# Patient Record
Sex: FEMALE | Race: WHITE | NOT HISPANIC OR LATINO | Employment: OTHER | URBAN - METROPOLITAN AREA
[De-identification: names, ages, dates, MRNs, and addresses within clinical notes are randomized per-mention and may not be internally consistent; named-entity substitution may affect disease eponyms.]

---

## 2017-02-20 ENCOUNTER — HOSPITAL ENCOUNTER (OUTPATIENT)
Dept: RADIOLOGY | Facility: MEDICAL CENTER | Age: 62
Discharge: HOME | End: 2017-02-20
Payer: COMMERCIAL

## 2017-02-20 ENCOUNTER — TRANSCRIBE ORDERS (OUTPATIENT)
Dept: ADMINISTRATIVE | Facility: HOSPITAL | Age: 62
End: 2017-02-20

## 2017-02-20 DIAGNOSIS — M79.671 RIGHT FOOT PAIN: ICD-10-CM

## 2017-02-20 DIAGNOSIS — M79.671 RIGHT FOOT PAIN: Primary | ICD-10-CM

## 2017-02-20 PROCEDURE — 73630 X-RAY EXAM OF FOOT: CPT

## 2017-04-26 ENCOUNTER — ALLSCRIPTS OFFICE VISIT (OUTPATIENT)
Dept: OTHER | Facility: OTHER | Age: 62
End: 2017-04-26

## 2017-05-02 ENCOUNTER — HOSPITAL ENCOUNTER (OUTPATIENT)
Dept: MAMMOGRAPHY | Facility: CLINIC | Age: 62
Discharge: HOME | End: 2017-05-02
Payer: COMMERCIAL

## 2017-05-02 DIAGNOSIS — Z15.01 GENETIC SUSCEPTIBILITY TO MALIGNANT NEOPLASM OF BREAST: ICD-10-CM

## 2017-05-02 DIAGNOSIS — Z15.02 GENETIC SUSCEPTIBILITY TO MALIGNANT NEOPLASM OF OVARY: ICD-10-CM

## 2017-05-02 DIAGNOSIS — Z12.31 ENCOUNTER FOR SCREENING MAMMOGRAM FOR MALIGNANT NEOPLASM OF BREAST: ICD-10-CM

## 2017-05-02 PROCEDURE — G0202 SCR MAMMO BI INCL CAD: HCPCS

## 2017-05-09 ENCOUNTER — ALLSCRIPTS OFFICE VISIT (OUTPATIENT)
Dept: OTHER | Facility: OTHER | Age: 62
End: 2017-05-09

## 2017-05-18 ENCOUNTER — TRANSCRIBE ORDERS (OUTPATIENT)
Dept: ADMINISTRATIVE | Facility: HOSPITAL | Age: 62
End: 2017-05-18

## 2017-05-18 DIAGNOSIS — Z15.01 GENETIC SUSCEPTIBILITY TO BREAST CANCER: ICD-10-CM

## 2017-05-18 DIAGNOSIS — Z15.01 GENETIC SUSCEPTIBILITY TO MALIGNANT NEOPLASM OF BREAST: Primary | ICD-10-CM

## 2017-05-30 ENCOUNTER — HOSPITAL ENCOUNTER (OUTPATIENT)
Dept: ULTRASOUND IMAGING | Facility: CLINIC | Age: 62
Discharge: HOME | End: 2017-05-30
Payer: COMMERCIAL

## 2017-05-30 DIAGNOSIS — Z15.01 GENETIC SUSCEPTIBILITY TO MALIGNANT NEOPLASM OF BREAST: ICD-10-CM

## 2017-05-30 PROCEDURE — 76377 3D RENDER W/INTRP POSTPROCES: CPT

## 2017-05-30 PROCEDURE — 76641 ULTRASOUND BREAST COMPLETE: CPT

## 2017-08-24 ENCOUNTER — APPOINTMENT (EMERGENCY)
Dept: RADIOLOGY | Facility: HOSPITAL | Age: 62
End: 2017-08-24
Payer: COMMERCIAL

## 2017-08-24 ENCOUNTER — HOSPITAL ENCOUNTER (EMERGENCY)
Facility: HOSPITAL | Age: 62
Discharge: HOME | End: 2017-08-24
Attending: EMERGENCY MEDICINE | Admitting: EMERGENCY MEDICINE
Payer: COMMERCIAL

## 2017-08-24 ENCOUNTER — APPOINTMENT (EMERGENCY)
Dept: NON INVASIVE DIAGNOSTICS | Facility: HOSPITAL | Age: 62
End: 2017-08-24
Payer: COMMERCIAL

## 2017-08-24 VITALS
WEIGHT: 250 LBS | TEMPERATURE: 97.9 F | RESPIRATION RATE: 18 BRPM | OXYGEN SATURATION: 94 % | HEART RATE: 83 BPM | BODY MASS INDEX: 40.35 KG/M2 | DIASTOLIC BLOOD PRESSURE: 60 MMHG | SYSTOLIC BLOOD PRESSURE: 127 MMHG

## 2017-08-24 DIAGNOSIS — S82.224A CLOSED NONDISPLACED TRANSVERSE FRACTURE OF SHAFT OF RIGHT TIBIA, INITIAL ENCOUNTER: Primary | ICD-10-CM

## 2017-08-24 PROCEDURE — 73590 X-RAY EXAM OF LOWER LEG: CPT

## 2017-08-24 PROCEDURE — 99284 EMERGENCY DEPT VISIT MOD MDM: CPT

## 2017-08-24 PROCEDURE — 93971 EXTREMITY STUDY: CPT

## 2017-08-24 RX ORDER — OXYCODONE HYDROCHLORIDE 10 MG/1
20 TABLET ORAL ONCE
Status: COMPLETED | OUTPATIENT
Start: 2017-08-24 | End: 2017-08-24

## 2017-08-24 RX ADMIN — OXYCODONE HYDROCHLORIDE 20 MG: 10 TABLET ORAL at 15:21

## 2017-09-01 ENCOUNTER — ALLSCRIPTS OFFICE VISIT (OUTPATIENT)
Dept: OTHER | Facility: OTHER | Age: 62
End: 2017-09-01

## 2017-09-04 ENCOUNTER — APPOINTMENT (INPATIENT)
Dept: RADIOLOGY | Facility: HOSPITAL | Age: 62
DRG: 493 | End: 2017-09-04
Payer: COMMERCIAL

## 2017-09-04 ENCOUNTER — APPOINTMENT (EMERGENCY)
Dept: RADIOLOGY | Facility: HOSPITAL | Age: 62
DRG: 493 | End: 2017-09-04
Payer: COMMERCIAL

## 2017-09-04 ENCOUNTER — HOSPITAL ENCOUNTER (INPATIENT)
Facility: HOSPITAL | Age: 62
LOS: 5 days | DRG: 493 | End: 2017-09-09
Attending: EMERGENCY MEDICINE | Admitting: ORTHOPAEDIC SURGERY
Payer: COMMERCIAL

## 2017-09-04 DIAGNOSIS — M31.30 WEGENER'S GRANULOMATOSIS: Chronic | ICD-10-CM

## 2017-09-04 DIAGNOSIS — S82.201A CLOSED RIGHT TIBIAL FRACTURE: Primary | ICD-10-CM

## 2017-09-04 LAB
ABO GROUP BLD: NORMAL
ANION GAP SERPL CALCULATED.3IONS-SCNC: 6 MMOL/L (ref 4–13)
APTT PPP: 28 SECONDS (ref 23–35)
BLD GP AB SCN SERPL QL: NEGATIVE
BUN SERPL-MCNC: 16 MG/DL (ref 5–25)
CALCIUM SERPL-MCNC: 9.1 MG/DL (ref 8.3–10.1)
CHLORIDE SERPL-SCNC: 104 MMOL/L (ref 100–108)
CO2 SERPL-SCNC: 30 MMOL/L (ref 21–32)
CREAT SERPL-MCNC: 0.99 MG/DL (ref 0.6–1.3)
ERYTHROCYTE [DISTWIDTH] IN BLOOD BY AUTOMATED COUNT: 13.3 % (ref 11.6–15.1)
GFR SERPL CREATININE-BSD FRML MDRD: 60 ML/MIN/1.73SQ M
GLUCOSE SERPL-MCNC: 88 MG/DL (ref 65–140)
HCT VFR BLD AUTO: 38.9 % (ref 34.8–46.1)
HGB BLD-MCNC: 12.8 G/DL (ref 11.5–15.4)
INR PPP: 1.02 (ref 0.86–1.16)
MCH RBC QN AUTO: 31.2 PG (ref 26.8–34.3)
MCHC RBC AUTO-ENTMCNC: 32.9 G/DL (ref 31.4–37.4)
MCV RBC AUTO: 95 FL (ref 82–98)
PLATELET # BLD AUTO: 209 THOUSANDS/UL (ref 149–390)
PMV BLD AUTO: 9.5 FL (ref 8.9–12.7)
POTASSIUM SERPL-SCNC: 4.3 MMOL/L (ref 3.5–5.3)
PROTHROMBIN TIME: 13.4 SECONDS (ref 12.1–14.4)
RBC # BLD AUTO: 4.1 MILLION/UL (ref 3.81–5.12)
RH BLD: POSITIVE
SODIUM SERPL-SCNC: 140 MMOL/L (ref 136–145)
SPECIMEN EXPIRATION DATE: NORMAL
WBC # BLD AUTO: 6.3 THOUSAND/UL (ref 4.31–10.16)

## 2017-09-04 PROCEDURE — 85027 COMPLETE CBC AUTOMATED: CPT | Performed by: ORTHOPAEDIC SURGERY

## 2017-09-04 PROCEDURE — 73590 X-RAY EXAM OF LOWER LEG: CPT

## 2017-09-04 PROCEDURE — 86923 COMPATIBILITY TEST ELECTRIC: CPT

## 2017-09-04 PROCEDURE — 99285 EMERGENCY DEPT VISIT HI MDM: CPT

## 2017-09-04 PROCEDURE — 36415 COLL VENOUS BLD VENIPUNCTURE: CPT | Performed by: ORTHOPAEDIC SURGERY

## 2017-09-04 PROCEDURE — 93005 ELECTROCARDIOGRAM TRACING: CPT | Performed by: ORTHOPAEDIC SURGERY

## 2017-09-04 PROCEDURE — 73600 X-RAY EXAM OF ANKLE: CPT

## 2017-09-04 PROCEDURE — 80048 BASIC METABOLIC PNL TOTAL CA: CPT | Performed by: ORTHOPAEDIC SURGERY

## 2017-09-04 PROCEDURE — 73560 X-RAY EXAM OF KNEE 1 OR 2: CPT

## 2017-09-04 PROCEDURE — 71010 HB CHEST X-RAY 1 VIEW FRONTAL: CPT

## 2017-09-04 PROCEDURE — 85610 PROTHROMBIN TIME: CPT | Performed by: ORTHOPAEDIC SURGERY

## 2017-09-04 PROCEDURE — 86901 BLOOD TYPING SEROLOGIC RH(D): CPT | Performed by: ORTHOPAEDIC SURGERY

## 2017-09-04 PROCEDURE — 85730 THROMBOPLASTIN TIME PARTIAL: CPT | Performed by: ORTHOPAEDIC SURGERY

## 2017-09-04 PROCEDURE — 86850 RBC ANTIBODY SCREEN: CPT | Performed by: ORTHOPAEDIC SURGERY

## 2017-09-04 PROCEDURE — 86900 BLOOD TYPING SEROLOGIC ABO: CPT | Performed by: ORTHOPAEDIC SURGERY

## 2017-09-04 RX ORDER — ACETAMINOPHEN 325 MG/1
650 TABLET ORAL EVERY 4 HOURS PRN
Status: DISCONTINUED | OUTPATIENT
Start: 2017-09-04 | End: 2017-09-09 | Stop reason: HOSPADM

## 2017-09-04 RX ORDER — DULOXETIN HYDROCHLORIDE 30 MG/1
30 CAPSULE, DELAYED RELEASE ORAL DAILY
Status: DISCONTINUED | OUTPATIENT
Start: 2017-09-05 | End: 2017-09-09 | Stop reason: HOSPADM

## 2017-09-04 RX ORDER — OXYCODONE HYDROCHLORIDE 5 MG/1
5 TABLET ORAL ONCE
Status: COMPLETED | OUTPATIENT
Start: 2017-09-04 | End: 2017-09-04

## 2017-09-04 RX ORDER — FUROSEMIDE 40 MG/1
40 TABLET ORAL DAILY
Status: DISCONTINUED | OUTPATIENT
Start: 2017-09-05 | End: 2017-09-09 | Stop reason: HOSPADM

## 2017-09-04 RX ORDER — PREGABALIN 50 MG/1
50 CAPSULE ORAL 3 TIMES DAILY
Status: DISCONTINUED | OUTPATIENT
Start: 2017-09-04 | End: 2017-09-09 | Stop reason: HOSPADM

## 2017-09-04 RX ORDER — DOCUSATE SODIUM 100 MG/1
100 CAPSULE, LIQUID FILLED ORAL 2 TIMES DAILY
Status: DISCONTINUED | OUTPATIENT
Start: 2017-09-04 | End: 2017-09-09 | Stop reason: HOSPADM

## 2017-09-04 RX ORDER — PANTOPRAZOLE SODIUM 40 MG/1
40 TABLET, DELAYED RELEASE ORAL
Status: DISCONTINUED | OUTPATIENT
Start: 2017-09-05 | End: 2017-09-09 | Stop reason: HOSPADM

## 2017-09-04 RX ORDER — ONDANSETRON 2 MG/ML
4 INJECTION INTRAMUSCULAR; INTRAVENOUS EVERY 6 HOURS PRN
Status: DISCONTINUED | OUTPATIENT
Start: 2017-09-04 | End: 2017-09-09

## 2017-09-04 RX ORDER — SENNOSIDES 8.6 MG
1 TABLET ORAL DAILY
Status: DISCONTINUED | OUTPATIENT
Start: 2017-09-04 | End: 2017-09-09 | Stop reason: HOSPADM

## 2017-09-04 RX ORDER — SODIUM CHLORIDE, SODIUM LACTATE, POTASSIUM CHLORIDE, CALCIUM CHLORIDE 600; 310; 30; 20 MG/100ML; MG/100ML; MG/100ML; MG/100ML
75 INJECTION, SOLUTION INTRAVENOUS CONTINUOUS
Status: DISCONTINUED | OUTPATIENT
Start: 2017-09-04 | End: 2017-09-06

## 2017-09-04 RX ORDER — LORAZEPAM 0.5 MG/1
0.5 TABLET ORAL EVERY 8 HOURS PRN
Status: DISCONTINUED | OUTPATIENT
Start: 2017-09-04 | End: 2017-09-09 | Stop reason: HOSPADM

## 2017-09-04 RX ORDER — OXYCODONE HCL 20 MG/1
60 TABLET, FILM COATED, EXTENDED RELEASE ORAL EVERY 12 HOURS SCHEDULED
Status: DISCONTINUED | OUTPATIENT
Start: 2017-09-04 | End: 2017-09-09 | Stop reason: HOSPADM

## 2017-09-04 RX ORDER — SULFAMETHOXAZOLE AND TRIMETHOPRIM 800; 160 MG/1; MG/1
1 TABLET ORAL 3 TIMES WEEKLY
Status: DISCONTINUED | OUTPATIENT
Start: 2017-09-04 | End: 2017-09-09 | Stop reason: HOSPADM

## 2017-09-04 RX ADMIN — DOCUSATE SODIUM 100 MG: 100 CAPSULE, LIQUID FILLED ORAL at 20:09

## 2017-09-04 RX ADMIN — LORAZEPAM 0.5 MG: 0.5 TABLET ORAL at 20:02

## 2017-09-04 RX ADMIN — OXYCODONE HYDROCHLORIDE 5 MG: 5 TABLET ORAL at 14:01

## 2017-09-04 RX ADMIN — OXYCODONE HYDROCHLORIDE 60 MG: 20 TABLET, FILM COATED, EXTENDED RELEASE ORAL at 20:03

## 2017-09-04 RX ADMIN — SULFAMETHOXAZOLE AND TRIMETHOPRIM 1 TABLET: 800; 160 TABLET ORAL at 20:00

## 2017-09-05 ENCOUNTER — ANESTHESIA EVENT (INPATIENT)
Dept: PERIOP | Facility: HOSPITAL | Age: 62
DRG: 493 | End: 2017-09-05
Payer: COMMERCIAL

## 2017-09-05 LAB
ATRIAL RATE: 83 BPM
P AXIS: 57 DEGREES
PLATELET # BLD AUTO: 210 THOUSANDS/UL (ref 149–390)
PMV BLD AUTO: 10.2 FL (ref 8.9–12.7)
PR INTERVAL: 156 MS
QRS AXIS: -5 DEGREES
QRSD INTERVAL: 92 MS
QT INTERVAL: 342 MS
QTC INTERVAL: 401 MS
T WAVE AXIS: 22 DEGREES
VENTRICULAR RATE: 83 BPM

## 2017-09-05 PROCEDURE — 85049 AUTOMATED PLATELET COUNT: CPT | Performed by: ORTHOPAEDIC SURGERY

## 2017-09-05 RX ADMIN — PANTOPRAZOLE SODIUM 40 MG: 40 TABLET, DELAYED RELEASE ORAL at 05:22

## 2017-09-05 RX ADMIN — LORAZEPAM 0.5 MG: 0.5 TABLET ORAL at 21:54

## 2017-09-05 RX ADMIN — ACETAMINOPHEN 650 MG: 325 TABLET, FILM COATED ORAL at 08:25

## 2017-09-05 RX ADMIN — OXYCODONE HYDROCHLORIDE 15 MG: 5 TABLET ORAL at 15:26

## 2017-09-05 RX ADMIN — OXYCODONE HYDROCHLORIDE 15 MG: 5 TABLET ORAL at 20:52

## 2017-09-05 RX ADMIN — OXYCODONE HYDROCHLORIDE 15 MG: 5 TABLET ORAL at 02:28

## 2017-09-05 RX ADMIN — OXYCODONE HYDROCHLORIDE 15 MG: 5 TABLET ORAL at 10:27

## 2017-09-05 RX ADMIN — LORAZEPAM 0.5 MG: 0.5 TABLET ORAL at 13:42

## 2017-09-05 RX ADMIN — PREGABALIN 50 MG: 50 CAPSULE ORAL at 08:25

## 2017-09-05 RX ADMIN — OXYCODONE HYDROCHLORIDE 60 MG: 20 TABLET, FILM COATED, EXTENDED RELEASE ORAL at 08:24

## 2017-09-05 RX ADMIN — LORAZEPAM 0.5 MG: 0.5 TABLET ORAL at 04:04

## 2017-09-05 RX ADMIN — FUROSEMIDE 40 MG: 40 TABLET ORAL at 08:25

## 2017-09-05 RX ADMIN — ENOXAPARIN SODIUM 40 MG: 40 INJECTION SUBCUTANEOUS at 08:25

## 2017-09-05 RX ADMIN — SENNOSIDES 8.6 MG: 8.6 TABLET, FILM COATED ORAL at 08:25

## 2017-09-05 RX ADMIN — DOCUSATE SODIUM 100 MG: 100 CAPSULE, LIQUID FILLED ORAL at 08:25

## 2017-09-05 RX ADMIN — DULOXETINE HYDROCHLORIDE 30 MG: 30 CAPSULE, DELAYED RELEASE ORAL at 08:25

## 2017-09-05 RX ADMIN — OXYCODONE HYDROCHLORIDE 60 MG: 20 TABLET, FILM COATED, EXTENDED RELEASE ORAL at 21:54

## 2017-09-06 ENCOUNTER — ANESTHESIA (INPATIENT)
Dept: PERIOP | Facility: HOSPITAL | Age: 62
DRG: 493 | End: 2017-09-06
Payer: COMMERCIAL

## 2017-09-06 ENCOUNTER — APPOINTMENT (INPATIENT)
Dept: RADIOLOGY | Facility: HOSPITAL | Age: 62
DRG: 493 | End: 2017-09-06
Payer: COMMERCIAL

## 2017-09-06 LAB
ANION GAP SERPL CALCULATED.3IONS-SCNC: 7 MMOL/L (ref 4–13)
BASOPHILS # BLD MANUAL: 0 THOUSAND/UL (ref 0–0.1)
BASOPHILS NFR MAR MANUAL: 0 % (ref 0–1)
BUN SERPL-MCNC: 17 MG/DL (ref 5–25)
CALCIUM SERPL-MCNC: 8.8 MG/DL (ref 8.3–10.1)
CHLORIDE SERPL-SCNC: 105 MMOL/L (ref 100–108)
CO2 SERPL-SCNC: 30 MMOL/L (ref 21–32)
CREAT SERPL-MCNC: 1.09 MG/DL (ref 0.6–1.3)
EOSINOPHIL # BLD MANUAL: 0.18 THOUSAND/UL (ref 0–0.4)
EOSINOPHIL NFR BLD MANUAL: 3 % (ref 0–6)
ERYTHROCYTE [DISTWIDTH] IN BLOOD BY AUTOMATED COUNT: 13.6 % (ref 11.6–15.1)
GFR SERPL CREATININE-BSD FRML MDRD: 54 ML/MIN/1.73SQ M
GLUCOSE SERPL-MCNC: 89 MG/DL (ref 65–140)
HCT VFR BLD AUTO: 38.6 % (ref 34.8–46.1)
HCT VFR BLD AUTO: 38.8 % (ref 34.8–46.1)
HGB BLD-MCNC: 12.6 G/DL (ref 11.5–15.4)
HGB BLD-MCNC: 12.6 G/DL (ref 11.5–15.4)
LYMPHOCYTES # BLD AUTO: 1.77 THOUSAND/UL (ref 0.6–4.47)
LYMPHOCYTES # BLD AUTO: 29 % (ref 14–44)
MAGNESIUM SERPL-MCNC: 2.1 MG/DL (ref 1.6–2.6)
MCH RBC QN AUTO: 31 PG (ref 26.8–34.3)
MCHC RBC AUTO-ENTMCNC: 32.6 G/DL (ref 31.4–37.4)
MCV RBC AUTO: 95 FL (ref 82–98)
METAMYELOCYTES NFR BLD MANUAL: 1 % (ref 0–1)
MONOCYTES # BLD AUTO: 0.37 THOUSAND/UL (ref 0–1.22)
MONOCYTES NFR BLD: 6 % (ref 4–12)
NEUTROPHILS # BLD MANUAL: 3.49 THOUSAND/UL (ref 1.85–7.62)
NEUTS SEG NFR BLD AUTO: 57 % (ref 43–75)
NRBC BLD AUTO-RTO: 0 /100 WBCS
PHOSPHATE SERPL-MCNC: 3.7 MG/DL (ref 2.3–4.1)
PLATELET # BLD AUTO: 219 THOUSANDS/UL (ref 149–390)
PLATELET BLD QL SMEAR: ADEQUATE
PMV BLD AUTO: 10.8 FL (ref 8.9–12.7)
POTASSIUM SERPL-SCNC: 4.3 MMOL/L (ref 3.5–5.3)
RBC # BLD AUTO: 4.07 MILLION/UL (ref 3.81–5.12)
RBC MORPH BLD: NORMAL
SODIUM SERPL-SCNC: 142 MMOL/L (ref 136–145)
VARIANT LYMPHS # BLD AUTO: 4 %
WBC # BLD AUTO: 6.12 THOUSAND/UL (ref 4.31–10.16)

## 2017-09-06 PROCEDURE — 73590 X-RAY EXAM OF LOWER LEG: CPT

## 2017-09-06 PROCEDURE — C1713 ANCHOR/SCREW BN/BN,TIS/BN: HCPCS | Performed by: ORTHOPAEDIC SURGERY

## 2017-09-06 PROCEDURE — 80048 BASIC METABOLIC PNL TOTAL CA: CPT | Performed by: INTERNAL MEDICINE

## 2017-09-06 PROCEDURE — 85018 HEMOGLOBIN: CPT | Performed by: PHYSICIAN ASSISTANT

## 2017-09-06 PROCEDURE — 84100 ASSAY OF PHOSPHORUS: CPT | Performed by: INTERNAL MEDICINE

## 2017-09-06 PROCEDURE — 85027 COMPLETE CBC AUTOMATED: CPT | Performed by: INTERNAL MEDICINE

## 2017-09-06 PROCEDURE — 0QSG36Z REPOSITION RIGHT TIBIA WITH INTRAMEDULLARY INTERNAL FIXATION DEVICE, PERCUTANEOUS APPROACH: ICD-10-PCS | Performed by: ORTHOPAEDIC SURGERY

## 2017-09-06 PROCEDURE — 85007 BL SMEAR W/DIFF WBC COUNT: CPT | Performed by: INTERNAL MEDICINE

## 2017-09-06 PROCEDURE — 83735 ASSAY OF MAGNESIUM: CPT | Performed by: INTERNAL MEDICINE

## 2017-09-06 PROCEDURE — C1769 GUIDE WIRE: HCPCS | Performed by: ORTHOPAEDIC SURGERY

## 2017-09-06 PROCEDURE — 85014 HEMATOCRIT: CPT | Performed by: PHYSICIAN ASSISTANT

## 2017-09-06 DEVICE — 5.0MM TI LOCKING SCREW W/T25 STARDRIVE 44MM F/IM NAIL-STER: Type: IMPLANTABLE DEVICE | Site: TIBIA | Status: FUNCTIONAL

## 2017-09-06 DEVICE — 5.0MM TI LOCKING SCREW W/T25 STARDRIVE 42MM F/IM NAIL-STER: Type: IMPLANTABLE DEVICE | Site: TIBIA | Status: FUNCTIONAL

## 2017-09-06 DEVICE — 13MM TI CANN TIBIAL NAIL-EX W/PROX BEND 345MM-STERILE
Type: IMPLANTABLE DEVICE | Site: TIBIA | Status: FUNCTIONAL
Brand: EXPERT NAIL

## 2017-09-06 RX ORDER — SODIUM CHLORIDE, SODIUM LACTATE, POTASSIUM CHLORIDE, CALCIUM CHLORIDE 600; 310; 30; 20 MG/100ML; MG/100ML; MG/100ML; MG/100ML
INJECTION, SOLUTION INTRAVENOUS CONTINUOUS PRN
Status: DISCONTINUED | OUTPATIENT
Start: 2017-09-06 | End: 2017-09-06

## 2017-09-06 RX ORDER — SODIUM CHLORIDE 9 MG/ML
INJECTION, SOLUTION INTRAVENOUS CONTINUOUS PRN
Status: DISCONTINUED | OUTPATIENT
Start: 2017-09-06 | End: 2017-09-06 | Stop reason: SURG

## 2017-09-06 RX ORDER — TOLTERODINE 4 MG/1
4 CAPSULE, EXTENDED RELEASE ORAL DAILY
Status: DISCONTINUED | OUTPATIENT
Start: 2017-09-06 | End: 2017-09-06

## 2017-09-06 RX ORDER — HYDROMORPHONE HYDROCHLORIDE 2 MG/ML
INJECTION, SOLUTION INTRAMUSCULAR; INTRAVENOUS; SUBCUTANEOUS AS NEEDED
Status: DISCONTINUED | OUTPATIENT
Start: 2017-09-06 | End: 2017-09-06 | Stop reason: SURG

## 2017-09-06 RX ORDER — FESOTERODINE FUMARATE 8 MG/1
8 TABLET, EXTENDED RELEASE ORAL EVERY EVENING
Status: DISCONTINUED | OUTPATIENT
Start: 2017-09-06 | End: 2017-09-09 | Stop reason: HOSPADM

## 2017-09-06 RX ORDER — MIDAZOLAM HYDROCHLORIDE 1 MG/ML
INJECTION INTRAMUSCULAR; INTRAVENOUS AS NEEDED
Status: DISCONTINUED | OUTPATIENT
Start: 2017-09-06 | End: 2017-09-06 | Stop reason: SURG

## 2017-09-06 RX ORDER — LIDOCAINE HYDROCHLORIDE 10 MG/ML
INJECTION, SOLUTION INFILTRATION; PERINEURAL AS NEEDED
Status: DISCONTINUED | OUTPATIENT
Start: 2017-09-06 | End: 2017-09-06 | Stop reason: SURG

## 2017-09-06 RX ORDER — ONDANSETRON 2 MG/ML
4 INJECTION INTRAMUSCULAR; INTRAVENOUS ONCE AS NEEDED
Status: COMPLETED | OUTPATIENT
Start: 2017-09-06 | End: 2017-09-06

## 2017-09-06 RX ORDER — MAGNESIUM HYDROXIDE 1200 MG/15ML
LIQUID ORAL AS NEEDED
Status: DISCONTINUED | OUTPATIENT
Start: 2017-09-06 | End: 2017-09-06 | Stop reason: HOSPADM

## 2017-09-06 RX ORDER — FENTANYL CITRATE 50 UG/ML
INJECTION, SOLUTION INTRAMUSCULAR; INTRAVENOUS AS NEEDED
Status: DISCONTINUED | OUTPATIENT
Start: 2017-09-06 | End: 2017-09-06 | Stop reason: SURG

## 2017-09-06 RX ORDER — KETAMINE HYDROCHLORIDE 50 MG/ML
INJECTION, SOLUTION, CONCENTRATE INTRAMUSCULAR; INTRAVENOUS AS NEEDED
Status: DISCONTINUED | OUTPATIENT
Start: 2017-09-06 | End: 2017-09-06 | Stop reason: SURG

## 2017-09-06 RX ORDER — SODIUM CHLORIDE, SODIUM LACTATE, POTASSIUM CHLORIDE, CALCIUM CHLORIDE 600; 310; 30; 20 MG/100ML; MG/100ML; MG/100ML; MG/100ML
20 INJECTION, SOLUTION INTRAVENOUS CONTINUOUS
Status: DISCONTINUED | OUTPATIENT
Start: 2017-09-06 | End: 2017-09-06

## 2017-09-06 RX ORDER — ONDANSETRON 2 MG/ML
INJECTION INTRAMUSCULAR; INTRAVENOUS AS NEEDED
Status: DISCONTINUED | OUTPATIENT
Start: 2017-09-06 | End: 2017-09-06 | Stop reason: SURG

## 2017-09-06 RX ORDER — FENTANYL CITRATE/PF 50 MCG/ML
25 SYRINGE (ML) INJECTION
Status: COMPLETED | OUTPATIENT
Start: 2017-09-06 | End: 2017-09-06

## 2017-09-06 RX ORDER — PROPOFOL 10 MG/ML
INJECTION, EMULSION INTRAVENOUS AS NEEDED
Status: DISCONTINUED | OUTPATIENT
Start: 2017-09-06 | End: 2017-09-06 | Stop reason: SURG

## 2017-09-06 RX ORDER — SODIUM CHLORIDE, SODIUM LACTATE, POTASSIUM CHLORIDE, CALCIUM CHLORIDE 600; 310; 30; 20 MG/100ML; MG/100ML; MG/100ML; MG/100ML
125 INJECTION, SOLUTION INTRAVENOUS CONTINUOUS
Status: DISCONTINUED | OUTPATIENT
Start: 2017-09-06 | End: 2017-09-07

## 2017-09-06 RX ADMIN — SULFAMETHOXAZOLE AND TRIMETHOPRIM 1 TABLET: 800; 160 TABLET ORAL at 09:38

## 2017-09-06 RX ADMIN — SODIUM CHLORIDE, SODIUM LACTATE, POTASSIUM CHLORIDE, AND CALCIUM CHLORIDE 75 ML/HR: .6; .31; .03; .02 INJECTION, SOLUTION INTRAVENOUS at 05:35

## 2017-09-06 RX ADMIN — HYDROMORPHONE HYDROCHLORIDE 0.4 MG: 1 INJECTION, SOLUTION INTRAMUSCULAR; INTRAVENOUS; SUBCUTANEOUS at 13:38

## 2017-09-06 RX ADMIN — OXYCODONE HYDROCHLORIDE 15 MG: 5 TABLET ORAL at 15:59

## 2017-09-06 RX ADMIN — OXYCODONE HYDROCHLORIDE 60 MG: 20 TABLET, FILM COATED, EXTENDED RELEASE ORAL at 09:25

## 2017-09-06 RX ADMIN — CEFAZOLIN SODIUM 2000 MG: 2 SOLUTION INTRAVENOUS at 11:45

## 2017-09-06 RX ADMIN — SODIUM CHLORIDE, SODIUM LACTATE, POTASSIUM CHLORIDE, AND CALCIUM CHLORIDE: .6; .31; .03; .02 INJECTION, SOLUTION INTRAVENOUS at 12:40

## 2017-09-06 RX ADMIN — FESOTERODINE FUMARATE 8 MG: 8 TABLET, EXTENDED RELEASE ORAL at 19:31

## 2017-09-06 RX ADMIN — FENTANYL CITRATE 50 MCG: 50 INJECTION, SOLUTION INTRAMUSCULAR; INTRAVENOUS at 12:50

## 2017-09-06 RX ADMIN — SODIUM CHLORIDE, SODIUM LACTATE, POTASSIUM CHLORIDE, AND CALCIUM CHLORIDE 125 ML/HR: .6; .31; .03; .02 INJECTION, SOLUTION INTRAVENOUS at 14:40

## 2017-09-06 RX ADMIN — OXYCODONE HYDROCHLORIDE 15 MG: 5 TABLET ORAL at 05:51

## 2017-09-06 RX ADMIN — HYDROMORPHONE HYDROCHLORIDE 0.5 MG: 2 INJECTION, SOLUTION INTRAMUSCULAR; INTRAVENOUS; SUBCUTANEOUS at 12:31

## 2017-09-06 RX ADMIN — HYDROMORPHONE HYDROCHLORIDE 1 MG: 1 INJECTION, SOLUTION INTRAMUSCULAR; INTRAVENOUS; SUBCUTANEOUS at 19:31

## 2017-09-06 RX ADMIN — DULOXETINE HYDROCHLORIDE 30 MG: 30 CAPSULE, DELAYED RELEASE ORAL at 09:25

## 2017-09-06 RX ADMIN — KETAMINE HYDROCHLORIDE 100 MG: 50 INJECTION, SOLUTION INTRAMUSCULAR; INTRAVENOUS at 11:40

## 2017-09-06 RX ADMIN — PANTOPRAZOLE SODIUM 40 MG: 40 TABLET, DELAYED RELEASE ORAL at 05:51

## 2017-09-06 RX ADMIN — HYDROMORPHONE HYDROCHLORIDE 0.4 MG: 1 INJECTION, SOLUTION INTRAMUSCULAR; INTRAVENOUS; SUBCUTANEOUS at 13:48

## 2017-09-06 RX ADMIN — FENTANYL CITRATE 25 MCG: 50 INJECTION INTRAMUSCULAR; INTRAVENOUS at 13:18

## 2017-09-06 RX ADMIN — HYDROMORPHONE HYDROCHLORIDE 0.5 MG: 2 INJECTION, SOLUTION INTRAMUSCULAR; INTRAVENOUS; SUBCUTANEOUS at 12:26

## 2017-09-06 RX ADMIN — FENTANYL CITRATE 25 MCG: 50 INJECTION INTRAMUSCULAR; INTRAVENOUS at 13:08

## 2017-09-06 RX ADMIN — HYDROMORPHONE HYDROCHLORIDE 0.4 MG: 1 INJECTION, SOLUTION INTRAMUSCULAR; INTRAVENOUS; SUBCUTANEOUS at 13:28

## 2017-09-06 RX ADMIN — MIDAZOLAM HYDROCHLORIDE 2 MG: 1 INJECTION, SOLUTION INTRAMUSCULAR; INTRAVENOUS at 12:31

## 2017-09-06 RX ADMIN — PREGABALIN 50 MG: 50 CAPSULE ORAL at 09:24

## 2017-09-06 RX ADMIN — PROPOFOL 200 MG: 10 INJECTION, EMULSION INTRAVENOUS at 11:39

## 2017-09-06 RX ADMIN — SODIUM CHLORIDE: 0.9 INJECTION, SOLUTION INTRAVENOUS at 11:49

## 2017-09-06 RX ADMIN — LIDOCAINE HYDROCHLORIDE 50 MG: 10 INJECTION, SOLUTION INFILTRATION; PERINEURAL at 11:39

## 2017-09-06 RX ADMIN — FENTANYL CITRATE 50 MCG: 50 INJECTION, SOLUTION INTRAMUSCULAR; INTRAVENOUS at 11:49

## 2017-09-06 RX ADMIN — FENTANYL CITRATE 25 MCG: 50 INJECTION INTRAMUSCULAR; INTRAVENOUS at 13:28

## 2017-09-06 RX ADMIN — FENTANYL CITRATE 50 MCG: 50 INJECTION, SOLUTION INTRAMUSCULAR; INTRAVENOUS at 12:06

## 2017-09-06 RX ADMIN — SODIUM CHLORIDE, SODIUM LACTATE, POTASSIUM CHLORIDE, AND CALCIUM CHLORIDE: .6; .31; .03; .02 INJECTION, SOLUTION INTRAVENOUS at 11:26

## 2017-09-06 RX ADMIN — FENTANYL CITRATE 50 MCG: 50 INJECTION, SOLUTION INTRAMUSCULAR; INTRAVENOUS at 11:40

## 2017-09-06 RX ADMIN — DOCUSATE SODIUM 100 MG: 100 CAPSULE, LIQUID FILLED ORAL at 19:32

## 2017-09-06 RX ADMIN — HYDROMORPHONE HYDROCHLORIDE 0.4 MG: 1 INJECTION, SOLUTION INTRAMUSCULAR; INTRAVENOUS; SUBCUTANEOUS at 13:58

## 2017-09-06 RX ADMIN — HYDROMORPHONE HYDROCHLORIDE 0.4 MG: 1 INJECTION, SOLUTION INTRAMUSCULAR; INTRAVENOUS; SUBCUTANEOUS at 14:08

## 2017-09-06 RX ADMIN — ONDANSETRON 4 MG: 2 INJECTION INTRAMUSCULAR; INTRAVENOUS at 21:22

## 2017-09-06 RX ADMIN — ONDANSETRON 4 MG: 2 INJECTION INTRAMUSCULAR; INTRAVENOUS at 14:03

## 2017-09-06 RX ADMIN — OXYCODONE HYDROCHLORIDE 60 MG: 20 TABLET, FILM COATED, EXTENDED RELEASE ORAL at 21:21

## 2017-09-06 RX ADMIN — PREGABALIN 50 MG: 50 CAPSULE ORAL at 21:22

## 2017-09-06 RX ADMIN — FENTANYL CITRATE 25 MCG: 50 INJECTION INTRAMUSCULAR; INTRAVENOUS at 13:12

## 2017-09-06 RX ADMIN — OXYCODONE HYDROCHLORIDE 15 MG: 5 TABLET ORAL at 22:46

## 2017-09-06 RX ADMIN — LORAZEPAM 0.5 MG: 0.5 TABLET ORAL at 05:56

## 2017-09-06 RX ADMIN — OXYCODONE HYDROCHLORIDE 15 MG: 5 TABLET ORAL at 01:20

## 2017-09-06 RX ADMIN — ONDANSETRON 4 MG: 2 INJECTION INTRAMUSCULAR; INTRAVENOUS at 12:39

## 2017-09-07 LAB
HCT VFR BLD AUTO: 32.2 % (ref 34.8–46.1)
HGB BLD-MCNC: 10.3 G/DL (ref 11.5–15.4)

## 2017-09-07 PROCEDURE — G8978 MOBILITY CURRENT STATUS: HCPCS

## 2017-09-07 PROCEDURE — 85018 HEMOGLOBIN: CPT | Performed by: PHYSICIAN ASSISTANT

## 2017-09-07 PROCEDURE — 97163 PT EVAL HIGH COMPLEX 45 MIN: CPT

## 2017-09-07 PROCEDURE — 97167 OT EVAL HIGH COMPLEX 60 MIN: CPT

## 2017-09-07 PROCEDURE — G8988 SELF CARE GOAL STATUS: HCPCS

## 2017-09-07 PROCEDURE — G8979 MOBILITY GOAL STATUS: HCPCS

## 2017-09-07 PROCEDURE — 97535 SELF CARE MNGMENT TRAINING: CPT

## 2017-09-07 PROCEDURE — 85014 HEMATOCRIT: CPT | Performed by: PHYSICIAN ASSISTANT

## 2017-09-07 PROCEDURE — G8987 SELF CARE CURRENT STATUS: HCPCS

## 2017-09-07 RX ADMIN — DOCUSATE SODIUM 100 MG: 100 CAPSULE, LIQUID FILLED ORAL at 17:42

## 2017-09-07 RX ADMIN — OXYCODONE HYDROCHLORIDE 15 MG: 5 TABLET ORAL at 13:55

## 2017-09-07 RX ADMIN — ACETAMINOPHEN 650 MG: 325 TABLET, FILM COATED ORAL at 17:41

## 2017-09-07 RX ADMIN — HYDROMORPHONE HYDROCHLORIDE 1 MG: 1 INJECTION, SOLUTION INTRAMUSCULAR; INTRAVENOUS; SUBCUTANEOUS at 06:03

## 2017-09-07 RX ADMIN — OXYCODONE HYDROCHLORIDE 60 MG: 20 TABLET, FILM COATED, EXTENDED RELEASE ORAL at 22:11

## 2017-09-07 RX ADMIN — OXYCODONE HYDROCHLORIDE 15 MG: 5 TABLET ORAL at 03:25

## 2017-09-07 RX ADMIN — OXYCODONE HYDROCHLORIDE 60 MG: 20 TABLET, FILM COATED, EXTENDED RELEASE ORAL at 08:30

## 2017-09-07 RX ADMIN — OXYCODONE HYDROCHLORIDE 15 MG: 5 TABLET ORAL at 18:39

## 2017-09-07 RX ADMIN — ONDANSETRON 4 MG: 2 INJECTION INTRAMUSCULAR; INTRAVENOUS at 13:56

## 2017-09-07 RX ADMIN — FUROSEMIDE 40 MG: 40 TABLET ORAL at 08:31

## 2017-09-07 RX ADMIN — DOCUSATE SODIUM 100 MG: 100 CAPSULE, LIQUID FILLED ORAL at 08:32

## 2017-09-07 RX ADMIN — ACETAMINOPHEN 650 MG: 325 TABLET, FILM COATED ORAL at 08:31

## 2017-09-07 RX ADMIN — SODIUM CHLORIDE, SODIUM LACTATE, POTASSIUM CHLORIDE, AND CALCIUM CHLORIDE 125 ML/HR: .6; .31; .03; .02 INJECTION, SOLUTION INTRAVENOUS at 00:06

## 2017-09-07 RX ADMIN — OXYCODONE HYDROCHLORIDE 15 MG: 5 TABLET ORAL at 08:34

## 2017-09-07 RX ADMIN — ONDANSETRON 4 MG: 2 INJECTION INTRAMUSCULAR; INTRAVENOUS at 06:38

## 2017-09-07 RX ADMIN — HYDROMORPHONE HYDROCHLORIDE 1 MG: 1 INJECTION, SOLUTION INTRAMUSCULAR; INTRAVENOUS; SUBCUTANEOUS at 20:05

## 2017-09-07 RX ADMIN — FESOTERODINE FUMARATE 8 MG: 8 TABLET, EXTENDED RELEASE ORAL at 17:42

## 2017-09-07 RX ADMIN — HYDROMORPHONE HYDROCHLORIDE 1 MG: 1 INJECTION, SOLUTION INTRAMUSCULAR; INTRAVENOUS; SUBCUTANEOUS at 00:08

## 2017-09-07 RX ADMIN — ENOXAPARIN SODIUM 40 MG: 40 INJECTION SUBCUTANEOUS at 08:32

## 2017-09-07 RX ADMIN — SENNOSIDES 8.6 MG: 8.6 TABLET, FILM COATED ORAL at 08:32

## 2017-09-07 RX ADMIN — LORAZEPAM 0.5 MG: 0.5 TABLET ORAL at 17:41

## 2017-09-07 RX ADMIN — PANTOPRAZOLE SODIUM 40 MG: 40 TABLET, DELAYED RELEASE ORAL at 06:03

## 2017-09-07 RX ADMIN — HYDROMORPHONE HYDROCHLORIDE 1 MG: 1 INJECTION, SOLUTION INTRAMUSCULAR; INTRAVENOUS; SUBCUTANEOUS at 12:05

## 2017-09-07 RX ADMIN — SODIUM CHLORIDE, SODIUM LACTATE, POTASSIUM CHLORIDE, AND CALCIUM CHLORIDE 125 ML/HR: .6; .31; .03; .02 INJECTION, SOLUTION INTRAVENOUS at 08:36

## 2017-09-07 RX ADMIN — DULOXETINE HYDROCHLORIDE 30 MG: 30 CAPSULE, DELAYED RELEASE ORAL at 08:30

## 2017-09-08 LAB
ABO GROUP BLD BPU: NORMAL
ABO GROUP BLD BPU: NORMAL
BPU ID: NORMAL
BPU ID: NORMAL
UNIT DISPENSE STATUS: NORMAL
UNIT DISPENSE STATUS: NORMAL
UNIT PRODUCT CODE: NORMAL
UNIT PRODUCT CODE: NORMAL
UNIT RH: NORMAL
UNIT RH: NORMAL

## 2017-09-08 PROCEDURE — 97535 SELF CARE MNGMENT TRAINING: CPT

## 2017-09-08 PROCEDURE — 97530 THERAPEUTIC ACTIVITIES: CPT

## 2017-09-08 PROCEDURE — 97112 NEUROMUSCULAR REEDUCATION: CPT

## 2017-09-08 RX ADMIN — OXYCODONE HYDROCHLORIDE 15 MG: 5 TABLET ORAL at 00:45

## 2017-09-08 RX ADMIN — DULOXETINE HYDROCHLORIDE 30 MG: 30 CAPSULE, DELAYED RELEASE ORAL at 08:19

## 2017-09-08 RX ADMIN — OXYCODONE HYDROCHLORIDE 15 MG: 5 TABLET ORAL at 06:31

## 2017-09-08 RX ADMIN — PANTOPRAZOLE SODIUM 40 MG: 40 TABLET, DELAYED RELEASE ORAL at 06:31

## 2017-09-08 RX ADMIN — LORAZEPAM 0.5 MG: 0.5 TABLET ORAL at 07:47

## 2017-09-08 RX ADMIN — OXYCODONE HYDROCHLORIDE 60 MG: 20 TABLET, FILM COATED, EXTENDED RELEASE ORAL at 08:23

## 2017-09-08 RX ADMIN — ENOXAPARIN SODIUM 40 MG: 40 INJECTION SUBCUTANEOUS at 08:19

## 2017-09-08 RX ADMIN — OXYCODONE HYDROCHLORIDE 60 MG: 20 TABLET, FILM COATED, EXTENDED RELEASE ORAL at 21:18

## 2017-09-08 RX ADMIN — OXYCODONE HYDROCHLORIDE 15 MG: 5 TABLET ORAL at 10:34

## 2017-09-08 RX ADMIN — PREGABALIN 50 MG: 50 CAPSULE ORAL at 15:12

## 2017-09-08 RX ADMIN — PREGABALIN 50 MG: 50 CAPSULE ORAL at 08:19

## 2017-09-08 RX ADMIN — OXYCODONE HYDROCHLORIDE 15 MG: 5 TABLET ORAL at 15:12

## 2017-09-08 RX ADMIN — FUROSEMIDE 40 MG: 40 TABLET ORAL at 08:19

## 2017-09-08 RX ADMIN — LORAZEPAM 0.5 MG: 0.5 TABLET ORAL at 23:57

## 2017-09-08 RX ADMIN — ONDANSETRON 4 MG: 2 INJECTION INTRAMUSCULAR; INTRAVENOUS at 07:48

## 2017-09-08 RX ADMIN — OXYCODONE HYDROCHLORIDE 15 MG: 5 TABLET ORAL at 23:51

## 2017-09-08 RX ADMIN — SENNOSIDES 8.6 MG: 8.6 TABLET, FILM COATED ORAL at 08:19

## 2017-09-08 RX ADMIN — FESOTERODINE FUMARATE 8 MG: 8 TABLET, EXTENDED RELEASE ORAL at 17:41

## 2017-09-08 RX ADMIN — PREGABALIN 50 MG: 50 CAPSULE ORAL at 21:18

## 2017-09-08 RX ADMIN — DOCUSATE SODIUM 100 MG: 100 CAPSULE, LIQUID FILLED ORAL at 08:19

## 2017-09-08 RX ADMIN — ACETAMINOPHEN 650 MG: 325 TABLET, FILM COATED ORAL at 07:48

## 2017-09-08 RX ADMIN — SULFAMETHOXAZOLE AND TRIMETHOPRIM 1 TABLET: 800; 160 TABLET ORAL at 08:19

## 2017-09-08 RX ADMIN — DOCUSATE SODIUM 100 MG: 100 CAPSULE, LIQUID FILLED ORAL at 17:40

## 2017-09-08 RX ADMIN — OXYCODONE HYDROCHLORIDE 15 MG: 5 TABLET ORAL at 19:42

## 2017-09-09 ENCOUNTER — HOSPITAL ENCOUNTER (INPATIENT)
Facility: HOSPITAL | Age: 62
LOS: 10 days | Discharge: HOME HEALTH | DRG: 560 | End: 2017-09-19
Attending: PHYSICAL MEDICINE & REHABILITATION | Admitting: PHYSICAL MEDICINE & REHABILITATION
Payer: COMMERCIAL

## 2017-09-09 VITALS
WEIGHT: 250 LBS | BODY MASS INDEX: 39.24 KG/M2 | HEIGHT: 67 IN | DIASTOLIC BLOOD PRESSURE: 60 MMHG | SYSTOLIC BLOOD PRESSURE: 128 MMHG | HEART RATE: 87 BPM | OXYGEN SATURATION: 96 % | TEMPERATURE: 98.7 F | RESPIRATION RATE: 16 BRPM

## 2017-09-09 DIAGNOSIS — M31.30 WEGENER'S GRANULOMATOSIS: Chronic | ICD-10-CM

## 2017-09-09 DIAGNOSIS — N18.9 CKD (CHRONIC KIDNEY DISEASE): ICD-10-CM

## 2017-09-09 DIAGNOSIS — D64.9 ANEMIA: Primary | ICD-10-CM

## 2017-09-09 DIAGNOSIS — S82.201A CLOSED RIGHT TIBIAL FRACTURE: ICD-10-CM

## 2017-09-09 RX ORDER — PREGABALIN 50 MG/1
50 CAPSULE ORAL 3 TIMES DAILY
Status: DISCONTINUED | OUTPATIENT
Start: 2017-09-09 | End: 2017-09-19 | Stop reason: HOSPADM

## 2017-09-09 RX ORDER — BISACODYL 10 MG
10 SUPPOSITORY, RECTAL RECTAL DAILY PRN
Status: DISCONTINUED | OUTPATIENT
Start: 2017-09-09 | End: 2017-09-19 | Stop reason: HOSPADM

## 2017-09-09 RX ORDER — POLYETHYLENE GLYCOL 3350 17 G/17G
17 POWDER, FOR SOLUTION ORAL DAILY PRN
Status: DISCONTINUED | OUTPATIENT
Start: 2017-09-09 | End: 2017-09-14

## 2017-09-09 RX ORDER — FUROSEMIDE 40 MG/1
40 TABLET ORAL DAILY
Status: CANCELLED | OUTPATIENT
Start: 2017-09-10

## 2017-09-09 RX ORDER — SENNOSIDES 8.6 MG
1 TABLET ORAL DAILY
Status: CANCELLED | OUTPATIENT
Start: 2017-09-10

## 2017-09-09 RX ORDER — PANTOPRAZOLE SODIUM 40 MG/1
40 TABLET, DELAYED RELEASE ORAL
Status: DISCONTINUED | OUTPATIENT
Start: 2017-09-10 | End: 2017-09-19 | Stop reason: HOSPADM

## 2017-09-09 RX ORDER — DOCUSATE SODIUM 100 MG/1
100 CAPSULE, LIQUID FILLED ORAL 2 TIMES DAILY
Status: CANCELLED | OUTPATIENT
Start: 2017-09-09

## 2017-09-09 RX ORDER — SULFAMETHOXAZOLE AND TRIMETHOPRIM 800; 160 MG/1; MG/1
1 TABLET ORAL 3 TIMES WEEKLY
Status: DISCONTINUED | OUTPATIENT
Start: 2017-09-11 | End: 2017-09-19 | Stop reason: HOSPADM

## 2017-09-09 RX ORDER — PREGABALIN 50 MG/1
50 CAPSULE ORAL 3 TIMES DAILY
Status: CANCELLED | OUTPATIENT
Start: 2017-09-09

## 2017-09-09 RX ORDER — FESOTERODINE FUMARATE 8 MG/1
8 TABLET, EXTENDED RELEASE ORAL EVERY EVENING
Status: CANCELLED | OUTPATIENT
Start: 2017-09-09

## 2017-09-09 RX ORDER — OXYCODONE HYDROCHLORIDE 5 MG/1
TABLET ORAL
Qty: 60 TABLET | Refills: 0
Start: 2017-09-09 | End: 2017-09-09

## 2017-09-09 RX ORDER — PANTOPRAZOLE SODIUM 40 MG/1
40 TABLET, DELAYED RELEASE ORAL
Status: CANCELLED | OUTPATIENT
Start: 2017-09-10

## 2017-09-09 RX ORDER — OXYCODONE HCL 20 MG/1
60 TABLET, FILM COATED, EXTENDED RELEASE ORAL EVERY 12 HOURS SCHEDULED
Status: CANCELLED | OUTPATIENT
Start: 2017-09-09

## 2017-09-09 RX ORDER — DULOXETIN HYDROCHLORIDE 30 MG/1
30 CAPSULE, DELAYED RELEASE ORAL DAILY
Status: CANCELLED | OUTPATIENT
Start: 2017-09-10

## 2017-09-09 RX ORDER — ACETAMINOPHEN 325 MG/1
650 TABLET ORAL EVERY 4 HOURS PRN
Status: CANCELLED | OUTPATIENT
Start: 2017-09-09

## 2017-09-09 RX ORDER — OXYCODONE HYDROCHLORIDE 10 MG/1
10 TABLET ORAL EVERY 4 HOURS PRN
Status: DISCONTINUED | OUTPATIENT
Start: 2017-09-09 | End: 2017-09-11

## 2017-09-09 RX ORDER — LORAZEPAM 0.5 MG/1
0.5 TABLET ORAL EVERY 8 HOURS PRN
Status: DISCONTINUED | OUTPATIENT
Start: 2017-09-09 | End: 2017-09-19 | Stop reason: HOSPADM

## 2017-09-09 RX ORDER — DULOXETIN HYDROCHLORIDE 30 MG/1
30 CAPSULE, DELAYED RELEASE ORAL DAILY
Status: DISCONTINUED | OUTPATIENT
Start: 2017-09-10 | End: 2017-09-19 | Stop reason: HOSPADM

## 2017-09-09 RX ORDER — SENNOSIDES 8.6 MG
1 TABLET ORAL DAILY
Status: DISCONTINUED | OUTPATIENT
Start: 2017-09-10 | End: 2017-09-19 | Stop reason: HOSPADM

## 2017-09-09 RX ORDER — ONDANSETRON 4 MG/1
4 TABLET, ORALLY DISINTEGRATING ORAL EVERY 6 HOURS PRN
Status: DISCONTINUED | OUTPATIENT
Start: 2017-09-09 | End: 2017-09-19 | Stop reason: HOSPADM

## 2017-09-09 RX ORDER — MAGNESIUM HYDROXIDE/ALUMINUM HYDROXICE/SIMETHICONE 120; 1200; 1200 MG/30ML; MG/30ML; MG/30ML
30 SUSPENSION ORAL EVERY 4 HOURS PRN
Status: DISCONTINUED | OUTPATIENT
Start: 2017-09-09 | End: 2017-09-19 | Stop reason: HOSPADM

## 2017-09-09 RX ORDER — ACETAMINOPHEN 325 MG/1
650 TABLET ORAL EVERY 4 HOURS PRN
Status: DISCONTINUED | OUTPATIENT
Start: 2017-09-09 | End: 2017-09-19 | Stop reason: HOSPADM

## 2017-09-09 RX ORDER — LORAZEPAM 0.5 MG/1
0.5 TABLET ORAL EVERY 8 HOURS PRN
Status: CANCELLED | OUTPATIENT
Start: 2017-09-09

## 2017-09-09 RX ORDER — FESOTERODINE FUMARATE 8 MG/1
8 TABLET, EXTENDED RELEASE ORAL EVERY EVENING
Status: DISCONTINUED | OUTPATIENT
Start: 2017-09-09 | End: 2017-09-18

## 2017-09-09 RX ORDER — ONDANSETRON 4 MG/1
4 TABLET, ORALLY DISINTEGRATING ORAL EVERY 6 HOURS PRN
Status: DISCONTINUED | OUTPATIENT
Start: 2017-09-09 | End: 2017-09-09 | Stop reason: HOSPADM

## 2017-09-09 RX ORDER — FUROSEMIDE 40 MG/1
40 TABLET ORAL DAILY
Status: DISCONTINUED | OUTPATIENT
Start: 2017-09-10 | End: 2017-09-12

## 2017-09-09 RX ORDER — OXYCODONE HYDROCHLORIDE 5 MG/1
TABLET ORAL
Qty: 60 TABLET | Refills: 0 | Status: SHIPPED | OUTPATIENT
Start: 2017-09-09 | End: 2017-09-19 | Stop reason: HOSPADM

## 2017-09-09 RX ORDER — OXYCODONE HCL 20 MG/1
60 TABLET, FILM COATED, EXTENDED RELEASE ORAL EVERY 12 HOURS SCHEDULED
Status: DISCONTINUED | OUTPATIENT
Start: 2017-09-09 | End: 2017-09-19 | Stop reason: HOSPADM

## 2017-09-09 RX ORDER — SULFAMETHOXAZOLE AND TRIMETHOPRIM 800; 160 MG/1; MG/1
1 TABLET ORAL 3 TIMES WEEKLY
Status: CANCELLED | OUTPATIENT
Start: 2017-09-11

## 2017-09-09 RX ORDER — DOCUSATE SODIUM 100 MG/1
100 CAPSULE, LIQUID FILLED ORAL 2 TIMES DAILY
Status: DISCONTINUED | OUTPATIENT
Start: 2017-09-09 | End: 2017-09-19 | Stop reason: HOSPADM

## 2017-09-09 RX ADMIN — OXYCODONE HYDROCHLORIDE 15 MG: 5 TABLET ORAL at 23:50

## 2017-09-09 RX ADMIN — DOCUSATE SODIUM 100 MG: 100 CAPSULE, LIQUID FILLED ORAL at 18:17

## 2017-09-09 RX ADMIN — ACETAMINOPHEN 650 MG: 325 TABLET, FILM COATED ORAL at 02:41

## 2017-09-09 RX ADMIN — OXYCODONE HYDROCHLORIDE 15 MG: 5 TABLET ORAL at 10:31

## 2017-09-09 RX ADMIN — OXYCODONE HYDROCHLORIDE 15 MG: 5 TABLET ORAL at 14:59

## 2017-09-09 RX ADMIN — DOCUSATE SODIUM 100 MG: 100 CAPSULE, LIQUID FILLED ORAL at 08:44

## 2017-09-09 RX ADMIN — PREGABALIN 50 MG: 50 CAPSULE ORAL at 20:58

## 2017-09-09 RX ADMIN — SENNOSIDES 8.6 MG: 8.6 TABLET, FILM COATED ORAL at 08:44

## 2017-09-09 RX ADMIN — DULOXETINE HYDROCHLORIDE 30 MG: 30 CAPSULE, DELAYED RELEASE ORAL at 08:44

## 2017-09-09 RX ADMIN — OXYCODONE HYDROCHLORIDE 60 MG: 20 TABLET, FILM COATED, EXTENDED RELEASE ORAL at 20:57

## 2017-09-09 RX ADMIN — LORAZEPAM 0.5 MG: 0.5 TABLET ORAL at 16:46

## 2017-09-09 RX ADMIN — FESOTERODINE FUMARATE 8 MG: 8 TABLET, EXTENDED RELEASE ORAL at 20:59

## 2017-09-09 RX ADMIN — PANTOPRAZOLE SODIUM 40 MG: 40 TABLET, DELAYED RELEASE ORAL at 05:01

## 2017-09-09 RX ADMIN — FUROSEMIDE 40 MG: 40 TABLET ORAL at 08:44

## 2017-09-09 RX ADMIN — HYDROMORPHONE HYDROCHLORIDE 1 MG: 1 INJECTION, SOLUTION INTRAMUSCULAR; INTRAVENOUS; SUBCUTANEOUS at 08:45

## 2017-09-09 RX ADMIN — PREGABALIN 50 MG: 50 CAPSULE ORAL at 08:45

## 2017-09-09 RX ADMIN — ENOXAPARIN SODIUM 40 MG: 40 INJECTION SUBCUTANEOUS at 08:45

## 2017-09-09 RX ADMIN — OXYCODONE HYDROCHLORIDE 60 MG: 20 TABLET, FILM COATED, EXTENDED RELEASE ORAL at 08:45

## 2017-09-09 RX ADMIN — PREGABALIN 50 MG: 50 CAPSULE ORAL at 16:42

## 2017-09-09 RX ADMIN — OXYCODONE HYDROCHLORIDE 15 MG: 5 TABLET ORAL at 05:01

## 2017-09-10 PROCEDURE — 97166 OT EVAL MOD COMPLEX 45 MIN: CPT

## 2017-09-10 PROCEDURE — 97162 PT EVAL MOD COMPLEX 30 MIN: CPT

## 2017-09-10 PROCEDURE — 97530 THERAPEUTIC ACTIVITIES: CPT

## 2017-09-10 PROCEDURE — 97535 SELF CARE MNGMENT TRAINING: CPT

## 2017-09-10 RX ADMIN — OXYCODONE HYDROCHLORIDE 60 MG: 20 TABLET, FILM COATED, EXTENDED RELEASE ORAL at 08:05

## 2017-09-10 RX ADMIN — DOCUSATE SODIUM 100 MG: 100 CAPSULE, LIQUID FILLED ORAL at 08:05

## 2017-09-10 RX ADMIN — LORAZEPAM 0.5 MG: 0.5 TABLET ORAL at 15:50

## 2017-09-10 RX ADMIN — OXYCODONE HYDROCHLORIDE 15 MG: 5 TABLET ORAL at 17:51

## 2017-09-10 RX ADMIN — PREGABALIN 50 MG: 50 CAPSULE ORAL at 15:43

## 2017-09-10 RX ADMIN — OXYCODONE HYDROCHLORIDE 15 MG: 5 TABLET ORAL at 08:15

## 2017-09-10 RX ADMIN — OXYCODONE HYDROCHLORIDE 15 MG: 5 TABLET ORAL at 04:12

## 2017-09-10 RX ADMIN — DULOXETINE HYDROCHLORIDE 30 MG: 30 CAPSULE, DELAYED RELEASE ORAL at 08:06

## 2017-09-10 RX ADMIN — OXYCODONE HYDROCHLORIDE 60 MG: 20 TABLET, FILM COATED, EXTENDED RELEASE ORAL at 02:03

## 2017-09-10 RX ADMIN — ENOXAPARIN SODIUM 40 MG: 40 INJECTION SUBCUTANEOUS at 08:10

## 2017-09-10 RX ADMIN — PREGABALIN 50 MG: 50 CAPSULE ORAL at 21:51

## 2017-09-10 RX ADMIN — ACETAMINOPHEN 650 MG: 325 TABLET, FILM COATED ORAL at 07:50

## 2017-09-10 RX ADMIN — SENNOSIDES 8.6 MG: 8.6 TABLET, FILM COATED ORAL at 08:10

## 2017-09-10 RX ADMIN — PREGABALIN 50 MG: 50 CAPSULE ORAL at 08:05

## 2017-09-10 RX ADMIN — DOCUSATE SODIUM 100 MG: 100 CAPSULE, LIQUID FILLED ORAL at 17:51

## 2017-09-10 RX ADMIN — PANTOPRAZOLE SODIUM 40 MG: 40 TABLET, DELAYED RELEASE ORAL at 05:20

## 2017-09-10 RX ADMIN — LORAZEPAM 0.5 MG: 0.5 TABLET ORAL at 04:15

## 2017-09-10 RX ADMIN — FUROSEMIDE 40 MG: 40 TABLET ORAL at 08:10

## 2017-09-10 RX ADMIN — OXYCODONE HYDROCHLORIDE 15 MG: 5 TABLET ORAL at 12:57

## 2017-09-10 RX ADMIN — POLYETHYLENE GLYCOL 3350 17 G: 17 POWDER, FOR SOLUTION ORAL at 07:50

## 2017-09-11 PROBLEM — S82.201A CLOSED RIGHT TIBIAL FRACTURE: Status: ACTIVE | Noted: 2017-09-04

## 2017-09-11 PROCEDURE — 97530 THERAPEUTIC ACTIVITIES: CPT | Performed by: OCCUPATIONAL THERAPY ASSISTANT

## 2017-09-11 PROCEDURE — 97530 THERAPEUTIC ACTIVITIES: CPT

## 2017-09-11 PROCEDURE — 97116 GAIT TRAINING THERAPY: CPT

## 2017-09-11 PROCEDURE — 97110 THERAPEUTIC EXERCISES: CPT | Performed by: OCCUPATIONAL THERAPY ASSISTANT

## 2017-09-11 RX ORDER — OXYCODONE HYDROCHLORIDE 10 MG/1
20 TABLET ORAL EVERY 4 HOURS PRN
Status: DISCONTINUED | OUTPATIENT
Start: 2017-09-11 | End: 2017-09-19 | Stop reason: HOSPADM

## 2017-09-11 RX ORDER — NALOXONE HYDROCHLORIDE 0.4 MG/ML
0.4 INJECTION, SOLUTION INTRAMUSCULAR; INTRAVENOUS; SUBCUTANEOUS DAILY PRN
Status: DISCONTINUED | OUTPATIENT
Start: 2017-09-11 | End: 2017-09-19 | Stop reason: HOSPADM

## 2017-09-11 RX ORDER — NALOXONE HYDROCHLORIDE 1 MG/ML
0.4 INJECTION INTRAMUSCULAR; INTRAVENOUS; SUBCUTANEOUS DAILY PRN
Status: DISCONTINUED | OUTPATIENT
Start: 2017-09-11 | End: 2017-09-11

## 2017-09-11 RX ORDER — LACTULOSE 20 G/30ML
20 SOLUTION ORAL ONCE
Status: COMPLETED | OUTPATIENT
Start: 2017-09-11 | End: 2017-09-11

## 2017-09-11 RX ADMIN — OXYCODONE HYDROCHLORIDE 15 MG: 5 TABLET ORAL at 06:29

## 2017-09-11 RX ADMIN — PREGABALIN 50 MG: 50 CAPSULE ORAL at 15:30

## 2017-09-11 RX ADMIN — PREGABALIN 50 MG: 50 CAPSULE ORAL at 09:11

## 2017-09-11 RX ADMIN — DOCUSATE SODIUM 100 MG: 100 CAPSULE, LIQUID FILLED ORAL at 09:11

## 2017-09-11 RX ADMIN — OXYCODONE HYDROCHLORIDE 15 MG: 5 TABLET ORAL at 11:21

## 2017-09-11 RX ADMIN — PANTOPRAZOLE SODIUM 40 MG: 40 TABLET, DELAYED RELEASE ORAL at 06:29

## 2017-09-11 RX ADMIN — DOCUSATE SODIUM 100 MG: 100 CAPSULE, LIQUID FILLED ORAL at 18:09

## 2017-09-11 RX ADMIN — OXYCODONE HYDROCHLORIDE 60 MG: 20 TABLET, FILM COATED, EXTENDED RELEASE ORAL at 09:11

## 2017-09-11 RX ADMIN — DULOXETINE HYDROCHLORIDE 30 MG: 30 CAPSULE, DELAYED RELEASE ORAL at 09:12

## 2017-09-11 RX ADMIN — LORAZEPAM 0.5 MG: 0.5 TABLET ORAL at 18:11

## 2017-09-11 RX ADMIN — OXYCODONE HYDROCHLORIDE 20 MG: 10 TABLET ORAL at 20:09

## 2017-09-11 RX ADMIN — LACTULOSE 20 G: 20 SOLUTION ORAL at 15:30

## 2017-09-11 RX ADMIN — OXYCODONE HYDROCHLORIDE 15 MG: 5 TABLET ORAL at 15:26

## 2017-09-11 RX ADMIN — PREGABALIN 50 MG: 50 CAPSULE ORAL at 20:06

## 2017-09-11 RX ADMIN — FUROSEMIDE 40 MG: 40 TABLET ORAL at 09:11

## 2017-09-11 RX ADMIN — POLYETHYLENE GLYCOL 3350 17 G: 17 POWDER, FOR SOLUTION ORAL at 09:11

## 2017-09-11 RX ADMIN — OXYCODONE HYDROCHLORIDE 60 MG: 20 TABLET, FILM COATED, EXTENDED RELEASE ORAL at 20:07

## 2017-09-11 RX ADMIN — OXYCODONE HYDROCHLORIDE 20 MG: 10 TABLET ORAL at 23:59

## 2017-09-11 RX ADMIN — ENOXAPARIN SODIUM 40 MG: 40 INJECTION SUBCUTANEOUS at 09:11

## 2017-09-11 RX ADMIN — ACETAMINOPHEN 650 MG: 325 TABLET, FILM COATED ORAL at 15:30

## 2017-09-11 RX ADMIN — SULFAMETHOXAZOLE AND TRIMETHOPRIM 1 TABLET: 800; 160 TABLET ORAL at 09:12

## 2017-09-11 RX ADMIN — OXYCODONE HYDROCHLORIDE 15 MG: 5 TABLET ORAL at 00:13

## 2017-09-11 RX ADMIN — LORAZEPAM 0.5 MG: 0.5 TABLET ORAL at 00:12

## 2017-09-11 RX ADMIN — SENNOSIDES 8.6 MG: 8.6 TABLET, FILM COATED ORAL at 09:11

## 2017-09-12 LAB
ANION GAP SERPL CALCULATED.3IONS-SCNC: 7 MMOL/L (ref 4–13)
BASOPHILS # BLD AUTO: 0.02 THOUSANDS/ΜL (ref 0–0.1)
BASOPHILS NFR BLD AUTO: 0 % (ref 0–1)
BUN SERPL-MCNC: 15 MG/DL (ref 5–25)
CALCIUM SERPL-MCNC: 8.3 MG/DL (ref 8.3–10.1)
CHLORIDE SERPL-SCNC: 102 MMOL/L (ref 100–108)
CO2 SERPL-SCNC: 31 MMOL/L (ref 21–32)
CREAT SERPL-MCNC: 1.25 MG/DL (ref 0.6–1.3)
EOSINOPHIL # BLD AUTO: 0.35 THOUSAND/ΜL (ref 0–0.61)
EOSINOPHIL NFR BLD AUTO: 5 % (ref 0–6)
ERYTHROCYTE [DISTWIDTH] IN BLOOD BY AUTOMATED COUNT: 14.2 % (ref 11.6–15.1)
GFR SERPL CREATININE-BSD FRML MDRD: 46 ML/MIN/1.73SQ M
GLUCOSE SERPL-MCNC: 125 MG/DL (ref 65–140)
HCT VFR BLD AUTO: 25.8 % (ref 34.8–46.1)
HGB BLD-MCNC: 8.2 G/DL (ref 11.5–15.4)
LYMPHOCYTES # BLD AUTO: 1.8 THOUSANDS/ΜL (ref 0.6–4.47)
LYMPHOCYTES NFR BLD AUTO: 24 % (ref 14–44)
MAGNESIUM SERPL-MCNC: 2 MG/DL (ref 1.6–2.6)
MCH RBC QN AUTO: 30.8 PG (ref 26.8–34.3)
MCHC RBC AUTO-ENTMCNC: 31.8 G/DL (ref 31.4–37.4)
MCV RBC AUTO: 97 FL (ref 82–98)
MONOCYTES # BLD AUTO: 0.55 THOUSAND/ΜL (ref 0.17–1.22)
MONOCYTES NFR BLD AUTO: 7 % (ref 4–12)
NEUTROPHILS # BLD AUTO: 4.63 THOUSANDS/ΜL (ref 1.85–7.62)
NEUTS SEG NFR BLD AUTO: 64 % (ref 43–75)
NRBC BLD AUTO-RTO: 0 /100 WBCS
PLATELET # BLD AUTO: 275 THOUSANDS/UL (ref 149–390)
PMV BLD AUTO: 9 FL (ref 8.9–12.7)
POTASSIUM SERPL-SCNC: 3.8 MMOL/L (ref 3.5–5.3)
RBC # BLD AUTO: 2.66 MILLION/UL (ref 3.81–5.12)
SODIUM SERPL-SCNC: 140 MMOL/L (ref 136–145)
WBC # BLD AUTO: 7.39 THOUSAND/UL (ref 4.31–10.16)

## 2017-09-12 PROCEDURE — 97535 SELF CARE MNGMENT TRAINING: CPT

## 2017-09-12 PROCEDURE — 97542 WHEELCHAIR MNGMENT TRAINING: CPT

## 2017-09-12 PROCEDURE — 80048 BASIC METABOLIC PNL TOTAL CA: CPT | Performed by: PHYSICAL MEDICINE & REHABILITATION

## 2017-09-12 PROCEDURE — 97116 GAIT TRAINING THERAPY: CPT

## 2017-09-12 PROCEDURE — 97530 THERAPEUTIC ACTIVITIES: CPT

## 2017-09-12 PROCEDURE — 83735 ASSAY OF MAGNESIUM: CPT | Performed by: PHYSICAL MEDICINE & REHABILITATION

## 2017-09-12 PROCEDURE — 97110 THERAPEUTIC EXERCISES: CPT

## 2017-09-12 PROCEDURE — 85025 COMPLETE CBC W/AUTO DIFF WBC: CPT | Performed by: PHYSICAL MEDICINE & REHABILITATION

## 2017-09-12 RX ORDER — LIDOCAINE 40 MG/G
CREAM TOPICAL ONCE
Status: DISCONTINUED | OUTPATIENT
Start: 2017-09-12 | End: 2017-09-19 | Stop reason: HOSPADM

## 2017-09-12 RX ADMIN — OXYCODONE HYDROCHLORIDE 20 MG: 10 TABLET ORAL at 13:39

## 2017-09-12 RX ADMIN — OXYCODONE HYDROCHLORIDE 20 MG: 10 TABLET ORAL at 05:07

## 2017-09-12 RX ADMIN — OXYCODONE HYDROCHLORIDE 20 MG: 10 TABLET ORAL at 09:23

## 2017-09-12 RX ADMIN — PREGABALIN 50 MG: 50 CAPSULE ORAL at 08:16

## 2017-09-12 RX ADMIN — DULOXETINE HYDROCHLORIDE 30 MG: 30 CAPSULE, DELAYED RELEASE ORAL at 08:17

## 2017-09-12 RX ADMIN — LORAZEPAM 0.5 MG: 0.5 TABLET ORAL at 05:23

## 2017-09-12 RX ADMIN — DOCUSATE SODIUM 100 MG: 100 CAPSULE, LIQUID FILLED ORAL at 08:16

## 2017-09-12 RX ADMIN — OXYCODONE HYDROCHLORIDE 20 MG: 10 TABLET ORAL at 17:43

## 2017-09-12 RX ADMIN — PREGABALIN 50 MG: 50 CAPSULE ORAL at 20:15

## 2017-09-12 RX ADMIN — OXYCODONE HYDROCHLORIDE 60 MG: 20 TABLET, FILM COATED, EXTENDED RELEASE ORAL at 20:15

## 2017-09-12 RX ADMIN — DOCUSATE SODIUM 100 MG: 100 CAPSULE, LIQUID FILLED ORAL at 16:48

## 2017-09-12 RX ADMIN — PANTOPRAZOLE SODIUM 40 MG: 40 TABLET, DELAYED RELEASE ORAL at 05:06

## 2017-09-12 RX ADMIN — FUROSEMIDE 40 MG: 40 TABLET ORAL at 08:16

## 2017-09-12 RX ADMIN — PREGABALIN 50 MG: 50 CAPSULE ORAL at 16:49

## 2017-09-12 RX ADMIN — SENNOSIDES 8.6 MG: 8.6 TABLET, FILM COATED ORAL at 08:16

## 2017-09-12 RX ADMIN — LORAZEPAM 0.5 MG: 0.5 TABLET ORAL at 19:14

## 2017-09-12 RX ADMIN — OXYCODONE HYDROCHLORIDE 60 MG: 20 TABLET, FILM COATED, EXTENDED RELEASE ORAL at 08:16

## 2017-09-12 RX ADMIN — OXYCODONE HYDROCHLORIDE 20 MG: 10 TABLET ORAL at 21:50

## 2017-09-12 RX ADMIN — ENOXAPARIN SODIUM 40 MG: 40 INJECTION SUBCUTANEOUS at 08:17

## 2017-09-13 LAB
ANION GAP SERPL CALCULATED.3IONS-SCNC: 6 MMOL/L (ref 4–13)
BUN SERPL-MCNC: 14 MG/DL (ref 5–25)
CALCIUM SERPL-MCNC: 8.8 MG/DL (ref 8.3–10.1)
CHLORIDE SERPL-SCNC: 102 MMOL/L (ref 100–108)
CO2 SERPL-SCNC: 30 MMOL/L (ref 21–32)
CREAT SERPL-MCNC: 1.07 MG/DL (ref 0.6–1.3)
GFR SERPL CREATININE-BSD FRML MDRD: 55 ML/MIN/1.73SQ M
GLUCOSE SERPL-MCNC: 113 MG/DL (ref 65–140)
POTASSIUM SERPL-SCNC: 3.5 MMOL/L (ref 3.5–5.3)
SODIUM SERPL-SCNC: 138 MMOL/L (ref 136–145)

## 2017-09-13 PROCEDURE — 97535 SELF CARE MNGMENT TRAINING: CPT

## 2017-09-13 PROCEDURE — 97110 THERAPEUTIC EXERCISES: CPT

## 2017-09-13 PROCEDURE — 97530 THERAPEUTIC ACTIVITIES: CPT

## 2017-09-13 PROCEDURE — 97116 GAIT TRAINING THERAPY: CPT

## 2017-09-13 PROCEDURE — 97542 WHEELCHAIR MNGMENT TRAINING: CPT

## 2017-09-13 PROCEDURE — 80048 BASIC METABOLIC PNL TOTAL CA: CPT | Performed by: NURSE PRACTITIONER

## 2017-09-13 RX ORDER — FUROSEMIDE 40 MG/1
40 TABLET ORAL DAILY
Status: DISCONTINUED | OUTPATIENT
Start: 2017-09-13 | End: 2017-09-19 | Stop reason: HOSPADM

## 2017-09-13 RX ADMIN — DOCUSATE SODIUM 100 MG: 100 CAPSULE, LIQUID FILLED ORAL at 17:17

## 2017-09-13 RX ADMIN — ENOXAPARIN SODIUM 40 MG: 40 INJECTION SUBCUTANEOUS at 08:41

## 2017-09-13 RX ADMIN — PREGABALIN 50 MG: 50 CAPSULE ORAL at 15:49

## 2017-09-13 RX ADMIN — OXYCODONE HYDROCHLORIDE 20 MG: 10 TABLET ORAL at 08:40

## 2017-09-13 RX ADMIN — OXYCODONE HYDROCHLORIDE 60 MG: 20 TABLET, FILM COATED, EXTENDED RELEASE ORAL at 08:40

## 2017-09-13 RX ADMIN — LORAZEPAM 0.5 MG: 0.5 TABLET ORAL at 15:50

## 2017-09-13 RX ADMIN — SENNOSIDES 8.6 MG: 8.6 TABLET, FILM COATED ORAL at 08:40

## 2017-09-13 RX ADMIN — DOCUSATE SODIUM 100 MG: 100 CAPSULE, LIQUID FILLED ORAL at 08:40

## 2017-09-13 RX ADMIN — PREGABALIN 50 MG: 50 CAPSULE ORAL at 08:40

## 2017-09-13 RX ADMIN — PANTOPRAZOLE SODIUM 40 MG: 40 TABLET, DELAYED RELEASE ORAL at 06:05

## 2017-09-13 RX ADMIN — LORAZEPAM 0.5 MG: 0.5 TABLET ORAL at 03:52

## 2017-09-13 RX ADMIN — OXYCODONE HYDROCHLORIDE 20 MG: 10 TABLET ORAL at 13:33

## 2017-09-13 RX ADMIN — DULOXETINE HYDROCHLORIDE 30 MG: 30 CAPSULE, DELAYED RELEASE ORAL at 08:41

## 2017-09-13 RX ADMIN — OXYCODONE HYDROCHLORIDE 60 MG: 20 TABLET, FILM COATED, EXTENDED RELEASE ORAL at 21:14

## 2017-09-13 RX ADMIN — OXYCODONE HYDROCHLORIDE 20 MG: 10 TABLET ORAL at 22:43

## 2017-09-13 RX ADMIN — PREGABALIN 50 MG: 50 CAPSULE ORAL at 21:14

## 2017-09-13 RX ADMIN — OXYCODONE HYDROCHLORIDE 20 MG: 10 TABLET ORAL at 03:49

## 2017-09-13 RX ADMIN — OXYCODONE HYDROCHLORIDE 20 MG: 10 TABLET ORAL at 18:27

## 2017-09-13 RX ADMIN — SULFAMETHOXAZOLE AND TRIMETHOPRIM 1 TABLET: 800; 160 TABLET ORAL at 08:41

## 2017-09-13 RX ADMIN — FUROSEMIDE 40 MG: 40 TABLET ORAL at 14:12

## 2017-09-14 LAB
ANION GAP SERPL CALCULATED.3IONS-SCNC: 6 MMOL/L (ref 4–13)
BASOPHILS # BLD MANUAL: 0 THOUSAND/UL (ref 0–0.1)
BASOPHILS NFR MAR MANUAL: 0 % (ref 0–1)
BUN SERPL-MCNC: 14 MG/DL (ref 5–25)
CALCIUM SERPL-MCNC: 8.2 MG/DL (ref 8.3–10.1)
CHLORIDE SERPL-SCNC: 104 MMOL/L (ref 100–108)
CO2 SERPL-SCNC: 32 MMOL/L (ref 21–32)
CREAT SERPL-MCNC: 1.07 MG/DL (ref 0.6–1.3)
EOSINOPHIL # BLD MANUAL: 0.27 THOUSAND/UL (ref 0–0.4)
EOSINOPHIL NFR BLD MANUAL: 4 % (ref 0–6)
ERYTHROCYTE [DISTWIDTH] IN BLOOD BY AUTOMATED COUNT: 14.9 % (ref 11.6–15.1)
ERYTHROCYTE [DISTWIDTH] IN BLOOD BY AUTOMATED COUNT: 15 % (ref 11.6–15.1)
GFR SERPL CREATININE-BSD FRML MDRD: 55 ML/MIN/1.73SQ M
GIANT PLATELETS BLD QL SMEAR: PRESENT
GLUCOSE SERPL-MCNC: 120 MG/DL (ref 65–140)
HCT VFR BLD AUTO: 24 % (ref 34.8–46.1)
HCT VFR BLD AUTO: 28.8 % (ref 34.8–46.1)
HGB BLD-MCNC: 7.7 G/DL (ref 11.5–15.4)
HGB BLD-MCNC: 9.4 G/DL (ref 11.5–15.4)
LYMPHOCYTES # BLD AUTO: 1.43 THOUSAND/UL (ref 0.6–4.47)
LYMPHOCYTES # BLD AUTO: 21 % (ref 14–44)
MCH RBC QN AUTO: 31 PG (ref 26.8–34.3)
MCH RBC QN AUTO: 31.5 PG (ref 26.8–34.3)
MCHC RBC AUTO-ENTMCNC: 32.1 G/DL (ref 31.4–37.4)
MCHC RBC AUTO-ENTMCNC: 32.6 G/DL (ref 31.4–37.4)
MCV RBC AUTO: 97 FL (ref 82–98)
MCV RBC AUTO: 97 FL (ref 82–98)
MONOCYTES # BLD AUTO: 0.75 THOUSAND/UL (ref 0–1.22)
MONOCYTES NFR BLD: 11 % (ref 4–12)
NEUTROPHILS # BLD MANUAL: 4.37 THOUSAND/UL (ref 1.85–7.62)
NEUTS SEG NFR BLD AUTO: 64 % (ref 43–75)
NRBC BLD AUTO-RTO: 0 /100 WBCS
PLATELET # BLD AUTO: 265 THOUSANDS/UL (ref 149–390)
PLATELET # BLD AUTO: 359 THOUSANDS/UL (ref 149–390)
PLATELET BLD QL SMEAR: ADEQUATE
PMV BLD AUTO: 8.9 FL (ref 8.9–12.7)
PMV BLD AUTO: 9.7 FL (ref 8.9–12.7)
POTASSIUM SERPL-SCNC: 3.5 MMOL/L (ref 3.5–5.3)
RBC # BLD AUTO: 2.48 MILLION/UL (ref 3.81–5.12)
RBC # BLD AUTO: 2.98 MILLION/UL (ref 3.81–5.12)
RBC MORPH BLD: NORMAL
SODIUM SERPL-SCNC: 142 MMOL/L (ref 136–145)
WBC # BLD AUTO: 5.52 THOUSAND/UL (ref 4.31–10.16)
WBC # BLD AUTO: 6.83 THOUSAND/UL (ref 4.31–10.16)

## 2017-09-14 PROCEDURE — 85027 COMPLETE CBC AUTOMATED: CPT | Performed by: NURSE PRACTITIONER

## 2017-09-14 PROCEDURE — 97530 THERAPEUTIC ACTIVITIES: CPT

## 2017-09-14 PROCEDURE — 97530 THERAPEUTIC ACTIVITIES: CPT | Performed by: OCCUPATIONAL THERAPY ASSISTANT

## 2017-09-14 PROCEDURE — 85007 BL SMEAR W/DIFF WBC COUNT: CPT | Performed by: NURSE PRACTITIONER

## 2017-09-14 PROCEDURE — 85027 COMPLETE CBC AUTOMATED: CPT | Performed by: PHYSICAL MEDICINE & REHABILITATION

## 2017-09-14 PROCEDURE — 97110 THERAPEUTIC EXERCISES: CPT

## 2017-09-14 PROCEDURE — 80048 BASIC METABOLIC PNL TOTAL CA: CPT | Performed by: NURSE PRACTITIONER

## 2017-09-14 PROCEDURE — 97116 GAIT TRAINING THERAPY: CPT

## 2017-09-14 RX ORDER — FERROUS SULFATE 325(65) MG
325 TABLET ORAL
Status: DISCONTINUED | OUTPATIENT
Start: 2017-09-14 | End: 2017-09-19 | Stop reason: HOSPADM

## 2017-09-14 RX ORDER — POLYETHYLENE GLYCOL 3350 17 G/17G
17 POWDER, FOR SOLUTION ORAL DAILY
Status: DISCONTINUED | OUTPATIENT
Start: 2017-09-14 | End: 2017-09-19 | Stop reason: HOSPADM

## 2017-09-14 RX ADMIN — OXYCODONE HYDROCHLORIDE 60 MG: 20 TABLET, FILM COATED, EXTENDED RELEASE ORAL at 21:25

## 2017-09-14 RX ADMIN — OXYCODONE HYDROCHLORIDE 20 MG: 10 TABLET ORAL at 06:50

## 2017-09-14 RX ADMIN — ENOXAPARIN SODIUM 40 MG: 40 INJECTION SUBCUTANEOUS at 08:00

## 2017-09-14 RX ADMIN — DULOXETINE HYDROCHLORIDE 30 MG: 30 CAPSULE, DELAYED RELEASE ORAL at 08:00

## 2017-09-14 RX ADMIN — DOCUSATE SODIUM 100 MG: 100 CAPSULE, LIQUID FILLED ORAL at 17:41

## 2017-09-14 RX ADMIN — FUROSEMIDE 40 MG: 40 TABLET ORAL at 08:00

## 2017-09-14 RX ADMIN — OXYCODONE HYDROCHLORIDE 15 MG: 5 TABLET ORAL at 15:08

## 2017-09-14 RX ADMIN — DOCUSATE SODIUM 100 MG: 100 CAPSULE, LIQUID FILLED ORAL at 08:00

## 2017-09-14 RX ADMIN — OXYCODONE HYDROCHLORIDE 15 MG: 5 TABLET ORAL at 19:36

## 2017-09-14 RX ADMIN — LORAZEPAM 0.5 MG: 0.5 TABLET ORAL at 19:36

## 2017-09-14 RX ADMIN — SENNOSIDES 8.6 MG: 8.6 TABLET, FILM COATED ORAL at 08:00

## 2017-09-14 RX ADMIN — POLYETHYLENE GLYCOL 3350 17 G: 17 POWDER, FOR SOLUTION ORAL at 14:05

## 2017-09-14 RX ADMIN — ACETAMINOPHEN 650 MG: 325 TABLET, FILM COATED ORAL at 10:53

## 2017-09-14 RX ADMIN — LORAZEPAM 0.5 MG: 0.5 TABLET ORAL at 07:50

## 2017-09-14 RX ADMIN — PREGABALIN 50 MG: 50 CAPSULE ORAL at 08:00

## 2017-09-14 RX ADMIN — PANTOPRAZOLE SODIUM 40 MG: 40 TABLET, DELAYED RELEASE ORAL at 06:28

## 2017-09-14 RX ADMIN — PREGABALIN 50 MG: 50 CAPSULE ORAL at 21:25

## 2017-09-14 RX ADMIN — OXYCODONE HYDROCHLORIDE 20 MG: 10 TABLET ORAL at 02:52

## 2017-09-14 RX ADMIN — PREGABALIN 50 MG: 50 CAPSULE ORAL at 17:41

## 2017-09-14 RX ADMIN — OXYCODONE HYDROCHLORIDE 20 MG: 10 TABLET ORAL at 10:52

## 2017-09-14 RX ADMIN — OXYCODONE HYDROCHLORIDE 60 MG: 20 TABLET, FILM COATED, EXTENDED RELEASE ORAL at 08:00

## 2017-09-14 RX ADMIN — Medication 325 MG: at 14:02

## 2017-09-15 LAB
ABO GROUP BLD: NORMAL
BLD GP AB SCN SERPL QL: NEGATIVE
ERYTHROCYTE [DISTWIDTH] IN BLOOD BY AUTOMATED COUNT: 14.8 % (ref 11.6–15.1)
HCT VFR BLD AUTO: 29.7 % (ref 34.8–46.1)
HGB BLD-MCNC: 9.4 G/DL (ref 11.5–15.4)
MCH RBC QN AUTO: 30.8 PG (ref 26.8–34.3)
MCHC RBC AUTO-ENTMCNC: 31.6 G/DL (ref 31.4–37.4)
MCV RBC AUTO: 97 FL (ref 82–98)
PLATELET # BLD AUTO: 337 THOUSANDS/UL (ref 149–390)
PMV BLD AUTO: 9 FL (ref 8.9–12.7)
RBC # BLD AUTO: 3.05 MILLION/UL (ref 3.81–5.12)
RH BLD: POSITIVE
SPECIMEN EXPIRATION DATE: NORMAL
WBC # BLD AUTO: 6.14 THOUSAND/UL (ref 4.31–10.16)

## 2017-09-15 PROCEDURE — 86901 BLOOD TYPING SEROLOGIC RH(D): CPT | Performed by: NURSE PRACTITIONER

## 2017-09-15 PROCEDURE — 97530 THERAPEUTIC ACTIVITIES: CPT

## 2017-09-15 PROCEDURE — 97116 GAIT TRAINING THERAPY: CPT

## 2017-09-15 PROCEDURE — 97535 SELF CARE MNGMENT TRAINING: CPT | Performed by: OCCUPATIONAL THERAPY ASSISTANT

## 2017-09-15 PROCEDURE — 86850 RBC ANTIBODY SCREEN: CPT | Performed by: NURSE PRACTITIONER

## 2017-09-15 PROCEDURE — 85027 COMPLETE CBC AUTOMATED: CPT | Performed by: PHYSICAL MEDICINE & REHABILITATION

## 2017-09-15 PROCEDURE — 97530 THERAPEUTIC ACTIVITIES: CPT | Performed by: OCCUPATIONAL THERAPY ASSISTANT

## 2017-09-15 PROCEDURE — 86900 BLOOD TYPING SEROLOGIC ABO: CPT | Performed by: NURSE PRACTITIONER

## 2017-09-15 PROCEDURE — 97542 WHEELCHAIR MNGMENT TRAINING: CPT

## 2017-09-15 RX ADMIN — PREGABALIN 50 MG: 50 CAPSULE ORAL at 08:34

## 2017-09-15 RX ADMIN — OXYCODONE HYDROCHLORIDE 20 MG: 10 TABLET ORAL at 15:08

## 2017-09-15 RX ADMIN — OXYCODONE HYDROCHLORIDE 15 MG: 5 TABLET ORAL at 00:19

## 2017-09-15 RX ADMIN — PREGABALIN 50 MG: 50 CAPSULE ORAL at 16:07

## 2017-09-15 RX ADMIN — OXYCODONE HYDROCHLORIDE 20 MG: 10 TABLET ORAL at 23:37

## 2017-09-15 RX ADMIN — LORAZEPAM 0.5 MG: 0.5 TABLET ORAL at 16:07

## 2017-09-15 RX ADMIN — SULFAMETHOXAZOLE AND TRIMETHOPRIM 1 TABLET: 800; 160 TABLET ORAL at 08:34

## 2017-09-15 RX ADMIN — FUROSEMIDE 40 MG: 40 TABLET ORAL at 08:34

## 2017-09-15 RX ADMIN — SENNOSIDES 8.6 MG: 8.6 TABLET, FILM COATED ORAL at 08:34

## 2017-09-15 RX ADMIN — PANTOPRAZOLE SODIUM 40 MG: 40 TABLET, DELAYED RELEASE ORAL at 05:20

## 2017-09-15 RX ADMIN — PREGABALIN 50 MG: 50 CAPSULE ORAL at 22:04

## 2017-09-15 RX ADMIN — DULOXETINE HYDROCHLORIDE 30 MG: 30 CAPSULE, DELAYED RELEASE ORAL at 08:34

## 2017-09-15 RX ADMIN — OXYCODONE HYDROCHLORIDE 20 MG: 10 TABLET ORAL at 09:49

## 2017-09-15 RX ADMIN — OXYCODONE HYDROCHLORIDE 60 MG: 20 TABLET, FILM COATED, EXTENDED RELEASE ORAL at 22:04

## 2017-09-15 RX ADMIN — OXYCODONE HYDROCHLORIDE 20 MG: 10 TABLET ORAL at 19:06

## 2017-09-15 RX ADMIN — POLYETHYLENE GLYCOL 3350 17 G: 17 POWDER, FOR SOLUTION ORAL at 08:34

## 2017-09-15 RX ADMIN — DOCUSATE SODIUM 100 MG: 100 CAPSULE, LIQUID FILLED ORAL at 08:34

## 2017-09-15 RX ADMIN — ENOXAPARIN SODIUM 40 MG: 40 INJECTION SUBCUTANEOUS at 08:34

## 2017-09-15 RX ADMIN — Medication 325 MG: at 11:51

## 2017-09-15 RX ADMIN — OXYCODONE HYDROCHLORIDE 60 MG: 20 TABLET, FILM COATED, EXTENDED RELEASE ORAL at 08:34

## 2017-09-15 RX ADMIN — DOCUSATE SODIUM 100 MG: 100 CAPSULE, LIQUID FILLED ORAL at 17:25

## 2017-09-15 RX ADMIN — OXYCODONE HYDROCHLORIDE 20 MG: 10 TABLET ORAL at 05:21

## 2017-09-16 PROCEDURE — 97542 WHEELCHAIR MNGMENT TRAINING: CPT

## 2017-09-16 PROCEDURE — 97530 THERAPEUTIC ACTIVITIES: CPT

## 2017-09-16 PROCEDURE — 97535 SELF CARE MNGMENT TRAINING: CPT | Performed by: OCCUPATIONAL THERAPY ASSISTANT

## 2017-09-16 PROCEDURE — 97116 GAIT TRAINING THERAPY: CPT

## 2017-09-16 RX ADMIN — ENOXAPARIN SODIUM 40 MG: 40 INJECTION SUBCUTANEOUS at 08:23

## 2017-09-16 RX ADMIN — OXYCODONE HYDROCHLORIDE 60 MG: 20 TABLET, FILM COATED, EXTENDED RELEASE ORAL at 09:21

## 2017-09-16 RX ADMIN — DOCUSATE SODIUM 100 MG: 100 CAPSULE, LIQUID FILLED ORAL at 17:38

## 2017-09-16 RX ADMIN — SENNOSIDES 8.6 MG: 8.6 TABLET, FILM COATED ORAL at 08:23

## 2017-09-16 RX ADMIN — FUROSEMIDE 40 MG: 40 TABLET ORAL at 08:23

## 2017-09-16 RX ADMIN — OXYCODONE HYDROCHLORIDE 20 MG: 10 TABLET ORAL at 08:22

## 2017-09-16 RX ADMIN — POLYETHYLENE GLYCOL 3350 17 G: 17 POWDER, FOR SOLUTION ORAL at 08:23

## 2017-09-16 RX ADMIN — PANTOPRAZOLE SODIUM 40 MG: 40 TABLET, DELAYED RELEASE ORAL at 05:26

## 2017-09-16 RX ADMIN — OXYCODONE HYDROCHLORIDE 20 MG: 10 TABLET ORAL at 13:31

## 2017-09-16 RX ADMIN — PREGABALIN 50 MG: 50 CAPSULE ORAL at 08:23

## 2017-09-16 RX ADMIN — OXYCODONE HYDROCHLORIDE 20 MG: 10 TABLET ORAL at 17:39

## 2017-09-16 RX ADMIN — OXYCODONE HYDROCHLORIDE 60 MG: 20 TABLET, FILM COATED, EXTENDED RELEASE ORAL at 21:32

## 2017-09-16 RX ADMIN — PREGABALIN 50 MG: 50 CAPSULE ORAL at 16:33

## 2017-09-16 RX ADMIN — OXYCODONE HYDROCHLORIDE 20 MG: 10 TABLET ORAL at 04:11

## 2017-09-16 RX ADMIN — PREGABALIN 50 MG: 50 CAPSULE ORAL at 21:32

## 2017-09-16 RX ADMIN — LORAZEPAM 0.5 MG: 0.5 TABLET ORAL at 13:31

## 2017-09-16 RX ADMIN — LORAZEPAM 0.5 MG: 0.5 TABLET ORAL at 02:21

## 2017-09-16 RX ADMIN — DULOXETINE HYDROCHLORIDE 30 MG: 30 CAPSULE, DELAYED RELEASE ORAL at 08:24

## 2017-09-16 RX ADMIN — Medication 325 MG: at 11:50

## 2017-09-16 RX ADMIN — DOCUSATE SODIUM 100 MG: 100 CAPSULE, LIQUID FILLED ORAL at 08:23

## 2017-09-17 PROCEDURE — 97535 SELF CARE MNGMENT TRAINING: CPT

## 2017-09-17 PROCEDURE — 97110 THERAPEUTIC EXERCISES: CPT

## 2017-09-17 PROCEDURE — 97116 GAIT TRAINING THERAPY: CPT

## 2017-09-17 RX ADMIN — PREGABALIN 50 MG: 50 CAPSULE ORAL at 15:33

## 2017-09-17 RX ADMIN — POLYETHYLENE GLYCOL 3350 17 G: 17 POWDER, FOR SOLUTION ORAL at 08:53

## 2017-09-17 RX ADMIN — DULOXETINE HYDROCHLORIDE 30 MG: 30 CAPSULE, DELAYED RELEASE ORAL at 08:54

## 2017-09-17 RX ADMIN — LORAZEPAM 0.5 MG: 0.5 TABLET ORAL at 01:56

## 2017-09-17 RX ADMIN — OXYCODONE HYDROCHLORIDE 60 MG: 20 TABLET, FILM COATED, EXTENDED RELEASE ORAL at 21:54

## 2017-09-17 RX ADMIN — PREGABALIN 50 MG: 50 CAPSULE ORAL at 21:23

## 2017-09-17 RX ADMIN — PREGABALIN 50 MG: 50 CAPSULE ORAL at 08:52

## 2017-09-17 RX ADMIN — LORAZEPAM 0.5 MG: 0.5 TABLET ORAL at 15:30

## 2017-09-17 RX ADMIN — ENOXAPARIN SODIUM 40 MG: 40 INJECTION SUBCUTANEOUS at 08:53

## 2017-09-17 RX ADMIN — Medication 325 MG: at 12:05

## 2017-09-17 RX ADMIN — OXYCODONE HYDROCHLORIDE 20 MG: 10 TABLET ORAL at 04:48

## 2017-09-17 RX ADMIN — OXYCODONE HYDROCHLORIDE 20 MG: 10 TABLET ORAL at 09:05

## 2017-09-17 RX ADMIN — FUROSEMIDE 40 MG: 40 TABLET ORAL at 08:52

## 2017-09-17 RX ADMIN — OXYCODONE HYDROCHLORIDE 60 MG: 20 TABLET, FILM COATED, EXTENDED RELEASE ORAL at 08:52

## 2017-09-17 RX ADMIN — OXYCODONE HYDROCHLORIDE 20 MG: 10 TABLET ORAL at 00:27

## 2017-09-17 RX ADMIN — OXYCODONE HYDROCHLORIDE 15 MG: 5 TABLET ORAL at 20:10

## 2017-09-17 RX ADMIN — OXYCODONE HYDROCHLORIDE 15 MG: 5 TABLET ORAL at 15:30

## 2017-09-17 RX ADMIN — PANTOPRAZOLE SODIUM 40 MG: 40 TABLET, DELAYED RELEASE ORAL at 05:11

## 2017-09-18 PROCEDURE — 97530 THERAPEUTIC ACTIVITIES: CPT

## 2017-09-18 PROCEDURE — 97110 THERAPEUTIC EXERCISES: CPT

## 2017-09-18 PROCEDURE — 97535 SELF CARE MNGMENT TRAINING: CPT | Performed by: OCCUPATIONAL THERAPY ASSISTANT

## 2017-09-18 RX ORDER — FUROSEMIDE 40 MG/1
40 TABLET ORAL ONCE
Status: COMPLETED | OUTPATIENT
Start: 2017-09-18 | End: 2017-09-18

## 2017-09-18 RX ADMIN — FUROSEMIDE 40 MG: 40 TABLET ORAL at 16:51

## 2017-09-18 RX ADMIN — DULOXETINE HYDROCHLORIDE 30 MG: 30 CAPSULE, DELAYED RELEASE ORAL at 09:12

## 2017-09-18 RX ADMIN — SULFAMETHOXAZOLE AND TRIMETHOPRIM 1 TABLET: 800; 160 TABLET ORAL at 09:12

## 2017-09-18 RX ADMIN — FUROSEMIDE 40 MG: 40 TABLET ORAL at 09:09

## 2017-09-18 RX ADMIN — OXYCODONE HYDROCHLORIDE 60 MG: 20 TABLET, FILM COATED, EXTENDED RELEASE ORAL at 09:10

## 2017-09-18 RX ADMIN — PANTOPRAZOLE SODIUM 40 MG: 40 TABLET, DELAYED RELEASE ORAL at 06:10

## 2017-09-18 RX ADMIN — ENOXAPARIN SODIUM 40 MG: 40 INJECTION SUBCUTANEOUS at 09:13

## 2017-09-18 RX ADMIN — OXYCODONE HYDROCHLORIDE 60 MG: 20 TABLET, FILM COATED, EXTENDED RELEASE ORAL at 22:01

## 2017-09-18 RX ADMIN — PREGABALIN 50 MG: 50 CAPSULE ORAL at 16:51

## 2017-09-18 RX ADMIN — ACETAMINOPHEN 650 MG: 325 TABLET, FILM COATED ORAL at 03:32

## 2017-09-18 RX ADMIN — OXYCODONE HYDROCHLORIDE 15 MG: 5 TABLET ORAL at 06:11

## 2017-09-18 RX ADMIN — OXYCODONE HYDROCHLORIDE 15 MG: 5 TABLET ORAL at 11:57

## 2017-09-18 RX ADMIN — PREGABALIN 50 MG: 50 CAPSULE ORAL at 09:11

## 2017-09-18 RX ADMIN — PREGABALIN 50 MG: 50 CAPSULE ORAL at 22:01

## 2017-09-18 RX ADMIN — Medication 325 MG: at 11:54

## 2017-09-18 RX ADMIN — OXYCODONE HYDROCHLORIDE 20 MG: 10 TABLET ORAL at 16:51

## 2017-09-19 ENCOUNTER — APPOINTMENT (INPATIENT)
Dept: RADIOLOGY | Facility: HOSPITAL | Age: 62
DRG: 560 | End: 2017-09-19
Payer: COMMERCIAL

## 2017-09-19 VITALS
WEIGHT: 237.66 LBS | SYSTOLIC BLOOD PRESSURE: 121 MMHG | RESPIRATION RATE: 18 BRPM | BODY MASS INDEX: 38.19 KG/M2 | TEMPERATURE: 98.3 F | HEIGHT: 66 IN | OXYGEN SATURATION: 94 % | DIASTOLIC BLOOD PRESSURE: 64 MMHG | HEART RATE: 87 BPM

## 2017-09-19 PROCEDURE — 73590 X-RAY EXAM OF LOWER LEG: CPT

## 2017-09-19 RX ORDER — FERROUS SULFATE 325(65) MG
325 TABLET ORAL
Qty: 14 TABLET | Refills: 0 | Status: SHIPPED | OUTPATIENT
Start: 2017-09-19 | End: 2018-05-15

## 2017-09-19 RX ORDER — ACETAMINOPHEN 325 MG/1
650 TABLET ORAL EVERY 6 HOURS PRN
Qty: 30 TABLET | Refills: 0 | Status: SHIPPED | OUTPATIENT
Start: 2017-09-19

## 2017-09-19 RX ADMIN — DOCUSATE SODIUM 100 MG: 100 CAPSULE, LIQUID FILLED ORAL at 10:04

## 2017-09-19 RX ADMIN — Medication 325 MG: at 12:40

## 2017-09-19 RX ADMIN — DULOXETINE HYDROCHLORIDE 30 MG: 30 CAPSULE, DELAYED RELEASE ORAL at 10:03

## 2017-09-19 RX ADMIN — SENNOSIDES 8.6 MG: 8.6 TABLET, FILM COATED ORAL at 10:02

## 2017-09-19 RX ADMIN — FUROSEMIDE 40 MG: 40 TABLET ORAL at 10:01

## 2017-09-19 RX ADMIN — PANTOPRAZOLE SODIUM 40 MG: 40 TABLET, DELAYED RELEASE ORAL at 05:28

## 2017-09-19 RX ADMIN — POLYETHYLENE GLYCOL 3350 17 G: 17 POWDER, FOR SOLUTION ORAL at 10:02

## 2017-09-19 RX ADMIN — PREGABALIN 50 MG: 50 CAPSULE ORAL at 09:59

## 2017-09-19 RX ADMIN — OXYCODONE HYDROCHLORIDE 60 MG: 20 TABLET, FILM COATED, EXTENDED RELEASE ORAL at 09:59

## 2017-09-19 RX ADMIN — OXYCODONE HYDROCHLORIDE 15 MG: 5 TABLET ORAL at 00:50

## 2017-09-19 RX ADMIN — ENOXAPARIN SODIUM 40 MG: 40 INJECTION SUBCUTANEOUS at 10:00

## 2017-09-19 RX ADMIN — OXYCODONE HYDROCHLORIDE 15 MG: 5 TABLET ORAL at 10:00

## 2017-09-19 RX ADMIN — OXYCODONE HYDROCHLORIDE 15 MG: 5 TABLET ORAL at 14:34

## 2017-09-19 RX ADMIN — OXYCODONE HYDROCHLORIDE 15 MG: 5 TABLET ORAL at 05:28

## 2017-09-29 ENCOUNTER — ALLSCRIPTS OFFICE VISIT (OUTPATIENT)
Dept: OTHER | Facility: OTHER | Age: 62
End: 2017-09-29

## 2017-09-29 ENCOUNTER — APPOINTMENT (OUTPATIENT)
Dept: RADIOLOGY | Facility: MEDICAL CENTER | Age: 62
End: 2017-09-29
Payer: COMMERCIAL

## 2017-09-29 DIAGNOSIS — M84.461D: ICD-10-CM

## 2017-09-29 PROCEDURE — 73590 X-RAY EXAM OF LOWER LEG: CPT

## 2017-11-21 ENCOUNTER — ALLSCRIPTS OFFICE VISIT (OUTPATIENT)
Dept: OTHER | Facility: OTHER | Age: 62
End: 2017-11-21

## 2017-11-21 ENCOUNTER — HOSPITAL ENCOUNTER (OUTPATIENT)
Dept: RADIOLOGY | Facility: HOSPITAL | Age: 62
Discharge: HOME | End: 2017-11-21
Attending: ORTHOPAEDIC SURGERY
Payer: COMMERCIAL

## 2017-11-21 ENCOUNTER — TRANSCRIBE ORDERS (OUTPATIENT)
Dept: ADMINISTRATIVE | Facility: HOSPITAL | Age: 62
End: 2017-11-21

## 2017-11-21 ENCOUNTER — GENERIC CONVERSION - ENCOUNTER (OUTPATIENT)
Dept: OTHER | Facility: OTHER | Age: 62
End: 2017-11-21

## 2017-11-21 DIAGNOSIS — Z15.01 GENETIC SUSCEPTIBILITY TO MALIGNANT NEOPLASM OF BREAST: Primary | ICD-10-CM

## 2017-11-21 DIAGNOSIS — M84.461D: ICD-10-CM

## 2017-11-21 PROCEDURE — 73590 X-RAY EXAM OF LOWER LEG: CPT

## 2017-11-22 NOTE — PROGRESS NOTES
Assessment    1  Aftercare following surgery for injury and trauma (V58 43) (Z48 89)  10 weeks following nailing of right tibia fracture, fracture appears healed  This patient to return to preinjury level of function  I would welcome the opportunity see back in the office should problems arise   Plan  Aftercare following surgery for injury and trauma    · Follow-up PRN Evaluation and Treatment  Follow-up  Status: Complete  Done:21Nov2017 01:36PM  Pathological fracture of right tibia with routine healing, unspecified pathological cause,subsequent encounter    · * XR TIBIA FIBULA 2 VIEW RIGHT; Status:Active; Requested for:21Nov2017;     Post-Op  HPI: 10 weeks following nailing for a right tibia fracture, as patient describes return 3 fracture level of function      Active Problems    1  Abdominal mass, LLQ (left lower quadrant) (789 34) (R19 04)   2  Abnormal mammogram (793 80) (R92 8)   3  Aftercare following surgery for injury and trauma (V58 43) (Z48 89)   4  Anxiety (300 00) (F41 9)   5  Benign essential hypertension (401 1) (I10)   6  Breast mass (611 72) (N63 0)   7  Chronic kidney disease, stage 3 (585 3) (N18 3)   8  Closed nondisplaced fracture of shaft of left clavicle, initial encounter (810 02) (S42 025A)   9  Depression (311) (F32 9)   10  Dyslipidemia (272 4) (E78 5)   11  Edema (782 3) (R60 9)   12  Encounter for screening mammogram for high-risk patient (V76 11) (Z12 31)   13  Genetic susceptibility to malignant neoplasm of breast (V84 01) (Z15 01)   14  Genetic susceptibility to malignant neoplasm of breast (V84 01) (Z15 01)   15  Hypertension (401 9) (I10)   16  Lymphedema (457 1) (I89 0)   17  Multiple joint pain (719 49) (M25 50)   18  Other chronic pain (338 29) (G89 29)   19  Pain of left clavicle (733 90) (M89 8X1)   20  Pathological fracture of right tibia with routine healing, unspecified pathological cause,  subsequent encounter (V54 26) (M84 461D)   21   Stasis dermatitis (454 1) (I83 10) 22  Urgency of urination (788 63) (R39 15)   23  Wegener's granulomatosis (446 4) (M31 30)    Social History     · Being A Social Drinker   · Daily caffeine consumption, 2-3 servings a day   · Denied: History of Drug Use   · Marital History - Currently    · Never A Smoker   · Uses Safety Equipment - Seatbelts    Current Meds   1  DULoxetine HCl - 30 MG Oral Capsule Delayed Release Particles; TAKE 1 CAPSULE DAILY; Therapy: 41UNJ7090 to Recorded   2  Furosemide 40 MG Oral Tablet; Take 1 tablet daily except Monday, , and Friday take 1 tablet twice a day; Therapy: 86TSV9151 to ([id number])  Requested for: 19ROR5744; Last Rx:00Dho9556 Ordered   3  LORazepam 0 5 MG Oral Tablet; TAKE 1 TABLET TWICE DAILY; Therapy: 24QMT4370 to (Evaluate:2012); Last TW:69LPY9076 Recorded   4  Lyrica 50 MG Oral Capsule; TAKE 1 CAPSULE 3 TIMES DAILY; Therapy: 06Pur2075 to Recorded   5  Omeprazole 20 MG Oral Capsule Delayed Release; TAKE 1 CAPSULE DAILY; Therapy: (Recorded:2015) to Recorded   6  PredniSONE 2 5 MG Oral Tablet; take 1 tablet every other day; Therapy: 91ZRN4836 to Recorded   7  Toviaz 4 MG Oral Tablet Extended Release 24 Hour; TAKE 1 TABLET AT BEDTIME; Therapy: 10Eki1561 to (Evaluate:2018)  Requested for: 35REH6254; Last Rx:32Hfc5063 Ordered    Allergies  1  Methotrexate TABS   2  Triple Dye LIQD    3  IVP Dye    Vitals  Signs   Recorded: 2017 01:22PM   Heart Rate: 446  Systolic: 834  Diastolic: 83    Physical Exam  Gait pattern is with lymphedema wraps, and [place] brace, and a gliding walker  Right knee is stiff  The right ankle is stiff  Results/Data  Diagnostic Studies Reviewed: I personally reviewed the films/images/results in the office today  My interpretation follows  X-ray Review X-rays the right tib-fib show nail traversing a healed proximal tibial shaft fracture        Future Appointments    Date/Time Provider Specialty Site   2017 03:00 PM ALYSSA Pascual  [facility]       Signatures   Electronically signed by : ALYSSA Martinez ; Nov 21 2017  1:36PM EST                       (Author)

## 2017-12-08 ENCOUNTER — TRANSCRIBE ORDERS (OUTPATIENT)
Dept: ADMINISTRATIVE | Facility: HOSPITAL | Age: 62
End: 2017-12-08

## 2017-12-08 DIAGNOSIS — Z13.820 SCREENING FOR OSTEOPOROSIS: Primary | ICD-10-CM

## 2018-01-13 VITALS
RESPIRATION RATE: 16 BRPM | HEIGHT: 66 IN | HEART RATE: 88 BPM | OXYGEN SATURATION: 91 % | TEMPERATURE: 98.5 F | SYSTOLIC BLOOD PRESSURE: 130 MMHG | DIASTOLIC BLOOD PRESSURE: 76 MMHG | WEIGHT: 250 LBS | BODY MASS INDEX: 40.18 KG/M2

## 2018-01-13 VITALS
RESPIRATION RATE: 16 BRPM | HEART RATE: 74 BPM | DIASTOLIC BLOOD PRESSURE: 72 MMHG | HEIGHT: 66 IN | SYSTOLIC BLOOD PRESSURE: 122 MMHG

## 2018-01-14 VITALS — HEART RATE: 91 BPM | SYSTOLIC BLOOD PRESSURE: 132 MMHG | DIASTOLIC BLOOD PRESSURE: 71 MMHG

## 2018-01-14 VITALS — SYSTOLIC BLOOD PRESSURE: 125 MMHG | HEART RATE: 79 BPM | DIASTOLIC BLOOD PRESSURE: 79 MMHG

## 2018-01-14 NOTE — PROGRESS NOTES
Assessment    1  Chronic kidney disease, stage 3 (585 3) (N18 3)   2  Wegener's granulomatosis (446 4) (M31 30)   3  Edema (782 3) (R60 9)   4  Lymphedema (457 1) (I89 0)   5  Urgency of urination (788 63) (R39 15)    Plan  Chronic kidney disease, stage 3    · (1) CBC/ PLT (NO DIFF); Status:Active; Requested for:03Apr2017;    Perform:[facility]; Due:03Apr2018; Ordered; For:Chronic kidney disease, stage 3; Ordered By:Braxton Holden;   · (1) MICROALBUMIN CREATININE RATIO, RANDOM URINE; Status:Active; Requested  for:03Apr2017;    Perform:[facility]; Due:03Apr2018; Ordered; For:Chronic kidney disease, stage 3; Ordered By:Braxton Holden;   · (1) PTH N-TERMINAL (INTACT); Status:Active; Requested for:03Apr2017;    Perform:[facility]; Due:03Apr2018; Ordered; For:Chronic kidney disease, stage 3; Ordered By:Braxton Holden;   · (1) RENAL FUNCTION PANEL; Status:Active; Requested for:03Apr2017;    Perform:[facility]; Due:03Apr2018; Ordered; For:Chronic kidney disease, stage 3; Ordered By:Braxton Holden;   · (1) URIC ACID; Status:Active; Requested for:03Apr2017;    Perform:[facility]; Due:03Apr2018; Ordered; For:Chronic kidney disease, stage 3; Ordered By:Braxton Holden;   · Follow-up Visit in 8 Months Evaluation and Treatment  Follow-up  Status: Hold For -  Scheduling  Requested for: 03Apr2017   Ordered; For: Chronic kidney disease, stage 3; Ordered By: Hallie Mcclure Performed:  Due: 08Apr2017  Urgency of urination    · Toviaz 4 MG Oral Tablet Extended Release 24 Hour; TAKE 1 TABLET Bedtime   Rx By: Hallie Mcclure; Dispense: 90 Days ; #:90 Tablet Extended Release 24 Hour; Refill: 3; For: Urgency of urination; HARITHA = N; [facility] To[facility]  Wegener's granulomatosis    · Clindamycin HCl - 300 MG Oral Capsule   Rx By: Braxton Henry; Dispense: 5 Days ; #:20 Capsule;  Refill: 0; For: Wegener's granulomatosis; HARITHA = N; Verified Transmission to [facility] [facility]; Last Updated By: Gala Santacruz; 8/22/2016 9:28:03 AM    Discussion/Summary    As far as Bree's kidney function is concerned it appears that her creatinine is fairly stable 1 3, GFR of approximately 43  Again her disease is probably due to her previous Juan's  At this point time her blood pressure seems to be stable, her lower extremity edema seems to be controlled with her current diuretic dosing as well as lymphedema treatment  At this point time have not changed her current regimen  We will continue to follow serially, plan to see her in approximately 8 months with repeat laboratory studies at that time  The patient was counseled regarding instructions for management, impressions  Reason For Visit  Chronic kidney disease      History of Present Illness  With the pleasure of seeing Shelby Beaver for nephrology follow-up secondary to previous diagnosis of stage III chronic kidney disease  He has been doing reasonably well  She denies any recent hospitalizations  She has been off the rituximab due to low immunoglobulin levels  Fortunately her Harvinder Morrow remained in remission  Her lower extremity swelling is unchanged  She unfortunately has developed a Charcot foot is now using a boot  Review of Systems    Constitutional: fatigue, but no fever and no anorexia  Integumentary: no rashes  Gastrointestinal: no abdominal pain, no nausea, no diarrhea and no vomiting  Respiratory: no shortness of breath  Cardiovascular: lower extremity edema, but no chest pain  Musculoskeletal: joint pain  Neurological: no lightheadedness  Genitourinary: urinary frequency, but no dysuria  Current Meds   1  Clindamycin HCl - 300 MG Oral Capsule; take 1 capsule 4 times daily; Therapy: 72EWZ3478 to (Evaluate:56Pkv9315)  Requested for: 96HQZ4144; Last   Rx:09May2016 Ordered   2  DULoxetine HCl - 30 MG Oral Capsule Delayed Release Particles; TAKE 1 CAPSULE   DAILY; Therapy: 67VBQ3975 to Recorded   3   Furosemide 40 MG Oral Tablet; TAKE 1 TABLET DAILY EXCEPT MODAY,WEDNESDAY   AND FRIDAY TAKE 1 TABLET TWICE DAILY; Therapy: 36KTX0112 to (Evaluate:15Jan2017)  Requested for: 20OWD7581; Last   Rx:31Jki9998 Ordered   4  LORazepam 0 5 MG Oral Tablet; TAKE 1 TABLET TWICE DAILY; Therapy: 23LEB3189 to (Evaluate:03Oct2012); Last UE:22GTJ4095 Recorded   5  Lyrica 50 MG Oral Capsule; TAKE 1 CAPSULE 3 TIMES DAILY; Therapy: 22Izw7523 to Recorded   6  Omeprazole 20 MG Oral Capsule Delayed Release; TAKE 1 CAPSULE DAILY; Therapy: (Recorded:02Apr2015) to Recorded   7  PredniSONE 2 5 MG Oral Tablet; take 1 tablet every other day; Therapy: 03SAB3559 to Recorded    The medication list was reviewed and updated today  Allergies    1  Methotrexate TABS   2  Triple Dye LIQD    3  IVP Dye    Vitals  Vital Signs    Recorded: 22Aug2016 09:44AM Recorded: 29NLD5436 80:71BV   Systolic 659, LUE, Sitting    Diastolic 92, LUE, Sitting    Height  5 ft 6 in     Physical Exam    Constitutional: General appearance: No acute distress, well appearing and well nourished  Eyes: Anicteric sclerae  JVD:  No JVD present  Pulmonary: Respiratory effort: No increased work of breathing or signs of respiratory distress  Auscultation of lungs: Clear to auscultation  Cardiovascular: Auscultation of heart: Normal rate and rhythm, normal S1 and S2, without murmurs  Abdomen: Non-tender, no masses  Extremities: Extremities are abnormal   Extremities: bilateral extremities have 1+ pitting edema  Rash: No rash present  Psychiatric: Orientation to person, place, and time: Normal   and Mood and affect: Normal        Future Appointments    Date/Time Provider Specialty Site   11/22/2016 08:30 AM ALYSSA Abdi   [facility]urgical [facility]     Signatures   Electronically signed by : Prerna Cheema DO; Aug 22 2016  9:53AM EST                       (Author)

## 2018-01-15 VITALS — SYSTOLIC BLOOD PRESSURE: 150 MMHG | HEART RATE: 106 BPM | DIASTOLIC BLOOD PRESSURE: 83 MMHG

## 2018-01-22 VITALS
SYSTOLIC BLOOD PRESSURE: 128 MMHG | TEMPERATURE: 98 F | HEART RATE: 106 BPM | BODY MASS INDEX: 38.73 KG/M2 | OXYGEN SATURATION: 97 % | RESPIRATION RATE: 16 BRPM | DIASTOLIC BLOOD PRESSURE: 78 MMHG | HEIGHT: 66 IN | WEIGHT: 241 LBS

## 2018-02-06 ENCOUNTER — HOSPITAL ENCOUNTER (OUTPATIENT)
Dept: RADIOLOGY | Age: 63
Discharge: HOME | End: 2018-02-06
Payer: COMMERCIAL

## 2018-02-06 DIAGNOSIS — Z13.820 SCREENING FOR OSTEOPOROSIS: ICD-10-CM

## 2018-02-06 PROCEDURE — 77080 DXA BONE DENSITY AXIAL: CPT

## 2018-04-02 DIAGNOSIS — N18.30 CHRONIC KIDNEY DISEASE, STAGE III (MODERATE) (HCC): ICD-10-CM

## 2018-05-03 DIAGNOSIS — Z15.01 GENETIC SUSCEPTIBILITY TO MALIGNANT NEOPLASM OF BREAST: ICD-10-CM

## 2018-05-04 ENCOUNTER — HOSPITAL ENCOUNTER (OUTPATIENT)
Dept: MAMMOGRAPHY | Facility: CLINIC | Age: 63
Discharge: HOME | End: 2018-05-04

## 2018-05-11 ENCOUNTER — HOSPITAL ENCOUNTER (OUTPATIENT)
Dept: MAMMOGRAPHY | Facility: CLINIC | Age: 63
Discharge: HOME | End: 2018-05-11
Payer: COMMERCIAL

## 2018-05-11 DIAGNOSIS — Z15.01 GENETIC SUSCEPTIBILITY TO MALIGNANT NEOPLASM OF BREAST: ICD-10-CM

## 2018-05-11 DIAGNOSIS — Z12.31 SCREENING MAMMOGRAM, ENCOUNTER FOR: ICD-10-CM

## 2018-05-11 PROCEDURE — 77063 BREAST TOMOSYNTHESIS BI: CPT

## 2018-05-11 PROCEDURE — 77067 SCR MAMMO BI INCL CAD: CPT

## 2018-05-14 PROBLEM — S82.201A CLOSED RIGHT TIBIAL FRACTURE: Status: RESOLVED | Noted: 2017-09-04 | Resolved: 2018-05-14

## 2018-05-14 PROBLEM — M19.92 POST-TRAUMATIC OSTEOARTHRITIS: Status: ACTIVE | Noted: 2018-05-14

## 2018-05-14 RX ORDER — SODIUM FLUORIDE 6.1 MG/ML
GEL, DENTIFRICE DENTAL
COMMUNITY
Start: 2018-02-12 | End: 2021-07-09 | Stop reason: HOSPADM

## 2018-05-14 RX ORDER — FESOTERODINE FUMARATE 8 MG/1
8 TABLET, FILM COATED, EXTENDED RELEASE ORAL
COMMUNITY
Start: 2018-04-05

## 2018-05-14 RX ORDER — POLYETHYLENE GLYCOL 3350 17 G/17G
17 POWDER, FOR SOLUTION ORAL AS NEEDED
COMMUNITY
End: 2021-12-21

## 2018-05-14 RX ORDER — DULOXETIN HYDROCHLORIDE 60 MG/1
60 CAPSULE, DELAYED RELEASE ORAL DAILY
Refills: 0 | COMMUNITY
Start: 2018-04-13

## 2018-05-15 ENCOUNTER — OFFICE VISIT (OUTPATIENT)
Dept: SURGICAL ONCOLOGY | Facility: CLINIC | Age: 63
End: 2018-05-15
Payer: COMMERCIAL

## 2018-05-15 VITALS
BODY MASS INDEX: 40.18 KG/M2 | SYSTOLIC BLOOD PRESSURE: 118 MMHG | WEIGHT: 250 LBS | DIASTOLIC BLOOD PRESSURE: 80 MMHG | HEIGHT: 66 IN | HEART RATE: 88 BPM | RESPIRATION RATE: 18 BRPM | TEMPERATURE: 97.8 F

## 2018-05-15 DIAGNOSIS — Z15.01 BRCA1 POSITIVE: Primary | ICD-10-CM

## 2018-05-15 DIAGNOSIS — Z15.09 BRCA1 POSITIVE: Primary | ICD-10-CM

## 2018-05-15 PROCEDURE — 99213 OFFICE O/P EST LOW 20 MIN: CPT | Performed by: SURGERY

## 2018-05-15 NOTE — LETTER
May 15, 2018     Stevenflora DO Isaiah  [address]   [address]  [place] [address]    Patient: Fernanda Oro   YOB: 1955   Date of Visit: 5/15/2018       Dear Dr Madan Poon: Thank you for referring Fabian Rosales to me for evaluation  Below are my notes for this consultation  If you have questions, please do not hesitate to call me  I look forward to following your patient along with you  Sincerely,        Dolores Hernandez MD        CC: No Recipients  Dolores Hernandez MD  5/15/2018 10:20 AM  Sign at close encounter               Surgical Oncology Follow Up       [address] [place] [id number]    Fernanda Oro  1955  [id number]  [address] [facility]COL[facility] [address] [address]    Diagnoses and all orders for this visit:    BRCA1 positive    Other orders  -     DULoxetine (CYMBALTA) 60 mg delayed release capsule;   -     TOVIAZ 8 MG TB24;   -     Multiple Vitamins-Minerals (MULTIVITAMIN ADULT PO); Take 1 capsule by mouth  -     polyethylene glycol (MIRALAX) 17 g packet; Take 17 g by mouth  -     PREVIDENT 5000 DRY MOUTH 1 1 % GEL;         Chief Complaint   Patient presents with    Follow-up     Pt is here for 6 mo breast f/u with mammo  Return in about 6 months (around 11/15/2018) for Office Visit  No history exists  Diagnosis and Staging: BRCA1 positive   Personal History of ovarian cancer  Family history of breast and ovarian cancer     Treatment History: FAMILIA BSO and cytoreductive surgery for ovarian cancer  Chemotherapy   Current Therapy:    Disease Status: MANUELITO     History of Present Illness:   Patient presents today for a 6 month follow-up for an increased risk of breast cancer due to BRCA1 genetic mutation  She had a bilateral screening mammogram performed on May 11, 2018  This was BI-RADS 1    I personally reviewed the films   She has no new complaints today  She is noticing no changes on self exams  She is unable to obtain an MRI  She did have some breast discomfort prior to the mammogram, but this resolved spontaneously after several days  Review of Systems  Complete ROS Surg Onc:   Complete ROS Surg Onc:   Constitutional: The patient denies new or recent history of general fatigue, no recent weight loss, no change in appetite  Eyes: No complaints of visual problems, no scleral icterus  ENT: no complaints of ear pain, no hoarseness, no difficulty swallowing,  no tinnitus and no new masses in head, oral cavity, or neck  Cardiovascular: No complaints of chest pain, no palpitations, no ankle edema  Respiratory: No complaints of shortness of breath, no cough  Gastrointestinal: No complaints of jaundice, no bloody stools, no pale stools  Genitourinary: No complaints of dysuria, no hematuria, no nocturia, no frequent urination, no urethral discharge  Musculoskeletal: No complaints of weakness, paralysis, joint stiffness or arthralgias  Integumentary: No complaints of rash, no new lesions  Neurological: No complaints of convulsions, no seizures, no dizziness  Hematologic/Lymphatic: No complaints of easy bruising  Endocrine:  No hot or cold intolerance  No polydipsia, polyphagia, or polyuria  Allergy/immunology:  No environmental allergies  No food allergies  Not immunocompromised  Skin:  No pallor or rash  No wound          Patient Active Problem List   Diagnosis    [facility]    Charcot's joint    Anxiety    Benign essential hypertension    BRCA1 positive    Cancer of ovary (HCC)    Chronic kidney disease, stage III (moderate)    Chronic pain disorder    Class 2 obesity    Depression    Dyslipidemia    Esophageal reflux    Hypertension    Opiate analgesic use agreement exists    Ovarian cancer (HCC)    Post-traumatic osteoarthritis    Increased frequency of urination Past Medical History:   Diagnosis Date    Lymphedema      Past Surgical History:   Procedure Laterality Date    APPENDECTOMY      HYSTERECTOMY      OTHER SURGICAL HISTORY      right lower extremity due to trauma    MT TREAT TIBIAL SHAFT FX, INTRAMED IMPLANT Right 9/6/2017    Procedure: INSERTION NAIL IM TIBIA;  Surgeon: India Verdugo MD;  [facility];  Service: Orthopedics     No family history on file  Social History     Social History    Marital status: /Civil Union     Spouse name: N/A    Number of children: N/A    Years of education: N/A     Occupational History    Not on file  Social History Main Topics    Smoking status: Never Smoker    Smokeless tobacco: Never Used    Alcohol use No    Drug use: No    Sexual activity: Yes     Partners: Male     Birth control/ protection: None     Other Topics Concern    Not on file     Social History Narrative    No narrative on file       Current Outpatient Prescriptions:     Multiple Vitamins-Minerals (MULTIVITAMIN ADULT PO), Take 1 capsule by mouth, Disp: , Rfl:     acetaminophen (TYLENOL) 325 mg tablet, Take 2 tablets by mouth every 6 (six) hours as needed for mild pain, Disp: 30 tablet, Rfl: 0    Calcium Carbonate-Vit D-Min (CALCIUM 1200 PO), Take 1 tablet by mouth, Disp: , Rfl:     DULoxetine (CYMBALTA) 30 mg delayed release capsule, Take 30 mg by mouth daily  , Disp: , Rfl:     DULoxetine (CYMBALTA) 60 mg delayed release capsule, , Disp: , Rfl: 0    furosemide (LASIX) 40 mg tablet, Take 40 mg by mouth daily M W F BID PRN , Disp: , Rfl:     LORazepam (ATIVAN) 0 5 mg tablet, Take 1 tablet as needed every 8 hours  , Disp: 30 tablet, Rfl: 0    omeprazole (PriLOSEC) 20 mg delayed release capsule, Take 20 mg by mouth daily  , Disp: , Rfl:     oxyCODONE (OxyCONTIN) 60 mg 12 hr tablet, Earliest Fill Date: 2/3/16  Take 1 tablet every 12 hours  , Disp: 60 tablet, Rfl: 0    oxyCODONE (ROXICODONE) 15 mg immediate release tablet, Take 1 tab every 4 to six hours as needed for pain , Disp: 60 tablet, Rfl: 0    polyethylene glycol (MIRALAX) 17 g packet, Take 17 g by mouth, Disp: , Rfl:     PREVIDENT 5000 DRY MOUTH 1 1 % GEL, , Disp: , Rfl:     sulfamethoxazole-trimethoprim (BACTRIM DS) 800-160 mg per tablet, Take 1 tablet by mouth 3 (three) times a week  , Disp: , Rfl:     TOVIAZ 8 MG TB24, , Disp: , Rfl:   Allergies   Allergen Reactions    Iodinated Diagnostic Agents Shortness Of Breath    Omnipaque [Iohexol] Shortness Of Breath    Other Shortness Of Breath     IVP dye, x ray dye 3    Iodides      Other reaction(s): SWELLING, SOB    Methotrexate Nausea Only     Headache and nausea    Methotrexate Derivatives Headache     Vitals:    05/15/18 0942   BP: 118/80   Pulse: 88   Resp: 18   Temp: 97 8 °F (36 6 °C)       Physical Exam  Constitutional: General appearance: The Patient is well-developed and well-nourished who appears the stated age in no acute distress  Patient is pleasant and talkative  HEENT:  Normocephalic  Sclerae are anicteric  Mucous membranes are moist  Neck is supple without adenopathy  No JVD  Chest: The lungs are clear to auscultation  Cardiac: Heart is regular rate  Abdomen: Abdomen is soft, non-tender, non-distended and without masses  Extremities: There is no clubbing or cyanosis  There is 3+ lower extremity edema  Her right foot is in a boot  Neuro: Grossly nonfocal  Gait, she walks with a walker  Lymphatic: No evidence of cervical adenopathy bilaterally  No evidence of axillary adenopathy bilaterally  Skin: Warm, anicteric  Psych:  Patient is pleasant and talkative  Breasts:  symmetric  No dominant masses, skin changes, or nipple discharge in either breast   No axillary or supraclavicular adenopathy bilaterally          Pathology:      Labs:      Imaging  Mammo Screening Bilateral W 3d & Cad    Result Date: 5/11/2018  Narrative: Patient History: Patient is postmenopausal and has history of ovarian cancer at age 45  Patient had tested positive for BRCA1  Family history of breast cancer at age 28 in mother, pancreatic cancer at age 58 in mother  Benign US guided breast biopsy right, May 9, 2016  Mammo Pathology Letter of the right breast, May 9, 2016  Patient has never smoked  Patient's BMI is 35 5  Reason for exam: screening, asymptomatic  Mammo Screening Bilateral W DBT and CAD: May 11, 2018 - Check In #: [de-identified] 2D/3D Procedure 3D views: Bilateral MLO view(s) were taken  2D views: Bilateral CC view(s) were taken  Technologist: MARIANN Martinez (R)(M) Prior study comparison: May 2, 2017, mammo screening bilateral W CAD performed at [address] [place] [address]  April 26, 2016, mammo diagnostic bilateral W CAD performed at [address] [place] [address]  April 22, 2015, digital bilateral screening mammogram, performed at [address] [place] [address]  April 21, 2014, bilateral mammogram, performed at [facility]  The breast tissue is almost entirely fat  No dominant soft tissue mass, architectural distortion or suspicious calcifications are noted in either breast   The skin and nipple structures are within normal limits  Scattered benign appearing calcifications are noted  No significant changes when compared with prior studies  ACR BI-RADS® Assessments: BiRad:1 - Negative Recommendation: Routine screening mammogram of both breasts in 1 year  A reminder letter will be scheduled  The patient is scheduled in a reminder system for screening mammography  8-10% of cancers will be missed on mammography  Management of a palpable abnormality must be based on clinical grounds  Patients will be notified of their results via letter from our facility  Accredited by [employer] of Radiology and FDA   Transcription Location: [address] [place] [address]: [id number] Risk Value(s): Tyrer-Cuzick 10 Year: 18 800%, Tyrer-Cuzick Lifetime: 29 800%, Myriad Table: 14 3%, MRS : Based on personal history, consideration of intensive breast surveillance, including MRI is warranted  I reviewed the above laboratory and imaging data  Discussion/Summary:   Patient is a 58-year-old female who presents today for a 6 month follow-up for an increased risk of breast cancer due to BRCA1 genetic mutation  She has a personal history of ovarian cancer  There is no evidence of malignancy by physical exam or imaging  She is unable to tolerate a breast MRI due to orthopedic injuries  She has no new complaints today  There are no worrisome findings on physical exam  We will see the patient back in 6 months  She is agreeable to this  All of her questions were answered

## 2018-05-15 NOTE — PROGRESS NOTES
Surgical Oncology Follow Up       [address] [place] [address]  CANCER CARE [facility] SURGICAL ONCOLOGY [address] [address]    Fredy Merino  1955  [id number]  [address] [place] [address]  CANCER [facility] SURGICAL ONCOLOGY [place] [id number]    Diagnoses and all orders for this visit:    BRCA1 positive    Other orders  -     DULoxetine (CYMBALTA) 60 mg delayed release capsule;   -     TOVIAZ 8 MG TB24;   -     Multiple Vitamins-Minerals (MULTIVITAMIN ADULT PO); Take 1 capsule by mouth  -     polyethylene glycol (MIRALAX) 17 g packet; Take 17 g by mouth  -     PREVIDENT 5000 DRY MOUTH 1 1 % GEL;         Chief Complaint   Patient presents with    Follow-up     Pt is here for 6 mo breast f/u with mammo  Return in about 6 months (around 11/15/2018) for Office Visit  No history exists  Diagnosis and Staging: BRCA1 positive   Personal History of ovarian cancer  Family history of breast and ovarian cancer     Treatment History: FAMILIA BSO and cytoreductive surgery for ovarian cancer  Chemotherapy   Current Therapy:    Disease Status: MANUELITO     History of Present Illness:   Patient presents today for a 6 month follow-up for an increased risk of breast cancer due to BRCA1 genetic mutation  She had a bilateral screening mammogram performed on May 11, 2018  This was BI-RADS 1  I personally reviewed the films  She has no new complaints today  She is noticing no changes on self exams  She is unable to obtain an MRI  She did have some breast discomfort prior to the mammogram, but this resolved spontaneously after several days  Review of Systems  Complete ROS Surg Onc:   Complete ROS Surg Onc:   Constitutional: The patient denies new or recent history of general fatigue, no recent weight loss, no change in appetite  Eyes: No complaints of visual problems, no scleral icterus     ENT: no complaints of ear pain, no hoarseness, no difficulty swallowing,  no tinnitus and no new masses in head, oral cavity, or neck  Cardiovascular: No complaints of chest pain, no palpitations, no ankle edema  Respiratory: No complaints of shortness of breath, no cough  Gastrointestinal: No complaints of jaundice, no bloody stools, no pale stools  Genitourinary: No complaints of dysuria, no hematuria, no nocturia, no frequent urination, no urethral discharge  Musculoskeletal: No complaints of weakness, paralysis, joint stiffness or arthralgias  Integumentary: No complaints of rash, no new lesions  Neurological: No complaints of convulsions, no seizures, no dizziness  Hematologic/Lymphatic: No complaints of easy bruising  Endocrine:  No hot or cold intolerance  No polydipsia, polyphagia, or polyuria  Allergy/immunology:  No environmental allergies  No food allergies  Not immunocompromised  Skin:  No pallor or rash  No wound  Patient Active Problem List   Diagnosis    Wegeners granulomatosis ([facility] )    Charcot's joint    Anxiety    Benign essential hypertension    BRCA1 positive    Cancer of ovary (HCC)    Chronic kidney disease, stage III (moderate)    Chronic pain disorder    Class 2 obesity    Depression    Dyslipidemia    Esophageal reflux    Hypertension    Opiate analgesic use agreement exists    [facility]    Post-traumatic osteoarthritis    Increased frequency of urination     Past Medical History:   Diagnosis Date    Lymphedema      Past Surgical History:   Procedure Laterality Date    APPENDECTOMY      HYSTERECTOMY      OTHER SURGICAL HISTORY      right lower extremity due to trauma    NM TREAT TIBIAL SHAFT FX, INTRAMED IMPLANT Right 9/6/2017    Procedure: INSERTION NAIL IM TIBIA;  Surgeon: Crystal Caal MD;  Location:  MAIN OR;  Service: Orthopedics     No family history on file    Social History     Social History    Marital status: /Civil Union     Spouse name: N/A    Number of children: N/A  Years of education: N/A     Occupational History    Not on file  Social History Main Topics    Smoking status: Never Smoker    Smokeless tobacco: Never Used    Alcohol use No    Drug use: No    Sexual activity: Yes     Partners: Male     Birth control/ protection: None     Other Topics Concern    Not on file     Social History Narrative    No narrative on file       Current Outpatient Prescriptions:     Multiple Vitamins-Minerals (MULTIVITAMIN ADULT PO), Take 1 capsule by mouth, Disp: , Rfl:     acetaminophen (TYLENOL) 325 mg tablet, Take 2 tablets by mouth every 6 (six) hours as needed for mild pain, Disp: 30 tablet, Rfl: 0    Calcium Carbonate-Vit D-Min (CALCIUM 1200 PO), Take 1 tablet by mouth, Disp: , Rfl:     DULoxetine (CYMBALTA) 30 mg delayed release capsule, Take 30 mg by mouth daily  , Disp: , Rfl:     DULoxetine (CYMBALTA) 60 mg delayed release capsule, , Disp: , Rfl: 0    furosemide (LASIX) 40 mg tablet, Take 40 mg by mouth daily M W F BID PRN , Disp: , Rfl:     LORazepam (ATIVAN) 0 5 mg tablet, Take 1 tablet as needed every 8 hours  , Disp: 30 tablet, Rfl: 0    omeprazole (PriLOSEC) 20 mg delayed release capsule, Take 20 mg by mouth daily  , Disp: , Rfl:     oxyCODONE (OxyCONTIN) 60 mg 12 hr tablet, Earliest Fill Date: 2/3/16  Take 1 tablet every 12 hours  , Disp: 60 tablet, Rfl: 0    oxyCODONE (ROXICODONE) 15 mg immediate release tablet, Take 1 tab every 4 to six hours as needed for pain , Disp: 60 tablet, Rfl: 0    polyethylene glycol (MIRALAX) 17 g packet, Take 17 g by mouth, Disp: , Rfl:     PREVIDENT 5000 DRY MOUTH 1 1 % GEL, , Disp: , Rfl:     sulfamethoxazole-trimethoprim (BACTRIM DS) 800-160 mg per tablet, Take 1 tablet by mouth 3 (three) times a week  , Disp: , Rfl:     TOVIAZ 8 MG TB24, , Disp: , Rfl:   Allergies   Allergen Reactions    Iodinated Diagnostic Agents Shortness Of Breath    Omnipaque [Iohexol] Shortness Of Breath    Other Shortness Of Breath     IVP dye, x ray dye 3    Iodides      Other reaction(s): SWELLING, SOB    Methotrexate Nausea Only     Headache and nausea    Methotrexate Derivatives Headache     Vitals:    05/15/18 0942   BP: 118/80   Pulse: 88   Resp: 18   Temp: 97 8 °F (36 6 °C)       Physical Exam  Constitutional: General appearance: The Patient is well-developed and well-nourished who appears the stated age in no acute distress  Patient is pleasant and talkative  HEENT:  Normocephalic  Sclerae are anicteric  Mucous membranes are moist  Neck is supple without adenopathy  No JVD  Chest: The lungs are clear to auscultation  Cardiac: Heart is regular rate  Abdomen: Abdomen is soft, non-tender, non-distended and without masses  Extremities: There is no clubbing or cyanosis  There is 3+ lower extremity edema  Her right foot is in a boot  Neuro: Grossly nonfocal  Gait, she walks with a walker  Lymphatic: No evidence of cervical adenopathy bilaterally  No evidence of axillary adenopathy bilaterally  Skin: Warm, anicteric  Psych:  Patient is pleasant and talkative  Breasts:  symmetric  No dominant masses, skin changes, or nipple discharge in either breast   No axillary or supraclavicular adenopathy bilaterally  Pathology:      Labs:      Imaging  Mammo Screening Bilateral W 3d & Cad    Result Date: 5/11/2018  Narrative: Patient History: Patient is postmenopausal and has history of ovarian cancer at age 45  Patient had tested positive for BRCA1  Family history of breast cancer at age 28 in mother, pancreatic cancer at age 58 in mother  Benign US guided breast biopsy right, May 9, 2016  Mammo Pathology Letter of the right breast, May 9, 2016  Patient has never smoked  Patient's BMI is 35 5  Reason for exam: screening, asymptomatic  Mammo Screening Bilateral W DBT and CAD: May 11, 2018 - Check In #: [de-identified] 2D/3D Procedure 3D views: Bilateral MLO view(s) were taken  2D views: Bilateral CC view(s) were taken  Technologist: MARIANN Barber (MARIANN)(M) Prior study comparison: May 2, 2017, mammo screening bilateral W CAD performed at [address] [place] [address]  April 26, 2016, mammo diagnostic bilateral W CAD performed at [address] [place] [address]  April 22, 2015, digital bilateral screening mammogram, performed at [address] [place] [address]  April 21, 2014, bilateral mammogram, performed at [facility]  The breast tissue is almost entirely fat  No dominant soft tissue mass, architectural distortion or suspicious calcifications are noted in either breast   The skin and nipple structures are within normal limits  Scattered benign appearing calcifications are noted  No significant changes when compared with prior studies  ACR BI-RADS® Assessments: BiRad:1 - Negative Recommendation: Routine screening mammogram of both breasts in 1 year  A reminder letter will be scheduled  The patient is scheduled in a reminder system for screening mammography  8-10% of cancers will be missed on mammography  Management of a palpable abnormality must be based on clinical grounds  Patients will be notified of their results via letter from our facility  Accredited by [employer] of Radiology and FDA  Transcription Location: [address] [place] [address]: [id number] Risk Value(s): Boriser-Cujaceck 10 Year: 18 800%, Tyrer-Cuzick Lifetime: 29 800%, Myriad Table: 14 3%, MRS : Based on personal history, consideration of intensive breast surveillance, including MRI is warranted  I reviewed the above laboratory and imaging data  Discussion/Summary:   Patient is a 64-year-old female who presents today for a 6 month follow-up for an increased risk of breast cancer due to BRCA1 genetic mutation  She has a personal history of ovarian cancer  There is no evidence of malignancy by physical exam or imaging  She is unable to tolerate a breast MRI due to orthopedic injuries  She has no new complaints today   There are no worrisome findings on physical exam  We will see the patient back in 6 months  She is agreeable to this  All of her questions were answered

## 2018-05-24 LAB — CREAT ?TM UR-SCNC: 121 UMOL/L

## 2018-06-20 ENCOUNTER — OFFICE VISIT (OUTPATIENT)
Dept: NEPHROLOGY | Facility: CLINIC | Age: 63
End: 2018-06-20
Payer: COMMERCIAL

## 2018-06-20 VITALS
SYSTOLIC BLOOD PRESSURE: 138 MMHG | DIASTOLIC BLOOD PRESSURE: 78 MMHG | HEART RATE: 76 BPM | HEIGHT: 66 IN | BODY MASS INDEX: 40.18 KG/M2 | WEIGHT: 250 LBS

## 2018-06-20 DIAGNOSIS — M31.30 WEGENERS GRANULOMATOSIS: Chronic | ICD-10-CM

## 2018-06-20 DIAGNOSIS — I10 BENIGN ESSENTIAL HYPERTENSION: ICD-10-CM

## 2018-06-20 DIAGNOSIS — N18.30 CHRONIC KIDNEY DISEASE, STAGE III (MODERATE) (HCC): Primary | ICD-10-CM

## 2018-06-20 PROCEDURE — 99213 OFFICE O/P EST LOW 20 MIN: CPT | Performed by: INTERNAL MEDICINE

## 2018-06-20 NOTE — PROGRESS NOTES
NEPHROLOGY OUTPATIENT PROGRESS NOTE   Gaston Morales 70 y o  female MRN: [id number]  Reason for visit:   Chronic kidney disease    ASSESSMENT and PLAN:  1  Chronic kidney disease, baseline creatinine between 1 and 1 2, current creatinine 1 1, estimated GFR 51, no significant proteinuria  2  History of ANCA associated vasculitis  3  Chronic lower extremity edema, lymphedema, continue with current diuretic regimen  4  Hypertension, overall stable    · Overall renal function has remained stable  · Continue with current regimen clipping her diuretics  · Follow up with Rheumatology, rituximab has been on hold secondary to depressed immunoglobulins  · Follow-up in 1 year with repeat laboratory studies at that time  SUBJECTIVE / INTERVAL HISTORY:  She has been doing reasonably well  Last year she did have a fracture involving her right leg and needed a steffi placed  At times complains of significant arthritis and body pain  Denies any worsening lower extremity swelling  Continues to use compression stockings  Denies any chest pain or shortness of Breath  Appetite has been normal       Review of Systems      OBJECTIVE:  /78 (BP Location: Left arm, Patient Position: Sitting)   Pulse 76   Ht 5' 6" (1 676 m)   Wt 113 kg (250 lb)   BMI 40 35 kg/m²     Physical Exam   Constitutional: She is oriented to person, place, and time  No distress  HENT:   Head: Normocephalic  Eyes: No scleral icterus  Neck: Neck supple  Cardiovascular: Normal rate and regular rhythm  Pulmonary/Chest: Effort normal and breath sounds normal    Abdominal: Soft  Bowel sounds are normal    Musculoskeletal: She exhibits edema (2+)  Neurological: She is alert and oriented to person, place, and time  Skin: Skin is warm and dry  Psychiatric: She has a normal mood and affect           Medications:    Current Outpatient Prescriptions:     acetaminophen (TYLENOL) 325 mg tablet, Take 2 tablets by mouth every 6 (six) hours as needed for mild pain, Disp: 30 tablet, Rfl: 0    Calcium Carbonate-Vit D-Min (CALCIUM 1200 PO), Take 1 tablet by mouth, Disp: , Rfl:     DULoxetine (CYMBALTA) 60 mg delayed release capsule, Take 60 mg by mouth daily  , Disp: , Rfl: 0    furosemide (LASIX) 40 mg tablet, Take 40 mg by mouth daily M W F BID PRN , Disp: , Rfl:     LORazepam (ATIVAN) 0 5 mg tablet, Take 1 tablet as needed every 8 hours  (Patient taking differently: Take 0 5 mg by mouth 2 (two) times a day Take 1 tablet as needed every 8 hours  ), Disp: 30 tablet, Rfl: 0    Multiple Vitamins-Minerals (MULTIVITAMIN ADULT PO), Take 1 capsule by mouth, Disp: , Rfl:     omeprazole (PriLOSEC) 20 mg delayed release capsule, Take 20 mg by mouth daily  , Disp: , Rfl:     oxyCODONE (OxyCONTIN) 60 mg 12 hr tablet, Earliest Fill Date: 2/3/16  Take 1 tablet every 12 hours  , Disp: 60 tablet, Rfl: 0    oxyCODONE (ROXICODONE) 15 mg immediate release tablet, Take 1 tab every 4 to six hours as needed for pain , Disp: 60 tablet, Rfl: 0    polyethylene glycol (MIRALAX) 17 g packet, Take 17 g by mouth, Disp: , Rfl:     sulfamethoxazole-trimethoprim (BACTRIM DS) 800-160 mg per tablet, Take 1 tablet by mouth 3 (three) times a week  , Disp: , Rfl:     TOVIAZ 8 MG TB24, , Disp: , Rfl:     DULoxetine (CYMBALTA) 30 mg delayed release capsule, Take 30 mg by mouth daily  , Disp: , Rfl:     PREVIDENT 5000 DRY MOUTH 1 1 % GEL, , Disp: , Rfl:     Laboratory Results:  Results for orders placed or performed in visit on 05/24/18   Microalbumin / creatinine urine ratio   Result Value Ref Range    EXT Creatinine Urine 121 0

## 2018-06-20 NOTE — PATIENT INSTRUCTIONS
ASSESSMENT and PLAN:  1  Chronic kidney disease, baseline creatinine between 1 and 1 2, current creatinine 1 1, estimated GFR 51, no significant proteinuria  2  History of ANCA associated vasculitis  3  Chronic lower extremity edema, lymphedema, continue with current diuretic regimen  4  Hypertension, overall stable    · Overall renal function has remained stable  · Continue with current regimen clipping her diuretics  · Follow up with Rheumatology, rituximab has been on hold secondary to depressed immunoglobulins  · Follow-up in 1 year with repeat laboratory studies at that time

## 2018-06-20 NOTE — LETTER
June 20, 2018     Fatou Hurtado DO  [address]   [address]  [place] [address]    Patient: Junior Regan   YOB: 1955   Date of Visit: 6/20/2018     Dear Dr Apolinar Begum      Thank you for referring Pearson Collet to me for evaluation  Below are the relevant portions of my assessment and plan of care  If you have questions, please do not hesitate to call me  I look forward to following Hernán Chandra along with you  Sincerely,        Jose Hernandez DO        CC: No Recipients    Progress Notes:      ASSESSMENT and PLAN:  1  Chronic kidney disease, baseline creatinine between 1 and 1 2, current creatinine 1 1, estimated GFR 51, no significant proteinuria  2  History of ANCA associated vasculitis  3  Chronic lower extremity edema, lymphedema, continue with current diuretic regimen  4  Hypertension, overall stable    · Overall renal function has remained stable  · Continue with current regimen clipping her diuretics  · Follow up with Rheumatology, rituximab has been on hold secondary to depressed immunoglobulins  · Follow-up in 1 year with repeat laboratory studies at that time

## 2018-07-10 RX ORDER — ONDANSETRON 2 MG/ML
4 INJECTION INTRAMUSCULAR; INTRAVENOUS ONCE
Status: COMPLETED | OUTPATIENT
Start: 2018-07-11 | End: 2018-07-11

## 2018-07-11 ENCOUNTER — HOSPITAL ENCOUNTER (INPATIENT)
Facility: HOSPITAL | Age: 63
LOS: 2 days | Discharge: HOME | DRG: 392 | End: 2018-07-13
Attending: EMERGENCY MEDICINE | Admitting: INTERNAL MEDICINE
Payer: COMMERCIAL

## 2018-07-11 ENCOUNTER — APPOINTMENT (EMERGENCY)
Dept: RADIOLOGY | Facility: HOSPITAL | Age: 63
DRG: 392 | End: 2018-07-11
Payer: COMMERCIAL

## 2018-07-11 DIAGNOSIS — A41.9 SEPSIS (HCC): Primary | ICD-10-CM

## 2018-07-11 DIAGNOSIS — G44.59 OTHER COMPLICATED HEADACHE SYNDROME: ICD-10-CM

## 2018-07-11 DIAGNOSIS — R41.0 CONFUSION: ICD-10-CM

## 2018-07-11 DIAGNOSIS — R51.9 HEADACHE: ICD-10-CM

## 2018-07-11 DIAGNOSIS — R53.83 LETHARGY: ICD-10-CM

## 2018-07-11 PROBLEM — R11.2 NAUSEA & VOMITING: Status: ACTIVE | Noted: 2018-07-11

## 2018-07-11 PROBLEM — R65.10 SIRS (SYSTEMIC INFLAMMATORY RESPONSE SYNDROME) (HCC): Status: ACTIVE | Noted: 2018-07-11

## 2018-07-11 LAB
ALBUMIN SERPL BCP-MCNC: 4 G/DL (ref 3.5–5)
ALP SERPL-CCNC: 188 U/L (ref 46–116)
ALT SERPL W P-5'-P-CCNC: 47 U/L (ref 12–78)
ANION GAP SERPL CALCULATED.3IONS-SCNC: 7 MMOL/L (ref 4–13)
ANION GAP SERPL CALCULATED.3IONS-SCNC: 7 MMOL/L (ref 4–13)
AST SERPL W P-5'-P-CCNC: 54 U/L (ref 5–45)
ATRIAL RATE: 104 BPM
BACTERIA UR QL AUTO: ABNORMAL /HPF
BASOPHILS # BLD AUTO: 0 THOUSANDS/ΜL (ref 0–0.1)
BASOPHILS # BLD AUTO: 0.02 THOUSANDS/ΜL (ref 0–0.1)
BASOPHILS NFR BLD AUTO: 0 % (ref 0–1)
BASOPHILS NFR BLD AUTO: 0 % (ref 0–1)
BILIRUB SERPL-MCNC: 0.74 MG/DL (ref 0.2–1)
BILIRUB UR QL STRIP: ABNORMAL
BUN SERPL-MCNC: 15 MG/DL (ref 5–25)
BUN SERPL-MCNC: 17 MG/DL (ref 5–25)
CALCIUM SERPL-MCNC: 8.2 MG/DL (ref 8.3–10.1)
CALCIUM SERPL-MCNC: 9.2 MG/DL (ref 8.3–10.1)
CHLORIDE SERPL-SCNC: 100 MMOL/L (ref 100–108)
CHLORIDE SERPL-SCNC: 106 MMOL/L (ref 100–108)
CLARITY UR: CLEAR
CO2 SERPL-SCNC: 23 MMOL/L (ref 21–32)
CO2 SERPL-SCNC: 27 MMOL/L (ref 21–32)
COLOR UR: ABNORMAL
COLOR, POC: NORMAL
CREAT SERPL-MCNC: 1 MG/DL (ref 0.6–1.3)
CREAT SERPL-MCNC: 1.1 MG/DL (ref 0.6–1.3)
EOSINOPHIL # BLD AUTO: 0 THOUSAND/ΜL (ref 0–0.61)
EOSINOPHIL # BLD AUTO: 0 THOUSAND/ΜL (ref 0–0.61)
EOSINOPHIL NFR BLD AUTO: 0 % (ref 0–6)
EOSINOPHIL NFR BLD AUTO: 0 % (ref 0–6)
ERYTHROCYTE [DISTWIDTH] IN BLOOD BY AUTOMATED COUNT: 13.7 % (ref 11.6–15.1)
ERYTHROCYTE [DISTWIDTH] IN BLOOD BY AUTOMATED COUNT: 13.8 % (ref 11.6–15.1)
GFR SERPL CREATININE-BSD FRML MDRD: 53 ML/MIN/1.73SQ M
GFR SERPL CREATININE-BSD FRML MDRD: 59 ML/MIN/1.73SQ M
GLUCOSE SERPL-MCNC: 129 MG/DL (ref 65–140)
GLUCOSE SERPL-MCNC: 143 MG/DL (ref 65–140)
GLUCOSE UR STRIP-MCNC: NEGATIVE MG/DL
HCT VFR BLD AUTO: 36.1 % (ref 34.8–46.1)
HCT VFR BLD AUTO: 42.4 % (ref 34.8–46.1)
HGB BLD-MCNC: 11.8 G/DL (ref 11.5–15.4)
HGB BLD-MCNC: 13.8 G/DL (ref 11.5–15.4)
HGB UR QL STRIP.AUTO: ABNORMAL
HOLD SPECIMEN: NORMAL
HYALINE CASTS #/AREA URNS LPF: ABNORMAL /LPF
IMM GRANULOCYTES # BLD AUTO: 0.05 THOUSAND/UL (ref 0–0.2)
IMM GRANULOCYTES # BLD AUTO: 0.09 THOUSAND/UL (ref 0–0.2)
IMM GRANULOCYTES NFR BLD AUTO: 0 % (ref 0–2)
IMM GRANULOCYTES NFR BLD AUTO: 1 % (ref 0–2)
KETONES UR STRIP-MCNC: NEGATIVE MG/DL
LACTATE SERPL-SCNC: 0.7 MMOL/L (ref 0.5–2)
LEUKOCYTE ESTERASE UR QL STRIP: NEGATIVE
LIPASE SERPL-CCNC: 530 U/L (ref 73–393)
LYMPHOCYTES # BLD AUTO: 0.72 THOUSANDS/ΜL (ref 0.6–4.47)
LYMPHOCYTES # BLD AUTO: 0.89 THOUSANDS/ΜL (ref 0.6–4.47)
LYMPHOCYTES NFR BLD AUTO: 6 % (ref 14–44)
LYMPHOCYTES NFR BLD AUTO: 6 % (ref 14–44)
MAGNESIUM SERPL-MCNC: 2.2 MG/DL (ref 1.6–2.6)
MCH RBC QN AUTO: 30.2 PG (ref 26.8–34.3)
MCH RBC QN AUTO: 30.4 PG (ref 26.8–34.3)
MCHC RBC AUTO-ENTMCNC: 32.5 G/DL (ref 31.4–37.4)
MCHC RBC AUTO-ENTMCNC: 32.7 G/DL (ref 31.4–37.4)
MCV RBC AUTO: 93 FL (ref 82–98)
MCV RBC AUTO: 93 FL (ref 82–98)
MONOCYTES # BLD AUTO: 0.65 THOUSAND/ΜL (ref 0.17–1.22)
MONOCYTES # BLD AUTO: 1.04 THOUSAND/ΜL (ref 0.17–1.22)
MONOCYTES NFR BLD AUTO: 5 % (ref 4–12)
MONOCYTES NFR BLD AUTO: 7 % (ref 4–12)
NEUTROPHILS # BLD AUTO: 11.13 THOUSANDS/ΜL (ref 1.85–7.62)
NEUTROPHILS # BLD AUTO: 13.02 THOUSANDS/ΜL (ref 1.85–7.62)
NEUTS SEG NFR BLD AUTO: 86 % (ref 43–75)
NEUTS SEG NFR BLD AUTO: 89 % (ref 43–75)
NITRITE UR QL STRIP: NEGATIVE
NON-SQ EPI CELLS URNS QL MICRO: ABNORMAL /HPF
NRBC BLD AUTO-RTO: 0 /100 WBCS
NRBC BLD AUTO-RTO: 0 /100 WBCS
P AXIS: 57 DEGREES
PH UR STRIP.AUTO: 7 [PH] (ref 4.5–8)
PHOSPHATE SERPL-MCNC: 3.2 MG/DL (ref 2.3–4.1)
PLATELET # BLD AUTO: 201 THOUSANDS/UL (ref 149–390)
PLATELET # BLD AUTO: 211 THOUSANDS/UL (ref 149–390)
PMV BLD AUTO: 10.1 FL (ref 8.9–12.7)
PMV BLD AUTO: 9.7 FL (ref 8.9–12.7)
POTASSIUM SERPL-SCNC: 4.2 MMOL/L (ref 3.5–5.3)
POTASSIUM SERPL-SCNC: 4.3 MMOL/L (ref 3.5–5.3)
PR INTERVAL: 170 MS
PROT SERPL-MCNC: 7 G/DL (ref 6.4–8.2)
PROT UR STRIP-MCNC: ABNORMAL MG/DL
QRS AXIS: -30 DEGREES
QRSD INTERVAL: 98 MS
QT INTERVAL: 350 MS
QTC INTERVAL: 460 MS
RBC # BLD AUTO: 3.88 MILLION/UL (ref 3.81–5.12)
RBC # BLD AUTO: 4.57 MILLION/UL (ref 3.81–5.12)
RBC #/AREA URNS AUTO: ABNORMAL /HPF
SODIUM SERPL-SCNC: 134 MMOL/L (ref 136–145)
SODIUM SERPL-SCNC: 136 MMOL/L (ref 136–145)
SP GR UR STRIP.AUTO: 1.02 (ref 1–1.03)
T WAVE AXIS: 28 DEGREES
TROPONIN I SERPL-MCNC: <0.02 NG/ML
UROBILINOGEN UR QL STRIP.AUTO: 1 E.U./DL
VENTRICULAR RATE: 104 BPM
WBC # BLD AUTO: 12.55 THOUSAND/UL (ref 4.31–10.16)
WBC # BLD AUTO: 15.06 THOUSAND/UL (ref 4.31–10.16)
WBC #/AREA URNS AUTO: ABNORMAL /HPF

## 2018-07-11 PROCEDURE — 96375 TX/PRO/DX INJ NEW DRUG ADDON: CPT

## 2018-07-11 PROCEDURE — 99254 IP/OBS CNSLTJ NEW/EST MOD 60: CPT | Performed by: INTERNAL MEDICINE

## 2018-07-11 PROCEDURE — 80053 COMPREHEN METABOLIC PANEL: CPT | Performed by: EMERGENCY MEDICINE

## 2018-07-11 PROCEDURE — 80048 BASIC METABOLIC PNL TOTAL CA: CPT | Performed by: INTERNAL MEDICINE

## 2018-07-11 PROCEDURE — 83735 ASSAY OF MAGNESIUM: CPT | Performed by: INTERNAL MEDICINE

## 2018-07-11 PROCEDURE — 87040 BLOOD CULTURE FOR BACTERIA: CPT | Performed by: EMERGENCY MEDICINE

## 2018-07-11 PROCEDURE — 99223 1ST HOSP IP/OBS HIGH 75: CPT | Performed by: INTERNAL MEDICINE

## 2018-07-11 PROCEDURE — 84484 ASSAY OF TROPONIN QUANT: CPT | Performed by: EMERGENCY MEDICINE

## 2018-07-11 PROCEDURE — 85025 COMPLETE CBC W/AUTO DIFF WBC: CPT | Performed by: INTERNAL MEDICINE

## 2018-07-11 PROCEDURE — 84100 ASSAY OF PHOSPHORUS: CPT | Performed by: INTERNAL MEDICINE

## 2018-07-11 PROCEDURE — 81001 URINALYSIS AUTO W/SCOPE: CPT

## 2018-07-11 PROCEDURE — 87147 CULTURE TYPE IMMUNOLOGIC: CPT | Performed by: EMERGENCY MEDICINE

## 2018-07-11 PROCEDURE — 83690 ASSAY OF LIPASE: CPT | Performed by: EMERGENCY MEDICINE

## 2018-07-11 PROCEDURE — 96374 THER/PROPH/DIAG INJ IV PUSH: CPT

## 2018-07-11 PROCEDURE — 36415 COLL VENOUS BLD VENIPUNCTURE: CPT

## 2018-07-11 PROCEDURE — 36415 COLL VENOUS BLD VENIPUNCTURE: CPT | Performed by: EMERGENCY MEDICINE

## 2018-07-11 PROCEDURE — 93005 ELECTROCARDIOGRAM TRACING: CPT

## 2018-07-11 PROCEDURE — 93010 ELECTROCARDIOGRAM REPORT: CPT | Performed by: INTERNAL MEDICINE

## 2018-07-11 PROCEDURE — 96361 HYDRATE IV INFUSION ADD-ON: CPT

## 2018-07-11 PROCEDURE — 70450 CT HEAD/BRAIN W/O DYE: CPT

## 2018-07-11 PROCEDURE — 85025 COMPLETE CBC W/AUTO DIFF WBC: CPT | Performed by: EMERGENCY MEDICINE

## 2018-07-11 PROCEDURE — 74176 CT ABD & PELVIS W/O CONTRAST: CPT

## 2018-07-11 PROCEDURE — 99285 EMERGENCY DEPT VISIT HI MDM: CPT

## 2018-07-11 PROCEDURE — 36415 COLL VENOUS BLD VENIPUNCTURE: CPT | Performed by: INTERNAL MEDICINE

## 2018-07-11 PROCEDURE — 83605 ASSAY OF LACTIC ACID: CPT | Performed by: EMERGENCY MEDICINE

## 2018-07-11 PROCEDURE — 99222 1ST HOSP IP/OBS MODERATE 55: CPT | Performed by: PSYCHIATRY & NEUROLOGY

## 2018-07-11 PROCEDURE — 71046 X-RAY EXAM CHEST 2 VIEWS: CPT

## 2018-07-11 RX ORDER — METOCLOPRAMIDE HYDROCHLORIDE 5 MG/ML
10 INJECTION INTRAMUSCULAR; INTRAVENOUS ONCE
Status: COMPLETED | OUTPATIENT
Start: 2018-07-11 | End: 2018-07-11

## 2018-07-11 RX ORDER — POLYETHYLENE GLYCOL 3350 17 G/17G
17 POWDER, FOR SOLUTION ORAL DAILY PRN
Status: DISCONTINUED | OUTPATIENT
Start: 2018-07-11 | End: 2018-07-13 | Stop reason: HOSPADM

## 2018-07-11 RX ORDER — DULOXETIN HYDROCHLORIDE 30 MG/1
30 CAPSULE, DELAYED RELEASE ORAL DAILY
Status: DISCONTINUED | OUTPATIENT
Start: 2018-07-11 | End: 2018-07-11

## 2018-07-11 RX ORDER — ACETAMINOPHEN 325 MG/1
650 TABLET ORAL EVERY 6 HOURS PRN
Status: DISCONTINUED | OUTPATIENT
Start: 2018-07-11 | End: 2018-07-13 | Stop reason: HOSPADM

## 2018-07-11 RX ORDER — MAGNESIUM HYDROXIDE/ALUMINUM HYDROXICE/SIMETHICONE 120; 1200; 1200 MG/30ML; MG/30ML; MG/30ML
15 SUSPENSION ORAL EVERY 6 HOURS PRN
Status: DISCONTINUED | OUTPATIENT
Start: 2018-07-11 | End: 2018-07-13 | Stop reason: HOSPADM

## 2018-07-11 RX ORDER — DOCUSATE SODIUM 100 MG/1
100 CAPSULE, LIQUID FILLED ORAL 2 TIMES DAILY PRN
Status: DISCONTINUED | OUTPATIENT
Start: 2018-07-11 | End: 2018-07-13 | Stop reason: HOSPADM

## 2018-07-11 RX ORDER — KETOROLAC TROMETHAMINE 30 MG/ML
15 INJECTION, SOLUTION INTRAMUSCULAR; INTRAVENOUS ONCE
Status: COMPLETED | OUTPATIENT
Start: 2018-07-11 | End: 2018-07-11

## 2018-07-11 RX ORDER — SODIUM CHLORIDE 9 MG/ML
75 INJECTION, SOLUTION INTRAVENOUS CONTINUOUS
Status: DISCONTINUED | OUTPATIENT
Start: 2018-07-11 | End: 2018-07-13 | Stop reason: HOSPADM

## 2018-07-11 RX ORDER — SULFAMETHOXAZOLE AND TRIMETHOPRIM 800; 160 MG/1; MG/1
1 TABLET ORAL 3 TIMES WEEKLY
Status: DISCONTINUED | OUTPATIENT
Start: 2018-07-11 | End: 2018-07-11

## 2018-07-11 RX ORDER — LORAZEPAM 0.5 MG/1
0.5 TABLET ORAL 2 TIMES DAILY PRN
Status: DISCONTINUED | OUTPATIENT
Start: 2018-07-11 | End: 2018-07-13 | Stop reason: HOSPADM

## 2018-07-11 RX ORDER — OXYCODONE HYDROCHLORIDE 5 MG/1
15 TABLET ORAL EVERY 6 HOURS PRN
Status: DISCONTINUED | OUTPATIENT
Start: 2018-07-11 | End: 2018-07-13 | Stop reason: HOSPADM

## 2018-07-11 RX ORDER — OXYBUTYNIN CHLORIDE 5 MG/1
5 TABLET, EXTENDED RELEASE ORAL DAILY
Status: DISCONTINUED | OUTPATIENT
Start: 2018-07-11 | End: 2018-07-13 | Stop reason: HOSPADM

## 2018-07-11 RX ORDER — TOLTERODINE 2 MG/1
2 CAPSULE, EXTENDED RELEASE ORAL DAILY
Status: DISCONTINUED | OUTPATIENT
Start: 2018-07-11 | End: 2018-07-11 | Stop reason: CLARIF

## 2018-07-11 RX ORDER — ONDANSETRON 2 MG/ML
4 INJECTION INTRAMUSCULAR; INTRAVENOUS EVERY 6 HOURS PRN
Status: DISCONTINUED | OUTPATIENT
Start: 2018-07-11 | End: 2018-07-12

## 2018-07-11 RX ORDER — DULOXETIN HYDROCHLORIDE 60 MG/1
60 CAPSULE, DELAYED RELEASE ORAL DAILY
Status: DISCONTINUED | OUTPATIENT
Start: 2018-07-11 | End: 2018-07-13 | Stop reason: HOSPADM

## 2018-07-11 RX ORDER — PANTOPRAZOLE SODIUM 20 MG/1
20 TABLET, DELAYED RELEASE ORAL
Status: DISCONTINUED | OUTPATIENT
Start: 2018-07-11 | End: 2018-07-13 | Stop reason: HOSPADM

## 2018-07-11 RX ORDER — B-COMPLEX WITH VITAMIN C
2 TABLET ORAL
Status: DISCONTINUED | OUTPATIENT
Start: 2018-07-11 | End: 2018-07-13 | Stop reason: HOSPADM

## 2018-07-11 RX ORDER — OXYCODONE HCL 20 MG/1
60 TABLET, FILM COATED, EXTENDED RELEASE ORAL EVERY 12 HOURS SCHEDULED
Status: DISCONTINUED | OUTPATIENT
Start: 2018-07-11 | End: 2018-07-13 | Stop reason: HOSPADM

## 2018-07-11 RX ADMIN — OXYCODONE HYDROCHLORIDE 60 MG: 20 TABLET, FILM COATED, EXTENDED RELEASE ORAL at 20:40

## 2018-07-11 RX ADMIN — OXYCODONE HYDROCHLORIDE 60 MG: 20 TABLET, FILM COATED, EXTENDED RELEASE ORAL at 08:10

## 2018-07-11 RX ADMIN — ALUMINUM HYDROXIDE, MAGNESIUM HYDROXIDE, AND SIMETHICONE 15 ML: 200; 200; 20 SUSPENSION ORAL at 15:39

## 2018-07-11 RX ADMIN — SODIUM CHLORIDE 500 ML: 0.9 INJECTION, SOLUTION INTRAVENOUS at 05:53

## 2018-07-11 RX ADMIN — KETOROLAC TROMETHAMINE 15 MG: 30 INJECTION, SOLUTION INTRAMUSCULAR at 03:06

## 2018-07-11 RX ADMIN — SODIUM CHLORIDE 1000 ML: 0.9 INJECTION, SOLUTION INTRAVENOUS at 02:42

## 2018-07-11 RX ADMIN — Medication 1 TABLET: at 09:49

## 2018-07-11 RX ADMIN — OXYBUTYNIN CHLORIDE 5 MG: 5 TABLET, EXTENDED RELEASE ORAL at 21:01

## 2018-07-11 RX ADMIN — ONDANSETRON 4 MG: 2 INJECTION, SOLUTION INTRAMUSCULAR; INTRAVENOUS at 00:51

## 2018-07-11 RX ADMIN — DULOXETINE HYDROCHLORIDE 60 MG: 60 CAPSULE, DELAYED RELEASE ORAL at 09:48

## 2018-07-11 RX ADMIN — SODIUM CHLORIDE 500 ML: 0.9 INJECTION, SOLUTION INTRAVENOUS at 00:52

## 2018-07-11 RX ADMIN — METOCLOPRAMIDE 10 MG: 5 INJECTION, SOLUTION INTRAMUSCULAR; INTRAVENOUS at 03:09

## 2018-07-11 RX ADMIN — OXYCODONE HYDROCHLORIDE 15 MG: 5 TABLET ORAL at 15:40

## 2018-07-11 RX ADMIN — PANTOPRAZOLE SODIUM 20 MG: 20 TABLET, DELAYED RELEASE ORAL at 08:10

## 2018-07-11 RX ADMIN — OXYCODONE HYDROCHLORIDE 15 MG: 5 TABLET ORAL at 09:53

## 2018-07-11 RX ADMIN — ENOXAPARIN SODIUM 40 MG: 100 INJECTION SUBCUTANEOUS at 08:10

## 2018-07-11 RX ADMIN — ACETAMINOPHEN 650 MG: 325 TABLET, FILM COATED ORAL at 22:11

## 2018-07-11 RX ADMIN — CEFEPIME HYDROCHLORIDE 2000 MG: 2 INJECTION, POWDER, FOR SOLUTION INTRAVENOUS at 03:27

## 2018-07-11 RX ADMIN — SULFAMETHOXAZOLE AND TRIMETHOPRIM 1 TABLET: 800; 160 TABLET ORAL at 08:21

## 2018-07-11 RX ADMIN — VANCOMYCIN HYDROCHLORIDE 1500 MG: 1 INJECTION, POWDER, LYOPHILIZED, FOR SOLUTION INTRAVENOUS at 04:11

## 2018-07-11 RX ADMIN — ALUMINUM HYDROXIDE, MAGNESIUM HYDROXIDE, AND SIMETHICONE 15 ML: 200; 200; 20 SUSPENSION ORAL at 20:45

## 2018-07-11 NOTE — ASSESSMENT & PLAN NOTE
· SIRS POA   · Low grade temp likely 2/2 nonspecific viral syndrome   · UA benign, CXR and CT a/p without source for infection   · Low suspicion for meningitis, appreciate neuro input   · blood cultures 1/2 prelim positive for GPC in cluster - follow up, d/w ID  · PCT normal   · appreciate ID input, stable off abx

## 2018-07-11 NOTE — ASSESSMENT & PLAN NOTE
· Resolved tolerating reg diet   · Supportive care   · Possible 2/2 nonspecific viral syndrome   · Can recheck lipase

## 2018-07-11 NOTE — ASSESSMENT & PLAN NOTE
Concern for meningitis  Infectious Disease consult neurology consult continue ceftriaxone with vancomycin  Unfortunately, patient refuses to have spinal tap  But will observe for next few days  Will also get urinalysis

## 2018-07-11 NOTE — CASE MANAGEMENT
Initial Clinical Review    Admission: Date/Time/Statement: 7/11/18 @ 0333     Orders Placed This Encounter   Procedures    Inpatient Admission     Standing Status:   Standing     Number of Occurrences:   1     Order Specific Question:   Admitting Physician     Answer:   Edelmira Cross [[id number]]     Order Specific Question:   Level of Care     Answer:   Med Surg [[id number]]     Order Specific Question:   Estimated length of stay     Answer:   More than 2 Midnights     Order Specific Question:   Certification     Answer:   I certify that inpatient services are medically necessary for this patient for a duration of greater than two midnights  See H&P and MD Progress Notes for additional information about the patient's course of treatment  ED: Date/Time/Mode of Arrival:   ED Arrival Information     Expected Arrival Acuity Means of Arrival Escorted By Service Admission Type    - 7/10/2018 23:50 Emergent Wheelchair Spouse General Medicine Emergency    Arrival Complaint    vomiting        Chief Complaint:   Chief Complaint   Patient presents with    Vomiting     pt reports acute onset of nausea and vomiting tonight, fever, no abdominal pain     History of Illness: Eden Wright is a 70 y o  female who has a past medical history significant for ovarian cancer status post therapy and currently is doing well in that respect  Likewise she has morbid obesity, chronic pain, depression  She is on multiple medications for the management of pain including oxycodone and OxyContin with the use of Cymbalta  Patient claims that she was not eating for the past day or so  However this evening was experiencing headache more in the frontal area with note of photophobia  She denies any neck stiffness  Initially she was lethargic although she could answer appropriately and is oriented x3  However according to ER resident she would also drift back to sleep  No note of any fever or cough or cold  No open sores    Patient is currently receiving some fluids with note of improvement of mental status  However due to the patient's refusal to have a spinal tap done, the concern was for meningitis and therefore was started on cefepime with vancomycin  The patient also had an episode of vomiting and nausea however currently she is feeling much better  ED Vital Signs:   ED Triage Vitals [07/10/18 2355]   Temperature Pulse Respirations Blood Pressure SpO2   100 °F (37 8 °C) (!) 107 22 (!) 180/84 93 %      Temp Source Heart Rate Source Patient Position - Orthostatic VS BP Location FiO2 (%)   Tympanic Monitor Sitting Left arm --      Pain Score       No Pain        Wt Readings from Last 1 Encounters:   07/11/18 111 kg (245 lb 6 oz)     Vital Signs (abnormal):     07/11/18 0126  --  104   23  149/65  91 %  None (Room air)  Lying   07/10/18 2355  100 °F (37 8 °C)   107  22   180/84  93 %  None (Room air)  Sitting     Abnormal Labs:    Na 134  Glucose 143  Sean 8 2  AST 54  Alk phos 188  Lipase 530  Trop WNL   WBC 15 06, 12 55  Blood cultures    07/11/18 0400   Color, UA Fani    Clarity, UA Clear    pH, UA 7 0    Leukocytes, UA Negative    Nitrite, UA Negative    Protein, UA Trace     Glucose, UA Negative    Ketones, UA Negative    Urobilinogen, UA 1 0    Bilirubin, UA Interference- unable to analyze     Blood, UA Trace     Specific [place], UA 1 020      Diagnostic Test Results:     7/11 CT abd, pelvis - Previously seen large area of ischemia/luxation, infection, and abscess formation in the left lower quadrant anterior abdominal wall has resolved  Unchanged postsurgical changes right hip persistent severe right hip arthritic changes  Fatty liver  Splenic cyst unchanged  Diverticulosis  No evidence for diverticulitis  IVC are unchanged      ED Treatment:   Medication Administration from 07/10/2018 2350 to 07/11/2018 0858    Date/Time Order Dose Route Action   07/11/2018 0051 ondansetron (ZOFRAN) injection 4 mg 4 mg Intravenous Given   07/11/2018 0946 sodium chloride 0 9 % bolus 500 mL 500 mL Intravenous New Bag   07/11/2018 0242 sodium chloride 0 9 % bolus 1,000 mL 1,000 mL Intravenous New Bag   07/11/2018 0306 ketorolac (TORADOL) injection 15 mg 15 mg Intravenous Given   07/11/2018 0309 metoclopramide (REGLAN) injection 10 mg 10 mg Intravenous Given   07/11/2018 0411 vancomycin (VANCOCIN) 1,500 mg in sodium chloride 0 9 % 250 mL IVPB 1,500 mg Intravenous New Bag   07/11/2018 0327 cefepime (MAXIPIME) 2 g/50 mL dextrose IVPB 2,000 mg Intravenous New Bag   07/11/2018 0810 pantoprazole (PROTONIX) EC tablet 20 mg 20 mg Oral Given   07/11/2018 0810 oxyCODONE (OxyCONTIN) 12 hr tablet 60 mg 60 mg Oral Given   07/11/2018 0821 sulfamethoxazole-trimethoprim (BACTRIM DS) 800-160 mg per tablet 1 tablet 1 tablet Oral Given   07/11/2018 0810 enoxaparin (LOVENOX) subcutaneous injection 40 mg 40 mg Subcutaneous Given   07/11/2018 0553 sodium chloride 0 9 % bolus 500 mL 500 mL Intravenous New Bag        Past Medical/Surgical History:     Diagnosis    Wegeners granulomatosis (HCC)    Charcot's joint    Anxiety    Benign essential hypertension    BRCA1 positive    Cancer of ovary (HCC)    Chronic kidney disease, stage III (moderate)    Chronic pain disorder    Class 2 obesity    Depression    Dyslipidemia    Esophageal reflux    Hypertension    Opiate analgesic use agreement exists    Ovarian [facility] ([facility] [address] )    Post-traumatic osteoarthritis    Increased frequency of urination       Diagnosis    Right foot pain    Anemia of chronic disease    Closed right tibial fracture    Abdominal mass, LLQ (left lower quadrant)    Abnormal mammogram    Anxiety state    Breast mass    Closed nondisplaced fracture of shaft of left clavicle    Edema    Lymphedema    Multiple joint pain    Pain of left clavicle    Stasis dermatitis    Urinary urgency     Diagnosis    Cancer (HCC)    Lymphedema     Admitting Diagnosis: Confusion [R41 0]  Vomiting [R11 10]  Lethargy [Y45 00]  Other complicated headache syndrome [G44 59]  Sepsis ([address] ) [A41 9]  Headache [R51]    Age/Sex: 70 y o  female    Assessment/Plan:   * Lethargy   Assessment & Plan     Concern for meningitis  Infectious Disease consult neurology consult continue ceftriaxone with vancomycin  Unfortunately, patient refuses to have spinal tap  But will observe for next few days  Will also get urinalysis         Other complicated headache syndrome   Assessment & Plan     Headache much better  Was given Toradol and metoclopramide         Chronic pain disorder   Assessment & Plan     While patient is on chronic opioids for control of pain especially secondary to malignancy; it is possible that her lethargy might be secondary to mild dehydration complicated by the opioid medication use  Patient will receive fluids  Assess mental status neurologic function         Cancer of ovary [facility])   Assessment & Plan     Currently this is stable  Patient's latest CT scan of the abdomen revealed diverticulosis  This is without any infection or abscess formation or any acute findings            VTE Prophylaxis: Enoxaparin (Lovenox)  / sequential compression device   Code Status: Prior full Code as discussed with patient  Anticipated Length of Stay:  Patient will be admitted on an Inpatient basis with an anticipated length of stay of  greater than 2 midnights     Justification for Hospital Stay: Please see detailed plans noted above      Admission Orders:  Scheduled Meds:   Current Facility-Administered Medications:  acetaminophen 650 mg Oral Q6H PRN    aluminum-magnesium hydroxide-simethicone 15 mL Oral Q6H PRN    calcium carbonate-vitamin D 2 tablet Oral Daily With Breakfast    docusate sodium 100 mg Oral BID PRN    DULoxetine 60 mg Oral Daily    enoxaparin 40 mg Subcutaneous Daily    LORazepam 0 5 mg Oral BID PRN    multivitamin-minerals 1 tablet Oral Daily    ondansetron 4 mg Intravenous Q6H PRN    oxyCODONE 60 mg Oral Q12H [address] oxyCODONE 15 mg Oral Q6H PRN    pantoprazole 20 mg Oral Early Morning    polyethylene glycol 17 g Oral Daily PRN    sodium chloride 75 mL/hr Intravenous Continuous Last Rate: Stopped (07/11/18 0819)   sulfamethoxazole-trimethoprim 1 tablet Oral Once per day on Mon Wed Fri    tolterodine 2 mg Oral Daily      Continuous Infusions:   sodium chloride 75 mL/hr Last Rate: Stopped (07/11/18 0819)     PRN Meds:   acetaminophen    aluminum-magnesium hydroxide-simethicone    docusate sodium    LORazepam    ondansetron    oxyCODONE    polyethylene glycol    Neuro checks q 4 hr   OOB as phyllis   Daily wt   SCDs  Diet cardiac step 1, 2 Gm Na   Cons Neuro   Cons ID   Contact isolation   ______________________ 7/11 Neuro Consult   1  Patient presents with complaints of headache, nausea vomiting, low-grade temperature, now improved  She did have an elevated white count at 15,000, now 12  She had no neck pain or other evidence of meningeal irritation  I think likelihood of meningitis is quite low  She appears nontoxic  Her headache may have been nonspecific, associated with viral or other GI process (increased lipase, consider pancreatitis)  2  History of Wegener's, in remission, no longer immunosuppressed (can Bactrim be discontinued?)     Plan:  1  Infectious Disease consult is pending  I would await their opinion, but would otherwise recommend continuing off antibiotics and observe  2   At present I do not think there is a strong indication to pursue lumbar puncture  3  Can continue utilize analgesics p r n    4   No need for additional neuro imaging-no additional neurologic recommendations  ___________________  7/11 ID Consult   1  SIRS, tachycardia, leukocytosis  T-max of a 100 0°  No high documented fevers  Lactic acid was normal  Unclear etiology  No overt evidence of acute bacterial infection  Patient's symptoms have already resolved and she has no focal complaints at this time    Her WBC count is trending down  Consider leukemoid reaction in the setting of recent vomiting episode and headache  She remains hemodynamically stable, nontoxic  CT head and abdomen with no evidence of acute infection  No clinical or radiographic evidence of pneumonia  Urinalysis without evidence of infection      -monitor off antibiotics  -monitor temperatures  -check CBC in a m   -follow up pending blood cultures     2  Acute onset headache, nausea, episode of vomiting  These symptoms have resolved  Patient refused lumbar puncture in the ED  No meningeal signs on exam   Neurology evaluation noted and they have a very low suspicion for bacterial meningitis, as do I  She is completely nontoxic  CT head and abdomen without any evidence of acute infection  Consideration for viral gastroenteritis with rapid resolution of symptoms  At this point, I would continue to monitor closely off antibiotics as no clear evidence acute bacterial infection      -monitor off antibiotics  -monitor symptoms closely     3   Elevated lipase  May be secondary to acute gastroenteritis  Abdominal exam is benign  CT abdomen with no acute pathology noted  Patient's symptoms have improved      -trend lipase for now  -serial abdominal exams     4  History of Juan's granulomatosis  Patient not recently been on any immunosuppressive agents or steroids  Currently no indication to continue on prophylactic Bactrim as patient is not on immunosuppressive medication and her disease is felt to be in remission      5   Lethargy  As stated above, no clinical evidence of infection  Consideration for polypharmacy as a contributing factor  Mental status has improved and appears to be back to baseline      -try to limit sedating medications as able  -monitor mental status     Antibiotics:  none    Thank you,  Shankar Garyq  [facility] Utilization Review Department  Phone: [phone];  Fax [phone]  ATTENTION: The Network Utilization Review Department is now centralized for our 9 Facilities  Make a note that we have a new phone and fax numbers for our Department  Please call with any questions or concerns to [phone] and carefully follow the prompts so that you are directed to the right person  All voicemails are confidential  Fax any determinations, approvals, denials, and requests for initial or continue stay review clinical to [phone]  Due to HIGH CALL volume, it would be easier if you could please send faxed requests to expedite your requests and in part, help us provide discharge notifications faster

## 2018-07-11 NOTE — ASSESSMENT & PLAN NOTE
· Follows renal and rheum outpatient  · rituximab on hold 2/2 depressed immunoglobulins   · Can d/c daily bactrim suppression as patients disease is in remission

## 2018-07-11 NOTE — CONSULTS
Consultation - Infectious Disease   Megan Nevarez 70 y o  female MRN: [id number]  Unit/Bed#: -01 Encounter: [id number]      IMPRESSION & RECOMMENDATIONS:   Impression/Recommendations:  1  SIRS, tachycardia, leukocytosis  T-max of a 100 0°  No high documented fevers  Lactic acid was normal  Unclear etiology  No overt evidence of acute bacterial infection  Patient's symptoms have already resolved and she has no focal complaints at this time  Her WBC count is trending down  Consider leukemoid reaction in the setting of recent vomiting episode and headache  She remains hemodynamically stable, nontoxic  CT head and abdomen with no evidence of acute infection  No clinical or radiographic evidence of pneumonia  Urinalysis without evidence of infection  -monitor off antibiotics  -monitor temperatures  -check CBC in a m   -follow up pending blood cultures    2  Acute onset headache, nausea, episode of vomiting  These symptoms have resolved  Patient refused lumbar puncture in the ED  No meningeal signs on exam   Neurology evaluation noted and they have a very low suspicion for bacterial meningitis, as do I  She is completely nontoxic  CT head and abdomen without any evidence of acute infection  Consideration for viral gastroenteritis with rapid resolution of symptoms  At this point, I would continue to monitor closely off antibiotics as no clear evidence acute bacterial infection  -monitor off antibiotics  -monitor symptoms closely    3  Elevated lipase  May be secondary to acute gastroenteritis  Abdominal exam is benign  CT abdomen with no acute pathology noted  Patient's symptoms have improved  -trend lipase for now  -serial abdominal exams    4  History of Juan's granulomatosis  Patient not recently been on any immunosuppressive agents or steroids    Currently no indication to continue on prophylactic Bactrim as patient is not on immunosuppressive medication and her disease is felt to be in remission  5   Lethargy  As stated above, no clinical evidence of infection  Consideration for polypharmacy as a contributing factor  Mental status has improved and appears to be back to baseline     -try to limit sedating medications as able  -monitor mental status    Antibiotics:  none    Thank you for this consultation  We will follow along with you  HISTORY OF PRESENT ILLNESS:  Reason for Consult:  Concern for meningitis    HPI: Merced Lema is a 70 y o  female with history of Juan's, CKD, BRCA1 positive, ovarian cancer, obesity who was admitted on 07/11/2018 due to acute onset fatigue, headache, nausea  Patient did appear somewhat confused  Her  who was in nurse was concerned for possible developing infection and brought her to the ER  Outside of the ER, patient had an episode of emesis  In the ED, she was noted to have a temperature of a 100° with associated tachycardia  She also had an elevated WBC count of 15  Due to complain of nausea and headache, a lumbar puncture was recommended but the patient refused  A CT head was negative  CT abdomen was negative  Patient was started empirically on vancomycin and cefepime due to possible meningitis which could not be ruled out as patient refused lumbar puncture  Today, patient states her symptoms have completely resolved  She denies any fatigue, fevers, chills, nausea, vomiting, diarrhea, abdominal pain, chest pain, new rashes, joint pain  REVIEW OF SYSTEMS:  A complete system-based review of systems is otherwise negative      PAST MEDICAL HISTORY:  Past Medical History:   Diagnosis Date    Cancer ([address] )     ovarian Ca with chemo    Lymphedema      Past Surgical History:   Procedure Laterality Date    APPENDECTOMY      HYSTERECTOMY      OTHER SURGICAL HISTORY      right lower extremity due to trauma    VA TREAT TIBIAL SHAFT FX, INTRAMED IMPLANT Right 9/6/2017    Procedure: INSERTION NAIL IM TIBIA;  Surgeon: Harriet Huffman MD [place];  Location: [facility];  Service: Orthopedics       FAMILY HISTORY:  Non-contributory    SOCIAL HISTORY:  History   Alcohol Use No     History   Drug Use No     History   Smoking Status    Never Smoker   Smokeless Tobacco    Never Used       ALLERGIES:  Allergies   Allergen Reactions    Iodinated Diagnostic Agents Shortness Of Breath    Omnipaque [Iohexol] Shortness Of Breath    Other Shortness Of Breath     IVP dye, x ray dye 3    Iodides      Other reaction(s): SWELLING, SOB    Methotrexate Nausea Only     Headache and nausea    Methotrexate Derivatives Headache       MEDICATIONS:  All current active medications have been reviewed  PHYSICAL EXAM:  Vitals:  HR:  [] 88  Resp:  [15-23] 18  BP: (109-180)/(57-84) 134/62  SpO2:  [91 %-96 %] 96 %  Temp (24hrs), Av 4 °F (37 4 °C), Min:98 7 °F (37 1 °C), Max:100 °F (37 8 °C)  Current: Temperature: 98 7 °F (37 1 °C)     Physical Exam:  General:  No acute distress, awake and alert  Eyes:  Conjunctive clear with no hemorrhages or effusions  Oropharynx:  No ulcers, no lesions  Neck:  Supple, no lymphadenopathy  Lungs:  Clear to auscultation bilaterally, no accessory muscle use  Cardiac:  Regular rate and rhythm, no murmurs  Abdomen:  Soft, non-tender, non-distended, morbidly obese, no rigidity or guarding  Extremities:  Bilateral lower extremities wrapped  Skin:  No rashes, no ulcers  Neurological:  Moves all four extremities spontaneously, sensation grossly intact    LABS, IMAGING, & OTHER STUDIES:  Lab Results:  I have personally reviewed pertinent labs      Results from last 7 days  Lab Units 18  0653 18  0049   SODIUM mmol/L 136 134*   POTASSIUM mmol/L 4 3 4 2   CHLORIDE mmol/L 106 100   CO2 mmol/L 23 27   ANION GAP mmol/L 7 7   BUN mg/dL 15 17   CREATININE mg/dL 1 00 1 10   EGFR ml/min/1 73sq m 59 53   GLUCOSE RANDOM mg/dL 143* 129   CALCIUM mg/dL 8 2* 9 2   AST U/L  --  54*   ALT U/L  --  47   ALK PHOS U/L  --  188*   TOTAL PROTEIN g/dL  --  7 0   BILIRUBIN TOTAL mg/dL  --  0 74       Results from last 7 days  Lab Units 07/11/18  0726 07/11/18  0049   WBC Thousand/uL 12 55* 15 06*   HEMOGLOBIN g/dL 11 8 13 8   PLATELETS Thousands/uL 201 211         Imaging Studies:   I have personally reviewed pertinent imaging study reports and images in PACS  Chest x-ray shows no active pulmonary disease  CT head was negative    CT abdomen/pelvis negative for any bowel obstruction  Previous intra-abdominal abscess has resolved  No acute infection  EKG, Pathology, and Other Studies:   I have personally reviewed pertinent reports

## 2018-07-11 NOTE — ASSESSMENT & PLAN NOTE
Currently this is stable  Patient's latest CT scan of the abdomen revealed diverticulosis  This is without any infection or abscess formation or any acute findings

## 2018-07-11 NOTE — DISCHARGE SUMMARY
Discharge- Shelley Damon 1955, 70 y o  female MRN: [id number]  Unit/Bed#: -01 Encounter: [id number] DOS: 7/12/18  Primary Care Provider: Deejay Cox DO   Date and time admitted to hospital: 7/11/2018 12:12 AM    SIRS (systemic inflammatory response syndrome) (HCC)   Assessment & Plan    · SIRS POA   · Low grade temp likely 2/2 nonspecific viral syndrome   · UA benign, CXR and CT a/p without source for infection   · Low suspicion for meningitis, appreciate neuro input   · blood cultures 1/2 positive for staph coag neg - d/w ID, likely skin contaminant  No further workup     · appreciate ID input, stable off abx         Nausea & vomiting   Assessment & Plan    · Resolved tolerating reg diet   · Possible 2/2 nonspecific viral syndrome         Wegeners granulomatosis (HCC)   Assessment & Plan    · Follows renal and rheum outpatient  · rituximab on hold 2/2 depressed immunoglobulins   · Can d/c daily bactrim suppression as patients disease is in remission        Lymphedema   Assessment & Plan    · Lasix 40mg BID PRN         Chronic pain disorder   Assessment & Plan    · Patient with continuous opioid dependence 2/2 chronic pain, cancer related pain  · Consider patients lethargy on admission might be 2/2 mild dehydration complicated by the opioid medication use  · Continue cymbalta         Chronic kidney disease, stage III (moderate)   Assessment & Plan    · Creatinine stable   · outpatient follow up with renal         Cancer of ovary [facility])   Assessment & Plan    · Stable, f/u with Dr Deborah Austin and specialists at [address] [place] [address]  Physician / Practitioner: Jody Hi PA-C  PCP: Deejay Cox DO  Admission Date:   Admission Orders     Ordered        07/11/18 0340  Inpatient Admission  Once             Discharge Date: 07/13/18    Resolved Problems  Date Reviewed: 7/12/2018    None        Consultations During Hospital Stay:  · Neurology  · Infectious disease     Procedures Performed: · none    Significant Findings / Test Results:   · CT head- No acute intracranial abnormality  · CT a/p- No bowel obstruction  Previously seen large area of ischemia/luxation, infection, and abscess formation in the left lower quadrant anterior abdominal wall has resolved  Unchanged postsurgical changes right hip persistent severe right hip arthritic changes  Fatty liver  Splenic cyst unchanged  Diverticulosis  No evidence for diverticulitis  IVC are unchanged  · CXR- No active pulmonary disease on examination which is somewhat limited secondary to low lung volumes  · SIRS   · Bl cultures 1/2 positive for staph coag negative   · Leukocytosis   · lipasemia     Incidental Findings:   · None     Test Results Pending at Discharge (will require follow up): · None      Outpatient Tests Requested:  · Follow up with PCP 2-4 weeks     Complications:  None     Reason for Admission: headache, vomiting     Hospital Course:     Aundrea Figueroa is a 70 y o  female patient with past medical history significant for hypertension, history of ovarian cancer, history of Wegener's vasculitis, chronic kidney disease, who originally presented to the hospital on 7/11/2018 due to headache, vomiting and low-grade fevers  Patient was also lethargic and weak appearing  There was concern for meningitis and patient was started on vancomycin and ceftriaxone prophylactically after refusing lumbar puncture  CT of the head was negative for acute intracranial abnormality  She also underwent chest x-ray and CT abdomen and pelvis without etiology for low-grade fever and her symptoms  Patient was given IV fluid hydration in addition to antibiotics and she was evaluated by Neurology and Infectious Disease  Patient is on chronic suppression with Bactrim  but currently her disease is in remission and monitored off rituxan, and Bactrim was discontinued    There was low suspicion for meningitis and Neurology and Infectious Disease felt patient could be safely monitored off antibiotics  One out of 2 blood cultures was positive for staph coag neg and likely represents skin contamination  Noted the patient has continuous opioid dependence secondary to chronic pain syndrome and this could be a source for her lethargy in the setting of dehydration  Her nausea and vomiting resolved and she tolerated regular diet prior to discharge  Blood cultures were negative  She is medically stable for discharge home today with close outpatient follow-up  Patient expressed understanding and agreed plan  Please see above list of diagnoses and related plan for additional information  Condition at Discharge: stable     Discharge Day Visit / Exam:     Subjective:  Pt feels better  No new complaints  No fevers or chills  Vitals: Blood Pressure: 160/80 (07/13/18 0803)  Pulse: 76 (07/13/18 0803)  Temperature: 97 7 °F (36 5 °C) (07/13/18 0803)  Temp Source: Axillary (07/13/18 0803)  Respirations: 18 (07/13/18 0803)  Height: 5' 6" (167 6 cm) (07/11/18 0913)  Weight - Scale: 110 kg (243 lb 9 7 oz) (07/13/18 0632)  SpO2: 97 % (07/13/18 0803)    Exam:   Physical Exam   Constitutional: She is oriented to person, place, and time  She appears well-developed and well-nourished  HENT:   Head: Normocephalic and atraumatic  Mouth/Throat: Oropharynx is clear and moist    Eyes: EOM are normal  No scleral icterus  Neck: Normal range of motion  Neck supple  Cardiovascular: Normal rate, regular rhythm and normal heart sounds  No murmur heard  Pulmonary/Chest: Effort normal and breath sounds normal    Abdominal: Soft  Bowel sounds are normal    Musculoskeletal: Normal range of motion  She exhibits edema  Neurological: She is alert and oriented to person, place, and time  Skin: Skin is warm and dry  Psychiatric: She has a normal mood and affect  Nursing note and vitals reviewed       Discussion with Family:      Discharge instructions/Information to patient and family:   See after visit summary for information provided to patient and family  Provisions for Follow-Up Care:  See after visit summary for information related to follow-up care and any pertinent home health orders  Disposition:     Home    For Discharges to [facility] SNF:   · Not Applicable to this Patient - Not Applicable to this Patient    Planned Readmission: non     Discharge Statement:  I spent 35 minutes discharging the patient  This time was spent on the day of discharge  I had direct contact with the patient on the day of discharge  Greater than 50% of the total time was spent examining patient, answering all patient questions, arranging and discussing plan of care with patient as well as directly providing post-discharge instructions  Additional time then spent on discharge activities  Discharge Medications:  See after visit summary for reconciled discharge medications provided to patient and family        ** Please Note: This note has been constructed using a voice recognition system **

## 2018-07-11 NOTE — SEPSIS NOTE
Sepsis Note   Himanshu Mathews 70 y o  female MRN: [id number]  Unit/Bed#: ED 25 Encounter: [id number]            Initial Sepsis Screening     Row Name 07/11/18 0100                Is the patient's history suggestive of a new or worsening infection? (!)  Yes (Proceed)  -AD        Suspected source of infection meningitis  -AD        Are two or more of the following signs & symptoms of infection both present and new to the patient? (!)  Yes (Proceed)  -AD        Indicate SIRS criteria Altered mental status; Tachycardia > 90 bpm;Tachypnea > 20 resp per min;Leukocytosis (WBC > [address] IJL)  -AD        If the answer is yes to both questions, suspicion of sepsis is present  --        If severe sepsis is present AND tissue hypoperfusion perists in the hour after fluid resuscitation or lactate > 4, the patient meets criteria for SEPTIC SHOCK  --        Are any of the following organ dysfunction criteria present within 6 hours of suspected infection and SIRS criteria that are NOT considered to be chronic conditions? No  -AD        Organ dysfunction  --        Date of presentation of severe sepsis  --        Time of presentation of severe sepsis  --        Tissue hypoperfusion persists in the hour after crystalloid fluid administration, evidenced, by either:  --        Was hypotension present within one hour of the conclusion of crystalloid fluid administration?  No  -AD        Date of presentation of septic shock  --        Time of presentation of septic shock  --          User Key  (r) = Recorded By, (t) = Taken By, (c) = Cosigned By    [address] Name Provider Type    AD Sabrina Thao MD Resident

## 2018-07-11 NOTE — PROGRESS NOTES
Progress Note - Fernanda Oro 1955, 70 y o  female MRN: [id number]  Unit/Bed#: -[facility] Encounter: [id number] DOS: 7/11/18   Primary Care Provider: Virginia Cloon DO   Date and time admitted to hospital: 7/11/2018 12:12 AM    SIRS (systemic inflammatory response syndrome) (HCC)   Assessment & Plan    · SIRS POA   · Low grade temp likely nonspecific viral syndrome   · UA benign   · CXR and CT a/p without source for infection   · Low suspicion for meningitis, appreciate neuro input   · F/u bl cultures   · Check PCT   · appreciate ID input, monitor off abx         Nausea & vomiting   Assessment & Plan    · Resolved tolerating reg diet   · Supportive care   · Possible 2/2 nonspecific viral syndrome   · Can recheck lipase         Wegeners granulomatosis ([address] )   Assessment & Plan    · Follows renal and rheum  · rituximab on hold 2/2 depressed immunoglobulins   · Can d/c daily bactrim suppression         Lymphedema   Assessment & Plan    · Lasix 40mg BID PRN         Chronic pain disorder   Assessment & Plan    · While patient is on chronic opioids for control of pain especially secondary to malignancy; it is possible that her lethargy might be secondary to mild dehydration complicated by the opioid medication use  · Continue cymbalta         Chronic kidney disease, stage III (moderate)   Assessment & Plan    · Creatinine stable   · outpatient follow up with renal         Cancer of ovary (HCC)   Assessment & Plan    · Stable           VTE Pharmacologic Prophylaxis:   Pharmacologic: Enoxaparin (Lovenox)  Mechanical VTE Prophylaxis in Place: Yes    Patient Centered Rounds: I have performed bedside rounds with nursing staff today  Discussions with Specialists or Other Care Team Provider: nursing, cm     Education and Discussions with Family / Patient: patient, declined call to      Time Spent for Care: 30 minutes    More than 50% of total time spent on counseling and coordination of care as described above     Current Length of Stay: 0 day(s)    Current Patient Status: Inpatient   Certification Statement: The patient will continue to require additional inpatient hospital stay due to monitor closely off antiobitcs, sirs vs sepsis, pending blood cultures, monitor PO intake given recent vomiting, hydration     Discharge Plan / Estimated Discharge Date: pending, likely d/c tomorrow morning if stable     Code Status: Level 1 - Full Code      Subjective: Tolerated some lunch, no further vomiting  Headache improved  No diarrhea  Objective:     Vitals:   Temp (24hrs), Av 4 °F (37 4 °C), Min:98 7 °F (37 1 °C), Max:100 °F (37 8 °C)    HR:  [] 88  Resp:  [15-23] 18  BP: (109-180)/(57-84) 134/62  SpO2:  [91 %-96 %] 96 %  Body mass index is 39 6 kg/m²  Input and Output Summary (last 24 hours): Intake/Output Summary (Last 24 hours) at 18 1429  Last data filed at 18 1200   Gross per 24 hour   Intake             1893 ml   Output              600 ml   Net             1293 ml       Physical Exam:     Physical Exam   Constitutional: She is oriented to person, place, and time  She appears well-developed and well-nourished  HENT:   Head: Normocephalic and atraumatic  MM dry   Eyes: EOM are normal  No scleral icterus  Neck: Normal range of motion  Neck supple  Cardiovascular: Normal rate, regular rhythm and normal heart sounds  No murmur heard  Pulmonary/Chest: Effort normal and breath sounds normal    Abdominal: Soft  Bowel sounds are normal    Musculoskeletal: Normal range of motion  She exhibits edema  Neurological: She is alert and oriented to person, place, and time  Skin: Skin is warm and dry  There is pallor  Psychiatric: She has a normal mood and affect       Additional Data:     Labs:      Results from last 7 days  Lab Units 18  0726   WBC Thousand/uL 12 55*   HEMOGLOBIN g/dL 11 8   HEMATOCRIT % 36 1   PLATELETS Thousands/uL 201   NEUTROS PCT % 89*   LYMPHS PCT % 6*   MONOS PCT % 5   EOS PCT % 0       Results from last 7 days  Lab Units 07/11/18  0653 07/11/18  0049   SODIUM mmol/L 136 134*   POTASSIUM mmol/L 4 3 4 2   CHLORIDE mmol/L 106 100   CO2 mmol/L 23 27   BUN mg/dL 15 17   CREATININE mg/dL 1 00 1 10   CALCIUM mg/dL 8 2* 9 2   TOTAL PROTEIN g/dL  --  7 0   BILIRUBIN TOTAL mg/dL  --  0 74   ALK PHOS U/L  --  188*   ALT U/L  --  47   AST U/L  --  54*   GLUCOSE RANDOM mg/dL 143* 129           * I Have Reviewed All Lab Data Listed Above  * Additional Pertinent Lab Tests Reviewed:  [facility] Admission Reviewed    Imaging:    Imaging Reports Reviewed Today Include: all   Imaging Personally Reviewed by Myself Includes:  none    Recent Cultures (last 7 days):           Last 24 Hours Medication List:     Current Facility-Administered Medications:  acetaminophen 650 mg Oral Q6H PRN Elieser Madsen MD    aluminum-magnesium hydroxide-simethicone 15 mL Oral Q6H PRN Elieser Madsen MD    calcium carbonate-vitamin D 2 tablet Oral Daily With Breakfast Elieser Madsen MD    docusate sodium 100 mg Oral BID PRN Elieser Madsen MD    DULoxetine 60 mg Oral Daily Elieser Madsen MD    enoxaparin 40 mg Subcutaneous Daily Elieser Madsen MD    LORazepam 0 5 mg Oral BID PRN Elieser Madsen MD    multivitamin-minerals 1 tablet Oral Daily Elieser Madsen MD    ondansetron 4 mg Intravenous Q6H PRN Elieser Madsen MD    oxybutynin 5 mg Oral Daily Fred Schmidt PA-C    oxyCODONE 60 mg Oral Q12H [facility] Elieser Madsen MD    oxyCODONE 15 mg Oral Q6H PRN Elieser Madsen MD    pantoprazole 20 mg Oral Early Morning Elieser Madsen MD    polyethylene glycol 17 g Oral Daily PRN Elieser Madsen MD    sodium chloride 75 mL/hr Intravenous Continuous Elieser Madsen MD Last Rate: Stopped (07/11/18 5400)   sulfamethoxazole-trimethoprim 1 tablet Oral Once per day on Mon Wed Fri Elieser Madsen MD         Today, Patient Was Seen By: Briseida Juares PA-C    ** Please Note: This note has been constructed using a voice recognition system   **

## 2018-07-11 NOTE — ED ATTENDING ATTESTATION
Billy SIU DO, saw and evaluated the patient  I have discussed the patient with the resident/non-physician practitioner and agree with the resident's/non-physician practitioner's findings, Plan of Care, and MDM as documented in the resident's/non-physician practitioner's note, except where noted  All available labs and Radiology studies were reviewed  At this point I agree with the current assessment done in the Emergency Department  I have conducted an independent evaluation of this patient a history and physical is as follows:    Vomiting and confusion started tonight  PMH ovarian cancer 11 yrs ago  3-4 episodes non bloody vomit  Fatigue x 2 days  No cough or CP or sob  No diarrhea or urinary symptoms, no fall or syncope  Patient reports she had a headache prior to the onset of vomiting  Patient normally ambulates with a walker has bilateral lower extremity edema status post surgery for ovarian cancer  Lower extremities are currently wrapped, as per  he wrapped night patient did not have any cellulitic changes  Patient admits to having photophobia  She states general she just does not feel well unable to provide further history        Vitals:    07/10/18 2355 07/11/18 0126   BP: (!) 180/84 149/65   BP Location: Left arm Right arm   Pulse: (!) 107 104   Resp: 22 (!) 23   Temp: 100 °F (37 8 °C)    TempSrc: Tympanic    SpO2: 93% 91%   Weight: 111 kg (245 lb)        GEN: NAD,falling asleep, diaphoresis  HEENT: EOMI, PERRLA, MMM , no tonsillar exudate  CV:  Tachycardia, no  Murmur  Resp:  CTA, no R/R/W  GI: soft,NT/ND  Neuro: non focal motor exam, CN symmetric, normal speech, follows extremities x4 extremities  Skin:  Pale, diaphoretic  Musculosk: le edema with dressing       Results Reviewed     Procedure Component Value Units Date/Time    Troponin I [[id number]]  (Normal) Collected:  07/11/18 0144    Lab Status:  Final result Specimen:  Blood from Arm, Right Updated:  07/11/18 1833 Troponin I <0 02 ng/mL     Lactic acid, plasma [[id number]]  (Normal) Collected:  07/11/18 0144    Lab Status:  Final result Specimen:  Blood from Arm, Right Updated:  07/11/18 0211     LACTIC ACID 0 7 mmol/L     Narrative:         Result may be elevated if tourniquet was used during collection  Blood culture #2 [[id number]] Collected:  07/11/18 0144    Lab Status: In process Specimen:  Blood from Arm, Right Updated:  07/11/18 0149    Comprehensive metabolic panel [[id number]]  (Abnormal) Collected:  07/11/18 0049    Lab Status:  Final result Specimen:  Blood from Hand, Left Updated:  07/11/18 0126     Sodium 134 (L) mmol/L      Potassium 4 2 mmol/L      Chloride 100 mmol/L      CO2 27 mmol/L      Anion Gap 7 mmol/L      BUN 17 mg/dL      Creatinine 1 10 mg/dL      Glucose 129 mg/dL      Calcium 9 2 mg/dL      AST 54 (H) U/L      ALT 47 U/L      Alkaline Phosphatase 188 (H) U/L      Total Protein 7 0 g/dL      Albumin 4 0 g/dL      Total Bilirubin 0 74 mg/dL      eGFR 53 ml/min/1 73sq m     Narrative:         National Kidney Disease Education Program recommendations are as follows:  GFR calculation is accurate only with a steady state creatinine  Chronic Kidney disease less than 60 ml/min/1 73 sq  meters  Kidney failure less than 15 ml/min/1 73 sq  meters      Lipase [[id number]]  (Abnormal) Collected:  07/11/18 0049    Lab Status:  Final result Specimen:  Blood from Hand, Left Updated:  07/11/18 0126     Lipase 530 (H) u/L     Blood culture #1 [[id number]]     Lab Status:  No result Specimen:  Blood from Line, Venous     POCT urinalysis dipstick [[id number]]     Lab Status:  No result Specimen:  Urine     CBC and differential [[id number]]  (Abnormal) Collected:  07/11/18 0049    Lab Status:  Final result Specimen:  Blood from Hand, Left Updated:  07/11/18 0111     WBC 15 06 (H) Thousand/uL      RBC 4 57 Million/uL      Hemoglobin 13 8 g/dL      Hematocrit 42 4 %      MCV 93 fL      MCH 30 2 pg      MCHC 32 5 g/dL      RDW 13 8 %      MPV 9 7 fL      Platelets 475 Thousands/uL      nRBC 0 /100 WBCs      Neutrophils Relative 86 (H) %      Immat GRANS % 1 %      Lymphocytes Relative 6 (L) %      Monocytes Relative 7 %      Eosinophils Relative 0 %      Basophils Relative 0 %      Neutrophils Absolute 13 02 (H) Thousands/µL      Immature Grans Absolute 0 09 Thousand/uL      Lymphocytes Absolute 0 89 Thousands/µL      Monocytes Absolute 1 04 Thousand/µL      Eosinophils Absolute 0 00 Thousand/µL      Basophils Absolute 0 02 Thousands/µL           Plan  Plan to check laboratory data, the CT scan of the head as patient complained of headache prior to nausea and vomiting  CT scan of the abdomen is patient has significant surgery rule out abdominal etiology  If CT scan of the head does not show etiology for patient's symptoms consider a lumbar puncture  Lumbar puncture was discussed with the patient at this time she is refusing  Attempt to administer pain medications and anti nausea medications additional IV fluids and reassess and re-approached LP with patient  Empiric antibiotics have been ordered      Critical Care Time  CritCare Time    Procedures

## 2018-07-11 NOTE — ASSESSMENT & PLAN NOTE
· Patient with continuous opioid dependence 2/2 chronic pain, cancer related pain  · Consider patients lethargy on admission might be 2/2 mild dehydration complicated by the opioid medication use  · Continue cymbalta

## 2018-07-11 NOTE — ED PROVIDER NOTES
History  Chief Complaint   Patient presents with    Vomiting     pt reports acute onset of nausea and vomiting tonight, fever, no abdominal pain     47-year-old woman with a history of ovarian cancer and chronic lymphedema presents for evaluation of vomiting  Patient reports feeling fatigued throughout the course of the day before onset of headache and upper abdominal pain this evening   says she was confused and not acting her normal self  She subsequently had 3 episodes of nonbloody vomiting and came in for evaluation  No recent fevers, neck stiffness, abdominal pain, diarrhea, constipation, or urinary symptoms  No recent head trauma and no blood thinner use  Headache worsened gradually from the time of onset  No visual changes  Patient has a history of a total hysterectomy and appendectomy  On arrival, patient is hyperthermic at 100 0, tachycardic at 1:04 a m , and tachypneic at 22 with otherwise unremarkable vital signs  Physical exam shows somnolence with a nonfocal neurologic exam and a benign abdominal exam   Concern for CNS pathology  Will perform CT head, CT abdomen/pelvis given significant intra-abdominal surgery, and septic workup  Will treat empirically with IV fluids, antibiotics, and reassess  Prior to Admission Medications   Prescriptions Last Dose Informant Patient Reported? Taking? Calcium Carbonate-Vit D-Min (CALCIUM 1200 PO)   Yes Yes   Sig: Take 1 tablet by mouth   DULoxetine (CYMBALTA) 30 mg delayed release capsule Unknown at Unknown time  Yes No   Sig: Take 30 mg by mouth daily  DULoxetine (CYMBALTA) 60 mg delayed release capsule  Self Yes Yes   Sig: Take 60 mg by mouth daily     LORazepam (ATIVAN) 0 5 mg tablet  Self No Yes   Sig: Take 1 tablet as needed every 8 hours  Patient taking differently: Take 0 5 mg by mouth 2 (two) times a day Take 1 tablet as needed every 8 hours      Multiple Vitamins-Minerals (MULTIVITAMIN ADULT PO)   Yes Yes   Sig: Take 1 capsule by mouth   PREVIDENT 5000 DRY MOUTH 1 1 % GEL Unknown at Unknown time  Yes No   TOVIAZ 8 MG TB24   Yes Yes   acetaminophen (TYLENOL) 325 mg tablet   No Yes   Sig: Take 2 tablets by mouth every 6 (six) hours as needed for mild pain   furosemide (LASIX) 40 mg tablet   Yes Yes   Sig: Take 40 mg by mouth daily M W F BID PRN    omeprazole (PriLOSEC) 20 mg delayed release capsule   Yes Yes   Sig: Take 20 mg by mouth daily  oxyCODONE (OxyCONTIN) 60 mg 12 hr tablet   No Yes   Sig: Earliest Fill Date: 2/3/16  Take 1 tablet every 12 hours  oxyCODONE (ROXICODONE) 15 mg immediate release tablet   No Yes   Sig: Take 1 tab every 4 to six hours as needed for pain  polyethylene glycol (MIRALAX) 17 g packet   Yes Yes   Sig: Take 17 g by mouth   sulfamethoxazole-trimethoprim (BACTRIM DS) 800-160 mg per tablet   Yes Yes   Sig: Take 1 tablet by mouth 3 (three) times a week        Facility-Administered Medications: None       Past Medical History:   Diagnosis Date    Cancer ([facility] [address] )     ovarian Ca with chemo    Lymphedema        Past Surgical History:   Procedure Laterality Date    APPENDECTOMY      HYSTERECTOMY      OTHER SURGICAL HISTORY      right lower extremity due to trauma    HI TREAT TIBIAL SHAFT FX, INTRAMED IMPLANT Right 9/6/2017    Procedure: INSERTION NAIL IM TIBIA;  Surgeon: Jacqui Alvarez MD;  Location: [facility];  Service: Orthopedics       History reviewed  No pertinent family history  I have reviewed and agree with the history as documented  Social History   Substance Use Topics    Smoking status: Never Smoker    Smokeless tobacco: Never Used    Alcohol use No        Review of Systems   Constitutional: Positive for fatigue  Negative for chills and fever  HENT: Negative for rhinorrhea and sore throat  Eyes: Negative for photophobia and visual disturbance  Respiratory: Negative for cough and shortness of breath  Cardiovascular: Negative for chest pain and leg swelling     Gastrointestinal: Positive for nausea and vomiting  Negative for abdominal pain, blood in stool, constipation and diarrhea  Genitourinary: Negative for dysuria, frequency and hematuria  Musculoskeletal: Negative for back pain and neck pain  Skin: Negative for rash and wound  Neurological: Positive for headaches  Negative for light-headedness  Physical Exam  ED Triage Vitals [07/10/18 2355]   Temperature Pulse Respirations Blood Pressure SpO2   100 °F (37 8 °C) (!) 107 22 (!) 180/84 93 %      Temp Source Heart Rate Source Patient Position - Orthostatic VS BP Location FiO2 (%)   Tympanic Monitor Sitting Left arm --      Pain Score       No Pain           Orthostatic Vital Signs  Vitals:    07/10/18 2355 07/11/18 0126 07/11/18 0244 07/11/18 0348   BP: (!) 180/84 149/65 157/74 133/60   Pulse: (!) 107 104 103 104   Patient Position - Orthostatic VS: Sitting Lying Lying Lying       Physical Exam   Constitutional: She is oriented to person, place, and time  She appears well-developed and well-nourished  No distress  HENT:   Head: Normocephalic and atraumatic  Eyes: Conjunctivae and EOM are normal  Pupils are equal, round, and reactive to light  No scleral icterus  Neck: Neck supple  No JVD present  No signs of meningismus   Cardiovascular: Normal rate, regular rhythm and normal heart sounds  Exam reveals no gallop and no friction rub  No murmur heard  Pulmonary/Chest: Effort normal and breath sounds normal  No respiratory distress  She has no wheezes  She has no rales  Abdominal: Soft  She exhibits no distension  There is no tenderness  There is no rebound and no guarding  Musculoskeletal: She exhibits edema (Chronic lymphedema bilateral lower extremities)  Neurological: She is oriented to person, place, and time  No cranial nerve deficit     Somnolent but arousable to voice and appropriate, no gross motor or sensory deficits, no abnormalities with cerebellar testing, normal rapid alternating movements, visual fields intact   Skin: Skin is warm and dry  She is not diaphoretic  Vitals reviewed        ED Medications  Medications   vancomycin (VANCOCIN) 1,500 mg in sodium chloride 0 9 % 250 mL IVPB (1,500 mg Intravenous New Bag 7/11/18 0411)   sodium chloride 0 9 % bolus 500 mL (not administered)   ondansetron (ZOFRAN) injection 4 mg (4 mg Intravenous Given 7/11/18 0051)   sodium chloride 0 9 % bolus 500 mL (0 mL Intravenous Stopped 7/11/18 0233)   sodium chloride 0 9 % bolus 1,000 mL (1,000 mL Intravenous New Bag 7/11/18 0242)   ketorolac (TORADOL) injection 15 mg (15 mg Intravenous Given 7/11/18 0306)   metoclopramide (REGLAN) injection 10 mg (10 mg Intravenous Given 7/11/18 0309)   cefepime (MAXIPIME) 2 g/50 mL dextrose IVPB (0 mg Intravenous Stopped 7/11/18 0407)       Diagnostic Studies  Results Reviewed     Procedure Component Value Units Date/Time    Urine Microscopic [[id number]]  (Abnormal) Collected:  07/11/18 0350    Lab Status:  Final result Specimen:  Urine from Urine, Indwelling Barba Catheter Updated:  07/11/18 0422     RBC, UA 2-4 (A) /hpf      WBC, UA None Seen /hpf      Epithelial Cells None Seen /hpf      Bacteria, UA None Seen /hpf      Hyaline Casts, UA None Seen /lpf     POCT urinalysis dipstick [[id number]]  (Normal) Resulted:  07/11/18 0350    Lab Status:  Final result Specimen:  Urine Updated:  07/11/18 0356     Color, UA marvin    ED Urine Macroscopic [[id number]]  (Abnormal) Collected:  07/11/18 0400    Lab Status:  Final result Specimen:  Urine Updated:  07/11/18 0350     Color, UA Marvin     Clarity, UA Clear     pH, UA 7 0     Leukocytes, UA Negative     Nitrite, UA Negative     Protein, UA Trace (A) mg/dl      Glucose, UA Negative mg/dl      Ketones, UA Negative mg/dl      Urobilinogen, UA 1 0 E U /dl      Bilirubin, UA Interference- unable to analyze (A)     Blood, UA Trace (A)     Specific [place], UA 1 020    Narrative:       CLINITEK RESULT    Blood culture #1 [[id number]] Collected: 07/11/18 0248    Lab Status: In process Specimen:  Blood from Hand, Right Updated:  07/11/18 0253    Troponin I [[id number]]  (Normal) Collected:  07/11/18 0144    Lab Status:  Final result Specimen:  Blood from Arm, Right Updated:  07/11/18 0211     Troponin I <0 02 ng/mL     Lactic acid, plasma [[id number]]  (Normal) Collected:  07/11/18 0144    Lab Status:  Final result Specimen:  Blood from Arm, Right Updated:  07/11/18 0211     LACTIC ACID 0 7 mmol/L     Narrative:         Result may be elevated if tourniquet was used during collection  Blood culture #2 [[id number]] Collected:  07/11/18 0144    Lab Status: In process Specimen:  Blood from Arm, Right Updated:  07/11/18 0149    Comprehensive metabolic panel [[id number]]  (Abnormal) Collected:  07/11/18 0049    Lab Status:  Final result Specimen:  Blood from Hand, Left Updated:  07/11/18 0126     Sodium 134 (L) mmol/L      Potassium 4 2 mmol/L      Chloride 100 mmol/L      CO2 27 mmol/L      Anion Gap 7 mmol/L      BUN 17 mg/dL      Creatinine 1 10 mg/dL      Glucose 129 mg/dL      Calcium 9 2 mg/dL      AST 54 (H) U/L      ALT 47 U/L      Alkaline Phosphatase 188 (H) U/L      Total Protein 7 0 g/dL      Albumin 4 0 g/dL      Total Bilirubin 0 74 mg/dL      eGFR 53 ml/min/1 73sq m     Narrative:         National Kidney Disease Education Program recommendations are as follows:  GFR calculation is accurate only with a steady state creatinine  Chronic Kidney disease less than 60 ml/min/1 73 sq  meters  Kidney failure less than 15 ml/min/1 73 sq  meters      Lipase [[id number]]  (Abnormal) Collected:  07/11/18 0049    Lab Status:  Final result Specimen:  Blood from Hand, Left Updated:  07/11/18 0126     Lipase 530 (H) u/L     CBC and differential [[id number]]  (Abnormal) Collected:  07/11/18 0049    Lab Status:  Final result Specimen:  Blood from Hand, Left Updated:  07/11/18 0111     WBC 15 06 (H) Thousand/uL      RBC 4 57 Million/uL      Hemoglobin 13 8 g/dL      Hematocrit 42 4 %      MCV 93 fL      MCH 30 2 pg      MCHC 32 5 g/dL      RDW 13 8 %      MPV 9 7 fL      Platelets 962 Thousands/uL      nRBC 0 /100 WBCs      Neutrophils Relative 86 (H) %      Immat GRANS % 1 %      Lymphocytes Relative 6 (L) %      Monocytes Relative 7 %      Eosinophils Relative 0 %      Basophils Relative 0 %      Neutrophils Absolute 13 02 (H) Thousands/µL      Immature Grans Absolute 0 09 Thousand/uL      Lymphocytes Absolute 0 89 Thousands/µL      Monocytes Absolute 1 04 Thousand/µL      Eosinophils Absolute 0 00 Thousand/µL      Basophils Absolute 0 02 Thousands/µL                  XR chest 2 views   ED Interpretation by Kya Beasley MD (07/11 0195)   No acute disease      CT head without contrast   Final Result by Megan Parisi MD (07/11 0682)      No acute intracranial abnormality  Workstation performed: [id number]         CT abdomen pelvis wo contrast   Final Result by Megan Parisi MD (07/11 0211)      No bowel obstruction  Previously seen large area of ischemia/luxation, infection, and abscess formation in the left lower quadrant anterior abdominal wall has resolved  Unchanged postsurgical changes right hip persistent severe right hip arthritic changes  Fatty liver  Splenic cyst unchanged  Diverticulosis  No evidence for diverticulitis  IVC are unchanged  Workstation performed: [id number]               Procedures  Procedures      Phone Consults  ED Phone Contact    ED Course  ED Course as of Jul 11 0518 Wed Jul 11, 2018   0863 Patient refusing lumbar puncture as she does not want a needle in her back  She is aware of the risks of foregoing the procedure and is amenable to empiric treatment with admission  Patient is of sound mind to make this decision which is supported by her               Identification of Seniors at Risk      Most Recent Value   (ISAR) Identification of Seniors at Risk   Before the illness or injury that brought you to the Emergency, did you need someone to help you on a regular basis? --   In the last 24 hours, have you needed more help than usual?  0 Filed at: 07/10/2018 2357   Have you been hospitalized for one or more nights during the past 6 months? 0 Filed at: 07/10/2018 2357   In general, do you see well?  0 Filed at: 07/10/2018 2357   In general, do you have serious problems with your memory? 0 Filed at: 07/10/2018 2357   Do you take more than three different medications every day? 1 Filed at: 07/10/2018 2357   ISAR Score  1 Filed at: 07/10/2018 2357                    Initial Sepsis Screening     Row Name 07/11/18 0100                Is the patient's history suggestive of a new or worsening infection? (!)  Yes (Proceed)  -AD        Suspected source of infection meningitis  -AD        Are two or more of the following signs & symptoms of infection both present and new to the patient? (!)  Yes (Proceed)  -AD        Indicate SIRS criteria Altered mental status; Tachycardia > 90 bpm;Tachypnea > 20 resp per min;Leukocytosis (WBC > 27373 IJL)  -AD        If the answer is yes to both questions, suspicion of sepsis is present  --        If severe sepsis is present AND tissue hypoperfusion perists in the hour after fluid resuscitation or lactate > 4, the patient meets criteria for SEPTIC SHOCK  --        Are any of the following organ dysfunction criteria present within 6 hours of suspected infection and SIRS criteria that are NOT considered to be chronic conditions? No  -AD        Organ dysfunction  --        Date of presentation of severe sepsis  --        Time of presentation of severe sepsis  --        Tissue hypoperfusion persists in the hour after crystalloid fluid administration, evidenced, by either:  --        Was hypotension present within one hour of the conclusion of crystalloid fluid administration?  No  -AD        Date of presentation of septic shock  --        Time of presentation of septic shock  -- User Key  (r) = Recorded By, (t) = Taken By, (c) = Cosigned By    [address] Name Provider Type    AD Kajal Marcelo MD Resident                  MDM  Number of Diagnoses or Management Options  Confusion:   Headache:   Sepsis [facility]):   Diagnosis management comments: Septic workup performed and negative for source of infection  Blood culture sent  No lactic acidosis and patient hemodynamically stable  CT head/abdomen/pelvis negative for acute findings  Patient and  were counseled regarding recommendation for lumbar puncture to evaluate for CNS infection  They understood the risks of foregoing the procedure and denied it  Patient was treated empirically with vancomycin and cefepime with ongoing IV fluid resuscitation  She was admitted to [facility] for further management      CritCare Time    Disposition  Final diagnoses:   Sepsis ([facility] [address] )   Confusion   Headache     Time reflects when diagnosis was documented in both MDM as applicable and the Disposition within this note     Time User Action Codes Description Comment    7/11/2018  3:33 AM Abraham Lang Add [R53 83] Lethargy     7/11/2018  3:33 AM Abraham Lang Modify [R53 83] Lethargy     7/11/2018  3:33 AM Abraham Lang Modify [R53 83] Lethargy     7/11/2018  3:33 AM Shakir Adas S Add [T61 32] Other complicated headache syndrome     7/11/2018  3:33 AM Abraham Lang Modify [Z74 61] Other complicated headache syndrome     7/11/2018  4:02 AM Deleonguerrero, Fariba Meuse Add [A41 9] Sepsis ([facility] [address] )     7/11/2018  4:03 AM Deleonguerrero, Fariba Meuse Modify [R53 83] Lethargy     7/11/2018  4:03 AM Deleonguerrero, Fariba Meuse Modify [A41 9] Sepsis ([facility] [address] )     7/11/2018  4:03 AM Deleonguerrero, Fariba Meuse Add [R41 0] Confusion     7/11/2018  4:03 AM Deleonguerrero, Fariba Meuse Add [R51] Headache       ED Disposition     ED Disposition Condition Comment    Admit  Case was discussed with Dr Elsy Gonzales and the patient's admission status was agreed to be Admission Status: inpatient status to the service of Dr Mustapha Warner  Follow-up Information    None         Patient's Medications   Discharge Prescriptions    No medications on file     No discharge procedures on file  ED Provider  Attending physically available and evaluated Monica George I managed the patient along with the ED Attending      Electronically Signed by         Kimberlyn Lord MD  07/11/18 2629

## 2018-07-11 NOTE — H&P
H&P- Clarisse Montanez 1955, 70 y o  female MRN: [id number]    Unit/Bed#: ED 25 Encounter: [id number]    Primary Care Provider: Vero Quinteros DO   Date and time admitted to hospital: 7/11/2018 12:12 AM        * Lethargy   Assessment & Plan    Concern for meningitis  Infectious Disease consult neurology consult continue ceftriaxone with vancomycin  Unfortunately, patient refuses to have spinal tap  But will observe for next few days  Will also get urinalysis  Other complicated headache syndrome   Assessment & Plan    Headache much better  Was given Toradol and metoclopramide  Chronic pain disorder   Assessment & Plan    While patient is on chronic opioids for control of pain especially secondary to malignancy; it is possible that her lethargy might be secondary to mild dehydration complicated by the opioid medication use  Patient will receive fluids  Assess mental status neurologic function  Cancer of ovary [facility])   Assessment & Plan    Currently this is stable  Patient's latest CT scan of the abdomen revealed diverticulosis  This is without any infection or abscess formation or any acute findings  VTE Prophylaxis: Enoxaparin (Lovenox)  / sequential compression device   Code Status: Prior full Code as discussed with patient  POLST: There is no POLST form on file for this patient (pre-hospital)    Anticipated Length of Stay:  Patient will be admitted on an Inpatient basis with an anticipated length of stay of  greater than 2 midnights  Justification for Hospital Stay: Please see detailed plans noted above  Chief Complaint:     Headache with photophobia  History of Present Illness:  Clarisse Montanez is a 70 y o  female who has a past medical history significant for ovarian cancer status post therapy and currently is doing well in that respect  Likewise she has morbid obesity, chronic pain, depression    She is on multiple medications for the management of pain including oxycodone and OxyContin with the use of Cymbalta  Patient claims that she was not eating for the past day or so  However this evening was experiencing headache more in the frontal area with note of photophobia  She denies any neck stiffness  Initially she was lethargic although she could answer appropriately and is oriented x3  However according to ER resident she would also drift back to sleep  No note of any fever or cough or cold  No open sores  Patient is currently receiving some fluids with note of improvement of mental status  However due to the patient's refusal to have a spinal tap done, the concern was for meningitis and therefore was started on cefepime with vancomycin  The patient also had an episode of vomiting and nausea however currently she is feeling much better  Currently, patient is is generally alert and awake although feeling weak otherwise is able to hold a conversation and maintain mental status  Review of Systems:    Constitutional:  Denies fever or chills   Eyes:  Denies change in visual acuity she denies even any blurring or visual disturbance    HENT:  Denies nasal congestion or sore throat   Respiratory:  Denies cough or shortness of breath   Cardiovascular:  Denies chest pain or edema   GI:  Denies abdominal pain, nausea, vomiting, bloody stools or diarrhea   :  Denies dysuria   Musculoskeletal:  Denies back pain or joint pain   Integument:  Denies rash   Neurologic:   frontal headache, no focal weakness no sensory changes    Endocrine:  Denies polyuria or polydipsia   Lymphatic:  Denies swollen glands   Psychiatric:  Denies depression or anxiety     Past Medical and Surgical History:   Past Medical History:   Diagnosis Date    Cancer ([facility] [address] )     ovarian Ca with chemo    Lymphedema      Past Surgical History:   Procedure Laterality Date    APPENDECTOMY      HYSTERECTOMY      OTHER SURGICAL HISTORY      right lower extremity due to trauma    DC TREAT TIBIAL SHAFT Toney Blaise IMPLANT Right 9/6/2017    Procedure: INSERTION NAIL IM TIBIA;  Surgeon: Sourav Santillan MD;  Location: [facility];  Service: Orthopedics       Meds/Allergies:  acetaminophen (TYLENOL) 325 mg tablet Take 2 tablets by mouth every 6 (six) hours as needed for mild pain Ben Martin MD Reordered   Ordered as: acetaminophen (TYLENOL) tablet 650 mg - 650 mg, Oral, Every 6 hours PRN, mild pain, Starting Wed 7/11/18 at 0329   Calcium Carbonate-Vit D-Min (CALCIUM 1200 PO) Take 1 tablet by mouth Ben Martin MD Reordered   Ordered as: calcium carbonate-vitamin D (OSCAL-D) 500 mg-200 units per tablet 2 tablet - 2 tablet, Oral, Daily with breakfast, First dose on Wed 7/11/18 at [address] [facility]    DULoxetine (CYMBALTA) 30 mg delayed release capsule Take 30 mg by mouth daily  Ben Martin MD Reordered   Ordered as: DULoxetine [facility]) delayed release capsule 30 mg - 30 mg, Oral, Daily, First dose on Wed 7/11/18 at 0900 Swallow whole or open and sprinkle on applesauce; do not crush or chew  Do not sprinkle contents on chocolate pudding  LOOK ALIKE SOUND [facility]    DULoxetine (CYMBALTA) 60 mg delayed release capsule Take 60 mg by mouth daily   Ben Martin MD Reordered   Ordered as: DULoxetine (CYMBALTA) delayed release capsule 60 mg - 60 mg, Oral, Daily, First dose on Wed 7/11/18 at 0900 Swallow whole or open and sprinkle on applesauce; do not crush or chew  Do not sprinkle contents on chocolate pudding  LOOK ALIKE SOUND [facility]    furosemide (LASIX) 40 mg tablet Take 40 mg by mouth daily M W F BID PRN  eBn Martin MD Not Ordered   LORazepam (ATIVAN) 0 5 mg tablet Take 1 tablet as needed every 8 hours  Ben Martin MD Reordered    Patient taking differently: Take 0 5 mg by mouth 2 (two) times a day Take 1 tablet as needed every 8 hours        Ordered as: LORazepam (ATIVAN) tablet 0 5 mg - 0 5 mg, Oral, 2 times daily PRN, anxiety, Starting Wed 7/11/18 at 0330 High alert medication  LOOK [employer]IKE SOUND [facility]    Multiple Vitamins-Minerals (MULTIVITAMIN ADULT PO) Take 1 capsule by mouth Nolan Hwang MD Reordered   Ordered as: multivitamin-minerals (CENTRUM) tablet 1 tablet - 1 tablet, Oral, Daily, First dose on Wed 7/11/18 at 0900 **DISPOSE IN 8 GALLON BLACK CONTAINER**    omeprazole (PriLOSEC) 20 mg delayed release capsule Take 20 mg by mouth daily  Nolan Hwang MD Reordered   Ordered as: pantoprazole (PROTONIX) EC tablet 20 mg - 20 mg, Oral, Daily (early morning), First dose on Wed 7/11/18 at 0600 Swallow whole; do not crush, chew or split  LOOK ALIKE SOUND [facility]    oxyCODONE (OxyCONTIN) 60 mg 12 hr tablet Earliest Fill Date: 2/3/16  Take 1 tablet every 12 hours  Nolan Hwang MD Reordered   Ordered as: oxyCODONE (OxyCONTIN) 12 hr tablet 60 mg - 60 mg, Oral, Every 12 hours scheduled, First dose on Wed 7/11/18 at 0900 High alert medication  Swallow whole; do not crush, moisten, dissolve, cut, break, or chew  LOOK ALIKE SOUND [facility]    oxyCODONE (ROXICODONE) 15 mg immediate release tablet Take 1 tab every 4 to six hours as needed for pain  Nolan Hwang MD Reordered   Ordered as: oxyCODONE (ROXICODONE) IR tablet 15 mg - 15 mg, Oral, Every 6 hours PRN, moderate pain, Starting Wed 7/11/18 at 0331 High alert medication  LOOK ALIKE SOUND [facility]    polyethylene glycol (MIRALAX) 17 g packet Take 17 g by mouth Nolan Hwang MD Reordered   Ordered as: polyethylene glycol (MIRALAX) packet 17 g - 17 g, Oral, Daily PRN, constipation, Starting Wed 7/11/18 at 0331 Stir powder in 4-8 ounces of water, juice, soda, coffee or tea until dissolved and drink   LOOK [employer]IKE [employer] [facility]    PREVIDENT 5000 DRY MOUTH 1 1 % GEL  Nolan Hwang MD Not Ordered   sulfamethoxazole-trimethoprim (BACTRIM DS) 800-160 mg per tablet Take 1 tablet by mouth 3 (three) times a week   Nolan Hwang MD Reordered   Ordered as: sulfamethoxazole-trimethoprim (BACTRIM DS) 800-160 mg per tablet 1 tablet - 1 tablet, Oral, 3 times weekly (Once per day on Mon Wed Fri), First dose on Wed 7/11/18 at 0900 Administer with at least 8 ounces of water  Alray Melo 8 MG TB24  Emir Ledezma MD Reordered   Ordered as: tolterodine (DETROL LA) 24 hr capsule 2 mg - 2 mg, Oral, Daily, First dose on Wed 7/11/18 at 0900 Swallow whole; do not crush, chew or empty contents        Allergies: Allergies   Allergen Reactions    Iodinated Diagnostic Agents Shortness Of Breath    Omnipaque [Iohexol] Shortness Of Breath    Other Shortness Of Breath     IVP dye, x ray dye 3    Iodides      Other reaction(s): SWELLING, SOB    Methotrexate Nausea Only     Headache and nausea    Methotrexate Derivatives Headache     History:  Marital Status: /Civil Union   Occupation: retired  Patient Pre-hospital Living Situation: home  Patient Pre-hospital Level of Mobility: mobile  Patient Pre-hospital Diet Restrictions: regular  Substance Use History:   History   Alcohol Use No     History   Smoking Status    Never Smoker   Smokeless Tobacco    Never Used     History   Drug Use No       Family History:  History reviewed  No pertinent family history  Physical Exam:     Vitals:   Blood Pressure: 157/74 (07/11/18 0244)  Pulse: 103 (07/11/18 0244)  Temperature: 100 °F (37 8 °C) (07/10/18 2355)  Temp Source: Tympanic (07/10/18 2355)  Respirations: 15 (07/11/18 0244)  Weight - Scale: 111 kg (245 lb) (07/10/18 2355)  SpO2: 91 % (07/11/18 0126)    Constitutional:  Well developed,obese, no acute distress, non-toxic appearance but generalized weakness and sickly appearing  Eyes:  PERRL, conjunctiva normal   HENT:  Atraumatic, external ears normal, nose normal, oropharynx moist, no pharyngeal exudates   Neck- normal range of motion, no tenderness, supple   Respiratory:  No respiratory distress, normal breath sounds, no rales, no wheezing   Cardiovascular:  Normal rate, normal rhythm, no murmurs, no gallops, no rubs   GI:  Soft, nondistended, normal bowel sounds, nontender, no organomegaly, no mass, no rebound, no guarding   :  No costovertebral angle tenderness   Musculoskeletal:  Both legs with lymphedema and wrapped in ACE no deformities  Back- no tenderness  Integument:  Well hydrated, no rash   Lymphatic:  No lymphadenopathy noted   Neurologic:  Alert &awake although tired appearing communicative, CN 2-12 normal, normal motor function, normal sensory function, no focal deficits noted   Psychiatric:  Speech and behavior appropriate       Lab Results: I have personally reviewed pertinent reports  Results from last 7 days  Lab Units 07/11/18  0049   WBC Thousand/uL 15 06*   HEMOGLOBIN g/dL 13 8   HEMATOCRIT % 42 4   PLATELETS Thousands/uL 211   NEUTROS PCT % 86*   LYMPHS PCT % 6*   MONOS PCT % 7   EOS PCT % 0       Results from last 7 days  Lab Units 07/11/18  0049   SODIUM mmol/L 134*   POTASSIUM mmol/L 4 2   CHLORIDE mmol/L 100   CO2 mmol/L 27   BUN mg/dL 17   CREATININE mg/dL 1 10   CALCIUM mg/dL 9 2   TOTAL PROTEIN g/dL 7 0   BILIRUBIN TOTAL mg/dL 0 74   ALK PHOS U/L 188*   ALT U/L 47   AST U/L 54*   GLUCOSE RANDOM mg/dL 129             Imaging: I have personally reviewed pertinent reports  Ct Abdomen Pelvis Wo Contrast    Result Date: 7/11/2018  Narrative: CT ABDOMEN AND PELVIS WITHOUT IV CONTRAST INDICATION:   Vomiting, hx ovarian CA and multiple surgeries  COMPARISON: None  TECHNIQUE:  CT examination of the abdomen and pelvis was performed without intravenous contrast   Axial, sagittal, and coronal 2D reformatted images were created from the source data and submitted for interpretation  Radiation dose length product (DLP) for this visit:  1309 mGy-cm   This examination, like all CT scans performed in the [facility], was performed utilizing techniques to minimize radiation dose exposure, including the use of iterative reconstruction and automated exposure control  Enteric contrast was administered  FINDINGS: ABDOMEN LOWER CHEST:  Small hiatal hernia noted  Dependent parenchymal changes versus scarring in the lung bases bilaterally  No other clinically significant abnormality identified in the visualized lower chest  LIVER/BILIARY TREE:  Liver is diffusely decreased in density consistent with fatty change  Normal hepatic contours  No biliary dilatation  GALLBLADDER:  No calcified gallstones  No pericholecystic inflammatory change  SPLEEN:  3 1 cm simple cyst in the liver unchanged  Otherwise unremarkable spleen  Ferol Jose PANCREAS:  Unremarkable  ADRENAL GLANDS:  Unremarkable  KIDNEYS/URETERS:  Unremarkable  No hydronephrosis  STOMACH AND BOWEL:  There is colonic diverticulosis without evidence of acute diverticulitis  APPENDIX:  No findings to suggest appendicitis  ABDOMINOPELVIC CAVITY:  No ascites or free intraperitoneal air  No lymphadenopathy  VESSELS:  IVC filter below the renal veins  Otherwise unremarkable for patient's age  PELVIS REPRODUCTIVE ORGANS:  Unremarkable for patient's age  URINARY BLADDER:  Unremarkable  ABDOMINAL WALL/INGUINAL REGIONS:  Unremarkable  Previously seen left lower quadrant anterior abdominal wall fluid collection has resolved with visualization of scarring  Postsurgical changes in the right lower quadrant anterior abdominal wall are also noted  There is left mid abdomen lateral wall: 3344); no evidence for collection differential cellulitis or other causes for focal edema  Recommend clinical correlation  OSSEOUS STRUCTURES:  No acute fracture or destructive osseous lesion  Unchanged plate device right acetabular fracture osteoarthritis of the right unchanged  Impression: No bowel obstruction  Previously seen large area of ischemia/luxation, infection, and abscess formation in the left lower quadrant anterior abdominal wall has resolved  Unchanged postsurgical changes right hip persistent severe right hip arthritic changes  Fatty liver  Splenic cyst unchanged  Diverticulosis  No evidence for diverticulitis  IVC are unchanged  Workstation performed: [id number]     Ct Head Without Contrast    Result Date: 7/11/2018  Narrative: CT BRAIN - WITHOUT CONTRAST INDICATION:   Fever, confusion, vomiting  COMPARISON:  1/25/2015 TECHNIQUE:  CT examination of the brain was performed  In addition to axial images, coronal 2D reformatted images were created and submitted for interpretation  Radiation dose length product (DLP) for this visit:  1047 mGy-cm   This examination, like all CT scans performed in the [facility], was performed utilizing techniques to minimize radiation dose exposure, including the use of iterative reconstruction and automated exposure control  IMAGE QUALITY:  Diagnostic  FINDINGS: PARENCHYMA:  No intracranial mass, mass effect or midline shift  No CT signs of acute infarction  No acute parenchymal hemorrhage  Again seen are prominent dilated perivascular spaces in the basal ganglia  VENTRICLES AND EXTRA-AXIAL SPACES:  Normal for the patient's age  VISUALIZED ORBITS AND PARANASAL SINUSES:  Unremarkable  CALVARIUM AND EXTRACRANIAL SOFT TISSUES:  Normal      Impression: No acute intracranial abnormality  Workstation performed: [id number]         ** Please Note: Dragon 360 Dictation voice to text software was used in the creation of this document   **

## 2018-07-11 NOTE — CONSULTS
Consultation - Neurology   Chris Chen 70 y o  female MRN: [id number]  Unit/Bed#: -01 Encounter: [id number]      Assessment/Plan   Assessment: 1  Patient presents with complaints of headache, nausea vomiting, low-grade temperature, now improved  She did have an elevated white count at 15,000, now 12  She had no neck pain or other evidence of meningeal irritation  I think likelihood of meningitis is quite low  She appears nontoxic  Her headache may have been nonspecific, associated with viral or other GI process (increased lipase, consider pancreatitis)  2  History of Wegener's, in remission, no longer immunosuppressed (can Bactrim be discontinued?)    Plan:  1  Infectious Disease consult is pending  I would await their opinion, but would otherwise recommend continuing off antibiotics and observe  2   At present I do not think there is a strong indication to pursue lumbar puncture  3  Can continue utilize analgesics p r n    4   No need for additional neuro imaging-no additional neurologic recommendations  History of Present Illness     Reason for Consult / Principal Problem: Lethargy and HA    HPI: Chris Chen is a 70 y o  female  with past medical history of Juan's granulomatosis, chronic kidney disease, BRCA 1 positive,ovarian cancer, chronic pain syndrome, obesity, urinary frequency, posttraumatic osteoarthritis, GERD  Per record review the patient does follow with Dr  with the goal of Rheumatology for wegener's granulomatosis  The patient is not taking steroids or other autoimmune medications at this time, and considered to be in remission  The patient presented to the ED this morning with fatigue throughout the day prior with new onset of ha and upper abdominal pain  She did report feeling feverish  Her  felt she was confused and not acting herself, and insisting that she come to the ER for fear of infection    The patient says that this was in reference to her not promptly following his instructions  She had some nauseousness at home and an episode of emesis outside the emergency room     In the ED she is complaining of headache which is worsening over time  Patient was found to be febrile (100), tachycardic and tachypneic, in the ED the patient had a CT scan performed of the head and abdomen - CT of the head without any acute intracranial abnormality, CT of the abdomen as below, chest x-ray showed no active pulmonary disease  The patient had leukocytosis, elevated alk-phos, and lipase  The patient refused an LP  She denies any associated neck pain  She does report some chronic light sensitivity, unchanged  Denies any new lateralizing numbness or weakness  No visual changes    The patient was given 1 dose of cefepime (2 g) and vancomycin  The patient has chronic pain syndrome and is on multiple medications  The patient has not been eating for a few days  She reports feeling considerably better today  Headache is minimal   No neck pain  She was able to eat though still reports some mild nauseousness  Reviewed Pmhx, surgical hx, reviewed social hx  Inpatient consult to Neurology  Consult performed by: Carlos Nair ordered by: Ty Dan          Review of Systems   Constitutional: Positive for appetite change, fatigue and fever  Negative for chills and diaphoresis  HENT: Negative  Eyes: Positive for photophobia  Chronic   Respiratory: Negative  Cardiovascular: Positive for leg swelling  Chronic lymphedema   Gastrointestinal: Positive for nausea  Endocrine: Negative  Genitourinary: Positive for frequency  Musculoskeletal: Positive for arthralgias and gait problem  Negative for neck pain and neck stiffness  Skin: Negative  Allergic/Immunologic: Negative  Negative for immunocompromised state  Neurological: Positive for headaches   Negative for dizziness, tremors, seizures, syncope, facial asymmetry, speech difficulty, weakness, light-headedness and numbness  Hematological: Negative  Psychiatric/Behavioral: Negative  Historical Information   Past Medical History:   Diagnosis Date    Cancer ([facility] [address] )     ovarian Ca with chemo    Lymphedema      Past Surgical History:   Procedure Laterality Date    APPENDECTOMY      HYSTERECTOMY      OTHER SURGICAL HISTORY      right lower extremity due to trauma    FL TREAT TIBIAL SHAFT FX, INTRAMED IMPLANT Right 9/6/2017    Procedure: INSERTION NAIL IM TIBIA;  Surgeon: Jacqui Alvarez MD;  Location: [facility];  Service: Orthopedics     Social History   History   Alcohol Use No     History   Drug Use No     History   Smoking Status    Never Smoker   Smokeless Tobacco    Never Used     Family History: History reviewed  No pertinent family history  Review of previous medical records was completed  See HPI      Meds/Allergies   all current active meds have been reviewed, current meds:   Current Facility-Administered Medications   Medication Dose Route Frequency    acetaminophen (TYLENOL) tablet 650 mg  650 mg Oral Q6H PRN    aluminum-magnesium hydroxide-simethicone (MYLANTA) 200-200-20 mg/5 mL oral suspension 15 mL  15 mL Oral Q6H PRN    calcium carbonate-vitamin D (OSCAL-D) 500 mg-200 units per tablet 2 tablet  2 tablet Oral Daily With Breakfast    docusate sodium (COLACE) capsule 100 mg  100 mg Oral BID PRN    DULoxetine (CYMBALTA) delayed release capsule 60 mg  60 mg Oral Daily    enoxaparin (LOVENOX) subcutaneous injection 40 mg  40 mg Subcutaneous Daily    LORazepam (ATIVAN) tablet 0 5 mg  0 5 mg Oral BID PRN    multivitamin-minerals (CENTRUM) tablet 1 tablet  1 tablet Oral Daily    ondansetron (ZOFRAN) injection 4 mg  4 mg Intravenous Q6H PRN    oxyCODONE (OxyCONTIN) 12 hr tablet 60 mg  60 mg Oral Q12H PALAK    oxyCODONE (ROXICODONE) IR tablet 15 mg  15 mg Oral Q6H PRN    pantoprazole (PROTONIX) EC tablet 20 mg  20 mg Oral Early Morning    polyethylene glycol (MIRALAX) packet 17 g  17 g Oral Daily PRN    sodium chloride 0 9 % infusion  75 mL/hr Intravenous Continuous    sulfamethoxazole-trimethoprim (BACTRIM DS) 800-160 mg per tablet 1 tablet  1 tablet Oral Once per day on Mon Wed Fri    tolterodine (DETROL LA) 24 hr capsule 2 mg  2 mg Oral Daily    and PTA meds:   Prior to Admission Medications   Prescriptions Last Dose Informant Patient Reported? Taking? Calcium Carbonate-Vit D-Min (CALCIUM 1200 PO) 7/11/2018 at Unknown time  Yes Yes   Sig: Take 1 tablet by mouth   DULoxetine (CYMBALTA) 60 mg delayed release capsule 7/11/2018 at Unknown time Self Yes Yes   Sig: Take 60 mg by mouth daily     LORazepam (ATIVAN) 0 5 mg tablet Unknown at Unknown time Self No No   Sig: Take 1 tablet as needed every 8 hours  Patient taking differently: Take 0 5 mg by mouth 2 (two) times a day Take 1 tablet as needed every 8 hours  Multiple Vitamins-Minerals (MULTIVITAMIN ADULT PO) 7/11/2018 at Unknown time  Yes Yes   Sig: Take 1 capsule by mouth   PREVIDENT 5000 DRY MOUTH 1 1 % GEL Unknown at Unknown time  Yes No   TOVIAZ 8 MG TB24 7/10/2018 at Unknown time  Yes Yes   acetaminophen (TYLENOL) 325 mg tablet Unknown at Unknown time  No No   Sig: Take 2 tablets by mouth every 6 (six) hours as needed for mild pain   furosemide (LASIX) 40 mg tablet Unknown at Unknown time  Yes No   Sig: Take 40 mg by mouth daily M W F BID PRN    omeprazole (PriLOSEC) 20 mg delayed release capsule Unknown at Unknown time  Yes No   Sig: Take 20 mg by mouth daily  oxyCODONE (OxyCONTIN) 60 mg 12 hr tablet 7/11/2018 at Unknown time  No Yes   Sig: Earliest Fill Date: 2/3/16  Take 1 tablet every 12 hours  oxyCODONE (ROXICODONE) 15 mg immediate release tablet 7/11/2018 at Unknown time  No Yes   Sig: Take 1 tab every 4 to six hours as needed for pain     polyethylene glycol (MIRALAX) 17 g packet Unknown at Unknown time  Yes No   Sig: Take 17 g by mouth   sulfamethoxazole-trimethoprim (BACTRIM DS) 800-160 mg per tablet Unknown at Unknown time  Yes No   Sig: Take 1 tablet by mouth 3 (three) times a week        Facility-Administered Medications: None     Allergies   Allergen Reactions    Iodinated Diagnostic Agents Shortness Of Breath    Omnipaque [Iohexol] Shortness Of Breath    Other Shortness Of Breath     IVP dye, x ray dye 3    Iodides      Other reaction(s): SWELLING, SOB    Methotrexate Nausea Only     Headache and nausea    Methotrexate Derivatives Headache     Objective   Vitals:Blood pressure 134/62, pulse 88, temperature 98 7 °F (37 1 °C), temperature source Oral, resp  rate 18, height 5' 6" (1 676 m), weight 111 kg (245 lb 6 oz), SpO2 96 %  ,Body mass index is 39 6 kg/m²  Intake/Output Summary (Last 24 hours) at 07/11/18 1056  Last data filed at 07/11/18 0552   Gross per 24 hour   Intake             1550 ml   Output              600 ml   Net              950 ml     Invasive Devices: Invasive Devices     Peripheral Intravenous Line            Peripheral IV 07/11/18 Left Hand less than 1 day    Peripheral IV 07/11/18 Right Arm less than 1 day              Physical Exam   Constitutional: She appears well-developed and well-nourished  No distress  HENT:   Head: Normocephalic and atraumatic  Mouth/Throat: No oropharyngeal exudate  Eyes: Conjunctivae are normal  No scleral icterus  Neck: Normal range of motion  Neck supple  No meningismus   Cardiovascular: Normal rate and regular rhythm  No murmur heard  Pulmonary/Chest: Effort normal and breath sounds normal  No respiratory distress  She has no wheezes  She has no rales  Abdominal: Soft  Bowel sounds are normal    Musculoskeletal: She exhibits edema and deformity  Neurological: She has normal strength  She has a normal Finger-Nose-Finger Test  Heel-to-shin test: Unable due to body habitus  Skin: Skin is warm and dry  She is not diaphoretic  Vitals reviewed  Neurologic Exam     Mental Status   Patient is awake and alert  There is no evident difficulty with language, memory, orientation, or higher cognitive function  Cranial Nerves   Cranial nerves II through XII intact  Motor Exam     Strength   Strength 5/5 throughout  Right iliopsoas: 4/5  Left iliopsoas: 3/5  Left anterior tibial: 3/5    Sensory Exam   Symmetric to light touch, temperature, vibration     Gait, Coordination, and Reflexes     Coordination   Finger to nose coordination: normal  Heel-to-shin test: Unable due to body habitus  Tremor   Resting tremor: absent  Intention tremor: absent  Action tremor: absentReflexes were diffusely diminished    Gait was not assessed       Lab Results:     Recent Results (from the past 24 hour(s))   CBC and differential    Collection Time: 07/11/18 12:49 AM   Result Value Ref Range    WBC 15 06 (H) 4 31 - 10 16 Thousand/uL    RBC 4 57 3 81 - 5 12 Million/uL    Hemoglobin 13 8 11 5 - 15 4 g/dL    Hematocrit 42 4 34 8 - 46 1 %    MCV 93 82 - 98 fL    MCH 30 2 26 8 - 34 3 pg    MCHC 32 5 31 4 - 37 4 g/dL    RDW 13 8 11 6 - 15 1 %    MPV 9 7 8 9 - 12 7 fL    Platelets 867 332 - 780 Thousands/uL    nRBC 0 /100 WBCs    Neutrophils Relative 86 (H) 43 - 75 %    Immat GRANS % 1 0 - 2 %    Lymphocytes Relative 6 (L) 14 - 44 %    Monocytes Relative 7 4 - 12 %    Eosinophils Relative 0 0 - 6 %    Basophils Relative 0 0 - 1 %    Neutrophils Absolute 13 02 (H) 1 85 - 7 62 Thousands/µL    Immature Grans Absolute 0 09 0 00 - 0 20 Thousand/uL    Lymphocytes Absolute 0 89 0 60 - 4 47 Thousands/µL    Monocytes Absolute 1 04 0 17 - 1 22 Thousand/µL    Eosinophils Absolute 0 00 0 00 - 0 61 Thousand/µL    Basophils Absolute 0 02 0 00 - 0 10 Thousands/µL   Comprehensive metabolic panel    Collection Time: 07/11/18 12:49 AM   Result Value Ref Range    Sodium 134 (L) 136 - 145 mmol/L    Potassium 4 2 3 5 - 5 3 mmol/L    Chloride 100 100 - 108 mmol/L    CO2 27 21 - 32 mmol/L    Anion Gap 7 4 - 13 mmol/L    BUN 17 5 - 25 mg/dL    Creatinine 1 10 0 60 - 1 30 mg/dL    Glucose 129 65 - 140 mg/dL    Calcium 9 2 8 3 - 10 1 mg/dL    AST 54 (H) 5 - 45 U/L    ALT 47 12 - 78 U/L    Alkaline Phosphatase 188 (H) 46 - 116 U/L    Total Protein 7 0 6 4 - 8 2 g/dL    Albumin 4 0 3 5 - 5 0 g/dL    Total Bilirubin 0 74 0 20 - 1 00 mg/dL    eGFR 53 ml/min/1 73sq m   Lipase    Collection Time: 07/11/18 12:49 AM   Result Value Ref Range    Lipase 530 (H) 73 - 393 u/L   Green / Black tube on hold    Collection Time: 07/11/18 12:49 AM   Result Value Ref Range    Extra Tube Hold for add-ons      Troponin I    Collection Time: 07/11/18  1:44 AM   Result Value Ref Range    Troponin I <0 02 <=0 04 ng/mL   Lactic acid, plasma    Collection Time: 07/11/18  1:44 AM   Result Value Ref Range    LACTIC ACID 0 7 0 5 - 2 0 mmol/L   POCT urinalysis dipstick    Collection Time: 07/11/18  3:50 AM   Result Value Ref Range    Color, UA marvin    Urine Microscopic    Collection Time: 07/11/18  3:50 AM   Result Value Ref Range    RBC, UA 2-4 (A) None Seen, 0-5 /hpf    WBC, UA None Seen None Seen, 0-5, 5-55, 5-65 /hpf    Epithelial Cells None Seen None Seen, Occasional /hpf    Bacteria, UA None Seen None Seen, Occasional /hpf    Hyaline Casts, UA None Seen None Seen /lpf   ED Urine Macroscopic    Collection Time: 07/11/18  4:00 AM   Result Value Ref Range    Color, UA Marvin     Clarity, UA Clear     pH, UA 7 0 4 5 - 8 0    Leukocytes, UA Negative Negative    Nitrite, UA Negative Negative    Protein, UA Trace (A) Negative mg/dl    Glucose, UA Negative Negative mg/dl    Ketones, UA Negative Negative mg/dl    Urobilinogen, UA 1 0 0 2, 1 0 E U /dl E U /dl    Bilirubin, UA Interference- unable to analyze (A) Negative    Blood, UA Trace (A) Negative    Specific Gravity, UA 1 020 1 003 - 2 664   Basic metabolic panel    Collection Time: 07/11/18  6:53 AM   Result Value Ref Range    Sodium 136 136 - 145 mmol/L    Potassium 4 3 3 5 - 5 3 mmol/L    Chloride 106 100 - 108 mmol/L    CO2 23 21 - 32 mmol/L    Anion Gap 7 4 - 13 mmol/L    BUN 15 5 - 25 mg/dL    Creatinine 1 00 0 60 - 1 30 mg/dL    Glucose 143 (H) 65 - 140 mg/dL    Calcium 8 2 (L) 8 3 - 10 1 mg/dL    eGFR 59 ml/min/1 73sq m   Magnesium    Collection Time: 07/11/18  6:53 AM   Result Value Ref Range    Magnesium 2 2 1 6 - 2 6 mg/dL   Phosphorus    Collection Time: 07/11/18  6:53 AM   Result Value Ref Range    Phosphorus 3 2 2 3 - 4 1 mg/dL   CBC and differential    Collection Time: 07/11/18  7:26 AM   Result Value Ref Range    WBC 12 55 (H) 4 31 - 10 16 Thousand/uL    RBC 3 88 3 81 - 5 12 Million/uL    Hemoglobin 11 8 11 5 - 15 4 g/dL    Hematocrit 36 1 34 8 - 46 1 %    MCV 93 82 - 98 fL    MCH 30 4 26 8 - 34 3 pg    MCHC 32 7 31 4 - 37 4 g/dL    RDW 13 7 11 6 - 15 1 %    MPV 10 1 8 9 - 12 7 fL    Platelets 451 204 - 262 Thousands/uL    nRBC 0 /100 WBCs    Neutrophils Relative 89 (H) 43 - 75 %    Immat GRANS % 0 0 - 2 %    Lymphocytes Relative 6 (L) 14 - 44 %    Monocytes Relative 5 4 - 12 %    Eosinophils Relative 0 0 - 6 %    Basophils Relative 0 0 - 1 %    Neutrophils Absolute 11 13 (H) 1 85 - 7 62 Thousands/µL    Immature Grans Absolute 0 05 0 00 - 0 20 Thousand/uL    Lymphocytes Absolute 0 72 0 60 - 4 47 Thousands/µL    Monocytes Absolute 0 65 0 17 - 1 22 Thousand/µL    Eosinophils Absolute 0 00 0 00 - 0 61 Thousand/µL    Basophils Absolute 0 00 0 00 - 0 10 Thousands/µL     Per radiology interpretation -    "  Ct Abdomen Pelvis Wo Contrast    Result Date: 7/11/2018  Narrative: CT ABDOMEN AND PELVIS WITHOUT IV CONTRAST INDICATION:  FINDINGS: ABDOMEN LOWER CHEST:  Small hiatal hernia noted  Dependent parenchymal changes versus scarring in the lung bases bilaterally  No other clinically significant abnormality identified in the visualized lower chest  LIVER/BILIARY TREE:  Liver is diffusely decreased in density consistent with fatty change  Normal hepatic contours  No biliary dilatation  GALLBLADDER:  No calcified gallstones   No pericholecystic inflammatory change  SPLEEN:  3 1 cm simple cyst in the liver unchanged  Otherwise unremarkable spleen  Jerryl Rm PANCREAS:  Unremarkable  ADRENAL GLANDS:  Unremarkable  KIDNEYS/URETERS:  Unremarkable  No hydronephrosis  STOMACH AND BOWEL:  There is colonic diverticulosis without evidence of acute diverticulitis  APPENDIX:  No findings to suggest appendicitis  ABDOMINOPELVIC CAVITY:  No ascites or free intraperitoneal air  No lymphadenopathy  VESSELS:  IVC filter below the renal veins  Otherwise unremarkable for patient's age  PELVIS REPRODUCTIVE ORGANS:  Unremarkable for patient's age  URINARY BLADDER:  Unremarkable  ABDOMINAL WALL/INGUINAL REGIONS:  Unremarkable  Previously seen left lower quadrant anterior abdominal wall fluid collection has resolved with visualization of scarring  Postsurgical changes in the right lower quadrant anterior abdominal wall are also noted  There is left mid abdomen lateral wall: 3344); no evidence for collection differential cellulitis or other causes for focal edema  Recommend clinical correlation  OSSEOUS STRUCTURES:  No acute fracture or destructive osseous lesion  Unchanged plate device right acetabular fracture osteoarthritis of the right unchanged  Impression: No bowel obstruction  Previously seen large area of ischemia/luxation, infection, and abscess formation in the left lower quadrant anterior abdominal wall has resolved  Unchanged postsurgical changes right hip persistent severe right hip arthritic changes  Fatty liver  Splenic cyst unchanged  Diverticulosis  No evidence for diverticulitis  IVC are unchanged  Workstation performed: [id number]     Xr Chest 2 Views    Result Date: 7/11/2018  Narrative: CHEST INDICATION:   Septic workup  Nausea, vomiting COMPARISON:  9/4/2017 EXAM PERFORMED/VIEWS:  XR CHEST PA & LATERAL FINDINGS: Cardiomediastinal silhouette appears unremarkable  Lung markings are crowded secondary to low lung volumes    Within limitations of this examination there is no focal airspace opacity to suggest pneumonia  No pneumothorax or pleural effusion  Osseous structures appear within normal limits for patient age  Impression: No active pulmonary disease on examination which is somewhat limited secondary to low lung volumes  Workstation performed: [id number]     Ct Head Without Contrast    Result Date: 7/11/2018  Narrative: CT BRAIN - WITHOUT CONTRAST INDICATION:   Fever, confusion, vomiting  COMPARISON:  1/25/2015 TECHNIQUE:  CT examination of the brain was performed  In addition to axial images, coronal 2D reformatted images were created and submitted for interpretation  Radiation dose length product (DLP) for this visit:  1047 mGy-cm   This examination, like all CT scans performed in the [facility], was performed utilizing techniques to minimize radiation dose exposure, including the use of iterative reconstruction and automated exposure control  IMAGE QUALITY:  Diagnostic  FINDINGS: PARENCHYMA:  No intracranial mass, mass effect or midline shift  No CT signs of acute infarction  No acute parenchymal hemorrhage  Again seen are prominent dilated perivascular spaces in the basal ganglia  VENTRICLES AND EXTRA-AXIAL SPACES:  Normal for the patient's age  VISUALIZED ORBITS AND PARANASAL SINUSES:  Unremarkable  CALVARIUM AND EXTRACRANIAL SOFT TISSUES:  Normal      Impression: No acute intracranial abnormality  Workstation performed: [id number]   "  Imaging Studies: I have personally reviewed pertinent reports        Code Status: Level 1 - Full Code

## 2018-07-11 NOTE — ASSESSMENT & PLAN NOTE
· Follows renal and rheum  · rituximab on hold 2/2 depressed immunoglobulins   · Can d/c daily bactrim suppression

## 2018-07-11 NOTE — ASSESSMENT & PLAN NOTE
· Stable, f/u with Dr Sony Rodriguez and specialists at [facility]

## 2018-07-11 NOTE — ASSESSMENT & PLAN NOTE
· While patient is on chronic opioids for control of pain especially secondary to malignancy; it is possible that her lethargy might be secondary to mild dehydration complicated by the opioid medication use    · Continue cymbalta

## 2018-07-11 NOTE — ASSESSMENT & PLAN NOTE
· SIRS POA   · Low grade temp likely nonspecific viral syndrome   · UA benign   · CXR and CT a/p without source for infection   · Low suspicion for meningitis, appreciate neuro input   · F/u bl cultures   · Check PCT   · appreciate ID input, monitor off abx

## 2018-07-11 NOTE — SOCIAL WORK
Met w/pt and reviewed cm role and potential dc for tomorrow  Pt resides w/spouse in ranch style home with ramp entrance  Pt has a roller walker, commode and lift chair  Pt had home health last year but doesn't recall the name  Pt has st [employer] but only for this month as her  took a job with a [employer] agency and left [facility]  Pt uses [facility], or [facility] for rx needs  Pt states she will have transportation home but does not know if her  can come first thing in the morning  Pt has been educated on the [employer] and dc process  [facility] is [facility] of [facility]  Pt denies any MH tx or drug and alcohol tx  Pt is aware there is potential for dc to home tomorrow

## 2018-07-11 NOTE — ASSESSMENT & PLAN NOTE
While patient is on chronic opioids for control of pain especially secondary to malignancy; it is possible that her lethargy might be secondary to mild dehydration complicated by the opioid medication use  Patient will receive fluids  Assess mental status neurologic function

## 2018-07-12 LAB
ANION GAP SERPL CALCULATED.3IONS-SCNC: 3 MMOL/L (ref 4–13)
BASOPHILS # BLD AUTO: 0.01 THOUSANDS/ΜL (ref 0–0.1)
BASOPHILS NFR BLD AUTO: 0 % (ref 0–1)
BUN SERPL-MCNC: 14 MG/DL (ref 5–25)
CALCIUM SERPL-MCNC: 8.2 MG/DL (ref 8.3–10.1)
CHLORIDE SERPL-SCNC: 106 MMOL/L (ref 100–108)
CO2 SERPL-SCNC: 30 MMOL/L (ref 21–32)
CREAT SERPL-MCNC: 0.92 MG/DL (ref 0.6–1.3)
EOSINOPHIL # BLD AUTO: 0.2 THOUSAND/ΜL (ref 0–0.61)
EOSINOPHIL NFR BLD AUTO: 4 % (ref 0–6)
ERYTHROCYTE [DISTWIDTH] IN BLOOD BY AUTOMATED COUNT: 14.1 % (ref 11.6–15.1)
GFR SERPL CREATININE-BSD FRML MDRD: 66 ML/MIN/1.73SQ M
GLUCOSE SERPL-MCNC: 111 MG/DL (ref 65–140)
HCT VFR BLD AUTO: 34.6 % (ref 34.8–46.1)
HGB BLD-MCNC: 10.8 G/DL (ref 11.5–15.4)
IMM GRANULOCYTES # BLD AUTO: 0.01 THOUSAND/UL (ref 0–0.2)
IMM GRANULOCYTES NFR BLD AUTO: 0 % (ref 0–2)
LIPASE SERPL-CCNC: 61 U/L (ref 73–393)
LYMPHOCYTES # BLD AUTO: 1.62 THOUSANDS/ΜL (ref 0.6–4.47)
LYMPHOCYTES NFR BLD AUTO: 28 % (ref 14–44)
MCH RBC QN AUTO: 30.2 PG (ref 26.8–34.3)
MCHC RBC AUTO-ENTMCNC: 31.2 G/DL (ref 31.4–37.4)
MCV RBC AUTO: 97 FL (ref 82–98)
MONOCYTES # BLD AUTO: 0.58 THOUSAND/ΜL (ref 0.17–1.22)
MONOCYTES NFR BLD AUTO: 10 % (ref 4–12)
NEUTROPHILS # BLD AUTO: 3.29 THOUSANDS/ΜL (ref 1.85–7.62)
NEUTS SEG NFR BLD AUTO: 58 % (ref 43–75)
NRBC BLD AUTO-RTO: 0 /100 WBCS
PLATELET # BLD AUTO: 173 THOUSANDS/UL (ref 149–390)
PMV BLD AUTO: 9.5 FL (ref 8.9–12.7)
POTASSIUM SERPL-SCNC: 4 MMOL/L (ref 3.5–5.3)
PROCALCITONIN SERPL-MCNC: 0.24 NG/ML
RBC # BLD AUTO: 3.58 MILLION/UL (ref 3.81–5.12)
SODIUM SERPL-SCNC: 139 MMOL/L (ref 136–145)
WBC # BLD AUTO: 5.71 THOUSAND/UL (ref 4.31–10.16)

## 2018-07-12 PROCEDURE — 80048 BASIC METABOLIC PNL TOTAL CA: CPT | Performed by: PHYSICIAN ASSISTANT

## 2018-07-12 PROCEDURE — 83690 ASSAY OF LIPASE: CPT | Performed by: PHYSICIAN ASSISTANT

## 2018-07-12 PROCEDURE — 85025 COMPLETE CBC W/AUTO DIFF WBC: CPT | Performed by: PHYSICIAN ASSISTANT

## 2018-07-12 PROCEDURE — 84145 PROCALCITONIN (PCT): CPT | Performed by: PHYSICIAN ASSISTANT

## 2018-07-12 PROCEDURE — 99232 SBSQ HOSP IP/OBS MODERATE 35: CPT | Performed by: INTERNAL MEDICINE

## 2018-07-12 PROCEDURE — 99232 SBSQ HOSP IP/OBS MODERATE 35: CPT | Performed by: PHYSICIAN ASSISTANT

## 2018-07-12 RX ORDER — ONDANSETRON 4 MG/1
4 TABLET, ORALLY DISINTEGRATING ORAL EVERY 6 HOURS PRN
Status: DISCONTINUED | OUTPATIENT
Start: 2018-07-12 | End: 2018-07-13 | Stop reason: HOSPADM

## 2018-07-12 RX ADMIN — OXYCODONE HYDROCHLORIDE 15 MG: 5 TABLET ORAL at 17:40

## 2018-07-12 RX ADMIN — ONDANSETRON 4 MG: 4 TABLET, ORALLY DISINTEGRATING ORAL at 12:27

## 2018-07-12 RX ADMIN — LORAZEPAM 0.5 MG: 0.5 TABLET ORAL at 20:20

## 2018-07-12 RX ADMIN — OXYCODONE HYDROCHLORIDE 15 MG: 5 TABLET ORAL at 08:12

## 2018-07-12 RX ADMIN — OXYCODONE HYDROCHLORIDE 60 MG: 20 TABLET, FILM COATED, EXTENDED RELEASE ORAL at 08:12

## 2018-07-12 RX ADMIN — OXYCODONE HYDROCHLORIDE 60 MG: 20 TABLET, FILM COATED, EXTENDED RELEASE ORAL at 22:37

## 2018-07-12 RX ADMIN — DULOXETINE HYDROCHLORIDE 60 MG: 60 CAPSULE, DELAYED RELEASE ORAL at 08:18

## 2018-07-12 RX ADMIN — DOCUSATE SODIUM 100 MG: 100 CAPSULE, LIQUID FILLED ORAL at 08:14

## 2018-07-12 RX ADMIN — Medication 1 TABLET: at 08:13

## 2018-07-12 RX ADMIN — OXYCODONE HYDROCHLORIDE 15 MG: 5 TABLET ORAL at 04:12

## 2018-07-12 RX ADMIN — OXYBUTYNIN CHLORIDE 5 MG: 5 TABLET, EXTENDED RELEASE ORAL at 22:37

## 2018-07-12 RX ADMIN — CALCIUM CARBONATE 500 MG (1,250 MG)-VITAMIN D3 200 UNIT TABLET 2 TABLET: at 08:17

## 2018-07-12 RX ADMIN — ENOXAPARIN SODIUM 40 MG: 100 INJECTION SUBCUTANEOUS at 08:17

## 2018-07-12 RX ADMIN — PANTOPRAZOLE SODIUM 20 MG: 20 TABLET, DELAYED RELEASE ORAL at 05:48

## 2018-07-12 RX ADMIN — LORAZEPAM 0.5 MG: 0.5 TABLET ORAL at 12:30

## 2018-07-12 NOTE — PROGRESS NOTES
Progress Note - Karissa Haines 1955, 70 y o  female MRN: [id number]  Unit/Bed#: -[facility] Encounter: [id number] DOS: 7/12/18  Primary Care Provider: Sveta Alonso DO   Date and time admitted to hospital: 7/11/2018 12:12 AM    SIRS (systemic inflammatory response syndrome) (HCC)   Assessment & Plan    · SIRS POA   · Low grade temp likely 2/2 nonspecific viral syndrome   · UA benign, CXR and CT a/p without source for infection   · Low suspicion for meningitis, appreciate neuro input   · blood cultures 1/2 prelim positive for GPC in cluster - follow up, d/w ID  · PCT normal   · appreciate ID input, stable off abx         Nausea & vomiting   Assessment & Plan    · Resolved tolerating reg diet   · Possible 2/2 nonspecific viral syndrome         Wegeners granulomatosis (HCC)   Assessment & Plan    · Follows renal and rheum outpatient  · rituximab on hold 2/2 depressed immunoglobulins   · Can d/c daily bactrim suppression as patients disease is in remission        Lymphedema   Assessment & Plan    · Lasix 40mg BID PRN         Chronic pain disorder   Assessment & Plan    · Patient with continuous opioid dependence 2/2 chronic pain, cancer related pain  · Consider patients lethargy on admission might be 2/2 mild dehydration complicated by the opioid medication use  · Continue cymbalta         Chronic kidney disease, stage III (moderate)   Assessment & Plan    · Creatinine stable   · outpatient follow up with renal         Cancer of ovary ([facility] [address] [facility] )   Assessment & Plan    · Stable, f/u with Dr Garett Rivas and specialists at [facility]           VTE Pharmacologic Prophylaxis:   Pharmacologic: Enoxaparin (Lovenox)  Mechanical VTE Prophylaxis in Place: Yes    Patient Centered Rounds: I have performed bedside rounds with nursing staff today      Discussions with Specialists or Other Care Team Provider: nursing, Dr Kalia Sequeira     Education and Discussions with Family / Patient: patient, left VM with  with call back number     Time Spent for Care: 30 minutes  More than 50% of total time spent on counseling and coordination of care as described above  Current Length of Stay: 1 day(s)    Current Patient Status: Inpatient   Certification Statement: The patient will continue to require additional inpatient hospital stay due to positive blood cultures, follow up ID input regarding plan of care, safe dc planning      Discharge Plan / Estimated Discharge Date: pending clinical course     Code Status: Level 1 - Full Code    Subjective:   Pt seen and examined at bedside  Feels well no fevers or chills  No n/v/d  Tolerating reg diet  Objective:     Vitals:   Temp (24hrs), Av 1 °F (36 7 °C), Min:98 °F (36 7 °C), Max:98 2 °F (36 8 °C)    HR:  [74-88] 74  Resp:  [20] 20  BP: (123-141)/(58-67) 141/67  SpO2:  [94 %-98 %] 94 %  Body mass index is 39 89 kg/m²  Input and Output Summary (last 24 hours): Intake/Output Summary (Last 24 hours) at 18 0955  Last data filed at 18 0800   Gross per 24 hour   Intake              988 ml   Output                0 ml   Net              988 ml     Physical Exam:     Physical Exam   Constitutional: She is oriented to person, place, and time  She appears well-developed and well-nourished  HENT:   Head: Normocephalic and atraumatic  Eyes: EOM are normal  No scleral icterus  Neck: Normal range of motion  Neck supple  Cardiovascular: Normal rate, regular rhythm and normal heart sounds  No murmur heard  Pulmonary/Chest: Effort normal and breath sounds normal    Abdominal: Soft  Bowel sounds are normal    Obese    Musculoskeletal: Normal range of motion  She exhibits edema (lymphedema b/l LE )  Neurological: She is alert and oriented to person, place, and time  Skin: Skin is warm and dry  There is pallor  Psychiatric: She has a normal mood and affect  Nursing note and vitals reviewed      Additional Data:     Labs:      Results from last 7 days  Lab Units 18  0632   WBC Thousand/uL 5 71   HEMOGLOBIN g/dL 10 8*   HEMATOCRIT % 34 6*   PLATELETS Thousands/uL 173   NEUTROS PCT % 58   LYMPHS PCT % 28   MONOS PCT % 10   EOS PCT % 4       Results from last 7 days  Lab Units 07/12/18  0632  07/11/18  0049   SODIUM mmol/L 139  < > 134*   POTASSIUM mmol/L 4 0  < > 4 2   CHLORIDE mmol/L 106  < > 100   CO2 mmol/L 30  < > 27   BUN mg/dL 14  < > 17   CREATININE mg/dL 0 92  < > 1 10   CALCIUM mg/dL 8 2*  < > 9 2   TOTAL PROTEIN g/dL  --   --  7 0   BILIRUBIN TOTAL mg/dL  --   --  0 74   ALK PHOS U/L  --   --  188*   ALT U/L  --   --  47   AST U/L  --   --  54*   GLUCOSE RANDOM mg/dL 111  < > 129   < > = values in this interval not displayed  * I Have Reviewed All Lab Data Listed Above  * Additional Pertinent Lab Tests Reviewed:  [facility] Admission Reviewed    Imaging:    Imaging Reports Reviewed Today Include: all   Imaging Personally Reviewed by Myself Includes:  None     Recent Cultures (last 7 days):       Results from last 7 days  Lab Units 07/11/18  0248 07/11/18  0144   BLOOD CULTURE  No Growth at 24 hrs   --    GRAM STAIN RESULT   --  Gram positive cocci in clusters       Last 24 Hours Medication List:     Current Facility-Administered Medications:  acetaminophen 650 mg Oral Q6H PRN Jalil Brush MD    aluminum-magnesium hydroxide-simethicone 15 mL Oral Q6H PRN Jalil Brush MD    calcium carbonate-vitamin D 2 tablet Oral Daily With Breakfast Jalil Brush MD    docusate sodium 100 mg Oral BID PRN Jalil Brush MD    DULoxetine 60 mg Oral Daily Jalil Brush MD    enoxaparin 40 mg Subcutaneous Daily Jalil Brush MD    LORazepam 0 5 mg Oral BID PRN Jalil Brush MD    multivitamin-minerals 1 tablet Oral Daily Jalil Brush MD    ondansetron 4 mg Intravenous Q6H PRN Jalil Brush MD    oxybutynin 5 mg Oral Daily Charmyaht Masker, EPHRAIM    oxyCODONE 60 mg Oral Q12H [facility] Jalil Brush MD    oxyCODONE 15 mg Oral Q6H PRN Jalil Brush MD pantoprazole 20 mg Oral Early Morning Abelardo Vega MD    polyethylene glycol 17 g Oral Daily PRN Abelardo Vega MD    sodium chloride 75 mL/hr Intravenous Continuous Abelardo Vega MD Last Rate: Stopped (07/11/18 2213)        Today, Patient Was Seen By: Babar Garrison PA-C    ** Please Note: This note has been constructed using a voice recognition system   **

## 2018-07-13 VITALS
WEIGHT: 243.61 LBS | DIASTOLIC BLOOD PRESSURE: 80 MMHG | HEIGHT: 66 IN | RESPIRATION RATE: 18 BRPM | OXYGEN SATURATION: 97 % | BODY MASS INDEX: 39.15 KG/M2 | SYSTOLIC BLOOD PRESSURE: 160 MMHG | HEART RATE: 76 BPM | TEMPERATURE: 97.7 F

## 2018-07-13 PROCEDURE — 99232 SBSQ HOSP IP/OBS MODERATE 35: CPT | Performed by: INTERNAL MEDICINE

## 2018-07-13 PROCEDURE — 99239 HOSP IP/OBS DSCHRG MGMT >30: CPT | Performed by: PHYSICIAN ASSISTANT

## 2018-07-13 RX ADMIN — DULOXETINE HYDROCHLORIDE 60 MG: 60 CAPSULE, DELAYED RELEASE ORAL at 08:33

## 2018-07-13 RX ADMIN — ACETAMINOPHEN 650 MG: 325 TABLET, FILM COATED ORAL at 04:37

## 2018-07-13 RX ADMIN — LORAZEPAM 0.5 MG: 0.5 TABLET ORAL at 08:03

## 2018-07-13 RX ADMIN — ENOXAPARIN SODIUM 40 MG: 100 INJECTION SUBCUTANEOUS at 08:33

## 2018-07-13 RX ADMIN — PANTOPRAZOLE SODIUM 20 MG: 20 TABLET, DELAYED RELEASE ORAL at 05:25

## 2018-07-13 RX ADMIN — Medication 1 TABLET: at 08:33

## 2018-07-13 RX ADMIN — CALCIUM CARBONATE 500 MG (1,250 MG)-VITAMIN D3 200 UNIT TABLET 2 TABLET: at 07:57

## 2018-07-13 RX ADMIN — OXYCODONE HYDROCHLORIDE 60 MG: 20 TABLET, FILM COATED, EXTENDED RELEASE ORAL at 08:33

## 2018-07-13 RX ADMIN — OXYCODONE HYDROCHLORIDE 15 MG: 5 TABLET ORAL at 04:37

## 2018-07-13 NOTE — ASSESSMENT & PLAN NOTE
· SIRS POA   · Low grade temp likely 2/2 nonspecific viral syndrome   · UA benign, CXR and CT a/p without source for infection   · Low suspicion for meningitis, appreciate neuro input   · blood cultures 1/2 positive for staph coag neg - d/w ID, likely skin contaminant  No further workup     · appreciate ID input, stable off abx

## 2018-07-13 NOTE — NURSING NOTE
Pt d/c home with family member, given d/c paper and instruction and no further question at this time   Pt stable and VS stable, no c/o pain

## 2018-07-13 NOTE — DISCHARGE INSTRUCTIONS
Acute Nausea and Vomiting   WHAT YOU NEED TO KNOW:   Acute nausea and vomiting start suddenly, worsen quickly, and last a short time  DISCHARGE INSTRUCTIONS:   Seek care immediately if:   · You see blood in your vomit or your bowel movements  · You have sudden, severe pain in your chest and upper abdomen after hard vomiting or retching  · You have swelling in your neck and chest      · You are dizzy, cold, and thirsty and your eyes and mouth are dry  · You are urinating very little or not at all  · You have muscle weakness, leg cramps, and trouble breathing  · Your heart is beating much faster than normal      · You continue to vomit for more than 48 hours  Contact your healthcare provider if:   · You have frequent dry heaves (vomiting but nothing comes out)  · Your nausea and vomiting does not get better or go away after you use medicine  · You have questions or concerns about your condition or treatment  Medicines: You may need any of the following:  · Medicines  may be given to calm your stomach and stop your vomiting  You may also need medicines to help you feel more relaxed or to stop nausea and vomiting caused by motion sickness  · Gastrointestinal stimulants  are used to help empty your stomach and bowels  This may help decrease nausea and vomiting  · Take your medicine as directed  Contact your healthcare provider if you think your medicine is not helping or if you have side effects  Tell him or her if you are allergic to any medicine  Keep a list of the medicines, vitamins, and herbs you take  Include the amounts, and when and why you take them  Bring the list or the pill bottles to follow-up visits  Carry your medicine list with you in case of an emergency  Prevent or manage acute nausea and vomiting:   · Do not drink alcohol  Alcohol may upset or irritate your stomach  Too much alcohol can also cause acute nausea and vomiting  · Control stress    Headaches due to stress may cause nausea and vomiting  Find ways to relax and manage your stress  Get more rest and sleep  · Drink more liquids as directed  Vomiting can lead to dehydration  It is important to drink more liquids to help replace lost body fluids  Ask your healthcare provider how much liquid to drink each day and which liquids are best for you  Your provider may recommend that you drink an oral rehydration solution (ORS)  ORS contains water, salts, and sugar that are needed to replace the lost body fluids  Ask what kind of ORS to use, how much to drink, and where to get it  · Eat smaller meals, more often  Eat small amounts of food every 2 to 3 hours, even if you are not hungry  Food in your stomach may decrease your nausea  · Talk to your healthcare provider before you take over-the-counter (OTC) medicines  These medicines can cause serious problems if you use certain other medicines, or you have a medical condition  You may have problems if you use too much or use them for longer than the label says  Follow directions on the label carefully  Follow up with your healthcare provider as directed:  Write down your questions so you remember to ask them during your visits  © 2017 [facility] Information is for End User's use only and may not be sold, redistributed or otherwise used for commercial purposes  All illustrations and images included in CareNotes® are the copyrighted property of [employer]  or Melvin Espinosa  The above information is an  only  It is not intended as medical advice for individual conditions or treatments  Talk to your doctor, nurse or pharmacist before following any medical regimen to see if it is safe and effective for you

## 2018-07-13 NOTE — PROGRESS NOTES
Progress Note - Infectious Disease   Gaston Morales 70 y o  female MRN: [id number]  Unit/Bed#: -01 Encounter: [id number]      Impression/Recommendations:  1   SIRS, tachycardia, leukocytosis   T-max of a 100 0°   No high documented fevers   Lactic acid was normal  Unclear etiology   No overt evidence of acute bacterial infection   Patient's symptoms have already resolved and she has no focal complaints at this time   Her WBC count is trending down   Consider leukemoid reaction in the setting of recent vomiting episode and headache   She remains hemodynamically stable, nontoxic   CT head and abdomen with no evidence of acute infection   No clinical or radiographic evidence of pneumonia   Urinalysis without evidence of infection  Patient remains clinically stable from an infectious standpoint after antibiotics were discontinued      -continue to monitor off antibiotics  -monitor temperatures     2  Positive blood culture  One of 2 blood cultures drawn on admission is positive for gram-positive cocci in clusters  More likely secondary to skin contamination, but remains unclear at this point  Patient without indwelling lines or catheters  She remains clinically stable without administration of antibiotics  Now cultures positive for coagulase-negative Staph  The other remains negative  Patient remains clinically well off antibiotics  This is likely skin contaminant    Would not pursue any further workup at this time      3   Acute onset headache, nausea, episode of vomiting   These symptoms have resolved   Patient refused lumbar puncture in the ED   No meningeal signs on exam   Neurology evaluation noted and they have a very low suspicion for bacterial meningitis, as do I   She is completely nontoxic   CT head and abdomen without any evidence of acute infection   Consideration for viral gastroenteritis with rapid resolution of symptoms   At this point, I would continue to monitor closely off antibiotics as no clear evidence acute bacterial infection  This remains improved      -monitor off antibiotics  -monitor symptoms closely     4   Elevated lipase   May be secondary to acute gastroenteritis   Abdominal exam is benign   CT abdomen with no acute pathology noted   Patient's symptoms have improved  Lipase has improved      -serial abdominal exams     5   History of Juan's granulomatosis   Patient not recently been on any immunosuppressive agents or steroids   Currently no indication to continue on prophylactic Bactrim as patient is not on immunosuppressive medication and her disease is felt to be in remission      6   Lethargy   As stated above, no clinical evidence of infection   Consideration for polypharmacy as a contributing factor   Mental status has improved and appears to be back to baseline      -try to limit sedating medications as able  -monitor mental status     Antibiotics:  None     Discussed above plan with Carlos Arteaga from [place], and with patient  Stable from ID standpoint  Subjective:  Patient has no new complaints  No abdominal pain  No shortness of breath or cough  Tolerating oral diet  Denies fevers, chills, or sweats  Denies nausea, vomiting, or diarrhea  Objective:  Vitals:  HR:  [76-97] 76  Resp:  [18-20] 18  BP: (141-161)/(80-93) 160/80  SpO2:  [95 %-97 %] 97 %  Temp (24hrs), Av °F (36 7 °C), Min:97 7 °F (36 5 °C), Max:98 3 °F (36 8 °C)  Current: Temperature: 97 7 °F (36 5 °C)    Physical Exam:   General:  No acute distress  Psychiatric:  Awake and alert  Pulmonary:  Normal respiratory excursion without accessory muscle use  Abdomen:  Soft, nontender  Extremities:  Bilateral lower extremities are wrapped  Skin:  No rashes    Lab Results:  I have personally reviewed pertinent labs      Results from last 7 days  Lab Units 18  0632 18  0653 18  0049   SODIUM mmol/L 139 136 134*   POTASSIUM mmol/L 4 0 4 3 4 2   CHLORIDE mmol/L 106 106 100   CO2 mmol/L 30 23 27   ANION GAP mmol/L 3* 7 7   BUN mg/dL 14 15 17   CREATININE mg/dL 0 92 1 00 1 10   EGFR ml/min/1 73sq m 66 59 53   GLUCOSE RANDOM mg/dL 111 143* 129   CALCIUM mg/dL 8 2* 8 2* 9 2   AST U/L  --   --  54*   ALT U/L  --   --  47   ALK PHOS U/L  --   --  188*   TOTAL PROTEIN g/dL  --   --  7 0   BILIRUBIN TOTAL mg/dL  --   --  0 74       Results from last 7 days  Lab Units 07/12/18  0632 07/11/18  0726 07/11/18  0049   WBC Thousand/uL 5 71 12 55* 15 06*   HEMOGLOBIN g/dL 10 8* 11 8 13 8   PLATELETS Thousands/uL 173 201 211       Results from last 7 days  Lab Units 07/11/18  0248 07/11/18  0144   BLOOD CULTURE  No Growth at 48 hrs  Staphylococcus coagulase negative*   GRAM STAIN RESULT   --  Gram positive cocci in clusters       Imaging Studies:   I have personally reviewed pertinent imaging study reports and images in PACS  EKG, Pathology, and Other Studies:   I have personally reviewed pertinent reports

## 2018-07-13 NOTE — PROGRESS NOTES
07/12/18 [address] Involvement Patient not active with Mandaen   Spiritual Beliefs/Perceptions   Concept of God Accepting   Plan of Care   Comments Introduced self to pt and began to establish a caring relationship with her  Provided a safe space for her to express her thoughts and feelings  Pt articulates hope not based on personal future outcomes      Assessment Completed by: Unit visit

## 2018-07-14 LAB
BACTERIA BLD CULT: ABNORMAL
GRAM STN SPEC: ABNORMAL

## 2018-07-16 LAB — BACTERIA BLD CULT: NORMAL

## 2018-11-15 ENCOUNTER — OFFICE VISIT (OUTPATIENT)
Dept: SURGICAL ONCOLOGY | Facility: CLINIC | Age: 63
End: 2018-11-15
Payer: MEDICARE

## 2018-11-15 VITALS
DIASTOLIC BLOOD PRESSURE: 72 MMHG | SYSTOLIC BLOOD PRESSURE: 138 MMHG | TEMPERATURE: 98.5 F | WEIGHT: 250 LBS | HEART RATE: 80 BPM | HEIGHT: 66 IN | RESPIRATION RATE: 14 BRPM | BODY MASS INDEX: 40.18 KG/M2

## 2018-11-15 DIAGNOSIS — Z12.31 VISIT FOR SCREENING MAMMOGRAM: ICD-10-CM

## 2018-11-15 DIAGNOSIS — Z15.09 BRCA1 POSITIVE: ICD-10-CM

## 2018-11-15 DIAGNOSIS — Z15.01 BRCA1 POSITIVE: ICD-10-CM

## 2018-11-15 DIAGNOSIS — Z91.89 INCREASED RISK OF BREAST CANCER: Primary | ICD-10-CM

## 2018-11-15 PROCEDURE — 99213 OFFICE O/P EST LOW 20 MIN: CPT | Performed by: NURSE PRACTITIONER

## 2018-11-15 NOTE — PROGRESS NOTES
Surgical Oncology Follow Up       [address]  [facility] [place] [address]    Apolinar Laguerre  1955  [id number]      Chief Complaint   Patient presents with    Follow-up     6 month follow up    FHx Breast Cancer       Assessment/Plan:  1  Increased risk of breast cancer  - US breast screening bilateral complete (ABUS); Future  - 6 mo f/u visit     2  BRCA1 positive  - US breast screening bilateral complete (ABUS); Future    3  Visit for screening mammogram  - Mammo screening bilateral w 3d & cad; Future      Discussion/Summary: Patient is a 57-year-old female who presents today for a 6 month follow-up visit for an increased risk of breast cancer due to BRCA1 genetic mutation  She has a personal history of ovarian cancer  She had a bilateral 3d screening mammogram performed in May 2018 which was BI-RADS 1  She is unable to tolerate a breast MRI due to orthopedic injuries  She has no new complaints today  She notices no changes on self exam  She does report chronic breast pain which is unchanged  I have recommended EPO and have provided her with the dosage recommendations  I will order an ABUS to be completed at this time as another screening modality since she is unable to tolerate MRI  I will order a bilateral 3d screening mammogram to be completed in May and we will see the patient back in 6 months or sooner if the need arises  She was instructed to call with any new concerns or symptoms  All of her questions were answered  History of Present Illness:     -Interval History:  Patient presents today for six-month follow-up visit for an increased risk of breast cancer due to the BRCA 1 genetic mutation  She has no new complaints today  She had a bilateral mammogram performed in May 2018 which was BI-RADS 1       Review of Systems:  Review of Systems   Constitutional: Negative for activity change, appetite change, chills, fatigue, fever and unexpected weight change  HENT: Negative for trouble swallowing  Eyes: Negative for pain, redness and visual disturbance  Respiratory: Negative for cough, shortness of breath and wheezing  Cardiovascular: Negative for chest pain, palpitations and leg swelling  Gastrointestinal: Negative for abdominal pain, constipation, diarrhea, nausea and vomiting  Endocrine: Negative for cold intolerance and heat intolerance  Musculoskeletal: Negative for arthralgias, back pain, gait problem and myalgias  Skin: Negative for color change and rash  Neurological: Negative for dizziness, syncope, light-headedness, numbness and headaches  Hematological: Negative for adenopathy  Psychiatric/Behavioral: Negative for agitation and confusion  All other systems reviewed and are negative        Patient Active Problem List   Diagnosis    [facility]    Charcot's joint    Anxiety    Benign essential hypertension    BRCA1 positive    Cancer of ovary (HCC)    Chronic kidney disease, stage III (moderate) (HCC)    Chronic pain disorder    Class 2 obesity    Depression    Dyslipidemia    Esophageal reflux    Hypertension    Lymphedema    Opiate analgesic use agreement exists    Ovarian cancer (HCC)    Post-traumatic osteoarthritis    Increased frequency of urination    Other complicated headache syndrome    Lethargy    Nausea & vomiting    SIRS (systemic inflammatory response syndrome) (HCC)    Increased risk of breast cancer     Past Medical History:   Diagnosis Date    [facility] )     ovarian Ca with chemo    Lymphedema      Past Surgical History:   Procedure Laterality Date    APPENDECTOMY      HYSTERECTOMY      OTHER SURGICAL HISTORY      right lower extremity due to trauma    AR TREAT TIBIAL SHAFT FX, INTRAMED IMPLANT Right 9/6/2017    Procedure: INSERTION NAIL IM TIBIA;  Surgeon: Nimisha Dobbins MD;  Location: [facility];  Service: Orthopedics    US GUIDED BREAST BIOPSY RIGHT COMPLETE Right 5/9/2016     History reviewed  No pertinent family history  Social History     Social History    Marital status: /Civil Union     Spouse name: N/A    Number of children: N/A    Years of education: N/A     Occupational History    Not on file  Social History Main Topics    Smoking status: Never Smoker    Smokeless tobacco: Never Used    Alcohol use No    Drug use: No    Sexual activity: Yes     Partners: Male     Birth control/ protection: None     Other Topics Concern    Not on file     Social History Narrative    No narrative on file       Current Outpatient Prescriptions:     acetaminophen (TYLENOL) 325 mg tablet, Take 2 tablets by mouth every 6 (six) hours as needed for mild pain, Disp: 30 tablet, Rfl: 0    Calcium Carbonate-Vit D-Min (CALCIUM 1200 PO), Take 1 tablet by mouth, Disp: , Rfl:     DULoxetine (CYMBALTA) 60 mg delayed release capsule, Take 60 mg by mouth daily  , Disp: , Rfl: 0    furosemide (LASIX) 40 mg tablet, Take 40 mg by mouth daily M W F BID PRN , Disp: , Rfl:     LORazepam (ATIVAN) 0 5 mg tablet, Take 1 tablet as needed every 8 hours  (Patient taking differently: Take 0 5 mg by mouth 2 (two) times a day Take 1 tablet as needed every 8 hours  ), Disp: 30 tablet, Rfl: 0    Multiple Vitamins-Minerals (MULTIVITAMIN ADULT PO), Take 1 capsule by mouth, Disp: , Rfl:     omeprazole (PriLOSEC) 20 mg delayed release capsule, Take 20 mg by mouth daily  , Disp: , Rfl:     oxyCODONE (OxyCONTIN) 60 mg 12 hr tablet, Earliest Fill Date: 2/3/16  Take 1 tablet every 12 hours   (Patient taking differently: 40 mg Take 1 tablet every 12 hours  ), Disp: 60 tablet, Rfl: 0    polyethylene glycol (MIRALAX) 17 g packet, Take 17 g by mouth, Disp: , Rfl:     PREVIDENT 5000 DRY MOUTH 1 1 % GEL, , Disp: , Rfl:     sulfamethoxazole-trimethoprim (BACTRIM DS) 800-160 mg per tablet, Take 1 tablet by mouth 3 (three) times a week  , Disp: , Rfl:     TOVIAZ 8 MG TB24, Take 4 mg by mouth 2 (two) times a day  , Disp: , Rfl:   Allergies   Allergen Reactions    Iodinated Diagnostic Agents Shortness Of Breath    Omnipaque [Iohexol] Shortness Of Breath    Other Shortness Of Breath     IVP dye, x ray dye 3    Iodides      Other reaction(s): SWELLING, SOB    Methotrexate Nausea Only     Headache and nausea    Methotrexate Derivatives Headache     Vitals:    11/15/18 0918   BP: 138/72   Pulse: 80   Resp: 14   Temp: 98 5 °F (36 9 °C)       Physical Exam   Constitutional: She is oriented to person, place, and time  Vital signs are normal  She appears well-developed and well-nourished  No distress  HENT:   Head: Normocephalic and atraumatic  Neck: Normal range of motion  Cardiovascular: Normal rate, regular rhythm and normal heart sounds  Pulmonary/Chest: Effort normal and breath sounds normal    Bilateral breasts were examined in the sitting and supine position  There are no masses, skin nodules, nipple changes or nipple discharge  There is no bilateral supraclavicular or axillary lymphadenopathy noted  Abdominal: Soft  Normal appearance  She exhibits no mass  There is no hepatosplenomegaly  There is no tenderness  Musculoskeletal: Normal range of motion  Walks with walker   Lymphadenopathy:     She has no axillary adenopathy  Right: No supraclavicular adenopathy present  Left: No supraclavicular adenopathy present  Neurological: She is alert and oriented to person, place, and time  Skin: Skin is warm, dry and intact  No rash noted  She is not diaphoretic  Psychiatric: She has a normal mood and affect  Her speech is normal    Vitals reviewed  Advance Care Planning/Advance Directives:  Discussed disease status and treatment goals with the patient

## 2018-11-28 ENCOUNTER — HOSPITAL ENCOUNTER (OUTPATIENT)
Dept: ULTRASOUND IMAGING | Facility: CLINIC | Age: 63
Discharge: HOME | End: 2018-11-28
Payer: MEDICARE

## 2018-11-28 DIAGNOSIS — Z15.09 BRCA1 POSITIVE: ICD-10-CM

## 2018-11-28 DIAGNOSIS — Z15.01 BRCA1 POSITIVE: ICD-10-CM

## 2018-11-28 DIAGNOSIS — Z91.89 INCREASED RISK OF BREAST CANCER: ICD-10-CM

## 2018-11-28 PROCEDURE — 76641 ULTRASOUND BREAST COMPLETE: CPT

## 2018-11-28 PROCEDURE — 76377 3D RENDER W/INTRP POSTPROCES: CPT

## 2019-04-02 ENCOUNTER — APPOINTMENT (EMERGENCY)
Dept: RADIOLOGY | Facility: HOSPITAL | Age: 64
DRG: 871 | End: 2019-04-02
Payer: MEDICARE

## 2019-04-02 ENCOUNTER — APPOINTMENT (EMERGENCY)
Dept: NON INVASIVE DIAGNOSTICS | Facility: HOSPITAL | Age: 64
DRG: 871 | End: 2019-04-02
Payer: MEDICARE

## 2019-04-02 ENCOUNTER — APPOINTMENT (EMERGENCY)
Dept: CT IMAGING | Facility: HOSPITAL | Age: 64
DRG: 871 | End: 2019-04-02
Payer: MEDICARE

## 2019-04-02 ENCOUNTER — HOSPITAL ENCOUNTER (INPATIENT)
Facility: HOSPITAL | Age: 64
LOS: 7 days | Discharge: HOME HEALTH | DRG: 871 | End: 2019-04-09
Attending: EMERGENCY MEDICINE | Admitting: INTERNAL MEDICINE
Payer: MEDICARE

## 2019-04-02 DIAGNOSIS — R93.1 ABNORMAL ECHOCARDIOGRAM: ICD-10-CM

## 2019-04-02 DIAGNOSIS — R65.20 SEVERE SEPSIS (HCC): Primary | ICD-10-CM

## 2019-04-02 DIAGNOSIS — I21.4 NSTEMI (NON-ST ELEVATED MYOCARDIAL INFARCTION) (HCC): ICD-10-CM

## 2019-04-02 DIAGNOSIS — J18.9 PNEUMONIA DUE TO INFECTIOUS ORGANISM, UNSPECIFIED LATERALITY, UNSPECIFIED PART OF LUNG: ICD-10-CM

## 2019-04-02 DIAGNOSIS — R41.82 ALTERED MENTAL STATUS: ICD-10-CM

## 2019-04-02 DIAGNOSIS — J18.9 MULTIFOCAL PNEUMONIA: ICD-10-CM

## 2019-04-02 DIAGNOSIS — E87.2 LACTIC ACIDOSIS: ICD-10-CM

## 2019-04-02 DIAGNOSIS — A41.9 SEVERE SEPSIS (HCC): Primary | ICD-10-CM

## 2019-04-02 PROBLEM — G93.40 ACUTE ENCEPHALOPATHY: Status: ACTIVE | Noted: 2019-04-02
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
## 2019-04-02 LAB
ALBUMIN SERPL BCP-MCNC: 3.5 G/DL (ref 3.5–5)
ALP SERPL-CCNC: 209 U/L (ref 46–116)
ALT SERPL W P-5'-P-CCNC: 32 U/L (ref 12–78)
ANION GAP SERPL CALCULATED.3IONS-SCNC: 16 MMOL/L (ref 4–13)
APAP SERPL-MCNC: <2 UG/ML (ref 10–20)
APTT PPP: 28 SECONDS (ref 26–38)
AST SERPL W P-5'-P-CCNC: 44 U/L (ref 5–45)
BACTERIA UR QL AUTO: ABNORMAL /HPF
BASOPHILS # BLD AUTO: 0.03 THOUSANDS/ΜL (ref 0–0.1)
BASOPHILS NFR BLD AUTO: 0 % (ref 0–1)
BILIRUB SERPL-MCNC: 1.48 MG/DL (ref 0.2–1)
BILIRUB UR QL STRIP: NEGATIVE
BUN SERPL-MCNC: 18 MG/DL (ref 5–25)
CALCIUM SERPL-MCNC: 9 MG/DL (ref 8.3–10.1)
CHLORIDE SERPL-SCNC: 102 MMOL/L (ref 100–108)
CLARITY UR: CLEAR
CO2 SERPL-SCNC: 25 MMOL/L (ref 21–32)
COLOR UR: YELLOW
CREAT SERPL-MCNC: 1.06 MG/DL (ref 0.6–1.3)
EOSINOPHIL # BLD AUTO: 0.02 THOUSAND/ΜL (ref 0–0.61)
EOSINOPHIL NFR BLD AUTO: 0 % (ref 0–6)
ERYTHROCYTE [DISTWIDTH] IN BLOOD BY AUTOMATED COUNT: 13.2 % (ref 11.6–15.1)
ETHANOL SERPL-MCNC: <3 MG/DL (ref 0–3)
GFR SERPL CREATININE-BSD FRML MDRD: 55 ML/MIN/1.73SQ M
GLUCOSE SERPL-MCNC: 114 MG/DL (ref 65–140)
GLUCOSE UR STRIP-MCNC: NEGATIVE MG/DL
HCT VFR BLD AUTO: 45.4 % (ref 34.8–46.1)
HGB BLD-MCNC: 14.3 G/DL (ref 11.5–15.4)
HGB UR QL STRIP.AUTO: ABNORMAL
IMM GRANULOCYTES # BLD AUTO: 0.11 THOUSAND/UL (ref 0–0.2)
IMM GRANULOCYTES NFR BLD AUTO: 1 % (ref 0–2)
INR PPP: 1.27 (ref 0.86–1.17)
KETONES UR STRIP-MCNC: NEGATIVE MG/DL
LACTATE SERPL-SCNC: 3.2 MMOL/L (ref 0.5–2)
LACTATE SERPL-SCNC: 4.4 MMOL/L (ref 0.5–2)
LACTATE SERPL-SCNC: 5.3 MMOL/L (ref 0.5–2)
LEUKOCYTE ESTERASE UR QL STRIP: NEGATIVE
LYMPHOCYTES # BLD AUTO: 0.64 THOUSANDS/ΜL (ref 0.6–4.47)
LYMPHOCYTES NFR BLD AUTO: 5 % (ref 14–44)
MAGNESIUM SERPL-MCNC: 1.7 MG/DL (ref 1.6–2.6)
MCH RBC QN AUTO: 29.5 PG (ref 26.8–34.3)
MCHC RBC AUTO-ENTMCNC: 31.5 G/DL (ref 31.4–37.4)
MCV RBC AUTO: 94 FL (ref 82–98)
MONOCYTES # BLD AUTO: 0.36 THOUSAND/ΜL (ref 0.17–1.22)
MONOCYTES NFR BLD AUTO: 3 % (ref 4–12)
NEUTROPHILS # BLD AUTO: 12.16 THOUSANDS/ΜL (ref 1.85–7.62)
NEUTS SEG NFR BLD AUTO: 91 % (ref 43–75)
NITRITE UR QL STRIP: NEGATIVE
NON-SQ EPI CELLS URNS QL MICRO: ABNORMAL /HPF
NRBC BLD AUTO-RTO: 0 /100 WBCS
PH UR STRIP.AUTO: 5.5 [PH]
PLATELET # BLD AUTO: 201 THOUSANDS/UL (ref 149–390)
PMV BLD AUTO: 9.4 FL (ref 8.9–12.7)
POTASSIUM SERPL-SCNC: 3.9 MMOL/L (ref 3.5–5.3)
PROCALCITONIN SERPL-MCNC: 9.16 NG/ML
PROT SERPL-MCNC: 6 G/DL (ref 6.4–8.2)
PROT UR STRIP-MCNC: NEGATIVE MG/DL
PROTHROMBIN TIME: 16 SECONDS (ref 11.8–14.2)
RBC # BLD AUTO: 4.84 MILLION/UL (ref 3.81–5.12)
RBC #/AREA URNS AUTO: ABNORMAL /HPF
SALICYLATES SERPL-MCNC: <3 MG/DL (ref 3–20)
SODIUM SERPL-SCNC: 143 MMOL/L (ref 136–145)
SP GR UR STRIP.AUTO: 1.01 (ref 1–1.03)
TROPONIN I SERPL-MCNC: 1.31 NG/ML
UROBILINOGEN UR QL STRIP.AUTO: 0.2 E.U./DL
WBC # BLD AUTO: 13.32 THOUSAND/UL (ref 4.31–10.16)
WBC #/AREA URNS AUTO: ABNORMAL /HPF

## 2019-04-02 PROCEDURE — 93005 ELECTROCARDIOGRAM TRACING: CPT

## 2019-04-02 PROCEDURE — 71250 CT THORAX DX C-: CPT

## 2019-04-02 PROCEDURE — 80053 COMPREHEN METABOLIC PANEL: CPT | Performed by: EMERGENCY MEDICINE

## 2019-04-02 PROCEDURE — 87147 CULTURE TYPE IMMUNOLOGIC: CPT | Performed by: EMERGENCY MEDICINE

## 2019-04-02 PROCEDURE — 85730 THROMBOPLASTIN TIME PARTIAL: CPT | Performed by: EMERGENCY MEDICINE

## 2019-04-02 PROCEDURE — 87040 BLOOD CULTURE FOR BACTERIA: CPT | Performed by: EMERGENCY MEDICINE

## 2019-04-02 PROCEDURE — 73552 X-RAY EXAM OF FEMUR 2/>: CPT

## 2019-04-02 PROCEDURE — 99223 1ST HOSP IP/OBS HIGH 75: CPT | Performed by: NURSE PRACTITIONER

## 2019-04-02 PROCEDURE — 96365 THER/PROPH/DIAG IV INF INIT: CPT

## 2019-04-02 PROCEDURE — 85610 PROTHROMBIN TIME: CPT | Performed by: EMERGENCY MEDICINE

## 2019-04-02 PROCEDURE — 80329 ANALGESICS NON-OPIOID 1 OR 2: CPT | Performed by: EMERGENCY MEDICINE

## 2019-04-02 PROCEDURE — 87449 NOS EACH ORGANISM AG IA: CPT | Performed by: NURSE PRACTITIONER

## 2019-04-02 PROCEDURE — 83605 ASSAY OF LACTIC ACID: CPT | Performed by: NURSE PRACTITIONER

## 2019-04-02 PROCEDURE — 96367 TX/PROPH/DG ADDL SEQ IV INF: CPT

## 2019-04-02 PROCEDURE — 96360 HYDRATION IV INFUSION INIT: CPT

## 2019-04-02 PROCEDURE — 81001 URINALYSIS AUTO W/SCOPE: CPT | Performed by: EMERGENCY MEDICINE

## 2019-04-02 PROCEDURE — 80320 DRUG SCREEN QUANTALCOHOLS: CPT | Performed by: EMERGENCY MEDICINE

## 2019-04-02 PROCEDURE — 70450 CT HEAD/BRAIN W/O DYE: CPT

## 2019-04-02 PROCEDURE — 36415 COLL VENOUS BLD VENIPUNCTURE: CPT | Performed by: EMERGENCY MEDICINE

## 2019-04-02 PROCEDURE — 71045 X-RAY EXAM CHEST 1 VIEW: CPT

## 2019-04-02 PROCEDURE — 84484 ASSAY OF TROPONIN QUANT: CPT | Performed by: EMERGENCY MEDICINE

## 2019-04-02 PROCEDURE — 84145 PROCALCITONIN (PCT): CPT | Performed by: EMERGENCY MEDICINE

## 2019-04-02 PROCEDURE — 83605 ASSAY OF LACTIC ACID: CPT | Performed by: EMERGENCY MEDICINE

## 2019-04-02 PROCEDURE — 73590 X-RAY EXAM OF LOWER LEG: CPT

## 2019-04-02 PROCEDURE — 87181 SC STD AGAR DILUTION PER AGT: CPT | Performed by: EMERGENCY MEDICINE

## 2019-04-02 PROCEDURE — 99285 EMERGENCY DEPT VISIT HI MDM: CPT | Performed by: EMERGENCY MEDICINE

## 2019-04-02 PROCEDURE — 83735 ASSAY OF MAGNESIUM: CPT | Performed by: EMERGENCY MEDICINE

## 2019-04-02 PROCEDURE — 93971 EXTREMITY STUDY: CPT

## 2019-04-02 PROCEDURE — 74176 CT ABD & PELVIS W/O CONTRAST: CPT

## 2019-04-02 PROCEDURE — 99285 EMERGENCY DEPT VISIT HI MDM: CPT

## 2019-04-02 PROCEDURE — 73030 X-RAY EXAM OF SHOULDER: CPT

## 2019-04-02 PROCEDURE — 96372 THER/PROPH/DIAG INJ SC/IM: CPT

## 2019-04-02 PROCEDURE — 85025 COMPLETE CBC W/AUTO DIFF WBC: CPT | Performed by: EMERGENCY MEDICINE

## 2019-04-02 RX ORDER — OXYCODONE HCL 40 MG/1
40 TABLET, FILM COATED, EXTENDED RELEASE ORAL EVERY 12 HOURS SCHEDULED
COMMUNITY

## 2019-04-02 RX ORDER — ACETAMINOPHEN 325 MG/1
650 TABLET ORAL ONCE
Status: DISCONTINUED | OUTPATIENT
Start: 2019-04-02 | End: 2019-04-09 | Stop reason: HOSPADM

## 2019-04-02 RX ORDER — KETOROLAC TROMETHAMINE 30 MG/ML
15 INJECTION, SOLUTION INTRAMUSCULAR; INTRAVENOUS ONCE
Status: COMPLETED | OUTPATIENT
Start: 2019-04-02 | End: 2019-04-02

## 2019-04-02 RX ORDER — ACETAMINOPHEN 160 MG/5ML
650 SUSPENSION, ORAL (FINAL DOSE FORM) ORAL ONCE
Status: COMPLETED | OUTPATIENT
Start: 2019-04-02 | End: 2019-04-02

## 2019-04-02 RX ORDER — ASPIRIN 300 MG/1
300 SUPPOSITORY RECTAL ONCE
Status: COMPLETED | OUTPATIENT
Start: 2019-04-02 | End: 2019-04-02

## 2019-04-02 RX ADMIN — ACETAMINOPHEN 650 MG: 160 SUSPENSION ORAL at 20:09

## 2019-04-02 RX ADMIN — ASPIRIN 300 MG: 300 SUPPOSITORY RECTAL at 22:21

## 2019-04-02 RX ADMIN — CEFEPIME HYDROCHLORIDE 2000 MG: 2 INJECTION, POWDER, FOR SOLUTION INTRAVENOUS at 20:58

## 2019-04-02 RX ADMIN — VANCOMYCIN HYDROCHLORIDE 1250 MG: 1 INJECTION, POWDER, LYOPHILIZED, FOR SOLUTION INTRAVENOUS at 21:33

## 2019-04-02 RX ADMIN — SODIUM CHLORIDE 1390 ML: 0.9 INJECTION, SOLUTION INTRAVENOUS at 22:10

## 2019-04-02 RX ADMIN — KETOROLAC TROMETHAMINE 15 MG: 30 INJECTION, SOLUTION INTRAMUSCULAR; INTRAVENOUS at 21:31

## 2019-04-02 RX ADMIN — SODIUM CHLORIDE 2000 ML: 0.9 INJECTION, SOLUTION INTRAVENOUS at 21:01

## 2019-04-03 ENCOUNTER — APPOINTMENT (INPATIENT)
Dept: NON INVASIVE DIAGNOSTICS | Facility: HOSPITAL | Age: 64
DRG: 871 | End: 2019-04-03
Payer: MEDICARE

## 2019-04-03 PROBLEM — E87.2 LACTIC ACIDOSIS: Status: ACTIVE | Noted: 2019-04-03

## 2019-04-03 PROBLEM — R77.8 TROPONIN I ABOVE REFERENCE RANGE: Status: ACTIVE | Noted: 2019-04-03

## 2019-04-03 PROBLEM — R65.20 SEVERE SEPSIS (HCC): Status: ACTIVE | Noted: 2019-04-02

## 2019-04-03 PROBLEM — J18.9 PNEUMONIA: Status: ACTIVE | Noted: 2019-04-03

## 2019-04-03 PROBLEM — E87.20 LACTIC ACIDOSIS: Status: ACTIVE | Noted: 2019-04-03

## 2019-04-03 PROBLEM — N17.9 AKI (ACUTE KIDNEY INJURY) (HCC): Status: ACTIVE | Noted: 2019-04-03

## 2019-04-03 PROBLEM — G92.8 TOXIC METABOLIC ENCEPHALOPATHY: Status: ACTIVE | Noted: 2019-04-02

## 2019-04-03 PROBLEM — R79.89 TROPONIN I ABOVE REFERENCE RANGE: Status: ACTIVE | Noted: 2019-04-03

## 2019-04-03 LAB
ANION GAP SERPL CALCULATED.3IONS-SCNC: 15 MMOL/L (ref 4–13)
BUN SERPL-MCNC: 23 MG/DL (ref 5–25)
CALCIUM SERPL-MCNC: 8 MG/DL (ref 8.3–10.1)
CHLORIDE SERPL-SCNC: 109 MMOL/L (ref 100–108)
CO2 SERPL-SCNC: 20 MMOL/L (ref 21–32)
CREAT SERPL-MCNC: 1.5 MG/DL (ref 0.6–1.3)
ERYTHROCYTE [DISTWIDTH] IN BLOOD BY AUTOMATED COUNT: 13.6 % (ref 11.6–15.1)
FLUAV AG SPEC QL: NOT DETECTED
FLUBV AG SPEC QL: NOT DETECTED
GFR SERPL CREATININE-BSD FRML MDRD: 36 ML/MIN/1.73SQ M
GLUCOSE SERPL-MCNC: 114 MG/DL (ref 65–140)
GLUCOSE SERPL-MCNC: 117 MG/DL (ref 65–140)
GLUCOSE SERPL-MCNC: 119 MG/DL (ref 65–140)
GLUCOSE SERPL-MCNC: 121 MG/DL (ref 65–140)
HCT VFR BLD AUTO: 40.1 % (ref 34.8–46.1)
HGB BLD-MCNC: 12.6 G/DL (ref 11.5–15.4)
L PNEUMO1 AG UR QL IA.RAPID: NEGATIVE
LACTATE SERPL-SCNC: 3.4 MMOL/L (ref 0.5–2)
LACTATE SERPL-SCNC: 4.3 MMOL/L (ref 0.5–2)
LACTATE SERPL-SCNC: 4.3 MMOL/L (ref 0.5–2)
MCH RBC QN AUTO: 29.5 PG (ref 26.8–34.3)
MCHC RBC AUTO-ENTMCNC: 31.4 G/DL (ref 31.4–37.4)
MCV RBC AUTO: 94 FL (ref 82–98)
PLATELET # BLD AUTO: 199 THOUSANDS/UL (ref 149–390)
PMV BLD AUTO: 9.1 FL (ref 8.9–12.7)
POTASSIUM SERPL-SCNC: 2.9 MMOL/L (ref 3.5–5.3)
RBC # BLD AUTO: 4.27 MILLION/UL (ref 3.81–5.12)
RSV B RNA SPEC QL NAA+PROBE: NOT DETECTED
S PNEUM AG UR QL: NEGATIVE
SODIUM SERPL-SCNC: 144 MMOL/L (ref 136–145)
TROPONIN I SERPL-MCNC: 4.2 NG/ML
TROPONIN I SERPL-MCNC: 4.3 NG/ML
WBC # BLD AUTO: 26.98 THOUSAND/UL (ref 4.31–10.16)

## 2019-04-03 PROCEDURE — 85025 COMPLETE CBC W/AUTO DIFF WBC: CPT | Performed by: NURSE PRACTITIONER

## 2019-04-03 PROCEDURE — 93971 EXTREMITY STUDY: CPT | Performed by: SURGERY

## 2019-04-03 PROCEDURE — G8979 MOBILITY GOAL STATUS: HCPCS

## 2019-04-03 PROCEDURE — G8998 SWALLOW D/C STATUS: HCPCS

## 2019-04-03 PROCEDURE — 99233 SBSQ HOSP IP/OBS HIGH 50: CPT | Performed by: INTERNAL MEDICINE

## 2019-04-03 PROCEDURE — G8987 SELF CARE CURRENT STATUS: HCPCS

## 2019-04-03 PROCEDURE — G8997 SWALLOW GOAL STATUS: HCPCS

## 2019-04-03 PROCEDURE — 80048 BASIC METABOLIC PNL TOTAL CA: CPT | Performed by: NURSE PRACTITIONER

## 2019-04-03 PROCEDURE — 82948 REAGENT STRIP/BLOOD GLUCOSE: CPT

## 2019-04-03 PROCEDURE — 87631 RESP VIRUS 3-5 TARGETS: CPT | Performed by: NURSE PRACTITIONER

## 2019-04-03 PROCEDURE — 97167 OT EVAL HIGH COMPLEX 60 MIN: CPT

## 2019-04-03 PROCEDURE — 99223 1ST HOSP IP/OBS HIGH 75: CPT | Performed by: INTERNAL MEDICINE

## 2019-04-03 PROCEDURE — 87081 CULTURE SCREEN ONLY: CPT | Performed by: NURSE PRACTITIONER

## 2019-04-03 PROCEDURE — 99222 1ST HOSP IP/OBS MODERATE 55: CPT | Performed by: INTERNAL MEDICINE

## 2019-04-03 PROCEDURE — 93005 ELECTROCARDIOGRAM TRACING: CPT

## 2019-04-03 PROCEDURE — 93306 TTE W/DOPPLER COMPLETE: CPT

## 2019-04-03 PROCEDURE — G8996 SWALLOW CURRENT STATUS: HCPCS

## 2019-04-03 PROCEDURE — 93306 TTE W/DOPPLER COMPLETE: CPT | Performed by: INTERNAL MEDICINE

## 2019-04-03 PROCEDURE — 83605 ASSAY OF LACTIC ACID: CPT | Performed by: INTERNAL MEDICINE

## 2019-04-03 PROCEDURE — 92610 EVALUATE SWALLOWING FUNCTION: CPT

## 2019-04-03 PROCEDURE — 84484 ASSAY OF TROPONIN QUANT: CPT | Performed by: NURSE PRACTITIONER

## 2019-04-03 PROCEDURE — G8978 MOBILITY CURRENT STATUS: HCPCS

## 2019-04-03 PROCEDURE — G8988 SELF CARE GOAL STATUS: HCPCS

## 2019-04-03 PROCEDURE — 97163 PT EVAL HIGH COMPLEX 45 MIN: CPT

## 2019-04-03 RX ORDER — ACETAMINOPHEN 325 MG/1
650 TABLET ORAL EVERY 6 HOURS PRN
Status: DISCONTINUED | OUTPATIENT
Start: 2019-04-03 | End: 2019-04-09 | Stop reason: HOSPADM

## 2019-04-03 RX ORDER — ONDANSETRON 2 MG/ML
4 INJECTION INTRAMUSCULAR; INTRAVENOUS EVERY 6 HOURS PRN
Status: DISCONTINUED | OUTPATIENT
Start: 2019-04-03 | End: 2019-04-09 | Stop reason: HOSPADM

## 2019-04-03 RX ORDER — OXYCODONE HCL 10 MG/1
40 TABLET, FILM COATED, EXTENDED RELEASE ORAL EVERY 12 HOURS SCHEDULED
Status: DISCONTINUED | OUTPATIENT
Start: 2019-04-03 | End: 2019-04-04

## 2019-04-03 RX ORDER — OXYCODONE HCL 10 MG/1
40 TABLET, FILM COATED, EXTENDED RELEASE ORAL EVERY 12 HOURS SCHEDULED
Status: DISCONTINUED | OUTPATIENT
Start: 2019-04-04 | End: 2019-04-03

## 2019-04-03 RX ORDER — PANTOPRAZOLE SODIUM 20 MG/1
20 TABLET, DELAYED RELEASE ORAL
Status: DISCONTINUED | OUTPATIENT
Start: 2019-04-03 | End: 2019-04-09 | Stop reason: HOSPADM

## 2019-04-03 RX ORDER — OXYBUTYNIN CHLORIDE 5 MG/1
5 TABLET, EXTENDED RELEASE ORAL DAILY
Status: DISCONTINUED | OUTPATIENT
Start: 2019-04-03 | End: 2019-04-04

## 2019-04-03 RX ORDER — SODIUM CHLORIDE 9 MG/ML
125 INJECTION, SOLUTION INTRAVENOUS CONTINUOUS
Status: DISCONTINUED | OUTPATIENT
Start: 2019-04-03 | End: 2019-04-03

## 2019-04-03 RX ORDER — LORAZEPAM 0.5 MG/1
0.5 TABLET ORAL 2 TIMES DAILY PRN
Status: DISCONTINUED | OUTPATIENT
Start: 2019-04-03 | End: 2019-04-09 | Stop reason: HOSPADM

## 2019-04-03 RX ORDER — SODIUM CHLORIDE 9 MG/ML
75 INJECTION, SOLUTION INTRAVENOUS CONTINUOUS
Status: DISCONTINUED | OUTPATIENT
Start: 2019-04-03 | End: 2019-04-05

## 2019-04-03 RX ORDER — POLYETHYLENE GLYCOL 3350 17 G/17G
17 POWDER, FOR SOLUTION ORAL DAILY PRN
Status: DISCONTINUED | OUTPATIENT
Start: 2019-04-03 | End: 2019-04-09 | Stop reason: HOSPADM

## 2019-04-03 RX ORDER — DOCUSATE SODIUM 100 MG/1
100 CAPSULE, LIQUID FILLED ORAL 2 TIMES DAILY
Status: DISCONTINUED | OUTPATIENT
Start: 2019-04-03 | End: 2019-04-09 | Stop reason: HOSPADM

## 2019-04-03 RX ORDER — DULOXETIN HYDROCHLORIDE 60 MG/1
60 CAPSULE, DELAYED RELEASE ORAL DAILY
Status: DISCONTINUED | OUTPATIENT
Start: 2019-04-03 | End: 2019-04-09 | Stop reason: HOSPADM

## 2019-04-03 RX ORDER — B-COMPLEX WITH VITAMIN C
1 TABLET ORAL
Status: DISCONTINUED | OUTPATIENT
Start: 2019-04-03 | End: 2019-04-03

## 2019-04-03 RX ADMIN — OXYCODONE HYDROCHLORIDE 40 MG: 10 TABLET, FILM COATED, EXTENDED RELEASE ORAL at 08:38

## 2019-04-03 RX ADMIN — CEFEPIME HYDROCHLORIDE 2000 MG: 1 INJECTION, POWDER, FOR SOLUTION INTRAMUSCULAR; INTRAVENOUS at 21:44

## 2019-04-03 RX ADMIN — SODIUM CHLORIDE 75 ML/HR: 0.9 INJECTION, SOLUTION INTRAVENOUS at 16:58

## 2019-04-03 RX ADMIN — ONDANSETRON 4 MG: 2 INJECTION INTRAMUSCULAR; INTRAVENOUS at 15:06

## 2019-04-03 RX ADMIN — OXYCODONE HYDROCHLORIDE 15 MG: 10 TABLET ORAL at 02:09

## 2019-04-03 RX ADMIN — ENOXAPARIN SODIUM 40 MG: 40 INJECTION SUBCUTANEOUS at 08:05

## 2019-04-03 RX ADMIN — DULOXETINE HYDROCHLORIDE 60 MG: 60 CAPSULE, DELAYED RELEASE ORAL at 08:05

## 2019-04-03 RX ADMIN — OXYCODONE HYDROCHLORIDE 15 MG: 10 TABLET ORAL at 15:10

## 2019-04-03 RX ADMIN — SODIUM CHLORIDE 75 ML/HR: 0.9 INJECTION, SOLUTION INTRAVENOUS at 00:57

## 2019-04-03 RX ADMIN — SODIUM CHLORIDE 1000 ML: 0.9 INJECTION, SOLUTION INTRAVENOUS at 15:10

## 2019-04-03 RX ADMIN — PANTOPRAZOLE SODIUM 20 MG: 20 TABLET, DELAYED RELEASE ORAL at 05:06

## 2019-04-03 RX ADMIN — ONDANSETRON 4 MG: 2 INJECTION INTRAMUSCULAR; INTRAVENOUS at 08:45

## 2019-04-03 RX ADMIN — ACETAMINOPHEN 650 MG: 325 TABLET ORAL at 05:06

## 2019-04-03 RX ADMIN — OXYCODONE HYDROCHLORIDE 40 MG: 10 TABLET, FILM COATED, EXTENDED RELEASE ORAL at 21:45

## 2019-04-03 RX ADMIN — OXYBUTYNIN CHLORIDE 5 MG: 5 TABLET, EXTENDED RELEASE ORAL at 08:05

## 2019-04-03 RX ADMIN — CEFEPIME HYDROCHLORIDE 2000 MG: 1 INJECTION, POWDER, FOR SOLUTION INTRAMUSCULAR; INTRAVENOUS at 08:05

## 2019-04-03 RX ADMIN — CALCIUM CARBONATE 500 MG (1,250 MG)-VITAMIN D3 200 UNIT TABLET 1 TABLET: at 08:05

## 2019-04-03 RX ADMIN — VANCOMYCIN HYDROCHLORIDE 1250 MG: 1 INJECTION, POWDER, LYOPHILIZED, FOR SOLUTION INTRAVENOUS at 09:57

## 2019-04-03 RX ADMIN — Medication 1 TABLET: at 08:05

## 2019-04-03 RX ADMIN — LORAZEPAM 0.5 MG: 0.5 TABLET ORAL at 22:35

## 2019-04-03 RX ADMIN — ACETAMINOPHEN 650 MG: 325 TABLET ORAL at 16:58

## 2019-04-03 RX ADMIN — VANCOMYCIN HYDROCHLORIDE 1250 MG: 1 INJECTION, POWDER, LYOPHILIZED, FOR SOLUTION INTRAVENOUS at 22:34

## 2019-04-04 LAB
ANION GAP SERPL CALCULATED.3IONS-SCNC: 11 MMOL/L (ref 4–13)
ANION GAP SERPL CALCULATED.3IONS-SCNC: 9 MMOL/L (ref 4–13)
ATRIAL RATE: 110 BPM
ATRIAL RATE: 137 BPM
BUN SERPL-MCNC: 23 MG/DL (ref 5–25)
BUN SERPL-MCNC: 23 MG/DL (ref 5–25)
CALCIUM SERPL-MCNC: 7.9 MG/DL (ref 8.3–10.1)
CALCIUM SERPL-MCNC: 8.1 MG/DL (ref 8.3–10.1)
CHLORIDE SERPL-SCNC: 110 MMOL/L (ref 100–108)
CHLORIDE SERPL-SCNC: 110 MMOL/L (ref 100–108)
CO2 SERPL-SCNC: 17 MMOL/L (ref 21–32)
CO2 SERPL-SCNC: 21 MMOL/L (ref 21–32)
CREAT SERPL-MCNC: 1.21 MG/DL (ref 0.6–1.3)
CREAT SERPL-MCNC: 1.25 MG/DL (ref 0.6–1.3)
ERYTHROCYTE [DISTWIDTH] IN BLOOD BY AUTOMATED COUNT: 14.4 % (ref 11.6–15.1)
GFR SERPL CREATININE-BSD FRML MDRD: 45 ML/MIN/1.73SQ M
GFR SERPL CREATININE-BSD FRML MDRD: 47 ML/MIN/1.73SQ M
GLUCOSE SERPL-MCNC: 102 MG/DL (ref 65–140)
GLUCOSE SERPL-MCNC: 109 MG/DL (ref 65–140)
GLUCOSE SERPL-MCNC: 110 MG/DL (ref 65–140)
GLUCOSE SERPL-MCNC: 127 MG/DL (ref 65–140)
GLUCOSE SERPL-MCNC: 95 MG/DL (ref 65–140)
HCT VFR BLD AUTO: 36.1 % (ref 34.8–46.1)
HGB BLD-MCNC: 11.4 G/DL (ref 11.5–15.4)
MAGNESIUM SERPL-MCNC: 1.4 MG/DL (ref 1.6–2.6)
MCH RBC QN AUTO: 30.3 PG (ref 26.8–34.3)
MCHC RBC AUTO-ENTMCNC: 31.6 G/DL (ref 31.4–37.4)
MCV RBC AUTO: 96 FL (ref 82–98)
P AXIS: 48 DEGREES
P AXIS: 69 DEGREES
PLATELET # BLD AUTO: 162 THOUSANDS/UL (ref 149–390)
PMV BLD AUTO: 9.4 FL (ref 8.9–12.7)
POTASSIUM SERPL-SCNC: 2 MMOL/L (ref 3.5–5.3)
POTASSIUM SERPL-SCNC: 5.6 MMOL/L (ref 3.5–5.3)
PR INTERVAL: 142 MS
PR INTERVAL: 146 MS
PROCALCITONIN SERPL-MCNC: 52.33 NG/ML
QRS AXIS: 39 DEGREES
QRS AXIS: 47 DEGREES
QRSD INTERVAL: 88 MS
QRSD INTERVAL: 92 MS
QT INTERVAL: 284 MS
QT INTERVAL: 375 MS
QTC INTERVAL: 428 MS
QTC INTERVAL: 508 MS
RBC # BLD AUTO: 3.76 MILLION/UL (ref 3.81–5.12)
SODIUM SERPL-SCNC: 138 MMOL/L (ref 136–145)
SODIUM SERPL-SCNC: 140 MMOL/L (ref 136–145)
T WAVE AXIS: 39 DEGREES
T WAVE AXIS: 51 DEGREES
VANCOMYCIN TROUGH SERPL-MCNC: 21.4 UG/ML (ref 10–20)
VENTRICULAR RATE: 110 BPM
VENTRICULAR RATE: 137 BPM
WBC # BLD AUTO: 24.87 THOUSAND/UL (ref 4.31–10.16)

## 2019-04-04 PROCEDURE — 80048 BASIC METABOLIC PNL TOTAL CA: CPT | Performed by: INTERNAL MEDICINE

## 2019-04-04 PROCEDURE — 99233 SBSQ HOSP IP/OBS HIGH 50: CPT | Performed by: INTERNAL MEDICINE

## 2019-04-04 PROCEDURE — 84145 PROCALCITONIN (PCT): CPT | Performed by: INTERNAL MEDICINE

## 2019-04-04 PROCEDURE — 99232 SBSQ HOSP IP/OBS MODERATE 35: CPT | Performed by: INTERNAL MEDICINE

## 2019-04-04 PROCEDURE — 82948 REAGENT STRIP/BLOOD GLUCOSE: CPT

## 2019-04-04 PROCEDURE — 83735 ASSAY OF MAGNESIUM: CPT | Performed by: INTERNAL MEDICINE

## 2019-04-04 PROCEDURE — 87040 BLOOD CULTURE FOR BACTERIA: CPT | Performed by: NURSE PRACTITIONER

## 2019-04-04 PROCEDURE — 93010 ELECTROCARDIOGRAM REPORT: CPT | Performed by: INTERNAL MEDICINE

## 2019-04-04 PROCEDURE — 85027 COMPLETE CBC AUTOMATED: CPT | Performed by: INTERNAL MEDICINE

## 2019-04-04 PROCEDURE — 80202 ASSAY OF VANCOMYCIN: CPT | Performed by: INTERNAL MEDICINE

## 2019-04-04 RX ORDER — POTASSIUM CHLORIDE 14.9 MG/ML
20 INJECTION INTRAVENOUS
Status: COMPLETED | OUTPATIENT
Start: 2019-04-04 | End: 2019-04-04

## 2019-04-04 RX ORDER — OXYCODONE HCL 20 MG/1
40 TABLET, FILM COATED, EXTENDED RELEASE ORAL EVERY 12 HOURS SCHEDULED
Status: DISCONTINUED | OUTPATIENT
Start: 2019-04-04 | End: 2019-04-09 | Stop reason: HOSPADM

## 2019-04-04 RX ORDER — MAGNESIUM SULFATE 1 G/100ML
1 INJECTION INTRAVENOUS ONCE
Status: COMPLETED | OUTPATIENT
Start: 2019-04-04 | End: 2019-04-04

## 2019-04-04 RX ORDER — OXYBUTYNIN CHLORIDE 5 MG/1
5 TABLET, EXTENDED RELEASE ORAL
Status: DISCONTINUED | OUTPATIENT
Start: 2019-04-05 | End: 2019-04-09 | Stop reason: HOSPADM

## 2019-04-04 RX ORDER — POTASSIUM CHLORIDE 20 MEQ/1
40 TABLET, EXTENDED RELEASE ORAL ONCE
Status: COMPLETED | OUTPATIENT
Start: 2019-04-04 | End: 2019-04-04

## 2019-04-04 RX ADMIN — PANTOPRAZOLE SODIUM 20 MG: 20 TABLET, DELAYED RELEASE ORAL at 06:03

## 2019-04-04 RX ADMIN — OXYCODONE HYDROCHLORIDE 40 MG: 10 TABLET, FILM COATED, EXTENDED RELEASE ORAL at 10:07

## 2019-04-04 RX ADMIN — ACETAMINOPHEN 650 MG: 325 TABLET ORAL at 22:02

## 2019-04-04 RX ADMIN — SODIUM CHLORIDE 75 ML/HR: 0.9 INJECTION, SOLUTION INTRAVENOUS at 09:35

## 2019-04-04 RX ADMIN — CEFEPIME HYDROCHLORIDE 2000 MG: 1 INJECTION, POWDER, FOR SOLUTION INTRAMUSCULAR; INTRAVENOUS at 10:10

## 2019-04-04 RX ADMIN — OXYBUTYNIN CHLORIDE 5 MG: 5 TABLET, EXTENDED RELEASE ORAL at 08:19

## 2019-04-04 RX ADMIN — CEFTRIAXONE SODIUM 1000 MG: 10 INJECTION, POWDER, FOR SOLUTION INTRAVENOUS at 21:29

## 2019-04-04 RX ADMIN — OXYCODONE HYDROCHLORIDE 15 MG: 10 TABLET ORAL at 13:08

## 2019-04-04 RX ADMIN — POTASSIUM CHLORIDE 20 MEQ: 200 INJECTION, SOLUTION INTRAVENOUS at 11:31

## 2019-04-04 RX ADMIN — ACETAMINOPHEN 650 MG: 325 TABLET ORAL at 00:07

## 2019-04-04 RX ADMIN — VANCOMYCIN HYDROCHLORIDE 1250 MG: 1 INJECTION, POWDER, LYOPHILIZED, FOR SOLUTION INTRAVENOUS at 23:15

## 2019-04-04 RX ADMIN — DOCUSATE SODIUM 100 MG: 100 CAPSULE, LIQUID FILLED ORAL at 08:19

## 2019-04-04 RX ADMIN — POTASSIUM CHLORIDE 40 MEQ: 1500 TABLET, EXTENDED RELEASE ORAL at 06:33

## 2019-04-04 RX ADMIN — VANCOMYCIN HYDROCHLORIDE 1250 MG: 1 INJECTION, POWDER, LYOPHILIZED, FOR SOLUTION INTRAVENOUS at 11:12

## 2019-04-04 RX ADMIN — MAGNESIUM SULFATE HEPTAHYDRATE 1 G: 1 INJECTION, SOLUTION INTRAVENOUS at 17:51

## 2019-04-04 RX ADMIN — POTASSIUM CHLORIDE 20 MEQ: 200 INJECTION, SOLUTION INTRAVENOUS at 06:36

## 2019-04-04 RX ADMIN — POTASSIUM CHLORIDE 20 MEQ: 200 INJECTION, SOLUTION INTRAVENOUS at 08:21

## 2019-04-04 RX ADMIN — DOCUSATE SODIUM 100 MG: 100 CAPSULE, LIQUID FILLED ORAL at 18:05

## 2019-04-04 RX ADMIN — ONDANSETRON 4 MG: 2 INJECTION INTRAMUSCULAR; INTRAVENOUS at 10:07

## 2019-04-04 RX ADMIN — OXYCODONE HYDROCHLORIDE 15 MG: 10 TABLET ORAL at 06:09

## 2019-04-04 RX ADMIN — DULOXETINE HYDROCHLORIDE 60 MG: 60 CAPSULE, DELAYED RELEASE ORAL at 08:19

## 2019-04-04 RX ADMIN — ENOXAPARIN SODIUM 40 MG: 40 INJECTION SUBCUTANEOUS at 08:18

## 2019-04-04 RX ADMIN — OXYCODONE HYDROCHLORIDE 40 MG: 20 TABLET, FILM COATED, EXTENDED RELEASE ORAL at 18:04

## 2019-04-05 LAB
ANION GAP SERPL CALCULATED.3IONS-SCNC: 8 MMOL/L (ref 4–13)
BACTERIA BLD CULT: ABNORMAL
BUN SERPL-MCNC: 21 MG/DL (ref 5–25)
CALCIUM SERPL-MCNC: 8.4 MG/DL (ref 8.3–10.1)
CHLORIDE SERPL-SCNC: 110 MMOL/L (ref 100–108)
CO2 SERPL-SCNC: 20 MMOL/L (ref 21–32)
CREAT SERPL-MCNC: 1.07 MG/DL (ref 0.6–1.3)
ERYTHROCYTE [DISTWIDTH] IN BLOOD BY AUTOMATED COUNT: 14.9 % (ref 11.6–15.1)
GFR SERPL CREATININE-BSD FRML MDRD: 54 ML/MIN/1.73SQ M
GLUCOSE SERPL-MCNC: 97 MG/DL (ref 65–140)
GRAM STN SPEC: ABNORMAL
HCT VFR BLD AUTO: 34.3 % (ref 34.8–46.1)
HGB BLD-MCNC: 10.7 G/DL (ref 11.5–15.4)
LACTATE SERPL-SCNC: 1.1 MMOL/L (ref 0.5–2)
MCH RBC QN AUTO: 29.9 PG (ref 26.8–34.3)
MCHC RBC AUTO-ENTMCNC: 31.2 G/DL (ref 31.4–37.4)
MCV RBC AUTO: 96 FL (ref 82–98)
MRSA NOSE QL CULT: NORMAL
PLATELET # BLD AUTO: 136 THOUSANDS/UL (ref 149–390)
PMV BLD AUTO: 9.7 FL (ref 8.9–12.7)
POTASSIUM SERPL-SCNC: 4.8 MMOL/L (ref 3.5–5.3)
PROCALCITONIN SERPL-MCNC: 26.47 NG/ML
RBC # BLD AUTO: 3.58 MILLION/UL (ref 3.81–5.12)
SODIUM SERPL-SCNC: 138 MMOL/L (ref 136–145)
WBC # BLD AUTO: 14.96 THOUSAND/UL (ref 4.31–10.16)

## 2019-04-05 PROCEDURE — 97110 THERAPEUTIC EXERCISES: CPT

## 2019-04-05 PROCEDURE — 97116 GAIT TRAINING THERAPY: CPT

## 2019-04-05 PROCEDURE — 99232 SBSQ HOSP IP/OBS MODERATE 35: CPT | Performed by: INTERNAL MEDICINE

## 2019-04-05 PROCEDURE — 97530 THERAPEUTIC ACTIVITIES: CPT

## 2019-04-05 PROCEDURE — 80048 BASIC METABOLIC PNL TOTAL CA: CPT | Performed by: INTERNAL MEDICINE

## 2019-04-05 PROCEDURE — 84145 PROCALCITONIN (PCT): CPT | Performed by: INTERNAL MEDICINE

## 2019-04-05 PROCEDURE — 83605 ASSAY OF LACTIC ACID: CPT | Performed by: INTERNAL MEDICINE

## 2019-04-05 PROCEDURE — 85027 COMPLETE CBC AUTOMATED: CPT | Performed by: INTERNAL MEDICINE

## 2019-04-05 PROCEDURE — 97535 SELF CARE MNGMENT TRAINING: CPT

## 2019-04-05 RX ORDER — VANCOMYCIN HYDROCHLORIDE 1 G/200ML
1000 INJECTION, SOLUTION INTRAVENOUS EVERY 12 HOURS
Status: DISCONTINUED | OUTPATIENT
Start: 2019-04-05 | End: 2019-04-05

## 2019-04-05 RX ADMIN — DOCUSATE SODIUM 100 MG: 100 CAPSULE, LIQUID FILLED ORAL at 08:19

## 2019-04-05 RX ADMIN — OXYCODONE HYDROCHLORIDE 40 MG: 20 TABLET, FILM COATED, EXTENDED RELEASE ORAL at 05:58

## 2019-04-05 RX ADMIN — ONDANSETRON 4 MG: 2 INJECTION INTRAMUSCULAR; INTRAVENOUS at 21:32

## 2019-04-05 RX ADMIN — PANTOPRAZOLE SODIUM 20 MG: 20 TABLET, DELAYED RELEASE ORAL at 05:58

## 2019-04-05 RX ADMIN — DULOXETINE HYDROCHLORIDE 60 MG: 60 CAPSULE, DELAYED RELEASE ORAL at 08:19

## 2019-04-05 RX ADMIN — ENOXAPARIN SODIUM 40 MG: 40 INJECTION SUBCUTANEOUS at 08:19

## 2019-04-05 RX ADMIN — OXYCODONE HYDROCHLORIDE 40 MG: 20 TABLET, FILM COATED, EXTENDED RELEASE ORAL at 17:58

## 2019-04-05 RX ADMIN — SODIUM CHLORIDE 75 ML/HR: 0.9 INJECTION, SOLUTION INTRAVENOUS at 01:06

## 2019-04-05 RX ADMIN — VANCOMYCIN HYDROCHLORIDE 1000 MG: 1 INJECTION, SOLUTION INTRAVENOUS at 12:06

## 2019-04-05 RX ADMIN — ONDANSETRON 4 MG: 2 INJECTION INTRAMUSCULAR; INTRAVENOUS at 06:28

## 2019-04-05 RX ADMIN — DOCUSATE SODIUM 100 MG: 100 CAPSULE, LIQUID FILLED ORAL at 17:58

## 2019-04-05 RX ADMIN — OXYBUTYNIN CHLORIDE 5 MG: 5 TABLET, EXTENDED RELEASE ORAL at 21:32

## 2019-04-06 LAB
ANION GAP SERPL CALCULATED.3IONS-SCNC: 9 MMOL/L (ref 4–13)
BASOPHILS # BLD AUTO: 0.02 THOUSANDS/ΜL (ref 0–0.1)
BASOPHILS NFR BLD AUTO: 0 % (ref 0–1)
BUN SERPL-MCNC: 15 MG/DL (ref 5–25)
CALCIUM SERPL-MCNC: 8.5 MG/DL (ref 8.3–10.1)
CHLORIDE SERPL-SCNC: 110 MMOL/L (ref 100–108)
CO2 SERPL-SCNC: 21 MMOL/L (ref 21–32)
CREAT SERPL-MCNC: 1.02 MG/DL (ref 0.6–1.3)
EOSINOPHIL # BLD AUTO: 0.24 THOUSAND/ΜL (ref 0–0.61)
EOSINOPHIL NFR BLD AUTO: 3 % (ref 0–6)
ERYTHROCYTE [DISTWIDTH] IN BLOOD BY AUTOMATED COUNT: 15.1 % (ref 11.6–15.1)
GFR SERPL CREATININE-BSD FRML MDRD: 57 ML/MIN/1.73SQ M
GLUCOSE SERPL-MCNC: 106 MG/DL (ref 65–140)
HCT VFR BLD AUTO: 34.5 % (ref 34.8–46.1)
HGB BLD-MCNC: 10.5 G/DL (ref 11.5–15.4)
IMM GRANULOCYTES # BLD AUTO: 0.02 THOUSAND/UL (ref 0–0.2)
IMM GRANULOCYTES NFR BLD AUTO: 0 % (ref 0–2)
LYMPHOCYTES # BLD AUTO: 1.42 THOUSANDS/ΜL (ref 0.6–4.47)
LYMPHOCYTES NFR BLD AUTO: 17 % (ref 14–44)
MAGNESIUM SERPL-MCNC: 1.7 MG/DL (ref 1.6–2.6)
MCH RBC QN AUTO: 29.2 PG (ref 26.8–34.3)
MCHC RBC AUTO-ENTMCNC: 30.4 G/DL (ref 31.4–37.4)
MCV RBC AUTO: 96 FL (ref 82–98)
MONOCYTES # BLD AUTO: 0.91 THOUSAND/ΜL (ref 0.17–1.22)
MONOCYTES NFR BLD AUTO: 11 % (ref 4–12)
NEUTROPHILS # BLD AUTO: 5.73 THOUSANDS/ΜL (ref 1.85–7.62)
NEUTS SEG NFR BLD AUTO: 69 % (ref 43–75)
NRBC BLD AUTO-RTO: 0 /100 WBCS
PLATELET # BLD AUTO: 136 THOUSANDS/UL (ref 149–390)
PMV BLD AUTO: 9.8 FL (ref 8.9–12.7)
POTASSIUM SERPL-SCNC: 3.9 MMOL/L (ref 3.5–5.3)
RBC # BLD AUTO: 3.59 MILLION/UL (ref 3.81–5.12)
SODIUM SERPL-SCNC: 140 MMOL/L (ref 136–145)
WBC # BLD AUTO: 8.34 THOUSAND/UL (ref 4.31–10.16)

## 2019-04-06 PROCEDURE — 99232 SBSQ HOSP IP/OBS MODERATE 35: CPT | Performed by: INTERNAL MEDICINE

## 2019-04-06 PROCEDURE — 83735 ASSAY OF MAGNESIUM: CPT | Performed by: INTERNAL MEDICINE

## 2019-04-06 PROCEDURE — 80048 BASIC METABOLIC PNL TOTAL CA: CPT | Performed by: INTERNAL MEDICINE

## 2019-04-06 PROCEDURE — 85025 COMPLETE CBC W/AUTO DIFF WBC: CPT | Performed by: INTERNAL MEDICINE

## 2019-04-06 PROCEDURE — 99223 1ST HOSP IP/OBS HIGH 75: CPT | Performed by: INTERNAL MEDICINE

## 2019-04-06 RX ADMIN — OXYBUTYNIN CHLORIDE 5 MG: 5 TABLET, EXTENDED RELEASE ORAL at 21:24

## 2019-04-06 RX ADMIN — DOCUSATE SODIUM 100 MG: 100 CAPSULE, LIQUID FILLED ORAL at 08:31

## 2019-04-06 RX ADMIN — DOCUSATE SODIUM 100 MG: 100 CAPSULE, LIQUID FILLED ORAL at 17:48

## 2019-04-06 RX ADMIN — CEFTRIAXONE SODIUM 2000 MG: 2 INJECTION, POWDER, FOR SOLUTION INTRAMUSCULAR; INTRAVENOUS at 21:27

## 2019-04-06 RX ADMIN — OXYCODONE HYDROCHLORIDE 40 MG: 20 TABLET, FILM COATED, EXTENDED RELEASE ORAL at 17:48

## 2019-04-06 RX ADMIN — ONDANSETRON 4 MG: 2 INJECTION INTRAMUSCULAR; INTRAVENOUS at 21:35

## 2019-04-06 RX ADMIN — OXYCODONE HYDROCHLORIDE 40 MG: 20 TABLET, FILM COATED, EXTENDED RELEASE ORAL at 05:55

## 2019-04-06 RX ADMIN — ENOXAPARIN SODIUM 40 MG: 40 INJECTION SUBCUTANEOUS at 08:31

## 2019-04-06 RX ADMIN — PANTOPRAZOLE SODIUM 20 MG: 20 TABLET, DELAYED RELEASE ORAL at 05:55

## 2019-04-06 RX ADMIN — ACETAMINOPHEN 650 MG: 325 TABLET ORAL at 23:39

## 2019-04-06 RX ADMIN — DULOXETINE HYDROCHLORIDE 60 MG: 60 CAPSULE, DELAYED RELEASE ORAL at 08:31

## 2019-04-06 RX ADMIN — OXYCODONE HYDROCHLORIDE 15 MG: 10 TABLET ORAL at 14:27

## 2019-04-06 RX ADMIN — CEFTRIAXONE SODIUM 1000 MG: 10 INJECTION, POWDER, FOR SOLUTION INTRAVENOUS at 00:40

## 2019-04-07 LAB
BACTERIA BLD CULT: NORMAL
PROCALCITONIN SERPL-MCNC: 6.1 NG/ML

## 2019-04-07 PROCEDURE — 99232 SBSQ HOSP IP/OBS MODERATE 35: CPT | Performed by: INTERNAL MEDICINE

## 2019-04-07 PROCEDURE — 84145 PROCALCITONIN (PCT): CPT | Performed by: INTERNAL MEDICINE

## 2019-04-07 RX ADMIN — ONDANSETRON 4 MG: 2 INJECTION INTRAMUSCULAR; INTRAVENOUS at 17:15

## 2019-04-07 RX ADMIN — DOCUSATE SODIUM 100 MG: 100 CAPSULE, LIQUID FILLED ORAL at 08:38

## 2019-04-07 RX ADMIN — OXYCODONE HYDROCHLORIDE 40 MG: 20 TABLET, FILM COATED, EXTENDED RELEASE ORAL at 05:16

## 2019-04-07 RX ADMIN — PANTOPRAZOLE SODIUM 20 MG: 20 TABLET, DELAYED RELEASE ORAL at 05:16

## 2019-04-07 RX ADMIN — OXYBUTYNIN CHLORIDE 5 MG: 5 TABLET, EXTENDED RELEASE ORAL at 21:10

## 2019-04-07 RX ADMIN — DOCUSATE SODIUM 100 MG: 100 CAPSULE, LIQUID FILLED ORAL at 17:14

## 2019-04-07 RX ADMIN — OXYCODONE HYDROCHLORIDE 15 MG: 10 TABLET ORAL at 21:11

## 2019-04-07 RX ADMIN — ONDANSETRON 4 MG: 2 INJECTION INTRAMUSCULAR; INTRAVENOUS at 05:34

## 2019-04-07 RX ADMIN — OXYCODONE HYDROCHLORIDE 15 MG: 10 TABLET ORAL at 08:54

## 2019-04-07 RX ADMIN — OXYCODONE HYDROCHLORIDE 40 MG: 20 TABLET, FILM COATED, EXTENDED RELEASE ORAL at 17:14

## 2019-04-07 RX ADMIN — ENOXAPARIN SODIUM 40 MG: 40 INJECTION SUBCUTANEOUS at 08:38

## 2019-04-07 RX ADMIN — ONDANSETRON 4 MG: 2 INJECTION INTRAMUSCULAR; INTRAVENOUS at 21:12

## 2019-04-07 RX ADMIN — CEFTRIAXONE SODIUM 2000 MG: 2 INJECTION, POWDER, FOR SOLUTION INTRAMUSCULAR; INTRAVENOUS at 20:47

## 2019-04-07 RX ADMIN — DULOXETINE HYDROCHLORIDE 60 MG: 60 CAPSULE, DELAYED RELEASE ORAL at 08:38

## 2019-04-08 PROCEDURE — 97110 THERAPEUTIC EXERCISES: CPT

## 2019-04-08 PROCEDURE — 99232 SBSQ HOSP IP/OBS MODERATE 35: CPT | Performed by: INTERNAL MEDICINE

## 2019-04-08 PROCEDURE — 97530 THERAPEUTIC ACTIVITIES: CPT

## 2019-04-08 RX ADMIN — ONDANSETRON 4 MG: 2 INJECTION INTRAMUSCULAR; INTRAVENOUS at 20:16

## 2019-04-08 RX ADMIN — OXYCODONE HYDROCHLORIDE 15 MG: 10 TABLET ORAL at 22:46

## 2019-04-08 RX ADMIN — DOCUSATE SODIUM 100 MG: 100 CAPSULE, LIQUID FILLED ORAL at 08:38

## 2019-04-08 RX ADMIN — OXYCODONE HYDROCHLORIDE 15 MG: 10 TABLET ORAL at 11:54

## 2019-04-08 RX ADMIN — OXYBUTYNIN CHLORIDE 5 MG: 5 TABLET, EXTENDED RELEASE ORAL at 21:40

## 2019-04-08 RX ADMIN — CEFTRIAXONE SODIUM 2000 MG: 2 INJECTION, POWDER, FOR SOLUTION INTRAMUSCULAR; INTRAVENOUS at 20:12

## 2019-04-08 RX ADMIN — OXYCODONE HYDROCHLORIDE 40 MG: 20 TABLET, FILM COATED, EXTENDED RELEASE ORAL at 17:23

## 2019-04-08 RX ADMIN — DOCUSATE SODIUM 100 MG: 100 CAPSULE, LIQUID FILLED ORAL at 17:23

## 2019-04-08 RX ADMIN — ENOXAPARIN SODIUM 40 MG: 40 INJECTION SUBCUTANEOUS at 08:38

## 2019-04-08 RX ADMIN — OXYCODONE HYDROCHLORIDE 40 MG: 20 TABLET, FILM COATED, EXTENDED RELEASE ORAL at 05:19

## 2019-04-08 RX ADMIN — ACETAMINOPHEN 650 MG: 325 TABLET ORAL at 16:22

## 2019-04-08 RX ADMIN — ONDANSETRON 4 MG: 2 INJECTION INTRAMUSCULAR; INTRAVENOUS at 05:20

## 2019-04-08 RX ADMIN — PANTOPRAZOLE SODIUM 20 MG: 20 TABLET, DELAYED RELEASE ORAL at 05:20

## 2019-04-08 RX ADMIN — DULOXETINE HYDROCHLORIDE 60 MG: 60 CAPSULE, DELAYED RELEASE ORAL at 08:38

## 2019-04-09 VITALS
TEMPERATURE: 97.6 F | WEIGHT: 265.65 LBS | RESPIRATION RATE: 18 BRPM | DIASTOLIC BLOOD PRESSURE: 73 MMHG | SYSTOLIC BLOOD PRESSURE: 156 MMHG | OXYGEN SATURATION: 96 % | BODY MASS INDEX: 47.07 KG/M2 | HEIGHT: 63 IN | HEART RATE: 68 BPM

## 2019-04-09 PROBLEM — R79.89 TROPONIN I ABOVE REFERENCE RANGE: Status: RESOLVED | Noted: 2019-04-03 | Resolved: 2019-04-09

## 2019-04-09 PROBLEM — A41.9 SEVERE SEPSIS (HCC): Status: RESOLVED | Noted: 2019-04-02 | Resolved: 2019-04-09

## 2019-04-09 PROBLEM — I21.4 NSTEMI (NON-ST ELEVATED MYOCARDIAL INFARCTION) (HCC): Status: RESOLVED | Noted: 2019-04-02 | Resolved: 2019-04-09

## 2019-04-09 PROBLEM — E87.20 LACTIC ACIDOSIS: Status: RESOLVED | Noted: 2019-04-03 | Resolved: 2019-04-09

## 2019-04-09 PROBLEM — N17.9 AKI (ACUTE KIDNEY INJURY) (HCC): Status: RESOLVED | Noted: 2019-04-03 | Resolved: 2019-04-09

## 2019-04-09 PROBLEM — J18.9 PNEUMONIA: Status: RESOLVED | Noted: 2019-04-03 | Resolved: 2019-04-09

## 2019-04-09 PROBLEM — E87.2 LACTIC ACIDOSIS: Status: RESOLVED | Noted: 2019-04-03 | Resolved: 2019-04-09

## 2019-04-09 PROBLEM — R65.20 SEVERE SEPSIS (HCC): Status: RESOLVED | Noted: 2019-04-02 | Resolved: 2019-04-09

## 2019-04-09 PROBLEM — G92.8 TOXIC METABOLIC ENCEPHALOPATHY: Status: RESOLVED | Noted: 2019-04-02 | Resolved: 2019-04-09

## 2019-04-09 PROBLEM — R77.8 TROPONIN I ABOVE REFERENCE RANGE: Status: RESOLVED | Noted: 2019-04-03 | Resolved: 2019-04-09

## 2019-04-09 LAB
BACTERIA BLD CULT: NORMAL
BACTERIA BLD CULT: NORMAL

## 2019-04-09 PROCEDURE — 97535 SELF CARE MNGMENT TRAINING: CPT

## 2019-04-09 PROCEDURE — 99232 SBSQ HOSP IP/OBS MODERATE 35: CPT | Performed by: INTERNAL MEDICINE

## 2019-04-09 PROCEDURE — 99239 HOSP IP/OBS DSCHRG MGMT >30: CPT | Performed by: INTERNAL MEDICINE

## 2019-04-09 PROCEDURE — 97530 THERAPEUTIC ACTIVITIES: CPT

## 2019-04-09 RX ORDER — AMOXICILLIN 500 MG/1
1000 CAPSULE ORAL EVERY 8 HOURS SCHEDULED
Qty: 42 CAPSULE | Refills: 0 | Status: SHIPPED | OUTPATIENT
Start: 2019-04-09 | End: 2019-04-16

## 2019-04-09 RX ORDER — AMOXICILLIN 500 MG/1
1000 CAPSULE ORAL EVERY 8 HOURS SCHEDULED
Qty: 42 CAPSULE | Refills: 0
Start: 2019-04-09 | End: 2019-04-09

## 2019-04-09 RX ORDER — AMOXICILLIN 500 MG/1
1000 CAPSULE ORAL EVERY 8 HOURS SCHEDULED
Status: DISCONTINUED | OUTPATIENT
Start: 2019-04-09 | End: 2019-04-09 | Stop reason: HOSPADM

## 2019-04-09 RX ADMIN — LORAZEPAM 0.5 MG: 0.5 TABLET ORAL at 03:46

## 2019-04-09 RX ADMIN — DOCUSATE SODIUM 100 MG: 100 CAPSULE, LIQUID FILLED ORAL at 09:34

## 2019-04-09 RX ADMIN — ONDANSETRON 4 MG: 2 INJECTION INTRAMUSCULAR; INTRAVENOUS at 11:07

## 2019-04-09 RX ADMIN — AMOXICILLIN 1000 MG: 500 CAPSULE ORAL at 14:25

## 2019-04-09 RX ADMIN — OXYCODONE HYDROCHLORIDE 40 MG: 20 TABLET, FILM COATED, EXTENDED RELEASE ORAL at 05:31

## 2019-04-09 RX ADMIN — ENOXAPARIN SODIUM 40 MG: 40 INJECTION SUBCUTANEOUS at 09:34

## 2019-04-09 RX ADMIN — OXYCODONE HYDROCHLORIDE 15 MG: 10 TABLET ORAL at 10:39

## 2019-04-09 RX ADMIN — DULOXETINE HYDROCHLORIDE 60 MG: 60 CAPSULE, DELAYED RELEASE ORAL at 09:34

## 2019-04-09 RX ADMIN — PANTOPRAZOLE SODIUM 20 MG: 20 TABLET, DELAYED RELEASE ORAL at 05:31

## 2019-05-07 ENCOUNTER — OFFICE VISIT (OUTPATIENT)
Dept: PULMONOLOGY | Facility: CLINIC | Age: 64
End: 2019-05-07
Payer: MEDICARE

## 2019-05-07 VITALS
HEIGHT: 66 IN | RESPIRATION RATE: 16 BRPM | SYSTOLIC BLOOD PRESSURE: 132 MMHG | OXYGEN SATURATION: 95 % | DIASTOLIC BLOOD PRESSURE: 60 MMHG | WEIGHT: 235 LBS | HEART RATE: 101 BPM | TEMPERATURE: 98.4 F | BODY MASS INDEX: 37.77 KG/M2

## 2019-05-07 DIAGNOSIS — M31.30 WEGENERS GRANULOMATOSIS: Primary | Chronic | ICD-10-CM

## 2019-05-07 DIAGNOSIS — R09.82 POSTNASAL DRIP: ICD-10-CM

## 2019-05-07 DIAGNOSIS — J18.9 PNEUMONIA OF BOTH LUNGS DUE TO INFECTIOUS ORGANISM, UNSPECIFIED PART OF LUNG: ICD-10-CM

## 2019-05-07 DIAGNOSIS — R06.00 DYSPNEA ON EXERTION: ICD-10-CM

## 2019-05-07 PROBLEM — R06.09 DYSPNEA ON EXERTION: Status: ACTIVE | Noted: 2019-05-07

## 2019-05-07 PROCEDURE — 99214 OFFICE O/P EST MOD 30 MIN: CPT | Performed by: INTERNAL MEDICINE

## 2019-05-14 ENCOUNTER — HOSPITAL ENCOUNTER (OUTPATIENT)
Dept: MAMMOGRAPHY | Facility: CLINIC | Age: 64
Discharge: HOME | End: 2019-05-14
Payer: MEDICARE

## 2019-05-14 ENCOUNTER — HOSPITAL ENCOUNTER (OUTPATIENT)
Dept: ULTRASOUND IMAGING | Facility: CLINIC | Age: 64
Discharge: HOME | End: 2019-05-14
Payer: MEDICARE

## 2019-05-14 VITALS — HEIGHT: 66 IN | BODY MASS INDEX: 37.77 KG/M2 | WEIGHT: 235 LBS

## 2019-05-14 DIAGNOSIS — Z12.31 VISIT FOR SCREENING MAMMOGRAM: ICD-10-CM

## 2019-05-14 PROCEDURE — 77063 BREAST TOMOSYNTHESIS BI: CPT

## 2019-05-14 PROCEDURE — 77067 SCR MAMMO BI INCL CAD: CPT

## 2019-05-14 PROCEDURE — 76642 ULTRASOUND BREAST LIMITED: CPT

## 2019-05-28 ENCOUNTER — HOSPITAL ENCOUNTER (OUTPATIENT)
Dept: NON INVASIVE DIAGNOSTICS | Facility: CLINIC | Age: 64
Discharge: HOME | End: 2019-05-28
Payer: MEDICARE

## 2019-05-28 ENCOUNTER — HOSPITAL ENCOUNTER (OUTPATIENT)
Dept: RADIOLOGY | Facility: HOSPITAL | Age: 64
Discharge: HOME | End: 2019-05-28
Payer: MEDICARE

## 2019-05-28 DIAGNOSIS — R93.1 ABNORMAL ECHOCARDIOGRAM: ICD-10-CM

## 2019-05-28 DIAGNOSIS — J18.9 PNEUMONIA DUE TO INFECTIOUS ORGANISM, UNSPECIFIED LATERALITY, UNSPECIFIED PART OF LUNG: ICD-10-CM

## 2019-05-28 PROCEDURE — 71046 X-RAY EXAM CHEST 2 VIEWS: CPT

## 2019-05-28 PROCEDURE — 93308 TTE F-UP OR LMTD: CPT | Performed by: INTERNAL MEDICINE

## 2019-05-28 PROCEDURE — 93321 DOPPLER ECHO F-UP/LMTD STD: CPT | Performed by: INTERNAL MEDICINE

## 2019-05-28 PROCEDURE — 93308 TTE F-UP OR LMTD: CPT

## 2019-05-28 PROCEDURE — 93325 DOPPLER ECHO COLOR FLOW MAPG: CPT | Performed by: INTERNAL MEDICINE

## 2019-05-30 ENCOUNTER — OFFICE VISIT (OUTPATIENT)
Dept: CARDIOLOGY CLINIC | Facility: CLINIC | Age: 64
End: 2019-05-30
Payer: MEDICARE

## 2019-05-30 VITALS
HEART RATE: 98 BPM | DIASTOLIC BLOOD PRESSURE: 82 MMHG | OXYGEN SATURATION: 96 % | SYSTOLIC BLOOD PRESSURE: 118 MMHG | HEIGHT: 66 IN | WEIGHT: 241.8 LBS | BODY MASS INDEX: 38.86 KG/M2

## 2019-05-30 DIAGNOSIS — I51.7 LVH (LEFT VENTRICULAR HYPERTROPHY): Primary | ICD-10-CM

## 2019-05-30 DIAGNOSIS — R77.8 ELEVATED TROPONIN: ICD-10-CM

## 2019-05-30 DIAGNOSIS — I05.9 MITRAL VALVE DISORDER: ICD-10-CM

## 2019-05-30 DIAGNOSIS — I10 BENIGN ESSENTIAL HYPERTENSION: ICD-10-CM

## 2019-05-30 PROCEDURE — 99214 OFFICE O/P EST MOD 30 MIN: CPT | Performed by: PHYSICIAN ASSISTANT

## 2019-06-03 ENCOUNTER — TELEPHONE (OUTPATIENT)
Dept: PULMONOLOGY | Facility: HOSPITAL | Age: 64
End: 2019-06-03

## 2019-06-04 LAB — CREAT ?TM UR-SCNC: 79.8 UMOL/L

## 2019-06-07 ENCOUNTER — OFFICE VISIT (OUTPATIENT)
Dept: NEPHROLOGY | Facility: CLINIC | Age: 64
End: 2019-06-07
Payer: MEDICARE

## 2019-06-07 VITALS
HEART RATE: 77 BPM | WEIGHT: 239.4 LBS | BODY MASS INDEX: 38.47 KG/M2 | DIASTOLIC BLOOD PRESSURE: 81 MMHG | HEIGHT: 66 IN | SYSTOLIC BLOOD PRESSURE: 138 MMHG | RESPIRATION RATE: 18 BRPM

## 2019-06-07 DIAGNOSIS — N18.30 CHRONIC KIDNEY DISEASE, STAGE III (MODERATE) (HCC): Primary | ICD-10-CM

## 2019-06-07 DIAGNOSIS — R76.8 LOW IMMUNOGLOBULIN LEVEL: ICD-10-CM

## 2019-06-07 DIAGNOSIS — M31.30 WEGENERS GRANULOMATOSIS: Chronic | ICD-10-CM

## 2019-06-07 PROCEDURE — 99214 OFFICE O/P EST MOD 30 MIN: CPT | Performed by: INTERNAL MEDICINE

## 2019-06-07 RX ORDER — FUROSEMIDE 20 MG/1
20 TABLET ORAL DAILY
Qty: 90 TABLET | Refills: 3 | Status: SHIPPED | OUTPATIENT
Start: 2019-06-07 | End: 2020-07-31 | Stop reason: SDUPTHER

## 2019-06-11 ENCOUNTER — OFFICE VISIT (OUTPATIENT)
Dept: SURGICAL ONCOLOGY | Facility: CLINIC | Age: 64
End: 2019-06-11
Payer: MEDICARE

## 2019-06-11 VITALS
WEIGHT: 235 LBS | HEART RATE: 89 BPM | RESPIRATION RATE: 17 BRPM | TEMPERATURE: 97.5 F | SYSTOLIC BLOOD PRESSURE: 146 MMHG | BODY MASS INDEX: 37.77 KG/M2 | DIASTOLIC BLOOD PRESSURE: 88 MMHG | HEIGHT: 66 IN

## 2019-06-11 DIAGNOSIS — Z15.01 BRCA1 POSITIVE: Primary | ICD-10-CM

## 2019-06-11 DIAGNOSIS — Z91.89 INCREASED RISK OF BREAST CANCER: ICD-10-CM

## 2019-06-11 DIAGNOSIS — Z15.09 BRCA1 POSITIVE: Primary | ICD-10-CM

## 2019-06-11 PROCEDURE — 99213 OFFICE O/P EST LOW 20 MIN: CPT | Performed by: SURGERY

## 2019-08-27 ENCOUNTER — OFFICE VISIT (OUTPATIENT)
Dept: CARDIOLOGY CLINIC | Facility: CLINIC | Age: 64
End: 2019-08-27
Payer: MEDICARE

## 2019-08-27 VITALS
DIASTOLIC BLOOD PRESSURE: 70 MMHG | SYSTOLIC BLOOD PRESSURE: 128 MMHG | HEART RATE: 72 BPM | HEIGHT: 66 IN | BODY MASS INDEX: 38.38 KG/M2 | WEIGHT: 238.8 LBS

## 2019-08-27 DIAGNOSIS — I51.7 LVH (LEFT VENTRICULAR HYPERTROPHY): ICD-10-CM

## 2019-08-27 DIAGNOSIS — I42.2 HYPERTROPHIC CARDIOMYOPATHY (HCC): Primary | ICD-10-CM

## 2019-08-27 PROCEDURE — 99214 OFFICE O/P EST MOD 30 MIN: CPT | Performed by: INTERNAL MEDICINE

## 2019-08-27 PROCEDURE — 1124F ACP DISCUSS-NO DSCNMKR DOCD: CPT | Performed by: INTERNAL MEDICINE

## 2019-08-27 NOTE — PATIENT INSTRUCTIONS
Please continue the metoprolol    Call the office if you have any questions    Have an echocardiogram done prior to your next appointment (in 1 year)

## 2019-08-29 NOTE — PROGRESS NOTES
HCM Consultation - Cardiology   Linda Contreras 75 y o  female MRN: [id number]  Unit/Bed#:  Encounter: [id number]      Active problem list:    Patient Active Problem List    Diagnosis Date Noted    Hypertrophic cardiomyopathy ([facility] ) 08/27/2019     Priority: Low    Low immunoglobulin level 06/07/2019     Priority: Low    LVH (left ventricular hypertrophy) 05/30/2019     Priority: Low    Postnasal drip 05/07/2019     Priority: Low    Dyspnea on exertion 05/07/2019     Priority: Low    Mitral valve disorder      Priority: Low    Pneumonia 04/03/2019     Priority: Low    Elevated troponin 04/03/2019     Priority: Low    Increased risk of breast cancer 11/15/2018     Priority: Low    Other complicated headache syndrome 07/11/2018     Priority: Low    Lethargy 07/11/2018     Priority: Low    Nausea & vomiting 07/11/2018     Priority: Low    SIRS (systemic inflammatory response syndrome) ([facility] ) 07/11/2018     Priority: Low    Post-traumatic osteoarthritis 05/14/2018     Priority: Low    Opiate analgesic use agreement exists 12/13/2016     Priority: Low    BRCA1 positive 06/21/2016     Priority: Low    Charcot's joint 01/20/2016     Priority: Low    Class 2 obesity 01/20/2016     Priority: Low    Wegeners granulomatosis ([facility] ) 01/15/2016     Priority: Low    Cancer of ovary ([facility] ) 04/09/2015     Priority: Low    Ovarian cancer ([place] [address] ) 04/09/2015     Priority: Low    Increased frequency of urination 08/22/2014     Priority: Low    Chronic kidney disease, stage III (moderate) (HCC) 03/26/2014     Priority: Low    Chronic pain disorder 10/24/2013     Priority: Low    GERD (gastroesophageal reflux disease) 09/27/2013     Priority: Low    Dyslipidemia 04/04/2013     Priority: Low    Lymphedema 11/15/2012     Priority: Low    Anxiety 06/05/2012     Priority: Low    Benign essential hypertension 06/05/2012     Priority: Low    Depression 06/05/2012     Priority: Low     Plan: Ms Dotty Matute has phenotypically mild hypertrophic obstructive cardiomyopathy and is currently asymptomatic on her limited degree of mobility due to her skeletal problem  She has no personal significant risk factor for sudden cardiac death and the circumstances of the death of her son is far from certain to make a case for primary prevention ICD particularly at her age and with the heightened risk of device infection  She does drink water liberally during the day and then uses low dose furosemide for diuresis  She is tolerating the metoprolol well  I do not believe she would benefit from extensive risk stratification and due to her asymptomatic status feel that her current treatment schedule is effective and adequate  Her blood pressure is well controlled and her kidney function has recovered from the acute injury sustained during recent sepsis  I have instructed her to inform me of any new heart symptoms and would otherwise see her for fullow up in a year time  Physician Requesting Consult: Cristofer Foster DO  Reason for Consult / Principal Problem: HOCM    HPI: Ms Marlen Pitts is a 75year old woman with past medical history of hypertension, ovarian cancer (, now in remission), Wegener's granulomatosis, low immunoglobulin status with recurrent sepsis, Charcot's knee and dyslipidemia who was recently admitted to hospital with sepsis and had non-coronary related troponin release  The sepsis was related to multifocal pneumonia for which she was treated with intravenous antibiotics  As part of the work up of troponin elevation she had a transthoracic echocardiogram (4-3-2019) that showed asymmetric left ventricular hypertrophy, NOAH and resting left ventricular outflow tract obstruction (gradient 64 mmHg)  She was treated with metoprolol and has remained asymptomatic since hospital discharge  Family history:  Mother  at age 77 from breast and pancreatic cancer, father  at age 80 without known heart disease, she has a 1/2 and a 1/2 brother from the father's side  Her sister is [de-identified]year old and has been told to have heart problem (?Ebstein's) and the brother who is 66year old is not known to have any cardiac problem  Her son  at age 28 at home possibly related to alcoholism although the circumstances were not well understood and the death certificate indicated atherosclerosis as the cause of death  She has another son who is 22years old and is a 4th year medical student at [employer] and has no health related issue  Review of Systems: Denies shortness of breath, chest pain, palpitation, dizziness or syncope  Historical Information   Past Medical History:   Diagnosis Date    Cancer ([facility] )     ovarian Ca with chemo    Lymphedema     MRSA (methicillin resistant Staphylococcus aureus) 2017    nasal swab negative 4/3/19    [facility]     CYST REMOVED RIGHT OVARY IN   HYSTERECTOMY 07       Past Surgical History:   Procedure Laterality Date    APPENDECTOMY      HYSTERECTOMY      OTHER SURGICAL HISTORY      right lower extremity due to trauma    CO TREAT TIBIAL SHAFT FX, INTRAMED IMPLANT Right 2017    Procedure: INSERTION NAIL IM TIBIA;  Surgeon: Crystal Caal MD;  [facility];  Service: Orthopedics    US GUIDED BREAST BIOPSY RIGHT COMPLETE Right 2016     Family History   Problem Relation Age of Onset    Breast cancer Mother     Ovarian cancer Maternal Grandmother     Breast cancer Maternal Aunt     No Known Problems Paternal Aunt      Current Outpatient Medications on File Prior to Visit   Medication Sig Dispense Refill    acetaminophen (TYLENOL) 325 mg tablet Take 2 tablets by mouth every 6 (six) hours as needed for mild pain 30 tablet 0    bisacodyl (DULCOLAX) 5 mg EC tablet Take 5 mg by mouth daily as needed for constipation      Calcium Carbonate-Vit D-Min (CALCIUM 1200 PO) Take 1 tablet by mouth daily       DULoxetine (CYMBALTA) 60 mg delayed release capsule Take 60 mg by mouth daily    0    furosemide (LASIX) 20 mg tablet Take 1 tablet (20 mg total) by mouth daily M W F BID PRN 90 tablet 3    LORazepam (ATIVAN) 0 5 mg tablet Take 1 tablet as needed every 8 hours  (Patient taking differently: Take 0 5 mg by mouth 2 (two) times a day Take 1 tablet as needed every 8 hours  ) 30 tablet 0    metoprolol tartrate (LOPRESSOR) 25 mg tablet Take 0 5 tablets (12 5 mg total) by mouth every 12 (twelve) hours 60 tablet 3    Multiple Vitamins-Minerals (MULTIVITAMIN ADULT PO) Take 1 capsule by mouth daily       omeprazole (PriLOSEC) 20 mg delayed release capsule Take 20 mg by mouth daily   oxyCODONE (OxyCONTIN) 40 mg 12 hr tablet Take 40 mg by mouth every 12 (twelve) hours      OXYCODONE HCL PO Take 15 mg by mouth 3 (three) times a day as needed       polyethylene glycol (MIRALAX) 17 g packet Take 17 g by mouth as needed       PREVIDENT 5000 DRY MOUTH 1 1 % GEL       TOVIAZ 8 MG TB24 Take 4 mg by mouth 2 (two) times a day         No current facility-administered medications on file prior to visit  Allergies   Allergen Reactions    Iodinated Diagnostic Agents Shortness Of Breath    Omnipaque [Iohexol] Shortness Of Breath    Other Shortness Of Breath     IVP dye, x ray dye 3    Iodides      Other reaction(s): SWELLING, SOB    Methotrexate Nausea Only     Headache and nausea    Methotrexate Derivatives Headache     Social History     Substance and Sexual Activity   Alcohol Use Yes    Frequency: Monthly or less     Social History     Substance and Sexual Activity   Drug Use No     Social History     Tobacco Use   Smoking Status Never Smoker   Smokeless Tobacco Never Used         Objective   Vitals: Blood pressure 128/70, pulse 72, height 5' 6" (1 676 m), weight 108 kg (238 lb 12 8 oz)  , Body mass index is 38 54 kg/m² ,     Invasive Devices     Peripheral Intravenous Line            Peripheral IV 04/06/19 Right Arm 145 days              Physical Exam:  GEN: Fernanda Oro appears well, alert and oriented x 3, pleasant and cooperative   HEENT: pupils equal, round, and reactive to light; extraocular muscles intact  NECK: supple, no carotid bruits   HEART: regular rhythm, normal S1 and S2, 2/6 systolic murmur at the base, clicks, gallops or rubs   LUNGS: clear to auscultation bilaterally; no wheezes, rales, or rhonchi   ABDOMEN: protuberant, normal bowel sounds, soft, no tenderness, no distention  EXTREMITIES: Marked lymphedema both lower extremities (wrapped in special bandage), peripheral pulses could not be felt, Marked deformity of right lower extremity   NEURO: no focal findings   SKIN: normal without suspicious lesions on exposed skin    Lab Results:   Labs:  Lab Results   Component Value Date    WBC 8 34 04/06/2019    RBC 3 59 (L) 04/06/2019    HGB 10 5 (L) 04/06/2019    HCT 34 5 (L) 04/06/2019    MCV 96 04/06/2019     (L) 04/06/2019    RDW 15 1 04/06/2019     Lab Results   Component Value Date     03/22/2015    K 3 9 04/06/2019     (H) 04/06/2019    CO2 21 04/06/2019    ANIONGAP 7 03/22/2015    BUN 15 04/06/2019    CREATININE 1 02 04/06/2019    EGFR 57 04/06/2019    GLUCOSE 88 03/22/2015    CALCIUM 8 5 04/06/2019    AST 44 04/02/2019    ALT 32 04/02/2019    ALKPHOS 209 (H) 04/02/2019    PROT 7 3 05/15/2015    BILITOT 0 39 05/15/2015     Lab Results   Component Value Date    MG 1 7 04/06/2019     No results found for: CHOL, HDL, TRIG, LDLCALC  Lab Results   Component Value Date    UCW9HCYCWPBD 0 954 01/30/2015       Imaging:   I have personally reviewed pertinent films in PACS    Cardiac testing:   Results for orders placed during the hospital encounter of 04/02/19   Echo complete with contrast if indicated    Narrative [address] [place] [address] 35    [place], [address]  [phone]    Transthoracic Echocardiogram  2D, M-mode, Doppler, and Color Doppler    Study date:  03-Apr-2019    Patient: Elza PAPPAS  MR number: [id number]  Account number: [id number]  : 1955  Age: 75 years  Gender: Female  Status: Inpatient  Location: Bedside  Height: 63 in  Weight: 241 6 lb  BP: 120/ 70 mmHg    Indications: Hypertension    Diagnoses: I10  - Essential (primary) hypertension    Sonographer:  Amberly Bowen RDCS  Primary Physician:  Jolie Gipson DO  Referring Physician:  Kenny Acosta MD  Group:  [facility]  Interpreting Physician:  Gene Recio MD    IMPRESSIONS:  Mild asymmetric hypertrophy of septum and systolic anterior motion noted  Can consider hypertrophic cardiomyopathy  Would repeat limited study with attention to thickness of left ventricle and gradient  SUMMARY    LEFT VENTRICLE:  A late peaking gradient of at least approximately 64 mmHg at rest is noted that is likely secondary to the systolic anterior motion of the mitral valve and hyperdynamic state  Systolic function was normal  Ejection fraction was estimated to be 60 %  Although no diagnostic regional wall motion abnormality was identified, this possibility cannot be completely excluded on the basis of this study  There was probable mild assymetrical hypertrophy (1 3 cm septum, 1 0 cm posterior wall)  MITRAL VALVE:  There was systolic anterior motion  There was trace to mild regurgitation  AORTA:  The abdominal aorta is not well visualized but appears aneurysmal and atheroslcerotic  Suggest additional imaing  HISTORY: PRIOR HISTORY: Myocardial infarction  Risk factors: morbid obesity  PROCEDURE: The procedure was performed at the bedside  This was a routine study  The transthoracic approach was used  The study included complete 2D imaging, M-mode, complete spectral Doppler, and color Doppler  The heart rate was 110 bpm,  at the start of the study  Echocardiographic views were limited due to decreased penetration and lung interference  This was a technically difficult study      LEFT VENTRICLE: A late peaking gradient of at least approximately 64 mmHg at rest is noted that is likely secondary to the systolic anterior motion of the mitral valve and hyperdynamic state  Size was normal  Systolic function was normal   Ejection fraction was estimated to be 60 %  Although no diagnostic regional wall motion abnormality was identified, this possibility cannot be completely excluded on the basis of this study  Wall thickness was normal  There was probable  mild assymetrical hypertrophy (1 3 cm septum, 1 0 cm posterior wall)  No evidence of apical thrombus  DOPPLER: The study was not technically sufficient to allow evaluation of LV diastolic function  RIGHT VENTRICLE: The size was normal  Systolic function was normal  Wall thickness was normal     LEFT ATRIUM: Size was normal     RIGHT ATRIUM: Size was normal     MITRAL VALVE: Valve structure was normal  There was normal leaflet separation  There was systolic anterior motion  DOPPLER: The transmitral velocity was within the normal range  There was no evidence for stenosis  There was trace to mild  regurgitation  AORTIC VALVE: The valve was trileaflet  Leaflets exhibited normal thickness and normal cuspal separation  DOPPLER: Transaortic velocity was within the normal range  There was no evidence for stenosis  There was no significant  regurgitation  TRICUSPID VALVE: The valve structure was normal  There was normal leaflet separation  DOPPLER: The transtricuspid velocity was within the normal range  There was no evidence for stenosis  There was no significant regurgitation  PULMONIC VALVE: Leaflets exhibited normal thickness, no calcification, and normal cuspal separation  DOPPLER: The transpulmonic velocity was within the normal range  There was no significant regurgitation  PERICARDIUM: There was no pericardial effusion  The pericardium was normal in appearance  AORTA: The root exhibited normal size   The abdominal aorta is not well visualized but appears aneurysmal and atheroslcerotic  Suggest additional imaing  SYSTEMIC VEINS: IVC: The inferior vena cava was normal in size  SYSTEM MEASUREMENT TABLES    2D  %FS: 29 6 %  Ao Diam: 2 8 cm  EDV(Teich): 42 ml  EF(Teich): 57 9 %  ESV(Teich): 17 7 ml  IVSd: 1 7 cm  LA Area: 15 3 cm2  LA Diam: 4 3 cm  LVEDV MOD A4C: 118 1 ml  LVEF MOD A4C: 68 2 %  LVESV MOD A4C: 37 6 ml  LVIDd: 3 2 cm  LVIDs: 2 3 cm  LVLd A4C: 8 3 cm  LVLs A4C: 6 5 cm  LVPWd: 1 7 cm  RA Area: 14 8 cm2  RVIDd: 3 cm  SV MOD A4C: 80 5 ml  SV(Teich): 24 3 ml    CW  TR Vmax: 2 1 m/s  TR maxP 5 mmHg    MM  TAPSE: 1 7 cm    PW  E': 0 1 m/s  E/E': 7 5  MV A Franklyn: 1 m/s  MV Dec Stark: 3 1 m/s2  MV DecT: 260 8 ms  MV E Franklyn: 0 8 m/s  MV E/A Ratio: 0 8  MV PHT: 75 6 ms  MVA By PHT: 2 9 cm2    [employer] Accredited Echocardiography Laboratory    Prepared and electronically signed by    Scotty Hickey MD  Signed 2019 15:51:10         Counseling / Coordination of Care  Total floor / unit time spent today 60 minutes  Greater than 50% of total time was spent with the patient and / or family counseling and / or coordination of care

## 2019-11-07 ENCOUNTER — HOSPITAL ENCOUNTER (INPATIENT)
Facility: HOSPITAL | Age: 64
LOS: 7 days | Discharge: HOME HEALTH | DRG: 871 | End: 2019-11-14
Attending: EMERGENCY MEDICINE | Admitting: INTERNAL MEDICINE
Payer: MEDICARE

## 2019-11-07 ENCOUNTER — APPOINTMENT (INPATIENT)
Dept: NON INVASIVE DIAGNOSTICS | Facility: HOSPITAL | Age: 64
DRG: 871 | End: 2019-11-07
Payer: MEDICARE

## 2019-11-07 ENCOUNTER — APPOINTMENT (EMERGENCY)
Dept: CT IMAGING | Facility: HOSPITAL | Age: 64
DRG: 871 | End: 2019-11-07
Payer: MEDICARE

## 2019-11-07 ENCOUNTER — APPOINTMENT (INPATIENT)
Dept: GASTROENTEROLOGY | Facility: HOSPITAL | Age: 64
DRG: 871 | End: 2019-11-07
Payer: MEDICARE

## 2019-11-07 ENCOUNTER — APPOINTMENT (EMERGENCY)
Dept: RADIOLOGY | Facility: HOSPITAL | Age: 64
DRG: 871 | End: 2019-11-07
Payer: MEDICARE

## 2019-11-07 DIAGNOSIS — N17.9 AKI (ACUTE KIDNEY INJURY) (HCC): ICD-10-CM

## 2019-11-07 DIAGNOSIS — A41.9 SEVERE SEPSIS (HCC): ICD-10-CM

## 2019-11-07 DIAGNOSIS — R65.20 SEVERE SEPSIS (HCC): ICD-10-CM

## 2019-11-07 DIAGNOSIS — R50.9 FEVER: ICD-10-CM

## 2019-11-07 DIAGNOSIS — J18.9 PNEUMONIA: ICD-10-CM

## 2019-11-07 DIAGNOSIS — E87.2 LACTIC ACID INCREASED: ICD-10-CM

## 2019-11-07 DIAGNOSIS — D72.829 LEUKOCYTOSIS: ICD-10-CM

## 2019-11-07 DIAGNOSIS — J96.01 ACUTE RESPIRATORY FAILURE WITH HYPOXIA (HCC): ICD-10-CM

## 2019-11-07 DIAGNOSIS — L03.115 CELLULITIS OF RIGHT LEG: ICD-10-CM

## 2019-11-07 DIAGNOSIS — R41.82 ALTERED MENTAL STATUS: Primary | ICD-10-CM

## 2019-11-07 DIAGNOSIS — R78.81 BACTEREMIA: ICD-10-CM

## 2019-11-07 PROBLEM — G93.41 ACUTE METABOLIC ENCEPHALOPATHY: Status: ACTIVE | Noted: 2019-04-02

## 2019-11-07 LAB
ALBUMIN SERPL BCP-MCNC: 3.7 G/DL (ref 3.5–5)
ALP SERPL-CCNC: 208 U/L (ref 46–116)
ALT SERPL W P-5'-P-CCNC: 43 U/L (ref 12–78)
ANION GAP SERPL CALCULATED.3IONS-SCNC: 13 MMOL/L (ref 4–13)
APTT PPP: 25 SECONDS (ref 23–37)
AST SERPL W P-5'-P-CCNC: 65 U/L (ref 5–45)
ATRIAL RATE: 124 BPM
BASOPHILS # BLD AUTO: 0.02 THOUSANDS/ΜL (ref 0–0.1)
BASOPHILS NFR BLD AUTO: 0 % (ref 0–1)
BILIRUB SERPL-MCNC: 0.8 MG/DL (ref 0.2–1)
BILIRUB UR QL STRIP: NEGATIVE
BUN SERPL-MCNC: 22 MG/DL (ref 5–25)
CALCIUM SERPL-MCNC: 9.3 MG/DL (ref 8.3–10.1)
CHLORIDE SERPL-SCNC: 100 MMOL/L (ref 100–108)
CLARITY UR: CLEAR
CO2 SERPL-SCNC: 27 MMOL/L (ref 21–32)
COLOR UR: YELLOW
CREAT SERPL-MCNC: 1.38 MG/DL (ref 0.6–1.3)
EOSINOPHIL # BLD AUTO: 0.01 THOUSAND/ΜL (ref 0–0.61)
EOSINOPHIL NFR BLD AUTO: 0 % (ref 0–6)
ERYTHROCYTE [DISTWIDTH] IN BLOOD BY AUTOMATED COUNT: 13.2 % (ref 11.6–15.1)
ERYTHROCYTE [DISTWIDTH] IN BLOOD BY AUTOMATED COUNT: 13.4 % (ref 11.6–15.1)
FLUAV RNA NPH QL NAA+PROBE: NORMAL
FLUBV RNA NPH QL NAA+PROBE: NORMAL
GFR SERPL CREATININE-BSD FRML MDRD: 40 ML/MIN/1.73SQ M
GLUCOSE SERPL-MCNC: 137 MG/DL (ref 65–140)
GLUCOSE UR STRIP-MCNC: NEGATIVE MG/DL
HCT VFR BLD AUTO: 42 % (ref 34.8–46.1)
HCT VFR BLD AUTO: 48.2 % (ref 34.8–46.1)
HGB BLD-MCNC: 12.9 G/DL (ref 11.5–15.4)
HGB BLD-MCNC: 15.3 G/DL (ref 11.5–15.4)
HGB UR QL STRIP.AUTO: NEGATIVE
IMM GRANULOCYTES # BLD AUTO: 0.06 THOUSAND/UL (ref 0–0.2)
IMM GRANULOCYTES NFR BLD AUTO: 1 % (ref 0–2)
INR PPP: 1.05 (ref 0.84–1.19)
KETONES UR STRIP-MCNC: NEGATIVE MG/DL
LACTATE SERPL-SCNC: 3.1 MMOL/L (ref 0.5–2)
LACTATE SERPL-SCNC: 5.4 MMOL/L (ref 0.5–2)
LEUKOCYTE ESTERASE UR QL STRIP: NEGATIVE
LYMPHOCYTES # BLD AUTO: 0.66 THOUSANDS/ΜL (ref 0.6–4.47)
LYMPHOCYTES NFR BLD AUTO: 6 % (ref 14–44)
MCH RBC QN AUTO: 30.4 PG (ref 26.8–34.3)
MCH RBC QN AUTO: 30.5 PG (ref 26.8–34.3)
MCHC RBC AUTO-ENTMCNC: 30.7 G/DL (ref 31.4–37.4)
MCHC RBC AUTO-ENTMCNC: 31.7 G/DL (ref 31.4–37.4)
MCV RBC AUTO: 96 FL (ref 82–98)
MCV RBC AUTO: 99 FL (ref 82–98)
MONOCYTES # BLD AUTO: 0.12 THOUSAND/ΜL (ref 0.17–1.22)
MONOCYTES NFR BLD AUTO: 1 % (ref 4–12)
NEUTROPHILS # BLD AUTO: 10.43 THOUSANDS/ΜL (ref 1.85–7.62)
NEUTS SEG NFR BLD AUTO: 92 % (ref 43–75)
NITRITE UR QL STRIP: NEGATIVE
NRBC BLD AUTO-RTO: 0 /100 WBCS
P AXIS: 43 DEGREES
PH UR STRIP.AUTO: 5.5 [PH]
PLATELET # BLD AUTO: 192 THOUSANDS/UL (ref 149–390)
PLATELET # BLD AUTO: 209 THOUSANDS/UL (ref 149–390)
PMV BLD AUTO: 8.7 FL (ref 8.9–12.7)
PMV BLD AUTO: 9.3 FL (ref 8.9–12.7)
POTASSIUM SERPL-SCNC: 3.5 MMOL/L (ref 3.5–5.3)
PR INTERVAL: 140 MS
PROCALCITONIN SERPL-MCNC: 7.61 NG/ML
PROT SERPL-MCNC: 6.6 G/DL (ref 6.4–8.2)
PROT UR STRIP-MCNC: NEGATIVE MG/DL
PROTHROMBIN TIME: 13.8 SECONDS (ref 11.6–14.5)
QRS AXIS: 99 DEGREES
QRSD INTERVAL: 80 MS
QT INTERVAL: 318 MS
QTC INTERVAL: 456 MS
RBC # BLD AUTO: 4.25 MILLION/UL (ref 3.81–5.12)
RBC # BLD AUTO: 5.01 MILLION/UL (ref 3.81–5.12)
RSV RNA NPH QL NAA+PROBE: NORMAL
SODIUM SERPL-SCNC: 140 MMOL/L (ref 136–145)
SP GR UR STRIP.AUTO: 1.01 (ref 1–1.03)
T WAVE AXIS: 21 DEGREES
UROBILINOGEN UR QL STRIP.AUTO: 0.2 E.U./DL
VENTRICULAR RATE: 124 BPM
WBC # BLD AUTO: 11.3 THOUSAND/UL (ref 4.31–10.16)
WBC # BLD AUTO: 25.77 THOUSAND/UL (ref 4.31–10.16)

## 2019-11-07 PROCEDURE — 83605 ASSAY OF LACTIC ACID: CPT | Performed by: EMERGENCY MEDICINE

## 2019-11-07 PROCEDURE — 93971 EXTREMITY STUDY: CPT | Performed by: SURGERY

## 2019-11-07 PROCEDURE — 36415 COLL VENOUS BLD VENIPUNCTURE: CPT | Performed by: EMERGENCY MEDICINE

## 2019-11-07 PROCEDURE — G8996 SWALLOW CURRENT STATUS: HCPCS

## 2019-11-07 PROCEDURE — 88112 CYTOPATH CELL ENHANCE TECH: CPT | Performed by: PATHOLOGY

## 2019-11-07 PROCEDURE — 88305 TISSUE EXAM BY PATHOLOGIST: CPT | Performed by: PATHOLOGY

## 2019-11-07 PROCEDURE — 92610 EVALUATE SWALLOWING FUNCTION: CPT

## 2019-11-07 PROCEDURE — 99223 1ST HOSP IP/OBS HIGH 75: CPT | Performed by: INTERNAL MEDICINE

## 2019-11-07 PROCEDURE — 80053 COMPREHEN METABOLIC PANEL: CPT | Performed by: EMERGENCY MEDICINE

## 2019-11-07 PROCEDURE — 70450 CT HEAD/BRAIN W/O DYE: CPT

## 2019-11-07 PROCEDURE — 96365 THER/PROPH/DIAG IV INF INIT: CPT

## 2019-11-07 PROCEDURE — 71045 X-RAY EXAM CHEST 1 VIEW: CPT

## 2019-11-07 PROCEDURE — 85027 COMPLETE CBC AUTOMATED: CPT | Performed by: NURSE PRACTITIONER

## 2019-11-07 PROCEDURE — 87040 BLOOD CULTURE FOR BACTERIA: CPT | Performed by: EMERGENCY MEDICINE

## 2019-11-07 PROCEDURE — 99285 EMERGENCY DEPT VISIT HI MDM: CPT

## 2019-11-07 PROCEDURE — 87181 SC STD AGAR DILUTION PER AGT: CPT | Performed by: EMERGENCY MEDICINE

## 2019-11-07 PROCEDURE — G8997 SWALLOW GOAL STATUS: HCPCS

## 2019-11-07 PROCEDURE — 93971 EXTREMITY STUDY: CPT

## 2019-11-07 PROCEDURE — G8998 SWALLOW D/C STATUS: HCPCS

## 2019-11-07 PROCEDURE — 87116 MYCOBACTERIA CULTURE: CPT | Performed by: INTERNAL MEDICINE

## 2019-11-07 PROCEDURE — 0B948ZX DRAINAGE OF RIGHT UPPER LOBE BRONCHUS, VIA NATURAL OR ARTIFICIAL OPENING ENDOSCOPIC, DIAGNOSTIC: ICD-10-PCS | Performed by: INTERNAL MEDICINE

## 2019-11-07 PROCEDURE — 87070 CULTURE OTHR SPECIMN AEROBIC: CPT | Performed by: INTERNAL MEDICINE

## 2019-11-07 PROCEDURE — 71250 CT THORAX DX C-: CPT

## 2019-11-07 PROCEDURE — 84145 PROCALCITONIN (PCT): CPT | Performed by: EMERGENCY MEDICINE

## 2019-11-07 PROCEDURE — 87281 PNEUMOCYSTIS CARINII AG IF: CPT | Performed by: INTERNAL MEDICINE

## 2019-11-07 PROCEDURE — 81003 URINALYSIS AUTO W/O SCOPE: CPT | Performed by: EMERGENCY MEDICINE

## 2019-11-07 PROCEDURE — 93005 ELECTROCARDIOGRAM TRACING: CPT

## 2019-11-07 PROCEDURE — 99285 EMERGENCY DEPT VISIT HI MDM: CPT | Performed by: EMERGENCY MEDICINE

## 2019-11-07 PROCEDURE — 85730 THROMBOPLASTIN TIME PARTIAL: CPT | Performed by: EMERGENCY MEDICINE

## 2019-11-07 PROCEDURE — 87102 FUNGUS ISOLATION CULTURE: CPT | Performed by: INTERNAL MEDICINE

## 2019-11-07 PROCEDURE — 96360 HYDRATION IV INFUSION INIT: CPT

## 2019-11-07 PROCEDURE — 31624 DX BRONCHOSCOPE/LAVAGE: CPT | Performed by: INTERNAL MEDICINE

## 2019-11-07 PROCEDURE — 89051 BODY FLUID CELL COUNT: CPT | Performed by: PATHOLOGY

## 2019-11-07 PROCEDURE — 87147 CULTURE TYPE IMMUNOLOGIC: CPT | Performed by: EMERGENCY MEDICINE

## 2019-11-07 PROCEDURE — 87206 SMEAR FLUORESCENT/ACID STAI: CPT | Performed by: INTERNAL MEDICINE

## 2019-11-07 PROCEDURE — 85025 COMPLETE CBC W/AUTO DIFF WBC: CPT | Performed by: EMERGENCY MEDICINE

## 2019-11-07 PROCEDURE — 87631 RESP VIRUS 3-5 TARGETS: CPT | Performed by: NURSE PRACTITIONER

## 2019-11-07 PROCEDURE — 93010 ELECTROCARDIOGRAM REPORT: CPT | Performed by: INTERNAL MEDICINE

## 2019-11-07 PROCEDURE — 87015 SPECIMEN INFECT AGNT CONCNTJ: CPT | Performed by: INTERNAL MEDICINE

## 2019-11-07 PROCEDURE — NC001 PR NO CHARGE: Performed by: INTERNAL MEDICINE

## 2019-11-07 PROCEDURE — 85610 PROTHROMBIN TIME: CPT | Performed by: EMERGENCY MEDICINE

## 2019-11-07 RX ORDER — FENTANYL CITRATE 50 UG/ML
25 INJECTION, SOLUTION INTRAMUSCULAR; INTRAVENOUS ONCE
Status: COMPLETED | OUTPATIENT
Start: 2019-11-07 | End: 2019-11-07

## 2019-11-07 RX ORDER — HEPARIN SODIUM 5000 [USP'U]/ML
5000 INJECTION, SOLUTION INTRAVENOUS; SUBCUTANEOUS EVERY 8 HOURS SCHEDULED
Status: DISCONTINUED | OUTPATIENT
Start: 2019-11-07 | End: 2019-11-14 | Stop reason: HOSPADM

## 2019-11-07 RX ORDER — MIDAZOLAM HYDROCHLORIDE 2 MG/2ML
INJECTION, SOLUTION INTRAMUSCULAR; INTRAVENOUS
Status: COMPLETED
Start: 2019-11-07 | End: 2019-11-07

## 2019-11-07 RX ORDER — ONDANSETRON 2 MG/ML
4 INJECTION INTRAMUSCULAR; INTRAVENOUS EVERY 6 HOURS PRN
Status: DISCONTINUED | OUTPATIENT
Start: 2019-11-07 | End: 2019-11-07

## 2019-11-07 RX ORDER — LIDOCAINE HYDROCHLORIDE 20 MG/ML
JELLY TOPICAL
Status: DISCONTINUED
Start: 2019-11-07 | End: 2019-11-07 | Stop reason: WASHOUT

## 2019-11-07 RX ORDER — LEVOFLOXACIN 5 MG/ML
750 INJECTION, SOLUTION INTRAVENOUS ONCE
Status: COMPLETED | OUTPATIENT
Start: 2019-11-07 | End: 2019-11-07

## 2019-11-07 RX ORDER — FENTANYL CITRATE 50 UG/ML
100 INJECTION, SOLUTION INTRAMUSCULAR; INTRAVENOUS ONCE
Status: COMPLETED | OUTPATIENT
Start: 2019-11-07 | End: 2019-11-07

## 2019-11-07 RX ORDER — OXYCODONE HCL 20 MG/1
40 TABLET, FILM COATED, EXTENDED RELEASE ORAL EVERY 12 HOURS SCHEDULED
Status: DISCONTINUED | OUTPATIENT
Start: 2019-11-07 | End: 2019-11-14 | Stop reason: HOSPADM

## 2019-11-07 RX ORDER — LIDOCAINE HYDROCHLORIDE 20 MG/ML
1 JELLY TOPICAL ONCE
Status: CANCELLED | OUTPATIENT
Start: 2019-11-07 | End: 2019-11-07

## 2019-11-07 RX ORDER — LIDOCAINE HYDROCHLORIDE 20 MG/ML
1 JELLY TOPICAL ONCE
Status: DISCONTINUED | OUTPATIENT
Start: 2019-11-07 | End: 2019-11-14 | Stop reason: HOSPADM

## 2019-11-07 RX ORDER — CHLORHEXIDINE GLUCONATE 0.12 MG/ML
15 RINSE ORAL EVERY 12 HOURS SCHEDULED
Status: DISCONTINUED | OUTPATIENT
Start: 2019-11-07 | End: 2019-11-07

## 2019-11-07 RX ORDER — HYDROMORPHONE HCL/PF 1 MG/ML
0.5 SYRINGE (ML) INJECTION
Status: DISCONTINUED | OUTPATIENT
Start: 2019-11-07 | End: 2019-11-07

## 2019-11-07 RX ORDER — ACETAMINOPHEN 650 MG/1
650 SUPPOSITORY RECTAL ONCE
Status: COMPLETED | OUTPATIENT
Start: 2019-11-07 | End: 2019-11-07

## 2019-11-07 RX ORDER — POTASSIUM CHLORIDE 20 MEQ/1
40 TABLET, EXTENDED RELEASE ORAL ONCE
Status: COMPLETED | OUTPATIENT
Start: 2019-11-07 | End: 2019-11-07

## 2019-11-07 RX ORDER — FENTANYL CITRATE 50 UG/ML
INJECTION, SOLUTION INTRAMUSCULAR; INTRAVENOUS
Status: COMPLETED
Start: 2019-11-07 | End: 2019-11-07

## 2019-11-07 RX ORDER — LIDOCAINE HYDROCHLORIDE 10 MG/ML
30 INJECTION, SOLUTION EPIDURAL; INFILTRATION; INTRACAUDAL; PERINEURAL ONCE
Status: COMPLETED | OUTPATIENT
Start: 2019-11-07 | End: 2019-11-07

## 2019-11-07 RX ORDER — SODIUM CHLORIDE 9 MG/ML
3 INJECTION INTRAVENOUS AS NEEDED
Status: DISCONTINUED | OUTPATIENT
Start: 2019-11-07 | End: 2019-11-14 | Stop reason: HOSPADM

## 2019-11-07 RX ORDER — MIDAZOLAM HYDROCHLORIDE 2 MG/2ML
6 INJECTION, SOLUTION INTRAMUSCULAR; INTRAVENOUS ONCE
Status: COMPLETED | OUTPATIENT
Start: 2019-11-07 | End: 2019-11-07

## 2019-11-07 RX ORDER — ACETAMINOPHEN 325 MG/1
650 TABLET ORAL EVERY 6 HOURS PRN
Status: DISCONTINUED | OUTPATIENT
Start: 2019-11-07 | End: 2019-11-08

## 2019-11-07 RX ORDER — DULOXETIN HYDROCHLORIDE 60 MG/1
60 CAPSULE, DELAYED RELEASE ORAL DAILY
Status: DISCONTINUED | OUTPATIENT
Start: 2019-11-08 | End: 2019-11-14 | Stop reason: HOSPADM

## 2019-11-07 RX ORDER — ONDANSETRON 2 MG/ML
4 INJECTION INTRAMUSCULAR; INTRAVENOUS EVERY 4 HOURS PRN
Status: DISCONTINUED | OUTPATIENT
Start: 2019-11-07 | End: 2019-11-14 | Stop reason: HOSPADM

## 2019-11-07 RX ORDER — SODIUM CHLORIDE 9 MG/ML
100 INJECTION, SOLUTION INTRAVENOUS CONTINUOUS
Status: DISCONTINUED | OUTPATIENT
Start: 2019-11-07 | End: 2019-11-12

## 2019-11-07 RX ORDER — LORAZEPAM 1 MG/1
0.5 TABLET ORAL 2 TIMES DAILY
Status: DISCONTINUED | OUTPATIENT
Start: 2019-11-07 | End: 2019-11-14 | Stop reason: HOSPADM

## 2019-11-07 RX ORDER — ACETAMINOPHEN 650 MG/1
650 SUPPOSITORY RECTAL EVERY 4 HOURS PRN
Status: DISCONTINUED | OUTPATIENT
Start: 2019-11-07 | End: 2019-11-07

## 2019-11-07 RX ORDER — LIDOCAINE HYDROCHLORIDE 10 MG/ML
INJECTION, SOLUTION EPIDURAL; INFILTRATION; INTRACAUDAL; PERINEURAL
Status: DISPENSED
Start: 2019-11-07 | End: 2019-11-08

## 2019-11-07 RX ADMIN — HEPARIN SODIUM 5000 UNITS: 5000 INJECTION INTRAVENOUS; SUBCUTANEOUS at 08:53

## 2019-11-07 RX ADMIN — CEFEPIME HYDROCHLORIDE 2000 MG: 2 INJECTION, POWDER, FOR SOLUTION INTRAVENOUS at 17:23

## 2019-11-07 RX ADMIN — ONDANSETRON 4 MG: 2 INJECTION INTRAMUSCULAR; INTRAVENOUS at 14:56

## 2019-11-07 RX ADMIN — METRONIDAZOLE 500 MG: 500 INJECTION, SOLUTION INTRAVENOUS at 05:20

## 2019-11-07 RX ADMIN — LORAZEPAM 0.5 MG: 1 TABLET ORAL at 17:43

## 2019-11-07 RX ADMIN — FENTANYL CITRATE 25 MCG: 50 INJECTION INTRAMUSCULAR; INTRAVENOUS at 11:52

## 2019-11-07 RX ADMIN — FENTANYL CITRATE 100 MCG: 50 INJECTION INTRAMUSCULAR; INTRAVENOUS at 16:36

## 2019-11-07 RX ADMIN — OXYCODONE HYDROCHLORIDE 40 MG: 20 TABLET, FILM COATED, EXTENDED RELEASE ORAL at 18:54

## 2019-11-07 RX ADMIN — SODIUM CHLORIDE 1000 ML: 0.9 INJECTION, SOLUTION INTRAVENOUS at 06:50

## 2019-11-07 RX ADMIN — SODIUM CHLORIDE 100 ML/HR: 0.9 INJECTION, SOLUTION INTRAVENOUS at 10:29

## 2019-11-07 RX ADMIN — VANCOMYCIN HYDROCHLORIDE 750 MG: 750 INJECTION, SOLUTION INTRAVENOUS at 19:50

## 2019-11-07 RX ADMIN — MIDAZOLAM HYDROCHLORIDE 2 MG: 2 INJECTION, SOLUTION INTRAMUSCULAR; INTRAVENOUS at 16:34

## 2019-11-07 RX ADMIN — LEVOFLOXACIN 750 MG: 5 INJECTION, SOLUTION INTRAVENOUS at 06:30

## 2019-11-07 RX ADMIN — OXYCODONE HYDROCHLORIDE 15 MG: 10 TABLET ORAL at 17:44

## 2019-11-07 RX ADMIN — SODIUM CHLORIDE 1000 ML: 0.9 INJECTION, SOLUTION INTRAVENOUS at 06:55

## 2019-11-07 RX ADMIN — HEPARIN SODIUM 5000 UNITS: 5000 INJECTION INTRAVENOUS; SUBCUTANEOUS at 14:56

## 2019-11-07 RX ADMIN — SODIUM CHLORIDE 100 ML/HR: 0.9 INJECTION, SOLUTION INTRAVENOUS at 17:02

## 2019-11-07 RX ADMIN — FENTANYL CITRATE 25 MCG: 50 INJECTION INTRAMUSCULAR; INTRAVENOUS at 16:38

## 2019-11-07 RX ADMIN — LIDOCAINE HYDROCHLORIDE 30 ML: 10 INJECTION, SOLUTION EPIDURAL; INFILTRATION; INTRACAUDAL; PERINEURAL at 16:39

## 2019-11-07 RX ADMIN — POTASSIUM CHLORIDE 40 MEQ: 1500 TABLET, EXTENDED RELEASE ORAL at 08:53

## 2019-11-07 RX ADMIN — MIDAZOLAM 6 MG: 1 INJECTION INTRAMUSCULAR; INTRAVENOUS at 16:33

## 2019-11-07 RX ADMIN — SODIUM CHLORIDE 1000 ML: 0.9 INJECTION, SOLUTION INTRAVENOUS at 05:45

## 2019-11-07 RX ADMIN — ACETAMINOPHEN 650 MG: 650 SUPPOSITORY RECTAL at 06:07

## 2019-11-07 RX ADMIN — VANCOMYCIN HYDROCHLORIDE 1500 MG: 1 INJECTION, POWDER, LYOPHILIZED, FOR SOLUTION INTRAVENOUS at 08:50

## 2019-11-07 RX ADMIN — HEPARIN SODIUM 5000 UNITS: 5000 INJECTION INTRAVENOUS; SUBCUTANEOUS at 21:30

## 2019-11-07 RX ADMIN — ACETAMINOPHEN 650 MG: 325 TABLET ORAL at 19:29

## 2019-11-07 RX ADMIN — FENTANYL CITRATE 100 MCG: 50 INJECTION, SOLUTION INTRAMUSCULAR; INTRAVENOUS at 16:36

## 2019-11-07 RX ADMIN — HYDROMORPHONE HYDROCHLORIDE 0.5 MG: 1 INJECTION, SOLUTION INTRAMUSCULAR; INTRAVENOUS; SUBCUTANEOUS at 14:55

## 2019-11-07 RX ADMIN — MIDAZOLAM 2 MG: 1 INJECTION INTRAMUSCULAR; INTRAVENOUS at 16:34

## 2019-11-07 RX ADMIN — SODIUM CHLORIDE 1000 ML: 0.9 INJECTION, SOLUTION INTRAVENOUS at 05:07

## 2019-11-07 RX ADMIN — MIDAZOLAM HYDROCHLORIDE 6 MG: 2 INJECTION, SOLUTION INTRAMUSCULAR; INTRAVENOUS at 16:33

## 2019-11-07 RX ADMIN — ONDANSETRON 4 MG: 2 INJECTION INTRAMUSCULAR; INTRAVENOUS at 19:50

## 2019-11-07 NOTE — ASSESSMENT & PLAN NOTE
· Secondary to multilobar pneumonia  · Continue to wean supplemental O2 for SpO2 >90  · Encourage cough, deep breathing, IS, ambulation

## 2019-11-07 NOTE — SPEECH THERAPY NOTE
Speech Language/Pathology  Speech/Language Pathology  Assessment    Patient Name: Charlene Bean  SMDPE'M Date: 11/7/2019     Problem List  Principal Problem:    Severe sepsis [facility])  Active Problems:    Lymphedema    Acute metabolic encephalopathy    RUCHI (acute kidney injury) ([facility] )    Hypertrophic cardiomyopathy ([place] [address] )    Acute respiratory failure with hypoxia [facility])    Past Medical History  Past Medical History:   Diagnosis Date    Cancer ([place] [address] )     ovarian Ca with chemo    Lymphedema     MRSA (methicillin resistant Staphylococcus aureus) 09/05/2017    nasal swab negative 4/3/19    Ovarian cancer ([place] [address] )     CYST REMOVED RIGHT OVARY IN 2007  HYSTERECTOMY 02/14/07  Past Surgical History  Past Surgical History:   Procedure Laterality Date    APPENDECTOMY      HYSTERECTOMY      OTHER SURGICAL HISTORY      right lower extremity due to trauma    [place] TREAT TIBIAL SHAFT FX, INTRAMED IMPLANT Right 9/6/2017    Procedure: INSERTION NAIL IM TIBIA;  Surgeon: Mann Mueller MD;  Location: [facility];  Service: Orthopedics    US GUIDED BREAST BIOPSY RIGHT COMPLETE Right 5/9/2016     History of Present Illness  HX and PE limited by: lethargy  Charlene Bean is a 75 y o  female who presents withwho presents with lethargy  She has past medical history of wegeners granulomatosis now in remission, HOCM, CKD stage 3 with baseline Cr 1-1 2  She has been treated multiple times in the past for pneumonia  Per her , she was in her normal state of health last evening, and when he woke her this morning around 0100 she was lethargic and disoriented  On arrival to the ED she was found to be hypoxic, tachycardic, and febrile  Sepsis alert was called  Lactic found to be 5 4  She was given IVF per protocol and started on broad spectrum abx  Ct chest: 11/7  IMPRESSION:  1  Bilateral infiltrates, most compatible with multifocal/multi lobar pneumonia  By history, the patient has Wegener's granulomatosis    2   Distended debris-filled stomach with reflux of ingested debris into the distal esophagus  Thickening and dilatation of the entire esophagus  Correlate for reflux esophagitis and aspiration pneumonia        Bedside Swallow Evaluation:    Summary:  Pt presents w/ no s/s aspiration  Both pt and  deny pt has any h/o swallowing difficulty  ? Reflux given CT findings  Pt is on a PPI  May want to consider a GI consult  ? Risk for bottom-up aspiration  Recommendations:  Diet: regular as tolerated  Avoid dense/dry foods as needed  H/o dry mouth  Liquid: thin  Meds:as tolerated  Supervision:assist of needed  Positioning:Upright  Strategies: Pt to take PO/Meds only when fully alert and upright  Oral care  Reflux precautions  Eval only, No f/u tx indicated  Patient's goal:wanted pain meds  Nurse aware  Consider consult w/:  Nutrition    Reason for consult:  R/o aspiration  Findings on CT chest    Precautions:  Droplet  Current diet:  npo  Premorbid diet[de-identified]  regular  Previous VBS:  No VBS  Pt was seen at the bedside by this SLP 4/3/19:  Summary:  Pt presents w/ no s/s aspiration  Oral stage was wnl for items taken  Pt denies any h/o or current difficulty swallowing  Reports she feels a little nauseaus sometimes  Recommendations:  Diet:regular  Recommended she avoid dry foods due to reported dry mouth  (add condiments, take drinks in between, etc)  Liquid: thin  Meds:as tolerated  Supervision:none  Positioning:Upright  Strategies: Pt to take PO/Meds only when fully alert and upright  Oral care  Reflux precautions  Eval only, No f/u tx indicated   4/3/19  O2 requirement:  2L nc  Voice/Speech:  wnl  Social:  Home w/   Follows commands:  yes                       Cognitive Status:  A&O  Oral [facility] exam:  Dentition:natural  Labial strength and ROM:wnl  Lingual strength and ROM:wnl  Secretion management: wnl, mouth dry    Items administered:  Liquids    Oral stage:wnl  Lip closure:wnl  Mastication:na  Bolus formation:wnl  Bolus control:wnl  Transfer:wnl  Oral residue:none    Pharyngeal stage:wnl  Swallow promptness:prompt  Laryngeal rise:wnl  Wet voice:no  Throat clear:no  Cough:no  Secondary swallows:no  Audible swallows:no  No overt s/s aspiration    Esophageal stage:  H/o GERD  Distended debris-filled stomach with reflux of ingested debris into the distal esophagus  Thickening and dilatation of the entire esophagus       Results d/w:  Pt, nursing, crnp

## 2019-11-07 NOTE — PLAN OF CARE
Problem: Potential for Falls  Goal: Patient will remain free of falls  Description  INTERVENTIONS:  - Assess patient frequently for physical needs  -  Identify cognitive and physical deficits and behaviors that affect risk of falls    -  [place] fall precautions as indicated by assessment   - Educate patient/family on patient safety including physical limitations  - Instruct patient to call for assistance with activity based on assessment  - Modify environment to reduce risk of injury  - Consider OT/PT consult to assist with strengthening/mobility  Outcome: Progressing     Problem: SKIN/TISSUE INTEGRITY - ADULT  Goal: Incision(s), wounds(s) or drain site(s) healing without S/S of infection  Description  INTERVENTIONS  - Assess and document risk factors for skin impairment   - Assess and document dressing, incision, wound bed, drain sites and surrounding tissue  - Consider nutrition services referral as needed  - Oral mucous membranes remain intact  - Provide patient/ family education  Outcome: Progressing     Problem: MUSCULOSKELETAL - ADULT  Goal: Maintain or return mobility to safest level of function  Description  INTERVENTIONS:  - Assess patient's ability to carry out ADLs; assess patient's baseline for ADL function and identify physical deficits which impact ability to perform ADLs (bathing, care of mouth/teeth, toileting, grooming, dressing, etc )  - Assess/evaluate cause of self-care deficits   - Assess range of motion  - Assess patient's mobility  - Assess patient's need for assistive devices and provide as appropriate  - Encourage maximum independence but intervene and supervise when necessary  - Involve family in performance of ADLs  - Assess for home care needs following discharge   - Consider OT consult to assist with ADL evaluation and planning for discharge  - Provide patient education as appropriate  Outcome: Progressing

## 2019-11-07 NOTE — H&P
H&P- Romeo Spain 1955, 75 y o  female MRN: [id number]    Unit/Bed#: ICU 07 Encounter: [id number]    Primary Care Provider: David Cervantes DO   Date and time admitted to hospital: 11/7/2019  4:42 AM        * Severe sepsis ([facility] [address] )  Assessment & Plan  · Presented with fever, tachycardia, and lactic acidosis  · Likely secondary to multilobar pneumonia with likely aspiration  · Ongoing fluid resuscitation   · Continue cefepime, vanco, flagyl  · Continue to trend lactic until resolution  · Follow up on blood cultures    Acute metabolic encephalopathy  Assessment & Plan  · Likely secondary to sepsis  · Continue frequent neuro exams  · Regulate sleep/wake cycle    Acute respiratory failure with hypoxia (HCC)  Assessment & Plan  · Secondary to multilobar pneumonia  · Continue to wean supplemental O2 for SpO2 >90  · Encourage cough, deep breathing, IS, ambulation    RU[facility] ([facility]) ([facility] )  Assessment & Plan  · In the setting of sepsis  · Baseline Cr 1 0-1 2, currently 1 38  · Ongoing IVF resuscitation  · Monitor I/O closely  · Trend BUN/Cr on BMP    Hypertrophic cardiomyopathy (HCC)  Assessment & Plan  · Last ECHO shows EF 75% with grade 1 diastolic dysfunction and hypertrophy of interventricular septum  · Hold home lasix in the setting of sepsis     Lymphedema  Assessment & Plan  · Chronic lymphedema of b/l LE  · No evidence of cellulitis      -------------------------------------------------------------------------------------------------------------  Chief Complaint: Lethargy    History of Present Illness   HX and PE limited by: lethargy  Romeo Spain is a 75 y o  female who presents withwho presents with lethargy  She has past medical history of wegeners granulomatosis now in remission, HOCM, CKD stage 3 with baseline Cr 1-1 2  She has been treated multiple times in the past for pneumonia   Per her , she was in her normal state of health last evening, and when he woke her this morning around 0100 she was lethargic and disoriented  On arrival to the ED she was found to be hypoxic, tachycardic, and febrile  Sepsis alert was called  Lactic found to be 5 4  She was given IVF per protocol and started on broad spectrum abx       History obtained from chart review  -------------------------------------------------------------------------------------------------------------  Dispo: Transfer to Stepdown Level 1     Code Status: Level 1 - Full Code  --------------------------------------------------------------------------------------------------------------  Review of Systems    Review of systems was unable to be performed secondary to Altered Mental Status    Physical Exam   Constitutional: She appears well-developed and well-nourished  She appears lethargic  No distress  HENT:   Head: Normocephalic and atraumatic  Eyes: Pupils are equal, round, and reactive to light  Neck: Neck supple  Cardiovascular: Regular rhythm and intact distal pulses  Tachycardia present  Pulmonary/Chest: Effort normal  No respiratory distress  She has decreased breath sounds in the right lower field and the left lower field  She has rhonchi in the right middle field and the right lower field  Abdominal: Soft  Bowel sounds are normal  She exhibits no distension  There is no tenderness  Musculoskeletal: Normal range of motion  She exhibits edema (chronic b/l LE edema)  Neurological: She has normal strength  She appears lethargic  She is disoriented  No sensory deficit  GCS eye subscore is 3  GCS verbal subscore is 3  GCS motor subscore is 6  Face symmetrical  Follows commands with equal strength in all extremities      Skin: Skin is warm and dry      --------------------------------------------------------------------------------------------------------------  Historical Information   Past Medical History:   Diagnosis Date    Cancer ([address] )     ovarian Ca with chemo    Lymphedema     MRSA (methicillin resistant Staphylococcus aureus) 09/05/2017    nasal swab negative 4/3/19    Ovarian cancer ([facility] [address] )     CYST REMOVED RIGHT OVARY IN 2007  HYSTERECTOMY 02/14/07  Past Surgical History:   Procedure Laterality Date    APPENDECTOMY      HYSTERECTOMY      OTHER SURGICAL HISTORY      right lower extremity due to trauma    AZ TREAT TIBIAL SHAFT FX, INTRAMED IMPLANT Right 9/6/2017    Procedure: INSERTION NAIL IM TIBIA;  Surgeon: Hoda Brown MD;  Location: [facility];  Service: Orthopedics    US GUIDED BREAST BIOPSY RIGHT COMPLETE Right 5/9/2016     Social History   Social History     Substance and Sexual Activity   Alcohol Use Yes    Frequency: Monthly or less     Social History     Substance and Sexual Activity   Drug Use No     Social History     Tobacco Use   Smoking Status Never Smoker   Smokeless Tobacco Never Used       Family History:     Family history unknown and I have reviewed this patient's family history and commented on sigificant items within the HPI    Vitals:   Vitals:    11/07/19 0448 11/07/19 0545 11/07/19 0700 11/07/19 0701   BP:  127/56 (!) 103/49 (!) 98/32   BP Location:   Right arm Right arm   Pulse:  (!) 122 (!) 116 (!) 118   Resp:  (!) 26 19 (!) 30   Temp:    (!) 100 8 °F (38 2 °C)   TempSrc:   Oral Tympanic   SpO2: 93% 96% 98%      Temp  Min: 100 8 °F (38 2 °C)  Max: 102 9 °F (39 4 °C)        There is no height or weight on file to calculate BMI        Medications:  Current Facility-Administered Medications   Medication Dose Route Frequency    cefepime (MAXIPIME) 2,000 mg in dextrose 5 % 50 mL IVPB  2,000 mg Intravenous Q12H    heparin (porcine) subcutaneous injection 5,000 Units  5,000 Units Subcutaneous Q8H [address]    metroNIDAZOLE (FLAGYL) IVPB (premix) 500 mg  500 mg Intravenous Q8H    potassium chloride (K-DUR,KLOR-CON) CR tablet 40 mEq  40 mEq Oral Once    sodium chloride (PF) 0 9 % injection 3 mL  3 mL Intravenous PRN    vancomycin (VANCOCIN) 1,500 mg in sodium chloride 0 9 % 250 mL IVPB 1,500 mg Intravenous Once    vancomycin (VANCOCIN) IVPB (premix) 750 mg  10 mg/kg (Adjusted) Intravenous Q12H     Home medications:  Prior to Admission Medications   Prescriptions Last Dose Informant Patient Reported? Taking? Calcium Carbonate-Vit D-Min (CALCIUM 1200 PO)  Self Yes No   Sig: Take 1 tablet by mouth daily    DULoxetine (CYMBALTA) 60 mg delayed release capsule  Self Yes No   Sig: Take 60 mg by mouth daily     LORazepam (ATIVAN) 0 5 mg tablet  Self No No   Sig: Take 1 tablet as needed every 8 hours  Patient taking differently: Take 0 5 mg by mouth 2 (two) times a day Take 1 tablet as needed every 8 hours  Multiple Vitamins-Minerals (MULTIVITAMIN ADULT PO)  Self Yes No   Sig: Take 1 capsule by mouth daily    OXYCODONE HCL PO  Self Yes No   Sig: Take 15 mg by mouth 3 (three) times a day as needed    PREVIDENT 5000 DRY MOUTH 1 1 % GEL  Self Yes No   TOVIAZ 8 MG TB24  Self Yes No   Sig: Take 4 mg by mouth 2 (two) times a day     acetaminophen (TYLENOL) 325 mg tablet  Self No No   Sig: Take 2 tablets by mouth every 6 (six) hours as needed for mild pain   bisacodyl (DULCOLAX) 5 mg EC tablet  Self Yes No   Sig: Take 5 mg by mouth daily as needed for constipation   furosemide (LASIX) 20 mg tablet  Self No No   Sig: Take 1 tablet (20 mg total) by mouth daily M W F BID PRN   metoprolol tartrate (LOPRESSOR) 25 mg tablet  Self No No   Sig: Take 0 5 tablets (12 5 mg total) by mouth every 12 (twelve) hours   omeprazole (PriLOSEC) 20 mg delayed release capsule  Self Yes No   Sig: Take 20 mg by mouth daily  oxyCODONE (OxyCONTIN) 40 mg 12 hr tablet  Self Yes No   Sig: Take 40 mg by mouth every 12 (twelve) hours   polyethylene glycol (MIRALAX) 17 g packet  Self Yes No   Sig: Take 17 g by mouth as needed       Facility-Administered Medications: None     Allergies:   Allergies   Allergen Reactions    Iodinated Diagnostic Agents Shortness Of Breath    Omnipaque [Iohexol] Shortness Of Breath    Other Shortness Of Breath     IVP dye, x ray dye 3    Iodides      Other reaction(s): SWELLING, SOB    Methotrexate Nausea Only     Headache and nausea    Methotrexate Derivatives Headache         Laboratory and Diagnostics:  Results from last 7 days   Lab Units 11/07/19  0452   WBC Thousand/uL 11 30*   HEMOGLOBIN g/dL 15 3   HEMATOCRIT % 48 2*   PLATELETS Thousands/uL 209   NEUTROS PCT % 92*   MONOS PCT % 1*     Results from last 7 days   Lab Units 11/07/19 0452   SODIUM mmol/L 140   POTASSIUM mmol/L 3 5   CHLORIDE mmol/L 100   CO2 mmol/L 27   ANION GAP mmol/L 13   BUN mg/dL 22   CREATININE mg/dL 1 38*   CALCIUM mg/dL 9 3   GLUCOSE RANDOM mg/dL 137   ALT U/L 43   AST U/L 65*   ALK PHOS U/L 208*   ALBUMIN g/dL 3 7   TOTAL BILIRUBIN mg/dL 0 80          Results from last 7 days   Lab Units 11/07/19 0452   INR  1 05   PTT seconds 25          Results from last 7 days   Lab Units 11/07/19  0652 11/07/19 0452   LACTIC ACID mmol/L 3 1* 5 4*     ABG:    VBG:          Micro:          EKG: ST rate 113  Imaging: I have personally reviewed pertinent reports  and I have personally reviewed pertinent films in PACS CT C/A/P showing multilobar PNA and reflux of ingested debris into the distal esophagus  ------------------------------------------------------------------------------------------------------------  Advance Directive and Living Will:      Power of :    POLST:    ------------------------------------------------------------------------------------------------------------  Anticipated Length of Stay is > 2 midnights          RENETTA Benjamin        Portions of the record may have been created with voice recognition software  Occasional wrong word or "sound a like" substitutions may have occurred due to the inherent limitations of voice recognition software    Read the chart carefully and recognize, using context, where substitutions have occurred

## 2019-11-07 NOTE — ASSESSMENT & PLAN NOTE
· Last ECHO shows EF 75% with grade 1 diastolic dysfunction and hypertrophy of interventricular septum  · Hold home lasix in the setting of sepsis

## 2019-11-07 NOTE — ED PROVIDER NOTES
History  Chief Complaint   Patient presents with    Altered Mental Status     Per EMS pt went to bed alert and oriented x4, stella woke her up around 1 now alert and oriented x2, lethargic during triage     74 yo female who woke up completely altered just PTA  Per  to EMS she was fine when they last talked at 0100, just PTA he noted she was febrile and lethargic  Pt nonverbal other than to painful stimulation  Temp 102 9, sats 87% RA  History provided by:  Patient  History limited by:  Acuity of condition   used: No    Altered Mental Status   Presenting symptoms: confusion and lethargy    Severity:  Moderate  Most recent episode: Today  Episode history:  Single  Duration:  1 hour  Timing:  Constant  Progression:  Unchanged  Chronicity:  New  Associated symptoms: fever    Associated symptoms: no rash and no vomiting        Prior to Admission Medications   Prescriptions Last Dose Informant Patient Reported? Taking? Calcium Carbonate-Vit D-Min (CALCIUM 1200 PO)  Self Yes No   Sig: Take 1 tablet by mouth daily    DULoxetine (CYMBALTA) 60 mg delayed release capsule  Self Yes No   Sig: Take 60 mg by mouth daily     LORazepam (ATIVAN) 0 5 mg tablet  Self No No   Sig: Take 1 tablet as needed every 8 hours  Patient taking differently: Take 0 5 mg by mouth 2 (two) times a day Take 1 tablet as needed every 8 hours      Multiple Vitamins-Minerals (MULTIVITAMIN ADULT PO)  Self Yes No   Sig: Take 1 capsule by mouth daily    OXYCODONE HCL PO  Self Yes No   Sig: Take 15 mg by mouth 3 (three) times a day as needed    PREVIDENT 5000 DRY MOUTH 1 1 % GEL  Self Yes No   TOVIAZ 8 MG TB24  Self Yes No   Sig: Take 4 mg by mouth 2 (two) times a day     acetaminophen (TYLENOL) 325 mg tablet  Self No No   Sig: Take 2 tablets by mouth every 6 (six) hours as needed for mild pain   bisacodyl (DULCOLAX) 5 mg EC tablet  Self Yes No   Sig: Take 5 mg by mouth daily as needed for constipation   furosemide (LASIX) 20 mg tablet  Self No No   Sig: Take 1 tablet (20 mg total) by mouth daily M W F BID PRN   metoprolol tartrate (LOPRESSOR) 25 mg tablet  Self No No   Sig: Take 0 5 tablets (12 5 mg total) by mouth every 12 (twelve) hours   omeprazole (PriLOSEC) 20 mg delayed release capsule  Self Yes No   Sig: Take 20 mg by mouth daily  oxyCODONE (OxyCONTIN) 40 mg 12 hr tablet  Self Yes No   Sig: Take 40 mg by mouth every 12 (twelve) hours   polyethylene glycol (MIRALAX) 17 g packet  Self Yes No   Sig: Take 17 g by mouth as needed       Facility-Administered Medications: None       Past Medical History:   Diagnosis Date    Cancer ([facility] )     ovarian Ca with chemo    Lymphedema     MRSA (methicillin resistant Staphylococcus aureus) 09/05/2017    nasal swab negative 4/3/19    [facility]     CYST REMOVED RIGHT OVARY IN 2007  HYSTERECTOMY 02/14/07  Past Surgical History:   Procedure Laterality Date    APPENDECTOMY      HYSTERECTOMY      OTHER SURGICAL HISTORY      right lower extremity due to trauma    MN TREAT TIBIAL SHAFT FX, INTRAMED IMPLANT Right 9/6/2017    Procedure: INSERTION NAIL IM TIBIA;  Surgeon: Michael Smith MD;  [facility]: [facility];  Service: Orthopedics    US GUIDED BREAST BIOPSY RIGHT COMPLETE Right 5/9/2016       Family History   Problem Relation Age of Onset    Breast cancer Mother     Ovarian cancer Maternal Grandmother     Breast cancer Maternal Aunt     No Known Problems Paternal Aunt      I have reviewed and agree with the history as documented  Social History     Tobacco Use    Smoking status: Never Smoker    Smokeless tobacco: Never Used   Substance Use Topics    Alcohol use: Yes     Frequency: Monthly or less    Drug use: No        Review of Systems   Constitutional: Positive for fatigue and fever  HENT: Negative for trouble swallowing  Eyes: Negative for discharge and redness  Respiratory: Negative for shortness of breath and wheezing      Gastrointestinal: Negative for diarrhea and vomiting  Skin: Negative for rash  Psychiatric/Behavioral: Positive for confusion  Physical Exam  Physical Exam   Constitutional: She appears well-developed and well-nourished  She appears distressed (drowsy, nonverbal other than screaming to painful stimuli)  HENT:   Head: Normocephalic and atraumatic  Right Ear: External ear normal    Left Ear: External ear normal    MM DRY   Eyes: EOM are normal    Neck: Neck supple  Cardiovascular: Regular rhythm  Tachycardia present  No murmur heard  Pulmonary/Chest: She is in respiratory distress (mild tachypnea)  She has no wheezes  She has rales (R base)  Decreased bases   Abdominal: Soft  Bowel sounds are normal    Musculoskeletal: Normal range of motion  She exhibits edema (b/l R>L - known lymphedema - RLE with well healing wounds in all different stages)  Neurological: She is alert  Nonverbal other than yelling when dressings being removed from feet   Skin: Skin is warm  No rash noted  No pallor  Nursing note and vitals reviewed        Vital Signs  ED Triage Vitals   Temperature Pulse Respirations Blood Pressure SpO2   11/07/19 0101 11/07/19 0101 11/07/19 0101 11/07/19 0101 11/07/19 0101   (!) 101 °F (38 3 °C) (!) 117 (!) 26 108/52 98 %      Temp Source Heart Rate Source Patient Position - Orthostatic VS BP Location FiO2 (%)   11/07/19 0101 11/07/19 0101 11/07/19 0442 11/07/19 0101 --   Temporal Monitor Lying Right arm       Pain Score       11/07/19 0442       No Pain           Vitals:    11/07/19 2131 11/07/19 2200 11/07/19 2331 11/08/19 0000   BP: (!) 103/47  (!) 95/41    Pulse:  (!) 122  (!) 118   Patient Position - Orthostatic VS:             Visual Acuity  Visual Acuity      Most Recent Value   L Pupil Size (mm)  2   R Pupil Size (mm)  2          ED Medications  Medications   sodium chloride (PF) 0 9 % injection 3 mL (has no administration in time range)   heparin (porcine) subcutaneous injection 5,000 Units (5,000 Units Subcutaneous Given 11/7/19 2130)   cefepime (MAXIPIME) 2,000 mg in dextrose 5 % 50 mL IVPB (0 mg Intravenous Stopped 11/7/19 1950)   vancomycin (VANCOCIN) IVPB (premix) 750 mg (0 mg Intravenous Stopped 11/7/19 2055)   sodium chloride 0 9 % infusion (100 mL/hr Intravenous New Bag 11/7/19 1702)   lidocaine (PF) (XYLOCAINE-MPF) 1 % injection **ADS Override Pull** (  Not Given 11/7/19 1940)   lidocaine (GLYDO) 2 % urethral/mucosal gel 1 application (1 application Urethral Not Given 11/7/19 1937)   benzocaine (HURRICAINE) 20 % mucosal spray 2 spray (2 sprays Mucosal Not Given 11/7/19 1936)   DULoxetine (CYMBALTA) delayed release capsule 60 mg (has no administration in time range)   oxyCODONE (OxyCONTIN) 12 hr tablet 40 mg (40 mg Oral Given 11/7/19 1854)   LORazepam (ATIVAN) tablet 0 5 mg (0 5 mg Oral Given 11/7/19 1743)   oxyCODONE (ROXICODONE) IR tablet 15 mg (15 mg Oral Given 11/8/19 0035)   acetaminophen (TYLENOL) tablet 650 mg (650 mg Oral Given 11/7/19 1929)   ondansetron (ZOFRAN) injection 4 mg (4 mg Intravenous Given 11/8/19 0036)   sodium chloride 0 9 % bolus 1,000 mL (0 mL Intravenous Stopped 11/7/19 0653)     Followed by   sodium chloride 0 9 % bolus 1,000 mL (0 mL Intravenous Stopped 11/7/19 1939)     Followed by   sodium chloride 0 9 % bolus 1,000 mL (0 mL Intravenous Stopped 11/7/19 1939)     Followed by   sodium chloride 0 9 % bolus 1,000 mL (0 mL Intravenous Stopped 11/7/19 1939)   levofloxacin (LEVAQUIN) IVPB (premix) 750 mg (0 mg Intravenous Stopped 11/7/19 0820)   acetaminophen (TYLENOL) rectal suppository 650 mg (650 mg Rectal Given 11/7/19 0607)   metroNIDAZOLE (FLAGYL) IVPB (premix) 500 mg (0 mg Intravenous Stopped 11/7/19 0632)   vancomycin (VANCOCIN) 1,500 mg in sodium chloride 0 9 % 250 mL IVPB (0 mg Intravenous Stopped 11/7/19 1031)   potassium chloride (K-DUR,KLOR-CON) CR tablet 40 mEq (40 mEq Oral Given 11/7/19 0853)   fentanyl citrate (PF) 100 MCG/2ML 25 mcg (25 mcg Intravenous Given 11/7/19 1638)   lidocaine (PF) (XYLOCAINE-MPF) 1 % injection 30 mL (30 mL Inhalation Given 11/7/19 1639)   midazolam (VERSED) injection 6 mg (2 mg Intravenous Given 11/7/19 1634)   fentanyl citrate (PF) 100 MCG/2ML 100 mcg (100 mcg Intravenous Given 11/7/19 1636)       Diagnostic Studies  Results Reviewed     Procedure Component Value Units Date/Time    Blood culture #1 [[id number]]  (Abnormal) Collected:  11/07/19 0457    Lab Status:  Preliminary result Specimen:  Blood from Arm, Right Updated:  11/07/19 2016     Blood Culture Received in Microbiology Lab  Culture in Progress  Gram Stain Result Gram positive cocci in chains    Procalcitonin [[id number]]  (Abnormal) Collected:  11/07/19 0452    Lab Status:  Final result Specimen:  Blood from Arm, Left Updated:  11/07/19 1354     Procalcitonin 7 61 ng/ml     Blood culture #2 [[id number]] Collected:  11/07/19 0520    Lab Status:  Preliminary result Specimen:  Blood from Hand, Left Updated:  11/07/19 1201     Blood Culture Received in Microbiology Lab  Culture in Progress  Lactic acid x2 [[id number]]  (Abnormal) Collected:  11/07/19 0652    Lab Status:  Final result Specimen:  Blood from Arm, Left Updated:  11/07/19 0729     LACTIC ACID 3 1 mmol/L     Narrative:       Result may be elevated if tourniquet was used during collection      UA w Reflex to Microscopic w Reflex to Culture [[id number]] Collected:  11/07/19 0547    Lab Status:  Final result Specimen:  Urine, Clean Catch Updated:  11/07/19 0607     Color, UA Yellow     Clarity, UA Clear     Specific Gravity, UA 1 010     pH, UA 5 5     Leukocytes, UA Negative     Nitrite, UA Negative     Protein, UA Negative mg/dl      Glucose, UA Negative mg/dl      Ketones, UA Negative mg/dl      Urobilinogen, UA 0 2 E U /dl      Bilirubin, UA Negative     Blood, UA Negative    CBC and differential [[id number]]  (Abnormal) Collected:  11/07/19 0452    Lab Status:  Final result Specimen:  Blood from Arm, Left Updated:  11/07/19 0536     WBC 11 30 Thousand/uL      RBC 5 01 Million/uL      Hemoglobin 15 3 g/dL      Hematocrit 48 2 %      MCV 96 fL      MCH 30 5 pg      MCHC 31 7 g/dL      RDW 13 2 %      MPV 8 7 fL      Platelets 829 Thousands/uL      nRBC 0 /100 WBCs      Neutrophils Relative 92 %      Immat GRANS % 1 %      Lymphocytes Relative 6 %      Monocytes Relative 1 %      Eosinophils Relative 0 %      Basophils Relative 0 %      Neutrophils Absolute 10 43 Thousands/µL      Immature Grans Absolute 0 06 Thousand/uL      Lymphocytes Absolute 0 66 Thousands/µL      Monocytes Absolute 0 12 Thousand/µL      Eosinophils Absolute 0 01 Thousand/µL      Basophils Absolute 0 02 Thousands/µL     Narrative: This is an appended report  These results have been appended to a previously verified report  Lactic acid x2 [[id number]]  (Abnormal) Collected:  11/07/19 0452    Lab Status:  Final result Specimen:  Blood from Arm, Left Updated:  11/07/19 0524     LACTIC ACID 5 4 mmol/L     Narrative:       Result may be elevated if tourniquet was used during collection      Comprehensive metabolic panel [[id number]]  (Abnormal) Collected:  11/07/19 0452    Lab Status:  Final result Specimen:  Blood from Arm, Left Updated:  11/07/19 0515     Sodium 140 mmol/L      Potassium 3 5 mmol/L      Chloride 100 mmol/L      CO2 27 mmol/L      ANION GAP 13 mmol/L      BUN 22 mg/dL      Creatinine 1 38 mg/dL      Glucose 137 mg/dL      Calcium 9 3 mg/dL      AST 65 U/L      ALT 43 U/L      Alkaline Phosphatase 208 U/L      Total Protein 6 6 g/dL      Albumin 3 7 g/dL      Total Bilirubin 0 80 mg/dL      eGFR 40 ml/min/1 73sq m     Narrative:       [place] guidelines for Chronic Kidney Disease (CKD):     Stage 1 with normal or high GFR (GFR > 90 mL/min/1 73 square meters)    Stage 2 Mild CKD (GFR = 60-89 mL/min/1 73 square meters)    Stage 3A Moderate CKD (GFR = 45-59 mL/min/1 73 square meters)    Stage 3B Moderate CKD (GFR = 30-44 mL/min/1 73 square meters)    Stage 4 Severe CKD (GFR = 15-29 mL/min/1 73 square meters)    Stage 5 End Stage CKD (GFR <15 mL/min/1 73 square meters)  Note: GFR calculation is accurate only with a steady state creatinine    Protime-INR [[id number]]  (Normal) Collected:  11/07/19 0452    Lab Status:  Final result Specimen:  Blood from Arm, Left Updated:  11/07/19 0510     Protime 13 8 seconds      INR 1 05    APTT [[id number]]  (Normal) Collected:  11/07/19 0452    Lab Status:  Final result Specimen:  Blood from Arm, Left Updated:  11/07/19 0510     PTT 25 seconds                  VAS lower limb venous duplex study, unilateral/limited   Final Result by César Argueta MD (11/07 9993)      CT chest without contrast   Final Result by Luigi Hurt DO (11/07 4566)   1  Bilateral infiltrates, most compatible with multifocal/multi lobar pneumonia  By history, the patient has Wegener's granulomatosis  2   Distended debris-filled stomach with reflux of ingested debris into the distal esophagus  Thickening and dilatation of the entire esophagus  Correlate for reflux esophagitis and aspiration pneumonia  The study was marked in [facility] for immediate notification  Workstation performed: [id number]         CT head without contrast   Final Result by Luigi Hurt DO (11/07 0617)   Stable cerebral atrophy with chronic small vessel ischemic change  No acute intracranial abnormality  Workstation performed: [id number]         XR chest portable   Final Result by Mk Grubbs MD (11/07 0308)      Hypoventilation with possible small infiltrate or atelectasis at the left lung base  Workstation performed: [id number]                    Procedures  ECG 12 Lead Documentation Only  Date/Time: 11/7/2019 4:56 AM  Performed by: Kelli Scott DO  Authorized by:  Kelli Scott DO     Indications / Diagnosis:  Altered  Patient location:  ED  Previous ECG:     Previous ECG: Compared to current    Comparison ECG info:  April 3 2019    Similarity:  No change  Interpretation:     Interpretation: non-specific    Rate:     ECG rate:  124    ECG rate assessment: tachycardic    Rhythm:     Rhythm: sinus tachycardia    Ectopy:     Ectopy: none    QRS:     QRS axis:  Right    QRS intervals:  Normal  ST segments:     ST segments:  Non-specific           ED Course  ED Course as of Nov 08 0104   u Nov 07, 2019   0442 Pt seen and examined  76 yo female who woke up completely altered just PTA  Per  to EMS she was fine when they last talked at 0100, just PTA he noted she was febrile and lethargic  Pt nonverbal other than to painful stimulation  Temp 102 9, sats 87% RA - placed on NC O2  Will give IVF, tylenol and check sepsis labs, check chest xray and EKG and start levaquin to cover chest and urinary in interim  0501 WBC 11 3  Chest xray very limited due to poor inspiratory effort  Allergy to IV dye  Will CT head, chest without contrast   IV flagyl ordered to cover for aspiration  0524 Lactate 5 4  Creat 1 38 up from 1 02  IVF infusing  [address] [place] with [employer] from ICU - will reassess after IVF and once CT scans resulted  6210 Barba cath placed and urine sent  Of note RLE up to upper thigh warm and erythematous but was in pressure dressing of RLE for lymphedema that was much thicker than LLE  Will start vancomycin  0551 Pt taken for CT scans  6707 CT chest reviewed while being performed and obvious infiltrates throughout  1833 CT head neg for acute findings  MDM    Disposition  Final diagnoses:    Altered mental status   RUCHI (acute kidney injury) ([facility] )   Severe sepsis (HCC)   Fever   Leukocytosis   Lactic acid increased   Pneumonia   Cellulitis of right leg     Time reflects when diagnosis was documented in both MDM as applicable and the Disposition within this note     Time User Action Codes Description Comment 11/7/2019  5:26 AM Edwige Garre M Add [R41 82] Altered mental status     11/7/2019  5:26 AM Edwige Garre M Add [N17 9] [facility]     11/7/2019  5:26 AM Edwige Garre M Add [A41 9,  R65 20] Severe sepsis ([facility] )     11/7/2019  5:26 AM Edwige Garre M Add [R50 9] Fever     11/7/2019  5:26 AM Edwige Garre M Add [D72 829] Leukocytosis     11/7/2019  5:26 AM Edwige Garre M Add [E87 2] Lactic acid increased     11/7/2019  6:05 AM Edwige Garre M Add [J18 9] Pneumonia     11/7/2019  6:06 AM Edwige Garre M Add [G38 031] Cellulitis of right leg     11/7/2019  2:49 PM Michel Turner Add [J96 01] Acute respiratory failure with hypoxia ([facility] )     11/7/2019  4:48 PM Yanely Gautam Modify [R41 82] Altered mental status     11/7/2019  4:48 PM Yanely Gautam Modify [N17 9] RUCHI (acute kidney injury) ([facility] )     11/7/2019  4:48 PM Yanely Gautam Modify [A41 9,  R65 20] Severe sepsis ([facility] )     11/7/2019  4:48 PM Yanely Gautam Modify [R50 9] Fever     11/7/2019  4:48 PM Yanely Gautam Modify [D72 829] Leukocytosis     11/7/2019  4:48 PM Yanely Gautam Modify [E87 2] Lactic acid increased     11/7/2019  4:48 PM Yanely Gautam Modify [J18 9] Pneumonia     11/7/2019  4:48 PM Yanely Gautam Modify [B79 374] Cellulitis of right leg     11/7/2019  4:48 PM Yanely Gautam Modify [J96 01] Acute respiratory failure with hypoxia ([facility] )     11/7/2019  7:48 PM Spatzer, Gwenith Wakonda Modify [R41 82] Altered mental status     11/7/2019  7:48 PM Spatzer Gwenith Wakonda Modify [N17 9] RUCHI (acute kidney injury) ([place] [address] )     11/7/2019  7:48 PM Spatzer, Gwenith Wakonda Modify [A41 9,  R65 20] Severe sepsis ([place] [address] )     11/7/2019  7:48 PM SpaKiko langley Wakonda Modify [R50 9] Fever     11/7/2019  7:48 PM Kate Cheers Modify [D72 829] Leukocytosis     11/7/2019  7:48 PM Spatzer, Gwenith Wakonda Modify [E87 2] Lactic acid increased     11/7/2019  7:48 PM Kate Cheers Modify [J18 9] Pneumonia     11/7/2019  7:48 PM Spatzer, Gwenith Wakonda Modify [L03 115] Cellulitis of right leg     11/7/2019  7:48 PM JaceLance langleycky Alfredo Modify [J96 01] Acute respiratory failure with hypoxia [facility])       ED Disposition     ED Disposition Condition Date/Time Comment    Admit Stable Thu Nov 7, 2019  6:13 AM Case was discussed with Em Rapid response and the patient's admission status was agreed to be Admission Status: inpatient status to the service of Dr Amos Trammell          Follow-up Information    None         Current Discharge Medication List      CONTINUE these medications which have NOT CHANGED    Details   acetaminophen (TYLENOL) 325 mg tablet Take 2 tablets by mouth every 6 (six) hours as needed for mild pain  Qty: 30 tablet, Refills: 0      bisacodyl (DULCOLAX) 5 mg EC tablet Take 5 mg by mouth daily as needed for constipation      Calcium Carbonate-Vit D-Min (CALCIUM 1200 PO) Take 1 tablet by mouth daily       DULoxetine (CYMBALTA) 60 mg delayed release capsule Take 60 mg by mouth daily    Refills: 0      furosemide (LASIX) 20 mg tablet Take 1 tablet (20 mg total) by mouth daily M W F BID PRN  Qty: 90 tablet, Refills: 3    Associated Diagnoses: Chronic kidney disease, stage III (moderate) ([facility] ); Wegeners granulomatosis ([facility] ); Low immunoglobulin level      LORazepam (ATIVAN) 0 5 mg tablet Take 1 tablet as needed every 8 hours  Qty: 30 tablet, Refills: 0      metoprolol tartrate (LOPRESSOR) 25 mg tablet Take 0 5 tablets (12 5 mg total) by mouth every 12 (twelve) hours  Qty: 60 tablet, Refills: 3    Associated Diagnoses: Benign essential hypertension      Multiple Vitamins-Minerals (MULTIVITAMIN ADULT PO) Take 1 capsule by mouth daily       omeprazole (PriLOSEC) 20 mg delayed release capsule Take 20 mg by mouth daily        oxyCODONE (OxyCONTIN) 40 mg 12 hr tablet Take 40 mg by mouth every 12 (twelve) hours      OXYCODONE HCL PO Take 15 mg by mouth 3 (three) times a day as needed       polyethylene glycol (MIRALAX) 17 g packet Take 17 g by mouth as needed       PREVIDENT 5000 DRY MOUTH 1 1 % GEL TOVIAZ 8 MG TB24 Take 4 mg by mouth 2 (two) times a day             No discharge procedures on file      ED Provider  Electronically Signed by           Francis Moody DO  11/08/19 0104

## 2019-11-07 NOTE — ASSESSMENT & PLAN NOTE
· In the setting of sepsis  · Baseline Cr 1 0-1 2, currently 1 38  · Ongoing IVF resuscitation  · Monitor I/O closely  · Trend BUN/Cr on BMP

## 2019-11-07 NOTE — ASSESSMENT & PLAN NOTE
· Presented with fever, tachycardia, and lactic acidosis  · Likely secondary to multilobar pneumonia with likely aspiration  · Ongoing fluid resuscitation   · Continue cefepime, vanco, flagyl  · Continue to trend lactic until resolution  · Follow up on blood cultures

## 2019-11-08 PROBLEM — J96.01 ACUTE RESPIRATORY FAILURE WITH HYPOXIA (HCC): Status: RESOLVED | Noted: 2019-11-07 | Resolved: 2019-11-08

## 2019-11-08 PROBLEM — G93.41 ACUTE METABOLIC ENCEPHALOPATHY: Status: RESOLVED | Noted: 2019-04-02 | Resolved: 2019-11-08

## 2019-11-08 LAB
ANION GAP SERPL CALCULATED.3IONS-SCNC: 11 MMOL/L (ref 4–13)
BUN SERPL-MCNC: 24 MG/DL (ref 5–25)
CALCIUM SERPL-MCNC: 8 MG/DL (ref 8.3–10.1)
CHLORIDE SERPL-SCNC: 106 MMOL/L (ref 100–108)
CO2 SERPL-SCNC: 21 MMOL/L (ref 21–32)
CREAT SERPL-MCNC: 1.4 MG/DL (ref 0.6–1.3)
ERYTHROCYTE [DISTWIDTH] IN BLOOD BY AUTOMATED COUNT: 14 % (ref 11.6–15.1)
GFR SERPL CREATININE-BSD FRML MDRD: 39 ML/MIN/1.73SQ M
GLUCOSE SERPL-MCNC: 133 MG/DL (ref 65–140)
HCT VFR BLD AUTO: 40.8 % (ref 34.8–46.1)
HGB BLD-MCNC: 12.9 G/DL (ref 11.5–15.4)
MCH RBC QN AUTO: 30.9 PG (ref 26.8–34.3)
MCHC RBC AUTO-ENTMCNC: 31.6 G/DL (ref 31.4–37.4)
MCV RBC AUTO: 98 FL (ref 82–98)
PLATELET # BLD AUTO: 174 THOUSANDS/UL (ref 149–390)
PMV BLD AUTO: 9.9 FL (ref 8.9–12.7)
POTASSIUM SERPL-SCNC: 3.8 MMOL/L (ref 3.5–5.3)
RBC # BLD AUTO: 4.17 MILLION/UL (ref 3.81–5.12)
SODIUM SERPL-SCNC: 138 MMOL/L (ref 136–145)
WBC # BLD AUTO: 31.4 THOUSAND/UL (ref 4.31–10.16)

## 2019-11-08 PROCEDURE — 80048 BASIC METABOLIC PNL TOTAL CA: CPT | Performed by: INTERNAL MEDICINE

## 2019-11-08 PROCEDURE — 85027 COMPLETE CBC AUTOMATED: CPT | Performed by: INTERNAL MEDICINE

## 2019-11-08 PROCEDURE — 99233 SBSQ HOSP IP/OBS HIGH 50: CPT | Performed by: INTERNAL MEDICINE

## 2019-11-08 PROCEDURE — 82533 TOTAL CORTISOL: CPT | Performed by: NURSE PRACTITIONER

## 2019-11-08 RX ORDER — B-COMPLEX WITH VITAMIN C
1 TABLET ORAL
Status: DISCONTINUED | OUTPATIENT
Start: 2019-11-09 | End: 2019-11-14 | Stop reason: HOSPADM

## 2019-11-08 RX ORDER — ACETAMINOPHEN 325 MG/1
650 TABLET ORAL EVERY 6 HOURS PRN
Status: DISCONTINUED | OUTPATIENT
Start: 2019-11-08 | End: 2019-11-14 | Stop reason: HOSPADM

## 2019-11-08 RX ORDER — HEPARIN SODIUM 5000 [USP'U]/ML
INJECTION, SOLUTION INTRAVENOUS; SUBCUTANEOUS
Status: COMPLETED
Start: 2019-11-08 | End: 2019-11-08

## 2019-11-08 RX ORDER — ACETAMINOPHEN 325 MG/1
650 TABLET ORAL EVERY 6 HOURS PRN
Status: DISCONTINUED | OUTPATIENT
Start: 2019-11-08 | End: 2019-11-08 | Stop reason: SDUPTHER

## 2019-11-08 RX ADMIN — HEPARIN SODIUM 5000 UNITS: 5000 INJECTION INTRAVENOUS; SUBCUTANEOUS at 14:12

## 2019-11-08 RX ADMIN — OXYCODONE HYDROCHLORIDE 40 MG: 20 TABLET, FILM COATED, EXTENDED RELEASE ORAL at 18:00

## 2019-11-08 RX ADMIN — OXYCODONE HYDROCHLORIDE 15 MG: 10 TABLET ORAL at 20:41

## 2019-11-08 RX ADMIN — OXYCODONE HYDROCHLORIDE 15 MG: 10 TABLET ORAL at 00:35

## 2019-11-08 RX ADMIN — ONDANSETRON 4 MG: 2 INJECTION INTRAMUSCULAR; INTRAVENOUS at 11:30

## 2019-11-08 RX ADMIN — SODIUM CHLORIDE 100 ML/HR: 0.9 INJECTION, SOLUTION INTRAVENOUS at 15:51

## 2019-11-08 RX ADMIN — ONDANSETRON 4 MG: 2 INJECTION INTRAMUSCULAR; INTRAVENOUS at 05:58

## 2019-11-08 RX ADMIN — HEPARIN SODIUM 5000 UNITS: 5000 INJECTION INTRAVENOUS; SUBCUTANEOUS at 22:21

## 2019-11-08 RX ADMIN — SODIUM CHLORIDE 100 ML/HR: 0.9 INJECTION, SOLUTION INTRAVENOUS at 03:18

## 2019-11-08 RX ADMIN — ACETAMINOPHEN 650 MG: 325 TABLET ORAL at 17:59

## 2019-11-08 RX ADMIN — CEFEPIME HYDROCHLORIDE 2000 MG: 2 INJECTION, POWDER, FOR SOLUTION INTRAVENOUS at 05:58

## 2019-11-08 RX ADMIN — ACETAMINOPHEN 650 MG: 325 TABLET ORAL at 08:04

## 2019-11-08 RX ADMIN — VANCOMYCIN HYDROCHLORIDE 750 MG: 750 INJECTION, SOLUTION INTRAVENOUS at 08:04

## 2019-11-08 RX ADMIN — LORAZEPAM 0.5 MG: 1 TABLET ORAL at 17:59

## 2019-11-08 RX ADMIN — LORAZEPAM 0.5 MG: 1 TABLET ORAL at 08:04

## 2019-11-08 RX ADMIN — DULOXETINE HYDROCHLORIDE 60 MG: 60 CAPSULE, DELAYED RELEASE ORAL at 08:04

## 2019-11-08 RX ADMIN — OXYCODONE HYDROCHLORIDE 40 MG: 20 TABLET, FILM COATED, EXTENDED RELEASE ORAL at 06:01

## 2019-11-08 RX ADMIN — HEPARIN SODIUM 5000 UNITS: 5000 INJECTION INTRAVENOUS; SUBCUTANEOUS at 05:58

## 2019-11-08 RX ADMIN — ACETAMINOPHEN 650 MG: 325 TABLET ORAL at 03:15

## 2019-11-08 RX ADMIN — ONDANSETRON 4 MG: 2 INJECTION INTRAMUSCULAR; INTRAVENOUS at 20:28

## 2019-11-08 RX ADMIN — CEFTRIAXONE 2000 MG: 2 INJECTION, POWDER, FOR SOLUTION INTRAMUSCULAR; INTRAVENOUS at 18:13

## 2019-11-08 RX ADMIN — ONDANSETRON 4 MG: 2 INJECTION INTRAMUSCULAR; INTRAVENOUS at 00:36

## 2019-11-08 NOTE — ASSESSMENT & PLAN NOTE
· In the setting of sepsis  Baseline Cr approximately 1 0-1 2  Creatinine is slightly worse this am, up to 1 4  Urine output is averaging 30-40mL/hr  · Continue to trend renal indices and monitor intake and output

## 2019-11-08 NOTE — ASSESSMENT & PLAN NOTE
· Chronic lymphedema of b/l LE  · No evidence of cellulitis  · Venous duplex was performed without evidence of DVT however the study was extremely limited due to patient not allowing the pressure to be put on her legs with the u/s probe

## 2019-11-08 NOTE — ASSESSMENT & PLAN NOTE
· Suspect secondary to community acquired multilobar pneumonia  · 1 of 2 blood cultures preliminarily positive for GPC in chains  The 2nd set is pending  Continue cefepime and vancomycin empirically for now while awaiting final cultures  · Bronchoscopy was performed yesterday  No evidence of hemorrhage which was a concern in the setting of Juan's history  BAL cultures are pending  · Influenza is negative    · Hemodynamics have been stable

## 2019-11-08 NOTE — ASSESSMENT & PLAN NOTE
· This was likely secondary to fever and sepsis  Now significantly improved  Mental status appears essentially back to baseline

## 2019-11-08 NOTE — ASSESSMENT & PLAN NOTE
· Secondary to multilobar pneumonia  · Wean oxygen as tolerated to maintian saturation >90%  · Aggressive pulmonary toileting  Encourage cough and deep breathing  Use incentive spirometer

## 2019-11-09 PROBLEM — N17.9 AKI (ACUTE KIDNEY INJURY) (HCC): Status: RESOLVED | Noted: 2019-04-03 | Resolved: 2019-11-09

## 2019-11-09 LAB
ANION GAP SERPL CALCULATED.3IONS-SCNC: 8 MMOL/L (ref 4–13)
ANISOCYTOSIS BLD QL SMEAR: PRESENT
BACTERIA BLD CULT: ABNORMAL
BACTERIA BRONCH AEROBE CULT: NORMAL
BASOPHILS # BLD MANUAL: 0 THOUSAND/UL (ref 0–0.1)
BASOPHILS NFR MAR MANUAL: 0 % (ref 0–1)
BUN SERPL-MCNC: 24 MG/DL (ref 5–25)
CALCIUM SERPL-MCNC: 8.4 MG/DL (ref 8.3–10.1)
CHLORIDE SERPL-SCNC: 107 MMOL/L (ref 100–108)
CO2 SERPL-SCNC: 24 MMOL/L (ref 21–32)
CORTIS SERPL-MCNC: 21.5 UG/DL
CREAT SERPL-MCNC: 1.07 MG/DL (ref 0.6–1.3)
DOHLE BOD BLD QL SMEAR: PRESENT
EOSINOPHIL # BLD MANUAL: 0 THOUSAND/UL (ref 0–0.4)
EOSINOPHIL NFR BLD MANUAL: 0 % (ref 0–6)
ERYTHROCYTE [DISTWIDTH] IN BLOOD BY AUTOMATED COUNT: 14.7 % (ref 11.6–15.1)
GFR SERPL CREATININE-BSD FRML MDRD: 54 ML/MIN/1.73SQ M
GLUCOSE SERPL-MCNC: 99 MG/DL (ref 65–140)
GRAM STN SPEC: ABNORMAL
GRAM STN SPEC: NORMAL
HCT VFR BLD AUTO: 35.4 % (ref 34.8–46.1)
HGB BLD-MCNC: 11.2 G/DL (ref 11.5–15.4)
LYMPHOCYTES # BLD AUTO: 0.41 THOUSAND/UL (ref 0.6–4.47)
LYMPHOCYTES # BLD AUTO: 2 % (ref 14–44)
MCH RBC QN AUTO: 30.8 PG (ref 26.8–34.3)
MCHC RBC AUTO-ENTMCNC: 31.6 G/DL (ref 31.4–37.4)
MCV RBC AUTO: 97 FL (ref 82–98)
METAMYELOCYTES NFR BLD MANUAL: 1 % (ref 0–1)
MONOCYTES # BLD AUTO: 0.2 THOUSAND/UL (ref 0–1.22)
MONOCYTES NFR BLD: 1 % (ref 4–12)
NEUTROPHILS # BLD MANUAL: 19.61 THOUSAND/UL (ref 1.85–7.62)
NEUTS BAND NFR BLD MANUAL: 31 % (ref 0–8)
NEUTS SEG NFR BLD AUTO: 65 % (ref 43–75)
NRBC BLD AUTO-RTO: 0 /100 WBCS
PLATELET # BLD AUTO: 129 THOUSANDS/UL (ref 149–390)
PLATELET BLD QL SMEAR: ABNORMAL
PMV BLD AUTO: 10.2 FL (ref 8.9–12.7)
POTASSIUM SERPL-SCNC: 4.1 MMOL/L (ref 3.5–5.3)
RBC # BLD AUTO: 3.64 MILLION/UL (ref 3.81–5.12)
SODIUM SERPL-SCNC: 139 MMOL/L (ref 136–145)
TOTAL CELLS COUNTED SPEC: 100
WBC # BLD AUTO: 20.43 THOUSAND/UL (ref 4.31–10.16)
WBC TOXIC VACUOLES BLD QL SMEAR: PRESENT

## 2019-11-09 PROCEDURE — 85027 COMPLETE CBC AUTOMATED: CPT | Performed by: NURSE PRACTITIONER

## 2019-11-09 PROCEDURE — 80048 BASIC METABOLIC PNL TOTAL CA: CPT | Performed by: NURSE PRACTITIONER

## 2019-11-09 PROCEDURE — 85007 BL SMEAR W/DIFF WBC COUNT: CPT | Performed by: NURSE PRACTITIONER

## 2019-11-09 PROCEDURE — 99233 SBSQ HOSP IP/OBS HIGH 50: CPT | Performed by: INTERNAL MEDICINE

## 2019-11-09 RX ORDER — METOPROLOL TARTRATE 5 MG/5ML
5 INJECTION INTRAVENOUS ONCE
Status: DISCONTINUED | OUTPATIENT
Start: 2019-11-09 | End: 2019-11-09

## 2019-11-09 RX ADMIN — SODIUM CHLORIDE 100 ML/HR: 0.9 INJECTION, SOLUTION INTRAVENOUS at 13:02

## 2019-11-09 RX ADMIN — HEPARIN SODIUM 5000 UNITS: 5000 INJECTION INTRAVENOUS; SUBCUTANEOUS at 21:08

## 2019-11-09 RX ADMIN — HEPARIN SODIUM 5000 UNITS: 5000 INJECTION INTRAVENOUS; SUBCUTANEOUS at 05:34

## 2019-11-09 RX ADMIN — SODIUM CHLORIDE 100 ML/HR: 0.9 INJECTION, SOLUTION INTRAVENOUS at 01:40

## 2019-11-09 RX ADMIN — ACETAMINOPHEN 650 MG: 325 TABLET ORAL at 01:38

## 2019-11-09 RX ADMIN — DULOXETINE HYDROCHLORIDE 60 MG: 60 CAPSULE, DELAYED RELEASE ORAL at 09:38

## 2019-11-09 RX ADMIN — CALCIUM CARBONATE-VITAMIN D TAB 500 MG-200 UNIT 1 TABLET: 500-200 TAB at 07:34

## 2019-11-09 RX ADMIN — ONDANSETRON 4 MG: 2 INJECTION INTRAMUSCULAR; INTRAVENOUS at 01:38

## 2019-11-09 RX ADMIN — ONDANSETRON 4 MG: 2 INJECTION INTRAMUSCULAR; INTRAVENOUS at 07:34

## 2019-11-09 RX ADMIN — OXYCODONE HYDROCHLORIDE 40 MG: 20 TABLET, FILM COATED, EXTENDED RELEASE ORAL at 06:03

## 2019-11-09 RX ADMIN — OXYCODONE HYDROCHLORIDE 20 MG: 20 TABLET, FILM COATED, EXTENDED RELEASE ORAL at 21:07

## 2019-11-09 RX ADMIN — OXYCODONE HYDROCHLORIDE 20 MG: 20 TABLET, FILM COATED, EXTENDED RELEASE ORAL at 22:15

## 2019-11-09 RX ADMIN — CEFTRIAXONE 2000 MG: 2 INJECTION, POWDER, FOR SOLUTION INTRAMUSCULAR; INTRAVENOUS at 17:31

## 2019-11-09 RX ADMIN — SODIUM CHLORIDE 100 ML/HR: 0.9 INJECTION, SOLUTION INTRAVENOUS at 22:15

## 2019-11-09 RX ADMIN — HEPARIN SODIUM 5000 UNITS: 5000 INJECTION INTRAVENOUS; SUBCUTANEOUS at 16:10

## 2019-11-09 NOTE — ASSESSMENT & PLAN NOTE
· In the setting of sepsis  Baseline Cr approximately 1 0-1 2  Creatinine is improving  Urine output is adequate  · Continue to trend renal indices and monitor intake and output

## 2019-11-09 NOTE — PROGRESS NOTES
Progress Note - Carlos Eduardo Cruz 1955, 75 y o  female MRN: [id number]    Unit/Bed#: ICU 07 Encounter: [id number]    Primary Care Provider: Maeve Lagos DO   Date and time admitted to hospital: 11/7/2019  4:42 AM        * Severe sepsis ([facility] [address] )  Assessment & Plan  · Suspect secondary to community acquired multilobar pneumonia  · 1 of 2 blood cultures preliminarily positive for GPC in chains however 2nd set shows no growth so this is likely a contaminant  · Antibiotics were deescalated to ceftriaxone  · S/P bronchoscopy  No evidence of hemorrhage which was a concern in the setting of Juan's history  BAL cultures show no growth so far  · Influenza is negative  · Hemodynamics remain satble    Acute metabolic encephalopathyresolved as of 11/8/2019  Assessment & Plan  · This was likely secondary to fever and sepsis  Now significantly improved  Mental status appears essentially back to baseline  Acute respiratory failure with hypoxia (HCC)resolved as of 11/8/2019  Assessment & Plan  · Secondary to multilobar pneumonia  · Has been weaned from oxygen  · Aggressive pulmonary toileting  Encourage cough and deep breathing  Use incentive spirometer  RUCHI (acute kidney injury) ([facility] [address] )  Assessment & Plan  · In the setting of sepsis  Baseline Cr approximately 1 0-1 2  Creatinine is improving  Urine output is adequate  · Continue to trend renal indices and monitor intake and output  Hypertrophic cardiomyopathy (HCC)  Assessment & Plan  · Last ECHO shows EF 75% with grade 1 diastolic dysfunction and hypertrophy of interventricular septum  · Lasix has been on hold due to slightly labile BP  Can restart later today if BP more stable      Lymphedema  Assessment & Plan  · Chronic lymphedema of b/l LE  · No evidence of cellulitis  · Venous duplex was performed without evidence of DVT however the study was extremely limited due to patient not allowing the pressure to be put on her legs with the u/s probe   · ACE wraps to lower legs      HPI/24hr events:   Afebrile  No acute events overnight  Medications:    Current Facility-Administered Medications:  acetaminophen 650 mg Oral Q6H PRN Estle [place]in[place], CRNP    benzocaine 2 spray Mucosal Once Bernadette Anderson MD    bisacodyl 5 mg Oral Daily PRN Estle [place]inburg, CRNP    calcium carbonate-vitamin D 1 tablet Oral Daily With Breakfast Estdavis Edinburg, CRNP    cefTRIAXone 2,000 mg Intravenous Q24H Estle Elton, CRNP Last Rate: Stopped (11/08/19 1843)   DULoxetine 60 mg Oral Daily Sharmin K SLOANE TurnerNP    heparin (porcine) 5,000 Units Subcutaneous Q8H [facility] Pujarandall AlasRENETTA    lidocaine 1 application Urethral Once Bernadette Anderson MD    LORazepam 0 5 mg Oral BID Abbie Portillo Bilofsky, CRNP    ondansetron 4 mg Intravenous Q4H PRN Fabiene Culver Spatzer, CRNP    oxyCODONE 40 mg Oral Q12H [facility] Abbie Portillo Bilofsky, CRNP    oxyCODONE 15 mg Oral Q6H PRN Abbie Portillo Bilofsky, CRNP    sodium chloride (PF) 3 mL Intravenous PRN SamaraRENETTA Rodriguez    sodium chloride 100 mL/hr Intravenous Continuous Abbie Portillo Bilofsky, CRNP Last Rate: 100 mL/hr (11/09/19 0140)         sodium chloride 100 mL/hr Last Rate: 100 mL/hr (11/09/19 0140)         Physical exam:  Vitals: Body mass index is 45 22 kg/m²  Blood pressure 107/54, pulse 102, temperature 98 3 °F (36 8 °C), temperature source Temporal, resp  rate 22, height 5' 4" (1 626 m), weight 120 kg (263 lb 7 2 oz), SpO2 93 %  ,  Temp  Min: 97 °F (36 1 °C)  Max: 102 9 °F (39 4 °C)  IBW: 54 7 kg    SpO2: 93 %  SpO2 Activity: At Rest  O2 Device: Nasal cannula      Intake/Output Summary (Last 24 hours) at 11/9/2019 0704  Last data filed at 11/9/2019 0556  Gross per 24 hour   Intake 3225 ml   Output 1000 ml   Net 2225 ml       Invasive/non-invasive ventilation settings:   Respiratory    Lab Data (Last 4 hours)    None         O2/Vent Data (Last 4 hours)    None              Invasive Devices     Peripheral Intravenous Line            Peripheral IV 11/07/19 Left Antecubital 2 days    Peripheral IV 11/07/19 Left Hand 2 days          Drain            External Urinary Catheter 1 day                  Physical Exam:  Gen: Sleeping but easily arousable, no acute distress  HEENT: atraumatic, normocephalic, EOMI, pupils 3mm equal and reactive, o/p clear  Neck: supple, trachea midline, no JVD, no lymphadenopathy  Chest: diminished in the bases otherwise CTA  Cor: Single S1/S2, no m/r/g, regular rhythm, tachycardic  Abd: soft, nontender, nondistended, BS normaoctive  Ext: b/l LE edema with ACE wraps in place, no clubbing or cyanosis  Neuro: oriented x3, CN 2-12 grossly intact, no focal deficits  Skin: warm, dry      Diagnostic Data:  Lab: I have personally reviewed pertinent lab results  CBC:   Results from last 7 days   Lab Units 11/08/19  0401 11/07/19  1028 11/07/19  0452   WBC Thousand/uL 31 40* 25 77* 11 30*   HEMOGLOBIN g/dL 12 9 12 9 15 3   HEMATOCRIT % 40 8 42 0 48 2*   PLATELETS Thousands/uL 174 192 209       CMP:   Results from last 7 days   Lab Units 11/09/19  0533 11/08/19  0401 11/07/19  0452   SODIUM mmol/L 139 138 140   POTASSIUM mmol/L 4 1 3 8 3 5   CHLORIDE mmol/L 107 106 100   CO2 mmol/L 24 21 27   BUN mg/dL 24 24 22   CREATININE mg/dL 1 07 1 40* 1 38*   CALCIUM mg/dL 8 4 8 0* 9 3   ALK PHOS U/L  --   --  208*   ALT U/L  --   --  43   AST U/L  --   --  65*     PT/INR:   No results found for: PT, INR,   Magnesium:     Phosphorous:       Microbiology:  Results from last 7 days   Lab Units 11/07/19  1705 11/07/19  0520 11/07/19  0457   BLOOD CULTURE   --  No Growth at 24 hrs  Beta Hemolytic Streptococcus Group B*   GRAM STAIN RESULT  No Polys or Bacteria seen  --  Gram positive cocci in chains*       Imaging:  No new imaging    Cardiac lab/EKG/telemetry/ECHO:   Sinus tachycardia    VTE Prophylaxis: Heparin, SCDs    Code Status: Level 1 - Full Code    Marko Kotyk Spatzer, CRNP    Portions of the record may have been created with voice recognition software   Occasional wrong word or "sound a like" substitutions may have occurred due to the inherent limitations of voice recognition software  Read the chart carefully and recognize, using context, where substitutions have occurred

## 2019-11-09 NOTE — ASSESSMENT & PLAN NOTE
· Secondary to multilobar pneumonia  · Has been weaned from oxygen  · Aggressive pulmonary toileting  Encourage cough and deep breathing  Use incentive spirometer

## 2019-11-09 NOTE — MALNUTRITION/BMI
This medical record reflects one or more clinical indicators suggestive of malnutrition and/or morbid obesity  Malnutrition Findings:              BMI Findings:  BMI Classifications: Morbid Obesity 45-49 9     Body mass index is 45 22 kg/m²  See Nutrition note dated 11/09/2019 for additional details  Completed nutrition assessment is viewable in the nutrition documentation

## 2019-11-09 NOTE — ASSESSMENT & PLAN NOTE
· Last ECHO shows EF 75% with grade 1 diastolic dysfunction and hypertrophy of interventricular septum  · Lasix has been on hold due to slightly labile BP  Can restart later today if BP more stable

## 2019-11-09 NOTE — ASSESSMENT & PLAN NOTE
· Suspect secondary to community acquired multilobar pneumonia  · 1 of 2 blood cultures preliminarily positive for GPC in chains however 2nd set shows no growth so this is likely a contaminant  · Antibiotics were deescalated to ceftriaxone  · S/P bronchoscopy  No evidence of hemorrhage which was a concern in the setting of Juan's history  BAL cultures show no growth so far  · Influenza is negative    · Hemodynamics remain satble

## 2019-11-09 NOTE — ASSESSMENT & PLAN NOTE
· Chronic lymphedema of b/l LE  · No evidence of cellulitis  · Venous duplex was performed without evidence of DVT however the study was extremely limited due to patient not allowing the pressure to be put on her legs with the u/s probe    · ACE wraps to lower legs

## 2019-11-09 NOTE — PLAN OF CARE
Problem: Prexisting or High Potential for Compromised Skin Integrity  Goal: Skin integrity is maintained or improved  Description  INTERVENTIONS:  - Identify patients at risk for skin breakdown  - Assess and monitor skin integrity  - Assess and monitor nutrition and hydration status  - Monitor labs   - Assess for incontinence   - Turn and reposition patient  - Assist with mobility/ambulation  - Relieve pressure over bony prominences  - Avoid friction and shearing  - Provide appropriate hygiene as needed including keeping skin clean and dry  - Evaluate need for skin moisturizer/barrier cream  - Collaborate with interdisciplinary team   - Patient/family teaching  - Consider wound care consult   Outcome: Progressing     Problem: Potential for Falls  Goal: Patient will remain free of falls  Description  INTERVENTIONS:  - Assess patient frequently for physical needs  -  Identify cognitive and physical deficits and behaviors that affect risk of falls    -  [place] fall precautions as indicated by assessment   - Educate patient/family on patient safety including physical limitations  - Instruct patient to call for assistance with activity based on assessment  - Modify environment to reduce risk of injury  - Consider OT/PT consult to assist with strengthening/mobility  Outcome: Progressing     Problem: CARDIOVASCULAR - ADULT  Goal: Absence of cardiac dysrhythmias or at baseline rhythm  Description  INTERVENTIONS:  - Continuous cardiac monitoring, vital signs, obtain 12 lead EKG if ordered  - Administer antiarrhythmic and heart rate control medications as ordered  - Monitor electrolytes and administer replacement therapy as ordered  Outcome: Progressing     Problem: METABOLIC, FLUID AND ELECTROLYTES - ADULT  Goal: Electrolytes maintained within normal limits  Description  INTERVENTIONS:  - Monitor labs and assess patient for signs and symptoms of electrolyte imbalances  - Administer electrolyte replacement as ordered  - Monitor response to electrolyte replacements, including repeat lab results as appropriate  - Instruct patient on fluid and nutrition as appropriate  Outcome: Progressing  Goal: Fluid balance maintained  Description  INTERVENTIONS:  - Monitor labs   - Monitor I/O and WT  - Instruct patient on fluid and nutrition as appropriate  - Assess for signs & symptoms of volume excess or deficit  Outcome: Progressing     Problem: SKIN/TISSUE INTEGRITY - ADULT  Goal: Incision(s), wounds(s) or drain site(s) healing without S/S of infection  Description  INTERVENTIONS  - Assess and document risk factors for skin impairment   - Assess and document dressing, incision, wound bed, drain sites and surrounding tissue  - Consider nutrition services referral as needed  - Oral mucous membranes remain intact  - Provide patient/ family education  Outcome: Progressing     Problem: HEMATOLOGIC - ADULT  Goal: Maintains hematologic stability  Description  INTERVENTIONS  - Assess for signs and symptoms of bleeding or hemorrhage  - Monitor labs  - Administer supportive blood products/factors as ordered and appropriate  Outcome: Progressing     Problem: MUSCULOSKELETAL - ADULT  Goal: Maintain or return mobility to safest level of function  Description  INTERVENTIONS:  - Assess patient's ability to carry out ADLs; assess patient's baseline for ADL function and identify physical deficits which impact ability to perform ADLs (bathing, care of mouth/teeth, toileting, grooming, dressing, etc )  - Assess/evaluate cause of self-care deficits   - Assess range of motion  - Assess patient's mobility  - Assess patient's need for assistive devices and provide as appropriate  - Encourage maximum independence but intervene and supervise when necessary  - Involve family in performance of ADLs  - Assess for home care needs following discharge   - Consider OT consult to assist with ADL evaluation and planning for discharge  - Provide patient education as appropriate  Outcome: Progressing     Problem: PAIN - ADULT  Goal: Verbalizes/displays adequate comfort level or baseline comfort level  Description  Interventions:  - Encourage patient to monitor pain and request assistance  - Assess pain using appropriate pain scale  - Administer analgesics based on type and severity of pain and evaluate response  - Implement non-pharmacological measures as appropriate and evaluate response  - Consider cultural and social influences on pain and pain management  - Notify physician/advanced practitioner if interventions unsuccessful or patient reports new pain  Outcome: Progressing     Problem: INFECTION - ADULT  Goal: Absence or prevention of progression during hospitalization  Description  INTERVENTIONS:  - Assess and monitor for signs and symptoms of infection  - Monitor lab/diagnostic results  - Monitor all insertion sites, i e  indwelling lines, tubes, and drains  - Monitor endotracheal if appropriate and nasal secretions for changes in amount and color  - [place] appropriate cooling/warming therapies per order  - Administer medications as ordered  - Instruct and encourage patient and family to use good hand hygiene technique  - Identify and instruct in appropriate isolation precautions for identified infection/condition  Outcome: Progressing     Problem: SAFETY ADULT  Goal: Maintain or return to baseline ADL function  Description  INTERVENTIONS:  -  Assess patient's ability to carry out ADLs; assess patient's baseline for ADL function and identify physical deficits which impact ability to perform ADLs (bathing, care of mouth/teeth, toileting, grooming, dressing, etc )  - Assess/evaluate cause of self-care deficits   - Assess range of motion  - Assess patient's mobility; develop plan if impaired  - Assess patient's need for assistive devices and provide as appropriate  - Encourage maximum independence but intervene and supervise when necessary  - Involve family in performance of ADLs  - Assess for home care needs following discharge   - Consider OT consult to assist with ADL evaluation and planning for discharge  - Provide patient education as appropriate  Outcome: Progressing  Goal: Maintain or return mobility status to optimal level  Description  INTERVENTIONS:  - Assess patient's baseline mobility status (ambulation, transfers, stairs, etc )    - Identify cognitive and physical deficits and behaviors that affect mobility  - Identify mobility aids required to assist with transfers and/or ambulation (gait belt, sit-to-stand, lift, walker, cane, etc )  - [place] fall precautions as indicated by assessment  - Record patient progress and toleration of activity level on Mobility SBAR; progress patient to next Phase/Stage  - Instruct patient to call for assistance with activity based on assessment  - Consider rehabilitation consult to assist with strengthening/weightbearing, etc   Outcome: Progressing     Problem: DISCHARGE PLANNING  Goal: Discharge to home or other facility with appropriate resources  Description  INTERVENTIONS:  - Identify barriers to discharge w/patient and caregiver  - Arrange for needed discharge resources and transportation as appropriate  - Identify discharge learning needs (meds, wound care, etc )  - Arrange for interpretive services to assist at discharge as needed  - Refer to Case Management Department for coordinating discharge planning if the patient needs post-hospital services based on physician/advanced practitioner order or complex needs related to functional status, cognitive ability, or social support system  Outcome: Progressing     Problem: Knowledge Deficit  Goal: Patient/family/caregiver demonstrates understanding of disease process, treatment plan, medications, and discharge instructions  Description  Complete learning assessment and assess knowledge base    Interventions:  - Provide teaching at level of understanding  - Provide teaching via preferred learning methods  Outcome: Progressing

## 2019-11-10 PROCEDURE — NC001 PR NO CHARGE: Performed by: NURSE PRACTITIONER

## 2019-11-10 PROCEDURE — 99233 SBSQ HOSP IP/OBS HIGH 50: CPT | Performed by: INTERNAL MEDICINE

## 2019-11-10 RX ADMIN — OXYCODONE HYDROCHLORIDE 40 MG: 20 TABLET, FILM COATED, EXTENDED RELEASE ORAL at 08:10

## 2019-11-10 RX ADMIN — SODIUM CHLORIDE 100 ML/HR: 0.9 INJECTION, SOLUTION INTRAVENOUS at 07:17

## 2019-11-10 RX ADMIN — HEPARIN SODIUM 5000 UNITS: 5000 INJECTION INTRAVENOUS; SUBCUTANEOUS at 07:17

## 2019-11-10 RX ADMIN — DULOXETINE HYDROCHLORIDE 60 MG: 60 CAPSULE, DELAYED RELEASE ORAL at 08:10

## 2019-11-10 RX ADMIN — CEFTRIAXONE 2000 MG: 2 INJECTION, POWDER, FOR SOLUTION INTRAMUSCULAR; INTRAVENOUS at 18:07

## 2019-11-10 RX ADMIN — LORAZEPAM 0.5 MG: 1 TABLET ORAL at 18:07

## 2019-11-10 RX ADMIN — OXYCODONE HYDROCHLORIDE 40 MG: 20 TABLET, FILM COATED, EXTENDED RELEASE ORAL at 18:07

## 2019-11-10 RX ADMIN — HEPARIN SODIUM 5000 UNITS: 5000 INJECTION INTRAVENOUS; SUBCUTANEOUS at 13:03

## 2019-11-10 RX ADMIN — HEPARIN SODIUM 5000 UNITS: 5000 INJECTION INTRAVENOUS; SUBCUTANEOUS at 22:38

## 2019-11-10 RX ADMIN — CALCIUM CARBONATE-VITAMIN D TAB 500 MG-200 UNIT 1 TABLET: 500-200 TAB at 08:10

## 2019-11-10 RX ADMIN — LORAZEPAM 0.5 MG: 1 TABLET ORAL at 08:10

## 2019-11-10 NOTE — PROGRESS NOTES
Progress Note - Benjamin Suarez 1955, 75 y o  female MRN: [id number]    Unit/Bed#: ICU 07 Encounter: [id number]    Primary Care Provider: Camden Shore DO   Date and time admitted to hospital: 11/7/2019  4:42 AM        * Severe sepsis ([address] )  Assessment & Plan  · Suspect secondary to community acquired multilobar pneumonia  · 1 of 2 blood cultures preliminarily positive for GPC in chains however 2nd set shows no growth so this is likely a contaminant  WBC count is trending down  · Continue Ceftriaxone, antibiotic d#5, for 2 more days to complete 7 days of treatment  · S/P bronchoscopy  No evidence of hemorrhage which was a concern in the setting of Juan's history  BAL cultures show no growth so far  PCP and fungal cultures pending  · Influenza is negative  · Has has some blood pressure lability however has been asymptomatic    RUCHI (acute kidney injury) (HCC)resolved as of 11/9/2019  Assessment & Plan  · Creatinine improved  Urine output is adequate  · Continue to trend renal indices and monitor intake and output  Hypertrophic cardiomyopathy (HCC)  Assessment & Plan  · Last ECHO shows EF 75% with grade 1 diastolic dysfunction and hypertrophy of interventricular septum  · Continue holding home lasix for now  Lymphedema  Assessment & Plan  · Chronic lymphedema of b/l LE  · No evidence of cellulitis  · Venous duplex was performed without evidence of DVT however the study was extremely limited due to patient not allowing the pressure to be put on her legs with the u/s probe  · Continue ACE wraps to lower legs      HPI/24hr events:   Afebrile  No acute events overnight      Medications:    Current Facility-Administered Medications:  acetaminophen 650 mg Oral Q6H PRN Jason Roles, CRNP    benzocaine 2 spray Mucosal Once Mitch Ulloa MD    bisacodyl 5 mg Oral Daily PRN Jason Roles, CRNP    calcium carbonate-vitamin D 1 tablet Oral Daily With Breakfast Jason Roles, CRNP    cefTRIAXone 2,000 mg Intravenous Q24H RENETTA Shahid Last Rate: Stopped (11/09/19 1801)   DULoxetine 60 mg Oral Daily Sharmin K Yue, SLOANENP    heparin (porcine) 5,000 Units Subcutaneous Q8H [address] Puja Leenabert, CRNP    lidocaine 1 application Urethral Once Giovanna Eldridge MD    LORazepam 0 5 mg Oral BID Verta Starr Bilofsky, CRNP    ondansetron 4 mg Intravenous Q4H PRN Sheryletta Salk Spatzer, SLOANENP    oxyCODONE 40 mg Oral Q12H [address] Verta Starr Bilofsky, CRNP    oxyCODONE 15 mg Oral Q6H PRN Verta Starr Bilofsky, CRNP    sodium chloride (PF) 3 mL Intravenous PRN Starlet Carbon, CRNP    sodium chloride 100 mL/hr Intravenous Continuous RENETTA Leahy Last Rate: 100 mL/hr (11/09/19 2215)         sodium chloride 100 mL/hr Last Rate: 100 mL/hr (11/09/19 2215)         Physical exam:  Vitals: Body mass index is 45 22 kg/m²  Blood pressure (!) 87/56, pulse 86, temperature 98 1 °F (36 7 °C), temperature source Temporal, resp  rate 12, height 5' 4" (1 626 m), weight 120 kg (263 lb 7 2 oz), SpO2 96 %  ,  Temp  Min: 97 °F (36 1 °C)  Max: 102 9 °F (39 4 °C)  IBW: 54 7 kg    SpO2: 96 %  SpO2 Activity: At Rest  O2 Device: None (Room air)      Intake/Output Summary (Last 24 hours) at 11/10/2019 0644  Last data filed at 11/10/2019 0400  Gross per 24 hour   Intake 2760 ml   Output 325 ml   Net 2435 ml       Invasive/non-invasive ventilation settings:   Respiratory    Lab Data (Last 4 hours)    None         O2/Vent Data (Last 4 hours)    None              Invasive Devices     Peripheral Intravenous Line            Peripheral IV 11/07/19 Left Antecubital 3 days    Peripheral IV 11/09/19 Left Forearm less than 1 day          Drain            External Urinary Catheter 2 days                  Physical Exam:  Gen:  Sleeping but easily arousable, appropriate, no acute distress  HEENT:  Atraumatic, normocephalic, extraocular movements intact, pupils 3 mm equal and reactive, oropharynx clear  Neck:  Supple, trachea midline, no JVD, no lymphadenopathy  Chest: clear to auscultation  Cor: Single S1/S2, no m/r/g, RRR  Abd: soft, nontender, nondistended, BS normoactive  Ext: b/l LE edema, ACE wraps in place, no clubbing or cyanosis  Neuro: oriented x3, CN 2-12 grossly intact, no focal deficits  Skin: warm, dry      Diagnostic Data:  Lab: I have personally reviewed pertinent lab results  CBC:   Results from last 7 days   Lab Units 11/09/19  0533 11/08/19  0401 11/07/19  1028   WBC Thousand/uL 20 43* 31 40* 25 77*   HEMOGLOBIN g/dL 11 2* 12 9 12 9   HEMATOCRIT % 35 4 40 8 42 0   PLATELETS Thousands/uL 129* 174 192       CMP:   Results from last 7 days   Lab Units 11/09/19  0533 11/08/19  0401 11/07/19  0452   SODIUM mmol/L 139 138 140   POTASSIUM mmol/L 4 1 3 8 3 5   CHLORIDE mmol/L 107 106 100   CO2 mmol/L 24 21 27   BUN mg/dL 24 24 22   CREATININE mg/dL 1 07 1 40* 1 38*   CALCIUM mg/dL 8 4 8 0* 9 3   ALK PHOS U/L  --   --  208*   ALT U/L  --   --  43   AST U/L  --   --  65*     PT/INR:   No results found for: PT, INR,   Magnesium:     Phosphorous:       Microbiology:  Results from last 7 days   Lab Units 11/07/19  1705 11/07/19  0520 11/07/19  0457   BLOOD CULTURE   --  No Growth at 48 hrs  Beta Hemolytic Streptococcus Group B*   GRAM STAIN RESULT  No Polys or Bacteria seen  --  Gram positive cocci in chains*       Imaging:  No new imaging    Cardiac lab/EKG/telemetry/ECHO:   Sinus rhythm    VTE Prophylaxis: warm, dry    Code Status: Level 1 - Full Code    Maye Bimler Spatzer, CRNP    Portions of the record may have been created with voice recognition software  Occasional wrong word or "sound a like" substitutions may have occurred due to the inherent limitations of voice recognition software  Read the chart carefully and recognize, using context, where substitutions have occurred

## 2019-11-10 NOTE — ASSESSMENT & PLAN NOTE
· Creatinine improved  Urine output is adequate  · Continue to trend renal indices and monitor intake and output

## 2019-11-10 NOTE — PLAN OF CARE
Problem: Prexisting or High Potential for Compromised Skin Integrity  Goal: Skin integrity is maintained or improved  Description  INTERVENTIONS:  - Identify patients at risk for skin breakdown  - Assess and monitor skin integrity  - Assess and monitor nutrition and hydration status  - Monitor labs   - Assess for incontinence   - Turn and reposition patient  - Assist with mobility/ambulation  - Relieve pressure over bony prominences  - Avoid friction and shearing  - Provide appropriate hygiene as needed including keeping skin clean and dry  - Evaluate need for skin moisturizer/barrier cream  - Collaborate with interdisciplinary team   - Patient/family teaching  - Consider wound care consult   Outcome: Progressing     Problem: Potential for Falls  Goal: Patient will remain free of falls  Description  INTERVENTIONS:  - Assess patient frequently for physical needs  -  Identify cognitive and physical deficits and behaviors that affect risk of falls    -  [place] fall precautions as indicated by assessment   - Educate patient/family on patient safety including physical limitations  - Instruct patient to call for assistance with activity based on assessment  - Modify environment to reduce risk of injury  - Consider OT/PT consult to assist with strengthening/mobility  Outcome: Progressing     Problem: SKIN/TISSUE INTEGRITY - ADULT  Goal: Incision(s), wounds(s) or drain site(s) healing without S/S of infection  Description  INTERVENTIONS  - Assess and document risk factors for skin impairment   - Assess and document dressing, incision, wound bed, drain sites and surrounding tissue  - Consider nutrition services referral as needed  - Oral mucous membranes remain intact  - Provide patient/ family education  Outcome: Progressing     Problem: HEMATOLOGIC - ADULT  Goal: Maintains hematologic stability  Description  INTERVENTIONS  - Assess for signs and symptoms of bleeding or hemorrhage  - Monitor labs  - Administer supportive blood products/factors as ordered and appropriate  Outcome: Progressing     Problem: MUSCULOSKELETAL - ADULT  Goal: Maintain or return mobility to safest level of function  Description  INTERVENTIONS:  - Assess patient's ability to carry out ADLs; assess patient's baseline for ADL function and identify physical deficits which impact ability to perform ADLs (bathing, care of mouth/teeth, toileting, grooming, dressing, etc )  - Assess/evaluate cause of self-care deficits   - Assess range of motion  - Assess patient's mobility  - Assess patient's need for assistive devices and provide as appropriate  - Encourage maximum independence but intervene and supervise when necessary  - Involve family in performance of ADLs  - Assess for home care needs following discharge   - Consider OT consult to assist with ADL evaluation and planning for discharge  - Provide patient education as appropriate  Outcome: Progressing     Problem: PAIN - ADULT  Goal: Verbalizes/displays adequate comfort level or baseline comfort level  Description  Interventions:  - Encourage patient to monitor pain and request assistance  - Assess pain using appropriate pain scale  - Administer analgesics based on type and severity of pain and evaluate response  - Implement non-pharmacological measures as appropriate and evaluate response  - Consider cultural and social influences on pain and pain management  - Notify physician/advanced practitioner if interventions unsuccessful or patient reports new pain  Outcome: Progressing     Problem: INFECTION - ADULT  Goal: Absence or prevention of progression during hospitalization  Description  INTERVENTIONS:  - Assess and monitor for signs and symptoms of infection  - Monitor lab/diagnostic results  - Monitor all insertion sites, i e  indwelling lines, tubes, and drains  - Monitor endotracheal if appropriate and nasal secretions for changes in amount and color  - [place] appropriate cooling/warming therapies per order  - Administer medications as ordered  - Instruct and encourage patient and family to use good hand hygiene technique  - Identify and instruct in appropriate isolation precautions for identified infection/condition  Outcome: Progressing     Problem: SAFETY ADULT  Goal: Maintain or return to baseline ADL function  Description  INTERVENTIONS:  -  Assess patient's ability to carry out ADLs; assess patient's baseline for ADL function and identify physical deficits which impact ability to perform ADLs (bathing, care of mouth/teeth, toileting, grooming, dressing, etc )  - Assess/evaluate cause of self-care deficits   - Assess range of motion  - Assess patient's mobility; develop plan if impaired  - Assess patient's need for assistive devices and provide as appropriate  - Encourage maximum independence but intervene and supervise when necessary  - Involve family in performance of ADLs  - Assess for home care needs following discharge   - Consider OT consult to assist with ADL evaluation and planning for discharge  - Provide patient education as appropriate  Outcome: Progressing  Goal: Maintain or return mobility status to optimal level  Description  INTERVENTIONS:  - Assess patient's baseline mobility status (ambulation, transfers, stairs, etc )    - Identify cognitive and physical deficits and behaviors that affect mobility  - Identify mobility aids required to assist with transfers and/or ambulation (gait belt, sit-to-stand, lift, walker, cane, etc )  - [place] fall precautions as indicated by assessment  - Record patient progress and toleration of activity level on Mobility SBAR; progress patient to next Phase/Stage  - Instruct patient to call for assistance with activity based on assessment  - Consider rehabilitation consult to assist with strengthening/weightbearing, etc   Outcome: Progressing     Problem: DISCHARGE PLANNING  Goal: Discharge to home or other facility with appropriate resources  Description  INTERVENTIONS:  - Identify barriers to discharge w/patient and caregiver  - Arrange for needed discharge resources and transportation as appropriate  - Identify discharge learning needs (meds, wound care, etc )  - Arrange for interpretive services to assist at discharge as needed  - Refer to Case Management Department for coordinating discharge planning if the patient needs post-hospital services based on physician/advanced practitioner order or complex needs related to functional status, cognitive ability, or social support system  Outcome: Progressing     Problem: Knowledge Deficit  Goal: Patient/family/caregiver demonstrates understanding of disease process, treatment plan, medications, and discharge instructions  Description  Complete learning assessment and assess knowledge base  Interventions:  - Provide teaching at level of understanding  - Provide teaching via preferred learning methods  Outcome: Progressing     Problem: Nutrition/Hydration-ADULT  Goal: Nutrient/Hydration intake appropriate for improving, restoring or maintaining nutritional needs  Description  Monitor and assess patient's nutrition/hydration status for malnutrition  Collaborate with interdisciplinary team and initiate plan and interventions as ordered  Monitor patient's weight and dietary intake as ordered or per policy  Utilize nutrition screening tool and intervene as necessary  Determine patient's food preferences and provide high-protein, high-caloric foods as appropriate       INTERVENTIONS:  - Monitor oral intake, urinary output, labs, and treatment plans  - Assess nutrition and hydration status and recommend course of action  - Evaluate amount of meals eaten  - Assist patient with eating if necessary   - Allow adequate time for meals  - Recommend/ encourage appropriate diets, oral nutritional supplements, and vitamin/mineral supplements  - Order, calculate, and assess calorie counts as needed  - Recommend, monitor, and adjust tube feedings and TPN/PPN based on assessed needs  - Assess need for intravenous fluids  - Provide specific nutrition/hydration education as appropriate  - Include patient/family/caregiver in decisions related to nutrition  Outcome: Progressing

## 2019-11-10 NOTE — PLAN OF CARE
Problem: Prexisting or High Potential for Compromised Skin Integrity  Goal: Skin integrity is maintained or improved  Description  INTERVENTIONS:  - Identify patients at risk for skin breakdown  - Assess and monitor skin integrity  - Assess and monitor nutrition and hydration status  - Monitor labs   - Assess for incontinence   - Turn and reposition patient  - Assist with mobility/ambulation  - Relieve pressure over bony prominences  - Avoid friction and shearing  - Provide appropriate hygiene as needed including keeping skin clean and dry  - Evaluate need for skin moisturizer/barrier cream  - Collaborate with interdisciplinary team   - Patient/family teaching  - Consider wound care consult   Outcome: Progressing     Problem: Potential for Falls  Goal: Patient will remain free of falls  Description  INTERVENTIONS:  - Assess patient frequently for physical needs  -  Identify cognitive and physical deficits and behaviors that affect risk of falls    -  [place] fall precautions as indicated by assessment   - Educate patient/family on patient safety including physical limitations  - Instruct patient to call for assistance with activity based on assessment  - Modify environment to reduce risk of injury  - Consider OT/PT consult to assist with strengthening/mobility  Outcome: Progressing     Problem: SKIN/TISSUE INTEGRITY - ADULT  Goal: Incision(s), wounds(s) or drain site(s) healing without S/S of infection  Description  INTERVENTIONS  - Assess and document risk factors for skin impairment   - Assess and document dressing, incision, wound bed, drain sites and surrounding tissue  - Consider nutrition services referral as needed  - Oral mucous membranes remain intact  - Provide patient/ family education  Outcome: Progressing     Problem: HEMATOLOGIC - ADULT  Goal: Maintains hematologic stability  Description  INTERVENTIONS  - Assess for signs and symptoms of bleeding or hemorrhage  - Monitor labs  - Administer supportive blood products/factors as ordered and appropriate  Outcome: Progressing     Problem: MUSCULOSKELETAL - ADULT  Goal: Maintain or return mobility to safest level of function  Description  INTERVENTIONS:  - Assess patient's ability to carry out ADLs; assess patient's baseline for ADL function and identify physical deficits which impact ability to perform ADLs (bathing, care of mouth/teeth, toileting, grooming, dressing, etc )  - Assess/evaluate cause of self-care deficits   - Assess range of motion  - Assess patient's mobility  - Assess patient's need for assistive devices and provide as appropriate  - Encourage maximum independence but intervene and supervise when necessary  - Involve family in performance of ADLs  - Assess for home care needs following discharge   - Consider OT consult to assist with ADL evaluation and planning for discharge  - Provide patient education as appropriate  Outcome: Progressing     Problem: PAIN - ADULT  Goal: Verbalizes/displays adequate comfort level or baseline comfort level  Description  Interventions:  - Encourage patient to monitor pain and request assistance  - Assess pain using appropriate pain scale  - Administer analgesics based on type and severity of pain and evaluate response  - Implement non-pharmacological measures as appropriate and evaluate response  - Consider cultural and social influences on pain and pain management  - Notify physician/advanced practitioner if interventions unsuccessful or patient reports new pain  Outcome: Progressing     Problem: INFECTION - ADULT  Goal: Absence or prevention of progression during hospitalization  Description  INTERVENTIONS:  - Assess and monitor for signs and symptoms of infection  - Monitor lab/diagnostic results  - Monitor all insertion sites, i e  indwelling lines, tubes, and drains  - Monitor endotracheal if appropriate and nasal secretions for changes in amount and color  - [place] appropriate cooling/warming therapies per order  - Administer medications as ordered  - Instruct and encourage patient and family to use good hand hygiene technique  - Identify and instruct in appropriate isolation precautions for identified infection/condition  Outcome: Progressing     Problem: SAFETY ADULT  Goal: Maintain or return to baseline ADL function  Description  INTERVENTIONS:  -  Assess patient's ability to carry out ADLs; assess patient's baseline for ADL function and identify physical deficits which impact ability to perform ADLs (bathing, care of mouth/teeth, toileting, grooming, dressing, etc )  - Assess/evaluate cause of self-care deficits   - Assess range of motion  - Assess patient's mobility; develop plan if impaired  - Assess patient's need for assistive devices and provide as appropriate  - Encourage maximum independence but intervene and supervise when necessary  - Involve family in performance of ADLs  - Assess for home care needs following discharge   - Consider OT consult to assist with ADL evaluation and planning for discharge  - Provide patient education as appropriate  Outcome: Progressing  Goal: Maintain or return mobility status to optimal level  Description  INTERVENTIONS:  - Assess patient's baseline mobility status (ambulation, transfers, stairs, etc )    - Identify cognitive and physical deficits and behaviors that affect mobility  - Identify mobility aids required to assist with transfers and/or ambulation (gait belt, sit-to-stand, lift, walker, cane, etc )  - [place] fall precautions as indicated by assessment  - Record patient progress and toleration of activity level on Mobility SBAR; progress patient to next Phase/Stage  - Instruct patient to call for assistance with activity based on assessment  - Consider rehabilitation consult to assist with strengthening/weightbearing, etc   Outcome: Progressing     Problem: DISCHARGE PLANNING  Goal: Discharge to home or other facility with appropriate resources  Description  INTERVENTIONS:  - Identify barriers to discharge w/patient and caregiver  - Arrange for needed discharge resources and transportation as appropriate  - Identify discharge learning needs (meds, wound care, etc )  - Arrange for interpretive services to assist at discharge as needed  - Refer to Case Management Department for coordinating discharge planning if the patient needs post-hospital services based on physician/advanced practitioner order or complex needs related to functional status, cognitive ability, or social support system  Outcome: Progressing     Problem: Knowledge Deficit  Goal: Patient/family/caregiver demonstrates understanding of disease process, treatment plan, medications, and discharge instructions  Description  Complete learning assessment and assess knowledge base  Interventions:  - Provide teaching at level of understanding  - Provide teaching via preferred learning methods  Outcome: Progressing     Problem: Nutrition/Hydration-ADULT  Goal: Nutrient/Hydration intake appropriate for improving, restoring or maintaining nutritional needs  Description  Monitor and assess patient's nutrition/hydration status for malnutrition  Collaborate with interdisciplinary team and initiate plan and interventions as ordered  Monitor patient's weight and dietary intake as ordered or per policy  Utilize nutrition screening tool and intervene as necessary  Determine patient's food preferences and provide high-protein, high-caloric foods as appropriate       INTERVENTIONS:  - Monitor oral intake, urinary output, labs, and treatment plans  - Assess nutrition and hydration status and recommend course of action  - Evaluate amount of meals eaten  - Assist patient with eating if necessary   - Allow adequate time for meals  - Recommend/ encourage appropriate diets, oral nutritional supplements, and vitamin/mineral supplements  - Order, calculate, and assess calorie counts as needed  - Recommend, monitor, and adjust tube feedings and TPN/PPN based on assessed needs  - Assess need for intravenous fluids  - Provide specific nutrition/hydration education as appropriate  - Include patient/family/caregiver in decisions related to nutrition  Outcome: Progressing

## 2019-11-10 NOTE — PROGRESS NOTES
Patient will be transported via bed to [place] [address], room 549, once belongings are together   at bedside  Belongings will be sent with patient  Report called to [place], RN on [place] [address]

## 2019-11-10 NOTE — ASSESSMENT & PLAN NOTE
· Chronic lymphedema of b/l LE  · No evidence of cellulitis  · Venous duplex was performed without evidence of DVT however the study was extremely limited due to patient not allowing the pressure to be put on her legs with the u/s probe    · Continue ACE wraps to lower legs

## 2019-11-10 NOTE — ASSESSMENT & PLAN NOTE
· Suspect secondary to community acquired multilobar pneumonia  · 1 of 2 blood cultures preliminarily positive for GPC in chains however 2nd set shows no growth so this is likely a contaminant  WBC count is trending down  · Continue Ceftriaxone, antibiotic d#5, for 2 more days to complete 7 days of treatment  · S/P bronchoscopy  No evidence of hemorrhage which was a concern in the setting of Juan's history  BAL cultures show no growth so far  PCP and fungal cultures pending  · Influenza is negative    · Has has some blood pressure lability however has been asymptomatic

## 2019-11-10 NOTE — ASSESSMENT & PLAN NOTE
· Last ECHO shows EF 75% with grade 1 diastolic dysfunction and hypertrophy of interventricular septum  · Continue holding home lasix for now

## 2019-11-10 NOTE — PROGRESS NOTES
Progress Note - ICU Transfer to SD/MS [facility]/MS   Issac Pry 75 y o  female MRN: [id number]  Pending sale to [facility]   Unit/Bed#: ICU 07 Encounter: [id number]    Code Status: Level 1 - Full Code  Referring Physician:  Dr Kelli Meza  Accepting Physician:  Dr Andres Cruz  _____________________________________________________________________    Reason for ICU/SD admission:  Severe sepsis, encephalopathy, hypoxic respiratory failure, acute kidney injury, hypertrophic cardiomyopathy with EF of 75%, lymphedema of the bilateral lower extremities    History of Present Illness:  66-year-old female with a known history of Juan's granulomatosis presented to [facility] Emergency Department with complaints of lethargy and encephalopathy  Upon arrival to the emergency department she was found to be hypoxic, tachycardic and febrile with a lactic acid of 5 4  Sepsis protocol was followed the patient was subsequently admitted to the intensive care unit for further evaluation treatment  Summary of clinical course:  Patient was initially treated with fluid resuscitation and IV antibiotics  She received cefepime, vancomycin, Flagyl  Bronchoscopy was performed with samples taken  Cultures are negative so far  A single blood culture was positive for beta hemolytic strep group B  The patient's IV antibiotics were narrowed to ceftriaxone  She continued to improve and was deemed medically fit for transfer to the medical-surgical unit 10 November 2019        Consultants:  No consults  _____________________________________________________________________    Diagnostic Data:  CBC:  Results from last 7 days   Lab Units 11/09/19  0533   WBC Thousand/uL 20 43*   HEMOGLOBIN g/dL 11 2*   HEMATOCRIT % 35 4   PLATELETS Thousands/uL 129*      CMP: No results found for: SODIUM, K, CL, CO2, ANIONGAP, BUN, CREATININE, GLUCOSE, CALCIUM, AST, ALT, ALKPHOS, PROT, BILITOT, EGFR,   PT/INR: No results found for: PT, INR, Magnesium: No components found for: MAG,  Phosphorous: No results found for: PHOS    ABG: No results found for: PHART, PVH2FLB, PO2ART, QRA3YML, Q6YRBSOO, BEART, SOURCE,     Microbiology:  Results from last 7 days   Lab Units 11/07/19  1705 11/07/19  0520 11/07/19  0457   BLOOD CULTURE   --  No Growth at 72 hrs  Beta Hemolytic Streptococcus Group B*   GRAM STAIN RESULT  No Polys or Bacteria seen  --  Gram positive cocci in chains*       Imaging: I have personally reviewed the pertinent imaging studies on the PACS system  A vascular study of the lower limbs was ordered however the patient was unable tolerated secondary to pain  CT of the chest done 7 November 2019:   1  Bilateral infiltrates, most compatible with multifocal/multi lobar pneumonia  By history, the patient has Wegener's granulomatosis  2   Distended debris-filled stomach with reflux of ingested debris into the distal esophagus  Thickening and dilatation of the entire esophagus  Correlate for reflux esophagitis and aspiration pneumonia    CT of the head done 11 November 2019:   Demonstrated stable cerebral atrophy with chronic small vessel ischemic changes    Chest x-ray done 7 November 2019 demonstrated hypoventilation with possible small infiltrate or atelectasis left base    Cardiac/EKG/telemetry/Echo:     Sinus tachycardia  _____________________________________________________________________    Recent or scheduled procedures:  None    Outstanding/pending diagnostics:  None     Mobilization Plan: Mobilize    Spoke with Dr Lydia Sheehan  regarding transfer  Please call [phone] with any questions or concerns  Portions of the record may have been created with voice recognition software  Occasional wrong word or "sound a like" substitutions may have occurred due to the inherent limitations of voice recognition software  Read the chart carefully and recognize, using context, where substitutions have occurred      RENETTA Burns

## 2019-11-11 PROBLEM — D72.829 LEUKOCYTOSIS: Status: ACTIVE | Noted: 2019-11-11

## 2019-11-11 PROBLEM — R78.81 BACTEREMIA: Status: ACTIVE | Noted: 2019-11-11

## 2019-11-11 LAB
ALBUMIN SERPL BCP-MCNC: 2 G/DL (ref 3.5–5)
ALP SERPL-CCNC: 205 U/L (ref 46–116)
ALT SERPL W P-5'-P-CCNC: 28 U/L (ref 12–78)
ANION GAP SERPL CALCULATED.3IONS-SCNC: 7 MMOL/L (ref 4–13)
AST SERPL W P-5'-P-CCNC: 41 U/L (ref 5–45)
BILIRUB DIRECT SERPL-MCNC: 0.28 MG/DL (ref 0–0.2)
BILIRUB SERPL-MCNC: 0.45 MG/DL (ref 0.2–1)
BUN SERPL-MCNC: 11 MG/DL (ref 5–25)
CALCIUM SERPL-MCNC: 8.4 MG/DL (ref 8.3–10.1)
CHLORIDE SERPL-SCNC: 109 MMOL/L (ref 100–108)
CO2 SERPL-SCNC: 23 MMOL/L (ref 21–32)
CREAT SERPL-MCNC: 0.79 MG/DL (ref 0.6–1.3)
ERYTHROCYTE [DISTWIDTH] IN BLOOD BY AUTOMATED COUNT: 15.5 % (ref 11.6–15.1)
GFR SERPL CREATININE-BSD FRML MDRD: 78 ML/MIN/1.73SQ M
GLUCOSE SERPL-MCNC: 108 MG/DL (ref 65–140)
HBV SURFACE AG SER QL: NORMAL
HCT VFR BLD AUTO: 35.1 % (ref 34.8–46.1)
HCV AB SER QL: NORMAL
HGB BLD-MCNC: 10.7 G/DL (ref 11.5–15.4)
HIV 1+2 AB+HIV1 P24 AG SERPL QL IA: NORMAL
HIV1 P24 AG SER QL: NORMAL
INR PPP: 1.02 (ref 0.84–1.19)
MAGNESIUM SERPL-MCNC: 1.6 MG/DL (ref 1.6–2.6)
MCH RBC QN AUTO: 30.4 PG (ref 26.8–34.3)
MCHC RBC AUTO-ENTMCNC: 30.5 G/DL (ref 31.4–37.4)
MCV RBC AUTO: 100 FL (ref 82–98)
PLATELET # BLD AUTO: 136 THOUSANDS/UL (ref 149–390)
PMV BLD AUTO: 10.1 FL (ref 8.9–12.7)
POTASSIUM SERPL-SCNC: 3.8 MMOL/L (ref 3.5–5.3)
PROT SERPL-MCNC: 4.6 G/DL (ref 6.4–8.2)
PROTHROMBIN TIME: 13.5 SECONDS (ref 11.6–14.5)
RBC # BLD AUTO: 3.52 MILLION/UL (ref 3.81–5.12)
SODIUM SERPL-SCNC: 139 MMOL/L (ref 136–145)
WBC # BLD AUTO: 10.13 THOUSAND/UL (ref 4.31–10.16)

## 2019-11-11 PROCEDURE — 85610 PROTHROMBIN TIME: CPT | Performed by: INTERNAL MEDICINE

## 2019-11-11 PROCEDURE — G8987 SELF CARE CURRENT STATUS: HCPCS

## 2019-11-11 PROCEDURE — 87340 HEPATITIS B SURFACE AG IA: CPT | Performed by: NURSE PRACTITIONER

## 2019-11-11 PROCEDURE — G8979 MOBILITY GOAL STATUS: HCPCS

## 2019-11-11 PROCEDURE — 80048 BASIC METABOLIC PNL TOTAL CA: CPT | Performed by: INTERNAL MEDICINE

## 2019-11-11 PROCEDURE — 97163 PT EVAL HIGH COMPLEX 45 MIN: CPT

## 2019-11-11 PROCEDURE — 97167 OT EVAL HIGH COMPLEX 60 MIN: CPT

## 2019-11-11 PROCEDURE — 86803 HEPATITIS C AB TEST: CPT | Performed by: NURSE PRACTITIONER

## 2019-11-11 PROCEDURE — 97530 THERAPEUTIC ACTIVITIES: CPT

## 2019-11-11 PROCEDURE — 99233 SBSQ HOSP IP/OBS HIGH 50: CPT | Performed by: INTERNAL MEDICINE

## 2019-11-11 PROCEDURE — 85027 COMPLETE CBC AUTOMATED: CPT | Performed by: INTERNAL MEDICINE

## 2019-11-11 PROCEDURE — G8978 MOBILITY CURRENT STATUS: HCPCS

## 2019-11-11 PROCEDURE — 80076 HEPATIC FUNCTION PANEL: CPT | Performed by: INTERNAL MEDICINE

## 2019-11-11 PROCEDURE — 87040 BLOOD CULTURE FOR BACTERIA: CPT | Performed by: INTERNAL MEDICINE

## 2019-11-11 PROCEDURE — G8988 SELF CARE GOAL STATUS: HCPCS

## 2019-11-11 PROCEDURE — 99223 1ST HOSP IP/OBS HIGH 75: CPT | Performed by: INTERNAL MEDICINE

## 2019-11-11 PROCEDURE — 83735 ASSAY OF MAGNESIUM: CPT | Performed by: INTERNAL MEDICINE

## 2019-11-11 PROCEDURE — 87806 HIV AG W/HIV1&2 ANTB W/OPTIC: CPT | Performed by: NURSE PRACTITIONER

## 2019-11-11 RX ORDER — PANTOPRAZOLE SODIUM 40 MG/1
40 TABLET, DELAYED RELEASE ORAL
Status: DISCONTINUED | OUTPATIENT
Start: 2019-11-11 | End: 2019-11-14 | Stop reason: HOSPADM

## 2019-11-11 RX ADMIN — OXYCODONE HYDROCHLORIDE 40 MG: 20 TABLET, FILM COATED, EXTENDED RELEASE ORAL at 06:34

## 2019-11-11 RX ADMIN — CEFTRIAXONE 2000 MG: 2 INJECTION, POWDER, FOR SOLUTION INTRAMUSCULAR; INTRAVENOUS at 17:38

## 2019-11-11 RX ADMIN — DULOXETINE HYDROCHLORIDE 60 MG: 60 CAPSULE, DELAYED RELEASE ORAL at 08:54

## 2019-11-11 RX ADMIN — PANTOPRAZOLE SODIUM 40 MG: 40 TABLET, DELAYED RELEASE ORAL at 10:45

## 2019-11-11 RX ADMIN — HEPARIN SODIUM 5000 UNITS: 5000 INJECTION INTRAVENOUS; SUBCUTANEOUS at 06:34

## 2019-11-11 RX ADMIN — LORAZEPAM 0.5 MG: 1 TABLET ORAL at 08:54

## 2019-11-11 RX ADMIN — LORAZEPAM 0.5 MG: 1 TABLET ORAL at 17:37

## 2019-11-11 RX ADMIN — OXYCODONE HYDROCHLORIDE 40 MG: 20 TABLET, FILM COATED, EXTENDED RELEASE ORAL at 20:18

## 2019-11-11 RX ADMIN — CALCIUM CARBONATE-VITAMIN D TAB 500 MG-200 UNIT 1 TABLET: 500-200 TAB at 08:54

## 2019-11-11 RX ADMIN — ONDANSETRON 4 MG: 2 INJECTION INTRAMUSCULAR; INTRAVENOUS at 00:40

## 2019-11-11 RX ADMIN — HEPARIN SODIUM 5000 UNITS: 5000 INJECTION INTRAVENOUS; SUBCUTANEOUS at 21:04

## 2019-11-11 RX ADMIN — HEPARIN SODIUM 5000 UNITS: 5000 INJECTION INTRAVENOUS; SUBCUTANEOUS at 14:02

## 2019-11-11 NOTE — PHYSICAL THERAPY NOTE
Physical Therapy Evaluation/Treatment    Patient Name: Preethi RICE Date: 11/11/2019     Time In: 10:05  Time Out: 10:20     Problem List  Principal Problem:    Severe sepsis [facility])  Active Problems:    [facility]egen[facility]omatosis ([facility] )    Charcot's joint    Chronic kidney disease, stage III (moderate) (HCC)    Chronic pain disorder    Class 2 obesity    GERD (gastroesophageal reflux disease)    Lymphedema    CAP (community acquired pneumonia)    Hypertrophic cardiomyopathy ([facility] )    Acute respiratory failure with hypoxia ([facility] )    Bacteremia    Leukocytosis       Past Medical History  Past Medical History:   Diagnosis Date    Cancer ([place] [address] )     ovarian Ca with chemo    Lymphedema     MRSA (methicillin resistant Staphylococcus aureus) 09/05/2017    nasal swab negative 4/3/19    Ovarian cancer ([place] [address] )     CYST REMOVED RIGHT OVARY IN 2007  HYSTERECTOMY 02/14/07  Past Surgical History  Past Surgical History:   Procedure Laterality Date    APPENDECTOMY      HYSTERECTOMY      OTHER SURGICAL HISTORY      right lower extremity due to trauma    GA TREAT TIBIAL SHAFT FX, INTRAMED IMPLANT Right 9/6/2017    Procedure: INSERTION NAIL IM TIBIA;  Surgeon: Landry Olson MD;  [facility]: [facility];  Service: Orthopedics    US GUIDED BREAST BIOPSY RIGHT COMPLETE Right 5/9/2016 11/11/19 1036   Note Type   Note type Eval/Treat   Pain Assessment   Pain Assessment 0-10   Pain Score 4   Pain Type Acute pain   Pain Location Leg   Pain Orientation Right   Hospital Pain Intervention(s) Repositioned; Ambulation/increased activity   Response to Interventions tolerated   Home Living   Type of [facility] One level;Ramped entrance   Bathroom Shower/Tub Tub/shower unit   Bathroom Toilet Standard   Bathroom Equipment Grab bars in shower   Bathroom Accessibility Accessible   Home Equipment Walker;Cane   Additional Comments Pt lives with spouse and sister-in-law in a one level house with ramped entrance  Pt's sister-in-law will be moving out in mid December   Prior Function   Level of [place] Independent with ADLs and functional mobility; Needs assistance with IADLs   Lives With Spouse; Other (Comment)  (sister in law, she is moving out mid december)   [place]   IADLs Needs assistance   Falls in the last 6 months 0   Vocational Retired   Comments At baseline, pt was I w/ ADLs and functional mobility/transfers w/ use of RW, required assist w/ IADLs, (-) , and reports 0 falls PTA; pt sleeps in a lift chair at home  Restrictions/Precautions   Weight Bearing Precautions Per Order No   Braces or Orthoses Other (Comment)  (R foot [place] boot for Charcot foot)   Other Precautions Cognitive; Chair Alarm; Bed Alarm;Multiple lines;O2;Fall Risk;Pain  (2L O2 via NC)   General   Family/Caregiver Present Yes  (sister in law present for subjective portion of evaluation)   Cognition   Overall Cognitive Status Impaired   Arousal/Participation Cooperative   Attention Attends with cues to redirect   Orientation Level Oriented to person;Oriented to place; Disoriented to time;Disoriented to situation   Following Commands Follows one step commands with increased time or repetition   Comments increased time required for processing, decreased insight into deficits   RUE Assessment   RUE Assessment WFL   LUE Assessment   LUE Assessment WFL   RLE Assessment   RLE Assessment X   Strength RLE   RLE Overall Strength 2+/5  (grossly)   LLE Assessment   LLE Assessment X   Strength LLE   LLE Overall Strength 2+/5  (grossly)   Coordination   Movements are Fluid and Coordinated 0   Coordination and Movement Description slow movements, guarded posture   Bed Mobility   Supine to Sit 3  Moderate assistance   Additional items Assist x 2; Increased time required;Verbal cues;LE management   Additional Comments Pt left sitting in bedside chair at termination of session, all needs within reach   Transfers   Sit to Stand 4  Minimal assistance   Additional items Assist x 2; Increased time required;Verbal cues  (bed height significantly elevated)   Stand to Sit 4  Minimal assistance   Additional items Assist x 2; Increased time required;Verbal cues   Additional Comments Pt required bed height significantly elevated for sit>stand  Cues for safe technique and hand placement   Ambulation/Elevation   Gait pattern Antalgic; Forward Flexion;Decreased foot clearance;Shuffling; Short stride; Step to;Excessively slow   Gait Assistance 4  Minimal assist   Additional items Verbal cues; Tactile cues   Assistive Device Rolling walker   Distance 3' from bed to bedside chair   Balance   Static Sitting Fair +   Dynamic Sitting Fair   Static Standing Fair -  (RW)   Dynamic Standing Poor +  (RW)   Ambulatory Poor +  (RW)   Endurance Deficit   Endurance Deficit Yes   Endurance Deficit Description weakness, fatigue   Activity Tolerance   Activity Tolerance Patient limited by fatigue;Patient limited by pain   Medical Staff Made Aware AYALA Amaya   Nurse Made Aware RN cleared patient for PT evaluation   Assessment   Prognosis Fair   Problem List Decreased strength;Decreased range of motion;Decreased endurance; Impaired balance;Decreased mobility; Decreased coordination; Impaired judgement;Decreased safety awareness;Decreased cognition;Obesity; Decreased skin integrity;Pain   Assessment Pt is a 75 y o  female seen for PT evaluation s/p admit to [address] [place] [address] on 11/7/19  Two pt identifiers were used to confirm  Pt presented with lethargy and disorientation which occurred on 11/7/19  Pt was admitted with a primary dx of: lactic acid increase  PT now consulted for assessment of mobility and d/c needs   Pts current co morbidities effecting treatment include: sepsis, Wegeners granulomatosis, Charcot's joint, obesity, GERD, bacteremia, leukocytosis and personal factors including ramped entrance into home environment  Pt currently lives in a one story home with her  and sister-in-law  Sister-in-law has been living with pt and assisting with IADL tasks, she is planning on moving out in December  Pts current clinical presentation is Unstable/ Unpredictable (high complexity) due to Ongoing medical management for primary dx, decreased activity tolerance compared to baseline, fall risk, increased assistance needed from caregiver at current time, multiple lines, decline in overall functional mobility status  Prior to admission, pt was mod I with ambulation with the use of a RW as per pt  Upon evaluation, pt currently is requiring mod A x 2 for bed mobility; min A x 2 for transfers and min A for ambulation w/ RW  Pt displays decreased foot clearance, decreased step and stride length, decreased gait speed, shuffling gait pattern  Pt presents at PT eval functioning below baseline and currently w/ overall mobility deficits secondary to: decreased strength, decreased endurance, impaired balance, impaired cognition, decreased coordination  Pt currently at a fall risk secondary to impairments listed above  Based on PT evaluation, pt will continue to benefit from skilled acute PT interventions to address stated impairments; to maximize functional mobility; for ongoing pt/ family training; and DME needs  At conclusion of PT session pt was left seated in bedside chair, all needs within reach  Provided pt education regarding PT plan to improve functional mobility status  PT is currently recommending post acute inpatient rehab vs home PT pending adequate support at home and pt progress  Pt agreeable to plan and goals as stated on evaluation  PT will continue to follow during hospital stay  Barriers to Discharge Decreased caregiver support   Goals   Patient Goals get stronger   STG Expiration Date 11/25/19   Short Term Goal #1 1   Pt will increased strength by 1 grade in order to increase functional independence with transfers  2  Pt will increase balance grade by 1 in order to improve safety  3  Pt will improve transfer ability to mod I to increase functional independence and decrease caregiver burden  4  Pt will perform bed mobility with assist x 1 to decrease caregiver burden  5  Pt will be able to amb 150 ft with RW and mod I to increase functional independence in home and community environments  6  Pt will be able to ascend and descend 1 step mod I to increase functional independence within the home environment  PT Treatment Day 0   Plan   Treatment/Interventions Functional transfer training;LE strengthening/ROM; Elevations; Therapeutic exercise; Endurance training;Cognitive reorientation;Patient/family training;Equipment eval/education; Bed mobility;Gait training; Compensatory technique education;Spoke to nursing;OT   PT Frequency Other (Comment)  (3-5x/week)   Recommendation   Recommendation Other (Comment)  (rehab vs HHPT pending adequate support at home)   Equipment Recommended Katiana Evans  (RW)   PT - OK to Discharge Yes   Additional Comments to rehab when medically stable   Modified Rebeka Scale   Modified [place] Scale 4   Barthel Index   Feeding 10   Bathing 0   Grooming Score 5   Dressing Score 5   Bladder Score 5   Bowels Score 10   Toilet Use Score 5   Transfers (Bed/Chair) Score 5   Mobility (Level Surface) Score 0   Stairs Score 0   Barthel Index Score 45       Additional Therapy Session:    Time In: 10:20  Time Out: 10:36    Pt participated in follow up PT session focusing on functional mobility and repositioning  Provided pt extensive education regarding hand placement and technique for transfers  Pt reports she relied heavily on lift chair at home to help herself get up  Pt able to reposition in bedside chair with max A x 1 for LE management  Provided pt education regarding safe functional mobility strategies, goal of therapy session, benefits of sitting OOB   Skilled physical therapy is indicated to address listed functional deficits focusing on strength, endurance and functional mobility      Nick Salcedo PT, DPT
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
## 2019-11-11 NOTE — PLAN OF CARE
Problem: OCCUPATIONAL THERAPY ADULT  Goal: Performs self-care activities at highest level of function for planned discharge setting  See evaluation for individualized goals  Description  Treatment Interventions: ADL retraining, Functional transfer training, UE strengthening/ROM, Endurance training, Patient/family training, Equipment evaluation/education, Compensatory technique education, Activityengagement, Energy conservation          See flowsheet documentation for full assessment, interventions and recommendations  Note:   Limitation: Decreased ADL status, Decreased UE strength, Decreased endurance, Decreased Safe judgement during ADL, Decreased self-care trans, Decreased high-level ADLs  Prognosis: Good  Assessment: Pt is a 75 y o  female seen for OT evaluation s/p adm to [facility] on 11/7/2019 w/ lethargy and altered mental status and dx'd w/ severe sepsis, acute metabolic encephalopathy, acute respiratory failure with hypoxia, and RUCHI  Comorbidities affecting pts functional performance include a significant PMH of Ovarian cancer, lymphedema, MRSA  Pt with active OT orders and activity orders for Up with assistance  Pt lives with spouse and sister-in-law in a one level house with ramped entrance  At baseline, pt was I w/ ADLs and functional mobility/transfers w/ use of RW, required assist w/ IADLs, (-) , and reports 0 falls PTA  Upon evaluation, pt currently requires Min A for UB ADLs, Max-Mod A for LB ADLs, Mod A for toileting, Mod A of 2 for bed mobility, and Min A of 2 for functional mobility/transfers 2* the following deficits impacting occupational performance: weakness, decreased strength, decreased balance, decreased tolerance, decreased safety awareness and increased pain  These impairments, as well at pts difficulty performing ADLS and limited insight into deficits limit pts ability to safely engage in all baseline areas of occupation   Pt scored overall 45/100 on the Barthel Index  Based on the aforementioned OT evaluation, functional performance deficits, and assessments, pt has been identified as a High complexity evaluation  Pt to continue to benefit from continued acute OT services during hospital stay to address defined deficits and to maximize level of functional independence in the following Occupational Performance areas: grooming, bathing/shower, toilet hygiene, dressing, medication management, functional mobility, community mobility, clothing management and social participation  From OT standpoint, recommend STR upon D/C- Will continue to monitor as pt prefers to return home  If D/C home, recommend Home OT  OT will continue to follow pt 3-5x/wk to address the following goals to  w/in 10-14 days:     OT Discharge Recommendation: Short Term Rehab(will continue to Monitor;  Pt prefers to go home)  OT - OK to Discharge: Yes(when medically cleared to rehab)

## 2019-11-11 NOTE — OCCUPATIONAL THERAPY NOTE
[address] Evaluation     Patient Name: Megan Wright  PXCNM'P Date: 11/11/2019  Problem List  Principal Problem:    Severe sepsis [facility])  Active Problems:    Wegeners granulomatosis ([facility] )    Charcot's joint    Chronic kidney disease, stage III (moderate) (HCC)    Chronic pain disorder    Class 2 obesity    GERD (gastroesophageal reflux disease)    Lymphedema    CAP (community acquired pneumonia)    Hypertrophic cardiomyopathy ([place] [address] )    Acute respiratory failure with hypoxia ([place] [address] )    Bacteremia    Leukocytosis    Past Medical History  Past Medical History:   Diagnosis Date    Cancer ([facility] )     ovarian Ca with chemo    Lymphedema     MRSA (methicillin resistant Staphylococcus aureus) 09/05/2017    nasal swab negative 4/3/19    Ovarian cancer ([place] [address] )     CYST REMOVED RIGHT OVARY IN 2007  HYSTERECTOMY 02/14/07  Past Surgical History  Past Surgical History:   Procedure Laterality Date    APPENDECTOMY      HYSTERECTOMY      OTHER SURGICAL HISTORY      right lower extremity due to trauma    WY TREAT TIBIAL SHAFT FX, INTRAMED IMPLANT Right 9/6/2017    Procedure: INSERTION NAIL IM TIBIA;  Surgeon: Rolo Roberto MD;  Location: [facility];  Service: Orthopedics    US GUIDED BREAST BIOPSY RIGHT COMPLETE Right 5/9/2016 11/11/19 1034   Note Type   Note type Eval only   Restrictions/Precautions   Weight Bearing Precautions Per Order No   Braces or Orthoses Other (Comment)  (R foot [place] boot for Charcot foot)   Other Precautions Cognitive; Chair Alarm; Bed Alarm; Fall Risk;Pain;O2;Multiple lines   Pain Assessment   Pain Assessment FLACC   Pain Type Acute pain   Pain Location Leg   Pain Orientation Right   Pain Rating: FLACC (Rest) - Face 0   Pain Rating: FLACC (Rest) - Legs 0   Pain Rating: FLACC (Rest) - Activity 0   Pain Rating: FLACC (Rest) - Cry 0   Pain Rating: FLACC (Rest) - Consolability 0   Score: FLACC (Rest) 0   Pain Rating: FLACC (Activity) - Face 1   Pain Rating: FLACC (Activity) - Legs 0   Pain Rating: FLACC (Activity) - Activity 1   Pain Rating: FLACC (Activity) - Cry 1   Pain Rating: FLACC (Activity) - Consolability 1   Score: FLACC (Activity) 4   Home Living   Type of [place] One level;Ramped entrance   Bathroom Shower/Tub Tub/shower unit   Bathroom Toilet Standard   Bathroom Equipment Grab bars in shower; Tub transfer bench;Commode;Hand-held shower   Bathroom Accessibility Accessible   Home Equipment Walker;Cane   Additional Comments Pt lives with spouse and sister-in-law in a one level house with ramped entrance  Pt's sister-in-law will be moving out in mid December   Prior Function   Level of [place] Independent with ADLs and functional mobility; Needs assistance with IADLs   Lives With Spouse; Other (Comment)  (Sister-in-law, will be moving out Mid December)   Receives Help From Family   ADL Assistance Independent   IADLs Needs assistance   Falls in the last 6 months 0   Vocational Retired   Comments At baseline, pt was I w/ ADLs and functional mobility/transfers w/ use of RW, required assist w/ IADLs, (-) , and reports 0 falls PTA  Lifestyle   Autonomy At baseline, pt was I w/ ADLs and functional mobility/transfers w/ use of RW, required assist w/ IADLs, (-) , and reports 0 falls PTA  Reciprocal Relationships Lives with spouse and sister-in-law   Service to Others Retired   Intrinsic Gratification Watching TV   Psychosocial   Psychosocial (WDL) WDL   ADL   Where Assessed Chair   Eating Assistance 5  Supervision/Setup   Grooming Assistance 5  Supervision/Setup   UB [address] 4  Minimal Assistance   LB [address] 3  Moderate Assistance    [facility] [address] 4  Minimal Assistance    [facility] 2  Maximal [address]  3  Moderate Assistance   Functional Assistance 3  Moderate Assistance   Bed Mobility   Supine to Sit 3  Moderate assistance   Additional items Assist x 2;HOB elevated; Bedrails; Increased time required;Verbal cues;LE management   Sit to Supine Unable to assess   Additional Comments Pt seated OOB in chair at end of session  Chair alarm activated  All needs met and pt reports no further questions for OT at this time   Transfers   Sit to Stand 4  Minimal assistance   Additional items Assist x 2; Increased time required;Verbal cues  (bed height elevated)   Stand to Sit 4  Minimal assistance   Additional items Assist x 2;Armrests; Increased time required;Verbal cues   Additional Comments Pt required bed height significantly elevated for sit>stand   Cues for safe technique and hand placement   Functional Mobility   Functional Mobility 4  Minimal assistance   Additional Comments Assist x1 with standby of 2nd for optimal pt safetey and line management    Additional items Rolling walker   Balance   Static Sitting Fair   Dynamic Sitting Fair -   Static Standing Fair -   Dynamic Standing Poor +   Ambulatory Poor +   Activity Tolerance   Activity Tolerance Patient limited by fatigue;Patient limited by pain   Medical Staff [id number], PT   Nurse Made Aware yes; Anne Olivo, RN and Kennedy Wilcox RN   RUE Assessment   RUE Assessment [facility]   RU Strength   RUE Overall Strength Within Functional Limits - able to perform ADL tasks with strength  (4/5 throughout)   LUE Assessment   LUE Assessment WFL   LUE Strength   LUE Overall Strength Within Functional Limits - able to perform ADL tasks with strength  (4/5 throughout)   Hand Function   Gross Motor Coordination Functional   Fine Motor Coordination Functional   Sensation   Light Touch No apparent deficits   Proprioception   Proprioception No apparent deficits   Vision - Complex Assessment   Ocular Range of Motion WFL   Acuity Able to read clock/calendar on wall without difficulty   Perception   Inattention/Neglect Appears intact   Cognition   Overall Cognitive Status Impaired   Arousal/Participation Alert   Attention Attends with cues to redirect   Orientation Level Oriented to person;Oriented to place; Disoriented to time   Memory Decreased recall of recent events;Decreased recall of precautions   Following Commands Follows one step commands with increased time or repetition   Comments Slow processing time, limited insight into deficits   Assessment   Limitation Decreased ADL status; Decreased UE strength;Decreased endurance;Decreased Safe judgement during ADL;Decreased self-care trans;Decreased high-level ADLs   Prognosis Good   Assessment Pt is a 75 y o  female seen for OT evaluation s/p adm to [facility] on 11/7/2019 w/ lethargy and altered mental status and dx'd w/ severe sepsis, acute metabolic encephalopathy, acute respiratory failure with hypoxia, and RUCHI  Comorbidities affecting pts functional performance include a significant PMH of Ovarian cancer, lymphedema, MRSA  Pt with active OT orders and activity orders for Up with assistance  Pt lives with spouse and sister-in-law in a one level house with ramped entrance  At baseline, pt was I w/ ADLs and functional mobility/transfers w/ use of RW, required assist w/ IADLs, (-) , and reports 0 falls PTA  Upon evaluation, pt currently requires Min A for UB ADLs, Max-Mod A for LB ADLs, Mod A for toileting, Mod A of 2 for bed mobility, and Min A of 2 for functional mobility/transfers 2* the following deficits impacting occupational performance: weakness, decreased strength, decreased balance, decreased tolerance, decreased safety awareness and increased pain  These impairments, as well at pts difficulty performing ADLS and limited insight into deficits limit pts ability to safely engage in all baseline areas of occupation  Pt scored overall 45/100 on the Barthel Index  Based on the aforementioned OT evaluation, functional performance deficits, and assessments, pt has been identified as a High complexity evaluation   Pt to continue to benefit from continued acute OT services during hospital stay to address defined deficits and to maximize level of functional independence in the following Occupational Performance areas: grooming, bathing/shower, toilet hygiene, dressing, medication management, functional mobility, community mobility, clothing management and social participation  From OT standpoint, recommend STR upon D/C- Will continue to monitor as pt prefers to return home  If D/C home, recommend Home OT  OT will continue to follow pt 3-5x/wk to address the following goals to  w/in 10-14 days:   Goals   Patient Goals To get stronger   LTG Time Frame 10-14   Long Term Goal Please refer to LTGs listed below   Plan   Treatment Interventions ADL retraining;Functional transfer training;UE strengthening/ROM; Endurance training;Patient/family training;Equipment evaluation/education; Compensatory technique education; Activityengagement; Energy conservation   Goal Expiration Date 19   OT Treatment Day 0   OT Frequency 3-5x/wk   Recommendation   OT Discharge Recommendation Short Term Rehab  (will continue to Monitor;  Pt prefers to go home)   OT - OK to Discharge Yes  (when medically cleared to rehab)   Barthel Index   Feeding 10   Bathing 0   Grooming Score 5   Dressing Score 5   Bladder Score 5   Bowels Score 10   Toilet Use Score 5   Transfers (Bed/Chair) Score 5   Mobility (Level Surface) Score 0   Stairs Score 0   Barthel Index Score 45   Modified [place] Scale   Modified [place] Scale 4        GOALS    1) Pt will improve activity tolerance to G for min 30 min txment sessions for increase engagement in functional tasks    2) Pt will complete bed mobility at a Mod I level w/ G balance/safety demonstrated to decrease caregiver assistance required     3) Pt will complete UB/LB dressing/self care w/ mod I using adaptive device and DME as needed    4) Pt will complete bathing w/ Mod I w/ use of AE and DME as needed    5) Pt will complete toileting w/ mod I w/ G hygiene/thoroughness using DME as needed    6) Pt will improve functional transfers to Mod I on/off all surfaces using DME as needed w/ G balance/safety     7) Pt will improve functional mobility during ADL/IADL/leisure tasks to Mod I using DME as needed w/ G balance/safety     8) Pt will be attentive 100% of the time during ongoing cognitive assessment w/ G participation to assist w/ safe d/c planning/recommendations    9) Pt will demonstrate G carryover of pt/caregiver education and training as appropriate w/o cues w/ good tolerance to increase safety during functional tasks    10) Pt will demonstrate 100% carryover of energy conservation techniques t/o functional I/ADL/leisure tasks w/o cues s/p skilled education to increase endurance during functional tasks    11) Pt will increase BUE strength by 1MM grade via AROM/AAROM/PROM exercises to increase independence in ADLs and transfers      Luis Garcia OTR/L

## 2019-11-11 NOTE — PLAN OF CARE
Problem: Prexisting or High Potential for Compromised Skin Integrity  Goal: Skin integrity is maintained or improved  Description  INTERVENTIONS:  - Identify patients at risk for skin breakdown  - Assess and monitor skin integrity  - Assess and monitor nutrition and hydration status  - Monitor labs   - Assess for incontinence   - Turn and reposition patient  - Assist with mobility/ambulation  - Relieve pressure over bony prominences  - Avoid friction and shearing  - Provide appropriate hygiene as needed including keeping skin clean and dry  - Evaluate need for skin moisturizer/barrier cream  - Collaborate with interdisciplinary team   - Patient/family teaching  - Consider wound care consult   Outcome: Progressing     Problem: Potential for Falls  Goal: Patient will remain free of falls  Description  INTERVENTIONS:  - Assess patient frequently for physical needs  -  Identify cognitive and physical deficits and behaviors that affect risk of falls    -  [place] fall precautions as indicated by assessment   - Educate patient/family on patient safety including physical limitations  - Instruct patient to call for assistance with activity based on assessment  - Modify environment to reduce risk of injury  - Consider OT/PT consult to assist with strengthening/mobility  Outcome: Progressing     Problem: SKIN/TISSUE INTEGRITY - ADULT  Goal: Incision(s), wounds(s) or drain site(s) healing without S/S of infection  Description  INTERVENTIONS  - Assess and document risk factors for skin impairment   - Assess and document dressing, incision, wound bed, drain sites and surrounding tissue  - Consider nutrition services referral as needed  - Oral mucous membranes remain intact  - Provide patient/ family education  Outcome: Progressing     Problem: HEMATOLOGIC - ADULT  Goal: Maintains hematologic stability  Description  INTERVENTIONS  - Assess for signs and symptoms of bleeding or hemorrhage  - Monitor labs  - Administer supportive blood products/factors as ordered and appropriate  Outcome: Progressing     Problem: MUSCULOSKELETAL - ADULT  Goal: Maintain or return mobility to safest level of function  Description  INTERVENTIONS:  - Assess patient's ability to carry out ADLs; assess patient's baseline for ADL function and identify physical deficits which impact ability to perform ADLs (bathing, care of mouth/teeth, toileting, grooming, dressing, etc )  - Assess/evaluate cause of self-care deficits   - Assess range of motion  - Assess patient's mobility  - Assess patient's need for assistive devices and provide as appropriate  - Encourage maximum independence but intervene and supervise when necessary  - Involve family in performance of ADLs  - Assess for home care needs following discharge   - Consider OT consult to assist with ADL evaluation and planning for discharge  - Provide patient education as appropriate  Outcome: Progressing     Problem: PAIN - ADULT  Goal: Verbalizes/displays adequate comfort level or baseline comfort level  Description  Interventions:  - Encourage patient to monitor pain and request assistance  - Assess pain using appropriate pain scale  - Administer analgesics based on type and severity of pain and evaluate response  - Implement non-pharmacological measures as appropriate and evaluate response  - Consider cultural and social influences on pain and pain management  - Notify physician/advanced practitioner if interventions unsuccessful or patient reports new pain  Outcome: Progressing     Problem: INFECTION - ADULT  Goal: Absence or prevention of progression during hospitalization  Description  INTERVENTIONS:  - Assess and monitor for signs and symptoms of infection  - Monitor lab/diagnostic results  - Monitor all insertion sites, i e  indwelling lines, tubes, and drains  - Monitor endotracheal if appropriate and nasal secretions for changes in amount and color  - [place] appropriate cooling/warming therapies per order  - Administer medications as ordered  - Instruct and encourage patient and family to use good hand hygiene technique  - Identify and instruct in appropriate isolation precautions for identified infection/condition  Outcome: Progressing     Problem: SAFETY ADULT  Goal: Maintain or return to baseline ADL function  Description  INTERVENTIONS:  -  Assess patient's ability to carry out ADLs; assess patient's baseline for ADL function and identify physical deficits which impact ability to perform ADLs (bathing, care of mouth/teeth, toileting, grooming, dressing, etc )  - Assess/evaluate cause of self-care deficits   - Assess range of motion  - Assess patient's mobility; develop plan if impaired  - Assess patient's need for assistive devices and provide as appropriate  - Encourage maximum independence but intervene and supervise when necessary  - Involve family in performance of ADLs  - Assess for home care needs following discharge   - Consider OT consult to assist with ADL evaluation and planning for discharge  - Provide patient education as appropriate  Outcome: Progressing  Goal: Maintain or return mobility status to optimal level  Description  INTERVENTIONS:  - Assess patient's baseline mobility status (ambulation, transfers, stairs, etc )    - Identify cognitive and physical deficits and behaviors that affect mobility  - Identify mobility aids required to assist with transfers and/or ambulation (gait belt, sit-to-stand, lift, walker, cane, etc )  - [place] fall precautions as indicated by assessment  - Record patient progress and toleration of activity level on Mobility SBAR; progress patient to next Phase/Stage  - Instruct patient to call for assistance with activity based on assessment  - Consider rehabilitation consult to assist with strengthening/weightbearing, etc   Outcome: Progressing     Problem: DISCHARGE PLANNING  Goal: Discharge to home or other facility with appropriate resources  Description  INTERVENTIONS:  - Identify barriers to discharge w/patient and caregiver  - Arrange for needed discharge resources and transportation as appropriate  - Identify discharge learning needs (meds, wound care, etc )  - Arrange for interpretive services to assist at discharge as needed  - Refer to Case Management Department for coordinating discharge planning if the patient needs post-hospital services based on physician/advanced practitioner order or complex needs related to functional status, cognitive ability, or social support system  Outcome: Progressing     Problem: Knowledge Deficit  Goal: Patient/family/caregiver demonstrates understanding of disease process, treatment plan, medications, and discharge instructions  Description  Complete learning assessment and assess knowledge base  Interventions:  - Provide teaching at level of understanding  - Provide teaching via preferred learning methods  Outcome: Progressing     Problem: Nutrition/Hydration-ADULT  Goal: Nutrient/Hydration intake appropriate for improving, restoring or maintaining nutritional needs  Description  Monitor and assess patient's nutrition/hydration status for malnutrition  Collaborate with interdisciplinary team and initiate plan and interventions as ordered  Monitor patient's weight and dietary intake as ordered or per policy  Utilize nutrition screening tool and intervene as necessary  Determine patient's food preferences and provide high-protein, high-caloric foods as appropriate       INTERVENTIONS:  - Monitor oral intake, urinary output, labs, and treatment plans  - Assess nutrition and hydration status and recommend course of action  - Evaluate amount of meals eaten  - Assist patient with eating if necessary   - Allow adequate time for meals  - Recommend/ encourage appropriate diets, oral nutritional supplements, and vitamin/mineral supplements  - Order, calculate, and assess calorie counts as needed  - Recommend, monitor, and adjust tube feedings and TPN/PPN based on assessed needs  - Assess need for intravenous fluids  - Provide specific nutrition/hydration education as appropriate  - Include patient/family/caregiver in decisions related to nutrition  Outcome: Progressing

## 2019-11-11 NOTE — CONSULTS
Consultation - Infectious Disease   Becka Moody 75 y o  female MRN: [id number]  Unit/Bed#: E5 -01 Encounter: [id number]      IMPRESSION & RECOMMENDATIONS:   Impression/Recommendations:  1  Severe sepsis, POA  Fever, tachycardia, lactic acidosis  Due to #2  ROS and exam otherwise benign  Improving  Rec:  · Continue antibiotics as below  · Follow temperatures closely  · Recheck CBC in a m  · Supportive care is per the primary service    2  Group B strep bacteremia  Consider skin source in setting of lymphedema although no overt cellulitis seen  Consider due to #3 although unusual respiratory pathogen  Repeat blood cultures negative  No TTE performed  Rec:  · Continue ceftriaxone for now  · Follow-up repeat blood cultures  · Check TTE  · If repeat blood cultures, TTE negative could transition to oral antibiotics with plan to complete 10 days total of treatment    3  Bilateral pneumonia  Consider aspiration pneumonitis versus pneumonia  CT showed reflux esophagitis  Rapid clinical improvement since admission  Status post bronchoscopy with negative cultures  Rec:  · Continue antibiotics as above  · Aspiration precautions    4  Acute hypoxic respiratory failure  Likely multifactorial due to all above  Consider element of pulmonary edema  Rapid clinical improvement, now on room air  Rec:  · Continue antibiotics as above  · Follow respiratory status closely    5  Chronic bilateral lower extremity lymphedema  6   Juan's granulomatosis  Initially with pulmonary hemorrhage, renal failure, polyarthritis  Stable off immunosuppression  7   CKD  Baseline creatinine 0 9-1 2     8   Hypogammaglobulinemia  9   HOCM  The above impression and plan was discussed in detail with Dr Jessica Ramos  Antibiotics:  Ceftriaxone # 4  Antibiotics # 5    Thank you for this consultation  We will follow along with you      HISTORY OF PRESENT ILLNESS:  Reason for Consult:  Severe sepsis    HPI: Abbie Murguia Alphonsus Meigs is a 75 y o  female with a history of Juan's granulomatosis not currently on immunosuppression  She has a history of chronic lower extremity lymphedema related to prior gyn surgery  She presented to the emergency department on 11/07 after  found her lethargic and confused  Upon presentation she was found to be febrile with tachycardia, tachypnea, and hypoxia  Her labs revealed an elevated WBC, lactic acid, and procalcitonin  She underwent a CT chest which suggested bilateral infiltrates concerning for aspiration pneumonia  She was given doses of levofloxacin and Flagyl and was started on vancomycin and cefepime  She initially admitted to the ICU  She underwent bronchoscopy which showed scant secretions and cultures were negative  Since admission a single blood cultures come back growing group B strep  We were asked to comment on further evaluation and management  Of note review of her record reveals she had a similar hospitalization in April 2019 when she presented with severe sepsis with bilateral pulmonary infiltrates and a single blood culture with group B strep  In performing this consult, I have reviewed prior admission and outpatient visit records in detail  REVIEW OF SYSTEMS:  She has chronic swelling of the right leg due to prior ovarian cancer surgery  She states her leg is chronically warm and painful but was more so on admission  She did not think it looked particularly red  A complete system-based review of systems is otherwise negative  PAST MEDICAL HISTORY:  Past Medical History:   Diagnosis Date    Cancer [facility])     ovarian Ca with chemo    Lymphedema     MRSA (methicillin resistant Staphylococcus aureus) 09/05/2017    nasal swab negative 4/3/19    Ovarian cancer ([facility] [address] )     CYST REMOVED RIGHT OVARY IN 2007  HYSTERECTOMY 02/14/07       Past Surgical History:   Procedure Laterality Date    APPENDECTOMY      HYSTERECTOMY      OTHER SURGICAL HISTORY      right lower extremity due to trauma    WI TREAT TIBIAL SHAFT FX, INTRAMED IMPLANT Right 2017    Procedure: INSERTION NAIL IM TIBIA;  Surgeon: Latonya Gilliam MD;  Location: [facility];  Service: Orthopedics    US GUIDED BREAST BIOPSY RIGHT COMPLETE Right 2016       FAMILY HISTORY:  Non-contributory    SOCIAL HISTORY:  Social History     Substance and Sexual Activity   Alcohol Use Yes    Frequency: Monthly or less     Social History     Substance and Sexual Activity   Drug Use No     Social History     Tobacco Use   Smoking Status Never Smoker   Smokeless Tobacco Never Used       ALLERGIES:  Allergies   Allergen Reactions    Iodinated Diagnostic Agents Shortness Of Breath    Omnipaque [Iohexol] Shortness Of Breath    Other Shortness Of Breath     IVP dye, x ray dye 3    Iodides      Other reaction(s): SWELLING, SOB    Methotrexate Nausea Only     Headache and nausea    Methotrexate Derivatives Headache       MEDICATIONS:  All current active medications have been reviewed  PHYSICAL EXAM:  Vitals:  Temp:  [97 4 °F (36 3 °C)-97 6 °F (36 4 °C)] 97 6 °F (36 4 °C)  HR:  [82-98] 98  Resp:  [19-20] 20  BP: (101-146)/(56-73) 128/61  SpO2:  [92 %-97 %] 97 %  Temp (24hrs), Av 5 °F (36 4 °C), Min:97 4 °F (36 3 °C), Max:97 6 °F (36 4 °C)  Current: Temperature: 97 6 °F (36 4 °C)     Physical Exam:  General:  Well-nourished, well-developed, in no acute distress  Eyes:  Conjunctive clear with no hemorrhages or effusions  Oropharynx:  No ulcers, no lesions  Neck:  Supple, no lymphadenopathy  Lungs:  Clear to auscultation bilaterally, no accessory muscle use  Cardiac:  Regular rate and rhythm, no murmurs  Abdomen:  Soft, non-tender, non-distended  Extremities:  No peripheral cyanosis, clubbing  Massive lymphedema of right lower extremity with increased warmth but no erythema    Skin:  No rashes, no ulcers  Neurological:  Moves all four extremities spontaneously, sensation grossly intact    LABS, IMAGING, & OTHER STUDIES:  Lab Results:  I have personally reviewed pertinent labs  Results from last 7 days   Lab Units 11/11/19  1140 11/09/19  0533 11/08/19  0401 11/07/19  0452   POTASSIUM mmol/L 3 8 4 1 3 8 3 5   CHLORIDE mmol/L 109* 107 106 100   CO2 mmol/L 23 24 21 27   BUN mg/dL 11 24 24 22   CREATININE mg/dL 0 79 1 07 1 40* 1 38*   EGFR ml/min/1 73sq m 78 54 39 40   CALCIUM mg/dL 8 4 8 4 8 0* 9 3   AST U/L 41  --   --  65*   ALT U/L 28  --   --  43   ALK PHOS U/L 205*  --   --  208*     Results from last 7 days   Lab Units 11/11/19  1140 11/09/19  0533 11/08/19  0401   WBC Thousand/uL 10 13 20 43* 31 40*   HEMOGLOBIN g/dL 10 7* 11 2* 12 9   PLATELETS Thousands/uL 136* 129* 174     Results from last 7 days   Lab Units 11/07/19  1705 11/07/19  0520 11/07/19  0457   BLOOD CULTURE   --  No Growth After 4 Days  Beta Hemolytic Streptococcus Group B*   GRAM STAIN RESULT  No Polys or Bacteria seen  --  Gram positive cocci in chains*       Imaging Studies:   I have personally reviewed pertinent imaging study reports and images in PACS  CT chest reviewed personally patchy bilateral infiltrates    EKG, Pathology, and Other Studies:   I have personally reviewed pertinent reports

## 2019-11-11 NOTE — PLAN OF CARE
Problem: PHYSICAL THERAPY ADULT  Goal: Performs mobility at highest level of function for planned discharge setting  See evaluation for individualized goals  Description  Treatment/Interventions: Functional transfer training, LE strengthening/ROM, Elevations, Therapeutic exercise, Endurance training, Cognitive reorientation, Patient/family training, Equipment eval/education, Bed mobility, Gait training, Compensatory technique education, Spoke to nursing, OT  Equipment Recommended: Walker(RW)       See flowsheet documentation for full assessment, interventions and recommendations  Note:   Prognosis: Fair  Problem List: Decreased strength, Decreased range of motion, Decreased endurance, Impaired balance, Decreased mobility, Decreased coordination, Impaired judgement, Decreased safety awareness, Decreased cognition, Obesity, Decreased skin integrity, Pain  Assessment: Pt is a 75 y o  female seen for PT evaluation s/p admit to [address] [id number] on 11/7/19  Two pt identifiers were used to confirm  Pt presented with lethargy and disorientation which occurred on 11/7/19  Pt was admitted with a primary dx of: lactic acid increase  PT now consulted for assessment of mobility and d/c needs  Pts current co morbidities effecting treatment include: sepsis, Wegeners granulomatosis, Charcot's joint, obesity, GERD, bacteremia, leukocytosis and personal factors including ramped entrance into home environment  Pt currently lives in a one story home with her  and sister-in-law  Sister-in-law has been living with pt and assisting with IADL tasks, she is planning on moving out in December  Pts current clinical presentation is Unstable/ Unpredictable (high complexity) due to Ongoing medical management for primary dx, decreased activity tolerance compared to baseline, fall risk, increased assistance needed from caregiver at current time, multiple lines, decline in overall functional mobility status   Prior to admission, pt was mod I with ambulation with the use of a RW as per pt  Upon evaluation, pt currently is requiring mod A x 2 for bed mobility; min A x 2 for transfers and min A for ambulation w/ RW  Pt displays decreased foot clearance, decreased step and stride length, decreased gait speed, shuffling gait pattern  Pt presents at PT eval functioning below baseline and currently w/ overall mobility deficits secondary to: decreased strength, decreased endurance, impaired balance, impaired cognition, decreased coordination  Pt currently at a fall risk secondary to impairments listed above  Based on PT evaluation, pt will continue to benefit from skilled acute PT interventions to address stated impairments; to maximize functional mobility; for ongoing pt/ family training; and DME needs  At conclusion of PT session pt was left seated in bedside chair, all needs within reach  Provided pt education regarding PT plan to improve functional mobility status  PT is currently recommending post acute inpatient rehab vs home PT pending adequate support at home and pt progress  Pt agreeable to plan and goals as stated on evaluation  PT will continue to follow during hospital stay  Barriers to Discharge: Decreased caregiver support     Recommendation: Other (Comment)(rehab vs HHPT pending adequate support at home)     PT - OK to Discharge: Yes    See flowsheet documentation for full assessment

## 2019-11-11 NOTE — SOCIAL WORK
This SW m/w the pt to complete a general assessment and discuss discharge planning  The pt lives with her  in a single story house with ramp to enter  Pt's ANN is currently at home until the middle of December to assist with any care  Pt is independent with ADLs and ambulates with a RW  Pt does not drive  Pt's family provides transportation  PCP: Zenaida Sykes DO  Preferred Pharmacy: [facility] in [place]  DME: RW, chair lift, shower chair, BSC  Pt has a hx of Home O2 but not currently  Hx of VNA: Patient Care (agreeable to using them if needed)  Awaiting PT/OT Eval   SW will continue to follow

## 2019-11-12 ENCOUNTER — APPOINTMENT (INPATIENT)
Dept: NON INVASIVE DIAGNOSTICS | Facility: HOSPITAL | Age: 64
DRG: 871 | End: 2019-11-12
Payer: MEDICARE

## 2019-11-12 LAB
ANION GAP SERPL CALCULATED.3IONS-SCNC: 8 MMOL/L (ref 4–13)
BACTERIA BLD CULT: NORMAL
BUN SERPL-MCNC: 11 MG/DL (ref 5–25)
CALCIUM SERPL-MCNC: 8.6 MG/DL (ref 8.3–10.1)
CHLORIDE SERPL-SCNC: 110 MMOL/L (ref 100–108)
CO2 SERPL-SCNC: 22 MMOL/L (ref 21–32)
CREAT SERPL-MCNC: 0.73 MG/DL (ref 0.6–1.3)
ERYTHROCYTE [DISTWIDTH] IN BLOOD BY AUTOMATED COUNT: 15.3 % (ref 11.6–15.1)
GFR SERPL CREATININE-BSD FRML MDRD: 86 ML/MIN/1.73SQ M
GLUCOSE SERPL-MCNC: 91 MG/DL (ref 65–140)
HCT VFR BLD AUTO: 34.3 % (ref 34.8–46.1)
HGB BLD-MCNC: 10.6 G/DL (ref 11.5–15.4)
MCH RBC QN AUTO: 30.5 PG (ref 26.8–34.3)
MCHC RBC AUTO-ENTMCNC: 30.9 G/DL (ref 31.4–37.4)
MCV RBC AUTO: 99 FL (ref 82–98)
P JIROVECII AG SPEC QL IF: NORMAL
PLATELET # BLD AUTO: 152 THOUSANDS/UL (ref 149–390)
PMV BLD AUTO: 10 FL (ref 8.9–12.7)
POTASSIUM SERPL-SCNC: 3.8 MMOL/L (ref 3.5–5.3)
RBC # BLD AUTO: 3.47 MILLION/UL (ref 3.81–5.12)
SODIUM SERPL-SCNC: 140 MMOL/L (ref 136–145)
WBC # BLD AUTO: 9.24 THOUSAND/UL (ref 4.31–10.16)

## 2019-11-12 PROCEDURE — 99232 SBSQ HOSP IP/OBS MODERATE 35: CPT | Performed by: INTERNAL MEDICINE

## 2019-11-12 PROCEDURE — 93306 TTE W/DOPPLER COMPLETE: CPT

## 2019-11-12 PROCEDURE — 85027 COMPLETE CBC AUTOMATED: CPT | Performed by: INTERNAL MEDICINE

## 2019-11-12 PROCEDURE — 97535 SELF CARE MNGMENT TRAINING: CPT

## 2019-11-12 PROCEDURE — 97530 THERAPEUTIC ACTIVITIES: CPT

## 2019-11-12 PROCEDURE — 99233 SBSQ HOSP IP/OBS HIGH 50: CPT | Performed by: INTERNAL MEDICINE

## 2019-11-12 PROCEDURE — 80048 BASIC METABOLIC PNL TOTAL CA: CPT | Performed by: INTERNAL MEDICINE

## 2019-11-12 PROCEDURE — 97116 GAIT TRAINING THERAPY: CPT

## 2019-11-12 RX ORDER — AMMONIUM LACTATE 12 G/100G
LOTION TOPICAL 2 TIMES DAILY
Status: DISCONTINUED | OUTPATIENT
Start: 2019-11-12 | End: 2019-11-14 | Stop reason: HOSPADM

## 2019-11-12 RX ADMIN — OXYCODONE HYDROCHLORIDE 40 MG: 20 TABLET, FILM COATED, EXTENDED RELEASE ORAL at 18:22

## 2019-11-12 RX ADMIN — PANTOPRAZOLE SODIUM 40 MG: 40 TABLET, DELAYED RELEASE ORAL at 06:59

## 2019-11-12 RX ADMIN — LORAZEPAM 0.5 MG: 1 TABLET ORAL at 17:43

## 2019-11-12 RX ADMIN — OXYCODONE HYDROCHLORIDE 40 MG: 20 TABLET, FILM COATED, EXTENDED RELEASE ORAL at 06:59

## 2019-11-12 RX ADMIN — HEPARIN SODIUM 5000 UNITS: 5000 INJECTION INTRAVENOUS; SUBCUTANEOUS at 15:15

## 2019-11-12 RX ADMIN — LORAZEPAM 0.5 MG: 1 TABLET ORAL at 09:59

## 2019-11-12 RX ADMIN — CEFTRIAXONE 2000 MG: 2 INJECTION, POWDER, FOR SOLUTION INTRAMUSCULAR; INTRAVENOUS at 17:43

## 2019-11-12 RX ADMIN — CALCIUM CARBONATE-VITAMIN D TAB 500 MG-200 UNIT 1 TABLET: 500-200 TAB at 10:00

## 2019-11-12 RX ADMIN — HEPARIN SODIUM 5000 UNITS: 5000 INJECTION INTRAVENOUS; SUBCUTANEOUS at 06:59

## 2019-11-12 RX ADMIN — HEPARIN SODIUM 5000 UNITS: 5000 INJECTION INTRAVENOUS; SUBCUTANEOUS at 22:47

## 2019-11-12 RX ADMIN — DULOXETINE HYDROCHLORIDE 60 MG: 60 CAPSULE, DELAYED RELEASE ORAL at 10:00

## 2019-11-12 RX ADMIN — SODIUM CHLORIDE 100 ML/HR: 0.9 INJECTION, SOLUTION INTRAVENOUS at 01:20

## 2019-11-12 NOTE — PLAN OF CARE
Problem: Prexisting or High Potential for Compromised Skin Integrity  Goal: Skin integrity is maintained or improved  Description  INTERVENTIONS:  - Identify patients at risk for skin breakdown  - Assess and monitor skin integrity  - Assess and monitor nutrition and hydration status  - Monitor labs   - Assess for incontinence   - Turn and reposition patient  - Assist with mobility/ambulation  - Relieve pressure over bony prominences  - Avoid friction and shearing  - Provide appropriate hygiene as needed including keeping skin clean and dry  - Evaluate need for skin moisturizer/barrier cream  - Collaborate with interdisciplinary team   - Patient/family teaching  - Consider wound care consult   Outcome: Progressing     Problem: Potential for Falls  Goal: Patient will remain free of falls  Description  INTERVENTIONS:  - Assess patient frequently for physical needs  -  Identify cognitive and physical deficits and behaviors that affect risk of falls    -  [place] fall precautions as indicated by assessment   - Educate patient/family on patient safety including physical limitations  - Instruct patient to call for assistance with activity based on assessment  - Modify environment to reduce risk of injury  - Consider OT/PT consult to assist with strengthening/mobility  Outcome: Progressing     Problem: SKIN/TISSUE INTEGRITY - ADULT  Goal: Incision(s), wounds(s) or drain site(s) healing without S/S of infection  Description  INTERVENTIONS  - Assess and document risk factors for skin impairment   - Assess and document dressing, incision, wound bed, drain sites and surrounding tissue  - Consider nutrition services referral as needed  - Oral mucous membranes remain intact  - Provide patient/ family education  Outcome: Progressing     Problem: HEMATOLOGIC - ADULT  Goal: Maintains hematologic stability  Description  INTERVENTIONS  - Assess for signs and symptoms of bleeding or hemorrhage  - Monitor labs  - Administer supportive blood products/factors as ordered and appropriate  Outcome: Progressing     Problem: MUSCULOSKELETAL - ADULT  Goal: Maintain or return mobility to safest level of function  Description  INTERVENTIONS:  - Assess patient's ability to carry out ADLs; assess patient's baseline for ADL function and identify physical deficits which impact ability to perform ADLs (bathing, care of mouth/teeth, toileting, grooming, dressing, etc )  - Assess/evaluate cause of self-care deficits   - Assess range of motion  - Assess patient's mobility  - Assess patient's need for assistive devices and provide as appropriate  - Encourage maximum independence but intervene and supervise when necessary  - Involve family in performance of ADLs  - Assess for home care needs following discharge   - Consider OT consult to assist with ADL evaluation and planning for discharge  - Provide patient education as appropriate  Outcome: Progressing     Problem: PAIN - ADULT  Goal: Verbalizes/displays adequate comfort level or baseline comfort level  Description  Interventions:  - Encourage patient to monitor pain and request assistance  - Assess pain using appropriate pain scale  - Administer analgesics based on type and severity of pain and evaluate response  - Implement non-pharmacological measures as appropriate and evaluate response  - Consider cultural and social influences on pain and pain management  - Notify physician/advanced practitioner if interventions unsuccessful or patient reports new pain  Outcome: Progressing     Problem: INFECTION - ADULT  Goal: Absence or prevention of progression during hospitalization  Description  INTERVENTIONS:  - Assess and monitor for signs and symptoms of infection  - Monitor lab/diagnostic results  - Monitor all insertion sites, i e  indwelling lines, tubes, and drains  - Monitor endotracheal if appropriate and nasal secretions for changes in amount and color  - [place] appropriate cooling/warming therapies per order  - Administer medications as ordered  - Instruct and encourage patient and family to use good hand hygiene technique  - Identify and instruct in appropriate isolation precautions for identified infection/condition  Outcome: Progressing     Problem: SAFETY ADULT  Goal: Maintain or return to baseline ADL function  Description  INTERVENTIONS:  -  Assess patient's ability to carry out ADLs; assess patient's baseline for ADL function and identify physical deficits which impact ability to perform ADLs (bathing, care of mouth/teeth, toileting, grooming, dressing, etc )  - Assess/evaluate cause of self-care deficits   - Assess range of motion  - Assess patient's mobility; develop plan if impaired  - Assess patient's need for assistive devices and provide as appropriate  - Encourage maximum independence but intervene and supervise when necessary  - Involve family in performance of ADLs  - Assess for home care needs following discharge   - Consider OT consult to assist with ADL evaluation and planning for discharge  - Provide patient education as appropriate  Outcome: Progressing  Goal: Maintain or return mobility status to optimal level  Description  INTERVENTIONS:  - Assess patient's baseline mobility status (ambulation, transfers, stairs, etc )    - Identify cognitive and physical deficits and behaviors that affect mobility  - Identify mobility aids required to assist with transfers and/or ambulation (gait belt, sit-to-stand, lift, walker, cane, etc )  - [place] fall precautions as indicated by assessment  - Record patient progress and toleration of activity level on Mobility SBAR; progress patient to next Phase/Stage  - Instruct patient to call for assistance with activity based on assessment  - Consider rehabilitation consult to assist with strengthening/weightbearing, etc   Outcome: Progressing     Problem: DISCHARGE PLANNING  Goal: Discharge to home or other facility with appropriate resources  Description  INTERVENTIONS:  - Identify barriers to discharge w/patient and caregiver  - Arrange for needed discharge resources and transportation as appropriate  - Identify discharge learning needs (meds, wound care, etc )  - Arrange for interpretive services to assist at discharge as needed  - Refer to Case Management Department for coordinating discharge planning if the patient needs post-hospital services based on physician/advanced practitioner order or complex needs related to functional status, cognitive ability, or social support system  Outcome: Progressing     Problem: Knowledge Deficit  Goal: Patient/family/caregiver demonstrates understanding of disease process, treatment plan, medications, and discharge instructions  Description  Complete learning assessment and assess knowledge base  Interventions:  - Provide teaching at level of understanding  - Provide teaching via preferred learning methods  Outcome: Progressing     Problem: Nutrition/Hydration-ADULT  Goal: Nutrient/Hydration intake appropriate for improving, restoring or maintaining nutritional needs  Description  Monitor and assess patient's nutrition/hydration status for malnutrition  Collaborate with interdisciplinary team and initiate plan and interventions as ordered  Monitor patient's weight and dietary intake as ordered or per policy  Utilize nutrition screening tool and intervene as necessary  Determine patient's food preferences and provide high-protein, high-caloric foods as appropriate       INTERVENTIONS:  - Monitor oral intake, urinary output, labs, and treatment plans  - Assess nutrition and hydration status and recommend course of action  - Evaluate amount of meals eaten  - Assist patient with eating if necessary   - Allow adequate time for meals  - Recommend/ encourage appropriate diets, oral nutritional supplements, and vitamin/mineral supplements  - Order, calculate, and assess calorie counts as needed  - Recommend, monitor, and adjust tube feedings and TPN/PPN based on assessed needs  - Assess need for intravenous fluids  - Provide specific nutrition/hydration education as appropriate  - Include patient/family/caregiver in decisions related to nutrition  Outcome: Progressing

## 2019-11-12 NOTE — CASE MANAGEMENT
CM met with patient to introduce self and explain role  CM provided STR and HHC choices to patient  CM informed that patient is amenable to STR, that patient has been in [facility] previously and placed referral per patient's request to [facility] in [employer]  CM informed by patient that her  is coming at 3pm to wrap her legs and they will review additional choices for STR at that time  CM will come back after that time for choices  CM department will continue to follow through discharge
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
## 2019-11-12 NOTE — PHYSICAL THERAPY NOTE
PHYSICAL THERAPY NOTE          Patient Name: Gaston Barba  HGAJZ'V Date: 11/12/2019 11/12/19 9325   Pain Assessment   Pain Assessment No/denies pain   Pain Score No Pain   Restrictions/Precautions   Weight Bearing Precautions Per Order No   Braces or Orthoses Other (Comment)  (R [place] boot for Charcot foot)   Other Precautions Cognitive; Chair Alarm; Bed Alarm;Multiple lines; Fall Risk   General   Chart Reviewed Yes   Family/Caregiver Present No   Cognition   Overall Cognitive Status Impaired   Arousal/Participation Alert; Cooperative   Attention Attends with cues to redirect   Orientation Level Oriented to person;Oriented to place; Disoriented to time;Disoriented to situation   Following Commands Follows one step commands with increased time or repetition   Subjective   Subjective "I can try to take a walk today"   Bed Mobility   Additional Comments Not assessed, pt received sitting in bedside chair, agreeable to therapy session   Transfers   Sit to Stand 3  Moderate assistance   Additional items Assist x 2; Increased time required;Verbal cues   Stand to Sit 3  Moderate assistance   Additional items Assist x 2; Increased time required;Verbal cues   Toilet transfer 4  Minimal assistance   Additional items Assist x 2; Increased time required;Verbal cues; Commode   Ambulation/Elevation   Gait pattern Antalgic; Forward Flexion;Decreased foot clearance;Shuffling; Short stride; Step to;Excessively slow   Gait Assistance 4  Minimal assist   Additional items Verbal cues; Tactile cues   Assistive Device Rolling walker   Distance 40' x 2   Balance   Static Sitting Fair +   Dynamic Sitting Fair   Static Standing Fair -  (RW)   Dynamic Standing Poor +  (RW)   Ambulatory Poor +  (RW)   Endurance Deficit   Endurance Deficit Yes   Endurance Deficit Description weakness, fatigue   Activity Tolerance   Activity Tolerance Patient tolerated treatment well;Patient limited by fatigue   Medical Staff [address]   Nurse Made Aware RN cleared patient for PT session   Assessment   Prognosis Fair   Problem List Decreased strength;Decreased endurance; Impaired balance;Decreased mobility; Decreased coordination;Decreased cognition;Decreased skin integrity   Assessment Pt participated in therapy session focusing on functional mobility tasks  Pt displays increased activity tolerance as shown by increased ambulation distance completed  Pt requires increased time for processing of directions and was very particular for placement of objects throughout session  Continue to focus on increasing activity tolerance and functional independence  Skilled physical therapy is indicated to address listed functional deficits focusing on strength, endurance and functional mobility  Barriers to Discharge Decreased caregiver support   Goals   Patient Goals get better   STG Expiration Date 11/25/19   PT Treatment Day 1   Plan   Treatment/Interventions Functional transfer training;LE strengthening/ROM; Therapeutic exercise; Endurance training;Patient/family training;Equipment eval/education; Bed mobility;Gait training;Spoke to nursing; Compensatory technique education   Progress Progressing toward goals   PT Frequency Other (Comment)  (3-5x/wk)   Recommendation   Recommendation Other (Comment)  (rehab vs HHPT pending adequate support)   Equipment Recommended Walker  (RW)   PT - OK to Discharge Yes   Additional Comments to rehab when medically stable       Elmer Hood PT, DPT

## 2019-11-12 NOTE — PROGRESS NOTES
Progress Note - Pulmonary   Jayne Aj 75 y o  female MRN: [id number]  Unit/Bed#: E5 -01 Encounter: [id number]      Assessment/Plan:    1  Acute hypoxic pneumonia secondary to bilateral pulmonary infiltrates/CAP  1  Currently oxygenating well on room air continue titrate as needed maintain SpO2 greater than equal to 88%  2  Pulmonary toilet:  Incentive spirometry, flutter valve, increasing activity as tolerated  2  Acute metabolic encephalopathy-resolved  3  Sepsis with group B Streptococcus and leukocytosis-improving  1  Currently on antibiotic regimen per Infectious Disease Plan for total 10 days-today is day 5 of 10 -if repeat blood, cultures and TTE is negative Will plan to transition to oral antibiotics  2  TTE planned  3  Daily CBC and BMP  4  Monitor daily temperatures  4  Chronic lymphedema  1  Management per Internal Medicine  2  Has been was planning on wrapping Charito's legs this afternoon  5  History Juan's granulomatosis-with history of pulmonary hemorrhage, renal failure and polyarthritis -not currently requiring immunosuppression     *she will need repeat Chest imaging in 6 weeks with pulmonary follow up      Subjective:   Arnold Severe was seen sitting in bed upon entering the room  She denies acute events  Reports that her breathing is significantly improved  She denies:  Chest pain, pain inspiration, fevers, chills, night sweats, bronchospasm, sputum production or hemoptysis  Objective:         Vitals: Blood pressure 133/60, pulse (!) 107, temperature 98 6 °F (37 °C), temperature source Temporal, resp  rate 18, height 5' 4" (1 626 m), weight 122 kg (268 lb 15 4 oz), SpO2 92 % , room air, Body mass index is 46 17 kg/m²        Intake/Output Summary (Last 24 hours) at 11/12/2019 1456  Last data filed at 11/12/2019 0501  Gross per 24 hour   Intake 950 ml   Output 750 ml   Net 200 ml         Physical Exam  Gen: Awake, alert, oriented x 3, no acute distress  HEENT: Mucous membranes moist, no oral lesions, no thrush  NECK: noaccessory muscle use, JVP not elevated  Cardiac: Regular, single S1, single S2, no murmurs, no rubs, no gallops  Lungs: clear, diminished breath sound  Abdomen: normoactive bowel sounds, soft nontender, nondistended, no rebound or rigidity, no guarding  Extremities: no cyanosis, no clubbing, bilateral lower extremity lymphadema    Labs: I have personally reviewed pertinent lab results  , CBC:   Lab Results   Component Value Date    WBC 9 24 11/12/2019    HGB 10 6 (L) 11/12/2019    HCT 34 3 (L) 11/12/2019    MCV 99 (H) 11/12/2019     11/12/2019    MCH 30 5 11/12/2019    MCHC 30 9 (L) 11/12/2019    RDW 15 3 (H) 11/12/2019    MPV 10 0 11/12/2019   , CMP:   Lab Results   Component Value Date    SODIUM 140 11/12/2019    K 3 8 11/12/2019     (H) 11/12/2019    CO2 22 11/12/2019    BUN 11 11/12/2019    CREATININE 0 73 11/12/2019    CALCIUM 8 6 11/12/2019    EGFR 86 11/12/2019     Imaging and other studies: none      [employer]

## 2019-11-12 NOTE — PROGRESS NOTES
Progress Note - Infectious Disease   Gaston Barba 75 y o  female MRN: [id number]  Unit/Bed#: E5 -01 Encounter: [id number]      Impression/Plan:  1  Severe sepsis, POA  Fever, tachycardia, lactic acidosis  Due to group B strep bacteremia  Repeat blood cultures are pending  TTE is pending  ROS and exam otherwise benign  Fortunately the patient has remained clinically stable  Rec:  ? Antibiotic as below  ? Monitor CBC and BMP  ? Follow up repeat blood cultures  ? Follow-up TTE  ? Monitor vitals  ? Supportive care     2  Group B strep bacteremia  Consider skin source in setting of lymphedema although no overt cellulitis seen  Consider due to #3 although unusual respiratory pathogen  Bronchoscopy culture was negative  Repeat blood cultures are pending  A TTE has been ordered  She is currently receiving IV ceftriaxone is tolerating without difficulty  Rec:  ? Continue IV ceftriaxone  ? Monitor CBC and BMP  ? Follow-up repeat blood cultures  ? Follow-up TTE  ? If repeat blood cultures, TTE negative could transition to oral antibiotics with plan to complete 10 days total of treatment     3  Bilateral pneumonia  Consider aspiration pneumonitis versus pneumonia  CT showed reflux esophagitis  Rapid clinical improvement since admission  Status post bronchoscopy with negative cultures  AFB stain is preliminarily negative  Flu/RSV PCR was negative  Rec:  ? Antibiotics as above  ? Monitor CBC  ? Monitor vitals  ? Monitor respiratory status  ? Aspiration precautions     4   Acute hypoxic respiratory failure  Likely multifactorial due to all above  Consider element of pulmonary edema  Rapid clinical improvement, now remains stable on room air  Rec:  ? Antibiotics as above  ? Monitor vitals  ? Monitor respiratory status     5  Chronic bilateral lower extremity lymphedema      6   Juan's granulomatosis  Initially with pulmonary hemorrhage, renal failure, polyarthritis    Stable off immunosuppression      7  CKD  Baseline creatinine 0 9-1 2  Today her creatinine is 0 73      8  Hypogammaglobulinemia      9  HOCM  Extensive time was spent at the bedside discussing patient's diagnosis, need for further diagnostics, and treatment plan  Patient's questions were answered  I provided her with my business card so she could reach out if she has more questions later today  Antibiotics:  Ceftriaxone 5  Antibiotics 7    Subjective:  Patient reports she is feeling all right today  Asks me for assistance in repositioning her chair as she is uncomfortable and would like her legs elevated a bit  I did add pillows  She reports improvement in her R leg pain today but tells me that will a wheeze likely be tender for at least part of her day  She asks me many questions about the echocardiogram that we have ordered for her as she states she has had multiple abnormal findings in the past and is concerned about what we may find this time  She denies fever, chills, sweats, shakes; no nausea, vomiting, abdominal pain, diarrhea, or dysuria; no cough, shortness of breath, or chest pain  No new symptoms  Objective:  Vitals:  Temp:  [96 9 °F (36 1 °C)-97 7 °F (36 5 °C)] 97 7 °F (36 5 °C)  HR:  [89-99] 89  Resp:  [18] 18  BP: (137-142)/(69-79) 137/69  SpO2:  [92 %-95 %] 92 %  Temp (24hrs), Av 3 °F (36 3 °C), Min:96 9 °F (36 1 °C), Max:97 7 °F (36 5 °C)  Current: Temperature: 97 7 °F (36 5 °C)    Physical Exam:   General Appearance:  Alert, interactive, nontoxic, no acute distress  Throat: Oropharynx moist without lesions  Lungs:   Clear to auscultation bilaterally; no wheezes, rhonchi or rales; respirations unlabored   Heart:  RRR; no murmur, rub or gallop   Abdomen:   Soft, non-tender, non-distended, positive bowel sounds  Extremities: No clubbing or cyanosis; right lower extremity with chronic lymphedema/edema, no overlying erythema, skin is intact     Skin: No new rashes, lesions, or draining wounds noted on exposed skin  Labs, Imaging, & Other studies:   All pertinent labs and imaging studies were personally reviewed  Results from last 7 days   Lab Units 11/11/19  1140 11/09/19  0533 11/08/19  0401   WBC Thousand/uL 10 13 20 43* 31 40*   HEMOGLOBIN g/dL 10 7* 11 2* 12 9   PLATELETS Thousands/uL 136* 129* 174     Results from last 7 days   Lab Units 11/12/19  0738 11/11/19  1140  11/07/19  0452   POTASSIUM mmol/L 3 8 3 8   < > 3 5   CHLORIDE mmol/L 110* 109*   < > 100   CO2 mmol/L 22 23   < > 27   BUN mg/dL 11 11   < > 22   CREATININE mg/dL 0 73 0 79   < > 1 38*   EGFR ml/min/1 73sq m 86 78   < > 40   CALCIUM mg/dL 8 6 8 4   < > 9 3   AST U/L  --  41  --  65*   ALT U/L  --  28  --  43   ALK PHOS U/L  --  205*  --  208*    < > = values in this interval not displayed  Results from last 7 days   Lab Units 11/11/19  1232 11/11/19  1140 11/07/19  1705 11/07/19  0520 11/07/19  0457   BLOOD CULTURE  Received in Microbiology Lab  Culture in Progress  Received in Microbiology Lab  Culture in Progress  Received in Microbiology Lab  Culture in Progress  --  No Growth After 4 Days   Beta Hemolytic Streptococcus Group B*   GRAM STAIN RESULT   --   --  No Polys or Bacteria seen  --  Gram positive cocci in chains*     Results from last 7 days   Lab Units 11/07/19  0452   PROCALCITONIN ng/ml 7 61*

## 2019-11-12 NOTE — OCCUPATIONAL THERAPY NOTE
Occupational Therapy Treatment Note:         11/12/19 1158   Restrictions/Precautions   Weight Bearing Precautions Per Order No   Braces or Orthoses Other (Comment)  (R [place] boot for Charcot foot)   Other Precautions Cognitive; Chair Alarm; Bed Alarm   Pain Assessment   Pain Assessment 0-10   Pain Score No Pain   Pain Type Chronic pain   Pain Location Generalized   Pain Orientation Right   ADL   Where Assessed Chair   Grooming Assistance 5  Supervision/Setup   Grooming Deficit Setup   LB Bathing Assistance 3  Moderate Assistance   LB Bathing Deficit Setup;Steadying;Verbal cueing; Increased time to complete;Supervision/safety; Buttocks; Perineal area; Left lower leg including foot;Right lower leg including foot   LB Bathing Comments increased A required for dena anal hygiene  LB Dressing Assistance 3  Moderate Assistance   LB Dressing Deficit Steadying;Setup; Requires assistive device for steadying;Verbal cueing;Supervision/safety; Increased time to complete; Don/doff R sock; Don/doff L sock;Pull up over hips; Thread RLE into underwear; Thread LLE into underwear   LB Dressing Comments Pt with limted functional reach to don brace and B/L shoes  Toileting Assistance  3  Moderate Assistance   Toileting Deficit Setup;Steadying;Verbal cueing;Supervison/safety; Increased time to complete;Clothing management down;Clothing management up;Perineal hygiene; Bedside commode   Functional Standing Tolerance   Time 8 mins   Activity dynamic stand balance activity  Comments Pt with slow controlled movements noted this tx session  Transfers   Sit to Stand 3  Moderate assistance   Additional items Assist x 2;Armrests; Increased time required;Verbal cues   Stand to Sit 3  Moderate assistance   Additional items Assist x 2;Armrests; Increased time required;Verbal cues; Bedrails   Stand pivot 3  Moderate assistance   Additional items Assist x 2;Armrests; Increased time required;Verbal cues   Toilet transfer 3  Moderate assistance Additional items Assist x 2;Armrests; Increased time required;Verbal cues; Commode   Additional Comments Pt with need for increased A for rise from lower surfaces  Functional Mobility   Functional Mobility 4  Minimal assistance   Additional Comments x1   Additional items Rolling walker   Toilet Transfers   Toilet Transfer From Bed   Toilet Transfer Type To and from   Toilet Transfer to Standard bedside commode   Toilet Transfer Technique Stand pivot; Ambulating   Toilet Transfers Verbal cues; Minimal assistance   Cognition   Overall Cognitive Status Impaired   Arousal/Participation Alert; Cooperative   Attention Attends with cues to redirect   Orientation Level Oriented to person;Oriented to place   Memory Decreased short term memory;Decreased recall of precautions;Decreased recall of recent events   Following Commands Follows one step commands without difficulty   Comments Pt with inconsistent focus to tasks and limited insight to newly learned information  Additional Activities   Additional Activities Other (Comment)  (reviewed home safe up and current plan of care  )   Additional Activities Comments Pt reports having good understanding of need for movement and strengthening  Activity Tolerance   Activity Tolerance Patient limited by pain; Patient limited by fatigue   Medical Staff Made Aware reported all findings to nursing staff  Assessment   Assessment Pt was seen for skilled OT with focus on completion of self care tasks, functional transfers, self toileting, review of RW safety, energy conservation techs and review of current plan of care  Pt with need for extended time to process thoughts with initial greeting  Pt with particular requests regarding set up of extra wide commode and position of toilet paper prior to completion of activity  See above levels of A required for all functional tasks  Pt required Mod a overall for functional transfers with increased A for sit to stand at [facility]   Improved communication noted following review of current plan of care  Mod A required for LE Dressing and dena care due to limited functional reach  Pt may benefit from further rehab with focus on achieving optimal performance levels with all functional tasks  Continue to recommend Inpatient rehab   Plan   Treatment Interventions ADL retraining;Functional transfer training; Endurance training;Cognitive reorientation;Patient/family training   Goal Expiration Date 11/25/19   OT Treatment Day 1   OT Frequency 3-5x/wk   Recommendation   OT Discharge Recommendation Short Term Rehab  (when medically cleared   )   OT - OK to Discharge Yes  (when medically cleared  )   Barthel Index   Feeding 10   Bathing 0   Grooming Score 5   Dressing Score 5   Bladder Score 5   Bowels Score 10   Toilet Use Score 5   Transfers (Bed/Chair) Score 5   Mobility (Level Surface) Score 0   Stairs Score 0   Barthel Index Score 45   Modified [place] Scale   Modified [place] Scale 4   [facility], [address]

## 2019-11-12 NOTE — CASE MANAGEMENT
CM met with patient and  to discuss [facility] acceptance pending medical stability; patient requested to speak with [facility] liaison and will be seen in am   CM also met with patient to receive additional STR choices vs HHC depending on clinical course  CM informed that patient does not want to go to STR and was given 2 choices for HHC: 1) [facility] 2) [place] [facility] referrals for both per request for SN/PT/OT  CM department will continue to follow through discharge

## 2019-11-12 NOTE — PLAN OF CARE
Problem: PHYSICAL THERAPY ADULT  Goal: Performs mobility at highest level of function for planned discharge setting  See evaluation for individualized goals  Description  Treatment/Interventions: Functional transfer training, LE strengthening/ROM, Elevations, Therapeutic exercise, Endurance training, Cognitive reorientation, Patient/family training, Equipment eval/education, Bed mobility, Gait training, Compensatory technique education, Spoke to nursing, OT  Equipment Recommended: Walker(RW)       See flowsheet documentation for full assessment, interventions and recommendations  Note:   Prognosis: Fair  Problem List: Decreased strength, Decreased endurance, Impaired balance, Decreased mobility, Decreased coordination, Decreased cognition, Decreased skin integrity  Assessment: Pt participated in therapy session focusing on functional mobility tasks  Pt displays increased activity tolerance as shown by increased ambulation distance completed  Pt requires increased time for processing of directions and was very particular for placement of objects throughout session  Continue to focus on increasing activity tolerance and functional independence  Skilled physical therapy is indicated to address listed functional deficits focusing on strength, endurance and functional mobility  Barriers to Discharge: Decreased caregiver support     Recommendation: Other (Comment)(rehab vs HHPT pending adequate support)     PT - OK to Discharge: Yes    See flowsheet documentation for full assessment

## 2019-11-12 NOTE — PLAN OF CARE
Problem: OCCUPATIONAL THERAPY ADULT  Goal: Performs self-care activities at highest level of function for planned discharge setting  See evaluation for individualized goals  Description  Treatment Interventions: ADL retraining, Functional transfer training, UE strengthening/ROM, Endurance training, Patient/family training, Equipment evaluation/education, Compensatory technique education, Activityengagement, Energy conservation          See flowsheet documentation for full assessment, interventions and recommendations  Outcome: Progressing  Note:   Limitation: Decreased ADL status, Decreased UE strength, Decreased endurance, Decreased Safe judgement during ADL, Decreased self-care trans, Decreased high-level ADLs  Prognosis: Good  Assessment: Pt was seen for skilled OT with focus on completion of self care tasks, functional transfers, self toileting, review of RW safety, energy conservation techs and review of current plan of care  Pt with need for extended time to process thoughts with initial greeting  Pt with particular requests regarding set up of extra wide commode and position of toilet paper prior to completion of activity  See above levels of A required for all functional tasks  Pt required Mod a overall for functional transfers with increased A for sit to stand at [facility]  Improved communication noted following review of current plan of care  Mod A required for LE Dressing and dena care due to limited functional reach  Pt may benefit from further rehab with focus on achieving optimal performance levels with all functional tasks  Continue to recommend Inpatient rehab     OT Discharge Recommendation: Short Term Rehab(when medically cleared   )  OT - OK to Discharge: Yes(when medically cleared  )

## 2019-11-13 DIAGNOSIS — R78.81 BACTEREMIA: Primary | ICD-10-CM

## 2019-11-13 LAB
BASOPHILS # BLD MANUAL: 0 THOUSAND/UL (ref 0–0.1)
BASOPHILS NFR MAR MANUAL: 0 % (ref 0–1)
EOSINOPHIL # BLD MANUAL: 0.48 THOUSAND/UL (ref 0–0.4)
EOSINOPHIL NFR BLD MANUAL: 5 % (ref 0–6)
ERYTHROCYTE [DISTWIDTH] IN BLOOD BY AUTOMATED COUNT: 15 % (ref 11.6–15.1)
HCT VFR BLD AUTO: 35.2 % (ref 34.8–46.1)
HGB BLD-MCNC: 10.9 G/DL (ref 11.5–15.4)
LYMPHOCYTES # BLD AUTO: 3.53 THOUSAND/UL (ref 0.6–4.47)
LYMPHOCYTES # BLD AUTO: 37 % (ref 14–44)
MCH RBC QN AUTO: 30.5 PG (ref 26.8–34.3)
MCHC RBC AUTO-ENTMCNC: 31 G/DL (ref 31.4–37.4)
MCV RBC AUTO: 99 FL (ref 82–98)
MONOCYTES # BLD AUTO: 0.86 THOUSAND/UL (ref 0–1.22)
MONOCYTES NFR BLD: 9 % (ref 4–12)
MYELOCYTES NFR BLD MANUAL: 3 % (ref 0–1)
NEUTROPHILS # BLD MANUAL: 4.38 THOUSAND/UL (ref 1.85–7.62)
NEUTS BAND NFR BLD MANUAL: 6 % (ref 0–8)
NEUTS SEG NFR BLD AUTO: 40 % (ref 43–75)
NRBC BLD AUTO-RTO: 0 /100 WBCS
PLATELET # BLD AUTO: 175 THOUSANDS/UL (ref 149–390)
PLATELET BLD QL SMEAR: ADEQUATE
PMV BLD AUTO: 9.6 FL (ref 8.9–12.7)
RBC # BLD AUTO: 3.57 MILLION/UL (ref 3.81–5.12)
STOMATOCYTES BLD QL SMEAR: PRESENT
TOTAL CELLS COUNTED SPEC: 100
WBC # BLD AUTO: 9.53 THOUSAND/UL (ref 4.31–10.16)

## 2019-11-13 PROCEDURE — 99232 SBSQ HOSP IP/OBS MODERATE 35: CPT | Performed by: INTERNAL MEDICINE

## 2019-11-13 PROCEDURE — 97110 THERAPEUTIC EXERCISES: CPT

## 2019-11-13 PROCEDURE — 85027 COMPLETE CBC AUTOMATED: CPT | Performed by: NURSE PRACTITIONER

## 2019-11-13 PROCEDURE — 97116 GAIT TRAINING THERAPY: CPT

## 2019-11-13 PROCEDURE — 97535 SELF CARE MNGMENT TRAINING: CPT

## 2019-11-13 PROCEDURE — 85007 BL SMEAR W/DIFF WBC COUNT: CPT | Performed by: NURSE PRACTITIONER

## 2019-11-13 PROCEDURE — 97530 THERAPEUTIC ACTIVITIES: CPT

## 2019-11-13 RX ORDER — PENICILLIN V POTASSIUM 250 MG/1
250 TABLET ORAL 2 TIMES DAILY
Qty: 60 TABLET | Refills: 5 | Status: SHIPPED | OUTPATIENT
Start: 2019-11-13 | End: 2019-11-14 | Stop reason: SDUPTHER

## 2019-11-13 RX ORDER — CEPHALEXIN 500 MG/1
500 CAPSULE ORAL EVERY 6 HOURS SCHEDULED
Status: DISCONTINUED | OUTPATIENT
Start: 2019-11-13 | End: 2019-11-14 | Stop reason: HOSPADM

## 2019-11-13 RX ADMIN — HEPARIN SODIUM 5000 UNITS: 5000 INJECTION INTRAVENOUS; SUBCUTANEOUS at 05:52

## 2019-11-13 RX ADMIN — OXYCODONE HYDROCHLORIDE 40 MG: 20 TABLET, FILM COATED, EXTENDED RELEASE ORAL at 18:16

## 2019-11-13 RX ADMIN — PANTOPRAZOLE SODIUM 40 MG: 40 TABLET, DELAYED RELEASE ORAL at 05:51

## 2019-11-13 RX ADMIN — CEPHALEXIN 500 MG: 500 CAPSULE ORAL at 12:59

## 2019-11-13 RX ADMIN — Medication: at 17:32

## 2019-11-13 RX ADMIN — HEPARIN SODIUM 5000 UNITS: 5000 INJECTION INTRAVENOUS; SUBCUTANEOUS at 21:59

## 2019-11-13 RX ADMIN — LORAZEPAM 0.5 MG: 1 TABLET ORAL at 10:00

## 2019-11-13 RX ADMIN — HEPARIN SODIUM 5000 UNITS: 5000 INJECTION INTRAVENOUS; SUBCUTANEOUS at 12:59

## 2019-11-13 RX ADMIN — OXYCODONE HYDROCHLORIDE 40 MG: 20 TABLET, FILM COATED, EXTENDED RELEASE ORAL at 05:52

## 2019-11-13 RX ADMIN — DULOXETINE HYDROCHLORIDE 60 MG: 60 CAPSULE, DELAYED RELEASE ORAL at 10:00

## 2019-11-13 RX ADMIN — Medication: at 10:04

## 2019-11-13 RX ADMIN — CEPHALEXIN 500 MG: 500 CAPSULE ORAL at 17:32

## 2019-11-13 RX ADMIN — LORAZEPAM 0.5 MG: 1 TABLET ORAL at 17:32

## 2019-11-13 NOTE — PLAN OF CARE
Problem: Prexisting or High Potential for Compromised Skin Integrity  Goal: Skin integrity is maintained or improved  Description  INTERVENTIONS:  - Identify patients at risk for skin breakdown  - Assess and monitor skin integrity  - Assess and monitor nutrition and hydration status  - Monitor labs   - Assess for incontinence   - Turn and reposition patient  - Assist with mobility/ambulation  - Relieve pressure over bony prominences  - Avoid friction and shearing  - Provide appropriate hygiene as needed including keeping skin clean and dry  - Evaluate need for skin moisturizer/barrier cream  - Collaborate with interdisciplinary team   - Patient/family teaching  - Consider wound care consult   Outcome: Progressing     Problem: Potential for Falls  Goal: Patient will remain free of falls  Description  INTERVENTIONS:  - Assess patient frequently for physical needs  -  Identify cognitive and physical deficits and behaviors that affect risk of falls    -  [place] fall precautions as indicated by assessment   - Educate patient/family on patient safety including physical limitations  - Instruct patient to call for assistance with activity based on assessment  - Modify environment to reduce risk of injury  - Consider OT/PT consult to assist with strengthening/mobility  Outcome: Progressing     Problem: SKIN/TISSUE INTEGRITY - ADULT  Goal: Incision(s), wounds(s) or drain site(s) healing without S/S of infection  Description  INTERVENTIONS  - Assess and document risk factors for skin impairment   - Assess and document dressing, incision, wound bed, drain sites and surrounding tissue  - Consider nutrition services referral as needed  - Oral mucous membranes remain intact  - Provide patient/ family education  Outcome: Progressing     Problem: HEMATOLOGIC - ADULT  Goal: Maintains hematologic stability  Description  INTERVENTIONS  - Assess for signs and symptoms of bleeding or hemorrhage  - Monitor labs  - Administer supportive blood products/factors as ordered and appropriate  Outcome: Progressing     Problem: MUSCULOSKELETAL - ADULT  Goal: Maintain or return mobility to safest level of function  Description  INTERVENTIONS:  - Assess patient's ability to carry out ADLs; assess patient's baseline for ADL function and identify physical deficits which impact ability to perform ADLs (bathing, care of mouth/teeth, toileting, grooming, dressing, etc )  - Assess/evaluate cause of self-care deficits   - Assess range of motion  - Assess patient's mobility  - Assess patient's need for assistive devices and provide as appropriate  - Encourage maximum independence but intervene and supervise when necessary  - Involve family in performance of ADLs  - Assess for home care needs following discharge   - Consider OT consult to assist with ADL evaluation and planning for discharge  - Provide patient education as appropriate  Outcome: Progressing     Problem: PAIN - ADULT  Goal: Verbalizes/displays adequate comfort level or baseline comfort level  Description  Interventions:  - Encourage patient to monitor pain and request assistance  - Assess pain using appropriate pain scale  - Administer analgesics based on type and severity of pain and evaluate response  - Implement non-pharmacological measures as appropriate and evaluate response  - Consider cultural and social influences on pain and pain management  - Notify physician/advanced practitioner if interventions unsuccessful or patient reports new pain  Outcome: Progressing     Problem: INFECTION - ADULT  Goal: Absence or prevention of progression during hospitalization  Description  INTERVENTIONS:  - Assess and monitor for signs and symptoms of infection  - Monitor lab/diagnostic results  - Monitor all insertion sites, i e  indwelling lines, tubes, and drains  - Monitor endotracheal if appropriate and nasal secretions for changes in amount and color  - [place] appropriate cooling/warming therapies per order  - Administer medications as ordered  - Instruct and encourage patient and family to use good hand hygiene technique  - Identify and instruct in appropriate isolation precautions for identified infection/condition  Outcome: Progressing     Problem: SAFETY ADULT  Goal: Maintain or return to baseline ADL function  Description  INTERVENTIONS:  -  Assess patient's ability to carry out ADLs; assess patient's baseline for ADL function and identify physical deficits which impact ability to perform ADLs (bathing, care of mouth/teeth, toileting, grooming, dressing, etc )  - Assess/evaluate cause of self-care deficits   - Assess range of motion  - Assess patient's mobility; develop plan if impaired  - Assess patient's need for assistive devices and provide as appropriate  - Encourage maximum independence but intervene and supervise when necessary  - Involve family in performance of ADLs  - Assess for home care needs following discharge   - Consider OT consult to assist with ADL evaluation and planning for discharge  - Provide patient education as appropriate  Outcome: Progressing  Goal: Maintain or return mobility status to optimal level  Description  INTERVENTIONS:  - Assess patient's baseline mobility status (ambulation, transfers, stairs, etc )    - Identify cognitive and physical deficits and behaviors that affect mobility  - Identify mobility aids required to assist with transfers and/or ambulation (gait belt, sit-to-stand, lift, walker, cane, etc )  - [place] fall precautions as indicated by assessment  - Record patient progress and toleration of activity level on Mobility SBAR; progress patient to next Phase/Stage  - Instruct patient to call for assistance with activity based on assessment  - Consider rehabilitation consult to assist with strengthening/weightbearing, etc   Outcome: Progressing     Problem: DISCHARGE PLANNING  Goal: Discharge to home or other facility with appropriate resources  Description  INTERVENTIONS:  - Identify barriers to discharge w/patient and caregiver  - Arrange for needed discharge resources and transportation as appropriate  - Identify discharge learning needs (meds, wound care, etc )  - Arrange for interpretive services to assist at discharge as needed  - Refer to Case Management Department for coordinating discharge planning if the patient needs post-hospital services based on physician/advanced practitioner order or complex needs related to functional status, cognitive ability, or social support system  Outcome: Progressing     Problem: Knowledge Deficit  Goal: Patient/family/caregiver demonstrates understanding of disease process, treatment plan, medications, and discharge instructions  Description  Complete learning assessment and assess knowledge base  Interventions:  - Provide teaching at level of understanding  - Provide teaching via preferred learning methods  Outcome: Progressing     Problem: Nutrition/Hydration-ADULT  Goal: Nutrient/Hydration intake appropriate for improving, restoring or maintaining nutritional needs  Description  Monitor and assess patient's nutrition/hydration status for malnutrition  Collaborate with interdisciplinary team and initiate plan and interventions as ordered  Monitor patient's weight and dietary intake as ordered or per policy  Utilize nutrition screening tool and intervene as necessary  Determine patient's food preferences and provide high-protein, high-caloric foods as appropriate       INTERVENTIONS:  - Monitor oral intake, urinary output, labs, and treatment plans  - Assess nutrition and hydration status and recommend course of action  - Evaluate amount of meals eaten  - Assist patient with eating if necessary   - Allow adequate time for meals  - Recommend/ encourage appropriate diets, oral nutritional supplements, and vitamin/mineral supplements  - Order, calculate, and assess calorie counts as needed  - Recommend, monitor, and adjust tube feedings and TPN/PPN based on assessed needs  - Assess need for intravenous fluids  - Provide specific nutrition/hydration education as appropriate  - Include patient/family/caregiver in decisions related to nutrition  Outcome: Progressing

## 2019-11-13 NOTE — OCCUPATIONAL THERAPY NOTE
[address] Progress Note     Patient Name: Ethel Ulrich  CYFWX'Q Date: 11/13/2019  Problem List  Principal Problem:    Severe sepsis [facility])  Active Problems:    Wegeners granulomatosis ([facility] [address] )    Charcot's joint    Chronic kidney disease, stage III (moderate) (HCC)    Chronic pain disorder    Class 2 obesity    GERD (gastroesophageal reflux disease)    Lymphedema    CAP (community acquired pneumonia)    Hypertrophic cardiomyopathy ([facility] )    Acute respiratory failure with hypoxia ([facility] )    Bacteremia    Leukocytosis          11/13/19 1238   Restrictions/Precautions   Weight Bearing Precautions Per Order No   Braces or Orthoses Other (Comment)  (R foot [place] for Charcot foot)   Other Precautions Cognitive; Chair Alarm; Bed Alarm; Fall Risk   General   Response to Previous Treatment Patient with no complaints from previous session   Lifestyle   Autonomy At baseline, pt was I w/ ADLs and functional mobility/transfers w/ use of RW, required assist w/ IADLs, (-) , and reports 0 falls PTA  Reciprocal Relationships Lives with spouse and sister-in-law   Service to Others Retired   Intrinsic Gratification Watching TV   Pain Assessment   Pain Assessment No/denies pain   Pain Score No Pain   ADL   Where Assessed Chair   Grooming Assistance 5  Supervision/Setup   Grooming Deficit Setup;Supervision/safety; Increased time to complete   Grooming Comments Setup required   UB Dressing Assistance 4  Minimal Assistance   UB Dressing Deficit Setup;Verbal cueing;Supervision/safety; Increased time to complete;Pull around back   UB Dressing Comments  UB dressing completed with Min A with assist to bring robe around/down in back 2* increased body habitus   LB Dressing Assistance 3  Moderate Assistance   LB Dressing Deficit Setup;Verbal cueing;Supervision/safety; Increased time to complete; Don/doff R shoe;Don/doff L shoe   LB Dressing Comments Impaired functional reach   [address]  3  Moderate Assistance Toileting Deficit Setup;Steadying;Verbal cueing;Supervison/safety; Increased time to complete;Perineal hygiene   Toileting Comments Toileting tasks completed with Mod A with increased assist for thoroughness during perineal hygiene tasks  Functional Standing Tolerance   Time 5 mins   Activity Dynamic standing balance activity   Comments No LOBs noted, however increased fatigue demonstrated   Bed Mobility   Supine to Sit Unable to assess   Sit to Supine Unable to assess   Additional Comments Pt seated OOB in chair at end of session with call bell and phone within reach  All needs met and pt reports no further questions for OT at this time  Transfers   Sit to Stand 3  Moderate assistance   Additional items Assist x 2;Armrests; Increased time required;Verbal cues   Stand to Sit 3  Moderate assistance   Additional items Assist x 2;Armrests; Increased time required;Verbal cues   Toilet transfer 3  Moderate assistance   Additional items Assist x 2; Increased time required;Verbal cues; Commode   Additional Comments Increased assist required to initiate forward weight shift from surface, Increased time for transitions   Functional Mobility   Functional Mobility 4  Minimal assistance   Additional Comments Assist x1; 2nd person present for chair follow   Additional items Rolling walker   Toilet Transfers   Toilet Transfer From Rolling walker   Toilet Transfer Type To and from   Toilet Transfer to Standard bedside commode   Toilet Transfer Technique Ambulating;Stand pivot   Toilet Transfers Moderate assistance;Verbal cues   Toilet Transfers Comments Assist x2   Cognition   Overall Cognitive Status Impaired   Arousal/Participation Alert; Cooperative   Attention Attends with cues to redirect   Orientation Level Oriented X4   Memory Decreased recall of precautions;Decreased recall of recent events;Decreased short term memory   Following Commands Follows one step commands with increased time or repetition   Activity Tolerance Activity Tolerance Patient limited by fatigue   Medical Staff Made Aware Luis Fernández RN; Gisella Winslow PTA   Assessment   Assessment Pt seen for OT treatment session this PM focusing on functional activity tolerance, ADLs, and functional mobility/transfers  Pt alert and cooperative throughout session  Pt seated OOB in chair at start of session with call bell ringing to use bathroom  Transfers completed with Mod A of 2 with increased time required for transitions and increased assist to initiate forward weight shift from surface for sit>stand  Min A required for functional mobility with 2nd person present to close chair follow  Toileting tasks completed with Mod A with increased assist for thoroughness during perineal hygiene tasks  Light grooming tasks completed with Supervision with setup required to wash/dry hands  UB dressing completed with Min A with assist to bring robe around/down in back 2* increased body habitus  Pt with overall increased fatigue in today's session, therefore educated pt on energy conservation techniques (ie: seated rest breaks, compensatory breathing, pacing, etc ) with pt verbalizing understanding of education  Pt seated OOB in chair at end of session with call bell and phone within reach  All needs met and pt reports no further questions for OT at this time  Continue to recommend STR when medically cleared, however pt prefers to return home  If D/C home, recommend Home OT  OT to follow pt on caseload  Plan   Treatment Interventions ADL retraining;Functional transfer training;UE strengthening/ROM; Endurance training;Patient/family training;Equipment evaluation/education; Compensatory technique education; Activityengagement; Energy conservation   Goal Expiration Date 11/25/19   OT Treatment Day 2   OT Frequency 3-5x/wk   Recommendation   OT Discharge Recommendation Short Term Rehab  (pt prefers home; If D/C home, recommend Home OT)   OT - OK to Discharge Yes  (when medically cleared to rehab) Barthel Index   Feeding 10   Bathing 0   Grooming Score 5   Dressing Score 5   Bladder Score 5   Bowels Score 10   Toilet Use Score 5   Transfers (Bed/Chair) Score 5   Mobility (Level Surface) Score 0   Stairs Score 0   Barthel Index Score 45   Modified [place] Scale   Modified [place] Scale 4       Sara Gray, OTR/L

## 2019-11-13 NOTE — PROGRESS NOTES
Progress Note - Infectious Disease   Mike Mole 75 y o  female MRN: [id number]  Unit/Bed#: E5 -01 Encounter: [id number]      Impression/Plan:  1   Severe sepsis, POA   Fever, tachycardia, lactic acidosis   Due to group B strep bacteremia  Repeat blood cultures are negative after 24 hours  TTE was a technically difficult study but was negative for valvular vegetation   ROS and exam otherwise benign  Fortunately the patient has remained clinically stable  Today she is afebrile with a stable WBC count  Rec:  ? Antibiotic as below  ? Monitor CBC and BMP  ? Follow up repeat blood cultures  ? Monitor vitals  ? Supportive care     2   Group B strep bacteremia   Consider skin source in setting of lymphedema although no overt cellulitis seen   Consider due to #3 although unusual respiratory pathogen   Bronchoscopy culture was negative   Repeat blood cultures are negative after 24 hours  TTE was a technically difficult study but was negative for valvular vegetation  She is currently receiving IV ceftriaxone is tolerating without difficulty  Given her clinical improvement I recommend we transition to an oral antibiotic regimen today  Rec:  ? stop IV ceftriaxone  ? Start PO Keflex, 500 mg q6 hours, through 11/15/2019 for total of 10 days of antibiotic treatment  ? Anticipate a transition to suppressive penicillin given this is her 2nd episode of group B strep bacteremia with in a year  ? She will follow-up in the outpatient infectious disease office on December 17, 2019 at 12:30 pm with Artem YE  ? Monitor CBC and BMP  ? Follow-up repeat blood cultures  ? Monitor vitals     3   Bilateral pneumonia   Consider aspiration pneumonitis versus pneumonia   CT showed reflux esophagitis   Rapid clinical improvement since admission   Status post bronchoscopy with negative cultures  AFB stain is preliminarily negative  Flu/RSV PCR was negative  Rec:  ? Antibiotics as above  ? Monitor CBC  ?  Monitor vitals  ? Monitor respiratory status  ? Aspiration precautions     4   Acute hypoxic respiratory failure   Likely multifactorial due to all above   Consider element of pulmonary edema   Rapid clinical improvement, now remains stable on room air  Rec:  ? Antibiotics as above  ? Monitor vitals  ? Monitor respiratory status     5   Chronic bilateral lower extremity lymphedema  I suspect this is source of patient's bacteremia above      6   Juan's granulomatosis   Initially with pulmonary hemorrhage, renal failure, polyarthritis   Stable off immunosuppression      7   CKD   Baseline creatinine 0 9-1 2      8   Hypogammaglobulinemia      9   HOCM  Above plan was discussed in detail with patient at the bedside  Antibiotics:  Ceftriaxone - stop  Keflex 1  Antibiotics 8    Subjective:  Patient reports she is feeling a little bit better today  She notices less pain in her right lower leg  Still prefers to be out of bed in the chair which helps her stay more comfortable  She denies fever, chills, sweats, shakes; no nausea, vomiting, abdominal pain, diarrhea, or dysuria; no cough, shortness of breath, or chest pain  No new symptoms  Objective:  Vitals:  Temp:  [97 3 °F (36 3 °C)-99 2 °F (37 3 °C)] 97 7 °F (36 5 °C)  HR:  [] 97  Resp:  [18-20] 18  BP: (133-153)/(60-75) 150/75  SpO2:  [92 %-96 %] 95 %  Temp (24hrs), Av 2 °F (36 8 °C), Min:97 3 °F (36 3 °C), Max:99 2 °F (37 3 °C)  Current: Temperature: 97 7 °F (36 5 °C)    Physical Exam:   General Appearance:  Alert, interactive, nontoxic, no acute distress  Throat: Oropharynx moist without lesions  Lungs:   Clear to auscultation bilaterally; no wheezes, rhonchi or rales; respirations unlabored; patient is on room air   Heart:  RRR; no murmur, rub or gallop   Abdomen:   Soft, obese, non-tender, non-distended, positive bowel sounds       Extremities: No clubbing or cyanosis; right lower extremity lymphedema/edema without overlying erythema, all skin remains intact   Skin: No new rashes, lesions, or draining wounds noted on exposed skin  Labs, Imaging, & Other studies:   All pertinent labs and imaging studies were personally reviewed  Results from last 7 days   Lab Units 11/13/19  0552 11/12/19  1045 11/11/19  1140   WBC Thousand/uL 9 53 9 24 10 13   HEMOGLOBIN g/dL 10 9* 10 6* 10 7*   PLATELETS Thousands/uL 175 152 136*     Results from last 7 days   Lab Units 11/12/19  0738 11/11/19  1140  11/07/19  0452   POTASSIUM mmol/L 3 8 3 8   < > 3 5   CHLORIDE mmol/L 110* 109*   < > 100   CO2 mmol/L 22 23   < > 27   BUN mg/dL 11 11   < > 22   CREATININE mg/dL 0 73 0 79   < > 1 38*   EGFR ml/min/1 73sq m 86 78   < > 40   CALCIUM mg/dL 8 6 8 4   < > 9 3   AST U/L  --  41  --  65*   ALT U/L  --  28  --  43   ALK PHOS U/L  --  205*  --  208*    < > = values in this interval not displayed  Results from last 7 days   Lab Units 11/11/19  1232 11/11/19  1140 11/07/19  1705 11/07/19  0520 11/07/19  0457   BLOOD CULTURE  No Growth at 24 hrs  No Growth at 24 hrs  No Growth at 24 hrs   --  No Growth After 5 Days   Beta Hemolytic Streptococcus Group B*   GRAM STAIN RESULT   --   --  No Polys or Bacteria seen  --  Gram positive cocci in chains*     Results from last 7 days   Lab Units 11/07/19  0452   PROCALCITONIN ng/ml 7 61*
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
## 2019-11-13 NOTE — PLAN OF CARE
Problem: Prexisting or High Potential for Compromised Skin Integrity  Goal: Skin integrity is maintained or improved  Description  INTERVENTIONS:  - Identify patients at risk for skin breakdown  - Assess and monitor skin integrity  - Assess and monitor nutrition and hydration status  - Monitor labs   - Assess for incontinence   - Turn and reposition patient  - Assist with mobility/ambulation  - Relieve pressure over bony prominences  - Avoid friction and shearing  - Provide appropriate hygiene as needed including keeping skin clean and dry  - Evaluate need for skin moisturizer/barrier cream  - Collaborate with interdisciplinary team   - Patient/family teaching  - Consider wound care consult   11/13/2019 0323 by Anabel Emery RN  Outcome: Progressing  11/12/2019 2029 by Anabel Emery RN  Outcome: Progressing     Problem: Potential for Falls  Goal: Patient will remain free of falls  Description  INTERVENTIONS:  - Assess patient frequently for physical needs  -  Identify cognitive and physical deficits and behaviors that affect risk of falls    -  [place] fall precautions as indicated by assessment   - Educate patient/family on patient safety including physical limitations  - Instruct patient to call for assistance with activity based on assessment  - Modify environment to reduce risk of injury  - Consider OT/PT consult to assist with strengthening/mobility  11/13/2019 0323 by Anabel Emery RN  Outcome: Progressing  11/12/2019 2029 by Anabel Emery RN  Outcome: Progressing     Problem: SKIN/TISSUE INTEGRITY - ADULT  Goal: Incision(s), wounds(s) or drain site(s) healing without S/S of infection  Description  INTERVENTIONS  - Assess and document risk factors for skin impairment   - Assess and document dressing, incision, wound bed, drain sites and surrounding tissue  - Consider nutrition services referral as needed  - Oral mucous membranes remain intact  - Provide patient/ family education  11/13/2019 0323 by Angela Segura RN  Outcome: Progressing  11/12/2019 2029 by Angela Segura RN  Outcome: Progressing     Problem: HEMATOLOGIC - ADULT  Goal: Maintains hematologic stability  Description  INTERVENTIONS  - Assess for signs and symptoms of bleeding or hemorrhage  - Monitor labs  - Administer supportive blood products/factors as ordered and appropriate  11/13/2019 0323 by Angela Segura RN  Outcome: Progressing  11/12/2019 2029 by Angela Segura RN  Outcome: Progressing     Problem: MUSCULOSKELETAL - ADULT  Goal: Maintain or return mobility to safest level of function  Description  INTERVENTIONS:  - Assess patient's ability to carry out ADLs; assess patient's baseline for ADL function and identify physical deficits which impact ability to perform ADLs (bathing, care of mouth/teeth, toileting, grooming, dressing, etc )  - Assess/evaluate cause of self-care deficits   - Assess range of motion  - Assess patient's mobility  - Assess patient's need for assistive devices and provide as appropriate  - Encourage maximum independence but intervene and supervise when necessary  - Involve family in performance of ADLs  - Assess for home care needs following discharge   - Consider OT consult to assist with ADL evaluation and planning for discharge  - Provide patient education as appropriate  11/13/2019 0323 by Angela Segura RN  Outcome: Progressing  11/12/2019 2029 by Angela Segura RN  Outcome: Progressing     Problem: PAIN - ADULT  Goal: Verbalizes/displays adequate comfort level or baseline comfort level  Description  Interventions:  - Encourage patient to monitor pain and request assistance  - Assess pain using appropriate pain scale  - Administer analgesics based on type and severity of pain and evaluate response  - Implement non-pharmacological measures as appropriate and evaluate response  - Consider cultural and social influences on pain and pain management  - Notify physician/advanced practitioner if interventions unsuccessful or patient reports new pain  11/13/2019 0323 by Neftali Ruby RN  Outcome: Progressing  11/12/2019 2029 by Neftali Ruby RN  Outcome: Progressing     Problem: INFECTION - ADULT  Goal: Absence or prevention of progression during hospitalization  Description  INTERVENTIONS:  - Assess and monitor for signs and symptoms of infection  - Monitor lab/diagnostic results  - Monitor all insertion sites, i e  indwelling lines, tubes, and drains  - Monitor endotracheal if appropriate and nasal secretions for changes in amount and color  - [place] appropriate cooling/warming therapies per order  - Administer medications as ordered  - Instruct and encourage patient and family to use good hand hygiene technique  - Identify and instruct in appropriate isolation precautions for identified infection/condition  11/13/2019 0323 by Neftali Ruby RN  Outcome: Progressing  11/12/2019 2029 by Neftali Ruby RN  Outcome: Progressing     Problem: SAFETY ADULT  Goal: Maintain or return to baseline ADL function  Description  INTERVENTIONS:  -  Assess patient's ability to carry out ADLs; assess patient's baseline for ADL function and identify physical deficits which impact ability to perform ADLs (bathing, care of mouth/teeth, toileting, grooming, dressing, etc )  - Assess/evaluate cause of self-care deficits   - Assess range of motion  - Assess patient's mobility; develop plan if impaired  - Assess patient's need for assistive devices and provide as appropriate  - Encourage maximum independence but intervene and supervise when necessary  - Involve family in performance of ADLs  - Assess for home care needs following discharge   - Consider OT consult to assist with ADL evaluation and planning for discharge  - Provide patient education as appropriate  11/13/2019 0323 by Neftali Ruby RN  Outcome: Progressing  11/12/2019 2029 by Neftali Ruby RN  Outcome: Progressing  Goal: Maintain or return mobility status to optimal level  Description  INTERVENTIONS:  - Assess patient's baseline mobility status (ambulation, transfers, stairs, etc )    - Identify cognitive and physical deficits and behaviors that affect mobility  - Identify mobility aids required to assist with transfers and/or ambulation (gait belt, sit-to-stand, lift, walker, cane, etc )  - [place] fall precautions as indicated by assessment  - Record patient progress and toleration of activity level on Mobility SBAR; progress patient to next Phase/Stage  - Instruct patient to call for assistance with activity based on assessment  - Consider rehabilitation consult to assist with strengthening/weightbearing, etc   11/13/2019 0323 by James Quintanilla RN  Outcome: Progressing  11/12/2019 2029 by James Quintanilla RN  Outcome: Progressing     Problem: DISCHARGE PLANNING  Goal: Discharge to home or other facility with appropriate resources  Description  INTERVENTIONS:  - Identify barriers to discharge w/patient and caregiver  - Arrange for needed discharge resources and transportation as appropriate  - Identify discharge learning needs (meds, wound care, etc )  - Arrange for interpretive services to assist at discharge as needed  - Refer to Case Management Department for coordinating discharge planning if the patient needs post-hospital services based on physician/advanced practitioner order or complex needs related to functional status, cognitive ability, or social support system  11/13/2019 0323 by James Quintanilla RN  Outcome: Progressing  11/12/2019 2029 by James Quintanilla RN  Outcome: Progressing     Problem: Knowledge Deficit  Goal: Patient/family/caregiver demonstrates understanding of disease process, treatment plan, medications, and discharge instructions  Description  Complete learning assessment and assess knowledge base    Interventions:  - Provide teaching at level of understanding  - Provide teaching via preferred learning methods  11/13/2019 0323 by James Quintanilla RN  Outcome: Progressing  11/12/2019 2029 by Pooja Alvarez RN  Outcome: Progressing     Problem: Nutrition/Hydration-ADULT  Goal: Nutrient/Hydration intake appropriate for improving, restoring or maintaining nutritional needs  Description  Monitor and assess patient's nutrition/hydration status for malnutrition  Collaborate with interdisciplinary team and initiate plan and interventions as ordered  Monitor patient's weight and dietary intake as ordered or per policy  Utilize nutrition screening tool and intervene as necessary  Determine patient's food preferences and provide high-protein, high-caloric foods as appropriate       INTERVENTIONS:  - Monitor oral intake, urinary output, labs, and treatment plans  - Assess nutrition and hydration status and recommend course of action  - Evaluate amount of meals eaten  - Assist patient with eating if necessary   - Allow adequate time for meals  - Recommend/ encourage appropriate diets, oral nutritional supplements, and vitamin/mineral supplements  - Order, calculate, and assess calorie counts as needed  - Recommend, monitor, and adjust tube feedings and TPN/PPN based on assessed needs  - Assess need for intravenous fluids  - Provide specific nutrition/hydration education as appropriate  - Include patient/family/caregiver in decisions related to nutrition  11/13/2019 0323 by Pooja Alvarez RN  Outcome: Progressing  11/12/2019 2029 by Pooja Alvarez RN  Outcome: Progressing

## 2019-11-13 NOTE — PLAN OF CARE
Problem: PHYSICAL THERAPY ADULT  Goal: Performs mobility at highest level of function for planned discharge setting  See evaluation for individualized goals  Description  Treatment/Interventions: Functional transfer training, LE strengthening/ROM, Elevations, Therapeutic exercise, Endurance training, Cognitive reorientation, Patient/family training, Equipment eval/education, Bed mobility, Gait training, Compensatory technique education, Spoke to nursing, OT  Equipment Recommended: Walker(RW)       See flowsheet documentation for full assessment, interventions and recommendations  Outcome: Progressing  Note:   Prognosis: Fair  Problem List: Decreased strength, Decreased endurance, Decreased range of motion, Impaired balance, Decreased mobility, Obesity, Decreased skin integrity, Decreased safety awareness  Assessment: Pt  seated in bedside chair upon my arrival  Pt  reporting fatigue, however agreeable to therapeutic intervention  Pt  requested use br, female OTR present for A with pericare  Progressed with A of 2 with cues for hand placement/technique  Progressed with an amb  trial with use of RW and A of therapist with cues provided for LE sequencing  Pt  limited by fatigue requiring return to seated in bedside chair  Pt  remained seated in bedside chair at end of treatment session  PT will continue to recommend rehab upon d/c for continued improvement of noted impairments above  However pt  refusing rehab upon d/c, if home would recommend HHPT and family support as needed when medically stable  Barriers to Discharge: Decreased caregiver support  Barriers to Discharge Comments: Level of support at home  Recommendation: Short-term skilled PT, Home PT, Home with family support(Pt  refusing rehab at this time )     PT - OK to Discharge: Yes(if d/c to rehab when medically stable )    See flowsheet documentation for full assessment

## 2019-11-13 NOTE — CASE MANAGEMENT
CM informed that patient declined [facility] and will d/c to home with [facility] CM department will continue to follow through discharge

## 2019-11-13 NOTE — CASE MANAGEMENT
CM met with patient to inform of acceptance with [facility]  CM informed that patient is more inclined to go home with [facility]  CM requested patient to inform of decision prior to discharge so that services can be confirmed  CM department will continue to follow through discharge

## 2019-11-13 NOTE — PHYSICAL THERAPY NOTE
Physical Therapy Progress Note     11/13/19 6250   Pain Assessment   Pain Assessment No/denies pain   Pain Score No Pain   Restrictions/Precautions   Weight Bearing Precautions Per Order No   Braces or Orthoses Other (Comment)  (R foot [place] boot for Charcot foot)   Other Precautions Cognitive; Chair Alarm; Bed Alarm; Fall Risk   General   Chart Reviewed Yes   Response to Previous Treatment Patient reporting fatigue but able to participate  Family/Caregiver Present No   Subjective   Subjective Willing to participate in therapy this PM    Transfers   Sit to Stand 3  Moderate assistance   Additional items Assist x 2;Armrests; Increased time required;Verbal cues   Stand to Sit 3  Moderate assistance   Additional items Assist x 2;Armrests; Increased time required;Verbal cues   Toilet transfer 3  Moderate assistance   Additional items Assist x 2;Armrests; Increased time required;Verbal cues; Commode   Ambulation/Elevation   Gait pattern Decreased foot clearance; Forward Flexion; Short stride; Inconsistent flor; Excessively slow; Shuffling   Gait Assistance 4  Minimal assist   Additional items Assist x 1;Verbal cues; Tactile cues; Other (Comment)  (with second present for a close chair follow)   Assistive Device Rolling walker   Distance 65'   Balance   Static Sitting Fair +   Dynamic Sitting Fair   Static Standing Fair -   Dynamic Standing Poor +   Ambulatory Poor +   Endurance Deficit   Endurance Deficit Yes   Endurance Deficit Description fatigue/weakness   Activity Tolerance   Activity Tolerance Patient limited by fatigue   Medical Staff [address]    Nurse Made Aware Yes   Exercises   TKR Sitting;10 reps;AAROM; Bilateral   Assessment   Prognosis Fair   Problem List Decreased strength;Decreased endurance;Decreased range of motion; Impaired balance;Decreased mobility;Obesity; Decreased skin integrity; Decreased safety awareness   Assessment Pt  seated in bedside chair upon my arrival  Pt  reporting fatigue, however agreeable to therapeutic intervention  Pt  requested use br, female OTR present for A with pericare  Progressed with A of 2 with cues for hand placement/technique  Progressed with an amb  trial with use of RW and A of therapist with cues provided for LE sequencing  Pt  limited by fatigue requiring return to seated in bedside chair  Pt  remained seated in bedside chair at end of treatment session  PT will continue to recommend rehab upon d/c for continued improvement of noted impairments above  However pt  refusing rehab upon d/c, if home would recommend HHPT and family support as needed when medically stable  Barriers to Discharge Decreased caregiver support   Barriers to Discharge Comments Level of support at home  Goals   Patient Goals To go home  STG Expiration Date 11/25/19   PT Treatment Day 2   Plan   Treatment/Interventions Functional transfer training;LE strengthening/ROM; Therapeutic exercise; Endurance training;Gait training;Spoke to nursing;Spoke to case management;OT   Progress Slow progress, decreased activity tolerance   PT Frequency Other (Comment)  (3-5x/wk)   Recommendation   Recommendation Short-term skilled PT; Home PT; Home with family support  (Pt  refusing rehab at this time )   Equipment Recommended Other (Comment)  (has DME)   PT - OK to Discharge Yes  (if d/c to rehab when medically stable )     Hoang Briscoe, PTA

## 2019-11-13 NOTE — PLAN OF CARE
Problem: OCCUPATIONAL THERAPY ADULT  Goal: Performs self-care activities at highest level of function for planned discharge setting  See evaluation for individualized goals  Description  Treatment Interventions: ADL retraining, Functional transfer training, UE strengthening/ROM, Endurance training, Patient/family training, Equipment evaluation/education, Compensatory technique education, Activityengagement, Energy conservation          See flowsheet documentation for full assessment, interventions and recommendations  Outcome: Progressing  Note:   Limitation: Decreased ADL status, Decreased UE strength, Decreased endurance, Decreased Safe judgement during ADL, Decreased self-care trans, Decreased high-level ADLs  Prognosis: Good  Assessment: Pt seen for OT treatment session this PM focusing on functional activity tolerance, ADLs, and functional mobility/transfers  Pt alert and cooperative throughout session  Pt seated OOB in chair at start of session with call bell ringing to use bathroom  Transfers completed with Mod A of 2 with increased time required for transitions and increased assist to initiate forward weight shift from surface for sit>stand  Min A required for functional mobility with 2nd person present to close chair follow  Toileting tasks completed with Mod A with increased assist for thoroughness during perineal hygiene tasks  Light grooming tasks completed with Supervision with setup required to wash/dry hands  UB dressing completed with Min A with assist to bring robe around/down in back 2* increased body habitus  Pt with overall increased fatigue in today's session, therefore educated pt on energy conservation techniques (ie: seated rest breaks, compensatory breathing, pacing, etc ) with pt verbalizing understanding of education  Pt seated OOB in chair at end of session with call bell and phone within reach  All needs met and pt reports no further questions for OT at this time   Continue to recommend STR when medically cleared, however pt prefers to return home  If D/C home, recommend Home OT  OT to follow pt on caseload        OT Discharge Recommendation: Short Term Rehab(pt prefers home; If D/C home, recommend Home OT)  OT - OK to Discharge: Yes(when medically cleared to rehab)

## 2019-11-14 VITALS
RESPIRATION RATE: 18 BRPM | OXYGEN SATURATION: 95 % | TEMPERATURE: 97.4 F | BODY MASS INDEX: 46.03 KG/M2 | DIASTOLIC BLOOD PRESSURE: 77 MMHG | HEIGHT: 64 IN | HEART RATE: 89 BPM | WEIGHT: 269.62 LBS | SYSTOLIC BLOOD PRESSURE: 144 MMHG

## 2019-11-14 PROBLEM — R65.20 SEVERE SEPSIS (HCC): Status: RESOLVED | Noted: 2019-04-02 | Resolved: 2019-11-14

## 2019-11-14 PROBLEM — A41.9 SEVERE SEPSIS (HCC): Status: RESOLVED | Noted: 2019-04-02 | Resolved: 2019-11-14

## 2019-11-14 PROCEDURE — 99232 SBSQ HOSP IP/OBS MODERATE 35: CPT | Performed by: INTERNAL MEDICINE

## 2019-11-14 PROCEDURE — 99239 HOSP IP/OBS DSCHRG MGMT >30: CPT | Performed by: INTERNAL MEDICINE

## 2019-11-14 RX ORDER — CEPHALEXIN 500 MG/1
500 CAPSULE ORAL EVERY 6 HOURS SCHEDULED
Qty: 6 CAPSULE | Refills: 0 | Status: SHIPPED | OUTPATIENT
Start: 2019-11-14 | End: 2019-11-16

## 2019-11-14 RX ORDER — PENICILLIN V POTASSIUM 250 MG/1
250 TABLET ORAL 2 TIMES DAILY
Qty: 60 TABLET | Refills: 0 | Status: SHIPPED | OUTPATIENT
Start: 2019-11-14 | End: 2019-12-14 | Stop reason: ALTCHOICE

## 2019-11-14 RX ADMIN — ACETAMINOPHEN 650 MG: 325 TABLET ORAL at 00:28

## 2019-11-14 RX ADMIN — CEPHALEXIN 500 MG: 500 CAPSULE ORAL at 06:25

## 2019-11-14 RX ADMIN — OXYCODONE HYDROCHLORIDE 15 MG: 10 TABLET ORAL at 14:20

## 2019-11-14 RX ADMIN — OXYCODONE HYDROCHLORIDE 40 MG: 20 TABLET, FILM COATED, EXTENDED RELEASE ORAL at 06:25

## 2019-11-14 RX ADMIN — CEPHALEXIN 500 MG: 500 CAPSULE ORAL at 12:37

## 2019-11-14 RX ADMIN — HEPARIN SODIUM 5000 UNITS: 5000 INJECTION INTRAVENOUS; SUBCUTANEOUS at 14:20

## 2019-11-14 RX ADMIN — DULOXETINE HYDROCHLORIDE 60 MG: 60 CAPSULE, DELAYED RELEASE ORAL at 08:42

## 2019-11-14 RX ADMIN — LORAZEPAM 0.5 MG: 1 TABLET ORAL at 08:24

## 2019-11-14 RX ADMIN — PANTOPRAZOLE SODIUM 40 MG: 40 TABLET, DELAYED RELEASE ORAL at 06:25

## 2019-11-14 RX ADMIN — CEPHALEXIN 500 MG: 500 CAPSULE ORAL at 00:28

## 2019-11-14 RX ADMIN — HEPARIN SODIUM 5000 UNITS: 5000 INJECTION INTRAVENOUS; SUBCUTANEOUS at 06:25

## 2019-11-14 RX ADMIN — OXYCODONE HYDROCHLORIDE 15 MG: 10 TABLET ORAL at 00:28
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
## 2019-11-14 NOTE — CASE MANAGEMENT
CM routed Summary of Care to [place] [id number] (formerly [employer]) to [phone]  CM department will continue to follow through discharge

## 2019-11-14 NOTE — DISCHARGE INSTRUCTIONS
Discharge instructions:    Continue Keflex through 11/15/19  When Keflex finished start Penicillin to continue until seen in the office by Infectious Disease      Please take your medication as directed    Please see your family doctor in 1 week for a checkup    Please return to the ER with any problems at all, especially any fever, chills, nausea, diarrhea, dizziness, lightheadedness, chest pain, difficulty breathing, or any other problems at all

## 2019-11-14 NOTE — PROGRESS NOTES
Progress Note - Infectious Disease   Genet Qureshi 75 y o  female MRN: [id number]  Unit/Bed#: E5 -01 Encounter: [id number]      Impression/Recommendations:  1   Severe sepsis, POA   Fever, tachycardia, lactic acidosis   Due to group B strep bacteremia   ROS and exam otherwise benign   Improved  Rec:  ? Antibiotic as below  ? Monitor vitals  ? Supportive care     2   Recurrent Group B strep bacteremia   Consider skin source in setting of lymphedema although no overt cellulitis seen   Consider due to #3 although unusual respiratory pathogen   Bronchoscopy culture was negative   Repeat blood cultures negative  TTE was a technically difficult study but was negative  Similar episode/presentation in 4/2019  Rec:  ? Continue Keflex through 11/15/2019 for total of 10 days of antibiotic treatment, then transition to chronic suppressive penicillin  Order for PCN place in [place]   ? Outpatient infectious disease follow-up on December 17, 2019 at 12:30 pm with PA-C, Loni Meckel     3   Bilateral pneumonia   Consider aspiration pneumonitis versus pneumonia   CT showed reflux esophagitis   Rapid clinical improvement since admission   Status post bronchoscopy with negative cultures   AFB stain is preliminarily negative   Flu/RSV PCR was negative  Rec:  ? Antibiotics as above  ? Aspiration precautions     4   Acute hypoxic respiratory failure   Likely multifactorial due to all above   Consider element of pulmonary edema   Rapid clinical improvement, now remains stable on room air  5   Chronic bilateral lower extremity lymphedema   Probable source of patient's bacteremia above      6   Juan's granulomatosis   Initially with pulmonary hemorrhage, renal failure, polyarthritis   Stable off immunosuppression      7   CKD   Baseline creatinine 0 9-1 2      8   Hypogammaglobulinemia      9   HOCM      The patient is stable from an ID standpoint for D/C at any time      Antibiotics:  Keflex #2  Antibiotics #9    Subjective:  Patient seen on AM rounds  Denies fevers, chills, sweats, nausea, vomiting, or diarrhea  Tired, didn't sleep well overnight  24 Hour Events:  No documented fevers, chills, sweats, nausea, vomiting, or diarrhea  Objective:  Vitals:  Temp:  [97 4 °F (36 3 °C)-98 2 °F (36 8 °C)] 97 4 °F (36 3 °C)  HR:  [] 89  Resp:  [18-20] 18  BP: (144-154)/(70-77) 144/77  SpO2:  [93 %-95 %] 95 %  Temp (24hrs), Av 8 °F (36 6 °C), Min:97 4 °F (36 3 °C), Max:98 2 °F (36 8 °C)  Current: Temperature: (!) 97 4 °F (36 3 °C)    Physical Exam:   General:  No acute distress  Psychiatric:  Awake and alert  Pulmonary:  Normal respiratory excursion without accessory muscle use  Abdomen:  Soft, nontender  Extremities:  Bilateral lymphedema wraps in place  Skin:  No rashes    Lab Results:  I have personally reviewed pertinent labs  Results from last 7 days   Lab Units 19  0738 19  1140 19  0533   POTASSIUM mmol/L 3 8 3 8 4 1   CHLORIDE mmol/L 110* 109* 107   CO2 mmol/L 22 23 24   BUN mg/dL 11 11 24   CREATININE mg/dL 0 73 0 79 1 07   EGFR ml/min/1 73sq m 86 78 54   CALCIUM mg/dL 8 6 8 4 8 4   AST U/L  --  41  --    ALT U/L  --  28  --    ALK PHOS U/L  --  205*  --      Results from last 7 days   Lab Units 19  0552 19  1045 19  1140   WBC Thousand/uL 9 53 9 24 10 13   HEMOGLOBIN g/dL 10 9* 10 6* 10 7*   PLATELETS Thousands/uL 175 152 136*     Results from last 7 days   Lab Units 19  1232 19  1140 19  1705   BLOOD CULTURE  No Growth at 48 hrs  No Growth at 48 hrs  No Growth at 48 hrs   --    GRAM STAIN RESULT   --   --  No Polys or Bacteria seen       Imaging Studies:   I have personally reviewed pertinent imaging study reports and images in PACS  EKG, Pathology, and Other Studies:   I have personally reviewed pertinent reports

## 2019-11-15 ENCOUNTER — TELEPHONE (OUTPATIENT)
Dept: INFECTIOUS DISEASES | Facility: CLINIC | Age: 64
End: 2019-11-15

## 2019-11-15 NOTE — TELEPHONE ENCOUNTER
Contacted pt to confirm f/u in our office Dec 17th at 12:30 PM      Pt does not need weekly labs, per Dr Neelam Mccord

## 2019-11-16 LAB
BACTERIA BLD CULT: NORMAL

## 2019-12-05 ENCOUNTER — TELEPHONE (OUTPATIENT)
Dept: CARDIOLOGY CLINIC | Facility: CLINIC | Age: 64
End: 2019-12-05

## 2019-12-05 NOTE — TELEPHONE ENCOUNTER
Pt was in the hospitla  And was d/c'd on 11/14/19  They had stopped her Lopressor because of  Low BP while in the hospital     Now Bp is 150/76 HR 88  Did you what her to restart Lopressor 12 5 mg bid?       Please advise

## 2019-12-09 LAB — FUNGUS SPEC CULT: NORMAL

## 2019-12-11 ENCOUNTER — TELEPHONE (OUTPATIENT)
Dept: HEMATOLOGY ONCOLOGY | Facility: CLINIC | Age: 64
End: 2019-12-11

## 2019-12-11 NOTE — TELEPHONE ENCOUNTER
New Patient Encounter    New Patient Intake Form   Patient Details:  Ana Luisa Dukes  1955  [id number]    Background Information:  [address] starts by opening a telephone encounter and gathering the following information   Who is calling to schedule? If not self, relationship to patient? Self   Referring Provider Dr Yue Gray   What is the diagnosis? Refer to hematology    When was the diagnosis? 6 months   Is patient aware of diagnosis? Yes   Reason for visit? consult   Have you had any testing done? If so: when, where? Yes  Are records in [facility]? yes   Was the patient told to bring a disk? no   Scheduling Information:   Preferred [place]:  [place]     Requesting Specific Provider? any   Are there any dates/time the patient cannot be seen? Monday and wednesdays       Miscellaneous: n/a   After completing the above information, please route to Financial Counselor and the appropriate Nurse Navigator for review

## 2019-12-13 ENCOUNTER — TELEPHONE (OUTPATIENT)
Dept: HEMATOLOGY ONCOLOGY | Facility: CLINIC | Age: 64
End: 2019-12-13

## 2019-12-13 ENCOUNTER — OFFICE VISIT (OUTPATIENT)
Dept: PULMONOLOGY | Facility: CLINIC | Age: 64
End: 2019-12-13
Payer: MEDICARE

## 2019-12-13 VITALS
OXYGEN SATURATION: 98 % | HEART RATE: 98 BPM | HEIGHT: 64 IN | TEMPERATURE: 98.6 F | SYSTOLIC BLOOD PRESSURE: 102 MMHG | DIASTOLIC BLOOD PRESSURE: 58 MMHG | WEIGHT: 220 LBS | BODY MASS INDEX: 37.56 KG/M2

## 2019-12-13 DIAGNOSIS — M31.30 WEGENER'S SYNDROME: ICD-10-CM

## 2019-12-13 DIAGNOSIS — D84.89 IMMUNODEFICIENCY (CELL-MEDIATED) (HCC): ICD-10-CM

## 2019-12-13 DIAGNOSIS — J18.9 PNEUMONITIS: Primary | ICD-10-CM

## 2019-12-13 DIAGNOSIS — Z23 ENCOUNTER FOR IMMUNIZATION: ICD-10-CM

## 2019-12-13 DIAGNOSIS — E66.9 OBESITY: ICD-10-CM

## 2019-12-13 PROCEDURE — 90662 IIV NO PRSV INCREASED AG IM: CPT

## 2019-12-13 PROCEDURE — 99214 OFFICE O/P EST MOD 30 MIN: CPT | Performed by: INTERNAL MEDICINE

## 2019-12-13 PROCEDURE — G0008 ADMIN INFLUENZA VIRUS VAC: HCPCS

## 2019-12-13 NOTE — PROGRESS NOTES
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   Obesity  Plan:   [facility] Medical oncology referral to initiate IgG transfusion   Influenza vaccine   Follow-up as needed  Discussion:   The patient's pneumonitis and acute hypoxemic respiratory failure have resolved  She is back to her baseline breathing wise  Her IgG and IgA levels are low  She needs immunoglobulin transfusion  I spoke with Dr Ana Pino who will arrange for her to see him in the office and initiate the immunoglobulin infusion  I have given the patient the influenza vaccine today  I have not scheduled her for another appointment however I will be happy to see her in the future if the need arises  Subjective: The patient is here for a follow-up visit  She was seen at [facility] on November 7th  She had acute hypoxemic respiratory failure acute metabolic encephalopathy and sepsis  She had bilateral airspace disease which is is thought to be due to pneumonitis  The patient had bronchoscopy and bronchioalveolar lavage done which was negative  Cultures were negative  She grew strep group B in the blood which is thought to be probably from her skin  She was treated with antibiotics  Her hypoxemia resolved  She is close to her baseline  She has occasional cough  She had a recent labs which showed low IgG and IgA  Review of systems:  A 12 point system review is done and aside from what is stated above the rest of the review of systems is negative  Family history and social history are reviewed  Medications list is reviewed        Vitals: Blood pressure 102/58, pulse 98, temperature 98 6 °F (37 °C), temperature source Tympanic, height 5' 4" (1 626 m), weight 99 8 kg (220 lb), SpO2 98 %  ,     Physical Exam  Gen: Awake, alert, oriented x 3, no acute distress  HEENT: Mucous membranes moist, no oral lesions, no thrush  NECK: No accessory muscle use, JVP not elevated  Cardiac: Regular, single S1, single S2, no murmurs, no rubs, no gallops  Lungs:  Clear breath sounds  Abdomen: normoactive bowel sounds, soft nontender, nondistended, no rebound or rigidity, no guarding  Extremities: no cyanosis, no clubbing  2+ edema  Neuro:  Grossly nonfocal   Skin:  No rash      IMMUNOGLOBULIN PRF 1Resulted: 11/22/2019 4:05 PM  [facility]  Component Name Value Ref Range   Immunoglobulin G 152 (L) 680 - 1,445 mg/dL   Immunoglobulin A 36 (L) 83 - 407 mg/dL   Immunoglobulin M 72    34 -214 mg/dL

## 2019-12-17 DIAGNOSIS — R78.81 BACTEREMIA: Primary | ICD-10-CM

## 2019-12-17 RX ORDER — PENICILLIN V POTASSIUM 250 MG/1
250 TABLET ORAL EVERY 12 HOURS
Qty: 60 TABLET | Refills: 0 | Status: SHIPPED | OUTPATIENT
Start: 2019-12-17 | End: 2020-01-13 | Stop reason: SDUPTHER

## 2019-12-19 ENCOUNTER — OFFICE VISIT (OUTPATIENT)
Dept: INFECTIOUS DISEASES | Facility: CLINIC | Age: 64
End: 2019-12-19
Payer: MEDICARE

## 2019-12-19 VITALS
BODY MASS INDEX: 37.76 KG/M2 | HEART RATE: 82 BPM | HEIGHT: 64 IN | SYSTOLIC BLOOD PRESSURE: 112 MMHG | TEMPERATURE: 98.4 F | DIASTOLIC BLOOD PRESSURE: 64 MMHG

## 2019-12-19 DIAGNOSIS — R78.81 BACTEREMIA: ICD-10-CM

## 2019-12-19 DIAGNOSIS — L03.115 CELLULITIS OF RIGHT LEG: ICD-10-CM

## 2019-12-19 DIAGNOSIS — I89.0 LYMPHEDEMA: ICD-10-CM

## 2019-12-19 DIAGNOSIS — L03.90 RECURRENT CELLULITIS: Primary | ICD-10-CM

## 2019-12-19 PROCEDURE — 99214 OFFICE O/P EST MOD 30 MIN: CPT | Performed by: INTERNAL MEDICINE

## 2019-12-19 NOTE — PROGRESS NOTES
Progress Note - Infectious Disease   Becka Moody 75 y o  female MRN: [id number]  Unit/Bed#:  Encounter: [id number]      Impression/Recommendations:  1  Recurrent leg cellulitis, on a background of lymphedema  Patient completed antibiotic course last month  She is currently on Pen VK suppression  Thus far, she has not had a recurrent cellulitis episode  Will have patient continue suppression for at least a year and re-evaluate at that time whether further suppression is necessary  Continue Pen VK suppression  2  Lymphedema  Continue to emphasized to patient the need to control leg edema with both compression stockings and leg elevation  Continue compression stockings and leg elevation  3  Recent GBS bacteremia with sepsis, secondary to leg cellulitis  Patient completed antibiotic course  At present, there are no clinical signs of active infection  Hospitalization records reviewed in detail  Discussed with patient in detail regarding the above plan  Follow-up with us in 1 year, earlier if there is problem  Antibiotics:  Pen VK suppression    Subjective:  Patient has history of lymphedema and recurrent leg cellulitis  She was last admitted at [facility] in November with right leg cellulitis and secondary GBS bacteremia  Patient completed antibiotic course and was transition to Pen VK suppression  Patient is doing well  Since discharge, she has not had another episode of cellulitis  She is tolerating Pen VK well  No nausea, vomiting or diarrhea    The following portions of the patient's history were reviewed and updated as appropriate: allergies, current medications, past medical history, past social history, past surgical history and problem list     Objective:  Vitals:   Temperature: 98 4 °F (36 9 °C)    Physical Exam:     General: Awake, alert, cooperative, no distress  Neck:  Supple  No lymphadenopathy  No mass     Lungs: Expansion symmetric, no rales, no wheezing, respirations unlabored  Heart:  Regular rate and rhythm, S1 and S2 normal, no murmur  Abdomen: Soft, nondistended, non-tender, bowel sounds active all four quadrants,        no masses, no organomegaly  Extremities: Stable leg edema  Chronic venous stasis changes  No erythema  Nontender  Skin:  No rash  Neuro: Moves all extremities  Invasive Devices     None                 Labs studies:   I have personally reviewed pertinent labs  Imaging Studies:   I have personally reviewed pertinent imaging study reports and images in PACS  EKG, Pathology, and Other Studies:   I have personally reviewed pertinent reports

## 2019-12-24 LAB
MYCOBACTERIUM SPEC CULT: NORMAL
RHODAMINE-AURAMINE STN SPEC: NORMAL

## 2019-12-27 ENCOUNTER — CONSULT (OUTPATIENT)
Dept: HEMATOLOGY ONCOLOGY | Facility: CLINIC | Age: 64
End: 2019-12-27
Payer: MEDICARE

## 2019-12-27 VITALS
OXYGEN SATURATION: 98 % | HEIGHT: 64 IN | BODY MASS INDEX: 39.44 KG/M2 | HEART RATE: 77 BPM | DIASTOLIC BLOOD PRESSURE: 84 MMHG | SYSTOLIC BLOOD PRESSURE: 132 MMHG | TEMPERATURE: 97.3 F | WEIGHT: 231 LBS | RESPIRATION RATE: 18 BRPM

## 2019-12-27 DIAGNOSIS — Z15.09 BRCA1 POSITIVE: ICD-10-CM

## 2019-12-27 DIAGNOSIS — D80.1 HYPOGAMMAGLOBULINEMIA, ACQUIRED (HCC): Primary | ICD-10-CM

## 2019-12-27 DIAGNOSIS — Z15.01 BRCA1 POSITIVE: ICD-10-CM

## 2019-12-27 PROBLEM — R78.81 BACTEREMIA: Status: RESOLVED | Noted: 2019-11-11 | Resolved: 2019-12-27

## 2019-12-27 PROBLEM — R53.83 LETHARGY: Status: RESOLVED | Noted: 2018-07-11 | Resolved: 2019-12-27

## 2019-12-27 PROBLEM — D72.829 LEUKOCYTOSIS: Status: RESOLVED | Noted: 2019-11-11 | Resolved: 2019-12-27

## 2019-12-27 PROCEDURE — 99215 OFFICE O/P EST HI 40 MIN: CPT | Performed by: INTERNAL MEDICINE

## 2019-12-27 RX ORDER — ACETAMINOPHEN 325 MG/1
650 TABLET ORAL ONCE
Status: CANCELLED | OUTPATIENT
Start: 2020-01-03

## 2019-12-27 RX ORDER — DIPHENHYDRAMINE HCL 25 MG
50 TABLET ORAL ONCE
Status: CANCELLED | OUTPATIENT
Start: 2020-01-03

## 2019-12-27 RX ORDER — SODIUM CHLORIDE 9 MG/ML
20 INJECTION, SOLUTION INTRAVENOUS ONCE
Status: CANCELLED | OUTPATIENT
Start: 2020-01-03

## 2019-12-27 NOTE — PROGRESS NOTES
Oncology Consult Note  Ana Luisa Dukes 75 y o  female MRN: [id number]  Unit/Bed#:  Encounter: [id number]      Presenting Complaint:  Multiple infections, hypogammaglobinemia    History of Presenting Illness:    58-year-old  female with multiple medical problems including ovarian cancer status post chemotherapy and surgical resection, she was tested positive for BRCA1 mutation, raquel'sgranulomatosis she received rituximab for almost 4 years, subsequent pneumonia, pneumonitis, sinusitis, cellulitis of the lower extremity requiring multiple admissions to the hospital and antibiotics    The last admission to the hospital was on 11/14/2019 with severe sepsis, leukocytosis, encephalopathy    On 11/22/2019, IgG 152, IgA 36, IgM 72    The patient had low IgG did not since 2016 with IgG level of 189    She does not smoke or drink    Denies any headache blurred vision diplopia odynophagia weight changes abdominal pain dysuria hematuria melena hematochezia            Review of Systems - As stated in the HPI otherwise the fourteen point review of systems was negative  Past Medical History:   Diagnosis Date    Cancer [facility])     ovarian Ca with chemo    Lymphedema     MRSA (methicillin resistant Staphylococcus aureus) 09/05/2017    nasal swab negative 4/3/19    Ovar[facility] cancer ([facility] [address] )     CYST REMOVED RIGHT OVARY IN 2007  HYSTERECTOMY 02/14/07         Social History     Socioeconomic History    Marital status: /Civil Union     Spouse name: Not on file    Number of children: Not on file    Years of education: Not on file    Highest education level: Not on file   Occupational History    Not on file   Social Needs    Financial resource strain: Not on file    Food insecurity:     Worry: Not on file     Inability: Not on file    Transportation needs:     Medical: Not on file     Non-medical: Not on file   Tobacco Use    Smoking status: Never Smoker    Smokeless tobacco: Never Used   Substance and Sexual Activity    Alcohol use: Yes     Frequency: Monthly or less    Drug use: No    Sexual activity: Yes     Partners: Male     Birth control/protection: None   Lifestyle    Physical activity:     Days per week: Not on file     Minutes per session: Not on file    Stress: Not on file   Relationships    Social connections:     Talks on phone: Not on file     Gets together: Not on file     Attends Confucianist service: Not on file     Active member of club or organization: Not on file     Attends meetings of clubs or organizations: Not on file     Relationship status: Not on file    Intimate partner violence:     Fear of current or ex partner: Not on file     Emotionally abused: Not on file     Physically abused: Not on file     Forced sexual activity: Not on file   Other Topics Concern    Not on file   Social History Narrative    Not on file       Family History   Problem Relation Age of Onset    Breast cancer Mother     Ovarian cancer Maternal Grandmother     Breast cancer Maternal Aunt     No Known Problems Paternal Aunt        Allergies   Allergen Reactions    Iodinated Diagnostic Agents Shortness Of Breath    Omnipaque [Iohexol] Shortness Of Breath    Other Shortness Of Breath     IVP dye, x ray dye 3    Iodides      Other reaction(s): SWELLING, SOB    Methotrexate Nausea Only     Headache and nausea    Methotrexate Derivatives Headache         Current Outpatient Medications:     acetaminophen (TYLENOL) 325 mg tablet, Take 2 tablets by mouth every 6 (six) hours as needed for mild pain, Disp: 30 tablet, Rfl: 0    bisacodyl (DULCOLAX) 5 mg EC tablet, Take 5 mg by mouth daily as needed for constipation, Disp: , Rfl:     Calcium Carbonate-Vit D-Min (CALCIUM 1200 PO), Take 1 tablet by mouth daily , Disp: , Rfl:     DULoxetine (CYMBALTA) 60 mg delayed release capsule, Take 60 mg by mouth daily  , Disp: , Rfl: 0    furosemide (LASIX) 20 mg tablet, Take 1 tablet (20 mg total) by mouth daily M W F BID PRN, Disp: 90 tablet, Rfl: 3    LORazepam (ATIVAN) 0 5 mg tablet, Take 1 tablet as needed every 8 hours  (Patient taking differently: Take 0 5 mg by mouth 2 (two) times a day Take 1 tablet as needed every 8 hours  ), Disp: 30 tablet, Rfl: 0    Multiple Vitamins-Minerals (MULTIVITAMIN ADULT PO), Take 1 capsule by mouth daily , Disp: , Rfl:     omeprazole (PriLOSEC) 20 mg delayed release capsule, Take 20 mg by mouth daily  , Disp: , Rfl:     oxyCODONE (OxyCONTIN) 40 mg 12 hr tablet, Take 40 mg by mouth every 12 (twelve) hours, Disp: , Rfl:     OXYCODONE HCL PO, Take 15 mg by mouth 3 (three) times a day as needed , Disp: , Rfl:     penicillin V potassium (VEETID) 250 mg tablet, Take 1 tablet (250 mg total) by mouth every 12 (twelve) hours, Disp: 60 tablet, Rfl: 0    polyethylene glycol (MIRALAX) 17 g packet, Take 17 g by mouth as needed , Disp: , Rfl:     PREVIDENT 5000 DRY MOUTH 1 1 % GEL, , Disp: , Rfl:     TOVIAZ 8 MG TB24, Take 4 mg by mouth 2 (two) times a day  , Disp: , Rfl:       /84 (BP Location: Left arm, Cuff Size: Adult)   Pulse 77   Temp (!) 97 3 °F (36 3 °C) (Tympanic)   Resp 18   Ht 5' 4" (1 626 m)   Wt 105 kg (231 lb)   SpO2 98%   BMI 39 65 kg/m²       General Appearance:    Alert, oriented        Eyes:    PERRL   Ears:    Normal external ear canals, both ears   Nose:   Nares normal, septum midline   Throat:   Mucosa moist  Pharynx without injection  Neck:   Supple       Lungs:     Clear to auscultation bilaterally   Chest Wall:    No tenderness or deformity    Heart:    Regular rate and rhythm       Abdomen:     Soft, non-tender, bowel sounds +, no organomegaly           Extremities:   Extremities no cyanosis lymphedema of the right lower extremity       Skin:   no rash or icterus      Lymph nodes:   Cervical, supraclavicular, and axillary nodes normal   Neurologic:   CNII-XII intact, normal strength, sensation and reflexes     Throughout               No results found for this or any previous visit (from the past 48 hour(s))  No results found  ECOG :1      Assessment and plan:    1  Hypogammaglobinemia with multiple infection requiring multiple admissions to the hospital at least 4 times and this year with sepsis, pneumonia, cellulitis, IgG level 152, patient to start IVIG 30 g every month hoping to get IgG level above 250 and space the treatment out  2  BRCA1 mutation with ovarian cancer, she is on close observation with mammogram/MRI of the breasts    3  Wegener's 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 with good response however with subsequent hypogammaglobinemia    4   Follow-up monthly with CBC, IgG level

## 2020-01-13 DIAGNOSIS — R78.81 BACTEREMIA: ICD-10-CM

## 2020-01-13 RX ORDER — PENICILLIN V POTASSIUM 250 MG/1
250 TABLET ORAL EVERY 12 HOURS
Qty: 60 TABLET | Refills: 0 | Status: SHIPPED | OUTPATIENT
Start: 2020-01-13 | End: 2020-02-12 | Stop reason: ALTCHOICE

## 2020-01-31 ENCOUNTER — HOSPITAL ENCOUNTER (OUTPATIENT)
Dept: INFUSION CENTER | Facility: CLINIC | Age: 65
Discharge: HOME | End: 2020-01-31
Payer: MEDICARE

## 2020-01-31 VITALS
TEMPERATURE: 97.8 F | RESPIRATION RATE: 16 BRPM | SYSTOLIC BLOOD PRESSURE: 115 MMHG | DIASTOLIC BLOOD PRESSURE: 70 MMHG | WEIGHT: 228.84 LBS | HEART RATE: 77 BPM | BODY MASS INDEX: 39.28 KG/M2

## 2020-01-31 DIAGNOSIS — D80.1 HYPOGAMMAGLOBULINEMIA, ACQUIRED (HCC): Primary | ICD-10-CM

## 2020-01-31 PROCEDURE — 96366 THER/PROPH/DIAG IV INF ADDON: CPT

## 2020-01-31 PROCEDURE — 96365 THER/PROPH/DIAG IV INF INIT: CPT

## 2020-01-31 RX ORDER — DIPHENHYDRAMINE HCL 25 MG
50 TABLET ORAL ONCE
Status: COMPLETED | OUTPATIENT
Start: 2020-01-31 | End: 2020-01-31

## 2020-01-31 RX ORDER — SODIUM CHLORIDE 9 MG/ML
20 INJECTION, SOLUTION INTRAVENOUS ONCE
Status: COMPLETED | OUTPATIENT
Start: 2020-01-31 | End: 2020-01-31

## 2020-01-31 RX ORDER — DIPHENHYDRAMINE HCL 25 MG
50 TABLET ORAL ONCE
Status: CANCELLED | OUTPATIENT
Start: 2020-02-28

## 2020-01-31 RX ORDER — ACETAMINOPHEN 325 MG/1
650 TABLET ORAL ONCE
Status: CANCELLED | OUTPATIENT
Start: 2020-02-28

## 2020-01-31 RX ORDER — ACETAMINOPHEN 325 MG/1
650 TABLET ORAL ONCE
Status: COMPLETED | OUTPATIENT
Start: 2020-01-31 | End: 2020-01-31

## 2020-01-31 RX ORDER — SODIUM CHLORIDE 9 MG/ML
20 INJECTION, SOLUTION INTRAVENOUS ONCE
Status: CANCELLED | OUTPATIENT
Start: 2020-02-28

## 2020-01-31 RX ADMIN — ACETAMINOPHEN 650 MG: 325 TABLET, FILM COATED ORAL at 10:16

## 2020-01-31 RX ADMIN — DIPHENHYDRAMINE HCL 50 MG: 25 TABLET ORAL at 10:16

## 2020-01-31 RX ADMIN — SODIUM CHLORIDE 20 ML/HR: 0.9 INJECTION, SOLUTION INTRAVENOUS at 10:29

## 2020-01-31 RX ADMIN — Medication 30 G: at 11:01

## 2020-01-31 NOTE — PROGRESS NOTES
Patient to the unit for 1st time IVIG infusion  Voices no new concerns  Tolerated infusion without adverse reaction  AVS given, aware of next appointment  Voices no questions or concerns at discharge

## 2020-01-31 NOTE — PLAN OF CARE
Problem: Potential for Falls  Goal: Patient will remain free of falls  Description  INTERVENTIONS:  - Assess patient frequently for physical needs  -  Identify cognitive and physical deficits and behaviors that affect risk of falls    -  [place] fall precautions as indicated by assessment   - Educate patient/family on patient safety including physical limitations  - Instruct patient to call for assistance with activity based on assessment  - Modify environment to reduce risk of injury  - Consider OT/PT consult to assist with strengthening/mobility  Outcome: Progressing     Problem: PAIN - ADULT  Goal: Verbalizes/displays adequate comfort level or baseline comfort level  Description  Interventions:  - Encourage patient to monitor pain and request assistance  - Assess pain using appropriate pain scale  - Administer analgesics based on type and severity of pain and evaluate response  - Implement non-pharmacological measures as appropriate and evaluate response  - Consider cultural and social influences on pain and pain management  - Notify physician/advanced practitioner if interventions unsuccessful or patient reports new pain  Outcome: Progressing     Problem: INFECTION - ADULT  Goal: Absence or prevention of progression during hospitalization  Description  INTERVENTIONS:  - Assess and monitor for signs and symptoms of infection  - Monitor lab/diagnostic results  - Monitor all insertion sites, i e  indwelling lines, tubes, and drains  - Monitor endotracheal if appropriate and nasal secretions for changes in amount and color  - [place] appropriate cooling/warming therapies per order  - Administer medications as ordered  - Instruct and encourage patient and family to use good hand hygiene technique  - Identify and instruct in appropriate isolation precautions for identified infection/condition  Outcome: Progressing     Problem: SAFETY ADULT  Goal: Patient will remain free of falls  Description  INTERVENTIONS:  - Assess patient frequently for physical needs  -  Identify cognitive and physical deficits and behaviors that affect risk of falls    -  [place] fall precautions as indicated by assessment   - Educate patient/family on patient safety including physical limitations  - Instruct patient to call for assistance with activity based on assessment  - Modify environment to reduce risk of injury  - Consider OT/PT consult to assist with strengthening/mobility  Outcome: Progressing     Problem: SAFETY ADULT  Goal: Maintain or return to baseline ADL function  Description  INTERVENTIONS:  -  Assess patient's ability to carry out ADLs; assess patient's baseline for ADL function and identify physical deficits which impact ability to perform ADLs (bathing, care of mouth/teeth, toileting, grooming, dressing, etc )  - Assess/evaluate cause of self-care deficits   - Assess range of motion  - Assess patient's mobility; develop plan if impaired  - Assess patient's need for assistive devices and provide as appropriate  - Encourage maximum independence but intervene and supervise when necessary  - Involve family in performance of ADLs  - Assess for home care needs following discharge   - Consider OT consult to assist with ADL evaluation and planning for discharge  - Provide patient education as appropriate  Outcome: Progressing     Problem: DISCHARGE PLANNING  Goal: Discharge to home or other facility with appropriate resources  Description  INTERVENTIONS:  - Identify barriers to discharge w/patient and caregiver  - Arrange for needed discharge resources and transportation as appropriate  - Identify discharge learning needs (meds, wound care, etc )  - Arrange for interpretive services to assist at discharge as needed  - Refer to Case Management Department for coordinating discharge planning if the patient needs post-hospital services based on physician/advanced practitioner order or complex needs related to functional status, cognitive ability, or social support system  Outcome: Progressing     Problem: Knowledge Deficit  Goal: Patient/family/caregiver demonstrates understanding of disease process, treatment plan, medications, and discharge instructions  Description  Complete learning assessment and assess knowledge base    Interventions:  - Provide teaching at level of understanding  - Provide teaching via preferred learning methods  Outcome: Progressing

## 2020-02-02 ENCOUNTER — TELEPHONE (OUTPATIENT)
Dept: OTHER | Facility: OTHER | Age: 65
End: 2020-02-02

## 2020-02-02 NOTE — TELEPHONE ENCOUNTER
Dr Enrique West called in requested to have hematology oncology called the pt back   Sent out another page on amion to [facility]

## 2020-02-03 ENCOUNTER — TELEPHONE (OUTPATIENT)
Dept: HEMATOLOGY ONCOLOGY | Facility: CLINIC | Age: 65
End: 2020-02-03

## 2020-02-03 DIAGNOSIS — R11.0 NAUSEA: Primary | ICD-10-CM

## 2020-02-03 RX ORDER — PROCHLORPERAZINE MALEATE 10 MG
10 TABLET ORAL EVERY 6 HOURS PRN
Qty: 30 TABLET | Refills: 0 | Status: SHIPPED | OUTPATIENT
Start: 2020-02-03 | End: 2020-02-27 | Stop reason: SDUPTHER

## 2020-02-03 NOTE — TELEPHONE ENCOUNTER
Patient stated she rec'd her tx on Friday 1/31/2020 and as of that evening she has been having such bad head aches

## 2020-02-05 ENCOUNTER — TELEPHONE (OUTPATIENT)
Dept: HEMATOLOGY ONCOLOGY | Facility: CLINIC | Age: 65
End: 2020-02-05

## 2020-02-05 NOTE — TELEPHONE ENCOUNTER
Patient called requesting to speak with Denny to provide a update on her health  Best call back [phone]

## 2020-02-05 NOTE — TELEPHONE ENCOUNTER
Called patient and she stated she is feeling better and her headache has mostly resolved  Informed her that I will discuss with Dr Deangelo Zarate adding other supportive meds to the IVIG to prevent the side effects  I will call patient back tomorrow  Patient verbalized understanding

## 2020-02-06 NOTE — TELEPHONE ENCOUNTER
Reviewed with Dr Vega Showmindi  He would like the patient to receive IV steroids prior to the IVIG  Placed the order and called the patient to confirm  Patient verbalized understanding

## 2020-02-14 DIAGNOSIS — L03.90 RECURRENT CELLULITIS: Primary | ICD-10-CM

## 2020-02-17 RX ORDER — PENICILLIN V POTASSIUM 250 MG/1
250 TABLET ORAL EVERY 12 HOURS
Qty: 60 TABLET | Refills: 3 | Status: SHIPPED | OUTPATIENT
Start: 2020-02-17 | End: 2020-07-27 | Stop reason: SDUPTHER

## 2020-02-27 ENCOUNTER — OFFICE VISIT (OUTPATIENT)
Dept: HEMATOLOGY ONCOLOGY | Facility: CLINIC | Age: 65
End: 2020-02-27
Payer: MEDICARE

## 2020-02-27 ENCOUNTER — HOSPITAL ENCOUNTER (OUTPATIENT)
Dept: INFUSION CENTER | Facility: CLINIC | Age: 65
Discharge: HOME | End: 2020-02-27
Payer: MEDICARE

## 2020-02-27 VITALS
TEMPERATURE: 98.9 F | RESPIRATION RATE: 18 BRPM | DIASTOLIC BLOOD PRESSURE: 80 MMHG | HEART RATE: 77 BPM | SYSTOLIC BLOOD PRESSURE: 142 MMHG

## 2020-02-27 VITALS
RESPIRATION RATE: 16 BRPM | HEIGHT: 64 IN | HEART RATE: 74 BPM | DIASTOLIC BLOOD PRESSURE: 70 MMHG | SYSTOLIC BLOOD PRESSURE: 120 MMHG | OXYGEN SATURATION: 98 % | BODY MASS INDEX: 38.76 KG/M2 | WEIGHT: 227 LBS

## 2020-02-27 DIAGNOSIS — D80.1 HYPOGAMMAGLOBULINEMIA, ACQUIRED (HCC): Primary | ICD-10-CM

## 2020-02-27 DIAGNOSIS — C56.3 MALIGNANT NEOPLASM OF BOTH OVARIES (HCC): ICD-10-CM

## 2020-02-27 DIAGNOSIS — Z15.09 BRCA1 POSITIVE: ICD-10-CM

## 2020-02-27 DIAGNOSIS — R11.0 NAUSEA: ICD-10-CM

## 2020-02-27 DIAGNOSIS — Z15.01 BRCA1 POSITIVE: ICD-10-CM

## 2020-02-27 DIAGNOSIS — M31.31 GRANULOMATOSIS WITH POLYANGIITIS WITH RENAL INVOLVEMENT (HCC): Chronic | ICD-10-CM

## 2020-02-27 PROCEDURE — 96366 THER/PROPH/DIAG IV INF ADDON: CPT

## 2020-02-27 PROCEDURE — 96375 TX/PRO/DX INJ NEW DRUG ADDON: CPT

## 2020-02-27 PROCEDURE — 99214 OFFICE O/P EST MOD 30 MIN: CPT | Performed by: INTERNAL MEDICINE

## 2020-02-27 PROCEDURE — 96365 THER/PROPH/DIAG IV INF INIT: CPT

## 2020-02-27 RX ORDER — DIPHENHYDRAMINE HCL 25 MG
50 TABLET ORAL ONCE
Status: COMPLETED | OUTPATIENT
Start: 2020-02-27 | End: 2020-02-27

## 2020-02-27 RX ORDER — ACETAMINOPHEN 325 MG/1
650 TABLET ORAL ONCE
Status: CANCELLED | OUTPATIENT
Start: 2020-02-28

## 2020-02-27 RX ORDER — PROCHLORPERAZINE MALEATE 10 MG
10 TABLET ORAL EVERY 6 HOURS PRN
Qty: 30 TABLET | Refills: 0 | Status: SHIPPED | OUTPATIENT
Start: 2020-02-27 | End: 2021-07-09 | Stop reason: HOSPADM

## 2020-02-27 RX ORDER — ACETAMINOPHEN 325 MG/1
650 TABLET ORAL ONCE
Status: CANCELLED | OUTPATIENT
Start: 2020-04-23

## 2020-02-27 RX ORDER — SODIUM CHLORIDE 9 MG/ML
20 INJECTION, SOLUTION INTRAVENOUS ONCE
Status: CANCELLED | OUTPATIENT
Start: 2020-02-28

## 2020-02-27 RX ORDER — ACETAMINOPHEN 325 MG/1
650 TABLET ORAL ONCE
Status: COMPLETED | OUTPATIENT
Start: 2020-02-27 | End: 2020-02-27

## 2020-02-27 RX ORDER — DIPHENHYDRAMINE HCL 25 MG
50 TABLET ORAL ONCE
Status: CANCELLED | OUTPATIENT
Start: 2020-02-28

## 2020-02-27 RX ORDER — SODIUM CHLORIDE 9 MG/ML
20 INJECTION, SOLUTION INTRAVENOUS ONCE
Status: COMPLETED | OUTPATIENT
Start: 2020-02-27 | End: 2020-02-27

## 2020-02-27 RX ORDER — SODIUM CHLORIDE 9 MG/ML
20 INJECTION, SOLUTION INTRAVENOUS ONCE
Status: CANCELLED | OUTPATIENT
Start: 2020-04-23

## 2020-02-27 RX ORDER — DIPHENHYDRAMINE HCL 25 MG
50 TABLET ORAL ONCE
Status: CANCELLED | OUTPATIENT
Start: 2020-04-23

## 2020-02-27 RX ADMIN — SODIUM CHLORIDE 20 ML/HR: 0.9 INJECTION, SOLUTION INTRAVENOUS at 09:03

## 2020-02-27 RX ADMIN — ACETAMINOPHEN 650 MG: 325 TABLET, FILM COATED ORAL at 09:03

## 2020-02-27 RX ADMIN — Medication 30 G: at 09:28

## 2020-02-27 RX ADMIN — HYDROCORTISONE SODIUM SUCCINATE 100 MG: 100 INJECTION, POWDER, FOR SOLUTION INTRAMUSCULAR; INTRAVENOUS at 09:03

## 2020-02-27 RX ADMIN — DIPHENHYDRAMINE HCL 50 MG: 25 TABLET, COATED ORAL at 09:03

## 2020-02-27 NOTE — PLAN OF CARE
Problem: Potential for Falls  Goal: Patient will remain free of falls  Description  INTERVENTIONS:  - Assess patient frequently for physical needs  -  Identify cognitive and physical deficits and behaviors that affect risk of falls    -  [place] fall precautions as indicated by assessment   - Educate patient/family on patient safety including physical limitations  - Instruct patient to call for assistance with activity based on assessment  - Modify environment to reduce risk of injury  - Consider OT/PT consult to assist with strengthening/mobility  Outcome: Progressing

## 2020-02-27 NOTE — PROGRESS NOTES
Pt arrived to unit for IVIG infusion  She has no new complaints at this time  She tolerated infusion well without adverse reaction  Pt encouraged to keep herself hydrated to decrease risk of developing a headache after infusion  She was given AVS and is aware of her next appt

## 2020-02-27 NOTE — PROGRESS NOTES
Hematology Outpatient Follow - Up Note  Marilin Gustafson 77 y o  female MRN: @ Encounter: [id number]        Date:  2/27/2020        Assessment/ Plan:    Severe hypogammaglobulinemia with multiple infections and required multiple admissions to the hospital at least 4 times in 2019 with sepsis, pneumonia, cellulitis, IgG level was 152, she is on IVIG 30 g every month, she received the 1st dose in January 2020, current IgG level of 540    At this time will space out IVIG 30 g to every other month    Patient to be seen every other month with CBC, IgG level next    BRCA1 mutation with ovarian cancer status post bilateral oophorectomy and hysterectomy, she is on close observation with mammogram and MRI of the breasts in [place]    Juan's 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 with good response with subsequent hypogammaglobinemia            HPI:  47-year-old  female with multiple medical problems including ovarian cancer status post chemotherapy and surgical resection, she was tested positive for BRCA1 mutation, juan'sgranulomatosis she received rituximab for almost 4 years, subsequent pneumonia, pneumonitis, sinusitis, cellulitis of the lower extremity requiring multiple admissions to the hospital and antibiotics     The last admission to the hospital was on 11/14/2019 with severe sepsis, leukocytosis, encephalopathy     On 11/22/2019, IgG 152, IgA 36, IgM 72     The patient had low IgG did not since 2016 with IgG level of 189    She was initiated on IVIG 30 g every month received the 1st dose in January 2020, IgG level went up from 189 to 547    Interval History:        Previous Treatment:         Test Results:    Imaging: No results found      Labs:   Lab Results   Component Value Date    WBC 9 53 11/13/2019    HGB 10 9 (L) 11/13/2019    HCT 35 2 11/13/2019    MCV 99 (H) 11/13/2019     11/13/2019     Lab Results   Component Value Date     03/22/2015    K 3 8 11/12/2019     (H) 11/12/2019    CO2 22 11/12/2019    ANIONGAP 7 03/22/2015    BUN 11 11/12/2019    CREATININE 0 73 11/12/2019    GLUCOSE 88 03/22/2015    CALCIUM 8 6 11/12/2019    AST 41 11/11/2019    ALT 28 11/11/2019    ALKPHOS 205 (H) 11/11/2019    PROT 7 3 05/15/2015    BILITOT 0 39 05/15/2015    EGFR 86 11/12/2019       No results found for: IRON, TIBC, FERRITIN    No results found for: [id number]      ROS: Review of Systems   Constitutional: Positive for fatigue  Negative for appetite change, chills, diaphoresis and unexpected weight change  HENT:   Negative for mouth sores, nosebleeds, sore throat, trouble swallowing and voice change  Eyes: Negative for eye problems and icterus  Respiratory: Negative for chest tightness, cough, hemoptysis and wheezing  Cardiovascular: Negative for chest pain, leg swelling and palpitations  Gastrointestinal: Negative for abdominal distention, abdominal pain, blood in stool, constipation, diarrhea, nausea and vomiting  Endocrine: Negative for hot flashes  Genitourinary: Negative for bladder incontinence, difficulty urinating, dyspareunia, dysuria and frequency  Musculoskeletal: Positive for arthralgias and back pain  Negative for gait problem, neck pain and neck stiffness  Skin: Negative for itching and rash  Neurological: Positive for numbness  Negative for dizziness, gait problem, headaches, seizures and speech difficulty  Hematological: Negative for adenopathy  Does not bruise/bleed easily  Psychiatric/Behavioral: Negative for decreased concentration, depression, sleep disturbance and suicidal ideas  The patient is not nervous/anxious  Current Medications: Reviewed  Allergies: Reviewed  PMH/FH/SH:  Reviewed      Physical Exam:    Body surface area is 2 06 meters squared      Wt Readings from Last 3 Encounters:   02/27/20 103 kg (227 lb)   01/31/20 104 kg (228 lb 13 4 oz)   12/27/19 105 kg (231 lb)        Temp Readings from Last 3 Encounters:   02/27/20 98 9 °F (37 2 °C) (Temporal)   20 97 8 °F (36 6 °C) (Temporal)   19 (!) 97 3 °F (36 3 °C) (Tympanic)        BP Readings from Last 3 Encounters:   20 120/70   20 142/80   20 115/70         Pulse Readings from Last 3 Encounters:   20 74   20 77   20 77        Physical Exam   Constitutional: She is oriented to person, place, and time  She appears well-developed and well-nourished  No distress  HENT:   Head: Normocephalic and atraumatic  Eyes: Conjunctivae are normal    Neck: Normal range of motion  Neck supple  No tracheal deviation present  Cardiovascular: Normal rate and regular rhythm  Exam reveals no gallop and no friction rub  No murmur heard  Pulmonary/Chest: Effort normal  No respiratory distress  She has wheezes  She has no rales  She exhibits no tenderness  Abdominal: Soft  She exhibits no distension  There is no tenderness  Musculoskeletal: She exhibits edema  Lymphadenopathy:     She has no cervical adenopathy  Neurological: She is alert and oriented to person, place, and time  Skin: Skin is warm and dry  She is not diaphoretic  No erythema  No pallor  Psychiatric: She has a normal mood and affect  Her behavior is normal  Judgment and thought content normal    Vitals reviewed  ECO    Goals and Barriers:  Current Goal: Minimize effects of disease  Barriers: None  Patient's Capacity to Self Care:  Patient is able to self care      Code Status: [unfilled]

## 2020-03-24 ENCOUNTER — TELEPHONE (OUTPATIENT)
Dept: SURGICAL ONCOLOGY | Facility: CLINIC | Age: 65
End: 2020-03-24

## 2020-03-24 DIAGNOSIS — Z12.31 VISIT FOR SCREENING MAMMOGRAM: Primary | ICD-10-CM

## 2020-03-24 NOTE — TELEPHONE ENCOUNTER
Patient called and wished to cancel her appt later this week secondary to COVID-19 secondary to her immunocompromised state   She notices no changes on self breast exam  I will order her bilateral 3d screening mammogram for May and I will tentatively plan to see her back following her mammogram or sooner if she appreciates any changes on self-exam

## 2020-04-13 ENCOUNTER — TELEPHONE (OUTPATIENT)
Dept: HEMATOLOGY ONCOLOGY | Facility: CLINIC | Age: 65
End: 2020-04-13

## 2020-04-17 ENCOUNTER — TELEPHONE (OUTPATIENT)
Dept: HEMATOLOGY ONCOLOGY | Facility: CLINIC | Age: 65
End: 2020-04-17

## 2020-04-17 ENCOUNTER — TELEMEDICINE (OUTPATIENT)
Dept: CARDIOLOGY CLINIC | Facility: CLINIC | Age: 65
End: 2020-04-17
Payer: MEDICARE

## 2020-04-17 VITALS
DIASTOLIC BLOOD PRESSURE: 70 MMHG | BODY MASS INDEX: 38.76 KG/M2 | WEIGHT: 227 LBS | HEIGHT: 64 IN | SYSTOLIC BLOOD PRESSURE: 130 MMHG

## 2020-04-17 DIAGNOSIS — I05.9 MITRAL VALVE DISORDER: ICD-10-CM

## 2020-04-17 DIAGNOSIS — I42.2 HYPERTROPHIC CARDIOMYOPATHY (HCC): Primary | ICD-10-CM

## 2020-04-17 DIAGNOSIS — I10 BENIGN ESSENTIAL HYPERTENSION: ICD-10-CM

## 2020-04-17 PROCEDURE — G2012 BRIEF CHECK IN BY MD/QHP: HCPCS | Performed by: INTERNAL MEDICINE

## 2020-04-21 ENCOUNTER — TELEPHONE (OUTPATIENT)
Dept: HEMATOLOGY ONCOLOGY | Facility: MEDICAL CENTER | Age: 65
End: 2020-04-21

## 2020-04-23 ENCOUNTER — HOSPITAL ENCOUNTER (OUTPATIENT)
Dept: INFUSION CENTER | Facility: CLINIC | Age: 65
Discharge: HOME | End: 2020-04-23

## 2020-04-23 ENCOUNTER — TELEMEDICINE (OUTPATIENT)
Dept: HEMATOLOGY ONCOLOGY | Facility: CLINIC | Age: 65
End: 2020-04-23
Payer: MEDICARE

## 2020-04-23 DIAGNOSIS — R76.8 LOW IMMUNOGLOBULIN LEVEL: Primary | ICD-10-CM

## 2020-04-23 PROCEDURE — 99214 OFFICE O/P EST MOD 30 MIN: CPT | Performed by: PHYSICIAN ASSISTANT

## 2020-06-11 ENCOUNTER — OFFICE VISIT (OUTPATIENT)
Dept: HEMATOLOGY ONCOLOGY | Facility: CLINIC | Age: 65
End: 2020-06-11
Payer: MEDICARE

## 2020-06-11 VITALS
HEIGHT: 64 IN | RESPIRATION RATE: 18 BRPM | DIASTOLIC BLOOD PRESSURE: 74 MMHG | BODY MASS INDEX: 38.41 KG/M2 | SYSTOLIC BLOOD PRESSURE: 132 MMHG | OXYGEN SATURATION: 96 % | TEMPERATURE: 99.5 F | HEART RATE: 83 BPM | WEIGHT: 225 LBS

## 2020-06-11 DIAGNOSIS — D80.1 HYPOGAMMAGLOBULINEMIA, ACQUIRED (HCC): ICD-10-CM

## 2020-06-11 DIAGNOSIS — R76.8 LOW IMMUNOGLOBULIN LEVEL: Primary | ICD-10-CM

## 2020-06-11 PROCEDURE — 99214 OFFICE O/P EST MOD 30 MIN: CPT | Performed by: PHYSICIAN ASSISTANT

## 2020-06-25 ENCOUNTER — TRANSCRIBE ORDERS (OUTPATIENT)
Dept: ADMINISTRATIVE | Facility: HOSPITAL | Age: 65
End: 2020-06-25

## 2020-06-25 DIAGNOSIS — Z78.0 POSTMENOPAUSAL STATUS (AGE-RELATED) (NATURAL): Primary | ICD-10-CM

## 2020-07-07 ENCOUNTER — HOSPITAL ENCOUNTER (OUTPATIENT)
Dept: MAMMOGRAPHY | Facility: CLINIC | Age: 65
Discharge: HOME | End: 2020-07-07
Payer: MEDICARE

## 2020-07-07 VITALS — HEIGHT: 64 IN | TEMPERATURE: 96.8 F | BODY MASS INDEX: 37.39 KG/M2 | WEIGHT: 219 LBS

## 2020-07-07 DIAGNOSIS — Z12.31 VISIT FOR SCREENING MAMMOGRAM: ICD-10-CM

## 2020-07-07 PROCEDURE — 77063 BREAST TOMOSYNTHESIS BI: CPT

## 2020-07-07 PROCEDURE — 77067 SCR MAMMO BI INCL CAD: CPT

## 2020-07-09 ENCOUNTER — HOSPITAL ENCOUNTER (OUTPATIENT)
Dept: ULTRASOUND IMAGING | Facility: CLINIC | Age: 65
Discharge: HOME | End: 2020-07-09
Payer: MEDICARE

## 2020-07-09 ENCOUNTER — HOSPITAL ENCOUNTER (OUTPATIENT)
Dept: MAMMOGRAPHY | Facility: CLINIC | Age: 65
Discharge: HOME | End: 2020-07-09
Payer: MEDICARE

## 2020-07-09 VITALS — HEIGHT: 64 IN | BODY MASS INDEX: 37.39 KG/M2 | TEMPERATURE: 97.6 F | WEIGHT: 219 LBS

## 2020-07-09 DIAGNOSIS — R92.8 ABNORMAL MAMMOGRAM: ICD-10-CM

## 2020-07-09 PROCEDURE — 77066 DX MAMMO INCL CAD BI: CPT

## 2020-07-09 PROCEDURE — 76642 ULTRASOUND BREAST LIMITED: CPT

## 2020-07-09 PROCEDURE — G0279 TOMOSYNTHESIS, MAMMO: HCPCS

## 2020-07-09 NOTE — PROGRESS NOTES
Met with patient and Dr Barbara Hill  regarding recommendation for;    _____ RIGHT __X____LEFT      __X___Ultrasound guided (x2) ______Stereotactic breast biopsy  __X___Verbalized understanding  Blood thinners:  _____yes ___X__no    Date stopped: ___N/A____    Biopsy teaching sheet given:  __X___yes ____no    Pt given contact information

## 2020-07-10 ENCOUNTER — TELEPHONE (OUTPATIENT)
Dept: SURGICAL ONCOLOGY | Facility: CLINIC | Age: 65
End: 2020-07-10

## 2020-07-10 NOTE — TELEPHONE ENCOUNTER
Pt called in to get Dr Jimmy Reeves opinion as far as proceeding with left breat biopsy, based on recent abnormal mammogram   Pt states she is BRCA1 positive, and had been considering prophylactic mastectomy, but has not pursued this yet due to multiple comorbidities and high risk for post-op infection  Per Dr Manuelito Brennan, advised pt to proceed with breast biopsy  I have cancelled her f/u appt with Varinder Herbert for now  Pt would like to be called with her biopsy results as soon as they come in, and will set up f/u with either Dr Manuelito Brennan or Castillo Roberts, depending upon those results

## 2020-07-15 ENCOUNTER — HOSPITAL ENCOUNTER (OUTPATIENT)
Dept: ULTRASOUND IMAGING | Facility: CLINIC | Age: 65
Discharge: HOME | End: 2020-07-15
Payer: MEDICARE

## 2020-07-15 ENCOUNTER — HOSPITAL ENCOUNTER (OUTPATIENT)
Dept: MAMMOGRAPHY | Facility: CLINIC | Age: 65
Discharge: HOME | End: 2020-07-15

## 2020-07-15 VITALS — HEART RATE: 70 BPM | TEMPERATURE: 98 F | DIASTOLIC BLOOD PRESSURE: 72 MMHG | SYSTOLIC BLOOD PRESSURE: 118 MMHG

## 2020-07-15 DIAGNOSIS — R92.8 ABNORMAL MAMMOGRAM: ICD-10-CM

## 2020-07-15 PROCEDURE — 19084 BX BREAST ADD LESION US IMAG: CPT

## 2020-07-15 PROCEDURE — 88342 IMHCHEM/IMCYTCHM 1ST ANTB: CPT | Performed by: PATHOLOGY

## 2020-07-15 PROCEDURE — 88305 TISSUE EXAM BY PATHOLOGIST: CPT | Performed by: PATHOLOGY

## 2020-07-15 PROCEDURE — 19083 BX BREAST 1ST LESION US IMAG: CPT

## 2020-07-15 RX ORDER — LIDOCAINE HYDROCHLORIDE 10 MG/ML
5 INJECTION, SOLUTION EPIDURAL; INFILTRATION; INTRACAUDAL; PERINEURAL ONCE
Status: COMPLETED | OUTPATIENT
Start: 2020-07-15 | End: 2020-07-15

## 2020-07-15 RX ADMIN — LIDOCAINE HYDROCHLORIDE 5 ML: 10 INJECTION, SOLUTION EPIDURAL; INFILTRATION; INTRACAUDAL; PERINEURAL at 14:40

## 2020-07-15 RX ADMIN — LIDOCAINE HYDROCHLORIDE 5 ML: 10 INJECTION, SOLUTION EPIDURAL; INFILTRATION; INTRACAUDAL; PERINEURAL at 14:45

## 2020-07-15 NOTE — PROGRESS NOTES
Procedure type:    ___x__ultrasound guided _____stereotactic    Breast:    __x___Left _____Right    Location: 10 oclock 8 cm    Needle: 12g Marquee    # of passes: 3    Clip: Ultraclip Ribbon    Performed by: Dr Liam Hancock held for 5 minutes by: Luz Elena June Strips:    __x___yes _____no    Band aid:    ___x__yes_____no    Tape and guaze:    _____yes _x____no    Tolerated procedure:    __x___yes _____no        Procedure type:    ___x__ultrasound guided _____stereotactic    Breast:    ___x__Left _____Right    Location: 11 oclock 9 cm    Needle: 12g Marquee    # of passes: 2    Clip: Ultraclip Heart    Performed by: Dr Liam Hancock held for 5 minutes by: Luz Elena Bloomi Strips:    __x___yes _____no    Band aid:    ___x__yes_____no    Tape and guaze:    _____yes __x___no    Tolerated procedure:    ___x__yes _____no

## 2020-07-15 NOTE — PROGRESS NOTES
Patient arrived via:    __x___ambulatory with walker    _____wheelchair    _____stretcher      Domestic violence screen    ___x___negative______positive    Breast Implants:    _______yes ___x_____no

## 2020-07-15 NOTE — PROGRESS NOTES
Ice pack given:    ___x__yes _____no    Discharge instructions signed by patient:    ___x__yes _____no    Discharge instructions given to patient:    __x___yes _____no    Discharged via:    __x___amulatory with walker    _____wheelchair    _____stretcher    Stable on discharge:    __x___yes ____no

## 2020-07-15 NOTE — DISCHARGE INSTR - OTHER ORDERS
POST LARGE CORE BREAST BIOPSY PATIENT INFORMATION      1  Place an ice pack inside your bra over the top of the dressing every hour for 20 minutes (20 minutes on, 60 minutes off)  Do this until bedtime  2  Do not shower or bathe until the following morning  3  You may bathe your breast carefully with the steri-strips in place  Be careful    Not to loosen them  The steri-strips will fall off in 3-5 days  4  You may have mild discomfort, and you may have some bruising where the   Needle entered the skin  This should clear within 5-7 days  5  If you need medicine for discomfort, take acetaminophen products such as   Tylenol  You may also take Advil or Motrin products  6  Do not participate in strenuous activities such as-tennis, aerobics, skiing,  Weight lifting, etc  for 24 hours  Refrain from swimming/soaking for 72 hours  7  Wearing a bra for sleeping may be more comfortable for the first 24-48 hours  8  Watch for continued bleeding, pain or fever over 101; please call with any questions or concerns  For procedures done at the [facility]  [address] call:  Ashok Wilcox RN at [phone]  Aleja Eng RN at [phone]                    *After 4 PM call the Interventional Radiology Department                    [phone] and ask to speak with the nurse on call  For procedures done at the [address] [place] [address] call:         Rupali Adler RN at   *After 4 PM call the Interventional Radiology Department   [phone] and ask to speak with the nurse on call  For procedures done at [address] [place] [address] call: The Radiology Nurse at [phone]  *After 4 PM call your physician, or go to the Emergency Department  9          The final results of your biopsy are usually available within one week

## 2020-07-16 NOTE — PROGRESS NOTES
Post procedure call completed    Bleeding: _____yes __x___no    Pain: _____yes ____x__no    Redness/Swelling: ______yes __x____no    Band aid removed: _____yes _x___no    Steri-Strips intact: ___x___yes _____no

## 2020-07-17 ENCOUNTER — TELEPHONE (OUTPATIENT)
Dept: SURGICAL ONCOLOGY | Facility: CLINIC | Age: 65
End: 2020-07-17

## 2020-07-17 NOTE — TELEPHONE ENCOUNTER
Spoke with patient and reviewed pathology from recent left breast biopsy, which was benign  A six month follow up u/s was recommended to reassess a complicated cyst  Patient is overdue for an office visit- appt scheduled 8/13 at 12:30  I will order her repeat imaging at that visit  She states her biopsy site is healing well

## 2020-07-21 ENCOUNTER — HOSPITAL ENCOUNTER (OUTPATIENT)
Dept: RADIOLOGY | Age: 65
Discharge: HOME | End: 2020-07-21
Payer: MEDICARE

## 2020-07-21 DIAGNOSIS — Z78.0 POSTMENOPAUSAL STATUS (AGE-RELATED) (NATURAL): ICD-10-CM

## 2020-07-21 PROCEDURE — 77080 DXA BONE DENSITY AXIAL: CPT

## 2020-07-23 ENCOUNTER — TELEPHONE (OUTPATIENT)
Dept: NEPHROLOGY | Facility: CLINIC | Age: 65
End: 2020-07-23

## 2020-07-23 DIAGNOSIS — M31.31 GRANULOMATOSIS WITH POLYANGIITIS WITH RENAL INVOLVEMENT (HCC): ICD-10-CM

## 2020-07-23 DIAGNOSIS — N18.30 CHRONIC KIDNEY DISEASE, STAGE III (MODERATE) (HCC): Primary | ICD-10-CM

## 2020-07-23 DIAGNOSIS — R76.8 LOW IMMUNOGLOBULIN LEVEL: ICD-10-CM

## 2020-07-23 NOTE — TELEPHONE ENCOUNTER
The patient has an appointment on 7/31/2020 with Dr Alvaro Javier and I left a message for the patient to call back to see if she has had any lab work completed for her upcoming appointment        Glendy Castaneda MA

## 2020-07-27 ENCOUNTER — TELEPHONE (OUTPATIENT)
Dept: INFECTIOUS DISEASES | Facility: CLINIC | Age: 65
End: 2020-07-27

## 2020-07-27 DIAGNOSIS — L03.90 RECURRENT CELLULITIS: ICD-10-CM

## 2020-07-27 LAB
CREAT ?TM UR-SCNC: 45.5 UMOL/L
CREAT ?TM UR-SCNC: 50.3 UMOL/L
EXT PROTEIN URINE: <5

## 2020-07-28 RX ORDER — PENICILLIN V POTASSIUM 250 MG/1
250 TABLET ORAL EVERY 12 HOURS
Qty: 60 TABLET | Refills: 0 | Status: SHIPPED | OUTPATIENT
Start: 2020-07-28 | End: 2020-09-15 | Stop reason: SDUPTHER

## 2020-07-31 ENCOUNTER — OFFICE VISIT (OUTPATIENT)
Dept: NEPHROLOGY | Facility: CLINIC | Age: 65
End: 2020-07-31
Payer: MEDICARE

## 2020-07-31 VITALS
SYSTOLIC BLOOD PRESSURE: 102 MMHG | TEMPERATURE: 98.3 F | DIASTOLIC BLOOD PRESSURE: 64 MMHG | HEIGHT: 64 IN | HEART RATE: 76 BPM | BODY MASS INDEX: 37.22 KG/M2 | WEIGHT: 218 LBS

## 2020-07-31 DIAGNOSIS — M31.30 WEGENERS GRANULOMATOSIS: Chronic | ICD-10-CM

## 2020-07-31 DIAGNOSIS — M31.31 GRANULOMATOSIS WITH POLYANGIITIS WITH RENAL INVOLVEMENT (HCC): Chronic | ICD-10-CM

## 2020-07-31 DIAGNOSIS — R76.8 LOW IMMUNOGLOBULIN LEVEL: ICD-10-CM

## 2020-07-31 DIAGNOSIS — N18.30 CHRONIC KIDNEY DISEASE, STAGE III (MODERATE) (HCC): Primary | ICD-10-CM

## 2020-07-31 PROCEDURE — 99213 OFFICE O/P EST LOW 20 MIN: CPT | Performed by: INTERNAL MEDICINE

## 2020-07-31 RX ORDER — FUROSEMIDE 20 MG/1
20 TABLET ORAL DAILY
Qty: 90 TABLET | Refills: 3 | Status: SHIPPED | OUTPATIENT
Start: 2020-07-31 | End: 2022-05-03 | Stop reason: HOSPADM

## 2020-07-31 NOTE — PROGRESS NOTES
NEPHROLOGY OUTPATIENT PROGRESS NOTE   Ricardo Rincon 77 y o  female MRN: [id number]  Reason for visit:  Chronic kidney disease    ASSESSMENT and PLAN:  1  Chronic kidney disease, stage III, baseline creatinine near 1 0 most recent creatinine 1 07, estimated GFR 54  2  History of Juan granulomatosis, ANCA profile is negative, CRP, sed rate normal  3  Chronic lymphedema, stable, continue with current diuretic dosing    · Overall renal function remains stable  · Volume status unchanged  · No changes in current regimen  · Follow-up in 1 year with repeat labs at that time  SUBJECTIVE / INTERVAL HISTORY:  She has been doing reasonably well  Her lower extremity swelling has been unchanged  Diet has been normal   Denies any chest pain shortness of breath  Denies any nausea vomiting diarrhea  Denies any syncopal episodes  OBJECTIVE:  /64 (BP Location: Left arm, Patient Position: Sitting, Cuff Size: Large)   Pulse 76   Temp 98 3 °F (36 8 °C) (Tympanic)   Ht 5' 4" (1 626 m)   Wt 98 9 kg (218 lb)   BMI 37 42 kg/m²   Vitals:    07/31/20 1145   Weight: 98 9 kg (218 lb)       Physical Exam   Constitutional: No distress  HENT:   Head: Normocephalic and atraumatic  Eyes: No scleral icterus  Neck: Neck supple  Cardiovascular: Normal rate and regular rhythm  Pulmonary/Chest: Effort normal and breath sounds normal    Abdominal: Soft  She exhibits no distension  Musculoskeletal: She exhibits edema (Right greater than left, 2+)  Neurological: She is alert  Skin: Skin is warm and dry  Rash (Slight rash on left hand, improved with steroid cream) noted  Psychiatric: She has a normal mood and affect           Medications:    Current Outpatient Medications:     acetaminophen (TYLENOL) 325 mg tablet, Take 2 tablets by mouth every 6 (six) hours as needed for mild pain, Disp: 30 tablet, Rfl: 0    bisacodyl (DULCOLAX) 5 mg EC tablet, Take 5 mg by mouth daily as needed for constipation, Disp: , Rfl:    Calcium Carbonate-Vit D-Min (CALCIUM 1200 PO), Take 1 tablet by mouth daily , Disp: , Rfl:     DULoxetine (CYMBALTA) 60 mg delayed release capsule, Take 60 mg by mouth daily  , Disp: , Rfl: 0    furosemide (LASIX) 20 mg tablet, Take 1 tablet (20 mg total) by mouth daily, Disp: 90 tablet, Rfl: 3    LORazepam (ATIVAN) 0 5 mg tablet, Take 1 tablet as needed every 8 hours  (Patient taking differently: Take 0 5 mg by mouth 2 (two) times a day Take 1 tablet as needed every 8 hours  ), Disp: 30 tablet, Rfl: 0    metoprolol tartrate (LOPRESSOR) 25 mg tablet, Take 0 5 tablets (12 5 mg total) by mouth every 12 (twelve) hours, Disp: 90 tablet, Rfl: 3    Multiple Vitamins-Minerals (MULTIVITAMIN ADULT PO), Take 1 capsule by mouth daily , Disp: , Rfl:     omeprazole (PriLOSEC) 20 mg delayed release capsule, Take 20 mg by mouth daily  , Disp: , Rfl:     oxyCODONE (OxyCONTIN) 40 mg 12 hr tablet, Take 40 mg by mouth every 12 (twelve) hours, Disp: , Rfl:     OXYCODONE HCL PO, Take 15 mg by mouth 2 (two) times a day , Disp: , Rfl:     penicillin V potassium (VEETID) 250 mg tablet, Take 1 tablet (250 mg total) by mouth every 12 (twelve) hours, Disp: 60 tablet, Rfl: 0    polyethylene glycol (MIRALAX) 17 g packet, Take 17 g by mouth as needed , Disp: , Rfl:     prochlorperazine (COMPAZINE) 10 mg tablet, Take 1 tablet (10 mg total) by mouth every 6 (six) hours as needed for nausea or vomiting, Disp: 30 tablet, Rfl: 0    TOVIAZ 8 MG TB24, Take 8 mg by mouth daily at bedtime , Disp: , Rfl:     PREVIDENT 5000 DRY MOUTH 1 1 % GEL, , Disp: , Rfl:     Laboratory Results:  Results for orders placed or performed during the hospital encounter of 07/15/20   Tissue Exam   Result Value Ref Range    Case Report       Surgical Pathology Report                         Case: [id number]                                   Authorizing Provider:  RENETTA Wang    Collected:           07/15/2020 1444              Ordering Location:     [facility] Breast Received:            07/15/2020 4208                                     Center                                                                       Pathologist:           Raj Hodges MD                                                   Specimens:   A) - Breast, Left, us guided bx, 10 ocl, 8 cm fn, 3 passes, 12 g                                    B) - Breast, Left, us guided bx, 11 ocl, 9 cm fn, 2 passes, 12 g                           Final Diagnosis       A  Breast, Left, us guided bx, 10 ocl, 8 cm fn:  - Benign breast tissue with foci of fibrosis and fat necrosis  See comment   - Negative for atypia and in-situ or invasive carcinoma  B  Breast, Left, us guided bx, 11 ocl, 9 cm fn:  - Benign breast tissue with focus of fibrosis and fat necrosis  See comment   - Negative for atypia and in-situ or invasive carcinoma  Comment: Immunohistochemistry for AE1/3 on block A1 and B1 is negative for epithelium in the areas of fibrosis and fat necrosis  Additional Information       All reported additional testing was performed with appropriately reactive controls  These tests were developed and their performance characteristics determined by [address] [place] [address] [facility]ty Laboratory or appropriate performing facility, though some tests may be performed on tissues which have not been validated for performance characteristics (such as staining performed on alcohol exposed cell blocks and decalcified tissues)  Results should be interpreted with caution and in the context of the patients clinical condition  These tests may not be cleared or approved by the [place]  Food and Drug Administration, though the FDA has determined that such clearance or approval is not necessary  These tests are used for clinical purposes and they should not be regarded as investigational or for research   This laboratory has been approved by CLIA 88, designated as a high-complexity laboratory and is qualified to perform these tests     Interpretation performed at [address] [facility], [address] [place] [address]      Gross Description       A  The specimen is received in formalin, labeled with the patient's name and hospital number, and is designated "ultrasound-guided left breast biopsy at 10:00 o'clock, 8 cm from nipple, 3 passes, 12 gauge  The specimen consists of 3 tan-yellow, focally friable fibrofatty tissue cores ranging from 1 5-1 7 cm in length and ranging from 0 2-0 3 cm in diameter  The specimen is entirely submitted between sponges in 2 cassettes, with 2 cores in cassette A1 and 1 core in cassette C2     B  The specimen is received in formalin, labeled with the patient's name and hospital number, and is designated "ultrasound-guided left breast biopsy at 11:00 o'clock, 9 cm from nipple, 12 gauge, 2 passes  The specimen consists of 2 tan-yellow, focally hemorrhagic fibrofatty tissue cores measuring 1 6 and 2 5 cm in length and ranging from 0 1-0 2 cm in diameter  The specimen is entirely submitted between sponges in 1 cassette  Note: The estimated total formalin fixation time based upon information provided by the submitting clinician and the standard processing schedule is 9 25 hours         Breanne      Clinical Information Hypoechoic lesion

## 2020-07-31 NOTE — LETTER
July 31, 2020     Darrelyn Canavan, DO  [address] [facility]  [address]    Patient: Ricardo Rincon   YOB: 1955   Date of Visit: 7/31/2020       Dear Dr Allen Gottron: Thank you for referring Yolanda Harmon to me for evaluation  Below are the relevant portions of my assessment and plan of care  If you have questions, please do not hesitate to call me  I look forward to following Wilson Jaime along with you  Sincerely,        Phil Grier DO        CC: No Recipients                       ASSESSMENT and PLAN:  1  Chronic kidney disease, stage III, baseline creatinine near 1 0 most recent creatinine 1 07, estimated GFR 54  2  History of Juan granulomatosis, ANCA profile is negative, CRP, sed rate normal  3  Chronic lymphedema, stable, continue with current diuretic dosing    · Overall renal function remains stable  · Volume status unchanged  · No changes in current regimen  · Follow-up in 1 year with repeat labs at that time

## 2020-08-11 ENCOUNTER — TELEPHONE (OUTPATIENT)
Dept: SURGICAL ONCOLOGY | Facility: CLINIC | Age: 65
End: 2020-08-11

## 2020-08-13 ENCOUNTER — OFFICE VISIT (OUTPATIENT)
Dept: SURGICAL ONCOLOGY | Facility: CLINIC | Age: 65
End: 2020-08-13
Payer: MEDICARE

## 2020-08-13 VITALS
HEART RATE: 75 BPM | HEIGHT: 64 IN | TEMPERATURE: 97.8 F | BODY MASS INDEX: 37.22 KG/M2 | RESPIRATION RATE: 18 BRPM | DIASTOLIC BLOOD PRESSURE: 86 MMHG | SYSTOLIC BLOOD PRESSURE: 128 MMHG | WEIGHT: 218 LBS

## 2020-08-13 DIAGNOSIS — Z12.39 ENCOUNTER FOR BREAST CANCER SCREENING OTHER THAN MAMMOGRAM: ICD-10-CM

## 2020-08-13 DIAGNOSIS — N60.02 BREAST CYST, LEFT: ICD-10-CM

## 2020-08-13 DIAGNOSIS — Z15.09 BRCA1 POSITIVE: Primary | ICD-10-CM

## 2020-08-13 DIAGNOSIS — Z91.89 INCREASED RISK OF BREAST CANCER: ICD-10-CM

## 2020-08-13 DIAGNOSIS — Z15.01 BRCA1 POSITIVE: Primary | ICD-10-CM

## 2020-08-13 PROCEDURE — 99213 OFFICE O/P EST LOW 20 MIN: CPT | Performed by: NURSE PRACTITIONER

## 2020-08-13 NOTE — PATIENT INSTRUCTIONS
Breast Self Exam for Women   AMBULATORY CARE:   A breast self-exam (BSE)  is a way to check your breasts for lumps and other changes  Regular BSEs can help you know how your breasts normally look and feel  Most breast lumps or changes are not cancer, but you should always have them checked by a healthcare provider  Why you should do a BSE:  Breast cancer is the most common type of cancer in women  Even if you have mammograms, you may still want to do a BSE regularly  If you know how your breasts normally feel and look, it may help you know when to contact your healthcare provider  Mammograms can miss some cancers  You may find a lump during a BSE that did not show up on your mammogram   When you should do a BSE:  Prudence Ruder your calendar to help you remember to do BSE on a regular schedule  One easy way to remember to do a BSE is to do the exam on the same day of each month  If you have periods, you may want to do your BSE 1 week after your period ends  This is the time when your breasts may be the least swollen, lumpy, or tender  You can do regular BSEs even if you are breastfeeding or have breast implants  Contact your healthcare provider if:   · You find any lumps or changes in your breasts  · You have breast pain or fluid coming from your nipples  · You have questions or concerns about your condition or care  How to do a BSE:   · Look at your breasts in a mirror  Look at the size and shape of each breast and nipple  Check for swelling, lumps, dimpling, scaly skin, or other skin changes  Look for nipple changes, such as a nipple that is painful or beginning to pull inward  Gently squeeze both nipples and check to see if fluid (that is not breast milk) comes out of them  If you find any of these or other breast changes, contact your healthcare provider  Check your breasts while you sit or  the following 3 positions:    [facility] your arms down at your sides      ¨ Raise your hands and join them behind your head  ¨ Put firm pressure with your hands on your hips  Bend slightly forward while you look at your breasts in the mirror  · Lie down and feel your breasts  When you lie down, your breast tissue spreads out evenly over your chest  This makes it easier for you to feel for lumps and anything that may not be normal for your breasts  Do a BSE on one breast at a time  ¨ Place a small pillow or towel under your left shoulder  Put your left arm behind your head  ¨ Use the 3 middle fingers of your right hand  Use your fingertip pads, on the top of your fingers  Your fingertip pad is the most sensitive part of your finger  ¨ Use small circles to feel your breast tissue  Use your fingertip pads to make dime-sized, overlapping circles on your breast and armpits  Use light, medium, and firm pressure  First, press lightly  Second, press with medium pressure to feel a little deeper into the breast  Last, use firm pressure to feel deep within your breast     ¨ Examine your entire breast area  Examine the breast area from above the breast to below the breast where you feel only ribs  Make small circles with your fingertips, starting in the middle of your armpit  Make circles going up and down the breast area  Continue toward your breast and all the way across it  Examine the area from your armpit all the way over to the middle of your chest (breastbone)  Stop at the middle of your chest     ¨ Move the pillow or towel to your right shoulder, and put your right arm behind your head  Use the 3 fingertip pads of your left hand, and repeat the above steps to do a BSE on your right breast        What else you can do to check for breast problems or cancer:  Some experts suggest that women 36years of age or older should have a mammogram every year  Other experts suggest that women between the ages of 48and 76years old should have a mammogram every 2 years   Talk to your healthcare provider about when you should have a mammogram   Follow up with your healthcare provider as directed: Your healthcare provider can watch you and tell you if you are doing your BSE correctly  Write down your questions so you remember to ask them during your visits  © 2017 [employer] Information is for End User's use only and may not be sold, redistributed or otherwise used for commercial purposes  All illustrations and images included in CareNotes® are the copyrighted property of A D A M , Inc  or Melvin Espinosa  The above information is an  only  It is not intended as medical advice for individual conditions or treatments  Talk to your doctor, nurse or pharmacist before following any medical regimen to see if it is safe and effective for you

## 2020-08-13 NOTE — PROGRESS NOTES
Surgical Oncology Follow Up       [address]  [facility]  [address] [place] [address]    Selina Strong  1955  [id number]      Chief Complaint   Patient presents with    Follow-up     after biopsy        Assessment/Plan:  1  BRCA1 positive  - US breast screening bilateral complete (ABUS); Future  - 6 mo f/u visit with Dr Lottie Murphy    2  Increased risk of breast cancer  - US breast screening bilateral complete (ABUS); Future    3  Encounter for breast cancer screening other than mammogram  - US breast screening bilateral complete (ABUS); Future    4  Breast cyst, left  - US breast left limited (diagnostic); Future       Discussion/Summary: Patient is a 57-year-old female who presents today for an overdue 6 month follow-up visit for an increased risk of breast cancer due to BRCA1 genetic mutation  She has a personal history of ovarian cancer  She had a bilateral 3d screening mammogram and bilateral breast ultrasound on July 9, 2020 which revealed no suspicious right breast findings but revealed 2 masses in the left breast as well as a complicated cyst   She underwent biopsies of the masses and the pathology was benign  A six-month follow-up left breast ultrasound was recommended to ensure stability of the complicated cyst   She has no new complaints today and there are no concerns on today's exam   I will order a left breast targeted ultrasound in 6 months and also an automated breast screening ultrasound secondary to her increased risk  Patient is unable to tolerate breast MRIs  We will plan to see her back at that time or sooner should the need arise  She was instructed to call with any new concerns or symptoms prior to her next visit  All of her questions were answered today      History of Present Illness:     -Interval History:  Patient presents today for follow-up visit for an increased risk of breast cancer secondary to a BRCA 1 genetic mutation  She had a bilateral screening mammogram which revealed 3 abnormalities in the left breast   She underwent ultrasound-guided biopsy of 2 of the masses  The 3rd area was a complicated cyst   Her pathology revealed benign breast tissue, fibrosis and fat necrosis  This was concordant  A six-month follow-up ultrasound was recommended to ensure stability of the complicated cyst   The patient notices no changes on her self breast exam   Her ecchymosis is resolving  Review of Systems:  Review of Systems   Constitutional: Negative for activity change, appetite change, chills, fatigue, fever and unexpected weight change  HENT: Negative for trouble swallowing  Eyes: Negative for pain, redness and visual disturbance  Respiratory: Negative for cough, shortness of breath and wheezing  Cardiovascular: Negative for chest pain, palpitations and leg swelling  Gastrointestinal: Negative for abdominal pain, constipation, diarrhea, nausea and vomiting  Endocrine: Negative for cold intolerance and heat intolerance  Musculoskeletal: Positive for gait problem  Negative for arthralgias, back pain and myalgias  Skin: Negative for color change and rash  Neurological: Negative for dizziness, syncope, light-headedness, numbness and headaches  Hematological: Negative for adenopathy  Psychiatric/Behavioral: Negative for agitation and confusion  All other systems reviewed and are negative        Patient Active Problem List   Diagnosis    Wegeners granulomatosis ([facility] [facility]    Charcot's joint    Anxiety    Benign essential hypertension    BRCA1 positive    Cancer of ovary ([facility] [facility]    Chronic kidney disease, stage III (moderate) (HCC)    Chronic pain disorder    Class 2 obesity    Depression    Dyslipidemia    GERD (gastroesophageal reflux disease)    Lymphedema    Opiate analgesic use agreement exists    Ovarian cancer (HCC)    Post-traumatic osteoarthritis    Increased frequency of urination    Other complicated headache syndrome    Nausea & vomiting    SIRS (systemic inflammatory response syndrome) (HCC)    Increased risk of breast cancer    CAP (community acquired pneumonia)    Elevated troponin    Mitral valve disorder    Postnasal drip    Dyspnea on exertion    LVH (left ventricular hypertrophy)    Low immunoglobulin level    Hypertrophic cardiomyopathy (HCC)    Acute respiratory failure with hypoxia (HCC)    Hypogammaglobulinemia, acquired ([facility] [address] )     Past Medical History:   Diagnosis Date    BRCA1 positive     Cancer ([facility] [address] )     ovarian Ca with chemo    History of chemotherapy     ovarian cancer     History of pulmonary embolus (PE) & DVT 2007    post-op FAMILIA/ omenectomy    Lymphedema     MRSA (methicillin resistant Staphylococcus aureus) 2017    nasal swab negative 4/3/19    Ovarian cancer ([facility] [address] )     CYST REMOVED RIGHT OVARY IN   HYSTERECTOMY 07       Past Surgical History:   Procedure Laterality Date    APPENDECTOMY      laparoscopic    BILATERAL SALPINGOOPHORECTOMY  2007    BREAST BIOPSY Right 2016     SECTION      HYSTERECTOMY  2007    [facility]     OTHER SURGICAL HISTORY      right lower extremity due to trauma    AK TREAT TIBIAL SHAFT FX, INTRAMED IMPLANT Right 2017    Procedure: INSERTION NAIL IM TIBIA;  Surgeon: Juan Farmer MD;  Location: [facility];  Service: Orthopedics    US GUIDANCE BREAST BIOPSY LEFT EACH ADDITIONAL Left 7/15/2020    US GUIDED BREAST BIOPSY LEFT COMPLETE Left 7/15/2020    US GUIDED BREAST BIOPSY RIGHT COMPLETE Right 2016     Family History   Problem Relation Age of Onset    Breast cancer Mother         28    Pancreatic cancer Mother     Ovarian cancer Maternal Grandmother     Breast cancer Maternal Aunt 39    No Known Problems Paternal Aunt     No Known Problems Father     No Known Problems Maternal Grandfather     No Known Problems Paternal Grandmother     No Known Problems Paternal Grandfather     Lung cancer Half-Sister      Social History     Socioeconomic History    Marital status: /Civil Union     Spouse name: Not on file    Number of children: Not on file    Years of education: Not on file    Highest education level: Not on file   Occupational History    Not on file   Social Needs    Financial resource strain: Not on file    Food insecurity     Worry: Not on file     Inability: Not on file   [employer] needs     Medical: Not on file     Non-medical: Not on file   Tobacco Use    Smoking status: Never Smoker    Smokeless tobacco: Never Used   Substance and Sexual Activity    Alcohol use: Yes     Frequency: Monthly or less    Drug use: Yes     Types: Hydromorphone, Oxycodone    Sexual activity: Yes     Partners: Male     Birth control/protection: None   Lifestyle    Physical activity     Days per week: Not on file     Minutes per session: Not on file    Stress: Not on file   Relationships    Social connections     Talks on phone: Not on file     Gets together: Not on file     Attends Jew service: Not on file     Active member of club or organization: Not on file     Attends meetings of clubs or organizations: Not on file     Relationship status: Not on file    Intimate partner violence     Fear of current or ex partner: Not on file     Emotionally abused: Not on file     Physically abused: Not on file     Forced sexual activity: Not on file   Other Topics Concern    Not on file   Social History Narrative    Not on file       Current Outpatient Medications:     acetaminophen (TYLENOL) 325 mg tablet, Take 2 tablets by mouth every 6 (six) hours as needed for mild pain, Disp: 30 tablet, Rfl: 0    bisacodyl (DULCOLAX) 5 mg EC tablet, Take 5 mg by mouth daily as needed for constipation, Disp: , Rfl:     Calcium Carbonate-Vit D-Min (CALCIUM 1200 PO), Take 1 tablet by mouth daily , Disp: , Rfl:    DULoxetine (CYMBALTA) 60 mg delayed release capsule, Take 60 mg by mouth daily  , Disp: , Rfl: 0    furosemide (LASIX) 20 mg tablet, Take 1 tablet (20 mg total) by mouth daily, Disp: 90 tablet, Rfl: 3    LORazepam (ATIVAN) 0 5 mg tablet, Take 1 tablet as needed every 8 hours  (Patient taking differently: Take 0 5 mg by mouth 2 (two) times a day Take 1 tablet as needed every 8 hours  ), Disp: 30 tablet, Rfl: 0    metoprolol tartrate (LOPRESSOR) 25 mg tablet, Take 0 5 tablets (12 5 mg total) by mouth every 12 (twelve) hours, Disp: 90 tablet, Rfl: 3    Multiple Vitamins-Minerals (MULTIVITAMIN ADULT PO), Take 1 capsule by mouth daily , Disp: , Rfl:     omeprazole (PriLOSEC) 20 mg delayed release capsule, Take 20 mg by mouth daily  , Disp: , Rfl:     oxyCODONE (OxyCONTIN) 40 mg 12 hr tablet, Take 40 mg by mouth every 12 (twelve) hours, Disp: , Rfl:     OXYCODONE HCL PO, Take 15 mg by mouth 2 (two) times a day , Disp: , Rfl:     penicillin V potassium (VEETID) 250 mg tablet, Take 1 tablet (250 mg total) by mouth every 12 (twelve) hours, Disp: 60 tablet, Rfl: 0    polyethylene glycol (MIRALAX) 17 g packet, Take 17 g by mouth as needed , Disp: , Rfl:     PREVIDENT 5000 DRY MOUTH 1 1 % GEL, , Disp: , Rfl:     prochlorperazine (COMPAZINE) 10 mg tablet, Take 1 tablet (10 mg total) by mouth every 6 (six) hours as needed for nausea or vomiting, Disp: 30 tablet, Rfl: 0    TOVIAZ 8 MG TB24, Take 8 mg by mouth daily at bedtime , Disp: , Rfl:   Allergies   Allergen Reactions    Iodinated Diagnostic Agents Shortness Of Breath    Omnipaque [Iohexol] Shortness Of Breath    Other Shortness Of Breath     IVP dye, x ray dye 3    Iodides      Other reaction(s): SWELLING, SOB    Methotrexate Nausea Only     Headache and nausea    Methotrexate Derivatives Headache     Vitals:    08/13/20 1247   BP: 128/86   Pulse: 75   Resp: 18   Temp: 97 8 °F (36 6 °C)       Physical Exam  Vitals signs reviewed     Constitutional: General: She is not in acute distress  Appearance: Normal appearance  She is well-developed  She is not diaphoretic  HENT:      Head: Normocephalic and atraumatic  Neck:      Musculoskeletal: Normal range of motion  Cardiovascular:      Rate and Rhythm: Normal rate and regular rhythm  Heart sounds: Normal heart sounds  Pulmonary:      Effort: Pulmonary effort is normal       Breath sounds: Normal breath sounds  Chest:      Breasts:         Right: No swelling, bleeding, inverted nipple, mass, nipple discharge, skin change or tenderness  Left: Skin change (Resolving ecchymosis at biopsy sites) present  No swelling, bleeding, inverted nipple, mass, nipple discharge or tenderness  Abdominal:      Palpations: Abdomen is soft  There is no mass  Tenderness: There is no abdominal tenderness  Musculoskeletal: Normal range of motion  Comments: Walks with walker   Lymphadenopathy:      Upper Body:      Right upper body: No supraclavicular or axillary adenopathy  Left upper body: No supraclavicular or axillary adenopathy  Comments: Lower extremity lymphedema   Skin:     General: Skin is warm and dry  Findings: No rash  Neurological:      Mental Status: She is alert and oriented to person, place, and time  Psychiatric:         Speech: Speech normal        Results:    Imaging  Us Guidance Breast Biopsy Left Each Additional, Us Guided Breast Biopsy Left Complete, Mammo Post Biopsy Left    Addendum Date: 7/17/2020 Addendum:   ADDENDUM: PATHOLOGY RESULTS: 3) Mass Benign finding: the pathology findings indicate benign breast tissue, fibrosis, and fat necrosis  The pathology result is concordant with imaging  4) Mass Benign finding: the pathology findings indicate benign breast tissue, fibrosis, and fat necrosis  RECOMMENDATION:      - Ultrasound in 6 months for the left breast as per prior diagnostic report   END ADDENDUM    Result Date: 7/15/2020  Narrative: DIAGNOSIS: Abnormal mammogram INDICATION: Amador Del Toro is a 77 y o  female presenting for ultrasound-guided core biopsy x2 of the left breast  FINDINGS: LEFT 3) MASS US guided breast biopsy left complete: Images of the left breast mass at 10 o'clock in the middle portion, 8 cm from the nipple were obtained  The patient was placed supine on the biopsy table  A core needle biopsy of a 3 mm x 5 mm mass was performed under ultrasound guidance  The skin was prepped in the usual fashion  Anesthesia was administered using lidocaine 1%  A 12 G device was advanced in multiple passes  A ribbon clip was inserted into the target area  Post-placement ultrasound and digital mammographic imaging demonstrate the clip was at the site  The patient tolerated the procedure well  There were no immediate complications  4) MASS US guided breast biopsy left complete: Images of the left breast mass at 11 o'clock, 9 cm from the nipple were obtained  The patient was placed supine on the biopsy table  A core needle biopsy of a 3 mm x 3 mm mass was performed under ultrasound guidance  The skin was prepped in the usual fashion  Anesthesia was administered using lidocaine 1%  A 12 G device was advanced in multiple passes  Using the device, 2 samples were collected  A clip was inserted into the target area  (heart) Post-placement ultrasound and digital mammographic imaging demonstrate the clip was at the site  Impression:  Successful ultrasound-guided core biopsy x 2 of the left breast RECOMMENDATION:      - Waiting for pathology for the left breast  Workstation ID: [id number]      I reviewed the above imaging data  Advance Care Planning/Advance Directives:  Discussed disease status and treatment goals with the patient

## 2020-08-14 ENCOUNTER — OFFICE VISIT (OUTPATIENT)
Dept: CARDIOLOGY CLINIC | Facility: CLINIC | Age: 65
End: 2020-08-14
Payer: MEDICARE

## 2020-08-14 VITALS
TEMPERATURE: 97.3 F | SYSTOLIC BLOOD PRESSURE: 108 MMHG | BODY MASS INDEX: 38.41 KG/M2 | DIASTOLIC BLOOD PRESSURE: 60 MMHG | HEART RATE: 74 BPM | WEIGHT: 225 LBS | OXYGEN SATURATION: 96 % | HEIGHT: 64 IN

## 2020-08-14 DIAGNOSIS — I10 BENIGN ESSENTIAL HYPERTENSION: ICD-10-CM

## 2020-08-14 DIAGNOSIS — I42.2 HYPERTROPHIC CARDIOMYOPATHY (HCC): Primary | ICD-10-CM

## 2020-08-14 PROCEDURE — 99215 OFFICE O/P EST HI 40 MIN: CPT | Performed by: INTERNAL MEDICINE

## 2020-08-14 NOTE — PROGRESS NOTES
[facility] 3 Follow Up Visit - Cardiology   Nic Metzger 77 y o  female MRN: [id number]  Unit/Bed#:  Encounter: [id number]      Active problem list:    Patient Active Problem List    Diagnosis Date Noted    Hypogammaglobulinemia, acquired ([facility] ) 12/27/2019     Priority: Low    Acute respiratory failure with hypoxia ([facility] ) 11/07/2019     Priority: Low    Hypertrophic cardiomyopathy ([facility] ) 08/27/2019     Priority: Low    Low immunoglobulin level 06/07/2019     Priority: Low    LVH (left ventricular hypertrophy) 05/30/2019     Priority: Low    Postnasal drip 05/07/2019     Priority: Low    Dyspnea on exertion 05/07/2019     Priority: Low    Mitral valve disorder      Priority: Low    CAP (community acquired pneumonia) 04/03/2019     Priority: Low    Elevated troponin 04/03/2019     Priority: Low    Increased risk of breast cancer 11/15/2018     Priority: Low    Other complicated headache syndrome 07/11/2018     Priority: Low    Nausea & vomiting 07/11/2018     Priority: Low    SIRS (systemic inflammatory response syndrome) ([facility] ) 07/11/2018     Priority: Low    Post-traumatic osteoarthritis 05/14/2018     Priority: Low    Opiate analgesic use agreement exists 12/13/2016     Priority: Low    BRCA1 positive 06/21/2016     Priority: Low    Charcot's joint 01/20/2016     Priority: Low    Class 2 obesity 01/20/2016     Priority: Low    Wegeners granulomatosis ([facility] ) 01/15/2016     Priority: Low    Cancer of ovary ([facility] ) 04/09/2015     Priority: Low    Ovarian cancer ([facility] ) 04/09/2015     Priority: Low    Increased frequency of urination 08/22/2014     Priority: Low    Chronic kidney disease, stage III (moderate) (HCC) 03/26/2014     Priority: Low    Chronic pain disorder 10/24/2013     Priority: Low    GERD (gastroesophageal reflux disease) 09/27/2013     Priority: Low    Dyslipidemia 04/04/2013     Priority: Low    Lymphedema 11/15/2012     Priority: Low    Anxiety 06/05/2012     Priority: Low    Benign essential hypertension 06/05/2012     Priority: Low    Depression 06/05/2012     Priority: Low       Plan: Ms Christian Saini is currently asymptomatic and quite limited in her scope of physical activity due to her knee problem  She has not had any recent infection or need for immunoglobulin administration  She denies chest pain, shortness of breath, dizziness, syncope or palpitation  She has had severe lymphedema of her lower extremities  She does use a small dose of furosemide at times when she notices worsening swelling  Her last echocardiogram was performed in November 2019  Today, I made no change to her medications and asked her to have blood work to check kidney function and electrolytes levels due to chronic use of diuretic  Physician Requesting David Scott DO  Reason for Consult / Sudarshan Fish     HPI: Ms Boogie Singletary is a 77year old woman with past medical history of hypertension, ovarian cancer (2011, now in remission), Wegener's granulomatosis, low immunoglobulin status with recurrent sepsis, Charcot's knee and dyslipidemia who was recently admitted to hospital with sepsis and had non-coronary related troponin release  The sepsis was related to multifocal pneumonia for which she was treated with intravenous antibiotics  As part of the work up of troponin elevation she had a transthoracic echocardiogram (4-3-2019) that showed asymmetric left ventricular hypertrophy, NAOH and resting left ventricular outflow tract obstruction (gradient 64 mmHg)  She was treated with metoprolol and has remained asymptomatic since hospital discharge       On 8-: Ms Christian Saini has phenotypically mild hypertrophic obstructive cardiomyopathy and is currently asymptomatic on her limited degree of mobility due to her skeletal problem   She has no personal significant risk factor for sudden cardiac death and the circumstances of the death of her son is far from certain to make a case for primary prevention ICD particularly at her age and with the heightened risk of device infection  She does drink water liberally during the day and then uses low dose furosemide for diuresis  She is tolerating the metoprolol well  I do not believe she would benefit from extensive risk stratification and due to her asymptomatic status feel that her current treatment schedule is effective and adequate  Her blood pressure is well controlled and her kidney function has recovered from the acute injury sustained during recent sepsis  I have instructed her to inform me of any new heart symptoms and would otherwise see her for fullow up in a year time     On 2020: García Ring has done very well from the cardiovascular standpoint  She is currently free of cardiac symptoms albeit at a suboptimal activity level due to self isolation and social distancing  She does get to walk her dog for a short distance on a daily basis  She denies shortness of breath, chest pain, palpitation, dizziness or syncope  She has lost considerable weight that has helped her symptoms significantly (down to 227 lbs from a high of 270 lbs)  Her blood pressure has been well controlled with the highest value of 130/70 mmHg  She received immunoglobulin infusions in 2 sessions in January and February and is due for laboratory work on   She is compliant with her medications and reports no side effects  An echocardiogram performed on 2019 showed normal LV systolic function, mild LVOT obstruction and no significant mitral regurgitation  Family history: Mother  at age 77 from breast and pancreatic cancer, father  at age 80 without known heart disease, she has a 1/2 and a 1/2 brother from the father's side  Her sister is [de-identified]year old and has been told to have heart problem (?Ebstein's) and the brother who is 66year old is not known to have any cardiac problem   Her son  at age 28 at home possibly related to alcoholism although the circumstances were not well understood and the death certificate indicated atherosclerosis as the cause of death  She has another son who is 22years old and has finished medical school at [employer] and has started an orthopedic residency at [facility] [facility] [employer] in [place] (as part of [employer]) has no health related issue      Review of Systems: Denies shortness of breath, chest pain, palpitation, dizziness or syncope  Historical Information   Past Medical History:   Diagnosis Date    BRCA1 positive     Cancer [facility] )     ovarian Ca with chemo    History of chemotherapy     ovarian cancer     History of pulmonary embolus (PE) & DVT 2007    post-op FAMILIA/ omenectomy    Lymphedema     MRSA (methicillin resistant Staphylococcus aureus) 2017    nasal swab negative 4/3/19    Ovarian cancer ([facility] )     CYST REMOVED RIGHT OVARY IN   HYSTERECTOMY 07       Past Surgical History:   Procedure Laterality Date    APPENDECTOMY      laparoscopic    BILATERAL SALPINGOOPHORECTOMY  2007    BREAST BIOPSY Right 2016     SECTION      HYSTERECTOMY  2007    [facility]     OTHER SURGICAL HISTORY      right lower extremity due to trauma    OK TREAT TIBIAL SHAFT FX, INTRAMED IMPLANT Right 2017    Procedure: INSERTION NAIL IM TIBIA;  Surgeon: Jose Welch MD;  [facility]: [facility];  Service: Orthopedics    US GUIDANCE BREAST BIOPSY LEFT EACH ADDITIONAL Left 7/15/2020    US GUIDED BREAST BIOPSY LEFT COMPLETE Left 7/15/2020    US GUIDED BREAST BIOPSY RIGHT COMPLETE Right 2016     Social History     Substance and Sexual Activity   Alcohol Use Yes    Frequency: Monthly or less     Social History     Substance and Sexual Activity   Drug Use Yes    Types: Hydromorphone, Oxycodone     Social History     Tobacco Use   Smoking Status Never Smoker   Smokeless Tobacco Never Used     Meds/Allergies   all current active meds have been reviewed  Allergies Allergen Reactions    Iodinated Diagnostic Agents Shortness Of Breath    Omnipaque [Iohexol] Shortness Of Breath    Other Shortness Of Breath     IVP dye, x ray dye 3    Iodides      Other reaction(s): SWELLING, SOB    Methotrexate Nausea Only     Headache and nausea    Methotrexate Derivatives Headache       Objective   Vitals: Blood pressure 108/60, pulse 74, temperature (!) 97 3 °F (36 3 °C), height 5' 4" (1 626 m), weight 102 kg (225 lb), SpO2 96 %  , Body mass index is 38 62 kg/m² ,  Body mass index is 38 62 kg/m²  Physical Exam:  GEN: Mary Carmen Purdy appears well, alert and oriented x 3, pleasant and cooperative   HEENT: pupils equal, round, and reactive to light; extraocular muscles intact  NECK: supple, no carotid bruits   HEART: regular rhythm, normal S1 and S2, 2/6 systolic murmur at the base, clicks, gallops or rubs   LUNGS: clear to auscultation bilaterally; no wheezes, rales, or rhonchi   ABDOMEN: protuberant, normal bowel sounds, soft, no tenderness, no distention  EXTREMITIES: Marked lymphedema both lower extremities (wrapped in special bandage), peripheral pulses could not be felt, Marked deformity of right lower extremity   NEURO: no focal findings   SKIN: normal without suspicious lesions on exposed skin    Lab Results:   No visits with results within 1 Day(s) from this visit  Latest known visit with results is:   Orders Only on 07/27/2020   Component Date Value    PROTEIN UA 07/27/2020 <5 0     EXT Creatinine Urine 07/27/2020 45 5     EXTERNAL Ur Prot/Creat R* 07/27/2020         Imaging: I have personally reviewed pertinent reports  Dxa Bone Density Spine Hip And Pelvis    Result Date: 7/21/2020  Narrative: DXA SCAN CLINICAL HISTORY:  78-year-old postmenopausal female  OTHER RISK FACTORS:  History of fracture resulting from minor injury  Omeprazole therapy for reflux  SSRI therapy  Furosemide therapy for hypertension   PHARMACOLOGIC THERAPY FOR OSTEOPOROSIS:  None TECHNIQUE: Bone densitometry was performed using a Hologic Horizon A bone densitometer  Regions of interest appear properly placed  COMPARISON: 2/6/2018  RESULTS: LUMBAR SPINE: Not assessed because  generalized spondylosis, degenerative sclerosis and osteophytosis results in fewer than two evaluable vertebrae  Cammie Pretty LEFT  TOTAL HIP: BMD 0 857  gm/cm2 / T-score -0 7 / Z score 0 7 LEFT  FEMORAL NECK: BMD 0 632  gm/cm2 / T score -2 0 / Z score -0 3 RIGHT  FOREARM:  33% RADIUS BMD  0 633  gm/cm2 / T-score -0 9 / Z score 1 0 CHANGE: Since the last DXA: HIP BMD has DECREASED  0 035 gm/cm2 or 3 9%  This change IS statistically significant  FOREARM BMD has  not been previously measured  Impression: 1  Low bone mass (osteopenia)  [Based on the left femoral neck] 2  Since a DXA study from 2/6/2018,  there has been a [address] in bone mineral density  in the left hip  3   The 10 year risk of hip fracture is 2 4% with the 10 year risk of major osteoporotic fracture being 16% as calculated by the [facility]/WHO fracture risk assessment tool (FRAX)  4   The current NOF guidelines recommend treating patients with a T-score of -2 5 or less in the lumbar spine or hips, or in post-menopausal women and men over the age of 48 with low bone mass (osteopenia) and a FRAX 10 year risk score of >3% for hip fracture and/or >20% for major osteoporotic fracture  5   The NOF recommends follow-up DXA in 1-2 years after initiating therapy for osteoporosis and every 2 years thereafter  More frequent evaluation is appropriate for patients with conditions associated with rapid bone loss, such as glucocorticoid therapy  The interval between DXA screenings may be longer for individuals without major risk factors and initial T-score in the normal or upper low bone mass range   The FRAX algorithm has certain limitations: -FRAX has not been validated in patients currently or previously treated with pharmacotherapy for osteoporosis  In such patients, clinical judgment must be exercised in interpreting FRAX scores  -Prior hip, vertebral and humeral fragility fractures appear to confer greater risk of subsequent fracture than fractures at other sites (this is especially true for individuals with severe vertebral fractures), but quantification of this incremental risk is not possible with FRAX  -FRAX underestimates fracture risk in patients with history of multiple fragility fractures  -FRAX may underestimate fracture risk in patients with history of frequent falls  -It is not appropriate to use FRAX to monitor treatment response  WHO CLASSIFICATION: Normal (a T-score of -1 0 or higher) Low bone mineral density (a T-score of less than -1 0 but higher than -2 5) Osteoporosis (a T-score of -2 5 or less) Severe osteoporosis (a T-score of -2 5 or less with a fragility fracture) LEAST SIGNIFICANT CHANGE (AT 95% C  I): Lumbar spine 0 014 g/cm2; 1 6% Total hip: 0 020 g/cm2; 2 4% Forearm: 0 013 g/cm2; 2 6%  Workstation performed: [id number]     Us Guidance Breast Biopsy Left Each Additional, Us Guided Breast Biopsy Left Complete, Mammo Post Biopsy Left    Addendum Date: 7/17/2020 Addendum:   ADDENDUM: PATHOLOGY RESULTS: 3) Mass Benign finding: the pathology findings indicate benign breast tissue, fibrosis, and fat necrosis  The pathology result is concordant with imaging  4) Mass Benign finding: the pathology findings indicate benign breast tissue, fibrosis, and fat necrosis  RECOMMENDATION:      - Ultrasound in 6 months for the left breast as per prior diagnostic report  END ADDENDUM    Result Date: 7/15/2020  Narrative: DIAGNOSIS: Abnormal mammogram INDICATION: Vidhi Hi is a 77 y o  female presenting for ultrasound-guided core biopsy x2 of the left breast  FINDINGS: LEFT 3) MASS US guided breast biopsy left complete: Images of the left breast mass at 10 o'clock in the middle portion, 8 cm from the nipple were obtained   The patient was placed supine on the biopsy table  A core needle biopsy of a 3 mm x 5 mm mass was performed under ultrasound guidance  The skin was prepped in the usual fashion  Anesthesia was administered using lidocaine 1%  A 12 G device was advanced in multiple passes  A ribbon clip was inserted into the target area  Post-placement ultrasound and digital mammographic imaging demonstrate the clip was at the site  The patient tolerated the procedure well  There were no immediate complications  4) MASS US guided breast biopsy left complete: Images of the left breast mass at 11 o'clock, 9 cm from the nipple were obtained  The patient was placed supine on the biopsy table  A core needle biopsy of a 3 mm x 3 mm mass was performed under ultrasound guidance  The skin was prepped in the usual fashion  Anesthesia was administered using lidocaine 1%  A 12 G device was advanced in multiple passes  Using the device, 2 samples were collected  A clip was inserted into the target area  (heart) Post-placement ultrasound and digital mammographic imaging demonstrate the clip was at the site  Impression:  Successful ultrasound-guided core biopsy x 2 of the left breast RECOMMENDATION:      - Waiting for pathology for the left breast  Workstation ID: [id number]    Counseling / Coordination of Care  Total floor / unit time spent today 40 minutes  Greater than 50% of total time was spent with the patient and / or family counseling and / or coordination of care

## 2020-08-18 ENCOUNTER — TELEPHONE (OUTPATIENT)
Dept: INFECTIOUS DISEASES | Facility: CLINIC | Age: 65
End: 2020-08-18

## 2020-08-19 ENCOUNTER — OFFICE VISIT (OUTPATIENT)
Dept: INFECTIOUS DISEASES | Facility: CLINIC | Age: 65
End: 2020-08-19
Payer: MEDICARE

## 2020-08-19 VITALS — TEMPERATURE: 97.1 F | HEART RATE: 72 BPM | DIASTOLIC BLOOD PRESSURE: 68 MMHG | SYSTOLIC BLOOD PRESSURE: 124 MMHG

## 2020-08-19 DIAGNOSIS — L03.90 RECURRENT CELLULITIS: Primary | ICD-10-CM

## 2020-08-19 DIAGNOSIS — I89.0 LYMPHEDEMA: ICD-10-CM

## 2020-08-19 DIAGNOSIS — R78.81 BACTEREMIA: ICD-10-CM

## 2020-08-19 PROCEDURE — 99214 OFFICE O/P EST MOD 30 MIN: CPT | Performed by: INTERNAL MEDICINE

## 2020-08-19 NOTE — PROGRESS NOTES
Progress Note - Infectious Disease   Nisha Parra 77 y o  female MRN: [id number]  Unit/Bed#:  Encounter: [id number]      Impression/Recommendations:  1  Recurrent leg cellulitis, on a background of lymphedema  Patient completed antibiotic course last month  She is currently on Pen VK suppression  Thus far, she has not had a recurrent cellulitis episode since last admission in November of last year  Will have patient continue suppression for follow months, then stop  If cellulitis recurs off suppression, patient would then need to be on lifelong suppression  Continue Pen VK suppression through 12/31/2020  At that time, she can stop it  Monitor for recurrent cellulitis afterwards  If cellulitis recurs after stopping suppression, patient will need to be on Pen VK suppression lifelong      2  Lymphedema  Continue to emphasized to patient the need to control leg edema with both compression stockings and leg elevation  Continue compression stockings and leg elevation      3  Recent GBS bacteremia with sepsis, secondary to leg cellulitis  Patient completed antibiotic course  At present, there are no clinical signs of active infection      Discussed with patient in detail regarding the above plan  Follow-up with us p r n        Antibiotics:  Pen VK suppression     Subjective:  Patient has history of lymphedema and recurrent leg cellulitis  She was last admitted at [facility] in November with right leg cellulitis and secondary GBS bacteremia  Patient completed antibiotic course and was transition to Pen VK suppression      Patient is doing well  Since discharge, she has not had another episode of cellulitis  She is tolerating Pen VK well    No nausea, vomiting or diarrhea  The following portions of the patient's history were reviewed and updated as appropriate: allergies, current medications, past medical history, past social history, past surgical history and problem list    ROS:  A complete review of systems was done  Except for what is noted above, the rest of the review of systems was negative  Objective:  Vitals:  Temperature: (!) 97 1 °F (36 2 °C)    Physical Exam:     General: Awake, alert, cooperative, no distress  Neck:  Supple  No lymphadenopathy  No mass  Lungs: Expansion symmetric, no rales, no wheezing, respirations unlabored  Heart:  Regular rate and rhythm, S1 and S2 normal, no murmur  Abdomen: Soft, nondistended, non-tender, bowel sounds active all four quadrants,        no masses, no organomegaly  Extremities: Stable leg edema  No erythema/warmth  No draining ulcer  Nontender to palpation  Skin:  No rash  Neuro: Moves all extremities  Invasive Devices     None                 Labs studies:   I have personally reviewed pertinent labs  Imaging Studies:   I have personally reviewed pertinent imaging study reports and images in PACS  EKG, Pathology, and Other Studies:   I have personally reviewed pertinent reports

## 2020-09-09 ENCOUNTER — TELEPHONE (OUTPATIENT)
Dept: HEMATOLOGY ONCOLOGY | Facility: CLINIC | Age: 65
End: 2020-09-09

## 2020-09-10 ENCOUNTER — OFFICE VISIT (OUTPATIENT)
Dept: HEMATOLOGY ONCOLOGY | Facility: CLINIC | Age: 65
End: 2020-09-10
Payer: MEDICARE

## 2020-09-10 VITALS
RESPIRATION RATE: 18 BRPM | HEIGHT: 64 IN | BODY MASS INDEX: 38.99 KG/M2 | HEART RATE: 71 BPM | SYSTOLIC BLOOD PRESSURE: 110 MMHG | DIASTOLIC BLOOD PRESSURE: 80 MMHG | OXYGEN SATURATION: 97 % | TEMPERATURE: 98.7 F | WEIGHT: 228.4 LBS

## 2020-09-10 DIAGNOSIS — Z15.01 BRCA1 POSITIVE: Primary | ICD-10-CM

## 2020-09-10 DIAGNOSIS — R76.8 LOW IMMUNOGLOBULIN LEVEL: ICD-10-CM

## 2020-09-10 DIAGNOSIS — Z15.09 BRCA1 POSITIVE: Primary | ICD-10-CM

## 2020-09-10 DIAGNOSIS — D63.8 ANEMIA OF CHRONIC DISEASE: ICD-10-CM

## 2020-09-10 PROCEDURE — 99214 OFFICE O/P EST MOD 30 MIN: CPT | Performed by: INTERNAL MEDICINE

## 2020-09-10 NOTE — PROGRESS NOTES
Hematology Outpatient Follow - Up Note  Lizzeth Mccarty 77 y o  female MRN: @ Encounter: [id number]        Date:  9/10/2020        Assessment/ Plan:    Severe hypogammaglobinemia with subsequent multiple infections required multiple admissions to the hospital at least 4 times in 2019 with sepsis, cellulitis, pneumonia, IgG level was less than 200, she was given IVIG 30 g every month initiated on 01/2020 IgG level improved to the range of 500, 2nd dose was given in February and then every other month in June 2020 IgG level was 433 with free of infections and admissions to the hospital, she had headache, nausea with IVIG requiring Zofran, and hydrocortisone    We decided at this time to continue watchful observation and follow-up IgG level every 2 months if it is less than 400 will administer IVIG 30 g    BRCA 1 mutation with ovarian cancer status post bilateral oophorectomy and hysterectomy in 2007 followed by chemotherapy also mammogram and MRI of the breasts followed by Dr Baldo Warner granulomatosis diagnosed in 02/2010 treated with rituximab for 4 years with good response and subsequent hypogammaglobinemia            HPI: Ephraim Hi is a 61-year-old  female seen initially 12/27/2019 regarding hypogammaglobulinemia  [facility] has multiple medical problems including history of ovarian cancer  [facility] is s/p exploratory laparotomy /BSO January 2007   She is s/p  hysterectomy, omentumectomy, lymph node biopsy 2/14/2007 at [facility]   Complicated by DVT and PE 3/25/2007   IVC filter was placed  Treated with Lovenox x 1 year  [facility] received post op chemotherapy        She was was found to have BRCA1 mutation   She follows with Dr Sona Gonzalez with surgical oncology     She has history of Juan's granulomatosis diagnosed 11/2010   She was on steroids through 2011 and then she received rituximab for almost 4 years per Dr Shanna Treadwell  [facility] had subsequent pneumonia, pneumonitis, sinusitis, cellulitis of the lower extremity requiring multiple admissions to the hospital and antibiotics     The last admission to the hospital was on 11/14/2019 with severe sepsis, leukocytosis, encephalopathy     On 11/22/2019, IgG 152, IgA 36, IgM 72     IgG 189 7/2016 at [facility]   IgG <200 through 11/2019      She was initiated on IVIG 30 g every month received the 1st dose January 31, 2020, IgG level went up from 189 to 547 2/22/20  She reported profound headaches after the 1st treatment  Hydrocortisone 100mg given as premedication with cycle #2 due to prolonged HA post treatment #1  For nausea she had benefit from Compazine, Zofran was added to premedication prior to IVIG     Interval History:  No changes from the last visit        Previous Treatment:         Test Results:    Imaging: No results found  Labs:   Lab Results   Component Value Date    WBC 9 53 11/13/2019    HGB 10 9 (L) 11/13/2019    HCT 35 2 11/13/2019    MCV 99 (H) 11/13/2019     11/13/2019     Lab Results   Component Value Date     03/22/2015    K 3 8 11/12/2019     (H) 11/12/2019    CO2 22 11/12/2019    ANIONGAP 7 03/22/2015    BUN 11 11/12/2019    CREATININE 0 73 11/12/2019    GLUCOSE 88 03/22/2015    CALCIUM 8 6 11/12/2019    AST 41 11/11/2019    ALT 28 11/11/2019    ALKPHOS 205 (H) 11/11/2019    PROT 7 3 05/15/2015    BILITOT 0 39 05/15/2015    EGFR 86 11/12/2019       No results found for: IRON, TIBC, FERRITIN    No results found for: [id number]      ROS: Review of Systems   Constitutional: Negative for appetite change, chills, diaphoresis, fatigue and unexpected weight change  HENT:   Negative for mouth sores, nosebleeds, sore throat, trouble swallowing and voice change  Eyes: Negative for eye problems and icterus  Respiratory: Negative for chest tightness, cough, hemoptysis and wheezing  Cardiovascular: Negative for chest pain, leg swelling and palpitations     Gastrointestinal: Negative for abdominal distention, abdominal pain, blood in stool, constipation, diarrhea, nausea and vomiting  Endocrine: Negative for hot flashes  Genitourinary: Negative for bladder incontinence, difficulty urinating, dyspareunia, dysuria and frequency  Musculoskeletal: Positive for arthralgias and back pain  Negative for gait problem, neck pain and neck stiffness  Skin: Negative for itching and rash  Neurological: Negative for dizziness, gait problem, headaches, numbness, seizures and speech difficulty  Hematological: Negative for adenopathy  Does not bruise/bleed easily  Psychiatric/Behavioral: Negative for decreased concentration, depression, sleep disturbance and suicidal ideas  The patient is nervous/anxious  Current Medications: Reviewed  Allergies: Reviewed  PMH/FH/SH:  Reviewed      Physical Exam:    Body surface area is 2 07 meters squared  Wt Readings from Last 3 Encounters:   09/10/20 104 kg (228 lb 6 4 oz)   08/14/20 102 kg (225 lb)   08/13/20 98 9 kg (218 lb)        Temp Readings from Last 3 Encounters:   09/10/20 98 7 °F (37 1 °C) (Tympanic)   08/19/20 (!) 97 1 °F (36 2 °C)   08/14/20 (!) 97 3 °F (36 3 °C)        BP Readings from Last 3 Encounters:   09/10/20 110/80   08/19/20 124/68   08/14/20 108/60         Pulse Readings from Last 3 Encounters:   09/10/20 71   08/19/20 72   08/14/20 74        Physical Exam  Vitals signs reviewed  Constitutional:       General: She is not in acute distress  Appearance: She is well-developed  She is not diaphoretic  Comments: Walker for ambulation   HENT:      Head: Normocephalic and atraumatic  Eyes:      Conjunctiva/sclera: Conjunctivae normal    Neck:      Musculoskeletal: Normal range of motion and neck supple  Trachea: No tracheal deviation  Cardiovascular:      Rate and Rhythm: Normal rate and regular rhythm  Heart sounds: No murmur  No friction rub  No gallop  Pulmonary:      Effort: Pulmonary effort is normal  No respiratory distress        Breath sounds: Normal breath sounds  No wheezing or rales  Chest:      Chest wall: No tenderness  Abdominal:      General: There is no distension  Palpations: Abdomen is soft  Tenderness: There is no abdominal tenderness  Musculoskeletal:      Right lower leg: Edema present  Left lower leg: Edema present  Lymphadenopathy:      Cervical: No cervical adenopathy  Skin:     General: Skin is warm and dry  Coloration: Skin is not pale  Findings: No erythema  Neurological:      Mental Status: She is alert and oriented to person, place, and time  Psychiatric:         Behavior: Behavior normal          Thought Content: Thought content normal          Judgment: Judgment normal          ECO    Goals and Barriers:  Current Goal: Minimize effects of disease  Barriers: None  Patient's Capacity to Self Care:  Patient is able to self care      Code Status: [unfilled]

## 2020-09-14 LAB — CREAT ?TM UR-SCNC: 17.6 UMOL/L

## 2020-09-15 DIAGNOSIS — L03.90 RECURRENT CELLULITIS: ICD-10-CM

## 2020-09-16 RX ORDER — PENICILLIN V POTASSIUM 250 MG/1
250 TABLET ORAL EVERY 12 HOURS
Qty: 60 TABLET | Refills: 3 | Status: SHIPPED | OUTPATIENT
Start: 2020-09-16 | End: 2021-07-09 | Stop reason: HOSPADM

## 2020-10-14 ENCOUNTER — TELEPHONE (OUTPATIENT)
Dept: HEMATOLOGY ONCOLOGY | Facility: CLINIC | Age: 65
End: 2020-10-14

## 2020-10-14 RX ORDER — ACETAMINOPHEN 325 MG/1
650 TABLET ORAL ONCE
Status: CANCELLED | OUTPATIENT
Start: 2020-10-21

## 2020-10-14 RX ORDER — DIPHENHYDRAMINE HCL 25 MG
50 TABLET ORAL ONCE
Status: CANCELLED | OUTPATIENT
Start: 2020-10-21

## 2020-10-14 RX ORDER — SODIUM CHLORIDE 9 MG/ML
20 INJECTION, SOLUTION INTRAVENOUS ONCE
Status: CANCELLED | OUTPATIENT
Start: 2020-10-21

## 2020-10-20 ENCOUNTER — TELEPHONE (OUTPATIENT)
Dept: HEMATOLOGY ONCOLOGY | Facility: CLINIC | Age: 65
End: 2020-10-20

## 2020-10-21 ENCOUNTER — HOSPITAL ENCOUNTER (OUTPATIENT)
Dept: INFUSION CENTER | Facility: HOSPITAL | Age: 65
Discharge: HOME | End: 2020-10-21
Attending: INTERNAL MEDICINE
Payer: MEDICARE

## 2020-10-21 ENCOUNTER — OFFICE VISIT (OUTPATIENT)
Dept: HEMATOLOGY ONCOLOGY | Facility: CLINIC | Age: 65
End: 2020-10-21
Payer: MEDICARE

## 2020-10-21 VITALS
SYSTOLIC BLOOD PRESSURE: 115 MMHG | TEMPERATURE: 97.8 F | HEIGHT: 64 IN | RESPIRATION RATE: 17 BRPM | DIASTOLIC BLOOD PRESSURE: 80 MMHG | HEART RATE: 73 BPM | BODY MASS INDEX: 38.93 KG/M2 | WEIGHT: 228 LBS | OXYGEN SATURATION: 98 %

## 2020-10-21 VITALS
DIASTOLIC BLOOD PRESSURE: 80 MMHG | SYSTOLIC BLOOD PRESSURE: 143 MMHG | WEIGHT: 213.63 LBS | BODY MASS INDEX: 36.67 KG/M2 | RESPIRATION RATE: 20 BRPM | HEART RATE: 76 BPM | TEMPERATURE: 96.5 F

## 2020-10-21 DIAGNOSIS — D80.1 HYPOGAMMAGLOBULINEMIA, ACQUIRED (HCC): Primary | ICD-10-CM

## 2020-10-21 DIAGNOSIS — R11.0 NAUSEA: Primary | ICD-10-CM

## 2020-10-21 DIAGNOSIS — R11.2 NAUSEA AND VOMITING, INTRACTABILITY OF VOMITING NOT SPECIFIED, UNSPECIFIED VOMITING TYPE: ICD-10-CM

## 2020-10-21 DIAGNOSIS — D80.1 HYPOGAMMAGLOBULINEMIA (HCC): ICD-10-CM

## 2020-10-21 PROCEDURE — 99214 OFFICE O/P EST MOD 30 MIN: CPT | Performed by: PHYSICIAN ASSISTANT

## 2020-10-21 PROCEDURE — 96367 TX/PROPH/DG ADDL SEQ IV INF: CPT

## 2020-10-21 PROCEDURE — 96375 TX/PRO/DX INJ NEW DRUG ADDON: CPT

## 2020-10-21 PROCEDURE — 96366 THER/PROPH/DIAG IV INF ADDON: CPT

## 2020-10-21 PROCEDURE — 96365 THER/PROPH/DIAG IV INF INIT: CPT

## 2020-10-21 RX ORDER — ACETAMINOPHEN 325 MG/1
650 TABLET ORAL ONCE
Status: CANCELLED | OUTPATIENT
Start: 2020-10-22

## 2020-10-21 RX ORDER — DIPHENHYDRAMINE HCL 25 MG
50 TABLET ORAL ONCE
Status: COMPLETED | OUTPATIENT
Start: 2020-10-21 | End: 2020-10-21

## 2020-10-21 RX ORDER — SODIUM CHLORIDE 9 MG/ML
20 INJECTION, SOLUTION INTRAVENOUS ONCE
Status: COMPLETED | OUTPATIENT
Start: 2020-10-21 | End: 2020-10-21

## 2020-10-21 RX ORDER — ONDANSETRON 4 MG/1
4 TABLET, FILM COATED ORAL EVERY 8 HOURS PRN
Qty: 20 TABLET | Refills: 0 | Status: SHIPPED | OUTPATIENT
Start: 2020-10-21 | End: 2022-05-03 | Stop reason: HOSPADM

## 2020-10-21 RX ORDER — ACETAMINOPHEN 325 MG/1
650 TABLET ORAL ONCE
Status: COMPLETED | OUTPATIENT
Start: 2020-10-21 | End: 2020-10-21

## 2020-10-21 RX ORDER — SODIUM CHLORIDE 9 MG/ML
20 INJECTION, SOLUTION INTRAVENOUS ONCE
Status: CANCELLED | OUTPATIENT
Start: 2020-10-22

## 2020-10-21 RX ORDER — DIPHENHYDRAMINE HCL 25 MG
50 TABLET ORAL ONCE
Status: CANCELLED | OUTPATIENT
Start: 2020-10-22

## 2020-10-21 RX ADMIN — ONDANSETRON 8 MG: 2 SOLUTION INTRAMUSCULAR; INTRAVENOUS at 13:58

## 2020-10-21 RX ADMIN — ACETAMINOPHEN 650 MG: 325 TABLET, FILM COATED ORAL at 14:00

## 2020-10-21 RX ADMIN — Medication 30 G: at 14:29

## 2020-10-21 RX ADMIN — DIPHENHYDRAMINE HCL 50 MG: 25 TABLET ORAL at 14:00

## 2020-10-21 RX ADMIN — SODIUM CHLORIDE 20 ML/HR: 0.9 INJECTION, SOLUTION INTRAVENOUS at 13:58

## 2020-10-21 RX ADMIN — HYDROCORTISONE SODIUM SUCCINATE 100 MG: 100 INJECTION, POWDER, FOR SOLUTION INTRAMUSCULAR; INTRAVENOUS at 14:00

## 2020-10-26 ENCOUNTER — TELEPHONE (OUTPATIENT)
Dept: HEMATOLOGY ONCOLOGY | Facility: CLINIC | Age: 65
End: 2020-10-26

## 2020-10-29 ENCOUNTER — TELEPHONE (OUTPATIENT)
Dept: HEMATOLOGY ONCOLOGY | Facility: CLINIC | Age: 65
End: 2020-10-29

## 2020-11-24 ENCOUNTER — TELEPHONE (OUTPATIENT)
Dept: HEMATOLOGY ONCOLOGY | Facility: CLINIC | Age: 65
End: 2020-11-24

## 2020-12-08 ENCOUNTER — TELEPHONE (OUTPATIENT)
Dept: HEMATOLOGY ONCOLOGY | Facility: CLINIC | Age: 65
End: 2020-12-08

## 2020-12-11 ENCOUNTER — TELEPHONE (OUTPATIENT)
Dept: HEMATOLOGY ONCOLOGY | Facility: CLINIC | Age: 65
End: 2020-12-11

## 2020-12-11 DIAGNOSIS — D80.1 HYPOGAMMAGLOBULINEMIA, ACQUIRED (HCC): Primary | ICD-10-CM

## 2020-12-11 RX ORDER — SODIUM CHLORIDE 9 MG/ML
20 INJECTION, SOLUTION INTRAVENOUS ONCE
OUTPATIENT
Start: 2021-01-13

## 2020-12-11 RX ORDER — ACETAMINOPHEN 325 MG/1
650 TABLET ORAL ONCE
OUTPATIENT
Start: 2021-01-13

## 2020-12-11 RX ORDER — DIPHENHYDRAMINE HCL 25 MG
50 TABLET ORAL ONCE
OUTPATIENT
Start: 2021-01-13

## 2020-12-28 ENCOUNTER — TELEPHONE (OUTPATIENT)
Dept: HEMATOLOGY ONCOLOGY | Facility: CLINIC | Age: 65
End: 2020-12-28

## 2021-01-08 ENCOUNTER — HOSPITAL ENCOUNTER (OUTPATIENT)
Dept: ULTRASOUND IMAGING | Facility: CLINIC | Age: 66
Discharge: HOME | End: 2021-01-08
Payer: MEDICARE

## 2021-01-08 VITALS — HEIGHT: 64 IN | WEIGHT: 213 LBS | BODY MASS INDEX: 36.37 KG/M2

## 2021-01-08 DIAGNOSIS — N60.02 BREAST CYST, LEFT: ICD-10-CM

## 2021-01-08 DIAGNOSIS — Z15.01 BRCA1 POSITIVE: ICD-10-CM

## 2021-01-08 DIAGNOSIS — Z15.09 BRCA1 POSITIVE: ICD-10-CM

## 2021-01-08 DIAGNOSIS — Z12.39 ENCOUNTER FOR BREAST CANCER SCREENING OTHER THAN MAMMOGRAM: ICD-10-CM

## 2021-01-08 DIAGNOSIS — Z91.89 INCREASED RISK OF BREAST CANCER: ICD-10-CM

## 2021-01-08 PROCEDURE — 76641 ULTRASOUND BREAST COMPLETE: CPT

## 2021-01-08 PROCEDURE — 76377 3D RENDER W/INTRP POSTPROCES: CPT

## 2021-01-08 PROCEDURE — 76642 ULTRASOUND BREAST LIMITED: CPT

## 2021-02-01 ENCOUNTER — TELEPHONE (OUTPATIENT)
Dept: HEMATOLOGY ONCOLOGY | Facility: MEDICAL CENTER | Age: 66
End: 2021-02-01

## 2021-02-01 NOTE — TELEPHONE ENCOUNTER
Inbound Calls to Reschedule/Cancel Appointments   Patient Details:  Jaclyn Coats  1955  [id number]     Who is calling? Patient    Date of original appointment 02/02/2021   Visit Type Follow up    Who is the Provider and Location? Dr Pauly Poe    Is the patient on active treatment? Chemo? Radiation? No    The patient would like to cancel, reschedule or make into a virtual appointment? Patient would like virtual  - patient has an android    Reason for rescheduling or cancelation? Due to the weather [phone]   Next Steps:    HopeLine: After completing the above information, please route to the appropriate Care Team for review  Care Team: Please review and reach out to the patient if you have any changes

## 2021-02-02 ENCOUNTER — TELEMEDICINE (OUTPATIENT)
Dept: HEMATOLOGY ONCOLOGY | Facility: CLINIC | Age: 66
End: 2021-02-02
Payer: MEDICARE

## 2021-02-02 DIAGNOSIS — D80.1 HYPOGAMMAGLOBULINEMIA, ACQUIRED (HCC): Primary | ICD-10-CM

## 2021-02-02 PROCEDURE — 99442 PR PHYS/QHP TELEPHONE EVALUATION 11-20 MIN: CPT | Performed by: INTERNAL MEDICINE

## 2021-02-02 NOTE — PROGRESS NOTES
Virtual Brief Visit    Assessment/Plan:  Severe hypogammaglobinemia with subsequent multiple infections required multiple admissions to the hospital at least 4 times in 2019 with sepsis, cellulitis, pneumonia, IgG level was less than 200  She was given IVIG 30 g x 2 doses January and February 2020  Level improved to the range of 500  She has been free of infections and admissions to the hospital  She had headache, nausea with IVIG requiring Zofran, and hydrocortisone    She received IVIG on October 2020 with Benadryl, Zofran, hydrocortisone    Now with level in the range of 400, no need for IVIG, the patient is asymptomatic     BRCA1 mutation with ovarian cancer status post bilateral oophorectomy and hysterectomy in 2007 followed by chemotherapy, she follows up with Dr Vianney Frost for mammograms    Wegenr's granulomatosis diagnosed in 2010 she was treated with rituximab for 4 years with subsequent hypogammaglobinemia  Problem List Items Addressed This Visit        Other    Hypogammaglobulinemia, acquired ([facility] [address] ) - Primary    Relevant Orders    CBC and differential    IgG                Reason for visit is   Chief Complaint   Patient presents with    Virtual Brief Visit        Encounter provider Jann Mondragon MD    Provider located at [address] [address] [phone]    Recent Visits  No visits were found meeting these conditions  Showing recent visits within past 7 days and meeting all other requirements     Today's Visits  Date Type Provider Dept   02/02/21 Telemedicine Darlin Tamayo 90 today's visits and meeting all other requirements     Future Appointments  No visits were found meeting these conditions     Showing future appointments within next 150 days and meeting all other requirements        After connecting through telephone, the patient was identified by name and date of birth  Jayne Aj was informed that this is a telemedicine visit and that the visit is being conducted through telephone  My office door was closed  No one else was in the room  She acknowledged consent and understanding of privacy and security of the platform  The patient has agreed to participate and understands she can discontinue the visit at any time  Patient is aware this is a billable service  Subjective    Jayne Aj is a 77 y o  female  With hypogammaglobinemia  HPI   Marcela Whiting is a 76-year-old  female seen initially 12/27/2019 regarding hypogammaglobulinemia  He[facility]ly Po has multiple medical problems including history of ovarian cancer  [facility] Po is s/p exploratory laparotomy /BSO January 2007   She is s/p  hysterectomy, omentumectomy, lymph node biopsy 2/14/2007 at [facility]   Complicated by DVT and PE 3/25/2007  Eletha Germain filter was placed  Treated with Lovenox x 1 year  Heavenly Po received post op chemotherapy        She was was found to have BRCA1 mutation   She follows with Dr Glenn Cerna with surgical oncology     She has history of Juan's granulomatosis diagnosed 11/2010  She was on steroids through 2011 and then she received rituximab for almost 4 years per Dr Ana Hernandez  [facility] Po had subsequent pneumonia, pneumonitis, sinusitis, cellulitis of the lower extremity requiring multiple admissions to the hospital and antibiotics     The last admission to the hospital was on 11/14/2019 with severe sepsis, leukocytosis, encephalopathy     On 11/22/2019, IgG 152, IgA 36, IgM 72     IgG 189 7/2016 at [facility]   IgG <200 through 11/2019      She was initiated on IVIG 30 g every month received the 1st dose January 31, 2020, IgG level went up from 189 to 547 2/22/20  She reported profound headaches after the 1st treatment    Hydrocortisone 100mg given as premedication with cycle #2 due to prolonged HA post treatment #1   For nausea she had benefit from Compazine, Zofran was added to premedication prior to IVIG      She only received the 2 doses  - 2020 and 20: IgG 375   10/20/20: IgG 304  IgM 48; IgA 30  Normal CBCD  She received IVIG with Zofran and premedication with Benadryl and hydrocortisone  IgG level on 2021 of 453, normal CBC and differential      Past Medical History:   Diagnosis Date    BRCA1 positive     Cancer ([facility] [address] )     ovarian Ca with chemo    History of chemotherapy     ovarian cancer     History of pulmonary embolus (PE) & DVT 2007    post-op FAMILIA/ omenectomy    Lymphedema     MRSA (methicillin resistant Staphylococcus aureus) 2017    nasal swab negative 4/3/19    Ovarian cancer ([facility] [address] )     CYST REMOVED RIGHT OVARY IN   HYSTERECTOMY 07         Past Surgical History:   Procedure Laterality Date    APPENDECTOMY      laparoscopic    BILATERAL SALPINGOOPHORECTOMY  2007    BREAST BIOPSY Right 2016     SECTION      HYSTERECTOMY  2007    O[facility]ectomy and [facility] at [facility]     OTHER SURGICAL HISTORY      right lower extremity due to trauma    NY TREAT TIBIAL SHAFT FX, INTRAMED IMPLANT Right 2017    Procedure: INSERTION NAIL IM TIBIA;  Surgeon: Mann Mueller MD;  Location: [facility];  Service: Orthopedics    US GUIDANCE BREAST BIOPSY LEFT EACH ADDITIONAL Left 7/15/2020    US GUIDED BREAST BIOPSY LEFT COMPLETE Left 7/15/2020    US GUIDED BREAST BIOPSY RIGHT COMPLETE Right 2016       Current Outpatient Medications   Medication Sig Dispense Refill    acetaminophen (TYLENOL) 325 mg tablet Take 2 tablets by mouth every 6 (six) hours as needed for mild pain 30 tablet 0    bisacodyl (DULCOLAX) 5 mg EC tablet Take 5 mg by mouth daily as needed for constipation      Calcium Carbonate-Vit D-Min (CALCIUM 1200 PO) Take 3 tablets by mouth daily       DULoxetine (CYMBALTA) 60 mg delayed release capsule Take 60 mg by mouth daily    0    furosemide (LASIX) 20 mg tablet Take 1 tablet (20 mg total) by mouth daily 90 tablet 3    LORazepam (ATIVAN) 0 5 mg tablet Take 1 tablet as needed every 8 hours  (Patient taking differently: Take 0 5 mg by mouth 2 (two) times a day Take 1 tablet as needed every 8 hours  ) 30 tablet 0    metoprolol tartrate (LOPRESSOR) 25 mg tablet Take 0 5 tablets (12 5 mg total) by mouth every 12 (twelve) hours 90 tablet 3    Multiple Vitamins-Minerals (MULTIVITAMIN ADULT PO) Take 1 capsule by mouth daily       omeprazole (PriLOSEC) 20 mg delayed release capsule Take 20 mg by mouth daily   ondansetron (ZOFRAN) 4 mg tablet Take 1 tablet (4 mg total) by mouth every 8 (eight) hours as needed for nausea or vomiting 20 tablet 0    oxyCODONE (OxyCONTIN) 40 mg 12 hr tablet Take 40 mg by mouth every 12 (twelve) hours      OXYCODONE HCL PO Take 15 mg by mouth 2 (two) times a day as needed       penicillin V potassium (VEETID) 250 mg tablet Take 1 tablet (250 mg total) by mouth every 12 (twelve) hours 60 tablet 3    polyethylene glycol (MIRALAX) 17 g packet Take 17 g by mouth as needed       PREVIDENT 5000 DRY MOUTH 1 1 % GEL       prochlorperazine (COMPAZINE) 10 mg tablet Take 1 tablet (10 mg total) by mouth every 6 (six) hours as needed for nausea or vomiting 30 tablet 0    TOVIAZ 8 MG TB24 Take 8 mg by mouth daily at bedtime        No current facility-administered medications for this visit  Allergies   Allergen Reactions    Iodinated Diagnostic Agents Shortness Of Breath    Omnipaque [Iohexol] Shortness Of Breath    Other Shortness Of Breath     IVP dye, x ray dye 3    Iodides      Other reaction(s): SWELLING, SOB    Methotrexate Nausea Only     Headache and nausea    Methotrexate Derivatives Headache       Review of Systems   Constitutional: Negative for chills and fever  HENT: Negative for ear pain and sore throat  Eyes: Negative for pain and visual disturbance  Respiratory: Negative for cough and shortness of breath      Cardiovascular: Negative for chest pain and palpitations  Gastrointestinal: Negative for abdominal pain and vomiting  Genitourinary: Negative for dysuria and hematuria  Musculoskeletal: Negative for arthralgias and back pain  Skin: Negative for color change and rash  Neurological: Negative for seizures and syncope  All other systems reviewed and are negative  There were no vitals filed for this visit  I spent 12 minutes directly with the patient during this visit    VIRTUAL VISIT [id number] eRagan Anton acknowledges that she has consented to an online visit or consultation  She understands that the online visit is based solely on information provided by her, and that, in the absence of a face-to-face physical evaluation by the physician, the diagnosis she receives is both limited and provisional in terms of accuracy and completeness  This is not intended to replace a full medical face-to-face evaluation by the physician  Mk Saucedo understands and accepts these terms

## 2021-03-09 ENCOUNTER — TELEPHONE (OUTPATIENT)
Dept: SURGICAL ONCOLOGY | Facility: CLINIC | Age: 66
End: 2021-03-09

## 2021-03-10 DIAGNOSIS — Z23 ENCOUNTER FOR IMMUNIZATION: ICD-10-CM

## 2021-03-12 ENCOUNTER — OFFICE VISIT (OUTPATIENT)
Dept: SURGICAL ONCOLOGY | Facility: CLINIC | Age: 66
End: 2021-03-12
Payer: MEDICARE

## 2021-03-12 VITALS
SYSTOLIC BLOOD PRESSURE: 130 MMHG | RESPIRATION RATE: 20 BRPM | DIASTOLIC BLOOD PRESSURE: 82 MMHG | HEART RATE: 93 BPM | WEIGHT: 228 LBS | HEIGHT: 64 IN | BODY MASS INDEX: 38.93 KG/M2 | TEMPERATURE: 98.7 F

## 2021-03-12 DIAGNOSIS — Z15.09 BRCA1 POSITIVE: Primary | ICD-10-CM

## 2021-03-12 DIAGNOSIS — Z15.01 BRCA1 POSITIVE: Primary | ICD-10-CM

## 2021-03-12 DIAGNOSIS — Z12.31 ENCOUNTER FOR SCREENING MAMMOGRAM FOR MALIGNANT NEOPLASM OF BREAST: ICD-10-CM

## 2021-03-12 DIAGNOSIS — Z91.89 INCREASED RISK OF BREAST CANCER: ICD-10-CM

## 2021-03-12 DIAGNOSIS — Z12.31 SCREENING MAMMOGRAM FOR HIGH-RISK PATIENT: ICD-10-CM

## 2021-03-12 PROCEDURE — 99213 OFFICE O/P EST LOW 20 MIN: CPT | Performed by: SURGERY

## 2021-03-12 NOTE — PROGRESS NOTES
Surgical Oncology Follow Up       [address] [facility] SURGICAL ONCOLOGY [place]  [address] [facility] [place] [address]    Jayne Aj  1955  [id number]  [address] [facility] SURGICAL ONCOLOGY [place]  [address] [facility] [address]    Diagnoses and all orders for this visit:    BRCA1 positive  -     Mammo screening bilateral w 3d & cad; Future    Increased risk of breast cancer  -     Mammo screening bilateral w 3d & cad; Future    Screening mammogram for high-risk patient    Encounter for screening mammogram for malignant neoplasm of breast   -     Mammo screening bilateral w 3d & cad; Future        Chief Complaint   Patient presents with    Follow-up       Return in about 6 months (around 9/12/2021) for Office Visit, Imaging - See orders, with Charisse Vanessa  Oncology History    No history exists  Diagnosis and Staging: BRCA1 positive   Personal History of ovarian cancer  Family history of breast and ovarian cancer     Treatment History: FAMILIA BSO and cytoreductive surgery for ovarian cancer  Chemotherapy   Current Therapy:    Disease Status: MANUELITO     History of Present Illness:   Patient returns in follow-up of her ultrasound  The bilateral breast ultrasound from January 8, 2021 was BI-RADS 2  I personally reviewed the films  She notices no changes on self exam     Review of Systems  Complete ROS Surg Onc:   Complete ROS Surg Onc:   Constitutional: The patient denies new or recent history of general fatigue, no recent weight loss, no change in appetite  Eyes: No complaints of visual problems, no scleral icterus  ENT: no complaints of ear pain, no hoarseness, no difficulty swallowing,  no tinnitus and no new masses in head, oral cavity, or neck  Cardiovascular: No complaints of chest pain, no palpitations, no ankle edema  Respiratory: No complaints of shortness of breath, no cough     Gastrointestinal: No complaints of jaundice, no bloody stools, no pale stools  Genitourinary: No complaints of dysuria, no hematuria, no nocturia, no frequent urination, no urethral discharge  Musculoskeletal: No complaints of weakness, paralysis, joint stiffness or arthralgias  Integumentary: No complaints of rash, no new lesions  Neurological: No complaints of convulsions, no seizures, no dizziness  Hematologic/Lymphatic: No complaints of easy bruising  Endocrine:  No hot or cold intolerance  No polydipsia, polyphagia, or polyuria  Allergy/immunology:  No environmental allergies  No food allergies  Not immunocompromised  Skin:  No pallor or rash  No wound          Patient Active Problem List   Diagnosis    Wegeners granulomatosis ([facility] )    Charcot's joint    Anxiety    Benign essential hypertension    BRCA1 positive    Cancer of ovary (HCC)    Chronic kidney disease, stage III (moderate) (HCC)    Chronic pain disorder    Class 2 obesity    Depression    Dyslipidemia    GERD (gastroesophageal reflux disease)    Lymphedema    Opiate analgesic use agreement exists    Ovarian cancer (HCC)    Post-traumatic osteoarthritis    Increased frequency of urination    Other complicated headache syndrome    Nausea & vomiting    SIRS (systemic inflammatory response syndrome) (HCC)    Increased risk of breast cancer    CAP (community acquired pneumonia)    Elevated troponin    Mitral valve disorder    Postnasal drip    Dyspnea on exertion    LVH (left ventricular hypertrophy)    Low immunoglobulin level    Hypertrophic cardiomyopathy (HCC)    Acute respiratory failure with hypoxia (HCC)    Hypogammaglobulinemia, acquired ([facility] )     Past Medical History:   Diagnosis Date    BRCA1 positive     Cancer ([facility] )     ovarian Ca with chemo    History of chemotherapy     ovarian cancer 2007    History of pulmonary embolus (PE) & DVT 03/2007    post-op FAMILIA/ omenectomy    Lymphedema     MRSA (methicillin resistant Staphylococcus aureus) 2017    nasal swab negative 4/3/19    Ovarian cancer ([facility] )     CYST REMOVED RIGHT OVARY IN   HYSTERECTOMY 07       Past Surgical History:   Procedure Laterality Date    APPENDECTOMY      laparoscopic    BILATERAL SALPINGOOPHORECTOMY  2007    BREAST BIOPSY Right 2016     SECTION      HYSTERECTOMY  2007    Omentectomy and lymph node biopsy at [facility]     OTHER SURGICAL HISTORY      right lower extremity due to trauma    NV TREAT TIBIAL SHAFT FX, INTRAMED IMPLANT Right 2017    Procedure: INSERTION NAIL IM TIBIA;  Surgeon: Marily Bryan MD;  [facility];  Service: Orthopedics    US GUIDANCE BREAST BIOPSY LEFT EACH ADDITIONAL Left 7/15/2020    US GUIDED BREAST BIOPSY LEFT COMPLETE Left 7/15/2020    US GUIDED BREAST BIOPSY RIGHT COMPLETE Right 2016     Family History   Problem Relation Age of Onset    Breast cancer Mother         28    Pancreatic cancer Mother     Ovarian cancer Maternal Grandmother     Breast cancer Maternal Aunt 39    No Known Problems Paternal Aunt     No Known Problems Father     No Known Problems Maternal Grandfather     No Known Problems Paternal Grandmother     No Known Problems Paternal Grandfather     Lung cancer Half-Sister      Social History     Socioeconomic History    Marital status: /Civil Union     Spouse name: Not on file    Number of children: Not on file    Years of education: Not on file    Highest education level: Not on file   Occupational History    Not on file   Social Needs    Financial resource strain: Not on file    Food insecurity     Worry: Not on file     Inability: Not on file   [employer] needs     Medical: Not on file     Non-medical: Not on file   Tobacco Use    Smoking status: Never Smoker    Smokeless tobacco: Never Used   Substance and Sexual Activity    Alcohol use: Yes     Frequency: Monthly or less    Drug use: Yes     Types: Hydromorphone, Oxycodone    Sexual activity: Yes     Partners: Male     Birth control/protection: None   Lifestyle    Physical activity     Days per week: Not on file     Minutes per session: Not on file    Stress: Not on file   Relationships    Social connections     Talks on phone: Not on file     Gets together: Not on file     Attends [employer] service: Not on file     Active member of club or organization: Not on file     Attends meetings of clubs or organizations: Not on file     Relationship status: Not on file    Intimate partner violence     Fear of current or ex partner: Not on file     Emotionally abused: Not on file     Physically abused: Not on file     Forced sexual activity: Not on file   Other Topics Concern    Not on file   Social History Narrative    Not on file       Current Outpatient Medications:     acetaminophen (TYLENOL) 325 mg tablet, Take 2 tablets by mouth every 6 (six) hours as needed for mild pain, Disp: 30 tablet, Rfl: 0    bisacodyl (DULCOLAX) 5 mg EC tablet, Take 5 mg by mouth daily as needed for constipation, Disp: , Rfl:     Calcium Carbonate-Vit D-Min (CALCIUM 1200 PO), Take 3 tablets by mouth daily , Disp: , Rfl:     DULoxetine (CYMBALTA) 60 mg delayed release capsule, Take 60 mg by mouth daily  , Disp: , Rfl: 0    LORazepam (ATIVAN) 0 5 mg tablet, Take 1 tablet as needed every 8 hours  (Patient taking differently: Take 0 5 mg by mouth 2 (two) times a day Take 1 tablet as needed every 8 hours  ), Disp: 30 tablet, Rfl: 0    metoprolol tartrate (LOPRESSOR) 25 mg tablet, Take 0 5 tablets (12 5 mg total) by mouth every 12 (twelve) hours, Disp: 90 tablet, Rfl: 3    Multiple Vitamins-Minerals (MULTIVITAMIN ADULT PO), Take 1 capsule by mouth daily , Disp: , Rfl:     omeprazole (PriLOSEC) 20 mg delayed release capsule, Take 20 mg by mouth daily  , Disp: , Rfl:     ondansetron (ZOFRAN) 4 mg tablet, Take 1 tablet (4 mg total) by mouth every 8 (eight) hours as needed for nausea or vomiting, Disp: 20 tablet, Rfl: 0    oxyCODONE (OxyCONTIN) 40 mg 12 hr tablet, Take 40 mg by mouth every 12 (twelve) hours, Disp: , Rfl:     OXYCODONE HCL PO, Take 15 mg by mouth 2 (two) times a day as needed , Disp: , Rfl:     polyethylene glycol (MIRALAX) 17 g packet, Take 17 g by mouth as needed , Disp: , Rfl:     PREVIDENT 5000 DRY MOUTH 1 1 % GEL, , Disp: , Rfl:     prochlorperazine (COMPAZINE) 10 mg tablet, Take 1 tablet (10 mg total) by mouth every 6 (six) hours as needed for nausea or vomiting, Disp: 30 tablet, Rfl: 0    TOVIAZ 8 MG TB24, Take 8 mg by mouth daily at bedtime , Disp: , Rfl:     furosemide (LASIX) 20 mg tablet, Take 1 tablet (20 mg total) by mouth daily (Patient not taking: Reported on 3/12/2021), Disp: 90 tablet, Rfl: 3    penicillin V potassium (VEETID) 250 mg tablet, Take 1 tablet (250 mg total) by mouth every 12 (twelve) hours, Disp: 60 tablet, Rfl: 3  Allergies   Allergen Reactions    Iodinated Diagnostic Agents Shortness Of Breath    Omnipaque [Iohexol] Shortness Of Breath    Other Shortness Of Breath     IVP dye, x ray dye 3    Iodides      Other reaction(s): SWELLING, SOB    Methotrexate Nausea Only     Headache and nausea    Methotrexate Derivatives Headache     Vitals:    03/12/21 0902   BP: 130/82   Pulse: 93   Resp: 20   Temp: 98 7 °F (37 1 °C)       Physical Exam  Constitutional: General appearance: The Patient is well-developed and well-nourished who appears the stated age in no acute distress  Patient is pleasant and talkative  HEENT:  Normocephalic  Sclerae are anicteric  Mucous membranes are moist  Neck is supple without adenopathy  Chest: The lungs are clear to auscultation  Cardiac: Heart is regular rate  Abdomen: Abdomen is soft, non-tender, non-distended and without masses  Extremities: There is no clubbing or cyanosis  There is no edema  Symmetric    Neuro: Grossly nonfocal  Gait is normal      Lymphatic: No evidence of cervical adenopathy bilaterally  No evidence of axillary adenopathy bilaterally  Skin: Warm, anicteric  Psych:  Patient is pleasant and talkative  Breasts:   Symmetric  No dominant masses, skin changes, or nipple discharge in either breast   No axillary or supraclavicular adenopathy bilaterally  Pathology:  [unfilled]    Labs:      Imaging  No results found  I reviewed the above laboratory and imaging data  Discussion/Summary: 74year-old female with an increased risk of breast cancer due to BRCA1 genetic mutation  She has a personal history of ovarian cancer  Delphine Gagnon is no evidence of malignancy by physical exam or imaging   She is unable to tolerate a breast MRI due to orthopedic injuries  She has no new complaints today  There are no worrisome findings on physical exam  We will see her back in 6 months with her mammogram and for another clinical exam  She is agreeable to this   All of her questions were answered

## 2021-03-22 ENCOUNTER — IMMUNIZATIONS (OUTPATIENT)
Dept: FAMILY MEDICINE CLINIC | Facility: HOSPITAL | Age: 66
End: 2021-03-22

## 2021-03-22 DIAGNOSIS — Z23 ENCOUNTER FOR IMMUNIZATION: Primary | ICD-10-CM

## 2021-03-22 PROCEDURE — 91300 SARS-COV-2 / COVID-19 MRNA VACCINE (PFIZER-BIONTECH) 30 MCG: CPT

## 2021-03-22 PROCEDURE — 0001A SARS-COV-2 / COVID-19 MRNA VACCINE (PFIZER-BIONTECH) 30 MCG: CPT

## 2021-04-12 ENCOUNTER — IMMUNIZATIONS (OUTPATIENT)
Dept: FAMILY MEDICINE CLINIC | Facility: HOSPITAL | Age: 66
End: 2021-04-12

## 2021-04-12 DIAGNOSIS — Z23 ENCOUNTER FOR IMMUNIZATION: Primary | ICD-10-CM

## 2021-04-12 PROCEDURE — 91300 SARS-COV-2 / COVID-19 MRNA VACCINE (PFIZER-BIONTECH) 30 MCG: CPT

## 2021-04-12 PROCEDURE — 0002A SARS-COV-2 / COVID-19 MRNA VACCINE (PFIZER-BIONTECH) 30 MCG: CPT

## 2021-04-13 ENCOUNTER — TELEPHONE (OUTPATIENT)
Dept: HEMATOLOGY ONCOLOGY | Facility: CLINIC | Age: 66
End: 2021-04-13

## 2021-04-13 NOTE — TELEPHONE ENCOUNTER
Lm on vm advising patient appt with Dr Shayan Ford on 5/7/21 has been r/s with Jose Francisco Sanchez 5/7/21 at 11:30am in the [facility] office, letter also sent

## 2021-05-07 ENCOUNTER — OFFICE VISIT (OUTPATIENT)
Dept: HEMATOLOGY ONCOLOGY | Facility: CLINIC | Age: 66
End: 2021-05-07
Payer: MEDICARE

## 2021-05-07 VITALS
TEMPERATURE: 98.1 F | HEART RATE: 74 BPM | RESPIRATION RATE: 18 BRPM | HEIGHT: 64 IN | OXYGEN SATURATION: 94 % | DIASTOLIC BLOOD PRESSURE: 74 MMHG | BODY MASS INDEX: 39.13 KG/M2 | WEIGHT: 229.2 LBS | SYSTOLIC BLOOD PRESSURE: 116 MMHG

## 2021-05-07 DIAGNOSIS — D80.1 HYPOGAMMAGLOBULINEMIA, ACQUIRED (HCC): Primary | ICD-10-CM

## 2021-05-07 DIAGNOSIS — R76.8 LOW IMMUNOGLOBULIN LEVEL: ICD-10-CM

## 2021-05-07 PROCEDURE — 99214 OFFICE O/P EST MOD 30 MIN: CPT | Performed by: PHYSICIAN ASSISTANT

## 2021-05-07 NOTE — PROGRESS NOTES
Ana Ola  1955  [facility] HEMATOLOGY ONCOLOGY [facility] [place]  [address] [facility] [address] [phone]      Assessment/Plan:  Severe hypogammaglobinemia with subsequent multiple infections required multiple admissions to the hospital at least 4 times in 2019 with sepsis, cellulitis, pneumonia, IgG level was less than 200  She was given IVIG 30 g x 2 doses January and February 2020  Level improved to the range of 500  She has been free of infections and admissions to the hospital  She had headache, nausea with IVIG requiring Zofran, and hydrocortisone     We decided at this time to continue watchful observation and follow-up IgG level  Her IgG is decreasing  375 9/14/20 and 304 10/20      IVIG 30 g with Benadryl and hydrocortisone premedication will be administered today  Due to profound nausea with prior treatments, Zofran 8 milligrams IV will also be administered  Zofran prescription sent to her pharmacy as well      1  Asked to follow up in six months with labs to continue monitoring IgG levels     2  BRCA 1 mutation with ovarian cancer status post bilateral oophorectomy and hysterectomy in 2007 followed by chemotherapy also mammogram and MRI of the breasts followed by Dr Ines Miller     3  Juan's granulomatosis diagnosed in 02/2010 treated with rituximab for 4 years with good response and subsequent hypogammaglobinemia      HPI: Charito Coon is a 68-year-old  female seen initially 12/27/2019 regarding hypogammaglobulinemia  Artem Kohler has multiple medical problems including history of ovarian cancer  Artem Kohler is s/p exploratory laparotomy /BSO January 2007   She is s/p  hysterectomy, omentumectomy, lymph node biopsy 2/14/2007 at [facility]   Complicated by DVT and PE 3/25/2007  Yoly Halo filter was placed   Treated with Lovenox x 1 year   She received post op chemotherapy        She was was found to have BRCA1 mutation   She follows with Dr Ines Miller with surgical oncology     She has history of Juan's granulomatosis diagnosed 11/2010  She was on steroids through 2011 and then she received rituximab for almost 4 years per Dr Johnie Fothergill Arharvinder Singh had subsequent pneumonia, pneumonitis, sinusitis, cellulitis of the lower extremity requiring multiple admissions to the hospital and antibiotics     The last admission to the hospital was on 11/14/2019 with severe sepsis, leukocytosis, encephalopathy     On 11/22/2019, IgG 152, IgA 36, IgM 72     IgG 189 7/2016 at [facility]   IgG <200 through 11/2019      She was initiated on IVIG 30 g every month received the 1st dose January 31, 2020, IgG level went up from 189 to 547 2/22/20  She reported profound headaches after the 1st treatment  Hydrocortisone 100mg given as premedication with cycle #2 due to prolonged HA post treatment #1   For nausea she had benefit from Compazine, Zofran was added to premedication prior to IVIG      She only received the 2 doses  - January 2020 and February 2020 9/14/20: IgG 375   10/20/20: IgG 304  IgM 48; IgA 30  Normal CBCD  Interval History:  She is doing well  No recurrent infections  Complains of some fatigue but is not sleeping well at night from having to get up to urinate  Some constipation relieved with stool softener  Advised to drink more water  Denies SOB, cough, fevers, night sweats, weight loss, GONZALES, hematuria, hematochezia, melena, N/V/D      1/22/21: IgG 453, normal CBCD, CMP WNL except mild elevation in alk phos likely secondary to osteopenia  4/10/21: IgG 409, normal CBCD    Review of Systems:  Review of Systems   Constitutional: Negative for chills and fever  HENT: Negative for ear pain and sore throat  Eyes: Negative for pain and visual disturbance  Respiratory: Negative for cough and shortness of breath  Cardiovascular: Negative for chest pain and palpitations  Gastrointestinal: Positive for constipation   Negative for abdominal pain, blood in stool and vomiting  Genitourinary: Negative for dysuria and hematuria  Musculoskeletal: Negative for arthralgias and back pain  Skin: Negative for color change and rash  Neurological: Negative for seizures and syncope  Hematological: Negative for adenopathy  Does not bruise/bleed easily  All other systems reviewed and are negative  Patient Active Problem List   Diagnosis    Granulomatosis with polyangiitis (HCC)    Charcot's joint    Anxiety    Benign essential hypertension    BRCA1 positive    Cancer of ovary (HCC)    Chronic kidney disease, stage III (moderate) (HCC)    Chronic pain disorder    Class 2 obesity    Depression    Dyslipidemia    GERD (gastroesophageal reflux disease)    Lymphedema    Opiate analgesic use agreement exists    Ovarian cancer (HCC)    Post-traumatic osteoarthritis    Increased frequency of urination    Other complicated headache syndrome    Nausea & vomiting    SIRS (systemic inflammatory response syndrome) (HCC)    Increased risk of breast cancer    CAP (community acquired pneumonia)    Elevated troponin    Mitral valve disorder    Postnasal drip    Dyspnea on exertion    LVH (left ventricular hypertrophy)    Low immunoglobulin level    Hypertrophic cardiomyopathy (HCC)    Acute respiratory failure with hypoxia (HCC)    Hypogammaglobulinemia, acquired ([facility] )     Past Medical History:   Diagnosis Date    BRCA1 positive     Cancer ([facility] )     ovarian Ca with chemo    History of chemotherapy     ovarian cancer 2007    History of pulmonary embolus (PE) & DVT 03/2007    post-op FAMILIA/ omenectomy    Lymphedema     MRSA (methicillin resistant Staphylococcus aureus) 09/05/2017    nasal swab negative 4/3/19    Ovarian cancer ([facility] )     CYST REMOVED RIGHT OVARY IN 2007  HYSTERECTOMY 02/14/07       Past Surgical History:   Procedure Laterality Date    APPENDECTOMY  1998    laparoscopic    BILATERAL SALPINGOOPHORECTOMY  01/2007    BREAST BIOPSY Right 2016     SECTION  1994    HYSTERECTOMY  2007    O[facility]ectomy and [facility] node [facility] at [facility]      right lower extremity due to trauma    NE TREAT TIBIAL SHAFT FX, INTRAMED IMPLANT Right 2017    Procedure: INSERTION NAIL IM TIBIA;  Surgeon: Darlyn Calderon MD;  [facility];  Service: Orthopedics    US GUIDANCE BREAST BIOPSY LEFT EACH ADDITIONAL Left 7/15/2020    US GUIDED BREAST BIOPSY LEFT COMPLETE Left 7/15/2020    US GUIDED BREAST BIOPSY RIGHT COMPLETE Right 2016     Family History   Problem Relation Age of Onset    Breast cancer Mother         28    Pancreatic cancer Mother     Ovarian cancer Maternal Grandmother     Breast cancer Maternal Aunt 39    No Known Problems Paternal Aunt     No Known Problems Father     No Known Problems Maternal Grandfather     No Known Problems Paternal Grandmother     No Known Problems Paternal Grandfather     Lung cancer Half-Sister      Social History     Socioeconomic History    Marital status: /Civil Union     Spouse name: Not on file    Number of children: Not on file    Years of education: Not on file    Highest education level: Not on file   Occupational History    Not on file   Social Needs    Financial resource strain: Not on file    Food insecurity     Worry: Not on file     Inability: Not on file   [employer] needs     Medical: Not on file     Non-medical: Not on file   Tobacco Use    Smoking status: Never Smoker    Smokeless tobacco: Never Used   Substance and Sexual Activity    Alcohol use: Yes     Frequency: Monthly or less    Drug use: Yes     Types: Hydromorphone, Oxycodone    Sexual activity: Yes     Partners: Male     Birth control/protection: None   Lifestyle    Physical activity     Days per week: Not on file     Minutes per session: Not on file    Stress: Not on file   Relationships    Social connections     Talks on phone: Not on file     Gets together: Not on file     Attends Baptism service: Not on file     Active member of club or organization: Not on file     Attends meetings of clubs or organizations: Not on file     Relationship status: Not on file    Intimate partner violence     Fear of current or ex partner: Not on file     Emotionally abused: Not on file     Physically abused: Not on file     Forced sexual activity: Not on file   Other Topics Concern    Not on file   Social History Narrative    Not on file       Current Outpatient Medications:     acetaminophen (TYLENOL) 325 mg tablet, Take 2 tablets by mouth every 6 (six) hours as needed for mild pain (Patient taking differently: Take 650 mg by mouth every 6 (six) hours as needed for mild pain ), Disp: 30 tablet, Rfl: 0    bisacodyl (DULCOLAX) 5 mg EC tablet, Take 5 mg by mouth daily as needed for constipation , Disp: , Rfl:     Calcium Carbonate-Vit D-Min (CALCIUM 1200 PO), Take 3 tablets by mouth daily , Disp: , Rfl:     DULoxetine (CYMBALTA) 60 mg delayed release capsule, Take 60 mg by mouth daily  , Disp: , Rfl: 0    LORazepam (ATIVAN) 0 5 mg tablet, Take 1 tablet as needed every 8 hours  (Patient taking differently: Take 0 5 mg by mouth 2 (two) times a day Take 1 tablet as needed every 8 hours  ), Disp: 30 tablet, Rfl: 0    metoprolol tartrate (LOPRESSOR) 25 mg tablet, Take 0 5 tablets (12 5 mg total) by mouth every 12 (twelve) hours, Disp: 90 tablet, Rfl: 3    Multiple Vitamins-Minerals (MULTIVITAMIN ADULT PO), Take 1 capsule by mouth daily , Disp: , Rfl:     omeprazole (PriLOSEC) 20 mg delayed release capsule, Take 20 mg by mouth daily  , Disp: , Rfl:     ondansetron (ZOFRAN) 4 mg tablet, Take 1 tablet (4 mg total) by mouth every 8 (eight) hours as needed for nausea or vomiting (Patient taking differently: Take 4 mg by mouth every 8 (eight) hours as needed for nausea or vomiting ), Disp: 20 tablet, Rfl: 0    oxyCODONE (OxyCONTIN) 40 mg 12 hr tablet, Take 40 mg by mouth every 12 (twelve) hours, Disp: , Rfl:     OXYCODONE HCL PO, Take 15 mg by mouth 2 (two) times a day as needed , Disp: , Rfl:     polyethylene glycol (MIRALAX) 17 g packet, Take 17 g by mouth as needed , Disp: , Rfl:     PREVIDENT 5000 DRY MOUTH 1 1 % GEL, , Disp: , Rfl:     prochlorperazine (COMPAZINE) 10 mg tablet, Take 1 tablet (10 mg total) by mouth every 6 (six) hours as needed for nausea or vomiting (Patient taking differently: Take 10 mg by mouth every 6 (six) hours as needed for nausea or vomiting ), Disp: 30 tablet, Rfl: 0    TOVIAZ 8 MG TB24, Take 8 mg by mouth daily at bedtime , Disp: , Rfl:     furosemide (LASIX) 20 mg tablet, Take 1 tablet (20 mg total) by mouth daily (Patient not taking: Reported on 3/12/2021), Disp: 90 tablet, Rfl: 3    penicillin V potassium (VEETID) 250 mg tablet, Take 1 tablet (250 mg total) by mouth every 12 (twelve) hours (Patient not taking: Reported on 5/7/2021), Disp: 60 tablet, Rfl: 3  Allergies   Allergen Reactions    Iodinated Diagnostic Agents Shortness Of Breath    Omnipaque [Iohexol] Shortness Of Breath    Other Shortness Of Breath     IVP dye, x ray dye 3    Iodides      Other reaction(s): SWELLING, SOB    Methotrexate Nausea Only     Headache and nausea    Methotrexate Derivatives Headache     Vitals:    05/07/21 1111   BP: 116/74   Pulse: 74   Resp: 18   Temp: 98 1 °F (36 7 °C)   SpO2: 94%         Physical Exam  Vitals signs and nursing note reviewed  Constitutional:       General: Ana Dangelo is not in acute distress  Appearance: Normal appearance  Ana Dangelo is not ill-appearing  HENT:      Head: Normocephalic and atraumatic  Neck:      Musculoskeletal: Normal range of motion and neck supple  Cardiovascular:      Rate and Rhythm: Normal rate and regular rhythm  Pulses: Normal pulses  Heart sounds: No murmur  Pulmonary:      Effort: Pulmonary effort is normal       Breath sounds: Normal breath sounds     Abdominal:      General: Abdomen is flat  Bowel sounds are normal       Palpations: Abdomen is soft  Tenderness: There is no abdominal tenderness  There is no guarding  Lymphadenopathy:      Cervical: No cervical adenopathy  Skin:     General: Skin is warm and dry  Findings: No bruising  Neurological:      Mental Status: [facility] is alert  Labs:  Previous IgG levels reviewed in [facility] everywhere and in chart  Previous CBCD and CMP reviewed in [facility] everywhere and in chart  Imaging  No results found  I reviewed the above laboratory and imaging data  Patient understands and is in agreement with this plan

## 2021-05-12 NOTE — PROGRESS NOTES
Progress Note - Infectious Disease   Larry Gallardo 70 y o  female MRN: [id number]  Unit/Bed#: -01 Encounter: [id number]      Impression/Recommendations:  1  SIRS, tachycardia, leukocytosis  T-max of a 100 0°  No high documented fevers  Lactic acid was normal  Unclear etiology  No overt evidence of acute bacterial infection  Patient's symptoms have already resolved and she has no focal complaints at this time  Her WBC count is trending down  Consider leukemoid reaction in the setting of recent vomiting episode and headache  She remains hemodynamically stable, nontoxic  CT head and abdomen with no evidence of acute infection  No clinical or radiographic evidence of pneumonia  Urinalysis without evidence of infection  Patient remains clinically stable from an infectious standpoint after antibiotics were discontinued      -continue to monitor off antibiotics  -monitor temperatures  -check CBC in a m   -follow up pending blood cultures    2  Positive blood culture  One of 2 blood cultures drawn on admission is positive for gram-positive cocci in clusters  More likely secondary to skin contamination, but remains unclear at this point  Patient without indwelling lines or catheters  She remains clinically stable without administration of antibiotics  -follow up further culture data to guide recommendations  If suggestive of skin contaminant, would not pursue further workup      3  Acute onset headache, nausea, episode of vomiting  These symptoms have resolved  Patient refused lumbar puncture in the ED  No meningeal signs on exam   Neurology evaluation noted and they have a very low suspicion for bacterial meningitis, as do I  She is completely nontoxic  CT head and abdomen without any evidence of acute infection  Consideration for viral gastroenteritis with rapid resolution of symptoms    At this point, I would continue to monitor closely off antibiotics as no clear evidence acute bacterial Scanning in letter of recommendation that was faxed to [facility].   infection      -monitor off antibiotics  -monitor symptoms closely     4   Elevated lipase  May be secondary to acute gastroenteritis  Abdominal exam is benign  CT abdomen with no acute pathology noted  Patient's symptoms have improved  Lipase has improved      -serial abdominal exams     5  History of Juan's granulomatosis  Patient not recently been on any immunosuppressive agents or steroids  Currently no indication to continue on prophylactic Bactrim as patient is not on immunosuppressive medication and her disease is felt to be in remission      6   Lethargy  As stated above, no clinical evidence of infection  Consideration for polypharmacy as a contributing factor  Mental status has improved and appears to be back to baseline      -try to limit sedating medications as able  -monitor mental status     Antibiotics:  None    Discussed above plan with Dwight Contreras from [place], and with patient  Subjective:  Feels tired  Tolerating normal diet  Denies fevers, chills, or sweats  Denies nausea, vomiting, or diarrhea  Objective:  Vitals:  HR:  [74-88] 74  Resp:  [20] 20  BP: (123-141)/(58-67) 141/67  SpO2:  [94 %-98 %] 94 %  Temp (24hrs), Av 1 °F (36 7 °C), Min:98 °F (36 7 °C), Max:98 2 °F (36 8 °C)  Current: Temperature: 98 1 °F (36 7 °C)    Physical Exam:   General:  No acute distress  Psychiatric:  Awake and alert  Pulmonary:  Normal respiratory excursion without accessory muscle use  Abdomen:  Soft, nontender, morbidly obese, no rigidity or guarding  Extremities:  Bilateral lower extremities are wrapped  Skin:  No rashes    Lab Results:  I have personally reviewed pertinent labs      Results from last 7 days  Lab Units 18  0632 18  0653 18  0049   SODIUM mmol/L 139 136 134*   POTASSIUM mmol/L 4 0 4 3 4 2   CHLORIDE mmol/L 106 106 100   CO2 mmol/L 30 23 27   ANION GAP mmol/L 3* 7 7   BUN mg/dL 14 15 17   CREATININE mg/dL 0 92 1 00 1 10   EGFR ml/min/1 73sq m 66 59 53   GLUCOSE RANDOM mg/dL 111 143* 129   CALCIUM mg/dL 8 2* 8 2* 9 2   AST U/L  --   --  54*   ALT U/L  --   --  47   ALK PHOS U/L  --   --  188*   TOTAL PROTEIN g/dL  --   --  7 0   BILIRUBIN TOTAL mg/dL  --   --  0 74       Results from last 7 days  Lab Units 07/12/18  0632 07/11/18  0726 07/11/18  0049   WBC Thousand/uL 5 71 12 55* 15 06*   HEMOGLOBIN g/dL 10 8* 11 8 13 8   PLATELETS Thousands/uL 173 201 211       Results from last 7 days  Lab Units 07/11/18  0248 07/11/18  0144   BLOOD CULTURE  No Growth at 24 hrs   --    GRAM STAIN RESULT   --  Gram positive cocci in clusters       Imaging Studies:   I have personally reviewed pertinent imaging study reports and images in PACS  EKG, Pathology, and Other Studies:   I have personally reviewed pertinent reports

## 2021-06-04 ENCOUNTER — OFFICE VISIT (OUTPATIENT)
Dept: CARDIOLOGY CLINIC | Facility: CLINIC | Age: 66
End: 2021-06-04
Payer: MEDICARE

## 2021-06-04 VITALS
SYSTOLIC BLOOD PRESSURE: 128 MMHG | HEART RATE: 71 BPM | HEIGHT: 64 IN | WEIGHT: 225.5 LBS | BODY MASS INDEX: 38.5 KG/M2 | DIASTOLIC BLOOD PRESSURE: 80 MMHG

## 2021-06-04 DIAGNOSIS — I42.2 HYPERTROPHIC CARDIOMYOPATHY (HCC): Primary | ICD-10-CM

## 2021-06-04 DIAGNOSIS — I05.9 MITRAL VALVE DISORDER: ICD-10-CM

## 2021-06-04 DIAGNOSIS — I10 BENIGN ESSENTIAL HYPERTENSION: ICD-10-CM

## 2021-06-04 PROCEDURE — 99215 OFFICE O/P EST HI 40 MIN: CPT | Performed by: INTERNAL MEDICINE

## 2021-06-04 NOTE — PROGRESS NOTES
[facility] Follow Up Visit - Cardiology   Ana Dangelo 74 y o  adult MRN: [id number]  Unit/Bed#:  Encounter: [id number]    Patient Active Problem List    Diagnosis Date Noted    Hypogammaglobulinemia, acquired ([facility] ) 12/27/2019     Priority: Low    Acute respiratory failure with hypoxia ([facility] ) 11/07/2019     Priority: Low    Hypertrophic cardiomyopathy ([facility] ) 08/27/2019     Priority: Low    Low immunoglobulin level 06/07/2019     Priority: Low    LVH (left ventricular hypertrophy) 05/30/2019     Priority: Low    Postnasal drip 05/07/2019     Priority: Low    Dyspnea on exertion 05/07/2019     Priority: Low    Mitral valve disorder      Priority: Low    CAP (community acquired pneumonia) 04/03/2019     Priority: Low    Elevated troponin 04/03/2019     Priority: Low    Increased risk of breast cancer 11/15/2018     Priority: Low    Other complicated headache syndrome 07/11/2018     Priority: Low    Nausea & vomiting 07/11/2018     Priority: Low    SIRS (systemic inflammatory response syndrome) ([facility] ) 07/11/2018     Priority: Low    Post-traumatic osteoarthritis 05/14/2018     Priority: Low    Opiate analgesic use agreement exists 12/13/2016     Priority: Low    BRCA1 positive 06/21/2016     Priority: Low    Charcot's joint 01/20/2016     Priority: Low    Class 2 obesity 01/20/2016     Priority: Low    Granulomatosis with polyangiitis ([facility] ) 01/15/2016     Priority: Low    Cancer of ovary ([facility] ) 04/09/2015     Priority: Low    Ovarian cancer ([facility] ) 04/09/2015     Priority: Low    Increased frequency of urination 08/22/2014     Priority: Low    Chronic kidney disease, stage III (moderate) (HCC) 03/26/2014     Priority: Low    Chronic pain disorder 10/24/2013     Priority: Low    GERD (gastroesophageal reflux disease) 09/27/2013     Priority: Low    Dyslipidemia 04/04/2013     Priority: Low    Lymphedema 11/15/2012     Priority: Low    Anxiety 06/05/2012     Priority: Low    Benign essential hypertension 06/05/2012     Priority: Low    Depression 06/05/2012     Priority: Low     Plan: Mrs Kurtis Vega has been asymptomatic from cardiac stand point  She continues to follow-up with Surgical Oncology for routine mammograms given history of BRCA1 positive mutation and ovarian cancer  She continues to follow-up with Hematology for management of immunoglobin deficiency with the most recent levels of IgG being 403 mg/dL in 4/2021  Admits to having had lower extremity pain, which is responsive to oxycontin 20 mg  Her  was tested positive for COVID 19, and although she felt mild symptoms, she didn't get tested and recovered well  She has received both doses of COVID 19 vaccines (Babar Sanchez)  She has stopped taking furosemide due to the inconvenience of getting to the bathroom frequently and has noted mild increase in  lower extremity edema that is mostly lymphedema for which she uses special lymphedema wrapping bands  We recommended to her to take furosemide sparingly if lower extremity edema worsens  We was encouraged to have her son screened with echocardiography  She is interested in losing some weight but her physical activity is limited secondary to hip and knee pains  Her blood pressure and heart rate are well controlled and she has no overt cardiac symptoms at the current level of physical activity  We encouraged her to have upper body toning exercises  She will be seen in [facility] clinic in 6 month with TTE prior to the visit  Physician Requesting Komal Matt DO  Reason for Consult / Tyrell Levy     HPI: Ms Serafin Dove is a 76 year old woman with past medical history of hypertension, ovarian cancer (2011, now in remission), Wegener's granulomatosis, low immunoglobulin status with recurrent sepsis, Charcot's knee and dyslipidemia who was recently admitted to hospital with sepsis and had non-coronary related troponin release   The sepsis was related to multifocal pneumonia for which she was treated with intravenous antibiotics  As part of the work up of troponin elevation she had a transthoracic echocardiogram (4-3-2019) that showed asymmetric left ventricular hypertrophy, NOAH and resting left ventricular outflow tract obstruction (gradient 64 mmHg)  She was treated with metoprolol and has remained asymptomatic since hospital discharge       On 8-: Ms Justine Blum has phenotypically mild hypertrophic obstructive cardiomyopathy and is currently asymptomatic on her limited degree of mobility due to her skeletal problem  She has no personal significant risk factor for sudden cardiac death and the circumstances of the death of her son is far from certain to make a case for primary prevention ICD particularly at her age and with the heightened risk of device infection  She does drink water liberally during the day and then uses low dose furosemide for diuresis  She is tolerating the metoprolol well  I do not believe she would benefit from extensive risk stratification and due to her asymptomatic status feel that her current treatment schedule is effective and adequate  Her blood pressure is well controlled and her kidney function has recovered from the acute injury sustained during recent sepsis  I have instructed her to inform me of any new heart symptoms and would otherwise see her for fullow up in a year time     On 4-: Patty Heimlich has done very well from the cardiovascular standpoint  She is currently free of cardiac symptoms albeit at a suboptimal activity level due to self isolation and social distancing  She does get to walk her dog for a short distance on a daily basis  She denies shortness of breath, chest pain, palpitation, dizziness or syncope  She has lost considerable weight that has helped her symptoms significantly (down to 227 lbs from a high of 270 lbs)  Her blood pressure has been well controlled with the highest value of 130/70 mmHg   She received immunoglobulin infusions in 2 sessions in January and February and is due for laboratory work on   She is compliant with her medications and reports no side effects  An echocardiogram performed on 2019 showed normal LV systolic function, mild LVOT obstruction and no significant mitral regurgitation  2020: Ms David Davis is currently asymptomatic and quite limited in her scope of physical activity due to her knee problem  She has not had any recent infection or need for immunoglobulin administration  She denies chest pain, shortness of breath, dizziness, syncope or palpitation  She has had severe lymphedema of her lower extremities  She does use a small dose of furosemide at times when she notices worsening swelling  Her last echocardiogram was performed in 2019  Today, I made no change to her medications and asked her to have blood work to check kidney function and electrolytes levels due to chronic use of diuretic      Family history: Mother  at age 77 from breast and pancreatic cancer, father  at age 80 without known heart disease, she has a 1/2 and a 1/2 brother from the father's side  Her sister is [de-identified]year old and has been told to have heart problem (?Ebstein's) and the brother who is 66year old is not known to have any cardiac problem  Her son  at age 28 at home possibly related to alcoholism although the circumstances were not well understood and the death certificate indicated atherosclerosis as the cause of death  She has another son who is 22years old and has finished medical school at [employer] and has started an orthopedic residency at [facility] in [place] (as part of [employer]) has no health related issue      Review of Systems: Denies shortness of breath, chest pain, palpitation, dizziness or syncope      Historical Information   Past Medical History:   Diagnosis Date    BRCA1 positive     Cancer [facility] )     ovarian Ca with chemo    History of chemotherapy     ovarian cancer     History of pulmonary embolus (PE) & DVT 2007    post-op FAMILIA/ omenectomy    Lymphedema     MRSA (methicillin resistant Staphylococcus aureus) 2017    nasal swab negative 4/3/19    Ovarian cancer ([address] )     CYST REMOVED RIGHT OVARY IN   HYSTERECTOMY 07       Past Surgical History:   Procedure Laterality Date    APPENDECTOMY  1998    laparoscopic    BILATERAL SALPINGOOPHORECTOMY  2007    BREAST BIOPSY Right 2016     SECTION      HYSTERECTOMY  2007    [facility]     OTHER SURGICAL HISTORY      right lower extremity due to trauma    ME TREAT TIBIAL SHAFT FX, INTRAMED IMPLANT Right 2017    Procedure: INSERTION NAIL IM TIBIA;  Surgeon: Tamra Massey MD;  [facility]: [facility];  Service: Orthopedics    US GUIDANCE BREAST BIOPSY LEFT EACH ADDITIONAL Left 7/15/2020    US GUIDED BREAST BIOPSY LEFT COMPLETE Left 7/15/2020    US GUIDED BREAST BIOPSY RIGHT COMPLETE Right 2016     Social History     Substance and Sexual Activity   Alcohol Use Yes    Frequency: Monthly or less     Social History     Substance and Sexual Activity   Drug Use Yes    Types: Hydromorphone, Oxycodone     Social History     Tobacco Use   Smoking Status Never Smoker   Smokeless Tobacco Never Used     Meds/Allergies    Current Outpatient Medications on File Prior to Visit   Medication Sig Dispense Refill    acetaminophen (TYLENOL) 325 mg tablet Take 2 tablets by mouth every 6 (six) hours as needed for mild pain (Patient taking differently: Take 650 mg by mouth every 6 (six) hours as needed for mild pain ) 30 tablet 0    bisacodyl (DULCOLAX) 5 mg EC tablet Take 5 mg by mouth daily as needed for constipation       Calcium Carbonate-Vit D-Min (CALCIUM 1200 PO) Take 3 tablets by mouth daily       DULoxetine (CYMBALTA) 60 mg delayed release capsule Take 60 mg by mouth daily    0    LORazepam (ATIVAN) 0 5 mg tablet Take 1 tablet as needed every 8 hours  (Patient taking differently: Take 0 5 mg by mouth 2 (two) times a day Take 1 tablet as needed every 8 hours  ) 30 tablet 0    metoprolol tartrate (LOPRESSOR) 25 mg tablet Take 0 5 tablets (12 5 mg total) by mouth every 12 (twelve) hours 90 tablet 3    Multiple Vitamins-Minerals (MULTIVITAMIN ADULT PO) Take 1 capsule by mouth daily       omeprazole (PriLOSEC) 20 mg delayed release capsule Take 20 mg by mouth daily   oxyCODONE (OxyCONTIN) 40 mg 12 hr tablet Take 40 mg by mouth every 12 (twelve) hours      OXYCODONE HCL PO Take 15 mg by mouth 2 (two) times a day as needed       PREVIDENT 5000 DRY MOUTH 1 1 % GEL       TOVIAZ 8 MG TB24 Take 8 mg by mouth daily at bedtime       furosemide (LASIX) 20 mg tablet Take 1 tablet (20 mg total) by mouth daily (Patient not taking: Reported on 3/12/2021) 90 tablet 3    ondansetron (ZOFRAN) 4 mg tablet Take 1 tablet (4 mg total) by mouth every 8 (eight) hours as needed for nausea or vomiting (Patient not taking: Reported on 6/4/2021) 20 tablet 0    penicillin V potassium (VEETID) 250 mg tablet Take 1 tablet (250 mg total) by mouth every 12 (twelve) hours (Patient not taking: Reported on 5/7/2021) 60 tablet 3    polyethylene glycol (MIRALAX) 17 g packet Take 17 g by mouth as needed       prochlorperazine (COMPAZINE) 10 mg tablet Take 1 tablet (10 mg total) by mouth every 6 (six) hours as needed for nausea or vomiting (Patient not taking: Reported on 6/4/2021) 30 tablet 0     No current facility-administered medications on file prior to visit        Allergies   Allergen Reactions    Iodinated Diagnostic Agents Shortness Of Breath    Omnipaque [Iohexol] Shortness Of Breath    Other Shortness Of Breath     IVP dye, x ray dye 3    Iodides      Other reaction(s): SWELLING, SOB    Methotrexate Nausea Only     Headache and nausea    Methotrexate Derivatives Headache     Objective   Vitals:   Vitals:    06/04/21 1306   BP: 128/80   BP Location: Left arm   Patient Position: Sitting   Cuff Size: Large   Pulse: 71   Weight: 102 kg (225 lb 8 oz)   Height: 5' 4" (1 626 m)   Body surface area is 2 06 meters squared  Body mass index is 38 71 kg/m²  Invasive Devices     None               Physical Exam:  GEN: Apolinar Laguerre appears well, alert and oriented x 3, pleasant and cooperative, ambulates with rolling walker  HEENT: pupils equal, round, and reactive to light; extraocular muscles intact  NECK: supple, no carotid bruits   HEART: regular rhythm, normal S1 and S2, no murmurs, clicks, gallops or rubs   LUNGS: clear to auscultation bilaterally; no wheezes, rales, or rhonchi   ABDOMEN: normal bowel sounds, soft, no tenderness, no distention  EXTREMITIES: peripheral pulses normal; no clubbing, cyanosis, trace b/l edema  SKIN: normal without suspicious lesions on exposed skin    Lab Results:   No visits with results within 1 Day(s) from this visit  Latest known visit with results is:   Orders Only on 2020   Component Date Value    EXT Creatinine Urine 2020 17 6      Imaging:  Transthoracic Echocardiogram  2D, M-mode, Doppler, and Color Doppler     Study date:  2019     Patient: Baldomero Charles  MR number: [id number]  Account number: [de-identified]  : 1955  Age: 75 years  Gender: Female  Status: Inpatient  Location: Bedside  Height: 64 in  Weight: 268 lb  BP: 137/ 69 mmHg     Indications: Bacteremia      Diagnoses: R78 81 - Bacteremia     Sonographer:  Ryan Pitts RDCS  Primary Physician:  Sammie Nolan DO  Referring Physician:  Heather Chilel MD  Group:  [facility]  Interpreting Physician:  ZACKERY Sanz      SUMMARY     PROCEDURE INFORMATION:  This was a technically difficult study  Echocardiographic views were limited due to poor acoustic window availability and lung interference      LEFT VENTRICLE:  The ventricle was mildly dilated  Systolic function was hyperdynamic by visual assessment   Ejection fraction was estimated to be 70 %  There were no regional wall motion abnormalities  Wall thickness was mildly increased  There was moderate assymetrical hypertrophy of the septum  There was the suggestion of mild LVOT obstruction noted by suboptimal Doppler interrogation  This was also seen on the prior study  No evidence of systolic anterior motion of the anterior mitral valve leaflet  Doppler parameters were consistent with abnormal left ventricular relaxation (grade 1 diastolic dysfunction)      LEFT ATRIUM:  The atrium was mildly to moderately dilated    RIGHT ATRIUM:  The atrium was mildly dilated    MITRAL VALVE:  There was mild annular calcification  Not well visualized    AORTIC VALVE:  There was no evidence for stenosis  The valve was not well visualized  TRICUSPID VALVE:  Not well visualized  There was mild to moderate regurgitation  Estimated peak PA pressure was 48 mmHg  IVC, HEPATIC VEINS:  The inferior vena cava was dilated  The respirophasic change in diameter was more than 50%      HISTORY: PRIOR HISTORY: CKD3  Obesity  Lymphedema  Hypertrophic cardiomyopathy  Hypertension  Ovarian cancer      PROCEDURE: The procedure was performed at the bedside  This was a routine study  The transthoracic approach was used  The study included complete 2D imaging, M-mode, complete spectral Doppler, and color Doppler  The heart rate was 99 bpm,  at the start of the study  Echocardiographic views were limited due to poor acoustic window availability and lung interference  This was a technically difficult study      LEFT VENTRICLE: The ventricle was mildly dilated  Systolic function was hyperdynamic by visual assessment  Ejection fraction was estimated to be 70 %  There were no regional wall motion abnormalities  Wall thickness was mildly increased  There was moderate assymetrical hypertrophy of the septum  DOPPLER: There was the suggestion of mild LVOT obstruction noted by suboptimal Doppler interrogation   This was also seen on the prior study  No evidence of systolic anterior motion  of the anterior mitral valve leaflet  Doppler parameters were consistent with abnormal left ventricular relaxation (grade 1 diastolic dysfunction)  RIGHT VENTRICLE: The size was normal  Systolic function was normal  Wall thickness was normal    LEFT ATRIUM: The atrium was mildly to moderately dilated  RIGHT ATRIUM: The atrium was mildly dilated  MITRAL VALVE: There was mild annular calcification  Valve structure was normal  There was normal leaflet separation  Not well visualized  DOPPLER: The transmitral velocity was within the normal range  There was no evidence for stenosis  There was no regurgitation  AORTIC VALVE: The valve was trileaflet  Leaflets exhibited normal thickness and normal cuspal separation  The valve was not well visualized  DOPPLER: Transaortic velocity was within the normal range  There was no evidence for stenosis  There was no regurgitation  TRICUSPID VALVE: Not well visualized  The valve structure was normal  There was normal leaflet separation  DOPPLER: The transtricuspid velocity was within the normal range  There was no evidence for stenosis  There was mild to moderate  regurgitation  Estimated peak PA pressure was 48 mmHg  The findings suggest moderate pulmonary hypertension  PULMONIC VALVE: Not well visualized  DOPPLER: There was no evidence for stenosis  There was no regurgitation  PERICARDIUM: The amount of pericardial fluid appeared to be at the upper limits of normal   AORTA: The root exhibited normal size  The ascending aorta was normal in size  The aortic arch was normal in size  The descending aorta was normal in size  SYSTEMIC VEINS: IVC: The inferior vena cava was dilated   The respirophasic change in diameter was more than 50%      SYSTEM MEASUREMENT TABLES  2D  %FS: 42 4 %  Ao Diam: 3 3 cm  EDV(Teich): 153 8 ml  EF(Teich): 72 8 %  ESV(Teich): 41 9 ml  IVSd: 1 1 cm  LA Area: 25 3 cm2  LA Diam: 5 3 cm  LAAs A4C: 25 3 cm2  LAESV A-L A4C: 88 3 ml  LAESV MOD A4C: 81 8 ml  LALs A4C: 6 2 cm  LVEDV MOD A4C: 83 5 ml  LVEF MOD A4C: 60 2 %  LVESV MOD A4C: 33 2 ml  LVIDd: 5 6 cm  LVIDs: 3 2 cm  LVLd A4C: 7 cm  LVLs A4C: 6 3 cm  LVPWd: 1 1 cm  RA Area: 21 2 cm2  RVIDd: 4 2 cm  RWT: 0 4  SV MOD A4C: 50 2 ml  SV(Teich): 111 9 ml     CW  AV Vmax: 2 1 m/s  AV maxP 5 mmHg  TR Vmax: 3 1 m/s  TR maxP 6 mmHg     MM  TAPSE: 3 cm     PW  E': 0 1 m/s  E/E': 10 4  LVOT Vmax: 1 7 m/s  LVOT maxP 7 mmHg  MV A Franklyn: 1 1 m/s  MV Dec Nelson: 15 2 m/s2  MV DecT: 68 9 ms  MV E Franklyn: 1 m/s  MV E/A Ratio: 1  MV PHT: 20 ms  MVA By PHT: 11 cm2     [facility] Accredited Echocardiography Laboratory     Prepared and electronically signed by  ZACKERY Wright  Signed 2019 16:54:00     EKG 2021: normal sinus rhythm with HR 71 bpm     Counseling / Coordination of Care  Total floor / unit time spent today 40 minutes  Greater than 50% of total time was spent with the patient and / or family counseling and / or coordination of care

## 2021-06-08 ENCOUNTER — OFFICE VISIT (OUTPATIENT)
Dept: NEPHROLOGY | Facility: CLINIC | Age: 66
End: 2021-06-08
Payer: MEDICARE

## 2021-06-08 VITALS
WEIGHT: 224.8 LBS | RESPIRATION RATE: 18 BRPM | HEIGHT: 64 IN | BODY MASS INDEX: 38.38 KG/M2 | SYSTOLIC BLOOD PRESSURE: 128 MMHG | HEART RATE: 64 BPM | DIASTOLIC BLOOD PRESSURE: 80 MMHG

## 2021-06-08 DIAGNOSIS — N18.31 STAGE 3A CHRONIC KIDNEY DISEASE (HCC): Primary | ICD-10-CM

## 2021-06-08 DIAGNOSIS — M31.31 GRANULOMATOSIS WITH POLYANGIITIS WITH RENAL INVOLVEMENT (HCC): ICD-10-CM

## 2021-06-08 DIAGNOSIS — E66.01 MORBID OBESITY (HCC): ICD-10-CM

## 2021-06-08 PROCEDURE — 99214 OFFICE O/P EST MOD 30 MIN: CPT | Performed by: INTERNAL MEDICINE

## 2021-06-08 NOTE — PROGRESS NOTES
NEPHROLOGY PROGRESS NOTE   [facility] 74 y o  adult MRN: [id number]  Unit/Bed#:  Encounter: [id number]  Reason for Consult:  Chronic kidney disease    ASSESSMENT/PLAN:  1  Chronic kidney disease, stage IIIB, creatinine at baseline near 1 0 most recent creatinine 1 03 estimated GFR 56  2  History of Juan's granulomatosis, recent serologies negative  3  Chronic lymphedema, volume status stable, has stopped diuretic therapy given urinary frequency  If swelling worsens could consider Aldactone or thiazide diuretic  4  Hypertension, blood pressure stable  5  Chronically low immunoglobulin levels, following with Hematology Oncology  PLAN:  · Overall renal function remains stable  · No significant proteinuria  · Serologies recently negative  · No changes from our management if swelling becomes worse could consider thiazide diuretic or Aldactone  · Follow-up in 1 year with repeat labs at that time    SUBJECTIVE:  She has been doing reasonably well  Denies any recent hospitalizations or illnesses  Slight increase in swelling since stopping the Lasix but no significant change in weight  Known history of chronic lymphedema  No reports of chest pain or shortness of breath  Appetite has been stable  Review of Systems    OBJECTIVE:  Current Weight: Weight - Scale: 102 kg (224 lb 12 8 oz)  Vitals:    06/08/21 1249   BP: 128/80   BP Location: Left arm   Patient Position: Sitting   Cuff Size: Large   Pulse: 64   Resp: 18   Weight: 102 kg (224 lb 12 8 oz)   Height: 5' 4" (1 626 m)     [unfilled]    Physical Exam  Constitutional:       Appearance: [facility] is obese  [facility] is not ill-appearing  HENT:      Head: Normocephalic and atraumatic  Eyes:      General: No scleral icterus  Cardiovascular:      Rate and Rhythm: Normal rate and regular rhythm  Pulmonary:      Effort: Pulmonary effort is normal       Breath sounds: Normal breath sounds  Abdominal:      General: There is no distension  Palpations: Abdomen is soft  Musculoskeletal:         General: Swelling present  Right lower leg: Edema (2+) present  Left lower leg: Edema (1+) present  Skin:     General: Skin is warm and dry  Findings: No rash  Neurological:      Mental Status: Gerard Barajas is alert and oriented to person, place, and time  Medications:    Current Outpatient Medications:     acetaminophen (TYLENOL) 325 mg tablet, Take 2 tablets by mouth every 6 (six) hours as needed for mild pain (Patient taking differently: Take 650 mg by mouth every 6 (six) hours as needed for mild pain ), Disp: 30 tablet, Rfl: 0    bisacodyl (DULCOLAX) 5 mg EC tablet, Take 5 mg by mouth daily as needed for constipation , Disp: , Rfl:     Calcium Carbonate-Vit D-Min (CALCIUM 1200 PO), Take 3 tablets by mouth daily , Disp: , Rfl:     DULoxetine (CYMBALTA) 60 mg delayed release capsule, Take 60 mg by mouth daily  , Disp: , Rfl: 0    LORazepam (ATIVAN) 0 5 mg tablet, Take 1 tablet as needed every 8 hours  (Patient taking differently: Take 0 5 mg by mouth 2 (two) times a day Take 1 tablet as needed every 8 hours  ), Disp: 30 tablet, Rfl: 0    metoprolol tartrate (LOPRESSOR) 25 mg tablet, Take 0 5 tablets (12 5 mg total) by mouth every 12 (twelve) hours, Disp: 90 tablet, Rfl: 3    Multiple Vitamins-Minerals (MULTIVITAMIN ADULT PO), Take 1 capsule by mouth daily , Disp: , Rfl:     omeprazole (PriLOSEC) 20 mg delayed release capsule, Take 20 mg by mouth daily  , Disp: , Rfl:     oxyCODONE (OxyCONTIN) 40 mg 12 hr tablet, Take 40 mg by mouth every 12 (twelve) hours, Disp: , Rfl:     OXYCODONE HCL PO, Take 15 mg by mouth 2 (two) times a day as needed , Disp: , Rfl:     polyethylene glycol (MIRALAX) 17 g packet, Take 17 g by mouth as needed , Disp: , Rfl:     PREVIDENT 5000 DRY MOUTH 1 1 % GEL, , Disp: , Rfl:     TOVIAZ 8 MG TB24, Take 8 mg by mouth daily at bedtime , Disp: , Rfl:     furosemide (LASIX) 20 mg tablet, Take 1 tablet (20 mg total) by mouth daily (Patient not taking: Reported on 3/12/2021), Disp: 90 tablet, Rfl: 3    ondansetron (ZOFRAN) 4 mg tablet, Take 1 tablet (4 mg total) by mouth every 8 (eight) hours as needed for nausea or vomiting (Patient not taking: Reported on 6/4/2021), Disp: 20 tablet, Rfl: 0    penicillin V potassium (VEETID) 250 mg tablet, Take 1 tablet (250 mg total) by mouth every 12 (twelve) hours (Patient not taking: Reported on 5/7/2021), Disp: 60 tablet, Rfl: 3    prochlorperazine (COMPAZINE) 10 mg tablet, Take 1 tablet (10 mg total) by mouth every 6 (six) hours as needed for nausea or vomiting (Patient not taking: Reported on 6/4/2021), Disp: 30 tablet, Rfl: 0    Laboratory Results:        Invalid input(s): ALBUMIN

## 2021-06-08 NOTE — LETTER
June 8, 2021     Sammie Nolan DO  [address] [facility]  [address]    Patient: Apolinar Laguerre   YOB: 1955   Date of Visit: 6/8/2021       Dear Dr Peralta Shadow: Thank you for referring Inderjit Gauthier to me for evaluation  Below are the relevant portions of my assessment and plan of care  If you have questions, please do not hesitate to call me  I look forward to following Hilario García along with you  Sincerely,        Brooks Garcia DO        CC: No Recipients                     ASSESSMENT/PLAN:  1  Chronic kidney disease, stage IIIB, creatinine at baseline near 1 0 most recent creatinine 1 03 estimated GFR 56  2  History of Juan's granulomatosis, recent serologies negative  3  Chronic lymphedema, volume status stable, has stopped diuretic therapy given urinary frequency  If swelling worsens could consider Aldactone or thiazide diuretic  4  Hypertension, blood pressure stable  5  Chronically low immunoglobulin levels, following with Hematology Oncology      PLAN:  · Overall renal function remains stable  · No significant proteinuria  · Serologies recently negative  · No changes from our management if swelling becomes worse could consider thiazide diuretic or Aldactone  · Follow-up in 1 year with repeat labs at that time

## 2021-06-09 ENCOUNTER — TELEPHONE (OUTPATIENT)
Dept: HEMATOLOGY ONCOLOGY | Facility: CLINIC | Age: 66
End: 2021-06-09

## 2021-06-09 NOTE — TELEPHONE ENCOUNTER
----- Message from Migel Moy MD sent at 6/8/2021  5:21 PM EDT -----    We will contact the patient and ensure that the insurance is approving IVIG  ----- Message -----  From: Deena Bonilla DO  Sent: 6/8/2021   1:12 PM EDT  To: Migel Moy MD    Just had a follow up appt with me:    She had a question:    From the notes it looks like she was going to have an IGG infusion, but she has not heard anything as of yet? Not sure if that was something that was going to be done  Thank you       Sandrine Olivares

## 2021-06-09 NOTE — TELEPHONE ENCOUNTER
Called pt and confirmed her most recent Igg level was 409 on 4/10/21  Since levels are running in the 400 range and she is asymptomatic we will continue with watchful observation per Dr Arturo Scott  She is in agreement with plan  Confirmed her follow up appt info in November

## 2021-07-06 ENCOUNTER — HOSPITAL ENCOUNTER (INPATIENT)
Facility: HOSPITAL | Age: 66
LOS: 3 days | Discharge: HOME HEALTH | DRG: 872 | End: 2021-07-09
Attending: EMERGENCY MEDICINE | Admitting: FAMILY MEDICINE
Payer: MEDICARE

## 2021-07-06 ENCOUNTER — APPOINTMENT (INPATIENT)
Dept: RADIOLOGY | Facility: HOSPITAL | Age: 66
DRG: 872 | End: 2021-07-06
Payer: MEDICARE

## 2021-07-06 ENCOUNTER — APPOINTMENT (EMERGENCY)
Dept: RADIOLOGY | Facility: HOSPITAL | Age: 66
DRG: 872 | End: 2021-07-06
Payer: MEDICARE

## 2021-07-06 DIAGNOSIS — R77.8 ELEVATED TROPONIN: ICD-10-CM

## 2021-07-06 DIAGNOSIS — R65.20 SEVERE SEPSIS (HCC): Primary | ICD-10-CM

## 2021-07-06 DIAGNOSIS — A41.9 SEVERE SEPSIS (HCC): Primary | ICD-10-CM

## 2021-07-06 DIAGNOSIS — N17.9 AKI (ACUTE KIDNEY INJURY) (HCC): ICD-10-CM

## 2021-07-06 DIAGNOSIS — M54.9 BACK PAIN: ICD-10-CM

## 2021-07-06 DIAGNOSIS — M31.30 GRANULOMATOSIS WITH POLYANGIITIS (HCC): ICD-10-CM

## 2021-07-06 DIAGNOSIS — L03.115 CELLULITIS OF RIGHT LOWER EXTREMITY: ICD-10-CM

## 2021-07-06 PROBLEM — R41.82 ALTERED MENTAL STATUS: Status: ACTIVE | Noted: 2021-07-06

## 2021-07-06 PROBLEM — R41.82 ALTERED MENTAL STATUS: Status: RESOLVED | Noted: 2021-07-06 | Resolved: 2021-07-06

## 2021-07-06 PROBLEM — M25.551 RIGHT HIP PAIN: Status: ACTIVE | Noted: 2021-07-06

## 2021-07-06 LAB
ALBUMIN SERPL BCP-MCNC: 3.6 G/DL (ref 3.5–5)
ALP SERPL-CCNC: 179 U/L (ref 46–116)
ALT SERPL W P-5'-P-CCNC: 87 U/L (ref 12–78)
ANION GAP SERPL CALCULATED.3IONS-SCNC: 10 MMOL/L (ref 4–13)
APTT PPP: 30 SECONDS (ref 23–37)
AST SERPL W P-5'-P-CCNC: 100 U/L (ref 5–45)
ATRIAL RATE: 112 BPM
BACTERIA UR QL AUTO: NORMAL /HPF
BASOPHILS # BLD AUTO: 0.06 THOUSANDS/ΜL (ref 0–0.1)
BASOPHILS NFR BLD AUTO: 0 % (ref 0–1)
BILIRUB SERPL-MCNC: 1 MG/DL (ref 0.2–1)
BILIRUB UR QL STRIP: NEGATIVE
BUN SERPL-MCNC: 14 MG/DL (ref 5–25)
CALCIUM SERPL-MCNC: 9.6 MG/DL (ref 8.3–10.1)
CHLORIDE SERPL-SCNC: 102 MMOL/L (ref 100–108)
CLARITY UR: CLEAR
CO2 SERPL-SCNC: 24 MMOL/L (ref 21–32)
COLOR UR: ABNORMAL
CREAT SERPL-MCNC: 1.26 MG/DL (ref 0.6–1.3)
CRP SERPL QL: 59.6 MG/L
EOSINOPHIL # BLD AUTO: 0 THOUSAND/ΜL (ref 0–0.61)
EOSINOPHIL NFR BLD AUTO: 0 % (ref 0–6)
ERYTHROCYTE [DISTWIDTH] IN BLOOD BY AUTOMATED COUNT: 13.2 % (ref 11.6–15.1)
ERYTHROCYTE [SEDIMENTATION RATE] IN BLOOD: 43 MM/HOUR
GLUCOSE SERPL-MCNC: 133 MG/DL (ref 65–140)
GLUCOSE SERPL-MCNC: 142 MG/DL (ref 65–140)
GLUCOSE UR STRIP-MCNC: NEGATIVE MG/DL
HCT VFR BLD AUTO: 46.8 % (ref 36.5–46.1)
HGB BLD-MCNC: 15.4 G/DL (ref 12–15.4)
HGB UR QL STRIP.AUTO: NEGATIVE
HYALINE CASTS #/AREA URNS LPF: NORMAL /LPF
IGA SERPL-MCNC: 30 MG/DL (ref 70–400)
IGG SERPL-MCNC: 565 MG/DL (ref 700–1600)
IGM SERPL-MCNC: 77 MG/DL (ref 40–230)
IMM GRANULOCYTES # BLD AUTO: 0.39 THOUSAND/UL (ref 0–0.2)
IMM GRANULOCYTES NFR BLD AUTO: 2 % (ref 0–2)
INR PPP: 1.14 (ref 0.84–1.19)
KETONES UR STRIP-MCNC: ABNORMAL MG/DL
LACTATE SERPL-SCNC: 2 MMOL/L (ref 0.5–2)
LACTATE SERPL-SCNC: 2.6 MMOL/L (ref 0.5–2)
LACTATE SERPL-SCNC: 3.7 MMOL/L (ref 0.5–2)
LACTATE SERPL-SCNC: 5.2 MMOL/L (ref 0.5–2)
LEUKOCYTE ESTERASE UR QL STRIP: NEGATIVE
LYMPHOCYTES # BLD AUTO: 1.1 THOUSANDS/ΜL (ref 0.6–4.47)
LYMPHOCYTES NFR BLD AUTO: 5 % (ref 14–44)
MCH RBC QN AUTO: 30.7 PG (ref 26.8–34.3)
MCHC RBC AUTO-ENTMCNC: 32.9 G/DL (ref 31.4–37.4)
MCV RBC AUTO: 93 FL (ref 82–98)
MONOCYTES # BLD AUTO: 0.67 THOUSAND/ΜL (ref 0.17–1.22)
MONOCYTES NFR BLD AUTO: 3 % (ref 4–12)
NEUTROPHILS # BLD AUTO: 21.41 THOUSANDS/ΜL (ref 1.85–7.62)
NEUTS SEG NFR BLD AUTO: 90 % (ref 43–75)
NITRITE UR QL STRIP: NEGATIVE
NON-SQ EPI CELLS URNS QL MICRO: NORMAL /HPF
NRBC BLD AUTO-RTO: 0 /100 WBCS
P AXIS: 41 DEGREES
PH UR STRIP.AUTO: 5.5 [PH]
PLATELET # BLD AUTO: 231 THOUSANDS/UL (ref 149–390)
PMV BLD AUTO: 9.3 FL (ref 8.9–12.7)
POTASSIUM SERPL-SCNC: 3.8 MMOL/L (ref 3.5–5.3)
PR INTERVAL: 128 MS
PROCALCITONIN SERPL-MCNC: 15.06 NG/ML
PROT SERPL-MCNC: 7.6 G/DL (ref 6.4–8.2)
PROT UR STRIP-MCNC: ABNORMAL MG/DL
PROTHROMBIN TIME: 14.6 SECONDS (ref 11.6–14.5)
QRS AXIS: -28 DEGREES
QRSD INTERVAL: 82 MS
QT INTERVAL: 332 MS
QTC INTERVAL: 453 MS
RBC # BLD AUTO: 5.02 MILLION/UL (ref 3.88–5.12)
RBC #/AREA URNS AUTO: NORMAL /HPF
SODIUM SERPL-SCNC: 136 MMOL/L (ref 136–145)
SP GR UR STRIP.AUTO: 1.02 (ref 1–1.03)
T WAVE AXIS: 30 DEGREES
TROPONIN I SERPL-MCNC: 0.41 NG/ML
TROPONIN I SERPL-MCNC: 0.64 NG/ML
TROPONIN I SERPL-MCNC: 0.79 NG/ML
UROBILINOGEN UR QL STRIP.AUTO: 1 E.U./DL
VENTRICULAR RATE: 112 BPM
WBC # BLD AUTO: 23.63 THOUSAND/UL (ref 4.31–10.16)
WBC #/AREA URNS AUTO: NORMAL /HPF

## 2021-07-06 PROCEDURE — 71045 X-RAY EXAM CHEST 1 VIEW: CPT

## 2021-07-06 PROCEDURE — 96366 THER/PROPH/DIAG IV INF ADDON: CPT

## 2021-07-06 PROCEDURE — A9585 GADOBUTROL INJECTION: HCPCS | Performed by: EMERGENCY MEDICINE

## 2021-07-06 PROCEDURE — 85652 RBC SED RATE AUTOMATED: CPT | Performed by: EMERGENCY MEDICINE

## 2021-07-06 PROCEDURE — 83605 ASSAY OF LACTIC ACID: CPT | Performed by: EMERGENCY MEDICINE

## 2021-07-06 PROCEDURE — G1004 CDSM NDSC: HCPCS

## 2021-07-06 PROCEDURE — 99291 CRITICAL CARE FIRST HOUR: CPT | Performed by: EMERGENCY MEDICINE

## 2021-07-06 PROCEDURE — 1124F ACP DISCUSS-NO DSCNMKR DOCD: CPT | Performed by: INTERNAL MEDICINE

## 2021-07-06 PROCEDURE — 83605 ASSAY OF LACTIC ACID: CPT | Performed by: NURSE PRACTITIONER

## 2021-07-06 PROCEDURE — 99223 1ST HOSP IP/OBS HIGH 75: CPT | Performed by: FAMILY MEDICINE

## 2021-07-06 PROCEDURE — 72158 MRI LUMBAR SPINE W/O & W/DYE: CPT

## 2021-07-06 PROCEDURE — 86140 C-REACTIVE PROTEIN: CPT | Performed by: EMERGENCY MEDICINE

## 2021-07-06 PROCEDURE — 84145 PROCALCITONIN (PCT): CPT | Performed by: EMERGENCY MEDICINE

## 2021-07-06 PROCEDURE — 85025 COMPLETE CBC W/AUTO DIFF WBC: CPT | Performed by: EMERGENCY MEDICINE

## 2021-07-06 PROCEDURE — 81001 URINALYSIS AUTO W/SCOPE: CPT | Performed by: EMERGENCY MEDICINE

## 2021-07-06 PROCEDURE — 85730 THROMBOPLASTIN TIME PARTIAL: CPT | Performed by: EMERGENCY MEDICINE

## 2021-07-06 PROCEDURE — 36415 COLL VENOUS BLD VENIPUNCTURE: CPT

## 2021-07-06 PROCEDURE — 82784 ASSAY IGA/IGD/IGG/IGM EACH: CPT | Performed by: FAMILY MEDICINE

## 2021-07-06 PROCEDURE — 82948 REAGENT STRIP/BLOOD GLUCOSE: CPT

## 2021-07-06 PROCEDURE — 74176 CT ABD & PELVIS W/O CONTRAST: CPT

## 2021-07-06 PROCEDURE — 80053 COMPREHEN METABOLIC PANEL: CPT | Performed by: EMERGENCY MEDICINE

## 2021-07-06 PROCEDURE — 93010 ELECTROCARDIOGRAM REPORT: CPT | Performed by: INTERNAL MEDICINE

## 2021-07-06 PROCEDURE — 93005 ELECTROCARDIOGRAM TRACING: CPT

## 2021-07-06 PROCEDURE — 99285 EMERGENCY DEPT VISIT HI MDM: CPT

## 2021-07-06 PROCEDURE — 84484 ASSAY OF TROPONIN QUANT: CPT | Performed by: EMERGENCY MEDICINE

## 2021-07-06 PROCEDURE — 83605 ASSAY OF LACTIC ACID: CPT

## 2021-07-06 PROCEDURE — 87040 BLOOD CULTURE FOR BACTERIA: CPT | Performed by: EMERGENCY MEDICINE

## 2021-07-06 PROCEDURE — 84484 ASSAY OF TROPONIN QUANT: CPT | Performed by: FAMILY MEDICINE

## 2021-07-06 PROCEDURE — 96365 THER/PROPH/DIAG IV INF INIT: CPT

## 2021-07-06 PROCEDURE — 96368 THER/DIAG CONCURRENT INF: CPT

## 2021-07-06 PROCEDURE — 85610 PROTHROMBIN TIME: CPT | Performed by: EMERGENCY MEDICINE

## 2021-07-06 PROCEDURE — 73502 X-RAY EXAM HIP UNI 2-3 VIEWS: CPT

## 2021-07-06 PROCEDURE — 72146 MRI CHEST SPINE W/O DYE: CPT

## 2021-07-06 RX ORDER — LIDOCAINE 50 MG/G
1 PATCH TOPICAL DAILY
Status: DISCONTINUED | OUTPATIENT
Start: 2021-07-06 | End: 2021-07-09 | Stop reason: HOSPADM

## 2021-07-06 RX ORDER — VANCOMYCIN HYDROCHLORIDE 1 G/200ML
10 INJECTION, SOLUTION INTRAVENOUS EVERY 12 HOURS
Status: DISCONTINUED | OUTPATIENT
Start: 2021-07-07 | End: 2021-07-06

## 2021-07-06 RX ORDER — POLYETHYLENE GLYCOL 3350 17 G/17G
17 POWDER, FOR SOLUTION ORAL DAILY
Status: DISCONTINUED | OUTPATIENT
Start: 2021-07-06 | End: 2021-07-09 | Stop reason: HOSPADM

## 2021-07-06 RX ORDER — HYDROMORPHONE HCL/PF 1 MG/ML
1 SYRINGE (ML) INJECTION ONCE
Status: COMPLETED | OUTPATIENT
Start: 2021-07-06 | End: 2021-07-06

## 2021-07-06 RX ORDER — OXYCODONE HCL 40 MG/1
40 TABLET, FILM COATED, EXTENDED RELEASE ORAL EVERY 12 HOURS SCHEDULED
Status: DISCONTINUED | OUTPATIENT
Start: 2021-07-06 | End: 2021-07-09 | Stop reason: HOSPADM

## 2021-07-06 RX ORDER — SODIUM CHLORIDE, SODIUM GLUCONATE, SODIUM ACETATE, POTASSIUM CHLORIDE, MAGNESIUM CHLORIDE, SODIUM PHOSPHATE, DIBASIC, AND POTASSIUM PHOSPHATE .53; .5; .37; .037; .03; .012; .00082 G/100ML; G/100ML; G/100ML; G/100ML; G/100ML; G/100ML; G/100ML
1000 INJECTION, SOLUTION INTRAVENOUS ONCE
Status: COMPLETED | OUTPATIENT
Start: 2021-07-06 | End: 2021-07-06

## 2021-07-06 RX ORDER — HYDROMORPHONE HCL/PF 1 MG/ML
0.5 SYRINGE (ML) INJECTION EVERY 4 HOURS PRN
Status: DISCONTINUED | OUTPATIENT
Start: 2021-07-06 | End: 2021-07-09 | Stop reason: HOSPADM

## 2021-07-06 RX ORDER — ONDANSETRON 2 MG/ML
4 INJECTION INTRAMUSCULAR; INTRAVENOUS ONCE
Status: COMPLETED | OUTPATIENT
Start: 2021-07-06 | End: 2021-07-06

## 2021-07-06 RX ORDER — SODIUM CHLORIDE 9 MG/ML
100 INJECTION, SOLUTION INTRAVENOUS CONTINUOUS
Status: DISPENSED | OUTPATIENT
Start: 2021-07-06 | End: 2021-07-07

## 2021-07-06 RX ORDER — SENNOSIDES 8.6 MG
1 TABLET ORAL
Status: DISCONTINUED | OUTPATIENT
Start: 2021-07-06 | End: 2021-07-09

## 2021-07-06 RX ORDER — HEPARIN SODIUM 5000 [USP'U]/ML
5000 INJECTION, SOLUTION INTRAVENOUS; SUBCUTANEOUS EVERY 8 HOURS SCHEDULED
Status: DISCONTINUED | OUTPATIENT
Start: 2021-07-06 | End: 2021-07-09 | Stop reason: HOSPADM

## 2021-07-06 RX ORDER — OXYCODONE HYDROCHLORIDE 10 MG/1
10 TABLET ORAL EVERY 4 HOURS PRN
Status: DISCONTINUED | OUTPATIENT
Start: 2021-07-06 | End: 2021-07-09 | Stop reason: HOSPADM

## 2021-07-06 RX ORDER — ASPIRIN 81 MG/1
162 TABLET, CHEWABLE ORAL ONCE
Status: COMPLETED | OUTPATIENT
Start: 2021-07-06 | End: 2021-07-06

## 2021-07-06 RX ORDER — DULOXETIN HYDROCHLORIDE 60 MG/1
60 CAPSULE, DELAYED RELEASE ORAL DAILY
Status: DISCONTINUED | OUTPATIENT
Start: 2021-07-06 | End: 2021-07-09 | Stop reason: HOSPADM

## 2021-07-06 RX ORDER — ACETAMINOPHEN 325 MG/1
650 TABLET ORAL EVERY 6 HOURS PRN
Status: DISCONTINUED | OUTPATIENT
Start: 2021-07-06 | End: 2021-07-06

## 2021-07-06 RX ORDER — ACETAMINOPHEN 325 MG/1
975 TABLET ORAL ONCE
Status: COMPLETED | OUTPATIENT
Start: 2021-07-06 | End: 2021-07-06

## 2021-07-06 RX ORDER — ONDANSETRON 2 MG/ML
4 INJECTION INTRAMUSCULAR; INTRAVENOUS EVERY 6 HOURS PRN
Status: DISCONTINUED | OUTPATIENT
Start: 2021-07-06 | End: 2021-07-09 | Stop reason: HOSPADM

## 2021-07-06 RX ORDER — LORAZEPAM 0.5 MG/1
0.5 TABLET ORAL EVERY 8 HOURS PRN
Status: DISCONTINUED | OUTPATIENT
Start: 2021-07-06 | End: 2021-07-09 | Stop reason: HOSPADM

## 2021-07-06 RX ORDER — MAGNESIUM HYDROXIDE/ALUMINUM HYDROXICE/SIMETHICONE 120; 1200; 1200 MG/30ML; MG/30ML; MG/30ML
30 SUSPENSION ORAL EVERY 6 HOURS PRN
Status: DISCONTINUED | OUTPATIENT
Start: 2021-07-06 | End: 2021-07-09 | Stop reason: HOSPADM

## 2021-07-06 RX ADMIN — HEPARIN SODIUM 5000 UNITS: 5000 INJECTION INTRAVENOUS; SUBCUTANEOUS at 14:17

## 2021-07-06 RX ADMIN — SODIUM CHLORIDE 100 ML/HR: 0.9 INJECTION, SOLUTION INTRAVENOUS at 14:19

## 2021-07-06 RX ADMIN — SODIUM CHLORIDE, SODIUM GLUCONATE, SODIUM ACETATE, POTASSIUM CHLORIDE, MAGNESIUM CHLORIDE, SODIUM PHOSPHATE, DIBASIC, AND POTASSIUM PHOSPHATE 1000 ML: .53; .5; .37; .037; .03; .012; .00082 INJECTION, SOLUTION INTRAVENOUS at 10:01

## 2021-07-06 RX ADMIN — OXYCODONE HYDROCHLORIDE 40 MG: 40 TABLET, FILM COATED, EXTENDED RELEASE ORAL at 21:54

## 2021-07-06 RX ADMIN — LORAZEPAM 0.5 MG: 0.5 TABLET ORAL at 14:22

## 2021-07-06 RX ADMIN — SENNOSIDES 8.6 MG: 8.6 TABLET ORAL at 21:54

## 2021-07-06 RX ADMIN — SODIUM CHLORIDE 1000 ML: 0.9 INJECTION, SOLUTION INTRAVENOUS at 13:03

## 2021-07-06 RX ADMIN — GADOBUTROL 10 ML: 604.72 INJECTION INTRAVENOUS at 16:50

## 2021-07-06 RX ADMIN — LIDOCAINE 1 PATCH: 50 PATCH TOPICAL at 18:28

## 2021-07-06 RX ADMIN — HEPARIN SODIUM 5000 UNITS: 5000 INJECTION INTRAVENOUS; SUBCUTANEOUS at 21:40

## 2021-07-06 RX ADMIN — ONDANSETRON 4 MG: 2 INJECTION INTRAMUSCULAR; INTRAVENOUS at 12:37

## 2021-07-06 RX ADMIN — ACETAMINOPHEN 975 MG: 325 TABLET, FILM COATED ORAL at 10:18

## 2021-07-06 RX ADMIN — HYDROMORPHONE HYDROCHLORIDE 1 MG: 1 INJECTION, SOLUTION INTRAMUSCULAR; INTRAVENOUS; SUBCUTANEOUS at 12:37

## 2021-07-06 RX ADMIN — OXYCODONE HYDROCHLORIDE 40 MG: 40 TABLET, FILM COATED, EXTENDED RELEASE ORAL at 14:16

## 2021-07-06 RX ADMIN — ASPIRIN 81 MG CHEWABLE TABLET 162 MG: 81 TABLET CHEWABLE at 11:57

## 2021-07-06 RX ADMIN — Medication 12.5 MG: at 14:16

## 2021-07-06 RX ADMIN — VANCOMYCIN HYDROCHLORIDE 1500 MG: 10 INJECTION, POWDER, LYOPHILIZED, FOR SOLUTION INTRAVENOUS at 12:02

## 2021-07-06 RX ADMIN — CEFEPIME HYDROCHLORIDE 2000 MG: 2 INJECTION, POWDER, FOR SOLUTION INTRAVENOUS at 10:59

## 2021-07-06 NOTE — ASSESSMENT & PLAN NOTE
jenny with h/o chronic hip pain, now has acute worsening     history of chronic deformity and dislocation of right femoral head  Clinically not appearing to be septic joint  Will obtain XR

## 2021-07-06 NOTE — SEPSIS NOTE
Sepsis Note   Monica George 74 y o  adult MRN: [id number]  Unit/Bed#: ED 11 Encounter: [id number]      qSOFA     Row Name 07/06/21 1100 07/06/21 1008 07/06/21 1000 07/06/21 0947       Altered mental status GCS < 15  --  0  --  --     Respiratory Rate > / =22  1  --  1  1     Systolic BP < / =142  0  --  0  0     Q Sofa Score  1  1  1  1         Initial Sepsis Screening     Row Name 07/06/21 1041                Is the patient's history suggestive of a new or worsening infection? (!) Yes (Proceed)  -MM        Suspected source of infection  suspect infection, source unknown  -MM        Are two or more of the following signs & symptoms of infection both present and new to the patient? (!) Yes (Proceed)  -MM        Indicate SIRS criteria  Hyperthemia > 38 3C (100 9F); Tachycardia > 90 bpm;Leukocytosis (WBC > 68997 IJL)  -MM        If the answer is yes to both questions, suspicion of sepsis is present  --        If severe sepsis is present AND tissue hypoperfusion perists in the hour after fluid resuscitation or lactate > 4, the patient meets criteria for SEPTIC SHOCK  --        Are any of the following organ dysfunction criteria present within 6 hours of suspected infection and SIRS criteria that are NOT considered to be chronic conditions? --        Organ dysfunction  Creatinine > 0 5 mg/dl ABOVE BASELINE;Lactate >/equal 4 0 mmol/L  -MM        Date of presentation of severe sepsis  07/06/21  -MM        Time of presentation of severe sepsis  1118  -MM        Tissue hypoperfusion persists in the hour after crystalloid fluid administration, evidenced, by either:  --        Was hypotension present within one hour of the conclusion of crystalloid fluid administration?   No  -MM        Date of presentation of septic shock  --        Time of presentation of septic shock  --          User Key  (r) = Recorded By, (t) = Taken By, (c) = Cosigned By    [address] Name Provider Type    DO Lex Pritchard
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
## 2021-07-06 NOTE — ASSESSMENT & PLAN NOTE
Patient seems to have elevated troponin since 2019    She denies any chest pain or dyspnea  Troponin elevated Likely secondary to sepsis   trend troponin

## 2021-07-06 NOTE — ED ATTENDING ATTESTATION
7/6/2021  Myles Barbosa DO, saw and evaluated the patient  I have discussed the patient with the resident/non-physician practitioner and agree with the resident's/non-physician practitioner's findings, Plan of Care, and MDM as documented in the resident's/non-physician practitioner's note, except where noted  All available labs and Radiology studies were reviewed  I was present for key portions of any procedure(s) performed by the resident/non-physician practitioner and I was immediately available to provide assistance  At this point I agree with the current assessment done in the Emergency Department  I have conducted an independent evaluation of this patient a history and physical is as follows:    68-year-old female with fever and depressed mental status  History of sepsis  Past Medical History:   Diagnosis Date    BRCA1 positive     Cancer ([facility] [address] )     ovarian Ca with chemo    History of chemotherapy     ovarian cancer 2007    History of pulmonary embolus (PE) & DVT 03/2007    post-op FAMILIA/ omenectomy    Lymphedema     MRSA (methicillin resistant Staphylococcus aureus) 09/05/2017    nasal swab negative 4/3/19    Ovarian cancer ([facility] [address] )     CYST REMOVED RIGHT OVARY IN 2007  HYSTERECTOMY 02/14/07  /66 (BP Location: Right arm)   Pulse (!) 114   Temp (!) 102 1 °F (38 9 °C) (Oral)   Resp 22   Wt 102 kg (224 lb 13 9 oz)   SpO2 98%   BMI 38 60 kg/m²   Appears ill, coarse breath sounds, tachycardic, abdomen is soft with mild lower abdominal tenderness, extremities with chronic lymphedema    ECG w sinus tach, no ST elevation     Septic evaluation/empiric tx  Admit                  ED Course         Critical Care Time  CriticalCare Time  Performed by: Deidra Ding DO  Authorized by: Deidra Ding DO     Critical care provider statement:     Critical care time (minutes):  41    Critical care time was exclusive of:  Separately billable procedures and treating other patients and teaching time    Critical care was necessary to treat or prevent imminent or life-threatening deterioration of the following conditions:  Sepsis    Critical care was time spent personally by me on the following activities:  Obtaining history from patient or surrogate, development of treatment plan with patient or surrogate, evaluation of patient's response to treatment, examination of patient, review of old charts, re-evaluation of patient's condition, ordering and review of radiographic studies, ordering and review of laboratory studies and ordering and performing treatments and interventions

## 2021-07-06 NOTE — ASSESSMENT & PLAN NOTE
paitnet with h/o chronic hip pain, now has acute worsening     Clinically not appearing to be septic joint  Will obtain XR
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
## 2021-07-06 NOTE — ED NOTES
Patient continues with back pain  Dr Rowan Castillo is aware  Requesting something for pain  Patient also complained of nausea, will medicate as ordered        Claudia Beck RN  07/06/21 4836

## 2021-07-06 NOTE — ASSESSMENT & PLAN NOTE
Lab Results   Component Value Date    EGFR 86 11/12/2019    EGFR 78 11/11/2019    EGFR 54 11/09/2019    CREATININE 1 26 07/06/2021    CREATININE 0 73 11/12/2019    CREATININE 0 79 11/11/2019     At baseline creatinine is 1  Patient presented with creatinine of 1 26  Likely secondary to sepsis, will give hydration and repeat labs

## 2021-07-06 NOTE — SEPSIS NOTE
Sepsis Note   Junior Regan 74 y o  adult MRN: [id number]  Unit/Bed#: CW2 218-01 Encounter: [id number]      qSOFA     Row Name 07/06/21 1530 07/06/21 1500 07/06/21 1434 07/06/21 1230 07/06/21 1200    Altered mental status GCS < 15  --  --  --  --  --    Respiratory Rate > / =22  0  0  0  0  1    Systolic BP < / =709  0  0  0  0  0    Q Sofa Score  0  0  0  0  1    Row Name 07/06/21 1130 07/06/21 1100 07/06/21 1008 07/06/21 1000 07/06/21 0947    Altered mental status GCS < 15  --  --  0  --  --    Respiratory Rate > / =22  1  1  --  1  1    Systolic BP < / =527  0  0  --  0  0    Q Sofa Score  1  1  1  1  1        Initial Sepsis Screening     Row Name 07/06/21 1041                Is the patient's history suggestive of a new or worsening infection? (!) Yes (Proceed)  -MM        Suspected source of infection  suspect infection, source unknown  -MM        Are two or more of the following signs & symptoms of infection both present and new to the patient? (!) Yes (Proceed)  -MM        Indicate SIRS criteria  Hyperthemia > 38 3C (100 9F); Tachycardia > 90 bpm;Leukocytosis (WBC > 67538 IJL)  -MM        If the answer is yes to both questions, suspicion of sepsis is present  --        If severe sepsis is present AND tissue hypoperfusion perists in the hour after fluid resuscitation or lactate > 4, the patient meets criteria for SEPTIC SHOCK  --        Are any of the following organ dysfunction criteria present within 6 hours of suspected infection and SIRS criteria that are NOT considered to be chronic conditions?   --        Organ dysfunction  Creatinine > 0 5 mg/dl ABOVE BASELINE;Lactate >/equal 4 0 mmol/L  -MM        Date of presentation of severe sepsis  07/06/21  -MM        Time of presentation of severe sepsis  1118  -MM        Tissue hypoperfusion persists in the hour after crystalloid fluid administration, evidenced, by either:  --        Was hypotension present within one hour of the conclusion of crystalloid fluid administration?   No  -MM        Date of presentation of septic shock  --        Time of presentation of septic shock  --          User Key  (r) = Recorded By, (t) = Taken By, (c) = Cosigned By    Initials Name Provider Type    BALDEV Sanchez DO Resident               Default Flowsheet Data (last 720 hours)      Sepsis Reassess     Row Name 07/06/21 1818 07/06/21 1435                Repeat Volume Status and Tissue Perfusion Assessment Performed    Repeat Volume Status and Tissue Perfusion Assessment Performed  Yes  -SA  Yes  -MM          Volume Status and Tissue Perfusion Post Fluid Resuscitation * Must Document All *    Vital Signs Reviewed (HR, RR, BP, T)  Yes  -SA  Yes  -MM       Shock Index Reviewed  Yes  -SA  Yes  -MM       Arterial Oxygen Saturation Reviewed (POx, SaO2 or SpO2)  Yes (comment %)  -SA  Yes (comment %) 94  -MM       Cardio  Normal S1/S2  -SA  (!) Tachycardia;Normal S1/S2  -MM       Pulmonary  Normal effort  -SA  Normal effort;Clear to auscultation  -MM       Capillary Refill  Brisk  -SA  Brisk  -MM       Peripheral Pulses  Radial  -SA  Radial  -MM       Peripheral Pulse  +4  -SA  +2  -MM       Skin  Warm;Dry  -SA  Warm  -MM       Urine output assessed  Adequate  -SA  Decreased  -MM          *OR*   Intensive Monitoring- Must Document One of the Following Four *:    Vital Signs Reviewed  --  --       * Central Venous Pressure (CVP or RAP)  --  --       * Central Venous Oxygen (SVO2, ScvO2 or Oxygen saturation via central catheter)  --  --       * Bedside Cardiovascular US in IVC diameter and % collapse  --  --       * Passive Leg Raise OR Crystalloid Challenge  --  --         User Key  (r) = Recorded By, (t) = Taken By, (c) = Cosigned By    Initials Name Provider Type    BALDEV Sanchez DO Resident    Alis Haney MD Physician

## 2021-07-06 NOTE — ED PROVIDER NOTES
History  Chief Complaint   Patient presents with    Altered Mental Status     As per , patient has increase confusion today and fevers  States that yesterday she had a headache  Cam Latvian is a 74y o  year old female with PMH of granulomatosis with polyangiitis presenting to the [facility] ED for fevers and generalized weakness  Symptoms started one day prior to arrival with gradually worsening, intermittent headaches similar to prior headaches  Patient went to get out of bed this morning and had difficulty standing due to weakness  Patient more confused than baseline per   Patient reports chronic back pain which is worse this morning  Patient had urinary incontinence this morning which  has noticed with prior UTI  Patient denies chest pain, dyspnea, cough, abdominal pain, flank pain  Patient has not taken/received any medications at home for relief of symptoms  Patient has history of bacteremia previously without known source of infection  Patient presumed COVID positive previously after exposure to  though has since received x2 doses COVID innoculation  History provided by:  Patient and spouse   used: No        Prior to Admission Medications   Prescriptions Last Dose Informant Patient Reported? Taking? Calcium Carbonate-Vit D-Min (CALCIUM 1200 PO)  Self Yes Yes   Sig: Take 3 tablets by mouth daily    DULoxetine (CYMBALTA) 60 mg delayed release capsule  Self Yes Yes   Sig: Take 60 mg by mouth daily     LORazepam (ATIVAN) 0 5 mg tablet  Self No Yes   Sig: Take 1 tablet as needed every 8 hours  Patient taking differently: Take 0 5 mg by mouth 2 (two) times a day Take 1 tablet as needed every 8 hours      Multiple Vitamins-Minerals (MULTIVITAMIN ADULT PO)  Self Yes Yes   Sig: Take 1 capsule by mouth daily    OXYCODONE HCL PO  Self Yes Yes   Sig: Take 15 mg by mouth 2 (two) times a day as needed    PREVIDENT 5000 DRY MOUTH 1 1 % GEL  Self Yes Yes   TOVIAZ 8 MG TB24  Self Yes No   Sig: Take 8 mg by mouth daily at bedtime    acetaminophen (TYLENOL) 325 mg tablet  Self No Yes   Sig: Take 2 tablets by mouth every 6 (six) hours as needed for mild pain   Patient taking differently: Take 650 mg by mouth every 6 (six) hours as needed for mild pain    bisacodyl (DULCOLAX) 5 mg EC tablet  Self Yes Yes   Sig: Take 5 mg by mouth daily as needed for constipation    furosemide (LASIX) 20 mg tablet Not Taking at Unknown time Self No No   Sig: Take 1 tablet (20 mg total) by mouth daily   Patient not taking: Reported on 3/12/2021   metoprolol tartrate (LOPRESSOR) 25 mg tablet  Self No Yes   Sig: Take 0 5 tablets (12 5 mg total) by mouth every 12 (twelve) hours   omeprazole (PriLOSEC) 20 mg delayed release capsule  Self Yes Yes   Sig: Take 20 mg by mouth daily     ondansetron (ZOFRAN) 4 mg tablet Not Taking at Unknown time Self No No   Sig: Take 1 tablet (4 mg total) by mouth every 8 (eight) hours as needed for nausea or vomiting   Patient not taking: Reported on 6/4/2021   oxyCODONE (OxyCONTIN) 40 mg 12 hr tablet  Self Yes Yes   Sig: Take 40 mg by mouth every 12 (twelve) hours   penicillin V potassium (VEETID) 250 mg tablet   No No   Sig: Take 1 tablet (250 mg total) by mouth every 12 (twelve) hours   Patient not taking: Reported on 5/7/2021   polyethylene glycol (MIRALAX) 17 g packet  Self Yes No   Sig: Take 17 g by mouth as needed    prochlorperazine (COMPAZINE) 10 mg tablet Not Taking at Unknown time Self No No   Sig: Take 1 tablet (10 mg total) by mouth every 6 (six) hours as needed for nausea or vomiting   Patient not taking: Reported on 6/4/2021      Facility-Administered Medications: None       Past Medical History:   Diagnosis Date    BRCA1 positive     Cancer ([facility] [address] )     ovarian Ca with chemo    History of chemotherapy     ovarian cancer 2007    History of pulmonary embolus (PE) & DVT 03/2007    post-op FAMILIA/ omenectomy    Lymphedema     MRSA (methicillin resistant Staphylococcus aureus) 2017    nasal swab negative 4/3/19    Ovarian cancer ([address] )     CYST REMOVED RIGHT OVARY IN   HYSTERECTOMY 07  Past Surgical History:   Procedure Laterality Date    APPENDECTOMY  1998    laparoscopic    BILATERAL SALPINGOOPHORECTOMY  2007    BREAST BIOPSY Right 2016     SECTION      HYSTERECTOMY  2007    Omentectomy and [facility] at [facility]     OTHER SURGICAL HISTORY      right lower extremity due to trauma    MI TREAT TIBIAL SHAFT FX, INTRAMED IMPLANT Right 2017    Procedure: INSERTION NAIL IM TIBIA;  Surgeon: Jacqui Alvarez MD;  Location: [facility];  Service: Orthopedics    US GUIDANCE BREAST BIOPSY LEFT EACH ADDITIONAL Left 7/15/2020    US GUIDED BREAST BIOPSY LEFT COMPLETE Left 7/15/2020    US GUIDED BREAST BIOPSY RIGHT COMPLETE Right 2016       Family History   Problem Relation Age of Onset    Breast cancer Mother         28    Pancreatic cancer Mother     Ovarian cancer Maternal Grandmother     Breast cancer Maternal Aunt 39    No Known Problems Paternal Aunt     No Known Problems Father     No Known Problems Maternal Grandfather     No Known Problems Paternal Grandmother     No Known Problems Paternal Grandfather     Lung cancer Half-Sister      I have reviewed and agree with the history as documented  E-Cigarette/Vaping    E-Cigarette Use Never User      E-Cigarette/Vaping Substances    Nicotine No     THC No     CBD No     Flavoring No     Other No     Unknown No      Social History     Tobacco Use    Smoking status: Never Smoker    Smokeless tobacco: Never Used   Vaping Use    Vaping Use: Never used   Substance Use Topics    Alcohol use: Yes    Drug use: Not Currently        Review of Systems   Constitutional: Positive for chills, fatigue and fever  HENT: Negative for congestion and rhinorrhea  Eyes: Negative for redness     Respiratory: Negative for cough, shortness of breath and wheezing  Cardiovascular: Positive for leg swelling (chronic)  Negative for chest pain  Gastrointestinal: Negative for abdominal pain, constipation, diarrhea, nausea and vomiting  Endocrine: Negative for polyuria  Genitourinary: Positive for frequency  Negative for difficulty urinating, dysuria and flank pain  Musculoskeletal: Positive for back pain  Negative for joint swelling, neck pain and neck stiffness  Skin: Negative for rash  Neurological: Positive for headaches  Negative for seizures, syncope and light-headedness  Hematological: Does not bruise/bleed easily  Psychiatric/Behavioral: Positive for confusion  Negative for behavioral problems  All other systems reviewed and are negative  Physical Exam  ED Triage Vitals [07/06/21 0947]   Temperature Pulse Respirations Blood Pressure SpO2   (!) 102 1 °F (38 9 °C) (!) 115 22 140/80 93 %      Temp Source Heart Rate Source Patient Position - Orthostatic VS BP Location FiO2 (%)   Oral Monitor Lying Right arm --      Pain Score       8             Orthostatic Vital Signs  Vitals:    07/06/21 1200 07/06/21 1230 07/06/21 1416 07/06/21 1434   BP: 138/65 140/65  125/83   Pulse: (!) 112 (!) 112 (!) 107 105   Patient Position - Orthostatic VS: Lying Lying  Lying       Physical Exam  Vitals and nursing note reviewed  Constitutional:       General: Merced Lema is not in acute distress  Appearance: Merced Lema is well-developed  Merced Lema is ill-appearing  Merced Lema is not toxic-appearing or diaphoretic  HENT:      Head: Normocephalic and atraumatic  Nose: No congestion or rhinorrhea  Eyes:      General:         Right eye: No discharge  Left eye: No discharge  Cardiovascular:      Rate and Rhythm: Regular rhythm  Tachycardia present  Pulmonary:      Effort: Pulmonary effort is normal  No respiratory distress  Breath sounds: Normal breath sounds  No wheezing or rales     Abdominal:      General: Bowel sounds are normal       Palpations: Abdomen is soft  Tenderness: There is no abdominal tenderness  There is no right CVA tenderness, left CVA tenderness, guarding or rebound  Musculoskeletal:      Cervical back: Normal range of motion and neck supple  No rigidity or tenderness  Normal range of motion  Right hip: No tenderness  Left hip: No tenderness  Right upper leg: Swelling present  No tenderness  Left upper leg: Swelling present  No tenderness  Right knee: No tenderness  Left knee: No tenderness  Right lower leg: Edema present  Left lower leg: Edema present  Right foot: No crepitus  Left foot: No crepitus  Comments: No warmth overlying hips  No crepitus b/l LE   Skin:     General: Skin is warm  Capillary Refill: Capillary refill takes less than 2 seconds  Comments: Patient exposed  No sacral ulcer/wound  No cellulitis/erythema/crepitus in b/l LE  Neurological:      Mental Status: Elbert Higuera is alert and oriented to person, place, and time  GCS: GCS eye subscore is 4  GCS verbal subscore is 5  GCS motor subscore is 6  Comments: Sensation grossly intact b/l LE  Atrophy of RLE  Weakness of RLE reported to be chronic  4/5 strength LLE     Psychiatric:         Mood and Affect: Mood normal          Behavior: Behavior normal          ED Medications  Medications   acetaminophen (TYLENOL) tablet 650 mg (has no administration in time range)   DULoxetine (CYMBALTA) delayed release capsule 60 mg (has no administration in time range)   LORazepam (ATIVAN) tablet 0 5 mg (0 5 mg Oral Given 7/6/21 1422)   metoprolol tartrate (LOPRESSOR) partial tablet 12 5 mg (12 5 mg Oral Given 7/6/21 1416)   oxyCODONE (OxyCONTIN) 12 hr tablet 40 mg (40 mg Oral Given 7/6/21 1416)   oxyCODONE (ROXICODONE) IR tablet 15 mg (has no administration in time range)   oxyCODONE (ROXICODONE) immediate release tablet 10 mg (has no administration in time range) HYDROmorphone (DILAUDID) injection 0 5 mg (has no administration in time range)   bisacodyl (DULCOLAX) EC tablet 5 mg (has no administration in time range)   sodium chloride 0 9 % infusion (100 mL/hr Intravenous New Bag 7/6/21 1419)   ondansetron (ZOFRAN) injection 4 mg (has no administration in time range)   aluminum-magnesium hydroxide-simethicone (MYLANTA) oral suspension 30 mL (has no administration in time range)   heparin (porcine) subcutaneous injection 5,000 Units (5,000 Units Subcutaneous Given 7/6/21 1417)   cefepime (MAXIPIME) 2 g/50 mL dextrose IVPB (has no administration in time range)   vancomycin (VANCOCIN) IVPB (premix in dextrose) 1,000 mg 200 mL (has no administration in time range)   acetaminophen (TYLENOL) tablet 975 mg (975 mg Oral Given 7/6/21 1018)   multi-electrolyte (ISOLYTE-S PH 7 4) bolus 1,000 mL (0 mL Intravenous Stopped 7/6/21 1300)   cefepime (MAXIPIME) 2 g/50 mL dextrose IVPB (0 mg Intravenous Stopped 7/6/21 1157)   vancomycin (VANCOCIN) 1500 mg in sodium chloride 0 9% 250 mL IVPB (0 mg Intravenous Stopped 7/6/21 1332)   aspirin chewable tablet 162 mg (162 mg Oral Given 7/6/21 1157)   sodium chloride 0 9 % bolus 1,000 mL (0 mL Intravenous Stopped 7/6/21 1403)   ondansetron (ZOFRAN) injection 4 mg (4 mg Intravenous Given 7/6/21 1237)   HYDROmorphone (DILAUDID) injection 1 mg (1 mg Intravenous Given 7/6/21 1237)       Diagnostic Studies  Results Reviewed     Procedure Component Value Units Date/Time    IgG, IgA, IgM [[id number]]  (Abnormal) Collected: 07/06/21 1015    Lab Status: Final result Specimen: Blood from Arm, Left Updated: 07/06/21 1410     IGA 30 0 mg/dL       0 mg/dL      IGM 77 0 mg/dL     Lactic acid, plasma [[id number]]  (Abnormal) Collected: 07/06/21 1311    Lab Status: Final result Specimen: Blood from Arm, Right Updated: 07/06/21 1402     LACTIC ACID 2 6 mmol/L     Narrative:      Result may be elevated if tourniquet was used during collection      Lactic acid 2 Hours [[id number]]     Lab Status: No result Specimen: Blood     Troponin I [[id number]]  (Abnormal) Collected: 07/06/21 1311    Lab Status: Final result Specimen: Blood from Arm, Right Updated: 07/06/21 1347     Troponin I 0 64 ng/mL     APTT [[id number]]  (Normal) Collected: 07/06/21 1209    Lab Status: Final result Specimen: Blood from Arm, Left Updated: 07/06/21 1303     PTT 30 seconds     Protime-INR [[id number]]  (Abnormal) Collected: 07/06/21 1209    Lab Status: Final result Specimen: Blood from Arm, Left Updated: 07/06/21 1303     Protime 14 6 seconds      INR 1 14    Lactic acid 2 Hours [[id number]]  (Abnormal) Collected: 07/06/21 1210    Lab Status: Final result Specimen: Blood from Arm, Left Updated: 07/06/21 1303     LACTIC ACID 3 7 mmol/L     Narrative:      Result may be elevated if tourniquet was used during collection      Platelet count [[id number]]     Lab Status: No result Specimen: Blood     Troponin I [[id number]]     Lab Status: No result Specimen: Blood     C-reactive protein [[id number]]  (Abnormal) Collected: 07/06/21 1015    Lab Status: Final result Specimen: Blood from Arm, Left Updated: 07/06/21 1229     CRP 59 6 mg/L     Sedimentation rate, automated [[id number]] Collected: 07/06/21 1015    Lab Status: Final result Specimen: Blood from Arm, Left Updated: 07/06/21 1225     Sed Rate 43 mm/hour     CBC and differential [[id number]]  (Abnormal) Collected: 07/06/21 1015    Lab Status: Final result Specimen: Blood from Arm, Left Updated: 07/06/21 1123     WBC 23 63 Thousand/uL      RBC 5 02 Million/uL      Hemoglobin 15 4 g/dL      Hematocrit 46 8 %      MCV 93 fL      MCH 30 7 pg      MCHC 32 9 g/dL      RDW 13 2 %      MPV 9 3 fL      Platelets 895 Thousands/uL      nRBC 0 /100 WBCs      Neutrophils Relative 90 %      Immat GRANS % 2 %      Lymphocytes Relative 5 %      Monocytes Relative 3 %      Eosinophils Relative 0 %      Basophils Relative 0 %      Neutrophils Absolute 21 41 Thousands/µL Immature Grans Absolute 0 39 Thousand/uL      Lymphocytes Absolute 1 10 Thousands/µL      Monocytes Absolute 0 67 Thousand/µL      Eosinophils Absolute 0 00 Thousand/µL      Basophils Absolute 0 06 Thousands/µL     Narrative: This is an appended report  These results have been appended to a previously verified report  Procalcitonin with AM Reflex [[id number]]  (Abnormal) Collected: 07/06/21 1015    Lab Status: Final result Specimen: Blood from Arm, Left Updated: 07/06/21 1122     Procalcitonin 15 06 ng/ml     Procalcitonin Reflex [[id number]]     Lab Status: No result Specimen: Blood     Urine Microscopic [[id number]]  (Normal) Collected: 07/06/21 1110    Lab Status: Final result Specimen: Urine, Straight Cath Updated: 07/06/21 1122     RBC, UA None Seen /hpf      WBC, UA None Seen /hpf      Epithelial Cells None Seen /hpf      Bacteria, UA None Seen /hpf      Hyaline Casts, UA None Seen /lpf     UA w Reflex to Microscopic w Reflex to Culture [[id number]]  (Abnormal) Collected: 07/06/21 1110    Lab Status: Final result Specimen: Urine, Straight Cath Updated: 07/06/21 1121     Color, UA Dk Yellow     Clarity, UA Clear     Specific Gravity, UA 1 018     pH, UA 5 5     Leukocytes, UA Negative     Nitrite, UA Negative     Protein, UA 30 (1+) mg/dl      Glucose, UA Negative mg/dl      Ketones, UA Trace mg/dl      Urobilinogen, UA 1 0 E U /dl      Bilirubin, UA Negative     Blood, UA Negative    Lactic acid [[id number]]  (Abnormal) Collected: 07/06/21 1015    Lab Status: Final result Specimen: Blood from Arm, Left Updated: 07/06/21 1114     LACTIC ACID 5 2 mmol/L     Narrative:      Result may be elevated if tourniquet was used during collection      Troponin I [[id number]]  (Abnormal) Collected: 07/06/21 1015    Lab Status: Final result Specimen: Blood from Arm, Left Updated: 07/06/21 1050     Troponin I 0 79 ng/mL     Comprehensive metabolic panel [[id number]]  (Abnormal) Collected: 07/06/21 1015    Lab Status: Final result Specimen: Blood from Arm, Left Updated: 07/06/21 1049     Sodium 136 mmol/L      Potassium 3 8 mmol/L      Chloride 102 mmol/L      CO2 24 mmol/L      ANION GAP 10 mmol/L      BUN 14 mg/dL      Creatinine 1 26 mg/dL      Glucose 133 mg/dL      Calcium 9 6 mg/dL       U/L      ALT 87 U/L      Alkaline Phosphatase 179 U/L      Total Protein 7 6 g/dL      Albumin 3 6 g/dL      Total Bilirubin 1 00 mg/dL      eGFR --    Narrative:      Notes:     1  eGFR calculation is only valid for adults 18 years and older  2  EGFR calculation cannot be performed for patients who are transgender, non-binary, or whose legal sex, sex at birth, and gender identity differ  Blood culture #1 [[id number]] Collected: 07/06/21 1027    Lab Status: In process Specimen: Blood from Hand, Left Updated: 07/06/21 1031    Blood culture #2 [[id number]] Collected: 07/06/21 1015    Lab Status: In process Specimen: Blood from Arm, Left Updated: 07/06/21 1025                 CT abdomen pelvis wo contrast   Final Result by Christine Jennings MD (07/06 1116)      1  No acute abnormality in the abdomen or pelvis  2   Stable mild stranding in the left lateral abdominal wall of uncertain etiology  3   Stable splenic hypodensity, likely cyst or hemangioma with additional new smaller hypodense lesion, also with a benign appearance consistent with cyst or hemangioma  4   Decreased right inguinal lymph nodes  Workstation performed: [id number]         XR chest 1 view portable   Final Result by Nazia Hanna MD (07/06 1327)      Mild pulmonary vascular congestion  No pneumothorax identified                    Workstation performed: [id number]         XR hip/pelv 2-3 vws right if performed    (Results Pending)   MRI thoracic spine w wo contrast    (Results Pending)   MRI lumbar spine w wo contrast    (Results Pending)         Procedures  Procedures      ED Course  ED Course as of Jul 06 1443   Tue Jul 06, 2021   1007 Procedure Note: EKG  Date/Time: 07/06/21 09:51 AM   Interpreted by: Marie Coles,   Indications / Diagnosis: Tachycardia  ECG reviewed by me, the ED Provider: yes   The EKG demonstrates:  Rhythm: sinus tachycardia, rate= 112 BPM  Intervals: Normal FL and QT intervals  Axis: Left axis deviation  QRS/Blocks: Normal QRS  ST Changes: No acute ST/T waves changes  No JOSEPHINE  No TWI  Comparison: Compared to prior EKG performed on 11/07/2019      1050 Troponin I(!): 0 79   1054 Creatinine: 1 26   1114 LACTIC ACID(!!): 5 2   1121 Leukocytes, UA: Negative   1121 Nitrite, UA: Negative   1128 Procalcitonin(!): 15 06   1140 Ideal body weight calculated as 54kg  Will give 30 cc/kg bolus based on ideal body weight due to history of hypertrophic cardiomyopathy and CKD  D/w CC shirley Goldberg for admission to floor  1220 D/w patient performing LP to eval for source of infection  Discussed risks/benefits  Patient understands possibility of missed viral meningitis without LP  Patient declines LP at this time  Identification of Seniors at Risk      Most Recent Value   (ISAR) Identification of Seniors at Risk   Before the illness or injury that brought you to the Emergency, did you need someone to help you on a regular basis? 0 Filed at: 07/06/2021 0949   In the last 24 hours, have you needed more help than usual?  1 Filed at: 07/06/2021 9008   Have you been hospitalized for one or more nights during the past 6 months? 0 Filed at: 07/06/2021 0949   In general, do you see well?  0 Filed at: 07/06/2021 0949   In general, do you have serious problems with your memory? 1 Filed at: 07/06/2021 6349   Do you take more than three different medications every day? 1 Filed at: 07/06/2021 0949   ISAR Score  3 Filed at: 07/06/2021 1719                Initial Sepsis Screening     Row Name 07/06/21 1041                Is the patient's history suggestive of a new or worsening infection?   (!) Yes (Proceed)  -MM        Suspected source of infection  suspect infection, source unknown  -MM        Are two or more of the following signs & symptoms of infection both present and new to the patient? (!) Yes (Proceed)  -MM        Indicate SIRS criteria  Hyperthemia > 38 3C (100 9F); Tachycardia > 90 bpm;Leukocytosis (WBC > 47983 IJL)  -MM        If the answer is yes to both questions, suspicion of sepsis is present  --        If severe sepsis is present AND tissue hypoperfusion perists in the hour after fluid resuscitation or lactate > 4, the patient meets criteria for SEPTIC SHOCK  --        Are any of the following organ dysfunction criteria present within 6 hours of suspected infection and SIRS criteria that are NOT considered to be chronic conditions? --        Organ dysfunction  Creatinine > 0 5 mg/dl ABOVE BASELINE;Lactate >/equal 4 0 mmol/L  -MM        Date of presentation of severe sepsis  07/06/21  -MM        Time of presentation of severe sepsis  1118  -MM        Tissue hypoperfusion persists in the hour after crystalloid fluid administration, evidenced, by either:  --        Was hypotension present within one hour of the conclusion of crystalloid fluid administration?   No  -MM        Date of presentation of septic shock  --        Time of presentation of septic shock  --          User Key  (r) = Recorded By, (t) = Taken By, (c) = Cosigned By    [address] Name Provider Type    MM Sakshi Vargas DO Resident           Default Flowsheet Data (last 720 hours)      Sepsis Reassess     Row Name 07/06/21 1435                   Repeat Volume Status and Tissue Perfusion Assessment Performed    Repeat Volume Status and Tissue Perfusion Assessment Performed  Yes  -MM           Volume Status and Tissue Perfusion Post Fluid Resuscitation * Must Document All *    Vital Signs Reviewed (HR, RR, BP, T)  Yes  -MM        Shock Index Reviewed  Yes  -MM        Arterial Oxygen Saturation Reviewed (POx, SaO2 or SpO2)  Yes (comment %) 94  -MM        Cardio  (!) Tachycardia;Normal S1/S2  -MM        Pulmonary  Normal effort;Clear to auscultation  -MM        Capillary Refill  Brisk  -MM        Peripheral Pulses  Radial  -MM        Peripheral Pulse  +2  -MM        Skin  Warm  -MM        Urine output assessed  Decreased  -MM           *OR*   Intensive Monitoring- Must Document One of the Following Four *:    Vital Signs Reviewed  --        * Central Venous Pressure (CVP or RAP)  --        * Central Venous Oxygen (SVO2, ScvO2 or Oxygen saturation via central catheter)  --        * Bedside Cardiovascular US in IVC diameter and % collapse  --        * Passive Leg Raise OR Crystalloid Challenge  --          User Key  (r) = Recorded By, (t) = Taken By, (c) = Cosigned By    Initials Name Provider Type    BALDVE Vanegas DO Resident        SBIRT 20yo+      Most Recent Value   SBIRT (23 yo +)   In order to provide better care to our patients, we are screening all of our patients for alcohol and drug use  Would it be okay to ask you these screening questions? No Filed at: 07/06/2021 1010                MDM  Number of Diagnoses or Management Options  RUCHI (acute kidney injury) ([facility] [address] )  Back pain  Elevated troponin  Granulomatosis with polyangiitis (HCC)  Severe sepsis (HCC)  Diagnosis management comments:   74 y o  female presenting for weakness and fevers  On initial evaluation patient has high risk of deterioration due to tachycardia and confusion  Will order labs and imaging to evaluate for source of sepsis  Will treat with IV fluids and IV antibiotics  As no clear source of infection and new back pain/fever in setting of immunocompromised state, will order MRI spine to evaluate for spinal epidural abscess  Due to severe sepsis, will admit for further evaluation and management  Patient care discussed with Dr Sydnie Maddox who will assume care and admit patient to the hospital  I have discussed with the patient our recommendation of inpatient admission for further medical care   I have answered all of the patient's questions and concerns  The patient is in agreement with the plan to proceed with admission to the hospital         Amount and/or Complexity of Data Reviewed  Clinical lab tests: ordered and reviewed  Tests in the radiology section of CPT®: ordered and reviewed  Review and summarize past medical records: yes  Discuss the patient with other providers: yes  Independent visualization of images, tracings, or specimens: yes    Patient Progress  Patient progress: stable      Disposition  Final diagnoses:   RUCHI (acute kidney injury) ([facility] )   Se[facility] sepsis ([facility] )   Elevated troponin   Back pain   Granulomatosis with polyangiitis ([facility] )     Time reflects when diagnosis was documented in both MDM as applicable and the Disposition within this note     Time User Action Codes Description Comment    7/6/2021 10:54 AM Maye Sleight Add [N17 9] RUCHI [facility]acute [facility] ([facility]     7/6/2021 11:17 AM Maye Sleight Add [A41 9,  R65 20] Severe sepsis ([facility] )     7/6/2021 11:17 AM Maye Sleight Add [R77 8] Elevated troponin     7/6/2021 12:35 PM Maye Sleight Add [M54 9] Back pain     7/6/2021 12:35 PM Maye Sleight Add [M31 30] Granulomatosis with polyangiitis ([facility] )     7/6/2021 12:35 PM Maye Sleight Modify [N17 9] RUCHI (acute kidney injury) ([facility] )     7/6/2021 12:35 PM Maye Sleight Modify [A41 9,  R65 20] Severe sepsis [facility])       ED Disposition     ED Disposition Condition Date/Time Comment    Admit Stable Tue Jul 6, 2021 12:35 PM Case was discussed with Dr Miriam Moura and the patient's admission status was agreed to be Admission Status: inpatient status to the service of Dr Miriam Moura  Follow-up Information    None         Patient's Medications   Discharge Prescriptions    No medications on file     No discharge procedures on file      PDMP Review       Value Time User    PDMP Reviewed  Yes 11/14/2019  1:43 PM Talib Boateng MD           ED Provider  Attending physically available and evaluated Linda Contreras I managed the patient along with the ED Attending      Electronically Signed by         Hanny Noland DO  07/06/21 4038

## 2021-07-06 NOTE — ASSESSMENT & PLAN NOTE
Patient with history of wegener granulomatosis diagnosed in 2010 and was treated with long-term steroids and rituximab  She completed her treatment however subsequently developed hypoglobulinemia  Patient used to take Lasix for chronic lymphedema however discontinued due to urinary frequency    She is currently following up with Nephrology Dr Shea Chacon

## 2021-07-06 NOTE — CONSULTS
right hip and right lower back for up to 12 hours daily   Ice to painful area for up to 20 minutes every hour as needed   Suggest start  Senokot S and MiraLax to avoid opioid induced constipation  APS will continue to follow  Please contact Acute Pain Service - B via [employer] from [phone] with additional questions or concerns  See Luz or Luisana for additional contacts and after hours information  History of Present Illness    Admit Date:  7/6/2021  Hospital Day:  0 days  Primary Service:  Hospitalist  Attending Provider:  Cesar Singleton MD  Reason for Consult / Principal Problem:   Right lower back, right hip and leg pain  HPI: Madelyn Chapman is a 74 y o  adult who presents with  Acute on chronic right lower back, right hip and right leg pain  Patient initially presented to the emergency department with generalized weakness, fever and mild confusion  Patient found to be septic and sepsis workup and treatment in progress  Currently, patient is alert and oriented x3 and states that she has chronic pain in her right hip and occasionally her right leg managed well on OxyContin 40 mg p o  b i d  and oxycodone IR 15 mg p o  b i d  States that today her pain is worse  Denies any trauma or other inciting factor  Denies numbness, tingling or unilateral weakness  States her pain is improved after 1 mg IV Dilaudid at 12:30 today  Current pain location(s):   Right lower back, right hip, right leg  Pain Scale:    6/10  Quality:   Sharp, aching  Current Analgesic regimen:    Tylenol 650 mg p o  q 6 hours p r n  mild pain  OxyContin 40 mg p o  q 12 hours scheduled  Oxycodone 10 mg p o  q 4 hours p r n  moderate pain  Oxycodone 15 mg p o  b i d  p r n  severe pain  Dilaudid 0 5 mg IV q 4 hours p r n  breakthrough pain  Cymbalta 60 mg p o  daily scheduled          Pain History: Chronic right hip and leg pain  Pain Management Provider:  Primary care provider Normie Schlatter, D O , [facility]    I have reviewed the patient's controlled substance dispensing history in the Prescription Drug Monitoring Program in compliance with the [facility] regulations before prescribing any controlled substances       Past 1 year review of PDMP:  Fill Date ID   Written Drug Qty Days Prescriber Rx # Pharmacy Refill   Daily Dose* Pymt Type      06/11/2021  1   03/15/2021  Lorazepam 0 5 MG Tablet  60 00  30 Ma Nus   [id number]   [place] ([id number])   3   [place]   06/10/2021  1   06/09/2021  Oxycodone Hcl Er 40 MG Tablet  60 00  30 Ma Nus   [id number]   Sandoval ([id number])   0  120 00 MME  [place]   06/10/2021  1   06/09/2021  Oxycodone Hcl 15 MG Tablet  60 00  30 Ma Nus   [id number]   Sandoval ([id number])   0  45 00 MME  [place]   05/14/2021  1   03/15/2021  Lorazepam 0 5 MG Tablet  60 00  30 Ma Nus   [id number]   [place]oval ([id number])   2   [place]   05/12/2021  1   05/10/2021  Oxycodone Hcl 15 MG Tablet  60 00  30 Ma Nus   [id number]   [facility]oval ([id number])   0  45 00 MME  [place]   05/12/2021  1   05/10/2021  Oxycodone Hcl Er 40 MG Tablet  60 00  30 Ma Nus   [id number]   [facility]oval ([id number])   0  120 00 MME  [place]   04/16/2021  1   03/15/2021  Lorazepam 0 5 MG Tablet  60 00  30 Ma Nus   [id number]   Sandoval ([id number])   1   Comm [facility]   04/14/2021  1   04/09/2021  Oxycodone Hcl Er 40 MG Tablet  60 00  30 Ma Nus   [id number]   [facility]oval ([id number])   0  120 00 MME  [place]   04/14/2021  1   04/09/2021  Oxycodone Hcl 15 MG Tablet  60 00  30 Ma Nus   [id number]   [facility]oval ([id number])   0  45 00 MME  [place]   03/15/2021  1   03/15/2021  Oxycodone Hcl 15 MG Tablet  60 00  30 Ma Nus   [id number]   Sandoval ([id number])   0  45 00 MME  Comm [facility]   03/15/2021  1   03/15/2021  Oxycodone Hcl Er 40 MG Tablet  60 00  30 Ma Nus   [id number]   [facility]oval ([id number])   0  120 00 MME  [place]   03/15/2021  1   03/15/2021  Lorazepam 0 5 MG Tablet  60 00  30 Ma Nus   [id number]   [place] ([id number])   0   [place]   02/12/2021  1   02/12/2021  Oxycodone Hcl Er 40 MG Tablet  60 00  30 Ma Nus   [id number]   [facility]oval ([id number])   0  120 00 MME  [place] 02/12/2021  1   02/12/2021  Oxycodone Hcl 15 MG Tablet  60 00  30 Ma Nus   [id number]   Sandoval ([id number])   0  45 00 MME  Comm [facility]   02/12/2021  1   02/12/2021  Lorazepam 0 5 MG Tablet  60 00  30 Ma Nus   [id number]   [place]oval ([id number])   0   [place]   01/12/2021  1   01/12/2021  Oxycodone Hcl 15 MG Tablet  60 00  30 Ma Nus   [id number]   [facility]oval ([id number])   0  45 00 MME  [place]   01/12/2021  1   01/12/2021  Lorazepam 0 5 MG Tablet  60 00  30 Ma Nus   [id number]   [facility]oval ([id number])   0   Comm [facility]   01/12/2021  1   01/12/2021  Oxycodone Hcl Er 40 MG Tablet  60 00  30 Ma Nus   [id number]   [facility]oval ([id number])   0  120 00 MME  Comm [facility]   12/11/2020  1   12/11/2020  Oxycontin Er 40 MG Tablet  60 00  30 Ma Nus   [id number]   [facility] ([id number])   0  120 00 MME  Comm [facility]   12/11/2020  1   12/11/2020  Oxycodone Hcl 15 MG Tablet  60 00  30 Ma Nus   [id number]   [facility]oval ([id number])   0  45 00 MME  Comm [facility]   12/11/2020  1   12/11/2020  Lorazepam 0 5 MG Tablet  60 00  30 Ma Nus   [id number]   [facility] (5682)   0   Comm [facility]   11/12/2020  1   11/12/2020  Oxycodone Hcl 15 MG Tablet  60 00  30 Ma Nus   [id number]   [facility]oval ([id number])   0  45 00 MME  Comm [facility]   11/07/2020  1   11/06/2020  Oxycontin Er 40 MG Tablet  60 00  30 Ma Nus   [id number]   [facility]oval ([id number])   0  120 00 MME  Comm [facility]   11/04/2020  1   07/09/2020  Lorazepam 0 5 MG Tablet  60 00  30 Ma Nus   [id number]   [place] ([id number])   3   [place]   10/07/2020  1   10/07/2020  Oxycontin Er 40 MG Tablet  60 00  30 Ma Nus   [id number]   [facility]oval ([id number])   0  120 00 MME  Comm [facility]   10/07/2020  1   10/07/2020  Oxycodone Hcl 15 MG Tablet  60 00  30 Ma Nus   [id number]   [facility] ([id number])   0  45 00 MME  [place]   10/07/2020  1   07/09/2020  Lorazepam 0 5 MG Tablet  60 00  30 Ma Nus   [id number]   [place] ([id number])   2   [place]   09/05/2020  1   07/09/2020  Lorazepam 0 5 MG Tablet  60 00  30 Ma Nus   [id number]   [place] ([id number])   1   [place]   09/04/2020  1   09/01/2020  Oxycontin Er 40 MG Tablet  60 00  30 Ma Nus   [id number]   [facility] ([id number])   0  120 00 MME  [place]   2020  1   2020  Oxycodone Hcl 15 MG Tablet  60 00  30 Ma Nus   [id number]   [facility] (5682)   0  45 00 MME  [place]   2020  1   2020  Lorazepam 0 5 MG Tablet  60 00  30 Ma Nus   [id number]   [place] ([id number])   0   [place]   2020  1   2020  Oxycontin Er 40 MG Tablet  60 00  30 Ma Nus   [id number]   [facility] (5682)   0  120 00 MME  [place]   2020  1   2020  Oxycodone Hcl 15 MG Tablet  60 00  30 Ma Nus   [id number]   [facility] ([id number])   0  45 00 MME  [place]   2020  1   2020  Oxycontin Er 40 MG Tablet  60 00  30 Ma Nus   [id number]   [facility]oval ([id number])   0  120 00 MME  [place]   2020  1   2020  Oxycodone Hcl 15 MG Tablet  60 00  30 Ma Nus   [id number]   [facility] (5682)   0  45 00 MME  [place]   2020  1   2020  Lorazepam 0 5 MG Tablet  60 00  30 Ma Nus   [id number]   [place] ([id number])   3   [place] Ins   [place]         Consults    Review of Systems   Constitutional: Positive for activity change and fever  Musculoskeletal: Positive for arthralgias and back pain  Neurological: Positive for weakness  All other systems reviewed and are negative  Historical Information   Past Medical History:   Diagnosis Date    BRCA1 positive     [facility] )     ovarian Ca with chemo    History of chemotherapy     ovarian cancer     History of pulmonary embolus (PE) & DVT 2007    post-op FAMILIA/ omenectomy    Lymphedema     MRSA (methicillin resistant Staphylococcus aureus) 2017    nasal swab negative 4/3/19    Ovarian cancer ([facility] )     CYST REMOVED RIGHT OVARY IN   HYSTERECTOMY 07       Past Surgical History:   Procedure Laterality Date    APPENDECTOMY  1998    laparoscopic    BILATERAL SALPINGOOPHORECTOMY  2007    BREAST BIOPSY Right 2016     SECTION  1994    HYSTERECTOMY  2007    Omentectomy and lymph node biopsy [facility]     OTHER SURGICAL HISTORY      right lower extremity due to trauma    AL TREAT TIBIAL SHAFT [place] IMPLANT Right 9/6/2017    Procedure: INSERTION NAIL IM TIBIA;  Surgeon: April Pollard MD;  [facility]: [facility];  Service: Orthopedics    US GUIDANCE BREAST BIOPSY LEFT EACH ADDITIONAL Left 7/15/2020    US GUIDED BREAST BIOPSY LEFT COMPLETE Left 7/15/2020    US GUIDED BREAST BIOPSY RIGHT COMPLETE Right 5/9/2016     Social History   Social History     Substance and Sexual Activity   Alcohol Use Yes     Social History     Substance and Sexual Activity   Drug Use Not Currently     Social History     Tobacco Use   Smoking Status Never Smoker   Smokeless Tobacco Never Used     Family History:   Family History   Problem Relation Age of Onset    Breast cancer Mother         28    Pancreatic cancer Mother     Ovarian cancer Maternal Grandmother     Breast cancer Maternal Aunt 39    No Known Problems Paternal Aunt     No Known Problems Father     No Known Problems Maternal Grandfather     No Known Problems Paternal Grandmother     No Known Problems Paternal Grandfather     Lung cancer Half-Sister        Meds/Allergies   all current active meds have been reviewed, current meds:   Current Facility-Administered Medications   Medication Dose Route Frequency    acetaminophen (TYLENOL) tablet 650 mg  650 mg Oral Q6H PRN    aluminum-magnesium hydroxide-simethicone (MYLANTA) oral suspension 30 mL  30 mL Oral Q6H PRN    bisacodyl (DULCOLAX) EC tablet 5 mg  5 mg Oral Daily PRN    cefepime (MAXIPIME) 2 g/50 mL dextrose IVPB  2,000 mg Intravenous Q12H    DULoxetine (CYMBALTA) delayed release capsule 60 mg  60 mg Oral Daily    heparin (porcine) subcutaneous injection 5,000 Units  5,000 Units Subcutaneous Q8H [facility]    HYDROmorphone (DILAUDID) injection 0 5 mg  0 5 mg Intravenous Q4H PRN    LORazepam (ATIVAN) tablet 0 5 mg  0 5 mg Oral Q8H PRN    metoprolol tartrate (LOPRESSOR) partial tablet 12 5 mg  12 5 mg Oral Q12H [facility]    ondansetron (ZOFRAN) injection 4 mg  4 mg Intravenous Q6H PRN    oxyCODONE (OxyCONTIN) 12 hr tablet 40 mg  40 mg Oral Q12H [address]    oxyCODONE (ROXICODONE) immediate release tablet 10 mg  10 mg Oral Q4H PRN    oxyCODONE (ROXICODONE) IR tablet 15 mg  15 mg Oral BID PRN    sodium chloride 0 9 % infusion  100 mL/hr Intravenous Continuous    [START ON 7/7/2021] vancomycin (VANCOCIN) IVPB (premix in dextrose) 1,000 mg 200 mL  10 mg/kg Intravenous Q12H    and PTA meds:   Prior to Admission Medications   Prescriptions Last Dose Informant Patient Reported? Taking? Calcium Carbonate-Vit D-Min (CALCIUM 1200 PO)  Self Yes Yes   Sig: Take 3 tablets by mouth daily    DULoxetine (CYMBALTA) 60 mg delayed release capsule  Self Yes Yes   Sig: Take 60 mg by mouth daily     LORazepam (ATIVAN) 0 5 mg tablet  Self No Yes   Sig: Take 1 tablet as needed every 8 hours  Patient taking differently: Take 0 5 mg by mouth 2 (two) times a day Take 1 tablet as needed every 8 hours      Multiple Vitamins-Minerals (MULTIVITAMIN ADULT PO)  Self Yes Yes   Sig: Take 1 capsule by mouth daily    OXYCODONE HCL PO  Self Yes Yes   Sig: Take 15 mg by mouth 2 (two) times a day as needed    PREVIDENT 5000 DRY MOUTH 1 1 % GEL  Self Yes Yes   TOVIAZ 8 MG TB24  Self Yes No   Sig: Take 8 mg by mouth daily at bedtime    acetaminophen (TYLENOL) 325 mg tablet  Self No Yes   Sig: Take 2 tablets by mouth every 6 (six) hours as needed for mild pain   Patient taking differently: Take 650 mg by mouth every 6 (six) hours as needed for mild pain    bisacodyl (DULCOLAX) 5 mg EC tablet  Self Yes Yes   Sig: Take 5 mg by mouth daily as needed for constipation    furosemide (LASIX) 20 mg tablet Not Taking at Unknown time Self No No   Sig: Take 1 tablet (20 mg total) by mouth daily   Patient not taking: Reported on 3/12/2021   metoprolol tartrate (LOPRESSOR) 25 mg tablet  Self No Yes   Sig: Take 0 5 tablets (12 5 mg total) by mouth every 12 (twelve) hours   omeprazole (PriLOSEC) 20 mg delayed release capsule  Self Yes Yes   Sig: Take 20 mg by mouth daily  ondansetron (ZOFRAN) 4 mg tablet Not Taking at Unknown time Self No No   Sig: Take 1 tablet (4 mg total) by mouth every 8 (eight) hours as needed for nausea or vomiting   Patient not taking: Reported on 6/4/2021   oxyCODONE (OxyCONTIN) 40 mg 12 hr tablet  Self Yes Yes   Sig: Take 40 mg by mouth every 12 (twelve) hours   penicillin V potassium (VEETID) 250 mg tablet   No No   Sig: Take 1 tablet (250 mg total) by mouth every 12 (twelve) hours   Patient not taking: Reported on 5/7/2021   polyethylene glycol (MIRALAX) 17 g packet  Self Yes No   Sig: Take 17 g by mouth as needed    prochlorperazine (COMPAZINE) 10 mg tablet Not Taking at Unknown time Self No No   Sig: Take 1 tablet (10 mg total) by mouth every 6 (six) hours as needed for nausea or vomiting   Patient not taking: Reported on 6/4/2021      Facility-Administered Medications: None       Allergies   Allergen Reactions    Iodinated Diagnostic Agents Shortness Of Breath    Omnipaque [Iohexol] Shortness Of Breath    Other Shortness Of Breath     IVP dye, x ray dye 3    Iodides      Other reaction(s): SWELLING, SOB    Methotrexate Nausea Only     Headache and nausea    Methotrexate Derivatives Headache       Objective   Temp:  [100 8 °F (38 2 °C)-102 1 °F (38 9 °C)] 100 8 °F (38 2 °C)  HR:  [107-115] 107  Resp:  [20-22] 20  BP: (133-141)/(65-80) 140/65    Intake/Output Summary (Last 24 hours) at 7/6/2021 1440  Last data filed at 7/6/2021 1300  Gross per 24 hour   Intake 1000 ml   Output --   Net 1000 ml       Physical Exam  Vitals and nursing note reviewed  Constitutional:       General: Elbert Higuera is awake  Duncansvillegeovany Higuera is not in acute distress  Appearance: Elbert Higuera is not ill-appearing, toxic-appearing or diaphoretic  Comments:   Lying on stretcher  Appears comfortable  Eyes:      Conjunctiva/sclera: Conjunctivae normal       Pupils: Pupils are equal, round, and reactive to light     Cardiovascular:      Rate and Rhythm: Normal rate and regular rhythm  Skin:     General: Skin is warm and dry  Neurological:      Mental Status: Enrico Driver is alert and oriented to person, place, and time  GCS: GCS eye subscore is 4  GCS verbal subscore is 5  GCS motor subscore is 6  Psychiatric:         Behavior: Behavior is cooperative  Lab Results:   I have personally reviewed pertinent labs  , CBC:   Lab Results   Component Value Date    WBC 23 63 (H) 07/06/2021    HGB 15 4 07/06/2021    HCT 46 8 (H) 07/06/2021    MCV 93 07/06/2021     07/06/2021    MCH 30 7 07/06/2021    MCHC 32 9 07/06/2021    RDW 13 2 07/06/2021    MPV 9 3 07/06/2021    NRBC 0 07/06/2021   , CMP:   Lab Results   Component Value Date    SODIUM 136 07/06/2021    K 3 8 07/06/2021     07/06/2021    CO2 24 07/06/2021    BUN 14 07/06/2021    CREATININE 1 26 07/06/2021    CALCIUM 9 6 07/06/2021     (H) 07/06/2021    ALT 87 (H) 07/06/2021    ALKPHOS 179 (H) 07/06/2021   , BMP:  Lab Results   Component Value Date    SODIUM 136 07/06/2021    K 3 8 07/06/2021     07/06/2021    CO2 24 07/06/2021    BUN 14 07/06/2021    CREATININE 1 26 07/06/2021    GLUC 133 07/06/2021    CALCIUM 9 6 07/06/2021    AGAP 10 07/06/2021   , PT/PTT:  Lab Results   Component Value Date    PTT 30 07/06/2021       Imaging Studies: I have personally reviewed pertinent reports  EKG, Pathology, and Other Studies: I have personally reviewed pertinent reports  Counseling / Coordination of Care  Total floor / unit time spent today Level 4 = 80 minutes  Greater than 50% of total time was spent with the patient and / or family counseling and / or coordination of care  A description of the counseling / coordination of care:  Patient interview, physical examination, review of PDMP, review of medical record, review of imaging and laboratory data, development of pain management plan, discussion of pain management plan with patient and primary service      Please note that the APS provides consultative services regarding pain management only  With the exception of ketamine and epidural infusions and except when indicated, final decisions regarding starting or changing doses of analgesic medications are at the discretion of the consulting service  Off hours consultation and/or medication management is generally not available      Logan Manriquez PA-C  Acute Pain Service

## 2021-07-06 NOTE — PROGRESS NOTES
MRI of lumbar spine shows: Marrow edema at the L2-3 endplates  No epidural phlegmon or abscess  This could be due early discitis/osteomyelitis or Modic type I endplate degenerative changes  Discussed with neurosurgery Dr Ventura Res: no urgent surgery needed  May need IR guided biopsy  Discussed with ID: Continue vanco, discontinue cefepime

## 2021-07-06 NOTE — ASSESSMENT & PLAN NOTE
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

## 2021-07-06 NOTE — ASSESSMENT & PLAN NOTE
· Patient is 13-year-old female with history of Wegener's granulomatosis, CKD 3, hypogammaglobulinemia, HTN presented to ER with complain of increased confusion and fever  · On my exam, alert and oriented x 4, however in significant distress due to back pain and right hip pain  · Patient was found to have fever of 102, tachycardia, elevated WBC 23, elevated lactic acid, and elevated procalcitonin  Sepsis alert was called  · Urinalysis is unremarkable so far, pending urine culture  · Pending blood culture, has h/o bacteremia  · CT abdomen pelvis is negative for acute infection  · Chest x-ray pending  · patient strongly declined LP  · Due to significant back pain and right hip pain (acute on chronic), MRI of thoracic/lumbar spine ordered  · 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

## 2021-07-06 NOTE — PROGRESS NOTES
Vancomycin Assessment    Himanshu Mathews is a 74 y o  adult who is currently receiving vancomycin 1500 mg IV once for bacteremia     Relevant clinical data and objective history reviewed:  Creatinine   Date Value Ref Range Status   07/06/2021 1 26 0 60 - 1 30 mg/dL Final     Comment:     Standardized to IDMS reference method   11/12/2019 0 73 0 60 - 1 30 mg/dL Final     Comment:     Standardized to IDMS reference method   11/11/2019 0 79 0 60 - 1 30 mg/dL Final     Comment:     Standardized to IDMS reference method   05/15/2015 1 37 (H) 0 60 - 1 30 mg/dL Final     Comment:     Standardized to IDMS reference method   03/22/2015 1 16 0 60 - 1 30 mg/dL Final     Comment:     Standardized to IDMS reference method   01/30/2015 0 93 0 60 - 1 30 mg/dL Final     Comment:     Standardized to IDMS reference method     VANCOMYCIN RANDOM   Date Value Ref Range Status   01/29/2015 17 7 mcg/mL Final     Comment:     The above 1 analytes were performed by [address] [id number]       /65 (BP Location: Right arm)   Pulse (!) 107   Temp (!) 100 8 °F (38 2 °C) (Oral)   Resp 20   Ht 5' 3" (1 6 m)   Wt 102 kg (224 lb 13 9 oz)   SpO2 94%   BMI 39 83 kg/m²   No intake/output data recorded  Lab Results   Component Value Date/Time    BUN 14 07/06/2021 10:15 AM    BUN 20 03/22/2015 08:42 AM    WBC 23 63 (H) 07/06/2021 10:15 AM    WBC 6 70 05/15/2015 12:23 PM    HGB 15 4 07/06/2021 10:15 AM    HGB 13 2 05/15/2015 12:23 PM    HCT 46 8 (H) 07/06/2021 10:15 AM    HCT 41 6 05/15/2015 12:23 PM    MCV 93 07/06/2021 10:15 AM    MCV 91 05/15/2015 12:23 PM     07/06/2021 10:15 AM     05/15/2015 12:23 PM     Temp Readings from Last 3 Encounters:   07/06/21 (!) 100 8 °F (38 2 °C) (Oral)   05/07/21 98 1 °F (36 7 °C) (Tympanic)   03/12/21 98 7 °F (37 1 °C) (Temporal)     Vancomycin Days of Therapy: 1    Assessment/Plan  The patient is currently on vancomycin utilizing scheduled dosing    Baseline risks associated with therapy include: pre-existing renal impairment and advanced age  The patient received a loading dose of  1500 mg IV once and will be started on maintenance dosing of 1250 mg IV q24h with an expected trough/AUC of 16 9/600, based on a goal of 15-20   Pharmacy will continue to follow closely for s/sx of nephrotoxicity, infusion reactions, and appropriateness of therapy  BMP and CBC will be ordered per protocol  Plan for trough as patient approaches steady state, prior to the 4th dose at approximately 1130 on 7/9  Pharmacy will continue to follow the patients culture results and clinical progress daily      Vipul Scales, Pharmacist

## 2021-07-07 ENCOUNTER — APPOINTMENT (INPATIENT)
Dept: RADIOLOGY | Facility: HOSPITAL | Age: 66
DRG: 872 | End: 2021-07-07
Payer: MEDICARE

## 2021-07-07 PROBLEM — M54.50 CHRONIC RIGHT-SIDED LOW BACK PAIN WITHOUT SCIATICA: Status: ACTIVE | Noted: 2021-07-07

## 2021-07-07 PROBLEM — G89.29 CHRONIC RIGHT-SIDED LOW BACK PAIN WITHOUT SCIATICA: Status: ACTIVE | Noted: 2021-07-07

## 2021-07-07 PROBLEM — L03.115 CELLULITIS OF RIGHT LOWER EXTREMITY: Status: ACTIVE | Noted: 2021-07-07

## 2021-07-07 LAB
ALBUMIN SERPL BCP-MCNC: 2.4 G/DL (ref 3.5–5)
ALP SERPL-CCNC: 120 U/L (ref 46–116)
ALT SERPL W P-5'-P-CCNC: 59 U/L (ref 12–78)
ANION GAP SERPL CALCULATED.3IONS-SCNC: 5 MMOL/L (ref 4–13)
AST SERPL W P-5'-P-CCNC: 50 U/L (ref 5–45)
BILIRUB DIRECT SERPL-MCNC: 0.18 MG/DL (ref 0–0.2)
BILIRUB SERPL-MCNC: 0.49 MG/DL (ref 0.2–1)
BUN SERPL-MCNC: 16 MG/DL (ref 5–25)
CALCIUM SERPL-MCNC: 8.6 MG/DL (ref 8.3–10.1)
CHLORIDE SERPL-SCNC: 109 MMOL/L (ref 100–108)
CO2 SERPL-SCNC: 26 MMOL/L (ref 21–32)
CREAT SERPL-MCNC: 0.81 MG/DL (ref 0.6–1.3)
ERYTHROCYTE [DISTWIDTH] IN BLOOD BY AUTOMATED COUNT: 14 % (ref 11.6–15.1)
GLUCOSE SERPL-MCNC: 103 MG/DL (ref 65–140)
HCT VFR BLD AUTO: 40.9 % (ref 36.5–46.1)
HGB BLD-MCNC: 13 G/DL (ref 12–15.4)
LACTATE SERPL-SCNC: 1.6 MMOL/L (ref 0.5–2)
MCH RBC QN AUTO: 31.1 PG (ref 26.8–34.3)
MCHC RBC AUTO-ENTMCNC: 31.8 G/DL (ref 31.4–37.4)
MCV RBC AUTO: 98 FL (ref 82–98)
PLATELET # BLD AUTO: 175 THOUSANDS/UL (ref 149–390)
PMV BLD AUTO: 9.6 FL (ref 8.9–12.7)
POTASSIUM SERPL-SCNC: 4 MMOL/L (ref 3.5–5.3)
PROCALCITONIN SERPL-MCNC: 13.41 NG/ML
PROT SERPL-MCNC: 5.6 G/DL (ref 6.4–8.2)
RBC # BLD AUTO: 4.18 MILLION/UL (ref 3.88–5.12)
SODIUM SERPL-SCNC: 140 MMOL/L (ref 136–145)
TROPONIN I SERPL-MCNC: 0.22 NG/ML
WBC # BLD AUTO: 14.02 THOUSAND/UL (ref 4.31–10.16)

## 2021-07-07 PROCEDURE — 99223 1ST HOSP IP/OBS HIGH 75: CPT | Performed by: INTERNAL MEDICINE

## 2021-07-07 PROCEDURE — 84145 PROCALCITONIN (PCT): CPT | Performed by: INTERNAL MEDICINE

## 2021-07-07 PROCEDURE — 99232 SBSQ HOSP IP/OBS MODERATE 35: CPT | Performed by: PHYSICIAN ASSISTANT

## 2021-07-07 PROCEDURE — 83605 ASSAY OF LACTIC ACID: CPT | Performed by: FAMILY MEDICINE

## 2021-07-07 PROCEDURE — 85027 COMPLETE CBC AUTOMATED: CPT | Performed by: FAMILY MEDICINE

## 2021-07-07 PROCEDURE — 72100 X-RAY EXAM L-S SPINE 2/3 VWS: CPT

## 2021-07-07 PROCEDURE — 84484 ASSAY OF TROPONIN QUANT: CPT | Performed by: INTERNAL MEDICINE

## 2021-07-07 PROCEDURE — 80048 BASIC METABOLIC PNL TOTAL CA: CPT | Performed by: FAMILY MEDICINE

## 2021-07-07 PROCEDURE — 99232 SBSQ HOSP IP/OBS MODERATE 35: CPT | Performed by: INTERNAL MEDICINE

## 2021-07-07 PROCEDURE — 80076 HEPATIC FUNCTION PANEL: CPT | Performed by: INTERNAL MEDICINE

## 2021-07-07 PROCEDURE — 99222 1ST HOSP IP/OBS MODERATE 55: CPT | Performed by: PHYSICIAN ASSISTANT

## 2021-07-07 RX ORDER — ACETAMINOPHEN 325 MG/1
650 TABLET ORAL ONCE
Status: COMPLETED | OUTPATIENT
Start: 2021-07-07 | End: 2021-07-07

## 2021-07-07 RX ORDER — CEFAZOLIN SODIUM 2 G/50ML
2000 SOLUTION INTRAVENOUS EVERY 8 HOURS
Status: DISCONTINUED | OUTPATIENT
Start: 2021-07-07 | End: 2021-07-09

## 2021-07-07 RX ADMIN — OXYCODONE HYDROCHLORIDE 40 MG: 40 TABLET, FILM COATED, EXTENDED RELEASE ORAL at 21:23

## 2021-07-07 RX ADMIN — HEPARIN SODIUM 5000 UNITS: 5000 INJECTION INTRAVENOUS; SUBCUTANEOUS at 21:25

## 2021-07-07 RX ADMIN — ONDANSETRON 4 MG: 2 INJECTION INTRAMUSCULAR; INTRAVENOUS at 10:36

## 2021-07-07 RX ADMIN — DULOXETINE HYDROCHLORIDE 60 MG: 60 CAPSULE, DELAYED RELEASE ORAL at 10:26

## 2021-07-07 RX ADMIN — CEFAZOLIN SODIUM 2000 MG: 2 SOLUTION INTRAVENOUS at 21:15

## 2021-07-07 RX ADMIN — LORAZEPAM 0.5 MG: 0.5 TABLET ORAL at 10:36

## 2021-07-07 RX ADMIN — HEPARIN SODIUM 5000 UNITS: 5000 INJECTION INTRAVENOUS; SUBCUTANEOUS at 13:19

## 2021-07-07 RX ADMIN — OXYCODONE HYDROCHLORIDE 40 MG: 40 TABLET, FILM COATED, EXTENDED RELEASE ORAL at 10:26

## 2021-07-07 RX ADMIN — LORAZEPAM 0.5 MG: 0.5 TABLET ORAL at 01:36

## 2021-07-07 RX ADMIN — Medication 12.5 MG: at 10:26

## 2021-07-07 RX ADMIN — ACETAMINOPHEN 650 MG: 325 TABLET, FILM COATED ORAL at 16:28

## 2021-07-07 RX ADMIN — LIDOCAINE 1 PATCH: 50 PATCH TOPICAL at 16:29

## 2021-07-07 RX ADMIN — HEPARIN SODIUM 5000 UNITS: 5000 INJECTION INTRAVENOUS; SUBCUTANEOUS at 06:13

## 2021-07-07 RX ADMIN — VANCOMYCIN HYDROCHLORIDE 1250 MG: 10 INJECTION, POWDER, LYOPHILIZED, FOR SOLUTION INTRAVENOUS at 11:21

## 2021-07-07 RX ADMIN — SODIUM CHLORIDE 100 ML/HR: 0.9 INJECTION, SOLUTION INTRAVENOUS at 01:07

## 2021-07-07 RX ADMIN — SENNOSIDES 8.6 MG: 8.6 TABLET ORAL at 21:25

## 2021-07-07 RX ADMIN — CEFAZOLIN SODIUM 2000 MG: 2 SOLUTION INTRAVENOUS at 13:19

## 2021-07-07 NOTE — PROGRESS NOTES
[address] [facility]  Progress Note - Larry Gallardo 1955, 74 y o  adult MRN: [id number]  Unit/Bed#: CW2 218-01 Encounter: [id number]  Primary Care Provider: Johanna Mishra DO   Date and time admitted to hospital: 7/6/2021  9:36 AM    * Severe sepsis [facility])  Assessment & Plan  · Presented on 7/6/21 with fever, confusion and headache  · Due to right leg cellulitis in the setting of lymphedema (did not have significant cellulitic changes on presentation)  · Initial concern for osteomyelitis due to marrow edema at L2-3 - likely related to Modic changes per neurosurgery  · Antibiotics changed to cefazolin  · Monitor local response, temperature, WBC  Follow-up blood cultures  · Discussed with infectious disease and neurosurgery      Cellulitis of right lower extremity  Assessment & Plan  · Cefazolin as above  · Monitor temp, WBC, local response      Chronic right-sided low back pain without sciatica  Assessment & Plan  · Presented with sepsis and severe low back pain with acute on chronic back/hip pain  · MRI thoracic spine - no evidence of diskitis/osteomyelitis  · MRI lumbar spine - "Transitional S1 vertebra  Marrow edema at the L2-3 endplates  No epidural phlegmon or abscess  This could be due early discitis/osteomyelitis or Modic type I endplate degenerative changes  Degenerative spondylosis"  · Seen by neurosurgery - MRI changes more likely related to Modic changes than infection  Severe right lower extremity lymphedema exacerbating right-sided pain  Baseline upright lumbar x-rays ordered for future comparison      Elevated troponin  Assessment & Plan  · No chest pain or shortness of breath  · Non MI related troponin increase  · Had elevated troponins upto 4 3 in April, 2019 when she had sepsis and was seen by cardiology who felt troponin increase was due to Non MI related troponin release  · Was diagnosed with hypertrophic cardiomyopathy at that time  · Trop - 0 79 -- - 22  · Had been advised repeat echo - to be done  · Check echo      Chronic pain disorder  Assessment & Plan  PDMP reviewed on admission by admitting physician - patient on oxycodone ER 40 mg twice a day with oxycodone IR 15 mg every 12 hours as needed  Now with acute on chronic right sided pain in the setting of severe sepsis from right lower extremity cellulitis  Pain medications adjusted per acute pain recommendations    Wegener's granulomatosis with renal involvement [facility])  Assessment & Plan  · Diagnosed in February 2010  · Recent serologies negative  · She had pulmonary hemorrhage, renal failure and polyarthritis  · Was on rituximab for 4 years and Prednisone - stopped due to hypogammaglobulinemia and no evidence of disease  · Was followed by Dr Myrtle Ramsey, Dr Yanira Dewey, Dr Resendez Pu      Hypogammaglobulinemia, acquired [facility])  Assessment & Plan  Developed hypogammaglobulinemia following Ritiximab treatment for Wegener's  Patient is currently following with Hematology outpatient Dr June Riggins  She was receiving IVIG 30 g every month until 2020  Currently under observation as IgG levels improved  IgG - 565 on 7/6  Outpatient follow-up    Hypertrophic cardiomyopathy ([facility] [address] )  Assessment & Plan  · Ct BB  · Repeat echo ordered as above      Chronic kidney disease, stage III (moderate) [facility])  Assessment & Plan  Lab Results   Component Value Date    EGFR 86 11/12/2019    EGFR 78 11/11/2019    EGFR 54 11/09/2019    CREATININE 0 81 07/07/2021    CREATININE 1 26 07/06/2021    CREATININE 0 73 11/12/2019     At baseline creatinine is 1     Patient presented with creatinine of 1 26 - improved to 0 81 with intravenous fluids    Ovarian cancer ([facility] [address] )  Assessment & Plan  · BRCA 1 mutation with ovarian cancer - status post bilateral oophorectomy and hysterectomy in 2007 followed by chemotherapy  · Outpatient follow-up            VTE Pharmacologic Prophylaxis: VTE Score: 9 High Risk (Score >/= 5) - Pharmacological DVT Prophylaxis Ordered: heparin  Sequential Compression Devices Ordered  Patient Centered Rounds: I performed bedside rounds with nursing staff today  Discussions with Specialists or Other Care Team Provider: Discussed with infectious disease and neurosurgery    Education and Discussions with Family / Patient: Updated  () via phone  Time Spent for Care: 40 minutes  More than 50% of total time spent on counseling and coordination of care as described above  Current Length of Stay: 1 day(s)  Current Patient Status: Inpatient   Certification Statement: The patient will continue to require additional inpatient hospital stay due to Right lower extremity cellulitis with severe sepsis    Code Status: Level 1 - Full Code    Subjective:   Right lower extremity pain controlled  T-max 99 6° F this morning  No nausea, vomiting or diarrhea  Objective:     Vitals:   Temp (24hrs), Av 9 °F (37 2 °C), Min:98 2 °F (36 8 °C), Max:99 6 °F (37 6 °C)    Temp:  [98 2 °F (36 8 °C)-99 6 °F (37 6 °C)] 98 2 °F (36 8 °C)  HR:  [] 82  Resp:  [18] 18  BP: (102-131)/(60-65) 102/61  SpO2:  [93 %-96 %] 96 %  Body mass index is 39 83 kg/m²  Physical Exam:   Physical Exam  Vitals reviewed  HENT:      Head: Normocephalic  Nose: Nose normal       Mouth/Throat:      Mouth: Mucous membranes are moist    Eyes:      Extraocular Movements: Extraocular movements intact  Cardiovascular:      Rate and Rhythm: Normal rate and regular rhythm  Pulmonary:      Effort: Pulmonary effort is normal  No respiratory distress  Breath sounds: Normal breath sounds  No wheezing  Abdominal:      General: Bowel sounds are normal  There is no distension  Palpations: Abdomen is soft  Tenderness: There is no abdominal tenderness  Musculoskeletal:      Cervical back: Neck supple        Comments: Bilateral lower extremity lymphedema   Skin:     Comments: Right leg erythema   Neurological:      General: No focal deficit present  Mental Status: Karissa Haines is alert and oriented to person, place, and time  Psychiatric:         Mood and Affect: Mood normal          Behavior: Behavior normal           Additional Data:     Labs:  Results from last 7 days   Lab Units 07/07/21  0629 07/06/21  1015   WBC Thousand/uL 14 02* 23 63*   HEMOGLOBIN g/dL 13 0 15 4   HEMATOCRIT % 40 9 46 8*   PLATELETS Thousands/uL 175 231   NEUTROS PCT %  --  90*   LYMPHS PCT %  --  5*   MONOS PCT %  --  3*   EOS PCT %  --  0     Results from last 7 days   Lab Units 07/07/21  1358 07/07/21  0629   SODIUM mmol/L  --  140   POTASSIUM mmol/L  --  4 0   CHLORIDE mmol/L  --  109*   CO2 mmol/L  --  26   BUN mg/dL  --  16   CREATININE mg/dL  --  0 81   ANION GAP mmol/L  --  5   CALCIUM mg/dL  --  8 6   ALBUMIN g/dL 2 4*  --    TOTAL BILIRUBIN mg/dL 0 49  --    ALK PHOS U/L 120*  --    ALT U/L 59  --    AST U/L 50*  --    GLUCOSE RANDOM mg/dL  --  103     Results from last 7 days   Lab Units 07/06/21  1209   INR  1 14     Results from last 7 days   Lab Units 07/06/21  1814   POC GLUCOSE mg/dl 142*         Results from last 7 days   Lab Units 07/07/21  1358 07/07/21  0629 07/06/21  1517 07/06/21  1311 07/06/21  1210 07/06/21  1015   LACTIC ACID mmol/L  --  1 6 2 0 2 6* 3 7* 5 2*   PROCALCITONIN ng/ml 13 41*  --   --   --   --  15 06*       Lines/Drains:  Invasive Devices     Peripheral Intravenous Line            Peripheral IV 07/06/21 Left Antecubital 1 day    Peripheral IV 07/06/21 Right Hand 1 day              Recent Cultures (last 7 days):   Results from last 7 days   Lab Units 07/06/21  1027 07/06/21  1015   BLOOD CULTURE  No Growth at 24 hrs  No Growth at 24 hrs         Last 24 Hours Medication List:   Current Facility-Administered Medications   Medication Dose Route Frequency Provider Last Rate    aluminum-magnesium hydroxide-simethicone  30 mL Oral Q6H PRN Chey Mishra MD      bisacodyl  5 mg Oral Daily PRN Chey Mishra MD      cefazolin  2,000 mg Intravenous Q8H Naveen Styles DO Stopped (07/07/21 7410)    DULoxetine  60 mg Oral Daily Jamison Shelton MD      heparin (porcine)  5,000 Units Subcutaneous Q8H [address] Dolores Mancia MD      HYDROmorphone  0 5 mg Intravenous Q4H PRN Dolores Mancia MD      lidocaine  1 patch Topical Daily Jamison Shelton MD      LORazepam  0 5 mg Oral Q8H PRN Dolores Mancia MD      metoprolol tartrate  12 5 mg Oral Q12H [address] Jamison Shelton MD      ondansetron  4 mg Intravenous Q6H PRN Dolores Mancia MD      oxyCODONE  40 mg Oral Q12H [address] Dolores Mancia MD      oxyCODONE  10 mg Oral Q4H PRN Dolores Mancia MD      oxyCODONE  15 mg Oral Q4H PRN Dolores Mancia MD      polyethylene glycol  17 g Oral Daily Dolores Mancia MD      senna  1 tablet Oral HS Dolores Mancia MD          Today, Patient Was Seen By: Ruperto Cerrato MD    **Please Note: This note may have been constructed using a voice recognition system  **
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
## 2021-07-07 NOTE — ASSESSMENT & PLAN NOTE
· Kidney function appears normal at this time  · Complete remission of disease     · Ongoing medical management per [place] team

## 2021-07-07 NOTE — ASSESSMENT & PLAN NOTE
· Long standing pain syndrome with low back pain, right hip pain and right leg pain related to her lymphedema     · Ongoing medical management and pain control per primary team

## 2021-07-07 NOTE — ASSESSMENT & PLAN NOTE
· POA   · Febrile, tachycardic, leukocytosis, elevated lactic and procalcitonin  · BCx pending completion  · MRI thoracic and lumbar given presence of severe back pain  · Broad spectrum IV abx on admission- currently on Ancef 2g IV Q8    · Ongoing medical management at this time

## 2021-07-07 NOTE — ASSESSMENT & PLAN NOTE
Developed hypogammaglobulinemia following Ritiximab treatment for Wegener's  Patient is currently following with Hematology outpatient Dr Juan Christensen  She was receiving IVIG 30 g every month until 2020    Currently under observation as IgG levels improved  IgG - 565 on 7/6  Outpatient follow-up

## 2021-07-07 NOTE — ASSESSMENT & PLAN NOTE
· Presented with sepsis and severe low back pain  · Acute on chronic back/hip pain worse on the right and exacerbated with laying flat  Imaging:   · MRI thoracic spine 07/06/2021:  No evidence of diskitis/osteomyelitis and thoracic spine  Modic type 1 endplate degenerative changes at T9-10  · MRI lumbar spine 07/06/2021:  Changes sternal S1 vertebrae  Marrow edema at the L2-3 endplate  No epidural phlegmon or abscess noted  Could be early osteomyelitis/diskitis or Modic type 1 changes as there is also no evidence of endplate erosion  Degenerative spondylosis as described  Plan:   · Continue to monitor neurologic symptoms  · Ongoing medical management per primary team     · Continue infectious disease observation  Continue to monitor fever curve  · Blood cultures pending  ESR normal, CRP elevated at 59 6  · MRI changes more likely related to Modic changes rather than infectious etiology at this time  · Significant improvement in back pain just with repositioning as it was worse with laying  Minimal pain when up and ambulating  · Significant RLE lymphedema that causes her joint pains in the RLE  Minimal radicular pains  · Can defer bracing at this time  If patient has progressive mechanical pain worse with movement can consider bracing at that time  · Will order baseline upright lumbar x-rays for future comparison  · If patient has neurological change, would recommend repeat MRI lumbar spine to assess for possible progression which may be related to infection  · No surgical intervention is indicated at this juncture  · Will continue to follow as needed at this time  Call with questions concerns or future needs

## 2021-07-07 NOTE — ASSESSMENT & PLAN NOTE
Lab Results   Component Value Date    EGFR 86 11/12/2019    EGFR 78 11/11/2019    EGFR 54 11/09/2019    CREATININE 0 81 07/07/2021    CREATININE 1 26 07/06/2021    CREATININE 0 73 11/12/2019     At baseline creatinine is 1     Patient presented with creatinine of 1 26 - improved to 0 81 with intravenous fluids

## 2021-07-07 NOTE — ASSESSMENT & PLAN NOTE
· Diagnosed in February 2010  · Recent serologies negative  · She had pulmonary hemorrhage, renal failure and polyarthritis  · Was on rituximab for 4 years and Prednisone - stopped due to hypogammaglobulinemia and no evidence of disease  · Was followed by Dr Ingrid Real, Dr Jose Maria Seo, Dr Ivette Bravo

## 2021-07-07 NOTE — ASSESSMENT & PLAN NOTE
Lab Results   Component Value Date    EGFR 86 11/12/2019    EGFR 78 11/11/2019    EGFR 54 11/09/2019    CREATININE 0 81 07/07/2021    CREATININE 1 26 07/06/2021    CREATININE 0 73 11/12/2019

## 2021-07-07 NOTE — ASSESSMENT & PLAN NOTE
· No chest pain or shortness of breath  · Non MI related troponin increase  · Had elevated troponins upto 4 3 in April, 2019 when she had sepsis and was seen by cardiology who felt troponin increase was due to Non MI related troponin release  · Was diagnosed with hypertrophic cardiomyopathy at that time  · Trop - 0 79 -- - 22  · Had been advised repeat echo - to be done  · Check echo

## 2021-07-07 NOTE — ASSESSMENT & PLAN NOTE
· BRCA 1 mutation with ovarian cancer - status post bilateral oophorectomy and hysterectomy in 2007 followed by chemotherapy  · Outpatient follow-up

## 2021-07-07 NOTE — PROGRESS NOTES
Progress Note - Acute Pain Service    Eden Cabrera 74 y o  adult MRN: [id number]  Unit/Bed#: CW2 218-01 Encounter: [id number]      Assessment:   Principal Problem:    Severe sepsis ([facility] )  Active Problems:    Wegener's granulomatosis with renal involvement ([facility] )    Chronic kidney disease, stage III (moderate) (HCC)    Chronic pain disorder    Elevated troponin    Hypogammaglobulinemia, acquired ([facility] )    Morbid obesity ([facility] )    Right hip pain    Eden Cabrera is a 74 y o  adult  With history of granulomatosis and polyangiitis with 1 day history of fever, generalized weakness and confusion  Also complains of acute on chronic right lower back and right leg pain  Has history of chronic right hip pain and opioid dependence  Plan:    Continue OxyContin 40 mg p o  q 12 hours scheduled (home medication )   Continue oxycodone 10 mg p o  q 4 hours p r n  moderate pain   Continue oxycodone 15 mg p o  q 4 hours p r n  severe pain   Discontinue IV Dilaudid   Continue Cymbalta 60 mg p o  daily scheduled   Continue lidocaine patch for up to 12 hours daily   Ice to affected area for up to 20 minutes every hour as needed   Continue bowel regimen to avoid opioid induced constipation   At discharge, suggest resume home OxyContin 40 mg p o  q 12 hours scheduled   At discharge, suggest oxycodone IR 15 mg p o  q 4 hours p r n  moderate to severe pain for 7-10 days, then resume oxycodone IR 15 mg p o  b i d  p r n  severe pain  APS will sign off at this time  Thank you for the consult  All opioids and other analgesics to be written at discretion of primary team  Please contact Acute Pain Service - [facility] via [facility] from [phone] with additional questions or concerns  See Luz or Luisana for additional contacts and after hours information      Pain History  Current pain location(s): Lower back  Pain Scale:    4/10  Quality:  sore  24 hour history:   Patient admitted yesterday with back pain and right hip pain as well as sepsis  Lumbar MRI consistent with possible early diskitis/ osteomyelitis verses Modic type 1 endplate degenerative changes  Pain improved today  Opioid requirement previous 24 hours:   OxyContin 80 mg p o , Dilaudid 1 mg IV  Meds/Allergies   all current active meds have been reviewed, current meds:   Current Facility-Administered Medications   Medication Dose Route Frequency    aluminum-magnesium hydroxide-simethicone (MYLANTA) oral suspension 30 mL  30 mL Oral Q6H PRN    bisacodyl (DULCOLAX) EC tablet 5 mg  5 mg Oral Daily PRN    DULoxetine (CYMBALTA) delayed release capsule 60 mg  60 mg Oral Daily    heparin (porcine) subcutaneous injection 5,000 Units  5,000 Units Subcutaneous Q8H [address]    HYDROmorphone (DILAUDID) injection 0 5 mg  0 5 mg Intravenous Q4H PRN    lidocaine (LIDODERM) 5 % patch 1 patch  1 patch Topical Daily    LORazepam (ATIVAN) tablet 0 5 mg  0 5 mg Oral Q8H PRN    metoprolol tartrate (LOPRESSOR) partial tablet 12 5 mg  12 5 mg Oral Q12H [address]    ondansetron (ZOFRAN) injection 4 mg  4 mg Intravenous Q6H PRN    oxyCODONE (OxyCONTIN) 12 hr tablet 40 mg  40 mg Oral Q12H PALAK    oxyCODONE (ROXICODONE) immediate release tablet 10 mg  10 mg Oral Q4H PRN    oxyCODONE (ROXICODONE) IR tablet 15 mg  15 mg Oral Q4H PRN    polyethylene glycol (MIRALAX) packet 17 g  17 g Oral Daily    senna (SENOKOT) tablet 8 6 mg  1 tablet Oral HS    sodium chloride 0 9 % infusion  100 mL/hr Intravenous Continuous    vancomycin (VANCOCIN) 1,250 mg in sodium chloride 0 9 % 250 mL IVPB  1,250 mg Intravenous Q24H    and PTA meds:   Prior to Admission Medications   Prescriptions Last Dose Informant Patient Reported? Taking?    Calcium Carbonate-Vit D-Min (CALCIUM 1200 PO) 7/5/2021 at Unknown time Self Yes Yes   Sig: Take 3 tablets by mouth daily    DULoxetine (CYMBALTA) 60 mg delayed release capsule 7/5/2021 at Unknown time Self Yes Yes   Sig: Take 60 mg by mouth daily     LORazepam (ATIVAN) 0 5 mg tablet 7/6/2021 at Unknown time Self No Yes   Sig: Take 1 tablet as needed every 8 hours  Patient taking differently: Take 0 5 mg by mouth 2 (two) times a day Take 1 tablet as needed every 8 hours  Multiple Vitamins-Minerals (MULTIVITAMIN ADULT PO) 7/5/2021 at Unknown time Self Yes Yes   Sig: Take 1 capsule by mouth daily    OXYCODONE HCL PO 7/5/2021 at Unknown time Self Yes Yes   Sig: Take 15 mg by mouth 2 (two) times a day as needed    PREVIDENT 5000 DRY MOUTH 1 1 % GEL  Self Yes Yes   TOVIAZ 8 MG TB24 7/5/2021 at Unknown time Self Yes Yes   Sig: Take 8 mg by mouth daily at bedtime    acetaminophen (TYLENOL) 325 mg tablet 7/5/2021 at Unknown time Self No Yes   Sig: Take 2 tablets by mouth every 6 (six) hours as needed for mild pain   Patient taking differently: Take 650 mg by mouth every 6 (six) hours as needed for mild pain    bisacodyl (DULCOLAX) 5 mg EC tablet Not Taking at Unknown time Self Yes No   Sig: Take 5 mg by mouth daily as needed for constipation    Patient not taking: Reported on 7/6/2021   furosemide (LASIX) 20 mg tablet Not Taking at Unknown time Self No No   Sig: Take 1 tablet (20 mg total) by mouth daily   Patient not taking: Reported on 3/12/2021   metoprolol tartrate (LOPRESSOR) 25 mg tablet 7/6/2021 at Unknown time Self No Yes   Sig: Take 0 5 tablets (12 5 mg total) by mouth every 12 (twelve) hours   omeprazole (PriLOSEC) 20 mg delayed release capsule 7/5/2021 at Unknown time Self Yes Yes   Sig: Take 20 mg by mouth daily     ondansetron (ZOFRAN) 4 mg tablet Not Taking at Unknown time Self No No   Sig: Take 1 tablet (4 mg total) by mouth every 8 (eight) hours as needed for nausea or vomiting   Patient not taking: Reported on 6/4/2021   oxyCODONE (OxyCONTIN) 40 mg 12 hr tablet 7/5/2021 at Unknown time Self Yes Yes   Sig: Take 40 mg by mouth every 12 (twelve) hours   penicillin V potassium (VEETID) 250 mg tablet   No No   Sig: Take 1 tablet (250 mg total) by mouth every 12 (twelve) hours   Patient not taking: Reported on 5/7/2021   polyethylene glycol (MIRALAX) 17 g packet Not Taking at Unknown time Self Yes No   Sig: Take 17 g by mouth as needed    Patient not taking: Reported on 7/6/2021   prochlorperazine (COMPAZINE) 10 mg tablet Not Taking at Unknown time Self No No   Sig: Take 1 tablet (10 mg total) by mouth every 6 (six) hours as needed for nausea or vomiting   Patient not taking: Reported on 6/4/2021      Facility-Administered Medications: None       Allergies   Allergen Reactions    Iodinated Diagnostic Agents Shortness Of Breath    Omnipaque [Iohexol] Shortness Of Breath    Other Shortness Of Breath     IVP dye, x ray dye 3    Iodides      Other reaction(s): SWELLING, SOB    Methotrexate Nausea Only     Headache and nausea    Methotrexate Derivatives Headache       Objective     Temp:  [99 6 °F (37 6 °C)-102 1 °F (38 9 °C)] 99 6 °F (37 6 °C)  HR:  [] 100  Resp:  [18-22] 18  BP: (109-141)/(60-83) 131/65    Physical Exam  Vitals and nursing note reviewed  Constitutional:       General: Vinh Hy is awake  Vinh Hy is not in acute distress  Skin:     General: Skin is warm and dry  Neurological:      Mental Status: Vinh Hy is alert and oriented to person, place, and time  GCS: GCS eye subscore is 4  GCS verbal subscore is 5  GCS motor subscore is 6  Psychiatric:         Behavior: Behavior is cooperative  Lab Results:   Results from last 7 days   Lab Units 07/07/21  0629   WBC Thousand/uL 14 02*   HEMOGLOBIN g/dL 13 0   HEMATOCRIT % 40 9   PLATELETS Thousands/uL 175      Results from last 7 days   Lab Units 07/07/21  0629 07/06/21  1015   POTASSIUM mmol/L 4 0 3 8   CHLORIDE mmol/L 109* 102   CO2 mmol/L 26 24   BUN mg/dL 16 14   CREATININE mg/dL 0 81 1 26   CALCIUM mg/dL 8 6 9 6   ALK PHOS U/L  --  179*   ALT U/L  --  87*   AST U/L  --  100*       Imaging Studies: I have personally reviewed pertinent reports      EKG, Pathology, and Other Studies: I have personally reviewed pertinent reports  Counseling / Coordination of Care  Total floor / unit time spent today 30 minutes  Greater than 50% of total time was spent with the patient and / or family counseling and / or coordination of care  A description of the counseling / coordination of care:  Patient interview, physical examination, review of medical record, review of imaging and laboratory data, development of pain management plan, discussion of pain management plan with patient and primary service  Please note that the APS provides consultative services regarding pain management only  With the exception of ketamine and epidural infusions and except when indicated, final decisions regarding starting or changing doses of analgesic medications are at the discretion of the consulting service  Off hours consultation and/or medication management is generally not available      Mita Pena PA-C  Acute Pain Service

## 2021-07-07 NOTE — ASSESSMENT & PLAN NOTE
· Presented with sepsis and severe low back pain  · Acute on chronic back/hip pain worse on the right and exacerbated with laying flat  · Worse pain is only in top of proximal thigh  No extension to the knee and minimal radiation from low back  Imaging:   · MRI thoracic spine 07/06/2021:  No evidence of diskitis/osteomyelitis and thoracic spine  Modic type 1 endplate degenerative changes at T9-10  · MRI lumbar spine 07/06/2021:  Changes sternal S1 vertebrae  Marrow edema at the L2-3 endplate  No epidural phlegmon or abscess noted  Could be early osteomyelitis/diskitis or Modic type 1 changes as there is also no evidence of endplate erosion  Degenerative spondylosis as described  Plan:   · Continue to monitor neurologic symptoms  · Ongoing medical management per primary team     · Continue infectious disease observation  Continue to monitor fever curve  · Blood cultures pending  ESR normal, CRP elevated at 59 6  · MRI changes more likely related to Modic changes rather than infectious etiology at this time  · Significant improvement in back pain just with repositioning as it was worse with laying  Minimal pain when up and ambulating  · Significant RLE lymphedema that causes her joint pains in the RLE  Minimal radicular pains  · Can defer bracing at this time  If patient has progressive mechanical pain worse with movement can consider bracing at that time  · Will order baseline upright lumbar x-rays for future comparison  · If patient has neurological change, would recommend repeat MRI lumbar spine to assess for possible progression which may be related to infection  · IR biopsy would be low yield at this time if [facility] negative as patient has been on broad spectrum ABX since admission  · No surgical intervention is indicated at this juncture  · Will continue to follow as needed at this time  Call with questions concerns or future needs

## 2021-07-07 NOTE — ASSESSMENT & PLAN NOTE
PDMP reviewed on admission by admitting physician - patient on oxycodone ER 40 mg twice a day with oxycodone IR 15 mg every 12 hours as needed    Now with acute on chronic right sided pain in the setting of severe sepsis from right lower extremity cellulitis  Pain medications adjusted per acute pain recommendations

## 2021-07-07 NOTE — CONSULTS
[address] [facility] 1955, 74 y o  adult MRN: [id number]  Unit/Bed#: 2 218-01 Encounter: [id number]  Primary Care Provider: Durga Grimes DO   Date and time admitted to hospital: 7/6/2021  9:36 AM    Inpatient consult to Neurosurgery  Consult performed by: Sofiya Rivera PA-C  Consult ordered by: Tyshawn Archuleta MD          Chronic right-sided low back pain without sciatica  Assessment & Plan  · Presented with sepsis and severe low back pain  · Acute on chronic back/hip pain worse on the right and exacerbated with laying flat  · Worse pain is only in top of proximal thigh  No extension to the knee and minimal radiation from low back  Imaging:   · MRI thoracic spine 07/06/2021:  No evidence of diskitis/osteomyelitis and thoracic spine  Modic type 1 endplate degenerative changes at T9-10  · MRI lumbar spine 07/06/2021:  Changes sternal S1 vertebrae  Marrow edema at the L2-3 endplate  No epidural phlegmon or abscess noted  Could be early osteomyelitis/diskitis or Modic type 1 changes as there is also no evidence of endplate erosion  Degenerative spondylosis as described  Plan:   · Continue to monitor neurologic symptoms  · Ongoing medical management per primary team     · Continue infectious disease observation  Continue to monitor fever curve  · Blood cultures pending  ESR normal, CRP elevated at 59 6  · MRI changes more likely related to Modic changes rather than infectious etiology at this time  · Significant improvement in back pain just with repositioning as it was worse with laying  Minimal pain when up and ambulating  · Significant RLE lymphedema that causes her joint pains in the RLE  Minimal radicular pains  · Can defer bracing at this time  If patient has progressive mechanical pain worse with movement can consider bracing at that time  · Will order baseline upright lumbar x-rays for future comparison     · If patient has neurological change, would recommend repeat MRI lumbar spine to assess for possible progression which may be related to infection  · IR biopsy would be low yield at this time if [facility] negative as patient has been on broad spectrum ABX since admission  · No surgical intervention is indicated at this juncture  · Will continue to follow as needed at this time  Call with questions concerns or future needs  Chronic pain disorder  Assessment & Plan  · Long standing pain syndrome with low back pain, right hip pain and right leg pain related to her lymphedema  · Ongoing medical management and pain control per primary team      Chronic kidney disease, stage III (moderate) [facility])  Assessment & Plan  Lab Results   Component Value Date    EGFR 86 11/12/2019    EGFR 78 11/11/2019    EGFR 54 11/09/2019    CREATININE 0 81 07/07/2021    CREATININE 1 26 07/06/2021    CREATININE 0 73 11/12/2019       Wegener's granulomatosis with renal involvement ([facility] [address] )  Assessment & Plan  · Kidney function appears normal at this time  · Complete remission of disease  · Ongoing medical management per [facility] team      * Severe sepsis ([facility] [address] )  Assessment & Plan  · POA   · Febrile, tachycardic, leukocytosis, elevated lactic and procalcitonin  · BCx pending completion  · MRI thoracic and lumbar given presence of severe back pain  · Broad spectrum IV abx on admission- currently on Ancef 2g IV Q8    · Ongoing medical management at this time  History of Present Illness   HPI: Megan Nevarez is a 74 y o  adult with PMH including ovarian cancer status post hysterectomy no with lymphedema in bilateral lower extremities, postoperative PE/DVT after hysterectomy who presents with complaint of fever and increased confusion  She states yesterday she doesn't know what happened exactly  On presentation she was found to meet sepsis criteria with fever, tachycardia, leukocytosis and elevated infectious markers   Of note ESR was normal   Patient was complaining of significant distress due to back pain and right hip discomfort which she has chronically  She states that yesterday her pain seemed to be much more severe but believes this was attributed to all the testing she was undergoing requiring her lay flat frequently  She states that her discomfort is worse when she is lying flat and better when she is sitting up and standing  She has no difficulty with ambulation, aside from her lymphadenopathy in her lower extremities  Right leg is worse than left leg in terms of swelling and keeps them wrapped at the discretion of her therapist   Since yesterday patient states that she has had significant improvement in her low back pain to the point where it is no longer concern for her  She states she is able to get up and ambulate to the bathroom without difficulties  No bowel or bladder dysfunction  No chest pain, shortness of breath, abdominal pain, lightheadedness, dizziness, changes in speech, difficulty swallowing, facial weakness, visual deficits  She does endorse mild headache this morning  Review of Systems   Constitutional: Positive for fever  Negative for activity change, appetite change and chills  HENT: Negative  Negative for voice change  Eyes: Negative for visual disturbance  Respiratory: Negative for cough, chest tightness and shortness of breath  Cardiovascular: Positive for leg swelling (Chronic lymphedema)  Negative for chest pain  Gastrointestinal: Negative  Genitourinary: Negative for difficulty urinating  Musculoskeletal: Positive for arthralgias ( right hip, right knee, right foot nonradiating and chronic ) and back pain ( improving low back pain  )  Negative for gait problem and neck pain  Neurological: Positive for weakness (related to her lymphedema) and headaches  Negative for dizziness, speech difficulty, light-headedness and numbness     Psychiatric/Behavioral: Negative for agitation, behavioral problems and confusion  Historical Information   Past Medical History:   Diagnosis Date    BRCA1 positive     Cancer ([facility] [address] )     ovarian Ca with chemo    History of chemotherapy     ovarian cancer     History of pulmonary embolus (PE) & DVT 2007    post-op FAMILIA/ omenectomy    Lymphedema     MRSA (methicillin resistant Staphylococcus aureus) 2017    nasal swab negative 4/3/19    Ovarian cancer ([facility] [address] )     CYST REMOVED RIGHT OVARY IN   HYSTERECTOMY 07       Past Surgical History:   Procedure Laterality Date    APPENDECTOMY  1998    laparoscopic    BILATERAL SALPINGOOPHORECTOMY  2007    BREAST BIOPSY Right 2016     SECTION      HYSTERECTOMY  2007    Omentectomy and lymph node biopsy at [facility]     OTHER SURGICAL HISTORY      right lower extremity due to trauma    RI TREAT TIBIAL SHAFT FX, INTRAMED IMPLANT Right 2017    Procedure: INSERTION NAIL IM TIBIA;  Surgeon: Kristan Price MD;  Location: [facility];  Service: Orthopedics    US GUIDANCE BREAST BIOPSY LEFT EACH ADDITIONAL Left 7/15/2020    US GUIDED BREAST BIOPSY LEFT COMPLETE Left 7/15/2020    US GUIDED BREAST BIOPSY RIGHT COMPLETE Right 2016     Social History     Substance and Sexual Activity   Alcohol Use Yes     Social History     Substance and Sexual Activity   Drug Use Not Currently     Social History     Tobacco Use   Smoking Status Never Smoker   Smokeless Tobacco Never Used     Family History   Problem Relation Age of Onset    Breast cancer Mother         28    Pancreatic cancer Mother     Ovarian cancer Maternal Grandmother     Breast cancer Maternal Aunt 39    No Known Problems Paternal Aunt     No Known Problems Father     No Known Problems Maternal Grandfather     No Known Problems Paternal Grandmother     No Known Problems Paternal Grandfather     Lung cancer Half-Sister        Meds/Allergies   all current active meds have been reviewed, current meds:   Current Facility-Administered Medications   Medication Dose Route Frequency    aluminum-magnesium hydroxide-simethicone (MYLANTA) oral suspension 30 mL  30 mL Oral Q6H PRN    bisacodyl (DULCOLAX) EC tablet 5 mg  5 mg Oral Daily PRN    ceFAZolin (ANCEF) IVPB (premix in dextrose) 2,000 mg 50 mL  2,000 mg Intravenous Q8H    DULoxetine (CYMBALTA) delayed release capsule 60 mg  60 mg Oral Daily    heparin (porcine) subcutaneous injection 5,000 Units  5,000 Units Subcutaneous Q8H [address]    HYDROmorphone (DILAUDID) injection 0 5 mg  0 5 mg Intravenous Q4H PRN    lidocaine (LIDODERM) 5 % patch 1 patch  1 patch Topical Daily    LORazepam (ATIVAN) tablet 0 5 mg  0 5 mg Oral Q8H PRN    metoprolol tartrate (LOPRESSOR) partial tablet 12 5 mg  12 5 mg Oral Q12H [address]    ondansetron (ZOFRAN) injection 4 mg  4 mg Intravenous Q6H PRN    oxyCODONE (OxyCONTIN) 12 hr tablet 40 mg  40 mg Oral Q12H PALAK    oxyCODONE (ROXICODONE) immediate release tablet 10 mg  10 mg Oral Q4H PRN    oxyCODONE (ROXICODONE) IR tablet 15 mg  15 mg Oral Q4H PRN    polyethylene glycol (MIRALAX) packet 17 g  17 g Oral Daily    senna (SENOKOT) tablet 8 6 mg  1 tablet Oral HS    sodium chloride 0 9 % infusion  100 mL/hr Intravenous Continuous    and PTA meds:   Prior to Admission Medications   Prescriptions Last Dose Informant Patient Reported? Taking? Calcium Carbonate-Vit D-Min (CALCIUM 1200 PO) 7/5/2021 at Unknown time Self Yes Yes   Sig: Take 3 tablets by mouth daily    DULoxetine (CYMBALTA) 60 mg delayed release capsule 7/5/2021 at Unknown time Self Yes Yes   Sig: Take 60 mg by mouth daily     LORazepam (ATIVAN) 0 5 mg tablet 7/6/2021 at Unknown time Self No Yes   Sig: Take 1 tablet as needed every 8 hours  Patient taking differently: Take 0 5 mg by mouth 2 (two) times a day Take 1 tablet as needed every 8 hours      Multiple Vitamins-Minerals (MULTIVITAMIN ADULT PO) 7/5/2021 at Unknown time Self Yes Yes   Sig: Take 1 capsule by mouth daily    OXYCODONE HCL PO 7/5/2021 at Unknown time Self Yes Yes   Sig: Take 15 mg by mouth 2 (two) times a day as needed    PREVIDENT 5000 DRY MOUTH 1 1 % GEL  Self Yes Yes   TOVIAZ 8 MG TB24 7/5/2021 at Unknown time Self Yes Yes   Sig: Take 8 mg by mouth daily at bedtime    acetaminophen (TYLENOL) 325 mg tablet 7/5/2021 at Unknown time Self No Yes   Sig: Take 2 tablets by mouth every 6 (six) hours as needed for mild pain   Patient taking differently: Take 650 mg by mouth every 6 (six) hours as needed for mild pain    bisacodyl (DULCOLAX) 5 mg EC tablet Not Taking at Unknown time Self Yes No   Sig: Take 5 mg by mouth daily as needed for constipation    Patient not taking: Reported on 7/6/2021   furosemide (LASIX) 20 mg tablet Not Taking at Unknown time Self No No   Sig: Take 1 tablet (20 mg total) by mouth daily   Patient not taking: Reported on 3/12/2021   metoprolol tartrate (LOPRESSOR) 25 mg tablet 7/6/2021 at Unknown time Self No Yes   Sig: Take 0 5 tablets (12 5 mg total) by mouth every 12 (twelve) hours   omeprazole (PriLOSEC) 20 mg delayed release capsule 7/5/2021 at Unknown time Self Yes Yes   Sig: Take 20 mg by mouth daily     ondansetron (ZOFRAN) 4 mg tablet Not Taking at Unknown time Self No No   Sig: Take 1 tablet (4 mg total) by mouth every 8 (eight) hours as needed for nausea or vomiting   Patient not taking: Reported on 6/4/2021   oxyCODONE (OxyCONTIN) 40 mg 12 hr tablet 7/5/2021 at Unknown time Self Yes Yes   Sig: Take 40 mg by mouth every 12 (twelve) hours   penicillin V potassium (VEETID) 250 mg tablet   No No   Sig: Take 1 tablet (250 mg total) by mouth every 12 (twelve) hours   Patient not taking: Reported on 5/7/2021   polyethylene glycol (MIRALAX) 17 g packet Not Taking at Unknown time Self Yes No   Sig: Take 17 g by mouth as needed    Patient not taking: Reported on 7/6/2021   prochlorperazine (COMPAZINE) 10 mg tablet Not Taking at Unknown time Self No No   Sig: Take 1 tablet (10 mg total) by mouth every 6 (six) hours as needed for nausea or vomiting   Patient not taking: Reported on 6/4/2021      Facility-Administered Medications: None     Allergies   Allergen Reactions    Iodinated Diagnostic Agents Shortness Of Breath    Omnipaque [Iohexol] Shortness Of Breath    Other Shortness Of Breath     IVP dye, x ray dye 3    Iodides      Other reaction(s): SWELLING, SOB    Methotrexate Nausea Only     Headache and nausea    Methotrexate Derivatives Headache       Objective   I/O       07/05 0701 - 07/06 0700 07/06 0701 - 07/07 0700 07/07 0701 - 07/08 0700    P  O    420    I V  (mL/kg)  2591 7 (25 4) 436 7 (4 3)    Total Intake(mL/kg)  2591 7 (25 4) 856 7 (8 4)    Urine (mL/kg/hr)  450     Total Output  450     Net  +2141 7 +856 7           Unmeasured Urine Occurrence   1 x          Physical Exam  Constitutional:       Appearance: Normal appearance  [place] Pascual is well-developed  [place] Pascual is obese  HENT:      Head: Normocephalic and atraumatic  Eyes:      Extraocular Movements: Extraocular movements intact and EOM normal       Conjunctiva/sclera: Conjunctivae normal       Pupils: Pupils are equal, round, and reactive to light  Neck:      Vascular: No JVD  Trachea: No tracheal deviation  Cardiovascular:      Rate and Rhythm: Normal rate  Pulmonary:      Effort: Pulmonary effort is normal  No respiratory distress  Abdominal:      General: There is no distension  Palpations: Abdomen is soft  Musculoskeletal:         General: No deformity  Normal range of motion  Cervical back: Normal range of motion and neck supple  Skin:     General: Skin is warm and dry  Neurological:      Mental Status: Bryant Garner is alert and oriented to person, place, and time  Cranial Nerves: No cranial nerve deficit  Sensory: No sensory deficit  Motor: Weakness present  Gait: Gait is intact  Deep Tendon Reflexes: Reflexes are normal and symmetric     Psychiatric:         Speech: Speech normal          Behavior: Behavior normal          Thought Content: Thought content normal        Neurologic Exam     Mental Status   Oriented to person, place, and time  Attention: normal    Speech: speech is normal   Level of consciousness: alert  Knowledge: good  Normal comprehension  Cranial Nerves     CN III, IV, VI   Pupils are equal, round, and reactive to light  Extraocular motions are normal    Upgaze: normal  Downgaze: normal    CN V   Facial sensation intact  CN VII   Facial expression full, symmetric  CN VIII   CN VIII normal    Hearing: intact    CN XII   CN XII normal    Tongue deviation: none    Motor Exam   Muscle bulk: normal  Right arm tone: normal  Left arm tone: normal  Right leg tone: normal  Left leg tone: normal    Strength   Right deltoid: 5/5  Left deltoid: 5/5  Right biceps: 5/5  Left biceps: 5/5  Right triceps: 5/5  Left triceps: 5/5  Right wrist flexion: 5/5  Left wrist flexion: 5/5  Right wrist extension: 5/5  Left wrist extension: 5/5  Right iliopsoas: 4/5  Left iliopsoas: 5/5  Right quadriceps: 4/5  Left quadriceps: 5/5  Right hamstrin/5  Left hamstrin/5  Right anterior tibial: 4/5  Left anterior tibial: 5/5  Right gastroc: 4/5  Left gastroc: 5/5RLE wrapped from mid thigh distally and LLE from knee distally  Right foot in compression brace  Some reduced strength in the right related to pressure pain from the swelling  Sensory Exam   Light touch normal      Gait, Coordination, and Reflexes     Gait  Gait: normal    Tremor   Resting tremor: absent  Action tremor: absent    Reflexes   Left ankle clonus: absentUnable to assess DTR's secondary to leg wrap in RLE  Also limited secondary to body habitus  Vitals:Blood pressure 131/65, pulse 100, temperature 99 6 °F (37 6 °C), resp  rate 18, height 5' 3" (1 6 m), weight 102 kg (224 lb 13 9 oz), SpO2 93 %  ,Body mass index is 39 83 kg/m²       Lab Results:   Results from last 7 days   Lab Units 21 07/06/21  1015   WBC Thousand/uL 14 02* 23 63*   HEMOGLOBIN g/dL 13 0 15 4   HEMATOCRIT % 40 9 46 8*   PLATELETS Thousands/uL 175 231   NEUTROS PCT %  --  90*   MONOS PCT %  --  3*     Results from last 7 days   Lab Units 07/07/21  0629 07/06/21  1015   POTASSIUM mmol/L 4 0 3 8   CHLORIDE mmol/L 109* 102   CO2 mmol/L 26 24   BUN mg/dL 16 14   CREATININE mg/dL 0 81 1 26   CALCIUM mg/dL 8 6 9 6   ALK PHOS U/L  --  179*   ALT U/L  --  87*   AST U/L  --  100*             Results from last 7 days   Lab Units 07/06/21  1209   INR  1 14   PTT seconds 30     No results found for: TROPONINT  ABG:No results found for: PHART, TGF5ZVC, PO2ART, AHX7FVZ, Z2JIBUSC, BEART, SOURCE    Imaging Studies: I have personally reviewed pertinent reports  and I have personally reviewed pertinent films in PACS    CT abdomen pelvis wo contrast    Result Date: 7/6/2021  Impression: 1  No acute abnormality in the abdomen or pelvis  2   Stable mild stranding in the left lateral abdominal wall of uncertain etiology  3   Stable splenic hypodensity, likely cyst or hemangioma with additional new smaller hypodense lesion, also with a benign appearance consistent with cyst or hemangioma  4   Decreased right inguinal lymph nodes  Workstation performed: [id number]     XR chest 1 view portable    Result Date: 7/6/2021  Impression: Mild pulmonary vascular congestion  No pneumothorax identified  Workstation performed: [id number]     XR hip/pelv 2-3 vws right if performed    Result Date: 7/7/2021  Impression: No acute osseous abnormality  Unchanged severe right hip osteoarthritis  Workstation performed: [id number]     MRI thoracic spine wo contrast    Result Date: 7/6/2021  Impression: No evidence of discitis/osteomyelitis in the thoracic spine  Workstation performed: [id number]     MRI lumbar spine w wo contrast    Result Date: 7/6/2021  Impression: Transitional S1 vertebra  Marrow edema at the L2-3 endplates  No epidural phlegmon or abscess  This could be due early discitis/osteomyelitis or Modic type I endplate degenerative changes  Degenerative spondylosis as described  Workstation performed: [id number]       EKG, Pathology, and Other Studies: I have personally reviewed pertinent reports  VTE Prophylaxis: Sequential compression device (Venodyne)  and Heparin    Code Status: Level 1 - Full Code  Advance Directive and Living Will:      Power of :    POLST:      Counseling / Coordination of Care  I spent 30 minutes with the patient

## 2021-07-07 NOTE — ASSESSMENT & PLAN NOTE
· Presented on 7/6/21 with fever, confusion and headache  · Due to right leg cellulitis in the setting of lymphedema (did not have significant cellulitic changes on presentation)  · Initial concern for osteomyelitis due to marrow edema at L2-3 - likely related to Modic changes per neurosurgery  · Antibiotics changed to cefazolin  · Monitor local response, temperature, WBC    Follow-up blood cultures  · Discussed with infectious disease and neurosurgery

## 2021-07-07 NOTE — ASSESSMENT & PLAN NOTE
· Presented with sepsis and severe low back pain with acute on chronic back/hip pain  · MRI thoracic spine - no evidence of diskitis/osteomyelitis  · MRI lumbar spine - "Transitional S1 vertebra  Marrow edema at the L2-3 endplates  No epidural phlegmon or abscess  This could be due early discitis/osteomyelitis or Modic type I endplate degenerative changes  Degenerative spondylosis"  · Seen by neurosurgery - MRI changes more likely related to Modic changes than infection  Severe right lower extremity lymphedema exacerbating right-sided pain  Baseline upright lumbar x-rays ordered for future comparison

## 2021-07-07 NOTE — PLAN OF CARE
Problem: Potential for Falls  Goal: Patient will remain free of falls  Description: INTERVENTIONS:  - Educate patient/family on patient safety including physical limitations  - Instruct patient to call for assistance with activity   - Consult OT/PT to assist with strengthening/mobility   - Keep Call bell within reach  - Keep bed low and locked with side rails adjusted as appropriate  - Keep care items and personal belongings within reach  - Initiate and maintain comfort rounds  - Make Fall Risk Sign visible to staff  - Offer Toileting every Hours, in advance of need  - Initiate/Maintain alarm  - Obtain necessary fall risk management equipment:  - Apply yellow socks and bracelet for high fall risk patients  - Consider moving patient to room near nurses station  Outcome: Progressing     Problem: MOBILITY - ADULT  Goal: Maintain or return to baseline ADL function  Description: INTERVENTIONS:  -  Assess patient's ability to carry out ADLs; assess patient's baseline for ADL function and identify physical deficits which impact ability to perform ADLs (bathing, care of mouth/teeth, toileting, grooming, dressing, etc )  - Assess/evaluate cause of self-care deficits   - Assess range of motion  - Assess patient's mobility; develop plan if impaired  - Assess patient's need for assistive devices and provide as appropriate  - Encourage maximum independence but intervene and supervise when necessary  - Involve family in performance of ADLs  - Assess for home care needs following discharge   - Consider OT consult to assist with ADL evaluation and planning for discharge  - Provide patient education as appropriate  Outcome: Progressing  Goal: Maintains/Returns to pre admission functional level  Description: INTERVENTIONS:  - Perform BMAT or MOVE assessment daily    - Set and communicate daily mobility goal to care team and patient/family/caregiver     - Collaborate with rehabilitation services on mobility goals if consulted  - Perform Range of Motion times a day  - Reposition patient every  hours    - Dangle patient times a day  - Stand patient  times a day  - Ambulate patient  times a day  - Out of bed to chair times a day   - Out of bed for meals  times a day  - Out of bed for toileting  - Record patient progress and toleration of activity level   Outcome: Progressing     Problem: Prexisting or High Potential for Compromised Skin Integrity  Goal: Skin integrity is maintained or improved  Description: INTERVENTIONS:  - Identify patients at risk for skin breakdown  - Assess and monitor skin integrity  - Assess and monitor nutrition and hydration status  - Monitor labs   - Assess for incontinence   - Turn and reposition patient  - Assist with mobility/ambulation  - Relieve pressure over bony prominences  - Avoid friction and shearing  - Provide appropriate hygiene as needed including keeping skin clean and dry  - Evaluate need for skin moisturizer/barrier cream  - Collaborate with interdisciplinary team   - Patient/family teaching  - Consider wound care consult   Outcome: Progressing

## 2021-07-07 NOTE — CONSULTS
Vancomycin IV Pharmacy-to-Dose Consultation    Monica George is a 74 y o  adult who was receiving vancomycin IV with management by the Pharmacy Consult service  The patient's vancomycin therapy has been discontinued  Thank you for allowing us to take part in this patient's care  Pharmacy will sign-off at this point; please call if there are any questions        Sheila Lobo, PharmD, [place] [address] Pharmacist   [phone]

## 2021-07-08 ENCOUNTER — TELEPHONE (OUTPATIENT)
Dept: NEUROSURGERY | Facility: CLINIC | Age: 66
End: 2021-07-08

## 2021-07-08 ENCOUNTER — APPOINTMENT (INPATIENT)
Dept: NON INVASIVE DIAGNOSTICS | Facility: HOSPITAL | Age: 66
DRG: 872 | End: 2021-07-08
Payer: MEDICARE

## 2021-07-08 PROBLEM — K59.01 SLOW TRANSIT CONSTIPATION: Status: ACTIVE | Noted: 2021-07-08

## 2021-07-08 LAB
ALBUMIN SERPL BCP-MCNC: 2.4 G/DL (ref 3.5–5)
ALP SERPL-CCNC: 116 U/L (ref 46–116)
ALT SERPL W P-5'-P-CCNC: 45 U/L (ref 12–78)
ANION GAP SERPL CALCULATED.3IONS-SCNC: 5 MMOL/L (ref 4–13)
AST SERPL W P-5'-P-CCNC: 43 U/L (ref 5–45)
BILIRUB SERPL-MCNC: 0.28 MG/DL (ref 0.2–1)
BUN SERPL-MCNC: 15 MG/DL (ref 5–25)
CALCIUM ALBUM COR SERPL-MCNC: 9.6 MG/DL (ref 8.3–10.1)
CALCIUM SERPL-MCNC: 8.3 MG/DL (ref 8.3–10.1)
CHLORIDE SERPL-SCNC: 107 MMOL/L (ref 100–108)
CO2 SERPL-SCNC: 25 MMOL/L (ref 21–32)
CREAT SERPL-MCNC: 0.85 MG/DL (ref 0.6–1.3)
ERYTHROCYTE [DISTWIDTH] IN BLOOD BY AUTOMATED COUNT: 14.1 % (ref 11.6–15.1)
GLUCOSE SERPL-MCNC: 119 MG/DL (ref 65–140)
HCT VFR BLD AUTO: 33.5 % (ref 36.5–46.1)
HGB BLD-MCNC: 10.5 G/DL (ref 12–15.4)
MCH RBC QN AUTO: 30.3 PG (ref 26.8–34.3)
MCHC RBC AUTO-ENTMCNC: 31.3 G/DL (ref 31.4–37.4)
MCV RBC AUTO: 97 FL (ref 82–98)
PLATELET # BLD AUTO: 152 THOUSANDS/UL (ref 149–390)
PMV BLD AUTO: 9.8 FL (ref 8.9–12.7)
POTASSIUM SERPL-SCNC: 3.5 MMOL/L (ref 3.5–5.3)
PROCALCITONIN SERPL-MCNC: 11.22 NG/ML
PROT SERPL-MCNC: 5.4 G/DL (ref 6.4–8.2)
RBC # BLD AUTO: 3.46 MILLION/UL (ref 3.88–5.12)
SODIUM SERPL-SCNC: 137 MMOL/L (ref 136–145)
WBC # BLD AUTO: 6.87 THOUSAND/UL (ref 4.31–10.16)

## 2021-07-08 PROCEDURE — 80053 COMPREHEN METABOLIC PANEL: CPT | Performed by: INTERNAL MEDICINE

## 2021-07-08 PROCEDURE — 93306 TTE W/DOPPLER COMPLETE: CPT

## 2021-07-08 PROCEDURE — 99232 SBSQ HOSP IP/OBS MODERATE 35: CPT | Performed by: NURSE PRACTITIONER

## 2021-07-08 PROCEDURE — 99233 SBSQ HOSP IP/OBS HIGH 50: CPT | Performed by: INTERNAL MEDICINE

## 2021-07-08 PROCEDURE — 93306 TTE W/DOPPLER COMPLETE: CPT | Performed by: INTERNAL MEDICINE

## 2021-07-08 PROCEDURE — 85027 COMPLETE CBC AUTOMATED: CPT | Performed by: INTERNAL MEDICINE

## 2021-07-08 PROCEDURE — 84145 PROCALCITONIN (PCT): CPT | Performed by: INTERNAL MEDICINE

## 2021-07-08 RX ORDER — ACETAMINOPHEN 325 MG/1
650 TABLET ORAL EVERY 6 HOURS PRN
Status: DISCONTINUED | OUTPATIENT
Start: 2021-07-08 | End: 2021-07-09 | Stop reason: HOSPADM

## 2021-07-08 RX ADMIN — CEFAZOLIN SODIUM 2000 MG: 2 SOLUTION INTRAVENOUS at 20:20

## 2021-07-08 RX ADMIN — OXYCODONE HYDROCHLORIDE 15 MG: 10 TABLET ORAL at 13:42

## 2021-07-08 RX ADMIN — HEPARIN SODIUM 5000 UNITS: 5000 INJECTION INTRAVENOUS; SUBCUTANEOUS at 21:09

## 2021-07-08 RX ADMIN — POLYETHYLENE GLYCOL 3350 17 G: 17 POWDER, FOR SOLUTION ORAL at 08:42

## 2021-07-08 RX ADMIN — HEPARIN SODIUM 5000 UNITS: 5000 INJECTION INTRAVENOUS; SUBCUTANEOUS at 05:09

## 2021-07-08 RX ADMIN — LIDOCAINE 1 PATCH: 50 PATCH TOPICAL at 17:35

## 2021-07-08 RX ADMIN — OXYCODONE HYDROCHLORIDE 40 MG: 40 TABLET, FILM COATED, EXTENDED RELEASE ORAL at 08:41

## 2021-07-08 RX ADMIN — CEFAZOLIN SODIUM 2000 MG: 2 SOLUTION INTRAVENOUS at 03:37

## 2021-07-08 RX ADMIN — SENNOSIDES 8.6 MG: 8.6 TABLET ORAL at 21:09

## 2021-07-08 RX ADMIN — HEPARIN SODIUM 5000 UNITS: 5000 INJECTION INTRAVENOUS; SUBCUTANEOUS at 13:24

## 2021-07-08 RX ADMIN — LORAZEPAM 0.5 MG: 0.5 TABLET ORAL at 13:42

## 2021-07-08 RX ADMIN — OXYCODONE HYDROCHLORIDE 15 MG: 10 TABLET ORAL at 03:39

## 2021-07-08 RX ADMIN — LORAZEPAM 0.5 MG: 0.5 TABLET ORAL at 03:47

## 2021-07-08 RX ADMIN — OXYCODONE HYDROCHLORIDE 40 MG: 40 TABLET, FILM COATED, EXTENDED RELEASE ORAL at 21:09

## 2021-07-08 RX ADMIN — LORAZEPAM 0.5 MG: 0.5 TABLET ORAL at 21:09

## 2021-07-08 RX ADMIN — CEFAZOLIN SODIUM 2000 MG: 2 SOLUTION INTRAVENOUS at 13:23

## 2021-07-08 RX ADMIN — DULOXETINE HYDROCHLORIDE 60 MG: 60 CAPSULE, DELAYED RELEASE ORAL at 08:41

## 2021-07-08 RX ADMIN — ACETAMINOPHEN 650 MG: 325 TABLET, FILM COATED ORAL at 21:48

## 2021-07-08 NOTE — ASSESSMENT & PLAN NOTE
· No chest pain or shortness of breath  · Non MI related troponin increase  · Had elevated troponins upto 4 3 in April, 2019 when she had sepsis and was seen by cardiology who felt troponin increase was due to Non MI related troponin release  · Was diagnosed with hypertrophic cardiomyopathy at that time  · Trop - 0 79 -- - 22  · Echo pending

## 2021-07-08 NOTE — ASSESSMENT & PLAN NOTE
Lab Results   Component Value Date    EGFR 86 11/12/2019    EGFR 78 11/11/2019    EGFR 54 11/09/2019    CREATININE 0 85 07/08/2021    CREATININE 0 81 07/07/2021    CREATININE 1 26 07/06/2021     · At baseline creatinine is 1    · Patient presented with creatinine of 1 26 - improved with intravenous fluids  · Monitor BMP   · Avoid nephrotoxic drugs and hypotension

## 2021-07-08 NOTE — ASSESSMENT & PLAN NOTE
· PDMP reviewed on admission by admitting physician - patient on oxycodone ER 40 mg twice a day with oxycodone IR 15 mg every 12 hours as needed    · Now with acute on chronic right sided pain in the setting of severe sepsis from right lower extremity cellulitis  · Pain medications adjusted per acute pain recommendations- pain control overall has improved

## 2021-07-08 NOTE — PROGRESS NOTES
[facility]  Progress Note - Valdemar Astudillo 1955, 74 y o  adult MRN: [id number]  Unit/Bed#: CW2 218-01 Encounter: [id number]  Primary Care Provider: Pricilla Ledesma DO   Date and time admitted to hospital: 7/6/2021  9:36 AM    * Severe sepsis [facility])  Assessment & Plan  · Presented on 7/6/21 with fever, confusion and headache  · Due to right leg cellulitis in the setting of lymphedema (did not have significant cellulitic changes on presentation)  · Initial concern for osteomyelitis due to marrow edema at L2-3 - likely related to Modic changes per neurosurgery  · Antibiotics changed to cefazolin  · Monitor local response, temperature, WBC  Follow-up blood cultures- NGTD  · infectious disease and neurosurgery following      Cellulitis of right lower extremity  Assessment & Plan  · Cefazolin as above  · ID following   · Monitor serial exams       Chronic right-sided low back pain without sciatica  Assessment & Plan  · Presented with sepsis and severe low back pain with acute on chronic back/hip pain  · MRI thoracic spine - no evidence of diskitis/osteomyelitis  · MRI lumbar spine - "Transitional S1 vertebra  Marrow edema at the L2-3 endplates  No epidural phlegmon or abscess  This could be due early discitis/osteomyelitis or Modic type I endplate degenerative changes  Degenerative spondylosis"  · Seen by neurosurgery - MRI changes more likely related to Modic changes than infection  Severe right lower extremity lymphedema exacerbating right-sided pain      · Baseline upright lumbar x-rays pending interpretation by radiology    Slow transit constipation  Assessment & Plan  · Add bowel regimen     Morbid obesity (HCC)  Assessment & Plan  Lifestyle modification  BMI 39 8    Hypogammaglobulinemia, acquired ([facility] [address] )  Assessment & Plan  · Developed hypogammaglobulinemia following Ritiximab treatment for Wegener's  · Patient is currently following with Hematology outpatient   Maira  · She was receiving IVIG 30 g every month until 2020  · Currently under observation as IgG levels improved  · IgG - 565 on 7/6  · Outpatient follow-up    Hypertrophic cardiomyopathy ([facility] )  Assessment & Plan  · Ct BB  · Repeat echo ordered as above      Elevated troponin  Assessment & Plan  · No chest pain or shortness of breath  · Non MI related troponin increase  · Had elevated troponins upto 4 3 in April, 2019 when she had sepsis and was seen by cardiology who felt troponin increase was due to Non MI related troponin release  · Was diagnosed with hypertrophic cardiomyopathy at that time  · Trop - 0 79 -- - 22  · Echo pending      Ovarian cancer ([facility] )  Assessment & Plan  · BRCA 1 mutation with ovarian cancer - status post bilateral oophorectomy and hysterectomy in 2007 followed by chemotherapy  · Outpatient follow-up      Chronic pain disorder  Assessment & Plan  · PDMP reviewed on admission by admitting physician - patient on oxycodone ER 40 mg twice a day with oxycodone IR 15 mg every 12 hours as needed    · Now with acute on chronic right sided pain in the setting of severe sepsis from right lower extremity cellulitis  · Pain medications adjusted per acute pain recommendations- pain control overall has improved     Chronic kidney disease, stage III (moderate) [facility])  Assessment & Plan  Lab Results   Component Value Date    EGFR 86 11/12/2019    EGFR 78 11/11/2019    EGFR 54 11/09/2019    CREATININE 0 85 07/08/2021    CREATININE 0 81 07/07/2021    CREATININE 1 26 07/06/2021     · At baseline creatinine is 1    · Patient presented with creatinine of 1 26 - improved with intravenous fluids  · Monitor BMP   · Avoid nephrotoxic drugs and hypotension    Wegener's granulomatosis with renal involvement ([facility] )  Assessment & Plan  · Diagnosed in February 2010  · Recent serologies negative  · She had pulmonary hemorrhage, renal failure and polyarthritis  · Was on rituximab for 4 years and Prednisone - stopped due to hypogammaglobulinemia and no evidence of disease  · Was followed by Dr Bernadette Galarza, Dr Rubio Romo, Dr Therese Ulloa          VTE Pharmacologic Prophylaxis: VTE Score: 9 High Risk (Score >/= 5) - Pharmacological DVT Prophylaxis Ordered: heparin  Sequential Compression Devices Ordered  Patient Centered Rounds: I performed bedside rounds with nursing staff today  Discussions with Specialists or Other Care Team Provider: d/w RN    Education and Discussions with Family / Patient: Updated  () via phone  Time Spent for Care: 30 minutes  More than 50% of total time spent on counseling and coordination of care as described above  Current Length of Stay: 2 day(s)  Current Patient Status: Inpatient   Certification Statement: The patient will continue to require additional inpatient hospital stay due to iv abxs   Discharge Plan: Anticipate discharge in 48-72 hrs to TBD    Code Status: Level 1 - Full Code    Subjective:   Pt lying in bed  Denies any significant complaints  + constipation  Objective:     Vitals:   Temp (24hrs), Av °F (36 7 °C), Min:97 8 °F (36 6 °C), Max:98 2 °F (36 8 °C)    Temp:  [97 8 °F (36 6 °C)-98 2 °F (36 8 °C)] 97 8 °F (36 6 °C)  HR:  [78-82] 78  Resp:  [18] 18  BP: (102-105)/(57-61) 103/57  SpO2:  [96 %-98 %] 97 %  Body mass index is 39 83 kg/m²  Input and Output Summary (last 24 hours): Intake/Output Summary (Last 24 hours) at 2021 1434  Last data filed at 2021 0839  Gross per 24 hour   Intake 1020 ml   Output --   Net 1020 ml       Physical Exam:   Physical Exam  Constitutional:       General: Shelley Damon is not in acute distress  Appearance: Shelley Damon is obese  Cardiovascular:      Rate and Rhythm: Normal rate and regular rhythm  Pulses: Normal pulses  Heart sounds: Murmur heard  Pulmonary:      Effort: No respiratory distress  Breath sounds: Normal breath sounds  No wheezing or rales     Abdominal:      General: Bowel sounds are normal  There is no distension  Palpations: Abdomen is soft  Tenderness: There is no abdominal tenderness  Musculoskeletal:         General: Swelling present  Right lower leg: Edema (lymphedema ) present  Left lower leg: Edema (moderate) present  Skin:     General: Skin is warm and dry  Findings: Erythema (right upper extremity ) present  No rash  Neurological:      General: No focal deficit present  Mental Status: Megan Nevarez is alert and oriented to person, place, and time  Mental status is at baseline     Psychiatric:         Attention and Perception: Attention normal          Mood and Affect: Mood normal           Additional Data:     Labs:  Results from last 7 days   Lab Units 07/08/21  0503 07/06/21  1015   WBC Thousand/uL 6 87 23 63*   HEMOGLOBIN g/dL 10 5* 15 4   HEMATOCRIT % 33 5* 46 8*   PLATELETS Thousands/uL 152 231   NEUTROS PCT %  --  90*   LYMPHS PCT %  --  5*   MONOS PCT %  --  3*   EOS PCT %  --  0     Results from last 7 days   Lab Units 07/08/21  0502   SODIUM mmol/L 137   POTASSIUM mmol/L 3 5   CHLORIDE mmol/L 107   CO2 mmol/L 25   BUN mg/dL 15   CREATININE mg/dL 0 85   ANION GAP mmol/L 5   CALCIUM mg/dL 8 3   ALBUMIN g/dL 2 4*   TOTAL BILIRUBIN mg/dL 0 28   ALK PHOS U/L 116   ALT U/L 45   AST U/L 43   GLUCOSE RANDOM mg/dL 119     Results from last 7 days   Lab Units 07/06/21  1209   INR  1 14     Results from last 7 days   Lab Units 07/06/21  1814   POC GLUCOSE mg/dl 142*         Results from last 7 days   Lab Units 07/08/21  0503 07/07/21  1358 07/07/21  0629 07/06/21  1517 07/06/21  1311 07/06/21  1210 07/06/21  1015   LACTIC ACID mmol/L  --   --  1 6 2 0 2 6* 3 7* 5 2*   PROCALCITONIN ng/ml 11 22* 13 41*  --   --   --   --  15 06*       Lines/Drains:  Invasive Devices     Peripheral Intravenous Line            Peripheral IV 07/06/21 Right Hand 2 days                      Imaging: Reviewed radiology reports from this admission including: chest xray, abdominal/pelvic CT, xray(s) and MRI thoracic and lumbar spine     Recent Cultures (last 7 days):   Results from last 7 days   Lab Units 07/06/21  1027 07/06/21  1015   BLOOD CULTURE  No Growth at 24 hrs  No Growth at 24 hrs  Last 24 Hours Medication List:   Current Facility-Administered Medications   Medication Dose Route Frequency Provider Last Rate    aluminum-magnesium hydroxide-simethicone  30 mL Oral Q6H PRN Ester Strauss MD      bisacodyl  5 mg Oral Daily PRN Ester Strauss MD      cefazolin  2,000 mg Intravenous Q8H Naveen Jensen, DO 2,000 mg (07/08/21 1323)    DULoxetine  60 mg Oral Daily Jamison Shelton MD      heparin (porcine)  5,000 Units Subcutaneous Q8H [address] Jamison Shelton MD      HYDROmorphone  0 5 mg Intravenous Q4H PRN Ester Strauss MD      lidocaine  1 patch Topical Daily Jamison Shelton MD      LORazepam  0 5 mg Oral Q8H PRN Ester Strauss MD      metoprolol tartrate  12 5 mg Oral Q12H [address] Jamison Shelton MD      ondansetron  4 mg Intravenous Q6H PRN Ester Strauss MD      oxyCODONE  40 mg Oral Q12H [address] Jamison Shelton MD      oxyCODONE  10 mg Oral Q4H PRN Ester Strauss MD      oxyCODONE  15 mg Oral Q4H PRN Ester Strauss MD      polyethylene glycol  17 g Oral Daily Ester Strauss MD      senna  1 tablet Oral HS Ester Strauss MD          Today, Patient Was Seen By: RENETTA Urbano    **Please Note: This note may have been constructed using a voice recognition system  **

## 2021-07-08 NOTE — ASSESSMENT & PLAN NOTE
· Presented on 7/6/21 with fever, confusion and headache  · Due to right leg cellulitis in the setting of lymphedema (did not have significant cellulitic changes on presentation)  · Initial concern for osteomyelitis due to marrow edema at L2-3 - likely related to Modic changes per neurosurgery  · Antibiotics changed to cefazolin  · Monitor local response, temperature, WBC    Follow-up blood cultures- NGTD  · infectious disease and neurosurgery following

## 2021-07-08 NOTE — ASSESSMENT & PLAN NOTE
· Diagnosed in February 2010  · Recent serologies negative  · She had pulmonary hemorrhage, renal failure and polyarthritis  · Was on rituximab for 4 years and Prednisone - stopped due to hypogammaglobulinemia and no evidence of disease  · Was followed by Dr Samantha Cabrera, Dr Liliane Singh, Dr Jessica Prasad

## 2021-07-08 NOTE — ASSESSMENT & PLAN NOTE
· Developed hypogammaglobulinemia following Ritiximab treatment for Wegener's  · Patient is currently following with Hematology outpatient Dr Bryant Patel  · She was receiving IVIG 30 g every month until 2020    · Currently under observation as IgG levels improved  · IgG - 565 on 7/6  · Outpatient follow-up

## 2021-07-08 NOTE — PROGRESS NOTES
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--> was on chronic suppressive therapy with penicillin open till January 2021     Blood cultures drawn from left hand and arm this admission  Right lower extremity above knee is more swollen and erythematous--> likely cellulitis given prior infections in same extremity and signs of infection     --continue IV cefazolin given past infections with plans for discharge on cephalexin for duration of 10-14 total days abx as long as blood cultures negative at 72 hrs   --will discuss benefits and risks of further long-term suppressive antibiotic management at discharge  --continue to monitor patient's lower extremity without wrapping for the next day  --will follow-up on blood culture growth/susceptibilities        2  Marrow edema of L2-3 endplates  Nonspecific findings, unlikely to diskitis/osteomyelitis given lack of symptoms  --see above  --no urgent operation per Neurosurgery     3  Granulomatosis with polyangiitis and renal involvement  Follows with Nephrology and Rheumatology outpatient   Currently in remission and has not received immunosuppressive medications in years     4  CKD 3  --management per primary team     5   Chronic pain disorder and right hip pain  On chronic opioid medications; patient has significant poly arthritis from granulomatosis as well as sequelae from [address] in 2014  --management per primary and acute pain teams    Antibiotics:  Cefepime 1 day   Vancomycin 2 days -7  Cefazolin Day 2 (started )    Subjective:  Patient has no fever, chills, sweats; no nausea, vomiting, diarrhea; no cough, shortness of breath; no pain  No new symptoms  She remarks that everything has improved from yesterday and that her pain is better controlled  Objective:  Vitals:  Temp:  [97 8 °F (36 6 °C)-98 2 °F (36 8 °C)] 97 8 °F (36 6 °C)  HR:  [78-82] 78  Resp:  [18] 18  BP: (102-105)/(57-61) 103/57  SpO2:  [96 %-98 %] 97 %  Temp (24hrs), Av °F (36 7 °C), Min:97 8 °F (36 6 °C), Max:98 2 °F (36 8 °C)  Current: Temperature: 97 8 °F (36 6 °C)    Physical Exam:   General Appearance:  Alert, interactive, nontoxic, no acute distress  Throat: Oropharynx moist without lesions  Lungs:   Clear to auscultation bilaterally; no wheezes, rhonchi or rales; respirations unlabored   Heart:  RRR; 4-8/1 systolic murmur, no rub or gallop   Abdomen:   Soft, non-tender, non-distended, positive bowel sounds  Extremities: No clubbing, cyanosis  R LE still moderately erythematous with some tenderness to palpation (patient and  states this is her baseline if the leg is not wrapped)   Skin: No new rashes or lesions  No draining wounds noted         Labs, Imaging, & Other studies:   All pertinent labs and imaging studies were personally reviewed  Results from last 7 days   Lab Units 21  0503 21  0629 21  1015   WBC Thousand/uL 6 87 14 02* 23 63*   HEMOGLOBIN g/dL 10 5* 13 0 15 4   PLATELETS Thousands/uL 152 175 231     Results from last 7 days   Lab Units 21  0502 21  1358 21  0629 21  0629 21  1015 21  1015   SODIUM mmol/L 137  --   --  140  --  136   POTASSIUM mmol/L 3 5  --   --  4 0   < > 3 8   CHLORIDE mmol/L 107  --   --  109*   < > 102   CO2 mmol/L 25  --   --  26   < > 24   BUN mg/dL 15  --   --  16   < > 14   CREATININE mg/dL 0 85  --   --  0 81   < > 1 26   CALCIUM mg/dL 8 3  -- --  8 6   < > 9 6   AST U/L 43 50*  --   --   --  100*   ALT U/L 45 59   < >  --   --  87*   ALK PHOS U/L 116 120*   < >  --   --  179*    < > = values in this interval not displayed  Results from last 7 days   Lab Units 07/06/21  1027 07/06/21  1015   BLOOD CULTURE  No Growth at 24 hrs  No Growth at 24 hrs       Results from last 7 days   Lab Units 07/08/21  0503 07/07/21  1358 07/06/21  1015   PROCALCITONIN ng/ml 11 22* 13 41* 15 06*     Results from last 7 days   Lab Units 07/06/21  1015   CRP mg/L 59 6*               Georgia Holbrook DO  Infectious Disease Service  Internal Medicine Residency PGY-2  [facility]

## 2021-07-08 NOTE — TELEPHONE ENCOUNTER
EPHRAIM Paige  No needs from our standpoint  Thanks for following up!      ----- Message -----   From: Fabrice Stokes   Sent: 7/13/2021   3:44 PM EDT   To: EPHRAIM Paige,     I SEE WE WERE FOLLOWING PT FROM THE PERIPHERY  SHE HAS SINCE BEEN DISCHARGED AND I HAVE NOTHING ON HER  DO WE NEED TO SEE HER BACK FOR ANY REASON?  PLEASE LET ME KNOW SO I CAN MAKE THE APT IF NEED BE        7/13/21- PT DISCHARGED TO HOME    7/9/21- [facility]    7/8/21- [facility]    7/7/21- (PARAG) BASELINE UPRIGHTS ORDERED AND FOLLOW FROM THE PERIPHERY

## 2021-07-08 NOTE — ASSESSMENT & PLAN NOTE
· Presented with sepsis and severe low back pain with acute on chronic back/hip pain  · MRI thoracic spine - no evidence of diskitis/osteomyelitis  · MRI lumbar spine - "Transitional S1 vertebra  Marrow edema at the L2-3 endplates  No epidural phlegmon or abscess  This could be due early discitis/osteomyelitis or Modic type I endplate degenerative changes  Degenerative spondylosis"  · Seen by neurosurgery - MRI changes more likely related to Modic changes than infection  Severe right lower extremity lymphedema exacerbating right-sided pain      · Baseline upright lumbar x-rays pending interpretation by radiology

## 2021-07-09 VITALS
HEIGHT: 63 IN | WEIGHT: 224.87 LBS | BODY MASS INDEX: 39.84 KG/M2 | TEMPERATURE: 97.9 F | DIASTOLIC BLOOD PRESSURE: 67 MMHG | HEART RATE: 79 BPM | SYSTOLIC BLOOD PRESSURE: 133 MMHG | OXYGEN SATURATION: 93 % | RESPIRATION RATE: 19 BRPM

## 2021-07-09 LAB
ANION GAP SERPL CALCULATED.3IONS-SCNC: 6 MMOL/L (ref 4–13)
BUN SERPL-MCNC: 12 MG/DL (ref 5–25)
CALCIUM SERPL-MCNC: 8.6 MG/DL (ref 8.3–10.1)
CHLORIDE SERPL-SCNC: 107 MMOL/L (ref 100–108)
CO2 SERPL-SCNC: 26 MMOL/L (ref 21–32)
CREAT SERPL-MCNC: 0.92 MG/DL (ref 0.6–1.3)
ERYTHROCYTE [DISTWIDTH] IN BLOOD BY AUTOMATED COUNT: 13.9 % (ref 11.6–15.1)
GLUCOSE SERPL-MCNC: 112 MG/DL (ref 65–140)
HCT VFR BLD AUTO: 36.1 % (ref 36.5–46.1)
HGB BLD-MCNC: 11.5 G/DL (ref 12–15.4)
MCH RBC QN AUTO: 30.5 PG (ref 26.8–34.3)
MCHC RBC AUTO-ENTMCNC: 31.9 G/DL (ref 31.4–37.4)
MCV RBC AUTO: 96 FL (ref 82–98)
PLATELET # BLD AUTO: 186 THOUSANDS/UL (ref 149–390)
PMV BLD AUTO: 9.7 FL (ref 8.9–12.7)
POTASSIUM SERPL-SCNC: 4.2 MMOL/L (ref 3.5–5.3)
RBC # BLD AUTO: 3.77 MILLION/UL (ref 3.88–5.12)
SODIUM SERPL-SCNC: 139 MMOL/L (ref 136–145)
WBC # BLD AUTO: 5.76 THOUSAND/UL (ref 4.31–10.16)

## 2021-07-09 PROCEDURE — 97530 THERAPEUTIC ACTIVITIES: CPT

## 2021-07-09 PROCEDURE — 99239 HOSP IP/OBS DSCHRG MGMT >30: CPT | Performed by: NURSE PRACTITIONER

## 2021-07-09 PROCEDURE — 80048 BASIC METABOLIC PNL TOTAL CA: CPT | Performed by: NURSE PRACTITIONER

## 2021-07-09 PROCEDURE — 97163 PT EVAL HIGH COMPLEX 45 MIN: CPT

## 2021-07-09 PROCEDURE — 97166 OT EVAL MOD COMPLEX 45 MIN: CPT

## 2021-07-09 PROCEDURE — 99232 SBSQ HOSP IP/OBS MODERATE 35: CPT | Performed by: INTERNAL MEDICINE

## 2021-07-09 PROCEDURE — 85027 COMPLETE CBC AUTOMATED: CPT | Performed by: NURSE PRACTITIONER

## 2021-07-09 RX ORDER — CEPHALEXIN 500 MG/1
500 CAPSULE ORAL EVERY 6 HOURS SCHEDULED
Status: DISCONTINUED | OUTPATIENT
Start: 2021-07-09 | End: 2021-07-09

## 2021-07-09 RX ORDER — DOCUSATE SODIUM 100 MG/1
100 CAPSULE, LIQUID FILLED ORAL 2 TIMES DAILY
Status: DISCONTINUED | OUTPATIENT
Start: 2021-07-09 | End: 2021-07-09 | Stop reason: HOSPADM

## 2021-07-09 RX ORDER — CEPHALEXIN 500 MG/1
500 CAPSULE ORAL EVERY 6 HOURS SCHEDULED
Qty: 31 CAPSULE | Refills: 0 | Status: SHIPPED | OUTPATIENT
Start: 2021-07-09 | End: 2021-07-17

## 2021-07-09 RX ORDER — CEPHALEXIN 500 MG/1
500 CAPSULE ORAL EVERY 6 HOURS SCHEDULED
Status: DISCONTINUED | OUTPATIENT
Start: 2021-07-09 | End: 2021-07-09 | Stop reason: HOSPADM

## 2021-07-09 RX ORDER — PANTOPRAZOLE SODIUM 40 MG/1
40 TABLET, DELAYED RELEASE ORAL
Status: DISCONTINUED | OUTPATIENT
Start: 2021-07-09 | End: 2021-07-09 | Stop reason: HOSPADM

## 2021-07-09 RX ORDER — SENNOSIDES 8.6 MG
2 TABLET ORAL ONCE
Status: COMPLETED | OUTPATIENT
Start: 2021-07-09 | End: 2021-07-09

## 2021-07-09 RX ADMIN — Medication 12.5 MG: at 10:00

## 2021-07-09 RX ADMIN — HEPARIN SODIUM 5000 UNITS: 5000 INJECTION INTRAVENOUS; SUBCUTANEOUS at 13:38

## 2021-07-09 RX ADMIN — DOCUSATE SODIUM 100 MG: 100 CAPSULE, LIQUID FILLED ORAL at 10:00

## 2021-07-09 RX ADMIN — LORAZEPAM 0.5 MG: 0.5 TABLET ORAL at 10:11

## 2021-07-09 RX ADMIN — HEPARIN SODIUM 5000 UNITS: 5000 INJECTION INTRAVENOUS; SUBCUTANEOUS at 06:34

## 2021-07-09 RX ADMIN — CEPHALEXIN 500 MG: 500 CAPSULE ORAL at 13:38

## 2021-07-09 RX ADMIN — CEFAZOLIN SODIUM 2000 MG: 2 SOLUTION INTRAVENOUS at 04:16

## 2021-07-09 RX ADMIN — PANTOPRAZOLE SODIUM 40 MG: 40 TABLET, DELAYED RELEASE ORAL at 10:06

## 2021-07-09 RX ADMIN — OXYCODONE HYDROCHLORIDE 15 MG: 10 TABLET ORAL at 04:51

## 2021-07-09 RX ADMIN — POLYETHYLENE GLYCOL 3350 17 G: 17 POWDER, FOR SOLUTION ORAL at 10:01

## 2021-07-09 RX ADMIN — SENNOSIDES 17.2 MG: 8.6 TABLET ORAL at 10:06

## 2021-07-09 RX ADMIN — DULOXETINE HYDROCHLORIDE 60 MG: 60 CAPSULE, DELAYED RELEASE ORAL at 10:06

## 2021-07-09 RX ADMIN — OXYCODONE HYDROCHLORIDE 40 MG: 40 TABLET, FILM COATED, EXTENDED RELEASE ORAL at 10:00

## 2021-07-09 NOTE — PHYSICAL THERAPY NOTE
Physical Therapy Evaluation and Treatment Note    Patient's Name: Lisandra Fowler    Admitting Diagnosis  Back pain [M54 9]  Elevated troponin [R77 8]  Change in mental status [R41 82]  RUCHI (acute kidney injury) ([facility] ) [N17 9]  Granulomatosis with polyangiitis ([facility] ) [M31 30]  Severe sepsis ([facility] ) [A41 9, R65 20]    Problem List  Patient Active Problem List   Diagnosis    [facility]    Charcot's joint    Anxiety    Benign essential hypertension    BRCA1 positive    Cancer of ovary ([facility] )    Chronic kidney disease, stage III (moderate) (HCC)    Chronic pain disorder    Class 2 obesity    Depression    Dyslipidemia    GERD (gastroesophageal reflux disease)    Lymphedema    Opiate analgesic use agreement exists    Ovarian cancer ([facility]    Post-traumatic osteoarthritis    Increased frequency of urination    Other complicated headache syndrome    Nausea & vomiting    SIRS (systemic inflammatory response syndrome) ([facility] )    Increased risk of breast cancer    CAP (community acquired pneumonia)    Elevated troponin    Mitral valve disorder    Postnasal drip    Dyspnea on exertion    LVH (left ventricular hypertrophy)    Low immunoglobulin level    Hypertrophic cardiomyopathy (HCC)    Acute respiratory failure with hypoxia ([facility] )    Hypogammaglobulinemia, acquired ([facility] )    Morbid obesity ([facility] )    Severe sepsis ([facility] )    Chronic right-sided low back pain without sciatica    Cellulitis of right lower extremity    Slow transit constipation       Past Medical History  Past Medical History:   Diagnosis Date    BRCA1 positive     Cancer ([facility] )     ovarian Ca with chemo    History of chemotherapy     ovarian cancer 2007    History of pulmonary embolus (PE) & DVT 03/2007    post-op FAMILIA/ omenectomy    Lymphedema     MRSA (methicillin resistant Staphylococcus aureus) 09/05/2017    nasal swab negative 4/3/19    Ovarian cancer ([facility] )     CYST REMOVED RIGHT OVARY IN   HYSTERECTOMY 07  Past Surgical History  Past Surgical History:   Procedure Laterality Date    APPENDECTOMY      laparoscopic    BILATERAL SALPINGOOPHORECTOMY  2007    BREAST BIOPSY Right 2016     SECTION      HYSTERECTOMY  2007    [facility]     OTHER SURGICAL HISTORY      right lower extremity due to trauma    CA TREAT TIBIAL SHAFT FX, INTRAMED IMPLANT Right 2017    Procedure: INSERTION NAIL IM TIBIA;  Surgeon: Dominic Raymond MD;  [facility]: [facility];  Service: Orthopedics    US GUIDANCE BREAST BIOPSY LEFT EACH ADDITIONAL Left 7/15/2020    US GUIDED BREAST BIOPSY LEFT COMPLETE Left 7/15/2020    US GUIDED BREAST BIOPSY RIGHT COMPLETE Right 21 1015   PT Last Visit   PT Visit Date 21   Note Type   Note type Evaluation  (and treatment session)   Pain Assessment   Pain Assessment Tool 0-10   Pain Score 5   Pain Location/Orientation Orientation: Right;Location: Leg   Patient's Stated Pain Goal No pain   Hospital Pain Intervention(s) Ambulation/increased activity   Home Living   Type of Home House   Additional Comments Resides with  in 1 story home, with stairs to basement  Normally independent with self-care, ambulates with roller walker  Prior Function   Level of [place] Independent with ADLs and functional mobility   Falls in the last 6 months 1 to 4   Restrictions/Precautions   Weight Bearing Precautions Per Order No   Braces or Orthoses   (Bilateral orthopedic shoes, right ankle brace )   Other Precautions Multiple lines; Fall Risk;Pain   General   Family/Caregiver Present No   Cognition   Overall Cognitive Status WFL   Arousal/Participation Responsive   Attention Attends with cues to redirect   Orientation Level Oriented X4   Memory Unable to assess   Following Commands Follows one step commands without difficulty   RLE Assessment   RLE Assessment   (Strength grossly 4-/5, weaker distally)   LLE Assessment   LLE Assessment   (Strength grossly 4-/5)   Coordination   Movements are Fluid and Coordinated 0   Coordination and Movement Description Bilateral lower extremity ataxia   Bed Mobility   Sit to Supine 3  Moderate assistance   Additional items Assist x 1;LE management; Increased time required  (During treatment portion of session)   Transfers   Sit to Stand 5  Supervision   Additional items Assist x 1; Increased time required   Stand to Sit 5  Supervision   Additional items Assist x 1   Additional Comments Transfers performed both during evaluation as well as treatment session  Did 3 additional standing trials after evaluation to assist in adjusting Ace bandage on patient's lower extremity, as well as transition back to bed and reposition  Ambulation/Elevation   Gait pattern   (Slow, wide MAURICE, right leg in ER, antalgic, ataxia)   Gait Assistance 5  Supervision   Additional items Assist x 1   Assistive Device Rolling walker   Distance During evaluation, ambulated 70 ft x 1  During additional treatment session, after standing trials, ambulated 10 ft x 1 from chair back to bed  Time spent to reposition patient to comfort   Balance   Static Sitting Normal   Dynamic Sitting Good   Static Standing Fair   Dynamic Standing Fair   Ambulatory Fair   Endurance Deficit   Endurance Deficit Yes   Endurance Deficit Description Fatigue, weakness, pain   Activity Tolerance   Activity Tolerance Patient tolerated treatment well;Patient limited by fatigue;Patient limited by pain   Nurse Made Aware Yes   Assessment   Prognosis Good   Problem List Decreased strength;Decreased endurance; Impaired balance;Decreased mobility;Pain   Assessment Patient seen for high complexity physical therapy evaluation, as well as additional treatment time for standing trials, minimal gait, repositioning    Patient is a 39-year-old female with history/comorbidities of Wegener's granulomatosis, CKD, hypertension, low back pain, morbid obesity, right Charcot foot, who is now admitted with confusion, fever, diagnoses include severe sepsis, right lower extremity cellulitis  Due to acute medical issues, pain, fall risk, note unstable clinical picture  PT consult to assess mobility, DC needs  Patient presents with decreased functional mobility, standing balance, endurance, bilateral lower extremity strength, barriers at home  Patient will benefit from continued skilled acute care PT services to correct for the above problems  When medically stable, anticipate DC home with home PT  Patient interested in using 's home health company to supply PT services  Goals   Patient Goals to go home today   STG Expiration Date 07/23/21   Short Term Goal #1 If not discharge home today, 1-2 weeks:  Bed mobility and transfers with independent, standing balance to good/normal with device, ambulate 200-300 feet with roller walker and supervision/modified independent, increased bilateral lower extremity strength by 1/2 to 1 full grade   PT Treatment Day 0   Plan   Treatment/Interventions Functional transfer training;LE strengthening/ROM; Therapeutic exercise; Endurance training;Patient/family training;Equipment eval/education; Bed mobility;Gait training   PT Frequency Other (Comment)  (3-5x/wk)   Recommendation   PT Discharge Recommendation Home with home health rehabilitation   PT - OK to Discharge Yes   [address]   Turning in Bed Without Bedrails 4   Lying on Back to Sitting on Edge of Flat Bed 3   Moving Bed to Chair 3   Standing Up From Chair 3   Walk in Room 3   Climb 3-5 Stairs 2   Basic Mobility Inpatient Raw Score 18   Basic Mobility Standardized Score 41 05           Samantha Velez PT, DPT, CSRS

## 2021-07-09 NOTE — DISCHARGE INSTRUCTIONS
Cellulitis   WHAT YOU NEED TO KNOW:   Cellulitis is a skin infection caused by bacteria  Cellulitis may go away on its own or you may need treatment  Your healthcare provider may draw a [place] around the outside edges of your cellulitis  If your cellulitis spreads, your healthcare provider will see it outside of the [place]  DISCHARGE INSTRUCTIONS:   Call 911 if:   · You have sudden trouble breathing or chest pain  Seek care immediately if:   · Your wound gets larger and more painful  · You feel a crackling under your skin when you touch it  · You have purple dots or bumps on your skin, or you see bleeding under your skin  · You have new swelling and pain in your legs  · The red, warm, swollen area gets larger  · You see red streaks coming from the infected area  Contact your healthcare provider if:   · You have a fever  · Your fever or pain does not go away or gets worse  · The area does not get smaller after 2 days of antibiotics  · Your skin is flaking or peeling off  · You have questions or concerns about your condition or care  Medicines:   · Antibiotics  help treat the bacterial infection  · NSAIDs , such as ibuprofen, help decrease swelling, pain, and fever  NSAIDs can cause stomach bleeding or kidney problems in certain people  If you take blood thinner medicine, always ask if NSAIDs are safe for you  Always read the medicine label and follow directions  Do not give these medicines to children under 10months of age without direction from your child's healthcare provider  · Acetaminophen  decreases pain and fever  It is available without a doctor's order  Ask how much to take and how often to take it  Follow directions  Read the labels of all other medicines you are using to see if they also contain acetaminophen, or ask your doctor or pharmacist  Acetaminophen can cause liver damage if not taken correctly   Do not use more than 4 grams (4,000 milligrams) total of acetaminophen in one day  · Take your medicine as directed  Contact your healthcare provider if you think your medicine is not helping or if you have side effects  Tell him or her if you are allergic to any medicine  Keep a list of the medicines, vitamins, and herbs you take  Include the amounts, and when and why you take them  Bring the list or the pill bottles to follow-up visits  Carry your medicine list with you in case of an emergency  Self-care:   · Elevate the area above the level of your heart  as often as you can  This will help decrease swelling and pain  Prop the area on pillows or blankets to keep it elevated comfortably  · Clean the area daily until the wound scabs over  Gently wash the area with soap and water  Pat dry  Use dressings as directed  · Place cool or warm, wet cloths on the area as directed  Use clean cloths and clean water  Leave it on the area until the cloth is room temperature  Pat the area dry with a clean, dry cloth  The cloths may help decrease pain  Prevent cellulitis:   · Do not scratch bug bites or areas of injury  You increase your risk for cellulitis by scratching these areas  · Do not share personal items, such as towels, clothing, and razors  · Clean exercise equipment  with germ-killing  before and after you use it  · Wash your hands often  Use soap and water  Wash your hands after you use the bathroom, change a child's diapers, or sneeze  Wash your hands before you prepare or eat food  Use lotion to prevent dry, cracked skin  · Wear pressure stockings as directed  You may be told to wear the stockings if you have peripheral edema  The stockings improve blood flow and decrease swelling  · Treat athlete's foot  This can help prevent the spread of a bacterial skin infection  Follow up with your healthcare provider within 3 days, or as directed:   Your healthcare provider will check if your cellulitis is getting better  You may need different medicine  Write down your questions so you remember to ask them during your visits  © Copyright [address] Information is for End User's use only and may not be sold, redistributed or otherwise used for commercial purposes  All illustrations and images included in CareNotes® are the copyrighted property of [employer]  or Enoch Perea  The above information is an  only  It is not intended as medical advice for individual conditions or treatments  Talk to your doctor, nurse or pharmacist before following any medical regimen to see if it is safe and effective for you  Hypertrophic Cardiomyopathy   WHAT YOU NEED TO KNOW:   Hypertrophic cardiomyopathy (HCM) is a disease of your heart muscle cells  As the cells enlarge, they cause the walls of your ventricles to become thick and stiff  The ventricles are the 2 lower chambers of your heart  They pump blood to your lungs and the rest of your body  When the ventricles are thick or stiff, your heart cannot fill with enough blood  This decreases the blood and oxygen supply to the rest of your body  HCM is usually inherited  HCM may also develop over time  High blood pressure, thyroid disease, and diabetes may cause HCM to develop  DISCHARGE INSTRUCTIONS:   Call your local emergency number (911 in the [address] [facility]) if:   · You have chest pain that may be worse when you take a deep breath or cough  You may cough up blood  · You have a sudden cold sweat, especially with chest discomfort or trouble breathing  · You feel very lightheaded or dizzy, especially with chest discomfort or trouble breathing  · You have pain or discomfort in your back, neck, jaw, abdomen, or one or both of your arms  · You have a severe headache or vision loss  · You have weakness in an arm or leg  · You are confused or have difficulty speaking  · You suddenly have trouble breathing      Call your cardiologist if:   · You gain weight for no known reason  · You feel weak or more tired than usual     · You have increased swelling in your legs, ankles, feet, or abdomen  · Your symptoms return or get worse  · You feel like your heart is beating faster than normal, fluttering, or jumping in your chest     · You urinate less than usual or not at all  · You have questions or concerns about your condition or care  Medicines: You may  need any of the following:  · Heart medicine  helps strengthen or regulate your heartbeat  · Blood thinners  help prevent blood clots  These include aspirin and warfarin  Take your medicine exactly as directed  Tell your healthcare provider if you forgot to take it or if you took too much  Blood thinners may cause you to bleed or bruise more easily  Use a soft toothbrush and an electric shaver  Wear medical alert jewelry or carry a card that says you take a blood thinner  Tell all healthcare providers, including your dentist, that you take this medicine  · Blood pressure medicine: This is given to lower your blood pressure  A controlled blood pressure helps protect your organs, such as your heart, lungs, brain, and kidneys  Take your blood pressure medicine exactly as directed  · Take your medicine as directed  Contact your healthcare provider if you think your medicine is not helping or if you have side effects  Tell him or her if you are allergic to any medicine  Keep a list of the medicines, vitamins, and herbs you take  Include the amounts, and when and why you take them  Bring the list or the pill bottles to follow-up visits  Carry your medicine list with you in case of an emergency  Self-care:   · Manage your other health conditions  Diabetes and high blood pressure that are not controlled will increase your risk for heart problems  · Limit your liquids  Talk to your healthcare provider about how much liquid to drink in a day   Your risk for fluid buildup and swelling increases if you drink too much  Your risk for dehydration increases if you do not drink enough liquid  Your heart has to work harder with too much or too little fluid  · Eat heart healthy foods  Eat whole grains, fruits, and vegetables every day  Limit salt and high-fat foods  Ask your healthcare provider for more information on a heart healthy diet  · Avoid alcohol  Alcohol can increase your symptoms by causing dehydration and weight gain  · Talk to your healthcare provider about exercise  Your healthcare provider will help you make a plan for exercise  He or she will tell you which exercises you need to avoid, such as weightlifting and running  · Do not smoke  Smoking weakens your heart and makes shortness of breath and other symptoms worse  If you smoke, it is never too late to quit  Ask your healthcare provider for information if you currently smoke and need help to quit  E-cigarettes or smokeless tobacco still contain nicotine  Talk to your healthcare provider before you use these products  Follow up with your healthcare provider or cardiologist as directed:  Write down your questions so you remember to ask them during your visits  © Copyright [employer] 2021 Information is for End User's use only and may not be sold, redistributed or otherwise used for commercial purposes  All illustrations and images included in CareNotes® are the copyrighted property of [employer] , Inc  or [address] [place] [address]  The above information is an  only  It is not intended as medical advice for individual conditions or treatments  Talk to your doctor, nurse or pharmacist before following any medical regimen to see if it is safe and effective for you  Sepsis   WHAT YOU NEED TO KNOW:   Sepsis happens when an infection spreads and causes your body to react strongly to germs  Your body's defense system normally releases chemicals to fight off infection at the infected area   When infection spreads, chemicals are released throughout your body  The chemicals cause inflammation and clotting in small blood vessels that is difficult to control  Inflammation and clotting decrease blood flow and oxygen to your organs  This may cause your organs to stop working correctly  Sepsis is a life-threatening emergency  DISCHARGE INSTRUCTIONS:   Call your local emergency number (911 in the [address]) if:   · You are short of breath or you cough up blood  · You have a fast heart rate and your chest hurts  · You feel so dizzy that you have trouble standing up  Seek care immediately if:   · You have increased swelling in your legs, feet, or abdomen  · Your lips or fingernails are blue  Call your doctor if:   · You have a fever  · You have muscle or joint pain  · You begin to have trouble sleeping, nightmares, or panic attacks  · You have questions or concerns about your condition or care  Medicines:   · Medicines  help treat an infection or decrease your symptoms  · Take your medicine as directed  Contact your healthcare provider if you think your medicine is not helping or if you have side effects  Tell him of her if you are allergic to any medicine  Keep a list of the medicines, vitamins, and herbs you take  Include the amounts, and when and why you take them  Bring the list or the pill bottles to follow-up visits  Carry your medicine list with you in case of an emergency  Prevent infection:   · Wash your hands often  Use soap and water  Wash your hands after you use the bathroom, change a child's diapers, or sneeze  Do not touch your eyes, nose, or mouth unless you have washed your hands first  Wash your hands before you prepare or eat food  Carry hand  with you  You can use it to clean your hands when soap and water are not available  · Ask about vaccines  Vaccines can decrease your risk for certain infections, such as the flu or pneumonia      · Prevent the spread of germs   Try to stay away from people who have a cold or the flu  If you are sick, stay away from others as much as possible  Follow up with your doctor as directed:  Write down your questions so you remember to ask them during your visits  © Copyright [address] Information is for End User's use only and may not be sold, redistributed or otherwise used for commercial purposes  All illustrations and images included in CareNotes® are the copyrighted property of [employer] Inc  or [facility]   The above information is an  only  It is not intended as medical advice for individual conditions or treatments  Talk to your doctor, nurse or pharmacist before following any medical regimen to see if it is safe and effective for you

## 2021-07-09 NOTE — OCCUPATIONAL THERAPY NOTE
Occupational Therapy Evaluation     Patient Name: Clare Salvador  HGWSZ'Q Date: 2021  Problem List  Principal Problem:    Severe sepsis [facility])  Active Problems:    Wegener's granulomatosis with renal involvement ([facility] )    Chronic kidney disease, stage III (moderate) (HCC)    Chronic pain disorder    Ovarian cancer (HCC)    Elevated troponin    Hypertrophic cardiomyopathy ([facility] )    Hypogammaglobulinemia, acquired ([facility] )    Morbid obesity ([facility] )    Chronic right-sided low back pain without sciatica    Cellulitis of right lower extremity    Slow transit constipation    Past Medical History  Past Medical History:   Diagnosis Date    BRCA1 positive     Cancer ([facility] )     ovarian Ca with chemo    History of chemotherapy     ovarian cancer     History of pulmonary embolus (PE) & DVT 2007    post-op FAMILIA/ omenectomy    Lymphedema     MRSA (methicillin resistant Staphylococcus aureus) 2017    nasal swab negative 4/3/19    Ovarian cancer ([facility] )     CYST REMOVED RIGHT OVARY IN   HYSTERECTOMY 07       Past Surgical History  Past Surgical History:   Procedure Laterality Date    APPENDECTOMY  1998    laparoscopic    BILATERAL SALPINGOOPHORECTOMY  2007    BREAST BIOPSY Right 2016     SECTION  1994    HYSTERECTOMY  2007    [facility]     OTHER SURGICAL HISTORY      right lower extremity due to trauma    NV TREAT TIBIAL SHAFT FX, INTRAMED IMPLANT Right 2017    Procedure: INSERTION NAIL IM TIBIA;  Surgeon: Zack Frost MD;  [facility]: [facility];  Service: Orthopedics    US GUIDANCE BREAST BIOPSY LEFT EACH ADDITIONAL Left 7/15/2020    US GUIDED BREAST BIOPSY LEFT COMPLETE Left 7/15/2020    US GUIDED BREAST BIOPSY RIGHT COMPLETE Right 21 1130   OT Last Visit   OT Visit Date 21   Note Type   Note type Evaluation   Restrictions/Precautions   Weight Bearing Precautions Per Order No   Braces or Orthoses Other (Comment)  ("custom boot" for RLE ankle/foot)   Pain Assessment   Pain Assessment Tool 0-10   Pain Score 7   Pain Location/Orientation Location: Leg   Home Living   Type of [address] One level   Bathroom Shower/Tub Tub/shower unit   [employer] Grab bars in shower; Shower chair   Home Equipment Walker   Additional Comments Pt lives in ranch style house with ramp to enter with   Pt with tub shower with SC/GB  Prior Function   Level of [place] Needs assistance with ADLs and functional mobility   Lives With Spouse   Receives Help From Family   ADL Assistance Needs assistance   IADLs Needs assistance   Falls in the last 6 months 0   Vocational Retired   Comments Pt is I with most ADLs, but recieves Ax1 from  for showering and some LB dressing  Pt ambulates with RW   assists with ADLs and drives  Lifestyle   Autonomy Pt I with most ADLs but receives Ax1 from  for bathing/dressing min A  Pt completes functional ambulation with RW  Reciprocal Relationships Pt with supportive  who works part time and able to assist     Service to Others Pt is retired  Intrinsic Gratification Pt enjoys spending time with her dogs  Psychosocial   Psychosocial (WDL) WDL   ADL   Where Assessed Edge of bed   Eating Assistance 7  Independent   Grooming Assistance 6  Modified Independent   UB Bathing Assistance 6  Modified Independent   LB Bathing Assistance 4  Minimal Assistance   UB Dressing Assistance 6  Modified independent   LB Dressing Assistance 4  Minimal Assistance   Toileting Assistance  6  Modified independent   Functional Assistance 5  Supervision/Setup   Functional Deficit Increased time to complete   Additional Comments Pt mainly I with ADLs, however requires some Ax1 for LB dressing/bathing even before hospital stay   supportive and able to provide assistance as needed  Pt able to complete most ADLs with increased time   Pt stated she is just about at baseline for ADL performance and no acute OT concerns at this time  Bed Mobility   Supine to Sit 6  Modified independent   Additional items HOB elevated;Verbal cues   Sit to Supine 6  Modified independent   Additional items Increased time required;Verbal cues; Bedrails   Additional Comments Pt able to complete with increased time  Transfers   Sit to Stand 5  Supervision   Additional items Increased time required   Stand to Sit 5  Supervision   Additional items Increased time required   Functional Mobility   Functional Mobility 5  Supervision   Additional Comments Pt completed functional mobility at S level with RW  Pt stated she just feels a "little weaker than usual "   Balance   Static Sitting Fair   Dynamic Sitting Fair   Static Standing Fair   Dynamic Standing [address],4, & 5 -   Activity Tolerance   Activity Tolerance Patient tolerated treatment well   Medical Staff Made Rickie García, OT   Nurse Made Aware RN cleared   RUE Assessment   RUE Assessment WFL   LUE Assessment   LUE Assessment WFL   Hand Function   Gross Motor Coordination Functional   Fine Motor Coordination Functional   Cognition   Overall Cognitive Status WFL   Arousal/Participation Alert; Cooperative   Attention Within functional limits   Orientation Level Oriented X4   Memory Within functional limits   Following Commands Follows all commands and directions without difficulty   Comments Pt pleasant and cooperative throughout session  Able to explain how much assistance her  provides to her at home  Assessment   Limitation Decreased ADL status; Decreased endurance   Prognosis Fair   Assessment Pt is a 73 yo Female who presented to hospitals on 7/6/2021 with worsening fever and generalized weakness  Pt with diagnosis of RUCHI, severe sepsis, and back pain   Pt  has a past medical history of BRCA1 positive, Cancer ([facility] ), History of chemotherapy, History of pulmonary embolus (PE) & DVT (03/2007), Lymphedema, MRSA (methicillin resistant Staphylococcus aureus) (09/05/2017), and Ovarian cancer ([facility] )  Pt greeted bedside for OT evaluation on 7/9/2021  Pt lives in ranch style house with ramp to enter with   Pt with tub shower with SC/GB  Pt is I with most ADLs, but recieves Ax1 from  for showering and some LB dressing  Pt ambulates with RW   assists with ADLs and drives  Pt completes UB ADLs at Rich level and LB ADLs with min A  Pt completes bed mobility, functional transfers, and functional mobility with RW at S level  Pt with supportive  at home and with ability to complete most ADL tasks with increased time  Limiting factors include RLE pain, decreased strength/endurance, and decreased participation in ADLs  Pt just about at baseline and Pt expressed no acute OT concerns upon DC home  DC skilled OT services at this time  Pt would benefit from home with support of  and home OT PRN upon DC to maximize safety and independence with ADLs and functional tasks of choice  Recommendation   OT Discharge Recommendation Home with home health rehabilitation  (Home OT)   OT - OK to Discharge Yes   Additional Comments  The patient's raw score on the AM-PAC Daily Activity inpatient short form is 21, standardized score is 44 27, greater than 39 4  Patients at this level are likely to benefit from discharge to home  Please refer to the recommendation of the Occupational Therapist for safe discharge planning     AM-PAC Daily Activity Inpatient   Lower Body Dressing 3   Bathing 3   Toileting 3   Upper Body Dressing 4   Grooming 4   Eating 4   Daily Activity Raw Score 21   Daily Activity Standardized Score (Calc for Raw Score >=11) 44 27   AM-PAC Applied Cognition Inpatient   Following a Speech/Presentation 4   Understanding Ordinary Conversation 4   Taking Medications 4   Remembering Where Things Are Placed or Put Away 4   Remembering List of 4-5 Errands 4   Taking Care of Complicated Tasks 3   Applied Cognition Raw Score 23 Applied Cognition Standardized Score 53 08   Modified Starr Scale   Modified Rebeka Scale 4        Kanchan Tafoya MS, OTR/L
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
## 2021-07-09 NOTE — ASSESSMENT & PLAN NOTE
· Diagnosed in February 2010  · Recent serologies negative  · She had pulmonary hemorrhage, renal failure and polyarthritis  · Was on rituximab for 4 years and Prednisone - stopped due to hypogammaglobulinemia and no evidence of disease  · Was followed by Dr Carmina Godoy, Dr Shiela Rodríguez, Dr Augusto Hager

## 2021-07-09 NOTE — ASSESSMENT & PLAN NOTE
· No chest pain or shortness of breath  · Non MI related troponin increase  · Had elevated troponins upto 4 3 in April, 2019 when she had sepsis and was seen by cardiology who felt troponin increase was due to Non MI related troponin release  · Was diagnosed with hypertrophic cardiomyopathy at that time  · Trop - 0 79 -- - 22  · Echo consistent with hypertrophic cardiomyopathy- echo discussed with Dr Bryon Esparza her primary cardiologist and he would like to see her outpatient in the next 1-2 weeks (his office will call her)

## 2021-07-09 NOTE — ASSESSMENT & PLAN NOTE
· Presented on 7/6/21 with fever, confusion and headache  · Due to right leg cellulitis in the setting of lymphedema (did not have significant cellulitic changes on presentation)  · Initial concern for osteomyelitis due to marrow edema at L2-3 - likely related to Modic changes per neurosurgery  · Per ID ok to transition to PO keflex today for a total of 10days of treatment   · Monitor local response, temperature, WBC    Follow-up blood cultures- NGTD

## 2021-07-09 NOTE — ASSESSMENT & PLAN NOTE
· Ct BB  · Repeat echo consistent with known disease  · Outpatient f/u with Dr Bailey Leon in 1-2 weeks after discharge

## 2021-07-09 NOTE — PLAN OF CARE
Problem: PHYSICAL THERAPY ADULT  Goal: Performs mobility at highest level of function for planned discharge setting  See evaluation for individualized goals  Description: Treatment/Interventions: Functional transfer training, LE strengthening/ROM, Therapeutic exercise, Endurance training, Patient/family training, Equipment eval/education, Bed mobility, Gait training          See flowsheet documentation for full assessment, interventions and recommendations  7/9/2021 1533 by Juan Rapp, PT  Note: Prognosis: Good  Problem List: Decreased strength, Decreased endurance, Impaired balance, Decreased mobility, Pain  Assessment: Patient seen for high complexity physical therapy evaluation, as well as additional treatment time for standing trials, minimal gait, repositioning  Patient is a 39-year-old female with history/comorbidities of Wegener's granulomatosis, CKD, hypertension, low back pain, morbid obesity, right Charcot foot, who is now admitted with confusion, fever, diagnoses include severe sepsis, right lower extremity cellulitis  Due to acute medical issues, pain, fall risk, note unstable clinical picture  PT consult to assess mobility, DC needs  Patient presents with decreased functional mobility, standing balance, endurance, bilateral lower extremity strength, barriers at home  Patient will benefit from continued skilled acute care PT services to correct for the above problems  When medically stable, anticipate DC home with home PT  Patient interested in using 's home health company to supply PT services  PT Discharge Recommendation: Home with home health rehabilitation     PT - OK to Discharge: Yes    See flowsheet documentation for full assessment

## 2021-07-09 NOTE — DISCHARGE SUMMARY
[facility]  Discharge- Merced Lema 1955, 74 y o  adult MRN: [id number]  Unit/Bed#: CW2 218-01 Encounter: [id number]  Primary Care Provider: Delvin Tapia DO   Date and time admitted to hospital: 7/6/2021  9:36 AM    * Severe sepsis [facility])  Assessment & Plan  · Presented on 7/6/21 with fever, confusion and headache  · Due to right leg cellulitis in the setting of lymphedema (did not have significant cellulitic changes on presentation)  · Initial concern for osteomyelitis due to marrow edema at L2-3 - likely related to Modic changes per neurosurgery  · Per ID ok to transition to PO keflex today for a total of 10days of treatment   · Monitor local response, temperature, WBC  Follow-up blood cultures- NGTD      Cellulitis of right lower extremity  Assessment & Plan  · Keflex x8 more days for a total of 10 days   · ID following   · Monitor serial exams       Chronic right-sided low back pain without sciatica  Assessment & Plan  · Presented with sepsis and severe low back pain with acute on chronic back/hip pain  · MRI thoracic spine - no evidence of diskitis/osteomyelitis  · MRI lumbar spine - "Transitional S1 vertebra  Marrow edema at the L2-3 endplates  No epidural phlegmon or abscess  This could be due early discitis/osteomyelitis or Modic type I endplate degenerative changes  Degenerative spondylosis"  · Seen by neurosurgery - MRI changes more likely related to Modic changes than infection  Severe right lower extremity lymphedema exacerbating right-sided pain      · Baseline upright lumbar x-rays pending interpretation by radiology    Slow transit constipation  Assessment & Plan  · optimize bowel regimen     Morbid obesity (HCC)  Assessment & Plan  Lifestyle modification  BMI 39 8    Hypogammaglobulinemia, acquired ([facility] [address] )  Assessment & Plan  · Developed hypogammaglobulinemia following Ritiximab treatment for Wegener's  · Patient is currently following with Hematology outpatient Dr Tommy Jose  · She was receiving IVIG 30 g every month until 2020  · Currently under observation as IgG levels improved  · IgG - 565 on 7/6  · Outpatient follow-up    Hypertrophic cardiomyopathy ([address] )  Assessment & Plan  · Ct BB  · Repeat echo consistent with known disease  · Outpatient f/u with Dr Delores Reaves in 1-2 weeks after discharge       Elevated troponin  Assessment & Plan  · No chest pain or shortness of breath  · Non MI related troponin increase  · Had elevated troponins upto 4 3 in April, 2019 when she had sepsis and was seen by cardiology who felt troponin increase was due to Non MI related troponin release  · Was diagnosed with hypertrophic cardiomyopathy at that time  · Trop - 0 79 -- - 22  · Echo consistent with hypertrophic cardiomyopathy- echo discussed with Dr Delores Reaves her primary cardiologist and he would like to see her outpatient in the next 1-2 weeks (his office will call her)      Ovarian cancer [facility])  Assessment & Plan  · BRCA 1 mutation with ovarian cancer - status post bilateral oophorectomy and hysterectomy in 2007 followed by chemotherapy  · Outpatient follow-up      Chronic pain disorder  Assessment & Plan  · PDMP reviewed on admission by admitting physician - patient on oxycodone ER 40 mg twice a day with oxycodone IR 15 mg every 12 hours as needed    · Now with acute on chronic right sided pain in the setting of severe sepsis from right lower extremity cellulitis  · Pain medications adjusted per acute pain recommendations- pain control overall has improved     Chronic kidney disease, stage III (moderate) [facility])  Assessment & Plan  Lab Results   Component Value Date    EGFR 86 11/12/2019    EGFR 78 11/11/2019    EGFR 54 11/09/2019    CREATININE 0 92 07/09/2021    CREATININE 0 85 07/08/2021    CREATININE 0 81 07/07/2021     · At baseline creatinine is 1    · Patient presented with creatinine of 1 26 - improved with intravenous fluids  · Check BMP outpatient in 1-2 weeks   · Resume lasix at discharge   · Avoid nephrotoxic drugs and hypotension    Wegener's granulomatosis with renal involvement ([facility] [address] )  Assessment & Plan  · Diagnosed in February 2010  · Recent serologies negative  · She had pulmonary hemorrhage, renal failure and polyarthritis  · Was on rituximab for 4 years and Prednisone - stopped due to hypogammaglobulinemia and no evidence of disease  · Was followed by Dr Soni Sims, Dr Carlitos Castillo, Dr Hong Melvin Problems     Resolved Problems  Date Reviewed: 7/9/2021        Resolved    Altered mental status 7/6/2021     Resolved by  Elidia Herring MD              Discharging Physician / Practitioner: RENETTA Saxena  PCP: Rowan Fernandez DO  Admission Date:   Admission Orders (From admission, onward)     Ordered        07/06/21 1236  [facility]  Once         07/06/21 1239  Inpatient Admission  Once                   Discharge Date: 07/09/21    Consultations During Hospital Stay:  · ID  · neurosurgery    Procedures Performed:   · 07/06/2021 chest x-ray-mild pulmonary vascular congestion no pneumothorax identified  · 07/06/2021 CT abdomen pelvis without contrast-no acute abnormality in the abdomen or pelvis  Stable mild stranding in the left lateral abdominal wall concerning etiology  Stable splenic hypodensity likely sister hemangioma with additional new smaller hypodense lesion, also with a benign appearance consistent with cyst or hemangioma  Decreased right inguinal lymph nodes  · 7/7 right hip x-ray-no acute osseous abnormality  Unchanged severe right hip osteoarthritis  · 07/06/2021-MRI thoracic spine without contrast-no evidence of diskitis or osteomyelitis in the thoracic spine  · 07/06/2021 MRI lumbar spine with and without contrast- Transitional S1 vertebra  Marrow edema at the L2-3 endplates   No epidural phlegmon or abscess   This could be due early discitis/osteomyelitis or Modic type I endplate degenerative changes   Degenerative spondylosis as described  · Blood cultures negative for 48 hours    Significant Findings / Test Results:   · Right lower extremity cellulitis    Incidental Findings:   · See above    Test Results Pending at Discharge (will require follow up): · Final blood cultures     Outpatient Tests Requested:  · BMP in 1-2 weeks    Complications:  None    Reason for Admission:  Altered mental status and fever    Hospital Course:   Sarina Castro is a 74 y o  adult patient with history of Wegener's granulomatosis, CKD 3, hypogammaglobulinemia, HTN who originally presented to the hospital on 7/6/2021 due to fever, altered mental status  In the emergency room the patient was febrile with a temperature of a 102° white blood cell count 23 and lactic acidosis and elevated procalcitonin level  She was also noted to have elevated troponins  She was admitted to hospital for sepsis and right lower extremity cellulitis  She was seen by infectious disease and started on IV Ancef with improvement of her right thigh cellulitis  The patient does have chronic lymphedema in that leg  She will be discharged with Keflex for 8 more days to complete a total of 10 days  The patient was on suppressive antibiotics last year and this was discontinued but if this continues to be recurrent she may need to resume suppressive antibiotic therapy and can follow-up with Infectious Disease outpatient if necessary  The patient was noted to have elevated troponins in the setting of sepsis this is believed to be a non MI troponin elevation  The patient was seen by her primary cardiologist while hospitalized after reviewing her echocardiogram and it was determined it was consistent with her previous echocardiograms and that he will follow up with her in the office outpatient  There were no medication changes at this time  When the patient arrived to emergency room she was also complaining of back pain   Subtle changes on L-spine MRI in a patient with chronic low back pain is nonspecific   If there is concern of early vertebral osteomyelitis, patient can get serial MRIs   Clearly, even if osteomyelitis is present, it would not be etiology of acute infection above  The patient was followed by Infectious Disease and Neurosurgery while hospitalized  Please see above list of diagnoses and related plan for additional information  Condition at Discharge: good    Discharge Day Visit / Exam:   Subjective:  Patient reports no bowel movement since she has been admitted  Denies any nausea, vomiting  Denies any abdominal pain  Patient reports that right lower extremity has improved but she believes this is secondary to her  wrapping her leg  Patient denies all other complaints  No chest pain or shortness of breath  Vitals: Blood Pressure: 133/67 (07/09/21 0959)  Pulse: 79 (07/09/21 0900)  Temperature: 97 9 °F (36 6 °C) (07/09/21 0959)  Temp Source: Oral (07/06/21 1230)  Respirations: 19 (07/09/21 0012)  Height: 5' 3" (160 cm) (07/06/21 1455)  Weight - Scale: 102 kg (224 lb 13 9 oz) (07/06/21 1455)  SpO2: 93 % (07/09/21 0900)  Exam:   Physical Exam  Vitals and nursing note reviewed  Constitutional:       General: Monica George is not in acute distress  Appearance: Monica George is obese  Cardiovascular:      Rate and Rhythm: Normal rate and regular rhythm  Heart sounds: Murmur heard  Pulmonary:      Effort: Pulmonary effort is normal  No respiratory distress  Breath sounds: Normal breath sounds  No wheezing or rales  Abdominal:      General: Bowel sounds are normal  There is no distension  Palpations: Abdomen is soft  Tenderness: There is no abdominal tenderness  Musculoskeletal:         General: Swelling present  Cervical back: Neck supple  Right lower leg: Edema (lymphedema severe) present  Left lower leg: Edema (mod- severe) present  Skin:     General: Skin is warm and dry        Findings: Erythema (significantly improved from yesterday) present  Neurological:      Mental Status: Clarisse Montanez is alert  Mental status is at baseline  Psychiatric:         Mood and Affect: Mood normal           Discussion with Family: Updated  () at bedside  Discharge instructions/Information to patient and family:   See after visit summary for information provided to patient and family  Provisions for Follow-Up Care:  See after visit summary for information related to follow-up care and any pertinent home health orders  Disposition:   Home with [facility] (Reminder: Complete face to face encounter)    Planned Readmission: no     Discharge Statement:  I spent 60 minutes discharging the patient  This time was spent on the day of discharge  I had direct contact with the patient on the day of discharge  Greater than 50% of the total time was spent examining patient, answering all patient questions, arranging and discussing plan of care with patient as well as directly providing post-discharge instructions  Additional time then spent on discharge activities  Discharge Medications:  See after visit summary for reconciled discharge medications provided to patient and/or family        **Please Note: This note may have been constructed using a voice recognition system**

## 2021-07-09 NOTE — ASSESSMENT & PLAN NOTE
· Developed hypogammaglobulinemia following Ritiximab treatment for Wegener's  · Patient is currently following with Hematology outpatient Dr Aiyana Oscar  · She was receiving IVIG 30 g every month until 2020    · Currently under observation as IgG levels improved  · IgG - 565 on 7/6  · Outpatient follow-up

## 2021-07-09 NOTE — ASSESSMENT & PLAN NOTE
Lab Results   Component Value Date    EGFR 86 11/12/2019    EGFR 78 11/11/2019    EGFR 54 11/09/2019    CREATININE 0 92 07/09/2021    CREATININE 0 85 07/08/2021    CREATININE 0 81 07/07/2021     · At baseline creatinine is 1    · Patient presented with creatinine of 1 26 - improved with intravenous fluids  · Check BMP outpatient in 1-2 weeks   · Resume lasix at discharge   · Avoid nephrotoxic drugs and hypotension

## 2021-07-11 LAB
BACTERIA BLD CULT: NORMAL
BACTERIA BLD CULT: NORMAL

## 2021-07-12 ENCOUNTER — TELEPHONE (OUTPATIENT)
Dept: INFECTIOUS DISEASES | Facility: CLINIC | Age: 66
End: 2021-07-12

## 2021-07-12 ENCOUNTER — HOSPITAL ENCOUNTER (OUTPATIENT)
Dept: MAMMOGRAPHY | Facility: MEDICAL CENTER | Age: 66
Discharge: HOME | End: 2021-07-12
Payer: MEDICARE

## 2021-07-12 VITALS — BODY MASS INDEX: 39.69 KG/M2 | HEIGHT: 63 IN | WEIGHT: 224 LBS

## 2021-07-12 DIAGNOSIS — Z91.89 INCREASED RISK OF BREAST CANCER: ICD-10-CM

## 2021-07-12 DIAGNOSIS — Z12.31 ENCOUNTER FOR SCREENING MAMMOGRAM FOR MALIGNANT NEOPLASM OF BREAST: ICD-10-CM

## 2021-07-12 DIAGNOSIS — Z15.01 BRCA1 POSITIVE: ICD-10-CM

## 2021-07-12 DIAGNOSIS — Z15.09 BRCA1 POSITIVE: ICD-10-CM

## 2021-07-12 PROCEDURE — 77063 BREAST TOMOSYNTHESIS BI: CPT

## 2021-07-12 PROCEDURE — 77067 SCR MAMMO BI INCL CAD: CPT

## 2021-07-12 NOTE — TELEPHONE ENCOUNTER
Pt called to inquire if she needs f/u with Dr Nicole Blakely? Per Dr Nicole Blakely: pt should finish keflex  F/U on an as needed basis  Called pt back and lmom  Asked her to cb if she had any questions or concerns

## 2021-07-23 ENCOUNTER — HOSPITAL ENCOUNTER (OUTPATIENT)
Dept: MAMMOGRAPHY | Facility: CLINIC | Age: 66
Discharge: HOME | End: 2021-07-23
Payer: MEDICARE

## 2021-07-23 ENCOUNTER — HOSPITAL ENCOUNTER (OUTPATIENT)
Dept: ULTRASOUND IMAGING | Facility: CLINIC | Age: 66
Discharge: HOME | End: 2021-07-23
Payer: MEDICARE

## 2021-07-23 ENCOUNTER — TELEPHONE (OUTPATIENT)
Dept: MAMMOGRAPHY | Facility: CLINIC | Age: 66
End: 2021-07-23

## 2021-07-23 VITALS — WEIGHT: 224 LBS | BODY MASS INDEX: 39.69 KG/M2 | HEIGHT: 63 IN

## 2021-07-23 DIAGNOSIS — R92.8 ABNORMAL SCREENING MAMMOGRAM: ICD-10-CM

## 2021-07-23 PROCEDURE — 76642 ULTRASOUND BREAST LIMITED: CPT

## 2021-07-23 PROCEDURE — G0279 TOMOSYNTHESIS, MAMMO: HCPCS

## 2021-07-23 PROCEDURE — 77065 DX MAMMO INCL CAD UNI: CPT

## 2021-07-26 ENCOUNTER — HOSPITAL ENCOUNTER (OUTPATIENT)
Dept: MAMMOGRAPHY | Facility: CLINIC | Age: 66
Discharge: HOME | End: 2021-07-26

## 2021-07-26 ENCOUNTER — HOSPITAL ENCOUNTER (OUTPATIENT)
Dept: MAMMOGRAPHY | Facility: CLINIC | Age: 66
Discharge: HOME | End: 2021-07-26
Payer: MEDICARE

## 2021-07-26 VITALS
HEART RATE: 74 BPM | WEIGHT: 224 LBS | BODY MASS INDEX: 39.69 KG/M2 | DIASTOLIC BLOOD PRESSURE: 71 MMHG | SYSTOLIC BLOOD PRESSURE: 146 MMHG | HEIGHT: 63 IN

## 2021-07-26 DIAGNOSIS — R92.1 BREAST CALCIFICATIONS: ICD-10-CM

## 2021-07-26 DIAGNOSIS — R92.8 ABNORMAL FINDINGS ON DIAGNOSTIC IMAGING OF BREAST: ICD-10-CM

## 2021-07-26 DIAGNOSIS — R92.8 ABNORMAL MAMMOGRAM: ICD-10-CM

## 2021-07-26 PROCEDURE — 88305 TISSUE EXAM BY PATHOLOGIST: CPT | Performed by: PATHOLOGY

## 2021-07-26 PROCEDURE — 88341 IMHCHEM/IMCYTCHM EA ADD ANTB: CPT | Performed by: PATHOLOGY

## 2021-07-26 PROCEDURE — 19081 BX BREAST 1ST LESION STRTCTC: CPT

## 2021-07-26 PROCEDURE — 88342 IMHCHEM/IMCYTCHM 1ST ANTB: CPT | Performed by: PATHOLOGY

## 2021-07-26 PROCEDURE — 88312 SPECIAL STAINS GROUP 1: CPT | Performed by: PATHOLOGY

## 2021-07-26 RX ORDER — LIDOCAINE HYDROCHLORIDE AND EPINEPHRINE 10; 10 MG/ML; UG/ML
10 INJECTION, SOLUTION INFILTRATION; PERINEURAL ONCE
Status: COMPLETED | OUTPATIENT
Start: 2021-07-26 | End: 2021-07-26

## 2021-07-26 RX ADMIN — LIDOCAINE HYDROCHLORIDE,EPINEPHRINE BITARTRATE 10 ML: 10; .01 INJECTION, SOLUTION INFILTRATION; PERINEURAL at 15:30

## 2021-07-26 NOTE — PROGRESS NOTES
Procedure type:    _____ultrasound guided __x___stereotactic    Breast:    _____Left __x___Right    Location: 12 oclock    Needle: 8g Revolve    # of passes: 3 cores all with calcs    Clip: mammomark bow tie    Performed by: Dr Ivette Michel held for 5 minutes by: Quita Higgins    Steri Strips:    __x___yes _____no    Band aid:    __x___yes_____no    Tape and guaze:    _____yes __x___no    Tolerated procedure:    __x___yes _____no

## 2021-07-26 NOTE — PROGRESS NOTES
Ice pack given:    __x___yes _____no    Discharge instructions signed by patient:    ___x__yes _____no    Discharge instructions given to patient:    __x___yes _____no    Discharged via:    __x___ambulatory with walker    _____wheelchair    _____stretcher    Stable on discharge:    _x____yes ____no

## 2021-07-26 NOTE — DISCHARGE INSTR - OTHER ORDERS
POST LARGE CORE BREAST BIOPSY PATIENT INFORMATION      1  Place an ice pack inside your bra over the top of the dressing every hour for 20 minutes (20 minutes on, 60 minutes off)  Do this until bedtime  2  Do not shower or bathe until the following morning  3  You may bathe your breast carefully with the steri-strips in place  Be careful    Not to loosen them  The steri-strips will fall off in 3-5 days  4  You may have mild discomfort, and you may have some bruising where the   Needle entered the skin  This should clear within 5-7 days  5  If you need medicine for discomfort, take acetaminophen products such as   Tylenol  You may also take Advil or Motrin products  6  Do not participate in strenuous activities such as-tennis, aerobics, skiing,  Weight lifting, etc  for 24 hours  Refrain from swimming/soaking for 72 hours  7  Wearing a bra for sleeping may be more comfortable for the first 24-48 hours  8  Watch for continued bleeding, pain or fever over 101; please call with any questions or concerns  For procedures done at the [facility]  [address] call:  Daina Parish RN at [phone]  Zayda Schneider RN at [phone]                    *After 4 PM call the Interventional Radiology Department                    [phone] and ask to speak with the nurse on call  For procedures done at the [address] [place] [address] call:         Durga Hollis RN at   *After 4 PM call the Interventional Radiology Department   [phone] and ask to speak with the nurse on call  For procedures done at [address] [place] [address] call: The Radiology Nurse at [phone]  *After 4 PM call your physician, or go to the Emergency Department  9          The final results of your biopsy are usually available within one week

## 2021-07-27 NOTE — PROGRESS NOTES
Post procedure call completed 7/27/2021 at 2:15    Bleeding: _____yes __X___no ( Patient denies)    Pain: _____yes ___X___no ( patient  denies)    Redness/Swelling: ______yes ___X___no ( patient denies)    Band aid removed: _____yes _____noInstructed patient to remove after her shower)    Steri-Strips intact: ___X___yes _____no (discussed with patient to remove steri strips on 7/31/2021 if they have not come off on their own)    Pt with no questions at this time, adv will call when results available, adv to call with any questions or concerns, has name/# for contact

## 2021-07-28 ENCOUNTER — HOSPITAL ENCOUNTER (OUTPATIENT)
Dept: RADIOLOGY | Age: 66
Discharge: HOME | End: 2021-07-28

## 2021-07-28 DIAGNOSIS — R92.1 BREAST CALCIFICATION, RIGHT: ICD-10-CM

## 2021-08-02 ENCOUNTER — TELEPHONE (OUTPATIENT)
Dept: SURGICAL ONCOLOGY | Facility: CLINIC | Age: 66
End: 2021-08-02

## 2021-09-02 DIAGNOSIS — I10 BENIGN ESSENTIAL HYPERTENSION: ICD-10-CM

## 2021-09-02 DIAGNOSIS — I42.2 HYPERTROPHIC CARDIOMYOPATHY (HCC): ICD-10-CM

## 2021-09-07 ENCOUNTER — OFFICE VISIT (OUTPATIENT)
Dept: CARDIOLOGY CLINIC | Facility: CLINIC | Age: 66
End: 2021-09-07
Payer: MEDICARE

## 2021-09-07 VITALS
BODY MASS INDEX: 39.99 KG/M2 | HEIGHT: 63 IN | SYSTOLIC BLOOD PRESSURE: 118 MMHG | WEIGHT: 225.7 LBS | HEART RATE: 68 BPM | DIASTOLIC BLOOD PRESSURE: 68 MMHG

## 2021-09-07 DIAGNOSIS — I42.2 HYPERTROPHIC CARDIOMYOPATHY (HCC): ICD-10-CM

## 2021-09-07 DIAGNOSIS — I51.7 LVH (LEFT VENTRICULAR HYPERTROPHY): ICD-10-CM

## 2021-09-07 DIAGNOSIS — I10 BENIGN ESSENTIAL HYPERTENSION: Primary | ICD-10-CM

## 2021-09-07 DIAGNOSIS — I05.9 MITRAL VALVE DISORDER: ICD-10-CM

## 2021-09-07 PROCEDURE — 99215 OFFICE O/P EST HI 40 MIN: CPT | Performed by: INTERNAL MEDICINE

## 2021-09-07 NOTE — PROGRESS NOTES
Consultation - Cardiology   Fritzi Holstein 74 y o  adult MRN: [id number]  Unit/Bed#:  Encounter: [id number]      Active problem list:    Patient Active Problem List    Diagnosis Date Noted    Slow transit constipation 07/08/2021    Chronic right-sided low back pain without sciatica 07/07/2021    Cellulitis of right lower extremity 07/07/2021    Severe sepsis ([facility] ) 07/06/2021    Morbid obesity ([facility] ) 06/08/2021    Hypogammaglobulinemia, acquired ([facility] ) 12/27/2019    Acute respiratory failure with hypoxia ([facility] ) 11/07/2019    Hypertrophic cardiomyopathy ([facility] ) 08/27/2019    Low immunoglobulin level 06/07/2019    LVH (left ventricular hypertrophy) 05/30/2019    Postnasal drip 05/07/2019    Dyspnea on exertion 05/07/2019    Mitral valve disorder     CAP (community acquired pneumonia) 04/03/2019    Elevated troponin 04/03/2019    Increased risk of breast cancer 47/07/4037    Other complicated headache syndrome 07/11/2018    Nausea & vomiting 07/11/2018    SIRS (systemic inflammatory response syndrome) ([facility] ) 07/11/2018    Post-traumatic osteoarthritis 05/14/2018    Opiate analgesic use agreement exists 12/13/2016    BRCA1 positive 06/21/2016    Charcot's joint 01/20/2016    Class 2 obesity 01/20/2016    Wegener's granulomatosis with renal involvement ([facility] ) 01/15/2016    Cancer of ovary ([facility] ) 04/09/2015    Ovarian cancer ([facility] ) 04/09/2015    Increased frequency of urination 08/22/2014    Chronic kidney disease, stage III (moderate) ([facility]) 03/26/2014    Chronic pain disorder 10/24/2013    GERD (gastroesophageal reflux disease) 09/27/2013    Dyslipidemia 04/04/2013    Lymphedema 11/15/2012    Anxiety 06/05/2012    Benign essential hypertension 06/05/2012    Depression 06/05/2012       Plan: Mrs Dennie Crofts has returned from a long car trip to [place] to see her son and had to be more active than usual for the duration of the trip   She reports that she tolerated the long rides well without any overt symptoms  They did take their time getting to their destination and back in order for her to be able to move her legs and get rest  Her son is doing well in his orthopedic residency but has not been screened for HCM yet  She reports that she had breast biopsy recently with negative results and that her IgG levels have remained adequate with the last immunoglobulin infusion about a year ago  Her lower extremity edema has been managed with PRN furosemide and special lymphedema wrapping bands  Her  has fully recovered from [place] 19  She has received both doses of COVID 19 vaccines ([employer]) and I encouraged her to apply for her booster shot  She was encouraged again to have her son screened with echocardiography  Her blood pressure and heart rate are well controlled and she has no overt cardiac symptoms at the current level of physical activity  An echocardiogram performed on 7-8-2021 has shown: LEFT VENTRICLE: Systolic function was hyperdynamic  Ejection fraction was estimated to be 70 %  There were no regional wall motion abnormalities  Wall thickness was markedly increased  There was severe assymetrical hypertrophy of the septum  There was dynamic obstruction at rest in the outflow tract, with a peak gradient of 37 mmHg  There was dynamic obstruction during Valsalva, with a peak gradient of 75 mmHg  Features were consistent with a pseudonormal left ventricular filling pattern, with concomitant abnormal relaxation and increased filling pressure (grade 2 diastolic dysfunction)  LEFT ATRIUM: The atrium was mildly to moderately dilated  RIGHT ATRIUM:  The atrium was mildly dilated  MITRAL VALVE: There was severe systolic anterior motion of the anterior leaflet  TRICUSPID VALVE: There was trace regurgitation  She will be having colonoscopy this Friday  I did emphasize that she should avoid dehydration during bowel prep and during the procedure       Physician Requesting Consult: Darrian Haq DO  Reason for Consult / Principal Problem: HOCM     HPI: Ms Cherie Peña is a 76 year old woman with past medical history of hypertension, ovarian cancer (2011, now in remission), Wegener's granulomatosis, low immunoglobulin status with recurrent sepsis, Charcot's knee and dyslipidemia who was recently admitted to hospital with sepsis and had non-coronary related troponin release  The sepsis was related to multifocal pneumonia for which she was treated with intravenous antibiotics  As part of the work up of troponin elevation she had a transthoracic echocardiogram (4-3-2019) that showed asymmetric left ventricular hypertrophy, NOAH and resting left ventricular outflow tract obstruction (gradient 64 mmHg)  She was treated with metoprolol and has remained asymptomatic since hospital discharge       On 8-: Ms Pastor Arana has phenotypically mild hypertrophic obstructive cardiomyopathy and is currently asymptomatic on her limited degree of mobility due to her skeletal problem  She has no personal significant risk factor for sudden cardiac death and the circumstances of the death of her son is far from certain to make a case for primary prevention ICD particularly at her age and with the heightened risk of device infection  She does drink water liberally during the day and then uses low dose furosemide for diuresis  She is tolerating the metoprolol well  I do not believe she would benefit from extensive risk stratification and due to her asymptomatic status feel that her current treatment schedule is effective and adequate  Her blood pressure is well controlled and her kidney function has recovered from the acute injury sustained during recent sepsis  I have instructed her to inform me of any new heart symptoms and would otherwise see her for fullow up in a year time     On 4-:  Ms Pastor Arana has done very well from the cardiovascular standpoint   She is currently free of cardiac symptoms albeit at a suboptimal activity level due to self isolation and social distancing  She does get to walk her dog for a short distance on a daily basis  She denies shortness of breath, chest pain, palpitation, dizziness or syncope  She has lost considerable weight that has helped her symptoms significantly (down to 227 lbs from a high of 270 lbs)  Her blood pressure has been well controlled with the highest value of 130/70 mmHg  She received immunoglobulin infusions in 2 sessions in January and February and is due for laboratory work on April 23  She is compliant with her medications and reports no side effects  An echocardiogram performed on 11/12/2019 showed normal LV systolic function, mild LVOT obstruction and no significant mitral regurgitation      8-: Ms Wilfred Eisenmenger is currently asymptomatic and quite limited in her scope of physical activity due to her knee problem  She has not had any recent infection or need for immunoglobulin administration  She denies chest pain, shortness of breath, dizziness, syncope or palpitation  She has had severe lymphedema of her lower extremities  She does use a small dose of furosemide at times when she notices worsening swelling  Her last echocardiogram was performed in November 2019  Today, I made no change to her medications and asked her to have blood work to check kidney function and electrolytes levels due to chronic use of diuretic     6-4-2021: Mrs Wilfred Eisenmenger has been asymptomatic from cardiac stand point  She continues to follow-up with Surgical Oncology for routine mammograms given history of BRCA1 positive mutation and ovarian cancer  She continues to follow-up with Hematology for management of immunoglobin deficiency with the most recent levels of IgG being 403 mg/dL in 4/2021  Admits to having had lower extremity pain, which is responsive to oxycontin 20 mg  Her  was tested positive for COVID 19, and although she felt mild symptoms, she didn't get tested and recovered well   She has received both doses of COVID 19 vaccines ([employer])  She has stopped taking furosemide due to the inconvenience of getting to the bathroom frequently and has noted mild increase in  lower extremity edema that is mostly lymphedema for which she uses special lymphedema wrapping bands  We recommended to her to take furosemide sparingly if lower extremity edema worsens  She was encouraged to have her son screened with echocardiography  She is interested in losing some weight but her physical activity is limited secondary to hip and knee pains  Her blood pressure and heart rate are well controlled and she has no overt cardiac symptoms at the current level of physical activity  We encouraged her to have upper body toning exercises  She will be seen in HCM clinic in 6 month with TTE prior to the visit       Family history: Mother  at age 77 from breast and pancreatic cancer, father  at age 80 without known heart disease, she has a 1/2 and a 1/2 brother from the father's side  Her sister is [de-identified]year old and has been told to have heart problem (?Ebstein's) and the brother who is 66year old is not known to have any cardiac problem  Her son  at age 28 at home possibly related to alcoholism although the circumstances were not well understood and the death certificate indicated atherosclerosis as the cause of death  She has another son who is 22years old and has finished medical school at [employer] [address] started an orthopedic residency at [employer] in [place] (as part of [employer] no health related issue      Review of Systems: Denies shortness of breath, chest pain, palpitation, dizziness or syncope on minimal physical activity due to skeletal problems      Historical Information   Past Medical History:   Diagnosis Date    BRCA1 positive     Cancer [facility] )     ovarian Ca with chemo    History of chemotherapy     ovarian cancer     History of pulmonary embolus (PE) & DVT 2007    post-op [facility]ILIA/ omenectomy    Lymphedema     MRSA (methicillin resistant Staphylococcus aureus) 2017    nasal swab negative 4/3/19    Ovarian cancer ([facility] )     CYST REMOVED RIGHT OVARY IN   HYSTERECTOMY 07       Past Surgical History:   Procedure Laterality Date    APPENDECTOMY  1998    laparoscopic    BILATERAL SALPINGOOPHORECTOMY  2007    BREAST BIOPSY Right 2016    BREAST BIOPSY Left 07/15/2020    BREAST BIOPSY Left 2021    stereo     SECTION      HYSTERECTOMY  2007    Omentectomy and lymph node biopsy at [place] of [address]     MAMMO STEREOTACTIC BREAST BIOPSY RIGHT (ALL INC) Right 2021    OTHER SURGICAL HISTORY      right lower extremity due to trauma    MD TREAT TIBIAL SHAFT FX, INTRAMED IMPLANT Right 2017    Procedure: INSERTION NAIL IM TIBIA;  Surgeon: Eduardo Abraham MD;  [facility]: [facility];  Service: Orthopedics    US GUIDANCE BREAST BIOPSY LEFT EACH ADDITIONAL Left 7/15/2020    US GUIDED BREAST BIOPSY LEFT COMPLETE Left 7/15/2020    US GUIDED BREAST BIOPSY RIGHT COMPLETE Right 2016    US GUIDED BREAST BIOPSY RIGHT COMPLETE Right 2021     Social History     Substance and Sexual Activity   Alcohol Use Yes     Social History     Substance and Sexual Activity   Drug Use Not Currently     Social History     Tobacco Use   Smoking Status Never Smoker   Smokeless Tobacco Never Used     Meds/Allergies   all current active meds have been reviewed  Allergies   Allergen Reactions    Iodinated Diagnostic Agents Shortness Of Breath    Omnipaque [Iohexol] Shortness Of Breath    Other Shortness Of Breath     IVP dye, x ray dye 3    Iodides      Other reaction(s): SWELLING, SOB    Methotrexate Nausea Only     Headache and nausea    Methotrexate Derivatives Headache     Current Outpatient Medications on File Prior to Visit   Medication Sig Dispense Refill    acetaminophen (TYLENOL) 325 mg tablet Take 2 tablets by mouth every 6 (six) hours as needed for mild pain (Patient taking differently: Take 650 mg by mouth every 6 (six) hours as needed for mild pain ) 30 tablet 0    bisacodyl (DULCOLAX) 5 mg EC tablet Take 5 mg by mouth daily as needed for constipation       Calcium Carbonate-Vit D-Min (CALCIUM 1200 PO) Take 3 tablets by mouth daily       DULoxetine (CYMBALTA) 60 mg delayed release capsule Take 60 mg by mouth daily    0    LORazepam (ATIVAN) 0 5 mg tablet Take 1 tablet as needed every 8 hours  (Patient taking differently: Take 0 5 mg by mouth 2 (two) times a day Take 1 tablet as needed every 8 hours  ) 30 tablet 0    metoprolol tartrate (LOPRESSOR) 25 mg tablet TAKE 1/2 TABLET(12 5MG) BY MOUTH EVERY 12 HOURS 90 tablet 3    Multiple Vitamins-Minerals (MULTIVITAMIN ADULT PO) Take 1 capsule by mouth daily       omeprazole (PriLOSEC) 20 mg delayed release capsule Take 20 mg by mouth daily   oxyCODONE (OxyCONTIN) 40 mg 12 hr tablet Take 40 mg by mouth every 12 (twelve) hours      OXYCODONE HCL PO Take 15 mg by mouth 2 (two) times a day as needed       TOVIAZ 8 MG TB24 Take 8 mg by mouth daily at bedtime       furosemide (LASIX) 20 mg tablet Take 1 tablet (20 mg total) by mouth daily (Patient not taking: Reported on 3/12/2021) 90 tablet 3    ondansetron (ZOFRAN) 4 mg tablet Take 1 tablet (4 mg total) by mouth every 8 (eight) hours as needed for nausea or vomiting (Patient not taking: Reported on 6/4/2021) 20 tablet 0    polyethylene glycol (MIRALAX) 17 g packet Take 17 g by mouth as needed  (Patient not taking: Reported on 7/6/2021)       No current facility-administered medications on file prior to visit  Objective   Vitals:   Vitals:    09/07/21 1321   BP: 118/68   BP Location: Right arm   Patient Position: Sitting   Cuff Size: Standard   Pulse: 68   Weight: 102 kg (225 lb 11 2 oz)   Height: 5' 3" (1 6 m)   Body mass index is 39 98 kg/m²  Body surface area is 2 03 meters squared      Invasive Devices     Peripheral Intravenous Line            Peripheral IV 07/06/21 Right Hand 63 days    Peripheral IV 07/08/21 Dorsal (posterior); Right Forearm 60 days              Physical Exam:  GEN: Charito Lamb appears well, alert and oriented x 3, pleasant and cooperative   HEENT: pupils equal, round, and reactive to light; extraocular muscles intact  NECK: supple, no carotid bruits   HEART: regular rhythm, normal S1 and P2, 8/8 systolic murmur at the base, clicks, gallops or rubs   LUNGS: clear to auscultation bilaterally; no wheezes, rales, or rhonchi   ABDOMEN: protuberant, normal bowel sounds, soft, no tenderness, no distention  EXTREMITIES: Marked lymphedema both lower extremities (wrapped in special bandage), peripheral pulses could not be felt, Marked deformity of right lower extremity   NEURO: no focal findings   SKIN: normal without suspicious lesions on exposed skin    Lab Results:   No visits with results within 1 Day(s) from this visit  Latest known visit with results is:   Hospital Outpatient Visit on 07/26/2021   Component Date Value    Case Report 07/26/2021                      Value:Surgical Pathology Report                         Case: [id number]                                   Authorizing Provider:  Elmo Prasad MD           Collected:           07/26/2021 1421              Ordering Location:     [address] [place] [address] Breast Received:            07/26/2021 [place]                                                                       Pathologist:           Romeo Hays MD                                                        Specimen:    Breast, Right, US BX RT BREAST 1200 10CMFN 3 PASSES 12G JESSICA                            Final Diagnosis 07/26/2021                      Value: This result contains rich text formatting which cannot be displayed here   Additional Information 07/26/2021                      Value: This result contains rich text formatting which cannot be displayed here     Camron Sukumar Description 07/26/2021 Value:This result contains rich text formatting which cannot be displayed here   Clinical Information 2021                      Value:AREA OF ASYMETRY WITH CALCIFICATIONS       Imaging: I have personally reviewed pertinent reports  Echo complete with contrast if indicated  Status: Final result   PACS Images     Show images for Echo complete with contrast if indicated   Study Result    Narrative & Impression   Sarina Mcpherson  [facility], [address] [place] [address]  [phone]     Transthoracic Echocardiogram  2D, M-mode, Doppler, and Color Doppler     Study date:  2021     Patient: Liv PAPPAS  MR number: [id number]  Account number: [de-identified]  : 1955  Age: 74 years  Gender: Female  Status: Inpatient  Location: Bedside  Height: 63 in  Weight: 224 lb  BP: 102/ 61 mmHg     Indications: Elevated Troponin;HOCOM follow-up      Diagnoses: I42 9 - Cardiomyopathy, unspecified     Sonographer:  RAPHAEL Allen  Primary Physician:  Deejay Cox DO  Referring Physician:  Erica Ludwig MD  Group:  [id number]  Interpreting Physician:  Gustavo Cameron MD     IMPRESSIONS:  Features are consistent with obstructive hypertrophic cardiomyopathy      SUMMARY     PROCEDURE INFORMATION:  This was a technically difficult study      LEFT VENTRICLE:  Systolic function was hyperdynamic  Ejection fraction was estimated to be 70 %  There were no regional wall motion abnormalities  Wall thickness was markedly increased  There was severe assymetrical hypertrophy of the septum  There was dynamic obstruction at rest in the outflow tract, with a peak gradient of 37 mmHg  There was dynamic obstruction during Valsalva, with a peak gradient of 75 mmHg    Features were consistent with a pseudonormal left ventricular filling pattern, with concomitant abnormal relaxation and increased filling pressure (grade 2 diastolic dysfunction)      LEFT ATRIUM:  The atrium was mildly to moderately dilated      RIGHT ATRIUM:  The atrium was mildly dilated      MITRAL VALVE:  There was severe systolic anterior motion of the anterior leaflet      TRICUSPID VALVE:  There was trace regurgitation      HISTORY: PRIOR HISTORY: Ovarian CA;CKD3;HOCM      PROCEDURE: The procedure was performed at the bedside  This was a routine study  The transthoracic approach was used  The study included complete 2D imaging, M-mode, complete spectral Doppler, and color Doppler  The heart rate was 79 bpm,  at the start of the study  Echocardiographic views were limited due to poor acoustic window availability  This was a technically difficult study      LEFT VENTRICLE: Size was normal  Systolic function was hyperdynamic  Ejection fraction was estimated to be 70 %  There were no regional wall motion abnormalities  Wall thickness was markedly increased  There was severe assymetrical  hypertrophy of the septum  DOPPLER: There was dynamic obstruction at rest in the outflow tract, with a peak gradient of 37 mmHg  There was dynamic obstruction during Valsalva, with a peak gradient of 75 mmHg  Features were consistent with  a pseudonormal left ventricular filling pattern, with concomitant abnormal relaxation and increased filling pressure (grade 2 diastolic dysfunction)      RIGHT VENTRICLE: The size was normal  Systolic function was normal  Wall thickness was normal      LEFT ATRIUM: The atrium was mildly to moderately dilated      RIGHT ATRIUM: The atrium was mildly dilated      MITRAL VALVE: Valve structure was normal  There was normal leaflet separation  There was severe systolic anterior motion of the anterior leaflet  DOPPLER: The transmitral velocity was within the normal range  There was no evidence for  stenosis  There was no regurgitation      AORTIC VALVE: The valve was trileaflet  Leaflets exhibited normal thickness and normal cuspal separation   DOPPLER: Transaortic velocity was within the normal range  There was no evidence for stenosis  There was no regurgitation      TRICUSPID VALVE: The valve structure was normal  There was normal leaflet separation  DOPPLER: The transtricuspid velocity was within the normal range  There was no evidence for stenosis  There was trace regurgitation      PULMONIC VALVE: Leaflets exhibited normal thickness, no calcification, and normal cuspal separation  DOPPLER: The transpulmonic velocity was within the normal range  There was no regurgitation      PERICARDIUM: There was no pericardial effusion  The pericardium was normal in appearance      AORTA: The root exhibited normal size      SYSTEM MEASUREMENT TABLES     2D  %FS: 27 97 %  Ao Diam: 2 51 cm  EDV(Teich): 43 3 ml  EF(Teich): 55 43 %  ESV(Teich): 19 3 ml  IVSd: 1 68 cm  LA Area: 14 17 cm2  LA Diam: 4 21 cm  LVEDV MOD A4C: 78 4 ml  LVEF MOD A4C: 59 38 %  LVESV MOD A4C: 31 85 ml  LVIDd: 3 27 cm  LVIDs: 2 36 cm  LVLd A4C: 7 48 cm  LVLs A4C: 6 03 cm  LVPWd: 1 41 cm  RA Area: 11 6 cm2  RVIDd: 3 79 cm  SV MOD A4C: 46 55 ml  SV(Teich): 24 ml     CW  AV Vmax: 3 96 m/s  AV maxP 14 mmHg  PRend PG: 3 62 mmHg  PRend Vmax: 0 95 m/s  TR Vmax: 2 61 m/s  TR maxP 28 mmHg     MM  TAPSE: 2 46 cm     PW  E' Sept: 0 05 m/s  E/E' Sept: 21 2  MV A [place]: 0 84 m/s  MV Dec [place]: 5 02 m/s2  MV DecT: 197 81 ms  MV E [place]: 0 99 m/s  MV E/A Ratio: 1 2  MV PHT: 57 37 ms  MVA By PHT: 3 84 cm2     [employer] Accredited Echocardiography Laboratory     Prepared and electronically signed by  Lloyd Johnson MD  Signed 2021 18:05:24     Counseling / Coordination of Care  Total floor / unit time spent today 40 minutes  Greater than 50% of total time was spent with the patient and / or family counseling and / or coordination of care

## 2021-09-13 ENCOUNTER — OFFICE VISIT (OUTPATIENT)
Dept: SURGICAL ONCOLOGY | Facility: CLINIC | Age: 66
End: 2021-09-13
Payer: MEDICARE

## 2021-09-13 VITALS
HEIGHT: 63 IN | BODY MASS INDEX: 39.98 KG/M2 | HEART RATE: 79 BPM | RESPIRATION RATE: 18 BRPM | OXYGEN SATURATION: 95 % | TEMPERATURE: 97.1 F | DIASTOLIC BLOOD PRESSURE: 86 MMHG | SYSTOLIC BLOOD PRESSURE: 140 MMHG

## 2021-09-13 DIAGNOSIS — Z15.09 BRCA1 POSITIVE: Primary | ICD-10-CM

## 2021-09-13 DIAGNOSIS — Z15.01 BRCA1 POSITIVE: Primary | ICD-10-CM

## 2021-09-13 DIAGNOSIS — M31.31 WEGENER'S GRANULOMATOSIS WITH RENAL INVOLVEMENT (HCC): ICD-10-CM

## 2021-09-13 DIAGNOSIS — Z91.89 INCREASED RISK OF BREAST CANCER: ICD-10-CM

## 2021-09-13 PROCEDURE — 99213 OFFICE O/P EST LOW 20 MIN: CPT | Performed by: NURSE PRACTITIONER

## 2021-09-13 NOTE — PROGRESS NOTES
Surgical Oncology Follow Up       [facility]  [facility] SUR[facility]ICAL [facility] [address] [address]    Monica George  1955  [id number]      Chief Complaint   Patient presents with    Follow-up       Assessment/Plan:  1  BRCA1 positive  - BUN; Future  - Creatinine, serum; Future  - MRI breast bilateral w and wo contrast w cad; Future  - 6 mo f/u visit with Dr Amos Jesus    2  Increased risk of breast cancer    - BUN; Future  - Creatinine, serum; Future  - MRI breast bilateral w and wo contrast w cad; Future    3  Wegener's granulomatosis with renal involvement ([address] )  - f/u with Rheumatologist regarding recent breast biopsy results     Discussion/Summary: Patient is a 25-year-old female who presents today for a 6 month follow-up visit for an increased risk of breast cancer due to BRCA1 genetic mutation  She has a personal history of ovarian cancer  We have been following her with annual mammography and annual automated breast ultrasound as she did not believe that she could tolerate positioning for a breast MRI  Patient had an abnormal screening mammogram which led to diagnostic imaging of the right breast and a right breast biopsy  This revealed fibroadipose tissue with necrotizing granulomata and vasculitis  Patient has a history of Juan's granulomatosis  I encouraged her to follow up with her rheumatologist in regards to these results  Her pathology was concordant with her imaging and the recommendation was to return to routine screening  There are no concerns on her breast exam today  Patient states that she would like to attempt a breast MRI as she was able to tolerate the positioning for the stereotactic biopsy  I will arrange this for January and we will plan to see the patient back in approximately 6 months or sooner should the need arise  She knows to contact us with any changes on self-breast exam or any concerns in the interim    All of her questions were answered today  History of Present Illness:     -Interval History:   Patient had a bilateral 3D screening mammogram in July of 2021 which revealed a focal asymmetry in the right breast   She underwent diagnostic imaging which revealed a developing asymmetry with associated calcifications at the 12 o'clock position  A biopsy was performed which revealed fibroadipose tissue with necrotizing granulomata and vasculitis  Patient has a history of Wegener granulomatosis  There was not evidence of breast parenchyma  The biopsy was successful but the clip migrated 4 5 cm inferiorly  This was concordant, recommendation was to return to routine screening  Patient had not appreciated any changes on her self breast exam     Review of Systems:  Review of Systems   Constitutional: Negative for chills, fatigue and fever  Respiratory: Negative for cough and shortness of breath  Cardiovascular: Negative for chest pain  Musculoskeletal:        Ambulates with walker     Hematological: Negative for adenopathy  Psychiatric/Behavioral: Negative for confusion         Patient Active Problem List   Diagnosis    Wegener's granulomatosis with renal involvement ([facility] )    Charcot's joint    Anxiety    Benign essential hypertension    BRCA1 positive    Cancer of ovary (HCC)    Chronic kidney disease, stage III (moderate) (HCC)    Chronic pain disorder    Class 2 obesity    Depression    Dyslipidemia    GERD (gastroesophageal reflux disease)    Lymphedema    Opiate analgesic use agreement exists    Ovarian cancer ([facility] )    Post-traumatic osteoarthritis    Increased frequency of urination    Other complicated headache syndrome    Nausea & vomiting    SIRS (systemic inflammatory response syndrome) (HCC)    Increased risk of breast cancer    CAP (community acquired pneumonia)    Elevated troponin    Mitral valve disorder    Postnasal drip    Dyspnea on exertion    LVH (left ventricular hypertrophy)    Low immunoglobulin level    Hypertrophic cardiomyopathy (HCC)    Acute respiratory failure with hypoxia (HCC)    Hypogammaglobulinemia, acquired ([facility]    Morbid obesity (HCC)    Severe sepsis (HCC)    Chronic right-sided low back pain without sciatica    Cellulitis of right lower extremity    Slow transit constipation     Past Medical History:   Diagnosis Date    BRCA1 positive     Cancer ([facility] )     ovarian Ca with chemo    History of chemotherapy     ovarian cancer     History of pulmonary embolus (PE) & DVT 2007    post-op FAMILIA/ omenectomy    Lymphedema     MRSA (methicillin resistant Staphylococcus aureus) 2017    nasal swab negative 4/3/19    Ovarian cancer ([facility] )     CYST REMOVED RIGHT OVARY IN   HYSTERECTOMY 07       Past Surgical History:   Procedure Laterality Date    APPENDECTOMY      laparoscopic    BILATERAL SALPINGOOPHORECTOMY  2007    BREAST BIOPSY Right 2016    BREAST BIOPSY Left 07/15/2020    BREAST BIOPSY Left 2021    stereo     SECTION      HYSTERECTOMY  2007    Omentectomy and [facility] node [facility]     MAMMO STEREOTACTIC BREAST BIOPSY RIGHT (ALL INC) Right 2021    OTHER SURGICAL HISTORY      right lower extremity due to trauma    VT TREAT TIBIAL SHAFT FX, INTRAMED IMPLANT Right 2017    Procedure: INSERTION NAIL IM TIBIA;  Surgeon: Carlene Wiley MD;  Location: [facility];  Service: Orthopedics    US GUIDANCE BREAST BIOPSY LEFT EACH ADDITIONAL Left 7/15/2020    US GUIDED BREAST BIOPSY LEFT COMPLETE Left 7/15/2020    US GUIDED BREAST BIOPSY RIGHT COMPLETE Right 2016    US GUIDED BREAST BIOPSY RIGHT COMPLETE Right 2021     Family History   Problem Relation Age of Onset    Breast cancer Mother         28    Pancreatic cancer Mother     Ovarian cancer Maternal Grandmother     Breast cancer Maternal Aunt 39    No Known Problems Paternal Aunt     No Known Problems Father     No Known Problems Maternal Grandfather     No Known Problems Paternal Grandmother     No Known Problems Paternal Grandfather     Lung cancer Half-Sister      Social History     Socioeconomic History    Marital status: /Civil Union     Spouse name: Not on file    Number of children: Not on file    Years of education: Not on file    Highest education level: Not on file   Occupational History    Not on file   Tobacco Use    Smoking status: Never Smoker    Smokeless tobacco: Never Used   Vaping Use    Vaping Use: Never used   Substance and Sexual Activity    Alcohol use: Yes    Drug use: Not Currently    Sexual activity: Yes     Partners: Male     Birth control/protection: None   Other Topics Concern    Not on file   Social History Narrative    Not on file     Social Determinants of Health     Financial Resource Strain:     Difficulty of Paying Living Expenses:    Food Insecurity:     Worried About Running Out of Food in the Last Year:     [address] in the Last Year:    Transportation Needs:     Lack of Transportation (Medical):      Lack of Transportation (Non-Medical):    Physical Activity:     Days of Exercise per Week:     Minutes of Exercise per Session:    Stress:     Feeling of Stress :    Social Connections:     Frequency of Communication with Friends and Family:     Frequency of Social Gatherings with Friends and Family:     Attends Congregation Services:     Active Member of Clubs or Organizations:     Attends Club or Organization Meetings:     Marital Status:    Intimate Partner Violence:     Fear of Current or Ex-Partner:     Emotionally Abused:     Physically Abused:     Sexually Abused:        Current Outpatient Medications:     acetaminophen (TYLENOL) 325 mg tablet, Take 2 tablets by mouth every 6 (six) hours as needed for mild pain (Patient taking differently: Take 650 mg by mouth every 6 (six) hours as needed for mild pain ), Disp: 30 tablet, Rfl: 0    bisacodyl (DULCOLAX) 5 mg EC tablet, Take 5 mg by mouth daily as needed for constipation , Disp: , Rfl:     Calcium Carbonate-Vit D-Min (CALCIUM 1200 PO), Take 3 tablets by mouth daily , Disp: , Rfl:     DULoxetine (CYMBALTA) 60 mg delayed release capsule, Take 60 mg by mouth daily  , Disp: , Rfl: 0    LORazepam (ATIVAN) 0 5 mg tablet, Take 1 tablet as needed every 8 hours  (Patient taking differently: Take 0 5 mg by mouth 2 (two) times a day Take 1 tablet as needed every 8 hours  ), Disp: 30 tablet, Rfl: 0    metoprolol tartrate (LOPRESSOR) 25 mg tablet, TAKE 1/2 TABLET(12 5MG) BY MOUTH EVERY 12 HOURS, Disp: 90 tablet, Rfl: 3    Multiple Vitamins-Minerals (MULTIVITAMIN ADULT PO), Take 1 capsule by mouth daily , Disp: , Rfl:     omeprazole (PriLOSEC) 20 mg delayed release capsule, Take 20 mg by mouth daily  , Disp: , Rfl:     oxyCODONE (OxyCONTIN) 40 mg 12 hr tablet, Take 40 mg by mouth every 12 (twelve) hours, Disp: , Rfl:     OXYCODONE HCL PO, Take 15 mg by mouth 2 (two) times a day as needed , Disp: , Rfl:     TOVIAZ 8 MG TB24, Take 8 mg by mouth daily at bedtime , Disp: , Rfl:     furosemide (LASIX) 20 mg tablet, Take 1 tablet (20 mg total) by mouth daily (Patient not taking: Reported on 3/12/2021), Disp: 90 tablet, Rfl: 3    ondansetron (ZOFRAN) 4 mg tablet, Take 1 tablet (4 mg total) by mouth every 8 (eight) hours as needed for nausea or vomiting (Patient not taking: Reported on 6/4/2021), Disp: 20 tablet, Rfl: 0    polyethylene glycol (MIRALAX) 17 g packet, Take 17 g by mouth as needed  (Patient not taking: Reported on 7/6/2021), Disp: , Rfl:   Allergies   Allergen Reactions    Iodinated Diagnostic Agents Shortness Of Breath    Omnipaque [Iohexol] Shortness Of Breath    Other Shortness Of Breath     IVP dye, x ray dye 3    Iodides      Other reaction(s): SWELLING, SOB    Methotrexate Nausea Only     Headache and nausea    Methotrexate Derivatives Headache     Vitals:    09/13/21 1003   BP: 140/86 Pulse: 79   Resp: 18   Temp: (!) 97 1 °F (36 2 °C)   SpO2: 95%       Physical Exam  Constitutional:       Appearance: Normal appearance  HENT:      Head: Normocephalic and atraumatic  Pulmonary:      Effort: Pulmonary effort is normal    Chest:      Breasts:         Right: Skin change (  Well-healed biopsy site 12 o'clock position) present  No swelling, bleeding, inverted nipple, mass, nipple discharge or tenderness  Left: No swelling, bleeding, inverted nipple, mass, nipple discharge, skin change or tenderness  Lymphadenopathy:      Upper Body:      Right upper body: No supraclavicular or axillary adenopathy  Left upper body: No supraclavicular or axillary adenopathy  Neurological:      General: No focal deficit present  Mental Status: Shelley Damon is alert and oriented to person, place, and time  Psychiatric:         Mood and Affect: Mood normal            Results:  Labs   Tissue Exam  Edited Result - FINAL 7/26/2021   Final Diagnosis   A  Right breast, with calcifications,  stereotactic biopsy x 3:  - Fibroadipose tissue with necrotizing granulomata and focal leukocytoclastic vasculitis  - Calcifications are present in granulomata  - Negative for malignancy  - Special stains for fungus (GMS, PAS-F) and acid fast bacteria (TBC) are negative for organisms          Note: patient's medical history of Wegener granulomatosis (Granulomatosis with polyangiitis) with recent negative serology is noted  Sections show focal perivascular chronic inflammation, necrotizing granulomata and very focal leukocytoclastic vasculitis  The findings are non-specific, but can be related with Granulomatosis with polyangiitis in appropriated clinical settings  Clinical (including recent procedure site changes, treatment history) and serology correlation is necessary  There is no breast parenchyma seen    CD68 highlights histiocytes which are negative for AE1/3, desmin        Additional histologic levels were examined  Ada see the concomitant case ([id number]) for additional information                   Tissue Exam: [id number]  Order: [id number]  Collected:  7/26/2021  2:21 PM   Status:  Final result   Visible to patient:  Yes ([facility])   Dx:  Abnormal mammogram   0 Result Notes  Component    Case Report   Surgical Pathology Report                         Case: [id number]                                    Authorizing Provider: Raad Hayward MD           Collected:           07/26/2021 1421               Ordering Location:     On license of [facility] Breast Received:            07/26/2021 1621                                      Center                                                                        Pathologist:           Gina Yap MD                                                         Specimen:    Breast, Right, US BX RT BREAST 1200 10CMFN 3 PASSES 12G JESSICA                            Final Diagnosis   A  Breast, right at 12:00 o'clock 10 cm from nipple, ultrasound-guided biopsy (3 passes):  -  Benign adipose tissue without breast epithelial sampling   -  Calcifications are not identified on initial or deeper sections  -  A very small focus of perivascular mixed lymphocytic inflammation is present, cannot exclude a limited component of vasculitis  Advance Care Planning/Advance Directives:  Discussed disease status and treatment goals with the patient

## 2021-11-05 ENCOUNTER — TELEPHONE (OUTPATIENT)
Dept: HEMATOLOGY ONCOLOGY | Facility: MEDICAL CENTER | Age: 66
End: 2021-11-05

## 2021-12-13 ENCOUNTER — IMMUNIZATIONS (OUTPATIENT)
Dept: FAMILY MEDICINE CLINIC | Facility: HOSPITAL | Age: 66
End: 2021-12-13

## 2021-12-13 DIAGNOSIS — Z23 ENCOUNTER FOR IMMUNIZATION: Primary | ICD-10-CM

## 2021-12-13 PROCEDURE — 0001A COVID-19 PFIZER VACC 0.3 ML: CPT

## 2021-12-13 PROCEDURE — 91300 COVID-19 PFIZER VACC 0.3 ML: CPT

## 2021-12-16 ENCOUNTER — TELEPHONE (OUTPATIENT)
Dept: HEMATOLOGY ONCOLOGY | Facility: CLINIC | Age: 66
End: 2021-12-16

## 2021-12-17 ENCOUNTER — TELEPHONE (OUTPATIENT)
Dept: HEMATOLOGY ONCOLOGY | Facility: CLINIC | Age: 66
End: 2021-12-17

## 2021-12-21 ENCOUNTER — OFFICE VISIT (OUTPATIENT)
Dept: HEMATOLOGY ONCOLOGY | Facility: CLINIC | Age: 66
End: 2021-12-21
Payer: MEDICARE

## 2021-12-21 VITALS
HEIGHT: 63 IN | BODY MASS INDEX: 39.98 KG/M2 | SYSTOLIC BLOOD PRESSURE: 130 MMHG | OXYGEN SATURATION: 98 % | RESPIRATION RATE: 16 BRPM | HEART RATE: 66 BPM | DIASTOLIC BLOOD PRESSURE: 90 MMHG | TEMPERATURE: 98.1 F

## 2021-12-21 DIAGNOSIS — D80.1 HYPOGAMMAGLOBULINEMIA, ACQUIRED (HCC): Primary | ICD-10-CM

## 2021-12-21 PROCEDURE — 99214 OFFICE O/P EST MOD 30 MIN: CPT | Performed by: INTERNAL MEDICINE

## 2022-01-25 ENCOUNTER — TELEPHONE (OUTPATIENT)
Dept: HEMATOLOGY ONCOLOGY | Facility: CLINIC | Age: 67
End: 2022-01-25

## 2022-01-25 NOTE — TELEPHONE ENCOUNTER
Kuldip Garrison is calling asking to scheduled for a different test of her breast instead of the MRI due to her disability & she stated that she will have trouble getting on the MRI table

## 2022-01-26 DIAGNOSIS — Z12.39 ENCOUNTER FOR BREAST CANCER SCREENING OTHER THAN MAMMOGRAM: Primary | ICD-10-CM

## 2022-01-26 NOTE — TELEPHONE ENCOUNTER
Spoke with patient, patient feels that she will not be able to undergo the postioning of the breast MRI  ABUS ordered by Juan Miguel Cavazos NP  Patient made aware she needs to cancel the MRI and schedule the ABUS, phone call transferred to central scheduling  I will make sure follow up appointment is after the ABUS

## 2022-01-26 NOTE — TELEPHONE ENCOUNTER
I wasn't in yesterday and this was sent to me    Her MRI is scheduled for 1/29/22 & appt with Dr Meggan Swann on 2/4/22

## 2022-02-09 ENCOUNTER — TELEPHONE (OUTPATIENT)
Dept: HEMATOLOGY ONCOLOGY | Facility: CLINIC | Age: 67
End: 2022-02-09

## 2022-02-09 NOTE — TELEPHONE ENCOUNTER
Patient called in to reschedule her appointment on 2/18 at 10:45 with Dr Jefferson Naik   Patient is now scheduled on 3/18 at 10am with Dr Jefferson Naik

## 2022-03-04 ENCOUNTER — TELEPHONE (OUTPATIENT)
Dept: HEMATOLOGY ONCOLOGY | Facility: CLINIC | Age: 67
End: 2022-03-04

## 2022-03-04 NOTE — TELEPHONE ENCOUNTER
Appointment Cancellation Or Reschedule     Person calling in Patient    Provider Dr Henry Perez   Office Visit Date and Time  3/18/22 at 10    Office Visit Location [facility]   Did patient want to reschedule their office appointment? If so, when was it scheduled to? Yes    4/1/22 at 9    Is this patient calling to reschedule an infusion appointment? No   When is their next infusion appointment? N/a   Is this patient a Chemo patient? No   Reason for Cancellation or Reschedule Wants appointment after ultrasound      If the patient is a treatment patient, please route this to the office nurse  If the patient is not on treatment, please route to the office MA

## 2022-03-30 ENCOUNTER — TELEPHONE (OUTPATIENT)
Dept: HEMATOLOGY ONCOLOGY | Facility: CLINIC | Age: 67
End: 2022-03-30

## 2022-03-30 NOTE — TELEPHONE ENCOUNTER
Appointment Cancellation Or Reschedule     Person calling in Patient    Provider Dr Thomas Notice   Office Visit Date and Time 4/1/22 at 9    Office Visit Location [facility]   Did patient want to reschedule their office appointment? If so, when was it scheduled to? Yes    5/6/22 at 11   Is this patient calling to reschedule an infusion appointment? No   When is their next infusion appointment? N/a   Is this patient a Chemo patient? No   Reason for Cancellation or Reschedule Needs to complete Ultrasound      If the patient is a treatment patient, please route this to the office nurse  If the patient is not on treatment, please route to the office MA

## 2022-04-06 NOTE — PROGRESS NOTES
Physical Therapy  Name: Murtaza Lerner  MRN:  [id number]  Date of service:  4/6/2022 04/06/22 1427   Restrictions/Precautions   Restrictions/Precautions Fall Risk;Contact Precautions   Subjective   Subjective Pt in bed and agreed to therapy. Pain Screening   Patient Currently in Pain No   Bed Mobility   Supine to Sit Moderate assistance;Maximal assistance   Transfers   Sit to Stand Moderate Assistance;2 Person Assistance   Stand to sit Moderate Assistance;2 Person Assistance   Comment stood in SS to transfer to stretcher   Short term goals   Time Frame for Short term goals 14 DAYS   Short term goal 1 BED MOB MIN ASSIST   Short term goal 2 SIT TO STAND MIN ASSIST   Short term goal 3 BED TO CHAIR MIN ASSIST   Conditions Requiring Skilled Therapeutic Intervention   Body structures, Functions, Activity limitations Decreased functional mobility ; Decreased ADL status; Decreased ROM; Decreased strength;Decreased safe awareness;Decreased balance; Increased pain;Decreased posture   Assessment Pt demostrated some R lateral lean in sitting and standing. Pt worked on standing and used SS to transfer to stretcher for transport. Pt with transport to go to CT scan.    Activity Tolerance   Activity Tolerance Patient limited by fatigue;Patient limited by endurance   Safety Devices   Type of devices   (pt with transport to go to CT)       Electronically signed by Eddie Parisi PTA on 4/6/2022 at 2:33 PM Progress Note - Ana Luisa Dukes 1955, 75 y o  female MRN: [id number]    Unit/Bed#: ICU 07 Encounter: [id number]    Primary Care Provider: Severiano Sierras, DO   Date and time admitted to hospital: 11/7/2019  4:42 AM        * Severe sepsis ([facility] )  Assessment & Plan  · Suspect secondary to community acquired multilobar pneumonia  · 1 of 2 blood cultures preliminarily positive for GPC in chains  The 2nd set is pending  Continue cefepime and vancomycin empirically for now while awaiting final cultures  · Bronchoscopy was performed yesterday  No evidence of hemorrhage which was a concern in the setting of Juan's history  BAL cultures are pending  · Influenza is negative  · Hemodynamics have been stable    Acute metabolic encephalopathy  Assessment & Plan  · This was likely secondary to fever and sepsis  Now significantly improved  Mental status appears essentially back to baseline  Acute respiratory failure with hypoxia (HCC)  Assessment & Plan  · Secondary to multilobar pneumonia  · Wean oxygen as tolerated to maintian saturation >90%  · Aggressive pulmonary toileting  Encourage cough and deep breathing  Use incentive spirometer  RUCHI (acute kidney injury) ([facility] )  Assessment & Plan  · In the setting of sepsis  Baseline Cr approximately 1 0-1 2  Creatinine is slightly worse this am, up to 1 4  Urine output is averaging 30-40mL/hr  · Continue to trend renal indices and monitor intake and output  Hypertrophic cardiomyopathy (HCC)  Assessment & Plan  · Last ECHO shows EF 75% with grade 1 diastolic dysfunction and hypertrophy of interventricular septum  · Hold home lasix in the setting of sepsis     Lymphedema  Assessment & Plan  · Chronic lymphedema of b/l LE  · No evidence of cellulitis  · Venous duplex was performed without evidence of DVT however the study was extremely limited due to patient not allowing the pressure to be put on her legs with the u/s probe        HPI/24hr events: Afebrile  No acute events overnight  Medications:    Current Facility-Administered Medications:  acetaminophen 650 mg Oral Q6H PRN RENETTA Jenkins    benzocaine 2 spray Mucosal Once Adilene Childress MD    cefepime 2,000 mg Intravenous Q12H RENETTA Warner Last Rate: Stopped (11/08/19 0615)   DULoxetine 60 mg Oral Daily Sharmin K RENETTA Turner    heparin (porcine) 5,000 Units Subcutaneous Q8H [address] RENETTA Alvarez    lidocaine 1 application Urethral Once Adilene Childress MD    LORazepam 0 5 mg Oral BID RENETTA Jenkins    ondansetron 4 mg Intravenous Q4H PRN Booker Ching Spatzer, CRNP    oxyCODONE 40 mg Oral Q12H [address] RENETTA Jenkins    oxyCODONE 15 mg Oral Q6H PRN RENETTA Jenkins    sodium chloride (PF) 3 mL Intravenous PRN RENETTA Warner    sodium chloride 100 mL/hr Intravenous Continuous Shaheed StapletonRENETTA jansen Last Rate: 100 mL/hr (11/08/19 0318)   vancomycin 10 mg/kg (Adjusted) Intravenous Q12H RENETTA Alvarez Last Rate: Stopped (11/07/19 2055)         sodium chloride 100 mL/hr Last Rate: 100 mL/hr (11/08/19 0318)         Physical exam:  Vitals: Body mass index is 43 67 kg/m²  Blood pressure (!) 86/45, pulse (!) 108, temperature (!) 97 °F (36 1 °C), temperature source Temporal, resp  rate (!) 29, height 5' 4" (1 626 m), weight 115 kg (254 lb 6 6 oz), SpO2 96 %  ,  Temp  Min: 97 °F (36 1 °C)  Max: 102 9 °F (39 4 °C)  IBW: 54 7 kg    SpO2: 96 %  SpO2 Activity: At Rest  O2 Device: Nasal cannula      Intake/Output Summary (Last 24 hours) at 11/8/2019 0645  Last data filed at 11/8/2019 0615  Gross per 24 hour   Intake 3200 ml   Output 815 ml   Net 2385 ml       Invasive/non-invasive ventilation settings:   Respiratory    Lab Data (Last 4 hours)    None         O2/Vent Data (Last 4 hours)    None              Invasive Devices     Peripheral Intravenous Line            Peripheral IV 11/07/19 Left Antecubital 1 day    Peripheral IV 11/07/19 Left Hand 1 day          Drain External Urinary Catheter less than 1 day                  Physical Exam:  Gen: Sleeping but easily arousable, no acute distress  HEENT: atraumatic, normocephalic, EOMI, pupils 3mm equal and reactive, o/p clear  Neck: supple, trachea midline, no JVD, no lymphadenopathy  Chest: diminished, otherwise clear  Cor: Single S1/S2, no m/r/g, regular rhythm, tachycardic  Abd: obese, soft, nontender, nondistended, BS normoactive  Ext: chronic RLE edema, no clubbing or cyanosis  Neuro: oriented x3, CN 2-12 grossly intact, no focal deficits  Skin: warm, dry      Diagnostic Data:  Lab: I have personally reviewed pertinent lab results  CBC:   Results from last 7 days   Lab Units 11/08/19  0401 11/07/19  1028 11/07/19  0452   WBC Thousand/uL 31 40* 25 77* 11 30*   HEMOGLOBIN g/dL 12 9 12 9 15 3   HEMATOCRIT % 40 8 42 0 48 2*   PLATELETS Thousands/uL 174 192 209       CMP:   Results from last 7 days   Lab Units 11/08/19  0401 11/07/19  0452   SODIUM mmol/L 138 140   POTASSIUM mmol/L 3 8 3 5   CHLORIDE mmol/L 106 100   CO2 mmol/L 21 27   BUN mg/dL 24 22   CREATININE mg/dL 1 40* 1 38*   CALCIUM mg/dL 8 0* 9 3   ALK PHOS U/L  --  208*   ALT U/L  --  43   AST U/L  --  65*     PT/INR:   No results found for: PT, INR,   Magnesium:     Phosphorous:       Microbiology:  Results from last 7 days   Lab Units 11/07/19  1705 11/07/19  0520 11/07/19  0457   BLOOD CULTURE   --  Received in Microbiology Lab  Culture in Progress  Received in Microbiology Lab  Culture in Progress  GRAM STAIN RESULT  No Polys or Bacteria seen  --  Gram positive cocci in chains*       Imaging:  VAS lower extremity duplex - RIGHT LOWER LIMB:  Evaluation with color flow imaging only suggests patency of the common femoral  vein, visualized portions of the femoral vein and posterior tibial veins  The peroneal veins could not be identified, and patient refused examination of  the popliteal vein  No evidence of superficial thrombophlebitis noted    Doppler evaluation shows a normal response to augmentation maneuvers  Posterior tibial arterial Doppler waveforms are monophasic  LEFT LOWER LIMB LIMITED:  Refused by patient  Tech Note: This study was very difficult/limited due to lymph edema and a  patient who only allowed minimal evaluation with color flow imaging, and would  not allow any probe pressure on her leg  Cardiac lab/EKG/telemetry/ECHO:   Sinus tachycardia    VTE Prophylaxis: Heparin, SCDs    Code Status: Level 1 - Full Code    Morey Catchings Spatzer, CRNP    Portions of the record may have been created with voice recognition software  Occasional wrong word or "sound a like" substitutions may have occurred due to the inherent limitations of voice recognition software  Read the chart carefully and recognize, using context, where substitutions have occurred

## 2022-04-21 ENCOUNTER — HOSPITAL ENCOUNTER (OUTPATIENT)
Dept: ULTRASOUND IMAGING | Facility: CLINIC | Age: 67
Discharge: HOME | End: 2022-04-21
Payer: MEDICARE

## 2022-04-21 VITALS — BODY MASS INDEX: 39.87 KG/M2 | HEIGHT: 63 IN | WEIGHT: 225 LBS

## 2022-04-21 DIAGNOSIS — Z12.39 ENCOUNTER FOR BREAST CANCER SCREENING OTHER THAN MAMMOGRAM: ICD-10-CM

## 2022-04-21 PROCEDURE — 76641 ULTRASOUND BREAST COMPLETE: CPT

## 2022-04-27 ENCOUNTER — APPOINTMENT (EMERGENCY)
Dept: CT IMAGING | Facility: HOSPITAL | Age: 67
DRG: 871 | End: 2022-04-27
Payer: MEDICARE

## 2022-04-27 ENCOUNTER — APPOINTMENT (EMERGENCY)
Dept: RADIOLOGY | Facility: HOSPITAL | Age: 67
DRG: 871 | End: 2022-04-27
Payer: MEDICARE

## 2022-04-27 ENCOUNTER — HOSPITAL ENCOUNTER (INPATIENT)
Facility: HOSPITAL | Age: 67
LOS: 5 days | Discharge: HOME HEALTH | DRG: 871 | End: 2022-05-03
Attending: EMERGENCY MEDICINE | Admitting: INTERNAL MEDICINE
Payer: MEDICARE

## 2022-04-27 DIAGNOSIS — G93.40 ACUTE ENCEPHALOPATHY: ICD-10-CM

## 2022-04-27 DIAGNOSIS — R09.02 HYPOXIA: ICD-10-CM

## 2022-04-27 DIAGNOSIS — R77.8 ELEVATED TROPONIN: ICD-10-CM

## 2022-04-27 DIAGNOSIS — M79.89 RIGHT LEG SWELLING: ICD-10-CM

## 2022-04-27 DIAGNOSIS — K59.01 SLOW TRANSIT CONSTIPATION: ICD-10-CM

## 2022-04-27 DIAGNOSIS — F41.9 ANXIETY: ICD-10-CM

## 2022-04-27 DIAGNOSIS — E87.2 METABOLIC ACIDOSIS: ICD-10-CM

## 2022-04-27 DIAGNOSIS — L03.115 CELLULITIS OF RIGHT LEG: ICD-10-CM

## 2022-04-27 DIAGNOSIS — I89.0 LYMPHEDEMA: ICD-10-CM

## 2022-04-27 DIAGNOSIS — R50.9 FEVER: ICD-10-CM

## 2022-04-27 DIAGNOSIS — R79.89 ELEVATED D-DIMER: ICD-10-CM

## 2022-04-27 DIAGNOSIS — N17.9 AKI (ACUTE KIDNEY INJURY) (HCC): ICD-10-CM

## 2022-04-27 DIAGNOSIS — A41.9 SEVERE SEPSIS (HCC): ICD-10-CM

## 2022-04-27 DIAGNOSIS — R65.20 SEVERE SEPSIS (HCC): ICD-10-CM

## 2022-04-27 DIAGNOSIS — J18.9 PNEUMONIA: ICD-10-CM

## 2022-04-27 DIAGNOSIS — L03.115 CELLULITIS OF RIGHT LOWER EXTREMITY: Primary | ICD-10-CM

## 2022-04-27 LAB
2HR DELTA HS TROPONIN: 38 NG/L
ALBUMIN SERPL BCP-MCNC: 3.3 G/DL (ref 3.5–5)
ALP SERPL-CCNC: 174 U/L (ref 46–116)
ALT SERPL W P-5'-P-CCNC: 43 U/L (ref 12–78)
ANION GAP SERPL CALCULATED.3IONS-SCNC: 12 MMOL/L (ref 4–13)
APTT PPP: 28 SECONDS (ref 23–37)
AST SERPL W P-5'-P-CCNC: 70 U/L (ref 5–45)
BASOPHILS # BLD MANUAL: 0 THOUSAND/UL (ref 0–0.1)
BASOPHILS NFR MAR MANUAL: 0 % (ref 0–1)
BILIRUB SERPL-MCNC: 0.89 MG/DL (ref 0.2–1)
BILIRUB UR QL STRIP: NEGATIVE
BUN SERPL-MCNC: 22 MG/DL (ref 5–25)
CALCIUM ALBUM COR SERPL-MCNC: 9.4 MG/DL (ref 8.3–10.1)
CALCIUM SERPL-MCNC: 8.8 MG/DL (ref 8.3–10.1)
CARDIAC TROPONIN I PNL SERPL HS: 216 NG/L
CARDIAC TROPONIN I PNL SERPL HS: 254 NG/L
CHLORIDE SERPL-SCNC: 102 MMOL/L (ref 100–108)
CLARITY UR: CLEAR
CO2 SERPL-SCNC: 26 MMOL/L (ref 21–32)
COLOR UR: YELLOW
CREAT SERPL-MCNC: 1.5 MG/DL (ref 0.6–1.3)
EOSINOPHIL # BLD MANUAL: 0 THOUSAND/UL (ref 0–0.4)
EOSINOPHIL NFR BLD MANUAL: 0 % (ref 0–6)
ERYTHROCYTE [DISTWIDTH] IN BLOOD BY AUTOMATED COUNT: 13.4 % (ref 11.6–15.1)
FLUAV RNA RESP QL NAA+PROBE: NEGATIVE
FLUBV RNA RESP QL NAA+PROBE: NEGATIVE
GLUCOSE SERPL-MCNC: 140 MG/DL (ref 65–140)
GLUCOSE UR STRIP-MCNC: NEGATIVE MG/DL
HCT VFR BLD AUTO: 44.2 % (ref 36.5–46.1)
HGB BLD-MCNC: 14 G/DL (ref 12–15.4)
HGB UR QL STRIP.AUTO: NEGATIVE
INR PPP: 1.16 (ref 0.84–1.19)
KETONES UR STRIP-MCNC: NEGATIVE MG/DL
LACTATE SERPL-SCNC: 5.2 MMOL/L (ref 0.5–2)
LACTATE SERPL-SCNC: 6.2 MMOL/L (ref 0.5–2)
LEUKOCYTE ESTERASE UR QL STRIP: NEGATIVE
LYMPHOCYTES # BLD AUTO: 0.31 THOUSAND/UL (ref 0.6–4.47)
LYMPHOCYTES # BLD AUTO: 3 % (ref 14–44)
MCH RBC QN AUTO: 30.6 PG (ref 26.8–34.3)
MCHC RBC AUTO-ENTMCNC: 31.7 G/DL (ref 31.4–37.4)
MCV RBC AUTO: 97 FL (ref 82–98)
MONOCYTES # BLD AUTO: 0.31 THOUSAND/UL (ref 0–1.22)
MONOCYTES NFR BLD: 3 % (ref 4–12)
NEUTROPHILS # BLD MANUAL: 9.65 THOUSAND/UL (ref 1.85–7.62)
NEUTS BAND NFR BLD MANUAL: 10 % (ref 0–8)
NEUTS SEG NFR BLD AUTO: 84 % (ref 43–75)
NITRITE UR QL STRIP: NEGATIVE
NT-PROBNP SERPL-MCNC: 2187 PG/ML
PH UR STRIP.AUTO: 5.5 [PH]
PLATELET # BLD AUTO: 207 THOUSANDS/UL (ref 149–390)
PLATELET BLD QL SMEAR: ADEQUATE
PMV BLD AUTO: 9.7 FL (ref 8.9–12.7)
POTASSIUM SERPL-SCNC: 4 MMOL/L (ref 3.5–5.3)
PROCALCITONIN SERPL-MCNC: 75.41 NG/ML
PROT SERPL-MCNC: 6.6 G/DL (ref 6.4–8.2)
PROT UR STRIP-MCNC: NEGATIVE MG/DL
PROTHROMBIN TIME: 14.6 SECONDS (ref 11.6–14.5)
RBC # BLD AUTO: 4.57 MILLION/UL (ref 3.88–5.12)
RBC MORPH BLD: NORMAL
RSV RNA RESP QL NAA+PROBE: NEGATIVE
SARS-COV-2 RNA RESP QL NAA+PROBE: NEGATIVE
SODIUM SERPL-SCNC: 140 MMOL/L (ref 136–145)
SP GR UR STRIP.AUTO: 1.02 (ref 1–1.03)
UROBILINOGEN UR QL STRIP.AUTO: 0.2 E.U./DL
WBC # BLD AUTO: 10.27 THOUSAND/UL (ref 4.31–10.16)

## 2022-04-27 PROCEDURE — 93005 ELECTROCARDIOGRAM TRACING: CPT

## 2022-04-27 PROCEDURE — 96365 THER/PROPH/DIAG IV INF INIT: CPT

## 2022-04-27 PROCEDURE — 96361 HYDRATE IV INFUSION ADD-ON: CPT

## 2022-04-27 PROCEDURE — 84484 ASSAY OF TROPONIN QUANT: CPT | Performed by: EMERGENCY MEDICINE

## 2022-04-27 PROCEDURE — 0241U HB NFCT DS VIR RESP RNA 4 TRGT: CPT | Performed by: EMERGENCY MEDICINE

## 2022-04-27 PROCEDURE — 70450 CT HEAD/BRAIN W/O DYE: CPT

## 2022-04-27 PROCEDURE — 99285 EMERGENCY DEPT VISIT HI MDM: CPT | Performed by: EMERGENCY MEDICINE

## 2022-04-27 PROCEDURE — 85730 THROMBOPLASTIN TIME PARTIAL: CPT | Performed by: EMERGENCY MEDICINE

## 2022-04-27 PROCEDURE — 85007 BL SMEAR W/DIFF WBC COUNT: CPT | Performed by: EMERGENCY MEDICINE

## 2022-04-27 PROCEDURE — 81003 URINALYSIS AUTO W/O SCOPE: CPT | Performed by: EMERGENCY MEDICINE

## 2022-04-27 PROCEDURE — 83605 ASSAY OF LACTIC ACID: CPT | Performed by: EMERGENCY MEDICINE

## 2022-04-27 PROCEDURE — 71045 X-RAY EXAM CHEST 1 VIEW: CPT

## 2022-04-27 PROCEDURE — 36415 COLL VENOUS BLD VENIPUNCTURE: CPT | Performed by: EMERGENCY MEDICINE

## 2022-04-27 PROCEDURE — 85025 COMPLETE CBC W/AUTO DIFF WBC: CPT | Performed by: EMERGENCY MEDICINE

## 2022-04-27 PROCEDURE — 83880 ASSAY OF NATRIURETIC PEPTIDE: CPT | Performed by: EMERGENCY MEDICINE

## 2022-04-27 PROCEDURE — 85027 COMPLETE CBC AUTOMATED: CPT | Performed by: EMERGENCY MEDICINE

## 2022-04-27 PROCEDURE — G1004 CDSM NDSC: HCPCS

## 2022-04-27 PROCEDURE — 96366 THER/PROPH/DIAG IV INF ADDON: CPT

## 2022-04-27 PROCEDURE — 80053 COMPREHEN METABOLIC PANEL: CPT | Performed by: EMERGENCY MEDICINE

## 2022-04-27 PROCEDURE — 84145 PROCALCITONIN (PCT): CPT | Performed by: EMERGENCY MEDICINE

## 2022-04-27 PROCEDURE — 99285 EMERGENCY DEPT VISIT HI MDM: CPT

## 2022-04-27 PROCEDURE — 85610 PROTHROMBIN TIME: CPT | Performed by: EMERGENCY MEDICINE

## 2022-04-27 PROCEDURE — 73700 CT LOWER EXTREMITY W/O DYE: CPT

## 2022-04-27 PROCEDURE — 74176 CT ABD & PELVIS W/O CONTRAST: CPT

## 2022-04-27 PROCEDURE — 87040 BLOOD CULTURE FOR BACTERIA: CPT | Performed by: EMERGENCY MEDICINE

## 2022-04-27 PROCEDURE — 96367 TX/PROPH/DG ADDL SEQ IV INF: CPT

## 2022-04-27 PROCEDURE — 71250 CT THORAX DX C-: CPT

## 2022-04-27 RX ORDER — ASPIRIN 300 MG/1
300 SUPPOSITORY RECTAL ONCE
Status: COMPLETED | OUTPATIENT
Start: 2022-04-27 | End: 2022-04-27

## 2022-04-27 RX ORDER — ACETAMINOPHEN 650 MG/1
650 SUPPOSITORY RECTAL ONCE
Status: COMPLETED | OUTPATIENT
Start: 2022-04-27 | End: 2022-04-27

## 2022-04-27 RX ORDER — SODIUM CHLORIDE 9 MG/ML
3 INJECTION INTRAVENOUS
Status: DISCONTINUED | OUTPATIENT
Start: 2022-04-27 | End: 2022-05-03 | Stop reason: HOSPADM

## 2022-04-27 RX ADMIN — ASPIRIN 300 MG: 300 SUPPOSITORY RECTAL at 23:18

## 2022-04-27 RX ADMIN — CEFEPIME HYDROCHLORIDE 2000 MG: 2 INJECTION, POWDER, FOR SOLUTION INTRAVENOUS at 20:39

## 2022-04-27 RX ADMIN — SODIUM CHLORIDE 1000 ML: 0.9 INJECTION, SOLUTION INTRAVENOUS at 20:39

## 2022-04-27 RX ADMIN — SODIUM CHLORIDE 1000 ML: 0.9 INJECTION, SOLUTION INTRAVENOUS at 23:08

## 2022-04-27 RX ADMIN — VANCOMYCIN HYDROCHLORIDE 1500 MG: 1 INJECTION, POWDER, LYOPHILIZED, FOR SOLUTION INTRAVENOUS at 21:15

## 2022-04-27 RX ADMIN — ACETAMINOPHEN 650 MG: 650 SUPPOSITORY RECTAL at 20:21

## 2022-04-28 ENCOUNTER — APPOINTMENT (INPATIENT)
Dept: NON INVASIVE DIAGNOSTICS | Facility: HOSPITAL | Age: 67
DRG: 871 | End: 2022-04-28
Payer: MEDICARE

## 2022-04-28 ENCOUNTER — APPOINTMENT (INPATIENT)
Dept: CT IMAGING | Facility: HOSPITAL | Age: 67
DRG: 871 | End: 2022-04-28
Payer: MEDICARE

## 2022-04-28 PROBLEM — Z86.711 HISTORY OF PULMONARY EMBOLISM: Chronic | Status: ACTIVE | Noted: 2022-04-28

## 2022-04-28 PROBLEM — I50.32 CHRONIC DIASTOLIC HEART FAILURE (HCC): Chronic | Status: ACTIVE | Noted: 2022-04-28

## 2022-04-28 LAB
4HR DELTA HS TROPONIN: 38 NG/L
ALBUMIN SERPL BCP-MCNC: 2.5 G/DL (ref 3.5–5)
ALP SERPL-CCNC: 86 U/L (ref 46–116)
ALT SERPL W P-5'-P-CCNC: 23 U/L (ref 12–78)
AMMONIA PLAS-SCNC: 10 UMOL/L (ref 11–35)
ANION GAP SERPL CALCULATED.3IONS-SCNC: 12 MMOL/L (ref 4–13)
ANION GAP SERPL CALCULATED.3IONS-SCNC: 9 MMOL/L (ref 4–13)
APAP SERPL-MCNC: <2 UG/ML (ref 10–20)
AST SERPL W P-5'-P-CCNC: 41 U/L (ref 5–45)
ATRIAL RATE: 112 BPM
ATRIAL RATE: 114 BPM
ATRIAL RATE: 132 BPM
BASE EX.OXY STD BLDV CALC-SCNC: 29.8 % (ref 60–80)
BASE EX.OXY STD BLDV CALC-SCNC: 57.2 % (ref 60–80)
BASE EXCESS BLDA CALC-SCNC: -3 MMOL/L
BASE EXCESS BLDV CALC-SCNC: -14.9 MMOL/L
BASE EXCESS BLDV CALC-SCNC: -5.9 MMOL/L
BILIRUB SERPL-MCNC: 0.98 MG/DL (ref 0.2–1)
BODY TEMPERATURE: 99.4 DEGREES FEHRENHEIT
BODY TEMPERATURE: 99.4 DEGREES FEHRENHEIT
BUN SERPL-MCNC: 22 MG/DL (ref 5–25)
BUN SERPL-MCNC: 26 MG/DL (ref 5–25)
CALCIUM ALBUM COR SERPL-MCNC: 8.3 MG/DL (ref 8.3–10.1)
CALCIUM SERPL-MCNC: 7.1 MG/DL (ref 8.3–10.1)
CALCIUM SERPL-MCNC: 7.7 MG/DL (ref 8.3–10.1)
CARDIAC TROPONIN I PNL SERPL HS: 250 NG/L (ref 8–18)
CARDIAC TROPONIN I PNL SERPL HS: 254 NG/L
CHLORIDE SERPL-SCNC: 106 MMOL/L (ref 100–108)
CHLORIDE SERPL-SCNC: 107 MMOL/L (ref 100–108)
CHOLEST SERPL-MCNC: 117 MG/DL
CK SERPL-CCNC: 59 U/L (ref 26–192)
CO2 SERPL-SCNC: 21 MMOL/L (ref 21–32)
CO2 SERPL-SCNC: 24 MMOL/L (ref 21–32)
CREAT SERPL-MCNC: 1.25 MG/DL (ref 0.6–1.3)
CREAT SERPL-MCNC: 1.28 MG/DL (ref 0.6–1.3)
D DIMER PPP FEU-MCNC: 10.15 UG/ML FEU
ERYTHROCYTE [DISTWIDTH] IN BLOOD BY AUTOMATED COUNT: 13.9 % (ref 11.6–15.1)
ERYTHROCYTE [DISTWIDTH] IN BLOOD BY AUTOMATED COUNT: 14.3 % (ref 11.6–15.1)
ETHANOL SERPL-MCNC: <3 MG/DL (ref 0–3)
GFR SERPL CREATININE-BSD FRML MDRD: 42 ML/MIN/1.73SQ M
GFR SERPL CREATININE-BSD FRML MDRD: 43 ML/MIN/1.73SQ M
GLUCOSE SERPL-MCNC: 102 MG/DL (ref 65–140)
GLUCOSE SERPL-MCNC: 93 MG/DL (ref 65–140)
HCO3 BLDA-SCNC: 21.2 MMOL/L (ref 22–28)
HCO3 BLDV-SCNC: 10.4 MMOL/L (ref 24–30)
HCO3 BLDV-SCNC: 21.1 MMOL/L (ref 24–30)
HCT VFR BLD AUTO: 32.1 % (ref 34.8–46.1)
HCT VFR BLD AUTO: 37.8 % (ref 34.8–46.1)
HDLC SERPL-MCNC: 30 MG/DL
HGB BLD-MCNC: 10.4 G/DL (ref 11.5–15.4)
HGB BLD-MCNC: 12.2 G/DL (ref 11.5–15.4)
LACTATE SERPL-SCNC: 1.8 MMOL/L (ref 0.5–2)
LACTATE SERPL-SCNC: 3.8 MMOL/L (ref 0.5–2)
LACTATE SERPL-SCNC: 6.4 MMOL/L (ref 0.5–2)
LDLC SERPL CALC-MCNC: 78 MG/DL (ref 0–100)
MAGNESIUM SERPL-MCNC: 1.1 MG/DL (ref 1.6–2.6)
MCH RBC QN AUTO: 31 PG (ref 26.8–34.3)
MCH RBC QN AUTO: 31 PG (ref 26.8–34.3)
MCHC RBC AUTO-ENTMCNC: 32.3 G/DL (ref 31.4–37.4)
MCHC RBC AUTO-ENTMCNC: 32.4 G/DL (ref 31.4–37.4)
MCV RBC AUTO: 96 FL (ref 82–98)
MCV RBC AUTO: 96 FL (ref 82–98)
NASAL CANNULA: 2
NASAL CANNULA: 2
O2 CT BLDA-SCNC: 14.7 ML/DL (ref 16–23)
O2 CT BLDV-SCNC: 11.2 ML/DL
O2 CT BLDV-SCNC: 2.5 ML/DL
OXYHGB MFR BLDA: 94.4 % (ref 94–97)
P AXIS: 41 DEGREES
P AXIS: 49 DEGREES
P AXIS: 56 DEGREES
PCO2 BLD: 23 MM HG (ref 42–50)
PCO2 BLDA: 34.9 MM HG (ref 36–44)
PCO2 BLDV: 22.6 MM HG (ref 42–50)
PCO2 BLDV: 47 MM HG (ref 42–50)
PCO2 TEMP ADJ BLDA: 35.5 MM HG (ref 36–44)
PH BLD: 7.28 [PH] (ref 7.3–7.4)
PH BLD: 7.39 [PH] (ref 7.35–7.45)
PH BLDA: 7.4 [PH] (ref 7.35–7.45)
PH BLDV: 7.27 [PH] (ref 7.3–7.4)
PH BLDV: 7.28 [PH] (ref 7.3–7.4)
PLATELET # BLD AUTO: 141 THOUSANDS/UL (ref 149–390)
PLATELET # BLD AUTO: 190 THOUSANDS/UL (ref 149–390)
PMV BLD AUTO: 9.7 FL (ref 8.9–12.7)
PMV BLD AUTO: 9.8 FL (ref 8.9–12.7)
PO2 BLD: 75.5 MM HG (ref 75–129)
PO2 BLDA: 73.5 MM HG (ref 75–129)
PO2 BLDV: 20.8 MM HG (ref 35–45)
PO2 BLDV: 31.8 MM HG (ref 35–45)
PO2 VENOUS TEMP CORRECTED: 21.4 MM HG (ref 35–45)
POTASSIUM SERPL-SCNC: 3.6 MMOL/L (ref 3.5–5.3)
POTASSIUM SERPL-SCNC: 3.9 MMOL/L (ref 3.5–5.3)
PR INTERVAL: 104 MS
PR INTERVAL: 136 MS
PR INTERVAL: 138 MS
PROCALCITONIN SERPL-MCNC: 84.2 NG/ML
PROT SERPL-MCNC: 4.9 G/DL (ref 6.4–8.2)
QRS AXIS: 18 DEGREES
QRS AXIS: 29 DEGREES
QRS AXIS: 33 DEGREES
QRSD INTERVAL: 80 MS
QRSD INTERVAL: 82 MS
QRSD INTERVAL: 88 MS
QT INTERVAL: 304 MS
QT INTERVAL: 354 MS
QT INTERVAL: 358 MS
QTC INTERVAL: 450 MS
QTC INTERVAL: 483 MS
QTC INTERVAL: 493 MS
RBC # BLD AUTO: 3.36 MILLION/UL (ref 3.81–5.12)
RBC # BLD AUTO: 3.93 MILLION/UL (ref 3.81–5.12)
SALICYLATES SERPL-MCNC: <3 MG/DL (ref 3–20)
SODIUM SERPL-SCNC: 139 MMOL/L (ref 136–145)
SODIUM SERPL-SCNC: 140 MMOL/L (ref 136–145)
SPECIMEN SOURCE: ABNORMAL
T WAVE AXIS: 23 DEGREES
T WAVE AXIS: 23 DEGREES
T WAVE AXIS: 35 DEGREES
TRIGL SERPL-MCNC: 47 MG/DL
TSH SERPL DL<=0.05 MIU/L-ACNC: 1.2 UIU/ML (ref 0.45–4.5)
VENTRICULAR RATE: 112 BPM
VENTRICULAR RATE: 114 BPM
VENTRICULAR RATE: 132 BPM
WBC # BLD AUTO: 22.92 THOUSAND/UL (ref 4.31–10.16)
WBC # BLD AUTO: 23.92 THOUSAND/UL (ref 4.31–10.16)

## 2022-04-28 PROCEDURE — 82805 BLOOD GASES W/O2 SATURATION: CPT | Performed by: EMERGENCY MEDICINE

## 2022-04-28 PROCEDURE — 87449 NOS EACH ORGANISM AG IA: CPT | Performed by: NURSE PRACTITIONER

## 2022-04-28 PROCEDURE — 82140 ASSAY OF AMMONIA: CPT | Performed by: EMERGENCY MEDICINE

## 2022-04-28 PROCEDURE — 99223 1ST HOSP IP/OBS HIGH 75: CPT | Performed by: INTERNAL MEDICINE

## 2022-04-28 PROCEDURE — 93005 ELECTROCARDIOGRAM TRACING: CPT

## 2022-04-28 PROCEDURE — 85027 COMPLETE CBC AUTOMATED: CPT | Performed by: NURSE PRACTITIONER

## 2022-04-28 PROCEDURE — 80061 LIPID PANEL: CPT | Performed by: NURSE PRACTITIONER

## 2022-04-28 PROCEDURE — 84145 PROCALCITONIN (PCT): CPT | Performed by: NURSE PRACTITIONER

## 2022-04-28 PROCEDURE — 84100 ASSAY OF PHOSPHORUS: CPT | Performed by: NURSE PRACTITIONER

## 2022-04-28 PROCEDURE — NC001 PR NO CHARGE: Performed by: NURSE PRACTITIONER

## 2022-04-28 PROCEDURE — 82805 BLOOD GASES W/O2 SATURATION: CPT | Performed by: NURSE PRACTITIONER

## 2022-04-28 PROCEDURE — 80143 DRUG ASSAY ACETAMINOPHEN: CPT | Performed by: EMERGENCY MEDICINE

## 2022-04-28 PROCEDURE — 99291 CRITICAL CARE FIRST HOUR: CPT | Performed by: NURSE PRACTITIONER

## 2022-04-28 PROCEDURE — 83605 ASSAY OF LACTIC ACID: CPT | Performed by: EMERGENCY MEDICINE

## 2022-04-28 PROCEDURE — 93970 EXTREMITY STUDY: CPT

## 2022-04-28 PROCEDURE — 03HY32Z INSERTION OF MONITORING DEVICE INTO UPPER ARTERY, PERCUTANEOUS APPROACH: ICD-10-PCS | Performed by: INTERNAL MEDICINE

## 2022-04-28 PROCEDURE — 85379 FIBRIN DEGRADATION QUANT: CPT | Performed by: EMERGENCY MEDICINE

## 2022-04-28 PROCEDURE — 84484 ASSAY OF TROPONIN QUANT: CPT | Performed by: NURSE PRACTITIONER

## 2022-04-28 PROCEDURE — 99221 1ST HOSP IP/OBS SF/LOW 40: CPT | Performed by: PHYSICIAN ASSISTANT

## 2022-04-28 PROCEDURE — 96361 HYDRATE IV INFUSION ADD-ON: CPT

## 2022-04-28 PROCEDURE — 97167 OT EVAL HIGH COMPLEX 60 MIN: CPT

## 2022-04-28 PROCEDURE — 85610 PROTHROMBIN TIME: CPT | Performed by: NURSE PRACTITIONER

## 2022-04-28 PROCEDURE — 99223 1ST HOSP IP/OBS HIGH 75: CPT | Performed by: STUDENT IN AN ORGANIZED HEALTH CARE EDUCATION/TRAINING PROGRAM

## 2022-04-28 PROCEDURE — 84484 ASSAY OF TROPONIN QUANT: CPT | Performed by: EMERGENCY MEDICINE

## 2022-04-28 PROCEDURE — 73700 CT LOWER EXTREMITY W/O DYE: CPT

## 2022-04-28 PROCEDURE — 82077 ASSAY SPEC XCP UR&BREATH IA: CPT | Performed by: EMERGENCY MEDICINE

## 2022-04-28 PROCEDURE — 92610 EVALUATE SWALLOWING FUNCTION: CPT

## 2022-04-28 PROCEDURE — 93970 EXTREMITY STUDY: CPT | Performed by: SURGERY

## 2022-04-28 PROCEDURE — 96372 THER/PROPH/DIAG INJ SC/IM: CPT

## 2022-04-28 PROCEDURE — 99222 1ST HOSP IP/OBS MODERATE 55: CPT | Performed by: INTERNAL MEDICINE

## 2022-04-28 PROCEDURE — 97163 PT EVAL HIGH COMPLEX 45 MIN: CPT

## 2022-04-28 PROCEDURE — 99233 SBSQ HOSP IP/OBS HIGH 50: CPT | Performed by: INTERNAL MEDICINE

## 2022-04-28 PROCEDURE — 82550 ASSAY OF CK (CPK): CPT | Performed by: NURSE PRACTITIONER

## 2022-04-28 PROCEDURE — 85730 THROMBOPLASTIN TIME PARTIAL: CPT | Performed by: NURSE PRACTITIONER

## 2022-04-28 PROCEDURE — 80053 COMPREHEN METABOLIC PANEL: CPT | Performed by: NURSE PRACTITIONER

## 2022-04-28 PROCEDURE — 84443 ASSAY THYROID STIM HORMONE: CPT | Performed by: EMERGENCY MEDICINE

## 2022-04-28 PROCEDURE — G1004 CDSM NDSC: HCPCS

## 2022-04-28 PROCEDURE — 99223 1ST HOSP IP/OBS HIGH 75: CPT | Performed by: NURSE PRACTITIONER

## 2022-04-28 PROCEDURE — 93010 ELECTROCARDIOGRAM REPORT: CPT

## 2022-04-28 PROCEDURE — 36620 INSERTION CATHETER ARTERY: CPT | Performed by: NURSE PRACTITIONER

## 2022-04-28 PROCEDURE — 80179 DRUG ASSAY SALICYLATE: CPT | Performed by: EMERGENCY MEDICINE

## 2022-04-28 PROCEDURE — 96375 TX/PRO/DX INJ NEW DRUG ADDON: CPT

## 2022-04-28 PROCEDURE — 83735 ASSAY OF MAGNESIUM: CPT | Performed by: NURSE PRACTITIONER

## 2022-04-28 PROCEDURE — 80048 BASIC METABOLIC PNL TOTAL CA: CPT | Performed by: NURSE PRACTITIONER

## 2022-04-28 PROCEDURE — 36415 COLL VENOUS BLD VENIPUNCTURE: CPT | Performed by: EMERGENCY MEDICINE

## 2022-04-28 PROCEDURE — 83605 ASSAY OF LACTIC ACID: CPT | Performed by: NURSE PRACTITIONER

## 2022-04-28 RX ORDER — ACETAMINOPHEN 325 MG/1
650 TABLET ORAL EVERY 4 HOURS PRN
Status: DISCONTINUED | OUTPATIENT
Start: 2022-04-28 | End: 2022-04-28

## 2022-04-28 RX ORDER — LORAZEPAM 0.5 MG/1
0.25 TABLET ORAL 2 TIMES DAILY
Status: DISCONTINUED | OUTPATIENT
Start: 2022-04-28 | End: 2022-04-28

## 2022-04-28 RX ORDER — CEFAZOLIN SODIUM 2 G/50ML
2000 SOLUTION INTRAVENOUS EVERY 8 HOURS
Status: DISCONTINUED | OUTPATIENT
Start: 2022-04-28 | End: 2022-04-28

## 2022-04-28 RX ORDER — POTASSIUM CHLORIDE 14.9 MG/ML
20 INJECTION INTRAVENOUS ONCE
Status: DISCONTINUED | OUTPATIENT
Start: 2022-04-29 | End: 2022-04-29

## 2022-04-28 RX ORDER — PANTOPRAZOLE SODIUM 20 MG/1
20 TABLET, DELAYED RELEASE ORAL
Status: DISCONTINUED | OUTPATIENT
Start: 2022-04-28 | End: 2022-05-03 | Stop reason: HOSPADM

## 2022-04-28 RX ORDER — LIDOCAINE HYDROCHLORIDE 20 MG/ML
5 INJECTION, SOLUTION EPIDURAL; INFILTRATION; INTRACAUDAL; PERINEURAL ONCE
Status: COMPLETED | OUTPATIENT
Start: 2022-04-28 | End: 2022-04-28

## 2022-04-28 RX ORDER — ONDANSETRON 2 MG/ML
4 INJECTION INTRAMUSCULAR; INTRAVENOUS EVERY 6 HOURS PRN
Status: DISCONTINUED | OUTPATIENT
Start: 2022-04-28 | End: 2022-05-03 | Stop reason: HOSPADM

## 2022-04-28 RX ORDER — SODIUM CHLORIDE, SODIUM GLUCONATE, SODIUM ACETATE, POTASSIUM CHLORIDE, MAGNESIUM CHLORIDE, SODIUM PHOSPHATE, DIBASIC, AND POTASSIUM PHOSPHATE .53; .5; .37; .037; .03; .012; .00082 G/100ML; G/100ML; G/100ML; G/100ML; G/100ML; G/100ML; G/100ML
500 INJECTION, SOLUTION INTRAVENOUS ONCE
Status: COMPLETED | OUTPATIENT
Start: 2022-04-28 | End: 2022-04-29

## 2022-04-28 RX ORDER — SODIUM CHLORIDE, SODIUM LACTATE, POTASSIUM CHLORIDE, CALCIUM CHLORIDE 600; 310; 30; 20 MG/100ML; MG/100ML; MG/100ML; MG/100ML
125 INJECTION, SOLUTION INTRAVENOUS CONTINUOUS
Status: DISCONTINUED | OUTPATIENT
Start: 2022-04-28 | End: 2022-04-29

## 2022-04-28 RX ORDER — ALBUMIN, HUMAN INJ 5% 5 %
25 SOLUTION INTRAVENOUS ONCE
Status: COMPLETED | OUTPATIENT
Start: 2022-04-28 | End: 2022-04-29

## 2022-04-28 RX ORDER — ACETAMINOPHEN 325 MG/1
650 TABLET ORAL EVERY 4 HOURS PRN
Status: DISCONTINUED | OUTPATIENT
Start: 2022-04-28 | End: 2022-04-29

## 2022-04-28 RX ORDER — CEFAZOLIN SODIUM 2 G/50ML
2000 SOLUTION INTRAVENOUS ONCE
Status: COMPLETED | OUTPATIENT
Start: 2022-04-28 | End: 2022-04-28

## 2022-04-28 RX ORDER — MAGNESIUM HYDROXIDE/ALUMINUM HYDROXICE/SIMETHICONE 120; 1200; 1200 MG/30ML; MG/30ML; MG/30ML
30 SUSPENSION ORAL EVERY 6 HOURS PRN
Status: DISCONTINUED | OUTPATIENT
Start: 2022-04-28 | End: 2022-05-03 | Stop reason: HOSPADM

## 2022-04-28 RX ORDER — DULOXETIN HYDROCHLORIDE 60 MG/1
60 CAPSULE, DELAYED RELEASE ORAL DAILY
Status: DISCONTINUED | OUTPATIENT
Start: 2022-04-28 | End: 2022-05-03 | Stop reason: HOSPADM

## 2022-04-28 RX ORDER — OXYCODONE HCL 20 MG/1
40 TABLET, FILM COATED, EXTENDED RELEASE ORAL EVERY 12 HOURS SCHEDULED
Status: DISCONTINUED | OUTPATIENT
Start: 2022-04-28 | End: 2022-04-28

## 2022-04-28 RX ORDER — SIMETHICONE 80 MG
80 TABLET,CHEWABLE ORAL 4 TIMES DAILY PRN
Status: DISCONTINUED | OUTPATIENT
Start: 2022-04-28 | End: 2022-04-29

## 2022-04-28 RX ORDER — HYDROMORPHONE HCL IN WATER/PF 6 MG/30 ML
0.2 PATIENT CONTROLLED ANALGESIA SYRINGE INTRAVENOUS ONCE
Status: COMPLETED | OUTPATIENT
Start: 2022-04-28 | End: 2022-04-28

## 2022-04-28 RX ORDER — OXYBUTYNIN CHLORIDE 10 MG/1
10 TABLET, EXTENDED RELEASE ORAL
Status: DISCONTINUED | OUTPATIENT
Start: 2022-04-28 | End: 2022-05-03 | Stop reason: HOSPADM

## 2022-04-28 RX ORDER — CLINDAMYCIN PHOSPHATE 600 MG/50ML
600 INJECTION INTRAVENOUS EVERY 8 HOURS
Status: DISCONTINUED | OUTPATIENT
Start: 2022-04-28 | End: 2022-04-29

## 2022-04-28 RX ORDER — ONDANSETRON 2 MG/ML
4 INJECTION INTRAMUSCULAR; INTRAVENOUS ONCE
Status: COMPLETED | OUTPATIENT
Start: 2022-04-28 | End: 2022-04-28

## 2022-04-28 RX ADMIN — LORAZEPAM 0.25 MG: 0.5 TABLET ORAL at 12:04

## 2022-04-28 RX ADMIN — CEFAZOLIN SODIUM 2000 MG: 2 SOLUTION INTRAVENOUS at 10:35

## 2022-04-28 RX ADMIN — HYDROMORPHONE HYDROCHLORIDE 0.2 MG: 0.2 INJECTION, SOLUTION INTRAMUSCULAR; INTRAVENOUS; SUBCUTANEOUS at 02:45

## 2022-04-28 RX ADMIN — NOREPINEPHRINE BITARTRATE 5 MCG/MIN: 1 INJECTION, SOLUTION, CONCENTRATE INTRAVENOUS at 23:13

## 2022-04-28 RX ADMIN — ONDANSETRON 4 MG: 2 INJECTION INTRAMUSCULAR; INTRAVENOUS at 01:00

## 2022-04-28 RX ADMIN — SODIUM CHLORIDE, SODIUM LACTATE, POTASSIUM CHLORIDE, AND CALCIUM CHLORIDE 125 ML/HR: .6; .31; .03; .02 INJECTION, SOLUTION INTRAVENOUS at 16:31

## 2022-04-28 RX ADMIN — LORAZEPAM 0.25 MG: 0.5 TABLET ORAL at 18:05

## 2022-04-28 RX ADMIN — ALBUMIN (HUMAN) 25 G: 12.5 INJECTION, SOLUTION INTRAVENOUS at 22:01

## 2022-04-28 RX ADMIN — CLINDAMYCIN PHOSPHATE 600 MG: 600 INJECTION, SOLUTION INTRAVENOUS at 10:35

## 2022-04-28 RX ADMIN — SODIUM CHLORIDE, SODIUM GLUCONATE, SODIUM ACETATE, POTASSIUM CHLORIDE, MAGNESIUM CHLORIDE, SODIUM PHOSPHATE, DIBASIC, AND POTASSIUM PHOSPHATE 500 ML: .53; .5; .37; .037; .03; .012; .00082 INJECTION, SOLUTION INTRAVENOUS at 22:02

## 2022-04-28 RX ADMIN — OXYCODONE HYDROCHLORIDE 40 MG: 20 TABLET, FILM COATED, EXTENDED RELEASE ORAL at 10:43

## 2022-04-28 RX ADMIN — SODIUM CHLORIDE, SODIUM LACTATE, POTASSIUM CHLORIDE, AND CALCIUM CHLORIDE 1000 ML: .6; .31; .03; .02 INJECTION, SOLUTION INTRAVENOUS at 04:35

## 2022-04-28 RX ADMIN — B-COMPLEX W/ C & FOLIC ACID TAB 1 TABLET: TAB at 10:44

## 2022-04-28 RX ADMIN — ENOXAPARIN SODIUM 100 MG: 100 INJECTION SUBCUTANEOUS at 10:43

## 2022-04-28 RX ADMIN — SODIUM CHLORIDE, SODIUM LACTATE, POTASSIUM CHLORIDE, AND CALCIUM CHLORIDE 125 ML/HR: .6; .31; .03; .02 INJECTION, SOLUTION INTRAVENOUS at 19:57

## 2022-04-28 RX ADMIN — DULOXETINE HYDROCHLORIDE 60 MG: 60 CAPSULE, DELAYED RELEASE ORAL at 10:44

## 2022-04-28 RX ADMIN — LIDOCAINE HYDROCHLORIDE 5 ML: 20 INJECTION, SOLUTION EPIDURAL; INFILTRATION; INTRACAUDAL; PERINEURAL at 23:01

## 2022-04-28 RX ADMIN — ENOXAPARIN SODIUM 100 MG: 100 INJECTION SUBCUTANEOUS at 01:04

## 2022-04-28 RX ADMIN — ENOXAPARIN SODIUM 100 MG: 100 INJECTION SUBCUTANEOUS at 23:36

## 2022-04-28 RX ADMIN — SODIUM CHLORIDE, SODIUM LACTATE, POTASSIUM CHLORIDE, AND CALCIUM CHLORIDE 500 ML: .6; .31; .03; .02 INJECTION, SOLUTION INTRAVENOUS at 16:31

## 2022-04-28 RX ADMIN — Medication 12.5 MG: at 10:43

## 2022-04-28 RX ADMIN — PIPERACILLIN SODIUM AND TAZOBACTAM SODIUM 3.38 G: 36; 4.5 INJECTION, POWDER, LYOPHILIZED, FOR SOLUTION INTRAVENOUS at 23:37

## 2022-04-28 RX ADMIN — SODIUM CHLORIDE, SODIUM LACTATE, POTASSIUM CHLORIDE, AND CALCIUM CHLORIDE 500 ML: .6; .31; .03; .02 INJECTION, SOLUTION INTRAVENOUS at 18:39

## 2022-04-28 RX ADMIN — CEFAZOLIN SODIUM 2000 MG: 2 SOLUTION INTRAVENOUS at 19:58

## 2022-04-28 RX ADMIN — CLINDAMYCIN PHOSPHATE 600 MG: 600 INJECTION, SOLUTION INTRAVENOUS at 18:05

## 2022-04-28 RX ADMIN — CEFAZOLIN SODIUM 2000 MG: 2 SOLUTION INTRAVENOUS at 03:03

## 2022-04-28 RX ADMIN — SODIUM CHLORIDE, SODIUM LACTATE, POTASSIUM CHLORIDE, AND CALCIUM CHLORIDE 1000 ML: .6; .31; .03; .02 INJECTION, SOLUTION INTRAVENOUS at 05:42

## 2022-04-28 NOTE — ASSESSMENT & PLAN NOTE
· Developed hypogammaglobulinemia following Ritiximab treatment for Wegener's  · Followed outpatient by Hem/Onc for severe hypogammaglobinemia, previously on IVIG although had adverse reactions and recommended watchful observation and monitoring of IgG level

## 2022-04-28 NOTE — ASSESSMENT & PLAN NOTE
· Patient presented with right lower extremity cellulitis, with sepsis  · History of hypogammaglobulinemia and recurrent leg cellulitis in setting of lymphedema  Previously followed by ID and treated with Pen VK suppression through 12/31/2020  Per ID note on Aug 19, 2020: If cellulitis recurs off suppression, patient would then need to be on lifelong suppression  · Was admitted in Nov 2019 for right leg cellulitis and secondary GBS bacteremia    · CT RLE:  Shows severe cellulitis of the right ankle and calf    No evidence of abscess or compartment syndrome  · Was started on antibiotics with IV Ancef 2g q8h  · Appreciate infectious disease team evaluation and recommendations  · Right lower extremity is edematous and tense, however CT without evidence of compartment syndrome  · Normal CK  · Appreciate surgical team's evaluation and recommendation  · Neurovascularly intact  · Continue to monitor closely in the step-down unit

## 2022-04-28 NOTE — ASSESSMENT & PLAN NOTE
· Maintained on OxyContin ER 40 mg b i d , oxycodone IR 15mg b i d PRN  · On lorazepam 0 5 mg b i d   · Medications verified on PDMP  · Patient has generalized tenderness on palpation, continue opioids  · Hold lorazepam until improvement in mental status

## 2022-04-28 NOTE — ED PROVIDER NOTES
History  Chief Complaint   Patient presents with    Altered Mental Status     Pt brought in by EMS and said that her  said she is immunosupressive and has a hx of sepsis  Pt is not following commands well and is not speaking much  EMS said that her  stated that this is consistent for her when she is septic  EMS BS 80      70 yo F presenting for evaluation of AMS/fever  Pt only able to answer her name, does not know name/where she is/recent events   states this has happened several times in the past when she gets septic (from numerous sources in the past)  He states she is always this confused  He reports she was in her normal state of health and then suddenly started to feel unwell around 5pm tonight-  went to cut lawn for about 1 hour and when he returned he found her altered and called EMS  Denies head injury  Pt has remote history ovarian ca s/p FAMILIA/BSO and chemo  H/o Wegener's granulomatosis- previously on steroids, currently off  H/o VTE, not on any current A/C  RLE with edema/erythema-  states this is baseline, possibly more red, but not more swollen                           Prior to Admission Medications   Prescriptions Last Dose Informant Patient Reported? Taking? Calcium Carbonate-Vit D-Min (CALCIUM 1200 PO)  Self Yes No   Sig: Take 3 tablets by mouth daily    DULoxetine (CYMBALTA) 60 mg delayed release capsule  Self Yes No   Sig: Take 60 mg by mouth daily     LORazepam (ATIVAN) 0 5 mg tablet  Self No No   Sig: Take 1 tablet as needed every 8 hours  Patient taking differently: Take 0 5 mg by mouth 2 (two) times a day Take 1 tablet as needed every 8 hours      Multiple Vitamins-Minerals (MULTIVITAMIN ADULT PO)  Self Yes No   Sig: Take 1 capsule by mouth daily    OXYCODONE HCL PO  Self Yes No   Sig: Take 15 mg by mouth 2 (two) times a day as needed    TOVIAZ 8 MG TB24  Self Yes No   Sig: Take 8 mg by mouth daily at bedtime    acetaminophen (TYLENOL) 325 mg tablet Self No No   Sig: Take 2 tablets by mouth every 6 (six) hours as needed for mild pain   Patient taking differently: Take 650 mg by mouth every 6 (six) hours as needed for mild pain    bisacodyl (DULCOLAX) 5 mg EC tablet  Self Yes No   Sig: Take 5 mg by mouth daily as needed for constipation    furosemide (LASIX) 20 mg tablet  Self No No   Sig: Take 1 tablet (20 mg total) by mouth daily   Patient not taking: Reported on 3/12/2021   metoprolol tartrate (LOPRESSOR) 25 mg tablet  Self No No   Sig: TAKE 1/2 TABLET(12 5MG) BY MOUTH EVERY 12 HOURS   omeprazole (PriLOSEC) 20 mg delayed release capsule  Self Yes No   Sig: Take 20 mg by mouth daily  ondansetron (ZOFRAN) 4 mg tablet  Self No No   Sig: Take 1 tablet (4 mg total) by mouth every 8 (eight) hours as needed for nausea or vomiting   Patient not taking: Reported on 2021   oxyCODONE (OxyCONTIN) 40 mg 12 hr tablet  Self Yes No   Sig: Take 40 mg by mouth every 12 (twelve) hours      Facility-Administered Medications: None       Past Medical History:   Diagnosis Date    BRCA1 positive     [facility]     ovarian Ca with chemo    History of chemotherapy     ovarian cancer     History of pulmonary embolus (PE) & DVT 2007    post-op FAMILIA/ omenectomy    Lymphedema     MRSA (methicillin resistant Staphylococcus aureus) 2017    nasal swab negative 4/3/19    [facility] ([facility] )     CYST REMOVED RIGHT OVARY IN   HYSTERECTOMY 07         Past Surgical History:   Procedure Laterality Date    APPENDECTOMY  1998    laparoscopic    BILATERAL SALPINGOOPHORECTOMY  2007    BREAST BIOPSY Right 2016    BREAST BIOPSY Left 07/15/2020    BREAST BIOPSY Left 2021    stereo     SECTION      HYSTERECTOMY  2007    [facility]ectomy [facility] at [facility] BIOPSY RIGHT (ALL INC) Right 2021    OOPHORECTOMY Bilateral 2007    OTHER SURGICAL HISTORY      right lower extremity due to trauma    MN TREAT TIBIAL SHAFT FX, INTRAMED IMPLANT Right 9/6/2017    Procedure: INSERTION NAIL IM TIBIA;  Surgeon: Shanell Cano MD;  Location: [facility];  Service: Orthopedics    US GUIDANCE BREAST BIOPSY LEFT EACH ADDITIONAL Left 7/15/2020    US GUIDED BREAST BIOPSY LEFT COMPLETE Left 7/15/2020    US GUIDED BREAST BIOPSY RIGHT COMPLETE Right 5/9/2016    US GUIDED BREAST BIOPSY RIGHT COMPLETE Right 7/26/2021       Family History   Problem Relation Age of Onset    Breast cancer Mother         28    Pancreatic cancer Mother     Ovarian cancer Maternal Grandmother     Breast cancer Maternal Aunt 39    No Known Problems Paternal Aunt     No Known Problems Father     No Known Problems Maternal Grandfather     No Known Problems Paternal Grandmother     No Known Problems Paternal Grandfather     Lung cancer Half-Sister      I have reviewed and agree with the history as documented  E-Cigarette/Vaping    E-Cigarette Use Never User      E-Cigarette/Vaping Substances    Nicotine No     THC No     CBD No     Flavoring No     Other No     Unknown No      Social History     Tobacco Use    Smoking status: Never Smoker    Smokeless tobacco: Never Used   Vaping Use    Vaping Use: Never used   Substance Use Topics    Alcohol use: Yes    Drug use: Not Currently       Review of Systems   Unable to perform ROS: Mental status change       Physical Exam  Physical Exam  Vitals and nursing note reviewed  Constitutional:       General: Jettie Late is not in acute distress  Comments: Ill appearing, pale, moaning   HENT:      Head: Normocephalic and atraumatic  Nose: Nose normal    Eyes:      Extraocular Movements: Extraocular movements intact  Conjunctiva/sclera: Conjunctivae normal       Pupils: Pupils are equal, round, and reactive to light  Neck:      Comments: Freely moving neck, no meningeal signs  Cardiovascular:      Rate and Rhythm: Regular rhythm  Tachycardia present  Heart sounds: Normal heart sounds  Pulmonary:      Breath sounds: No stridor  No wheezing or rales  Comments: Tachypneic, hypoxic and on NRB  Chest:      Chest wall: No tenderness  Abdominal:      General: There is no distension  Palpations: Abdomen is soft  Tenderness: There is no abdominal tenderness  There is no guarding or rebound  Musculoskeletal:         General: Swelling and deformity present  Cervical back: Normal range of motion and neck supple  No rigidity or tenderness  Comments: RLE: erythema to entire leg, extends from inguinal crease down to foot  Diffuse R leg swelling  Chronic skin changes  Warm to touch  No crepitus  Chronic R lower leg/foot cast removed  Skin:     General: Skin is warm and dry  Findings: No rash  Neurological:      Motor: No abnormal muscle tone        Comments: Oriented to self only, moving all extremities, intermittently can follow commands, does not participate with exam to evaluate strength/sensation/coordination         Vital Signs  ED Triage Vitals   Temperature Pulse Respirations Blood Pressure SpO2   04/27/22 1951 04/27/22 1951 04/27/22 1951 04/27/22 1951 04/27/22 1951   (!) 102 °F (38 9 °C) (!) 133 16 119/68 92 %      Temp Source Heart Rate Source Patient Position - Orthostatic VS BP Location FiO2 (%)   04/27/22 1951 04/27/22 1951 04/27/22 1951 04/27/22 1951 --   Oral Monitor Lying Right arm       Pain Score       04/28/22 0245       10 - Worst Possible Pain           Vitals:    05/01/22 0200 05/01/22 0300 05/01/22 0500 05/01/22 0600   BP: 106/51 116/55  123/59   Pulse: 68 70 70 70   Patient Position - Orthostatic VS:             Visual Acuity  Visual Acuity      Most Recent Value   L Pupil Size (mm) 3   R Pupil Size (mm) 3   L Pupil Shape Round   R Pupil Shape Round          ED Medications  Medications   sodium chloride (PF) 0 9 % injection 3 mL (has no administration in time range)   ondansetron (ZOFRAN) injection 4 mg (has no administration in time range)   aluminum-magnesium hydroxide-simethicone (MYLANTA) oral suspension 30 mL (has no administration in time range)   DULoxetine (CYMBALTA) delayed release capsule 60 mg (60 mg Oral Given 4/30/22 0936)   pantoprazole (PROTONIX) EC tablet 20 mg (20 mg Oral Given 5/1/22 0519)   oxybutynin (DITROPAN-XL) 24 hr tablet 10 mg (10 mg Oral Given 4/30/22 2155)   bisacodyl (DULCOLAX) EC tablet 10 mg (has no administration in time range)   oxyCODONE (ROXICODONE) IR tablet 15 mg (15 mg Oral Given 5/1/22 0522)   acetaminophen (TYLENOL) tablet 650 mg (650 mg Oral Given 4/30/22 2156)   ceFAZolin (ANCEF) IVPB (premix in dextrose) 2,000 mg 50 mL (0 mg Intravenous Stopped 5/1/22 0205)   enoxaparin (LOVENOX) subcutaneous injection 40 mg (40 mg Subcutaneous Given 4/30/22 2015)   clindamycin (CLEOCIN) IVPB (premix in dextrose) 900 mg 50 mL (0 mg Intravenous Stopped 4/30/22 1900)   midodrine (PROAMATINE) tablet 5 mg (5 mg Oral Given 5/1/22 0608)   oxyCODONE (OxyCONTIN) 12 hr tablet 20 mg (20 mg Oral Given 4/30/22 2045)   senna-docusate sodium (SENOKOT S) 8 6-50 mg per tablet 1 tablet (1 tablet Oral Given 4/30/22 2155)   furosemide (LASIX) tablet 20 mg (has no administration in time range)   LORazepam (ATIVAN) tablet 0 5 mg (0 5 mg Oral Given 4/30/22 2156)   docusate sodium (COLACE) capsule 100 mg (100 mg Oral Given 4/30/22 2015)   acetaminophen (TYLENOL) rectal suppository 650 mg (650 mg Rectal Given 4/27/22 2021)   sodium chloride 0 9 % bolus 1,000 mL (0 mL Intravenous Stopped 4/27/22 2201)   cefepime (MAXIPIME) 2 g/50 mL dextrose IVPB (0 mg Intravenous Stopped 4/27/22 2109)   vancomycin (VANCOCIN) 1500 mg in sodium chloride 0 9% 250 mL IVPB (0 mg/kg × 102 kg Intravenous Stopped 4/27/22 2310)   sodium chloride 0 9 % bolus 1,000 mL (0 mL Intravenous Stopped 4/28/22 0059)   aspirin rectal suppository 300 mg (300 mg Rectal Given 4/27/22 2318)   ondansetron (ZOFRAN) injection 4 mg (4 mg Intravenous Given 4/28/22 0100)   enoxaparin (LOVENOX) subcutaneous injection 100 mg (100 mg Subcutaneous Given 4/28/22 0104)   HYDROmorphone HCl (DILAUDID) injection 0 2 mg (0 2 mg Intravenous Given 4/28/22 0245)   ceFAZolin (ANCEF) IVPB (premix in dextrose) 2,000 mg 50 mL (0 mg Intravenous Stopped 4/28/22 0645)   lactated ringers bolus 1,000 mL (0 mL Intravenous Stopped 4/28/22 0645)   lactated ringers bolus 1,000 mL (0 mL Intravenous Stopped 4/28/22 0645)   lactated ringers bolus 500 mL (0 mL Intravenous Stopped 4/29/22 0745)   lactated ringers bolus 500 mL (0 mL Intravenous Stopped 4/29/22 0745)   multi-electrolyte (ISOLYTE-S PH 7 4) bolus 500 mL (0 mL Intravenous Stopped 4/29/22 0746)   albumin human (FLEXBUMIN) 5 % injection 25 g (0 g Intravenous Stopped 4/29/22 0744)   lidocaine (PF) (XYLOCAINE-MPF) 2 % injection 5 mL (5 mL Infiltration Given 4/28/22 2301)   magnesium sulfate 4 g/100 mL IVPB (premix) 4 g (0 g Intravenous Stopped 4/29/22 0745)   potassium phosphates 30 mmol in sodium chloride 0 9 % 250 mL infusion (0 mmol Intravenous Stopped 4/29/22 0814)   multi-electrolyte (ISOLYTE-S PH 7 4) bolus 500 mL (0 mL Intravenous Stopped 4/29/22 0746)       Diagnostic Studies  Results Reviewed     Procedure Component Value Units Date/Time    Blood culture #1 [[id number]] Collected: 04/27/22 2013    Lab Status: Preliminary result Specimen: Blood from Arm, Right Updated: 05/01/22 0001     Blood Culture No Growth at 72 hrs  Blood culture #2 [[id number]] Collected: 04/27/22 2013    Lab Status: Preliminary result Specimen: Blood from Arm, Left Updated: 05/01/22 0001     Blood Culture No Growth at 72 hrs  Lactic acid 2 Hours [[id number]]  (Abnormal) Collected: 04/28/22 0658    Lab Status: Final result Specimen: Blood from Arm, Left Updated: 04/28/22 0755     LACTIC ACID 3 8 mmol/L     Narrative:      Result may be elevated if tourniquet was used during collection      CK (with reflex to MB) [[id number]]  (Normal) Collected: 04/28/22 0701    Lab Status: Final result Specimen: Blood from Arm, Left Updated: 04/28/22 0746     Total CK 59 U/L     Basic metabolic panel [[id number]]  (Abnormal) Collected: 04/28/22 0701    Lab Status: Final result Specimen: Blood from Arm, Left Updated: 04/28/22 0746     Sodium 140 mmol/L      Potassium 3 6 mmol/L      Chloride 107 mmol/L      CO2 21 mmol/L      ANION GAP 12 mmol/L      BUN 22 mg/dL      Creatinine 1 25 mg/dL      Glucose 102 mg/dL      Calcium 7 7 mg/dL      eGFR 43 ml/min/1 73sq m     Narrative:      [place] guidelines for Chronic Kidney Disease (CKD):     Stage 1 with normal or high GFR (GFR > 90 mL/min/1 73 square meters)    Stage 2 Mild CKD (GFR = 60-89 mL/min/1 73 square meters)    Stage 3A Moderate CKD (GFR = 45-59 mL/min/1 73 square meters)    Stage 3B Moderate CKD (GFR = 30-44 mL/min/1 73 square meters)    Stage 4 Severe CKD (GFR = 15-29 mL/min/1 73 square meters)    Stage 5 End Stage CKD (GFR <15 mL/min/1 73 square meters)  Note: GFR calculation is accurate only with a steady state creatinine    CBC and differential [[id number]]  (Abnormal) Collected: 04/28/22 0658    Lab Status: Final result Specimen: Blood from Arm, Left Updated: 04/28/22 0731     WBC 22 92 Thousand/uL      RBC 3 93 Million/uL      Hemoglobin 12 2 g/dL      Hematocrit 37 8 %      MCV 96 fL      MCH 31 0 pg      MCHC 32 3 g/dL      RDW 13 9 %      MPV 9 7 fL      Platelets 864 Thousands/uL     Narrative:      See Manual Diff Done Within 3 Days  This is an appended report  These results have been appended to a previously verified report  Lactic acid, plasma [[id number]]  (Abnormal) Collected: 04/28/22 0139    Lab Status: Final result Specimen: Blood from Arm, Right Updated: 04/28/22 0211     LACTIC ACID 6 4 mmol/L     Narrative:      Result may be elevated if tourniquet was used during collection  Acetaminophen level-If concentration is detectable, please discuss with medical  on call  [[id number]]  (Abnormal) Collected: 04/28/22 0003    Lab Status: Final result Specimen: Blood from Arm, Right Updated: 04/28/22 0048     Acetaminophen Level <2 ug/mL     TSH, 3rd generation with Free T4 reflex [[id number]]  (Normal) Collected: 04/28/22 0003    Lab Status: Final result Specimen: Blood from Arm, Right Updated: 04/28/22 0045     TSH 3RD GENERATON 1 197 uIU/mL     Narrative:      Patients undergoing fluorescein dye angiography may retain small amounts of fluorescein in the body for 48-72 hours post procedure  Samples containing fluorescein can produce falsely depressed TSH values  If the patient had this procedure,a specimen should be resubmitted post fluorescein clearance  Salicylate level [[id number]]  (Abnormal) Collected: 04/28/22 0003    Lab Status: Final result Specimen: Blood from Arm, Right Updated: 86/72/30 2283     Salicylate Lvl <3 mg/dL     Ammonia [[id number]]  (Abnormal) Collected: 04/28/22 0003    Lab Status: Final result Specimen: Blood from Arm, Right Updated: 04/28/22 0035     Ammonia 10 umol/L     Ethanol [[id number]]  (Normal) Collected: 04/28/22 0003    Lab Status: Final result Specimen: Blood from Arm, Right Updated: 04/28/22 0035     Ethanol Lvl <3 mg/dL     D-dimer, quantitative [[id number]]  (Abnormal) Collected: 04/28/22 0003    Lab Status: Final result Specimen: Blood from Arm, Right Updated: 04/28/22 0034     D-Dimer, Quant 10 15 ug/ml FEU     Narrative: In the evaluation for possible pulmonary embolism, in the appropriate (Well's Score of 4 or less) patient, the age adjusted d-dimer cutoff for this patient can be calculated as:    Age x 0 01 (in ug/mL) for Age-adjusted D-dimer exclusion threshold for a patient over 50 years      HS Troponin I 4hr [[id number]]  (Abnormal) Collected: 04/28/22 0003    Lab Status: Final result Specimen: Blood from Arm, Right Updated: 04/28/22 0034     hs TnI 4hr 254 ng/L      Delta 4hr hsTnI 38 ng/L     Blood gas, venous [[id number]] (Abnormal) Collected: 04/28/22 0003    Lab Status: Final result Specimen: Blood from Arm, Right Updated: 04/28/22 0013     pH, [place] 7 270     pCO2, [place] 47 0 mm Hg      pO2, [place] 31 8 mm Hg      HCO3, [place] 21 1 mmol/L      Base Excess, [place] -5 9 mmol/L      O2 Content, [place] 11 2 ml/dL      O2 HGB, VENOUS 57 2 %     Lactic acid 2 Hours [[id number]]  (Abnormal) Collected: 04/27/22 2229    Lab Status: Final result Specimen: Blood from Arm, Right Updated: 04/27/22 2301     LACTIC ACID 6 2 mmol/L     Narrative:      Result may be elevated if tourniquet was used during collection  HS Troponin I 2hr [[id number]]  (Abnormal) Collected: 04/27/22 2229    Lab Status: Final result Specimen: Blood from Arm, Right Updated: 04/27/22 2256     hs TnI 2hr 254 ng/L      Delta 2hr hsTnI 38 ng/L     COVID/FLU/RSV - 2 hour TAT [[id number]]  (Normal) Collected: 04/27/22 2013    Lab Status: Final result Specimen: Nares from Nose Updated: 04/27/22 2100     SARS-CoV-2 Negative     INFLUENZA A PCR Negative     INFLUENZA B PCR Negative     RSV PCR Negative    Narrative:      FOR PEDIATRIC PATIENTS - copy/paste COVID Guidelines URL to browser: [email or web address]    SARS-CoV-2 assay is a Nucleic Acid Amplification assay intended for the  qualitative detection of nucleic acid from SARS-CoV-2 in nasopharyngeal  swabs  Results are for the presumptive identification of SARS-CoV-2 RNA  Positive results are indicative of infection with SARS-CoV-2, the virus  causing COVID-19, but do not rule out bacterial infection or co-infection  with other viruses  Laboratories within the [place] and its  territories are required to report all positive results to the appropriate  public health authorities  Negative results do not preclude SARS-CoV-2  infection and should not be used as the sole basis for treatment or other  patient management decisions   Negative results must be combined with  clinical observations, patient history, and epidemiological information  This test has not been FDA cleared or approved  This test has been authorized by FDA under an Emergency Use Authorization  (EUA)  This test is only authorized for the duration of time the  declaration that circumstances exist justifying the authorization of the  emergency use of an in vitro diagnostic tests for detection of SARS-CoV-2  virus and/or diagnosis of COVID-19 infection under section 564(b)(1) of  the Act, 21 U  S C  852VTV-1(T)(5), unless the authorization is terminated  or revoked sooner  The test has been validated but independent review by FDA  and CLIA is pending  Test performed using [employer] GeneXpert: This RT-PCR assay targets N2,  a region unique to SARS-CoV-2  A conserved region in the E-gene was chosen  for pan-Sarbecovirus detection which includes SARS-CoV-2      Manual Differential(PHLEBS Do Not Order) [[id number]]  (Abnormal) Collected: 04/27/22 2013    Lab Status: Final result Specimen: Blood from Arm, Right Updated: 04/27/22 2059     Segmented % 84 %      Bands % 10 %      Lymphocytes % 3 %      Monocytes % 3 %      Eosinophils, % 0 %      Basophils % 0 %      Absolute Neutrophils 9 65 Thousand/uL      Lymphocytes Absolute 0 31 Thousand/uL      Monocytes Absolute 0 31 Thousand/uL      Eosinophils Absolute 0 00 Thousand/uL      Basophils Absolute 0 00 Thousand/uL      Total Counted --     RBC Morphology Normal     Platelet Estimate Adequate    UA w Reflex to Microscopic w Reflex to Culture [[id number]] Collected: 04/27/22 2047    Lab Status: Final result Specimen: Urine, Straight Cath Updated: 04/27/22 2055     Color, UA Yellow     Clarity, UA Clear     Specific [place], UA 1 020     pH, UA 5 5     Leukocytes, UA Negative     Nitrite, UA Negative     Protein, UA Negative mg/dl      Glucose, UA Negative mg/dl      Ketones, UA Negative mg/dl      Urobilinogen, UA 0 2 E U /dl      Bilirubin, UA Negative     Blood, UA Negative Comprehensive metabolic panel [[id number]]  (Abnormal) Collected: 04/27/22 2013    Lab Status: Final result Specimen: Blood from Arm, Right Updated: 04/27/22 2048     Sodium 140 mmol/L      Potassium 4 0 mmol/L      Chloride 102 mmol/L      CO2 26 mmol/L      ANION GAP 12 mmol/L      BUN 22 mg/dL      Creatinine 1 50 mg/dL      Glucose 140 mg/dL      Calcium 8 8 mg/dL      Corrected Calcium 9 4 mg/dL      AST 70 U/L      ALT 43 U/L      Alkaline Phosphatase 174 U/L      Total Protein 6 6 g/dL      Albumin 3 3 g/dL      Total Bilirubin 0 89 mg/dL      eGFR --    Narrative:      Notes:     1  eGFR calculation is only valid for adults 18 years and older  2  EGFR calculation cannot be performed for patients who are transgender, non-binary, or whose legal sex, sex at birth, and gender identity differ  NT-BNP PRO [[id number]]  (Abnormal) Collected: 04/27/22 2013    Lab Status: Final result Specimen: Blood from Arm, Right Updated: 04/27/22 2048     NT-proBNP 2,187 pg/mL     Lactic Acid [[id number]]  (Abnormal) Collected: 04/27/22 2013    Lab Status: Final result Specimen: Blood from Arm, Right Updated: 04/27/22 2047     LACTIC ACID 5 2 mmol/L     Narrative:      Result may be elevated if tourniquet was used during collection      HS Troponin 0hr (reflex protocol) [[id number]]  (Abnormal) Collected: 04/27/22 2013    Lab Status: Final result Specimen: Blood from Arm, Right Updated: 04/27/22 2047     hs TnI 0hr 216 ng/L     Procalcitonin [[id number]]  (Abnormal) Collected: 04/27/22 2013    Lab Status: Final result Specimen: Blood from Arm, Right Updated: 04/27/22 2047     Procalcitonin 75 41 ng/ml     Protime-INR [[id number]]  (Abnormal) Collected: 04/27/22 2013    Lab Status: Final result Specimen: Blood from Arm, Right Updated: 04/27/22 2042     Protime 14 6 seconds      INR 1 16    APTT [[id number]]  (Normal) Collected: 04/27/22 2013    Lab Status: Final result Specimen: Blood from Arm, Right Updated: 04/27/22 2042     PTT 28 seconds     CBC and differential [[id number]]  (Abnormal) Collected: 04/27/22 2013    Lab Status: Final result Specimen: Blood from Arm, Right Updated: 04/27/22 2030     WBC 10 27 Thousand/uL      RBC 4 57 Million/uL      Hemoglobin 14 0 g/dL      Hematocrit 44 2 %      MCV 97 fL      MCH 30 6 pg      MCHC 31 7 g/dL      RDW 13 4 %      MPV 9 7 fL      Platelets 392 Thousands/uL     Narrative: This is an appended report  These results have been appended to a previously verified report  RADIOLOGY RESULTS   Final Result by  (04/30 1106)      XR chest portable ICU   Final Result by Adrián Ramos MD (04/29 9678)      Right jugular catheter in right atrium with no pneumothorax  To be at the cavoatrial junction, it could be pulled back 2 cm  Persistent patchy bilateral groundglass opacity and left base consolidation  Workstation performed: [id number]         CT lower extremity wo contrast right   Final Result by Dwight Rizo MD (04/29 9385)      1  No imaging evidence of necrotizing fasciitis, noting that this does not exclude the diagnosis  Correlate clinically for additional signs  2   Scattered soft tissue swelling without organized collection      The major findings are in agreement with the preliminary report provided by [facility] which was provided shortly after completion of the exam           Workstation performed: [id number]         VAS lower limb venous duplex study, complete bilateral   Final Result by Bam Pierce MD (04/28 1948)      CT abdomen pelvis wo contrast   ED Interpretation by Misbah Quarles DO (04/28 0024)   FINDINGS:     ABDOMEN     Evaluation of intra-abdominal organs is limited to lack of IV contrast  Evaluation of the bowel is limited to lack of enteric contrast       LOWER CHEST:  See CT of the chest performed earlier the same day      LIVER/BILIARY TREE:  Unremarkable      GALLBLADDER:  No calcified gallstones   No pericholecystic inflammatory change      SPLEEN:  Stable hypodensities measuring up to 2 7 cm in the spleen      PANCREAS:  Unremarkable      ADRENAL GLANDS:  Unremarkable      KIDNEYS/URETERS:  Unremarkable  No hydronephrosis      STOMACH AND BOWEL:  Unremarkable      APPENDIX:  No findings to suggest appendicitis      ABDOMINOPELVIC CAVITY:  No ascites  No pneumoperitoneum  No lymphadenopathy      VESSELS:  Infrarenal IVC filter in place      PELVIS     REPRODUCTIVE ORGANS:  Surgical changes of prior hysterectomy      URINARY BLADDER:  Unremarkable      ABDOMINAL WALL/INGUINAL REGIONS:  Subcutaneous fat stranding overlying the proximal right thigh  There right inguina   l lymphadenopathy measuring up to 1 3 cm in short axis      OSSEOUS STRUCTURES:  Motion limited examination  Fixation of the right pelvic pelvis with severe posttraumatic degenerative change of the right hip  No acute fracture or destructive osseous lesion      IMPRESSION:     1  No acute inflammatory process identified within the abdomen or pelvis  2   Subcutaneous fat stranding overlying the proximal right thigh with right inguinal lymphadenopathy suggestive of right lower extremity infectious process  See CT of the right lower extremity for further evaluation          Final Result by Selvin Pena MD (04/28 0022)      1  No acute inflammatory process identified within the abdomen or pelvis  2   Subcutaneous fat stranding overlying the proximal right thigh with right inguinal lymphadenopathy suggestive of right lower extremity infectious process  See CT of the right lower extremity for further evaluation  Workstation performed: [id number]         CT lower extremity wo contrast right   Final Result by Ximena Mena MD (04/28 2275)   1    Right lower extremity and ankle subcutaneous edema consistent with nonspecific cellulitis without drainable collection, deep soft tissue involvement or gloria osseous destructive change to indicate osteomyelitis  2   Postoperative change noted after ORIF for a right acetabular, posterior column and ischial fracture without gross evidence of hardware complication, limited by beam hardening artifacts  Advanced posttraumatic right hip osteoarthritis including    subcortical collapse right femoral head  Additional postoperative change status post ORIF for a right tibial fracture with IM steffi, again without hardware complication given limitations of patient motion and beam hardening artifacts  Concordant preliminary virtual radiologic findings were provided at 2:34 AM on 4/28/2022  Workstation performed: [id number]         CT head without contrast   ED Interpretation by Stephani Limon DO (04/27 2255)   IMPRESSION:     No acute intracranial hemorrhage, midline shift, or mass effect  Final Result by Brittaney Ahuja MD (04/27 2250)      No acute intracranial hemorrhage, midline shift, or mass effect  Workstation performed: [id number]         CT chest without contrast   ED Interpretation by Stephani Limon DO (04/27 2255)   IMPRESSION:        1  Groundglass and interstitial airspace disease in the upper lungs, similar appearance to the prior exam suggesting chronic interstitial pneumonitis  2   Atelectasis and airspace consolidation in the dependent portions of the lung bases may represent aspiration and/or pneumonia  Final Result by Elis Nieves MD (04/27 2250)         1  Groundglass and interstitial airspace disease in the upper lungs, similar appearance to the prior exam suggesting chronic interstitial pneumonitis  2   Atelectasis and airspace consolidation in the dependent portions of the lung bases may represent aspiration and/or pneumonia  Workstation performed: [id number]         XR chest 1 view portable   Final Result by Masood Madden MD (04/28 0845)      Hypoinflated lungs   Diffuse bilateral airspace disease can be secondary to pneumonia versus pulmonary edema  Workstation performed: [id number]                    Procedures  Procedures         ED Course  ED Course as of 05/01/22 0825   Wed Apr 27, 2022 2048 LACTIC ACID(!!): 5 2   2056 NT-proBNP(!): 2,187   2056 The 30ml/kg fluid bolus was not given to the patient despite hypotension and/or significantly elevated lactate of = 4 and/or presence of septic shock due to: Concern for fluid/volume overload  Elevated BNP and hypoxic respiratory failure  The patient will be administered 1L of crystalloid fluid instead  Orders for this have been placed in Epic  The patient may receive additional colloid or crystalloid fluids thereafter based on clinical condition  Yessenia Kemp DO    2103 Bands Relative(!): 10   2130 CXR: diffuse interstitial changes, unable to compare to prior, ? CHF vs  Wegener's vs  PNA  Will get CT to better evaluate   2134 Reviewed results and plan with    2232 EKG sinus tachycardia, normal intervals, no ST/t wave changes   2234 Able to wean O2 down to 3L facemask  More awake but still confused   2300 hs TnI 2hr(!): 254   2302 LACTIC ACID(!!): 6 2  Worsening  Added CT abdomen/leg to r/o other sources such as ischemic gut/nec fasc  Will start 2nd L as pt's breathing is okay right now, but will closely monitor for worsening hypoxia   Thu Apr 28, 2022   0004 Case discussed with CC AP, Sony Diaz who had responded to the sepsis alert earlier and was already aware of pt  No critical care needs at this time, so okay for SD 2 unless and further changes/drop in BP/CT scan findings    0005 Repeat EKG- sinus tachycardia without ST/t wave changes   0044 D-Dimer, Quant(!): 10 15  Unable to obtain CTA due to contrast allergy and no duplex at night  Will treat for possible DVT/PE with Lovenox   0058 Discussed with SLIM, aware of admission   Pending CT lower extremity read             HEART Risk Score      Most Recent Value   Heart Score Risk Calculator    History 0 Filed at: 04/27/2022 2317   ECG 0 Filed at: 04/27/2022 2317   Age 2 Filed at: 04/27/2022 2317   Risk Factors 1 Filed at: 04/27/2022 2317   Troponin 2 Filed at: 04/27/2022 2317   HEART Score 5 Filed at: 04/27/2022 2317                     Initial Sepsis Screening     Row Name 04/27/22 2055 04/27/22 2028             Is the patient's history suggestive of a new or worsening infection? -- Yes (Proceed)  -[facility]       Suspected source of infection -- suspect infection, source unknown  -KH       Are two or more of the following signs & symptoms of infection both present and new to the patient? -- Yes (Proceed)  -[facility]       Indicate SIRS criteria -- Hyperthemia > 38 3C (100 9F); Tachycardia > 90 bpm  -KH       If the answer is yes to both questions, suspicion of sepsis is present -- --       If severe sepsis is present AND tissue hypoperfusion perists in the hour after fluid resuscitation or lactate > 4, the patient meets criteria for SEPTIC SHOCK -- --       Are any of the following organ dysfunction criteria present within 6 hours of suspected infection and SIRS criteria that are NOT considered to be chronic conditions? Yes  -KH No  -KH       Organ dysfunction Lactate >/equal 4 0 mmol/L (MEETS CRITERIA FOR SEPTIC SHOCK)  -KH --       Date of presentation of severe sepsis 04/27/22  -KH --       Time of presentation of severe sepsis 2055  [facility] --       Tissue hypoperfusion persists in the hour after crystalloid fluid administration, evidenced, by either: -- --       Was hypotension present within one hour of the conclusion of crystalloid fluid administration?  No  -KH --       Date of presentation of septic shock -- --       Time of presentation of septic shock -- --             User Key  (r) = Recorded By, (t) = Taken By, (c) = Cosigned By    [address] Name Provider Type    [address] [place] [address],  Physician              Default Flowsheet Data (last 720 hours)     Sepsis Reassess     Row Name 04/27/22 2058                   Repeat Volume Status and Tissue Perfusion Assessment Performed    Repeat Volume Status and Tissue Perfusion Assessment Performed Yes  -KH                  Volume Status and Tissue Perfusion Post Fluid Resuscitation * Must Document All *    Vital Signs Reviewed (HR, RR, BP, T) Yes  -KH        Shock Index Reviewed Yes  -KH        Arterial Oxygen Saturation Reviewed (POx, SaO2 or SpO2) Yes (comment %)  -        Cardio Tachycardia  -KH        Pulmonary Tachypnea;Clear to auscultation  -        Capillary Refill Brisk  -        Peripheral Pulses Radial;Dorsalis Pedis; Posterior Tibialis  -        Peripheral Pulse +1  -KH        Dorsalis Pedis +1  -KH        Posterior Tibialis +1  -KH        Skin Warm;Dry  -KH        Urine output assessed Adequate  -KH                  *OR*   Intensive Monitoring- Must Document One of the Following Four *:    Vital Signs Reviewed --        * Central Venous Pressure (CVP or RAP) --        * Central Venous Oxygen (SVO2, ScvO2 or Oxygen saturation via central catheter) --        * Bedside Cardiovascular US in IVC diameter and % collapse --        * Passive Leg Raise OR Crystalloid Challenge --              User Key  (r) = Recorded By, (t) = Taken By, (c) = Cosigned By    Initials Name Provider Type    [address] [place] [address],  Physician              SBIRT 22yo+      Most Recent Value   SBIRT (24 yo +)    In order to provide better care to our patients, we are screening all of our patients for alcohol and drug use  Would it be okay to ask you these screening questions? Unable to answer at this time Filed at: 04/27/2022 [address] [place] [address]  Number of Diagnoses or Management Options  Acute encephalopathy  RUCHI (acute kidney injury) ([facility] [address] )  Cellulitis of right leg  Elevated d-dimer  Elevated troponin  Fever  Hypoxia  Pneumonia  Right leg swelling  Severe sepsis [facility])  Diagnosis management comments: 68 yo F presenting with AMS/fever, SIRS criteria/severe sepsis- source RLE cellulitis vs  PNA   Broad spectrum abx started  Pending CT RLE read, SLIM aware of admission and case has been discussed with CC       Amount and/or Complexity of Data Reviewed  Clinical lab tests: ordered and reviewed  Tests in the radiology section of CPT®: ordered and reviewed  Tests in the medicine section of CPT®: reviewed and ordered  Review and summarize past medical records: yes  Independent visualization of images, tracings, or specimens: yes        Disposition  Final diagnoses:   Acute encephalopathy   Fever   RUCHI (acute kidney injury) ([facility] [address] )   Hypoxia   Pneumonia   Elevated troponin   Right leg swelling   Elevated d-dimer   Cellulitis of right leg   Se[facility] ([facility] )     Time reflects when diagnosis was documented in both MDM as applicable and the Disposition within this note     Time User Action Codes Description Comment    4/27/2022 10:37 PM Raleigh Joqauina Add [G93 40] Acute encephalopathy     4/27/2022 10:37 PM Gennett Adia A Add [R50 9] Fever     4/27/2022 10:39 PM Raleigh Golva Add [N17 9] RUCHI (acute kidney injury) ([facility] )     4/27/2022 10:39 PM Gennett Adia A Add [R09 02] Hypoxia     4/27/2022 11:18 PM Raleigh Joaquina Add [J18 9] Pneumonia     4/27/2022 11:18 PM Gennett Adia A Add [R77 8] Elevated troponin     4/27/2022 11:21 PM Raleigh Joaquina Add [M79 89] Right leg swelling     4/28/2022 12:46 AM Gennett Adia A Add [R79 89] Elevated d-dimer     4/28/2022 12:46 AM Gennett Adia A Add [L34 269] Cellulitis of right leg     4/28/2022 12:46 AM Cathy Reddy A Add [A41 9,  R65 20] Severe sepsis ([facility] )     4/28/2022  3:34 AM Nadia Gant Add [O14 357] Cellulitis of right lower extremity     4/28/2022 11:19 PM Sage Mendes Add [R31 8] Metabolic acidosis       ED Disposition     ED Disposition Condition Date/Time Comment    Admit Stable Thu Apr 28, 2022 12:46 AM Case was discussed with DECLAN and the patient's admission status was agreed to be Admission Status: inpatient status to the service of Dr David Pak           Follow-up Information    None Current Discharge Medication List      CONTINUE these medications which have NOT CHANGED    Details   acetaminophen (TYLENOL) 325 mg tablet Take 2 tablets by mouth every 6 (six) hours as needed for mild pain  Qty: 30 tablet, Refills: 0      bisacodyl (DULCOLAX) 5 mg EC tablet Take 5 mg by mouth daily as needed for constipation       Calcium Carbonate-Vit D-Min (CALCIUM 1200 PO) Take 3 tablets by mouth daily       DULoxetine (CYMBALTA) 60 mg delayed release capsule Take 60 mg by mouth daily    Refills: 0      furosemide (LASIX) 20 mg tablet Take 1 tablet (20 mg total) by mouth daily  Qty: 90 tablet, Refills: 3    Associated Diagnoses: Chronic kidney disease, stage III (moderate) ([facility] [address] ); Wegeners granulomatosis; Low immunoglobulin level      LORazepam (ATIVAN) 0 5 mg tablet Take 1 tablet as needed every 8 hours  Qty: 30 tablet, Refills: 0      metoprolol tartrate (LOPRESSOR) 25 mg tablet TAKE 1/2 TABLET(12 5MG) BY MOUTH EVERY 12 HOURS  Qty: 90 tablet, Refills: 3    Associated Diagnoses: Benign essential hypertension; Hypertrophic cardiomyopathy (HCC)      Multiple Vitamins-Minerals (MULTIVITAMIN ADULT PO) Take 1 capsule by mouth daily       omeprazole (PriLOSEC) 20 mg delayed release capsule Take 20 mg by mouth daily  ondansetron (ZOFRAN) 4 mg tablet Take 1 tablet (4 mg total) by mouth every 8 (eight) hours as needed for nausea or vomiting  Qty: 20 tablet, Refills: 0    Associated Diagnoses: Nausea      oxyCODONE (OxyCONTIN) 40 mg 12 hr tablet Take 40 mg by mouth every 12 (twelve) hours      OXYCODONE HCL PO Take 15 mg by mouth 2 (two) times a day as needed       TOVIAZ 8 MG TB24 Take 8 mg by mouth daily at bedtime              No discharge procedures on file      PDMP Review       Value Time User    PDMP Reviewed  Yes 4/28/2022 10:29 PM Awilda Zepeda [facility] Provider  Electronically Signed by           Radha Ivey DO  05/01/22 [id number]

## 2022-04-28 NOTE — ASSESSMENT & PLAN NOTE
· Patient has history of chronic pain disorder  · Maintained on OxyContin ER 40 mg b i d , oxycodone IR 15mg b i d PRN  · On lorazepam 0 5 mg b i d --> confirms she takes this scheduled BID  · Medications verified on PDMP  · Patient has generalized tenderness on palpation, continue opioid home regimen  · Patient presented with metabolic encephalopathy secondary to sepsis:  Will continue her home benzodiazepine regimen at a lower dose of ativan 0 25mg bid (to avoid precipitating withdrawal, and hopefully minimizing side effects)

## 2022-04-28 NOTE — PLAN OF CARE
Problem: Potential for Falls  Goal: Patient will remain free of falls  Description: INTERVENTIONS:  - Educate patient/family on patient safety including physical limitations  - Instruct patient to call for assistance with activity   - Consult OT/PT to assist with strengthening/mobility   - Keep Call bell within reach  - Keep bed low and locked with side rails adjusted as appropriate  - Keep care items and personal belongings within reach  - Initiate and maintain comfort rounds  - Make Fall Risk Sign visible to staff  - Offer Toileting every two Hours, in advance of need  - Initiate/Maintain bed alarm  - Obtain necessary fall risk management equipment: bed alarm  - Apply yellow socks and bracelet for high fall risk patients  - Consider moving patient to room near nurses station  Outcome: Progressing

## 2022-04-28 NOTE — ASSESSMENT & PLAN NOTE
Wt Readings from Last 3 Encounters:   04/27/22 101 kg (223 lb)   04/21/22 102 kg (225 lb)   09/07/21 102 kg (225 lb 11 2 oz)     · Elevated BNP >2100    Echo LVEF 70%, normal wall motion, G2DD  · Continue metoprolol  · Monitor daily weight

## 2022-04-28 NOTE — ASSESSMENT & PLAN NOTE
· Juan's granulomatosis diagnosed in Feb 2010, treated with rituximab for 4 years and prednisone with good response although discontinued due to subsequent hypogammaglobinemia and remission of disease

## 2022-04-28 NOTE — ASSESSMENT & PLAN NOTE
Patient has a history of HOCM, and follows with the cardiology team  Previous echocardiogram 7/21 revealed dynamic outflow tract obstruction, and septal hypertrophy  Avoid volume depletion and hypotension with sepsis  Appreciate cardiology evaluation and recommendations

## 2022-04-28 NOTE — ASSESSMENT & PLAN NOTE
· Pt has a h/o  DVT and PE in 2007 after hysterectomy for ovarian cancer at [facility]  IVC filter was placed  Treated with Lovenox for 1 year  · Presented with elevated D dimer 10 15  · B/LLE duplex study negative for DVT  · Treat with Lovenox weight based dosing until eval for PE further-->IV dye allergy  Will require premedication    · Will confer with pulmonary team further

## 2022-04-28 NOTE — CONSULTS
Consultation - Infectious Disease   Naya Arroyo 69 y o  female MRN: [id number]  Unit/Bed#: ICU 11 Encounter: [id number]    IMPRESSION & RECOMMENDATIONS:   1  Severe sepsis  POA  Fever, tachycardia, leukocytosis, and lactic acidosis  Secondary to recurring RLE cellulitis  Consider possibility of bacteremia, blood cultures are pending  Also consider possible respiratory source as patient has ground glass on chest imaging although this may be more indicative of chronic interstitial pneumonitis  No other clear source appreciated  Patient remains hemodynamically stable at this time  Her fever curve is trending down   -antibiotic as below  -check CBC and BMP  -follow up blood culture  -monitor vitals  -supportive care    2  Recurring RLE cellulitis  In setting of severe lymphedema  I personally reviewed patient's RLE CT imaging which showed severe soft tissue edema but no drainable collection  No evidence of deep soft tissue involvement or osseous changes but she does have hardware along the tibia and in the ankle  She is awaiting orthopedic surgery evaluation  Leg appears tight, tender, and hot on exam  Consider possibility of toxin involvement  She is currently receiving IV cefazolin which she is tolerating without difficulty  I will add clindamycin to her regimen for now    -continue IV cefazolin  -start IV clindamycin now  -check CBC and BMP tomorrow  -monitor vitals  -serial RLE exams  -follow up orthopedic surgery consult    3  Acute hypoxic respiratory failure  Patient requiring nonrebreather mask by EMS, now on face mask O2  I personally reviewed her chest xray and CT chest which showed B/L atelectasis with basilar consolidations, and upper airspace opacities likely representing chronic interstitial pneumonitis  COVID-19/Flu/RSV PCR was negative  This may all be due to severe sepsis with metabolic encephalopathy  Also consider possibility of PE  D dimer elevated  She unfortunately can't have dye due to allergy  She has been started on Lovenox  Pulmonology will be consulted   -antibiotics as above  -check CBC and BMP tomorrow  -monitor vitals  -monitor respiratory symptoms  -O2 support per primary service     4  CKD III  Upon review of patient's available past medical records it appears her baseline creatinine is approximately 0 8-1 2  She was elevated at 1 5 upon admission which I suspect is pre-renal in setting of acute infection above  Patient's creatinine has improved to 1 25 this morning   -check creatinine tomorrow  -dose adjust antibiotics for renal function as needed  -avoid nephrotoxins  -volume management per SLIM    5  Acute metabolic encephalopathy  Likely secondary to severe sepsis and acute infection above  I personally reviewed patient's CT of the head which showed no acute intracranial findings  Ammonia level normal  Low suspicion for CNS infection at this time   -serial neuro exams  -monitor mood and mental status  -supportive care    6  Lymphedema  This is likely driving force of patient's recurring RLE cellulitis above  7  Wegener's granulomatosis with history of renal involvement  Patient with history of immunosuppressive treatment but has been off for many years  8  Morbid obesity  BMI = 40 42  Thank you for the consult  We will continue to follow along  Above plan was discussed in detail with patient at the bedside  Above plan was discussed in detail with SLIM  HISTORY OF PRESENT ILLNESS:  Reason for Consult: cellulitis of right lower extremity  HPI: Delbert Rousseau is a 69y o  year old female who presented to the [address] [place] [address] ED on 4/27/2022 with altered mental status  Patient's  was out cutting the lawn and came inside to find his wife confused   reported this has happened to her before when she becomes septic  He called EMS  Upon arrival to the ED the patient's temperature was 102  HR was 133  RR was 16  O2 saturation was 92% on a nonrebreather mask   BP was 119/68  Her WBC count was 10 27  Lactic acid was 5 2  Creatinine was 1 5  AST and Alk phos were elevated  ProBNP was 2187  HS trop was 216  Procal was 75  41  Ddimer was 10 15  UA was benign  COVID-19/Flu/RSV PCR was negative  Blood cultures were sent  CXR showed B/L airspace opacities  Chest CT showed groundglass and interstitial opacities with atelectasis and lower lobe consolidations  CT head with no acute findings  CT abdomen suggested fat stranding throughout the R thigh and R inguinal lymphadenopathy  CT of the RLE showed intact tibial hardware and severe cellulitis of the R ankle and calf  She was started on cefepime and vancomycin  The patient was admitted for additional medical management  After her admission the patient's antibiotic regimen was transitioned to IV cefazolin  Urine strep and legionella antigens were ordered  Orthopedic surgery has been consulted  She cannot have further leg imaging with CT due to contrast allergy and may require MRI  Cardiology has been consulted  A B/L LE duplex has been ordered to rule out DVT  She's been started on lovenox  We have been asked for formal consult for RLE cellulitis  Of note, patient has history of recurring RLE cellulitis in setting of severe lymphedema  She has had strep bacteremia from cellulitis in the past  There is hardware in the R lower leg  She has used suppressive antibiotics in the past       The patient has chronic pain disorder, lymphedema, chronic diastolic heart failure, lymphedema, wegener's granulomatosis with renal involvement, hypogammaglobulinemia, hypertention, anxiety, obesity, CKD III, depression, hypertrophic cardiomyopathy, slow transit constipation, right sided low back pain without sciatica, mitral valve disorder, LVH, post-traumatic osteoarthritis, and GERD   She has a history of recurring RLE cellulitis, strep bacteremia, ovarian cancer, urinary frequency, GONZALES, appendectomy, , chemotherapy, hysterectomy, oopherectomy, breast biopsies, B/L salpingectomy, and R tibia hardware insertion to repair traumatic injury  She has allergies to iodine, omnipaque, IVP dye, iodides, methotrexate, and methotrexate derivatives  REVIEW OF SYSTEMS:  Very limited ROS as patient is very drowsy, falls asleep multiple times during our conversation  She denies pain in her chest and abdomen  She denies difficulty breathing, nausea, and vomiting  She reports a lot of pain in the RLE but cannot describe further or tell me if it is worse than her baseline chronic pain  Patient offers no other symptoms  Bedside RN reports her lactic acid is trending down  RN notes she's very tender when her RLE is touched  PAST MEDICAL HISTORY:  Past Medical History:   Diagnosis Date    BRCA1 positive     Cancer ([facility] [address] )     ovarian Ca with chemo    History of chemotherapy     ovarian cancer     History of pulmonary embolus (PE) & DVT 2007    post-op FAMILIA/ omenectomy    Lymphedema     MRSA (methicillin resistant Staphylococcus aureus) 2017    nasal swab negative 4/3/19    Ovarian cancer ([facility] [address] )     CYST REMOVED RIGHT OVARY IN   HYSTERECTOMY 07       Past Surgical History:   Procedure Laterality Date    APPENDECTOMY  1998    laparoscopic    BILATERAL SALPINGOOPHORECTOMY  2007    BREAST BIOPSY Right 2016    BREAST BIOPSY Left 07/15/2020    BREAST BIOPSY Left 2021    stereo     SECTION      HYSTERECTOMY  2007    [facility] BIOPSY RIGHT (ALL INC) Right 2021    OOPHORECTOMY Bilateral 2007    OTHER SURGICAL HISTORY      right lower extremity due to trauma    SC TREAT TIBIAL SHAFT FX, INTRAMED IMPLANT Right 2017    Procedure: INSERTION NAIL IM TIBIA;  Surgeon: Meenu Malik MD;  Location: [facility];  Service: Orthopedics    US GUIDANCE BREAST BIOPSY LEFT EACH ADDITIONAL Left 7/15/2020    US GUIDED BREAST BIOPSY LEFT COMPLETE Left 7/15/2020    US GUIDED BREAST BIOPSY RIGHT COMPLETE Right 2016    US GUIDED BREAST BIOPSY RIGHT COMPLETE Right 2021     FAMILY HISTORY:  Non-contributory    SOCIAL HISTORY:  Social History   Social History     Substance and Sexual Activity   Alcohol Use Yes     Social History     Substance and Sexual Activity   Drug Use Not Currently     Social History     Tobacco Use   Smoking Status Never Smoker   Smokeless Tobacco Never Used     ALLERGIES:  Allergies   Allergen Reactions    Iodinated Diagnostic Agents Shortness Of Breath    Omnipaque [Iohexol] Shortness Of Breath    Other Shortness Of Breath     IVP dye, x ray dye 3    Iodides      Other reaction(s): SWELLING, SOB    Methotrexate Nausea Only     Headache and nausea    Methotrexate Derivatives Headache     MEDICATIONS:  All current active medications have been reviewed  ANTIBIOTICS:  Cefazolin 2  Antibiotics 2    PHYSICAL EXAM:  Temp:  [99 2 °F (37 3 °C)-103 3 °F (39 6 °C)] 99 2 °F (37 3 °C)  HR:  [111-133] 122  Resp:  [16-33] 33  BP: (102-122)/(50-68) 102/51  SpO2:  [92 %-99 %] 96 %  Temp (24hrs), Av 5 °F (38 6 °C), Min:99 2 °F (37 3 °C), Max:103 3 °F (39 6 °C)  Current: Temperature: 99 2 °F (37 3 °C)    Intake/Output Summary (Last 24 hours) at 2022 0748  Last data filed at 2022 0059  Gross per 24 hour   Intake 2050 ml   Output --   Net 2050 ml     General Appearance:  Appearing lethargic, somewhat interactive but falls asleep during conversation  Appears pale, ill  Appears comfortable sitting up in no acute distress   Head:  Normocephalic, without obvious abnormality, atraumatic  Eyes:  Conjunctiva pink and sclera anicteric, both eyes  Nose: Nares normal, mucosa normal, no drainage  Throat: Oropharynx dry without lesions  Mucous membranes pale  Neck: Supple, symmetrical, no adenopathy, no tenderness/mass/nodules     Lungs:   Decreased to auscultation bilaterally, respirations very shallow, unlabored, on face mask O2  Chest Wall:  No tenderness or deformity  Heart:  Tachycardic; no murmur, rub or gallop   Abdomen:   Soft, non-tender, non-distended, positive bowel sounds throughout   Extremities: RLE with severe lymphedema, appears also edematous, is hot to touch with faint erythema over medial shin/calf and dorsal foot, is very tender with palpation  R groin tender to palpation  Skin: No rashes noted on exposed skin  Lymph nodes: Cervical, supraclavicular nodes normal    Neurologic: Oriented to self, is aware she is in the hospital , follows commands but falls asleep multiple times during exam     LABS, IMAGING, & OTHER STUDIES:  Lab Results:  I have personally reviewed pertinent labs  Results from last 7 days   Lab Units 04/28/22  0658 04/27/22 2013   WBC Thousand/uL 22 92* 10 27*   HEMOGLOBIN g/dL 12 2 14 0   PLATELETS Thousands/uL 190 207     Results from last 7 days   Lab Units 04/28/22  0701 04/27/22 2013 04/27/22 2013   POTASSIUM mmol/L 3 6   < > 4 0   CHLORIDE mmol/L 107   < > 102   CO2 mmol/L 21   < > 26   BUN mg/dL 22   < > 22   CREATININE mg/dL 1 25   < > 1 50*   EGFR ml/min/1 73sq m 43  --   --    CALCIUM mg/dL 7 7*   < > 8 8   AST U/L  --   --  70*   ALT U/L  --   --  43   ALK PHOS U/L  --   --  174*    < > = values in this interval not displayed  Results from last 7 days   Lab Units 04/27/22 2013   BLOOD CULTURE  Received in Microbiology Lab  Culture in Progress  Received in Microbiology Lab  Culture in Progress  Results from last 7 days   Lab Units 04/27/22 2013   PROCALCITONIN ng/ml 75 41*      Results from last 7 days   Lab Units 04/28/22  0003   D-DIMER QUANTITATIVE ug/ml FEU 10 15*     Imaging Studies:   I have personally reviewed pertinent imaging study reports and images in PACS  CT ABDOMEN PELVIS WO CONTRAST 4/28/2022 - I personally reviewed this study which showed an unremarkable liver, gallbladder, spleen, and pancreas   Stomach and bowel are unremarkable without obstruction  Kidneys are unremarkable without obstruction or hydronephrosis  Bladder is unremarkable  R upper thigh shows subcutaneous fat stranding  There is R inguinal lymphadenopathy  CT LOWER EXTREMITY WO CONTRAST RIGHT 4/28/2022 - I personally reviewed this study which showed intact hardware in RLE below the knee  CT CHEST WITHOUT CONTRAST 4/27/2022 - I personally reviewed this study which showed B/L lower lobe atelectasis with airspace consolidations  Patient has interstitial and groundglass opacities in the upper lobes  CT HEAD WITHOUT CONTRAST 4/27/2022 - I personally reviewed this study which showed no acute intracranial findings  Patient with no mass, shift, infarct, or hemorrhage  There is decreased attenuation in the periventricular and subcortical white matter likely representing microangiopathic changes  Patient with chronic lacunar infarcts  Other Studies:   Blood cultures are pending  I have personally reviewed pertinent reports:      04/27/2022 2013 04/27/2022 2100 COVID/FLU/RSV - 2 hour TAT [[id number]]    Nares from Nose    Final result [address] URL to browser: [email or web address]  [email or web address]   SARS-CoV-2 assay is a Nucleic Acid Amplification assay intended for the   qualitative detection of nucleic acid from SARS-CoV-2 in nasopharyngeal   swabs  Results are for the presumptive identification of SARS-CoV-2 RNA  Positive results are indicative of infection with SARS-CoV-2, the virus   causing COVID-19, but do not rule out bacterial infection or co-infection   with other viruses  Laboratories within the [place] and its   territories are required to report all positive results to the appropriate   public health authorities   Negative results do not preclude SARS-CoV-2   infection and should not be used as the sole basis for treatment or other   patient management decisions  Negative results must be combined with   clinical observations, patient history, and epidemiological information  This test has not been FDA cleared or approved  This test has been authorized by FDA under an Emergency Use Authorization   (EUA)  This test is only authorized for the duration of time the   declaration that circumstances exist justifying the authorization of the   emergency use of an in vitro diagnostic tests for detection of SARS-CoV-2   virus and/or diagnosis of COVID-19 infection under section 564(b)(1) of   the Act, 21 U  S C  519LAJ-5(B)(6), unless the authorization is terminated   or revoked sooner  The test has been validated but independent review by FDA   and CLIA is pending  Test performed using [employer] Gene[employer]pert: This RT-PCR assay targets N2,   a region unique to SARS-CoV-2  A conserved region in the E-gene was chosen   for pan-Sarbecovirus detection which includes SARS-CoV-2      Component Value   SARS-CoV-2 Negative    INFLUENZA A PCR Negative    INFLUENZA B PCR Negative    RSV PCR Negative

## 2022-04-28 NOTE — CONSULTS
Consult - Cardiology   Issac Melvin 69 y o  female MRN: [id number]  Unit/Bed#: ICU 11 Encounter: [id number]        Reason For Consult:  Elevated troponin             ASSESSMENT:  Elevated troponin  Hypertrophic obstructive cardiomyopathy   - LVEF 70%, wall thickness markedly increase, severe asymmetrical hypertrophy of the septum, dynamic obstruction at rest in the outflow track with peak gradient of 37 mmHg, dynamic obstruction during Valsalva with peak gradient of 75 mmHg, grade 2 diastolic dysfunction, left atrium mildly to moderately dilated, right atrium mildly dilated, mitral valve with severe systolic anterior motion of the anterior leaflet, trace TR, 7/8/21  Severe sepsis  Acute encephalopathy  Acute hypoxic respiratory failure  Elevated D-dimer  Chronic lymphedema with recurrent cellulitis right lower extremity  Acute kidney injury on CKD stage 3  Hypertension  Dyslipidemia  History of PE and DVT, IVC filter in place, 2007  Wegener's granulomatosis  Hypogammaglobulinemia  Ovarian cancer post bilateral oophorectomy and hysterectomy  Obesity    PLAN/ DISCUSSION:     · Elevated troponin with high sensitivity troponin trend 216, 254, 254  Suspect elevated troponin the setting of severe sepsis, hypoxia and acute renal failure  · CT of chest revealed ground-glass and interstitial airspace disease in upper lungs suggesting chronic interstitial pneumonitis  Atelectasis and airspace consolidation in the dependent portions of the lung bases may represent aspiration and/or pneumonia  · CT of abdomen and pelvis revealed fat stranding overlying the proximal right thigh with right inguinal lymphadenopathy suggestive of right lower extremity infectious process  CT lower extremity revealed right lower extremity and ankle subcutaneous edema with deep soft tissue involvement to indicate osteomyelitis  · Patient tachycardiac, however in the setting of severe sepsis   Suspect tachycardia to improve with improvement of sepsis  Continue metoprolol tartrate as blood pressure allows  · In light of elevated D-dimer, plan for lower extremity duplex to be completed  Consider V/Q scan  · Recommend judicious IV fluid hydration in light of severe sepsis, will plan for diuresis to follow  · Monitor volume status with strict intake/output, daily weight  · Monitor renal function and lytes closely; maintain potassium > 4, magnesium > 2    · Check lipid panel, will add onto morning lab studies  History Of Present Illness:  75-year-old female presented to [facility] due to encephalopathy  Patient's  came inside the home to find his wife confused  Per , this had happened to the patient before when she became septic thus he called EMS  Upon arrival to the emergency department, patient was febrile with a temperature 102°  Her heart rate was noted to be 133 beats per minute  Oxygen saturation was 92% on a non-rebreather  Procalcitonin elevated at 75 41, D-dimer 10 15 and high sensitivity troponin of 216  Chest x-ray revealed bilateral airspace opacities  CT of chest revealed ground-glass and interstitial opacities with atelectasis and lower lobe consolidations  Right lower extremity ECG revealed intact tibial hardware and severe cellulitis of the right ankle and calf  Patient has a history of recurring right lower extremity cellulitis; additionally, she has had strep bacteremia from cellulitis in the past as well  Patient seen and evaluated at the bedside in the intensive care unit  Patient appears to be distressed  She remains on a simple mask upon exam  She wakes to verbal stimulation  She is oriented to self, location  She is able to discern that she has an infection of her right lower extremity  She notes that her breathing has improved since yesterday with simple mask in place  She denies any current or previous chest pain  Please see significant cardiac history and problems enumerated above   Patient follows with Dr Jazmín Saldaña with Cardiology as an outpatient with most recent visit on 21  Past Medical History:        Past Medical History:   Diagnosis Date    BRCA1 positive     Cancer [facility] [address] )     ovarian Ca with chemo    History of chemotherapy     ovarian cancer     History of pulmonary embolus (PE) & DVT 2007    post-op FAMILIA/ omenectomy    Lymphedema     MRSA (methicillin resistant Staphylococcus aureus) 2017    nasal swab negative 4/3/19    Ovarian cancer ([facility] [address] )     CYST REMOVED RIGHT OVARY IN   HYSTERECTOMY 07        Past Surgical History:   Procedure Laterality Date    APPENDECTOMY      laparoscopic    BILATERAL SALPINGOOPHORECTOMY  2007    BREAST BIOPSY Right 2016    BREAST BIOPSY Left 07/15/2020    BREAST BIOPSY Left 2021    stereo     SECTION      HYSTERECTOMY  2007    O[facility]ectomy and [facility] node [facility] at [facility] BIOPSY RIGHT (ALL INC) Right 2021    OOPHORECTOMY Bilateral 2007    OTHER SURGICAL HISTORY      right lower extremity due to trauma    MA TREAT TIBIAL SHAFT FX, INTRAMED IMPLANT Right 2017    Procedure: INSERTION NAIL IM TIBIA;  Surgeon: Shanell Cano MD;  Location: [facility] MAIN OR;  Service: Orthopedics    US GUIDANCE BREAST BIOPSY LEFT EACH ADDITIONAL Left 7/15/2020    US GUIDED BREAST BIOPSY LEFT COMPLETE Left 7/15/2020    US GUIDED BREAST BIOPSY RIGHT COMPLETE Right 2016    US GUIDED BREAST BIOPSY RIGHT COMPLETE Right 2021        Allergy:        Allergies   Allergen Reactions    Iodinated Diagnostic Agents Shortness Of Breath    Omnipaque [Iohexol] Shortness Of Breath    Other Shortness Of Breath     IVP dye, x ray dye 3    Iodides      Other reaction(s): SWELLING, SOB    Methotrexate Nausea Only     Headache and nausea    Methotrexate Derivatives Headache       Medications:       Prior to Admission medications    Medication Sig Start Date End Date Taking? Authorizing Provider   acetaminophen (TYLENOL) 325 mg tablet Take 2 tablets by mouth every 6 (six) hours as needed for mild pain  Patient taking differently: Take 650 mg by mouth every 6 (six) hours as needed for mild pain  9/19/17   Felisa Gambino MD   bisacodyl (DULCOLAX) 5 mg EC tablet Take 5 mg by mouth daily as needed for constipation     Historical Provider, MD   Calcium Carbonate-Vit D-Min (CALCIUM 1200 PO) Take 3 tablets by mouth daily     Historical Provider, MD   DULoxetine (CYMBALTA) 60 mg delayed release capsule Take 60 mg by mouth daily   4/13/18   Historical Provider, MD   furosemide (LASIX) 20 mg tablet Take 1 tablet (20 mg total) by mouth daily  Patient not taking: Reported on 3/12/2021 7/31/20   Karlee Holden DO   LORazepam (ATIVAN) 0 5 mg tablet Take 1 tablet as needed every 8 hours  Patient taking differently: Take 0 5 mg by mouth 2 (two) times a day Take 1 tablet as needed every 8 hours  2/3/16   RENETTA Oscar   metoprolol tartrate (LOPRESSOR) 25 mg tablet TAKE 1/2 TABLET(12 5MG) BY MOUTH EVERY 12 HOURS 9/2/21   Marilia Rodriguez MD   Multiple Vitamins-Minerals (MULTIVITAMIN ADULT PO) Take 1 capsule by mouth daily  9/27/13   Historical Provider, MD   omeprazole (PriLOSEC) 20 mg delayed release capsule Take 20 mg by mouth daily      Historical Provider, MD   ondansetron (ZOFRAN) 4 mg tablet Take 1 tablet (4 mg total) by mouth every 8 (eight) hours as needed for nausea or vomiting  Patient not taking: Reported on 6/4/2021 10/21/20   Navin Bazzi PA-C   oxyCODONE (OxyCONTIN) 40 mg 12 hr tablet Take 40 mg by mouth every 12 (twelve) hours    Historical Provider, MD   OXYCODONE HCL PO Take 15 mg by mouth 2 (two) times a day as needed     Historical Provider, MD   TOVIAZ 8 MG TB24 Take 8 mg by mouth daily at bedtime  4/5/18   Historical Provider, MD       Family History:     Family History   Problem Relation Age of Onset    Breast cancer Mother         28   [facility] Pancreatic cancer Mother     Ovarian cancer Maternal Grandmother     Breast cancer Maternal Aunt 39    No Known Problems Paternal Aunt     No Known Problems Father     No Known Problems Maternal Grandfather     No Known Problems Paternal Grandmother     No Known Problems Paternal Grandfather     Lung cancer Half-Sister         Social History:       Social History     Socioeconomic History    Marital status: /Civil Union     Spouse name: Not on file    Number of children: Not on file    Years of education: Not on file    Highest education level: Not on file   Occupational History    Not on file   Tobacco Use    Smoking status: Never Smoker    Smokeless tobacco: Never Used   Vaping Use    Vaping Use: Never used   Substance and Sexual Activity    Alcohol use: Yes    Drug use: Not Currently    Sexual activity: Yes     Partners: Male     Birth control/protection: None   Other Topics Concern    Not on file   Social History Narrative    Not on file     Social Determinants of Health     Financial Resource Strain: Not on file   Food Insecurity: Not on file   Transportation Needs: Not on file   Physical Activity: Not on file   Stress: Not on file   Social Connections: Not on file   Intimate Partner Violence: Not on file   Housing Stability: Not on file       ROS:  Please see above  Patient able to answer some questions, in acute distress  Remainder review of systems is negative    Exam:  General:  alert, oriented and in no distress, cooperative  Head: Normocephalic, atraumatic  Eyes:  EOMI  Pupils - equal, round, reactive to accomodation  No icterus  Normal Conjunctiva     Oropharynx: moist and normal-appearing mucosa  Neck: supple, symmetrical, trachea midline   Heart: tachycardiac, murmur appreciated   Respiratory effort / Chest Inspection: labored, remains on oxygen upon exam   Lungs: left lobe coarse on exam, right lobe diminished   Abdomen: flat, normal findings: bowel sounds normal and soft, non-tender  Lower Limbs: chronic bilateral lower extremity lymphedema with edema + right lower extremity cellulitis     DATA:      ECG:         Sinus tachycardia with short IN  Cannot rule out Anterior infarct (cited on or before 27-APR-2022)  Abnormal ECG  When compared with ECG of 27-APR-2022 22:29, (unconfirmed)  No significant change was found  Confirmed by Darlen Siemens ([id number]) on 4/28/2022 6:22:47 AM              Telemetry: sinus tachycardia, noted ectopy with NSVT, couplets, triplets via telemetry           Echocardiogram completed on 7/8/21:         LEFT VENTRICLE:  Systolic function was hyperdynamic  Ejection fraction was estimated to be 70 %  There were no regional wall motion abnormalities  Wall thickness was markedly increased  There was severe assymetrical hypertrophy of the septum  There was dynamic obstruction at rest in the outflow tract, with a peak gradient of 37 mmHg  There was dynamic obstruction during Valsalva, with a peak gradient of 75 mmHg  Features were consistent with a pseudonormal left ventricular filling pattern, with concomitant abnormal relaxation and increased filling pressure (grade 2 diastolic dysfunction)      LEFT ATRIUM:  The atrium was mildly to moderately dilated      RIGHT ATRIUM:  The atrium was mildly dilated      MITRAL VALVE:  There was severe systolic anterior motion of the anterior leaflet      TRICUSPID VALVE:  There was trace regurgitation  Weights: Wt Readings from Last 3 Encounters:   04/28/22 104 kg (228 lb 2 8 oz)   04/21/22 102 kg (225 lb)   09/07/21 102 kg (225 lb 11 2 oz)   , Body mass index is 40 42 kg/m²           Lab Studies:    Results from last 7 days   Lab Units 04/28/22  0701   CK TOTAL U/L 59          Results from last 7 days   Lab Units 04/28/22  0658 04/27/22 2013   WBC Thousand/uL 22 92* 10 27*   HEMOGLOBIN g/dL 12 2 14 0   HEMATOCRIT % 37 8 44 2   PLATELETS Thousands/uL 190 207   ,   Results from last 7 days   Lab Units 04/28/22  0701 04/27/22 2013   POTASSIUM mmol/L 3 6 4 0   CHLORIDE mmol/L 107 102   CO2 mmol/L 21 26   BUN mg/dL 22 22   CREATININE mg/dL 1 25 1 50*   CALCIUM mg/dL 7 7* 8 8   ALK PHOS U/L  --  174*   ALT U/L  --  43   AST U/L  --  70*

## 2022-04-28 NOTE — ASSESSMENT & PLAN NOTE
· Toxic metabolic encephalopathy in setting of severe sepsis; previous admission with same presentation  · CT head negative for acute intracranial abnormality  · Coma panel and ammonia level normal  · Monitor for improvement with treatment of sepsis

## 2022-04-28 NOTE — CONSULTS
Consultation - Pulmonary Medicine   Delbert Rousseau 69 y o  female MRN: [id number]  Unit/Bed#: ICU 11 Encounter: [id number]      Physician Requesting Consult: Dr Sandi Lipscomb  Reason for Consult: hypoxia    Assessment/Plan:    1  Acute hypoxic respiratory failure  2  Severe sepsis, POA  3  LLL consolidation - consider infectious pneumonia  4  RLE cellulitis  5  RUCHI on CKD stage II - baseline creatinine 0 9  6  Chronic heart failure, pEF  7  Acquired hypogammaglobulinemia  8  HCOM  9  Abnormal troponin  10  Chronic pain  11  CKD stage III  12  Hx of ovarian CA s/p B/L oopherectomy and hysterectomy  13  Lymphedema  14  Severe obesity due to excess calorie intake with serious comorbidity and BMI 40 42  15  Wegener's granulomatosis with renal involvement - diagnosed in 2010, off therapy    The patient is encephalopathic due to underlying infection: cellulitis and possible pneumonia  - the patient's hypoxia is improved, she is saturating 90% on RA  Her CT per my review shows chronic biapical changes with LLL consolidation  Since there is no history of cough/fever/fatigue, it seems infectious pneumonia is less likely  Her hypoxia may be due to atelectasis  Her LLL consolidation may be atelectasis  Hypoxia may also be due to sepsis  - I have low suspicion for PE since there are other explanations for her hypoxia and elevated d-dimer is due to infection/sepsis  - No evidence of worsened Wegener's  - Cont  abx per ID  - Hopefully as encephalopathy improves, pt will be able to ambulate and use IS  - DVT Px    ______________________________________________________________________    Chief complaint:  Help me      History per chart due to patient encephalopathy    HPI:    Delbert Rousseau is a 69 y o  female who presented to the ER last evening with change in mental tatus  Was in her usual health and then had an acute change  In the ER, pt febrile 102 and tachycardic  Treated with IV antibiotics, Acetaminophen and aspirin   Found to have lactic acidosis, bandemia, RUCHI,  Leukocytosis  LLL consolidation noted on CT chest  UA negative  New hypoxia  Admitted with sepsis, right L/E cellulitis  We are consulted for hypoxia  Found lying in bed  Oxygen is off, saturation is 90%  Repeating "can you help me?"      Review of Systems:  Unable to obtain due to encephalopathy    Historical Information   Past Medical History:   Diagnosis Date    BRCA1 positive     Cancer ([facility] [address] )     ovarian Ca with chemo    History of chemotherapy     ovarian cancer     History of pulmonary embolus (PE) & DVT 2007    post-op FAMILIA/ omenectomy    Lymphedema     MRSA (methicillin resistant Staphylococcus aureus) 2017    nasal swab negative 4/3/19    Ovarian cancer ([facility] [address] )     CYST REMOVED RIGHT OVARY IN   HYSTERECTOMY 07       Past Surgical History:   Procedure Laterality Date    APPENDECTOMY      laparoscopic    BILATERAL SALPINGOOPHORECTOMY  2007    BREAST BIOPSY Right 2016    BREAST BIOPSY Left 07/15/2020    BREAST BIOPSY Left 2021    stereo     SECTION      HYSTERECTOMY  2007    [facility] node [facility] at [facility] [address] [facility] BIOPSY RIGHT (ALL INC) Right 2021    OOPHORECTOMY Bilateral 2007    OTHER SURGICAL HISTORY      right lower extremity due to trauma    NC TREAT TIBIAL SHAFT FX, INTRAMED IMPLANT Right 2017    Procedure: INSERTION NAIL IM TIBIA;  Surgeon: Michael Smith MD;  Location: [facility] OR;  Service: Orthopedics    US GUIDANCE BREAST BIOPSY LEFT EACH ADDITIONAL Left 7/15/2020    US GUIDED BREAST BIOPSY LEFT COMPLETE Left 7/15/2020    US GUIDED BREAST BIOPSY RIGHT COMPLETE Right 2016    US GUIDED BREAST BIOPSY RIGHT COMPLETE Right 2021     Social History   Social History     Substance and Sexual Activity   Alcohol Use Yes     Social History     Tobacco Use   Smoking Status Never Smoker   Smokeless Tobacco Never Used Occupational history:  Unable to obtain due to encephalopathy    Family History:   Family History   Problem Relation Age of Onset    Breast cancer Mother         28    Pancreatic cancer Mother     Ovarian cancer Maternal Grandmother     Breast cancer Maternal Aunt 39    No Known Problems Paternal Aunt     No Known Problems Father     No Known Problems Maternal Grandfather     No Known Problems Paternal Grandmother     No Known Problems Paternal Grandfather     Lung cancer Half-Sister        Medications: The patient's active and prehospital medications were reviewed    Current Facility-Administered Medications   Medication Dose Route Frequency Provider Last Rate    acetaminophen  650 mg Oral Q4H PRN Idalmis Lawrence, CRNP      aluminum-magnesium hydroxide-simethicone  30 mL Oral Q6H PRN Idalmis Lawrence, SLOANENP      bisacodyl  10 mg Oral Daily PRN Idalmis Lawrence, SLOANENP      cefazolin  2,000 mg Intravenous Q8H Idalmis Lawrence, CRNP 2,000 mg (04/28/22 1035)    clindamycin  600 mg Intravenous Q8H Dorita Crawford, CRNP 600 mg (04/28/22 1035)    DULoxetine  60 mg Oral Daily RENETTA Mcgrath      enoxaparin  1 mg/kg Subcutaneous Q12H [address] Idalmis Lawrence, SLOANENP      LORazepam  0 25 mg Oral BID Meghann Abdalla MD      metoprolol tartrate  12 5 mg Oral Q12H [address], CRNP      multivitamin stress formula  1 tablet Oral Daily RENETTA Mcgrath      ondansetron  4 mg Intravenous Q6H PRN Idalmis Lawrence, RENETTA      oxybutynin  10 mg Oral HS Idalmis Lawrence, CRNP      oxyCODONE  40 mg Oral Q12H [address], [address]      oxyCODONE  15 mg Oral BID PRN Idalmis Lawrence, SLOANENP      pantoprazole  20 mg Oral Early Morning Idalmis Lawrence, SLOANENP      simethicone  80 mg Oral 4x Daily PRN RENETTA Mcgrath      sodium chloride (PF)  3 mL Intravenous Q1H PRN Dwight Sen, DO           PhysicalExamination:  Vitals:   Vitals:    04/28/22 1000 04/28/22 1100 04/28/22 1200 04/28/22 1300   BP: 98/56 (!) 112/48 93/54 100/54   BP Location:       Pulse: (!) 126 (!) 126 (!) 124 (!) 118   Resp: (!) 28 (!) 31 (!) 24 (!) 45   Temp:    98 9 °F (37 2 °C)   TempSrc:    Tympanic   SpO2: 92% 93% 94% (!) 89%   Weight:       Height:         Body mass index is 40 42 kg/m²  Temp  Min: 98 9 °F (37 2 °C)  Max: 103 3 °F (39 6 °C)  IBW (Ideal Body Weight): 52 4 kg    SpO2: (!) 89 %,   SpO2 Activity: At Rest,   O2 Device: Simple mask    Gen: Mild distress due to confusion  HEENT: WDWN, NCAT; EOMI; anicteric sclera  Neck: supple  Chest: Clear b/l bs  Cardiac: Tachy  Abd: soft, nd  Ext: + b/l lymphedema with erythema of RLE  Neuro: nonfocal, AAO x 1  Skin: warm, dry  Psych: anxious    Diagnostic Data:  CBC:   Results from last 7 days   Lab Units 04/28/22  0658 04/27/22 2013   WBC Thousand/uL 22 92* 10 27*   HEMOGLOBIN g/dL 12 2 14 0   HEMATOCRIT % 37 8 44 2   PLATELETS Thousands/uL 190 207       CMP:   Results from last 7 days   Lab Units 04/28/22  0701 04/27/22 2013   SODIUM mmol/L 140 140   POTASSIUM mmol/L 3 6 4 0   CHLORIDE mmol/L 107 102   CO2 mmol/L 21 26   BUN mg/dL 22 22   CREATININE mg/dL 1 25 1 50*   CALCIUM mg/dL 7 7* 8 8   ALK PHOS U/L  --  174*   ALT U/L  --  43   AST U/L  --  70*     Labs per my review reveal mild hypokalemia, decreased GFR, elevated procalcitonin, leukocytosis worse    Microbiology:  Results from last 7 days   Lab Units 04/27/22 2013   BLOOD CULTURE  Received in Microbiology Lab  Culture in Progress  Received in Microbiology Lab  Culture in Progress  Imaging:  CT chest per my review reveals GGO and interstitial chanes in b/l apices with b/l atelectasis  When I compare to CT chest from 2019, the biapical changes are unchanged      Cardiac lab/EKG/telemetry/ECHO:   Results from last 7 days   Lab Units 04/27/22 2013   NT-PRO BNP pg/mL 2,187*     ECHO 7/21: EF normal, severe asymetrical hypertrophy of LV septum, dynamic obstruction; grade 2 diastolic dysfunction      Fay Motta, DO

## 2022-04-28 NOTE — PLAN OF CARE
Problem: PHYSICAL THERAPY ADULT  Goal: Performs mobility at highest level of function for planned discharge setting  See evaluation for individualized goals  Description: Treatment/Interventions: LE strengthening/ROM,Therapeutic exercise,Endurance training,Patient/family training,Bed mobility,Spoke to nursing,OT          See flowsheet documentation for full assessment, interventions and recommendations  Note: Prognosis: Fair  Problem List: Decreased strength,Decreased range of motion,Decreased endurance,Impaired balance,Decreased mobility,Decreased cognition,Impaired judgement,Decreased safety awareness,Obesity,Pain,Decreased skin integrity  Assessment: Pt  69 y  o female admitted for Severe sepsis ([facility] [address] ) w/ RLE cellulitis, acute respiratory failure w/ hypoxia, elevated troponin & D-dimer & acute metabolic encephalopathy  Pt referred to PT for mobility assessment & D/C planning w/ orders of up w/ assistance  Please see above for information re: home set-up & PLOF as well as objective findings during PT assessment  PTA, pt's  reports that pt I w/ RW at baseline  On eval, pt functioning below baseline hence will continue skilled PT to improve function & safety  Pt require maxAx2 w/ overall bed mobility + cues for techniques & safety  Pt able to sit at EOB approx  1-2mins however w/ poor sitting balance & dec safety awareness w/ pt attempting to scoot off EOB despite education hence assisted back in bed for safety  During session, BP EOB: 124/94; SpO2 90-92% w/ 2L O2 via face mask  The patient's AM-PAC Basic Mobility Inpatient Short Form Raw Score is 6  A Raw score of less than or equal to 16 suggests the patient may benefit from discharge to post-acute rehabilitation services  Please also refer to the recommendation of the Physical Therapist for safe discharge planning  From PT standpoint, due to above mentioned deficits & high risk for falls, pt will benefit from inpt rehab at D/C   No SOB & dizziness reported t/o session  Nsg staff most recent vital signs as follows: /54   Pulse (!) 118   Temp (!) 101 6 °F (38 7 °C) (Tympanic)   Resp (!) 45   Ht 5' 3" (1 6 m)   Wt 104 kg (228 lb 2 8 oz)   SpO2 (!) 89%   BMI 40 42 kg/m²   At end of session, pt back in bed in stable condition, call bell & phone in reach, bed alarm activated, all lines intact  Fall precautions reinforced w/ good understanding  CM to follow  Nsg staff to continue to mobilized pt as tolerated to prevent further decline in function  Will recommend meek lift for OOB at this time  Nsg notified  Co-eval was necessary to complete this PT eval for the pt's best interest given pt's medical acuity & complexity  Barriers to Discharge: Decreased caregiver support  Barriers to Discharge Comments: (+) times home alone     PT Discharge Recommendation: Post acute rehabilitation services          See flowsheet documentation for full assessment

## 2022-04-28 NOTE — ASSESSMENT & PLAN NOTE
· Troponin elevated oon admission  · Evaluated by cardiology team: Suspect non MI troponin elevation in setting of severe sepsis  · Cont B-blocker

## 2022-04-28 NOTE — ASSESSMENT & PLAN NOTE
· Patient presented with acute respiratory failure with hypoxia, initially requiring non-rebreather, weaned down to simple face mask   Chest CT showed    "1   Groundglass and interstitial airspace disease in the upper lungs, similar appearance to the prior exam  suggesting chronic interstitial pneumonitis  2   Atelectasis and airspace consolidation in the dependent portions of the lung bases may represent aspiration and/or  Pneumonia "  · Negative COVID/influenza/RSV  · Blood cultures negative x2  · Patient currently on broad-spectrum antibiotic:  Cefazolin and clinda  · Aspiration precautions  · Speech therapy eval  · Patient also has a history of thrombotic events:  Elevated D-dimer, however in the setting of sepsis:  Lower extremity ultrasound negative for DVT  Will confer with Pulmonary team regarding additional imaging for PE    Patient would require premedication for IV contrast   Suspect V/Q would not be accurate in the setting of bilateral ground-glass and interstitial chronic disease

## 2022-04-28 NOTE — OCCUPATIONAL THERAPY NOTE
Occupational Therapy Evaluation     Patient Name: Karin Toledo  YYYPW'A Date: 2022  Problem List  Principal Problem:    Severe sepsis [facility])  Active Problems:    Wegener's granulomatosis with renal involvement ([place] [address] )    Cancer of ovary ([place] [address] )    Chronic kidney disease, stage III (moderate) (HCC)    Chronic pain disorder    Acute metabolic encephalopathy    Elevated troponin    Hypertrophic cardiomyopathy (HCC)    Acute respiratory failure with hypoxia (HCC)    Hypogammaglobulinemia, acquired ([place] [address] )    Cellulitis of right lower extremity    History of pulmonary embolism    Chronic diastolic heart failure ([facility] )    Past Medical History  Past Medical History:   Diagnosis Date    BRCA1 positive     Cancer ([place] [address] )     ovarian Ca with chemo    History of chemotherapy     ovarian cancer     History of pulmonary embolus (PE) & DVT 2007    post-op FAMILIA/ omenectomy    Lymphedema     MRSA (methicillin resistant Staphylococcus aureus) 2017    nasal swab negative 4/3/19    Ovarian cancer ([place] [address] )     CYST REMOVED RIGHT OVARY IN   HYSTERECTOMY 07       Past Surgical History  Past Surgical History:   Procedure Laterality Date    APPENDECTOMY  1998    laparoscopic    BILATERAL SALPINGOOPHORECTOMY  2007    BREAST BIOPSY Right 2016    BREAST BIOPSY Left 07/15/2020    BREAST BIOPSY Left 2021    stereo     SECTION      HYSTERECTOMY  2007    [facility] and [facility] at [facility] BIOPSY RIGHT (ALL INC) Right 2021    OOPHORECTOMY Bilateral 2007    OTHER SURGICAL HISTORY      right lower extremity due to trauma    MN TREAT TIBIAL SHAFT FX, INTRAMED IMPLANT Right 2017    Procedure: INSERTION NAIL IM TIBIA;  Surgeon: Shanell Cano MD;  [facility]: [facility];  Service: Orthopedics    US GUIDANCE BREAST BIOPSY LEFT EACH ADDITIONAL Left 7/15/2020    US GUIDED BREAST BIOPSY LEFT COMPLETE Left 7/15/2020    US GUIDED BREAST BIOPSY RIGHT COMPLETE Right 5/9/2016    US GUIDED BREAST BIOPSY RIGHT COMPLETE Right 7/26/2021 04/28/22 1146   OT Last Visit   OT Visit Date 04/28/22   Note Type   Note type Evaluation   Restrictions/Precautions   Weight Bearing Precautions Per Order No   Other Precautions Cognitive; Chair Alarm; Bed Alarm;O2;Fall Risk;Telemetry;Multiple lines;Pain  (2L O2 via face mask)   Pain Assessment   Pain Assessment Tool FLACC   Pain Location/Orientation Orientation: Right;Location: Leg   Hospital Pain Intervention(s) Repositioned; Ambulation/increased activity; Emotional support; Rest   Multiple Pain Sites No   Pain Rating: FLACC (Rest) - Face 0   Pain Rating: FLACC (Rest) - Legs 0   Pain Rating: FLACC (Rest) - Activity 0   Pain Rating: FLACC (Rest) - Cry 0   Pain Rating: FLACC (Rest) - Consolability 0   Score: FLACC (Rest) 0   Pain Rating: FLACC (Activity) - Face 1   Pain Rating: FLACC (Activity) - Legs 1   Pain Rating: FLACC (Activity) - Activity 1   Pain Rating: FLACC (Activity) - Cry 1   Pain Rating: FLACC (Activity) - Consolability 1   Score: FLACC (Activity) 5   Home Living   Type of Home House   Home Layout One level;Ramped entrance   Bathroom Shower/Tub Walk-in shower   Bathroom Toilet Raised   Bathroom Equipment Grab bars in shower; Shower chair;Commode   Bathroom Accessibility Accessible   Home Equipment Walker;Quad cane; Wheelchair-manual   Additional Comments Pt is a poor historian, info on home setup and PLOF obtained via pt and pt's spouse  Spouse works as RN, (+) home alone at times  Prior Function   Level of [place] Independent with ADLs and functional mobility; Needs assistance with IADLs   Lives With Spouse  (spouse works as RN)   Melissaefbiju  (assist to wrap LEs only)   IADLs Needs assistance  (Spouse primarily performs; I w/ laundry)   Falls in the last 6 months 0   Vocational Retired   Comments At baseline, pt was I w/ ADLs and Mod I for functional transfers/mobility w/ use of RW  Spouse primarily performed IADLs (pt I w/ her laundry per spouse)  Denies falls PTA  Lifestyle   Autonomy At baseline, pt was I w/ ADLs and Mod I for functional transfers/mobility w/ use of RW  Spouse primarily performed IADLs (pt I w/ her laundry per spouse)  Denies falls PTA  Reciprocal Relationships Spouse   Service to Others Retired   Psychosocial   Psychosocial (WDL) WDL   Subjective   Subjective "Please help me!"   ADL   Where Assessed Edge of bed   Eating Assistance 4  Minimal Assistance   Grooming Assistance 4  Minimal Assistance   UB Bathing Assistance 3  Moderate [address] 2  Maximal [address] 3  Moderate Assistance   LB Dressing Assistance 2  Maximal [address]  2  Maximal Assistance   Functional Assistance 2  Maximal Assistance   Bed Mobility   Rolling R 2  Maximal assistance   Additional items Assist x 2;Bedrails; Increased time required;Verbal cues;LE management   Rolling L 2  Maximal assistance   Additional items Assist x 2;Bedrails; Increased time required;Verbal cues;LE management   Supine to Sit 2  Maximal assistance   Additional items Assist x 2;HOB elevated; Bedrails; Increased time required;Verbal cues;LE management   Sit to Supine 2  Maximal assistance   Additional items Assist x 2; Increased time required;Verbal cues;LE management   Additional Comments BP EOB: 124/94  SpO2: 90-95% on 2L O2 via fask mask  HR: 123-129 bpm  Pt able to tolerate approx  1-2 mins sitting EOB, however w/ decreased seated balance/tolerance and decreased safety awareness (attempting to scoot off EOB)  Returned to supine position for optimal pt safety  Pt lying supine with bed alarm activated at end of session  Call bell and phone within reach  All needs met and pt reports no further questions for OT at this time      Transfers   Sit to Stand Unable to assess   Stand to Sit Unable to assess   Additional Comments OOB deferred at this time 2* decreased seated balance/tolerance, cognitive deficits, and decreased safety awareness   Balance   Static Sitting Fair -   Dynamic Sitting Poor +   Activity Tolerance   Activity Tolerance Patient limited by fatigue;Patient limited by pain;Treatment limited secondary to medical complications (Comment); Other (Comment)  (Cognitive deficits)   Medical Staff Made Aware Adam PT   Nurse Made Aware yes; Jacob Biggs, RN  Pt appropriate to be seen for therapy evals per nursing   RUE Assessment   RUE Assessment X  (Shldr AROM assess limited 2* cog, PROM=WFL; Elb-distal=WFL)   RUE Strength   R Shoulder Flexion 3+/5   R Shoulder Extension 3+/5   R Elbow Flexion 4-/5   R Elbow Extension 4-/5   LUE Assessment   LUE Assessment X  (Shldr AROM assess limited 2* cog, PROM=WFL; Elb-distal=WFL)   LUE Strength   L Shoulder Flexion 3+/5   L Shoulder Extension 3+/5   L Elbow Flexion 4-/5   L Elbow Extension 4-/5   Hand Function   Gross Motor Coordination Functional   Fine Motor Coordination Functional   Sensation   Light Touch Partial deficits in the RLE;Partial deficits in the LLE   Proprioception   Proprioception No apparent deficits   Vision - Complex Assessment   Acuity Able to read clock/calendar on wall without difficulty   Perception   Inattention/Neglect Appears intact   Cognition   Overall Cognitive Status Impaired   Arousal/Participation Responsive; Cooperative   Attention Difficulty attending to directions   Orientation Level Oriented to person;Disoriented to time;Disoriented to situation  (oriented to place w/ cues and multiple choice)   Memory Decreased recall of recent events;Decreased recall of precautions;Decreased short term memory   Following Commands Follows one step commands with increased time or repetition   Comments Pt pleasant  Cues for redirection, pt often stating "Help me!" Limited insight into deficits, decreased safety awareness     Assessment   Limitation Decreased ADL status; Decreased UE ROM; Decreased UE strength;Decreased Safe judgement during ADL;Decreased endurance;Decreased self-care trans;Decreased high-level ADLs   Prognosis Fair   Assessment Pt is a 69 y o  female seen for OT evaluation s/p adm to [facility] on 4/27/2022 w/ altered mental status and fever  Pt admitted w/ Severe sepsis, Acute respiratory failure with hypoxia, Acute metabolic encephalopathy, cellulitis of R LE, elevated troponin  CT head (-) for acute intracranial hemorrhage, midline shift, or mass effect  CT chest: Atelectasis and airspace consolidation in the dependent portions of the lung bases may represent aspiration and/or pneumonia  CT R LE: Severe cellulitis of the right ankle and calf:  No evidence of abscess  No evidence of compartment syndrome  Comorbidities affecting pts functional performance include a significant PMH of CA, DVT, PE, Lymphedema  Pt with active OT orders and activity orders for Up with assistance  Pt is a poor historian, info on home setup and PLOF obtained via pt and pt's spouse  Spouse works as RN, (+) home alone at times  At baseline, pt was I w/ ADLs and Mod I for functional transfers/mobility w/ use of RW  Spouse primarily performed IADLs (pt I w/ her laundry per spouse)  Denies falls PTA  Upon evaluation, pt currently requires Mod A for UB ADLs, Max A for LB ADLs, Max A for toileting, and Max A of 2 for bed mobility 2* the following deficits impacting occupational performance: decreased ROM, decreased strength, decreased balance, decreased tolerance, impaired sensation, impaired memory, impaired problem solving and increased pain  These impairments, as well at pts fall risk, multiple lines, new O2 requirements, limited home support, difficulty performing ADLS and limited insight into deficits limit pts ability to safely engage in all baseline areas of occupation   Based on the aforementioned OT evaluation, functional performance deficits, and assessments, pt has been identified as a High complexity evaluation  Pt to continue to benefit from continued acute OT services during hospital stay to address defined deficits and to maximize level of functional independence in the following Occupational Performance areas: eating, grooming, bathing/shower, toilet hygiene, dressing, medication management, health maintenance, functional mobility, community mobility, clothing management and social participation  From OT standpoint, recommend STR upon D/C  OT will continue to follow pt 3-5x/wk to address the following goals to  w/in 10-14 days:   Goals   Patient Goals None stated 2* cognitive deficits   LTG Time Frame 10-   Long Term Goal Please refer to LTGs listed below   Plan   Treatment Interventions ADL retraining;Functional transfer training;UE strengthening/ROM; Endurance training;Patient/family training;Equipment evaluation/education; Compensatory technique education;Continued evaluation; Energy conservation; Activityengagement   Goal Expiration Date 22   OT Treatment Day 0   OT Frequency 3-5x/wk   Recommendation   OT Discharge Recommendation Post acute rehabilitation services   OT - OK to Discharge Yes  (when medically cleared to rehab)   Additional Comments  The patient's raw score on the AM-PAC Daily Activity inpatient short form is 14, standardized score is 33 39, less than 39 4  Patients at this level are likely to benefit from discharge to post-acute rehabilitation services  Please refer to the recommendation of the Occupational Therapist for safe discharge planning     AM-PAC Daily Activity Inpatient   Lower Body Dressing 2   Bathing 2   Toileting 2   Upper Body Dressing 2   Grooming 3   Eating 3   Daily Activity Raw Score 14   Daily Activity Standardized Score (Calc for Raw Score >=11) 33 39   AM-PAC Applied Cognition Inpatient   Following a Speech/Presentation 2   Understanding Ordinary Conversation 2   Taking Medications 1   Remembering Where Things Are Placed or Put Away 1   Remembering List of 4-5 Errands 1   Taking Care of Complicated Tasks 1   Applied Cognition Raw Score 8   Applied Cognition Standardized Score 19 32          GOALS    1  Pt will improve activity tolerance to G for min 30 min txment sessions for increase engagement in functional tasks    2  Pt will complete bed mobility at a Min A level w/ G balance/safety demonstrated to decrease caregiver assistance required     3  Pt will complete UB dressing/self care w/ Supervision using adaptive device and DME as needed     4  Pt will complete LB dressing/self care w/ min Ausing adaptive device and DME as needed    5  Pt will complete toileting w/ min A w/ G hygiene/thoroughness using DME as needed    6  Pt will be attentive 100% of the time during ongoing cognitive assessment w/ G participation to assist w/ safe d/c planning/recommendations    7  Pt will demonstrate G carryover of pt/caregiver education and training as appropriate w/o cues w/ good tolerance to increase safety during functional tasks    8  Pt will demonstrate 100% carryover of energy conservation techniques t/o functional I/ADL/leisure tasks w/o cues s/p skilled education to increase endurance during functional tasks    9  Pt will increase BUE strength by 1MM grade via AROM/AAROM/PROM exercises to increase independence in ADLs and transfers    10  Pt will tolerate continued OOB assessment and appropriate functional transfer/mobility goals will be established by OTR as applicable     11  Pt will increase seated tolerance to 8-10 with Fair dynamic seated balance to increase safety during participation in ADLs     12   Pt will follow 100% simple one step verbal commands to increase participation in therapy sessions         Deepa Luther OTR/L

## 2022-04-28 NOTE — CONSULTS
Orthopaedic Surgery - Consultation  Delbert Rousseau (1 y o  female)   : 1955   MRN: [id number]  Date: 2022   Encounter: [id number]   Unit/Bed#: ICU 11    Consulting Physician:  Holly Collier PA-C  Requesting Physician: Ruben Henriquez MD  Reason for Consult / Principal Problem:  Right lower extremity cellulitis    Assessment / Plan  Right lower extremity cellulitis    · At this time we are recommending continue treatment for cellulitis and sepsis  · Her symptoms are generalized and not consistent with abscess or joint infection  · No collections were seen on CT scan and MRI would not be beneficial due to previous hardware  Nothing on CT suggested osteomyelitis  · If the patient is not to improve or develops open draining wounds can consider I&D versus her for removal   · Ortho signing off at this time  Reconsult if patient worsens or develops open wounds  Can follow-up outpatient with Dr René Rivas for further treatment recommendations if she continues with recurrent infections or cellulitis of the right distal lower extremity concerning for tibial hardware involvement  History of Present Illness  Delbert Rousseau is a 69 y o  female who presents to [address] [id number] with sepsis  The patient did have some confusion while questioning her she was able to answer some questions in regards to pain in how she felt otherwise I did obtain history from her  over the phone  For the patient's has been she has had episodes of sepsis approximately 6 times in the past that have been identified through confusion  He states that he noticed some increased redness in her leg which was generalized over the last 4 days    Prior to calling the ambulance to bring her to the hospital he did remove for lymphatic compression wraps so they did not get damaged and feels that the patient's swelling has increased since coming to the hospital due to the fluids she has been receiving and the lack of compression on her legs   He states that she does normally have some baseline pinkish or late red colors to the lower leg but not over the knee/ thigh area  She does have a history of Charcot foot on the right and right tibia nail in 2017 by Dr Robin Arreaga  Per her  she was able to ambulate with a walker prior to this episode  He states that her right knee is limited by arthritis so does normally stiff on exam and she does have ongoing pain from right hip arthritis  At this time she is able to do some movements with her knee and ankle  Her pain is generalized but she feels over the lower shin area is where it is the most tender  Her and her  deny any open wounds or drainage from the lower leg  Review of Systems  Negative for chest pain and shortness of breath    Medical, Surgical, Family, and Social History    Past Medical History:   Diagnosis Date    BRCA1 positive     Cancer ([facility] [address] )     ovarian Ca with chemo    History of chemotherapy     ovarian cancer     History of pulmonary embolus (PE) & DVT 2007    post-op FAMILIA/ omenectomy    Lymphedema     MRSA (methicillin resistant Staphylococcus aureus) 2017    nasal swab negative 4/3/19    Ovarian cancer ([facility] [address] )     CYST REMOVED RIGHT OVARY IN   HYSTERECTOMY 07         Past Surgical History:   Procedure Laterality Date    APPENDECTOMY  1998    laparoscopic    BILATERAL SALPINGOOPHORECTOMY  2007    BREAST BIOPSY Right 2016    BREAST BIOPSY Left 07/15/2020    BREAST BIOPSY Left 2021    stereo     SECTION      HYSTERECTOMY  2007    Omentectomy and [facility] node [facility] at [facility] [address] BIOPSY RIGHT (ALL INC) Right 2021    OOPHORECTOMY Bilateral 2007    OTHER SURGICAL HISTORY      right lower extremity due to trauma    KY TREAT TIBIAL SHAFT FX, INTRAMED IMPLANT Right 2017    Procedure: INSERTION NAIL IM TIBIA;  Surgeon: Latonya Gilliam MD;  [facility]: [facility]; Service: Orthopedics    US GUIDANCE BREAST BIOPSY LEFT EACH ADDITIONAL Left 7/15/2020    US GUIDED BREAST BIOPSY LEFT COMPLETE Left 7/15/2020    US GUIDED BREAST BIOPSY RIGHT COMPLETE Right 5/9/2016    US GUIDED BREAST BIOPSY RIGHT COMPLETE Right 7/26/2021       Family History   Problem Relation Age of Onset    Breast cancer Mother         28    Pancreatic cancer Mother     Ovarian cancer Maternal Grandmother     Breast cancer Maternal Aunt 39    No Known Problems Paternal Aunt     No Known Problems Father     No Known Problems Maternal Grandfather     No Known Problems Paternal Grandmother     No Known Problems Paternal Grandfather     Lung cancer Half-Sister        Social History     Occupational History    Not on file   Tobacco Use    Smoking status: Never Smoker    Smokeless tobacco: Never Used   Vaping Use    Vaping Use: Never used   Substance and Sexual Activity    Alcohol use:  Yes    Drug use: Not Currently    Sexual activity: Yes     Partners: Male     Birth control/protection: None       Allergies   Allergen Reactions    Iodinated Diagnostic Agents Shortness Of Breath    Omnipaque [Iohexol] Shortness Of Breath    Other Shortness Of Breath     IVP dye, x ray dye 3    Iodides      Other reaction(s): SWELLING, SOB    Methotrexate Nausea Only     Headache and nausea    Methotrexate Derivatives Headache       Current Facility-Administered Medications   Medication Dose Route Frequency    acetaminophen (TYLENOL) tablet 650 mg  650 mg Oral Q4H PRN    aluminum-magnesium hydroxide-simethicone (MYLANTA) oral suspension 30 mL  30 mL Oral Q6H PRN    bisacodyl (DULCOLAX) EC tablet 10 mg  10 mg Oral Daily PRN    ceFAZolin (ANCEF) IVPB (premix in dextrose) 2,000 mg 50 mL  2,000 mg Intravenous Q8H    clindamycin (CLEOCIN) IVPB (premix in dextrose) 600 mg 50 mL  600 mg Intravenous Q8H    DULoxetine (CYMBALTA) delayed release capsule 60 mg  60 mg Oral Daily    enoxaparin (LOVENOX) subcutaneous injection 100 mg  1 mg/kg Subcutaneous Q12H [facility]    LORazepam (ATIVAN) tablet 0 25 mg  0 25 mg Oral BID    metoprolol tartrate (LOPRESSOR) partial tablet 12 5 mg  12 5 mg Oral Q12H [facility]    multivitamin stress formula tablet 1 tablet  1 tablet Oral Daily    ondansetron (ZOFRAN) injection 4 mg  4 mg Intravenous Q6H PRN    oxybutynin (DITROPAN-XL) 24 hr tablet 10 mg  10 mg Oral HS    oxyCODONE (OxyCONTIN) 12 hr tablet 40 mg  40 mg Oral Q12H PALAK    oxyCODONE (ROXICODONE) IR tablet 15 mg  15 mg Oral BID PRN    pantoprazole (PROTONIX) EC tablet 20 mg  20 mg Oral Early Morning    simethicone (MYLICON) chewable tablet 80 mg  80 mg Oral 4x Daily PRN    sodium chloride (PF) 0 9 % injection 3 mL  3 mL Intravenous Q1H PRN     Medications Prior to Admission   Medication    acetaminophen (TYLENOL) 325 mg tablet    bisacodyl (DULCOLAX) 5 mg EC tablet    Calcium Carbonate-Vit D-Min (CALCIUM 1200 PO)    DULoxetine (CYMBALTA) 60 mg delayed release capsule    furosemide (LASIX) 20 mg tablet    LORazepam (ATIVAN) 0 5 mg tablet    metoprolol tartrate (LOPRESSOR) 25 mg tablet    Multiple Vitamins-Minerals (MULTIVITAMIN ADULT PO)    omeprazole (PriLOSEC) 20 mg delayed release capsule    ondansetron (ZOFRAN) 4 mg tablet    oxyCODONE (OxyCONTIN) 40 mg 12 hr tablet    OXYCODONE HCL PO    TOVIAZ 8 MG TB24       Vitals  Temp:  [98 9 °F (37 2 °C)-103 3 °F (39 6 °C)] 98 9 °F (37 2 °C)  HR:  [111-133] 118  Resp:  [16-45] 45  BP: ()/(48-68) 100/54  Body mass index is 40 42 kg/m²  I/O last 24 hours:   In: 2050 [IV Piggyback:2050]  Out: 400 [Urine:400]    Physical Exam  General:  Alert & oriented x3, no distress, appears stated age  [de-identified]:  EOMI, eyes clear, hearing intact, mucous membranes moist  Neck:  Supple, non-tender, trachea midline, no lymphadenopathy  Cardiovascular:  Regular rate, no discernable arrhythmia  Pulmonary:  Regular rate, respirations non-labored   Abdominal:  Soft, non-tender    Right Lower Extremity Exam  Alignment:  Her normal resting position is with the hip in slight external rotation  Knee is in normal alignment at this time and ankle is fused at about 90°  Inspection:  Patient has generalized swelling throughout the right lower extremity  Mild erythema is noted through the thigh and around the knee anteriorly  Mild erythema is also noticed posterior around the thigh and knee  Moderate erythema is noted over the lower extremity  All incisions/previous scars are healed without any active drainage sites  Palpation:  Generalized tenderness at The anterior aspect of her shin and LEs tender but tender over the anterior lateral aspect of the knee and thigh     Swelling is generalized with no specific collections palpated  ROM:  I was able to range the hip about 60° of flexion limited by discomfort in the lower extremity  Her knee did range about 20° without much discomfort except for pressure by pushing  Her right ankle was only able to move through about a 5 degree arc due to her Charcots deformity  Strength:  Not tested  Stability:  Not tested  Neurovascular:  Sensation intact in DP/SP/Chawla/Sa/T nerve distributions  Sensation intact in all digital nerve distributions  Toes warm and perfused  Gait:  Not tested  Imaging  I have personally reviewed pertinent films in PACS  CT of right Lower extremity - From 04/28/2022 shows subcutaneous edema consistent with nonspecific cellulitis without drainable collections  Is no notable deep soft tissue involvement or osseous destructive changes indicating osteomyelitis  Postoperative changes are noted after previous ORIF of the right acetabulum, and ischial fractures  Significant posttraumatic right hip arthritis noted  Status post ORIF right tibial fracture with IM steffi without hardware complications      Lab Results  (I have personally reviewed pertinent lab results )  Results from last 7 days   Lab Units 04/28/22  0658 04/27/22 2013   WBC Thousand/uL 22 92* 10 27*   HEMOGLOBIN g/dL 12 2 14 0   HEMATOCRIT % 37 8 44 2   PLATELETS Thousands/uL 190 207   RBC Million/uL 3 93 4 57   MCV fL 96 97   MCH pg 31 0 30 6   MCHC g/dL 32 3 31 7   RDW % 13 9 13 4   MPV fL 9 7 9 7     Results from last 7 days   Lab Units 04/27/22 2013   PTT seconds 28   INR  1 16     Results from last 7 days   Lab Units 04/28/22  0701 04/27/22 2013   POTASSIUM mmol/L 3 6 4 0   CHLORIDE mmol/L 107 102   CO2 mmol/L 21 26   BUN mg/dL 22 22   CREATININE mg/dL 1 25 1 50*   EGFR ml/min/1 73sq m 43  --    CALCIUM mg/dL 7 7* 8 8   ALT U/L  --  43   AST U/L  --  70*         Results from last 7 days   Lab Units 04/27/22 2013   BLOOD CULTURE  Received in Microbiology Lab  Culture in Progress  Received in Microbiology Lab  Culture in Progress  Code Status  Level 1 - Full Code    Counseling / Coordination of Care  Total floor / unit time spent today 20 minutes  Greater than 50% of total time was spent with the patient and / or family counseling and / or coordination of care      Piero Best PA-C

## 2022-04-28 NOTE — PROGRESS NOTES
[address] [facility]  Progress Note - Francie Galindo 1955, 69 y o  female MRN: [id number]  Unit/Bed#: ICU 11 Encounter: [id number]  Primary Care Provider: Angela Watters DO   Date and time admitted to hospital: 4/27/2022  7:43 PM    * Severe sepsis [facility])  Assessment & Plan  · Pt presented with severe sepsis, present on admission, with fever of 103, tachycardia, lactic acidosis, metabolic encephalopathy and hypoxia  · Pt was placed on sepsis pathway, given ivf resuscitation, broad-spectrum antibiotics, and admitted to step-down unit  ·  Chest CT shows atelectasis and airspace consolidation in the dependent portions of the lung bases- may represent aspiration and/or pneumonia  Groundglass and interstitial airspace disease in the upper lungs, similar to prior suggesting chronic interstitial pneumonitis  · CT RLE :  Severe cellulitis of the right ankle and calf:  No evidence of abscess  No evidence of compartment syndrome  · Appreciate infectious disease team evaluation recommendations    Acute respiratory failure with hypoxia ([address] )  Assessment & Plan  · Patient presented with acute respiratory failure with hypoxia, initially requiring non-rebreather, weaned down to simple face mask   Chest CT showed    "1   Groundglass and interstitial airspace disease in the upper lungs, similar appearance to the prior exam  suggesting chronic interstitial pneumonitis  2   Atelectasis and airspace consolidation in the dependent portions of the lung bases may represent aspiration and/or  Pneumonia "  · Negative COVID/influenza/RSV  · Blood cultures negative x2  · Patient currently on broad-spectrum antibiotic:  Cefazolin and clinda  · Aspiration precautions  · Speech therapy eval  · Patient also has a history of thrombotic events:  Elevated D-dimer, however in the setting of sepsis:  Lower extremity ultrasound negative for DVT  Will confer with Pulmonary team regarding additional imaging for PE    Patient would require premedication for IV contrast   Suspect V/Q would not be accurate in the setting of bilateral ground-glass and interstitial chronic disease    Acute metabolic encephalopathy  Assessment & Plan  · Toxic metabolic encephalopathy in setting of severe sepsis; previous admission with same presentation  · CT head negative for acute intracranial abnormality  · Coma panel and ammonia level normal  · Monitor for improvement with treatment of sepsis    Chronic diastolic heart failure (HCC)  Assessment & Plan  Wt Readings from Last 3 Encounters:   04/28/22 104 kg (228 lb 2 8 oz)   04/21/22 102 kg (225 lb)   09/07/21 102 kg (225 lb 11 2 oz)     · Pt presented with elevated BNP >2100, dyspnea, and hypoxia  · Most recent 2D echo 7/21 with LVEF 70%, normal wall motion, G2DD, severe asymetric hypertrophy of septum with dynamic outflow tract obstruction  · Continue metoprolol  · Hold diuretics with severe sepsis  Monitor for volume overloa  · Appreciate cardiology eval and recommendations    History of pulmonary embolism  Assessment & Plan  · Pt has a h/o  DVT and PE in 2007 after hysterectomy for ovarian cancer at [facility]  IVC filter was placed  Treated with Lovenox for 1 year  · Presented with elevated D dimer 10 15  · B/LLE duplex study negative for DVT  · Treat with Lovenox weight based dosing until eval for PE further-->IV dye allergy  Will require premedication  · Will confer with pulmonary team further    Cellulitis of right lower extremity  Assessment & Plan  · Patient presented with right lower extremity cellulitis, with sepsis  · History of hypogammaglobulinemia and recurrent leg cellulitis in setting of lymphedema  Previously followed by ID and treated with Pen VK suppression through 12/31/2020  Per ID note on Aug 19, 2020: If cellulitis recurs off suppression, patient would then need to be on lifelong suppression    · Was admitted in Nov 2019 for right leg cellulitis and secondary GBS bacteremia    · CT RLE:  Shows severe cellulitis of the right ankle and calf    No evidence of abscess or compartment syndrome  · Was started on antibiotics with IV Ancef 2g q8h  · Appreciate infectious disease team evaluation and recommendations  · Right lower extremity is edematous and tense, however CT without evidence of compartment syndrome  · Normal CK  · Appreciate surgical team's evaluation and recommendation  · Neurovascularly intact  · Continue to monitor closely in the step-down unit    Hypogammaglobulinemia, acquired [facility])  Assessment & Plan  · Developed hypogammaglobulinemia following Ritiximab treatment for Wegener's  · Followed outpatient by Hem/Onc for severe hypogammaglobinemia, previously on IVIG although had adverse reactions and recommended watchful observation and monitoring of IgG level  · Cont outpt follow up with Dr Wilian Wall    Hypertrophic cardiomyopathy [facility])  Assessment & Plan  Patient has a history of HOCM, and follows with the cardiology team  Previous echocardiogram 7/21 revealed dynamic outflow tract obstruction, and septal hypertrophy  Avoid volume depletion and hypotension with sepsis  Appreciate cardiology evaluation and recommendations    Elevated troponin  Assessment & Plan  · Troponin elevated oon admission  · Evaluated by cardiology team: Suspect non MI troponin elevation in setting of severe sepsis  · Cont B-blocker     Chronic pain disorder  Assessment & Plan  · Patient has history of chronic pain disorder  · Maintained on OxyContin ER 40 mg b i d , oxycodone IR 15mg b i d PRN  · On lorazepam 0 5 mg b i d --> confirms she takes this scheduled BID  · Medications verified on PDMP  · Patient has generalized tenderness on palpation, continue opioid home regimen  · Patient presented with metabolic encephalopathy secondary to sepsis:  Will continue her home benzodiazepine regimen at a lower dose of ativan 0 25mg bid (to avoid precipitating withdrawal, and hopefully minimizing side effects)    Chronic kidney disease, stage III (moderate) ([facility])  Assessment & Plan  Lab Results   Component Value Date    EGFR 43 04/28/2022    EGFR 86 11/12/2019    EGFR 78 11/11/2019    CREATININE 1 25 04/28/2022    CREATININE 1 50 (H) 04/27/2022    CREATININE 0 92 07/09/2021       Patient has history of chronic kidney disease stage 3  Review of old records note her baseline creatinine ranges 0 7-1 2, and is variable  Initial creatinine on admission was 1 5, elevated from her baseline, likely due to sepsis  Improved after IV fluid hydration    Cancer of ovary ([facility])  Assessment & Plan  · History of ovarian cancer status post bilateral oophorectomy and hysterectomy  · Continue outpatient follow-up    Wegener's granulomatosis with renal involvement ([facility] [address] )  Assessment & Plan  · Pt has a h/o Juan's granulomatosis diagnosed in Feb 2010  · Patient had associated renal failure, pulmonary hemorrhage, and polyarteritis  · Treated with rituximab for 4 years and prednisone with good response although discontinued due to subsequent hypogammaglobinemia and remission of disease  · CT chest on admission revealed ground-glass and interstitial airspace disease in upper lungs, similar to prior, suggestive of interstitial pneumonitis          Family:  Updated patient's  via phone  Reviewed all test results and treatment plan    Discussed with ID: [id number]  D/w cardiology: ZACKERY JACKSON    VTE Pharmacologic Prophylaxis: Enoxaparin (Lovenox)  VTE Mechanical Prophylaxis: sequential compression device        Certification Statement: The patient will continue to require additional inpatient hospital stay due to need for further acute intervention for sepsis    Status: inpatient     ===================================================================    Subjective:  Patient is currently sitting up in bed  Complaining of pain in her left hand, near the site of her IV  She complaint of pain in her right lower extremity  She notes shortness of breath  She denies any cough  She denies any abdominal pain  She denies any nausea or vomiting  Patient is currently saying help  She is asking for her mother  She then states she is aware that her mother has passed away  She is asking for her   She was reassured that he is coming in to see her as soon as possible    Physical Exam:   Temp:  [99 2 °F (37 3 °C)-103 3 °F (39 6 °C)] 99 2 °F (37 3 °C)  HR:  [111-133] 122  Resp:  [16-33] 33  BP: (102-122)/(50-68) 102/51    Gen:  Pleasant, slightly tachypneic  Sitting upright in bed with head of the bed at 75°  Face mask in place  Heart: regular rate and rhythm, S1S2 present, no murmur, rub or gallop  Lungs:  Decreased breath sounds at both bases with diminished air movement  However no wheezes, crackles, rhonchi    No accessory muscle use or respiratory distress  Abd: soft, non-tender, non-distended  NABS, no guarding, rebound or peritoneal signs  Extremities: no clubbing, cyanosis  1+ left lower extremity edema and left pedal edema  4+right lower extremity edema extending distally from thigh  Tender to touch  2+pedal pulses bilaterally  Neuro: awake, alert  Not oriented  Follows commands  Fluent speech    Skin: LLE no erythema  RLE +erythema, edema extending from toes up to mid thigh  Tender to touch  No discrete lesions  Tense  LABS:   Results from last 7 days   Lab Units 04/28/22  0658 04/27/22 2013   WBC Thousand/uL 22 92* 10 27*   HEMOGLOBIN g/dL 12 2 14 0   HEMATOCRIT % 37 8 44 2   PLATELETS Thousands/uL 190 207     Results from last 7 days   Lab Units 04/28/22  0701 04/27/22 2013   POTASSIUM mmol/L 3 6 4 0   CHLORIDE mmol/L 107 102   CO2 mmol/L 21 26   BUN mg/dL 22 22   CREATININE mg/dL 1 25 1 50*   CALCIUM mg/dL 7 7* 8 8       Hospital Data:  4/27:  Lower extremity ultrasound  RIGHT LOWER LIMB:  No evidence of acute or chronic deep vein thrombosis    No evidence of superficial thrombophlebitis noted  Doppler evaluation shows a normal response to augmentation maneuvers  Posterior tibial and anterior tibial arterial Doppler waveforms are  triphasic/hyperemic  LEFT LOWER LIMB:  No evidence of acute or chronic deep vein thrombosis  No evidence of superficial thrombophlebitis noted  Doppler evaluation shows a normal response to augmentation maneuvers  Posterior tibial and anterior tibial arterial Doppler waveforms are triphasic    4/27 CT right lower extremity:  1  Right lower extremity and ankle subcutaneous edema consistent with nonspecific cellulitis without drainable collection, deep soft tissue involvement or gloria osseous destructive change to indicate osteomyelitis  2   Postoperative change noted after ORIF for a right acetabular, posterior column and ischial fracture without gross evidence of hardware complication, limited by beam hardening artifacts  Advanced posttraumatic right hip osteoarthritis including   subcortical collapse right femoral head  Additional postoperative change status post ORIF for a right tibial fracture with IM steffi, again without hardware complication given limitations of patient motion and beam hardening artifacts    4/27 CT abdomen/pelvis:  1   No acute inflammatory process identified within the abdomen or pelvis  2   Subcutaneous fat stranding overlying the proximal right thigh with right inguinal lymphadenopathy suggestive of right lower extremity infectious process   See CT of the right lower extremity for further evaluation    4/27 CT head:  No acute intracranial hemorrhage, midline shift, or mass effect    4/27 CT chest without contrast:  1  Groundglass and interstitial airspace disease in the upper lungs, similar appearance to the prior exam suggesting chronic interstitial pneumonitis  2   Atelectasis and airspace consolidation in the dependent portions of the lung bases may represent aspiration and/or pneumonia  4/27 chest x-ray:  Hypoinflated lungs  Diffuse bilateral airspace disease can be secondary to pneumonia versus pulmonary edema    Microbiology  4/27:  Blood culture pending x2  4/20 7-:  Influenza, COVID, RSV        ---------------------------------------------------------------------------------------------------------------  This note has been constructed using a voice recognition system

## 2022-04-28 NOTE — ASSESSMENT & PLAN NOTE
Wt Readings from Last 3 Encounters:   04/28/22 104 kg (228 lb 2 8 oz)   04/21/22 102 kg (225 lb)   09/07/21 102 kg (225 lb 11 2 oz)     · Pt presented with elevated BNP >2100, dyspnea, and hypoxia  · Most recent 2D echo 7/21 with LVEF 70%, normal wall motion, G2DD, severe asymetric hypertrophy of septum with dynamic outflow tract obstruction  · Continue metoprolol  · Hold diuretics with severe sepsis    Monitor for volume overloa  · Appreciate cardiology eval and recommendations

## 2022-04-28 NOTE — ASSESSMENT & PLAN NOTE
· Severe sepsis present on admission with fever, tachycardia, lactic acidosis and respiratory failure  T Max 103 3F  · Lactate trending upward, CT study of abdomen and pelvis normal   Received 2 L IV bolus in ED  Will provide with total 4 L IV bolus  · Chest CT shows atelectasis and airspace consolidation in the dependent portions of the lung bases- may represent aspiration and/or pneumonia  Groundglass and interstitial airspace disease in the upper lungs, similar to prior suggesting chronic interstitial pneumonitis    · CT RLE :  Severe cellulitis of the right ankle and calf  · See plan for right lower extremity cellulitis  · Elevated PCT 75, continue to trend  · Urine antigens ordered

## 2022-04-28 NOTE — ASSESSMENT & PLAN NOTE
· Troponin 216, history of elevated troponin  Suspect non MI troponin elevation in setting of severe sepsis and RUCHI  · Received aspirin bolus rectally in ED  · Unable to assess for chest pain due to encephalopathy    Will cycle troponins and EKG, monitor on telemetry and consult Cardiology

## 2022-04-28 NOTE — ASSESSMENT & PLAN NOTE
Lab Results   Component Value Date    EGFR 43 04/28/2022    EGFR 86 11/12/2019    EGFR 78 11/11/2019    CREATININE 1 25 04/28/2022    CREATININE 1 50 (H) 04/27/2022    CREATININE 0 92 07/09/2021       Patient has history of chronic kidney disease stage 3  Review of old records note her baseline creatinine ranges 0 7-1 2, and is variable  Initial creatinine on admission was 1 5, elevated from her baseline, likely due to sepsis    Improved after IV fluid hydration

## 2022-04-28 NOTE — ASSESSMENT & PLAN NOTE
· History of ovarian cancer status post bilateral oophorectomy and hysterectomy  · Continue outpatient follow-up

## 2022-04-28 NOTE — ASSESSMENT & PLAN NOTE
· Pt presented with severe sepsis, present on admission, with fever of 103, tachycardia, lactic acidosis, metabolic encephalopathy and hypoxia  · Pt was placed on sepsis pathway, given ivf resuscitation, broad-spectrum antibiotics, and admitted to step-down unit  ·  Chest CT shows atelectasis and airspace consolidation in the dependent portions of the lung bases- may represent aspiration and/or pneumonia  Groundglass and interstitial airspace disease in the upper lungs, similar to prior suggesting chronic interstitial pneumonitis  · CT RLE :  Severe cellulitis of the right ankle and calf:  No evidence of abscess    No evidence of compartment syndrome  · Appreciate infectious disease team evaluation recommendations

## 2022-04-28 NOTE — ASSESSMENT & PLAN NOTE
· Elevated D-dimer 10 15  · History of DVT and PE in 2007 after hysterectomy for ovarian cancer at [facility]  IVC filter was placed   Treated with Lovenox for 1 year  · Contrast allergy, consider premedicating with steroids for CTA PE study vs V/Q scan  · Will order B/LLE duplex study to r/o DVT  · Treat with Lovenox weight based dosing until thrombus ruled out

## 2022-04-28 NOTE — ASSESSMENT & PLAN NOTE
· History of recurrent leg cellulitis in setting of lymphedema  Previously followed by ID and treated with Pen VK suppression through 12/31/2020  Per ID note on Aug 19, 2020: If cellulitis recurs off suppression, patient would then need to be on lifelong suppression  · Was admitted in Nov 2019 for right leg cellulitis and secondary GBS bacteremia    · CT RLE:  Shows severe cellulitis of the right ankle and calf  · Will treat with IV Ancef 2g q8h  · May need MRI RLE   Will consult Orthopedic surgery  · Check CK  · Neurovascular checks  · Orthopaedic consult  · Complicated history, will consult ID

## 2022-04-28 NOTE — ASSESSMENT & PLAN NOTE
· Toxic metabolic encephalopathy in setting of severe sepsis; previous admission with same presentation  · CT head negative for acute intracranial abnormality  · Coma panel and ammonia level normal  · Hold sedating medications  · NPO for now  · Monitor for improvement with treatment of sepsis

## 2022-04-28 NOTE — ASSESSMENT & PLAN NOTE
· Respiratory failure requiring non-rebreather, weaned down to face mask as patient breathes primarily through the mouth  · Chest CT shows atelectasis and airspace consolidation in the dependent portions of the lung bases may represent aspiration and/or pneumonia  Groundglass and interstitial airspace disease in the upper lungs, similar appearance to the prior exam suggesting chronic interstitial pneumonitis  · Respiratory viral panel negative  · Monitor O2 sats closely      · See plan for severe sepsis

## 2022-04-28 NOTE — ASSESSMENT & PLAN NOTE
· Developed hypogammaglobulinemia following Ritiximab treatment for Wegener's  · Followed outpatient by Hem/Onc for severe hypogammaglobinemia, previously on IVIG although had adverse reactions and recommended watchful observation and monitoring of IgG level  · Cont outpt follow up with Dr Chika Mathis

## 2022-04-28 NOTE — H&P
[address]  H&P- Rosemary Nikita 1955, 69 y o  female MRN: [id number]  Unit/Bed#: ICU 11 Encounter: [id number]  Primary Care Provider: Azalea Garrison,    Date and time admitted to hospital: 4/27/2022  7:43 PM    * Severe sepsis [facility])  Assessment & Plan  · Severe sepsis present on admission with fever, tachycardia, lactic acidosis and respiratory failure  T Max 103 3F  · Lactate trending upward, CT study of abdomen and pelvis normal   Received 2 L IV bolus in ED  Will provide with total 4 L IV bolus  · Chest CT shows atelectasis and airspace consolidation in the dependent portions of the lung bases- may represent aspiration and/or pneumonia  Groundglass and interstitial airspace disease in the upper lungs, similar to prior suggesting chronic interstitial pneumonitis  · CT RLE :  Severe cellulitis of the right ankle and calf  · See plan for right lower extremity cellulitis  · Elevated PCT 75, continue to trend  · Urine antigens ordered    Cellulitis of right lower extremity  Assessment & Plan  · History of recurrent leg cellulitis in setting of lymphedema  Previously followed by ID and treated with Pen VK suppression through 12/31/2020  Per ID note on Aug 19, 2020: If cellulitis recurs off suppression, patient would then need to be on lifelong suppression  · Was admitted in Nov 2019 for right leg cellulitis and secondary GBS bacteremia    · CT RLE:  Shows severe cellulitis of the right ankle and calf  · Will treat with IV Ancef 2g q8h  · May need MRI RLE  Will consult Orthopedic surgery  · Check CK  · Neurovascular checks  · Orthopaedic consult  · Complicated history, will consult ID     Acute respiratory failure with hypoxia ([address] )  Assessment & Plan  · Respiratory failure requiring non-rebreather, weaned down to face mask as patient breathes primarily through the mouth    · Chest CT shows atelectasis and airspace consolidation in the dependent portions of the lung bases may represent aspiration and/or pneumonia  Groundglass and interstitial airspace disease in the upper lungs, similar appearance to the prior exam suggesting chronic interstitial pneumonitis  · Respiratory viral panel negative  · Monitor O2 sats closely  · See plan for severe sepsis    Elevated troponin  Assessment & Plan  · Troponin 216, history of elevated troponin  Suspect non MI troponin elevation in setting of severe sepsis and RUCHI  · Received aspirin bolus rectally in ED  · Unable to assess for chest pain due to encephalopathy  Will cycle troponins and EKG, monitor on telemetry and consult Cardiology    Acute metabolic encephalopathy  Assessment & Plan  · Toxic metabolic encephalopathy in setting of severe sepsis; previous admission with same presentation  · CT head negative for acute intracranial abnormality  · Coma panel and ammonia level normal  · Hold sedating medications  · NPO for now  · Monitor for improvement with treatment of sepsis    History of pulmonary embolism  Assessment & Plan  · Elevated D-dimer 10 15  · History of DVT and PE in 2007 after hysterectomy for ovarian cancer at [facility]  IVC filter was placed   Treated with Lovenox for 1 year  · Contrast allergy, consider premedicating with steroids for CTA PE study vs V/Q scan  · Will order B/LLE duplex study to r/o DVT  · Treat with Lovenox weight based dosing until thrombus ruled out    Hypogammaglobulinemia, acquired ([address] )  Assessment & Plan  · Developed hypogammaglobulinemia following Ritiximab treatment for Wegener's  · Followed outpatient by Hem/Onc for severe hypogammaglobinemia, previously on IVIG although had adverse reactions and recommended watchful observation and monitoring of IgG level    Chronic pain disorder  Assessment & Plan  · Maintained on OxyContin ER 40 mg b i d , oxycodone IR 15mg b i d PRN  · On lorazepam 0 5 mg b i d   · Medications verified on PDMP  · Patient has generalized tenderness on palpation, continue opioids  · Hold lorazepam until improvement in mental status    Wegener's granulomatosis with renal involvement ([facility] )  Assessment & Plan  · Juan's granulomatosis diagnosed in Feb 2010, treated with rituximab for 4 years and prednisone with good response although discontinued due to subsequent hypogammaglobinemia and remission of disease    Chronic diastolic heart failure ([facility] )  Assessment & Plan  Wt Readings from Last 3 Encounters:   04/27/22 101 kg (223 lb)   04/21/22 102 kg (225 lb)   09/07/21 102 kg (225 lb 11 2 oz)     · Elevated BNP >2100  Echo LVEF 70%, normal wall motion, G2DD  · Continue metoprolol  · Monitor daily weight    Cancer of ovary (HCC)  Assessment & Plan  · History of ovarian cancer status post bilateral oophorectomy and hysterectomy    VTE Prophylaxis: Enoxaparin (Lovenox)  / sequential compression device   Code Status: FC by default  POLST: POLST is not applicable to this patient  Discussion with family:     Anticipated Length of Stay:  Patient will be admitted on an Inpatient basis with an anticipated length of stay of  > 2 midnights  Justification for Hospital Stay:  Severe sepsis in setting of severe cellulitis, elevated troponin, toxic encephalopathy    Total Time for Visit, including Counseling / Coordination of Care: 90 minutes  Greater than 50% of this total time spent on direct patient counseling and coordination of care  Chief Complaint:       History of Present Illness:    Delbert Rousseau is a 69 y o  female who presents with severe sepsis  Arrived with encephalopathy  Mental status improving, currently oriented to person and place, patient repeating "help me " Unable to obtain HPI      Review of Systems:    Review of Systems   Unable to perform ROS: Acuity of condition       Past Medical and Surgical History:     Past Medical History:   Diagnosis Date    BRCA1 positive     Cancer ([facility] )     ovarian Ca with chemo    History of chemotherapy     ovarian cancer 2007    History of pulmonary embolus (PE) & DVT 2007    post-op FAMILIA/ omenectomy    Lymphedema     MRSA (methicillin resistant Staphylococcus aureus) 2017    nasal swab negative 4/3/19    Ovarian cancer ([facility] )     CYST REMOVED RIGHT OVARY IN   HYSTERECTOMY 07  Past Surgical History:   Procedure Laterality Date    APPENDECTOMY      laparoscopic    BILATERAL SALPINGOOPHORECTOMY  2007    BREAST BIOPSY Right 2016    BREAST BIOPSY Left 07/15/2020    BREAST BIOPSY Left 2021    stereo     SECTION      HYSTERECTOMY  2007    Omentectomy and lymph node biopsy at [facility] BIOPSY RIGHT (ALL INC) Right 2021    OOPHORECTOMY Bilateral 2007    OTHER SURGICAL HISTORY      right lower extremity due to trauma    [place] TREAT TIBIAL SHAFT FX, INTRAMED IMPLANT Right 2017    Procedure: INSERTION NAIL IM TIBIA;  Surgeon: Rolo Roberto MD;  Location: [facility];  Service: Orthopedics    US GUIDANCE BREAST BIOPSY LEFT EACH ADDITIONAL Left 7/15/2020    US GUIDED BREAST BIOPSY LEFT COMPLETE Left 7/15/2020    US GUIDED BREAST BIOPSY RIGHT COMPLETE Right 2016    US GUIDED BREAST BIOPSY RIGHT COMPLETE Right 2021       Meds/Allergies:    Prior to Admission medications    Medication Sig Start Date End Date Taking?  Authorizing Provider   acetaminophen (TYLENOL) 325 mg tablet Take 2 tablets by mouth every 6 (six) hours as needed for mild pain  Patient taking differently: Take 650 mg by mouth every 6 (six) hours as needed for mild pain  17   Curly Sandhoff, MD   bisacodyl (DULCOLAX) 5 mg EC tablet Take 5 mg by mouth daily as needed for constipation     Historical Provider, MD   Calcium Carbonate-Vit D-Min (CALCIUM 1200 PO) Take 3 tablets by mouth daily     Historical Provider, MD   DULoxetine (CYMBALTA) 60 mg delayed release capsule Take 60 mg by mouth daily   18   Historical Provider, MD   furosemide (LASIX) 20 mg tablet Take 1 tablet (20 mg total) by mouth daily  Patient not taking: Reported on 3/12/2021 7/31/20   Pedro Holden DO   LORazepam (ATIVAN) 0 5 mg tablet Take 1 tablet as needed every 8 hours  Patient taking differently: Take 0 5 mg by mouth 2 (two) times a day Take 1 tablet as needed every 8 hours  2/3/16   RENETTA Jorge   metoprolol tartrate (LOPRESSOR) 25 mg tablet TAKE 1/2 TABLET(12 5MG) BY MOUTH EVERY 12 HOURS 9/2/21   Venessa Lamar MD   Multiple Vitamins-Minerals (MULTIVITAMIN ADULT PO) Take 1 capsule by mouth daily  9/27/13   Historical Provider, MD   omeprazole (PriLOSEC) 20 mg delayed release capsule Take 20 mg by mouth daily  Historical Provider, MD   ondansetron (ZOFRAN) 4 mg tablet Take 1 tablet (4 mg total) by mouth every 8 (eight) hours as needed for nausea or vomiting  Patient not taking: Reported on 6/4/2021 10/21/20   Jesus Mcdonald PA-C   oxyCODONE (OxyCONTIN) 40 mg 12 hr tablet Take 40 mg by mouth every 12 (twelve) hours    Historical Provider, MD   OXYCODONE HCL PO Take 15 mg by mouth 2 (two) times a day as needed     Historical Provider, MD   TOVIAZ 8 MG TB24 Take 8 mg by mouth daily at bedtime  4/5/18   Historical Provider, MD     I have reviewed home medications using allscripts  Allergies:    Allergies   Allergen Reactions    Iodinated Diagnostic Agents Shortness Of Breath    Omnipaque [Iohexol] Shortness Of Breath    Other Shortness Of Breath     IVP dye, x ray dye 3    Iodides      Other reaction(s): SWELLING, SOB    Methotrexate Nausea Only     Headache and nausea    Methotrexate Derivatives Headache       Social History:     Marital Status: /Civil Union   Occupation:   Patient Pre-hospital Living Situation:   Patient Pre-hospital Level of Mobility:   Patient Pre-hospital Diet Restrictions:   Substance Use History:   Social History     Substance and Sexual Activity   Alcohol Use Yes     Social History     Tobacco Use   Smoking Status Never Smoker Smokeless Tobacco Never Used     Social History     Substance and Sexual Activity   Drug Use Not Currently       Family History:    Family History   Problem Relation Age of Onset    Breast cancer Mother         28    Pancreatic cancer Mother     Ovarian cancer Maternal Grandmother     Breast cancer Maternal Aunt 39    No Known Problems Paternal Aunt     No Known Problems Father     No Known Problems Maternal Grandfather     No Known Problems Paternal Grandmother     No Known Problems Paternal Grandfather     Lung cancer Half-Sister        Physical Exam:     Vitals:   Blood Pressure: 110/56 (04/28/22 0304)  Pulse: (!) 120 (04/28/22 0347)  Temperature: 97 9 °F (36 6 °C) (04/28/22 0347)  Temp Source: Oral (04/28/22 0347)  Respirations: (!) 30 (04/28/22 0347)  Height: 5' 3" (160 cm) (04/28/22 0016)  Weight - Scale: 101 kg (223 lb) (04/27/22 2103)  SpO2: 97 % (04/28/22 0347)    Physical Exam  Constitutional:       General: She is not in acute distress  Appearance: She is obese  She is ill-appearing  She is not toxic-appearing or diaphoretic  Comments: Chronically ill   HENT:      Head: Normocephalic and atraumatic  Nose: No congestion or rhinorrhea  Mouth/Throat:      Mouth: Mucous membranes are dry  Eyes:      General: No scleral icterus  Conjunctiva/sclera: Conjunctivae normal    Cardiovascular:      Rate and Rhythm: Regular rhythm  Tachycardia present  Heart sounds: Murmur heard  Pulmonary:      Effort: Pulmonary effort is normal       Breath sounds: Normal breath sounds  Comments: On facemask at 2L  Abdominal:      General: There is no distension  Palpations: Abdomen is soft  Tenderness: There is no abdominal tenderness  Comments: Decreased bowel sounds   Genitourinary:     Comments: Fani urine output  Musculoskeletal:         General: Tenderness present  Right lower leg: No edema  Left lower leg: No edema     Skin:     General: Skin is warm       Comments: RLE Charcot deformity, lymphedema, erythema and warmth, tender to palpation  Chronic scaling and hyperpigmentation   Neurological:      Mental Status: She is alert  She is disoriented  Psychiatric:      Comments: Alert and oriented to person and place only       Additional Data:     Lab Results: I have personally reviewed pertinent reports  Results from last 7 days   Lab Units 04/27/22 2013   WBC Thousand/uL 10 27*   HEMOGLOBIN g/dL 14 0   HEMATOCRIT % 44 2   PLATELETS Thousands/uL 207   BANDS PCT % 10*   LYMPHO PCT % 3*   MONO PCT % 3*   EOS PCT % 0     Results from last 7 days   Lab Units 04/27/22 2013   SODIUM mmol/L 140   POTASSIUM mmol/L 4 0   CHLORIDE mmol/L 102   CO2 mmol/L 26   BUN mg/dL 22   CREATININE mg/dL 1 50*   ANION GAP mmol/L 12   CALCIUM mg/dL 8 8   ALBUMIN g/dL 3 3*   TOTAL BILIRUBIN mg/dL 0 89   ALK PHOS U/L 174*   ALT U/L 43   AST U/L 70*   GLUCOSE RANDOM mg/dL 140     Results from last 7 days   Lab Units 04/27/22 2013   INR  1 16             Results from last 7 days   Lab Units 04/28/22  0139 04/27/22  2229 04/27/22 2013   LACTIC ACID mmol/L 6 4* 6 2* 5 2*   PROCALCITONIN ng/ml  --   --  75 41*       Imaging: I have personally reviewed pertinent reports  CT abdomen pelvis wo contrast   ED Interpretation by Brennon Clemons DO (04/28 0024)   FINDINGS:     ABDOMEN     Evaluation of intra-abdominal organs is limited to lack of IV contrast  Evaluation of the bowel is limited to lack of enteric contrast       LOWER CHEST:  See CT of the chest performed earlier the same day      LIVER/BILIARY TREE:  Unremarkable      GALLBLADDER:  No calcified gallstones  No pericholecystic inflammatory change      SPLEEN:  Stable hypodensities measuring up to 2 7 cm in the spleen      PANCREAS:  Unremarkable      ADRENAL GLANDS:  Unremarkable      KIDNEYS/URETERS:  Unremarkable   No hydronephrosis      STOMACH AND BOWEL:  Unremarkable      APPENDIX:  No findings to suggest appendicitis      ABDOMINOPELVIC CAVITY:  No ascites  No pneumoperitoneum  No lymphadenopathy      VESSELS:  Infrarenal IVC filter in place      PELVIS     REPRODUCTIVE ORGANS:  Surgical changes of prior hysterectomy      URINARY BLADDER:  Unremarkable      ABDOMINAL WALL/INGUINAL REGIONS:  Subcutaneous fat stranding overlying the proximal right thigh  There right inguina   l lymphadenopathy measuring up to 1 3 cm in short axis      OSSEOUS STRUCTURES:  Motion limited examination  Fixation of the right pelvic pelvis with severe posttraumatic degenerative change of the right hip  No acute fracture or destructive osseous lesion      IMPRESSION:     1  No acute inflammatory process identified within the abdomen or pelvis  2   Subcutaneous fat stranding overlying the proximal right thigh with right inguinal lymphadenopathy suggestive of right lower extremity infectious process  See CT of the right lower extremity for further evaluation          Final Result by Elmer Huynh MD (04/28 0022)      1  No acute inflammatory process identified within the abdomen or pelvis  2   Subcutaneous fat stranding overlying the proximal right thigh with right inguinal lymphadenopathy suggestive of right lower extremity infectious process  See CT of the right lower extremity for further evaluation  Workstation performed: [id number]         CT head without contrast   ED Interpretation by Yaz Biggs DO (04/27 2255)   IMPRESSION:     No acute intracranial hemorrhage, midline shift, or mass effect  Final Result by Elmer Huynh MD (04/27 2250)      No acute intracranial hemorrhage, midline shift, or mass effect  Workstation performed: [id number]         CT chest without contrast   ED Interpretation by Yaz Biggs DO (04/27 2255)   IMPRESSION:        1    Groundglass and interstitial airspace disease in the upper lungs, similar appearance to the prior exam suggesting chronic interstitial pneumonitis  2   Atelectasis and airspace consolidation in the dependent portions of the lung bases may represent aspiration and/or pneumonia  Final Result by Ewelina Ackerman MD (04/27 2250)         1  Groundglass and interstitial airspace disease in the upper lungs, similar appearance to the prior exam suggesting chronic interstitial pneumonitis  2   Atelectasis and airspace consolidation in the dependent portions of the lung bases may represent aspiration and/or pneumonia  Workstation performed: [id number]         XR chest 1 view portable    (Results Pending)   CT lower extremity wo contrast right    (Results Pending)   VAS lower limb venous duplex study, complete bilateral    (Results Pending)       EKG, Pathology, and Other Studies Reviewed on Admission:   · EKG: stac 114 CT echo LVEF 70%    Allscripts / Epic Records Reviewed: Yes     ** Please Note: This note has been constructed using a voice recognition system   **

## 2022-04-28 NOTE — ASSESSMENT & PLAN NOTE
· Pt has a h/o Juan's granulomatosis diagnosed in Feb 2010  · Patient had associated renal failure, pulmonary hemorrhage, and polyarteritis  · Treated with rituximab for 4 years and prednisone with good response although discontinued due to subsequent hypogammaglobinemia and remission of disease  · CT chest on admission revealed ground-glass and interstitial airspace disease in upper lungs, similar to prior, suggestive of interstitial pneumonitis

## 2022-04-28 NOTE — SPEECH THERAPY NOTE
Speech Language/Pathology  Speech-Language Pathology Bedside Swallow Evaluation        Patient Name: Cassi Caraballo    TCSBS'K Date: 2022     Problem List  Principal Problem:    Severe sepsis [facility])  Active Problems:    Wegener's granulomatosis with renal involvement ([facility] )    Cancer of ovary ([place] [id number] )    Chronic pain disorder    Acute metabolic encephalopathy    Elevated troponin    Acute respiratory failure with hypoxia (HCC)    Hypogammaglobulinemia, acquired ([place] [address] )    Cellulitis of right lower extremity    History of pulmonary embolism    Chronic diastolic heart failure ([facility] )         Past Medical History  Past Medical History:   Diagnosis Date    BRCA1 positive     Cancer ([place] [address] )     ovarian Ca with chemo    History of chemotherapy     ovarian cancer     History of pulmonary embolus (PE) & DVT 2007    post-op FAMILIA/ omenectomy    Lymphedema     MRSA (methicillin resistant Staphylococcus aureus) 2017    nasal swab negative 4/3/19    Ovarian cancer ([place] [address] )     CYST REMOVED RIGHT OVARY IN   HYSTERECTOMY 07         Past Surgical History  Past Surgical History:   Procedure Laterality Date    APPENDECTOMY  1998    laparoscopic    BILATERAL SALPINGOOPHORECTOMY  2007    BREAST BIOPSY Right 2016    BREAST BIOPSY Left 07/15/2020    BREAST BIOPSY Left 2021    stereo     SECTION      HYSTERECTOMY  2007    O[facility]ectomy and [facility] at [facility] BIOPSY RIGHT (ALL INC) Right 2021    OOPHORECTOMY Bilateral 2007    OTHER SURGICAL HISTORY      right lower extremity due to trauma    [place] TREAT TIBIAL SHAFT FX, INTRAMED IMPLANT Right 2017    Procedure: INSERTION NAIL IM TIBIA;  Surgeon: Lorna Forrest MD;  [facility];  Service: Orthopedics    US GUIDANCE BREAST BIOPSY LEFT EACH ADDITIONAL Left 7/15/2020    US GUIDED BREAST BIOPSY LEFT COMPLETE Left 7/15/2020    US GUIDED BREAST BIOPSY RIGHT COMPLETE Right 5/9/2016    US GUIDED BREAST BIOPSY RIGHT COMPLETE Right 7/26/2021       Summary    Assessment was limited, due to pt with reduced attention/participation and impaired mental status (pt frequently calling out, difficulty with word finding, echolalia), which was discussed with nursing  With limited assessment (ice chips, water, and 2 bites of applesauce), pt presents with oropharyngeal swallows that appear [facility] [place] for clear liquids and min amount of pureed  There were no overt s/s of aspiration  After only two bites of applesauce (pt needed max encouragement to take), she refused to take any more food  She did drink at least 4 oz of water, single and consecutive sips by straw  At this time, given AMS and limited assessment, recommend consideration of clear liquid diet, with further dysphagia assessment when pt is more alert and able to focus  Recommendations:   Diet: Clear liquids, straws ok  Meds: whole with liquid and whole with puree, as tolerated   Frequent Oral care  Close supervision at meals/assist with meals  Aspiration precautions and compensatory swallowing strategies: upright posture, only feed when fully alert, slow rate of feeding and small bites/sips  Other Recommendations/ considerations: ST to see for further assessment, trials of pureed, soft, hard solids as appropriate  Current Medical Status  Copied from admission:  Becka Moody is a 69 y o  female who presents with severe sepsis  Arrived with encephalopathy  Mental status improving, currently oriented to person and place, patient repeating "help me " Unable to obtain HPI  Order received and chart reviewed  Spoke with RN, who reports pt drank some water this morning without difficulty  She was frequently calling out "help me" during the assessment  Past medical history:   Please see H&P for details    Special Studies:  CT head-  No acute intracranial hemorrhage, midline shift, or mass effect  CT chest-  1    Groundglass and interstitial airspace disease in the upper lungs, similar appearance to the prior exam suggesting chronic interstitial pneumonitis  2   Atelectasis and airspace consolidation in the dependent portions of the lung bases may represent aspiration and/or pneumonia  Social/Education/Vocational Hx:  Pt lives with family    Swallow Information   Current Risks for Dysphagia & Aspiration: AMS and decreased alertness  Current Symptoms/Concerns: change in respiratory status and CT chest suggests possible aspiration or pneumonia  Current Diet: NPO   Baseline Diet: regular diet and thin liquids    Baseline Assessment   Behavior/Cognition: waxing and waning arousal level, decreased attention and pt calling out frequently, appears to be confused, with word substitutions, repeating phrases, difficulty with word finding  Speech/Language Status: able to follow commands inconsistently and limited verbal output  Patient Positioning: upright in bed     Swallow Mechanism Exam   Facial: symmetrical  Labial: WFL  Lingual: decreased ROM and decreased coordination  Velum: unable to visualize  Mandible:  decreased ROM  Dentition: adequate  Vocal quality:clear/adequate   Volitional Cough: unable to initiate volitional cough   Respiratory: Face mask, sats in mid 90s during assessment      Consistencies Assessed and Performance   Consistencies Administered: ice chips, thin liquids and puree  -single and consecutive sips of thin water by straw, 2 small bites of pureed with max encouragement    Oral Stage: mildly reduced bolus retrieval from spoon, adequate draw from straw, bolus manipulation and transfer appeared fairly prompt, adequate lip seal      Pharyngeal Stage: Hyolaryngeal elevation observed and palpated  Swallows appeared prompt  There were no overt s/s of aspiration  Esophageal Concerns: none reported      Results Reviewed with: patient and RN   Dysphagia Goals: 1   Pt will tolerate clear liquid diet with prompt oropharyngeal swallows and no overt s/s of aspiration  2  Pt will participate in further dysphagia assessment when more alert/more attentive  3  Pt will tolerate LRD without s/s of aspiration across all meals and snacks     Discharge recommendation: unsure at this time    Speech Therapy Prognosis   Prognosis: fair    Prognosis Considerations: age, medical status, cognitive status and therapeutic potential

## 2022-04-28 NOTE — PHYSICAL THERAPY NOTE
PT EVALUATION    Pt  Name: Cheko Will  Pt  Age: 69 y o  MRN: [id number]  LENGTH OF STAY: 0      Admitting Diagnoses: Altered mental status [R41 82]  Pneumonia [J18 9]  Hypoxia [R09 02]  Elevated troponin [R77 8]  Fever [R50 9]  RUCHI (acute kidney injury) ([facility] ) [N17 9]  Cellulitis of right leg [L03 115]  Cellulitis of right lower extremity [L03 115]  Acute encephalopathy [G93 40]  Elevated d-dimer [R79 89]  Severe sepsis ([facility] ) [A41 9, R65 20]  Right leg swelling [M79 89]    Past Medical History:   Diagnosis Date    BRCA1 positive     Cancer ([facility] )     ovarian Ca with chemo    History of chemotherapy     ovarian cancer     History of pulmonary embolus (PE) & DVT 2007    post-op FAMILIA/ omenectomy    Lymphedema     MRSA (methicillin resistant Staphylococcus aureus) 2017    nasal swab negative 4/3/19    Ovarian cancer ([facility] )     CYST REMOVED RIGHT OVARY IN   HYSTERECTOMY 07         Past Surgical History:   Procedure Laterality Date    APPENDECTOMY  1998    laparoscopic    BILATERAL SALPINGOOPHORECTOMY  2007    BREAST BIOPSY Right 2016    BREAST BIOPSY Left 07/15/2020    BREAST BIOPSY Left 2021    stereo     SECTION      HYSTERECTOMY  2007    [facility]ectomy and [facility] node [facility] BIOPSY RIGHT (ALL INC) Right 2021    OOPHORECTOMY Bilateral 2007    OTHER SURGICAL HISTORY      right lower extremity due to trauma    [place] TREAT TIBIAL SHAFT FX, INTRAMED IMPLANT Right 2017    Procedure: INSERTION NAIL IM TIBIA;  Surgeon: Rachel Terry MD;  Location: [facility];  Service: Orthopedics    US GUIDANCE BREAST BIOPSY LEFT EACH ADDITIONAL Left 7/15/2020    US GUIDED BREAST BIOPSY LEFT COMPLETE Left 7/15/2020    US GUIDED BREAST BIOPSY RIGHT COMPLETE Right 2016    US GUIDED BREAST BIOPSY RIGHT COMPLETE Right 2021       Imaging Studies:  CT abdomen pelvis wo contrast   ED Interpretation by Reyes Mutter, DO (04/28 0024)   FINDINGS:     ABDOMEN     Evaluation of intra-abdominal organs is limited to lack of IV contrast  Evaluation of the bowel is limited to lack of enteric contrast       LOWER CHEST:  See CT of the chest performed earlier the same day      LIVER/BILIARY TREE:  Unremarkable      GALLBLADDER:  No calcified gallstones  No pericholecystic inflammatory change      SPLEEN:  Stable hypodensities measuring up to 2 7 cm in the spleen      PANCREAS:  Unremarkable      ADRENAL GLANDS:  Unremarkable      KIDNEYS/URETERS:  Unremarkable  No hydronephrosis      STOMACH AND BOWEL:  Unremarkable      APPENDIX:  No findings to suggest appendicitis      ABDOMINOPELVIC CAVITY:  No ascites  No pneumoperitoneum  No lymphadenopathy      VESSELS:  Infrarenal IVC filter in place      PELVIS     REPRODUCTIVE ORGANS:  Surgical changes of prior hysterectomy      URINARY BLADDER:  Unremarkable      ABDOMINAL WALL/INGUINAL REGIONS:  Subcutaneous fat stranding overlying the proximal right thigh  There right inguina   l lymphadenopathy measuring up to 1 3 cm in short axis      OSSEOUS STRUCTURES:  Motion limited examination  Fixation of the right pelvic pelvis with severe posttraumatic degenerative change of the right hip  No acute fracture or destructive osseous lesion      IMPRESSION:     1  No acute inflammatory process identified within the abdomen or pelvis  2   Subcutaneous fat stranding overlying the proximal right thigh with right inguinal lymphadenopathy suggestive of right lower extremity infectious process  See CT of the right lower extremity for further evaluation          Final Result by Geoffrey Cordova MD (04/28 0022)      1  No acute inflammatory process identified within the abdomen or pelvis  2   Subcutaneous fat stranding overlying the proximal right thigh with right inguinal lymphadenopathy suggestive of right lower extremity infectious process    See CT of the right lower extremity for further evaluation  Workstation performed: [id number]         CT lower extremity wo contrast right   Final Result by Cindy Quiñonez MD (04/28 3623)   1  Right lower extremity and ankle subcutaneous edema consistent with nonspecific cellulitis without drainable collection, deep soft tissue involvement or gloria osseous destructive change to indicate osteomyelitis  2   Postoperative change noted after ORIF for a right acetabular, posterior column and ischial fracture without gross evidence of hardware complication, limited by beam hardening artifacts  Advanced posttraumatic right hip osteoarthritis including    subcortical collapse right femoral head  Additional postoperative change status post ORIF for a right tibial fracture with IM steffi, again without hardware complication given limitations of patient motion and beam hardening artifacts  Concordant preliminary virtual radiologic findings were provided at 2:34 AM on 4/28/2022  Workstation performed: [id number]         CT head without contrast   ED Interpretation by Ky Coronado DO (04/27 2255)   IMPRESSION:     No acute intracranial hemorrhage, midline shift, or mass effect  Final Result by Mariano Bolden MD (04/27 2250)      No acute intracranial hemorrhage, midline shift, or mass effect  Workstation performed: [id number]         CT chest without contrast   ED Interpretation by Ky Coronado DO (04/27 2255)   IMPRESSION:        1  Groundglass and interstitial airspace disease in the upper lungs, similar appearance to the prior exam suggesting chronic interstitial pneumonitis  2   Atelectasis and airspace consolidation in the dependent portions of the lung bases may represent aspiration and/or pneumonia  Final Result by Angeles Corrales MD (04/27 2250)         1    Groundglass and interstitial airspace disease in the upper lungs, similar appearance to the prior exam suggesting chronic interstitial pneumonitis  2   Atelectasis and airspace consolidation in the dependent portions of the lung bases may represent aspiration and/or pneumonia  Workstation performed: [id number]         XR chest 1 view portable   Final Result by Yang Bansal MD (04/28 0845)      Hypoinflated lungs  Diffuse bilateral airspace disease can be secondary to pneumonia versus pulmonary edema  Workstation performed: [id number]         VAS lower limb venous duplex study, complete bilateral    (Results Pending)        04/28/22 1145   PT Last Visit   PT Visit Date 04/28/22   Note Type   Note type Evaluation   Pain Assessment   Pain Score 10 - Worst Possible Pain   Pain Location/Orientation Orientation: Right;Location: Leg   Hospital Pain Intervention(s) Repositioned; Ambulation/increased activity; Emotional support; Rest   Restrictions/Precautions   Weight Bearing Precautions Per Order No   Other Precautions Cognitive; Chair Alarm; Bed Alarm;Multiple lines;Telemetry;O2;Fall Risk;Pain  (2L O2 face mask)   Home Living   Type of [address] [place] [address] One level;Ramped entrance   Bathroom Shower/Tub Walk-in shower   Bathroom Toilet Raised   Bathroom Equipment Grab bars in shower; Shower chair;Commode   Home Equipment [place] cane; Wheelchair-manual   Additional Comments Pt poor historian  Above information provided by pt's  who was present during session  Prior Function   Level of [place] Independent with ADLs and functional mobility  (w/ RW)   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)   [place]  (assist to wrap LLE)   Falls in the last 6 months 0   Vocational Retired   Comments (-) ; pt poor historian  Above information provided by pt's       General   Family/Caregiver Present Yes  (spouse)   Cognition   Overall Cognitive Status Impaired   Arousal/Participation Alert   Attention Difficulty attending to directions   Orientation Level Oriented to person;Oriented to place; Disoriented to time;Disoriented to situation   Following Commands Follows one step commands with increased time or repetition   Comments pt pleasant but confused  Subjective   Subjective Pt agreeable to PT/OT evals  RUE Assessment   RUE Assessment   (refer to OT)   LUE Assessment   LUE Assessment   (refer to OT)   RLE Assessment   RLE Assessment X  (unable to test 2* to inc pain even to touch)   LLE Assessment   LLE Assessment X  (2+/5 grossly)   Bed Mobility   Rolling R 2  Maximal assistance   Additional items Assist x 2;Bedrails; Increased time required;Verbal cues;LE management   Rolling L 2  Maximal assistance   Additional items Assist x 2; Increased time required;Verbal cues;LE management; Bedrails   Supine to Sit 2  Maximal assistance   Additional items Assist x 2;HOB elevated; Bedrails; Increased time required;Verbal cues;LE management   Sit to Supine 2  Maximal assistance   Additional items Assist x 2; Increased time required;Verbal cues;LE management   Additional Comments cues for techniques & safety; BP EOB: 124/94; SpO2 90-92% w/ 2L O2 via face mask; pt able to sit at EOB approx  1-2mins however w/ poor sitting balance & dec safety awareness w/ pt attempting to scoot off EOB despite education hence assisted back in bed for safety      Transfers   Sit to Stand Unable to assess   Stand to Sit Unable to assess   Additional Comments not appropriate at this time   Ambulation/Elevation   Gait pattern Not appropriate   Balance   Static Sitting Fair -   Dynamic Sitting Poor +   Static Standing Zero   Endurance Deficit   Endurance Deficit Yes   Endurance Deficit Description fatigue; pain; weakness   Activity Tolerance   Activity Tolerance Patient limited by fatigue;Patient limited by pain;Treatment limited secondary to medical complications (Comment)   Medical Staff Made Aware AYALA Fair   Nurse Made Aware JUDSON Suarez Falling   Assessment   Prognosis Fair   Problem List Decreased strength;Decreased range of motion;Decreased endurance; Impaired balance;Decreased mobility; Decreased cognition; Impaired judgement;Decreased safety awareness; Obesity;Pain;Decreased skin integrity   Assessment Pt  69 y  o female admitted for Severe sepsis ([address] ) w/ RLE cellulitis, acute respiratory failure w/ hypoxia, elevated troponin & D-dimer & acute metabolic encephalopathy  Pt referred to PT for mobility assessment & D/C planning w/ orders of up w/ assistance  Please see above for information re: home set-up & PLOF as well as objective findings during PT assessment  PTA, pt's  reports that pt I w/ RW at baseline  On eval, pt functioning below baseline hence will continue skilled PT to improve function & safety  Pt require maxAx2 w/ overall bed mobility + cues for techniques & safety  Pt able to sit at EOB approx  1-2mins however w/ poor sitting balance & dec safety awareness w/ pt attempting to scoot off EOB despite education hence assisted back in bed for safety  During session, BP EOB: 124/94; SpO2 90-92% w/ 2L O2 via face mask  The patient's AM-PAC Basic Mobility Inpatient Short Form Raw Score is 6  A Raw score of less than or equal to 16 suggests the patient may benefit from discharge to post-acute rehabilitation services  Please also refer to the recommendation of the Physical Therapist for safe discharge planning  From PT standpoint, due to above mentioned deficits & high risk for falls, pt will benefit from inpt rehab at D/C  No SOB & dizziness reported t/o session  Nsg staff most recent vital signs as follows: /54   Pulse (!) 118   Temp (!) 101 6 °F (38 7 °C) (Tympanic)   Resp (!) 45   Ht 5' 3" (1 6 m)   Wt 104 kg (228 lb 2 8 oz)   SpO2 (!) 89%   BMI 40 42 kg/m²   At end of session, pt back in bed in stable condition, call bell & phone in reach, bed alarm activated, all lines intact  Fall precautions reinforced w/ good understanding  CM to follow   Nsg staff to continue to mobilized pt as tolerated to prevent further decline in function  Will recommend meek lift for OOB at this time  Nsg notified  Co-eval was necessary to complete this PT eval for the pt's best interest given pt's medical acuity & complexity  Barriers to Discharge Decreased caregiver support   Barriers to Discharge Comments (+) times home alone   Goals   Patient Goals none stated 2* to impaired cognition   STG Expiration Date 05/12/22   Short Term Goal #1 Goals to be met in 14 days; pt will be able to: 1) inc strength & balance by 1/2 grade to improve overall functional mobility & dec fall risk; 2) inc bed mobility to modAx1 for pt to be able to get in/OOB safely w/ proper techniques 100% of the time, to dec caregiver burden & safely function at home; 3) inc sitting tolerance at EOB approx  10mins; 4) initiate pre-gait activities & progress to functional transfers as appropriate; 5) PT to see when pt appropriate for functional transfers & amb; 6) pt/caregiver ed   PT Treatment Day 0   Plan   Treatment/Interventions LE strengthening/ROM; Therapeutic exercise; Endurance training;Patient/family training;Bed mobility;Spoke to nursing;OT   PT Frequency 3-5x/wk   Recommendation   PT Discharge Recommendation Post acute rehabilitation services   AM-PAC Basic Mobility Inpatient   Turning in Bed Without Bedrails 1   Lying on Back to Sitting on Edge of Flat Bed 1   Moving Bed to Chair 1   Standing Up From Chair 1   Walk in Room 1   Climb 3-5 Stairs 1   Basic Mobility Inpatient Raw Score 6   Turning Head Towards Sound 3   Follow Simple Instructions 2   Low Function Basic Mobility Raw Score 11   Low Function Basic Mobility Standardized Score 16 55   Highest Level Of Mobility   JH-HLM Goal 2: Bed activities/Dependent transfer   JH-HLM Highest Level of Mobility 3: Sit at edge of bed   JH-HLM Goal Achieved Yes   End of Consult   Patient Position at End of Consult Supine;Bed/Chair alarm activated; All needs within reach   End of Consult Comments Pt in stable condition at end of session  All lines intact  Bed alarm activated      Hx/personal factors: co-morbidities, dec caregiver support, mutliple lines, telemetry, use of AD, dec cognition, pain, fall risk, assist w/ ADL's, obesity and O2  Examination: dec mobility, dec balance, dec endurance, dec amb, risk for falls, pain, dec cognition  Clinical: unpredictable (ongoing medical status, abnormal lab values, risk for falls and pain mgt)  Complexity: high     Blaine Bourne, PT

## 2022-04-28 NOTE — PLAN OF CARE
Problem: OCCUPATIONAL THERAPY ADULT  Goal: Performs self-care activities at highest level of function for planned discharge setting  See evaluation for individualized goals  Description: Treatment Interventions: ADL retraining,Functional transfer training,UE strengthening/ROM,Endurance training,Patient/family training,Equipment evaluation/education,Compensatory technique education,Continued evaluation,Energy conservation,Activityengagement          See flowsheet documentation for full assessment, interventions and recommendations  Note: Limitation: Decreased ADL status,Decreased UE ROM,Decreased UE strength,Decreased Safe judgement during ADL,Decreased endurance,Decreased self-care trans,Decreased high-level ADLs  Prognosis: Fair  Assessment: Pt is a 69 y o  female seen for OT evaluation s/p adm to [facility]  on 4/27/2022 w/ altered mental status and fever  Pt admitted w/ Severe sepsis, Acute respiratory failure with hypoxia, Acute metabolic encephalopathy, cellulitis of R LE, elevated troponin  CT head (-) for acute intracranial hemorrhage, midline shift, or mass effect  CT chest: Atelectasis and airspace consolidation in the dependent portions of the lung bases may represent aspiration and/or pneumonia  CT R LE: Severe cellulitis of the right ankle and calf:  No evidence of abscess  No evidence of compartment syndrome  Comorbidities affecting pts functional performance include a significant PMH of CA, DVT, PE, Lymphedema  Pt with active OT orders and activity orders for Up with assistance  Pt is a poor historian, info on home setup and PLOF obtained via pt and pt's spouse  Spouse works as RN, (+) home alone at times  At baseline, pt was I w/ ADLs and Mod I for functional transfers/mobility w/ use of RW  Spouse primarily performed IADLs (pt I w/ her laundry per spouse)  Denies falls PTA   Upon evaluation, pt currently requires Mod A for UB ADLs, Max A for LB ADLs, Max A for toileting, and Max A of 2 for bed mobility 2* the following deficits impacting occupational performance: decreased ROM, decreased strength, decreased balance, decreased tolerance, impaired sensation, impaired memory, impaired problem solving and increased pain  These impairments, as well at pts fall risk, multiple lines, new O2 requirements, limited home support, difficulty performing ADLS and limited insight into deficits limit pts ability to safely engage in all baseline areas of occupation  Based on the aforementioned OT evaluation, functional performance deficits, and assessments, pt has been identified as a High complexity evaluation  Pt to continue to benefit from continued acute OT services during hospital stay to address defined deficits and to maximize level of functional independence in the following Occupational Performance areas: eating, grooming, bathing/shower, toilet hygiene, dressing, medication management, health maintenance, functional mobility, community mobility, clothing management and social participation  From OT standpoint, recommend STR upon D/C   OT will continue to follow pt 3-5x/wk to address the following goals to  w/in 10-14 days:     OT Discharge Recommendation: Post acute rehabilitation services  OT - OK to Discharge: Yes (when medically cleared to rehab)

## 2022-04-28 NOTE — QUICK NOTE
Labs ordered and have not been drawn; last lactate resulted at 1:39a was 6 4  RN is in another room, message left for primary nurse     Stat lactate ordered

## 2022-04-29 ENCOUNTER — APPOINTMENT (INPATIENT)
Dept: RADIOLOGY | Facility: HOSPITAL | Age: 67
DRG: 871 | End: 2022-04-29
Payer: MEDICARE

## 2022-04-29 ENCOUNTER — APPOINTMENT (INPATIENT)
Dept: NON INVASIVE DIAGNOSTICS | Facility: HOSPITAL | Age: 67
DRG: 871 | End: 2022-04-29
Payer: MEDICARE

## 2022-04-29 PROBLEM — E83.39 HYPOPHOSPHATEMIA: Status: ACTIVE | Noted: 2022-04-29

## 2022-04-29 PROBLEM — R65.21 SEPTIC SHOCK (HCC): Status: ACTIVE | Noted: 2021-07-06

## 2022-04-29 PROBLEM — E83.42 HYPOMAGNESEMIA: Status: ACTIVE | Noted: 2022-04-29

## 2022-04-29 LAB
ANION GAP SERPL CALCULATED.3IONS-SCNC: 9 MMOL/L (ref 4–13)
ANION GAP SERPL CALCULATED.3IONS-SCNC: 9 MMOL/L (ref 4–13)
AORTIC ROOT: 3.1 CM
AORTIC VALVE MEAN VELOCITY: 12.4 M/S
APTT PPP: 35 SECONDS (ref 23–37)
ASCENDING AORTA: 3.3 CM (ref 2.15–3.21)
ATRIAL RATE: 105 BPM
AV AREA BY CONTINUOUS VTI: 3.9 CM2
AV AREA PEAK VELOCITY: 3.5 CM2
AV LVOT MEAN GRADIENT: 7 MMHG
AV LVOT PEAK GRADIENT: 13 MMHG
AV MEAN GRADIENT: 7 MMHG
AV PEAK GRADIENT: 12 MMHG
AV VALVE AREA: 3.86 CM2
AV VELOCITY RATIO: 1.01
BASE EXCESS BLDA CALC-SCNC: -3.7 MMOL/L
BASOPHILS # BLD AUTO: 0.13 THOUSANDS/ΜL (ref 0–0.1)
BASOPHILS NFR BLD AUTO: 1 % (ref 0–1)
BUN SERPL-MCNC: 25 MG/DL (ref 5–25)
BUN SERPL-MCNC: 25 MG/DL (ref 5–25)
CALCIUM SERPL-MCNC: 7.2 MG/DL (ref 8.3–10.1)
CALCIUM SERPL-MCNC: 7.4 MG/DL (ref 8.3–10.1)
CHLORIDE SERPL-SCNC: 105 MMOL/L (ref 100–108)
CHLORIDE SERPL-SCNC: 106 MMOL/L (ref 100–108)
CO2 SERPL-SCNC: 23 MMOL/L (ref 21–32)
CO2 SERPL-SCNC: 24 MMOL/L (ref 21–32)
CREAT SERPL-MCNC: 1.13 MG/DL (ref 0.6–1.3)
CREAT SERPL-MCNC: 1.25 MG/DL (ref 0.6–1.3)
DOP CALC AO PEAK VEL: 1.76 M/S
DOP CALC AO VTI: 36.44 CM
DOP CALC LVOT AREA: 3.46 CM2
DOP CALC LVOT DIAMETER: 2.1 CM
DOP CALC LVOT PEAK VEL VTI: 40.64 CM
DOP CALC LVOT PEAK VEL: 1.78 M/S
DOP CALC LVOT STROKE INDEX: 70.7 ML/M2
DOP CALC LVOT STROKE VOLUME: 140.69 CM3
E WAVE DECELERATION TIME: 179 MS
EOSINOPHIL # BLD AUTO: 0.13 THOUSAND/ΜL (ref 0–0.61)
EOSINOPHIL NFR BLD AUTO: 1 % (ref 0–6)
ERYTHROCYTE [DISTWIDTH] IN BLOOD BY AUTOMATED COUNT: 14.5 % (ref 11.6–15.1)
EST. AVERAGE GLUCOSE BLD GHB EST-MCNC: 105 MG/DL
FRACTIONAL SHORTENING: 34 % (ref 28–44)
GFR SERPL CREATININE-BSD FRML MDRD: 43 ML/MIN/1.73SQ M
GFR SERPL CREATININE-BSD FRML MDRD: 49 ML/MIN/1.73SQ M
GLUCOSE SERPL-MCNC: 101 MG/DL (ref 65–140)
GLUCOSE SERPL-MCNC: 105 MG/DL (ref 65–140)
HBA1C MFR BLD: 5.3 %
HCO3 BLDA-SCNC: 21.2 MMOL/L (ref 22–28)
HCT VFR BLD AUTO: 31.9 % (ref 34.8–46.1)
HGB BLD-MCNC: 10 G/DL (ref 11.5–15.4)
IMM GRANULOCYTES # BLD AUTO: >0.5 THOUSAND/UL (ref 0–0.2)
IMM GRANULOCYTES NFR BLD AUTO: 7 % (ref 0–2)
INR PPP: 1.27 (ref 0.84–1.19)
INTERVENTRICULAR SEPTUM IN DIASTOLE (PARASTERNAL SHORT AXIS VIEW): 1.3 CM
INTERVENTRICULAR SEPTUM: 1.3 CM (ref 0.56–1.04)
L PNEUMO1 AG UR QL IA.RAPID: NEGATIVE
LAAS-AP2: 24 CM2
LAAS-AP4: 22.8 CM2
LACTATE SERPL-SCNC: 1.5 MMOL/L (ref 0.5–2)
LEFT ATRIUM SIZE: 4.1 CM
LEFT INTERNAL DIMENSION IN SYSTOLE: 2.7 CM (ref 3.95–5.98)
LEFT VENTRICULAR INTERNAL DIMENSION IN DIASTOLE: 4.1 CM (ref 6.57–9.78)
LEFT VENTRICULAR POSTERIOR WALL IN END DIASTOLE: 1.1 CM (ref 0.54–1.03)
LEFT VENTRICULAR STROKE VOLUME: 49 ML
LVSV (TEICH): 49 ML
LYMPHOCYTES # BLD AUTO: 1.55 THOUSANDS/ΜL (ref 0.6–4.47)
LYMPHOCYTES NFR BLD AUTO: 6 % (ref 14–44)
MAGNESIUM SERPL-MCNC: 0.3 MG/DL (ref 1.6–2.6)
MAGNESIUM SERPL-MCNC: 2.4 MG/DL (ref 1.6–2.6)
MAGNESIUM SERPL-MCNC: 2.6 MG/DL (ref 1.6–2.6)
MCH RBC QN AUTO: 29.4 PG (ref 26.8–34.3)
MCHC RBC AUTO-ENTMCNC: 31.3 G/DL (ref 31.4–37.4)
MCV RBC AUTO: 94 FL (ref 82–98)
MONOCYTES # BLD AUTO: 0.26 THOUSAND/ΜL (ref 0.17–1.22)
MONOCYTES NFR BLD AUTO: 1 % (ref 4–12)
MV E'TISSUE VEL-LAT: 9 CM/S
MV E'TISSUE VEL-SEP: 9 CM/S
MV PEAK A VEL: 0.81 M/S
MV PEAK E VEL: 108 CM/S
MV STENOSIS PRESSURE HALF TIME: 49 MS
MV VALVE AREA P 1/2 METHOD: 4.49 CM2
NEUTROPHILS # BLD AUTO: 21.86 THOUSANDS/ΜL (ref 1.85–7.62)
NEUTS SEG NFR BLD AUTO: 84 % (ref 43–75)
NRBC BLD AUTO-RTO: 0 /100 WBCS
O2 CT BLDA-SCNC: 14.5 ML/DL (ref 16–23)
OXYHGB MFR BLDA: 96.5 % (ref 94–97)
P AXIS: 54 DEGREES
PCO2 BLDA: 38.2 MM HG (ref 36–44)
PH BLDA: 7.36 [PH] (ref 7.35–7.45)
PHOSPHATE SERPL-MCNC: 1.2 MG/DL (ref 2.3–4.1)
PHOSPHATE SERPL-MCNC: 4.2 MG/DL (ref 2.3–4.1)
PHOSPHATE SERPL-MCNC: 4.7 MG/DL (ref 2.3–4.1)
PLATELET # BLD AUTO: 141 THOUSANDS/UL (ref 149–390)
PMV BLD AUTO: 10.2 FL (ref 8.9–12.7)
PO2 BLDA: 95.5 MM HG (ref 75–129)
POTASSIUM SERPL-SCNC: 4 MMOL/L (ref 3.5–5.3)
POTASSIUM SERPL-SCNC: 4.3 MMOL/L (ref 3.5–5.3)
PR INTERVAL: 158 MS
PROCALCITONIN SERPL-MCNC: 77.99 NG/ML
PROTHROMBIN TIME: 15.6 SECONDS (ref 11.6–14.5)
QRS AXIS: 12 DEGREES
QRSD INTERVAL: 96 MS
QT INTERVAL: 367 MS
QTC INTERVAL: 485 MS
RBC # BLD AUTO: 3.4 MILLION/UL (ref 3.81–5.12)
RIGHT VENTRICLE ID DIMENSION: 3.7 CM
S PNEUM AG UR QL: NEGATIVE
SL CV LEFT ATRIUM LENGTH A2C: 6.3 CM
SL CV LV EF: 65
SL CV PED ECHO LEFT VENTRICLE DIASTOLIC VOLUME (MOD BIPLANE) 2D: 75 ML
SL CV PED ECHO LEFT VENTRICLE SYSTOLIC VOLUME (MOD BIPLANE) 2D: 26 ML
SODIUM SERPL-SCNC: 138 MMOL/L (ref 136–145)
SODIUM SERPL-SCNC: 138 MMOL/L (ref 136–145)
SPECIMEN SOURCE: ABNORMAL
T WAVE AXIS: 21 DEGREES
TR MAX PG: 26 MMHG
TR PEAK VELOCITY: 2.6 M/S
TRICUSPID VALVE PEAK REGURGITATION VELOCITY: 2.56 M/S
VENTRICULAR RATE: 105 BPM
WBC # BLD AUTO: 25.62 THOUSAND/UL (ref 4.31–10.16)
Z-SCORE OF ASCENDING AORTA: 2.31
Z-SCORE OF INTERVENTRICULAR SEPTUM IN END DIASTOLE: 4.02
Z-SCORE OF LEFT VENTRICULAR DIMENSION IN END DIASTOLE: -6.7
Z-SCORE OF LEFT VENTRICULAR DIMENSION IN END SYSTOLE: -4.64
Z-SCORE OF LEFT VENTRICULAR POSTERIOR WALL IN END DIASTOLE: 2.52

## 2022-04-29 PROCEDURE — 83735 ASSAY OF MAGNESIUM: CPT | Performed by: NURSE PRACTITIONER

## 2022-04-29 PROCEDURE — 84145 PROCALCITONIN (PCT): CPT | Performed by: NURSE PRACTITIONER

## 2022-04-29 PROCEDURE — 85027 COMPLETE CBC AUTOMATED: CPT | Performed by: NURSE PRACTITIONER

## 2022-04-29 PROCEDURE — 71045 X-RAY EXAM CHEST 1 VIEW: CPT

## 2022-04-29 PROCEDURE — 93306 TTE W/DOPPLER COMPLETE: CPT | Performed by: INTERNAL MEDICINE

## 2022-04-29 PROCEDURE — 02HV33Z INSERTION OF INFUSION DEVICE INTO SUPERIOR VENA CAVA, PERCUTANEOUS APPROACH: ICD-10-PCS | Performed by: INTERNAL MEDICINE

## 2022-04-29 PROCEDURE — NC001 PR NO CHARGE: Performed by: NURSE PRACTITIONER

## 2022-04-29 PROCEDURE — 84100 ASSAY OF PHOSPHORUS: CPT | Performed by: NURSE PRACTITIONER

## 2022-04-29 PROCEDURE — 93010 ELECTROCARDIOGRAM REPORT: CPT | Performed by: INTERNAL MEDICINE

## 2022-04-29 PROCEDURE — 36556 INSERT NON-TUNNEL CV CATH: CPT | Performed by: NURSE PRACTITIONER

## 2022-04-29 PROCEDURE — 83605 ASSAY OF LACTIC ACID: CPT | Performed by: NURSE PRACTITIONER

## 2022-04-29 PROCEDURE — 80048 BASIC METABOLIC PNL TOTAL CA: CPT | Performed by: NURSE PRACTITIONER

## 2022-04-29 PROCEDURE — 76937 US GUIDE VASCULAR ACCESS: CPT | Performed by: NURSE PRACTITIONER

## 2022-04-29 PROCEDURE — 99291 CRITICAL CARE FIRST HOUR: CPT | Performed by: INTERNAL MEDICINE

## 2022-04-29 PROCEDURE — 92526 ORAL FUNCTION THERAPY: CPT

## 2022-04-29 PROCEDURE — 93306 TTE W/DOPPLER COMPLETE: CPT

## 2022-04-29 PROCEDURE — 83036 HEMOGLOBIN GLYCOSYLATED A1C: CPT | Performed by: NURSE PRACTITIONER

## 2022-04-29 PROCEDURE — 99232 SBSQ HOSP IP/OBS MODERATE 35: CPT | Performed by: INTERNAL MEDICINE

## 2022-04-29 PROCEDURE — 87081 CULTURE SCREEN ONLY: CPT | Performed by: NURSE PRACTITIONER

## 2022-04-29 PROCEDURE — 82805 BLOOD GASES W/O2 SATURATION: CPT | Performed by: NURSE PRACTITIONER

## 2022-04-29 PROCEDURE — 99232 SBSQ HOSP IP/OBS MODERATE 35: CPT | Performed by: NURSE PRACTITIONER

## 2022-04-29 RX ORDER — ENOXAPARIN SODIUM 100 MG/ML
40 INJECTION SUBCUTANEOUS
Status: DISCONTINUED | OUTPATIENT
Start: 2022-04-29 | End: 2022-04-29

## 2022-04-29 RX ORDER — SODIUM CHLORIDE, SODIUM GLUCONATE, SODIUM ACETATE, POTASSIUM CHLORIDE, MAGNESIUM CHLORIDE, SODIUM PHOSPHATE, DIBASIC, AND POTASSIUM PHOSPHATE .53; .5; .37; .037; .03; .012; .00082 G/100ML; G/100ML; G/100ML; G/100ML; G/100ML; G/100ML; G/100ML
500 INJECTION, SOLUTION INTRAVENOUS ONCE
Status: COMPLETED | OUTPATIENT
Start: 2022-04-29 | End: 2022-04-29

## 2022-04-29 RX ORDER — ACETAMINOPHEN 325 MG/1
650 TABLET ORAL EVERY 6 HOURS PRN
Status: DISCONTINUED | OUTPATIENT
Start: 2022-04-29 | End: 2022-05-03 | Stop reason: HOSPADM

## 2022-04-29 RX ORDER — ENOXAPARIN SODIUM 100 MG/ML
40 INJECTION SUBCUTANEOUS EVERY 12 HOURS SCHEDULED
Status: DISCONTINUED | OUTPATIENT
Start: 2022-04-29 | End: 2022-04-29

## 2022-04-29 RX ORDER — ENOXAPARIN SODIUM 100 MG/ML
40 INJECTION SUBCUTANEOUS EVERY 12 HOURS SCHEDULED
Status: DISCONTINUED | OUTPATIENT
Start: 2022-04-30 | End: 2022-05-03 | Stop reason: HOSPADM

## 2022-04-29 RX ORDER — CEFAZOLIN SODIUM 2 G/50ML
2000 SOLUTION INTRAVENOUS EVERY 8 HOURS
Status: COMPLETED | OUTPATIENT
Start: 2022-04-29 | End: 2022-05-02

## 2022-04-29 RX ORDER — MAGNESIUM SULFATE HEPTAHYDRATE 40 MG/ML
4 INJECTION, SOLUTION INTRAVENOUS ONCE
Status: COMPLETED | OUTPATIENT
Start: 2022-04-29 | End: 2022-04-29

## 2022-04-29 RX ORDER — CLINDAMYCIN PHOSPHATE 900 MG/50ML
900 INJECTION INTRAVENOUS EVERY 8 HOURS
Status: ACTIVE | OUTPATIENT
Start: 2022-04-29 | End: 2022-04-30

## 2022-04-29 RX ADMIN — SODIUM CHLORIDE, SODIUM GLUCONATE, SODIUM ACETATE, POTASSIUM CHLORIDE, MAGNESIUM CHLORIDE, SODIUM PHOSPHATE, DIBASIC, AND POTASSIUM PHOSPHATE 500 ML: .53; .5; .37; .037; .03; .012; .00082 INJECTION, SOLUTION INTRAVENOUS at 03:00

## 2022-04-29 RX ADMIN — CEFAZOLIN SODIUM 2000 MG: 2 SOLUTION INTRAVENOUS at 09:54

## 2022-04-29 RX ADMIN — OXYCODONE HYDROCHLORIDE 15 MG: 10 TABLET ORAL at 23:19

## 2022-04-29 RX ADMIN — SODIUM CHLORIDE, SODIUM LACTATE, POTASSIUM CHLORIDE, AND CALCIUM CHLORIDE 125 ML/HR: .6; .31; .03; .02 INJECTION, SOLUTION INTRAVENOUS at 05:49

## 2022-04-29 RX ADMIN — PIPERACILLIN SODIUM AND TAZOBACTAM SODIUM 3.38 G: 36; 4.5 INJECTION, POWDER, LYOPHILIZED, FOR SOLUTION INTRAVENOUS at 05:42

## 2022-04-29 RX ADMIN — CEFAZOLIN SODIUM 2000 MG: 2 SOLUTION INTRAVENOUS at 17:59

## 2022-04-29 RX ADMIN — CLINDAMYCIN IN 5 PERCENT DEXTROSE 900 MG: 18 INJECTION, SOLUTION INTRAVENOUS at 18:43

## 2022-04-29 RX ADMIN — CLINDAMYCIN PHOSPHATE 600 MG: 600 INJECTION, SOLUTION INTRAVENOUS at 03:04

## 2022-04-29 RX ADMIN — VANCOMYCIN HYDROCHLORIDE 750 MG: 750 INJECTION, SOLUTION INTRAVENOUS at 00:38

## 2022-04-29 RX ADMIN — POTASSIUM PHOSPHATE, MONOBASIC AND POTASSIUM PHOSPHATE, DIBASIC 30 MMOL: 224; 236 INJECTION, SOLUTION, CONCENTRATE INTRAVENOUS at 02:00

## 2022-04-29 RX ADMIN — MAGNESIUM SULFATE HEPTAHYDRATE 4 G: 40 INJECTION, SOLUTION INTRAVENOUS at 01:57

## 2022-04-29 RX ADMIN — OXYBUTYNIN 10 MG: 10 TABLET, FILM COATED, EXTENDED RELEASE ORAL at 21:46

## 2022-04-29 RX ADMIN — ENOXAPARIN SODIUM 100 MG: 100 INJECTION SUBCUTANEOUS at 09:53

## 2022-04-29 RX ADMIN — CLINDAMYCIN PHOSPHATE 600 MG: 600 INJECTION, SOLUTION INTRAVENOUS at 09:54

## 2022-04-29 RX ADMIN — DULOXETINE HYDROCHLORIDE 60 MG: 60 CAPSULE, DELAYED RELEASE ORAL at 09:53

## 2022-04-29 RX ADMIN — OXYCODONE HYDROCHLORIDE 15 MG: 10 TABLET ORAL at 08:02

## 2022-04-29 NOTE — ASSESSMENT & PLAN NOTE
· Hx of HOCM  · Last ECHO: EF 70%, wall thickness markedly increased, severe asymetrical hypertrophy of the septum  There was dynamic obstruction at rest in the outflow tract, with a peak gradient of 37 mmHg  There is dynamic out obstruction during Valsalva, with peak gradient of 75 mmHg features were consistent with pseudonormal left ventricular filling pattern with, concomitant abnormal relaxation and increased filling pressure grade 2 diastolic dysfunction    LA mildly to moderately dilated, RA mildly dilated, MV severe systolic anterior motion with anterior leaflet, TV trace regurg  · Obtain new echo  · Cardiology was consulted, appreciate recommendations  · random troponin 250  · Holding home metoprolol in setting of hypotension

## 2022-04-29 NOTE — PROGRESS NOTES
[address] [facility]  ICU Acceptance/Transfer Note - Gaston Barba 1955, 69 y o  female MRN: [id number]  Unit/Bed#: ICU 11 Encounter: [id number]  Primary Care Provider: Mohan Clark DO   Date and time admitted to hospital: 4/27/2022  7:43 PM    * Severe sepsis ([address] )  Assessment & Plan  · POA AEB:  Tachycardia, tachypnea, hyperthermia, lactic acidosis -- suspected source: RLE cellulitis  · Lactic acid: 5 2, 6 2, 6 4, 3 8  · Continue to trend  · Procalcitonin 75, 84  · Continue to trend  · Critical care asked to see patient secondary to persistent hypotension following additional volume bolus  · Had received 2 L IV fluids on admission 2/2 elevated lactic acid  · Noted hypotensive earlier this afternoon, received additional L of fluid  · Check new lactic, CMP, Mag, Phos, random hsTn, CBC, VBG, ABG  · Per cardiology, IV fluids should be continued 2/2 HOCM  · May need vasopressors in setting of hypotension  · Maintain MAP > 65 mmHg  · Check echo  · Continue IV abx Zosyn, Vanco, Clindamycin  · If lactic persistently elevated / increasing, or CT final read concerning for worsening infection will need acute care surgery consult this evening  · DVT ppx: enoxaparin  · Follow cultures, deescalate antibiotics as able    Acute metabolic encephalopathy  Assessment & Plan  · Likely in the setting of infection/sepsis  · CT head on admission no acute process  · Hold sedating medications  · Continue serial neuro checks  · Check electrolytes  · Currently euglycemic  · Optimize electrolytes, blood glucose, oxygenation    Acute respiratory failure with hypoxia (HCC)  Assessment & Plan  · Likely in the setting infection/sepsis  · Does have history of Juan's granulomatosis  · On admission CT chest with bilateral apical infiltrates, likely chronic    There is infiltrate of the left lower lobe, aspiration pneumonitis versus aspiration versus atelectasis  · Antibiotics broadened out as below secondary to clinical decline  · Currently on nasal cannula 2 L with SpO2 greater than 92%  · Titrate FiO2 for SpO2 greater than 92%  · Check ABG  · Low threshold to intubate    Hypertrophic cardiomyopathy (HCC)  Assessment & Plan  · Hx of HOCM  · Last ECHO: EF 70%, wall thickness markedly increased, severe asymetrical hypertrophy of the septum  There was dynamic obstruction at rest in the outflow tract, with a peak gradient of 37 mmHg  There is dynamic out obstruction during Valsalva, with peak gradient of 75 mmHg features were consistent with pseudonormal left ventricular filling pattern with, concomitant abnormal relaxation and increased filling pressure grade 2 diastolic dysfunction  LA mildly to moderately dilated, RA mildly dilated, MV severe systolic anterior motion with anterior leaflet, TV trace regurg  · Obtain new echo  · Cardiology was consulted, appreciate recommendations  · Given hypotension will give additional IVF / albumin bolus  · May need to start vasopressors if transient or norms monitor fluids  · Check random troponin  · Holding home metoprolol in setting of hypotension    Cellulitis of right lower extremity  Assessment & Plan  · POA: RLE cellulitis with redness and edema, suspected source of sepsis  · Has hx of recurrent leg cellulitis in setting of lymphedema  · Previously followed by ID and treated with Pen-VK suppression through 12/31/2020  Per ID note on 08/19/2020: If cellulitis recurs of suppression, patient would then need to be treated on lifelong suppression  · Admitted 11/2019 for right leg cellulitis and secondary GBS bacteremia  · On admission CT RLE: Right lower extremity and ankle subcutaneous edema consistent with nonspecific cellulitis without drainable collection, deep soft tissue involvement or gloria osseous destructive change to indicate osteomyelitis  · Repeat CT RLE this evening on my read no obvious fluid collection or free air noted  Awaiting final read    · Recheck labs as above  · Given clinical decline and hypotension broaden out antibiotics to zosyn, vancomycin, and continue clindamycin  · ID was consulted, appreciate recommendations  · CK 59 this am  · Continue neurovascular checks  · May need to consult surgery pending CT and lactic acid    Chronic kidney disease, stage III (moderate) [facility])  Assessment & Plan  Lab Results   Component Value Date    EGFR 43 04/28/2022    EGFR 86 11/12/2019    EGFR 78 11/11/2019    CREATININE 1 25 04/28/2022    CREATININE 1 50 (H) 04/27/2022    CREATININE 0 92 07/09/2021     · Baseline creatinine 0 8-1 in 2021  On admission was 1 5, this morning was 1 25  · Check CMP now  · Monitor UO and renal indices  · Appears to currently have poor UO  · Bladder scan and urinary retention protocol  · Will give isolyte 500 mL and albumin 5% 500 mL  · Avoid hypotension and neprhotoxins      Elevated troponin  Assessment & Plan  · On admission hsTn 216, 254, 254  · Likely due to sepsis/tachycardia  · Telemetry without evidence of ischemia  · Check EKG  · Continue telemetry  · Check random hsTn      Chronic diastolic heart failure (HCC)  Assessment & Plan  Wt Readings from Last 3 Encounters:   04/28/22 107 kg (236 lb 1 8 oz)   04/21/22 102 kg (225 lb)   09/07/21 102 kg (225 lb 11 2 oz)     · Daily weights  · Strict I/Os  · Holding diuretic at this time due to hypotension in the setting of severe sepsis  · Holding home metoprolol in setting of hypotension, restart as able        History of pulmonary embolism  Assessment & Plan  · Hx of DVT/PE in 2007 following hysterectomy for ovarian CA at [facility]   IVC filter placed, treated with Lovenox for 1 year  · Presented with elevated D dimer 10 15  · Unable to CTA 2/2 IV dye allergy -- would need pre-medication  · Started on weight based Lovenox -- pending repeat CMP, if decline in renal function will change to Heparin DVT/PE infusion  · BLE venous duplex - negative for DVT  · Suspect elevated D-dimer 2/2 infection/sepsis      Hypogammaglobulinemia, acquired [facility])  Assessment & Plan  · 2/2 treatment with Rituximab for Wegener's  · Follows with Hem/Onc as outpatient  Was treated with IVIG but had adverse reactions and further watchful observation on IgG level monitoring was recommended  · Continue outpt f/u with Dr Ashley Hare    Chronic pain disorder  Assessment & Plan  · PTA OxyContin ER 40 mg bid, oxycodone IT 15 mg bid as needed  · PTA lorazepam 0 5 mg bid (reportedly takes scheduled)  · PDMP reviewed  · Currently lethargic but rousable and encephalopathic -- holding medications for now, if mental status improves will restart slowly    Cancer of ovary ([address] )  Assessment & Plan  · S/p bilateral oophorectomy and hysterectomy    Wegener's granulomatosis with renal involvement ([facility] )  Assessment & Plan  · Hx of Juan's granulomatosis dx Feb 2010  · Hx of associated renal failure, pulmonary hemorrhage, and polyarteritis  · Treated with Rituximab for 4 years and prednisone with good response, has been discontinued 2/2 subsequent hypogammaglobinemia and remission  · CT chest:  Groundglass and interstitial airspace disease in the upper lungs, similar appearance to the prior exam suggesting chronic interstitial pneumonitis  Atelectasis and airspace consolidation in the dependent portions of the lung bases may represent aspiration and/or pneumonia         -------------------------------------------------------------------------------------------------------------  Chief Complaint: weakness, RLE edema/redness    History of Present Illness   HX and PE limited by: acute encephalopathy  Megan Wright is a 69 y o  female who presents with PMHx significant for Wegener's granulomatosis, hypertrophic cardiomyopathy, recurrent lower extremity cellulitis, CKD 3, ovarian cancer s/p b/l oophorectomy and hysterectomy, diastolic heart failure, chronic pain disorder on opioids, history DVT PE 2007 s/p IVC filter, and hypogammaglobulinemia  She presented to the ER 4/27 with RLE weakness, right lower extremity edema/redness  CT of right lower extremity showed edema, no evidence of fluid collection or necrosis  On admission she was found to be tachycardic, tachypneic, and hyperthermic with T-max 102° F   Labs revealed elevated lactic acid 6 4, which improved to 3 8 following volume resuscitation  She was admitted to the hospitalist team, in the step-down 2 unit  This evening Critical Care was notified the patient had persistent hypotension following additional volume boluses  Patient transferred to step-down level 1 for further workup and monitoring, possible vasopressors  History obtained from chart review, the patient and unobtainable from patient due to acute encephalopathy   -------------------------------------------------------------------------------------------------------------  Dispo: Transfer to Stepdown Level 1     Code Status: Level 1 - Full Code  --------------------------------------------------------------------------------------------------------------  Review of Systems   Unable to perform ROS: Other (Acute encephalopathy)       Review of systems was unable to be performed secondary to Acute encephalopathy    Physical Exam  Vitals and nursing note reviewed  Exam conducted with a chaperone present  Constitutional:       General: She is not in acute distress  Appearance: She is morbidly obese  She is ill-appearing and toxic-appearing  She is not diaphoretic  Interventions: She is not intubated  Nasal cannula in place  HENT:      Head: Normocephalic and atraumatic  Right Ear: External ear normal       Left Ear: External ear normal       Nose: Nose normal  No congestion or rhinorrhea  Mouth/Throat:      Mouth: Mucous membranes are dry  Pharynx: Oropharynx is clear  No oropharyngeal exudate or posterior oropharyngeal erythema  Eyes:      General: No visual field deficit or scleral icterus       Extraocular Movements: Extraocular movements intact  Conjunctiva/sclera: Conjunctivae normal       Pupils: Pupils are equal, round, and reactive to light  Neck:      Vascular: No carotid bruit  Cardiovascular:      Rate and Rhythm: Regular rhythm  Tachycardia present  Pulses:           Radial pulses are 2+ on the right side and 1+ on the left side  Dorsalis pedis pulses are detected w/ Doppler on the right side and 1+ on the left side  Heart sounds: S1 normal and S2 normal  Murmur heard  No friction rub  No gallop  Comments: Lower extremity edema right greater than left  Edema is pending  Pulmonary:      Effort: Tachypnea present  No accessory muscle usage or respiratory distress  She is not intubated  Breath sounds: Examination of the left-lower field reveals rales  Decreased breath sounds and rales present  Abdominal:      General: Abdomen is protuberant  Bowel sounds are decreased  There is no distension  Palpations: Abdomen is soft  Tenderness: There is no abdominal tenderness  Musculoskeletal:         General: Tenderness and deformity present  Cervical back: Normal range of motion and neck supple  Right lower leg: Deformity present  Edema present  Left lower leg: Edema present  Legs:       Comments: Patient has extensive surgical scarring on both lower extremities  Lymphadenopathy:      Cervical: No cervical adenopathy  Skin:     General: Skin is warm and dry  Capillary Refill: Capillary refill takes 2 to 3 seconds  Coloration: Skin is pale  Skin is not mottled  Findings: Erythema present  No bruising or rash  Neurological:      Mental Status: She is lethargic  GCS: GCS eye subscore is 4  GCS verbal subscore is 5  GCS motor subscore is 6  Cranial Nerves: Cranial nerves are intact  No cranial nerve deficit, dysarthria or facial asymmetry  Sensory: No sensory deficit        Motor: Weakness (Generalized) present  Comments: Patient lethargic, but conversant  ERIK EOM intact, oropharynx clear, cranial nerves II through XII grossly intact  Psychiatric:         Attention and Perception: She is inattentive  Mood and Affect: Mood is anxious  Speech: Speech normal          Behavior: Behavior is cooperative  Thought Content: Thought content normal          Cognition and Memory: Cognition normal          Judgment: Judgment normal        --------------------------------------------------------------------------------------------------------------  Vitals:   Vitals:    04/28/22 1927 04/28/22 2000 04/28/22 2100 04/28/22 2210   BP:  90/51 93/61    BP Location:       Pulse:  104 (!) 106    Resp:  22 (!) 28    Temp: (!) 101 1 °F (38 4 °C)      TempSrc: Tympanic      SpO2:  92% 92%    Weight:    107 kg (236 lb 1 8 oz)   Height:         Temp  Min: 98 9 °F (37 2 °C)  Max: 103 3 °F (39 6 °C)  IBW (Ideal Body Weight): 52 4 kg  Height: 5' 3" (160 cm)  Body mass index is 41 83 kg/m²      Laboratory and Diagnostics:  Results from last 7 days   Lab Units 04/28/22 2321 04/28/22  0658 04/27/22 2013   WBC Thousand/uL 23 92* 22 92* 10 27*   HEMOGLOBIN g/dL 10 4* 12 2 14 0   HEMATOCRIT % 32 1* 37 8 44 2   PLATELETS Thousands/uL 141* 190 207   BANDS PCT %  --   --  10*   MONO PCT %  --   --  3*     Results from last 7 days   Lab Units 04/28/22 2321 04/28/22  0701 04/27/22 2013   SODIUM mmol/L 139 140 140   POTASSIUM mmol/L 3 9 3 6 4 0   CHLORIDE mmol/L 106 107 102   CO2 mmol/L 24 21 26   ANION GAP mmol/L 9 12 12   BUN mg/dL 26* 22 22   CREATININE mg/dL 1 28 1 25 1 50*   CALCIUM mg/dL 7 1* 7 7* 8 8   GLUCOSE RANDOM mg/dL 93 102 140   ALT U/L 23  --  43   AST U/L 41  --  70*   ALK PHOS U/L 86  --  174*   ALBUMIN g/dL 2 5*  --  3 3*   TOTAL BILIRUBIN mg/dL 0 98  --  0 89     Results from last 7 days   Lab Units 04/28/22  0701   MAGNESIUM mg/dL 1 1*      Results from last 7 days   Lab Units 04/27/22  2013   INR 1 16   PTT seconds 28          Results from last 7 days   Lab Units 22  2321 22  0658 22  0139 22  2229 22   LACTIC ACID mmol/L 1 8 3 8* 6 4* 6 2* 5 2*     ABG:  Results from last 7 days   Lab Units 22  2321   PH ART  7 401   PCO2 ART mm Hg 34 9*   PO2 ART mm Hg 73 5*   HCO3 ART mmol/L 21 2*   BASE EXC ART mmol/L -3 0   ABG SOURCE  Line, Arterial     VBG:  Results from last 7 days   Lab Units 22  2321 22  2157   PH MONICA   --  7 282*   PCO2 MONICA mm Hg  --  22 6*   PO2 MONICA mm Hg  --  20 8*   HCO3 MONICA mmol/L  --  10 4*   BASE EXC MONICA mmol/L  --  -14 9   ABG SOURCE  Line, Arterial  --      Results from last 7 days   Lab Units 22  0701 22   PROCALCITONIN ng/ml 84 20* 75 41*       Micro:  Results from last 7 days   Lab Units 22   BLOOD CULTURE  No Growth at 24 hrs  No Growth at 24 hrs  EKG:  Sinus tachycardia  Imaging: I have personally reviewed pertinent reports  and I have personally reviewed pertinent films in PACS      Historical Information   Past Medical History:   Diagnosis Date    BRCA1 positive     Cancer ([facility] )     ovarian Ca with chemo    History of chemotherapy     ovarian cancer     History of pulmonary embolus (PE) & DVT 2007    post-op FAMILIA/ omenectomy    Lymphedema     MRSA (methicillin resistant Staphylococcus aureus) 2017    nasal swab negative 4/3/19    Ovarian cancer ([facility] [address] )     CYST REMOVED RIGHT OVARY IN   HYSTERECTOMY 07       Past Surgical History:   Procedure Laterality Date    APPENDECTOMY  1998    laparoscopic    BILATERAL SALPINGOOPHORECTOMY  2007    BREAST BIOPSY Right 2016    BREAST BIOPSY Left 07/15/2020    BREAST BIOPSY Left 2021    stereo     SECTION      HYSTERECTOMY  2007    Omentectomy [facility] node [facility]     MAMMO STEREOTACTIC BREAST BIOPSY RIGHT (ALL INC) Right 2021    OOPHORECTOMY Bilateral 2007    OTHER SURGICAL HISTORY      right lower extremity due to trauma    GA TREAT TIBIAL SHAFT FX, INTRAMED IMPLANT Right 9/6/2017    Procedure: INSERTION NAIL IM TIBIA;  Surgeon: Mikey Faulkner MD;  Location: [facility];  Service: Orthopedics    US GUIDANCE BREAST BIOPSY LEFT EACH ADDITIONAL Left 7/15/2020    US GUIDED BREAST BIOPSY LEFT COMPLETE Left 7/15/2020    US GUIDED BREAST BIOPSY RIGHT COMPLETE Right 5/9/2016    US GUIDED BREAST BIOPSY RIGHT COMPLETE Right 7/26/2021     Social History   Social History     Substance and Sexual Activity   Alcohol Use Yes     Social History     Substance and Sexual Activity   Drug Use Not Currently     Social History     Tobacco Use   Smoking Status Never Smoker   Smokeless Tobacco Never Used     Exercise History:  Needs assistance  Family History:   Family History   Problem Relation Age of Onset    Breast cancer Mother         28    Pancreatic cancer Mother     Ovarian cancer Maternal Grandmother     Breast cancer Maternal Aunt 39    No Known Problems Paternal Aunt     No Known Problems Father     No Known Problems Maternal Grandfather     No Known Problems Paternal Grandmother     No Known Problems Paternal Grandfather     Lung cancer Half-Sister      I have reviewed this patient's family history and commented on sigificant items within the HPI      Medications:  Current Facility-Administered Medications   Medication Dose Route Frequency    acetaminophen (TYLENOL) tablet 650 mg  650 mg Oral Q4H PRN    aluminum-magnesium hydroxide-simethicone (MYLANTA) oral suspension 30 mL  30 mL Oral Q6H PRN    bisacodyl (DULCOLAX) EC tablet 10 mg  10 mg Oral Daily PRN    clindamycin (CLEOCIN) IVPB (premix in dextrose) 600 mg 50 mL  600 mg Intravenous Q8H    DULoxetine (CYMBALTA) delayed release capsule 60 mg  60 mg Oral Daily    enoxaparin (LOVENOX) subcutaneous injection 100 mg  1 mg/kg Subcutaneous Q12H [address]    lactated ringers infusion  125 mL/hr Intravenous Continuous  multivitamin stress formula tablet 1 tablet  1 tablet Oral Daily    NOREPINEPHRINE 4 MG  ML NSS (CMPD ORDER) infusion  1-30 mcg/min Intravenous Titrated    ondansetron (ZOFRAN) injection 4 mg  4 mg Intravenous Q6H PRN    oxybutynin (DITROPAN-XL) 24 hr tablet 10 mg  10 mg Oral HS    oxyCODONE (ROXICODONE) IR tablet 15 mg  15 mg Oral BID PRN    pantoprazole (PROTONIX) EC tablet 20 mg  20 mg Oral Early Morning    piperacillin-tazobactam (ZOSYN) 3 375 g in sodium chloride 0 9 % 100 mL IVPB  3 375 g Intravenous Q6H    potassium chloride 20 mEq IVPB (premix)  20 mEq Intravenous Once    simethicone (MYLICON) chewable tablet 80 mg  80 mg Oral 4x Daily PRN    sodium chloride (PF) 0 9 % injection 3 mL  3 mL Intravenous Q1H PRN    vancomycin (VANCOCIN) IVPB (premix in dextrose) 750 mg 150 mL  10 mg/kg (Adjusted) Intravenous Q12H     Home medications:  Prior to Admission Medications   Prescriptions Last Dose Informant Patient Reported? Taking? Calcium Carbonate-Vit D-Min (CALCIUM 1200 PO)  Self Yes No   Sig: Take 3 tablets by mouth daily    DULoxetine (CYMBALTA) 60 mg delayed release capsule  Self Yes No   Sig: Take 60 mg by mouth daily     LORazepam (ATIVAN) 0 5 mg tablet  Self No No   Sig: Take 1 tablet as needed every 8 hours  Patient taking differently: Take 0 5 mg by mouth 2 (two) times a day Take 1 tablet as needed every 8 hours      Multiple Vitamins-Minerals (MULTIVITAMIN ADULT PO)  Self Yes No   Sig: Take 1 capsule by mouth daily    OXYCODONE HCL PO  Self Yes No   Sig: Take 15 mg by mouth 2 (two) times a day as needed    TOVIAZ 8 MG TB24  Self Yes No   Sig: Take 8 mg by mouth daily at bedtime    acetaminophen (TYLENOL) 325 mg tablet  Self No No   Sig: Take 2 tablets by mouth every 6 (six) hours as needed for mild pain   Patient taking differently: Take 650 mg by mouth every 6 (six) hours as needed for mild pain    bisacodyl (DULCOLAX) 5 mg EC tablet  Self Yes No   Sig: Take 5 mg by mouth daily as needed for constipation    furosemide (LASIX) 20 mg tablet  Self No No   Sig: Take 1 tablet (20 mg total) by mouth daily   Patient not taking: Reported on 3/12/2021   metoprolol tartrate (LOPRESSOR) 25 mg tablet  Self No No   Sig: TAKE 1/2 TABLET(12 5MG) BY MOUTH EVERY 12 HOURS   omeprazole (PriLOSEC) 20 mg delayed release capsule  Self Yes No   Sig: Take 20 mg by mouth daily  ondansetron (ZOFRAN) 4 mg tablet  Self No No   Sig: Take 1 tablet (4 mg total) by mouth every 8 (eight) hours as needed for nausea or vomiting   Patient not taking: Reported on 6/4/2021   oxyCODONE (OxyCONTIN) 40 mg 12 hr tablet  Self Yes No   Sig: Take 40 mg by mouth every 12 (twelve) hours      Facility-Administered Medications: None     Allergies: Allergies   Allergen Reactions    Iodinated Diagnostic Agents Shortness Of Breath    Omnipaque [Iohexol] Shortness Of Breath    Other Shortness Of Breath     IVP dye, x ray dye 3    Iodides      Other reaction(s): SWELLING, SOB    Methotrexate Nausea Only     Headache and nausea    Methotrexate Derivatives Headache     ------------------------------------------------------------------------------------------------------------  Advance Directive and Living Will:      Power of :    POLST:    ------------------------------------------------------------------------------------------------------------  Care Time Delivered:   Upon my evaluation, this patient had a high probability of imminent or life-threatening deterioration due to Severe sepsis, acute cephalopathy, new hypotension, which required my direct attention, intervention, and personal management  I have personally provided 40 minutes ([phone]) of critical care time, exclusive of procedures, teaching, family meetings, and any prior time recorded by providers other than myself  RENETTA Cummins        Portions of the record may have been created with voice recognition software    Occasional wrong word or "sound a like" substitutions may have occurred due to the inherent limitations of voice recognition software  Read the chart carefully and recognize, using context, where substitutions have occurred

## 2022-04-29 NOTE — PROGRESS NOTES
[address] [facility]  Progress Note - Mike Mole 1955, 69 y o  female MRN: [id number]  Unit/Bed#: ICU 11 Encounter: [id number]  Primary Care Provider: Violet Scott,    Date and time admitted to hospital: 4/27/2022  7:43 PM    * Septic shock ([facility] [address] )  Assessment & Plan  · POA AEB:  Tachycardia, tachypnea, hyperthermia, leukocytosis, lactic acidosis -- suspected source: RLE cellulitis  · Lactic acid: 5 2, 6 2, 6 4, 3 8  · Now 1 8  · Initially received 2L IVF on admission for elevated lactic  · Procalcitonin 75, 84  · Continue to trend  · Initially BP was stable  · Yesterday afternoon she became hypotensive and was given additional 1L of IVF to which she was transiently responsive  · Given Isolyte 500 mL and Albumin 5% 500 mL by critical care with improvement in blood pressure  · Arterial line placed  · Per cardiology, IV fluids should be continued 2/2 HOCM  · Started on Levophed overnight  · Maintain MAP > 65 mmHg  · Check echo  · Continue IV abx Zosyn, Vanco, Clindamycin  · CT without evidence of fluid collection or evidence of necrosis  · DVT ppx: enoxaparin  · Follow cultures, deescalate antibiotics as able    Acute metabolic encephalopathy  Assessment & Plan  · Likely in the setting of infection/sepsis  · CT head on admission no acute process  · Hold sedating medications  · Continue serial neuro checks  · Check electrolytes  · Currently euglycemic  · Optimize electrolytes, blood glucose, oxygenation  · CAM ICU bid  · Delirium precautions    Acute respiratory failure with hypoxia (HCC)  Assessment & Plan  · Likely in the setting infection/sepsis  · Does have history of Juan's granulomatosis  · On admission CT chest with bilateral apical infiltrates, likely chronic    There is infiltrate of the left lower lobe, aspiration pneumonitis versus aspiration versus atelectasis  · Antibiotics broadened out as below secondary to clinical decline  · Currently on nasal cannula 2 L with SpO2 greater than 92%  · Titrate FiO2 for SpO2 greater than 92%  · ABG without hypercarbia    Hypertrophic cardiomyopathy (HCC)  Assessment & Plan  · Hx of HOCM  · Last ECHO: EF 70%, wall thickness markedly increased, severe asymetrical hypertrophy of the septum  There was dynamic obstruction at rest in the outflow tract, with a peak gradient of 37 mmHg  There is dynamic out obstruction during Valsalva, with peak gradient of 75 mmHg features were consistent with pseudonormal left ventricular filling pattern with, concomitant abnormal relaxation and increased filling pressure grade 2 diastolic dysfunction  LA mildly to moderately dilated, RA mildly dilated, MV severe systolic anterior motion with anterior leaflet, TV trace regurg  · Obtain new echo  · Cardiology was consulted, appreciate recommendations  · random troponin 250  · Holding home metoprolol in setting of hypotension    Cellulitis of right lower extremity  Assessment & Plan  · POA: RLE cellulitis with redness and edema, suspected source of sepsis  · Has hx of recurrent leg cellulitis in setting of lymphedema  · Previously followed by ID and treated with Pen-VK suppression through 12/31/2020  Per ID note on 08/19/2020: If cellulitis recurs of suppression, patient would then need to be treated on lifelong suppression  · Admitted 11/2019 for right leg cellulitis and secondary GBS bacteremia  · On admission CT RLE: Right lower extremity and ankle subcutaneous edema consistent with nonspecific cellulitis without drainable collection, deep soft tissue involvement or gloria osseous destructive change to indicate osteomyelitis    · Repeat CT RLE no obvious fluid collection or free air noted  · Continue serial assessments of RLE/neurovascular checks  · Given clinical decline and hypotension broaden out antibiotics to zosyn, vancomycin, and continue clindamycin  · ID was consulted, appreciate recommendations  · CK 59 this am  · Continue neurovascular checks      Chronic kidney disease, stage III (moderate) [facility])  Assessment & Plan  Lab Results   Component Value Date    EGFR 42 04/28/2022    EGFR 43 04/28/2022    EGFR 86 11/12/2019    CREATININE 1 28 04/28/2022    CREATININE 1 25 04/28/2022    CREATININE 1 50 (H) 04/27/2022     · Baseline creatinine 0 8-1 in 2021  On admission was 1 5, this morning was 1 25, most recently 1 28  · Monitor UO and renal indices  · BMP in am pending   · Appears to currently have poor UO  · Bladder scan and urinary retention protocol  · Avoid hypotension and neprhotoxins      Elevated troponin  Assessment & Plan  · On admission hsTn 216, 254, 254  · Random troponin 250  · Likely due to sepsis/tachycardia  · Telemetry without evidence of ischemia  · Check EKG  · Continue telemetry  · Check new echo      Chronic diastolic heart failure (HCC)  Assessment & Plan  Wt Readings from Last 3 Encounters:   04/28/22 107 kg (236 lb 1 8 oz)   04/21/22 102 kg (225 lb)   09/07/21 102 kg (225 lb 11 2 oz)     · Daily weights  · Strict I/Os  · Holding diuretic at this time due to hypotension in the setting of severe sepsis  · Holding home metoprolol in setting of hypotension, restart as able        History of pulmonary embolism  Assessment & Plan  · Hx of DVT/PE in 2007 following hysterectomy for ovarian CA at [facility]  IVC filter placed, treated with Lovenox for 1 year  · Presented with elevated D dimer 10 15  · Unable to CTA 2/2 IV dye allergy -- would need pre-medication  · Started on weight based Lovenox -- pending repeat CMP, if decline in renal function will change to Heparin DVT/PE infusion  · BLE venous duplex - negative for DVT  · Suspect elevated D-dimer 2/2 infection/sepsis      Hypogammaglobulinemia, acquired ([facility] [address] )  Assessment & Plan  · 2/2 treatment with Rituximab for Wegener's  · Follows with Hem/Onc as outpatient    Was treated with IVIG but had adverse reactions and further watchful observation on IgG level monitoring was recommended  · Continue outpt f/u with Dr Wilian Wall    Chronic pain disorder  Assessment & Plan  · PTA OxyContin ER 40 mg bid, oxycodone IT 15 mg bid as needed  · PTA lorazepam 0 5 mg bid (reportedly takes scheduled)  · PDMP reviewed  · Currently lethargic but rousable and encephalopathic -- holding medications for now, if mental status improves will restart slowly    Cancer of ovary [facility])  Assessment & Plan  · S/p bilateral oophorectomy and hysterectomy  Wegener's granulomatosis with renal involvement [facility])  Assessment & Plan  · Hx of Juan's granulomatosis dx Feb 2010  · Hx of associated renal failure, pulmonary hemorrhage, and polyarteritis  · Treated with Rituximab for 4 years and prednisone with good response, has been discontinued 2/2 subsequent hypogammaglobinemia and remission  · CT chest:  Groundglass and interstitial airspace disease in the upper lungs, similar appearance to the prior exam suggesting chronic interstitial pneumonitis  Atelectasis and airspace consolidation in the dependent portions of the lung bases may represent aspiration and/or pneumonia           ----------------------------------------------------------------------------------------  HPI/24hr events:   · Developed hypotension yesterday, transiently responsive to IVF bolus  Received additional IVF   · Antibiotics broadened to Zosyn, Vanco   Clindamycin continued  · CT RLE repeated in setting of clinical decline, appears stable from previous imaging  · Arterial line and central line placed overnight  · Started on Levophed overnight  · Magnesium supplementation overnight  Patient appropriate for transfer out of the ICU today?: No  Disposition: Continue Critical Care   Code Status: Level 1 - Full Code  ---------------------------------------------------------------------------------------  SUBJECTIVE  Repeating "help me", but is unable to elaborate        Review of Systems   Unable to perform ROS: Other (acute encephalopathy) Review of systems was unable to be performed secondary to acute encephalopathy  ---------------------------------------------------------------------------------------  OBJECTIVE    Vitals   Vitals:    22 2330 22 2345 22 0000 22 0015   BP: (!) 83/45 (!) 86/49     BP Location:       Pulse: (!) 106 (!) 108 (!) 106 104   Resp: (!) 26 (!) 38 (!) 31 (!) 28   Temp:       TempSrc:       SpO2: 96% 94% 94% 97%   Weight:       Height:         Temp (24hrs), Av 6 °F (38 1 °C), Min:98 9 °F (37 2 °C), Max:101 7 °F (38 7 °C)  Current: Temperature: (!) 101 1 °F (38 4 °C)  Arterial Line BP: 130/36  Arterial Line MAP (mmHg): 58 mmHg    Respiratory:  SpO2: SpO2: 97 %, SpO2 Device: O2 Device: Simple mask       Invasive/non-invasive ventilation settings   Respiratory  Report   Lab Data (Last 4 hours)       2321            pH, Arterial       7 401             pCO2, Arterial       34 9             pO2, Arterial       73 5             HCO3, Arterial       21 2             Base Excess, Arterial       -3 0                  O2/Vent Data     None                Physical Exam  Vitals and nursing note reviewed  Constitutional:       General: She is not in acute distress  Appearance: She is morbidly obese  She is ill-appearing and toxic-appearing  Interventions: She is not intubated  Nasal cannula in place  HENT:      Head: Normocephalic and atraumatic  Right Ear: External ear normal       Left Ear: External ear normal       Nose: Nose normal  No congestion or rhinorrhea  Mouth/Throat:      Mouth: Mucous membranes are dry  Pharynx: Oropharynx is clear  No oropharyngeal exudate or posterior oropharyngeal erythema  Eyes:      General: No visual field deficit or scleral icterus  Extraocular Movements: Extraocular movements intact  Conjunctiva/sclera: Conjunctivae normal       Pupils: Pupils are equal, round, and reactive to light  Neck:      Vascular: No carotid bruit  Cardiovascular:      Rate and Rhythm: Normal rate and regular rhythm  Pulses:           Radial pulses are 2+ on the right side and 1+ on the left side  Dorsalis pedis pulses are detected w/ Doppler on the right side and 1+ on the left side  Heart sounds: S1 normal and S2 normal  Murmur heard  Systolic murmur is present  No friction rub  No gallop  Comments: RLE > LLE  Edema, pitting  Pulmonary:      Effort: Tachypnea present  No accessory muscle usage or respiratory distress  She is not intubated  Breath sounds: Examination of the left-lower field reveals decreased breath sounds and rales  Decreased breath sounds and rales present  No wheezing or rhonchi  Abdominal:      General: Abdomen is protuberant  Bowel sounds are decreased  There is no distension  Palpations: Abdomen is soft  Tenderness: There is no abdominal tenderness  Musculoskeletal:      Cervical back: Normal range of motion and neck supple  Right lower leg: Edema present  Left lower leg: Edema present  Lymphadenopathy:      Cervical: No cervical adenopathy  Skin:     General: Skin is warm and dry  Capillary Refill: Capillary refill takes less than 2 seconds  Coloration: Skin is pale  Findings: No bruising, erythema or rash  Neurological:      Mental Status: She is alert  She is disoriented  GCS: GCS eye subscore is 4  GCS verbal subscore is 4  GCS motor subscore is 6  Cranial Nerves: Cranial nerves are intact  No dysarthria or facial asymmetry  Sensory: Sensation is intact  Motor: Weakness (generalized) present  Psychiatric:         Attention and Perception: She is inattentive  Mood and Affect: Mood is anxious  Speech: Speech normal          Behavior: Behavior is cooperative  Thought Content: Thought content normal          Cognition and Memory: Memory is impaired           Judgment: Judgment normal              Laboratory and Diagnostics:  Results from last 7 days   Lab Units 04/28/22 2321 04/28/22  0658 04/27/22 2013   WBC Thousand/uL 23 92* 22 92* 10 27*   HEMOGLOBIN g/dL 10 4* 12 2 14 0   HEMATOCRIT % 32 1* 37 8 44 2   PLATELETS Thousands/uL 141* 190 207   BANDS PCT %  --   --  10*   MONO PCT %  --   --  3*     Results from last 7 days   Lab Units 04/28/22 2321 04/28/22  0701 04/27/22 2013   SODIUM mmol/L 139 140 140   POTASSIUM mmol/L 3 9 3 6 4 0   CHLORIDE mmol/L 106 107 102   CO2 mmol/L 24 21 26   ANION GAP mmol/L 9 12 12   BUN mg/dL 26* 22 22   CREATININE mg/dL 1 28 1 25 1 50*   CALCIUM mg/dL 7 1* 7 7* 8 8   GLUCOSE RANDOM mg/dL 93 102 140   ALT U/L 23  --  43   AST U/L 41  --  70*   ALK PHOS U/L 86  --  174*   ALBUMIN g/dL 2 5*  --  3 3*   TOTAL BILIRUBIN mg/dL 0 98  --  0 89     Results from last 7 days   Lab Units 04/28/22 2157 04/28/22  0701   MAGNESIUM mg/dL 0 3* 1 1*   PHOSPHORUS mg/dL 1 2*  --       Results from last 7 days   Lab Units 04/28/22 2321 04/27/22 2013   INR  1 27* 1 16   PTT seconds 35 28          Results from last 7 days   Lab Units 04/28/22 2321 04/28/22  0658 04/28/22  0139 04/27/22 2229 04/27/22 2013   LACTIC ACID mmol/L 1 8 3 8* 6 4* 6 2* 5 2*     ABG:  Results from last 7 days   Lab Units 04/28/22 2321   PH ART  7 401   PCO2 ART mm Hg 34 9*   PO2 ART mm Hg 73 5*   HCO3 ART mmol/L 21 2*   BASE EXC ART mmol/L -3 0   ABG SOURCE  Line, Arterial     VBG:  Results from last 7 days   Lab Units 04/28/22 2321 04/28/22 2157   PH MONICA   --  7 282*   PCO2 MONICA mm Hg  --  22 6*   PO2 MONICA mm Hg  --  20 8*   HCO3 MONICA mmol/L  --  10 4*   BASE EXC MONICA mmol/L  --  -14 9   ABG SOURCE  Line, Arterial  --      Results from last 7 days   Lab Units 04/28/22  0701 04/27/22 2013   PROCALCITONIN ng/ml 84 20* 75 41*       Micro  Results from last 7 days   Lab Units 04/28/22  1622 04/27/22 2013   BLOOD CULTURE   --  No Growth at 24 hrs  No Growth at 24 hrs     LEGIONELLA URINARY ANTIGEN  Negative  --    STREP PNEUMONIAE ANTIGEN, URINE  Negative  --        EKG: sinus rhythm on telemetry  Imaging: I have personally reviewed pertinent reports  and I have personally reviewed pertinent films in PACS    Intake and Output  I/O       04/27 0701 04/28 0700 04/28 0701 04/29 0700    IV Piggyback 2050     Total Intake(mL/kg) 2050 (19 9)     Urine (mL/kg/hr)  650 (0 3)    Total Output  650    Net +2050 -650                Height and Weights   Height: 5' 3" (160 cm)  IBW (Ideal Body Weight): 52 4 kg  Body mass index is 41 83 kg/m²  Weight (last 2 days)     Date/Time Weight    04/28/22 2210 107 (236 11)    04/28/22 0543 104 (228 18)    04/28/22 0335 104 (228 18)    04/27/22 2103 101 (223)            Nutrition       Diet Orders   (From admission, onward)             Start     Ordered    04/28/22 2210  Diet NPO; Sips with meds  Diet effective now        References:    Nutrtion Support Algorithm Enteral vs  Parenteral   Question Answer Comment   Diet Type NPO    NPO Except: Sips with meds    RD to adjust diet per protocol?  Yes        04/28/22 2209                  Active Medications  Scheduled Meds:  Current Facility-Administered Medications   Medication Dose Route Frequency Provider Last Rate    acetaminophen  650 mg Oral Q6H PRN Juliachanell Singh, CRNP      aluminum-magnesium hydroxide-simethicone  30 mL Oral Q6H PRN JuliaRENETTA Treadwell      bisacodyl  10 mg Oral Daily PRN RENETTA Pederson      clindamycin  600 mg Intravenous Q8H Shadi Fallqing Abelino, [address] 600 mg (04/28/22 1805)    DULoxetine  60 mg Oral Daily Shadi FallRENETTA Jameson      enoxaparin  1 mg/kg Subcutaneous Q12H [address] Shadi Fallow [employer], [address]      lactated ringers  125 mL/hr Intravenous Continuous Shadi Fallqing [employer], [address] 125 mL/hr (04/28/22 1957)    magnesium sulfate  4 g Intravenous Once RENETTA Pederson      multi-electrolyte  500 mL Intravenous Once ShadiRENETTA Gandhi      norepinephrine  1-30 mcg/min Intravenous Titrated [place] Commander, CRNP 2 mcg/min (04/29/22 0156)    ondansetron  4 mg Intravenous Q6H PRN [place] Commander, CRNP      oxybutynin  10 mg Oral HS August Forrestraghu WoodsonAbelino, [address]      oxyCODONE  15 mg Oral BID PRN August Woodsonlan, CRNP      pantoprazole  20 mg Oral Early Morning August Woodsonlan, [address]      piperacillin-tazobactam  3 375 g Intravenous Q6H August Woodsonlan, CRNP 3 375 g (04/28/22 2337)    potassium phosphate  30 mmol Intravenous Once [place] Commander, CRNP 30 mmol (04/29/22 0200)    sodium chloride (PF)  3 mL Intravenous Q1H PRN Christophe Commander, CRNP      vancomycin  10 mg/kg (Adjusted) Intravenous Q12H August Claytons Crandall, CRNP 750 mg (04/29/22 0038)     Continuous Infusions:  lactated ringers, 125 mL/hr, Last Rate: 125 mL/hr (04/28/22 1957)  norepinephrine, 1-30 mcg/min, Last Rate: 2 mcg/min (04/29/22 0213)      PRN Meds:   acetaminophen, 650 mg, Q6H PRN  aluminum-magnesium hydroxide-simethicone, 30 mL, Q6H PRN  bisacodyl, 10 mg, Daily PRN  ondansetron, 4 mg, Q6H PRN  oxyCODONE, 15 mg, BID PRN  sodium chloride (PF), 3 mL, Q1H PRN        Invasive Devices Review  Invasive Devices  Report    Central Venous Catheter Line            CVC Central Lines 04/29/22 Triple Right Internal jugular <1 day          Peripheral Intravenous Line            Peripheral IV 04/27/22 Left Hand 2 days    Peripheral IV 04/28/22 Dorsal (posterior); Right Hand <1 day          Arterial Line            Arterial Line 04/28/22 Radial <1 day          Drain            External Urinary Catheter <1 day                Rationale for remaining devices:   [facility] CVC: no peripheral access/medications requiring central line  Arterial line: hemodynamic monitoring/frequent lab draws  Jameson: close monitoring of UO in critically ill patient  ---------------------------------------------------------------------------------------  Advance Directive and Living Will:      Power of :    POLST: ---------------------------------------------------------------------------------------  Care Time Delivered:   Upon my evaluation, this patient had a high probability of imminent or life-threatening deterioration due to septic shock, hypomagnesemia, which required my direct attention, intervention, and personal management  I have personally provided 40 minutes (0206 to 0246) of critical care time, exclusive of procedures, teaching, family meetings, and any prior time recorded by providers other than myself  RENETTA Nava      Portions of the record may have been created with voice recognition software  Occasional wrong word or "sound a like" substitutions may have occurred due to the inherent limitations of voice recognition software  Read the chart carefully and recognize, using context, where substitutions have occurred

## 2022-04-29 NOTE — ASSESSMENT & PLAN NOTE
· POA AEB:  Tachycardia, tachypnea, hyperthermia, leukocytosis, lactic acidosis -- suspected source: RLE cellulitis  · Lactic acid: 5 2, 6 2, 6 4, 3 8  · Now 1 8  · Initially received 2L IVF on admission for elevated lactic  · Procalcitonin 75, 84  · Continue to trend  · Initially BP was stable    · Yesterday afternoon she became hypotensive and was given additional 1L of IVF to which she was transiently responsive  · Given Isolyte 500 mL and Albumin 5% 500 mL by critical care with improvement in blood pressure  · Arterial line placed  · Per cardiology, IV fluids should be continued 2/2 HOCM  · Started on Levophed overnight  · Maintain MAP > 65 mmHg  · Check echo  · Continue IV abx Zosyn, Vanco, Clindamycin  · CT without evidence of fluid collection or evidence of necrosis  · DVT ppx: enoxaparin  · Follow cultures, deescalate antibiotics as able

## 2022-04-29 NOTE — CASE MANAGEMENT
Case Management Assessment & Discharge Planning Note    Patient name Luann Norman  Location ICU 11/ICU 11 MRN [id number]  : 1955 Date 2022       Current Admission Date: 2022  Current Admission Diagnosis:Septic shock [facility])   Patient Active Problem List    Diagnosis Date Noted    Hypomagnesemia 2022    Hypophosphatemia 2022    History of pulmonary embolism 2022    Chronic diastolic heart failure ([facility] ) 2022    Slow transit constipation 2021    Chronic right-sided low back pain without sciatica 2021    Cellulitis of right lower extremity 2021    Septic shock ([facility] ) 2021    Morbid obesity ([facility] ) 2021    Hypogammaglobulinemia, acquired ([facility] ) 2019    Acute respiratory failure with hypoxia ([facility] ) 2019    Hypertrophic cardiomyopathy ([facility] ) 2019    Low immunoglobulin level 2019    LVH (left ventricular hypertrophy) 2019    Postnasal drip 2019    Dyspnea on exertion 2019    Mitral valve disorder     CAP (community acquired pneumonia) 2019    Elevated troponin 2019    Acute metabolic encephalopathy     Increased risk of breast cancer     Other complicated headache syndrome 2018    Nausea & vomiting 2018    SIRS (systemic inflammatory response syndrome) ([facility] ) 2018    Post-traumatic osteoarthritis 2018    Opiate analgesic use agreement exists 2016    BRCA1 positive 2016    Charcot's joint 2016    Class 2 obesity 2016    Wegener's granulomatosis with renal involvement ([facility] ) 01/15/2016    Cancer of ovary ([facility] ) 2015    Ovarian cancer ([facility] ) 2015    Increased frequency of urination 2014    Chronic kidney disease, stage III (moderate) (HCC) 2014    Chronic pain disorder 10/24/2013    GERD (gastroesophageal reflux disease) 2013    Dyslipidemia 2013    Lymphedema 11/15/2012  Anxiety 06/05/2012    Benign essential hypertension 06/05/2012    Depression 06/05/2012      LOS (days): 1  Geometric Mean LOS (GMLOS) (days): 4 80  Days to GMLOS:3 2     OBJECTIVE:    Risk of Unplanned Readmission Score: 22         Current admission status: Inpatient  Referral Reason: Other (Disposition planning and post-acute home needs)    Preferred Pharmacy:   [address], [address] [facility] [address]  Phone: [phone] Fax: [phone]    [facility] [address] [facility], [address] [facility]  [address] [facility] [address]  Phone: [phone] Fax: [phone]    Primary Care Provider: Mahnaz Campuzano, DO    Primary Insurance: MEDICARE  Secondary Insurance: AARP    ASSESSMENT:  Active Health Care Proxies     Lamb, [address] Representative - Spouse   Primary Phone: [phone] [facility]  Home Phone: [phone]               Advance Directives  Does patient have a [address]?: No  Was patient offered paperwork?: Yes (declined)  Does patient currently have a Health Care decision maker?: Yes, please see Health Care Proxy section  Does patient have Advance Directives?: No  Was patient offered paperwork?: Yes (declined)  Primary Contact:  Sera Gardiner         Readmission Root Cause  30 Day Readmission: No    Patient Information  Admitted from[de-identified] Home  Mental Status: Alert  During Assessment patient was accompanied by: Spouse  Assessment information provided by[de-identified] Patient,Spouse  Primary Caregiver: Self  Support Systems: Spouse/significant other  [place] of Residence: [address] [place] [address] do you live in?: [address] [facility] entry access options   Select all that apply : Ramp  Type of Current Residence: [place]  In the last 12 months, was there a time when you were not able to pay the mortgage or rent on time?: No  In the last 12 months, how many places have you lived?: 1  In the last 12 months, was there a time when you did not have a steady place to sleep or slept in a shelter (including now)?: No  Homeless/housing insecurity resource given?: N/A  Living Arrangements: Lives w/ Spouse/significant other  Is patient a ?: No    Activities of Daily Living Prior to Admission  Functional Status: Independent  Completes ADLs independently?: Yes  Ambulates independently?: Yes  Does patient use assisted devices?: Yes  Assisted Devices (DME) used: Walker,Other (Comment),Straight Cane (Raised toilet, grab bars)  Does patient currently own DME?: Yes  What DME does the patient currently own?: Zulma Hart (Comment) (Raised toilet, grab bars)  Does patient have a history of Outpatient Therapy (PT/OT)?: Yes  Does the patient have a history of Short-Term Rehab?: Yes (In [facility] )  Does patient have a history of HHC?: Yes ([facility])  Does patient currently have [address]?: No         Patient Information Continued  Income Source: SSI/SSD  Does patient have prescription coverage?: Yes  Within the past 12 months, you worried that your food would run out before you got the money to buy more : Never true  Within the past 12 months, the food you bought just didnt last and you didnt have money to get more : Never true  Food insecurity resource given?: N/A  Does patient receive dialysis treatments?: No  Does patient have a history of substance abuse?: No  Does patient have a history of Mental Health Diagnosis?: No         Means of Transportation  Means of Transport to [facility][de-identified] Family transport ( or friend)  In the past 12 months, has lack of transportation kept you from medical appointments or from getting medications?: No  In the past 12 months, has lack of transportation kept you from meetings, work, or from getting things needed for daily living?: No  Was application for public transport provided?: N/A        DISCHARGE DETAILS:    Discharge planning discussed with[de-identified] Patient and   Freedom of Choice: Yes  Comments - Freedom of Choice: Patient and  agreeable to STR if recommended, however  would like to wait a few days to see if patient makes progress before making STR referrals ( brought in leg wraps and boots from home that patient uses to walk at baseline)  If to dc home w/ VNA, would like to use [facility]  CM contacted family/caregiver?: Yes  Were Treatment Team discharge recommendations reviewed with patient/caregiver?: Yes  Did patient/caregiver verbalize understanding of patient care needs?: Yes       Contacts  Patient Contacts: Nely Wolfe  Relationship to Patient[de-identified] Family ()  Contact Method:  In Person  Reason/Outcome: Emergency Contact,Continuity of [address]                   Would you like to participate in our [address] service program?  : No - Declined

## 2022-04-29 NOTE — ASSESSMENT & PLAN NOTE
Lab Results   Component Value Date    EGFR 42 04/28/2022    EGFR 43 04/28/2022    EGFR 86 11/12/2019    CREATININE 1 28 04/28/2022    CREATININE 1 25 04/28/2022    CREATININE 1 50 (H) 04/27/2022     · Baseline creatinine 0 8-1 in 2021    On admission was 1 5, this morning was 1 25, most recently 1 28  · Monitor UO and renal indices  · BMP in am pending   · Appears to currently have poor UO  · Bladder scan and urinary retention protocol  · Avoid hypotension and neprhotoxins

## 2022-04-29 NOTE — ASSESSMENT & PLAN NOTE
· On admission hsTn 216, 254, 254  · Random troponin 250  · Likely due to sepsis/tachycardia  · Telemetry without evidence of ischemia  · Check EKG  · Continue telemetry  · Check new echo

## 2022-04-29 NOTE — ASSESSMENT & PLAN NOTE
· POA: RLE cellulitis with redness and edema, suspected source of sepsis  · Has hx of recurrent leg cellulitis in setting of lymphedema  · Previously followed by ID and treated with Pen-VK suppression through 12/31/2020  Per ID note on 08/19/2020: If cellulitis recurs of suppression, patient would then need to be treated on lifelong suppression  · Admitted 11/2019 for right leg cellulitis and secondary GBS bacteremia  · On admission CT RLE: Right lower extremity and ankle subcutaneous edema consistent with nonspecific cellulitis without drainable collection, deep soft tissue involvement or gloria osseous destructive change to indicate osteomyelitis  · Repeat CT RLE this evening on my read no obvious fluid collection or free air noted  Awaiting final read    · Recheck labs as above  · Given clinical decline and hypotension broaden out antibiotics to zosyn, vancomycin, and continue clindamycin  · ID was consulted, appreciate recommendations  · CK 59 this am  · Continue neurovascular checks  · May need to consult surgery pending CT and lactic acid

## 2022-04-29 NOTE — ASSESSMENT & PLAN NOTE
· On admission hsTn 216, 254, 254  · Likely due to sepsis/tachycardia  · Telemetry without evidence of ischemia  · Check EKG  · Continue telemetry  · Check random hsTn

## 2022-04-29 NOTE — ASSESSMENT & PLAN NOTE
· POA AEB:  Tachycardia, tachypnea, hyperthermia, lactic acidosis -- suspected source: RLE cellulitis  · Lactic acid: 5 2, 6 2, 6 4, 3 8  · Continue to trend  · Procalcitonin 75, 84  · Continue to trend  · Critical care asked to see patient secondary to persistent hypotension following additional volume bolus  · Had received 2 L IV fluids on admission 2/2 elevated lactic acid  · Noted hypotensive earlier this afternoon, received additional L of fluid  · Check new lactic, CMP, Mag, Phos, random hsTn, CBC, VBG, ABG  · Per cardiology, IV fluids should be continued 2/2 HOCM  · May need vasopressors in setting of hypotension  · Maintain MAP > 65 mmHg  · Check echo  · Continue IV abx Zosyn, Vanco, Clindamycin  · If lactic persistently elevated / increasing, or CT final read concerning for worsening infection will need acute care surgery consult this evening     · DVT ppx: enoxaparin  · Follow cultures, deescalate antibiotics as able

## 2022-04-29 NOTE — PROGRESS NOTES
Vancomycin Assessment    Indira Obrien is a 69 y o  female who is currently ordered vancomycin 1500mg IV q24h for skin-soft tissue infection     Relevant clinical data and objective history reviewed:  Creatinine   Date Value Ref Range Status   04/28/2022 1 25 0 60 - 1 30 mg/dL Final     Comment:     Standardized to IDMS reference method   04/27/2022 1 50 (H) 0 60 - 1 30 mg/dL Final     Comment:     Standardized to IDMS reference method   07/09/2021 0 92 0 60 - 1 30 mg/dL Final     Comment:     Standardized to IDMS reference method   05/15/2015 1 37 (H) 0 60 - 1 30 mg/dL Final     Comment:     Standardized to IDMS reference method   03/22/2015 1 16 0 60 - 1 30 mg/dL Final     Comment:     Standardized to IDMS reference method   01/30/2015 0 93 0 60 - 1 30 mg/dL Final     Comment:     Standardized to IDMS reference method     VANCOMYCIN RANDOM   Date Value Ref Range Status   01/29/2015 17 7 mcg/mL Final     Comment:     The above 1 analytes were performed by [facility]  [address] [place] [id number]       BP 93/61   Pulse (!) 106   Temp (!) 101 1 °F (38 4 °C) (Tympanic)   Resp (!) 28   Ht 5' 3" (1 6 m)   Wt 107 kg (236 lb 1 8 oz)   SpO2 92%   BMI 41 83 kg/m²   I/O last 3 completed shifts:   In: 2050 [IV Piggyback:2050]  Out: 650 [Urine:650]  Lab Results   Component Value Date/Time    BUN 22 04/28/2022 07:01 AM    BUN 20 03/22/2015 08:42 AM    WBC 22 92 (H) 04/28/2022 06:58 AM    WBC 6 70 05/15/2015 12:23 PM    HGB 12 2 04/28/2022 06:58 AM    HGB 13 2 05/15/2015 12:23 PM    HCT 37 8 04/28/2022 06:58 AM    HCT 41 6 05/15/2015 12:23 PM    MCV 96 04/28/2022 06:58 AM    MCV 91 05/15/2015 12:23 PM     04/28/2022 06:58 AM     05/15/2015 12:23 PM     Temp Readings from Last 3 Encounters:   04/28/22 (!) 101 1 °F (38 4 °C) (Tympanic)   12/21/21 98 1 °F (36 7 °C) (Tympanic)   09/13/21 (!) 97 1 °F (36 2 °C) (Tympanic)     Vancomycin Days of Therapy: 2    Assessment/Plan  The patient is currently on vancomycin utilizing scheduled dosing based on actual body weight  Baseline risks associated with therapy include: pre-existing renal impairment, concomitant nephrotoxic medications, and advanced age  The patient is currently receiving 1500mg IV q24h and after clinical evaluation will be changed to 750mg IV q12h  Pharmacy will also follow closely for s/sx of nephrotoxicity, infusion reactions, and appropriateness of therapy  BMP and CBC will be ordered per protocol  Plan for trough as patient approaches steady state, prior to the 4th  dose at approximately 1130 on 4/30/22  Due to infection severity, will target a trough of 15-20 (appropriate for most indications)   Pharmacy will continue to follow the patients culture results and clinical progress daily      Katharina Malone, Pharmacist

## 2022-04-29 NOTE — PROGRESS NOTES
Progress Note - Cardiology   Marilin Gustafson 69 y o  female MRN: [id number]  Unit/Bed#: ICU 11 Encounter: [id number]      Asessment:  Nonischemic myocardial injury secondary to sepsis   Hypertrophic obstructive cardiomyopathy              - LVEF 70%, wall thickness markedly increase, severe asymmetrical hypertrophy of the septum, dynamic obstruction at rest in the outflow track with peak gradient of 37 mmHg, dynamic obstruction during Valsalva with peak gradient of 75 mmHg, grade 2 diastolic dysfunction, left atrium mildly to moderately dilated, right atrium mildly dilated, mitral valve with severe systolic anterior motion of the anterior leaflet, trace TR, 7/8/21  Septic shock  Acute encephalopathy  Acute hypoxic respiratory failure  Elevated D-dimer  Chronic lymphedema with recurrent cellulitis right lower extremity  Chronic kidney disease stage 3  Hypertension  Dyslipidemia  History of PE and DVT, IVC filter in place, 2007  Wegener's granulomatosis with history of renal involvement   Hypogammaglobulinemia  Ovarian cancer post bilateral oophorectomy and hysterectomy  Morbid obesity    Plan/ Discussion:  · Patient with hypotension overnight despite fluid boluses; had central line placed with initiation of norepinephrine  At this time, patient remains on IV fluid hydration in addition to low-dose norepinephrine  · Repeat echocardiogram completed to assess cardiac structure and function  · Post aggressive IV fluid resuscitation, monitor for IV diuretic needs  · Monitor volume status closely with strict intake/output, daily weight  · Renal function improved; recommend to maintain potassium > 4, magnesium > 2  Subjective:  Patient seen and examined at the bedside  Patient states she feels better today and is more awake  She easily drifts to sleep, but easily wakes to verbal stimulus  She denies chest pain and shortness of breath       Vitals:  Vitals:    04/29/22 0545 04/29/22 1000   Weight: 107 kg (236 lb 15 9 oz) 107 kg (236 lb)   ,  Vitals:    04/29/22 0700 04/29/22 0800 04/29/22 0900 04/29/22 1000   BP:    110/80   BP Location:       Pulse: 96 90 92 88   Resp: 18 (!) 24 (!) 32 20   Temp: 98 6 °F (37 °C) 98 6 °F (37 °C) 98 6 °F (37 °C) 98 2 °F (36 8 °C)   TempSrc:   Bladder Bladder   SpO2: 97% 99% 98% 99%   Weight:    107 kg (236 lb)   Height:    5' 3" (1 6 m)       Exam:  General: lethargic but oriented, no acute distress   Heart: regular rate and rhythm, murmur appreciated   Respiratory effort/ Lungs: remains on supplemental oxygen, diminished breath sounds bilaterally  Abdominal: obese, rounded, non-tender  Lower Limbs: chronic bilateral lower extremity lymphedema with edema, right lower extremity cellulitis            Telemetry:       NSR on exam, noted ectopy with NSVT via telemetry     Medications:    Current Facility-Administered Medications:     acetaminophen (TYLENOL) tablet 650 mg, 650 mg, Oral, Q6H PRN, RENETTA Aguilar    aluminum-magnesium hydroxide-simethicone (MYLANTA) oral suspension 30 mL, 30 mL, Oral, Q6H PRN, RENETTA Aguilar    bisacodyl (DULCOLAX) EC tablet 10 mg, 10 mg, Oral, Daily PRN, RENETTA Aguilar    ceFAZolin (ANCEF) IVPB (premix in dextrose) 2,000 mg 50 mL, 2,000 mg, Intravenous, Q8H, RENETTA Peralta, Last Rate: 100 mL/hr at 04/29/22 0954, 2,000 mg at 04/29/22 0954    clindamycin (CLEOCIN) IVPB (premix in dextrose) 600 mg 50 mL, 600 mg, Intravenous, Q8H, RENETTA Aguilar, Last Rate: 100 mL/hr at 04/29/22 0954, 600 mg at 04/29/22 0954    DULoxetine (CYMBALTA) delayed release capsule 60 mg, 60 mg, Oral, Daily, RENETTA Lane, 60 mg at 04/29/22 0953    [START ON 4/30/2022] enoxaparin (LOVENOX) subcutaneous injection 40 mg, 40 mg, Subcutaneous, Q12H [address], RENETTA Berumen    lactated ringers infusion, 125 mL/hr, Intravenous, Continuous, RENETTA Aguilar, Last Rate: 125 mL/hr at 04/29/22 1030, 125 mL/hr at 04/29/22 1030    NOREPINEPHRINE 4 MG  ML NSS (CMPD ORDER) infusion, 1-30 mcg/min, Intravenous, Titrated, Minerva Dine, CRNP, Last Rate: 11 3 mL/hr at 04/29/22 1030, 3 mcg/min at 04/29/22 1030    ondansetron (ZOFRAN) injection 4 mg, 4 mg, Intravenous, Q6H PRN, Minerva Dine, CRNP    oxybutynin (DITROPAN-XL) 24 hr tablet 10 mg, 10 mg, Oral, HS, Feldman Eagles Abelino, CRNP    oxyCODONE (ROXICODONE) IR tablet 15 mg, 15 mg, Oral, BID PRN, Minerva Dine, CRNP, 15 mg at 04/29/22 0802    pantoprazole (PROTONIX) EC tablet 20 mg, 20 mg, Oral, Early Morning, Minerva Dine, CRNP    sodium chloride (PF) 0 9 % injection 3 mL, 3 mL, Intravenous, Q1H PRN, Minerva Dine, CRNP      Labs/Data:        Results from last 7 days   Lab Units 04/29/22  0439 04/28/22  2321 04/28/22  0658   WBC Thousand/uL 25 62* 23 92* 22 92*   HEMOGLOBIN g/dL 10 0* 10 4* 12 2   HEMATOCRIT % 31 9* 32 1* 37 8   PLATELETS Thousands/uL 141* 141* 190     Results from last 7 days   Lab Units 04/29/22  0919 04/29/22  0508 04/28/22  2321 04/28/22  0701 04/28/22  0701   POTASSIUM mmol/L 4 3 4 0 3 9   < > 3 6   CHLORIDE mmol/L 106 105 106   < > 107   CO2 mmol/L 23 24 24   < > 21   BUN mg/dL 25 25 26*   < > 22   CK TOTAL U/L  --   --   --   --  59    < > = values in this interval not displayed

## 2022-04-29 NOTE — ASSESSMENT & PLAN NOTE
· Likely in the setting of infection/sepsis  · CT head on admission no acute process  · Hold sedating medications  · Continue serial neuro checks  · Check electrolytes  · Currently euglycemic  · Optimize electrolytes, blood glucose, oxygenation  · CAM ICU bid  · Delirium precautions

## 2022-04-29 NOTE — ASSESSMENT & PLAN NOTE
· 2/2 treatment with Rituximab for Wegener's  · Follows with Hem/Onc as outpatient    Was treated with IVIG but had adverse reactions and further watchful observation on IgG level monitoring was recommended  · Continue outpt f/u with Dr Navas Model

## 2022-04-29 NOTE — PROGRESS NOTES
Progress Note - Infectious Disease   Issac Melvin 69 y o  female MRN: [id number]  Unit/Bed#: ICU 11 Encounter: [id number]    Impression/Plan:  1  Severe sepsis  POA  Fever, tachycardia, leukocytosis, and lactic acidosis  Secondary to recurring RLE cellulitis  Also consider possible respiratory source as patient has ground glass on chest imaging although this may be more indicative of chronic interstitial pneumonitis  No other clear source appreciated  Blood cultures are negative after 24 hours  Patient with development of hypotension overnight  She required initiation of pressor support but this morning she is titrating down   -antibiotic as below  -check CBC and BMP tomorrow  -follow up blood culture  -monitor vitals  -supportive care     2  Recurring RLE cellulitis  In setting of severe lymphedema  RLE CT imaging showed severe soft tissue edema but no drainable collection  No evidence of deep soft tissue involvement or osseous changes but she does have hardware along the tibia and in the ankle  Orthopedic surgery team has evaluated, no plans for surgical intervention at this time  Leg initially appeared tight, tender, and hot on exam  Given concern for toxin involvement Clindamycin was added to antibiotic regimen  Her cefazolin was broadened to zosyn/vancomycin overnight given development of hypotension  This morning her blood pressure has improve and she is weaning off pressor  I still suspect strep is the main pathogen involved  I will stop the zosyn and vancomycin and place her back on Cefazolin now     -stop IV zosyn  -stop IV vancomycin  -continue IV clindamycin  -restart IV cefazolin, 2g q8 hours  -check CBC and BMP tomorrow  -monitor vitals  -serial RLE exams  -continue follow up with orthopedic surgery     3  Acute hypoxic respiratory failure  Patient requiring nonrebreather mask by EMS   Chest xray and CT chest showed B/L atelectasis with basilar consolidations, and upper airspace opacities likely representing chronic interstitial pneumonitis  COVID-19/Flu/RSV PCR was negative  Suspect respiratory status may all be due to severe sepsis with metabolic encephalopathy  Also consider possibility of PE  D dimer elevated  She unfortunately can't have dye due to allergy  She has been started on Lovenox  Pulmonology is following  Today her O2 saturation is stable on nasal cannula O2    -antibiotics as above  -check CBC and BMP tomorrow  -monitor vitals  -monitor respiratory symptoms  -O2 support per critical care     4  CKD III  Upon review of patient's available past medical records it appears her baseline creatinine is approximately 0 8-1 2  She was elevated at 1 5 upon admission which I suspect is pre-renal in setting of acute infection above  Patient's creatinine has improved to 1 13 this morning   -check creatinine tomorrow  -dose adjust antibiotics for renal function as needed  -avoid nephrotoxins  -volume management per SLIM     5  Acute metabolic encephalopathy  Likely secondary to severe sepsis and acute infection above  CT of the head showed no acute intracranial findings  Ammonia level normal  Low suspicion for CNS infection at this time   -serial neuro exams  -monitor mood and mental status  -supportive care     6  Lymphedema  This is likely driving force of patient's recurring RLE cellulitis above      7  Wegener's granulomatosis with history of renal involvement  Patient with history of immunosuppressive treatment but has been off for many years       8  Morbid obesity  BMI = 41 98  Above plan was discussed in detail with patient at the bedside  Above plan was discussed in detail with the critical care team     Antibiotics:  Clindamycin 2  Zosyn - stop  Vancomycin - stop  Cefazolin 3  Antibiotics 3    Subjective:  Patient reports she's doing alright this morning  RN shares she's been alert and appropriate, asking for beverages and to get out of bed to chair   Patient reporting less pain in her RLE today but was surprised when she saw the leg stating, "That looks terrible, it hasn't been this bad in so long " She has no chills, sweats, shakes, nausea, vomiting, abdominal pain, diarrhea, cough, shortness of breath, or chest pain  No concerns with her lopez  No new symptoms  Objective:  Vitals:  Temp:  [98 9 °F (37 2 °C)-101 6 °F (38 7 °C)] 99 °F (37 2 °C)  HR:  [] 96  Resp:  [13-45] 23  BP: ()/(38-61) 86/49  SpO2:  [89 %-100 %] 100 %  Temp (24hrs), Av 8 °F (37 7 °C), Min:98 9 °F (37 2 °C), Max:101 6 °F (38 7 °C)  Current: Temperature: 99 °F (37 2 °C)    Physical Exam:   General Appearance:  Alert, interactive, in no acute distress  She appears comfortable sitting up in bed  Appears tired, ill, and somewhat weak  Throat: Oropharynx moist without lesions  Lungs:   Decreased to auscultation bilaterally; no wheezing or rhonchi; respirations unlabored but shallow and tachypneic  Heart:  Irregular, tachycardic; no murmur, rub or gallop  Abdomen:   Soft, obese, non-tender, non-distended, positive bowel sounds  Extremities: R LE lymphedema with edema of medial lower leg/ankle/foot, much less tender to palpation today with very faint erythema only over distal shin/calf, leg is still overall warm but not hot like yesterday  Skin: No new rashes noted on exposed skin       Labs, Imaging, & Other studies:   All pertinent labs and imaging studies were personally reviewed  Results from last 7 days   Lab Units 22  0439 22  0658   WBC Thousand/uL 25 62* 23 92* 22 92*   HEMOGLOBIN g/dL 10 0* 10 4* 12 2   PLATELETS Thousands/uL 141* 141* 190     Results from last 7 days   Lab Units 22  0508 22  2321 22  0701 22   POTASSIUM mmol/L 4 0   < > 3 9   < > 4 0   CHLORIDE mmol/L 105   < > 106   < > 102   CO2 mmol/L 24   < > 24   < > 26   BUN mg/dL 25   < > 26*   < > 22   CREATININE mg/dL 1 25   < > 1 28   < > 1 50*   EGFR ml/min/1 73sq m 43   < > 42   < >  --    CALCIUM mg/dL 7 4*   < > 7 1*   < > 8 8   AST U/L  --   --  41  --  70*   ALT U/L  --   --  23  --  43   ALK PHOS U/L  --   --  86  --  174*    < > = values in this interval not displayed  Results from last 7 days   Lab Units 04/28/22  1622 04/27/22 2013   BLOOD CULTURE   --  No Growth at 24 hrs  No Growth at 24 hrs     LEGIONELLA URINARY ANTIGEN  Negative  --      Results from last 7 days   Lab Units 04/29/22  0543 04/28/22  0701 04/27/22 2013   PROCALCITONIN ng/ml 77 99* 84 20* 75 41*

## 2022-04-29 NOTE — PROCEDURES
Central Line Insertion    Date/Time: 4/29/2022 1:15 AM  Performed by: RENETTA Concepcion  Authorized by: RENETTA Concepcion     Patient location:  Bedside and ICU  Consent:     Consent obtained:  Written and verbal (via telephone with , Signa Son)    Consent given by:  Spouse    Risks discussed:  Arterial puncture, bleeding, incorrect placement, infection and pneumothorax    Alternatives discussed:  Delayed treatment  Universal protocol:     Procedure explained and questions answered to patient or proxy's satisfaction: yes      Relevant documents present and verified: yes      Test results available and properly labeled: yes      Radiology Images displayed and confirmed  If images not available, report reviewed: yes      Required blood products, implants, devices, and special equipment available: yes      Site/side marked: yes      Immediately prior to procedure, a time out was called: yes      Patient identity confirmed:  Verbally with patient, arm band and hospital-assigned identification number  Pre-procedure details:     Hand hygiene: Hand hygiene performed prior to insertion      Sterile barrier technique: All elements of maximal sterile technique followed      Skin preparation:  ChloraPrep    Skin preparation agent: Skin preparation agent completely dried prior to procedure    Indications:     Central line indications: medications requiring central line and no peripheral vascular access    Anesthesia (see MAR for exact dosages):      Anesthesia method:  Local infiltration    Local anesthetic:  Lidocaine 1% w/o epi  Procedure details:     Location:  Right internal jugular    Vessel type: vein      Laterality:  Right    Approach: percutaneous technique used      Patient position:  Flat    Catheter type:  Triple lumen 16cm    Catheter size:  7 Fr    Landmarks identified: yes      Ultrasound guidance: yes      Ultrasound image availability:  Images available in PACS    Sterile ultrasound techniques: Sterile gel and sterile probe covers were used      Manometry confirmation: yes      Number of attempts:  1    Successful placement: yes      Vessel of catheter tip end:  CAJ  Post-procedure details:     Post-procedure:  Dressing applied and line sutured    Assessment:  Blood return through all ports, no pneumothorax on x-ray, placement verified by x-ray and free fluid flow    Post-procedure complications: none      Patient tolerance of procedure:   Tolerated well, no immediate complications

## 2022-04-29 NOTE — CONSULTS
Vancomycin IV Pharmacy-to-Dose Consultation    Megan Wright is a 69 y o  female who was receiving Vancomycin IV with management by the Pharmacy Consult service  The patient's Vancomycin therapy has been completed / discontinued  Thank you for allowing us to take part in this patient's care  Pharmacy will sign-off now; please call or re-consult if there are any questions          Nisha Nelson, CarleenD  Pharmacist

## 2022-04-29 NOTE — QUICK NOTE
Called patient's , Sera Gardiner, and provided [facility]  Also notified of concern for worsening sepsis, hemodynamics  At this time I also discussed central line placement due to lack of/difficult IV access  Sera Gardiner is agreeable to central line access at this time  See consent form

## 2022-04-29 NOTE — PLAN OF CARE
Problem: MOBILITY - ADULT  Goal: Maintain or return to baseline ADL function  Description: INTERVENTIONS:  -  Assess patient's ability to carry out ADLs; assess patient's baseline for ADL function and identify physical deficits which impact ability to perform ADLs (bathing, care of mouth/teeth, toileting, grooming, dressing, etc )  - Assess/evaluate cause of self-care deficits   - Assess range of motion  - Assess patient's mobility; develop plan if impaired  - Assess patient's need for assistive devices and provide as appropriate  - Encourage maximum independence but intervene and supervise when necessary  - Involve family in performance of ADLs  - Assess for home care needs following discharge   - Consider OT consult to assist with ADL evaluation and planning for discharge  - Provide patient education as appropriate  Outcome: Progressing     Problem: Prexisting or High Potential for Compromised Skin Integrity  Goal: Skin integrity is maintained or improved  Description: INTERVENTIONS:  - Identify patients at risk for skin breakdown  - Assess and monitor skin integrity  - Assess and monitor nutrition and hydration status  - Monitor labs   - Assess for incontinence   - Turn and reposition patient  - Assist with mobility/ambulation  - Relieve pressure over bony prominences  - Avoid friction and shearing  - Provide appropriate hygiene as needed including keeping skin clean and dry  - Evaluate need for skin moisturizer/barrier cream  - Collaborate with interdisciplinary team   - Patient/family teaching  - Consider wound care consult   Outcome: Progressing     Problem: Potential for Falls  Goal: Patient will remain free of falls  Description: INTERVENTIONS:  - Educate patient/family on patient safety including physical limitations  - Instruct patient to call for assistance with activity   - Consult OT/PT to assist with strengthening/mobility   - Keep Call bell within reach  - Keep bed low and locked with side rails adjusted as appropriate  - Keep care items and personal belongings within reach  - Initiate and maintain comfort rounds  - Make Fall Risk Sign visible to staff  - Offer Toileting every 2Hours, in advance of need  - Initiate/Maintain bed alarm  - Obtain necessary fall risk management equipment: bed alarm, Mahin lift  - Apply yellow socks and bracelet for high fall risk patients  - Consider moving patient to room near nurses station  Outcome: Progressing

## 2022-04-29 NOTE — ASSESSMENT & PLAN NOTE
· Likely in the setting of infection/sepsis  · CT head on admission no acute process  · Hold sedating medications  · Continue serial neuro checks  · Check electrolytes  · Currently euglycemic  · Optimize electrolytes, blood glucose, oxygenation

## 2022-04-29 NOTE — ASSESSMENT & PLAN NOTE
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

## 2022-04-29 NOTE — ASSESSMENT & PLAN NOTE
· 2/2 treatment with Rituximab for Wegener's  · Follows with Hem/Onc as outpatient    Was treated with IVIG but had adverse reactions and further watchful observation on IgG level monitoring was recommended  · Continue outpt f/u with Dr Ana Pino

## 2022-04-29 NOTE — ASSESSMENT & PLAN NOTE
· PTA OxyContin ER 40 mg bid, oxycodone IT 15 mg bid as needed  · PTA lorazepam 0 5 mg bid (reportedly takes scheduled)  · PDMP reviewed  · Currently lethargic but rousable and encephalopathic -- holding medications for now, if mental status improves will restart slowly

## 2022-04-29 NOTE — PROCEDURES
Arterial Line Insertion    Date/Time: 4/28/2022 11:07 PM  Performed by: RENETTA Concepcion  Authorized by: RENETTA Concepcion     Patient location:  Bedside and ICU  Consent:     Consent obtained:  Emergent situation and verbal    Consent given by:  Patient    Risks discussed:  Bleeding, ischemia, repeat procedure, infection and pain  Universal protocol:     Relevant documents present and verified: yes      Required blood products, implants, devices, and special equipment available: yes      Site/side marked: yes      Immediately prior to procedure a time out was called: yes      Patient identity confirmed:  Verbally with patient and arm band  Indications:     Indications: hemodynamic monitoring, continuous blood pressure monitoring and frequent labs / infusion    Pre-procedure details:     Skin preparation:  Chlorhexidine    Preparation: Patient was prepped and draped in sterile fashion    Anesthesia (see MAR for exact dosages): Anesthesia method:  Local infiltration    Local anesthetic:  Lidocaine 2% w/o epi  Procedure details:     Location / Tip of Catheter:  Radial    Laterality:  Right    Clement's test performed: yes      Clement's test abnormal: no      Needle gauge:  20 G    Placement technique:  Seldinger    Number of attempts:  1    Successful placement: yes      Transducer: waveform confirmed    Post-procedure details:     Post-procedure:  Sterile dressing applied, sutured and wrist guard applied    CMS:  Normal    Patient tolerance of procedure:   Tolerated well, no immediate complications

## 2022-04-29 NOTE — ASSESSMENT & PLAN NOTE
· Likely in the setting infection/sepsis  · Does have history of Juan's granulomatosis  · On admission CT chest with bilateral apical infiltrates, likely chronic  There is infiltrate of the left lower lobe, aspiration pneumonitis versus aspiration versus atelectasis  · Antibiotics broadened out as below secondary to clinical decline  · Currently on nasal cannula 2 L with SpO2 greater than 92%  · Titrate FiO2 for SpO2 greater than 92%  · Check ABG    · Low threshold to intubate

## 2022-04-29 NOTE — ASSESSMENT & PLAN NOTE
Recent Labs     04/28/22  2157   PHOS 1 2*     · Likely in the setting of poor po intake/critical illness  · Replete as needed to maintain PO4 > 3

## 2022-04-29 NOTE — ASSESSMENT & PLAN NOTE
· POA: RLE cellulitis with redness and edema, suspected source of sepsis  · Has hx of recurrent leg cellulitis in setting of lymphedema  · Previously followed by ID and treated with Pen-VK suppression through 12/31/2020  Per ID note on 08/19/2020: If cellulitis recurs of suppression, patient would then need to be treated on lifelong suppression  · Admitted 11/2019 for right leg cellulitis and secondary GBS bacteremia  · On admission CT RLE: Right lower extremity and ankle subcutaneous edema consistent with nonspecific cellulitis without drainable collection, deep soft tissue involvement or gloria osseous destructive change to indicate osteomyelitis    · Repeat CT RLE no obvious fluid collection or free air noted  · Continue serial assessments of RLE/neurovascular checks  · Given clinical decline and hypotension broaden out antibiotics to zosyn, vancomycin, and continue clindamycin  · ID was consulted, appreciate recommendations  · CK 59 this am  · Continue neurovascular checks

## 2022-04-29 NOTE — ASSESSMENT & PLAN NOTE
Wt Readings from Last 3 Encounters:   04/28/22 107 kg (236 lb 1 8 oz)   04/21/22 102 kg (225 lb)   09/07/21 102 kg (225 lb 11 2 oz)     · Daily weights  · Strict I/Os  · Holding diuretic at this time due to hypotension in the setting of severe sepsis  · Holding home metoprolol in setting of hypotension, restart as able

## 2022-04-29 NOTE — ASSESSMENT & PLAN NOTE
· Hx of HOCM  · Last ECHO: EF 70%, wall thickness markedly increased, severe asymetrical hypertrophy of the septum  There was dynamic obstruction at rest in the outflow tract, with a peak gradient of 37 mmHg  There is dynamic out obstruction during Valsalva, with peak gradient of 75 mmHg features were consistent with pseudonormal left ventricular filling pattern with, concomitant abnormal relaxation and increased filling pressure grade 2 diastolic dysfunction  LA mildly to moderately dilated, RA mildly dilated, MV severe systolic anterior motion with anterior leaflet, TV trace regurg  · Obtain new echo  · Cardiology was consulted, appreciate recommendations  · Given hypotension will give additional IVF / albumin bolus  · May need to start vasopressors if transient or norms monitor fluids    · Check random troponin  · Holding home metoprolol in setting of hypotension

## 2022-04-29 NOTE — ASSESSMENT & PLAN NOTE
Recent Labs     04/28/22  0701 04/28/22  2157   MG 1 1* 0 3*     · Present on admission  · Magnesium critically low -- at increased risk for Torsades/arrhythmia  · Currently receiving Magnesium Sulfate 4 gm IVPB  · Continue to replete for for Mag > 2  · Check mag level in am

## 2022-04-29 NOTE — ASSESSMENT & PLAN NOTE
· Likely in the setting infection/sepsis  · Does have history of Juan's granulomatosis  · On admission CT chest with bilateral apical infiltrates, likely chronic    There is infiltrate of the left lower lobe, aspiration pneumonitis versus aspiration versus atelectasis  · Antibiotics broadened out as below secondary to clinical decline  · Currently on nasal cannula 2 L with SpO2 greater than 92%  · Titrate FiO2 for SpO2 greater than 92%  · ABG without hypercarbia

## 2022-04-29 NOTE — ASSESSMENT & PLAN NOTE
Lab Results   Component Value Date    EGFR 43 04/28/2022    EGFR 86 11/12/2019    EGFR 78 11/11/2019    CREATININE 1 25 04/28/2022    CREATININE 1 50 (H) 04/27/2022    CREATININE 0 92 07/09/2021     · Baseline creatinine 0 8-1 in 2021    On admission was 1 5, this morning was 1 25  · Check CMP now  · Monitor UO and renal indices  · Appears to currently have poor UO  · Bladder scan and urinary retention protocol  · Will give isolyte 500 mL and albumin 5% 500 mL  · Avoid hypotension and neprhotoxins

## 2022-04-29 NOTE — ASSESSMENT & PLAN NOTE
· Hx of Juan's granulomatosis dx Feb 2010  · Hx of associated renal failure, pulmonary hemorrhage, and polyarteritis  · Treated with Rituximab for 4 years and prednisone with good response, has been discontinued 2/2 subsequent hypogammaglobinemia and remission  · CT chest:  Groundglass and interstitial airspace disease in the upper lungs, similar appearance to the prior exam suggesting chronic interstitial pneumonitis  Atelectasis and airspace consolidation in the dependent portions of the lung bases may represent aspiration and/or pneumonia

## 2022-04-29 NOTE — ASSESSMENT & PLAN NOTE
· Hx of DVT/PE in 2007 following hysterectomy for ovarian CA at [facility]   IVC filter placed, treated with Lovenox for 1 year  · Presented with elevated D dimer 10 15  · Unable to CTA 2/2 IV dye allergy -- would need pre-medication  · Started on weight based Lovenox -- pending repeat CMP, if decline in renal function will change to Heparin DVT/PE infusion  · BLE venous duplex - negative for DVT  · Suspect elevated D-dimer 2/2 infection/sepsis

## 2022-04-30 PROBLEM — E83.42 HYPOMAGNESEMIA: Status: RESOLVED | Noted: 2022-04-29 | Resolved: 2022-04-30

## 2022-04-30 PROBLEM — E83.39 HYPOPHOSPHATEMIA: Status: RESOLVED | Noted: 2022-04-29 | Resolved: 2022-04-30

## 2022-04-30 LAB
ANION GAP SERPL CALCULATED.3IONS-SCNC: 5 MMOL/L (ref 4–13)
BASOPHILS # BLD AUTO: 0.05 THOUSANDS/ΜL (ref 0–0.1)
BASOPHILS NFR BLD AUTO: 0 % (ref 0–1)
BUN SERPL-MCNC: 23 MG/DL (ref 5–25)
CALCIUM SERPL-MCNC: 7.7 MG/DL (ref 8.3–10.1)
CHLORIDE SERPL-SCNC: 105 MMOL/L (ref 100–108)
CO2 SERPL-SCNC: 26 MMOL/L (ref 21–32)
CREAT SERPL-MCNC: 1.12 MG/DL (ref 0.6–1.3)
EOSINOPHIL # BLD AUTO: 0.02 THOUSAND/ΜL (ref 0–0.61)
EOSINOPHIL NFR BLD AUTO: 0 % (ref 0–6)
ERYTHROCYTE [DISTWIDTH] IN BLOOD BY AUTOMATED COUNT: 14.9 % (ref 11.6–15.1)
GFR SERPL CREATININE-BSD FRML MDRD: 50 ML/MIN/1.73SQ M
GLUCOSE SERPL-MCNC: 106 MG/DL (ref 65–140)
HCT VFR BLD AUTO: 30.6 % (ref 34.8–46.1)
HGB BLD-MCNC: 9.8 G/DL (ref 11.5–15.4)
IMM GRANULOCYTES # BLD AUTO: 0.09 THOUSAND/UL (ref 0–0.2)
IMM GRANULOCYTES NFR BLD AUTO: 1 % (ref 0–2)
LYMPHOCYTES # BLD AUTO: 1.13 THOUSANDS/ΜL (ref 0.6–4.47)
LYMPHOCYTES NFR BLD AUTO: 6 % (ref 14–44)
MAGNESIUM SERPL-MCNC: 2.4 MG/DL (ref 1.6–2.6)
MCH RBC QN AUTO: 30.6 PG (ref 26.8–34.3)
MCHC RBC AUTO-ENTMCNC: 32 G/DL (ref 31.4–37.4)
MCV RBC AUTO: 96 FL (ref 82–98)
MONOCYTES # BLD AUTO: 0.72 THOUSAND/ΜL (ref 0.17–1.22)
MONOCYTES NFR BLD AUTO: 4 % (ref 4–12)
MRSA NOSE QL CULT: NORMAL
NEUTROPHILS # BLD AUTO: 16.75 THOUSANDS/ΜL (ref 1.85–7.62)
NEUTS SEG NFR BLD AUTO: 89 % (ref 43–75)
NRBC BLD AUTO-RTO: 0 /100 WBCS
PLATELET # BLD AUTO: 127 THOUSANDS/UL (ref 149–390)
PMV BLD AUTO: 10.3 FL (ref 8.9–12.7)
POTASSIUM SERPL-SCNC: 3.8 MMOL/L (ref 3.5–5.3)
RBC # BLD AUTO: 3.2 MILLION/UL (ref 3.81–5.12)
SODIUM SERPL-SCNC: 136 MMOL/L (ref 136–145)
WBC # BLD AUTO: 18.76 THOUSAND/UL (ref 4.31–10.16)

## 2022-04-30 PROCEDURE — 99232 SBSQ HOSP IP/OBS MODERATE 35: CPT | Performed by: STUDENT IN AN ORGANIZED HEALTH CARE EDUCATION/TRAINING PROGRAM

## 2022-04-30 PROCEDURE — 83735 ASSAY OF MAGNESIUM: CPT | Performed by: NURSE PRACTITIONER

## 2022-04-30 PROCEDURE — 80048 BASIC METABOLIC PNL TOTAL CA: CPT | Performed by: NURSE PRACTITIONER

## 2022-04-30 PROCEDURE — 99232 SBSQ HOSP IP/OBS MODERATE 35: CPT | Performed by: INTERNAL MEDICINE

## 2022-04-30 PROCEDURE — 85025 COMPLETE CBC W/AUTO DIFF WBC: CPT | Performed by: NURSE PRACTITIONER

## 2022-04-30 RX ORDER — MIDODRINE HYDROCHLORIDE 5 MG/1
5 TABLET ORAL
Status: DISCONTINUED | OUTPATIENT
Start: 2022-04-30 | End: 2022-05-01

## 2022-04-30 RX ORDER — FUROSEMIDE 20 MG/1
20 TABLET ORAL DAILY
Status: DISCONTINUED | OUTPATIENT
Start: 2022-05-01 | End: 2022-05-01

## 2022-04-30 RX ORDER — DOCUSATE SODIUM 100 MG/1
100 CAPSULE, LIQUID FILLED ORAL 2 TIMES DAILY
Status: DISCONTINUED | OUTPATIENT
Start: 2022-04-30 | End: 2022-05-03 | Stop reason: HOSPADM

## 2022-04-30 RX ORDER — LORAZEPAM 0.5 MG/1
0.5 TABLET ORAL 2 TIMES DAILY
Status: DISCONTINUED | OUTPATIENT
Start: 2022-04-30 | End: 2022-04-30

## 2022-04-30 RX ORDER — OXYCODONE HCL 20 MG/1
20 TABLET, FILM COATED, EXTENDED RELEASE ORAL EVERY 12 HOURS SCHEDULED
Status: DISCONTINUED | OUTPATIENT
Start: 2022-04-30 | End: 2022-05-03 | Stop reason: HOSPADM

## 2022-04-30 RX ORDER — LORAZEPAM 0.5 MG/1
0.5 TABLET ORAL 2 TIMES DAILY PRN
Status: DISCONTINUED | OUTPATIENT
Start: 2022-04-30 | End: 2022-05-03 | Stop reason: HOSPADM

## 2022-04-30 RX ORDER — FUROSEMIDE 20 MG/1
20 TABLET ORAL DAILY
Status: DISCONTINUED | OUTPATIENT
Start: 2022-04-30 | End: 2022-04-30

## 2022-04-30 RX ORDER — AMOXICILLIN 250 MG
1 CAPSULE ORAL
Status: DISCONTINUED | OUTPATIENT
Start: 2022-04-30 | End: 2022-05-03 | Stop reason: HOSPADM

## 2022-04-30 RX ADMIN — ENOXAPARIN SODIUM 40 MG: 40 INJECTION SUBCUTANEOUS at 20:15

## 2022-04-30 RX ADMIN — CLINDAMYCIN IN 5 PERCENT DEXTROSE 900 MG: 18 INJECTION, SOLUTION INTRAVENOUS at 02:25

## 2022-04-30 RX ADMIN — DOCUSATE SODIUM AND SENNOSIDES 1 TABLET: 8.6; 5 TABLET, FILM COATED ORAL at 21:55

## 2022-04-30 RX ADMIN — CEFAZOLIN SODIUM 2000 MG: 2 SOLUTION INTRAVENOUS at 09:23

## 2022-04-30 RX ADMIN — MIDODRINE HYDROCHLORIDE 5 MG: 5 TABLET ORAL at 16:16

## 2022-04-30 RX ADMIN — DULOXETINE HYDROCHLORIDE 60 MG: 60 CAPSULE, DELAYED RELEASE ORAL at 09:36

## 2022-04-30 RX ADMIN — MIDODRINE HYDROCHLORIDE 5 MG: 5 TABLET ORAL at 11:36

## 2022-04-30 RX ADMIN — CEFAZOLIN SODIUM 2000 MG: 2 SOLUTION INTRAVENOUS at 01:34

## 2022-04-30 RX ADMIN — DOCUSATE SODIUM 100 MG: 100 CAPSULE, LIQUID FILLED ORAL at 20:15

## 2022-04-30 RX ADMIN — ACETAMINOPHEN 325MG 650 MG: 325 TABLET ORAL at 21:56

## 2022-04-30 RX ADMIN — OXYCODONE HYDROCHLORIDE 15 MG: 10 TABLET ORAL at 11:36

## 2022-04-30 RX ADMIN — OXYCODONE HYDROCHLORIDE 20 MG: 20 TABLET, FILM COATED, EXTENDED RELEASE ORAL at 20:45

## 2022-04-30 RX ADMIN — ENOXAPARIN SODIUM 40 MG: 40 INJECTION SUBCUTANEOUS at 09:35

## 2022-04-30 RX ADMIN — CEFAZOLIN SODIUM 2000 MG: 2 SOLUTION INTRAVENOUS at 18:30

## 2022-04-30 RX ADMIN — CLINDAMYCIN IN 5 PERCENT DEXTROSE 900 MG: 18 INJECTION, SOLUTION INTRAVENOUS at 10:18

## 2022-04-30 RX ADMIN — PANTOPRAZOLE SODIUM 20 MG: 20 TABLET, DELAYED RELEASE ORAL at 05:39

## 2022-04-30 RX ADMIN — MIDODRINE HYDROCHLORIDE 5 MG: 5 TABLET ORAL at 09:36

## 2022-04-30 RX ADMIN — LORAZEPAM 0.5 MG: 0.5 TABLET ORAL at 21:56

## 2022-04-30 RX ADMIN — OXYBUTYNIN 10 MG: 10 TABLET, FILM COATED, EXTENDED RELEASE ORAL at 21:55

## 2022-04-30 NOTE — ASSESSMENT & PLAN NOTE
· On admission hsTn 216, 254, 254  · 4/29 Random troponin 250  · Likely due to sepsis/tachycardia  · Telemetry without evidence of ischemia  · Check EKG  · Continue telemetry  · Check new echo

## 2022-04-30 NOTE — ASSESSMENT & PLAN NOTE
· Likely in the setting infection/sepsis  · Does have history of Juan's granulomatosis  · On admission CT chest with bilateral apical infiltrates, likely chronic    There is infiltrate of the left lower lobe, aspiration pneumonitis versus aspiration versus atelectasis  · Currently on nasal cannula 2 L with SpO2 greater than 92%  · Titrate FiO2 for SpO2 greater than 92%  · ABG without hypercarbia

## 2022-04-30 NOTE — PROGRESS NOTES
[address]  Progress Note - Naya Arroyo 1955, 69 y o  female MRN: [id number]  Unit/Bed#: ICU 11 Encounter: [id number]  Primary Care Provider: Jase Douglas DO   Date and time admitted to hospital: 4/27/2022  7:43 PM    * Septic shock ([address] )  Assessment & Plan  · POA AEB:  Tachycardia, tachypnea, hyperthermia, leukocytosis, lactic acidosis -- suspected source: RLE cellulitis  · Lactic acid: 5 2, 6 2, 6 4, 3 8  · Now 1 8  · Initially received 2L IVF on admission for elevated lactic  · Procalcitonin 75, 84  · Continue to trend  · Initially BP was stable  · 4/28 became hypotensive and was given additional 1L of IVF to which she was transiently responsive  Given Isolyte 500 mL and Albumin 5% 500 mL by critical care with improvement in blood pressure  · 4/29 started on Levophed  · Per cardiology, IV fluids should be continued 2/2 HOCM  · Continue Levophed -- currently on 1 mcg/min  · Maintain MAP > 65 mmHg  · 4/29 2D echo: EF 65%  · 7/2021 2D echo: EF 70%, G2DD  · Continue IV abx Ancef, Clindamycin -- per ID  · CT RLE without evidence of fluid collection or evidence of necrosis  · DVT ppx: enoxaparin  · Follow cultures, deescalate antibiotics as able    Acute metabolic encephalopathy  Assessment & Plan  · Likely in the setting of infection/sepsis -- improving  · CT head on admission no acute process  · Hold sedating medications  · Does take scheduled oxy and lorazepam at home  · Continue serial neuro checks  · Optimize electrolytes, blood glucose, oxygenation  · CAM ICU bid  · Delirium precautions  · Promote sleep-wake cycle    Acute respiratory failure with hypoxia (HCC)  Assessment & Plan  · Likely in the setting infection/sepsis  · Does have history of Juan's granulomatosis  · On admission CT chest with bilateral apical infiltrates, likely chronic    There is infiltrate of the left lower lobe, aspiration pneumonitis versus aspiration versus atelectasis  · Currently on nasal cannula 2 L with SpO2 greater than 92%  · Titrate FiO2 for SpO2 greater than 92%  · ABG without hypercarbia    Hypertrophic cardiomyopathy (HCC)  Assessment & Plan  · Hx of HOCM  · 7/2021 2D ECHO: EF 70%, G2DD  · 4/29 2D echo: EF 65%  · Cardiology was consulted, appreciate recommendations  · Holding home metoprolol in setting of hypotension    Cellulitis of right lower extremity  Assessment & Plan  · POA: RLE cellulitis with redness and edema, suspected source of sepsis  · Has hx of recurrent leg cellulitis in setting of lymphedema  · Previously followed by ID and treated with Pen-VK suppression through 12/31/2020  Per ID note on 08/19/2020: If cellulitis recurs of suppression, patient would then need to be treated on lifelong suppression  · Admitted 11/2019 for right leg cellulitis and secondary GBS bacteremia  · On admission CT RLE: Right lower extremity and ankle subcutaneous edema consistent with nonspecific cellulitis without drainable collection, deep soft tissue involvement or gloria osseous destructive change to indicate osteomyelitis  · Repeat CT RLE no obvious fluid collection or free air noted  · Continue serial assessments of RLE/neurovascular checks  · Continue IV antibiotics with cefepime and clindamycin per ID  · ID was consulted, appreciate recommendations  · CK 59 this am  · Continue neurovascular checks      Chronic kidney disease, stage III (moderate) [facility])  Assessment & Plan  Lab Results   Component Value Date    EGFR 49 04/29/2022    EGFR 43 04/29/2022    EGFR 42 04/28/2022    CREATININE 1 13 04/29/2022    CREATININE 1 25 04/29/2022    CREATININE 1 28 04/28/2022     · Baseline creatinine 0 8-1 in 2021    On admission was 1 5, this morning was 1 25, most recently 1 13  · Monitor UO and renal indices  · BMP in am pending   · Bladder scan and urinary retention protocol  · Avoid hypotension and neprhotoxins      Elevated troponin  Assessment & Plan  · On admission hsTn 216, 254, 254  · 4/29 Random troponin 250  · Likely due to sepsis/tachycardia  · Telemetry without evidence of ischemia  · Check EKG  · Continue telemetry  · Check new echo      Hypomagnesemia-resolved as of 4/30/2022  Assessment & Plan  Recent Labs     04/28/22  0701 04/28/22  2157 04/29/22  0508 04/29/22  0919   MG 1 1* 0 3* 2 6 2 4     · Present on admission  · Magnesium critically low -- at increased risk for Torsades/arrhythmia  · Currently receiving Magnesium Sulfate 4 gm IVPB  · Continue to replete for for Mag > 2  · Check mag level in am    Hypophosphatemia-resolved as of 4/30/2022  Assessment & Plan  Recent Labs     04/28/22  2157 04/29/22  0508 04/29/22  0919   PHOS 1 2* 4 2* 4 7*     · Likely in the setting of poor po intake/critical illness  · Replete as needed to maintain PO4 > 3  Chronic diastolic heart failure (HCC)  Assessment & Plan  Wt Readings from Last 3 Encounters:   04/29/22 107 kg (236 lb)   04/21/22 102 kg (225 lb)   09/07/21 102 kg (225 lb 11 2 oz)     · Daily weights  · Strict I/Os  · Holding diuretic at this time due to hypotension in the setting of severe sepsis  · Holding home metoprolol in setting of hypotension, restart as able        History of pulmonary embolism  Assessment & Plan  · Hx of DVT/PE in 2007 following hysterectomy for ovarian CA at [facility]  IVC filter placed, treated with Lovenox for 1 year  · Presented with elevated D dimer 10 15  · Unable to CTA 2/2 IV dye allergy -- would need pre-medication  · BLE venous duplex - negative for DVT  · Suspect elevated D-dimer 2/2 infection/sepsis  · Suspect for current acute pulmonary embolism, continue DVT prophylaxis with enoxaparin    Hypogammaglobulinemia, acquired ([facility] [address] )  Assessment & Plan  · 2/2 treatment with Rituximab for Wegener's  · Follows with Hem/Onc as outpatient    Was treated with IVIG but had adverse reactions and further watchful observation on IgG level monitoring was recommended  · Continue outpt f/u with Dr Gary Martinez    Chronic pain disorder  Assessment & Plan  · PTA OxyContin ER 40 mg bid, oxycodone IT 15 mg bid as needed  · PTA lorazepam 0 5 mg bid (reportedly takes scheduled)  · PDMP reviewed  · Currently lethargic but rousable and encephalopathic -- holding medications for now, if mental status improves will restart slowly    Cancer of ovary [facility])  Assessment & Plan  · S/p bilateral oophorectomy and hysterectomy  Wegener's granulomatosis with renal involvement [facility])  Assessment & Plan  · Hx of Juan's granulomatosis dx Feb 2010  · Hx of associated renal failure, pulmonary hemorrhage, and polyarteritis  · Treated with Rituximab for 4 years and prednisone with good response, has been discontinued 2/2 subsequent hypogammaglobinemia and remission  · CT chest:  Groundglass and interstitial airspace disease in the upper lungs, similar appearance to the prior exam suggesting chronic interstitial pneumonitis  Atelectasis and airspace consolidation in the dependent portions of the lung bases may represent aspiration and/or pneumonia           ----------------------------------------------------------------------------------------  HPI/24hr events:   · On/off low dose levophed overnight  Patient appropriate for transfer out of the ICU today?: No  Disposition: Continue Critical Care   Code Status: Level 1 - Full Code  ---------------------------------------------------------------------------------------  SUBJECTIVE  States that she feels hungry, otherwise offers no other complaints  Review of Systems   Constitutional: Positive for fatigue  Negative for chills and fever  HENT: Negative  Eyes: Negative  Respiratory: Negative for cough, shortness of breath and wheezing  Cardiovascular: Negative for chest pain, palpitations and leg swelling  Gastrointestinal: Negative for abdominal distention, abdominal pain, diarrhea, nausea and vomiting  Endocrine: Negative  Genitourinary: Positive for difficulty urinating   Negative for dysuria, frequency and urgency  Musculoskeletal: Positive for myalgias (RLE)  Negative for arthralgias  Skin: Positive for color change (RLE) and wound  Negative for pallor and rash  Allergic/Immunologic: Negative  Neurological: Positive for weakness (generalized)  Negative for dizziness, light-headedness and numbness  Hematological: Negative  Psychiatric/Behavioral: Negative for agitation, confusion and decreased concentration  The patient is nervous/anxious  Review of systems was reviewed and negative unless stated above in HPI/24-hour events   ---------------------------------------------------------------------------------------  OBJECTIVE    Vitals   Vitals:    22 0000 22 0100 22 0200 22 0229   BP:       BP Location:       Pulse: 88 92 88 90   Resp: (!) 23 18 19 20   Temp:       TempSrc:       SpO2: 98% 99% 99% 97%   Weight:       Height:         Temp (24hrs), Av 3 °F (36 8 °C), Min:97 6 °F (36 4 °C), Max:99 °F (37 2 °C)  Current: Temperature: 97 6 °F (36 4 °C)  Arterial Line BP: 88/44  Arterial Line MAP (mmHg): 60 mmHg    Respiratory:  SpO2: SpO2: 98 %, SpO2 Device: O2 Device: Nasal cannula       Invasive/non-invasive ventilation settings   Respiratory  Report   Lab Data (Last 4 hours)    None         O2/Vent Data (Last 4 hours)    None                Physical Exam  Vitals and nursing note reviewed  Constitutional:       General: She is sleeping  She is not in acute distress  Appearance: She is morbidly obese  She is ill-appearing  She is not toxic-appearing or diaphoretic  HENT:      Head: Normocephalic and atraumatic  Right Ear: External ear normal       Left Ear: External ear normal       Nose: Nose normal  No congestion or rhinorrhea  Mouth/Throat:      Mouth: Mucous membranes are moist       Pharynx: Oropharynx is clear  No oropharyngeal exudate or posterior oropharyngeal erythema  Eyes:      General: No scleral icterus       Extraocular Movements: Extraocular movements intact  Conjunctiva/sclera: Conjunctivae normal       Pupils: Pupils are equal, round, and reactive to light  Cardiovascular:      Rate and Rhythm: Normal rate and regular rhythm  Heart sounds: S1 normal and S2 normal  Murmur heard  No friction rub  No gallop  Comments: BLE edema R > L  Pulmonary:      Effort: No tachypnea, accessory muscle usage or respiratory distress  Breath sounds: Decreased breath sounds present  No wheezing, rhonchi or rales  Abdominal:      General: Bowel sounds are normal  There is no distension  Palpations: Abdomen is soft  Tenderness: There is no abdominal tenderness  Musculoskeletal:         General: No swelling or tenderness  Normal range of motion  Cervical back: Normal range of motion and neck supple  Right lower leg: Edema present  Left lower leg: Edema present  Skin:     General: Skin is warm and dry  Capillary Refill: Capillary refill takes less than 2 seconds  Findings: Erythema (RLE) present  No bruising, lesion or rash  Neurological:      General: No focal deficit present  Mental Status: She is oriented to person, place, and time and easily aroused  GCS: GCS eye subscore is 4  GCS verbal subscore is 5  GCS motor subscore is 6  Cranial Nerves: No cranial nerve deficit, dysarthria or facial asymmetry  Sensory: Sensation is intact  No sensory deficit  Motor: Weakness (generalized) present  No tremor  Psychiatric:         Attention and Perception: Attention normal  She is attentive  Mood and Affect: Mood is anxious  Behavior: Behavior is cooperative  Thought Content:  Thought content normal          Cognition and Memory: Cognition normal          Judgment: Judgment normal              Laboratory and Diagnostics:  Results from last 7 days   Lab Units 04/29/22  0439 04/28/22  2321 04/28/22  0658 04/27/22 2013   WBC Thousand/uL 25 62* 23 92* 22 92* 10 27*   HEMOGLOBIN g/dL 10 0* 10 4* 12 2 14 0   HEMATOCRIT % 31 9* 32 1* 37 8 44 2   PLATELETS Thousands/uL 141* 141* 190 207   NEUTROS PCT % 84*  --   --   --    BANDS PCT %  --   --   --  10*   MONOS PCT % 1*  --   --   --    MONO PCT %  --   --   --  3*     Results from last 7 days   Lab Units 04/29/22  0919 04/29/22  0508 04/28/22  2321 04/28/22  0701 04/27/22 2013   SODIUM mmol/L 138 138 139 140 140   POTASSIUM mmol/L 4 3 4 0 3 9 3 6 4 0   CHLORIDE mmol/L 106 105 106 107 102   CO2 mmol/L 23 24 24 21 26   ANION GAP mmol/L 9 9 9 12 12   BUN mg/dL 25 25 26* 22 22   CREATININE mg/dL 1 13 1 25 1 28 1 25 1 50*   CALCIUM mg/dL 7 2* 7 4* 7 1* 7 7* 8 8   GLUCOSE RANDOM mg/dL 101 105 93 102 140   ALT U/L  --   --  23  --  43   AST U/L  --   --  41  --  70*   ALK PHOS U/L  --   --  86  --  174*   ALBUMIN g/dL  --   --  2 5*  --  3 3*   TOTAL BILIRUBIN mg/dL  --   --  0 98  --  0 89     Results from last 7 days   Lab Units 04/29/22  0919 04/29/22  0508 04/28/22 2157 04/28/22  0701   MAGNESIUM mg/dL 2 4 2 6 0 3* 1 1*   PHOSPHORUS mg/dL 4 7* 4 2* 1 2*  --       Results from last 7 days   Lab Units 04/28/22 2321 04/27/22 2013   INR  1 27* 1 16   PTT seconds 35 28          Results from last 7 days   Lab Units 04/29/22  0524 04/28/22 2321 04/28/22  0658 04/28/22  0139 04/27/22 2229 04/27/22 2013   LACTIC ACID mmol/L 1 5 1 8 3 8* 6 4* 6 2* 5 2*     ABG:  Results from last 7 days   Lab Units 04/29/22  0425   PH ART  7 363   PCO2 ART mm Hg 38 2   PO2 ART mm Hg 95 5   HCO3 ART mmol/L 21 2*   BASE EXC ART mmol/L -3 7   ABG SOURCE  Line, Arterial     VBG:  Results from last 7 days   Lab Units 04/29/22  0425 04/28/22  2321 04/28/22 2157   PH MONICA   --   --  7 282*   PCO2 MONICA mm Hg  --   --  22 6*   PO2 MONICA mm Hg  --   --  20 8*   HCO3 MONICA mmol/L  --   --  10 4*   BASE EXC MONICA mmol/L  --   --  -14 9   ABG SOURCE  Line, Arterial   < >  --     < > = values in this interval not displayed       Results from last 7 days Lab Units 04/29/22  0543 04/28/22  0701 04/27/22 2013   PROCALCITONIN ng/ml 77 99* 84 20* 75 41*       Micro  Results from last 7 days   Lab Units 04/28/22  1622 04/27/22 2013   BLOOD CULTURE   --  No Growth at 48 hrs  No Growth at 48 hrs  LEGIONELLA URINARY ANTIGEN  Negative  --    STREP PNEUMONIAE ANTIGEN, URINE  Negative  --        EKG: sinus rhythm on telemetry  Imaging: I have personally reviewed pertinent reports  and I have personally reviewed pertinent films in PACS    Intake and Output  I/O       04/28 0701 04/29 0700 04/29 0701 04/30 0700    I V  (mL/kg) 1679 8 (15 7) 1295 3 (12 1)    IV Piggyback 1684 1 425 8    Total Intake(mL/kg) 3363 9 (31 4) 1721 1 (16 1)    Urine (mL/kg/hr) 1300 (0 5) 500 (0 2)    Stool  1    Total Output 1300 501    Net +2063 9 +1220 1          Unmeasured Urine Occurrence  1 x          Height and Weights   Height: 5' 3" (160 cm)  IBW (Ideal Body Weight): 52 4 kg  Body mass index is 41 81 kg/m²  Weight (last 2 days)     Date/Time Weight    04/29/22 1000 107 (236)    04/29/22 0545 107 (236 99)    04/28/22 2210 107 (236 11)    04/28/22 0543 104 (228 18)    04/28/22 0335 104 (228 18)            Nutrition       Diet Orders   (From admission, onward)             Start     Ordered    04/29/22 1518  Diet Dysphagia/Modified Consistency; Dysphagia 2-Mechanical Soft; Thin Liquid  Diet effective now        References:    Nutrtion Support Algorithm Enteral vs  Parenteral   Question Answer Comment   Diet Type Dysphagia/Modified Consistency    Dysphagia/Modified Consistency Dysphagia 2-Mechanical Soft    Liquid Modifier Thin Liquid    RD to adjust diet per protocol?  Yes        04/29/22 1517                  Active Medications  Scheduled Meds:  Current Facility-Administered Medications   Medication Dose Route Frequency Provider Last Rate    acetaminophen  650 mg Oral Q6H PRN RENETTA Amaya      aluminum-magnesium hydroxide-simethicone  30 mL Oral Q6H PRN Nova  Gieniec, CRNP      bisacodyl  10 mg Oral Daily PRN Duwaine [place], CRNP      cefazolin  2,000 mg Intravenous [address], CRNP Stopped (04/30/22 0224)    clindamycin  900 mg Intravenous Q8H Anastacio Bartlett  mg (04/30/22 0225)    DULoxetine  60 mg Oral Daily Duwaine [place], CRNP      enoxaparin  40 mg Subcutaneous Q12H [address] Sheliah Race, CRNP      norepinephrine  1-30 mcg/min Intravenous Titrated [place] Right Abel[place], CRNP 1 mcg/min (04/30/22 0229)    ondansetron  4 mg Intravenous Q6H PRN Duwaine [place], CRNP      oxybutynin  10 mg Oral HS [place], [place]      oxyCODONE  15 mg Oral BID PRN Duwaine [place], CRNP      pantoprazole  20 mg Oral Early Morning [place], [place]      sodium chloride (PF)  3 mL Intravenous Q1H PRN Duwaine [place], CRNP       Continuous Infusions:  norepinephrine, 1-30 mcg/min, Last Rate: 1 mcg/min (04/30/22 0229)      PRN Meds:   acetaminophen, 650 mg, Q6H PRN  aluminum-magnesium hydroxide-simethicone, 30 mL, Q6H PRN  bisacodyl, 10 mg, Daily PRN  ondansetron, 4 mg, Q6H PRN  oxyCODONE, 15 mg, BID PRN  sodium chloride (PF), 3 mL, Q1H PRN        Invasive Devices Review  Invasive Devices  Report    Central Venous Catheter Line            CVC Central Lines 04/29/22 Triple Right Internal jugular 1 day          Peripheral Intravenous Line            Peripheral IV 04/28/22 Dorsal (posterior); Right Hand 1 day          Arterial Line            Arterial Line 04/28/22 Radial 1 day          Drain            External Urinary Catheter <1 day                Rationale for remaining devices:   CVC:  Medications requiring central line  Arterial line:  Hemodynamic monitoring/frequent labs  PIV:  IV access/medications  ---------------------------------------------------------------------------------------  Advance Directive and Living Will:      Power of :    POLST: ---------------------------------------------------------------------------------------  Care Time Delivered:   Upon my evaluation, this patient had a high probability of imminent or life-threatening deterioration due to septic shock, which required my direct attention, intervention, and personal management  I have personally provided 31 minutes ([phone] to [phone]) of critical care time, exclusive of procedures, teaching, family meetings, and any prior time recorded by providers other than myself  RENETTA Concepcion      Portions of the record may have been created with voice recognition software  Occasional wrong word or "sound a like" substitutions may have occurred due to the inherent limitations of voice recognition software  Read the chart carefully and recognize, using context, where substitutions have occurred

## 2022-04-30 NOTE — PLAN OF CARE
Problem: Potential for Falls  Goal: Patient will remain free of falls  Description: INTERVENTIONS:  - Educate patient/family on patient safety including physical limitations  - Instruct patient to call for assistance with activity   - Consult OT/PT to assist with strengthening/mobility   - Keep Call bell within reach  - Keep bed low and locked with side rails adjusted as appropriate  - Keep care items and personal belongings within reach  - Initiate and maintain comfort rounds  - Make Fall Risk Sign visible to staff  - Offer Toileting every  Hours, in advance of need  - Initiate/Maintain alarm  - Obtain necessary fall risk management equipment:   - Apply yellow socks and bracelet for high fall risk patients  - Consider moving patient to room near nurses station  4/30/2022 1219 by Jayden Ledbetter RN  Outcome: Progressing  4/30/2022 1219 by Jayden Ledbetter RN  Outcome: Progressing     Problem: MOBILITY - ADULT  Goal: Maintain or return to baseline ADL function  Description: INTERVENTIONS:  -  Assess patient's ability to carry out ADLs; assess patient's baseline for ADL function and identify physical deficits which impact ability to perform ADLs (bathing, care of mouth/teeth, toileting, grooming, dressing, etc )  - Assess/evaluate cause of self-care deficits   - Assess range of motion  - Assess patient's mobility; develop plan if impaired  - Assess patient's need for assistive devices and provide as appropriate  - Encourage maximum independence but intervene and supervise when necessary  - Involve family in performance of ADLs  - Assess for home care needs following discharge   - Consider OT consult to assist with ADL evaluation and planning for discharge  - Provide patient education as appropriate  4/30/2022 1219 by Jayden Ledbetter RN  Outcome: Progressing  4/30/2022 1219 by Jayden Ledbetter RN  Outcome: Progressing  Goal: Maintains/Returns to pre admission functional level  Description: INTERVENTIONS:  - Perform BMAT or MOVE assessment daily    - Set and communicate daily mobility goal to care team and patient/family/caregiver  - Collaborate with rehabilitation services on mobility goals if consulted  - Perform Range of Motion  times a day  - Reposition patient every  hours  - Dangle patient  times a day  - Stand patient  times a day  - Ambulate patient  times a day  - Out of bed to chair  times a day   - Out of bed for meals  times a day  - Out of bed for toileting  - Record patient progress and toleration of activity level   4/30/2022 1219 by Kwadwo Aguirre RN  Outcome: Progressing  4/30/2022 1219 by Kwadwo Aguirre RN  Outcome: Progressing     Problem: Prexisting or High Potential for Compromised Skin Integrity  Goal: Skin integrity is maintained or improved  Description: INTERVENTIONS:  - Identify patients at risk for skin breakdown  - Assess and monitor skin integrity  - Assess and monitor nutrition and hydration status  - Monitor labs   - Assess for incontinence   - Turn and reposition patient  - Assist with mobility/ambulation  - Relieve pressure over bony prominences  - Avoid friction and shearing  - Provide appropriate hygiene as needed including keeping skin clean and dry  - Evaluate need for skin moisturizer/barrier cream  - Collaborate with interdisciplinary team   - Patient/family teaching  - Consider wound care consult   4/30/2022 1219 by Kwadwo Aguirre RN  Outcome: Progressing  4/30/2022 1219 by Kwadwo Aguirre RN  Outcome: Progressing     Problem: Nutrition/Hydration-ADULT  Goal: Nutrient/Hydration intake appropriate for improving, restoring or maintaining nutritional needs  Description: Monitor and assess patient's nutrition/hydration status for malnutrition  Collaborate with interdisciplinary team and initiate plan and interventions as ordered  Monitor patient's weight and dietary intake as ordered or per policy  Utilize nutrition screening tool and intervene as necessary   Determine patient's food preferences and provide high-protein, high-caloric foods as appropriate       INTERVENTIONS:  - Monitor oral intake, urinary output, labs, and treatment plans  - Assess nutrition and hydration status and recommend course of action  - Evaluate amount of meals eaten  - Assist patient with eating if necessary   - Allow adequate time for meals  - Recommend/ encourage appropriate diets, oral nutritional supplements, and vitamin/mineral supplements  - Order, calculate, and assess calorie counts as needed  - Recommend, monitor, and adjust tube feedings and TPN/PPN based on assessed needs  - Assess need for intravenous fluids  - Provide specific nutrition/hydration education as appropriate  - Include patient/family/caregiver in decisions related to nutrition  4/30/2022 1219 by Deepa Lara RN  Outcome: Progressing  4/30/2022 1219 by Deepa Lara RN  Outcome: Progressing     Problem: PAIN - ADULT  Goal: Verbalizes/displays adequate comfort level or baseline comfort level  Description: Interventions:  - Encourage patient to monitor pain and request assistance  - Assess pain using appropriate pain scale  - Administer analgesics based on type and severity of pain and evaluate response  - Implement non-pharmacological measures as appropriate and evaluate response  - Consider cultural and social influences on pain and pain management  - Notify physician/advanced practitioner if interventions unsuccessful or patient reports new pain  Outcome: Progressing     Problem: INFECTION - ADULT  Goal: Absence or prevention of progression during hospitalization  Description: INTERVENTIONS:  - Assess and monitor for signs and symptoms of infection  - Monitor lab/diagnostic results  - Monitor all insertion sites, i e  indwelling lines, tubes, and drains  - Monitor endotracheal if appropriate and nasal secretions for changes in amount and color  - [place] appropriate cooling/warming therapies per order  - Administer medications as ordered  - Instruct and encourage patient and family to use good hand hygiene technique  - Identify and instruct in appropriate isolation precautions for identified infection/condition  Outcome: Progressing     Problem: SAFETY ADULT  Goal: Patient will remain free of falls  Description: INTERVENTIONS:  - Educate patient/family on patient safety including physical limitations  - Instruct patient to call for assistance with activity   - Consult OT/PT to assist with strengthening/mobility   - Keep Call bell within reach  - Keep bed low and locked with side rails adjusted as appropriate  - Keep care items and personal belongings within reach  - Initiate and maintain comfort rounds  - Make Fall Risk Sign visible to staff  - Offer Toileting every  Hours, in advance of need  - Initiate/Maintain alarm  - Obtain necessary fall risk management equipment:   - Apply yellow socks and bracelet for high fall risk patients  - Consider moving patient to room near nurses station  4/30/2022 1219 by Bam Ash RN  Outcome: Progressing  4/30/2022 1219 by Bam Ash RN  Outcome: Progressing  Goal: Maintain or return to baseline ADL function  Description: INTERVENTIONS:  -  Assess patient's ability to carry out ADLs; assess patient's baseline for ADL function and identify physical deficits which impact ability to perform ADLs (bathing, care of mouth/teeth, toileting, grooming, dressing, etc )  - Assess/evaluate cause of self-care deficits   - Assess range of motion  - Assess patient's mobility; develop plan if impaired  - Assess patient's need for assistive devices and provide as appropriate  - Encourage maximum independence but intervene and supervise when necessary  - Involve family in performance of ADLs  - Assess for home care needs following discharge   - Consider OT consult to assist with ADL evaluation and planning for discharge  - Provide patient education as appropriate  4/30/2022 1219 by Bam Ash RN  Outcome: Progressing  4/30/2022 1219 by Ellyn Antonio RN  Outcome: Progressing  Goal: Maintains/Returns to pre admission functional level  Description: INTERVENTIONS:  - Perform BMAT or MOVE assessment daily    - Set and communicate daily mobility goal to care team and patient/family/caregiver  - Collaborate with rehabilitation services on mobility goals if consulted  - Perform Range of Motion  times a day  - Reposition patient every  hours  - Dangle patient  times a day  - Stand patient  times a day  - Ambulate patient  times a day  - Out of bed to chair  times a day   - Out of bed for meals  times a day  - Out of bed for toileting  - Record patient progress and toleration of activity level   4/30/2022 1219 by Ellyn Antonio RN  Outcome: Progressing  4/30/2022 1219 by Ellyn Antonio RN  Outcome: Progressing     Problem: DISCHARGE PLANNING  Goal: Discharge to home or other facility with appropriate resources  Description: INTERVENTIONS:  - Identify barriers to discharge w/patient and caregiver  - Arrange for needed discharge resources and transportation as appropriate  - Identify discharge learning needs (meds, wound care, etc )  - Arrange for interpretive services to assist at discharge as needed  - Refer to Case Management Department for coordinating discharge planning if the patient needs post-hospital services based on physician/advanced practitioner order or complex needs related to functional status, cognitive ability, or social support system  Outcome: Progressing     Problem: Knowledge Deficit  Goal: Patient/family/caregiver demonstrates understanding of disease process, treatment plan, medications, and discharge instructions  Description: Complete learning assessment and assess knowledge base    Interventions:  - Provide teaching at level of understanding  - Provide teaching via preferred learning methods  Outcome: Progressing

## 2022-04-30 NOTE — ASSESSMENT & PLAN NOTE
Wt Readings from Last 3 Encounters:   04/29/22 107 kg (236 lb)   04/21/22 102 kg (225 lb)   09/07/21 102 kg (225 lb 11 2 oz)     · Daily weights  · Strict I/Os  · Holding diuretic at this time due to hypotension in the setting of severe sepsis  · Holding home metoprolol in setting of hypotension, restart as able

## 2022-04-30 NOTE — ASSESSMENT & PLAN NOTE
Lab Results   Component Value Date    EGFR 49 04/29/2022    EGFR 43 04/29/2022    EGFR 42 04/28/2022    CREATININE 1 13 04/29/2022    CREATININE 1 25 04/29/2022    CREATININE 1 28 04/28/2022     · Baseline creatinine 0 8-1 in 2021    On admission was 1 5, this morning was 1 25, most recently 1 13  · Monitor UO and renal indices  · BMP in am pending   · Bladder scan and urinary retention protocol  · Avoid hypotension and neprhotoxins

## 2022-04-30 NOTE — ASSESSMENT & PLAN NOTE
· Likely in the setting of infection/sepsis -- improving  · CT head on admission no acute process  · Hold sedating medications  · Does take scheduled oxy and lorazepam at home  · Continue serial neuro checks  · Optimize electrolytes, blood glucose, oxygenation  · CAM ICU bid  · Delirium precautions  · Promote sleep-wake cycle

## 2022-04-30 NOTE — ASSESSMENT & PLAN NOTE
· Hx of HOCM  · 7/2021 2D ECHO: EF 70%, G2DD  · 4/29 2D echo: EF 65%  · Cardiology was consulted, appreciate recommendations  · Holding home metoprolol in setting of hypotension

## 2022-04-30 NOTE — PLAN OF CARE
Problem: Potential for Falls  Goal: Patient will remain free of falls  Description: INTERVENTIONS:  - Educate patient/family on patient safety including physical limitations  - Instruct patient to call for assistance with activity   - Consult OT/PT to assist with strengthening/mobility   - Keep Call bell within reach  - Keep bed low and locked with side rails adjusted as appropriate  - Keep care items and personal belongings within reach  - Initiate and maintain comfort rounds  - Make Fall Risk Sign visible to staff  - Offer Toileting every 2 Hours, in advance of need  - Initiate/Maintain alarm  - Obtain necessary fall risk management equipment:   Problem: MOBILITY - ADULT  Goal: Maintain or return to baseline ADL function  Description: INTERVENTIONS:  -  Assess patient's ability to carry out ADLs; assess patient's baseline for ADL function and identify physical deficits which impact ability to perform ADLs (bathing, care of mouth/teeth, toileting, grooming, dressing, etc )  - Assess/evaluate cause of self-care deficits   - Assess range of motion  - Assess patient's mobility; develop plan if impaired  - Assess patient's need for assistive devices and provide as appropriate  - Encourage maximum independence but intervene and supervise when necessary  - Involve family in performance of ADLs  - Assess for home care needs following discharge   - Consider OT consult to assist with ADL evaluation and planning for discharge  - Provide patient education as appropriate  Outcome: Progressing         Problem: Prexisting or High Potential for Compromised Skin Integrity  Goal: Skin integrity is maintained or improved  Description: INTERVENTIONS:  - Identify patients at risk for skin breakdown  - Assess and monitor skin integrity  - Assess and monitor nutrition and hydration status  - Monitor labs   - Assess for incontinence   - Turn and reposition patient  - Assist with mobility/ambulation  - Relieve pressure over bony prominences  - Avoid friction and shearing  - Provide appropriate hygiene as needed including keeping skin clean and dry  - Evaluate need for skin moisturizer/barrier cream  - Collaborate with interdisciplinary team   - Patient/family teaching  - Consider wound care consult   Outcome: Progressing     Problem: Nutrition/Hydration-ADULT  Goal: Nutrient/Hydration intake appropriate for improving, restoring or maintaining nutritional needs  Description: Monitor and assess patient's nutrition/hydration status for malnutrition  Collaborate with interdisciplinary team and initiate plan and interventions as ordered  Monitor patient's weight and dietary intake as ordered or per policy  Utilize nutrition screening tool and intervene as necessary  Determine patient's food preferences and provide high-protein, high-caloric foods as appropriate       INTERVENTIONS:  - Monitor oral intake, urinary output, labs, and treatment plans  - Assess nutrition and hydration status and recommend course of action  - Evaluate amount of meals eaten  - Assist patient with eating if necessary   - Allow adequate time for meals  - Recommend/ encourage appropriate diets, oral nutritional supplements, and vitamin/mineral supplements  - Order, calculate, and assess calorie counts as needed  - Recommend, monitor, and adjust tube feedings and TPN/PPN based on assessed needs  - Assess need for intravenous fluids  - Provide specific nutrition/hydration education as appropriate  - Include patient/family/caregiver in decisions related to nutrition  Outcome: Progressing

## 2022-04-30 NOTE — ASSESSMENT & PLAN NOTE
Recent Labs     04/28/22  0701 04/28/22  2157 04/29/22  0508 04/29/22  0919   MG 1 1* 0 3* 2 6 2 4     · Present on admission  · Magnesium critically low -- at increased risk for Torsades/arrhythmia  · Currently receiving Magnesium Sulfate 4 gm IVPB  · Continue to replete for for Mag > 2  · Check mag level in am

## 2022-04-30 NOTE — ASSESSMENT & PLAN NOTE
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

## 2022-04-30 NOTE — PROGRESS NOTES
Progress Note - Infectious Disease   Marilin Gustafson 69 y o  female MRN: [id number]  Unit/Bed#: ICU 11 Encounter: [id number]      Impression/Plan:    1  Septic shock-evolving since admission  The patient presented with severe sepsis with hypotension, tachycardic, tachypnea, leukocytosis, lactic acidosis  She had similar admissions in the past with RLE cellulitis and Grop B strep bacteremia  Although her right leg does not have significant erythema, it is warm and tender so I suspect recurrent cellulitis due to strep as the source of infection  Concern for strep toxin production driving her severe presentation  Repeat CT of the RLE does not show evidence of abscess, deeper infection, nec fascitis or osteomyelitis  She is now off pressors with improvement in fevers and leukocytosis  Blood cultures show no growth today    -Continue Cefazolin 2g IV q8hr   -due to clinical improvement, will stop clindamycin today   -follow up blood cultures   -monitor WBC count/fever curve/hemodynamics   -monitor right leg exam    2  Recurrent RLE cellulitis  In setting of #3  Now improving    -antibiotics as above    3  Right lower extremity lymphedema due to previous gynecologic surgery  Risk factor for recurrent cellulitis  4  Toxic metabolic encephalopathy due to #1  Improving    5  Acute hypoxic respiratory failure  Likely due to sepsis, IVF, atelectasis, underlying pneumonitis  Low concern for acute pulmonary source of infection  Off oxygen  6  RUCHI on CKD3  POA  Due to sepsis    7  Wegener's granulomatosis  Not on immunosupression  8  Morbid obesity  BMI 40  Risk factor for infection  Plan discussed with primary team    ID will follow  Antibiotics:  Clindamycin day 3  Cefazolin day 4    Subjective:  Patient doing better today, off pressors  She is afebrile with improvement in leukocytosis  Patient still reports right leg erythema and swelling, but this is improving   She feels that her hands are also swollen from IVF     Objective:  Vitals:  Temp:  [97 5 °F (36 4 °C)-99 2 °F (37 3 °C)] 98 3 °F (36 8 °C)  HR:  [74-94] 74  Resp:  [15-30] 21  BP: ()/(51-57) 95/51  SpO2:  [85 %-100 %] 99 %  Temp (24hrs), Av 1 °F (36 7 °C), Min:97 5 °F (36 4 °C), Max:99 2 °F (37 3 °C)  Current: Temperature: 98 3 °F (36 8 °C)    Physical Exam:   General: awake, alert, cooperative  No distress  CV: RRR  Lungs: clear to auscultation bilaterally  Abdomen: no distension or tenderness to palpation  Extremities: diffuse right leg swelling, warmth, mild erythema and tenderness to palpation  Skin: RLE erythema from hip to foot, improved  Neuro: cooperative, moving all extremities spontaneously    Labs: All pertinent labs and imaging studies were personally reviewed  Results from last 7 days   Lab Units 22  0405 22  0439 22  232   WBC Thousand/uL 18 76* 25 62* 23 92*   HEMOGLOBIN g/dL 9 8* 10 0* 10 4*   PLATELETS Thousands/uL 127* 141* 141*     Results from last 7 days   Lab Units 22  0404 22  0919 22  0919 22  0508 22  0508 22  2321 22  2321 22  0701 22   SODIUM mmol/L 136  --  138  --  138   < > 139   < > 140   POTASSIUM mmol/L 3 8   < > 4 3   < > 4 0   < > 3 9   < > 4 0   CHLORIDE mmol/L 105   < > 106   < > 105   < > 106   < > 102   CO2 mmol/L 26   < > 23   < > 24   < > 24   < > 26   BUN mg/dL 23   < > 25   < > 25   < > 26*   < > 22   CREATININE mg/dL 1 12   < > 1 13   < > 1 25   < > 1 28   < > 1 50*   EGFR ml/min/1 73sq m 50   < > 49   < > 43   < > 42   < >  --    CALCIUM mg/dL 7 7*   < > 7 2*   < > 7 4*   < > 7 1*   < > 8 8   AST U/L  --   --   --   --   --   --  41  --  70*   ALT U/L  --   --   --   --   --   --  23  --  43   ALK PHOS U/L  --   --   --   --   --   --  86  --  174*    < > = values in this interval not displayed       Results from last 7 days   Lab Units 22  0543 22  0701 22   PROCALCITONIN ng/ml 77 99* 84 20* 75 41* Results from last 7 days   Lab Units 04/28/22  0003   D-DIMER QUANTITATIVE ug/ml FEU 10 15*       Micro:  Results from last 7 days   Lab Units 04/29/22  0008 04/28/22  1622 04/27/22 2013   BLOOD CULTURE   --   --  No Growth at 48 hrs  No Growth at 48 hrs  MRSA CULTURE ONLY  No Methicillin Resistant Staphlyococcus aureus (MRSA) isolated  --   --    LEGIONELLA URINARY ANTIGEN   --  Negative  --        Imaging:  I have personally reviewed pertinent imaging reports and images in PACS      CT right lower extremity 4/27/22: Right lower extremity and ankle subcutaneous edema consistent with nonspecific cellulitis without drainable collection, deep soft tissue involvement or gloria osseous destructive change to indicate osteomyelitis

## 2022-04-30 NOTE — ASSESSMENT & PLAN NOTE
· POA AEB:  Tachycardia, tachypnea, hyperthermia, leukocytosis, lactic acidosis -- suspected source: RLE cellulitis  · Lactic acid: 5 2, 6 2, 6 4, 3 8  · Now 1 8  · Initially received 2L IVF on admission for elevated lactic  · Procalcitonin 75, 84  · Continue to trend  · Initially BP was stable  · 4/28 became hypotensive and was given additional 1L of IVF to which she was transiently responsive    Given Isolyte 500 mL and Albumin 5% 500 mL by critical care with improvement in blood pressure  · 4/29 started on Levophed  · Per cardiology, IV fluids should be continued 2/2 HOCM  · Continue Levophed -- currently on 1 mcg/min  · Maintain MAP > 65 mmHg  · 4/29 2D echo: EF 65%  · 7/2021 2D echo: EF 70%, G2DD  · Continue IV abx Ancef, Clindamycin -- per ID  · CT RLE without evidence of fluid collection or evidence of necrosis  · DVT ppx: enoxaparin  · Follow cultures, deescalate antibiotics as able

## 2022-04-30 NOTE — ASSESSMENT & PLAN NOTE
· POA: RLE cellulitis with redness and edema, suspected source of sepsis  · Has hx of recurrent leg cellulitis in setting of lymphedema  · Previously followed by ID and treated with Pen-VK suppression through 12/31/2020  Per ID note on 08/19/2020: If cellulitis recurs of suppression, patient would then need to be treated on lifelong suppression  · Admitted 11/2019 for right leg cellulitis and secondary GBS bacteremia  · On admission CT RLE: Right lower extremity and ankle subcutaneous edema consistent with nonspecific cellulitis without drainable collection, deep soft tissue involvement or gloria osseous destructive change to indicate osteomyelitis    · Repeat CT RLE no obvious fluid collection or free air noted  · Continue serial assessments of RLE/neurovascular checks  · Continue IV antibiotics with cefepime and clindamycin per ID  · ID was consulted, appreciate recommendations  · CK 59 this am  · Continue neurovascular checks

## 2022-04-30 NOTE — ASSESSMENT & PLAN NOTE
Recent Labs     04/28/22  2157 04/29/22  0508 04/29/22  0919   PHOS 1 2* 4 2* 4 7*     · Likely in the setting of poor po intake/critical illness  · Replete as needed to maintain PO4 > 3

## 2022-05-01 PROBLEM — G93.41 ACUTE METABOLIC ENCEPHALOPATHY: Status: RESOLVED | Noted: 2019-04-02 | Resolved: 2022-05-01

## 2022-05-01 PROBLEM — J96.01 ACUTE RESPIRATORY FAILURE WITH HYPOXIA (HCC): Status: RESOLVED | Noted: 2019-11-07 | Resolved: 2022-05-01

## 2022-05-01 LAB
ANION GAP SERPL CALCULATED.3IONS-SCNC: 5 MMOL/L (ref 4–13)
BASOPHILS # BLD AUTO: 0.04 THOUSANDS/ΜL (ref 0–0.1)
BASOPHILS NFR BLD AUTO: 0 % (ref 0–1)
BUN SERPL-MCNC: 23 MG/DL (ref 5–25)
CALCIUM SERPL-MCNC: 7.8 MG/DL (ref 8.3–10.1)
CHLORIDE SERPL-SCNC: 107 MMOL/L (ref 100–108)
CO2 SERPL-SCNC: 26 MMOL/L (ref 21–32)
CREAT SERPL-MCNC: 1.16 MG/DL (ref 0.6–1.3)
EOSINOPHIL # BLD AUTO: 0.19 THOUSAND/ΜL (ref 0–0.61)
EOSINOPHIL NFR BLD AUTO: 2 % (ref 0–6)
ERYTHROCYTE [DISTWIDTH] IN BLOOD BY AUTOMATED COUNT: 15.1 % (ref 11.6–15.1)
GFR SERPL CREATININE-BSD FRML MDRD: 48 ML/MIN/1.73SQ M
GLUCOSE SERPL-MCNC: 75 MG/DL (ref 65–140)
HCT VFR BLD AUTO: 29.4 % (ref 34.8–46.1)
HGB BLD-MCNC: 9.4 G/DL (ref 11.5–15.4)
IMM GRANULOCYTES # BLD AUTO: 0.07 THOUSAND/UL (ref 0–0.2)
IMM GRANULOCYTES NFR BLD AUTO: 1 % (ref 0–2)
LYMPHOCYTES # BLD AUTO: 1.63 THOUSANDS/ΜL (ref 0.6–4.47)
LYMPHOCYTES NFR BLD AUTO: 13 % (ref 14–44)
MCH RBC QN AUTO: 30.8 PG (ref 26.8–34.3)
MCHC RBC AUTO-ENTMCNC: 32 G/DL (ref 31.4–37.4)
MCV RBC AUTO: 96 FL (ref 82–98)
MONOCYTES # BLD AUTO: 1.1 THOUSAND/ΜL (ref 0.17–1.22)
MONOCYTES NFR BLD AUTO: 9 % (ref 4–12)
NEUTROPHILS # BLD AUTO: 9.9 THOUSANDS/ΜL (ref 1.85–7.62)
NEUTS SEG NFR BLD AUTO: 75 % (ref 43–75)
NRBC BLD AUTO-RTO: 0 /100 WBCS
PLATELET # BLD AUTO: 121 THOUSANDS/UL (ref 149–390)
PMV BLD AUTO: 10.9 FL (ref 8.9–12.7)
POTASSIUM SERPL-SCNC: 3.8 MMOL/L (ref 3.5–5.3)
RBC # BLD AUTO: 3.05 MILLION/UL (ref 3.81–5.12)
SODIUM SERPL-SCNC: 138 MMOL/L (ref 136–145)
WBC # BLD AUTO: 12.93 THOUSAND/UL (ref 4.31–10.16)

## 2022-05-01 PROCEDURE — 99231 SBSQ HOSP IP/OBS SF/LOW 25: CPT | Performed by: STUDENT IN AN ORGANIZED HEALTH CARE EDUCATION/TRAINING PROGRAM

## 2022-05-01 PROCEDURE — 85025 COMPLETE CBC W/AUTO DIFF WBC: CPT | Performed by: NURSE PRACTITIONER

## 2022-05-01 PROCEDURE — 99232 SBSQ HOSP IP/OBS MODERATE 35: CPT | Performed by: INTERNAL MEDICINE

## 2022-05-01 PROCEDURE — 80048 BASIC METABOLIC PNL TOTAL CA: CPT | Performed by: NURSE PRACTITIONER

## 2022-05-01 RX ORDER — CEPHALEXIN 500 MG/1
1000 CAPSULE ORAL EVERY 6 HOURS SCHEDULED
Status: DISCONTINUED | OUTPATIENT
Start: 2022-05-02 | End: 2022-05-03 | Stop reason: HOSPADM

## 2022-05-01 RX ORDER — MIDODRINE HYDROCHLORIDE 2.5 MG/1
2.5 TABLET ORAL
Status: DISCONTINUED | OUTPATIENT
Start: 2022-05-01 | End: 2022-05-02

## 2022-05-01 RX ADMIN — CEFAZOLIN SODIUM 2000 MG: 2 SOLUTION INTRAVENOUS at 09:47

## 2022-05-01 RX ADMIN — MIDODRINE HYDROCHLORIDE 5 MG: 5 TABLET ORAL at 06:08

## 2022-05-01 RX ADMIN — DOCUSATE SODIUM AND SENNOSIDES 1 TABLET: 8.6; 5 TABLET, FILM COATED ORAL at 22:29

## 2022-05-01 RX ADMIN — ENOXAPARIN SODIUM 40 MG: 40 INJECTION SUBCUTANEOUS at 09:47

## 2022-05-01 RX ADMIN — MIDODRINE HYDROCHLORIDE 5 MG: 5 TABLET ORAL at 10:57

## 2022-05-01 RX ADMIN — OXYCODONE HYDROCHLORIDE 20 MG: 20 TABLET, FILM COATED, EXTENDED RELEASE ORAL at 09:48

## 2022-05-01 RX ADMIN — DOCUSATE SODIUM 100 MG: 100 CAPSULE, LIQUID FILLED ORAL at 17:08

## 2022-05-01 RX ADMIN — DULOXETINE HYDROCHLORIDE 60 MG: 60 CAPSULE, DELAYED RELEASE ORAL at 09:47

## 2022-05-01 RX ADMIN — DOCUSATE SODIUM 100 MG: 100 CAPSULE, LIQUID FILLED ORAL at 09:47

## 2022-05-01 RX ADMIN — OXYBUTYNIN 10 MG: 10 TABLET, FILM COATED, EXTENDED RELEASE ORAL at 22:29

## 2022-05-01 RX ADMIN — ENOXAPARIN SODIUM 40 MG: 40 INJECTION SUBCUTANEOUS at 20:15

## 2022-05-01 RX ADMIN — OXYCODONE HYDROCHLORIDE 15 MG: 10 TABLET ORAL at 05:22

## 2022-05-01 RX ADMIN — OXYCODONE HYDROCHLORIDE 20 MG: 20 TABLET, FILM COATED, EXTENDED RELEASE ORAL at 20:23

## 2022-05-01 RX ADMIN — PANTOPRAZOLE SODIUM 20 MG: 20 TABLET, DELAYED RELEASE ORAL at 05:19

## 2022-05-01 RX ADMIN — CEFAZOLIN SODIUM 2000 MG: 2 SOLUTION INTRAVENOUS at 01:29

## 2022-05-01 RX ADMIN — CEFAZOLIN SODIUM 2000 MG: 2 SOLUTION INTRAVENOUS at 17:08

## 2022-05-01 RX ADMIN — MIDODRINE HYDROCHLORIDE 2.5 MG: 2.5 TABLET ORAL at 16:27

## 2022-05-01 RX ADMIN — OXYCODONE HYDROCHLORIDE 15 MG: 10 TABLET ORAL at 22:37

## 2022-05-01 NOTE — PROGRESS NOTES
[address]  Progress Note - Ana Luisa Dukes 1955, 69 y o  female MRN: [id number]  Unit/Bed#: ICU 11 Encounter: [id number]  Primary Care Provider: Severiano Sierras, DO   Date and time admitted to hospital: 4/27/2022  7:43 PM    * Septic shock ([facility] [address] )  Assessment & Plan  · POA AEB:  Tachycardia, tachypnea, hyperthermia, leukocytosis, lactic acidosis -- suspected source: RLE cellulitis  · Lactic acid: 5 2, 6 2, 6 4, 3 8, 1 8  · Procalcitonin 75, 84  · Patient had received IV fluid resuscitations and vasopressors  · Initially BP was stable  · 4/28 became hypotensive and was given additional 1L of IVF to which she was transiently responsive  Given Isolyte 500 mL and Albumin 5% 500 mL by critical care with improvement in blood pressure  · 4/29 started on Levophed  · 4/30 transfer back to step-down level 1 from [facility] due to persistent hypotension  · 4/29 2D echo: EF 65%  · 7/2021 2D echo: EF 70%, G2DD  · CT RLE without evidence of fluid collection or evidence of necrosis  · DVT ppx: enoxaparin  · Blood culture negative at 72 hours  Plans:  Continue IV abx Ancef Day 3/7 - per ID  Monitor vitals and daily labs CBC and BMP    Chronic diastolic heart failure (HCC)  Assessment & Plan  Wt Readings from Last 3 Encounters:   05/01/22 112 kg (247 lb 9 2 oz)   04/21/22 102 kg (225 lb)   09/07/21 102 kg (225 lb 11 2 oz)     · Daily weights  · Strict I/Os  · Holding diuretic at this time due to hypotension in the setting of severe sepsis  · Holding home metoprolol in setting of hypotension, restart as able        History of pulmonary embolism  Assessment & Plan  · Hx of DVT/PE in 2007 following hysterectomy for ovarian CA at [facility]   IVC filter placed, treated with Lovenox for 1 year  · Presented with elevated D dimer 10 15  · Unable to CTA 2/2 IV dye allergy -- would need pre-medication  · BLE venous duplex - negative for DVT  · Suspect elevated D-dimer 2/2 infection/sepsis  · Suspect for current acute pulmonary embolism, continue DVT prophylaxis with enoxaparin    Cellulitis of right lower extremity  Assessment & Plan  · POA: RLE cellulitis with redness and edema, suspected source of sepsis  · Has hx of recurrent leg cellulitis in setting of lymphedema  · Previously followed by ID and treated with Pen-VK suppression through 12/31/2020  Per ID note on 08/19/2020: If cellulitis recurs of suppression, patient would then need to be treated on lifelong suppression  · Admitted 11/2019 for right leg cellulitis and secondary GBS bacteremia  · On admission CT RLE: Right lower extremity and ankle subcutaneous edema consistent with nonspecific cellulitis without drainable collection, deep soft tissue involvement or gloria osseous destructive change to indicate osteomyelitis  · Repeat CT RLE no obvious fluid collection or free air noted  · Continue serial assessments of RLE/neurovascular checks    Plans:  · Continue IV antibiotics with cefepime Day 3/7 per ID  · ID was consulted, appreciate recommendations  · Continue neurovascular checks      Hypogammaglobulinemia, acquired [facility])  Assessment & Plan  · 2/2 treatment with Rituximab for Wegener's  · Follows with Hem/Onc as outpatient    Was treated with IVIG but had adverse reactions and further watchful observation on IgG level monitoring was recommended  · Continue outpt f/u with Dr Navas Model    Hypertrophic cardiomyopathy [facility])  Assessment & Plan  · Hx of HOCM  · 7/2021 2D ECHO: EF 70%, G2DD  · 4/29 2D echo: EF 65%  · Cardiology was consulted, appreciate recommendations  · Holding home metoprolol in setting of hypotension    Elevated troponin  Assessment & Plan  · On admission hsTn 216, 254, 254  · 4/29 Random troponin 250  · Likely due to sepsis/tachycardia  · Telemetry without evidence of ischemia  · Check EKG  · Continue telemetry  · Check new echo      Chronic pain disorder  Assessment & Plan  · PTA OxyContin ER 40 mg bid, oxycodone IT 15 mg bid as needed  · PTA lorazepam 0 5 mg bid (reportedly takes scheduled)  · PDMP reviewed  · Continue duloxetine 60 mg daily and oxycodone 20 mg every 12 hours      Chronic kidney disease, stage III (moderate) ([facility])  Assessment & Plan  Lab Results   Component Value Date    EGFR 48 05/01/2022    EGFR 50 04/30/2022    EGFR 49 04/29/2022    CREATININE 1 16 05/01/2022    CREATININE 1 12 04/30/2022    CREATININE 1 13 04/29/2022     · Baseline creatinine 0 8-1 in 2021  On admission was 1 5, this morning was 1 25, most recently 1 13  · Monitor UO and renal indices  · Avoid hypotension and neprhotoxins      Cancer of ovary ([facility])  Assessment & Plan  · S/p bilateral oophorectomy and hysterectomy in 2007  Wegener's granulomatosis with renal involvement [facility])  Assessment & Plan  · Hx of Juan's granulomatosis dx Feb 2010  · Hx of associated renal failure, pulmonary hemorrhage, and polyarteritis  · Treated with Rituximab for 4 years and prednisone with good response, has been discontinued 2/2 subsequent hypogammaglobinemia and remission  · CT chest:  Groundglass and interstitial airspace disease in the upper lungs, similar appearance to the prior exam suggesting chronic interstitial pneumonitis  Atelectasis and airspace consolidation in the dependent portions of the lung bases may represent aspiration and/or pneumonia  Acute respiratory failure with hypoxia ([facility])-resolved as of 5/1/2022  Assessment & Plan  · Likely in the setting infection/sepsis  · Does have history of Juan's granulomatosis  · On admission CT chest with bilateral apical infiltrates, likely chronic    There is infiltrate of the left lower lobe, aspiration pneumonitis versus aspiration versus atelectasis  · Currently on nasal cannula 2 L with SpO2 greater than 92%  · Titrate FiO2 for SpO2 greater than 92%  · ABG without hypercarbia    Acute metabolic encephalopathy-resolved as of 5/1/2022  Assessment & Plan  · Likely in the setting of infection/sepsis -- improving  · CT head on admission no acute process  · Hold sedating medications  · Does take scheduled oxy and lorazepam at home  · Continue serial neuro checks  · Optimize electrolytes, blood glucose, oxygenation  · CAM ICU bid  · Delirium precautions  · Promote sleep-wake cycle    Daily Progress Note - Critical Care   Francie Galindo 69 y o  female MRN: [id number]  Unit/Bed#: ICU 11 Encounter: [id number]        ----------------------------------------------------------------------------------------  HPI/24hr events:  Soft blood pressure with MAP 60-74 mmHg  Monitor off vasopressors and IV fluids  ---------------------------------------------------------------------------------------  SUBJECTIVE  Patient was comfortably lying in bed this morning  She complains of right leg pain  No new complaint  Denied fever, chills, chest pain, shortness breath, abdominal pain  She can tolerate mechanical soft diet  Review of Systems   Constitutional: Negative for chills and fever  HENT: Negative for ear pain and sore throat  Eyes: Negative for pain and visual disturbance  Respiratory: Negative for cough and shortness of breath  Cardiovascular: Negative for chest pain and palpitations  Gastrointestinal: Negative for abdominal pain and vomiting  Genitourinary: Negative for dysuria and hematuria  Musculoskeletal: Negative for arthralgias and back pain  Right leg pain   Skin: Negative for color change and rash  Neurological: Negative for seizures and syncope  All other systems reviewed and are negative      Review of systems was reviewed and negative unless stated above in HPI/24-hour events   ---------------------------------------------------------------------------------------    Patient appropriate for transfer out of the ICU today?: No  Disposition: Continue Critical Care   Code Status: Level 1 - Full Code  ---------------------------------------------------------------------------------------  ICU CORE MEASURES    Prophylaxis   VTE Pharmacologic Prophylaxis: Enoxaparin (Lovenox)  VTE Mechanical Prophylaxis: sequential compression device  Stress Ulcer Prophylaxis: Pantoprazole PO    Invasive Devices Review  Invasive Devices  Report    Central Venous Catheter Line            CVC Central Lines 22 Triple Right Internal jugular 2 days          Drain            External Urinary Catheter <1 day              Can any invasive devices be discontinued today? Not applicable  ---------------------------------------------------------------------------------------  OBJECTIVE    Vitals   Vitals:    22 0500 22 0539 22 0600 22 0700   BP:   123/59    BP Location:   Left arm    Pulse: 70  70    Resp: 13  15    Temp:    97 5 °F (36 4 °C)   TempSrc:    Temporal   SpO2: 98%  98%    Weight:  112 kg (247 lb 9 2 oz)     Height:         Temp (24hrs), Av 3 °F (36 8 °C), Min:97 5 °F (36 4 °C), Max:99 2 °F (37 3 °C)  Current: Temperature: 97 5 °F (36 4 °C)    Respiratory:  SpO2: SpO2: 98 %       Invasive/non-invasive ventilation settings   Respiratory  Report   Lab Data (Last 4 hours)    None         O2/Vent Data (Last 4 hours)    None                Physical Exam  Vitals and nursing note reviewed  Constitutional:       General: She is not in acute distress  Appearance: She is well-developed  She is obese  HENT:      Head: Normocephalic and atraumatic  Mouth/Throat:      Mouth: Mucous membranes are moist       Pharynx: Oropharynx is clear  Eyes:      Extraocular Movements: Extraocular movements intact  Conjunctiva/sclera: Conjunctivae normal       Pupils: Pupils are equal, round, and reactive to light  Neck:      Comments: Central line in the right side of the neck  Cardiovascular:      Rate and Rhythm: Normal rate and regular rhythm  Heart sounds: No murmur heard  Pulmonary:      Effort: Pulmonary effort is normal  No respiratory distress        Breath sounds: Normal breath sounds  Abdominal:      General: Bowel sounds are normal  There is no distension  Palpations: Abdomen is soft  Tenderness: There is no abdominal tenderness  Musculoskeletal:         General: Swelling present  Cervical back: Neck supple  Right lower leg: Edema present  Left lower leg: No edema  Comments: Right leg lymphedema   Skin:     General: Skin is warm and dry  Capillary Refill: Capillary refill takes less than 2 seconds  Coloration: Skin is not jaundiced or pale  Neurological:      Mental Status: She is alert and oriented to person, place, and time               Laboratory and Diagnostics:  Results from last 7 days   Lab Units 05/01/22  0504 04/30/22  0405 04/29/22  0439 04/28/22  2321 04/28/22  0658 04/27/22 2013   WBC Thousand/uL 12 93* 18 76* 25 62* 23 92* 22 92* 10 27*   HEMOGLOBIN g/dL 9 4* 9 8* 10 0* 10 4* 12 2 14 0   HEMATOCRIT % 29 4* 30 6* 31 9* 32 1* 37 8 44 2   PLATELETS Thousands/uL 121* 127* 141* 141* 190 207   NEUTROS PCT % 75 89* 84*  --   --   --    BANDS PCT %  --   --   --   --   --  10*   MONOS PCT % 9 4 1*  --   --   --    MONO PCT %  --   --   --   --   --  3*     Results from last 7 days   Lab Units 05/01/22  0504 04/30/22  0404 04/29/22  0919 04/29/22  0508 04/28/22  2321 04/28/22  0701 04/27/22 2013   SODIUM mmol/L 138 136 138 138 139 140 140   POTASSIUM mmol/L 3 8 3 8 4 3 4 0 3 9 3 6 4 0   CHLORIDE mmol/L 107 105 106 105 106 107 102   CO2 mmol/L 26 26 23 24 24 21 26   ANION GAP mmol/L 5 5 9 9 9 12 12   BUN mg/dL 23 23 25 25 26* 22 22   CREATININE mg/dL 1 16 1 12 1 13 1 25 1 28 1 25 1 50*   CALCIUM mg/dL 7 8* 7 7* 7 2* 7 4* 7 1* 7 7* 8 8   GLUCOSE RANDOM mg/dL 75 106 101 105 93 102 140   ALT U/L  --   --   --   --  23  --  43   AST U/L  --   --   --   --  41  --  70*   ALK PHOS U/L  --   --   --   --  86  --  174*   ALBUMIN g/dL  --   --   --   --  2 5*  --  3 3*   TOTAL BILIRUBIN mg/dL  --   --   --   --  0 98  --  0 89     Results from last 7 days   Lab Units 04/30/22  0404 04/29/22  0919 04/29/22  0508 04/28/22  2157 04/28/22  0701   MAGNESIUM mg/dL 2 4 2 4 2 6 0 3* 1 1*   PHOSPHORUS mg/dL  --  4 7* 4 2* 1 2*  --       Results from last 7 days   Lab Units 04/28/22  2321 04/27/22 2013   INR  1 27* 1 16   PTT seconds 35 28          Results from last 7 days   Lab Units 04/29/22  0524 04/28/22  2321 04/28/22  0658 04/28/22  0139 04/27/22 2229 04/27/22 2013   LACTIC ACID mmol/L 1 5 1 8 3 8* 6 4* 6 2* 5 2*     ABG:  Results from last 7 days   Lab Units 04/29/22  0425   PH ART  7 363   PCO2 ART mm Hg 38 2   PO2 ART mm Hg 95 5   HCO3 ART mmol/L 21 2*   BASE EXC ART mmol/L -3 7   ABG SOURCE  Line, Arterial     VBG:  Results from last 7 days   Lab Units 04/29/22  0425 04/28/22 2321 04/28/22 2157   PH MONICA   --   --  7 282*   PCO2 MONICA mm Hg  --   --  22 6*   PO2 MONICA mm Hg  --   --  20 8*   HCO3 MONICA mmol/L  --   --  10 4*   BASE EXC MONICA mmol/L  --   --  -14 9   ABG SOURCE  Line, Arterial   < >  --     < > = values in this interval not displayed  Results from last 7 days   Lab Units 04/29/22  0543 04/28/22  0701 04/27/22 2013   PROCALCITONIN ng/ml 77 99* 84 20* 75 41*       Micro  Results from last 7 days   Lab Units 04/29/22  0008 04/28/22  1622 04/27/22 2013   BLOOD CULTURE   --   --  No Growth at 72 hrs  No Growth at 72 hrs  MRSA CULTURE ONLY  No Methicillin Resistant Staphlyococcus aureus (MRSA) isolated  --   --    LEGIONELLA URINARY ANTIGEN   --  Negative  --    STREP PNEUMONIAE ANTIGEN, URINE   --  Negative  --        EKG:  No EKG today  Imaging:  I have personally reviewed pertinent reports  and I have personally reviewed pertinent films in PACS    Intake and Output  I/O       04/29 0701  04/30 0700 04/30 0701 05/01 0700 05/01 0701 05/02 0700    P  O  220 300     I V  (mL/kg) 1301 1 (11 9) 24 6 (0 2)     IV Piggyback 425 8 210     Total Intake(mL/kg) 1946 9 (17 9) 534 6 (4 8)     Urine (mL/kg/hr) 650 (0 2) 700 (0 3)     Stool 1 0 Total Output 651 700     Net +1295 9 -165 4            Unmeasured Urine Occurrence 2 x 1 x     Unmeasured Stool Occurrence  2 x           Height and Weights   Height: 5' 3" (160 cm)  IBW (Ideal Body Weight): 52 4 kg  Body mass index is 43 86 kg/m²  Weight (last 2 days)     Date/Time Weight    05/01/22 0539 112 (247 58)    04/30/22 0537 109 (241 18)    04/30/22 0500 109 (241 18)    04/29/22 1000 107 (236)    04/29/22 0545 107 (236 99)            Nutrition       Diet Orders   (From admission, onward)             Start     Ordered    05/01/22 0815  Diet Renal; Renal [place]; No; No  Diet effective now        References:    Nutrtion Support Algorithm Enteral vs  Parenteral   Question Answer Comment   Diet Type Renal    Renal Renal [place]    Should patient have a fluid restriction? No    Should patient have a protein modifier? No    RD to adjust diet per protocol?  Yes        05/01/22 0815                Active Medications  Scheduled Meds:  Current Facility-Administered Medications   Medication Dose Route Frequency Provider Last Rate    acetaminophen  650 mg Oral Q6H PRN Inez Nobles, RENETTA      aluminum-magnesium hydroxide-simethicone  30 mL Oral Q6H PRN RENETTA Solorio      bisacodyl  10 mg Oral Daily PRN RENETTA Solorio      cefazolin  2,000 mg Intravenous [facility], RENETTA Stopped (05/01/22 0205)    docusate sodium  100 mg Oral BID RENETTA Solorio      DULoxetine  60 mg Oral Daily Tracie Quarlesor [place], [address]      enoxaparin  40 mg Subcutaneous Q12H [facility] Ethyl Decent, CRNP      furosemide  20 mg Oral Daily Ethyl Decent, CRNP      LORazepam  0 5 mg Oral BID PRN Franklynindjesus Vaughan, DO      midodrine  5 mg Oral TID AC Ethyl Decent, CRNP      ondansetron  4 mg Intravenous Q6H PRN RENETTA Solorio      oxybutynin  10 mg Oral HS Tracie Celio [place], [address]      oxyCODONE  20 mg Oral Q12H [facility] Ethyl Decent, CRNP      oxyCODONE  15 mg Oral BID PRN RENETTA Guthrie      pantoprazole  20 mg Oral Early Morning Crystal Junup Abelino, [address]      senna-docusate sodium  1 tablet Oral HS RENETTA Roman      sodium chloride (PF)  3 mL Intravenous Q1H PRN RENETTA Guthrie       Continuous Infusions:     PRN Meds:   acetaminophen, 650 mg, Q6H PRN  aluminum-magnesium hydroxide-simethicone, 30 mL, Q6H PRN  bisacodyl, 10 mg, Daily PRN  LORazepam, 0 5 mg, BID PRN  ondansetron, 4 mg, Q6H PRN  oxyCODONE, 15 mg, BID PRN  sodium chloride (PF), 3 mL, Q1H PRN        Allergies   Allergies   Allergen Reactions    Iodinated Diagnostic Agents Shortness Of Breath    Omnipaque [Iohexol] Shortness Of Breath    Other Shortness Of Breath     IVP dye, x ray dye 3    Iodides      Other reaction(s): SWELLING, SOB    Methotrexate Nausea Only     Headache and nausea    Methotrexate Derivatives Headache     ---------------------------------------------------------------------------------------  Advance Directive and Living Will:      Power of :    POLST:    ---------------------------------------------------------------------------------------  Care Time Delivered:   Please refer to attending statement  Awilda Welch MD      Portions of the record may have been created with voice recognition software  Occasional wrong word or "sound a like" substitutions may have occurred due to the inherent limitations of voice recognition software    Read the chart carefully and recognize, using context, where substitutions have occurred

## 2022-05-01 NOTE — ASSESSMENT & PLAN NOTE
· 2/2 treatment with Rituximab for Wegener's  · Follows with Hem/Onc as outpatient    Was treated with IVIG but had adverse reactions and further watchful observation on IgG level monitoring was recommended  · Continue outpt f/u with Dr Castro Toribio

## 2022-05-01 NOTE — ASSESSMENT & PLAN NOTE
· POA: RLE cellulitis with redness and edema, suspected source of sepsis  · Has hx of recurrent leg cellulitis in setting of lymphedema  · Previously followed by ID and treated with Pen-VK suppression through 12/31/2020  Per ID note on 08/19/2020: If cellulitis recurs of suppression, patient would then need to be treated on lifelong suppression  · Admitted 11/2019 for right leg cellulitis and secondary GBS bacteremia  · On admission CT RLE: Right lower extremity and ankle subcutaneous edema consistent with nonspecific cellulitis without drainable collection, deep soft tissue involvement or gloria osseous destructive change to indicate osteomyelitis    · Repeat CT RLE no obvious fluid collection or free air noted  · Continue serial assessments of RLE/neurovascular checks    Plans:  · Continue IV antibiotics with cefepime Day 3/7 per ID  · ID was consulted, appreciate recommendations  · Continue neurovascular checks

## 2022-05-01 NOTE — ASSESSMENT & PLAN NOTE
Lab Results   Component Value Date    EGFR 48 05/01/2022    EGFR 50 04/30/2022    EGFR 49 04/29/2022    CREATININE 1 16 05/01/2022    CREATININE 1 12 04/30/2022    CREATININE 1 13 04/29/2022     · Baseline creatinine 0 8-1 in 2021    On admission was 1 5, this morning was 1 25, most recently 1 13  · Monitor UO and renal indices  · Avoid hypotension and neprhotoxins

## 2022-05-01 NOTE — ASSESSMENT & PLAN NOTE
· POA AEB:  Tachycardia, tachypnea, hyperthermia, leukocytosis, lactic acidosis -- suspected source: RLE cellulitis  · Lactic acid: 5 2, 6 2, 6 4, 3 8, 1 8  · Procalcitonin 75, 84  · Patient had received IV fluid resuscitations and vasopressors  · Initially BP was stable  · 4/28 became hypotensive and was given additional 1L of IVF to which she was transiently responsive  Given Isolyte 500 mL and Albumin 5% 500 mL by critical care with improvement in blood pressure  · 4/29 started on Levophed  · 4/30 transfer back to step-down level 1 from [facility] due to persistent hypotension  · 4/29 2D echo: EF 65%  · 7/2021 2D echo: EF 70%, G2DD  · CT RLE without evidence of fluid collection or evidence of necrosis  · DVT ppx: enoxaparin  · Blood culture negative at 72 hours      Plans:  Continue IV abx Ancef Day 3/7 - per ID  Monitor vitals and daily labs CBC and BMP

## 2022-05-01 NOTE — PROGRESS NOTES
Progress Note - Infectious Disease   Naya Arroyo 69 y o  female MRN: [id number]  Unit/Bed#: E5 -01 Encounter: [id number]      Impression/Plan:    1  Septic shock-evolving since admission  The patient presented with severe sepsis with hypotension, tachycardic, tachypnea, leukocytosis, lactic acidosis  She had similar admissions in the past with RLE cellulitis and Grop B strep bacteremia  Although her right leg does not have significant erythema, it is warm and tender so I suspect recurrent cellulitis due to strep as the source of infection  Concern for strep toxin production driving her severe presentation  Repeat CT of the RLE does not show evidence of abscess, deeper infection, nec fascitis or osteomyelitis  Blood cultures no growth  She has clinically improved, is off pressors, leukocytosis improved, transferred out of ICU  Clindamycin stopped yesterday    -Continue Cefazolin 2g IV q8hr through tonight   -tomorrow, will switch to high dose Keflex 1g PO q6hr   -no need to place PICC line for antibiotics, and the patient does not want a PICC line    -monitor WBC count/fever curve/hemodynamics   -monitor right leg exam    2  Recurrent RLE cellulitis  In setting of #3  Now improving  Swelling is still worse than baseline, but suspect that is from IV fluids and not having her normal compression wraps in the hospital    -antibiotics as above   -recommend resuming suppressive antibiotics if patient has a recurrence of cellulitis following treatment     3  Right lower extremity lymphedema due to previous gynecologic surgery  Risk factor for recurrent cellulitis  4  Toxic metabolic encephalopathy due to #1  Improving    5  Acute hypoxic respiratory failure  Likely due to sepsis, IVF, atelectasis, underlying pneumonitis  Low concern for acute pulmonary source of infection  Off oxygen  6  RUCHI on CKD3  POA  Due to sepsis    7  Wegener's granulomatosis  Not on immunosupression  8  Morbid obesity  BMI 40   Risk factor for infection  Plan discussed with primary team    ID will follow  Antibiotics:  Cefazolin day 4    Subjective:  Patient doing well, transferred out of the ICU to the floors  She denies fever, chills  Tolerating IV antibiotics  Reports that the erythema of the right leg is improved, but she continues have swelling which is worse than baseline  Objective:  Vitals:  Temp:  [97 5 °F (36 4 °C)-98 5 °F (36 9 °C)] 98 1 °F (36 7 °C)  HR:  [68-86] 71  Resp:  [12-28] 17  BP: ()/(48-78) 125/57  SpO2:  [91 %-99 %] 98 %  Temp (24hrs), Av 2 °F (36 8 °C), Min:97 5 °F (36 4 °C), Max:98 5 °F (36 9 °C)  Current: Temperature: 98 1 °F (36 7 °C)    Physical Exam:   General: awake, alert, cooperative  No distress  CV: RRR  Lungs: clear to auscultation bilaterally  Abdomen: no distension or tenderness to palpation  Extremities: diffuse right leg swelling, warmth  Skin: RLE erythema from hip to foot, improved  Neuro: cooperative, moving all extremities spontaneously    Labs:    All pertinent labs and imaging studies were personally reviewed  Results from last 7 days   Lab Units 22  0504 22  0405 22  0439   WBC Thousand/uL 12 93* 18 76* 25 62*   HEMOGLOBIN g/dL 9 4* 9 8* 10 0*   PLATELETS Thousands/uL 121* 127* 141*     Results from last 7 days   Lab Units 22  0504 22  0404 22  0404 22  0919 22  0919 22  0508 22  2321 22  0701 22   SODIUM mmol/L 138  --  136  --  138   < > 139   < > 140   POTASSIUM mmol/L 3 8   < > 3 8   < > 4 3   < > 3 9   < > 4 0   CHLORIDE mmol/L 107   < > 105   < > 106   < > 106   < > 102   CO2 mmol/L 26   < > 26   < > 23   < > 24   < > 26   BUN mg/dL 23   < > 23   < > 25   < > 26*   < > 22   CREATININE mg/dL 1 16   < > 1 12   < > 1 13   < > 1 28   < > 1 50*   EGFR ml/min/1 73sq m 48   < > 50   < > 49   < > 42   < >  --    CALCIUM mg/dL 7 8*   < > 7 7*   < > 7 2*   < > 7 1*   < > 8 8   AST U/L  --   --   --   --   -- --  41  --  70*   ALT U/L  --   --   --   --   --   --  23  --  43   ALK PHOS U/L  --   --   --   --   --   --  86  --  174*    < > = values in this interval not displayed  Results from last 7 days   Lab Units 04/29/22  0543 04/28/22  0701 04/27/22 2013   PROCALCITONIN ng/ml 77 99* 84 20* 75 41*             Results from last 7 days   Lab Units 04/28/22  0003   D-DIMER QUANTITATIVE ug/ml FEU 10 15*       Micro:  Results from last 7 days   Lab Units 04/29/22  0008 04/28/22  1622 04/27/22 2013   BLOOD CULTURE   --   --  No Growth at 72 hrs  No Growth at 72 hrs  MRSA CULTURE ONLY  No Methicillin Resistant Staphlyococcus aureus (MRSA) isolated  --   --    LEGIONELLA URINARY ANTIGEN   --  Negative  --        Imaging:  I have personally reviewed pertinent imaging reports and images in PACS      CT right lower extremity 4/27/22: Right lower extremity and ankle subcutaneous edema consistent with nonspecific cellulitis without drainable collection, deep soft tissue involvement or gloria osseous destructive change to indicate osteomyelitis

## 2022-05-01 NOTE — ASSESSMENT & PLAN NOTE
· PTA OxyContin ER 40 mg bid, oxycodone IT 15 mg bid as needed  · PTA lorazepam 0 5 mg bid (reportedly takes scheduled)  · PDMP reviewed  · Continue duloxetine 60 mg daily and oxycodone 20 mg every 12 hours

## 2022-05-01 NOTE — ASSESSMENT & PLAN NOTE
Wt Readings from Last 3 Encounters:   05/01/22 112 kg (247 lb 9 2 oz)   04/21/22 102 kg (225 lb)   09/07/21 102 kg (225 lb 11 2 oz)     · Daily weights  · Strict I/Os  · Holding diuretic at this time due to hypotension in the setting of severe sepsis  · Holding home metoprolol in setting of hypotension, restart as able

## 2022-05-01 NOTE — PLAN OF CARE
Problem: Potential for Falls  Goal: Patient will remain free of falls  Description: INTERVENTIONS:  - Educate patient/family on patient safety including physical limitations  - Instruct patient to call for assistance with activity   - Consult OT/PT to assist with strengthening/mobility   - Keep Call bell within reach  - Keep bed low and locked with side rails adjusted as appropriate  - Keep care items and personal belongings within reach  - Initiate and maintain comfort rounds  - Make Fall Risk Sign visible to staff  - Offer Toileting every  Hours, in advance of need  - Initiate/Maintain alarm  - Obtain necessary fall risk management equipment:   - Apply yellow socks and bracelet for high fall risk patients  - Consider moving patient to room near nurses station  Outcome: Progressing     Problem: MOBILITY - ADULT  Goal: Maintain or return to baseline ADL function  Description: INTERVENTIONS:  -  Assess patient's ability to carry out ADLs; assess patient's baseline for ADL function and identify physical deficits which impact ability to perform ADLs (bathing, care of mouth/teeth, toileting, grooming, dressing, etc )  - Assess/evaluate cause of self-care deficits   - Assess range of motion  - Assess patient's mobility; develop plan if impaired  - Assess patient's need for assistive devices and provide as appropriate  - Encourage maximum independence but intervene and supervise when necessary  - Involve family in performance of ADLs  - Assess for home care needs following discharge   - Consider OT consult to assist with ADL evaluation and planning for discharge  - Provide patient education as appropriate  Outcome: Progressing  Goal: Maintains/Returns to pre admission functional level  Description: INTERVENTIONS:  - Perform BMAT or MOVE assessment daily    - Set and communicate daily mobility goal to care team and patient/family/caregiver     - Collaborate with rehabilitation services on mobility goals if consulted  - Perform Range of Motion  times a day  - Reposition patient every  hours  - Dangle patient  times a day  - Stand patient  times a day  - Ambulate patient  times a day  - Out of bed to chair  times a day   - Out of bed for meals  times a day  - Out of bed for toileting  - Record patient progress and toleration of activity level   Outcome: Progressing     Problem: Prexisting or High Potential for Compromised Skin Integrity  Goal: Skin integrity is maintained or improved  Description: INTERVENTIONS:  - Identify patients at risk for skin breakdown  - Assess and monitor skin integrity  - Assess and monitor nutrition and hydration status  - Monitor labs   - Assess for incontinence   - Turn and reposition patient  - Assist with mobility/ambulation  - Relieve pressure over bony prominences  - Avoid friction and shearing  - Provide appropriate hygiene as needed including keeping skin clean and dry  - Evaluate need for skin moisturizer/barrier cream  - Collaborate with interdisciplinary team   - Patient/family teaching  - Consider wound care consult   Outcome: Progressing     Problem: Nutrition/Hydration-ADULT  Goal: Nutrient/Hydration intake appropriate for improving, restoring or maintaining nutritional needs  Description: Monitor and assess patient's nutrition/hydration status for malnutrition  Collaborate with interdisciplinary team and initiate plan and interventions as ordered  Monitor patient's weight and dietary intake as ordered or per policy  Utilize nutrition screening tool and intervene as necessary  Determine patient's food preferences and provide high-protein, high-caloric foods as appropriate       INTERVENTIONS:  - Monitor oral intake, urinary output, labs, and treatment plans  - Assess nutrition and hydration status and recommend course of action  - Evaluate amount of meals eaten  - Assist patient with eating if necessary   - Allow adequate time for meals  - Recommend/ encourage appropriate diets, oral nutritional supplements, and vitamin/mineral supplements  - Order, calculate, and assess calorie counts as needed  - Recommend, monitor, and adjust tube feedings and TPN/PPN based on assessed needs  - Assess need for intravenous fluids  - Provide specific nutrition/hydration education as appropriate  - Include patient/family/caregiver in decisions related to nutrition  Outcome: Progressing     Problem: PAIN - ADULT  Goal: Verbalizes/displays adequate comfort level or baseline comfort level  Description: Interventions:  - Encourage patient to monitor pain and request assistance  - Assess pain using appropriate pain scale  - Administer analgesics based on type and severity of pain and evaluate response  - Implement non-pharmacological measures as appropriate and evaluate response  - Consider cultural and social influences on pain and pain management  - Notify physician/advanced practitioner if interventions unsuccessful or patient reports new pain  Outcome: Progressing     Problem: INFECTION - ADULT  Goal: Absence or prevention of progression during hospitalization  Description: INTERVENTIONS:  - Assess and monitor for signs and symptoms of infection  - Monitor lab/diagnostic results  - Monitor all insertion sites, i e  indwelling lines, tubes, and drains  - Monitor endotracheal if appropriate and nasal secretions for changes in amount and color  - [place] appropriate cooling/warming therapies per order  - Administer medications as ordered  - Instruct and encourage patient and family to use good hand hygiene technique  - Identify and instruct in appropriate isolation precautions for identified infection/condition  Outcome: Progressing     Problem: SAFETY ADULT  Goal: Patient will remain free of falls  Description: INTERVENTIONS:  - Educate patient/family on patient safety including physical limitations  - Instruct patient to call for assistance with activity   - Consult OT/PT to assist with strengthening/mobility - Keep Call bell within reach  - Keep bed low and locked with side rails adjusted as appropriate  - Keep care items and personal belongings within reach  - Initiate and maintain comfort rounds  - Make Fall Risk Sign visible to staff  - Offer Toileting every  Hours, in advance of need  - Initiate/Maintain alarm  - Obtain necessary fall risk management equipment:   - Apply yellow socks and bracelet for high fall risk patients  - Consider moving patient to room near nurses station  Outcome: Progressing  Goal: Maintain or return to baseline ADL function  Description: INTERVENTIONS:  -  Assess patient's ability to carry out ADLs; assess patient's baseline for ADL function and identify physical deficits which impact ability to perform ADLs (bathing, care of mouth/teeth, toileting, grooming, dressing, etc )  - Assess/evaluate cause of self-care deficits   - Assess range of motion  - Assess patient's mobility; develop plan if impaired  - Assess patient's need for assistive devices and provide as appropriate  - Encourage maximum independence but intervene and supervise when necessary  - Involve family in performance of ADLs  - Assess for home care needs following discharge   - Consider OT consult to assist with ADL evaluation and planning for discharge  - Provide patient education as appropriate  Outcome: Progressing  Goal: Maintains/Returns to pre admission functional level  Description: INTERVENTIONS:  - Perform BMAT or MOVE assessment daily    - Set and communicate daily mobility goal to care team and patient/family/caregiver  - Collaborate with rehabilitation services on mobility goals if consulted  - Perform Range of Motion  times a day  - Reposition patient every  hours    - Dangle patient  times a day  - Stand patient  times a day  - Ambulate patient  times a day  - Out of bed to chair  times a day   - Out of bed for meals  times a day  - Out of bed for toileting  - Record patient progress and toleration of activity level   Outcome: Progressing     Problem: DISCHARGE PLANNING  Goal: Discharge to home or other facility with appropriate resources  Description: INTERVENTIONS:  - Identify barriers to discharge w/patient and caregiver  - Arrange for needed discharge resources and transportation as appropriate  - Identify discharge learning needs (meds, wound care, etc )  - Arrange for interpretive services to assist at discharge as needed  - Refer to Case Management Department for coordinating discharge planning if the patient needs post-hospital services based on physician/advanced practitioner order or complex needs related to functional status, cognitive ability, or social support system  Outcome: Progressing     Problem: Knowledge Deficit  Goal: Patient/family/caregiver demonstrates understanding of disease process, treatment plan, medications, and discharge instructions  Description: Complete learning assessment and assess knowledge base    Interventions:  - Provide teaching at level of understanding  - Provide teaching via preferred learning methods  Outcome: Progressing

## 2022-05-02 LAB
ANION GAP SERPL CALCULATED.3IONS-SCNC: 4 MMOL/L (ref 4–13)
BUN SERPL-MCNC: 20 MG/DL (ref 5–25)
CALCIUM SERPL-MCNC: 7.9 MG/DL (ref 8.3–10.1)
CHLORIDE SERPL-SCNC: 106 MMOL/L (ref 100–108)
CO2 SERPL-SCNC: 29 MMOL/L (ref 21–32)
CREAT SERPL-MCNC: 1.15 MG/DL (ref 0.6–1.3)
ERYTHROCYTE [DISTWIDTH] IN BLOOD BY AUTOMATED COUNT: 15.4 % (ref 11.6–15.1)
GFR SERPL CREATININE-BSD FRML MDRD: 48 ML/MIN/1.73SQ M
GLUCOSE SERPL-MCNC: 78 MG/DL (ref 65–140)
HCT VFR BLD AUTO: 29.6 % (ref 34.8–46.1)
HGB BLD-MCNC: 9.2 G/DL (ref 11.5–15.4)
MCH RBC QN AUTO: 29.9 PG (ref 26.8–34.3)
MCHC RBC AUTO-ENTMCNC: 31.1 G/DL (ref 31.4–37.4)
MCV RBC AUTO: 96 FL (ref 82–98)
PLATELET # BLD AUTO: 142 THOUSANDS/UL (ref 149–390)
PMV BLD AUTO: 10.6 FL (ref 8.9–12.7)
POTASSIUM SERPL-SCNC: 3.8 MMOL/L (ref 3.5–5.3)
RBC # BLD AUTO: 3.08 MILLION/UL (ref 3.81–5.12)
SODIUM SERPL-SCNC: 139 MMOL/L (ref 136–145)
WBC # BLD AUTO: 10.98 THOUSAND/UL (ref 4.31–10.16)

## 2022-05-02 PROCEDURE — 99232 SBSQ HOSP IP/OBS MODERATE 35: CPT | Performed by: STUDENT IN AN ORGANIZED HEALTH CARE EDUCATION/TRAINING PROGRAM

## 2022-05-02 PROCEDURE — 85027 COMPLETE CBC AUTOMATED: CPT

## 2022-05-02 PROCEDURE — 99233 SBSQ HOSP IP/OBS HIGH 50: CPT | Performed by: STUDENT IN AN ORGANIZED HEALTH CARE EDUCATION/TRAINING PROGRAM

## 2022-05-02 PROCEDURE — 97110 THERAPEUTIC EXERCISES: CPT

## 2022-05-02 PROCEDURE — 80048 BASIC METABOLIC PNL TOTAL CA: CPT

## 2022-05-02 PROCEDURE — 99232 SBSQ HOSP IP/OBS MODERATE 35: CPT | Performed by: INTERNAL MEDICINE

## 2022-05-02 PROCEDURE — 97530 THERAPEUTIC ACTIVITIES: CPT

## 2022-05-02 RX ORDER — POLYETHYLENE GLYCOL 3350 17 G/17G
17 POWDER, FOR SOLUTION ORAL DAILY
Status: DISCONTINUED | OUTPATIENT
Start: 2022-05-03 | End: 2022-05-03 | Stop reason: HOSPADM

## 2022-05-02 RX ADMIN — CEFAZOLIN SODIUM 2000 MG: 2 SOLUTION INTRAVENOUS at 02:18

## 2022-05-02 RX ADMIN — DOCUSATE SODIUM AND SENNOSIDES 1 TABLET: 8.6; 5 TABLET, FILM COATED ORAL at 22:02

## 2022-05-02 RX ADMIN — OXYBUTYNIN 10 MG: 10 TABLET, FILM COATED, EXTENDED RELEASE ORAL at 22:02

## 2022-05-02 RX ADMIN — ENOXAPARIN SODIUM 40 MG: 40 INJECTION SUBCUTANEOUS at 08:30

## 2022-05-02 RX ADMIN — ACETAMINOPHEN 325MG 650 MG: 325 TABLET ORAL at 05:37

## 2022-05-02 RX ADMIN — OXYCODONE HYDROCHLORIDE 20 MG: 20 TABLET, FILM COATED, EXTENDED RELEASE ORAL at 08:30

## 2022-05-02 RX ADMIN — ACETAMINOPHEN 325MG 650 MG: 325 TABLET ORAL at 22:49

## 2022-05-02 RX ADMIN — OXYCODONE HYDROCHLORIDE 15 MG: 10 TABLET ORAL at 15:26

## 2022-05-02 RX ADMIN — DOCUSATE SODIUM 100 MG: 100 CAPSULE, LIQUID FILLED ORAL at 08:30

## 2022-05-02 RX ADMIN — MIDODRINE HYDROCHLORIDE 2.5 MG: 2.5 TABLET ORAL at 15:26

## 2022-05-02 RX ADMIN — DOCUSATE SODIUM 100 MG: 100 CAPSULE, LIQUID FILLED ORAL at 17:14

## 2022-05-02 RX ADMIN — PANTOPRAZOLE SODIUM 20 MG: 20 TABLET, DELAYED RELEASE ORAL at 05:36

## 2022-05-02 RX ADMIN — MIDODRINE HYDROCHLORIDE 2.5 MG: 2.5 TABLET ORAL at 08:30

## 2022-05-02 RX ADMIN — MIDODRINE HYDROCHLORIDE 2.5 MG: 2.5 TABLET ORAL at 11:02

## 2022-05-02 RX ADMIN — CEPHALEXIN 1000 MG: 500 CAPSULE ORAL at 22:46

## 2022-05-02 RX ADMIN — DULOXETINE HYDROCHLORIDE 60 MG: 60 CAPSULE, DELAYED RELEASE ORAL at 08:30

## 2022-05-02 RX ADMIN — ENOXAPARIN SODIUM 40 MG: 40 INJECTION SUBCUTANEOUS at 22:02

## 2022-05-02 RX ADMIN — CEPHALEXIN 1000 MG: 500 CAPSULE ORAL at 11:02

## 2022-05-02 RX ADMIN — CEPHALEXIN 1000 MG: 500 CAPSULE ORAL at 17:14

## 2022-05-02 RX ADMIN — OXYCODONE HYDROCHLORIDE 20 MG: 20 TABLET, FILM COATED, EXTENDED RELEASE ORAL at 22:02

## 2022-05-02 RX ADMIN — CEPHALEXIN 1000 MG: 500 CAPSULE ORAL at 05:36

## 2022-05-02 NOTE — ASSESSMENT & PLAN NOTE
Wt Readings from Last 3 Encounters:   05/02/22 115 kg (254 lb 6 6 oz)   04/21/22 102 kg (225 lb)   09/07/21 102 kg (225 lb 11 2 oz)     Weight significantly above baseline   7/2021 echo showing EF of 70% and Grade 2 DD   - Will start diuretics and metoprolol once pressure allows

## 2022-05-02 NOTE — PROGRESS NOTES
Cardiology         Progress Note - Cardiology   Ila Phillip 69 y o  female MRN: [id number]  Unit/Bed#: E5 -01 Encounter: [id number]          Assessment/Recommendations/Discussion:   Nonischemic myocardial injury secondary to sepsis   Hypertrophic obstructive cardiomyopathy              - LVEF 70%, wall thickness markedly increase, severe asymmetrical hypertrophy of the septum, dynamic obstruction at rest in the outflow track with peak gradient of 37 mmHg, dynamic obstruction during Valsalva with peak gradient of 75 mmHg, grade 2 diastolic dysfunction, left atrium mildly to moderately dilated, right atrium mildly dilated, mitral valve with severe systolic anterior motion of the anterior leaflet, trace TR, 7/8/21  Septic shock  Acute encephalopathy  Acute hypoxic respiratory failure  Elevated D-dimer  Chronic lymphedema with recurrent cellulitis right lower extremity  Chronic kidney disease stage 3  Hypertension  Dyslipidemia  History of PE and DVT, IVC filter in place, 2007  Wegener's granulomatosis with history of renal involvement   Hypogammaglobulinemia  Ovarian cancer post bilateral oophorectomy and hysterectomy  Morbid obesity      · Start furosemide 40 mg daily when BP allows  Weight gain about 25 lb noted, although she states edema seems stable  · CV status stable otherwise  Cardiology will s/o, please call if questions        Subjective: Pt seen/examined  Feels well, admits to slight increase in edema on R, stable on L    No SOB, CP                Physical Exam:  GEN:  NAD  HEENT:  MMM, NCAT, pink conjunctiva, EOMI, nonicteric sclera  CV:  NO JVD/HJR, RR, NO M/R/G, +S1/S2, NO PARASTERNAL HEAVE/THRILL, +LE EDEMA, NO HEPATIC SYSTOLIC PULSATION, WARM EXTREMITIES  RESP:  CTAB/L  ABD:  SOFT, NT, NO GROSS ORGANOMEGALY        Vitals:   BP 97/57   Pulse 78   Temp 98 4 °F (36 9 °C)   Resp 18   Ht 5' 3" (1 6 m)   Wt 115 kg (254 lb 6 6 oz)   SpO2 96%   BMI 45 07 kg/m²   Vitals:    05/01/22 0539 05/02/22 0533   Weight: 112 kg (247 lb 9 2 oz) 115 kg (254 lb 6 6 oz)       Intake/Output Summary (Last 24 hours) at 5/2/2022 1206  Last data filed at 5/2/2022 1018  Gross per 24 hour   Intake 240 ml   Output 975 ml   Net -735 ml       TELEMETRY: off  Lab Results:  Results from last 7 days   Lab Units 05/02/22  0541   WBC Thousand/uL 10 98*   HEMOGLOBIN g/dL 9 2*   HEMATOCRIT % 29 6*   PLATELETS Thousands/uL 142*     Results from last 7 days   Lab Units 05/02/22  0541 04/29/22  0508 04/28/22  2321   POTASSIUM mmol/L 3 8   < > 3 9   CHLORIDE mmol/L 106   < > 106   CO2 mmol/L 29   < > 24   BUN mg/dL 20   < > 26*   CREATININE mg/dL 1 15   < > 1 28   CALCIUM mg/dL 7 9*   < > 7 1*   ALK PHOS U/L  --   --  86   ALT U/L  --   --  23   AST U/L  --   --  41    < > = values in this interval not displayed       Results from last 7 days   Lab Units 05/02/22  0541   POTASSIUM mmol/L 3 8   CHLORIDE mmol/L 106   CO2 mmol/L 29   BUN mg/dL 20   CREATININE mg/dL 1 15   CALCIUM mg/dL 7 9*           Medications:    Current Facility-Administered Medications:     acetaminophen (TYLENOL) tablet 650 mg, 650 mg, Oral, Q6H PRN, May Ange Casanova MD, 650 mg at 05/02/22 1879    aluminum-magnesium hydroxide-simethicone (MYLANTA) oral suspension 30 mL, 30 mL, Oral, Q6H PRN, May Ange Casanova MD    bisacodyl (DULCOLAX) EC tablet 10 mg, 10 mg, Oral, Daily PRN, May Ange Casanova MD    cephalexin [facility]) capsule 1,000 mg, 1,000 mg, Oral, Q6H [address], Pita Theodore MD, 1,000 mg at 05/02/22 1102    docusate sodium (COLACE) capsule 100 mg, 100 mg, Oral, BID, May Ange Casanova MD, 100 mg at 05/02/22 0830    DULoxetine (CYMBALTA) delayed release capsule 60 mg, 60 mg, Oral, Daily, May Ange Blanchard MD, 60 mg at 05/02/22 0830    enoxaparin (LOVENOX) subcutaneous injection 40 mg, 40 mg, Subcutaneous, Q12H [address], May Luisu Cookie Casanova MD, 40 mg at 05/02/22 0830    LORazepam (ATIVAN) tablet 0 5 mg, 0 5 mg, Oral, BID PRN, May Ange Casanova MD, 0 5 mg at 04/30/22 2156    midodrine (PROAMATINE) tablet 2 5 mg, 2 5 mg, Oral, TID AC, May Ange Bueno MD, 2 5 mg at 05/02/22 1102    ondansetron [facility]) injection 4 mg, 4 mg, Intravenous, Q6H PRN, May Ange Bueno MD    oxybutynin (DITROPAN-XL) 24 hr tablet 10 mg, 10 mg, Oral, HS, May Ange Bueno MD, 10 mg at 05/01/22 2229    oxyCODONE (OxyCONTIN) 12 hr tablet 20 mg, 20 mg, Oral, Q12H [facility], May Ange Bueno MD, 20 mg at 05/02/22 0830    oxyCODONE (ROXICODONE) IR tablet 15 mg, 15 mg, Oral, BID PRN, May Ange Bueno MD, 15 mg at 05/01/22 2237    pantoprazole (PROTONIX) EC tablet 20 mg, 20 mg, Oral, Early Morning, May Aneg Bueno MD, 20 mg at 05/02/22 0536    senna-docusate sodium (SENOKOT S) 8 6-50 mg per tablet 1 tablet, 1 tablet, Oral, HS, May Ange Bueno MD, 1 tablet at 05/01/22 2229    sodium chloride (PF) 0 9 % injection 3 mL, 3 mL, Intravenous, Q1H PRN, May Ange Bueno MD    This note was completed in part utilizing M-Modal Fluency Direct Software  Grammatical errors, random word insertions, spelling mistakes, and incomplete sentences may be an occasional consequence of this system secondary to software limitations, ambient noise, and hardware issues  If you have any questions or concerns about the content, text, or information contained within the body of this dictation, please contact the provider for clarification

## 2022-05-02 NOTE — PROGRESS NOTES
Progress Note - Infectious Disease   Mk Saucedo 69 y o  female MRN: [id number]  Unit/Bed#: E5 -01 Encounter: [id number]    Impression/Plan:  1  Severe sepsis  POA  Fever, tachycardia, leukocytosis, and lactic acidosis  With development of shock/hypotension, temporarily requiring pressor support  Secondary to recurring RLE cellulitis  Also consider possible respiratory source as patient has ground glass on chest imaging although this may be more indicative of chronic interstitial pneumonitis  No other clear source appreciated  Blood cultures are negative after 4 days  Fortunately she has clinically improved  She has transitioned out of the ICU to med/surg level care  This morning she is afebrile and her WBC count is trending down   -antibiotic as below  -monitor CBC and BMP  -follow up blood culture  -monitor vitals  -supportive care     2  Recurring RLE cellulitis  In setting of severe lymphedema  RLE CT imaging showed severe soft tissue edema but no drainable collection  No evidence of deep soft tissue involvement or osseous changes but she does have hardware along the tibia and in the ankle  Orthopedic surgery team has evaluated, no plans for surgical intervention at this time  Leg initially appeared tight, tender, and hot on exam  Given concern for toxin involvement she did require Clindamycin/Cefazolin  Patient now significantly improved  She has been transitioned to oral Keflex  Consider patient may have to consider chronic suppression again   -continue high dose oral Keflex, 1g q6 hours  -monitor CBC and BMP  -monitor vitals  -serial RLE exams  -continue follow up with orthopedic surgery  -anticipate patient will need follow up in the outpatient office to discuss antibiotic suppression     3  Acute hypoxic respiratory failure  Patient requiring nonrebreather mask by EMS   Chest xray and CT chest showed B/L atelectasis with basilar consolidations, and upper airspace opacities likely representing chronic interstitial pneumonitis  COVID-19/Flu/RSV PCR was negative  Suspect respiratory status may all be due to severe sepsis with metabolic encephalopathy   Also consider possibility of PE  D dimer elevated  She unfortunately can't have dye due to allergy  She has been started on Lovenox  Pulmonology is following  Today her O2 saturation is stable on nasal cannula O2    -antibiotics as above  -monitor CBC and BMP  -monitor vitals  -monitor respiratory symptoms  -O2 support per primary service     4  CKD III  Upon review of patient's available past medical records it appears her baseline creatinine is approximately 0 8-1 2  She was elevated at 1 5 upon admission which I suspect is pre-renal in setting of acute infection above  Patient's creatinine has improved  -monitor creatinine  -dose adjust antibiotics for renal function as needed  -avoid nephrotoxins  -volume management per SLIM     5  Acute metabolic encephalopathy  Likely secondary to severe sepsis and acute infection above  CT of the head showed no acute intracranial findings  Ammonia level normal  Low suspicion for CNS infection at this time  Mental status has improved  -serial neuro exams  -monitor mood and mental status  -supportive care     6  Lymphedema  This is likely driving force of patient's recurring RLE cellulitis above      7  Wegener's granulomatosis with history of renal involvement  Patient with history of immunosuppressive treatment but has been off for many years       8  Morbid obesity  BMI = 45 07     Above plan was discussed in detail with patient at the bedside  Above plan was discussed in detail with SLIM  Antibiotics:  Keflex 2  Antibiotics 6    Subjective:  Patient reports she's feeling better  Tired  Is having itching and irritation from her R neck TLC  She reports ongoing pain in the R leg, tells me it is "stable" and "the same as always" today   She has no fever, chills, sweats, shakes; no nausea, vomiting, abdominal pain, or diarrhea; no cough, shortness of breath, or chest pain  No new symptoms  Objective:  Vitals:  Temp:  [97 5 °F (36 4 °C)-98 9 °F (37 2 °C)] 98 9 °F (37 2 °C)  HR:  [68-80] 71  Resp:  [12-24] 18  BP: (106-125)/(52-61) 116/55  SpO2:  [91 %-99 %] 98 %  Temp (24hrs), Av 2 °F (36 8 °C), Min:97 5 °F (36 4 °C), Max:98 9 °F (37 2 °C)  Current: Temperature: 98 9 °F (37 2 °C)    Physical Exam:   General Appearance:  Alert, interactive, nontoxic, no acute distress  She appears comfortable sitting up in bed having breakfast    Throat: Oropharynx moist without lesions  Neck: R TLC intact, not currently running, no leakage or spreading erythema at insertion site, overlying dressing is clean/dry  Lungs:   Clear to auscultation bilaterally; no wheezes, rhonchi or rales; respirations unlabored on nasal cannula O2  Heart:  RRR; no murmur, rub or gallop  Abdomen:   Soft, obese, non-tender, non-distended, positive bowel sounds  Extremities: RLE lymphedema, lower leg without overlying erythema, still warm but no hot spots, tender to touch the medial calf/malleolus  Skin: No new rashes noted on exposed skin       Labs, Imaging, & Other studies:   All pertinent labs and imaging studies were personally reviewed  Results from last 7 days   Lab Units 22  0541 22  0504 22  0405   WBC Thousand/uL 10 98* 12 93* 18 76*   HEMOGLOBIN g/dL 9 2* 9 4* 9 8*   PLATELETS Thousands/uL 142* 121* 127*     Results from last 7 days   Lab Units 22  0504 22  0508 22  2321 22  0701 22   POTASSIUM mmol/L 3 8   < > 3 9   < > 4 0   CHLORIDE mmol/L 107   < > 106   < > 102   CO2 mmol/L 26   < > 24   < > 26   BUN mg/dL 23   < > 26*   < > 22   CREATININE mg/dL 1 16   < > 1 28   < > 1 50*   EGFR ml/min/1 73sq m 48   < > 42   < >  --    CALCIUM mg/dL 7 8*   < > 7 1*   < > 8 8   AST U/L  --   --  41  --  70*   ALT U/L  --   --  23  --  43   ALK PHOS U/L  --   --  86  --  174*    < > = values in this interval not displayed  Results from last 7 days   Lab Units 04/29/22  0008 04/28/22  1622 04/27/22 2013   BLOOD CULTURE   --   --  No Growth After 4 Days  No Growth After 4 Days     MRSA CULTURE ONLY  No Methicillin Resistant Staphlyococcus aureus (MRSA) isolated  --   --    LEGIONELLA URINARY ANTIGEN   --  Negative  --

## 2022-05-02 NOTE — ASSESSMENT & PLAN NOTE
Pt has a h/o Juan's granulomatosis diagnosed in Feb 2010   Treated with rituximab for 4 years and prednisone with good response although discontinued due to subsequent hypogammaglobinemia and remission of disease

## 2022-05-02 NOTE — NURSING NOTE
Pt upset that she is getting Oxycontin 20 mg  States she has been told that it was 40 mg  " Why would they tell me that?"  Show patient the original order, the [facility] and the pill pakaging  She says "it seems like its been one screw up after another  Requested a Purewick for night time

## 2022-05-02 NOTE — ASSESSMENT & PLAN NOTE
Pt has a h/o  DVT and PE in 2007 after hysterectomy for ovarian cancer at [facility]  IVC filter was placed  Treated with Lovenox for 1 year  Presented with elevated D dimer 10 15  B/LLE duplex study negative for DVT  - Pulmonary recommendations appreciated  Low suspicion for PE at this time

## 2022-05-02 NOTE — PLAN OF CARE
Problem: Nutrition/Hydration-ADULT  Goal: Nutrient/Hydration intake appropriate for improving, restoring or maintaining nutritional needs  Description: Monitor and assess patient's nutrition/hydration status for malnutrition  Collaborate with interdisciplinary team and initiate plan and interventions as ordered  Monitor patient's weight and dietary intake as ordered or per policy  Utilize nutrition screening tool and intervene as necessary  Determine patient's food preferences and provide high-protein, high-caloric foods as appropriate       INTERVENTIONS:  - Monitor oral intake, urinary output, labs, and treatment plans  - Assess nutrition and hydration status and recommend course of action  - Evaluate amount of meals eaten  - Assist patient with eating if necessary   - Allow adequate time for meals  - Recommend/ encourage appropriate diets, oral nutritional supplements, and vitamin/mineral supplements  - Order, calculate, and assess calorie counts as needed  - Recommend, monitor, and adjust tube feedings and TPN/PPN based on assessed needs  - Assess need for intravenous fluids  - Provide specific nutrition/hydration education as appropriate  - Include patient/family/caregiver in decisions related to nutrition  Outcome: Progressing     Problem: INFECTION - ADULT  Goal: Absence or prevention of progression during hospitalization  Description: INTERVENTIONS:  - Assess and monitor for signs and symptoms of infection  - Monitor lab/diagnostic results  - Monitor all insertion sites, i e  indwelling lines, tubes, and drains  - Monitor endotracheal if appropriate and nasal secretions for changes in amount and color  - [place] appropriate cooling/warming therapies per order  - Administer medications as ordered  - Instruct and encourage patient and family to use good hand hygiene technique  - Identify and instruct in appropriate isolation precautions for identified infection/condition  Outcome: Progressing     Problem: DISCHARGE PLANNING  Goal: Discharge to home or other facility with appropriate resources  Description: INTERVENTIONS:  - Identify barriers to discharge w/patient and caregiver  - Arrange for needed discharge resources and transportation as appropriate  - Identify discharge learning needs (meds, wound care, etc )  - Arrange for interpretive services to assist at discharge as needed  - Refer to Case Management Department for coordinating discharge planning if the patient needs post-hospital services based on physician/advanced practitioner order or complex needs related to functional status, cognitive ability, or social support system  Outcome: Progressing     Problem: Knowledge Deficit  Goal: Patient/family/caregiver demonstrates understanding of disease process, treatment plan, medications, and discharge instructions  Description: Complete learning assessment and assess knowledge base    Interventions:  - Provide teaching at level of understanding  - Provide teaching via preferred learning methods  Outcome: Progressing

## 2022-05-02 NOTE — ASSESSMENT & PLAN NOTE
69year old female presenting with septic shock secondary to right lower extremity cellulitis  - Off pressors, midodrine discontinued today    - ID transitioned her to Keflex which she will complete through 5/7/2022    - Discharge planning if she continues to remain hemodynamically stable

## 2022-05-02 NOTE — PROGRESS NOTES
[facility]  Progress Note - Edith Lu 1955, 69 y o  female MRN: [id number]  Unit/Bed#: E5 -01 Encounter: [id number]  Primary Care Provider: Terrell Negron DO   Date and time admitted to hospital: 4/27/2022  7:43 PM    * Septic shock [facility])  Assessment & Plan  69year old female presenting with septic shock secondary to right lower extremity cellulitis  - Off pressors, midodrine discontinued today  - ID transitioned her to Keflex which she will complete through 5/7/2022    - Discharge planning if she continues to remain hemodynamically stable    Chronic diastolic heart failure [facility])  Assessment & Plan  Wt Readings from Last 3 Encounters:   05/02/22 115 kg (254 lb 6 6 oz)   04/21/22 102 kg (225 lb)   09/07/21 102 kg (225 lb 11 2 oz)     Weight significantly above baseline  7/2021 echo showing EF of 70% and Grade 2 DD   - Will start diuretics and metoprolol once pressure allows    Cellulitis of right lower extremity  Assessment & Plan  - ID assisting in management  - See above problem for treatment plan    History of pulmonary embolism  Assessment & Plan  Pt has a h/o  DVT and PE in 2007 after hysterectomy for ovarian cancer at [facility]  IVC filter was placed  Treated with Lovenox for 1 year  Presented with elevated D dimer 10 15  B/LLE duplex study negative for DVT  - Pulmonary recommendations appreciated  Low suspicion for PE at this time  Hypogammaglobulinemia, acquired [facility])  Assessment & Plan  Developed hypogammaglobulinemia following Ritiximab treatment for Wegener's  Followed outpatient by Hem/Onc for severe hypogammaglobinemia, previously on IVIG although had adverse reactions and recommended watchful observation and monitoring of IgG level  - Continue outpatient follow up with Dr Ru Browning    Elevated troponin  Assessment & Plan  Cardiology recommendations appreciated   Suspect non MI troponin elevation in setting of severe sepsis  - Restart BB once pressure allows    Chronic pain disorder  Assessment & Plan  History of chronic pain disorder  PTA OxyContin ER 40 mg bid, oxycodone IT 15 mg bid as needed, PTA lorazepam 0 5 mg bid (reportedly takes scheduled)  Continue duloxetine 60 mg daily and oxycodone 20 mg every 12 hours    Chronic kidney disease, stage III (moderate) (HCC)  Assessment & Plan  At baseline of up to 1 2  Does not meet RUCHI criteria  Recent Labs     22  0404 22  0504 22  0541   BUN 23 23 20   CREATININE 1 12 1 16 1 15   EGFR 50 48 48       Cancer of ovary (HCC)  Assessment & Plan  History of ovarian cancer status post bilateral oophorectomy and hysterectomy  - Continue outpatient follow-up    Wegener's granulomatosis with renal involvement [facility])  Assessment & Plan  Pt has a h/o Juan's granulomatosis diagnosed in 2010  Treated with rituximab for 4 years and prednisone with good response although discontinued due to subsequent hypogammaglobinemia and remission of disease      VTE Pharmacologic Prophylaxis:   Pharmacologic: Enoxaparin (Lovenox)  Mechanical VTE Prophylaxis in Place: Yes    Discussions with Specialists or Other Care Team Provider: nursing    Education and Discussions with Family / Patient: patient, spouse    Time Spent for Care: 30 minutes  More than 50% of total time spent on counseling and coordination of care as described above  Current Length of Stay: 4 day(s)    Current Patient Status: Inpatient   Certification Statement: The patient will continue to require additional inpatient hospital stay due to Wean off midodrine, reintroduce diuretics and metoprolol    Discharge Plan: 24-48 hours    Code Status: Level 1 - Full Code      Subjective:   Patient seen and examined at bedside  Comfortable  No new complaints overnight   Updated spouse at bedside    Objective:     Vitals:   Temp (24hrs), Av 6 °F (37 °C), Min:98 4 °F (36 9 °C), Max:98 9 °F (37 2 °C)    Temp:  [98 4 °F (36 9 °C)-98 9 °F (37 2 °C)] 98 4 °F (36 9 °C)  HR:  [75-78] 75  Resp:  [18] 18  BP: ()/(55-60) 119/60  SpO2:  [92 %-96 %] 92 %  Body mass index is 45 07 kg/m²  Input and Output Summary (last 24 hours): Intake/Output Summary (Last 24 hours) at 5/2/2022 1654  Last data filed at 5/2/2022 1018  Gross per 24 hour   Intake --   Output 1600 ml   Net -1600 ml       Physical Exam:     Physical Exam  Vitals reviewed  HENT:      Head: Normocephalic  Nose: Nose normal       Mouth/Throat:      Mouth: Mucous membranes are moist    Eyes:      General: No scleral icterus  Cardiovascular:      Rate and Rhythm: Normal rate and regular rhythm  Pulmonary:      Effort: Pulmonary effort is normal  No respiratory distress  Abdominal:      General: There is no distension  Palpations: Abdomen is soft  Tenderness: There is no abdominal tenderness  Musculoskeletal:      Comments: Bilateral lower extremity dressings intact   Skin:     General: Skin is warm  Neurological:      Mental Status: She is alert  Mental status is at baseline  Psychiatric:         Mood and Affect: Mood normal          Behavior: Behavior normal        Additional Data:     Labs:    Results from last 7 days   Lab Units 05/02/22  0541 05/01/22  0504 05/01/22  0504 04/28/22  0658 04/27/22 2013   WBC Thousand/uL 10 98*   < > 12 93*   < > 10 27*   HEMOGLOBIN g/dL 9 2*   < > 9 4*   < > 14 0   HEMATOCRIT % 29 6*   < > 29 4*   < > 44 2   PLATELETS Thousands/uL 142*   < > 121*   < > 207   BANDS PCT %  --   --   --   --  10*   NEUTROS PCT %  --   --  75   < >  --    LYMPHS PCT %  --   --  13*   < >  --    LYMPHO PCT %  --   --   --   --  3*   MONOS PCT %  --   --  9   < >  --    MONO PCT %  --   --   --   --  3*   EOS PCT %  --   --  2   < > 0    < > = values in this interval not displayed       Results from last 7 days   Lab Units 05/02/22  0541 04/29/22  0508 04/28/22  2321   SODIUM mmol/L 139   < > 139   POTASSIUM mmol/L 3 8   < > 3 9   CHLORIDE mmol/L 106   < > 106 CO2 mmol/L 29   < > 24   BUN mg/dL 20   < > 26*   CREATININE mg/dL 1 15   < > 1 28   ANION GAP mmol/L 4   < > 9   CALCIUM mg/dL 7 9*   < > 7 1*   ALBUMIN g/dL  --   --  2 5*   TOTAL BILIRUBIN mg/dL  --   --  0 98   ALK PHOS U/L  --   --  86   ALT U/L  --   --  23   AST U/L  --   --  41   GLUCOSE RANDOM mg/dL 78   < > 93    < > = values in this interval not displayed  Results from last 7 days   Lab Units 04/28/22 2321   INR  1 27*         Results from last 7 days   Lab Units 04/29/22  0439   HEMOGLOBIN A1C % 5 3     Results from last 7 days   Lab Units 04/29/22  0543 04/29/22  0524 04/28/22  2321 04/28/22  0701 04/28/22  0658 04/28/22  0139 04/27/22 2229 04/27/22 2013   LACTIC ACID mmol/L  --  1 5 1 8  --  3 8* 6 4*   < > 5 2*   PROCALCITONIN ng/ml 77 99*  --   --  84 20*  --   --   --  75 41*    < > = values in this interval not displayed  * I Have Reviewed All Lab Data Listed Above  * Additional Pertinent Lab Tests Reviewed: Arabella 64 Admission Reviewed    Imaging:    Imaging Reports Reviewed Today Include: no new imaging    Recent Cultures (last 7 days):     Results from last 7 days   Lab Units 04/28/22  1622 04/27/22 2013   BLOOD CULTURE   --  No Growth After 4 Days  No Growth After 4 Days     LEGIONELLA URINARY ANTIGEN  Negative  --        Last 24 Hours Medication List:   Current Facility-Administered Medications   Medication Dose Route Frequency Provider Last Rate    acetaminophen  650 mg Oral Q6H PRN May Thu Robb Crane MD      aluminum-magnesium hydroxide-simethicone  30 mL Oral Q6H PRN May Thu Robb Crane MD      bisacodyl  10 mg Oral Daily PRN May Thu Robb Crane MD      cephalexin  1,000 mg Oral Q6H [address] Claudia Arauz MD      docusate sodium  100 mg Oral BID May Thu MD Santo      DULoxetine  60 mg Oral Daily May Thu Robb Crane MD      enoxaparin  40 mg Subcutaneous Q12H [address] May Ange Blanchard MD      LORazepam  0 5 mg Oral BID PRN May Ange Blanchard MD      ondansetron  4 mg Intravenous Q6H PRN May Ange Blanchard MD      oxybutynin  10 mg Oral HS May Thu MD Santo      oxyCODONE  20 mg Oral Q12H [address] May Thu MD Santo      oxyCODONE  15 mg Oral BID PRN May Thu Rene Fry MD      pantoprazole  20 mg Oral Early Morning May Thu Rene Fry MD      senna-docusate sodium  1 tablet Oral HS May Thu Rene Fry MD      sodium chloride (PF)  3 mL Intravenous Q1H PRN May Thu Rene Fry MD          Today, Patient Was Seen By: Priscilla Waters MD    ** Please Note: Dictation voice to text software may have been used in the creation of this document   **

## 2022-05-02 NOTE — ASSESSMENT & PLAN NOTE
At baseline of up to 1 2  Does not meet RUCHI criteria      Recent Labs     04/30/22  0404 05/01/22  0504 05/02/22  0541   BUN 23 23 20   CREATININE 1 12 1 16 1 15   EGFR 50 48 48

## 2022-05-02 NOTE — PROGRESS NOTES
picc consult for long term antibiotics : antibiotics transitioned to oral by ID  Pt currently has a central line, no IV meds or infusions ordered  Picc consult to be d/c as per Dr Shayne Ng

## 2022-05-02 NOTE — ASSESSMENT & PLAN NOTE
Cardiology recommendations appreciated   Suspect non MI troponin elevation in setting of severe sepsis  - Restart BB once pressure allows

## 2022-05-02 NOTE — ASSESSMENT & PLAN NOTE
History of ovarian cancer status post bilateral oophorectomy and hysterectomy  - Continue outpatient follow-up

## 2022-05-02 NOTE — ASSESSMENT & PLAN NOTE
History of chronic pain disorder  PTA OxyContin ER 40 mg bid, oxycodone IT 15 mg bid as needed, PTA lorazepam 0 5 mg bid (reportedly takes scheduled)   Continue duloxetine 60 mg daily and oxycodone 20 mg every 12 hours

## 2022-05-02 NOTE — PLAN OF CARE
Problem: PHYSICAL THERAPY ADULT  Goal: Performs mobility at highest level of function for planned discharge setting  See evaluation for individualized goals  Description: Treatment/Interventions: LE strengthening/ROM,Therapeutic exercise,Endurance training,Patient/family training,Bed mobility,Spoke to nursing,OT          See flowsheet documentation for full assessment, interventions and recommendations  Outcome: Progressing  Note: Prognosis: Good  Problem List: Decreased strength,Decreased endurance,Impaired balance,Decreased mobility,Obesity,Decreased skin integrity,Pain  Assessment: Bart Spear was seen for a f/u session  Pt w significant improvements in cognition/ arousal allowing progression of mobility compared to initial evaluation  Able to perform OOB mobility including transfers and ambulation at close S- min A level  [place] on RW which is baseline  Difficulty w sit>stand transfers likely due to acute weakness, RLE swelling and reliance on lift chair at home  However able to perform w increased time progressing from mod A to Sup  Based on progress would consider HHPT however +home alone continues to be barrier for dc given decline in function  Barriers to Discharge: Decreased caregiver support  Barriers to Discharge Comments: + home alone     PT Discharge Recommendation: Home with home health rehabilitation (v STR pending continued progress)          See flowsheet documentation for full assessment

## 2022-05-02 NOTE — PHYSICAL THERAPY NOTE
PHYSICAL THERAPY NOTE          Patient Name: Romeo Spain  [id number] Date: 5/2/2022   Time: 5100-4726       05/02/22 1406   PT Last Visit   PT Visit Date 05/02/22   Note Type   Note Type Treatment   Pain Assessment   Pain Assessment Tool 0-10   Pain Score 7   Pain Location/Orientation Orientation: Right;Location: Leg   Hospital Pain Intervention(s) Repositioned; Ambulation/increased activity; Elevated; Emotional support; Rest  (RN notified and to f/u w pain medication)   Restrictions/Precautions   Other Precautions Fall Risk;Pain   General   Chart Reviewed Yes   Response to Previous Treatment Patient with no complaints from previous session  Family/Caregiver Present Yes   Cognition   Overall Cognitive Status WFL   Arousal/Participation Cooperative   Attention Within functional limits   Orientation Level Oriented X4   Memory Decreased recall of recent events   Following Commands Follows all commands and directions without difficulty   Subjective   Subjective "I dont even remember the first time therapy came in"   Transfers   Sit to Stand 4  Minimal assistance   Additional items Assist x 1; Armrests; Increased time required; Other  (RW)   Stand to Sit 5  Supervision   Additional items Armrests; Increased time required; Other  (RW)   Toilet transfer 5  Supervision   Additional items Increased time required;Verbal cues; Commode; Other  (RW)   Additional Comments mod A for initial sit>stand progressing to CGA   Ambulation/Elevation   Gait pattern Improper Weight shift; Forward Flexion; Wide MAURICE; Decreased foot clearance; Short stride; Excessively slow   Gait Assistance 5  Supervision   Additional items Assist x 1   Assistive Device Rolling walker  (and chair follow)   Distance 25'   Balance   Static Standing Fair   Dynamic Standing Fair -   Ambulatory Fair -   Endurance Deficit   Endurance Deficit Yes   Endurance Deficit Description fatigue w ambulation Activity Tolerance   Activity Tolerance Patient tolerated treatment well;Patient limited by fatigue   Nurse Made Aware Sarath Jones RN   Assessment   Prognosis Good   Problem List Decreased strength;Decreased endurance; Impaired balance;Decreased mobility;Obesity; Decreased skin integrity;Pain   Assessment Shonda Nava was seen for a f/u session  Pt w significant improvements in cognition/ arousal allowing progression of mobility compared to initial evaluation  Able to perform OOB mobility including transfers and ambulation at close S- min A level  [place] on RW which is baseline  Difficulty w sit>stand transfers likely due to acute weakness, RLE swelling and reliance on lift chair at home  However able to perform w increased time progressing from mod A to Sup  Based on progress would consider HHPT however +home alone continues to be barrier for dc given decline in function  Barriers to Discharge Decreased caregiver support   Barriers to Discharge Comments + home alone   Goals   Patient Goals continue to get stronger and go home tomorrow   STG Expiration Date 05/12/22   Short Term Goal #1 Goals to be met in 14 days; pt will be able to: 1) inc strength & balance by 1/2 grade to improve overall functional mobility & dec fall risk; 2) inc bed mobility to modAx1 for pt to be able to get in/OOB safely w/ proper techniques 100% of the time, to dec caregiver burden & safely function at home; 3) inc sitting tolerance at EOB approx  10mins; 4) initiate pre-gait activities & progress to functional transfers as appropriate; 5) PT to see when pt appropriate for functional transfers & amb; 6) pt/caregiver ed   PT Treatment Day 1   Plan   Treatment/Interventions Functional transfer training;LE strengthening/ROM; Therapeutic exercise; Endurance training;Patient/family training;Equipment eval/education; Bed mobility;Continued evaluation;Spoke to nursing;Family   Progress Progressing toward goals   PT Frequency 3-5x/wk   Recommendation   PT Discharge Recommendation Home with home health rehabilitation  (v STR pending continued progress)   Additional Comments The patient's AM-PAC Basic Mobility Inpatient Short Form Raw Score is 12  A Raw score of less than or equal to 16 suggests the patient may benefit from discharge to post-acute rehabilitation services  Please also refer to the recommendation of the Physical Therapist for safe discharge planning  Current score also reflects baseline mobility deficits including sleeps in lift chair, does not do steps  AM-PAC Basic Mobility Inpatient   Turning in Bed Without Bedrails 1   Lying on Back to Sitting on Edge of Flat Bed 1   Moving Bed to Chair 3   Standing Up From Chair 3   Walk in Room 3   Climb 3-5 Stairs 1   Basic Mobility Inpatient Raw Score 12   Basic Mobility Standardized Score 32 23   Highest Level Of Mobility   J[facility]-HL[facility] Goal 4: Move to chair/commode   [facility] Highest Level of Mobility 7: Walk 25 feet or more   [facility] Goal Achieved Yes   End of Consult   Patient Position at End of Consult Bedside chair; All needs within reach   End of Consult Comments spouse present     Tova Darnell, PT

## 2022-05-02 NOTE — ASSESSMENT & PLAN NOTE
Developed hypogammaglobulinemia following Ritiximab treatment for Wegener's   Followed outpatient by Hem/Onc for severe hypogammaglobinemia, previously on IVIG although had adverse reactions and recommended watchful observation and monitoring of IgG level  - Continue outpatient follow up with Dr Gretchen Hinson

## 2022-05-03 VITALS
WEIGHT: 248.68 LBS | TEMPERATURE: 98.4 F | DIASTOLIC BLOOD PRESSURE: 68 MMHG | BODY MASS INDEX: 44.06 KG/M2 | HEIGHT: 63 IN | SYSTOLIC BLOOD PRESSURE: 136 MMHG | HEART RATE: 77 BPM | OXYGEN SATURATION: 93 % | RESPIRATION RATE: 18 BRPM

## 2022-05-03 LAB
BACTERIA BLD CULT: NORMAL
BACTERIA BLD CULT: NORMAL

## 2022-05-03 PROCEDURE — 99239 HOSP IP/OBS DSCHRG MGMT >30: CPT | Performed by: STUDENT IN AN ORGANIZED HEALTH CARE EDUCATION/TRAINING PROGRAM

## 2022-05-03 PROCEDURE — 97530 THERAPEUTIC ACTIVITIES: CPT

## 2022-05-03 PROCEDURE — 97535 SELF CARE MNGMENT TRAINING: CPT

## 2022-05-03 PROCEDURE — 97110 THERAPEUTIC EXERCISES: CPT

## 2022-05-03 PROCEDURE — 92610 EVALUATE SWALLOWING FUNCTION: CPT

## 2022-05-03 PROCEDURE — NC001 PR NO CHARGE: Performed by: STUDENT IN AN ORGANIZED HEALTH CARE EDUCATION/TRAINING PROGRAM

## 2022-05-03 PROCEDURE — 99232 SBSQ HOSP IP/OBS MODERATE 35: CPT | Performed by: STUDENT IN AN ORGANIZED HEALTH CARE EDUCATION/TRAINING PROGRAM

## 2022-05-03 RX ORDER — AMOXICILLIN 250 MG
1 CAPSULE ORAL
Refills: 0 | Status: CANCELLED
Start: 2022-05-03

## 2022-05-03 RX ORDER — FUROSEMIDE 40 MG/1
40 TABLET ORAL DAILY
Qty: 6 TABLET | Refills: 0 | Status: SHIPPED | OUTPATIENT
Start: 2022-05-04 | End: 2022-05-03

## 2022-05-03 RX ORDER — AMOXICILLIN 250 MG
1 CAPSULE ORAL
Qty: 30 TABLET | Refills: 0 | Status: SHIPPED | OUTPATIENT
Start: 2022-05-03 | End: 2022-05-03

## 2022-05-03 RX ORDER — FUROSEMIDE 40 MG/1
40 TABLET ORAL DAILY
Status: DISCONTINUED | OUTPATIENT
Start: 2022-05-03 | End: 2022-05-03 | Stop reason: HOSPADM

## 2022-05-03 RX ORDER — CEPHALEXIN 500 MG/1
1000 CAPSULE ORAL EVERY 6 HOURS SCHEDULED
Qty: 34 CAPSULE | Refills: 0 | Status: SHIPPED | OUTPATIENT
Start: 2022-05-03 | End: 2022-05-08

## 2022-05-03 RX ORDER — CEPHALEXIN 500 MG/1
1000 CAPSULE ORAL EVERY 6 HOURS SCHEDULED
Qty: 34 CAPSULE | Refills: 0 | Status: SHIPPED | OUTPATIENT
Start: 2022-05-03 | End: 2022-05-03

## 2022-05-03 RX ORDER — LORAZEPAM 0.5 MG/1
0.5 TABLET ORAL 2 TIMES DAILY PRN
Start: 2022-05-03

## 2022-05-03 RX ORDER — LORAZEPAM 0.5 MG/1
0.5 TABLET ORAL 2 TIMES DAILY PRN
Status: CANCELLED
Start: 2022-05-03

## 2022-05-03 RX ORDER — AMOXICILLIN 250 MG
1 CAPSULE ORAL
Qty: 30 TABLET | Refills: 0 | Status: SHIPPED | OUTPATIENT
Start: 2022-05-03 | End: 2022-06-02

## 2022-05-03 RX ORDER — FUROSEMIDE 40 MG/1
40 TABLET ORAL DAILY
Qty: 6 TABLET | Refills: 0 | Status: SHIPPED | OUTPATIENT
Start: 2022-05-04 | End: 2022-05-20

## 2022-05-03 RX ORDER — FUROSEMIDE 40 MG/1
40 TABLET ORAL DAILY
Qty: 5 TABLET | Refills: 0 | Status: CANCELLED | OUTPATIENT
Start: 2022-05-04 | End: 2022-05-09

## 2022-05-03 RX ORDER — CEPHALEXIN 500 MG/1
1000 CAPSULE ORAL EVERY 6 HOURS SCHEDULED
Qty: 34 CAPSULE | Refills: 0 | Status: CANCELLED | OUTPATIENT
Start: 2022-05-03 | End: 2022-05-08

## 2022-05-03 RX ADMIN — CEPHALEXIN 1000 MG: 500 CAPSULE ORAL at 17:12

## 2022-05-03 RX ADMIN — OXYCODONE HYDROCHLORIDE 15 MG: 10 TABLET ORAL at 05:33

## 2022-05-03 RX ADMIN — CEPHALEXIN 1000 MG: 500 CAPSULE ORAL at 13:29

## 2022-05-03 RX ADMIN — OXYCODONE HYDROCHLORIDE 15 MG: 10 TABLET ORAL at 15:11

## 2022-05-03 RX ADMIN — LORAZEPAM 0.5 MG: 0.5 TABLET ORAL at 15:11

## 2022-05-03 RX ADMIN — FUROSEMIDE 40 MG: 40 TABLET ORAL at 09:30

## 2022-05-03 RX ADMIN — LORAZEPAM 0.5 MG: 0.5 TABLET ORAL at 05:37

## 2022-05-03 RX ADMIN — PANTOPRAZOLE SODIUM 20 MG: 20 TABLET, DELAYED RELEASE ORAL at 05:37

## 2022-05-03 RX ADMIN — OXYCODONE HYDROCHLORIDE 20 MG: 20 TABLET, FILM COATED, EXTENDED RELEASE ORAL at 09:30

## 2022-05-03 RX ADMIN — DOCUSATE SODIUM 100 MG: 100 CAPSULE, LIQUID FILLED ORAL at 09:30

## 2022-05-03 RX ADMIN — CEPHALEXIN 1000 MG: 500 CAPSULE ORAL at 05:37

## 2022-05-03 RX ADMIN — POLYETHYLENE GLYCOL 3350 17 G: 17 POWDER, FOR SOLUTION ORAL at 09:30

## 2022-05-03 RX ADMIN — DULOXETINE HYDROCHLORIDE 60 MG: 60 CAPSULE, DELAYED RELEASE ORAL at 09:30

## 2022-05-03 RX ADMIN — ENOXAPARIN SODIUM 40 MG: 40 INJECTION SUBCUTANEOUS at 09:30

## 2022-05-03 NOTE — RESTORATIVE TECHNICIAN NOTE
Restorative Technician Note      Patient Name: Naya Arroyo     Restorative Tech Visit Date: 05/03/22  Note Type: Mobility  Patient Position Upon Consult: Supine  Activity Performed: Ambulated; Other (Comment) (Amb with chair due to fatige and weekness)  Assistive Device: Roller walker  Patient Position at End of Consult: All needs within reach;  Bedside chair

## 2022-05-03 NOTE — ASSESSMENT & PLAN NOTE
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

## 2022-05-03 NOTE — ASSESSMENT & PLAN NOTE
69year old female presenting with septic shock secondary to right lower extremity cellulitis  - Off pressors, midodrine discontinued  - ID transitioned her to Keflex which she will complete through 5/7/2022     - For discharge today

## 2022-05-03 NOTE — DISCHARGE SUMMARY
Please see 5/03/2022 1:04PM note  This note is written for billing purposes  Here is the note transferred from today's progress note (5/3/2022)    [facility]  Progress Note - Luann Norman 1955, 69 y o  female MRN: [id number]  Unit/Bed#: E5 -01 Encounter: [id number]  Primary Care Provider: Adal Johnson,    Date and time admitted to hospital: 4/27/2022  7:43 PM    * Septic shock [facility])  Assessment & Plan  69year old female presenting with septic shock secondary to right lower extremity cellulitis  - Off pressors, midodrine discontinued  - ID transitioned her to Keflex which she will complete through 5/7/2022  - For discharge today    Chronic diastolic heart failure (HCC)  Assessment & Plan  Wt Readings from Last 3 Encounters:   05/03/22 113 kg (248 lb 10 9 oz)   04/21/22 102 kg (225 lb)   09/07/21 102 kg (225 lb 11 2 oz)     Weight significantly above baseline  7/2021 echo showing EF of 70% and Grade 2 DD   - Continue diuretics and beta blocker    Cellulitis of right lower extremity  Assessment & Plan  - ID assisting in management  - See above problem for treatment plan    History of pulmonary embolism  Assessment & Plan  Pt has a h/o  DVT and PE in 2007 after hysterectomy for ovarian cancer at 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  Followed outpatient by Hem/Onc for severe hypogammaglobinemia, previously on IVIG although had adverse reactions and recommended watchful observation and monitoring of IgG level  - Continue outpatient follow up with Dr Sophia Zarate    Elevated troponin  Assessment & Plan  Cardiology recommendations appreciated   Suspect non MI troponin elevation in setting of severe sepsis  - Restart betablocker    Chronic pain disorder  Assessment & Plan  History of chronic pain disorder  Continue home medications on discharge    Chronic kidney disease, stage III (moderate) (HCC)  Assessment & Plan  At baseline of up to 1 2  Does not meet RUCHI criteria  Recent Labs     05/01/22  0504 05/02/22  0541   BUN 23 20   CREATININE 1 16 1 15   EGFR 48 48       Cancer of ovary ([facility] [address] )  Assessment & Plan  History of ovarian cancer status post bilateral oophorectomy and hysterectomy  - Continue outpatient follow-up      Discharging Physician / Practitioner: Greg Marquis MD  PCP: Polo Flaherty DO  Admission Date:   Admission Orders (From admission, onward)     Ordered        04/28/22 0251  INPATIENT ADMISSION  Once                      Discharge Date: 05/03/22    Medical Problems             Resolved Problems  Date Reviewed: 5/3/2022          Resolved    Acute metabolic encephalopathy 5/7/8897     Resolved by  Pamela Blanchard MD    Acute respiratory failure with hypoxia ([facility] [address] ) 5/1/2022     Resolved by  Pamela Blanchard MD    Hypomagnesemia 4/30/2022     Resolved by  RENETTA Tineo    Hypophosphatemia 4/30/2022     Resolved by  Mirna Coats, [address] [place] [address] Stay:  · Ortho, pulmonary, cardiology, infectious disease    Procedures Performed:   · Central line, arterial line    Significant Findings / Test Results:   · Imaging  · XR Chest:    Hypoinflated lungs  Diffuse bilateral airspace disease can be secondary to pneumonia versus pulmonary edema    · CT head:    No acute intracranial hemorrhage, midline shift, or mass effect  · Ct chest    1  Groundglass and interstitial airspace disease in the upper lungs, similar appearance to the prior exam suggesting chronic interstitial pneumonitis    2   Atelectasis and airspace consolidation in the dependent portions of the lung bases may represent aspiration and/or pneumonia  · Ct ap    1   No acute inflammatory process identified within the abdomen or pelvis  2   Subcutaneous fat stranding overlying the proximal right thigh with right inguinal lymphadenopathy suggestive of right lower extremity infectious process   See CT of the right lower extremity for further evaluation  · Ct lower extremity right:    1  Right lower extremity and ankle subcutaneous edema consistent with nonspecific cellulitis without drainable collection, deep soft tissue involvement or gloria osseous destructive change to indicate osteomyelitis  2   Postoperative change noted after ORIF for a right acetabular, posterior column and ischial fracture without gross evidence of hardware complication, limited by beam hardening artifacts  Advanced posttraumatic right hip osteoarthritis including   subcortical collapse right femoral head  Additional postoperative change status post ORIF for a right tibial fracture with IM steffi, again without hardware complication given limitations of patient motion and beam hardening artifacts  · Vas lower limb:    RIGHT LOWER LIMB:  No evidence of acute or chronic deep vein thrombosis  No evidence of superficial thrombophlebitis noted  Doppler evaluation shows a normal response to augmentation maneuvers  Posterior tibial and anterior tibial arterial Doppler waveforms are  triphasic/hyperemic  LEFT LOWER LIMB:  No evidence of acute or chronic deep vein thrombosis  No evidence of superficial thrombophlebitis noted  Doppler evaluation shows a normal response to augmentation maneuvers  Posterior tibial and anterior tibial arterial Doppler waveforms are triphasic  · xr icu:    Right jugular catheter in right atrium with no pneumothorax  To be at the cavoatrial junction, it could be pulled back 2 cm      Persistent patchy bilateral groundglass opacity and left base consolidation      · Echo    Left Ventricle Left ventricular cavity size is normal  Wall thickness is normal  There is mild asymmetric hypertrophy of the septal wall  The left ventricular ejection fraction is 65%  Systolic function is normal   Wall motion is normal  Diastolic function is normal   There is no LV dynamic obstruction  Right Ventricle Right ventricular cavity size is normal  Systolic function is normal  Wall thickness is normal    Left Atrium The atrium is normal in size  Right Atrium The atrium is normal in size  Aortic Valve The aortic valve is trileaflet  The leaflets are not thickened  The leaflets are not calcified  The leaflets exhibit normal mobility  There is no evidence of regurgitation  There is no evidence of stenosis  Mitral Valve The mitral valve has normal structure  There is mild systolic anterior motion of the chordae  There is no evidence of regurgitation  There is no evidence of stenosis  Tricuspid Valve Tricuspid valve structure is normal  There is no evidence of regurgitation  There is no evidence of stenosis  Pulmonic Valve Pulmonic valve structure is normal  There is no evidence of regurgitation  There is no evidence of stenosis  Ascending Aorta The aortic root is normal in size  IVC/SVC The inferior vena cava is mildly dilated  Pericardium There is no pericardial effusion  The pericardium is normal in appearance  Incidental Findings:   · As written above     Test Results Pending at Discharge (will require follow up):   · none     Outpatient Tests Requested:  · PCP follow-up  · CBC, BMP    Complications: none    Reason for Admission: severe sepsis    Hospital Course:     Jayne Aj is a 69 y o  female patient who originally presented to the hospital on 4/27/2022 due to hypoxia, fever, encephalopathy  She had acute respiratory failure with hypoxia and severe sepsis secondary to pneumonitis and right lower extremity cellulitis respectively  Cardiology was consulted due to elevated troponin levels   Since she had no symptoms of angina or ischemic changes, there was no need for ischemic testing  Ortho was consulted to assess her lower extremity cellulitis  They recommended that she continue being treated for cellulitis / sepsis  Since no collections were observed with her imaging, they do not believe she would require surgical intervention  She continued to decline and started developing septic shock  She required pressors and ICU level of care  She eventually improved and was downgraded to the floors  She continues to remain hemodynamically stable off pressors and continues to breathe room air  Infectious disease would like her to complete her antibiotics through 5/7/2022  Lastly, due to her weight gain as a result of fluid resuscitation lasix 40mg po was started for at least a week per cardiology recommendtations  We recommended close pcp follow-up for transition of care and repeat blood work to ensure creatinine and electrolyte stability  Patient agrees to follow-up with her providers as scheduled and to take her medications as prescribed  All questions were addressed  she understood the need to seek immediate medical attention should she develop any chest pain, shortness of breath, severe pain, fever, chills, or any other concerning symptoms  Please see above list of diagnoses and related plan for additional information  Condition at Discharge: fair     Discharge Day Visit / Exam:     Subjective:  Patient seen and examined at bedside  Comfortable  No new complaints overnight  Vitals: Blood Pressure: 136/68 (05/03/22 0752)  Pulse: 77 (05/03/22 0752)  Temperature: 98 4 °F (36 9 °C) (05/03/22 0752)  Temp Source: Oral (05/03/22 0752)  Respirations: 18 (05/03/22 0752)  Height: 5' 3" (160 cm) (04/29/22 1000)  Weight - Scale: 113 kg (248 lb 10 9 oz) (05/03/22 0559)  SpO2: 93 % (05/03/22 0752)  Exam:   Physical Exam  Vitals reviewed  Constitutional:       General: She is not in acute distress  HENT:      Head: Normocephalic  Nose: Nose normal       Mouth/Throat:      Mouth: Mucous membranes are moist    Eyes:      General: No scleral icterus  Cardiovascular:      Rate and Rhythm: Normal rate  Pulmonary:      Effort: Pulmonary effort is normal  No respiratory distress  Abdominal:      General: There is no distension  Palpations: Abdomen is soft  Tenderness: There is no abdominal tenderness  Musculoskeletal:      Comments: Right lower extremity lymphedema, swelling, but much improved  Skin:     General: Skin is warm  Neurological:      Mental Status: She is alert  Mental status is at baseline  Psychiatric:         Mood and Affect: Mood normal          Behavior: Behavior normal        Discussion with Family: left VM for spouse    Discharge instructions/Information to patient and family:   See after visit summary for information provided to patient and family  Provisions for Follow-Up Care:  See after visit summary for information related to follow-up care and any pertinent home health orders  Disposition:     Home with VNA Services (Reminder: Complete face to face encounter)    For Discharges to [facility] SNF:   · Not Applicable to this Patient - Not Applicable to this Patient    Planned Readmission: No     Discharge Statement:  I spent 37 minutes discharging the patient  This time was spent on the day of discharge  I had direct contact with the patient on the day of discharge  Greater than 50% of the total time was spent examining patient, answering all patient questions, arranging and discussing plan of care with patient as well as directly providing post-discharge instructions  Additional time then spent on discharge activities  Discharge Medications:  See after visit summary for reconciled discharge medications provided to patient and family        ** Please Note: This note has been constructed using a voice recognition system **

## 2022-05-03 NOTE — ASSESSMENT & PLAN NOTE
Wt Readings from Last 3 Encounters:   05/03/22 113 kg (248 lb 10 9 oz)   04/21/22 102 kg (225 lb)   09/07/21 102 kg (225 lb 11 2 oz)     Weight significantly above baseline   7/2021 echo showing EF of 70% and Grade 2 DD   - Continue diuretics and beta blocker

## 2022-05-03 NOTE — PHYSICAL THERAPY NOTE
PHYSICAL THERAPY NOTE          Patient Name: Clint Ch   [id number]'E Date: 5/3/2022   Time: 0408-0326 05/03/22 1127   PT Last Visit   PT Visit Date 05/03/22   Note Type   Note Type Treatment   Restrictions/Precautions   Other Precautions Fall Risk;Pain   General   Chart Reviewed Yes   Response to Previous Treatment Patient with no complaints from previous session  Cognition   Overall Cognitive Status WFL   Arousal/Participation Cooperative   Subjective   Subjective agreeable to therapy   Bed Mobility   Supine to Sit 4  Minimal assistance   Additional items Assist x 1;HOB elevated; Bedrails; Increased time required   Additional Comments assist to reposition at EOB   Transfers   Sit to Stand 5  Supervision   Additional items Increased time required; Other  (RW)   Stand to Sit 5  Supervision   Additional items Armrests; Increased time required; Other  (RW)   Toilet transfer 4  Minimal assistance   Additional items Assist x 1; Increased time required;Standard toilet; Other  (grab bar, RW)   Ambulation/Elevation   Gait pattern Decreased foot clearance; Wide MAURICE; Short stride; Excessively slow   Gait Assistance 5  Supervision   Additional items Assist x 1   Assistive Device Rolling walker   Distance 8', 15'   Balance   Static Standing Fair   Dynamic Standing Fair -   Ambulatory Fair -   Endurance Deficit   Endurance Deficit Yes   Endurance Deficit Description easily fatigues   Activity Tolerance   Activity Tolerance Patient tolerated treatment well;Patient limited by fatigue   Medical Staff 115 Raine Sanders OT; Sonya restorative tech   Nurse Made Aware Daniel RN   Assessment   Prognosis Good   Problem List Decreased strength;Decreased range of motion;Decreased endurance; Impaired balance;Decreased mobility;Obesity; Decreased skin integrity;Pain   Assessment Aisha Daugherty was seen for a f/u session   Session focused on bed mobility transfers, sit<>stand transfer, toilet transfers, and ambulation  Significant compensatory techniques needed to stand secondary to RLE edema limiting ROM and relative weakness secondary to reliance on lift chair at home  However w increased time able to transfer primarily at S level w use of environmental modifications (bedrails, grab bars)  Noted improved ambulatory balance today compared to prev session  However continues to be at risk for falls secondary to weakness and unsteadiness  Would consider HHPT pending progress however barrier to dc continues to be home alone; wife states if needed spouse can rearrange schedule to be home  Barriers to Discharge Decreased caregiver support   Barriers to Discharge Comments +home alone   Goals   Patient Goals go home soon   STG Expiration Date 05/12/22   Short Term Goal #1 Goals to be met in 14 days; pt will be able to: 1) inc strength & balance by 1/2 grade to improve overall functional mobility & dec fall risk; 2) inc bed mobility to modAx1 for pt to be able to get in/OOB safely w/ proper techniques 100% of the time, to dec caregiver burden & safely function at home; 3) inc sitting tolerance at EOB approx  10mins; 4) initiate pre-gait activities & progress to functional transfers as appropriate; 5) PT to see when pt appropriate for functional transfers & amb; 6) pt/caregiver ed   Short Term Goal #2 additional goals to be met 5/12/22: pt will transfer at mod I level  pt will ambulate >50' at mod I level  PT Treatment Day 2   Plan   Treatment/Interventions LE strengthening/ROM; Functional transfer training; Therapeutic exercise; Endurance training;Patient/family training;Equipment eval/education; Bed mobility; Compensatory technique education;Spoke to nursing;OT;Continued evaluation   Progress Slow progress, decreased activity tolerance   PT Frequency 3-5x/wk   Recommendation   PT Discharge Recommendation Home with home health rehabilitation  (vs STR pending continued progress)   AM-PAC Basic Mobility Inpatient   Turning in Bed Without Bedrails 2   Lying on Back to Sitting on Edge of Flat Bed 1   Moving Bed to Chair 3   Standing Up From Chair 3   Walk in Room 3   Climb 3-5 Stairs 1   Basic Mobility Inpatient Raw Score 13   Basic Mobility Standardized Score 33 99   Highest Level Of Mobility   -[facility] Goal 4: Move to chair/commode   -[facility] Highest Level of Mobility 6: Walk 10 steps or more   -HLM Goal Achieved Yes     Crystal Gallagher, PT

## 2022-05-03 NOTE — ASSESSMENT & PLAN NOTE
Cardiology recommendations appreciated   Suspect non MI troponin elevation in setting of severe sepsis  - Restart betablocker

## 2022-05-03 NOTE — PROGRESS NOTES
[address] [address]  Progress Note - Naya Arroyo 1955, 69 y o  female MRN: [id number]  Unit/Bed#: E5 -01 Encounter: [id number]  Primary Care Provider: Jase Douglas DO   Date and time admitted to hospital: 4/27/2022  7:43 PM    * Septic shock [facility])  Assessment & Plan  69year old female presenting with septic shock secondary to right lower extremity cellulitis  - Off pressors, midodrine discontinued  - ID transitioned her to Keflex which she will complete through 5/7/2022  - For discharge today    Chronic diastolic heart failure (HCC)  Assessment & Plan  Wt Readings from Last 3 Encounters:   05/03/22 113 kg (248 lb 10 9 oz)   04/21/22 102 kg (225 lb)   09/07/21 102 kg (225 lb 11 2 oz)     Weight significantly above baseline  7/2021 echo showing EF of 70% and Grade 2 DD   - Continue diuretics and beta blocker    Cellulitis of right lower extremity  Assessment & Plan  - ID assisting in management  - See above problem for treatment plan    History of pulmonary embolism  Assessment & Plan  Pt has a h/o  DVT and PE in 2007 after hysterectomy for ovarian cancer at [facility]  IVC filter was placed  Treated with Lovenox for 1 year  Presented with elevated D dimer 10 15  B/LLE duplex study negative for DVT  - Pulmonary recommendations appreciated  Low suspicion for PE at this time  Hypogammaglobulinemia, acquired [facility])  Assessment & Plan  Developed hypogammaglobulinemia following Ritiximab treatment for Wegener's  Followed outpatient by Hem/Onc for severe hypogammaglobinemia, previously on IVIG although had adverse reactions and recommended watchful observation and monitoring of IgG level  - Continue outpatient follow up with Dr Metz Running    Elevated troponin  Assessment & Plan  Cardiology recommendations appreciated  Suspect non MI troponin elevation in setting of severe sepsis  - Restart betablocker    Chronic pain disorder  Assessment & Plan  History of chronic pain disorder    Continue home medications on discharge    Chronic kidney disease, stage III (moderate) (HCC)  Assessment & Plan  At baseline of up to 1 2  Does not meet RUCHI criteria  Recent Labs     05/01/22  0504 05/02/22  0541   BUN 23 20   CREATININE 1 16 1 15   EGFR 48 48       Cancer of ovary ([facility] [address] )  Assessment & Plan  History of ovarian cancer status post bilateral oophorectomy and hysterectomy  - Continue outpatient follow-up      Discharging Physician / Practitioner: Candis Frederick MD  PCP: Mohan Clark DO  Admission Date:   Admission Orders (From admission, onward)     Ordered        04/28/22 0251  INPATIENT ADMISSION  Once                      Discharge Date: 05/03/22    Medical Problems             Resolved Problems  Date Reviewed: 5/3/2022          Resolved    Acute metabolic encephalopathy 3/1/6937     Resolved by  Pamela Blanchard MD    Acute respiratory failure with hypoxia ([facility] [address] ) 5/1/2022     Resolved by  Pamela Blanchard MD    Hypomagnesemia 4/30/2022     Resolved by  RENETTA Chaudhry    Hypophosphatemia 4/30/2022     Resolved by  Christophe Pearson [address] [place] [address] Stay:  · Ortho, pulmonary, cardiology, infectious disease    Procedures Performed:   · Central line, arterial line    Significant Findings / Test Results:   · Imaging  · XR Chest:    Hypoinflated lungs  Diffuse bilateral airspace disease can be secondary to pneumonia versus pulmonary edema    · CT head:    No acute intracranial hemorrhage, midline shift, or mass effect  · Ct chest    1  Groundglass and interstitial airspace disease in the upper lungs, similar appearance to the prior exam suggesting chronic interstitial pneumonitis  2   Atelectasis and airspace consolidation in the dependent portions of the lung bases may represent aspiration and/or pneumonia  · Ct ap    1   No acute inflammatory process identified within the abdomen or pelvis     2   Subcutaneous fat stranding overlying the proximal right thigh with right inguinal lymphadenopathy suggestive of right lower extremity infectious process   See CT of the right lower extremity for further evaluation  · Ct lower extremity right:    1  Right lower extremity and ankle subcutaneous edema consistent with nonspecific cellulitis without drainable collection, deep soft tissue involvement or gloria osseous destructive change to indicate osteomyelitis  2   Postoperative change noted after ORIF for a right acetabular, posterior column and ischial fracture without gross evidence of hardware complication, limited by beam hardening artifacts  Advanced posttraumatic right hip osteoarthritis including   subcortical collapse right femoral head  Additional postoperative change status post ORIF for a right tibial fracture with IM steffi, again without hardware complication given limitations of patient motion and beam hardening artifacts  · Vas lower limb:    RIGHT LOWER LIMB:  No evidence of acute or chronic deep vein thrombosis  No evidence of superficial thrombophlebitis noted  Doppler evaluation shows a normal response to augmentation maneuvers  Posterior tibial and anterior tibial arterial Doppler waveforms are  triphasic/hyperemic  LEFT LOWER LIMB:  No evidence of acute or chronic deep vein thrombosis  No evidence of superficial thrombophlebitis noted  Doppler evaluation shows a normal response to augmentation maneuvers  Posterior tibial and anterior tibial arterial Doppler waveforms are triphasic  · xr icu:    Right jugular catheter in right atrium with no pneumothorax  To be at the cavoatrial junction, it could be pulled back 2 cm      Persistent patchy bilateral groundglass opacity and left base consolidation  · Echo    Left Ventricle Left ventricular cavity size is normal  Wall thickness is normal  There is mild asymmetric hypertrophy of the septal wall  The left ventricular ejection fraction is 65%   Systolic function is normal   Wall motion is normal  Diastolic function is normal   There is no LV dynamic obstruction  Right Ventricle Right ventricular cavity size is normal  Systolic function is normal  Wall thickness is normal    Left Atrium The atrium is normal in size  Right Atrium The atrium is normal in size  Aortic Valve The aortic valve is trileaflet  The leaflets are not thickened  The leaflets are not calcified  The leaflets exhibit normal mobility  There is no evidence of regurgitation  There is no evidence of stenosis  Mitral Valve The mitral valve has normal structure  There is mild systolic anterior motion of the chordae  There is no evidence of regurgitation  There is no evidence of stenosis  Tricuspid Valve Tricuspid valve structure is normal  There is no evidence of regurgitation  There is no evidence of stenosis  Pulmonic Valve Pulmonic valve structure is normal  There is no evidence of regurgitation  There is no evidence of stenosis  Ascending Aorta The aortic root is normal in size  IVC/SVC The inferior vena cava is mildly dilated  Pericardium There is no pericardial effusion  The pericardium is normal in appearance  Incidental Findings:   · As written above     Test Results Pending at Discharge (will require follow up):   · none     Outpatient Tests Requested:  · PCP follow-up  · CBC, BMP    Complications: none    Reason for Admission: severe sepsis    Hospital Course:     Genet Qureshi is a 69 y o  female patient who originally presented to the hospital on 4/27/2022 due to hypoxia, fever, encephalopathy  She had acute respiratory failure with hypoxia and severe sepsis secondary to pneumonitis and right lower extremity cellulitis respectively  Cardiology was consulted due to elevated troponin levels  Since she had no symptoms of angina or ischemic changes, there was no need for ischemic testing  Ortho was consulted to assess her lower extremity cellulitis   They recommended that she continue being treated for cellulitis / sepsis  Since no collections were observed with her imaging, they do not believe she would require surgical intervention  She continued to decline and started developing septic shock  She required pressors and ICU level of care  She eventually improved and was downgraded to the floors  She continues to remain hemodynamically stable off pressors and continues to breathe room air  Infectious disease would like her to complete her antibiotics through 5/7/2022  Lastly, due to her weight gain as a result of fluid resuscitation lasix 40mg po was started for at least a week per cardiology recommendtations  We recommended close pcp follow-up for transition of care and repeat blood work to ensure creatinine and electrolyte stability  Patient agrees to follow-up with her providers as scheduled and to take her medications as prescribed  All questions were addressed  she understood the need to seek immediate medical attention should she develop any chest pain, shortness of breath, severe pain, fever, chills, or any other concerning symptoms  Please see above list of diagnoses and related plan for additional information  Condition at Discharge: fair     Discharge Day Visit / Exam:     Subjective:  Patient seen and examined at bedside  Comfortable  No new complaints overnight  Vitals: Blood Pressure: 136/68 (05/03/22 0752)  Pulse: 77 (05/03/22 0752)  Temperature: 98 4 °F (36 9 °C) (05/03/22 0752)  Temp Source: Oral (05/03/22 0752)  Respirations: 18 (05/03/22 0752)  Height: 5' 3" (160 cm) (04/29/22 1000)  Weight - Scale: 113 kg (248 lb 10 9 oz) (05/03/22 0559)  SpO2: 93 % (05/03/22 0752)  Exam:   Physical Exam  Vitals reviewed  Constitutional:       General: She is not in acute distress  HENT:      Head: Normocephalic  Nose: Nose normal       Mouth/Throat:      Mouth: Mucous membranes are moist    Eyes:      General: No scleral icterus  Cardiovascular:      Rate and Rhythm: Normal rate  Pulmonary:      Effort: Pulmonary effort is normal  No respiratory distress  Abdominal:      General: There is no distension  Palpations: Abdomen is soft  Tenderness: There is no abdominal tenderness  Musculoskeletal:      Comments: Right lower extremity lymphedema, swelling, but much improved  Skin:     General: Skin is warm  Neurological:      Mental Status: She is alert  Mental status is at baseline  Psychiatric:         Mood and Affect: Mood normal          Behavior: Behavior normal        Discussion with Family: left VM for spouse    Discharge instructions/Information to patient and family:   See after visit summary for information provided to patient and family  Provisions for Follow-Up Care:  See after visit summary for information related to follow-up care and any pertinent home health orders  Disposition:     Home with VNA Services (Reminder: Complete face to face encounter)    For Discharges to [address] [facility]:   · Not Applicable to this Patient - Not Applicable to this Patient    Planned Readmission: No     Discharge Statement:  I spent 37 minutes discharging the patient  This time was spent on the day of discharge  I had direct contact with the patient on the day of discharge  Greater than 50% of the total time was spent examining patient, answering all patient questions, arranging and discussing plan of care with patient as well as directly providing post-discharge instructions  Additional time then spent on discharge activities  Discharge Medications:  See after visit summary for reconciled discharge medications provided to patient and family        ** Please Note: This note has been constructed using a voice recognition system **

## 2022-05-03 NOTE — ASSESSMENT & PLAN NOTE
At baseline of up to 1 2  Does not meet RUCHI criteria      Recent Labs     05/01/22  0504 05/02/22  0541   BUN 23 20   CREATININE 1 16 1 15   EGFR 48 48

## 2022-05-03 NOTE — ASSESSMENT & PLAN NOTE
Pt has a h/o  DVT and PE in 2007 after hysterectomy for ovarian cancer at [facility]  IVC filter was placed  Treated with Lovenox for 1 year  Presented with elevated D dimer 10 15  B/LLE duplex study negative for DVT  - Pulmonary recommendations appreciated  Low suspicion for PE at this time

## 2022-05-03 NOTE — PROGRESS NOTES
Progress Note - Infectious Disease   Krystal Jones 69 y o  female MRN: [id number]  Unit/Bed#: E5 -01 Encounter: [id number]    Impression/Plan:  1  Severe sepsis  POA  Fever, tachycardia, leukocytosis, and lactic acidosis  With development of shock/hypotension, temporarily requiring pressor support  Secondary to recurring RLE cellulitis  Also consider possible respiratory source as patient has ground glass on chest imaging although this may be more indicative of chronic interstitial pneumonitis  No other clear source appreciated  Blood cultures are negative after 5 days  Fortunately she has clinically improved  She has transitioned out of the ICU to med/surg level care  This morning she is afebrile  Her WBC count has trended down   -antibiotic as below  -monitor CBC and BMP  -monitor vitals  -supportive care     2  Recurring RLE cellulitis  In setting of severe lymphedema  RLE CT imaging showed severe soft tissue edema but no drainable collection  No evidence of deep soft tissue involvement or osseous changes but she does have hardware along the tibia and in the ankle  Orthopedic surgery team has evaluated, no plans for surgical intervention at this time  Leg initially appeared tight, tender, and hot on exam  Given concern for toxin involvement she did require Clindamycin/Cefazolin  Patient now significantly improved  She has been transitioned to oral Keflex  Consider patient may have to consider chronic suppression again if infection reoccurs  -continue high dose oral Keflex, 1g q6 hours, through 5/7/2022 for a total of 10 days of antibiotic treatment  -monitor CBC and BMP  -monitor vitals  -serial RLE exams  -continue follow up with orthopedic surgery  -anticipate possibility of antibiotic suppression if infection reoccurs     3  Acute hypoxic respiratory failure  Patient requiring nonrebreather mask by EMS   Chest xray and CT chest showed B/L atelectasis with basilar consolidations, and upper airspace opacities likely representing chronic interstitial pneumonitis  COVID-19/Flu/RSV PCR was negative  Suspect respiratory status may all be due to severe sepsis with metabolic encephalopathy   Also consider possibility of PE  D dimer elevated  She unfortunately can't have dye due to allergy  She has been started on Lovenox  Pulmonology is following  Today her O2 saturation is stable on room air    -antibiotics as above  -monitor CBC and BMP  -monitor vitals  -monitor respiratory symptoms     4  CKD III  Upon review of patient's available past medical records it appears her baseline creatinine is approximately 0 8-1 2  She was elevated at 1 5 upon admission which I suspect is pre-renal in setting of acute infection above  Patient's creatinine has improved  -monitor creatinine  -dose adjust antibiotics for renal function as needed  -avoid nephrotoxins  -volume management per SLIM     5  Acute metabolic encephalopathy  Likely secondary to severe sepsis and acute infection above  CT of the head showed no acute intracranial findings  Ammonia level normal  Low suspicion for CNS infection at this time  Mental status has improved  -serial neuro exams  -monitor mood and mental status  -supportive care     6  Lymphedema  This is likely driving force of patient's recurring RLE cellulitis above      7  Wegener's granulomatosis with history of renal involvement  Patient with history of immunosuppressive treatment but has been off for many years       8  Morbid obesity  BMI = 44 05     Above plan was discussed in detail with patient at the bedside  Above plan was discussed in detail with SLIM  Antibiotics:  Keflex 3  Antibiotics 7    Subjective:  Patient reports she's feeling no different today than yesterday  She's tired of being in the hospital  She tells me her leg is  but no worse than yesterday  Has not noticed any worsening redness   She has no fever, chills, sweats, shakes; no nausea, vomiting, abdominal pain, or diarrhea; no cough, shortness of breath, or chest pain  No new symptoms  Objective:  Vitals:  Temp:  [98 4 °F (36 9 °C)-98 9 °F (37 2 °C)] 98 9 °F (37 2 °C)  HR:  [75-93] 87  Resp:  [18-19] 18  BP: ()/(57-77) 148/77  SpO2:  [92 %-96 %] 93 %  Temp (24hrs), Av 6 °F (37 °C), Min:98 4 °F (36 9 °C), Max:98 9 °F (37 2 °C)  Current: Temperature: 98 9 °F (37 2 °C)    Physical Exam:   General Appearance:  Alert, interactive, nontoxic, no acute distress  She appears comfortable sitting up in bed  Throat: Oropharynx moist without lesions  Lungs:   Clear to auscultation bilaterally; no wheezes, rhonchi or rales; respirations unlabored on room air  Heart:  RRR; no murmur, rub or gallop  Abdomen:   Soft, obese, non-tender, non-distended, positive bowel sounds  Extremities: RLE lymphedema, lower leg still with swelling, warmth to touch but no hot spots, faint erythema over medial malleolar and distal shin  Skin: No new rashes or lesions noted on exposed skin  Labs, Imaging, & Other studies:   All pertinent labs and imaging studies were personally reviewed  Results from last 7 days   Lab Units 22  0541 22  0504 22  0405   WBC Thousand/uL 10 98* 12 93* 18 76*   HEMOGLOBIN g/dL 9 2* 9 4* 9 8*   PLATELETS Thousands/uL 142* 121* 127*     Results from last 7 days   Lab Units 22  0541 22  0508 22  2321 22  0701 22   POTASSIUM mmol/L 3 8   < > 3 9   < > 4 0   CHLORIDE mmol/L 106   < > 106   < > 102   CO2 mmol/L 29   < > 24   < > 26   BUN mg/dL 20   < > 26*   < > 22   CREATININE mg/dL 1 15   < > 1 28   < > 1 50*   EGFR ml/min/1 73sq m 48   < > 42   < >  --    CALCIUM mg/dL 7 9*   < > 7 1*   < > 8 8   AST U/L  --   --  41  --  70*   ALT U/L  --   --  23  --  43   ALK PHOS U/L  --   --  86  --  174*    < > = values in this interval not displayed       Results from last 7 days   Lab Units 22  0008 22  1622 22   BLOOD CULTURE --   --  No Growth After 5 Days  No Growth After 5 Days     MRSA CULTURE ONLY  No Methicillin Resistant Staphlyococcus aureus (MRSA) isolated  --   --    LEGIONELLA URINARY ANTIGEN   --  Negative  --      Results from last 7 days   Lab Units 04/29/22  0543 04/28/22  0701 04/27/22 2013   PROCALCITONIN ng/ml 77 99* 84 20* 75 41*

## 2022-05-03 NOTE — SPEECH THERAPY NOTE
Speech Language/Pathology  Speech/Language Pathology  Assessment    Patient Name: Luann Norman  ZUBUU'MARIANN Date: 5/3/2022     Problem List  Principal Problem:    Septic shock ([facility] )  Active Problems:    Wegener's granulomatosis with renal involvement ([facility] )    Cancer of ovary ([facility] )    Chronic kidney disease, stage III (moderate) (HCC)    Chronic pain disorder    Elevated troponin    Hypertrophic cardiomyopathy ([place] [address] )    Hypogammaglobulinemia, acquired ([place] [address] )    Cellulitis of right lower extremity    History of pulmonary embolism    Chronic diastolic heart failure ([facility] )    Past Medical History  Past Medical History:   Diagnosis Date    BRCA1 positive     Cancer ([place] [address] )     ovarian Ca with chemo    History of chemotherapy     ovarian cancer     History of pulmonary embolus (PE) & DVT 2007    post-op FAMILIA/ omenectomy    Lymphedema     MRSA (methicillin resistant Staphylococcus aureus) 2017    nasal swab negative 4/3/19    Ovarian cancer ([place] [address] )     CYST REMOVED RIGHT OVARY IN   HYSTERECTOMY 07       Past Surgical History  Past Surgical History:   Procedure Laterality Date    APPENDECTOMY  1998    laparoscopic    BILATERAL SALPINGOOPHORECTOMY  2007    BREAST BIOPSY Right 2016    BREAST BIOPSY Left 07/15/2020    BREAST BIOPSY Left 2021    stereo     SECTION      HYSTERECTOMY  2007    Omentectomy and lymph node biopsy at [facility] BIOPSY RIGHT (ALL INC) Right 2021    OOPHORECTOMY Bilateral 2007    OTHER SURGICAL HISTORY      right lower extremity due to trauma    DE TREAT TIBIAL SHAFT FX, INTRAMED IMPLANT Right 2017    Procedure: INSERTION NAIL IM TIBIA;  Surgeon: Tete Sharma MD;  [facility]: [facility];  Service: Orthopedics    US GUIDANCE BREAST BIOPSY LEFT EACH ADDITIONAL Left 7/15/2020    US GUIDED BREAST BIOPSY LEFT COMPLETE Left 7/15/2020    US GUIDED BREAST BIOPSY RIGHT COMPLETE Right 2016    US GUIDED BREAST BIOPSY RIGHT COMPLETE Right 7/26/2021        Bedside Swallow Evaluation:    Summary:  Pt presented fully alert and verbal  Requested other items for breakfast  Not happy w/ the level 2 mechanical  (ST order was cancelled by medicine 4/28)  Pt had an omelette, pancake, tea, OJ  Wanted her plate on her lap, stating she does not like the table in front of her  Fed self  Mastication, bolus formation, control, and transfer wnl  Swallows prompt  Laryngeal rise appeared wnl  No cough or wet vocal quality  Oral/pharyngeal stages wnl  Recommendations:  Diet:regular  Liquid:thin  Meds:as tolerated  Supervision:none  Positioning:Upright  Strategies: Pt to take PO/Meds only when fully alert and upright  Oral care  Aspiration precautions  Reflux precautions  Eval only, No f/u tx indicated  Consider consult w/:  Nutrition    H&P/Admit info/ pertinent provider notes: (PMH noted above)  Chief Complaint:     History of Present Illness:  Kiko Bourgeois is a 69 y o  female who presents with severe sepsis  Arrived with encephalopathy  Mental status improving, currently oriented to person and place, patient repeating "help me " Unable to obtain HPI  Attestation signed by Shayne Fairas MD at 4/29/2022  3:05 PM   I saw/examined the patient  I agree with the Advanced Practitioner's note with the following additions/exceptions:      Impression/Recommendations:     1  Septic shock-evolving since admission  Fever, leukocytosis, tachycardia, elevated lactate, hypotension  Required pressors overnight  Likely due to RLE cellulitis, consider bacteremia  2  Recurrent RLE cellulitis  In setting of #3   3  Right lower extremity lymphedema due to previous gynecologic surgery  4  Toxic metabolic encephalopathy due to #1  Improving  5  Acute hypoxic respiratory failure  Likely due to sepsis, IVF, atelectasis, underlying pneumonitis  Low concern for acute pulmonary source of infection  6  RUCHI on CKD3  POA  Due to sepsis  7  Wegener's granulomatosis  Not on immunosupression  8  Morbid obesity  BMI 40  Risk factor for infection      The patient presented with severe sepsis with hypotension, tachycardic, tachypnea, leukocytosis, lactic acidosis  She has had similar admissions in the past with RLE cellulitis and Grop B strep bacteremia  Although her right leg does not have significant erythema, it is warm and tender so I suspect recurrent cellulitis due to strep as the source of infection  I am concerned about strep toxin production driving her severe presentation  Overnight the patient had worsening hypotension requiring pressors  Leukocytosis is worse today, but overall she looks better and remains on low dose levophed with improvement in mental status, tachycardia, and fever curve  Repeat CT of the RLE does not show evidence of abscess, deeper infection, nec fascitis or osteomyelitis       I do not think that her ongoing sepsis is due to inadequate antimicrobial coverage, but likely due to toxin production from strep infection/cellulitis  Recommend stopping the zosyn and vancomycin and narrow back to Cefazolin 2g IV q8hr  Will continue clindamycin for toxin productive, but increase dose to 900mg IV q8hr given weight       Follow up blood cultures  Monitor fever curve, WBC count, hemodynamics  Supportive care per critical care team      Plan discussed with the primary team  ID will follow closely over the weekend  Special Studies:  See initial eval by another slp  Previous VBS:  none    Patient's goal: hopes she goes home today    Did the pt report pain? no  If yes, was nursing notified/was it addressed?  na    Reason for consult:  Determine safest and least restrictive diet  Mental status improved  Request for upgrade  Food allergies:  none  Current diet:  Level 2 mechanical soft  Premorbid diet:  Regular at home  O2 requirement:  none  Voice/Speech:  wnl  Social:  Home w/   Follows commands:  yes Cognitive Status:  alert  Oral [facility] exam:  Dentition:natural  Labial strength and ROM:+  Lingual strength and ROM:+  Mandibular strength and ROM:+  Secretion management:+     Results d/w:  Pt, nursing, physician

## 2022-05-03 NOTE — PLAN OF CARE
Problem: Potential for Falls  Goal: Patient will remain free of falls  Description: INTERVENTIONS:  - Educate patient/family on patient safety including physical limitations  - Instruct patient to call for assistance with activity   - Consult OT/PT to assist with strengthening/mobility   - Keep Call bell within reach  - Keep bed low and locked with side rails adjusted as appropriate  - Keep care items and personal belongings within reach  - Initiate and maintain comfort rounds  - Make Fall Risk Sign visible to staff  - Offer Toileting every  Hours, in advance of need  - Initiate/Maintain alarm  - Obtain necessary fall risk management equipment:   - Apply yellow socks and bracelet for high fall risk patients  - Consider moving patient to room near nurses station  Outcome: Progressing     Problem: MOBILITY - ADULT  Goal: Maintain or return to baseline ADL function  Description: INTERVENTIONS:  -  Assess patient's ability to carry out ADLs; assess patient's baseline for ADL function and identify physical deficits which impact ability to perform ADLs (bathing, care of mouth/teeth, toileting, grooming, dressing, etc )  - Assess/evaluate cause of self-care deficits   - Assess range of motion  - Assess patient's mobility; develop plan if impaired  - Assess patient's need for assistive devices and provide as appropriate  - Encourage maximum independence but intervene and supervise when necessary  - Involve family in performance of ADLs  - Assess for home care needs following discharge   - Consider OT consult to assist with ADL evaluation and planning for discharge  - Provide patient education as appropriate  Outcome: Progressing  Goal: Maintains/Returns to pre admission functional level  Description: INTERVENTIONS:  - Perform BMAT or MOVE assessment daily    - Set and communicate daily mobility goal to care team and patient/family/caregiver     - Collaborate with rehabilitation services on mobility goals if consulted  - Perform Range of Motion  times a day  - Reposition patient every  hours  - Dangle patient  times a day  - Stand patient  times a day  - Ambulate patient  times a day  - Out of bed to chair  times a day   - Out of bed for meals  times a day  - Out of bed for toileting  - Record patient progress and toleration of activity level   Outcome: Progressing     Problem: Prexisting or High Potential for Compromised Skin Integrity  Goal: Skin integrity is maintained or improved  Description: INTERVENTIONS:  - Identify patients at risk for skin breakdown  - Assess and monitor skin integrity  - Assess and monitor nutrition and hydration status  - Monitor labs   - Assess for incontinence   - Turn and reposition patient  - Assist with mobility/ambulation  - Relieve pressure over bony prominences  - Avoid friction and shearing  - Provide appropriate hygiene as needed including keeping skin clean and dry  - Evaluate need for skin moisturizer/barrier cream  - Collaborate with interdisciplinary team   - Patient/family teaching  - Consider wound care consult   Outcome: Progressing     Problem: Nutrition/Hydration-ADULT  Goal: Nutrient/Hydration intake appropriate for improving, restoring or maintaining nutritional needs  Description: Monitor and assess patient's nutrition/hydration status for malnutrition  Collaborate with interdisciplinary team and initiate plan and interventions as ordered  Monitor patient's weight and dietary intake as ordered or per policy  Utilize nutrition screening tool and intervene as necessary  Determine patient's food preferences and provide high-protein, high-caloric foods as appropriate       INTERVENTIONS:  - Monitor oral intake, urinary output, labs, and treatment plans  - Assess nutrition and hydration status and recommend course of action  - Evaluate amount of meals eaten  - Assist patient with eating if necessary   - Allow adequate time for meals  - Recommend/ encourage appropriate diets, oral nutritional supplements, and vitamin/mineral supplements  - Order, calculate, and assess calorie counts as needed  - Recommend, monitor, and adjust tube feedings and TPN/PPN based on assessed needs  - Assess need for intravenous fluids  - Provide specific nutrition/hydration education as appropriate  - Include patient/family/caregiver in decisions related to nutrition  Outcome: Progressing     Problem: PAIN - ADULT  Goal: Verbalizes/displays adequate comfort level or baseline comfort level  Description: Interventions:  - Encourage patient to monitor pain and request assistance  - Assess pain using appropriate pain scale  - Administer analgesics based on type and severity of pain and evaluate response  - Implement non-pharmacological measures as appropriate and evaluate response  - Consider cultural and social influences on pain and pain management  - Notify physician/advanced practitioner if interventions unsuccessful or patient reports new pain  Outcome: Progressing     Problem: INFECTION - ADULT  Goal: Absence or prevention of progression during hospitalization  Description: INTERVENTIONS:  - Assess and monitor for signs and symptoms of infection  - Monitor lab/diagnostic results  - Monitor all insertion sites, i e  indwelling lines, tubes, and drains  - Monitor endotracheal if appropriate and nasal secretions for changes in amount and color  - [place] appropriate cooling/warming therapies per order  - Administer medications as ordered  - Instruct and encourage patient and family to use good hand hygiene technique  - Identify and instruct in appropriate isolation precautions for identified infection/condition  Outcome: Progressing     Problem: SAFETY ADULT  Goal: Patient will remain free of falls  Description: INTERVENTIONS:  - Educate patient/family on patient safety including physical limitations  - Instruct patient to call for assistance with activity   - Consult OT/PT to assist with strengthening/mobility - Keep Call bell within reach  - Keep bed low and locked with side rails adjusted as appropriate  - Keep care items and personal belongings within reach  - Initiate and maintain comfort rounds  - Make Fall Risk Sign visible to staff  - Offer Toileting every  Hours, in advance of need  - Initiate/Maintain alarm  - Obtain necessary fall risk management equipment:   - Apply yellow socks and bracelet for high fall risk patients  - Consider moving patient to room near nurses station  Outcome: Progressing  Goal: Maintain or return to baseline ADL function  Description: INTERVENTIONS:  -  Assess patient's ability to carry out ADLs; assess patient's baseline for ADL function and identify physical deficits which impact ability to perform ADLs (bathing, care of mouth/teeth, toileting, grooming, dressing, etc )  - Assess/evaluate cause of self-care deficits   - Assess range of motion  - Assess patient's mobility; develop plan if impaired  - Assess patient's need for assistive devices and provide as appropriate  - Encourage maximum independence but intervene and supervise when necessary  - Involve family in performance of ADLs  - Assess for home care needs following discharge   - Consider OT consult to assist with ADL evaluation and planning for discharge  - Provide patient education as appropriate  Outcome: Progressing  Goal: Maintains/Returns to pre admission functional level  Description: INTERVENTIONS:  - Perform BMAT or MOVE assessment daily    - Set and communicate daily mobility goal to care team and patient/family/caregiver  - Collaborate with rehabilitation services on mobility goals if consulted  - Perform Range of Motion  times a day  - Reposition patient every  hours    - Dangle patient  times a day  - Stand patient  times a day  - Ambulate patient  times a day  - Out of bed to chair  times a day   - Out of bed for meals  times a day  - Out of bed for toileting  - Record patient progress and toleration of activity level   Outcome: Progressing     Problem: DISCHARGE PLANNING  Goal: Discharge to home or other facility with appropriate resources  Description: INTERVENTIONS:  - Identify barriers to discharge w/patient and caregiver  - Arrange for needed discharge resources and transportation as appropriate  - Identify discharge learning needs (meds, wound care, etc )  - Arrange for interpretive services to assist at discharge as needed  - Refer to Case Management Department for coordinating discharge planning if the patient needs post-hospital services based on physician/advanced practitioner order or complex needs related to functional status, cognitive ability, or social support system  Outcome: Progressing     Problem: Knowledge Deficit  Goal: Patient/family/caregiver demonstrates understanding of disease process, treatment plan, medications, and discharge instructions  Description: Complete learning assessment and assess knowledge base    Interventions:  - Provide teaching at level of understanding  - Provide teaching via preferred learning methods  Outcome: Progressing

## 2022-05-03 NOTE — PLAN OF CARE
Problem: OCCUPATIONAL THERAPY ADULT  Goal: Performs self-care activities at highest level of function for planned discharge setting  See evaluation for individualized goals  Description: Treatment Interventions: ADL retraining,Functional transfer training,UE strengthening/ROM,Endurance training,Patient/family training,Equipment evaluation/education,Compensatory technique education,Continued evaluation,Energy conservation,Activityengagement          See flowsheet documentation for full assessment, interventions and recommendations  Outcome: Progressing  Note: Limitation: Decreased ADL status,Decreased UE ROM,Decreased UE strength,Decreased Safe judgement during ADL,Decreased endurance,Decreased self-care trans,Decreased high-level ADLs  Prognosis: Fair  Assessment: Pt seen for OT treatment session focusing on functional activity tolerance, bed mobility, ADLs, and functional transfers/mobility  Pt alert and cooperative throughout session  Pt lying supine at start of session, completing bed mobility (supine>sit) w/ Min A with assist for trunk control  LB dressing completed while seated EOB w/ Mod A with assist to don R shoe  Pt reports spouse assists w/ donning R shoe in home environment  Transfers (sit<>stand, RW<>standard toilet) completed w/ Min A-Supervision w/ Min A required to initiate forward weight shift from standard toilet height for sit>stand and Min A for controlled descent to standard toilet  Pt required increased time to complete all transitions  Pt progressed to Supervision for functional mobility with use of RW  Toileting tasks completed w/ Min A w/ assist for balance/steadying when standing for perineal hygiene tasks 2* decreased dynamic standing balance demonstrated when standing w/ unilateral UE support  Grooming tasks completed w/ Supervision while standing at sink to wash/dry hands and brush hair  UB dressing completed w/ Supervision w/ setup required and increased time to complete   Pt OOB on BSC at end of session with  Call bell within reach  All needs met and pt reports no further questions for OT at this time  Continue to recommend Home OT vs STR pending continued progress and available home support when medically cleared  OT to follow pt on caseload        OT Discharge Recommendation: Home with home health rehabilitation (Home OT vs STR pending progress and home support)  OT - OK to Discharge: Yes (when medically cleared)

## 2022-05-03 NOTE — PLAN OF CARE
Problem: Potential for Falls  Goal: Patient will remain free of falls  Description: INTERVENTIONS:  - Educate patient/family on patient safety including physical limitations  - Instruct patient to call for assistance with activity   - Consult OT/PT to assist with strengthening/mobility   - Keep Call bell within reach  - Keep bed low and locked with side rails adjusted as appropriate  - Keep care items and personal belongings within reach  - Initiate and maintain comfort rounds  - Make Fall Risk Sign visible to staff  - Offer Toileting every  Hours, in advance of need  - Initiate/Maintain alarm  - Obtain necessary fall risk management equipment:   - Apply yellow socks and bracelet for high fall risk patients  - Consider moving patient to room near nurses station  Outcome: Progressing     Problem: MOBILITY - ADULT  Goal: Maintain or return to baseline ADL function  Description: INTERVENTIONS:  -  Assess patient's ability to carry out ADLs; assess patient's baseline for ADL function and identify physical deficits which impact ability to perform ADLs (bathing, care of mouth/teeth, toileting, grooming, dressing, etc )  - Assess/evaluate cause of self-care deficits   - Assess range of motion  - Assess patient's mobility; develop plan if impaired  - Assess patient's need for assistive devices and provide as appropriate  - Encourage maximum independence but intervene and supervise when necessary  - Involve family in performance of ADLs  - Assess for home care needs following discharge   - Consider OT consult to assist with ADL evaluation and planning for discharge  - Provide patient education as appropriate  Outcome: Progressing  Goal: Maintains/Returns to pre admission functional level  Description: INTERVENTIONS:  - Perform BMAT or MOVE assessment daily    - Set and communicate daily mobility goal to care team and patient/family/caregiver     - Collaborate with rehabilitation services on mobility goals if consulted  - Perform Range of Motion  times a day  - Reposition patient every  hours  - Dangle patient  times a day  - Stand patient  times a day  - Ambulate patient  times a day  - Out of bed to chair  times a day   - Out of bed for meals  times a day  - Out of bed for toileting  - Record patient progress and toleration of activity level   Outcome: Progressing     Problem: Prexisting or High Potential for Compromised Skin Integrity  Goal: Skin integrity is maintained or improved  Description: INTERVENTIONS:  - Identify patients at risk for skin breakdown  - Assess and monitor skin integrity  - Assess and monitor nutrition and hydration status  - Monitor labs   - Assess for incontinence   - Turn and reposition patient  - Assist with mobility/ambulation  - Relieve pressure over bony prominences  - Avoid friction and shearing  - Provide appropriate hygiene as needed including keeping skin clean and dry  - Evaluate need for skin moisturizer/barrier cream  - Collaborate with interdisciplinary team   - Patient/family teaching  - Consider wound care consult   Outcome: Progressing     Problem: Nutrition/Hydration-ADULT  Goal: Nutrient/Hydration intake appropriate for improving, restoring or maintaining nutritional needs  Description: Monitor and assess patient's nutrition/hydration status for malnutrition  Collaborate with interdisciplinary team and initiate plan and interventions as ordered  Monitor patient's weight and dietary intake as ordered or per policy  Utilize nutrition screening tool and intervene as necessary  Determine patient's food preferences and provide high-protein, high-caloric foods as appropriate       INTERVENTIONS:  - Monitor oral intake, urinary output, labs, and treatment plans  - Assess nutrition and hydration status and recommend course of action  - Evaluate amount of meals eaten  - Assist patient with eating if necessary   - Allow adequate time for meals  - Recommend/ encourage appropriate diets, oral nutritional supplements, and vitamin/mineral supplements  - Order, calculate, and assess calorie counts as needed  - Recommend, monitor, and adjust tube feedings and TPN/PPN based on assessed needs  - Assess need for intravenous fluids  - Provide specific nutrition/hydration education as appropriate  - Include patient/family/caregiver in decisions related to nutrition  Outcome: Progressing     Problem: PAIN - ADULT  Goal: Verbalizes/displays adequate comfort level or baseline comfort level  Description: Interventions:  - Encourage patient to monitor pain and request assistance  - Assess pain using appropriate pain scale  - Administer analgesics based on type and severity of pain and evaluate response  - Implement non-pharmacological measures as appropriate and evaluate response  - Consider cultural and social influences on pain and pain management  - Notify physician/advanced practitioner if interventions unsuccessful or patient reports new pain  Outcome: Progressing     Problem: INFECTION - ADULT  Goal: Absence or prevention of progression during hospitalization  Description: INTERVENTIONS:  - Assess and monitor for signs and symptoms of infection  - Monitor lab/diagnostic results  - Monitor all insertion sites, i e  indwelling lines, tubes, and drains  - Monitor endotracheal if appropriate and nasal secretions for changes in amount and color  - Tensed appropriate cooling/warming therapies per order  - Administer medications as ordered  - Instruct and encourage patient and family to use good hand hygiene technique  - Identify and instruct in appropriate isolation precautions for identified infection/condition  Outcome: Progressing     Problem: SAFETY ADULT  Goal: Patient will remain free of falls  Description: INTERVENTIONS:  - Educate patient/family on patient safety including physical limitations  - Instruct patient to call for assistance with activity   - Consult OT/PT to assist with strengthening/mobility - Keep Call bell within reach  - Keep bed low and locked with side rails adjusted as appropriate  - Keep care items and personal belongings within reach  - Initiate and maintain comfort rounds  - Make Fall Risk Sign visible to staff  - Offer Toileting every  Hours, in advance of need  - Initiate/Maintain alarm  - Obtain necessary fall risk management equipment:   - Apply yellow socks and bracelet for high fall risk patients  - Consider moving patient to room near nurses station  Outcome: Progressing  Goal: Maintain or return to baseline ADL function  Description: INTERVENTIONS:  -  Assess patient's ability to carry out ADLs; assess patient's baseline for ADL function and identify physical deficits which impact ability to perform ADLs (bathing, care of mouth/teeth, toileting, grooming, dressing, etc )  - Assess/evaluate cause of self-care deficits   - Assess range of motion  - Assess patient's mobility; develop plan if impaired  - Assess patient's need for assistive devices and provide as appropriate  - Encourage maximum independence but intervene and supervise when necessary  - Involve family in performance of ADLs  - Assess for home care needs following discharge   - Consider OT consult to assist with ADL evaluation and planning for discharge  - Provide patient education as appropriate  Outcome: Progressing  Goal: Maintains/Returns to pre admission functional level  Description: INTERVENTIONS:  - Perform BMAT or MOVE assessment daily    - Set and communicate daily mobility goal to care team and patient/family/caregiver  - Collaborate with rehabilitation services on mobility goals if consulted  - Perform Range of Motion  times a day  - Reposition patient every  hours    - Dangle patient  times a day  - Stand patient  times a day  - Ambulate patient  times a day  - Out of bed to chair  times a day   - Out of bed for meals  times a day  - Out of bed for toileting  - Record patient progress and toleration of activity level   Outcome: Progressing     Problem: DISCHARGE PLANNING  Goal: Discharge to home or other facility with appropriate resources  Description: INTERVENTIONS:  - Identify barriers to discharge w/patient and caregiver  - Arrange for needed discharge resources and transportation as appropriate  - Identify discharge learning needs (meds, wound care, etc )  - Arrange for interpretive services to assist at discharge as needed  - Refer to Case Management Department for coordinating discharge planning if the patient needs post-hospital services based on physician/advanced practitioner order or complex needs related to functional status, cognitive ability, or social support system  Outcome: Progressing     Problem: Knowledge Deficit  Goal: Patient/family/caregiver demonstrates understanding of disease process, treatment plan, medications, and discharge instructions  Description: Complete learning assessment and assess knowledge base    Interventions:  - Provide teaching at level of understanding  - Provide teaching via preferred learning methods  Outcome: Progressing

## 2022-05-03 NOTE — CASE MANAGEMENT
Case Management Discharge Planning Note    Patient name Jaclyn Coats  Location [facility] 5 /E5 MS 0-* MRN [id number]  : 1955 Date 5/3/2022       Current Admission Date: 2022  Current Admission Diagnosis:Septic shock [facility])   Patient Active Problem List    Diagnosis Date Noted    History of pulmonary embolism 2022    [facility] 2022    Slow transit constipation 2021    Chronic right-sided low back pain without sciatica 2021    Cellulitis of right lower extremity 2021    Septic shock ([facility] ) 2021    Morbid obesity ([facility] ) 2021    Hypogammaglobulinemia, acquired ([facility] ) 2019    Hypertrophic cardiomyopathy ([place] [address] ) 2019    Low immunoglobulin level 2019    LVH (left ventricular hypertrophy) 2019    Postnasal drip 2019    Dyspnea on exertion 2019    Mitral valve disorder     CAP (community acquired pneumonia) 2019    Elevated troponin 2019    Increased risk of breast cancer 6225    Other complicated headache syndrome 2018    Nausea & vomiting 2018    SIRS (systemic inflammatory response syndrome) ([facility] ) 2018    Post-traumatic osteoarthritis 2018    Opiate analgesic use agreement exists 2016    BRCA1 positive 2016    Charcot's joint 2016    Class 2 obesity 2016    Wegener's granulomatosis with renal involvement ([facility] ) 01/15/2016    Cancer of ovary ([facility] ) 2015    Ovarian cancer ([facility] ) 2015    Increased frequency of urination 2014    Chronic kidney disease, stage III (moderate) (HCC) 2014    Chronic pain disorder 10/24/2013    GERD (gastroesophageal reflux disease) 2013    Dyslipidemia 2013    Lymphedema 11/15/2012    Anxiety 2012    Benign essential hypertension 2012    Depression 2012      LOS (days): 5  Geometric Mean LOS (GMLOS) (days): 4 80  Days to GMLOS:-0 6     OBJECTIVE:  Risk of Unplanned Readmission Score: 20         Current admission status: Inpatient   Preferred Pharmacy:   [address], [address] [place] [address]  Phone: [phone] Fax: [phone] [address] [address]  Phone: [phone] Fax: [phone]    Primary Care Provider: Jacki Leiva DO    Primary Insurance: MEDICARE  Secondary Insurance: AARP    DISCHARGE DETAILS:    Discharge planning discussed with[de-identified] spouse  Freedom of Choice: Yes  Comments - Freedom of Choice: spouse [facility] [place] [address]  CM contacted family/caregiver?: Yes  Were Treatment Team discharge recommendations reviewed with patient/caregiver?: Yes  Did patient/caregiver verbalize understanding of patient care needs?: Yes  Were patient/caregiver advised of the risks associated with not following Treatment Team discharge recommendations?: Yes    Contacts  Patient Contacts: Anita Davila  Relationship to Patient[de-identified] Family  Contact Method: Phone  Phone Number: [phone]  Reason/Outcome: Emergency Contact,Continuity of [address]  Miya Redman [address]         Is the patient interested in [place] [address] at discharge?: Yes  [address] requested[de-identified] Occupational Katherine Pacheco ([facility] [place] [address])  [facility] Name[de-identified] Other ([facility] [place] [address])  [address] [place] [address] Provider[de-identified] PCP  Home Health Services Needed[de-identified] Strengthening/Theraputic Exercises to Improve Function,Gait/ADL Training,Evaluate Functional Status and Safety,Other (comment)  Homebound Criteria Met[de-identified] Uses an Assist Device (i e  cane, walker, etc),Requires the Assistance of Another Person for Safe Ambulation or to Leave the Home  Supporting Clincal Findings[de-identified] Limited Endurance,Fatigues Easliy in Short Distances    Other Referral/Resources/Interventions Provided:  Interventions: Vipul       Treatment Team Recommendation: Home with [facility]  Discharge Destination Plan[de-identified] Home with [place] at Discharge : [facility] script for [facility] written on paper, there was an electronic issue placing order in [facility] [address]   CM faxed the script to [facility] and gave paper script to patient with discharge paperwork

## 2022-05-03 NOTE — PLAN OF CARE
Problem: PHYSICAL THERAPY ADULT  Goal: Performs mobility at highest level of function for planned discharge setting  See evaluation for individualized goals  Description: Treatment/Interventions: LE strengthening/ROM,Therapeutic exercise,Endurance training,Patient/family training,Bed mobility,Spoke to nursing,OT          See flowsheet documentation for full assessment, interventions and recommendations  Note: Prognosis: Good  Problem List: Decreased strength,Decreased range of motion,Decreased endurance,Impaired balance,Decreased mobility,Obesity,Decreased skin integrity,Pain  Assessment: Maryjo Arana was seen for a f/u session  Session focused on bed mobility transfers, sit<>stand transfer, toilet transfers, and ambulation  Significant compensatory techniques needed to stand secondary to RLE edema limiting ROM and relative weakness secondary to reliance on lift chair at home  However w increased time able to transfer primarily at S level w use of environmental modifications (bedrails, grab bars)  Noted improved ambulatory balance today compared to prev session  However continues to be at risk for falls secondary to weakness and unsteadiness  Would consider HHPT pending progress however barrier to dc continues to be home alone; wife states if needed spouse can rearrange schedule to be home  Barriers to Discharge: Decreased caregiver support  Barriers to Discharge Comments: +home alone     PT Discharge Recommendation: Home with home health rehabilitation (vs STR pending continued progress)          See flowsheet documentation for full assessment

## 2022-05-03 NOTE — OCCUPATIONAL THERAPY NOTE
Occupational Therapy Progress Note     Patient Name: Kiko Bourgeois  CAHTJ'W Date: 5/3/2022  Problem List  Principal Problem:    Septic shock ([place] [address] )  Active Problems:    Wegener's granulomatosis with renal involvement ([place] [address] )    Cancer of ovary ([place] [address] )    Chronic kidney disease, stage III (moderate) (HCC)    Chronic pain disorder    Elevated troponin    Hypertrophic cardiomyopathy ([place] [address] )    Hypogammaglobulinemia, acquired ([place] [address] )    Cellulitis of right lower extremity    History of pulmonary embolism    Chronic diastolic heart failure ([place] [address] )          05/03/22 1126   OT Last Visit   OT Visit Date 05/03/22   Note Type   Note Type Treatment   Restrictions/Precautions   Weight Bearing Precautions Per Order No   Other Precautions Fall Risk;Pain   General   Response to Previous Treatment Patient with no complaints from previous session   Pain Assessment   Pain Assessment Tool 0-10   Pain Score No Pain   ADL   Grooming Assistance 5  Supervision/Setup   Grooming Deficit Setup;Supervision/safety; Increased time to complete;Wash/dry hands;Brushing hair   UB Dressing Assistance 5  Supervision/Setup   UB Dressing Deficit Setup;Supervision/safety; Increased time to complete   LB Dressing Assistance 3  Moderate Assistance   LB Dressing Deficit Setup;Verbal cueing;Supervision/safety; Increased time to complete; Don/doff R shoe;Don/doff L shoe   Toileting Assistance  4  Minimal Assistance   Toileting Deficit Setup;Steadying;Verbal cueing;Supervison/safety; Increased time to complete;Perineal hygiene   Functional Standing Tolerance   Time 3-4 mins   Activity Dynamic standing balance activity    Comments Close supervision w/ use of RW  No LOBs noted   Bed Mobility   Supine to Sit 4  Minimal assistance   Additional items Assist x 1;HOB elevated; Bedrails; Increased time required;Verbal cues   Sit to Supine Unable to assess   Additional Comments Pt OOB on BSC at end of session with  Call bell within reach   All needs met and pt reports no further questions for OT at this time  Transfers   Sit to Stand 5  Supervision   Additional items Armrests; Increased time required;Verbal cues   Stand to Sit 5  Supervision   Additional items Armrests; Increased time required;Verbal cues   Toilet transfer 4  Minimal assistance   Additional items Assist x 1; Increased time required;Verbal cues;Standard toilet  (grab bar use)   Additional Comments Cues for safe technique and hand placement   Functional Mobility   Functional Mobility 5  Supervision   Additional Comments Assist x1   Additional items Rolling walker   Cognition   Overall Cognitive Status WFL   Arousal/Participation Alert; Cooperative   Attention Within functional limits   Orientation Level Oriented X4   Memory Within functional limits   Following Commands Follows one step commands without difficulty   Activity Tolerance   Activity Tolerance Patient limited by fatigue;Patient tolerated treatment well   Medical Staff Made Aware Marilu Mullen, PT; Cathy Murrieta, JUDSON   Assessment   Assessment Pt seen for OT treatment session focusing on functional activity tolerance, bed mobility, ADLs, and functional transfers/mobility  Pt alert and cooperative throughout session  Pt lying supine at start of session, completing bed mobility (supine>sit) w/ Min A with assist for trunk control  LB dressing completed while seated EOB w/ Mod A with assist to don R shoe  Pt reports spouse assists w/ donning R shoe in home environment  Transfers (sit<>stand, RW<>standard toilet) completed w/ Min A-Supervision w/ Min A required to initiate forward weight shift from standard toilet height for sit>stand and Min A for controlled descent to standard toilet  Pt required increased time to complete all transitions  Pt progressed to Supervision for functional mobility with use of RW   Toileting tasks completed w/ Min A w/ assist for balance/steadying when standing for perineal hygiene tasks 2* decreased dynamic standing balance demonstrated when standing w/ unilateral UE support  Grooming tasks completed w/ Supervision while standing at sink to wash/dry hands and brush hair  UB dressing completed w/ Supervision w/ setup required and increased time to complete  Pt OOB on BSC at end of session with  Call bell within reach  All needs met and pt reports no further questions for OT at this time  Continue to recommend Home OT vs STR pending continued progress and available home support when medically cleared  OT to follow pt on caseload  Plan   Treatment Interventions ADL retraining;UE strengthening/ROM; Functional transfer training; Endurance training;Patient/family training;Equipment evaluation/education; Compensatory technique education; Activityengagement   Goal Expiration Date 05/12/22   OT Treatment Day 1   OT Frequency 3-5x/wk   Recommendation   OT Discharge Recommendation Home with home health rehabilitation  (Home OT vs STR pending progress and home support)   OT - OK to Discharge Yes  (when medically cleared)   Additional Comments  The patient's raw score on the AM-PAC Daily Activity inpatient short form is 18, standardized score is 38 66, less than 39 4  Patients at this level are likely to benefit from discharge to post-acute rehabilitation services  Please refer to the recommendation of the Occupational Therapist for safe discharge planning  AM-PAC Daily Activity Inpatient   Lower Body Dressing 2   Bathing 3   Toileting 3   Upper Body Dressing 3   Grooming 3   Eating 4   Daily Activity Raw Score 18   Daily Activity Standardized Score (Calc for Raw Score >=11) 38 66   AM-[facility] Applied Cognition Inpatient   Following a Speech/Presentation 4   Understanding Ordinary Conversation 4   Taking Medications 3   Remembering Where Things Are Placed or Put Away 4   Remembering List of 4-5 Errands 4   Taking Care of Complicated Tasks 3   Applied Cognition Raw Score 22   Applied Cognition Standardized Score 47 83          UPDATED GOALS    1   Pt will improve activity tolerance to G for min 30 min txment sessions for increase engagement in functional tasks    2  Pt will complete bed mobility at a Mod I level w/ G balance/safety demonstrated to decrease caregiver assistance required     3  Pt will complete UB dressing/self care w/ mod I using adaptive device and DME as needed     4  Pt will complete LB dressing/self care w/ mod I using adaptive device and DME as needed    5  Pt will complete toileting w/ mod I w/ G hygiene/thoroughness using DME as needed    6  Pt will improve functional transfers to Mod I on/off all surfaces using DME as needed w/ G balance/safety     7  Pt will improve functional mobility during ADL/IADL/leisure tasks to Mod I using DME as needed w/ G balance/safety     8  Pt will be attentive 100% of the time during ongoing cognitive assessment w/ G participation to assist w/ safe d/c planning/recommendations    9  Pt will demonstrate G carryover of pt/caregiver education and training as appropriate w/o cues w/ good tolerance to increase safety during functional tasks    10  Pt will increase BUE strength by 1MM grade via AROM exercises to increase independence in ADLs and transfers    11  Pt will verbalize 3 potential fall hazards and identify appropriate compensatory techniques to decrease fall risk in home environment     12   Pt will increase standing tolerance to 8-10 mins with Fair+ dynamic standing balance to increase safety during participation in ADLs       Uyen Contreras OTR/L

## 2022-05-06 ENCOUNTER — TELEPHONE (OUTPATIENT)
Dept: HEMATOLOGY ONCOLOGY | Facility: CLINIC | Age: 67
End: 2022-05-06

## 2022-05-06 NOTE — TELEPHONE ENCOUNTER
Appointment Cancellation Or Reschedule     Person calling in Patient    Provider Dr Rachel Sher   Office Visit Date and Time 5/6/11 @ 11am   Office Visit Location [facility]   Did patient want to reschedule their office appointment? If so, when was it scheduled to? Yes 5/20/22 @ 11:15am    Is this patient calling to reschedule an infusion appointment? No    When is their next infusion appointment? na   Is this patient a Chemo patient? no   Reason for Cancellation or Reschedule Patient is unable to keep appt     If the patient is a treatment patient, please route this to the office nurse  If the patient is not on treatment, please route to the office MA

## 2022-05-11 ENCOUNTER — TELEPHONE (OUTPATIENT)
Dept: NEPHROLOGY | Facility: CLINIC | Age: 67
End: 2022-05-11

## 2022-05-20 ENCOUNTER — OFFICE VISIT (OUTPATIENT)
Dept: SURGICAL ONCOLOGY | Facility: CLINIC | Age: 67
End: 2022-05-20
Payer: MEDICARE

## 2022-05-20 VITALS
RESPIRATION RATE: 16 BRPM | DIASTOLIC BLOOD PRESSURE: 70 MMHG | HEIGHT: 63 IN | BODY MASS INDEX: 39.51 KG/M2 | TEMPERATURE: 97.8 F | HEART RATE: 97 BPM | SYSTOLIC BLOOD PRESSURE: 140 MMHG | WEIGHT: 223 LBS | OXYGEN SATURATION: 95 %

## 2022-05-20 DIAGNOSIS — Z15.01 BRCA1 POSITIVE: ICD-10-CM

## 2022-05-20 DIAGNOSIS — Z91.89 INCREASED RISK OF BREAST CANCER: Primary | ICD-10-CM

## 2022-05-20 DIAGNOSIS — Z12.31 ENCOUNTER FOR SCREENING MAMMOGRAM FOR MALIGNANT NEOPLASM OF BREAST: ICD-10-CM

## 2022-05-20 DIAGNOSIS — Z15.09 BRCA1 POSITIVE: ICD-10-CM

## 2022-05-20 PROCEDURE — 99213 OFFICE O/P EST LOW 20 MIN: CPT | Performed by: SURGERY

## 2022-05-20 NOTE — PROGRESS NOTES
Surgical Oncology Follow Up       [facility] [place] [address]  [facility] [id number] [phone]    Megan Cord  1955  [id number]  [facility] [address]  [facility] [id number] [phone]    Diagnoses and all orders for this visit:    Increased risk of breast cancer  -     Mammo screening bilateral w 3d & cad; Future    BRCA1 positive    Encounter for screening mammogram for malignant neoplasm of breast   -     Mammo screening bilateral w 3d & cad; Future        Chief Complaint   Patient presents with    Follow-up       Return in about 6 months (around 11/20/2022) for Office Visit, Imaging - See orders, with Suzette  Oncology History    No history exists  Diagnosis and Staging: BRCA1 positive   Personal History of ovarian cancer  Family history of breast and ovarian cancer     Treatment History: FAMILIA BSO and cytoreductive surgery for ovarian cancer  Chemotherapy   Current Therapy:    Disease Status: MANUELITO     History of Present Illness:  Patient returns in follow-up of her high risk breast cancer, since she is BRCA1 positive  Her ABUS from April 21, 2022 was BI-RADS 2  I personally reviewed the films  She notices no changes on self exam   She is still doing well from her ovarian cancer standpoint  She was recently hospitalized with lower extremity swelling and her history of Wegener's granulomatosis  Review of Systems  Complete ROS Surg Onc:   Complete ROS Surg Onc:   Constitutional: The patient denies new or recent history of general fatigue, no recent weight loss, no change in appetite  Eyes: No complaints of visual problems, no scleral icterus  ENT: no complaints of ear pain, no hoarseness, no difficulty swallowing,  no tinnitus and no new masses in head, oral cavity, or neck     Cardiovascular: No complaints of chest pain, no palpitations, no ankle edema  Respiratory: No complaints of shortness of breath, no cough  Gastrointestinal: No complaints of jaundice, no bloody stools, no pale stools  Genitourinary: No complaints of dysuria, no hematuria, no nocturia, no frequent urination, no urethral discharge  Musculoskeletal: No complaints of weakness, paralysis, joint stiffness or arthralgias  Integumentary: No complaints of rash, no new lesions  Neurological: No complaints of convulsions, no seizures, no dizziness  Hematologic/Lymphatic: No complaints of easy bruising  Endocrine:  No hot or cold intolerance  No polydipsia, polyphagia, or polyuria  Allergy/immunology:  No environmental allergies  No food allergies  Not immunocompromised  Skin:  No pallor or rash  No wound          Patient Active Problem List   Diagnosis    Wegener's granulomatosis with renal involvement ([facility] )    Charcot's joint    Anxiety    Benign essential hypertension    BRCA1 positive    Cancer of ovary (HCC)    Chronic kidney disease, stage III (moderate) (HCC)    Chronic pain disorder    Class 2 obesity    Depression    Dyslipidemia    GERD (gastroesophageal reflux disease)    Lymphedema    Opiate analgesic use agreement exists    Ovarian cancer ([facility] )    Post-traumatic osteoarthritis    Increased frequency of urination    Other complicated headache syndrome    Nausea & vomiting    SIRS (systemic inflammatory response syndrome) (HCC)    Increased risk of breast cancer    CAP (community acquired pneumonia)    Elevated troponin    Mitral valve disorder    Postnasal drip    Dyspnea on exertion    LVH (left ventricular hypertrophy)    Low immunoglobulin level    Hypertrophic cardiomyopathy (HCC)    Hypogammaglobulinemia, acquired ([facility] )    Morbid obesity ([facility]    Septic shock ([facility] )    Chronic right-sided low back pain without sciatica    Cellulitis of right lower extremity    Slow transit constipation    History of pulmonary embolism    Chronic diastolic heart failure ([facility] )     Past Medical History:   Diagnosis Date    BRCA1 positive     Cancer ([facility] )     ovarian Ca with chemo    History of chemotherapy     ovarian cancer     History of pulmonary embolus (PE) & DVT 2007    post-op FAMILIA/ omenectomy    Lymphedema     MRSA (methicillin resistant Staphylococcus aureus) 2017    nasal swab negative 4/3/19    Ovarian cancer ([facility] )     CYST REMOVED RIGHT OVARY IN   HYSTERECTOMY 07       Past Surgical History:   Procedure Laterality Date    APPENDECTOMY  1998    laparoscopic    BILATERAL SALPINGOOPHORECTOMY  2007    BREAST BIOPSY Right 2016    BREAST BIOPSY Left 07/15/2020    BREAST BIOPSY Left 2021    stereo     SECTION      HYSTERECTOMY  2007    Omentectomy and lymph node biopsy at [facility] BIOPSY RIGHT (ALL INC) Right 2021    OOPHORECTOMY Bilateral 2007    OTHER SURGICAL HISTORY      right lower extremity due to trauma    CA TREAT TIBIAL SHAFT FX, INTRAMED IMPLANT Right 2017    Procedure: INSERTION NAIL IM TIBIA;  Surgeon: Maico Branch MD;  Location: [facility];  Service: Orthopedics    US GUIDANCE BREAST BIOPSY LEFT EACH ADDITIONAL Left 7/15/2020    US GUIDED BREAST BIOPSY LEFT COMPLETE Left 7/15/2020    US GUIDED BREAST BIOPSY RIGHT COMPLETE Right 2016    US GUIDED BREAST BIOPSY RIGHT COMPLETE Right 2021     Family History   Problem Relation Age of Onset    Breast cancer Mother         28    Pancreatic cancer Mother     Ovarian cancer Maternal Grandmother     Breast cancer Maternal Aunt 39    No Known Problems Paternal Aunt     No Known Problems Father     No Known Problems Maternal Grandfather     No Known Problems Paternal Grandmother     No Known Problems Paternal Grandfather     Lung cancer Half-Sister      Social History     Socioeconomic History    Marital status: /Civil Union     Spouse name: Not on file    Number of children: Not on file    Years of education: Not on file    Highest education level: Not on file   Occupational History    Not on file   Tobacco Use    Smoking status: Never Smoker    Smokeless tobacco: Never Used   Vaping Use    Vaping Use: Never used   Substance and Sexual Activity    Alcohol use: Yes    Drug use: Not Currently    Sexual activity: Yes     Partners: Male     Birth control/protection: None   Other Topics Concern    Not on file   Social History Narrative    Not on file     Social Determinants of Health     Financial Resource Strain: Not on file   Food Insecurity: No Food Insecurity    Worried About Running Out of Food in the Last Year: Never true    Huber of Food in the Last Year: Never true   Transportation Needs: No Transportation Needs    Lack of Transportation (Medical): No    Lack of Transportation (Non-Medical): No   Physical Activity: Not on file   Stress: Not on file   Social Connections: Not on file   Intimate Partner Violence: Not on file   Housing Stability: Low Risk     Unable to Pay for Housing in the Last Year: No    Number of Places Lived in the Last Year: 1    Unstable Housing in the Last Year: No       Current Outpatient Medications:     acetaminophen (TYLENOL) 325 mg tablet, Take 2 tablets by mouth every 6 (six) hours as needed for mild pain, Disp: 30 tablet, Rfl: 0    Calcium Carbonate-Vit D-Min (CALCIUM 1200 PO), Take 3 tablets by mouth daily , Disp: , Rfl:     DULoxetine (CYMBALTA) 60 mg delayed release capsule, Take 60 mg by mouth daily  , Disp: , Rfl: 0    LORazepam (ATIVAN) 0 5 mg tablet, Take 1 tablet (0 5 mg total) by mouth 2 (two) times a day as needed for anxiety Take 1 tablet as needed every 8 hours  , Disp: , Rfl:     metoprolol tartrate (LOPRESSOR) 25 mg tablet, TAKE 1/2 TABLET(12 5MG) BY MOUTH EVERY 12 HOURS, Disp: 90 tablet, Rfl: 3    Multiple Vitamins-Minerals (MULTIVITAMIN ADULT PO), Take 1 capsule by mouth daily , Disp: , Rfl:     omeprazole (PriLOSEC) 20 mg delayed release capsule, Take 20 mg by mouth daily  , Disp: , Rfl:     oxyCODONE (OxyCONTIN) 40 mg 12 hr tablet, Take 40 mg by mouth every 12 (twelve) hours, Disp: , Rfl:     OXYCODONE HCL PO, Take 15 mg by mouth 2 (two) times a day as needed , Disp: , Rfl:     senna-docusate sodium (SENOKOT S) 8 6-50 mg per tablet, Take 1 tablet by mouth daily at bedtime, Disp: 30 tablet, Rfl: 0    TOVIAZ 8 MG TB24, Take 8 mg by mouth daily at bedtime , Disp: , Rfl:     bisacodyl (DULCOLAX) 5 mg EC tablet, Take 5 mg by mouth daily as needed for constipation  (Patient not taking: Reported on 5/20/2022), Disp: , Rfl:     furosemide (LASIX) 40 mg tablet, Take 1 tablet (40 mg total) by mouth daily for 6 days (Patient not taking: Reported on 5/20/2022), Disp: 6 tablet, Rfl: 0  Allergies   Allergen Reactions    Iodinated Diagnostic Agents Shortness Of Breath    Omnipaque [Iohexol] Shortness Of Breath    Other Shortness Of Breath     IVP dye, x ray dye 3    Iodides      Other reaction(s): SWELLING, SOB    Methotrexate Nausea Only     Headache and nausea    Methotrexate Derivatives Headache     Vitals:    05/20/22 1115   BP: 140/70   Pulse: 97   Resp: 16   Temp: 97 8 °F (36 6 °C)   SpO2: 95%       Physical Exam  Constitutional: General appearance: The Patient is well-developed and well-nourished who appears the stated age in no acute distress  Patient is pleasant and talkative  HEENT:  Normocephalic  Sclerae are anicteric  Mucous membranes are moist  Neck is supple without adenopathy  No JVD  Chest: The lungs are clear to auscultation  Cardiac: Heart is regular rate  Abdomen: Abdomen is soft, non-tender, non-distended and without masses  Extremities: There is no clubbing or cyanosis  Bilateral lower extremity edema, left greater than right Symmetric    Neuro: Grossly nonfocal  Gait is normal      Lymphatic: No evidence of cervical adenopathy bilaterally  No evidence of axillary adenopathy bilaterally  Skin: Warm, anicteric  Psych:  Patient is pleasant and talkative  Breasts:  Symmetric  No dominant masses, skin changes, or nipple discharge in either breast   No axillary or supraclavicular adenopathy bilaterally  Pathology:  [unfilled]    Labs:      Imaging  CT abdomen pelvis wo contrast    Result Date: 4/28/2022  Narrative: CT ABDOMEN AND PELVIS WITHOUT IV CONTRAST INDICATION:   Abdominal pain, acute, nonlocalized sepsis  COMPARISON:  CT of abdomen pelvis on July 6, 2021  TECHNIQUE:  CT examination of the abdomen and pelvis was performed without intravenous contrast   Axial, sagittal, and coronal 2D reformatted images were created from the source data and submitted for interpretation  Radiation dose length product (DLP) for this visit:  2046 mGy-cm   This examination, like all CT scans performed in the [facility], was performed utilizing techniques to minimize radiation dose exposure, including the use of iterative reconstruction and automated exposure control  Enteric contrast was not administered  FINDINGS: ABDOMEN Evaluation of intra-abdominal organs is limited to lack of IV contrast  Evaluation of the bowel is limited to lack of enteric contrast  LOWER CHEST:  See CT of the chest performed earlier the same day  LIVER/BILIARY TREE:  Unremarkable  GALLBLADDER:  No calcified gallstones  No pericholecystic inflammatory change  SPLEEN:  Stable hypodensities measuring up to 2 7 cm in the spleen  PANCREAS:  Unremarkable  ADRENAL GLANDS:  Unremarkable  KIDNEYS/URETERS:  Unremarkable  No hydronephrosis  STOMACH AND BOWEL:  Unremarkable  APPENDIX:  No findings to suggest appendicitis  ABDOMINOPELVIC CAVITY:  No ascites  No pneumoperitoneum  No lymphadenopathy  VESSELS:  Infrarenal IVC filter in place  PELVIS REPRODUCTIVE ORGANS:  Surgical changes of prior hysterectomy  URINARY BLADDER:  Unremarkable   ABDOMINAL WALL/INGUINAL REGIONS:  Subcutaneous fat stranding overlying the proximal right thigh  There right inguinal lymphadenopathy measuring up to 1 3 cm in short axis  OSSEOUS STRUCTURES:  Motion limited examination  Fixation of the right pelvic pelvis with severe posttraumatic degenerative change of the right hip  No acute fracture or destructive osseous lesion  Impression: 1  No acute inflammatory process identified within the abdomen or pelvis  2   Subcutaneous fat stranding overlying the proximal right thigh with right inguinal lymphadenopathy suggestive of right lower extremity infectious process  See CT of the right lower extremity for further evaluation  Workstation performed: [id number]     XR chest 1 view portable    Result Date: 4/28/2022  Narrative: CHEST INDICATION:   fever/hypoxia  COMPARISON:  7/6/2021  EXAM PERFORMED/VIEWS:  XR CHEST PORTABLE FINDINGS: Cardiomediastinal silhouette appears prominent however this could be accentuated by portable technique and low inspiratory effort  Lung volumes are low  Diffuse bilateral airspace disease can be secondary to pneumonia versus pulmonary edema  No sizable pleural effusion  No pneumothorax  Osseous structures appear within normal limits for patient age  Impression: Hypoinflated lungs  Diffuse bilateral airspace disease can be secondary to pneumonia versus pulmonary edema  Workstation performed: [id number]     CT head without contrast    Result Date: 4/27/2022  Narrative: CT BRAIN - WITHOUT CONTRAST INDICATION:   Altered mental status  COMPARISON:  None  TECHNIQUE:  CT examination of the brain was performed  In addition to axial images, sagittal and coronal 2D reformatted images were created and submitted for interpretation  Radiation dose length product (DLP) for this visit:  1899 mGy-cm     This examination, like all CT scans performed in the [facility], was performed utilizing techniques to minimize radiation dose exposure, including the use of iterative reconstruction and automated exposure control  IMAGE QUALITY:  Diagnostic  FINDINGS: PARENCHYMA: Decreased attenuation is noted in periventricular and subcortical white matter demonstrating an appearance that is statistically most likely to represent mild microangiopathic change  Hypodensities in the regions of the bilateral basal ganglia are similar to prior examination which may represent chronic lacunar infarcts versus dilated perivascular spaces  No CT signs of acute infarction  No intracranial mass, mass effect or midline shift  No acute parenchymal hemorrhage  VENTRICLES AND EXTRA-AXIAL SPACES:  Normal for the patient's age  VISUALIZED ORBITS AND PARANASAL SINUSES:  Unremarkable  CALVARIUM AND EXTRACRANIAL SOFT TISSUES:  Normal      Impression: No acute intracranial hemorrhage, midline shift, or mass effect  Workstation performed: [id number]     CT chest without contrast    Result Date: 4/27/2022  Narrative: CT CHEST WITHOUT IV CONTRAST INDICATION:   Hypoxia  Wegener's  COMPARISON:  11/7/2019 TECHNIQUE: CT examination of the chest was performed without intravenous contrast   Axial, sagittal, and coronal 2D reformatted images were created from the source data and submitted for interpretation  Radiation dose length product (DLP) for this visit:  759 mGy-cm   This examination, like all CT scans performed in the [facility], was performed utilizing techniques to minimize radiation dose exposure, including the use of iterative reconstruction and automated exposure control  FINDINGS: LUNGS:  Bibasilar atelectasis and airspace consolidation in the dependent portions of the lower lobes  Interstitial and groundglass opacities in the upper lungs  There is no tracheal or endobronchial lesion  PLEURA:  Trace amount of pleural fluid bilaterally  HEART/GREAT VESSELS: Stable mild cardiomegaly  No thoracic aortic aneurysm  MEDIASTINUM AND EMILY:  No lymphadenopathy   CHEST WALL AND LOWER NECK:  Unremarkable  VISUALIZED STRUCTURES IN THE UPPER ABDOMEN:  Stable low-attenuation 3 3 cm lesion in the spleen could represent a cyst  OSSEOUS STRUCTURES:  No acute fracture or destructive osseous lesion  Impression: 1  Groundglass and interstitial airspace disease in the upper lungs, similar appearance to the prior exam suggesting chronic interstitial pneumonitis  2   Atelectasis and airspace consolidation in the dependent portions of the lung bases may represent aspiration and/or pneumonia  Workstation performed: [id number]     XR chest portable ICU    Result Date: 4/29/2022  Narrative: CHEST INDICATION:   CVC placement  COMPARISON:  Chest radiograph from 4/27/2022 and 7/6/2021, chest CT from 4/27/2022  EXAM PERFORMED/VIEWS:  XR CHEST PORTABLE ICU FINDINGS:  Right jugular catheter in upper right atrium  Barely imaged IVC filter  Cardiomediastinal silhouette appears unremarkable  Persistent patchy bilateral groundglass opacity and left base consolidation  No effusion or pneumothorax  Old right rib fractures  Impression: Right jugular catheter in right atrium with no pneumothorax  To be at the cavoatrial junction, it could be pulled back 2 cm  Persistent patchy bilateral groundglass opacity and left base consolidation  Workstation performed: [id number]     US breast screening bilateral complete (ABUS)    Result Date: 4/22/2022  Narrative: DIAGNOSIS: Encounter for breast cancer screening other than mammogram TECHNIQUE: Automated breast ultrasound images were obtained on the [employer] system  Imaging was obtained in standard projections including AP, lateral and medial for both breasts, inclusive of all four quadrants   COMPARISONS: Prior breast imaging dated: 07/28/2021, 07/28/2021, 07/26/2021, 07/26/2021, 07/26/2021, 07/26/2021, 07/26/2021, 07/23/2021, 07/23/2021, 07/12/2021, 01/08/2021, 01/08/2021, 07/15/2020, 07/15/2020, 07/15/2020, 07/09/2020, 07/09/2020, 07/07/2020, 05/14/2019, 2019, 2018, 2018, 2017, 2017, 2016, 2016, 2016, 2016, 2016, and 2015 RELEVANT HISTORY: Family Breast Cancer History: History of breast cancer in Mother, Maternal Aunt  Family Medical History: Family medical history includes breast cancer in 2 relatives (maternal aunt, mother) and ovarian cancer in maternal grandmother  Personal History: No known relevant hormone history  Surgical history includes breast biopsy, hysterectomy, and oophorectomy  Medical history includes ovarian cancer, BRCA 1 positive, and history of chemotherapy  RISK ASSESSMENT: 5 Year Tyrer-Cuzick: 6 44 % 10 Year Tyrer-Cuzick: 10 95 % Lifetime Tyrer-Cuzick: 17 29 % TISSUE COMPOSITION: Homogeneous background echotexture - fat INDICATION: Tasha Martin is a 69 y o  female with dense breasts presenting for automated breast ultrasound screening  Several benign oil cysts in the left breast are again noted  FINDINGS: Bilateral There are no suspicious masses or areas of architectural distortion  Impression:  No evidence for malignancy  Automated breast ultrasound is an adjunct, not a replacement, for routine yearly screening mammography  ASSESSMENT/BI-RADS CATEGORY: Left: 2 - Benign Right: 1 - Negative Overall: 2 - Benign RECOMMENDATION:      - Return to routine screening for both breasts  Workstation ID: [id number]    VAS lower limb venous duplex study, complete bilateral    Result Date: 2022  Narrative:  [facility] REPORT CLINICAL: Indications: Patient presents with elevated D-dimer and bilateral lower extremity edema and pain  Operative History: Appendectomy   IVC filter- [place] RLE vein stripping Right Shoulder Arthroscopy [facility]  Risk Factors The patient has history of Obesity and DVT  CONCLUSION: Impression: RIGHT LOWER LIMB: No evidence of acute or chronic deep vein thrombosis  No evidence of superficial thrombophlebitis noted   Doppler evaluation shows a normal response to augmentation maneuvers  Posterior tibial and anterior tibial arterial Doppler waveforms are triphasic/hyperemic  LEFT LOWER LIMB: No evidence of acute or chronic deep vein thrombosis  No evidence of superficial thrombophlebitis noted  Doppler evaluation shows a normal response to augmentation maneuvers  Posterior tibial and anterior tibial arterial Doppler waveforms are triphasic  Technically difficult/limited study  SIGNATURE: Electronically Signed by: Claudine Lema MD on 2022-04-28 07:48:51 PM    US bedside procedure    Result Date: 4/29/2022  Narrative: [phone]  7 [id number]  1 [id number]    US bedside procedure    Result Date: 4/29/2022  Narrative: 2 [phone]  0 [id number]  1 [id number]    CT lower extremity wo contrast right    Result Date: 4/29/2022  Narrative: CT right lower extremity without IV contrast INDICATION: r/o nec fasc  Right lower extremity cellulitis  Sepsis  COMPARISON: CT prior day,7/6/21 TECHNIQUE: CT examination of the above was performed  This examination, like all CT scans performed in the [facility], was performed utilizing techniques to minimize radiation dose exposure, including the use of iterative reconstruction  and automated exposure control software  Sagittal and coronal two dimensional reconstructed images were also submitted for interpretation  Rad dose  1151 mGy-cm FINDINGS: OSSEOUS STRUCTURES: Right posterior acetabular ORIF without hardware complication Severe right hip osteoarthritis with collapse of much of the femoral head Tibial intramedullary nail with single proximal and single distal interlocking screws Sacralization of L5 Chronic remodeling changes noted between the fibula and calcaneus,, with hindfoot valgus, and talocalcaneal fusion VISUALIZED MUSCULATURE:  Fatty atrophy of the intrinsic foot musculature    Moderate amount of muscle atrophy seen throughout the leg and to a lesser extent the thigh muscles SOFT TISSUES:  Circumferential soft tissue swelling noted within the distal thigh extending inferiorly, within the lower leg this is more so affecting the medial portion, and extending into the medial and dorsal foot  No evidence of soft tissue air or large skin ulceration  Large amount of venous varicosities throughout the extremity Intermuscular fat throughout the thigh and lower leg appears preserved Prominent however nonenlarged right inguinal lymph nodes OTHER PERTINENT FINDINGS:  IVC filter noted     Impression: 1  No imaging evidence of necrotizing fasciitis, noting that this does not exclude the diagnosis  Correlate clinically for additional signs  2   Scattered soft tissue swelling without organized collection The major findings are in agreement with the preliminary report provided by [facility] which was provided shortly after completion of the exam  Workstation performed: [id number]     CT lower extremity wo contrast right    Result Date: 4/28/2022  Narrative: CT right lower extremity without IV contrast INDICATION: cellulitis/sepsis  COMPARISON: None  TECHNIQUE: CT examination of the above was performed  This examination, like all CT scans performed in the [facility], was performed utilizing techniques to minimize radiation dose exposure, including the use of iterative reconstruction  and automated exposure control software  Sagittal and coronal two dimensional reconstructed images were also submitted for interpretation  Rad dose  2321 mGy-cm FINDINGS: OSSEOUS STRUCTURES:  The patient is status post ORIF for a right acetabular fracture with grossly intact orthopedic hardware including the posterior acetabular column and right ischium  No evidence of hardware complication  There is severe likely posttraumatic right hip osteoarthritis with subcortical collapse of the right femoral head   The patient is status post ORIF for a right tibial fracture with IM steffi in proximal/distal interlocking screws, limited in its assessment due to patient motion throughout the examination but grossly intact  Advanced degenerative change throughout the visualized ankle with severe pes planus deformity  There also appears to be fusion across the subtalar articulation best seen on series 801 image 88  VISUALIZED MUSCULATURE:  Atrophic musculature noted throughout the foot and lower extremity regions suggesting limited use  SOFT TISSUES:  Subcutaneous edema noted right lower extremity consistent with nonspecific cellulitis predominantly at the level of the tibia fibula and ankle  No drainable collection identified  No deep soft tissue involvement or osseous destructive change to suggest osteomyelitis  OTHER PERTINENT FINDINGS:  None  Impression: 1  Right lower extremity and ankle subcutaneous edema consistent with nonspecific cellulitis without drainable collection, deep soft tissue involvement or gloria osseous destructive change to indicate osteomyelitis  2   Postoperative change noted after ORIF for a right acetabular, posterior column and ischial fracture without gross evidence of hardware complication, limited by beam hardening artifacts  Advanced posttraumatic right hip osteoarthritis including subcortical collapse right femoral head  Additional postoperative change status post ORIF for a right tibial fracture with IM steffi, again without hardware complication given limitations of patient motion and beam hardening artifacts  Concordant preliminary virtual radiologic findings were provided at 2:34 AM on 4/28/2022  Workstation performed: [id number]     Echo complete w/ contrast if indicated    Result Date: 4/29/2022  Narrative: Andrea Shea  Left Ventricle: Left ventricular cavity size is normal  Wall thickness is normal  There is mild asymmetric hypertrophy of the septal wall  The left ventricular ejection fraction is 65%   Systolic function is normal  Wall motion is normal  Diastolic function is normal  There is no LV dynamic obstruction  I reviewed the above laboratory and imaging data  Discussion/Summary:  61-year-old female with an increased risk of breast cancer due to BRCA1 genetic mutation  She has a personal history ovarian cancer  Her physical exam from a breast standpoint is normal   Her most recent imaging with [facility] was normal as well  She cannot tolerate an MRI due to her orthopedic injuries  She is due for her mammogram at the end of July  We will schedule this  I will see her again in 6 months for another clinical exam   She is agreeable to this  All her questions were answered

## 2022-05-20 NOTE — LETTER
May 20, 2022     Maeve Lagos DO  [address] [facility]  [address]  [address]    Patient: Carlos Eduardo Cruz   YOB: 1955   Date of Visit: 5/20/2022       Dear Dr Kael Corrales: Thank you for referring Abilio Mendenhall to me for evaluation  Below are my notes for this consultation  If you have questions, please do not hesitate to call me  I look forward to following your patient along with you  Sincerely,        Eddi Carrillo MD        CC: No Recipients  Eddi Carrillo MD  5/20/2022 11:44 AM  Sign when Signing Visit               Surgical Oncology Follow Up       [address] [facility] [address] [address] [phone]    Carlos Eduardo Cruz  1955  [id number]  [address] [facility] [address] [address] [phone]    Diagnoses and all orders for this visit:    Increased risk of breast cancer  -     Mammo screening bilateral w 3d & cad; Future    BRCA1 positive    Encounter for screening mammogram for malignant neoplasm of breast   -     Mammo screening bilateral w 3d & cad; Future        Chief Complaint   Patient presents with    Follow-up       Return in about 6 months (around 11/20/2022) for Office Visit, Imaging - See orders, with Suzette  Oncology History    No history exists  Diagnosis and Staging: BRCA1 positive   Personal History of ovarian cancer  Family history of breast and ovarian cancer     Treatment History: FAMILIA BSO and cytoreductive surgery for ovarian cancer  Chemotherapy   Current Therapy:    Disease Status: MANUELITO     History of Present Illness:  Patient returns in follow-up of her high risk breast cancer, since she is BRCA1 positive  Her ABUS from April 21, 2022 was BI-RADS 2  I personally reviewed the films  She notices no changes on self exam   She is still doing well from her ovarian cancer standpoint  She was recently hospitalized with lower extremity swelling and her history of Wegener's granulomatosis  Review of Systems  Complete ROS Surg Onc:   Complete ROS Surg Onc:   Constitutional: The patient denies new or recent history of general fatigue, no recent weight loss, no change in appetite  Eyes: No complaints of visual problems, no scleral icterus  ENT: no complaints of ear pain, no hoarseness, no difficulty swallowing,  no tinnitus and no new masses in head, oral cavity, or neck  Cardiovascular: No complaints of chest pain, no palpitations, no ankle edema  Respiratory: No complaints of shortness of breath, no cough  Gastrointestinal: No complaints of jaundice, no bloody stools, no pale stools  Genitourinary: No complaints of dysuria, no hematuria, no nocturia, no frequent urination, no urethral discharge  Musculoskeletal: No complaints of weakness, paralysis, joint stiffness or arthralgias  Integumentary: No complaints of rash, no new lesions  Neurological: No complaints of convulsions, no seizures, no dizziness  Hematologic/Lymphatic: No complaints of easy bruising  Endocrine:  No hot or cold intolerance  No polydipsia, polyphagia, or polyuria  Allergy/immunology:  No environmental allergies  No food allergies  Not immunocompromised  Skin:  No pallor or rash  No wound          Patient Active Problem List   Diagnosis    Wegener's granulomatosis with renal involvement ([facility] )    Charcot's joint    Anxiety    Benign essential hypertension    BRCA1 positive    Cancer of ovary ([facility] )    Chronic kidney disease, stage III (moderate) (HCC)    Chronic pain disorder    Class 2 obesity    Depression    Dyslipidemia    GERD (gastroesophageal reflux disease)    Lymphedema    Opiate analgesic use agreement exists    Ovarian cancer ([facility] )    Post-traumatic osteoarthritis    Increased frequency of urination    Other complicated headache syndrome    Nausea & vomiting    SIRS (systemic inflammatory response syndrome) (HCC)    Increased risk of breast cancer    CAP (community acquired pneumonia)    Elevated troponin    Mitral valve disorder    Postnasal drip    Dyspnea on exertion    LVH (left ventricular hypertrophy)    Low immunoglobulin level    Hypertrophic cardiomyopathy (HCC)    Hypogammaglobulinemia, acquired ([facility] )    Morbid obesity ([facility] )    Septic shock ([facility] )    Chronic right-sided low back pain without sciatica    Cellulitis of right lower extremity    Slow transit constipation    History of pulmonary embolism    Chronic diastolic heart failure (HCC)     Past Medical History:   Diagnosis Date    BRCA1 positive     Cancer ([facility] )     ovarian Ca with chemo    History of chemotherapy     ovarian cancer     History of pulmonary embolus (PE) & DVT 2007    post-op FAMILIA/ omenectomy    Lymphedema     MRSA (methicillin resistant Staphylococcus aureus) 2017    nasal swab negative 4/3/19    Ovarian cancer ([facility] )     CYST REMOVED RIGHT OVARY IN   HYSTERECTOMY 07       Past Surgical History:   Procedure Laterality Date    APPENDECTOMY  1998    laparoscopic    BILATERAL SALPINGOOPHORECTOMY  2007    BREAST BIOPSY Right 2016    BREAST BIOPSY Left 07/15/2020    BREAST BIOPSY Left 2021    stereo     SECTION      HYSTERECTOMY  2007    Omentectomy and lymph node biopsy at [facility] BIOPSY RIGHT (ALL INC) Right 2021    OOPHORECTOMY Bilateral 2007    OTHER SURGICAL HISTORY      right lower extremity due to trauma    ID TREAT TIBIAL SHAFT FX, INTRAMED IMPLANT Right 2017    Procedure: INSERTION NAIL IM TIBIA;  Surgeon: Lidia Ross MD;  [facility]: [facility];  Service: Orthopedics    US GUIDANCE BREAST BIOPSY LEFT EACH ADDITIONAL Left 7/15/2020    US GUIDED BREAST BIOPSY LEFT COMPLETE Left 7/15/2020    US GUIDED BREAST BIOPSY RIGHT COMPLETE Right 2016    US GUIDED BREAST BIOPSY RIGHT COMPLETE Right 7/26/2021     Family History   Problem Relation Age of Onset    Breast cancer Mother         28    Pancreatic cancer Mother     Ovarian cancer Maternal Grandmother     Breast cancer Maternal Aunt 39    No Known Problems Paternal Aunt     No Known Problems Father     No Known Problems Maternal Grandfather     No Known Problems Paternal Grandmother     No Known Problems Paternal Grandfather     Lung cancer Half-Sister      Social History     Socioeconomic History    Marital status: /Civil Union     Spouse name: Not on file    Number of children: Not on file    Years of education: Not on file    Highest education level: Not on file   Occupational History    Not on file   Tobacco Use    Smoking status: Never Smoker    Smokeless tobacco: Never Used   Vaping Use    Vaping Use: Never used   Substance and Sexual Activity    Alcohol use: Yes    Drug use: Not Currently    Sexual activity: Yes     Partners: Male     Birth control/protection: None   Other Topics Concern    Not on file   Social History Narrative    Not on file     Social Determinants of Health     Financial Resource Strain: Not on file   Food Insecurity: No Food Insecurity    Worried About Running Out of Food in the Last Year: Never true    Huber of Food in the Last Year: Never true   Transportation Needs: No Transportation Needs    Lack of Transportation (Medical): No    Lack of Transportation (Non-Medical):  No   Physical Activity: Not on file   Stress: Not on file   Social Connections: Not on file   Intimate Partner Violence: Not on file   Housing Stability: Low Risk     Unable to Pay for Housing in the Last Year: No    Number of Places Lived in the Last Year: 1    Unstable Housing in the Last Year: No       Current Outpatient Medications:     acetaminophen (TYLENOL) 325 mg tablet, Take 2 tablets by mouth every 6 (six) hours as needed for mild pain, Disp: 30 tablet, Rfl: 0    Calcium Carbonate-Vit D-Min (CALCIUM 1200 PO), Take 3 tablets by mouth daily , Disp: , Rfl:     DULoxetine (CYMBALTA) 60 mg delayed release capsule, Take 60 mg by mouth daily  , Disp: , Rfl: 0    LORazepam (ATIVAN) 0 5 mg tablet, Take 1 tablet (0 5 mg total) by mouth 2 (two) times a day as needed for anxiety Take 1 tablet as needed every 8 hours  , Disp: , Rfl:     metoprolol tartrate (LOPRESSOR) 25 mg tablet, TAKE 1/2 TABLET(12 5MG) BY MOUTH EVERY 12 HOURS, Disp: 90 tablet, Rfl: 3    Multiple Vitamins-Minerals (MULTIVITAMIN ADULT PO), Take 1 capsule by mouth daily , Disp: , Rfl:     omeprazole (PriLOSEC) 20 mg delayed release capsule, Take 20 mg by mouth daily  , Disp: , Rfl:     oxyCODONE (OxyCONTIN) 40 mg 12 hr tablet, Take 40 mg by mouth every 12 (twelve) hours, Disp: , Rfl:     OXYCODONE HCL PO, Take 15 mg by mouth 2 (two) times a day as needed , Disp: , Rfl:     senna-docusate sodium (SENOKOT S) 8 6-50 mg per tablet, Take 1 tablet by mouth daily at bedtime, Disp: 30 tablet, Rfl: 0    TOVIAZ 8 MG TB24, Take 8 mg by mouth daily at bedtime , Disp: , Rfl:     bisacodyl (DULCOLAX) 5 mg EC tablet, Take 5 mg by mouth daily as needed for constipation  (Patient not taking: Reported on 5/20/2022), Disp: , Rfl:     furosemide (LASIX) 40 mg tablet, Take 1 tablet (40 mg total) by mouth daily for 6 days (Patient not taking: Reported on 5/20/2022), Disp: 6 tablet, Rfl: 0  Allergies   Allergen Reactions    Iodinated Diagnostic Agents Shortness Of Breath    Omnipaque [Iohexol] Shortness Of Breath    Other Shortness Of Breath     IVP dye, x ray dye 3    Iodides      Other reaction(s): SWELLING, SOB    Methotrexate Nausea Only     Headache and nausea    Methotrexate Derivatives Headache     Vitals:    05/20/22 1115   BP: 140/70   Pulse: 97   Resp: 16   Temp: 97 8 °F (36 6 °C)   SpO2: 95%       Physical Exam  Constitutional: General appearance:  The Patient is well-developed and well-nourished who appears the stated age in no acute distress  Patient is pleasant and talkative  HEENT:  Normocephalic  Sclerae are anicteric  Mucous membranes are moist  Neck is supple without adenopathy  No JVD  Chest: The lungs are clear to auscultation  Cardiac: Heart is regular rate  Abdomen: Abdomen is soft, non-tender, non-distended and without masses  Extremities: There is no clubbing or cyanosis  Bilateral lower extremity edema, left greater than right Symmetric  Neuro: Grossly nonfocal  Gait is normal      Lymphatic: No evidence of cervical adenopathy bilaterally  No evidence of axillary adenopathy bilaterally  Skin: Warm, anicteric  Psych:  Patient is pleasant and talkative  Breasts:  Symmetric  No dominant masses, skin changes, or nipple discharge in either breast   No axillary or supraclavicular adenopathy bilaterally  Pathology:  [unfilled]    Labs:      Imaging  CT abdomen pelvis wo contrast    Result Date: 4/28/2022  Narrative: CT ABDOMEN AND PELVIS WITHOUT IV CONTRAST INDICATION:   Abdominal pain, acute, nonlocalized sepsis  COMPARISON:  CT of abdomen pelvis on July 6, 2021  TECHNIQUE:  CT examination of the abdomen and pelvis was performed without intravenous contrast   Axial, sagittal, and coronal 2D reformatted images were created from the source data and submitted for interpretation  Radiation dose length product (DLP) for this visit:  2046 mGy-cm   This examination, like all CT scans performed in the [facility], was performed utilizing techniques to minimize radiation dose exposure, including the use of iterative reconstruction and automated exposure control  Enteric contrast was not administered  FINDINGS: ABDOMEN Evaluation of intra-abdominal organs is limited to lack of IV contrast  Evaluation of the bowel is limited to lack of enteric contrast  LOWER CHEST:  See CT of the chest performed earlier the same day  LIVER/BILIARY TREE:  Unremarkable   GALLBLADDER:  No calcified gallstones  No pericholecystic inflammatory change  SPLEEN:  Stable hypodensities measuring up to 2 7 cm in the spleen  PANCREAS:  Unremarkable  ADRENAL GLANDS:  Unremarkable  KIDNEYS/URETERS:  Unremarkable  No hydronephrosis  STOMACH AND BOWEL:  Unremarkable  APPENDIX:  No findings to suggest appendicitis  ABDOMINOPELVIC CAVITY:  No ascites  No pneumoperitoneum  No lymphadenopathy  VESSELS:  Infrarenal IVC filter in place  PELVIS REPRODUCTIVE ORGANS:  Surgical changes of prior hysterectomy  URINARY BLADDER:  Unremarkable  ABDOMINAL WALL/INGUINAL REGIONS:  Subcutaneous fat stranding overlying the proximal right thigh  There right inguinal lymphadenopathy measuring up to 1 3 cm in short axis  OSSEOUS STRUCTURES:  Motion limited examination  Fixation of the right pelvic pelvis with severe posttraumatic degenerative change of the right hip  No acute fracture or destructive osseous lesion  Impression: 1  No acute inflammatory process identified within the abdomen or pelvis  2   Subcutaneous fat stranding overlying the proximal right thigh with right inguinal lymphadenopathy suggestive of right lower extremity infectious process  See CT of the right lower extremity for further evaluation  Workstation performed: [id number]     XR chest 1 view portable    Result Date: 4/28/2022  Narrative: CHEST INDICATION:   fever/hypoxia  COMPARISON:  7/6/2021  EXAM PERFORMED/VIEWS:  XR CHEST PORTABLE FINDINGS: Cardiomediastinal silhouette appears prominent however this could be accentuated by portable technique and low inspiratory effort  Lung volumes are low  Diffuse bilateral airspace disease can be secondary to pneumonia versus pulmonary edema  No sizable pleural effusion  No pneumothorax  Osseous structures appear within normal limits for patient age  Impression: Hypoinflated lungs  Diffuse bilateral airspace disease can be secondary to pneumonia versus pulmonary edema   Workstation performed: [id number]     CT head without contrast    Result Date: 4/27/2022  Narrative: CT BRAIN - WITHOUT CONTRAST INDICATION:   Altered mental status  COMPARISON:  None  TECHNIQUE:  CT examination of the brain was performed  In addition to axial images, sagittal and coronal 2D reformatted images were created and submitted for interpretation  Radiation dose length product (DLP) for this visit:  1899 mGy-cm   This examination, like all CT scans performed in the [facility], was performed utilizing techniques to minimize radiation dose exposure, including the use of iterative reconstruction and automated exposure control  IMAGE QUALITY:  Diagnostic  FINDINGS: PARENCHYMA: Decreased attenuation is noted in periventricular and subcortical white matter demonstrating an appearance that is statistically most likely to represent mild microangiopathic change  Hypodensities in the regions of the bilateral basal ganglia are similar to prior examination which may represent chronic lacunar infarcts versus dilated perivascular spaces  No CT signs of acute infarction  No intracranial mass, mass effect or midline shift  No acute parenchymal hemorrhage  VENTRICLES AND EXTRA-AXIAL SPACES:  Normal for the patient's age  VISUALIZED ORBITS AND PARANASAL SINUSES:  Unremarkable  CALVARIUM AND EXTRACRANIAL SOFT TISSUES:  Normal      Impression: No acute intracranial hemorrhage, midline shift, or mass effect  Workstation performed: [id number]     CT chest without contrast    Result Date: 4/27/2022  Narrative: CT CHEST WITHOUT IV CONTRAST INDICATION:   Hypoxia  Wegener's  COMPARISON:  11/7/2019 TECHNIQUE: CT examination of the chest was performed without intravenous contrast   Axial, sagittal, and coronal 2D reformatted images were created from the source data and submitted for interpretation  Radiation dose length product (DLP) for this visit:  759 mGy-cm     This examination, like all CT scans performed in the [facility], was performed utilizing techniques to minimize radiation dose exposure, including the use of iterative reconstruction and automated exposure control  FINDINGS: LUNGS:  Bibasilar atelectasis and airspace consolidation in the dependent portions of the lower lobes  Interstitial and groundglass opacities in the upper lungs  There is no tracheal or endobronchial lesion  PLEURA:  Trace amount of pleural fluid bilaterally  HEART/GREAT VESSELS: Stable mild cardiomegaly  No thoracic aortic aneurysm  MEDIASTINUM AND EMILY:  No lymphadenopathy  CHEST WALL AND LOWER NECK:  Unremarkable  VISUALIZED STRUCTURES IN THE UPPER ABDOMEN:  Stable low-attenuation 3 3 cm lesion in the spleen could represent a cyst  OSSEOUS STRUCTURES:  No acute fracture or destructive osseous lesion  Impression: 1  Groundglass and interstitial airspace disease in the upper lungs, similar appearance to the prior exam suggesting chronic interstitial pneumonitis  2   Atelectasis and airspace consolidation in the dependent portions of the lung bases may represent aspiration and/or pneumonia  Workstation performed: [id number]     XR chest portable ICU    Result Date: 4/29/2022  Narrative: CHEST INDICATION:   CVC placement  COMPARISON:  Chest radiograph from 4/27/2022 and 7/6/2021, chest CT from 4/27/2022  EXAM PERFORMED/VIEWS:  XR CHEST PORTABLE ICU FINDINGS:  Right jugular catheter in upper right atrium  Barely imaged IVC filter  Cardiomediastinal silhouette appears unremarkable  Persistent patchy bilateral groundglass opacity and left base consolidation  No effusion or pneumothorax  Old right rib fractures  Impression: Right jugular catheter in right atrium with no pneumothorax  To be at the cavoatrial junction, it could be pulled back 2 cm  Persistent patchy bilateral groundglass opacity and left base consolidation   Workstation performed: [id number]     US breast screening bilateral complete (ABUS)    Result Date: 4/22/2022  Narrative: DIAGNOSIS: Encounter for breast cancer screening other than mammogram TECHNIQUE: Automated breast ultrasound images were obtained on the SABIA system  Imaging was obtained in standard projections including AP, lateral and medial for both breasts, inclusive of all four quadrants  COMPARISONS: Prior breast imaging dated: 07/28/2021, 07/28/2021, 07/26/2021, 07/26/2021, 07/26/2021, 07/26/2021, 07/26/2021, 07/23/2021, 07/23/2021, 07/12/2021, 01/08/2021, 01/08/2021, 07/15/2020, 07/15/2020, 07/15/2020, 07/09/2020, 07/09/2020, 07/07/2020, 05/14/2019, 05/14/2019, 11/28/2018, 05/11/2018, 05/30/2017, 05/02/2017, 05/09/2016, 05/09/2016, 05/09/2016, 04/26/2016, 04/26/2016, and 04/22/2015 RELEVANT HISTORY: Family Breast Cancer History: History of breast cancer in Mother, Maternal Aunt  Family Medical History: Family medical history includes breast cancer in 2 relatives (maternal aunt, mother) and ovarian cancer in maternal grandmother  Personal History: No known relevant hormone history  Surgical history includes breast biopsy, hysterectomy, and oophorectomy  Medical history includes ovarian cancer, BRCA 1 positive, and history of chemotherapy  RISK ASSESSMENT: 5 Year Tyrer-Cuzick: 6 44 % 10 Year Tyrer-Cuzick: 10 95 % Lifetime Tyrer-Cuzick: 17 29 % TISSUE COMPOSITION: Homogeneous background echotexture - fat INDICATION: Clint Ch is a 69 y o  female with dense breasts presenting for automated breast ultrasound screening  Several benign oil cysts in the left breast are again noted  FINDINGS: Bilateral There are no suspicious masses or areas of architectural distortion  Impression:  No evidence for malignancy  Automated breast ultrasound is an adjunct, not a replacement, for routine yearly screening mammography  ASSESSMENT/BI-RADS CATEGORY: Left: 2 - Benign Right: 1 - Negative Overall: 2 - Benign RECOMMENDATION:      - Return to routine screening for both breasts   Workstation ID: [id number]    VAS lower limb venous duplex study, complete bilateral    Result Date: 2022  Narrative:  [facility] REPORT CLINICAL: Indications: Patient presents with elevated D-dimer and bilateral lower extremity edema and pain  Operative History: Appendectomy   IVC filter- Delores RLE vein stripping Right Shoulder Arthroscopy [employer]  Risk Factors The patient has history of Obesity and DVT  CONCLUSION: Impression: RIGHT LOWER LIMB: No evidence of acute or chronic deep vein thrombosis  No evidence of superficial thrombophlebitis noted  Doppler evaluation shows a normal response to augmentation maneuvers  Posterior tibial and anterior tibial arterial Doppler waveforms are triphasic/hyperemic  LEFT LOWER LIMB: No evidence of acute or chronic deep vein thrombosis  No evidence of superficial thrombophlebitis noted  Doppler evaluation shows a normal response to augmentation maneuvers  Posterior tibial and anterior tibial arterial Doppler waveforms are triphasic  Technically difficult/limited study  SIGNATURE: Electronically Signed by: Shanice Guevara MD on 2022 07:48:51 PM    US bedside procedure    Result Date: 2022  Narrative: 1 0 [id number]  7 [id number]  1 [id number]    US bedside procedure    Result Date: 2022  Narrative: 7 4 729 [id number]  5 [id number]  1 [id number]    CT lower extremity wo contrast right    Result Date: 2022  Narrative: CT right lower extremity without IV contrast INDICATION: r/o nec fasc  Right lower extremity cellulitis  Sepsis  COMPARISON: CT prior day,21 TECHNIQUE: CT examination of the above was performed  This examination, like all CT scans performed in the [facility], was performed utilizing techniques to minimize radiation dose exposure, including the use of iterative reconstruction  and automated exposure control software  Sagittal and coronal two dimensional reconstructed images were also submitted for interpretation   Rad dose  1151 mGy-cm FINDINGS: OSSEOUS STRUCTURES: Right posterior acetabular ORIF without hardware complication Severe right hip osteoarthritis with collapse of much of the femoral head Tibial intramedullary nail with single proximal and single distal interlocking screws Sacralization of L5 Chronic remodeling changes noted between the fibula and calcaneus,, with hindfoot valgus, and talocalcaneal fusion VISUALIZED MUSCULATURE:  Fatty atrophy of the intrinsic foot musculature  Moderate amount of muscle atrophy seen throughout the leg and to a lesser extent the thigh muscles SOFT TISSUES:  Circumferential soft tissue swelling noted within the distal thigh extending inferiorly, within the lower leg this is more so affecting the medial portion, and extending into the medial and dorsal foot  No evidence of soft tissue air or large skin ulceration  Large amount of venous varicosities throughout the extremity Intermuscular fat throughout the thigh and lower leg appears preserved Prominent however nonenlarged right inguinal lymph nodes OTHER PERTINENT FINDINGS:  IVC filter noted     Impression: 1  No imaging evidence of necrotizing fasciitis, noting that this does not exclude the diagnosis  Correlate clinically for additional signs  2   Scattered soft tissue swelling without organized collection The major findings are in agreement with the preliminary report provided by [employer] which was provided shortly after completion of the exam  Workstation performed: [id number]     CT lower extremity wo contrast right    Result Date: 4/28/2022  Narrative: CT right lower extremity without IV contrast INDICATION: cellulitis/sepsis  COMPARISON: None  TECHNIQUE: CT examination of the above was performed    This examination, like all CT scans [facility], was performed utilizing techniques to minimize radiation dose exposure, including the use of iterative reconstruction  and automated exposure control software  Sagittal and coronal two dimensional reconstructed images were also submitted for interpretation  Rad dose  2321 mGy-cm FINDINGS: OSSEOUS STRUCTURES:  The patient is status post ORIF for a right acetabular fracture with grossly intact orthopedic hardware including the posterior acetabular column and right ischium  No evidence of hardware complication  There is severe likely posttraumatic right hip osteoarthritis with subcortical collapse of the right femoral head  The patient is status post ORIF for a right tibial fracture with IM steffi in proximal/distal interlocking screws, limited in its assessment due to patient motion throughout the examination but grossly intact  Advanced degenerative change throughout the visualized ankle with severe pes planus deformity  There also appears to be fusion across the subtalar articulation best seen on series 801 image 88  VISUALIZED MUSCULATURE:  Atrophic musculature noted throughout the foot and lower extremity regions suggesting limited use  SOFT TISSUES:  Subcutaneous edema noted right lower extremity consistent with nonspecific cellulitis predominantly at the level of the tibia fibula and ankle  No drainable collection identified  No deep soft tissue involvement or osseous destructive change to suggest osteomyelitis  OTHER PERTINENT FINDINGS:  None  Impression: 1  Right lower extremity and ankle subcutaneous edema consistent with nonspecific cellulitis without drainable collection, deep soft tissue involvement or gloria osseous destructive change to indicate osteomyelitis  2   Postoperative change noted after ORIF for a right acetabular, posterior column and ischial fracture without gross evidence of hardware complication, limited by beam hardening artifacts  Advanced posttraumatic right hip osteoarthritis including subcortical collapse right femoral head    Additional postoperative change status post ORIF for a right tibial fracture with IM steffi, again without hardware complication given limitations of patient motion and beam hardening artifacts  Concordant preliminary virtual radiologic findings were provided at 2:34 AM on 4/28/2022  Workstation performed: [id number]     Echo complete w/ contrast if indicated    Result Date: 4/29/2022  Narrative: Vangie Martini  Left Ventricle: Left ventricular cavity size is normal  Wall thickness is normal  There is mild asymmetric hypertrophy of the septal wall  The left ventricular ejection fraction is 65%  Systolic function is normal  Wall motion is normal  Diastolic function is normal  There is no LV dynamic obstruction  I reviewed the above laboratory and imaging data  Discussion/Summary:  47-year-old female with an increased risk of breast cancer due to BRCA1 genetic mutation  She has a personal history ovarian cancer  Her physical exam from a breast standpoint is normal   Her most recent imaging with [facility] was normal as well  She cannot tolerate an MRI due to her orthopedic injuries  She is due for her mammogram at the end of July  We will schedule this  I will see her again in 6 months for another clinical exam   She is agreeable to this  All her questions were answered

## 2022-05-26 ENCOUNTER — TELEPHONE (OUTPATIENT)
Dept: CARDIOLOGY CLINIC | Facility: CLINIC | Age: 67
End: 2022-05-26

## 2022-06-10 ENCOUNTER — TELEPHONE (OUTPATIENT)
Dept: HEMATOLOGY ONCOLOGY | Facility: CLINIC | Age: 67
End: 2022-06-10

## 2022-06-13 ENCOUNTER — TELEPHONE (OUTPATIENT)
Dept: HEMATOLOGY ONCOLOGY | Facility: CLINIC | Age: 67
End: 2022-06-13

## 2022-06-13 NOTE — TELEPHONE ENCOUNTER
Appointment Cancellation Or Reschedule     Person calling in patient   Provider Melly Henry   Office Visit Date and Time 6/13 @ 1030   Office Visit Location [facility]   Did patient want to reschedule their office appointment? If so, when was it scheduled to? Yes 6/17 @ 10am   Is this patient calling to reschedule an infusion appointment? no   When is their next infusion appointment? n/a   Is this patient a Chemo patient? no   Reason for Cancellation or Reschedule Patient unable to complete labs prior to appt      If the patient is a treatment patient, please route this to the office nurse  If the patient is not on treatment, please route to the office MA

## 2022-06-17 ENCOUNTER — TELEPHONE (OUTPATIENT)
Dept: HEMATOLOGY ONCOLOGY | Facility: CLINIC | Age: 67
End: 2022-06-17

## 2022-06-17 ENCOUNTER — TELEPHONE (OUTPATIENT)
Dept: SURGICAL ONCOLOGY | Facility: CLINIC | Age: 67
End: 2022-06-17

## 2022-06-17 NOTE — TELEPHONE ENCOUNTER
Called patient and left message stating she is checked in for virtual appointment with Shirley Barba PA-C on 6/17/22 for a telephone call  I stated in message the Medicare questions were not answered for appointment

## 2022-06-17 NOTE — TELEPHONE ENCOUNTER
Patient noted as arrived for telephone visit  Not on videocall  Called patient x 2 for virtual visit  Went to [facility]    Left message to call back for virtual

## 2022-06-20 ENCOUNTER — TELEPHONE (OUTPATIENT)
Dept: HEMATOLOGY ONCOLOGY | Facility: CLINIC | Age: 67
End: 2022-06-20

## 2022-06-20 NOTE — TELEPHONE ENCOUNTER
Scheduling Appointment     Who Is Calling to Schedule Patient   Doctor Natacha Lorenzo   Date and Time 8/25 at 10:30am   Reason for scheduling appointment Patient missed her original appointment  Patient verbalized understanding   Yes

## 2022-06-20 NOTE — TELEPHONE ENCOUNTER
Patient is calling to let office know that she was experiencing phone issues on Friday and that's why the call wasn't going through to her  Patient asked if Michael Santacruz or someone from the office could give her a call at [phone]

## 2022-07-14 ENCOUNTER — TELEPHONE (OUTPATIENT)
Dept: HEMATOLOGY ONCOLOGY | Facility: CLINIC | Age: 67
End: 2022-07-14

## 2022-07-14 DIAGNOSIS — D80.1 HYPOGAMMAGLOBULINEMIA, ACQUIRED (HCC): Primary | ICD-10-CM

## 2022-07-14 NOTE — TELEPHONE ENCOUNTER
Appointment Cancellation Or Reschedule     Person calling in Patient    Provider [facility] Visit Date and Time 8/25 at 10:30am   Office Visit Location [facility]   Did patient want to reschedule their office appointment? If so, when was it scheduled to? yes  9/23 at 11:30am    Please mail any labs that need to be completed to:    Joaquin Lamb  [address] [place] [phone]   Is this patient calling to reschedule an infusion appointment? no   When is their next infusion appointment? n/a   Is this patient a Chemo patient? no   Reason for Cancellation or Reschedule Patient going on vacation  If the patient is a treatment patient, please route this to the office nurse  If the patient is not on treatment, please route to the office MA  If the patient is a surgical oncology patient, please route to surg/onc clinical pool

## 2022-07-25 ENCOUNTER — HOSPITAL ENCOUNTER (OUTPATIENT)
Dept: MAMMOGRAPHY | Facility: MEDICAL CENTER | Age: 67
Discharge: HOME | End: 2022-07-25
Payer: MEDICARE

## 2022-07-25 VITALS — BODY MASS INDEX: 39.51 KG/M2 | HEIGHT: 63 IN | WEIGHT: 223 LBS

## 2022-07-25 DIAGNOSIS — Z91.89 INCREASED RISK OF BREAST CANCER: ICD-10-CM

## 2022-07-25 DIAGNOSIS — Z12.31 ENCOUNTER FOR SCREENING MAMMOGRAM FOR MALIGNANT NEOPLASM OF BREAST: ICD-10-CM

## 2022-07-25 PROCEDURE — 77067 SCR MAMMO BI INCL CAD: CPT

## 2022-07-25 PROCEDURE — 77063 BREAST TOMOSYNTHESIS BI: CPT

## 2022-09-02 ENCOUNTER — APPOINTMENT (OUTPATIENT)
Dept: RADIOLOGY | Facility: HOSPITAL | Age: 67
DRG: 871 | End: 2022-09-02
Payer: MEDICARE

## 2022-09-02 ENCOUNTER — HOSPITAL ENCOUNTER (INPATIENT)
Facility: HOSPITAL | Age: 67
LOS: 2 days | Discharge: HOME | DRG: 871 | End: 2022-09-04
Attending: EMERGENCY MEDICINE | Admitting: INTERNAL MEDICINE
Payer: MEDICARE

## 2022-09-02 ENCOUNTER — APPOINTMENT (EMERGENCY)
Dept: CT IMAGING | Facility: HOSPITAL | Age: 67
DRG: 871 | End: 2022-09-02
Payer: MEDICARE

## 2022-09-02 DIAGNOSIS — A41.9 SEPSIS (HCC): ICD-10-CM

## 2022-09-02 DIAGNOSIS — R05.9 COUGH: ICD-10-CM

## 2022-09-02 DIAGNOSIS — J18.9 PNEUMONIA: Primary | ICD-10-CM

## 2022-09-02 DIAGNOSIS — R50.9 FEVER: ICD-10-CM

## 2022-09-02 LAB
2HR DELTA HS TROPONIN: 2 NG/L
ALBUMIN SERPL BCP-MCNC: 4.1 G/DL (ref 3.5–5)
ALP SERPL-CCNC: 162 U/L (ref 46–116)
ALT SERPL W P-5'-P-CCNC: 28 U/L (ref 12–78)
ANION GAP SERPL CALCULATED.3IONS-SCNC: 8 MMOL/L (ref 4–13)
APTT PPP: 29 SECONDS (ref 23–37)
AST SERPL W P-5'-P-CCNC: 38 U/L (ref 5–45)
ATRIAL RATE: 105 BPM
ATRIAL RATE: 106 BPM
BASOPHILS # BLD AUTO: 0.03 THOUSANDS/ΜL (ref 0–0.1)
BASOPHILS NFR BLD AUTO: 0 % (ref 0–1)
BILIRUB SERPL-MCNC: 0.75 MG/DL (ref 0.2–1)
BILIRUB UR QL STRIP: NEGATIVE
BUN SERPL-MCNC: 17 MG/DL (ref 5–25)
CALCIUM SERPL-MCNC: 9.2 MG/DL (ref 8.3–10.1)
CARDIAC TROPONIN I PNL SERPL HS: 13 NG/L
CARDIAC TROPONIN I PNL SERPL HS: 15 NG/L
CHLORIDE SERPL-SCNC: 99 MMOL/L (ref 96–108)
CLARITY UR: CLEAR
CO2 SERPL-SCNC: 30 MMOL/L (ref 21–32)
COLOR UR: YELLOW
CREAT SERPL-MCNC: 1.27 MG/DL (ref 0.6–1.3)
EOSINOPHIL # BLD AUTO: 0.04 THOUSAND/ΜL (ref 0–0.61)
EOSINOPHIL NFR BLD AUTO: 0 % (ref 0–6)
ERYTHROCYTE [DISTWIDTH] IN BLOOD BY AUTOMATED COUNT: 13.5 % (ref 11.6–15.1)
FLUAV RNA RESP QL NAA+PROBE: NEGATIVE
FLUBV RNA RESP QL NAA+PROBE: NEGATIVE
GFR SERPL CREATININE-BSD FRML MDRD: 43 ML/MIN/1.73SQ M
GLUCOSE SERPL-MCNC: 119 MG/DL (ref 65–140)
GLUCOSE SERPL-MCNC: 146 MG/DL (ref 65–140)
GLUCOSE UR STRIP-MCNC: NEGATIVE MG/DL
HCT VFR BLD AUTO: 42.6 % (ref 34.8–46.1)
HGB BLD-MCNC: 13.7 G/DL (ref 11.5–15.4)
HGB UR QL STRIP.AUTO: NEGATIVE
IMM GRANULOCYTES # BLD AUTO: 0.08 THOUSAND/UL (ref 0–0.2)
IMM GRANULOCYTES NFR BLD AUTO: 0 % (ref 0–2)
INR PPP: 1.07 (ref 0.84–1.19)
KETONES UR STRIP-MCNC: NEGATIVE MG/DL
L PNEUMO1 AG UR QL IA.RAPID: NEGATIVE
LACTATE SERPL-SCNC: 1.2 MMOL/L (ref 0.5–2)
LACTATE SERPL-SCNC: 2.1 MMOL/L (ref 0.5–2)
LEUKOCYTE ESTERASE UR QL STRIP: NEGATIVE
LIPASE SERPL-CCNC: 259 U/L (ref 73–393)
LYMPHOCYTES # BLD AUTO: 1.11 THOUSANDS/ΜL (ref 0.6–4.47)
LYMPHOCYTES NFR BLD AUTO: 6 % (ref 14–44)
MCH RBC QN AUTO: 29.3 PG (ref 26.8–34.3)
MCHC RBC AUTO-ENTMCNC: 32.2 G/DL (ref 31.4–37.4)
MCV RBC AUTO: 91 FL (ref 82–98)
MONOCYTES # BLD AUTO: 0.86 THOUSAND/ΜL (ref 0.17–1.22)
MONOCYTES NFR BLD AUTO: 5 % (ref 4–12)
NEUTROPHILS # BLD AUTO: 16.85 THOUSANDS/ΜL (ref 1.85–7.62)
NEUTS SEG NFR BLD AUTO: 89 % (ref 43–75)
NITRITE UR QL STRIP: NEGATIVE
NRBC BLD AUTO-RTO: 0 /100 WBCS
NT-PROBNP SERPL-MCNC: 679 PG/ML
P AXIS: 48 DEGREES
P AXIS: 70 DEGREES
PH UR STRIP.AUTO: 6 [PH]
PLATELET # BLD AUTO: 228 THOUSANDS/UL (ref 149–390)
PMV BLD AUTO: 9.5 FL (ref 8.9–12.7)
POTASSIUM SERPL-SCNC: 4 MMOL/L (ref 3.5–5.3)
PR INTERVAL: 166 MS
PR INTERVAL: 176 MS
PROCALCITONIN SERPL-MCNC: 1.57 NG/ML
PROT SERPL-MCNC: 8.7 G/DL (ref 6.4–8.4)
PROT UR STRIP-MCNC: NEGATIVE MG/DL
PROTHROMBIN TIME: 13.9 SECONDS (ref 11.6–14.5)
QRS AXIS: 41 DEGREES
QRS AXIS: 76 DEGREES
QRSD INTERVAL: 94 MS
QRSD INTERVAL: 94 MS
QT INTERVAL: 340 MS
QT INTERVAL: 342 MS
QTC INTERVAL: 451 MS
QTC INTERVAL: 452 MS
RBC # BLD AUTO: 4.68 MILLION/UL (ref 3.81–5.12)
RSV RNA RESP QL NAA+PROBE: NEGATIVE
S PNEUM AG UR QL: NEGATIVE
SARS-COV-2 RNA RESP QL NAA+PROBE: NEGATIVE
SODIUM SERPL-SCNC: 137 MMOL/L (ref 135–147)
SP GR UR STRIP.AUTO: 1.02 (ref 1–1.03)
T WAVE AXIS: 32 DEGREES
T WAVE AXIS: 55 DEGREES
UROBILINOGEN UR QL STRIP.AUTO: 0.2 E.U./DL
VENTRICULAR RATE: 105 BPM
VENTRICULAR RATE: 106 BPM
WBC # BLD AUTO: 18.97 THOUSAND/UL (ref 4.31–10.16)

## 2022-09-02 PROCEDURE — 96365 THER/PROPH/DIAG IV INF INIT: CPT

## 2022-09-02 PROCEDURE — 83880 ASSAY OF NATRIURETIC PEPTIDE: CPT | Performed by: PHYSICIAN ASSISTANT

## 2022-09-02 PROCEDURE — 84145 PROCALCITONIN (PCT): CPT | Performed by: PHYSICIAN ASSISTANT

## 2022-09-02 PROCEDURE — 81003 URINALYSIS AUTO W/O SCOPE: CPT | Performed by: PHYSICIAN ASSISTANT

## 2022-09-02 PROCEDURE — 84484 ASSAY OF TROPONIN QUANT: CPT | Performed by: PHYSICIAN ASSISTANT

## 2022-09-02 PROCEDURE — 71045 X-RAY EXAM CHEST 1 VIEW: CPT

## 2022-09-02 PROCEDURE — 87449 NOS EACH ORGANISM AG IA: CPT | Performed by: PHYSICIAN ASSISTANT

## 2022-09-02 PROCEDURE — 80053 COMPREHEN METABOLIC PANEL: CPT | Performed by: PHYSICIAN ASSISTANT

## 2022-09-02 PROCEDURE — G1004 CDSM NDSC: HCPCS

## 2022-09-02 PROCEDURE — 74176 CT ABD & PELVIS W/O CONTRAST: CPT

## 2022-09-02 PROCEDURE — 87040 BLOOD CULTURE FOR BACTERIA: CPT | Performed by: PHYSICIAN ASSISTANT

## 2022-09-02 PROCEDURE — 96375 TX/PRO/DX INJ NEW DRUG ADDON: CPT

## 2022-09-02 PROCEDURE — 85730 THROMBOPLASTIN TIME PARTIAL: CPT | Performed by: PHYSICIAN ASSISTANT

## 2022-09-02 PROCEDURE — 83690 ASSAY OF LIPASE: CPT | Performed by: PHYSICIAN ASSISTANT

## 2022-09-02 PROCEDURE — 93010 ELECTROCARDIOGRAM REPORT: CPT | Performed by: STUDENT IN AN ORGANIZED HEALTH CARE EDUCATION/TRAINING PROGRAM

## 2022-09-02 PROCEDURE — 93005 ELECTROCARDIOGRAM TRACING: CPT

## 2022-09-02 PROCEDURE — 36415 COLL VENOUS BLD VENIPUNCTURE: CPT | Performed by: PHYSICIAN ASSISTANT

## 2022-09-02 PROCEDURE — 0241U HB NFCT DS VIR RESP RNA 4 TRGT: CPT | Performed by: PHYSICIAN ASSISTANT

## 2022-09-02 PROCEDURE — 99223 1ST HOSP IP/OBS HIGH 75: CPT | Performed by: INTERNAL MEDICINE

## 2022-09-02 PROCEDURE — 85610 PROTHROMBIN TIME: CPT | Performed by: PHYSICIAN ASSISTANT

## 2022-09-02 PROCEDURE — 99285 EMERGENCY DEPT VISIT HI MDM: CPT | Performed by: PHYSICIAN ASSISTANT

## 2022-09-02 PROCEDURE — 96361 HYDRATE IV INFUSION ADD-ON: CPT

## 2022-09-02 PROCEDURE — 85025 COMPLETE CBC W/AUTO DIFF WBC: CPT | Performed by: PHYSICIAN ASSISTANT

## 2022-09-02 PROCEDURE — 99285 EMERGENCY DEPT VISIT HI MDM: CPT

## 2022-09-02 PROCEDURE — 96367 TX/PROPH/DG ADDL SEQ IV INF: CPT

## 2022-09-02 PROCEDURE — 83605 ASSAY OF LACTIC ACID: CPT | Performed by: PHYSICIAN ASSISTANT

## 2022-09-02 PROCEDURE — 82948 REAGENT STRIP/BLOOD GLUCOSE: CPT

## 2022-09-02 RX ORDER — OXYBUTYNIN CHLORIDE 10 MG/1
10 TABLET, EXTENDED RELEASE ORAL DAILY
Status: DISCONTINUED | OUTPATIENT
Start: 2022-09-02 | End: 2022-09-04 | Stop reason: HOSPADM

## 2022-09-02 RX ORDER — GUAIFENESIN 600 MG/1
600 TABLET, EXTENDED RELEASE ORAL 2 TIMES DAILY
Status: DISCONTINUED | OUTPATIENT
Start: 2022-09-02 | End: 2022-09-04 | Stop reason: HOSPADM

## 2022-09-02 RX ORDER — OXYCODONE HCL 20 MG/1
40 TABLET, FILM COATED, EXTENDED RELEASE ORAL EVERY 12 HOURS SCHEDULED
Status: DISCONTINUED | OUTPATIENT
Start: 2022-09-02 | End: 2022-09-04 | Stop reason: HOSPADM

## 2022-09-02 RX ORDER — LORAZEPAM 0.5 MG/1
0.5 TABLET ORAL 2 TIMES DAILY PRN
Status: DISCONTINUED | OUTPATIENT
Start: 2022-09-02 | End: 2022-09-04 | Stop reason: HOSPADM

## 2022-09-02 RX ORDER — ONDANSETRON 2 MG/ML
4 INJECTION INTRAMUSCULAR; INTRAVENOUS ONCE
Status: COMPLETED | OUTPATIENT
Start: 2022-09-02 | End: 2022-09-02

## 2022-09-02 RX ORDER — OXYCODONE HYDROCHLORIDE 10 MG/1
10 TABLET ORAL ONCE
Status: DISCONTINUED | OUTPATIENT
Start: 2022-09-02 | End: 2022-09-04 | Stop reason: HOSPADM

## 2022-09-02 RX ORDER — DOCUSATE SODIUM 100 MG/1
100 CAPSULE, LIQUID FILLED ORAL 2 TIMES DAILY
Status: DISCONTINUED | OUTPATIENT
Start: 2022-09-02 | End: 2022-09-04 | Stop reason: HOSPADM

## 2022-09-02 RX ORDER — HEPARIN SODIUM 5000 [USP'U]/ML
5000 INJECTION, SOLUTION INTRAVENOUS; SUBCUTANEOUS EVERY 8 HOURS SCHEDULED
Status: DISCONTINUED | OUTPATIENT
Start: 2022-09-02 | End: 2022-09-04 | Stop reason: HOSPADM

## 2022-09-02 RX ORDER — DULOXETIN HYDROCHLORIDE 60 MG/1
60 CAPSULE, DELAYED RELEASE ORAL DAILY
Status: DISCONTINUED | OUTPATIENT
Start: 2022-09-02 | End: 2022-09-04 | Stop reason: HOSPADM

## 2022-09-02 RX ORDER — ACETAMINOPHEN 325 MG/1
650 TABLET ORAL EVERY 6 HOURS PRN
Status: DISCONTINUED | OUTPATIENT
Start: 2022-09-02 | End: 2022-09-04 | Stop reason: HOSPADM

## 2022-09-02 RX ORDER — OXYCODONE HYDROCHLORIDE 15 MG/1
TABLET ORAL
COMMUNITY
Start: 2022-07-20 | End: 2022-09-04

## 2022-09-02 RX ORDER — ONDANSETRON 2 MG/ML
4 INJECTION INTRAMUSCULAR; INTRAVENOUS EVERY 6 HOURS PRN
Status: DISCONTINUED | OUTPATIENT
Start: 2022-09-02 | End: 2022-09-04 | Stop reason: HOSPADM

## 2022-09-02 RX ORDER — PANTOPRAZOLE SODIUM 20 MG/1
20 TABLET, DELAYED RELEASE ORAL
Status: DISCONTINUED | OUTPATIENT
Start: 2022-09-02 | End: 2022-09-04 | Stop reason: HOSPADM

## 2022-09-02 RX ORDER — MAGNESIUM HYDROXIDE/ALUMINUM HYDROXICE/SIMETHICONE 120; 1200; 1200 MG/30ML; MG/30ML; MG/30ML
30 SUSPENSION ORAL EVERY 6 HOURS PRN
Status: DISCONTINUED | OUTPATIENT
Start: 2022-09-02 | End: 2022-09-04 | Stop reason: HOSPADM

## 2022-09-02 RX ADMIN — ONDANSETRON 4 MG: 2 INJECTION INTRAMUSCULAR; INTRAVENOUS at 01:56

## 2022-09-02 RX ADMIN — PANTOPRAZOLE SODIUM 20 MG: 20 TABLET, DELAYED RELEASE ORAL at 09:24

## 2022-09-02 RX ADMIN — DEXTROSE MONOHYDRATE 2000 MG: 5 INJECTION, SOLUTION INTRAVENOUS at 04:12

## 2022-09-02 RX ADMIN — HEPARIN SODIUM 5000 UNITS: 5000 INJECTION INTRAVENOUS; SUBCUTANEOUS at 09:24

## 2022-09-02 RX ADMIN — CEFTRIAXONE 1000 MG: 1 INJECTION, POWDER, FOR SOLUTION INTRAMUSCULAR; INTRAVENOUS at 09:57

## 2022-09-02 RX ADMIN — OXYCODONE HYDROCHLORIDE 40 MG: 20 TABLET, FILM COATED, EXTENDED RELEASE ORAL at 21:43

## 2022-09-02 RX ADMIN — AZITHROMYCIN MONOHYDRATE 500 MG: 500 INJECTION, POWDER, LYOPHILIZED, FOR SOLUTION INTRAVENOUS at 04:47

## 2022-09-02 RX ADMIN — LORAZEPAM 0.5 MG: 0.5 TABLET ORAL at 09:27

## 2022-09-02 RX ADMIN — SODIUM CHLORIDE 500 ML: 0.9 INJECTION, SOLUTION INTRAVENOUS at 01:56

## 2022-09-02 RX ADMIN — Medication 12.5 MG: at 09:24

## 2022-09-02 RX ADMIN — ACETAMINOPHEN 650 MG: 325 TABLET ORAL at 08:23

## 2022-09-02 RX ADMIN — Medication 12.5 MG: at 21:43

## 2022-09-02 RX ADMIN — ONDANSETRON 4 MG: 2 INJECTION INTRAMUSCULAR; INTRAVENOUS at 09:27

## 2022-09-02 RX ADMIN — HEPARIN SODIUM 5000 UNITS: 5000 INJECTION INTRAVENOUS; SUBCUTANEOUS at 21:43

## 2022-09-02 RX ADMIN — LORAZEPAM 0.5 MG: 0.5 TABLET ORAL at 21:54

## 2022-09-02 RX ADMIN — GUAIFENESIN 600 MG: 600 TABLET, EXTENDED RELEASE ORAL at 09:24

## 2022-09-02 RX ADMIN — DULOXETINE HYDROCHLORIDE 60 MG: 60 CAPSULE, DELAYED RELEASE ORAL at 09:24

## 2022-09-02 RX ADMIN — HEPARIN SODIUM 5000 UNITS: 5000 INJECTION INTRAVENOUS; SUBCUTANEOUS at 15:29

## 2022-09-02 RX ADMIN — OXYBUTYNIN CHLORIDE 10 MG: 10 TABLET, EXTENDED RELEASE ORAL at 09:24

## 2022-09-02 RX ADMIN — GUAIFENESIN 600 MG: 600 TABLET, EXTENDED RELEASE ORAL at 18:12

## 2022-09-02 RX ADMIN — DOCUSATE SODIUM 100 MG: 100 CAPSULE, LIQUID FILLED ORAL at 18:12

## 2022-09-02 RX ADMIN — OXYCODONE HYDROCHLORIDE 40 MG: 20 TABLET, FILM COATED, EXTENDED RELEASE ORAL at 08:23

## 2022-09-02 RX ADMIN — DOCUSATE SODIUM 100 MG: 100 CAPSULE, LIQUID FILLED ORAL at 09:24

## 2022-09-02 NOTE — ASSESSMENT & PLAN NOTE
Present on admission as evidenced by fever, diffuse patchy bilateral hazy airspace opacities compatible with pneumonia on CXR  · Initiated on IV Rocephin and azithromycin in the ED  · Continue with IV Rocephin  · Drip score less than 4  · Strep pneumo and Legionella antigens ordered  · Sputum culture ordered  · Blood cultures ordered  · Titrate oxygen appropriately  · Incentive spirometry

## 2022-09-02 NOTE — ASSESSMENT & PLAN NOTE
Currently requiring 2 L nasal cannula oxygen or to maintain oxygen saturations greater than 88%  · Suspect likely secondary to pneumonia

## 2022-09-02 NOTE — ASSESSMENT & PLAN NOTE
Lab Results   Component Value Date    EGFR 43 09/02/2022    EGFR 48 05/02/2022    EGFR 48 05/01/2022    CREATININE 1 27 09/02/2022    CREATININE 1 15 05/02/2022    CREATININE 1 16 05/01/2022   With baseline 1-1 2  · Monitor BMP

## 2022-09-02 NOTE — ED PROVIDER NOTES
History  Chief Complaint   Patient presents with    Fever - 9 weeks to 74 years     Per visitor "This happens once or twice a year where she gets septic" Pt c/o fever, chills, nausea, vomiting starting this evening  Tylenol given around 9pm     Prachi Murguia is a 69 y o   female with PMH of Wegners granulomatosis, CKD, CPD, Ovarian cancer s/p oophorectomy, CHF, Provoked PE who presents to the emergency department with subjective fever, chills, nausea, vomiting and lower abdominal pain  The patient is accompanied by significant other who supplements history  Patient's  reports that this evening patient started become more weak and tired and felt warm  He attempted take temperature however is thermometer was not working so we gave Tylenol  Patient reports that she is also nauseous and has had a few episodes of nondescript vomiting  Patient denies any dizziness, lightheadedness, headaches, blurry vision, neck pain, neck stiffness  She denies any cough, congestion, sore throat, ear pain, chest pain, shortness of breath, palpitations  She does admit to some lower abdominal pain and nausea but denies any diarrhea  She denies any urinary frequency, urgency, dysuria, hematuria or flank pain  Patient has known Charcot's foot to the right lower extremity and has had recurrent cellulitis however  notes no skin changes in the lower extremities              History provided by:  Patient   used: No    Fever - 9 weeks to 74 years  Temp source:  Subjective  Onset quality:  Sudden  Duration:  4 hours  Timing:  Constant  Progression:  Unchanged  Chronicity:  New  Relieved by:  Acetaminophen  Ineffective treatments:  None tried  Associated symptoms: chills, nausea, somnolence and vomiting    Associated symptoms: no chest pain, no confusion, no congestion, no cough, no diarrhea, no dysuria, no ear pain, no headaches, no myalgias, no rash, no rhinorrhea and no sore throat    Nausea: Severity:  Moderate    Onset quality:  Gradual    Duration:  4 hours    Timing:  Constant    Progression:  Unchanged  Vomiting:     Quality:  Stomach contents    Severity:  Moderate    Duration:  4 hours    Timing:  Constant    Progression:  Unchanged      Prior to Admission Medications   Prescriptions Last Dose Informant Patient Reported? Taking? Calcium Carbonate-Vit D-Min (CALCIUM 1200 PO)  Self Yes Yes   Sig: Take 3 tablets by mouth daily    DULoxetine (CYMBALTA) 60 mg delayed release capsule  Self Yes Yes   Sig: Take 60 mg by mouth daily     LORazepam (ATIVAN) 0 5 mg tablet  Self No Yes   Sig: Take 1 tablet (0 5 mg total) by mouth 2 (two) times a day as needed for anxiety Take 1 tablet as needed every 8 hours  Multiple Vitamins-Minerals (MULTIVITAMIN ADULT PO)  Self Yes Yes   Sig: Take 1 capsule by mouth daily    OXYCODONE HCL PO  Self Yes Yes   Sig: Take 15 mg by mouth 2 (two) times a day as needed    TOVIAZ 8 MG TB24  Self Yes Yes   Sig: Take 8 mg by mouth daily at bedtime    acetaminophen (TYLENOL) 325 mg tablet  Self No Yes   Sig: Take 2 tablets by mouth every 6 (six) hours as needed for mild pain   bisacodyl (DULCOLAX) 5 mg EC tablet Not Taking at Unknown time  Yes No   Sig: Take 5 mg by mouth daily as needed for constipation    Patient not taking: No sig reported   furosemide (LASIX) 40 mg tablet  Self No No   Sig: Take 1 tablet (40 mg total) by mouth daily for 6 days   Patient not taking: Reported on 5/20/2022   metoprolol tartrate (LOPRESSOR) 25 mg tablet  Self No Yes   Sig: TAKE 1/2 TABLET(12 5MG) BY MOUTH EVERY 12 HOURS   omeprazole (PriLOSEC) 20 mg delayed release capsule  Self Yes Yes   Sig: Take 20 mg by mouth daily     oxyCODONE (OxyCONTIN) 40 mg 12 hr tablet  Self Yes Yes   Sig: Take 40 mg by mouth every 12 (twelve) hours   oxyCODONE (ROXICODONE) 15 mg immediate release tablet   Yes Yes   senna-docusate sodium (SENOKOT S) 8 6-50 mg per tablet  Self No No   Sig: Take 1 tablet by mouth daily at bedtime      Facility-Administered Medications: None       Past Medical History:   Diagnosis Date    BRCA1 positive     Cancer ([facility] [facility] )     ovarian Ca with chemo    History of chemotherapy     ovarian cancer     History of pulmonary embolus (PE) & DVT 2007    post-op FAMILIA/ omenectomy    Lymphedema     MRSA (methicillin resistant Staphylococcus aureus) 2017    nasal swab negative 4/3/19    Ovarian cancer ([facility] [address] )     CYST REMOVED RIGHT OVARY IN   HYSTERECTOMY 07  Past Surgical History:   Procedure Laterality Date    APPENDECTOMY  1998    laparoscopic    BILATERAL SALPINGOOPHORECTOMY  2007    BREAST BIOPSY Right 2016    BREAST BIOPSY Left 07/15/2020    BREAST BIOPSY Left 2021    stereo     SECTION      HYSTERECTOMY  2007    Omentectomy and lymph node biopsy at [facility] [address] BIOPSY RIGHT (ALL INC) Right 2021    OOPHORECTOMY Bilateral 2007    OTHER SURGICAL HISTORY      right lower extremity due to trauma    MO TREAT TIBIAL SHAFT FX, INTRAMED IMPLANT Right 2017    Procedure: INSERTION NAIL IM TIBIA;  Surgeon: Eligio Beach MD;  [facility]: [facility];  Service: Orthopedics    US GUIDANCE BREAST BIOPSY LEFT EACH ADDITIONAL Left 7/15/2020    US GUIDED BREAST BIOPSY LEFT COMPLETE Left 7/15/2020    US GUIDED BREAST BIOPSY RIGHT COMPLETE Right 2016    US GUIDED BREAST BIOPSY RIGHT COMPLETE Right 2021       Family History   Problem Relation Age of Onset    Breast cancer Mother         28    Pancreatic cancer Mother     Ovarian cancer Maternal Grandmother     Breast cancer Maternal Aunt 39    No Known Problems Paternal Aunt     No Known Problems Father     No Known Problems Maternal Grandfather     No Known Problems Paternal Grandmother     No Known Problems Paternal Grandfather     Lung cancer Half-Sister      I have reviewed and agree with the history as documented      E-Cigarette/Vaping  E-Cigarette Use Never User      E-Cigarette/Vaping Substances    Nicotine No     THC No     CBD No     Flavoring No     Other No     Unknown No      Social History     Tobacco Use    Smoking status: Never Smoker    Smokeless tobacco: Never Used   Vaping Use    Vaping Use: Never used   Substance Use Topics    Alcohol use: Yes    Drug use: Not Currently       Review of Systems   Constitutional: Positive for activity change, appetite change, chills and fever  Negative for diaphoresis and fatigue  HENT: Negative for congestion, drooling, ear discharge, ear pain, mouth sores, nosebleeds, rhinorrhea, sinus pressure, sneezing, sore throat and voice change  Respiratory: Negative for apnea, cough, choking, chest tightness, wheezing and stridor  Cardiovascular: Negative for chest pain, palpitations and leg swelling  Gastrointestinal: Positive for abdominal pain, nausea and vomiting  Negative for abdominal distention, anal bleeding, blood in stool and diarrhea  Genitourinary: Negative for difficulty urinating, dysuria, flank pain, frequency and urgency  Musculoskeletal: Negative for arthralgias, back pain and myalgias  Skin: Negative for color change, pallor, rash and wound  Neurological: Negative for dizziness, tremors, seizures, weakness, numbness and headaches  Psychiatric/Behavioral: Negative for agitation, behavioral problems and confusion  All other systems reviewed and are negative  Physical Exam  Physical Exam  Vitals and nursing note reviewed  Constitutional:       General: She is not in acute distress  Appearance: Normal appearance  She is normal weight  She is not ill-appearing or toxic-appearing  HENT:      Head: Normocephalic and atraumatic  Right Ear: Tympanic membrane, ear canal and external ear normal       Left Ear: Tympanic membrane, ear canal and external ear normal       Nose: Nose normal  No congestion        Mouth/Throat:      Mouth: Mucous membranes are moist       Pharynx: Oropharynx is clear  No oropharyngeal exudate  Eyes:      Extraocular Movements: Extraocular movements intact  Pupils: Pupils are equal, round, and reactive to light  Cardiovascular:      Rate and Rhythm: Regular rhythm  Tachycardia present  Pulses: Normal pulses  Heart sounds: Normal heart sounds  No murmur heard  No friction rub  No gallop  Pulmonary:      Effort: Pulmonary effort is normal  No respiratory distress  Breath sounds: Normal breath sounds  No stridor  No wheezing or rhonchi  Comments: Dry nonproductive cough on exam  Abdominal:      General: Abdomen is flat  Bowel sounds are normal  There is no distension  Palpations: Abdomen is soft  There is no mass  Tenderness: There is abdominal tenderness in the right lower quadrant and left lower quadrant  There is no guarding or rebound  Negative signs include Loja's sign, Rovsing's sign, McBurney's sign, psoas sign and obturator sign  Hernia: No hernia is present  Musculoskeletal:         General: Normal range of motion  Feet:    Skin:     Capillary Refill: Capillary refill takes less than 2 seconds  Neurological:      General: No focal deficit present  Mental Status: She is alert and oriented to person, place, and time  Mental status is at baseline  Cranial Nerves: No cranial nerve deficit  Sensory: No sensory deficit        Coordination: Coordination normal       Gait: Gait normal          Vital Signs  ED Triage Vitals [09/02/22 0117]   Temperature Pulse Respirations Blood Pressure SpO2   99 3 °F (37 4 °C) (!) 107 20 166/74 94 %      Temp Source Heart Rate Source Patient Position - Orthostatic VS BP Location FiO2 (%)   Oral Monitor Lying Right arm --      Pain Score       --           Vitals:    09/02/22 0117 09/02/22 0230 09/02/22 0430   BP: 166/74 157/70 155/75   Pulse: (!) 107 104 104   Patient Position - Orthostatic VS: Lying Lying Lying         Visual Acuity      ED Medications  Medications   oxyCODONE (ROXICODONE) immediate release tablet 10 mg (0 mg Oral Hold 9/2/22 0423)   sodium chloride 0 9 % bolus 500 mL (0 mL Intravenous Stopped 9/2/22 0256)   ondansetron (ZOFRAN) injection 4 mg (4 mg Intravenous Given 9/2/22 0156)   ceftriaxone (ROCEPHIN) 2 g/50 mL in dextrose IVPB (0 mg Intravenous Stopped 9/2/22 0447)   azithromycin (ZITHROMAX) 500 mg in sodium chloride 0 9% 250mL IVPB 500 mg (0 mg Intravenous Stopped 9/2/22 0600)       Diagnostic Studies  Results Reviewed     Procedure Component Value Units Date/Time    Lactic acid 2 Hours [[id number]]  (Normal) Collected: 09/02/22 0419    Lab Status: Final result Specimen: Blood from Arm, Left Updated: 09/02/22 0446     LACTIC ACID 1 2 mmol/L     Narrative:      Result may be elevated if tourniquet was used during collection  HS Troponin I 2hr [[id number]]  (Normal) Collected: 09/02/22 0401    Lab Status: Final result Specimen: Blood from Hand, Left Updated: 09/02/22 0430     hs TnI 2hr 15 ng/L      Delta 2hr hsTnI 2 ng/L     Blood culture [[id number]] Collected: 09/02/22 0401    Lab Status: In process Specimen: Blood from Hand, Left Updated: 09/02/22 0406    Blood culture [[id number]] Collected: 09/02/22 0400    Lab Status:  In process Specimen: Blood from Arm, Left Updated: 09/02/22 0406    UA w Reflex to Microscopic w Reflex to Culture [[id number]] Collected: 09/02/22 0343    Lab Status: Final result Specimen: Urine, Straight Cath Updated: 09/02/22 0354     Color, UA Yellow     Clarity, UA Clear     Specific Becker, UA 1 020     pH, UA 6 0     Leukocytes, UA Negative     Nitrite, UA Negative     Protein, UA Negative mg/dl      Glucose, UA Negative mg/dl      Ketones, UA Negative mg/dl      Urobilinogen, UA 0 2 E U /dl      Bilirubin, UA Negative     Occult Blood, UA Negative    COVID/FLU/RSV [[id number]]  (Normal) Collected: 09/02/22 0228    Lab Status: Final result Specimen: Nares from Nose Updated: 09/02/22 9946 SARS-CoV-2 Negative     INFLUENZA A PCR Negative     INFLUENZA B PCR Negative     RSV PCR Negative    Narrative:      FOR PEDIATRIC PATIENTS - copy/paste COVID Guidelines URL to browser: [email or web address]    SARS-CoV-2 assay is a Nucleic Acid Amplification assay intended for the  qualitative detection of nucleic acid from SARS-CoV-2 in nasopharyngeal  swabs  Results are for the presumptive identification of SARS-CoV-2 RNA  Positive results are indicative of infection with SARS-CoV-2, the virus  causing COVID-19, but do not rule out bacterial infection or co-infection  with other viruses  Laboratories within the [place] and its  territories are required to report all positive results to the appropriate  public health authorities  Negative results do not preclude SARS-CoV-2  infection and should not be used as the sole basis for treatment or other  patient management decisions  Negative results must be combined with  clinical observations, patient history, and epidemiological information  This test has not been FDA cleared or approved  This test has been authorized by FDA under an Emergency Use Authorization  (EUA)  This test is only authorized for the duration of time the  declaration that circumstances exist justifying the authorization of the  emergency use of an in vitro diagnostic tests for detection of SARS-CoV-2  virus and/or diagnosis of COVID-19 infection under section 564(b)(1) of  the Act, 21 U  S C  646WQL-2(V)(5), unless the authorization is terminated  or revoked sooner  The test has been validated but independent review by FDA  and CLIA is pending  Test performed using [employer] GeneXpert: This RT-PCR assay targets N2,  a region unique to SARS-CoV-2  A conserved region in the E-gene was chosen  for pan-Sarbecovirus detection which includes SARS-CoV-2      Comprehensive metabolic panel [[id number]]  (Abnormal) Collected: 09/02/22 0149 Lab Status: Final result Specimen: Blood from Arm, Left Updated: 09/02/22 0229     Sodium 137 mmol/L      Potassium 4 0 mmol/L      Chloride 99 mmol/L      CO2 30 mmol/L      ANION GAP 8 mmol/L      BUN 17 mg/dL      Creatinine 1 27 mg/dL      Glucose 119 mg/dL      Calcium 9 2 mg/dL      AST 38 U/L      ALT 28 U/L      Alkaline Phosphatase 162 U/L      Total Protein 8 7 g/dL      Albumin 4 1 g/dL      Total Bilirubin 0 75 mg/dL      eGFR 43 ml/min/1 73sq m     Narrative:      [facility] guidelines for Chronic Kidney Disease (CKD):     Stage 1 with normal or high GFR (GFR > 90 mL/min/1 73 square meters)    Stage 2 Mild CKD (GFR = 60-89 mL/min/1 73 square meters)    Stage 3A Moderate CKD (GFR = 45-59 mL/min/1 73 square meters)    Stage 3B Moderate CKD (GFR = 30-44 mL/min/1 73 square meters)    Stage 4 Severe CKD (GFR = 15-29 mL/min/1 73 square meters)    Stage 5 End Stage CKD (GFR <15 mL/min/1 73 square meters)  Note: GFR calculation is accurate only with a steady state creatinine    Lipase [[id number]]  (Normal) Collected: 09/02/22 0149    Lab Status: Final result Specimen: Blood from Arm, Left Updated: 09/02/22 0229     Lipase 259 u/L     Procalcitonin [[id number]]  (Abnormal) Collected: 09/02/22 0149    Lab Status: Final result Specimen: Blood from Arm, Left Updated: 09/02/22 0228     Procalcitonin 1 57 ng/ml     Lactic acid [[id number]]  (Abnormal) Collected: 09/02/22 0149    Lab Status: Final result Specimen: Blood from Arm, Left Updated: 09/02/22 0225     LACTIC ACID 2 1 mmol/L     Narrative:      Result may be elevated if tourniquet was used during collection      HS Troponin 0hr (reflex protocol) [[id number]]  (Normal) Collected: 09/02/22 0149    Lab Status: Final result Specimen: Blood from Arm, Left Updated: 09/02/22 0225     hs TnI 0hr 13 ng/L     Protime-INR [[id number]]  (Normal) Collected: 09/02/22 0149    Lab Status: Final result Specimen: Blood from Arm, Left Updated: 09/02/22 0215     Protime 13 9 seconds      INR 1 07    APTT [[id number]]  (Normal) Collected: 09/02/22 0149    Lab Status: Final result Specimen: Blood from Arm, Left Updated: 09/02/22 0215     PTT 29 seconds     CBC and differential [[id number]]  (Abnormal) Collected: 09/02/22 0149    Lab Status: Final result Specimen: Blood from Arm, Left Updated: 09/02/22 0157     WBC 18 97 Thousand/uL      RBC 4 68 Million/uL      Hemoglobin 13 7 g/dL      Hematocrit 42 6 %      MCV 91 fL      MCH 29 3 pg      MCHC 32 2 g/dL      RDW 13 5 %      MPV 9 5 fL      Platelets 959 Thousands/uL      nRBC 0 /100 WBCs      Neutrophils Relative 89 %      Immat GRANS % 0 %      Lymphocytes Relative 6 %      Monocytes Relative 5 %      Eosinophils Relative 0 %      Basophils Relative 0 %      Neutrophils Absolute 16 85 Thousands/µL      Immature Grans Absolute 0 08 Thousand/uL      Lymphocytes Absolute 1 11 Thousands/µL      Monocytes Absolute 0 86 Thousand/µL      Eosinophils Absolute 0 04 Thousand/µL      Basophils Absolute 0 03 Thousands/µL                  XR chest portable   Final Result by Gustabo Goltz, MD (09/02 302)      Diffuse patchy bilateral hazy airspace opacities compatible with pneumonia versus pulmonary edema                  Workstation performed: [id number]         CT abdomen pelvis wo contrast   Final Result by Gustabo Goltz, MD (09/02 0697)      No evidence of acute intra-abdominal or pelvic pathology              Workstation performed: [id number]                    Procedures  ECG 12 Lead Documentation Only    Date/Time: 9/2/2022 3:03 AM  Performed by: Savannah Brown PA-C  Authorized by: Savannah Brown PA-C     Indications / Diagnosis:  Sepsis  ECG reviewed by me, the ED Provider: yes    Patient location:  ED  Previous ECG:     Previous ECG:  Compared to current    Similarity:  No change  Interpretation:     Interpretation: non-specific    Rate:     ECG rate:  105    ECG rate assessment: tachycardic    Rhythm:     Rhythm: sinus rhythm    Ectopy:     Ectopy: none    QRS:     QRS axis:  Normal    QRS intervals:  Normal  Conduction:     Conduction: normal    ST segments:     ST segments:  Normal  T waves:     T waves: normal    ECG 12 Lead Documentation Only    Date/Time: 9/2/2022 4:15 AM  Performed by: Benigno Bullock PA-C  Authorized by: Benigno Bullock PA-C     Indications / Diagnosis:  Delta  ECG reviewed by me, the ED Provider: yes    Patient location:  ED  Previous ECG:     Previous ECG:  Compared to current    Similarity:  No change  Interpretation:     Interpretation: abnormal    Rate:     ECG rate:  106    ECG rate assessment: tachycardic    Rhythm:     Rhythm: sinus tachycardia    Ectopy:     Ectopy: none    QRS:     QRS axis:  Normal    QRS intervals:  Normal  Conduction:     Conduction: normal    ST segments:     ST segments:  Normal  T waves:     T waves: inverted      Inverted:  V1             ED Course                                             MDM  Number of Diagnoses or Management Options  Cough: new and requires workup  Fever: new and requires workup  Pneumonia: new and requires workup  Sepsis [facility]): new and requires workup  Diagnosis management comments: Patient was seen and examined  in the emergency department for chief complaint of fever generalized weakness as well as nausea and vomiting  The patient presented few hours symptoms  Patient noted be tachycardic on arrival, a femoral but recently received Tylenol  Abdomen is soft with tenderness in the left lower right lower quadrant  Lower extremities have lymphedema however no evidence of infection at this time  Skin otherwise does not reveal any signs of infection  Lungs are clear  Tachycardic but regular  Neuro exam is nonfocal       DDx including but not limited to: sepsis, UTI, pneumonia, viral illness, cellulitis, sinusitis; doubt influenza, meningitis, line sepsis or other intracranial process  Workup: Will check labs    CT abdomen pelvis without in setting iodine allergy  Chest x-ray  EKG  Evidence of bilateral pneumonia on chest x-ray  Will give antibiotics  Leukocytosis  Mild lactic acidosis  Cleared with fluids  Disposition:  General impression 20-year-old female with cough and subjective fever home found to have sepsis from pneumonia  Treat with antibiotics  Admitted to the internal medicine service for further observation and management  The patient was admitted to the internal medicine service for further evaluation, management, and observation  The patient remained stable while under my care   Care was transferred with full report to admitting team         Amount and/or Complexity of Data Reviewed  Clinical lab tests: ordered and reviewed  Tests in the radiology section of CPT®: ordered and reviewed  Tests in the medicine section of CPT®: ordered and reviewed  Review and summarize past medical records: yes  Discuss the patient with other providers: yes (Internal medicine)  Independent visualization of images, tracings, or specimens: yes    Risk of Complications, Morbidity, and/or Mortality  Presenting problems: moderate  Diagnostic procedures: moderate  Management options: moderate    Patient Progress  Patient progress: stable      Disposition  Final diagnoses:   Fever   Cough   Pneumonia   Sepsis ([address] )     Time reflects when diagnosis was documented in both MDM as applicable and the Disposition within this note     Time User Action Codes Description Comment    9/2/2022  5:56 AM Donnald Runner Add [R50 9] Fever     9/2/2022  5:56 AM Donnald Runner Add [R05 9] Cough     9/2/2022  5:56 AM Donnald Runner Add [J18 9] Pneumonia     9/2/2022  5:56 AM Donnald Runner Add [A41 9] Sepsis ([address] )     9/2/2022  5:56 AM Donnald Runner Modify [R50 9] Fever     9/2/2022  5:56 AM Donnald Runner Modify [J18 9] Pneumonia       ED Disposition     ED Disposition   Admit    Condition   Stable    Date/Time   Fri Sep 2, 2022  5:56 AM    Comment Case was discussed with DECLAN and the patient's admission status was agreed to be Admission Status: inpatient status to the service of Dr Meera Robledo   Follow-up Information    None         Patient's Medications   Discharge Prescriptions    No medications on file       No discharge procedures on file      PDMP Review       Value Time User    PDMP Reviewed  Yes 4/28/2022 10:29 PM Deep Suazo, [facility]          ED Provider  Electronically Signed by           Ellis Pina PA-C  09/02/22 2334

## 2022-09-02 NOTE — ASSESSMENT & PLAN NOTE
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

## 2022-09-02 NOTE — CASE MANAGEMENT
Case Management Assessment & Discharge Planning Note    Patient name Amador Del Toro  Location [facility] 68 2 /South 2 Viktor Mallory* MRN [id number]  : 1955 Date 2022       Current Admission Date: 2022  Current Admission Diagnosis:CAP (community acquired pneumonia)   Patient Active Problem List    Diagnosis Date Noted    History of pulmonary embolism 2022    Chronic diastolic heart failure ([facility] ) 2022    Slow transit constipation 2021    Chronic right-sided low back pain without sciatica 2021    Cellulitis of right lower extremity 2021    Septic shock ([facility] ) 2021    Morbid obesity ([facility] ) 2021    Hypogammaglobulinemia, acquired ([facility] ) 2019    Acute respiratory failure with hypoxia ([facility] ) 2019    Hypertrophic cardiomyopathy ([facility] ) 2019    Low immunoglobulin level 2019    LVH (left ventricular hypertrophy) 2019    Postnasal drip 2019    Dyspnea on exertion 2019    Mitral valve disorder     CAP (community acquired pneumonia) 2019    Elevated troponin 2019    Severe sepsis ([facility] ) 2019    Increased risk of breast cancer     Other complicated headache syndrome 2018    Nausea & vomiting 2018    SIRS (systemic inflammatory response syndrome) ([facility] ) 2018    Post-traumatic osteoarthritis 2018    Opiate analgesic use agreement exists 2016    BRCA1 positive 2016    Charcot's joint 2016    Class 2 obesity 2016    Wegener's granulomatosis with renal involvement ([facility] ) 01/15/2016    Cancer of ovary ([facility] ) 2015    Ovarian cancer ([facility] ) 2015    Increased frequency of urination 2014    Chronic kidney disease, stage III (moderate) (HCC) 2014    Chronic pain disorder 10/24/2013    GERD (gastroesophageal reflux disease) 2013    Dyslipidemia 2013    Lymphedema 11/15/2012    Anxiety 2012    Benign essential hypertension 06/05/2012    Depression 06/05/2012      LOS (days): 0  Geometric Mean LOS (GMLOS) (days): 4 80  Days to GMLOS:4 5     OBJECTIVE:    Risk of Unplanned Readmission Score: 13 53         Current admission status: Inpatient       Preferred Pharmacy:   [address], [address]  [address] [place] [address]  Phone: [phone] Fax: 9[address] [address]  Phone: [phone] Fax: [phone]    Primary Care Provider: Tanya Arthur DO    Primary Insurance: MEDICARE  Secondary Insurance: AARP    ASSESSMENT:  Active Health Care Proxies     Lamb, Δηλη[address] Representative - Spouse   Primary Phone: [phone] (Mobile)  Home Phone: [phone]                              Patient Information  Admitted from[de-identified] Home  Mental Status: Alert  During Assessment patient was accompanied by: Not accompanied during assessment  Assessment information provided by[de-identified] Patient  Primary Caregiver: Self  Support Systems: Son, Spouse/significant other  [place] of Residence: [address] [place] [address] do you live in?: [address] [place] [address] access options   Select all that apply : Ramp  Type of Current Residence: [place]  In the last 12 months, was there a time when you were not able to pay the mortgage or rent on time?: No  In the last 12 months, how many places have you lived?: 1  In the last 12 months, was there a time when you did not have a steady place to sleep or slept in a shelter (including now)?: No  Homeless/housing insecurity resource given?: N/A  Living Arrangements: Lives w/ Spouse/significant other  Is patient a ?: No    Activities of Daily Living Prior to Admission  Functional Status: Independent  Completes ADLs independently?: Yes  Ambulates independently?: Yes  Does patient use assisted devices?: Yes  Assisted Devices (DME) used: Walker, Other (Comment) (Toilet seat riser)  Does patient currently own DME?: Yes  What DME does the patient currently own?: Ana M Wei, David (Comment) (Toilet seat riser)  Does patient have a history of Outpatient Therapy (PT/OT)?: Yes  Does the patient have a history of Short-Term Rehab?: Yes  Does patient have a history of HHC?: Yes  Does patient currently have [facility]?: No         Patient Information Continued  Income Source: Unemployed  Does patient have prescription coverage?: Yes  Within the past 12 months, you worried that your food would run out before you got the money to buy more : Never true  Within the past 12 months, the food you bought just didn't last and you didn't have money to get more : Never true  Food insecurity resource given?: N/A  Does patient receive dialysis treatments?: No  Does patient have a history of substance abuse?: No  Does patient have a history of Mental Health Diagnosis?: No         Means of Transportation  Means of Transport to Appts[de-identified] Family transport  In the past 12 months, has lack of transportation kept you from medical appointments or from getting medications?: No  In the past 12 months, has lack of transportation kept you from meetings, work, or from getting things needed for daily living?: No  Was application for public transport provided?: N/A        DISCHARGE DETAILS:    Discharge planning discussed with[de-identified] Pt        CM contacted family/caregiver?: No- see comments (Pt declined )             Contacts  Patient Contacts: Deion Gillis  Relationship to Patient[de-identified] Family (Spouse)  Contact Method: Phone  Phone Number: [phone]  Reason/Outcome: Discharge Planning                   Would you like to participate in our [facility] service program?  : Yes

## 2022-09-02 NOTE — H&P
[facility]  H&P- [facility] 1955, 69 y o  female MRN: [id number]  Unit/Bed#: [place] [facility] 2 -01 Encounter: [id number]  Primary Care Provider: Funmi Elais DO   Date and time admitted to hospital: 9/2/2022  1:12 AM    * CAP (community acquired pneumonia)  Assessment & Plan  Present on admission as evidenced by fever, diffuse patchy bilateral hazy airspace opacities compatible with pneumonia on CXR  · Initiated on IV Rocephin and azithromycin in the ED  · Continue with IV Rocephin  · Drip score less than 4  · Strep pneumo and Legionella antigens ordered  · Sputum culture ordered  · Blood cultures ordered  · Titrate oxygen appropriately  · Incentive spirometry    Severe sepsis [facility])  Assessment & Plan  Present on admission as evidenced by tachycardia, tachypnea, leukocytosis  · Lactic acid initially elevated at 2 1, most recent 1 2  · Secondary to pneumonia  · Continue IV Rocephin  · Follow-up blood cultures  · Follow-up with strep pneumo/Legionella antigens  · Antipyretics p r n      Chronic diastolic heart failure (HCC)  Assessment & Plan  Wt Readings from Last 3 Encounters:   09/02/22 97 6 kg (215 lb 2 7 oz)   07/25/22 101 kg (223 lb)   05/20/22 101 kg (223 lb)     Appears euvolemic on exam        History of pulmonary embolism  Assessment & Plan  History of PE/DVT in 2007 following hysterectomy for ovarian cancer, treated with Lovenox for 1 year  · IVC filter in place, noted on CT abdomen pelvis    Hypogammaglobulinemia, acquired ([address] )  Assessment & Plan  Developed hypogammaglobulinemia following rituximab treatment for Juan's  · Followed by outpatient Hematology/Oncology for severe hypogammaglobulinemia  · Previously on IVIG on the discontinued due to severe adverse reactions  · Recommended watchful observation and monitoring of IgG level  · Continue outpatient follow-up     Acute respiratory failure with hypoxia ([address] )  Assessment & Plan  Currently requiring 2 L nasal cannula oxygen or to maintain oxygen saturations greater than 88%  · Suspect likely secondary to pneumonia    Opiate analgesic use agreement exists  Assessment & Plan  Maintained on chronic pain medications  · PDMP reviewed    Chronic kidney disease, stage III (moderate) (HCC)  Assessment & Plan  Lab Results   Component Value Date    EGFR 43 09/02/2022    EGFR 48 05/02/2022    EGFR 48 05/01/2022    CREATININE 1 27 09/02/2022    CREATININE 1 15 05/02/2022    CREATININE 1 16 05/01/2022   With baseline 1-1 2  · Monitor BMP    Benign essential hypertension  Assessment & Plan  Continue metoprolol 12 5 mg b i d  Wegener's granulomatosis with renal involvement ([facility] )  Assessment & Plan  Not maintained on medications  · Follows with outpatient Rheumatology      VTE Pharmacologic Prophylaxis: VTE Score: 3 Moderate Risk (Score 3-4) - Pharmacological DVT Prophylaxis Ordered: heparin  Code Status: Level 1 - Full Code   Discussion with family: Patient declined call to   Anticipated Length of Stay: Patient will be admitted on an inpatient basis with an anticipated length of stay of greater than 2 midnights secondary to sepsis, pna  Total Time for Visit, including Counseling / Coordination of Care: 60 minutes Greater than 50% of this total time spent on direct patient counseling and coordination of care  Chief Complaint:  Fevers/chills    History of Present Illness:  Stone Garay is a 69 y o  female with a PMH of Juan's granulomatosis, CKD, ovarian cancer status post total hysterectomy, CHF, provoked PE who presents with subjective fevers/chills, vomiting  According to the patient on the day of admission, the patient began feeling ill last evening  She states that she felt like she had a fever however the thermometer was not working therefore she took Tylenol  She also reported significant nausea and vomiting  She presented to the hospital for further evaluation as she has been septic before    She denies any sick contacts  She denies any recent travel, medication changes  Review of Systems:  Review of Systems   Constitutional: Positive for chills and fever  Negative for appetite change and unexpected weight change  HENT: Negative for congestion, rhinorrhea and sore throat  Respiratory: Negative for cough, chest tightness, shortness of breath and wheezing  Cardiovascular: Negative for chest pain, palpitations and leg swelling  Gastrointestinal: Positive for abdominal pain, nausea and vomiting  Negative for constipation and diarrhea  Genitourinary: Negative for dysuria, frequency and urgency  Musculoskeletal: Negative for arthralgias  Neurological: Positive for weakness  Negative for dizziness, light-headedness and headaches  Past Medical and Surgical History:   Past Medical History:   Diagnosis Date    BRCA1 positive     Cancer [facility])     ovarian Ca with chemo    History of chemotherapy     ovarian cancer     History of pulmonary embolus (PE) & DVT 2007    post-op FAMILIA/ omenectomy    Lymphedema     MRSA (methicillin resistant Staphylococcus aureus) 2017    nasal swab negative 4/3/19    Ovarian cancer ([facility])     CYST REMOVED RIGHT OVARY IN   HYSTERECTOMY 07         Past Surgical History:   Procedure Laterality Date    APPENDECTOMY  1998    laparoscopic    BILATERAL SALPINGOOPHORECTOMY  2007    BREAST BIOPSY Right 2016    BREAST BIOPSY Left 07/15/2020    BREAST BIOPSY Left 2021    stereo     SECTION      HYSTERECTOMY  2007    Omentectomy and [facility] node [facility] at [facility] BIOPSY RIGHT (ALL INC) Right 2021    OOPHORECTOMY Bilateral 2007    OTHER SURGICAL HISTORY      right lower extremity due to trauma    RI TREAT TIBIAL SHAFT FX, INTRAMED IMPLANT Right 2017    Procedure: INSERTION NAIL IM TIBIA;  Surgeon: Tatianna Fleming MD;  [facility];  Service: [facility] GUIDANCE BREAST BIOPSY LEFT EACH ADDITIONAL Left 7/15/2020    US GUIDED BREAST BIOPSY LEFT COMPLETE Left 7/15/2020    US GUIDED BREAST BIOPSY RIGHT COMPLETE Right 5/9/2016    US GUIDED BREAST BIOPSY RIGHT COMPLETE Right 7/26/2021       Meds/Allergies:  Prior to Admission medications    Medication Sig Start Date End Date Taking? Authorizing Provider   acetaminophen (TYLENOL) 325 mg tablet Take 2 tablets by mouth every 6 (six) hours as needed for mild pain 9/19/17  Yes Loida Gray MD   bisacodyl (DULCOLAX) 5 mg EC tablet Take 5 mg by mouth daily as needed for constipation   Yes Historical Provider, MD   Calcium Carbonate-Vit D-Min (CALCIUM 1200 PO) Take 3 tablets by mouth daily    Yes Historical Provider, MD   DULoxetine (CYMBALTA) 60 mg delayed release capsule Take 60 mg by mouth daily   4/13/18  Yes Historical Provider, MD   LORazepam (ATIVAN) 0 5 mg tablet Take 1 tablet (0 5 mg total) by mouth 2 (two) times a day as needed for anxiety Take 1 tablet as needed every 8 hours  Patient taking differently: Take 0 5 mg by mouth 2 (two) times a day as needed for anxiety Take 1 tablet as needed every 8 hours  Patient takes every 12 hours 5/3/22  Yes Maddie Stephens MD   metoprolol tartrate (LOPRESSOR) 25 mg tablet TAKE 1/2 TABLET(12 5MG) BY MOUTH EVERY 12 HOURS 9/2/21  Yes Jesus Palomino MD   Multiple Vitamins-Minerals (MULTIVITAMIN ADULT PO) Take 1 capsule by mouth daily  9/27/13  Yes Historical Provider, MD   omeprazole (PriLOSEC) 20 mg delayed release capsule Take 20 mg by mouth daily     Yes Historical Provider, MD   oxyCODONE (OxyCONTIN) 40 mg 12 hr tablet Take 40 mg by mouth every 12 (twelve) hours   Yes Historical Provider, MD   oxyCODONE (ROXICODONE) 15 mg immediate release tablet  7/20/22  Yes Historical Provider, MD   OXYCODONE HCL PO Take 15 mg by mouth 2 (two) times a day as needed (breakthrough pain)   Yes Historical Provider, MD   TOVIAZ 8 MG TB24 Take 8 mg by mouth daily at bedtime  4/5/18  Yes Historical Provider, MD   furosemide (LASIX) 40 mg tablet Take 1 tablet (40 mg total) by mouth daily for 6 days  Patient not taking: Reported on 5/20/2022 5/4/22 5/20/22  Nettie Isabel MD   senna-docusate sodium (SENOKOT S) 8 6-50 mg per tablet Take 1 tablet by mouth daily at bedtime 5/3/22 6/2/22  Nettie Isabel MD     I have reviewed home medications with patient personally  Allergies:    Allergies   Allergen Reactions    Iodinated Diagnostic Agents Shortness Of Breath    Omnipaque [Iohexol] Shortness Of Breath    Other Shortness Of Breath     IVP dye, x ray dye 3    Iodides      Other reaction(s): SWELLING, SOB    Methotrexate Nausea Only     Headache and nausea    Methotrexate Derivatives Headache       Social History:  Marital Status: /Civil Union   Occupation:  None  Patient Pre-hospital Living Situation: Home  Patient Pre-hospital Level of Mobility: walks  Patient Pre-hospital Diet Restrictions:  None  Substance Use History:   Social History     Substance and Sexual Activity   Alcohol Use Yes     Social History     Tobacco Use   Smoking Status Never Smoker   Smokeless Tobacco Never Used     Social History     Substance and Sexual Activity   Drug Use Not Currently       Family History:  Family History   Problem Relation Age of Onset    Breast cancer Mother         28    Pancreatic cancer Mother     Ovarian cancer Maternal Grandmother     Breast cancer Maternal Aunt 39    No Known Problems Paternal Aunt     No Known Problems Father     No Known Problems Maternal Grandfather     No Known Problems Paternal Grandmother     No Known Problems Paternal Grandfather     Lung cancer Half-Sister        Physical Exam:     Vitals:   Blood Pressure: 149/82 (09/02/22 0905)  Pulse: 92 (09/02/22 0905)  Temperature: 98 2 °F (36 8 °C) (09/02/22 0905)  Temp Source: Oral (09/02/22 0905)  Respirations: 20 (09/02/22 0905)  Height: 5' 3" (160 cm) (09/02/22 1100)  Weight - Scale: 97 6 kg (215 lb 2 7 oz) (09/02/22 1100)  SpO2: 98 % (09/02/22 0905)    Physical Exam  Vitals and nursing note reviewed  Constitutional:       Appearance: Normal appearance  She is not ill-appearing  Cardiovascular:      Rate and Rhythm: Tachycardia present  Heart sounds: No murmur heard  No friction rub  No gallop  Pulmonary:      Effort: Pulmonary effort is normal  No respiratory distress  Breath sounds: No stridor  No wheezing or rales  Comments: Mildly decreased breath sounds at bases  Abdominal:      General: Abdomen is flat  Bowel sounds are normal  There is no distension  Palpations: Abdomen is soft  Tenderness: There is no abdominal tenderness  Musculoskeletal:      Right lower leg: No edema  Left lower leg: No edema  Skin:     General: Skin is warm and dry  Coloration: Skin is not jaundiced or pale  Comments: no rashes or lesions noted to visible skin   Neurological:      Mental Status: She is alert            Additional Data:     Lab Results:  Results from last 7 days   Lab Units 09/02/22  0149   WBC Thousand/uL 18 97*   HEMOGLOBIN g/dL 13 7   HEMATOCRIT % 42 6   PLATELETS Thousands/uL 228   NEUTROS PCT % 89*   LYMPHS PCT % 6*   MONOS PCT % 5   EOS PCT % 0     Results from last 7 days   Lab Units 09/02/22  0149   SODIUM mmol/L 137   POTASSIUM mmol/L 4 0   CHLORIDE mmol/L 99   CO2 mmol/L 30   BUN mg/dL 17   CREATININE mg/dL 1 27   ANION GAP mmol/L 8   CALCIUM mg/dL 9 2   ALBUMIN g/dL 4 1   TOTAL BILIRUBIN mg/dL 0 75   ALK PHOS U/L 162*   ALT U/L 28   AST U/L 38   GLUCOSE RANDOM mg/dL 119     Results from last 7 days   Lab Units 09/02/22  0149   INR  1 07             Results from last 7 days   Lab Units 09/02/22  0419 09/02/22  0149   LACTIC ACID mmol/L 1 2 2 1*   PROCALCITONIN ng/ml  --  1 57*       Imaging: Reviewed radiology reports from this admission including: chest xray and abdominal/pelvic CT  XR chest portable   Final Result by Tamiko Pan MD (09/02 9267)      Diffuse patchy bilateral hazy airspace opacities compatible with pneumonia versus pulmonary edema                  Workstation performed: [id number]         CT abdomen pelvis wo contrast   Final Result by Valerie Hsieh MD (09/02 9937)      No evidence of acute intra-abdominal or pelvic pathology  Workstation performed: [id number]             EKG and Other Studies Reviewed on Admission:   · EKG:  Sinus tachycardia    ** Please Note: This note has been constructed using a voice recognition system   **

## 2022-09-02 NOTE — ASSESSMENT & PLAN NOTE
History of PE/DVT in 2007 following hysterectomy for ovarian cancer, treated with Lovenox for 1 year  · IVC filter in place, noted on CT abdomen pelvis

## 2022-09-02 NOTE — ASSESSMENT & PLAN NOTE
Present on admission as evidenced by tachycardia, tachypnea, leukocytosis  · Lactic acid initially elevated at 2 1, most recent 1 2  · Secondary to pneumonia  · Continue IV Rocephin  · Follow-up blood cultures  · Follow-up with strep pneumo/Legionella antigens  · Antipyretics p r n

## 2022-09-02 NOTE — ASSESSMENT & PLAN NOTE
Wt Readings from Last 3 Encounters:   09/02/22 97 6 kg (215 lb 2 7 oz)   07/25/22 101 kg (223 lb)   05/20/22 101 kg (223 lb)     Appears euvolemic on exam

## 2022-09-02 NOTE — PLAN OF CARE
Problem: Potential for Falls  Goal: Patient will remain free of falls  Description: INTERVENTIONS:  - Educate patient/family on patient safety including physical limitations  - Instruct patient to call for assistance with activity   - Consult OT/PT to assist with strengthening/mobility   - Keep Call bell within reach  - Keep bed low and locked with side rails adjusted as appropriate  - Keep care items and personal belongings within reach  - Initiate and maintain comfort rounds  - Make Fall Risk Sign visible to staff  - Offer Toileting every 2 Hours, in advance of need  - Initiate/Maintain bed alarm  - Obtain necessary fall risk management equipment:   - Apply yellow socks and bracelet for high fall risk patients  - Consider moving patient to room near nurses station  Outcome: Progressing     Problem: MOBILITY - ADULT  Goal: Maintain or return to baseline ADL function  Description: INTERVENTIONS:  -  Assess patient's ability to carry out ADLs; assess patient's baseline for ADL function and identify physical deficits which impact ability to perform ADLs (bathing, care of mouth/teeth, toileting, grooming, dressing, etc )  - Assess/evaluate cause of self-care deficits   - Assess range of motion  - Assess patient's mobility; develop plan if impaired  - Assess patient's need for assistive devices and provide as appropriate  - Encourage maximum independence but intervene and supervise when necessary  - Involve family in performance of ADLs  - Assess for home care needs following discharge   - Consider OT consult to assist with ADL evaluation and planning for discharge  - Provide patient education as appropriate  Outcome: Progressing  Goal: Maintains/Returns to pre admission functional level  Description: INTERVENTIONS:  - Perform BMAT or MOVE assessment daily    - Set and communicate daily mobility goal to care team and patient/family/caregiver     - Collaborate with rehabilitation services on mobility goals if consulted  - Perform Range of Motion 4 times a day  - Reposition patient every 2 hours    - Dangle patient 3 times a day  - Stand patient 3 times a day  - Ambulate patient 3 times a day  - Out of bed to chair 3 times a day   - Out of bed for meals 3 times a day  - Out of bed for toileting  - Record patient progress and toleration of activity level   Outcome: Progressing     Problem: PAIN - ADULT  Goal: Verbalizes/displays adequate comfort level or baseline comfort level  Description: Interventions:  - Encourage patient to monitor pain and request assistance  - Assess pain using appropriate pain scale  - Administer analgesics based on type and severity of pain and evaluate response  - Implement non-pharmacological measures as appropriate and evaluate response  - Consider cultural and social influences on pain and pain management  - Notify physician/advanced practitioner if interventions unsuccessful or patient reports new pain  Outcome: Progressing     Problem: INFECTION - ADULT  Goal: Absence or prevention of progression during hospitalization  Description: INTERVENTIONS:  - Assess and monitor for signs and symptoms of infection  - Monitor lab/diagnostic results  - Monitor all insertion sites, i e  indwelling lines, tubes, and drains  - Monitor endotracheal if appropriate and nasal secretions for changes in amount and color  - [place] appropriate cooling/warming therapies per order  - Administer medications as ordered  - Instruct and encourage patient and family to use good hand hygiene technique  - Identify and instruct in appropriate isolation precautions for identified infection/condition  Outcome: Progressing  Goal: Absence of fever/infection during neutropenic period  Description: INTERVENTIONS:  - Monitor WBC    Outcome: Progressing     Problem: SAFETY ADULT  Goal: Patient will remain free of falls  Description: INTERVENTIONS:  - Educate patient/family on patient safety including physical limitations  - Instruct patient to call for assistance with activity   - Consult OT/PT to assist with strengthening/mobility   - Keep Call bell within reach  - Keep bed low and locked with side rails adjusted as appropriate  - Keep care items and personal belongings within reach  - Initiate and maintain comfort rounds  - Make Fall Risk Sign visible to staff  - Offer Toileting every 2 Hours, in advance of need  - Initiate/Maintain bed alarm  - Obtain necessary fall risk management equipment:   - Apply yellow socks and bracelet for high fall risk patients  - Consider moving patient to room near nurses station  Outcome: Progressing  Goal: Maintain or return to baseline ADL function  Description: INTERVENTIONS:  -  Assess patient's ability to carry out ADLs; assess patient's baseline for ADL function and identify physical deficits which impact ability to perform ADLs (bathing, care of mouth/teeth, toileting, grooming, dressing, etc )  - Assess/evaluate cause of self-care deficits   - Assess range of motion  - Assess patient's mobility; develop plan if impaired  - Assess patient's need for assistive devices and provide as appropriate  - Encourage maximum independence but intervene and supervise when necessary  - Involve family in performance of ADLs  - Assess for home care needs following discharge   - Consider OT consult to assist with ADL evaluation and planning for discharge  - Provide patient education as appropriate  Outcome: Progressing  Goal: Maintains/Returns to pre admission functional level  Description: INTERVENTIONS:  - Perform BMAT or MOVE assessment daily    - Set and communicate daily mobility goal to care team and patient/family/caregiver  - Collaborate with rehabilitation services on mobility goals if consulted  - Perform Range of Motion 4 times a day  - Reposition patient every 2 hours    - Dangle patient 3 times a day  - Stand patient 3 times a day  - Ambulate patient 3 times a day  - Out of bed to chair 3 times a day   - Out of bed for meals 3 times a day  - Out of bed for toileting  - Record patient progress and toleration of activity level   Outcome: Progressing     Problem: DISCHARGE PLANNING  Goal: Discharge to home or other facility with appropriate resources  Description: INTERVENTIONS:  - Identify barriers to discharge w/patient and caregiver  - Arrange for needed discharge resources and transportation as appropriate  - Identify discharge learning needs (meds, wound care, etc )  - Arrange for interpretive services to assist at discharge as needed  - Refer to Case Management Department for coordinating discharge planning if the patient needs post-hospital services based on physician/advanced practitioner order or complex needs related to functional status, cognitive ability, or social support system  Outcome: Progressing     Problem: Knowledge Deficit  Goal: Patient/family/caregiver demonstrates understanding of disease process, treatment plan, medications, and discharge instructions  Description: Complete learning assessment and assess knowledge base    Interventions:  - Provide teaching at level of understanding  - Provide teaching via preferred learning methods  Outcome: Progressing

## 2022-09-02 NOTE — ED NOTES
When pt sleeps, pt drops to 87-88% oxygen saturation  Pt placed on 2L nasal canula at this time  Oxygen saturation 97% Provider aware       Alonso Gimenez RN  09/02/22 2178

## 2022-09-03 PROBLEM — J96.01 ACUTE RESPIRATORY FAILURE WITH HYPOXIA (HCC): Status: RESOLVED | Noted: 2019-11-07 | Resolved: 2022-09-03

## 2022-09-03 PROBLEM — A41.9 SEVERE SEPSIS (HCC): Status: RESOLVED | Noted: 2019-04-02 | Resolved: 2022-09-03

## 2022-09-03 PROBLEM — R65.20 SEVERE SEPSIS (HCC): Status: RESOLVED | Noted: 2019-04-02 | Resolved: 2022-09-03

## 2022-09-03 LAB
ANION GAP SERPL CALCULATED.3IONS-SCNC: 7 MMOL/L (ref 4–13)
BASOPHILS # BLD AUTO: 0.01 THOUSANDS/ΜL (ref 0–0.1)
BASOPHILS NFR BLD AUTO: 0 % (ref 0–1)
BUN SERPL-MCNC: 15 MG/DL (ref 5–25)
CALCIUM SERPL-MCNC: 8.6 MG/DL (ref 8.3–10.1)
CHLORIDE SERPL-SCNC: 104 MMOL/L (ref 96–108)
CO2 SERPL-SCNC: 30 MMOL/L (ref 21–32)
CREAT SERPL-MCNC: 0.94 MG/DL (ref 0.6–1.3)
EOSINOPHIL # BLD AUTO: 0.17 THOUSAND/ΜL (ref 0–0.61)
EOSINOPHIL NFR BLD AUTO: 2 % (ref 0–6)
ERYTHROCYTE [DISTWIDTH] IN BLOOD BY AUTOMATED COUNT: 13.8 % (ref 11.6–15.1)
GFR SERPL CREATININE-BSD FRML MDRD: 62 ML/MIN/1.73SQ M
GLUCOSE SERPL-MCNC: 99 MG/DL (ref 65–140)
HCT VFR BLD AUTO: 33.3 % (ref 34.8–46.1)
HGB BLD-MCNC: 10.8 G/DL (ref 11.5–15.4)
IMM GRANULOCYTES # BLD AUTO: 0.06 THOUSAND/UL (ref 0–0.2)
IMM GRANULOCYTES NFR BLD AUTO: 1 % (ref 0–2)
LYMPHOCYTES # BLD AUTO: 1.81 THOUSANDS/ΜL (ref 0.6–4.47)
LYMPHOCYTES NFR BLD AUTO: 21 % (ref 14–44)
MCH RBC QN AUTO: 29.8 PG (ref 26.8–34.3)
MCHC RBC AUTO-ENTMCNC: 32.4 G/DL (ref 31.4–37.4)
MCV RBC AUTO: 92 FL (ref 82–98)
MONOCYTES # BLD AUTO: 0.63 THOUSAND/ΜL (ref 0.17–1.22)
MONOCYTES NFR BLD AUTO: 7 % (ref 4–12)
NEUTROPHILS # BLD AUTO: 6.03 THOUSANDS/ΜL (ref 1.85–7.62)
NEUTS SEG NFR BLD AUTO: 69 % (ref 43–75)
NRBC BLD AUTO-RTO: 0 /100 WBCS
PLATELET # BLD AUTO: 167 THOUSANDS/UL (ref 149–390)
PMV BLD AUTO: 10.5 FL (ref 8.9–12.7)
POTASSIUM SERPL-SCNC: 4 MMOL/L (ref 3.5–5.3)
RBC # BLD AUTO: 3.62 MILLION/UL (ref 3.81–5.12)
SODIUM SERPL-SCNC: 141 MMOL/L (ref 135–147)
WBC # BLD AUTO: 8.71 THOUSAND/UL (ref 4.31–10.16)

## 2022-09-03 PROCEDURE — 85025 COMPLETE CBC W/AUTO DIFF WBC: CPT | Performed by: PHYSICIAN ASSISTANT

## 2022-09-03 PROCEDURE — 80048 BASIC METABOLIC PNL TOTAL CA: CPT | Performed by: PHYSICIAN ASSISTANT

## 2022-09-03 PROCEDURE — 99232 SBSQ HOSP IP/OBS MODERATE 35: CPT | Performed by: INTERNAL MEDICINE

## 2022-09-03 RX ORDER — FUROSEMIDE 10 MG/ML
20 INJECTION INTRAMUSCULAR; INTRAVENOUS ONCE
Status: COMPLETED | OUTPATIENT
Start: 2022-09-03 | End: 2022-09-03

## 2022-09-03 RX ADMIN — LORAZEPAM 0.5 MG: 0.5 TABLET ORAL at 17:03

## 2022-09-03 RX ADMIN — DULOXETINE HYDROCHLORIDE 60 MG: 60 CAPSULE, DELAYED RELEASE ORAL at 08:24

## 2022-09-03 RX ADMIN — FUROSEMIDE 20 MG: 10 INJECTION, SOLUTION INTRAVENOUS at 08:24

## 2022-09-03 RX ADMIN — CEFTRIAXONE 1000 MG: 1 INJECTION, POWDER, FOR SOLUTION INTRAMUSCULAR; INTRAVENOUS at 08:27

## 2022-09-03 RX ADMIN — DOCUSATE SODIUM 100 MG: 100 CAPSULE, LIQUID FILLED ORAL at 08:25

## 2022-09-03 RX ADMIN — GUAIFENESIN 600 MG: 600 TABLET, EXTENDED RELEASE ORAL at 08:26

## 2022-09-03 RX ADMIN — HEPARIN SODIUM 5000 UNITS: 5000 INJECTION INTRAVENOUS; SUBCUTANEOUS at 14:58

## 2022-09-03 RX ADMIN — PANTOPRAZOLE SODIUM 20 MG: 20 TABLET, DELAYED RELEASE ORAL at 06:04

## 2022-09-03 RX ADMIN — ACETAMINOPHEN 650 MG: 325 TABLET ORAL at 17:03

## 2022-09-03 RX ADMIN — HEPARIN SODIUM 5000 UNITS: 5000 INJECTION INTRAVENOUS; SUBCUTANEOUS at 06:04

## 2022-09-03 RX ADMIN — Medication 12.5 MG: at 08:24

## 2022-09-03 RX ADMIN — HEPARIN SODIUM 5000 UNITS: 5000 INJECTION INTRAVENOUS; SUBCUTANEOUS at 21:01

## 2022-09-03 RX ADMIN — Medication 12.5 MG: at 20:58

## 2022-09-03 RX ADMIN — OXYCODONE HYDROCHLORIDE 40 MG: 20 TABLET, FILM COATED, EXTENDED RELEASE ORAL at 20:58

## 2022-09-03 RX ADMIN — OXYCODONE HYDROCHLORIDE 40 MG: 20 TABLET, FILM COATED, EXTENDED RELEASE ORAL at 08:26

## 2022-09-03 RX ADMIN — ACETAMINOPHEN 650 MG: 325 TABLET ORAL at 06:06

## 2022-09-03 NOTE — PLAN OF CARE
Problem: Potential for Falls  Goal: Patient will remain free of falls  Description: INTERVENTIONS:  - Educate patient/family on patient safety including physical limitations  - Instruct patient to call for assistance with activity   - Consult OT/PT to assist with strengthening/mobility   - Keep Call bell within reach  - Keep bed low and locked with side rails adjusted as appropriate  - Keep care items and personal belongings within reach  - Initiate and maintain comfort rounds  - Make Fall Risk Sign visible to staff  - Offer Toileting every 2 Hours, in advance of need  - Initiate/Maintain bed alarm  - Obtain necessary fall risk management equipment:   - Apply yellow socks and bracelet for high fall risk patients  - Consider moving patient to room near nurses station  Outcome: Progressing     Problem: MOBILITY - ADULT  Goal: Maintain or return to baseline ADL function  Description: INTERVENTIONS:  -  Assess patient's ability to carry out ADLs; assess patient's baseline for ADL function and identify physical deficits which impact ability to perform ADLs (bathing, care of mouth/teeth, toileting, grooming, dressing, etc )  - Assess/evaluate cause of self-care deficits   - Assess range of motion  - Assess patient's mobility; develop plan if impaired  - Assess patient's need for assistive devices and provide as appropriate  - Encourage maximum independence but intervene and supervise when necessary  - Involve family in performance of ADLs  - Assess for home care needs following discharge   - Consider OT consult to assist with ADL evaluation and planning for discharge  - Provide patient education as appropriate  Outcome: Progressing  Goal: Maintains/Returns to pre admission functional level  Description: INTERVENTIONS:  - Perform BMAT or MOVE assessment daily    - Set and communicate daily mobility goal to care team and patient/family/caregiver     - Collaborate with rehabilitation services on mobility goals if consulted  - Perform Range of Motion 4 times a day  - Reposition patient every 2 hours    - Dangle patient 3 times a day  - Stand patient 3 times a day  - Ambulate patient 3 times a day  - Out of bed to chair 3 times a day   - Out of bed for meals 3 times a day  - Out of bed for toileting  - Record patient progress and toleration of activity level   Outcome: Progressing     Problem: PAIN - ADULT  Goal: Verbalizes/displays adequate comfort level or baseline comfort level  Description: Interventions:  - Encourage patient to monitor pain and request assistance  - Assess pain using appropriate pain scale  - Administer analgesics based on type and severity of pain and evaluate response  - Implement non-pharmacological measures as appropriate and evaluate response  - Consider cultural and social influences on pain and pain management  - Notify physician/advanced practitioner if interventions unsuccessful or patient reports new pain  Outcome: Progressing     Problem: INFECTION - ADULT  Goal: Absence or prevention of progression during hospitalization  Description: INTERVENTIONS:  - Assess and monitor for signs and symptoms of infection  - Monitor lab/diagnostic results  - Monitor all insertion sites, i e  indwelling lines, tubes, and drains  - Monitor endotracheal if appropriate and nasal secretions for changes in amount and color  - [place] appropriate cooling/warming therapies per order  - Administer medications as ordered  - Instruct and encourage patient and family to use good hand hygiene technique  - Identify and instruct in appropriate isolation precautions for identified infection/condition  Outcome: Progressing  Goal: Absence of fever/infection during neutropenic period  Description: INTERVENTIONS:  - Monitor WBC    Outcome: Progressing     Problem: SAFETY ADULT  Goal: Patient will remain free of falls  Description: INTERVENTIONS:  - Educate patient/family on patient safety including physical limitations  - Instruct patient to call for assistance with activity   - Consult OT/PT to assist with strengthening/mobility   - Keep Call bell within reach  - Keep bed low and locked with side rails adjusted as appropriate  - Keep care items and personal belongings within reach  - Initiate and maintain comfort rounds  - Make Fall Risk Sign visible to staff  - Offer Toileting every 2 Hours, in advance of need  - Initiate/Maintain bed alarm  - Obtain necessary fall risk management equipment:   - Apply yellow socks and bracelet for high fall risk patients  - Consider moving patient to room near nurses station  Outcome: Progressing  Goal: Maintain or return to baseline ADL function  Description: INTERVENTIONS:  -  Assess patient's ability to carry out ADLs; assess patient's baseline for ADL function and identify physical deficits which impact ability to perform ADLs (bathing, care of mouth/teeth, toileting, grooming, dressing, etc )  - Assess/evaluate cause of self-care deficits   - Assess range of motion  - Assess patient's mobility; develop plan if impaired  - Assess patient's need for assistive devices and provide as appropriate  - Encourage maximum independence but intervene and supervise when necessary  - Involve family in performance of ADLs  - Assess for home care needs following discharge   - Consider OT consult to assist with ADL evaluation and planning for discharge  - Provide patient education as appropriate  Outcome: Progressing  Goal: Maintains/Returns to pre admission functional level  Description: INTERVENTIONS:  - Perform BMAT or MOVE assessment daily    - Set and communicate daily mobility goal to care team and patient/family/caregiver  - Collaborate with rehabilitation services on mobility goals if consulted  - Perform Range of Motion 4 times a day  - Reposition patient every 2 hours    - Dangle patient 3 times a day  - Stand patient 3 times a day  - Ambulate patient 3 times a day  - Out of bed to chair 3 times a day   - Out of bed for meals 3 times a day  - Out of bed for toileting  - Record patient progress and toleration of activity level   Outcome: Progressing     Problem: DISCHARGE PLANNING  Goal: Discharge to home or other facility with appropriate resources  Description: INTERVENTIONS:  - Identify barriers to discharge w/patient and caregiver  - Arrange for needed discharge resources and transportation as appropriate  - Identify discharge learning needs (meds, wound care, etc )  - Arrange for interpretive services to assist at discharge as needed  - Refer to Case Management Department for coordinating discharge planning if the patient needs post-hospital services based on physician/advanced practitioner order or complex needs related to functional status, cognitive ability, or social support system  Outcome: Progressing     Problem: Knowledge Deficit  Goal: Patient/family/caregiver demonstrates understanding of disease process, treatment plan, medications, and discharge instructions  Description: Complete learning assessment and assess knowledge base    Interventions:  - Provide teaching at level of understanding  - Provide teaching via preferred learning methods  Outcome: Progressing     Problem: Prexisting or High Potential for Compromised Skin Integrity  Goal: Skin integrity is maintained or improved  Description: INTERVENTIONS:  - Identify patients at risk for skin breakdown  - Assess and monitor skin integrity  - Assess and monitor nutrition and hydration status  - Monitor labs   - Assess for incontinence   - Turn and reposition patient  - Assist with mobility/ambulation  - Relieve pressure over bony prominences  - Avoid friction and shearing  - Provide appropriate hygiene as needed including keeping skin clean and dry  - Evaluate need for skin moisturizer/barrier cream  - Collaborate with interdisciplinary team   - Patient/family teaching  - Consider wound care consult   Outcome: Progressing

## 2022-09-03 NOTE — ASSESSMENT & PLAN NOTE
Lab Results   Component Value Date    EGFR 62 09/03/2022    EGFR 43 09/02/2022    EGFR 48 05/02/2022    CREATININE 0 94 09/03/2022    CREATININE 1 27 09/02/2022    CREATININE 1 15 05/02/2022   With baseline 1-1 2  · Monitor BMP

## 2022-09-03 NOTE — ASSESSMENT & PLAN NOTE
Wt Readings from Last 3 Encounters:   09/02/22 97 6 kg (215 lb 2 7 oz)   07/25/22 101 kg (223 lb)   05/20/22 101 kg (223 lb)     With mildly elevated BNP, possible pulmonary vascular congestion on chest x-ray  · Mild lower extremity swelling however patient does have a history of lymphedema  · Will administer single dose of IV Lasix and assess response

## 2022-09-03 NOTE — ASSESSMENT & PLAN NOTE
Present on admission as evidenced by fever, diffuse patchy bilateral hazy airspace opacities compatible with pneumonia on CXR  · Initiated on IV Rocephin and azithromycin in the ED  · Continue with IV Rocephin  · Drip score less than 4  · Strep pneumo and Legionella antigens negative  · Blood cultures negative at 24 hours  · Continue Rocephin for 1 additional day  · White blood cell count downtrending

## 2022-09-03 NOTE — PLAN OF CARE
Problem: Potential for Falls  Goal: Patient will remain free of falls  Description: INTERVENTIONS:  - Educate patient/family on patient safety including physical limitations  - Instruct patient to call for assistance with activity   - Consult OT/PT to assist with strengthening/mobility   - Keep Call bell within reach  - Keep bed low and locked with side rails adjusted as appropriate  - Keep care items and personal belongings within reach  - Initiate and maintain comfort rounds  - Make Fall Risk Sign visible to staff  - Offer Toileting every 2 Hours, in advance of need  - Initiate/Maintain bed alarm  - Obtain necessary fall risk management equipment:   - Apply yellow socks and bracelet for high fall risk patients  - Consider moving patient to room near nurses station  Outcome: Progressing     Problem: MOBILITY - ADULT  Goal: Maintain or return to baseline ADL function  Description: INTERVENTIONS:  -  Assess patient's ability to carry out ADLs; assess patient's baseline for ADL function and identify physical deficits which impact ability to perform ADLs (bathing, care of mouth/teeth, toileting, grooming, dressing, etc )  - Assess/evaluate cause of self-care deficits   - Assess range of motion  - Assess patient's mobility; develop plan if impaired  - Assess patient's need for assistive devices and provide as appropriate  - Encourage maximum independence but intervene and supervise when necessary  - Involve family in performance of ADLs  - Assess for home care needs following discharge   - Consider OT consult to assist with ADL evaluation and planning for discharge  - Provide patient education as appropriate  Outcome: Progressing  Goal: Maintains/Returns to pre admission functional level  Description: INTERVENTIONS:  - Perform BMAT or MOVE assessment daily    - Set and communicate daily mobility goal to care team and patient/family/caregiver     - Collaborate with rehabilitation services on mobility goals if consulted  - Perform Range of Motion 4 times a day  - Reposition patient every 2 hours    - Dangle patient 3 times a day  - Stand patient 3 times a day  - Ambulate patient 3 times a day  - Out of bed to chair 3 times a day   - Out of bed for meals 3 times a day  - Out of bed for toileting  - Record patient progress and toleration of activity level   Outcome: Progressing     Problem: PAIN - ADULT  Goal: Verbalizes/displays adequate comfort level or baseline comfort level  Description: Interventions:  - Encourage patient to monitor pain and request assistance  - Assess pain using appropriate pain scale  - Administer analgesics based on type and severity of pain and evaluate response  - Implement non-pharmacological measures as appropriate and evaluate response  - Consider cultural and social influences on pain and pain management  - Notify physician/advanced practitioner if interventions unsuccessful or patient reports new pain  Outcome: Progressing     Problem: INFECTION - ADULT  Goal: Absence or prevention of progression during hospitalization  Description: INTERVENTIONS:  - Assess and monitor for signs and symptoms of infection  - Monitor lab/diagnostic results  - Monitor all insertion sites, i e  indwelling lines, tubes, and drains  - Monitor endotracheal if appropriate and nasal secretions for changes in amount and color  - [place] appropriate cooling/warming therapies per order  - Administer medications as ordered  - Instruct and encourage patient and family to use good hand hygiene technique  - Identify and instruct in appropriate isolation precautions for identified infection/condition  Outcome: Progressing  Goal: Absence of fever/infection during neutropenic period  Description: INTERVENTIONS:  - Monitor WBC    Outcome: Progressing     Problem: SAFETY ADULT  Goal: Patient will remain free of falls  Description: INTERVENTIONS:  - Educate patient/family on patient safety including physical limitations  - Instruct patient to call for assistance with activity   - Consult OT/PT to assist with strengthening/mobility   - Keep Call bell within reach  - Keep bed low and locked with side rails adjusted as appropriate  - Keep care items and personal belongings within reach  - Initiate and maintain comfort rounds  - Make Fall Risk Sign visible to staff  - Offer Toileting every 2 Hours, in advance of need  - Initiate/Maintain bed alarm  - Obtain necessary fall risk management equipment:   - Apply yellow socks and bracelet for high fall risk patients  - Consider moving patient to room near nurses station  Outcome: Progressing  Goal: Maintain or return to baseline ADL function  Description: INTERVENTIONS:  -  Assess patient's ability to carry out ADLs; assess patient's baseline for ADL function and identify physical deficits which impact ability to perform ADLs (bathing, care of mouth/teeth, toileting, grooming, dressing, etc )  - Assess/evaluate cause of self-care deficits   - Assess range of motion  - Assess patient's mobility; develop plan if impaired  - Assess patient's need for assistive devices and provide as appropriate  - Encourage maximum independence but intervene and supervise when necessary  - Involve family in performance of ADLs  - Assess for home care needs following discharge   - Consider OT consult to assist with ADL evaluation and planning for discharge  - Provide patient education as appropriate  Outcome: Progressing  Goal: Maintains/Returns to pre admission functional level  Description: INTERVENTIONS:  - Perform BMAT or MOVE assessment daily    - Set and communicate daily mobility goal to care team and patient/family/caregiver  - Collaborate with rehabilitation services on mobility goals if consulted  - Perform Range of Motion 4 times a day  - Reposition patient every 2 hours    - Dangle patient 3 times a day  - Stand patient 3 times a day  - Ambulate patient 3 times a day  - Out of bed to chair 3 times a day   - Out of bed for meals 3 times a day  - Out of bed for toileting  - Record patient progress and toleration of activity level   Outcome: Progressing     Problem: DISCHARGE PLANNING  Goal: Discharge to home or other facility with appropriate resources  Description: INTERVENTIONS:  - Identify barriers to discharge w/patient and caregiver  - Arrange for needed discharge resources and transportation as appropriate  - Identify discharge learning needs (meds, wound care, etc )  - Arrange for interpretive services to assist at discharge as needed  - Refer to Case Management Department for coordinating discharge planning if the patient needs post-hospital services based on physician/advanced practitioner order or complex needs related to functional status, cognitive ability, or social support system  Outcome: Progressing

## 2022-09-03 NOTE — ASSESSMENT & PLAN NOTE
Present on admission as evidenced by tachycardia, tachypnea, leukocytosis  · Lactic acid initially elevated at 2 1, most recent 1 2  · Secondary to pneumonia  · Continue IV Rocephin  · Blood cultures negative  · Antipyretics p r n    · Resolved

## 2022-09-03 NOTE — PROGRESS NOTES
[address] [facility]  Progress Note - Claudia Arteaga 1955, 69 y o  female MRN: [id number]  Unit/Bed#: [place] [address] -01 Encounter: [id number]  Primary Care Provider: Janine Paez DO   Date and time admitted to hospital: 9/2/2022  1:12 AM    * CAP (community acquired pneumonia)  Assessment & Plan  Present on admission as evidenced by fever, diffuse patchy bilateral hazy airspace opacities compatible with pneumonia on CXR  · Initiated on IV Rocephin and azithromycin in the ED  · Continue with IV Rocephin  · Drip score less than 4  · Strep pneumo and Legionella antigens negative  · Blood cultures negative at 24 hours  · Continue Rocephin for 1 additional day  · White blood cell count downtrending    Severe sepsis (HCC)-resolved as of 9/3/2022  Assessment & Plan  Present on admission as evidenced by tachycardia, tachypnea, leukocytosis  · Lactic acid initially elevated at 2 1, most recent 1 2  · Secondary to pneumonia  · Continue IV Rocephin  · Blood cultures negative  · Antipyretics p r n    · Resolved    Chronic diastolic heart failure (HCC)  Assessment & Plan  Wt Readings from Last 3 Encounters:   09/02/22 97 6 kg (215 lb 2 7 oz)   07/25/22 101 kg (223 lb)   05/20/22 101 kg (223 lb)     With mildly elevated BNP, possible pulmonary vascular congestion on chest x-ray  · Mild lower extremity swelling however patient does have a history of lymphedema  · Will administer single dose of IV Lasix and assess response        History of pulmonary embolism  Assessment & Plan  History of PE/DVT in 2007 following hysterectomy for ovarian cancer, treated with Lovenox for 1 year  · IVC filter in place, noted on CT abdomen pelvis    Hypogammaglobulinemia, acquired [facility])  Assessment & Plan  Developed hypogammaglobulinemia following rituximab treatment for Juan's  · Followed by outpatient Hematology/Oncology for severe hypogammaglobulinemia  · Previously on IVIG on the discontinued due to severe adverse reactions  · Recommended watchful observation and monitoring of IgG level  · Continue outpatient follow-up     Opiate analgesic use agreement exists  Assessment & Plan  Maintained on chronic pain medications  · PDMP reviewed    Chronic kidney disease, stage III (moderate) (HCC)  Assessment & Plan  Lab Results   Component Value Date    EGFR 62 09/03/2022    EGFR 43 09/02/2022    EGFR 48 05/02/2022    CREATININE 0 94 09/03/2022    CREATININE 1 27 09/02/2022    CREATININE 1 15 05/02/2022   With baseline 1-1 2  · Monitor BMP    Benign essential hypertension  Assessment & Plan  Continue metoprolol 12 5 mg b i d  Wegener's granulomatosis with renal involvement ([facility] [address] )  Assessment & Plan  Not maintained on medications  · Follows with outpatient Rheumatology    Acute respiratory failure with hypoxia (HCC)-resolved as of 9/3/2022  Assessment & Plan  Currently requiring 2 L nasal cannula oxygen or to maintain oxygen saturations greater than 88%  · Suspect likely secondary to pneumonia  · Resolved        VTE Pharmacologic Prophylaxis: VTE Score: 3 Moderate Risk (Score 3-4) - Pharmacological DVT Prophylaxis Ordered: heparin  Patient Centered Rounds: I performed bedside rounds with nursing staff today  Discussions with Specialists or Other Care Team Provider:  None    Education and Discussions with Family / Patient: Patient declined call to   Time Spent for Care: 20 minutes  More than 50% of total time spent on counseling and coordination of care as described above  Current Length of Stay: 1 day(s)  Current Patient Status: Inpatient   Certification Statement: The patient will continue to require additional inpatient hospital stay due to IV antibiotics  Discharge Plan: Anticipate discharge tomorrow to home  Code Status: Level 1 - Full Code    Subjective:   Patient is seen resting in bed  She states she is feeling much better than yesterday    She reports that previously, she was seen by infectious disease as an outpatient and on prophylactic antibiotics  After review of patient's records, it appears that she was previously maintained on prophylactic antibiotics for repeated right lower extremity cellulitis  During last hospitalization in May, Infectious Disease was consulted and recommended that if she has any recurrence of right lower extremity cellulitis, consider prophylactic antibiotics  Since she is not currently hospitalized for cellulitis, will not consider prophylactic dosing at this time, will recommend that she follows up with Infectious Disease as an outpatient  Objective:     Vitals:   Temp (24hrs), Av 8 °F (36 6 °C), Min:97 6 °F (36 4 °C), Max:97 9 °F (36 6 °C)    Temp:  [97 6 °F (36 4 °C)-97 9 °F (36 6 °C)] 97 9 °F (36 6 °C)  HR:  [69-80] 69  Resp:  [16-18] 16  BP: (132-139)/(68-81) 132/68  SpO2:  [97 %-99 %] 97 %  Body mass index is 38 12 kg/m²  Input and Output Summary (last 24 hours): Intake/Output Summary (Last 24 hours) at 9/3/2022 1227  Last data filed at 9/3/2022 0901  Gross per 24 hour   Intake --   Output 300 ml   Net -300 ml       Physical Exam:   Physical Exam  Vitals and nursing note reviewed  Constitutional:       General: She is not in acute distress  Appearance: Normal appearance  She is not ill-appearing, toxic-appearing or diaphoretic  Eyes:      General: No scleral icterus  Conjunctiva/sclera: Conjunctivae normal    Cardiovascular:      Rate and Rhythm: Normal rate and regular rhythm  Heart sounds: No murmur heard  No friction rub  No gallop  Pulmonary:      Effort: Pulmonary effort is normal  No respiratory distress  Breath sounds: Normal breath sounds  No wheezing, rhonchi or rales  Musculoskeletal:      Right lower leg: Edema present  Left lower leg: Edema present  Comments: Chronic lymphedema, Ace bandages in place   Skin:     General: Skin is warm and dry  Coloration: Skin is not jaundiced or pale  Neurological:      General: No focal deficit present  Mental Status: She is alert and oriented to person, place, and time  Mental status is at baseline  Additional Data:     Labs:  Results from last 7 days   Lab Units 09/03/22  0514   WBC Thousand/uL 8 71   HEMOGLOBIN g/dL 10 8*   HEMATOCRIT % 33 3*   PLATELETS Thousands/uL 167   NEUTROS PCT % 69   LYMPHS PCT % 21   MONOS PCT % 7   EOS PCT % 2     Results from last 7 days   Lab Units 09/03/22  0514 09/02/22  0149   SODIUM mmol/L 141 137   POTASSIUM mmol/L 4 0 4 0   CHLORIDE mmol/L 104 99   CO2 mmol/L 30 30   BUN mg/dL 15 17   CREATININE mg/dL 0 94 1 27   ANION GAP mmol/L 7 8   CALCIUM mg/dL 8 6 9 2   ALBUMIN g/dL  --  4 1   TOTAL BILIRUBIN mg/dL  --  0 75   ALK PHOS U/L  --  162*   ALT U/L  --  28   AST U/L  --  38   GLUCOSE RANDOM mg/dL 99 119     Results from last 7 days   Lab Units 09/02/22  0149   INR  1 07     Results from last 7 days   Lab Units 09/02/22  2117   POC GLUCOSE mg/dl 146*         Results from last 7 days   Lab Units 09/02/22  0419 09/02/22  0149   LACTIC ACID mmol/L 1 2 2 1*   PROCALCITONIN ng/ml  --  1 57*       Lines/Drains:  Invasive Devices  Report    Peripheral Intravenous Line  Duration           Peripheral IV 09/02/22 Left Forearm 1 day          Drain  Duration           External Urinary Catheter <1 day                      Imaging: No pertinent imaging reviewed  Recent Cultures (last 7 days):   Results from last 7 days   Lab Units 09/02/22  1527 09/02/22  0401 09/02/22  0400   BLOOD CULTURE   --  No Growth at 24 hrs  No Growth at 24 hrs     LEGIONELLA URINARY ANTIGEN  Negative  --   --        Last 24 Hours Medication List:   Current Facility-Administered Medications   Medication Dose Route Frequency Provider Last Rate    acetaminophen  650 mg Oral Q6H PRN Eladia Sneed PA-C      aluminum-magnesium hydroxide-simethicone  30 mL Oral Q6H PRN Eladia Sneed PA-C      cefTRIAXone  1,000 mg Intravenous Q24H Eladia Sneed PA-C 1,000 mg (09/03/22 0827)    docusate sodium  100 mg Oral BID Mary Harris PA-C      DULoxetine  60 mg Oral Daily Mary Harris, [place]      guaiFENesin  600 mg Oral BID Mary Harris PA-C      heparin (porcine)  5,000 Units Subcutaneous [facility] [address] [place], [place]      LORazepam  0 5 mg Oral BID PRN Mary Harris, [place]      metoprolol tartrate  12 5 mg Oral Q12H [address] [place], [place]      ondansetron  4 mg Intravenous Q6H PRN Mary Harris, [place]      oxybutynin  10 mg Oral Daily Mary [place], [place]      oxyCODONE  40 mg Oral Q12H [facility] [address] [place], [place]      oxyCODONE  10 mg Oral Once [employer], [place]      oxyCODONE  15 mg Oral BID PRN Mary Harris, [place]      pantoprazole  20 mg Oral Early Morning Mary Harris PA-C          Today, Patient Was Seen By: Mary Harris PA-C    **Please Note: This note may have been constructed using a voice recognition system  **

## 2022-09-03 NOTE — ASSESSMENT & PLAN NOTE
Currently requiring 2 L nasal cannula oxygen or to maintain oxygen saturations greater than 88%  · Suspect likely secondary to pneumonia  · Resolved

## 2022-09-04 VITALS
HEIGHT: 63 IN | DIASTOLIC BLOOD PRESSURE: 85 MMHG | SYSTOLIC BLOOD PRESSURE: 154 MMHG | HEART RATE: 83 BPM | RESPIRATION RATE: 20 BRPM | TEMPERATURE: 97.8 F | WEIGHT: 215.17 LBS | BODY MASS INDEX: 38.12 KG/M2 | OXYGEN SATURATION: 93 %

## 2022-09-04 PROBLEM — J18.9 CAP (COMMUNITY ACQUIRED PNEUMONIA): Status: RESOLVED | Noted: 2019-04-03 | Resolved: 2022-09-04

## 2022-09-04 LAB
ANION GAP SERPL CALCULATED.3IONS-SCNC: 6 MMOL/L (ref 4–13)
BUN SERPL-MCNC: 18 MG/DL (ref 5–25)
CALCIUM SERPL-MCNC: 9 MG/DL (ref 8.3–10.1)
CHLORIDE SERPL-SCNC: 103 MMOL/L (ref 96–108)
CO2 SERPL-SCNC: 31 MMOL/L (ref 21–32)
CREAT SERPL-MCNC: 1.04 MG/DL (ref 0.6–1.3)
ERYTHROCYTE [DISTWIDTH] IN BLOOD BY AUTOMATED COUNT: 13.9 % (ref 11.6–15.1)
GFR SERPL CREATININE-BSD FRML MDRD: 54 ML/MIN/1.73SQ M
GLUCOSE SERPL-MCNC: 89 MG/DL (ref 65–140)
HCT VFR BLD AUTO: 35.1 % (ref 34.8–46.1)
HGB BLD-MCNC: 11.1 G/DL (ref 11.5–15.4)
MCH RBC QN AUTO: 29.2 PG (ref 26.8–34.3)
MCHC RBC AUTO-ENTMCNC: 31.6 G/DL (ref 31.4–37.4)
MCV RBC AUTO: 92 FL (ref 82–98)
PLATELET # BLD AUTO: 191 THOUSANDS/UL (ref 149–390)
PMV BLD AUTO: 10.3 FL (ref 8.9–12.7)
POTASSIUM SERPL-SCNC: 3.6 MMOL/L (ref 3.5–5.3)
RBC # BLD AUTO: 3.8 MILLION/UL (ref 3.81–5.12)
SODIUM SERPL-SCNC: 140 MMOL/L (ref 135–147)
WBC # BLD AUTO: 6.9 THOUSAND/UL (ref 4.31–10.16)

## 2022-09-04 PROCEDURE — 80048 BASIC METABOLIC PNL TOTAL CA: CPT | Performed by: PHYSICIAN ASSISTANT

## 2022-09-04 PROCEDURE — 85027 COMPLETE CBC AUTOMATED: CPT | Performed by: PHYSICIAN ASSISTANT

## 2022-09-04 PROCEDURE — 99239 HOSP IP/OBS DSCHRG MGMT >30: CPT | Performed by: PHYSICIAN ASSISTANT

## 2022-09-04 RX ADMIN — PANTOPRAZOLE SODIUM 20 MG: 20 TABLET, DELAYED RELEASE ORAL at 05:29

## 2022-09-04 RX ADMIN — LORAZEPAM 0.5 MG: 0.5 TABLET ORAL at 00:44

## 2022-09-04 RX ADMIN — ACETAMINOPHEN 650 MG: 325 TABLET ORAL at 08:59

## 2022-09-04 RX ADMIN — DULOXETINE HYDROCHLORIDE 60 MG: 60 CAPSULE, DELAYED RELEASE ORAL at 08:56

## 2022-09-04 RX ADMIN — CEFTRIAXONE 1000 MG: 1 INJECTION, POWDER, FOR SOLUTION INTRAMUSCULAR; INTRAVENOUS at 08:55

## 2022-09-04 RX ADMIN — OXYBUTYNIN CHLORIDE 10 MG: 10 TABLET, EXTENDED RELEASE ORAL at 08:56

## 2022-09-04 RX ADMIN — HEPARIN SODIUM 5000 UNITS: 5000 INJECTION INTRAVENOUS; SUBCUTANEOUS at 05:29

## 2022-09-04 RX ADMIN — OXYCODONE HYDROCHLORIDE 40 MG: 20 TABLET, FILM COATED, EXTENDED RELEASE ORAL at 08:56

## 2022-09-04 RX ADMIN — LORAZEPAM 0.5 MG: 0.5 TABLET ORAL at 08:56

## 2022-09-04 RX ADMIN — Medication 12.5 MG: at 08:56

## 2022-09-04 NOTE — PLAN OF CARE
Problem: Potential for Falls  Goal: Patient will remain free of falls  Description: INTERVENTIONS:  - Educate patient/family on patient safety including physical limitations  - Instruct patient to call for assistance with activity   - Consult OT/PT to assist with strengthening/mobility   - Keep Call bell within reach  - Keep bed low and locked with side rails adjusted as appropriate  - Keep care items and personal belongings within reach  - Initiate and maintain comfort rounds  - Make Fall Risk Sign visible to staff  - Offer Toileting every 2 Hours, in advance of need  - Initiate/Maintain bed alarm  - Obtain necessary fall risk management equipment:   - Apply yellow socks and bracelet for high fall risk patients  - Consider moving patient to room near nurses station  Outcome: Progressing     Problem: MOBILITY - ADULT  Goal: Maintain or return to baseline ADL function  Description: INTERVENTIONS:  -  Assess patient's ability to carry out ADLs; assess patient's baseline for ADL function and identify physical deficits which impact ability to perform ADLs (bathing, care of mouth/teeth, toileting, grooming, dressing, etc )  - Assess/evaluate cause of self-care deficits   - Assess range of motion  - Assess patient's mobility; develop plan if impaired  - Assess patient's need for assistive devices and provide as appropriate  - Encourage maximum independence but intervene and supervise when necessary  - Involve family in performance of ADLs  - Assess for home care needs following discharge   - Consider OT consult to assist with ADL evaluation and planning for discharge  - Provide patient education as appropriate  Outcome: Progressing  Goal: Maintains/Returns to pre admission functional level  Description: INTERVENTIONS:  - Perform BMAT or MOVE assessment daily    - Set and communicate daily mobility goal to care team and patient/family/caregiver     - Collaborate with rehabilitation services on mobility goals if consulted  - Perform Range of Motion 4 times a day  - Reposition patient every 2 hours    - Dangle patient 3 times a day  - Stand patient 3 times a day  - Ambulate patient 3 times a day  - Out of bed to chair 3 times a day   - Out of bed for meals 3 times a day  - Out of bed for toileting  - Record patient progress and toleration of activity level   Outcome: Progressing     Problem: PAIN - ADULT  Goal: Verbalizes/displays adequate comfort level or baseline comfort level  Description: Interventions:  - Encourage patient to monitor pain and request assistance  - Assess pain using appropriate pain scale  - Administer analgesics based on type and severity of pain and evaluate response  - Implement non-pharmacological measures as appropriate and evaluate response  - Consider cultural and social influences on pain and pain management  - Notify physician/advanced practitioner if interventions unsuccessful or patient reports new pain  Outcome: Progressing     Problem: INFECTION - ADULT  Goal: Absence or prevention of progression during hospitalization  Description: INTERVENTIONS:  - Assess and monitor for signs and symptoms of infection  - Monitor lab/diagnostic results  - Monitor all insertion sites, i e  indwelling lines, tubes, and drains  - Monitor endotracheal if appropriate and nasal secretions for changes in amount and color  - [place] appropriate cooling/warming therapies per order  - Administer medications as ordered  - Instruct and encourage patient and family to use good hand hygiene technique  - Identify and instruct in appropriate isolation precautions for identified infection/condition  Outcome: Progressing  Goal: Absence of fever/infection during neutropenic period  Description: INTERVENTIONS:  - Monitor WBC    Outcome: Progressing     Problem: SAFETY ADULT  Goal: Patient will remain free of falls  Description: INTERVENTIONS:  - Educate patient/family on patient safety including physical limitations  - Instruct patient to call for assistance with activity   - Consult OT/PT to assist with strengthening/mobility   - Keep Call bell within reach  - Keep bed low and locked with side rails adjusted as appropriate  - Keep care items and personal belongings within reach  - Initiate and maintain comfort rounds  - Make Fall Risk Sign visible to staff  - Offer Toileting every 2 Hours, in advance of need  - Initiate/Maintain bed alarm  - Obtain necessary fall risk management equipment:   - Apply yellow socks and bracelet for high fall risk patients  - Consider moving patient to room near nurses station  Outcome: Progressing  Goal: Maintain or return to baseline ADL function  Description: INTERVENTIONS:  -  Assess patient's ability to carry out ADLs; assess patient's baseline for ADL function and identify physical deficits which impact ability to perform ADLs (bathing, care of mouth/teeth, toileting, grooming, dressing, etc )  - Assess/evaluate cause of self-care deficits   - Assess range of motion  - Assess patient's mobility; develop plan if impaired  - Assess patient's need for assistive devices and provide as appropriate  - Encourage maximum independence but intervene and supervise when necessary  - Involve family in performance of ADLs  - Assess for home care needs following discharge   - Consider OT consult to assist with ADL evaluation and planning for discharge  - Provide patient education as appropriate  Outcome: Progressing  Goal: Maintains/Returns to pre admission functional level  Description: INTERVENTIONS:  - Perform BMAT or MOVE assessment daily    - Set and communicate daily mobility goal to care team and patient/family/caregiver  - Collaborate with rehabilitation services on mobility goals if consulted  - Perform Range of Motion 4 times a day  - Reposition patient every 2 hours    - Dangle patient 3 times a day  - Stand patient 3 times a day  - Ambulate patient 3 times a day  - Out of bed to chair 3 times a day   - Out of bed for meals 3 times a day  - Out of bed for toileting  - Record patient progress and toleration of activity level   Outcome: Progressing     Problem: DISCHARGE PLANNING  Goal: Discharge to home or other facility with appropriate resources  Description: INTERVENTIONS:  - Identify barriers to discharge w/patient and caregiver  - Arrange for needed discharge resources and transportation as appropriate  - Identify discharge learning needs (meds, wound care, etc )  - Arrange for interpretive services to assist at discharge as needed  - Refer to Case Management Department for coordinating discharge planning if the patient needs post-hospital services based on physician/advanced practitioner order or complex needs related to functional status, cognitive ability, or social support system  Outcome: Progressing     Problem: Knowledge Deficit  Goal: Patient/family/caregiver demonstrates understanding of disease process, treatment plan, medications, and discharge instructions  Description: Complete learning assessment and assess knowledge base    Interventions:  - Provide teaching at level of understanding  - Provide teaching via preferred learning methods  Outcome: Progressing     Problem: Prexisting or High Potential for Compromised Skin Integrity  Goal: Skin integrity is maintained or improved  Description: INTERVENTIONS:  - Identify patients at risk for skin breakdown  - Assess and monitor skin integrity  - Assess and monitor nutrition and hydration status  - Monitor labs   - Assess for incontinence   - Turn and reposition patient  - Assist with mobility/ambulation  - Relieve pressure over bony prominences  - Avoid friction and shearing  - Provide appropriate hygiene as needed including keeping skin clean and dry  - Evaluate need for skin moisturizer/barrier cream  - Collaborate with interdisciplinary team   - Patient/family teaching  - Consider wound care consult   Outcome: Progressing

## 2022-09-04 NOTE — DISCHARGE SUMMARY
[address] [facility]  Discharge- [place] [facility] 1955, 69 y o  female MRN: [id number]  Unit/Bed#: [place] 68 2 -01 Encounter: [id number]  Primary Care Provider: Ami Ann DO   Date and time admitted to hospital: 9/2/2022  1:12 AM    * CAP (community acquired pneumonia)-resolved as of 9/4/2022  Assessment & Plan  Present on admission as evidenced by fever, diffuse patchy bilateral hazy airspace opacities compatible with pneumonia on CXR  · Initiated on IV Rocephin and azithromycin in the ED  · Continue with IV Rocephin  · Drip score less than 4  · Strep pneumo and Legionella antigens negative  · Blood cultures negative at 48 hours  · Received 3 days of IV Rocephin  · White blood cell count improved  · Resolved    Chronic diastolic heart failure (HCC)  Assessment & Plan  Wt Readings from Last 3 Encounters:   09/02/22 97 6 kg (215 lb 2 7 oz)   07/25/22 101 kg (223 lb)   05/20/22 101 kg (223 lb)     With mildly elevated BNP, possible pulmonary vascular congestion on chest x-ray  · Mild lower extremity swelling however patient does have a history of lymphedema  · With appropriate diuresis with single dose of IV Lasix  · No longer requiring oxygen  · Will follow-up with cardiology on 09/06/2022        History of pulmonary embolism  Assessment & Plan  History of PE/DVT in 2007 following hysterectomy for ovarian cancer, treated with Lovenox for 1 year  · IVC filter in place, noted on CT abdomen pelvis    Hypogammaglobulinemia, acquired ([facility] )  Assessment & Plan  Developed hypogammaglobulinemia following rituximab treatment for Juan's  · Followed by outpatient Hematology/Oncology for severe hypogammaglobulinemia  · Previously on IVIG on the discontinued due to severe adverse reactions  · Recommended watchful observation and monitoring of IgG level  · Continue outpatient follow-up   · Will follow-up with Hematology on 09/23/2022    Opiate analgesic use agreement exists  Assessment & Plan  Maintained on chronic pain medications  · PDMP reviewed    Chronic kidney disease, stage III (moderate) ([facility])  Assessment & Plan  Lab Results   Component Value Date    EGFR 54 09/04/2022    EGFR 62 09/03/2022    EGFR 43 09/02/2022    CREATININE 1 04 09/04/2022    CREATININE 0 94 09/03/2022    CREATININE 1 27 09/02/2022   With baseline 1-1 2  · Monitor BMP    Benign essential hypertension  Assessment & Plan  Continue metoprolol 12 5 mg b i d  Wegener's granulomatosis with renal involvement [facility])  Assessment & Plan  Not maintained on medications  · Follows with outpatient Rheumatology      Medical Problems             Resolved Problems  Date Reviewed: 9/3/2022          Resolved    Severe sepsis ([facility] [address] [address] ) 9/3/2022     Resolved by  David Tuttle PA-C    * (Principal) CAP (community acquired pneumonia) 9/4/2022     Resolved by  David Tuttle PA-C    Acute respiratory failure with hypoxia ([facility] [address] ) 9/3/2022     Resolved by  David Tuttle PA-C              Discharging Physician / Practitioner: David Tuttle PA-C  PCP: Evelin Green DO  Admission Date:   Admission Orders (From admission, onward)     Ordered        09/02/22 0632  INPATIENT ADMISSION  Once                      Discharge Date: 09/04/22    Consultations During Hospital Stay:  · None    Procedures Performed:   · None    Significant Findings / Test Results:   CHEST      INDICATION:   Fever      COMPARISON:  4/21/2022     EXAM PERFORMED/VIEWS:  XR CHEST PORTABLE        FINDINGS:     Heart shadow is enlarged but unchanged from prior exam      Diffuse patchy bilateral hazy airspace opacities        Osseous structures appear within normal limits for patient age      IMPRESSION:     Diffuse patchy bilateral hazy airspace opacities compatible with pneumonia versus pulmonary edema    CT ABDOMEN AND PELVIS WITHOUT IV CONTRAST     INDICATION:   Abdominal pain, acute, nonlocalized  Generalized diffuse abdominal pain, allergy to contrast      COMPARISON: None      TECHNIQUE:  CT examination of the abdomen and pelvis was performed without intravenous contrast  Axial, sagittal, and coronal 2D reformatted images were created from the source data and submitted for interpretation       Radiation dose length product (DLP) for this visit:  1187 mGy-cm   This examination, like all CT scans performed in the [facility], was performed utilizing techniques to minimize radiation dose exposure, including the use of iterative   reconstruction and automated exposure control       Enteric contrast was administered       FINDINGS:     ABDOMEN     LOWER CHEST:  Atelectatic changes are noted at the lung bases        LIVER/BILIARY TREE:  Unremarkable      GALLBLADDER:  No calcified gallstones  No pericholecystic inflammatory change      SPLEEN:  Stable splenic hypodensity      PANCREAS:  Unremarkable      ADRENAL GLANDS:  Unremarkable      KIDNEYS/URETERS:  Unremarkable  No hydronephrosis      STOMACH AND BOWEL:  Unremarkable      APPENDIX:  No findings to suggest appendicitis      ABDOMINOPELVIC CAVITY:  No ascites  No pneumoperitoneum  No lymphadenopathy      VESSELS:  Unremarkable for patient's age      PELVIS     REPRODUCTIVE ORGANS:  Surgical changes of prior hysterectomy      URINARY BLADDER:  Unremarkable      ABDOMINAL WALL/INGUINAL REGIONS:  Mild right inguinal adenopathy, likely reactive and similar to 4/27/2022        OSSEOUS STRUCTURES:  No acute fracture or destructive osseous lesion  Fixation of the right pelvis with severe posttraumatic degenerative changes of the right hip      IMPRESSION:     No evidence of acute intra-abdominal or pelvic pathology  Incidental Findings:   · None     Test Results Pending at Discharge (will require follow up):    · None     Outpatient Tests Requested:  · None    Complications:  None    Reason for Admission:  Pneumonia, sepsis    Hospital Course:   Nanda Wong is a 69 y o  female patient with past medical history of Juan's granulomatosis, CKD, ovarian cancer status post total hysterectomy, CHF, provoked PE who originally presented to the hospital on 9/2/2022 due to subjective fevers/chills and vomiting  On the day of admission, she reported feeling ill starting the day prior  She was unable to take her temperature due to a broken thermometer at home however due to the fact that she was feeling feverish, she took Tylenol  Due to her significant history of previous episodes of septic shock, she ultimately presented to the emergency department for further evaluation  In the emergency department, she was found to be septic with a mildly elevated lactic acid at 2 1  This cleared after the administration of IV fluids  She was initiated on IV antibiotics as chest x-ray was revealing for pneumonia  She was continued on IV antibiotics for 3 additional days with subsequent improvement in the patient's symptoms and labs  On the day of discharge, the patient reported feeling well  She will follow-up with outpatient providers including Hematology/Oncology, Cardiology, and primary care provider  I placed a referral for Infectious Disease as well  Please see above list of diagnoses and related plan for additional information  Condition at Discharge: stable    Discharge Day Visit / Exam:   Subjective: The patient is seen resting comfortably in chair bedside  She reports feeling well  Denies any shortness of breath, cough, fevers/chills overnight  She is looking for to going home today  She denies any chest pain  Vitals: Blood Pressure: 154/85 (09/04/22 0745)  Pulse: 83 (09/04/22 0745)  Temperature: 97 8 °F (36 6 °C) (09/04/22 0745)  Temp Source: Oral (09/04/22 0108)  Respirations: 20 (09/04/22 0745)  Height: 5' 3" (160 cm) (09/02/22 1100)  Weight - Scale: 97 6 kg (215 lb 2 7 oz) (09/02/22 1100)  SpO2: 93 % (09/04/22 0745)  Exam:   Physical Exam  Vitals and nursing note reviewed     Constitutional:       General: She is not in acute distress  Appearance: Normal appearance  She is not ill-appearing, toxic-appearing or diaphoretic  Eyes:      General: No scleral icterus  Conjunctiva/sclera: Conjunctivae normal    Cardiovascular:      Rate and Rhythm: Normal rate and regular rhythm  Heart sounds: No murmur heard  No friction rub  No gallop  Pulmonary:      Effort: Pulmonary effort is normal  No respiratory distress  Breath sounds: Normal breath sounds  No stridor  No wheezing, rhonchi or rales  Chest:      Chest wall: No tenderness  Musculoskeletal:      Comments: Bilateral lower extremities wrapped with Ace bandages  Skin:     General: Skin is warm and dry  Coloration: Skin is not jaundiced or pale  Neurological:      General: No focal deficit present  Mental Status: She is alert and oriented to person, place, and time  Mental status is at baseline  Discussion with Family: Patient declined call to   Discharge instructions/Information to patient and family:   See after visit summary for information provided to patient and family  Provisions for Follow-Up Care:  See after visit summary for information related to follow-up care and any pertinent home health orders  Disposition:   Home    Planned Readmission: n/a     Discharge Statement:  I spent 35 minutes discharging the patient  This time was spent on the day of discharge  I had direct contact with the patient on the day of discharge  Greater than 50% of the total time was spent examining patient, answering all patient questions, arranging and discussing plan of care with patient as well as directly providing post-discharge instructions  Additional time then spent on discharge activities  Discharge Medications:  See after visit summary for reconciled discharge medications provided to patient and/or family        **Please Note: This note may have been constructed using a voice recognition system**

## 2022-09-04 NOTE — ASSESSMENT & PLAN NOTE
Lab Results   Component Value Date    EGFR 54 09/04/2022    EGFR 62 09/03/2022    EGFR 43 09/02/2022    CREATININE 1 04 09/04/2022    CREATININE 0 94 09/03/2022    CREATININE 1 27 09/02/2022   With baseline 1-1 2  · Monitor BMP

## 2022-09-04 NOTE — ASSESSMENT & PLAN NOTE
Wt Readings from Last 3 Encounters:   09/02/22 97 6 kg (215 lb 2 7 oz)   07/25/22 101 kg (223 lb)   05/20/22 101 kg (223 lb)     With mildly elevated BNP, possible pulmonary vascular congestion on chest x-ray  · Mild lower extremity swelling however patient does have a history of lymphedema  · With appropriate diuresis with single dose of IV Lasix  · No longer requiring oxygen  · Will follow-up with cardiology on 09/06/2022

## 2022-09-04 NOTE — ASSESSMENT & PLAN NOTE
Present on admission as evidenced by fever, diffuse patchy bilateral hazy airspace opacities compatible with pneumonia on CXR  · Initiated on IV Rocephin and azithromycin in the ED  · Continue with IV Rocephin  · Drip score less than 4  · Strep pneumo and Legionella antigens negative  · Blood cultures negative at 48 hours  · Received 3 days of IV Rocephin  · White blood cell count improved  · Resolved

## 2022-09-04 NOTE — PLAN OF CARE
Problem: Potential for Falls  Goal: Patient will remain free of falls  Description: INTERVENTIONS:  - Educate patient/family on patient safety including physical limitations  - Instruct patient to call for assistance with activity   - Consult OT/PT to assist with strengthening/mobility   - Keep Call bell within reach  - Keep bed low and locked with side rails adjusted as appropriate  - Keep care items and personal belongings within reach  - Initiate and maintain comfort rounds  - Make Fall Risk Sign visible to staff  - Offer Toileting every 2 Hours, in advance of need  - Initiate/Maintain bed alarm  - Obtain necessary fall risk management equipment:   - Apply yellow socks and bracelet for high fall risk patients  - Consider moving patient to room near nurses station  Outcome: Progressing     Problem: MOBILITY - ADULT  Goal: Maintain or return to baseline ADL function  Description: INTERVENTIONS:  -  Assess patient's ability to carry out ADLs; assess patient's baseline for ADL function and identify physical deficits which impact ability to perform ADLs (bathing, care of mouth/teeth, toileting, grooming, dressing, etc )  - Assess/evaluate cause of self-care deficits   - Assess range of motion  - Assess patient's mobility; develop plan if impaired  - Assess patient's need for assistive devices and provide as appropriate  - Encourage maximum independence but intervene and supervise when necessary  - Involve family in performance of ADLs  - Assess for home care needs following discharge   - Consider OT consult to assist with ADL evaluation and planning for discharge  - Provide patient education as appropriate  Outcome: Progressing  Goal: Maintains/Returns to pre admission functional level  Description: INTERVENTIONS:  - Perform BMAT or MOVE assessment daily    - Set and communicate daily mobility goal to care team and patient/family/caregiver     - Collaborate with rehabilitation services on mobility goals if consulted  - Perform Range of Motion 4 times a day  - Reposition patient every 2 hours    - Dangle patient 3 times a day  - Stand patient 3 times a day  - Ambulate patient 3 times a day  - Out of bed to chair 3 times a day   - Out of bed for meals 3 times a day  - Out of bed for toileting  - Record patient progress and toleration of activity level   Outcome: Progressing     Problem: PAIN - ADULT  Goal: Verbalizes/displays adequate comfort level or baseline comfort level  Description: Interventions:  - Encourage patient to monitor pain and request assistance  - Assess pain using appropriate pain scale  - Administer analgesics based on type and severity of pain and evaluate response  - Implement non-pharmacological measures as appropriate and evaluate response  - Consider cultural and social influences on pain and pain management  - Notify physician/advanced practitioner if interventions unsuccessful or patient reports new pain  Outcome: Progressing     Problem: INFECTION - ADULT  Goal: Absence or prevention of progression during hospitalization  Description: INTERVENTIONS:  - Assess and monitor for signs and symptoms of infection  - Monitor lab/diagnostic results  - Monitor all insertion sites, i e  indwelling lines, tubes, and drains  - Monitor endotracheal if appropriate and nasal secretions for changes in amount and color  - [place] appropriate cooling/warming therapies per order  - Administer medications as ordered  - Instruct and encourage patient and family to use good hand hygiene technique  - Identify and instruct in appropriate isolation precautions for identified infection/condition  Outcome: Progressing  Goal: Absence of fever/infection during neutropenic period  Description: INTERVENTIONS:  - Monitor WBC    Outcome: Progressing     Problem: SAFETY ADULT  Goal: Patient will remain free of falls  Description: INTERVENTIONS:  - Educate patient/family on patient safety including physical limitations  - Instruct patient to call for assistance with activity   - Consult OT/PT to assist with strengthening/mobility   - Keep Call bell within reach  - Keep bed low and locked with side rails adjusted as appropriate  - Keep care items and personal belongings within reach  - Initiate and maintain comfort rounds  - Make Fall Risk Sign visible to staff  - Offer Toileting every 2 Hours, in advance of need  - Initiate/Maintain bed alarm  - Obtain necessary fall risk management equipment:   - Apply yellow socks and bracelet for high fall risk patients  - Consider moving patient to room near nurses station  Outcome: Progressing  Goal: Maintain or return to baseline ADL function  Description: INTERVENTIONS:  -  Assess patient's ability to carry out ADLs; assess patient's baseline for ADL function and identify physical deficits which impact ability to perform ADLs (bathing, care of mouth/teeth, toileting, grooming, dressing, etc )  - Assess/evaluate cause of self-care deficits   - Assess range of motion  - Assess patient's mobility; develop plan if impaired  - Assess patient's need for assistive devices and provide as appropriate  - Encourage maximum independence but intervene and supervise when necessary  - Involve family in performance of ADLs  - Assess for home care needs following discharge   - Consider OT consult to assist with ADL evaluation and planning for discharge  - Provide patient education as appropriate  Outcome: Progressing  Goal: Maintains/Returns to pre admission functional level  Description: INTERVENTIONS:  - Perform BMAT or MOVE assessment daily    - Set and communicate daily mobility goal to care team and patient/family/caregiver  - Collaborate with rehabilitation services on mobility goals if consulted  - Perform Range of Motion 4 times a day  - Reposition patient every 2 hours    - Dangle patient 3 times a day  - Stand patient 3 times a day  - Ambulate patient 3 times a day  - Out of bed to chair 3 times a day   - Out of bed for meals 3 times a day  - Out of bed for toileting  - Record patient progress and toleration of activity level   Outcome: Progressing     Problem: DISCHARGE PLANNING  Goal: Discharge to home or other facility with appropriate resources  Description: INTERVENTIONS:  - Identify barriers to discharge w/patient and caregiver  - Arrange for needed discharge resources and transportation as appropriate  - Identify discharge learning needs (meds, wound care, etc )  - Arrange for interpretive services to assist at discharge as needed  - Refer to Case Management Department for coordinating discharge planning if the patient needs post-hospital services based on physician/advanced practitioner order or complex needs related to functional status, cognitive ability, or social support system  Outcome: Progressing     Problem: Knowledge Deficit  Goal: Patient/family/caregiver demonstrates understanding of disease process, treatment plan, medications, and discharge instructions  Description: Complete learning assessment and assess knowledge base    Interventions:  - Provide teaching at level of understanding  - Provide teaching via preferred learning methods  Outcome: Progressing     Problem: Prexisting or High Potential for Compromised Skin Integrity  Goal: Skin integrity is maintained or improved  Description: INTERVENTIONS:  - Identify patients at risk for skin breakdown  - Assess and monitor skin integrity  - Assess and monitor nutrition and hydration status  - Monitor labs   - Assess for incontinence   - Turn and reposition patient  - Assist with mobility/ambulation  - Relieve pressure over bony prominences  - Avoid friction and shearing  - Provide appropriate hygiene as needed including keeping skin clean and dry  - Evaluate need for skin moisturizer/barrier cream  - Collaborate with interdisciplinary team   - Patient/family teaching  - Consider wound care consult   Outcome: Progressing

## 2022-09-04 NOTE — NURSING NOTE
Discussed discharge instructions with patient, patient verbalized understanding of instructions  IV removed   Patient left with all belongings

## 2022-09-04 NOTE — CASE MANAGEMENT
Case Management Discharge Planning Note    Patient name Deep Yeh  Location [facility] 2 /[address] 2 Robby Lyon* MRN [id number]  : 1955 Date 2022       Current Admission Date: 2022  Current Admission Diagnosis:CAP (community acquired pneumonia)   Patient Active Problem List    Diagnosis Date Noted    History of pulmonary embolism 2022    [facility] ) 2022    Slow transit constipation 2021    Chronic right-sided low back pain without sciatica 2021    Cellulitis of right lower extremity 2021    Septic shock ([facility] ) 2021    Morbid obesity ([facility] ) 2021    Hypogammaglobulinemia, acquired ([facility] ) 2019    Hypertrophic cardiomyopathy ([facility] ) 2019    Low immunoglobulin level 2019    LVH (left ventricular hypertrophy) 2019    Postnasal drip 2019    Dyspnea on exertion 2019    Mitral valve disorder     CAP (community acquired pneumonia) 2019    Elevated troponin 2019    Increased risk of breast cancer     Other complicated headache syndrome 2018    Nausea & vomiting 2018    SIRS (systemic inflammatory response syndrome) ([facility] ) 2018    Post-traumatic osteoarthritis 2018    Opiate analgesic use agreement exists 2016    BRCA1 positive 2016    Charcot's joint 2016    Class 2 obesity 2016    Wegener's granulomatosis with renal involvement ([facility] ) 01/15/2016    Cancer of ovary ([facility] ) 2015    Ovarian cancer ([facility] ) 2015    Increased frequency of urination 2014    Chronic kidney disease, stage III (moderate) (HCC) 2014    Chronic pain disorder 10/24/2013    GERD (gastroesophageal reflux disease) 2013    Dyslipidemia 2013    Lymphedema 11/15/2012    Anxiety 2012    Benign essential hypertension 2012    Depression 2012      LOS (days): 2  Geometric Mean LOS (GMLOS) (days): 4 80  Days to GMLOS:2 7     OBJECTIVE:  Risk of Unplanned Readmission Score: 15 26         Current admission status: Inpatient   Preferred Pharmacy:   [address], [address] [place] [address]  Phone: [phone] Fax: [address] [place], [place] - [address] [place] [id number]  Phone: [phone] Fax: [address] [place], [place] - [address] ,  [address] ,  [address] [place] [address]  Phone: [phone] Fax: [phone]    Primary Care Provider: Luis Welch DO    Primary Insurance: MEDICARE  Secondary Insurance: AARP    DISCHARGE DETAILS:    Discharge planning discussed with[de-identified] Pt  Freedom of Choice: Yes  Comments - Freedom of Choice: Pt would like to go home  IMM Given (Date):: 09/04/22 (Pt does not plan to appeal d/c  Original placed in scan bin   Copy placed in pt's d/c folder, as requested by pt )  IMM Given to[de-identified] Patient

## 2022-09-04 NOTE — ASSESSMENT & PLAN NOTE
Developed hypogammaglobulinemia following rituximab treatment for Juan's  · Followed by outpatient Hematology/Oncology for severe hypogammaglobulinemia  · Previously on IVIG on the discontinued due to severe adverse reactions  · Recommended watchful observation and monitoring of IgG level  · Continue outpatient follow-up   · Will follow-up with Hematology on 09/23/2022

## 2022-09-07 ENCOUNTER — TELEPHONE (OUTPATIENT)
Dept: NEPHROLOGY | Facility: CLINIC | Age: 67
End: 2022-09-07

## 2022-09-07 LAB
BACTERIA BLD CULT: NORMAL
BACTERIA BLD CULT: NORMAL

## 2022-09-07 NOTE — TELEPHONE ENCOUNTER
Reschedule Appointment   Person speaking to  patient    Date of original appointment 09/08   New appointment date 11/10   Patient on dialysis no   Location [place]   Provider Dr Melgar Brought    Additional Information Patient was discharged from the hosp and was not up to coming in   Wants to wait until she feels better

## 2022-09-07 NOTE — TELEPHONE ENCOUNTER
Appointment Confirmation   Person confirmed appointment with  If not patient, name of the person lm    Date and time of appointment 09/08   Patient acknowledged and will be at appointment? no   Did you advise the patient that they will need a urine sample if they are a new patient? no   Did you advise the patient to bring their current medications for verification? (including any OTC) yes   Additional Information

## 2022-09-19 ENCOUNTER — TELEPHONE (OUTPATIENT)
Dept: HEMATOLOGY ONCOLOGY | Facility: CLINIC | Age: 67
End: 2022-09-19

## 2022-09-23 ENCOUNTER — OFFICE VISIT (OUTPATIENT)
Dept: HEMATOLOGY ONCOLOGY | Facility: CLINIC | Age: 67
End: 2022-09-23
Payer: MEDICARE

## 2022-09-23 VITALS
HEART RATE: 72 BPM | BODY MASS INDEX: 38.09 KG/M2 | TEMPERATURE: 97 F | HEIGHT: 63 IN | OXYGEN SATURATION: 98 % | SYSTOLIC BLOOD PRESSURE: 128 MMHG | WEIGHT: 215 LBS | RESPIRATION RATE: 16 BRPM | DIASTOLIC BLOOD PRESSURE: 80 MMHG

## 2022-09-23 DIAGNOSIS — D80.1 HYPOGAMMAGLOBULINEMIA, ACQUIRED (HCC): ICD-10-CM

## 2022-09-23 DIAGNOSIS — Z15.01 BRCA1 POSITIVE: Primary | ICD-10-CM

## 2022-09-23 DIAGNOSIS — R77.8 ELEVATED TOTAL PROTEIN: ICD-10-CM

## 2022-09-23 DIAGNOSIS — Z15.09 BRCA1 POSITIVE: Primary | ICD-10-CM

## 2022-09-23 PROCEDURE — 99215 OFFICE O/P EST HI 40 MIN: CPT | Performed by: PHYSICIAN ASSISTANT

## 2022-09-23 NOTE — PROGRESS NOTES
Hematology/Oncology Outpatient Follow- up Note  Mary Carmen Purdy 69 y o  female MRN: @ Encounter: [id number]        Date:  9/23/2022      Assessment / Plan:      Hematology/Oncology Outpatient Follow- up Note  Mary Carmen Purdy 69 y o  female MRN: @ Encounter: [id number]        Date:  9/23/2022      Assessment / Plan:    1  Juan's granulomatosis diagnosed in 02/2010 treated with rituximab for 4 years with good response and subsequent hypogammaglobinemia  Follows with Dr Amy Lopez, Dr Sathya Bravo  2   Hypogammaglobinemia with subsequent multiple infections required multiple admissions to the hospital at least 4 times in 2019 with sepsis, cellulitis, pneumonia  IgG level was less than 200  She was given IVIG 30 g x 2 doses January and February 2020  Level improved to the range of 500  She had headache, nausea with IVIG requiring Zofran, and hydrocortisone ,Benadryl  Last dose of IV Ig was 10/2020  IgG level was 500 8/2021; improved to 1200 range 2022  Admitted 4/27/22 - 5/3/22 secondary to right lower extremity cellulitis and septic shock  Admitted 9/2 - 9/4/22 2nd to pneumonia, heart failure  Total protein was elevated  Will investigate  Discussed prn f/u  At this time, she will f/u in 1 year  3   History of ovarian cancer status post bilateral oophorectomy and hysterectomy in 2007 followed by chemotherapy (Carboplatin/Taxol x 5 last 5/2007)  Follows with Dr Mikki Casas at [facility]  BRCA1 mutation   Mammogram and MRI of the breast are followed by Surgical Oncology Dr Sheri Munoz  HPI:    Clarisse Merlos is a 20-year-old  female seen initially 12/27/2019 regarding hypogammaglobulinemia  She has multiple medical problems including history of ovarian cancer  She is s/p exploratory laparotomy /BSO January 2007  She is s/p  hysterectomy, omentumectomy, lymph node biopsy 2/14/2007 at  [address]  Complicated by DVT and PE 3/25/2007    IVC filter was placed  Treated with Lovenox x 1 year  She received post op chemotherapy  She was was found to have BRCA1 mutation  She follows with Dr Ines Miller with surgical oncology  She has history of Juan's granulomatosis diagnosed 11/2010  She was on steroids through 2011 and then she received rituximab for almost 4 years per Dr Tiana Whitt   She had subsequent pneumonia, pneumonitis, sinusitis, cellulitis of the lower extremity requiring multiple admissions to the hospital and antibiotics     Admission to the hospital was on 11/14/2019 with severe sepsis, leukocytosis, encephalopathy     On 11/22/2019, IgG 152, IgA 36, IgM 72     IgG 189 7/2016 at [facility]  IgG <200 through 11/2019  She was initiated on IVIG 30 g every month received the 1st dose January 31, 2020, IgG level went up from 189 to 547 2/22/20  She reported profound headaches after the 1st treatment  Hydrocortisone 100mg given as premedication with cycle #2 due to prolonged HA post treatment #1  For nausea she had benefit from Compazine, Zofran was added to premedication prior to IVIG  She only received the 2 doses  - January 2020 and February 2020 9/14/20: IgG 375   10/20/20: IgG 304  IgM 48; IgA 30  Normal CBCD  She received IVIG with Zofran and premedication with Benadryl and hydrocortisone  IgG level on 01/22/2021 of 453, normal CBC and differential    Last dose of IV Ig was 10/2020    Interval History:    Last seen 12/2021  Admitted 4/27/22 - 5/3/22 secondary to right lower extremity cellulitis and septic shock  7/22 Bilateral- There are no suspicious masses    Admitted 9/2 - 9/4/22 2nd to pneumonia, heart failure  Noncontrast CT A/P- No evidence of acute intra-abdominal or pelvic pathology  6/15/22-  hemoglobin 12, white blood cell count 5 9, 53% neutrophils, 34% lymphocytes, 8% monocytes, platelet count 792  IgG level 1485    9/20/22 CBCD normal; IgG 1243      Interval History:    Here with her , Luciano Stout    Feeling well         Test Results:        Labs:   Lab Results   Component Value Date    HGB 11 1 (L) 09/04/2022    HCT 35 1 09/04/2022    MCV 92 09/04/2022     09/04/2022    WBC 6 90 09/04/2022    NRBC 0 09/03/2022    BANDSPCT 10 (H) 04/27/2022    ATYLMPCT 4 (H) 09/06/2017     Lab Results   Component Value Date     03/22/2015    K 3 6 09/04/2022     09/04/2022    CO2 31 09/04/2022    ANIONGAP 7 03/22/2015    BUN 18 09/04/2022    CREATININE 1 04 09/04/2022    GLUCOSE 88 03/22/2015    CALCIUM 9 0 09/04/2022    CORRECTEDCA 8 3 04/28/2022    AST 38 09/02/2022    ALT 28 09/02/2022    ALKPHOS 162 (H) 09/02/2022    PROT 7 3 05/15/2015    BILITOT 0 39 05/15/2015    EGFR 54 09/04/2022       Imaging: CT abdomen pelvis wo contrast    Result Date: 9/2/2022  Narrative: CT ABDOMEN AND PELVIS WITHOUT IV CONTRAST INDICATION:   Abdominal pain, acute, nonlocalized Generalized diffuse abdominal pain, allergy to contrast  COMPARISON:  None  TECHNIQUE:  CT examination of the abdomen and pelvis was performed without intravenous contrast  Axial, sagittal, and coronal 2D reformatted images were created from the source data and submitted for interpretation  Radiation dose length product (DLP) for this visit:  1187 mGy-cm   This examination, like all CT scans performed in the [facility], was performed utilizing techniques to minimize radiation dose exposure, including the use of iterative reconstruction and automated exposure control  Enteric contrast was administered  FINDINGS: ABDOMEN LOWER CHEST:  Atelectatic changes are noted at the lung bases  LIVER/BILIARY TREE:  Unremarkable  GALLBLADDER:  No calcified gallstones  No pericholecystic inflammatory change  SPLEEN:  Stable splenic hypodensity  PANCREAS:  Unremarkable  ADRENAL GLANDS:  Unremarkable  KIDNEYS/URETERS:  Unremarkable  No hydronephrosis  STOMACH AND BOWEL:  Unremarkable  APPENDIX:  No findings to suggest appendicitis   ABDOMINOPELVIC CAVITY:  No ascites  No pneumoperitoneum  No lymphadenopathy  VESSELS:  Unremarkable for patient's age  PELVIS REPRODUCTIVE ORGANS:  Surgical changes of prior hysterectomy  URINARY BLADDER:  Unremarkable  ABDOMINAL WALL/INGUINAL REGIONS:  Mild right inguinal adenopathy, likely reactive and similar to 4/27/2022    OSSEOUS STRUCTURES:  No acute fracture or destructive osseous lesion  Fixation of the right pelvis with severe posttraumatic degenerative changes of the right hip  Impression: No evidence of acute intra-abdominal or pelvic pathology  Workstation performed: [id number]     XR chest portable    Result Date: 9/2/2022  Narrative: CHEST INDICATION:   Fever  COMPARISON:  4/21/2022 EXAM PERFORMED/VIEWS:  XR CHEST PORTABLE FINDINGS: Heart shadow is enlarged but unchanged from prior exam  Diffuse patchy bilateral hazy airspace opacities    Osseous structures appear within normal limits for patient age  Impression: Diffuse patchy bilateral hazy airspace opacities compatible with pneumonia versus pulmonary edema Workstation performed: [id number]           ROS:  As mentioned in HPI & Interval History otherwise 14 point ROS negative  Allergies: Allergies   Allergen Reactions    Iodinated Diagnostic Agents Shortness Of Breath    Omnipaque [Iohexol] Shortness Of Breath    Other Shortness Of Breath     IVP dye, x ray dye 3    Iodides      Other reaction(s): SWELLING, SOB    Methotrexate Nausea Only     Headache and nausea    Methotrexate Derivatives Headache     Current Medications: Reviewed  PMH/FH/SH:  Reviewed      Physical Exam:    There is no height or weight on file to calculate BSA      Ht Readings from Last 3 Encounters:   09/02/22 5' 3" (1 6 m)   07/25/22 5' 3" (1 6 m)   05/20/22 5' 3" (1 6 m)        Wt Readings from Last 3 Encounters:   09/02/22 97 6 kg (215 lb 2 7 oz)   07/25/22 101 kg (223 lb)   05/20/22 101 kg (223 lb)        Temp Readings from Last 3 Encounters:   09/04/22 97 8 °F (36 6 °C)   05/20/22 97 8 °F (36 6 °C) (Temporal)   05/03/22 98 4 °F (36 9 °C) (Oral)        BP Readings from Last 3 Encounters:   09/04/22 154/85   05/20/22 140/70   05/03/22 136/68           Physical Exam  Vitals reviewed  Constitutional:       General: She is not in acute distress  Appearance: She is well-developed  She is not diaphoretic  HENT:      Head: Normocephalic and atraumatic  Eyes:      Conjunctiva/sclera: Conjunctivae normal    Neck:      Trachea: No tracheal deviation  Cardiovascular:      Rate and Rhythm: Normal rate and regular rhythm  Heart sounds: No friction rub  Pulmonary:      Effort: Pulmonary effort is normal    Musculoskeletal:      Cervical back: Normal range of motion and neck supple  Lymphadenopathy:      Cervical: No cervical adenopathy  Skin:     General: Skin is warm and dry  Coloration: Skin is not pale  Neurological:      Mental Status: She is alert and oriented to person, place, and time  Psychiatric:         Behavior: Behavior normal          Thought Content:  Thought content normal          Judgment: Judgment normal            Emergency Contacts:    Extended Emergency Contact Information  Primary Emergency Contact: Indira Casas  Address: Luis Bardales , [address] Phone: [phone]  Mobile Phone: [phone]  Relation: Spouse  Secondary Emergency Contact: Tun[address] Phone: [phone]  Mobile Phone: [phone]  Relation: Son

## 2022-09-23 NOTE — PROGRESS NOTES
Hematology/Oncology Outpatient Follow- up Note  Ashwini Valdivia 69 y o  female MRN: @ Encounter: [id number]        Date:  9/23/2022      Assessment / Plan:    1  Juan's granulomatosis diagnosed in 02/2010 treated with rituximab for 4 years with good response and subsequent hypogammaglobinemia  Follows with Dr Tyron Ragsdale, Dr Ethan Maciel  2   Hypogammaglobinemia with subsequent multiple infections required multiple admissions to the hospital at least 4 times in 2019 with sepsis, cellulitis, pneumonia  IgG level was less than 200  She was given IVIG 30 g x 2 doses January and February 2020  Level improved to the range of 500  She had headache, nausea with IVIG requiring Zofran, and hydrocortisone ,Benadryl  Last dose of IV Ig was 10/2020  IgG level was 500 8/2021; improved to 1200 range 2022  Admitted 4/27/22 - 5/3/22 secondary to right lower extremity cellulitis and septic shock  Admitted 9/2 - 9/4/22 2nd to pneumonia, heart failure  Total protein was elevated  Will investigate  Discussed prn f/u  At this time, she will f/u in 1 year  3   History of ovarian cancer status post bilateral oophorectomy and hysterectomy in 2007 followed by chemotherapy (Carboplatin/Taxol x 5 last 5/2007)  Follows with Dr Jonnie Kennedy at [place]  BRCA1 mutation   Mammogram and MRI of the breast are followed by Surgical Oncology Dr Meggan Swann  HPI:    Nazia Jiménez is a 27-year-old  female seen initially 12/27/2019 regarding hypogammaglobulinemia  She has multiple medical problems including history of ovarian cancer  She is s/p exploratory laparotomy /BSO January 2007  She is s/p  hysterectomy, omentumectomy, lymph node biopsy 2/14/2007 at  [facility]  Complicated by DVT and PE 3/25/2007  IVC filter was placed  Treated with Lovenox x 1 year  She received post op chemotherapy  She was was found to have BRCA1 mutation    She follows with Dr Meggan Swann with surgical oncology  She has history of Juan's granulomatosis diagnosed 11/2010  She was on steroids through 2011 and then she received rituximab for almost 4 years per Dr Black    She had subsequent pneumonia, pneumonitis, sinusitis, cellulitis of the lower extremity requiring multiple admissions to the hospital and antibiotics     Admission to the hospital was on 11/14/2019 with severe sepsis, leukocytosis, encephalopathy     On 11/22/2019, IgG 152, IgA 36, IgM 72     IgG 189 7/2016 at [facility]  IgG <200 through 11/2019  She was initiated on IVIG 30 g every month received the 1st dose January 31, 2020, IgG level went up from 189 to 547 2/22/20  She reported profound headaches after the 1st treatment  Hydrocortisone 100mg given as premedication with cycle #2 due to prolonged HA post treatment #1  For nausea she had benefit from Compazine, Zofran was added to premedication prior to IVIG  She only received the 2 doses  - January 2020 and February 2020 9/14/20: IgG 375   10/20/20: IgG 304  IgM 48; IgA 30  Normal CBCD  She received IVIG with Zofran and premedication with Benadryl and hydrocortisone  IgG level on 01/22/2021 of 453, normal CBC and differential    Last dose of IV Ig was 10/2020    Interval History:    Last seen 12/2021  Admitted 4/27/22 - 5/3/22 secondary to right lower extremity cellulitis and septic shock  7/22 Bilateral- There are no suspicious masses    Admitted 9/2 - 9/4/22 2nd to pneumonia, heart failure  Noncontrast CT A/P- No evidence of acute intra-abdominal or pelvic pathology  6/15/22-  hemoglobin 12, white blood cell count 5 9, 53% neutrophils, 34% lymphocytes, 8% monocytes, platelet count 878  IgG level 1485    9/20/22 CBCD normal; IgG 1243      Interval History:    Here with her , Carlos Shoemaker  Feeling well          Test Results:        Labs:   Lab Results   Component Value Date    HGB 11 1 (L) 09/04/2022    HCT 35 1 09/04/2022    MCV 92 09/04/2022    PLT 191 09/04/2022    WBC 6 90 09/04/2022    NRBC 0 09/03/2022    BANDSPCT 10 (H) 04/27/2022    ATYLMPCT 4 (H) 09/06/2017     Lab Results   Component Value Date     03/22/2015    K 3 6 09/04/2022     09/04/2022    CO2 31 09/04/2022    ANIONGAP 7 03/22/2015    BUN 18 09/04/2022    CREATININE 1 04 09/04/2022    GLUCOSE 88 03/22/2015    CALCIUM 9 0 09/04/2022    CORRECTEDCA 8 3 04/28/2022    AST 38 09/02/2022    ALT 28 09/02/2022    ALKPHOS 162 (H) 09/02/2022    PROT 7 3 05/15/2015    BILITOT 0 39 05/15/2015    EGFR 54 09/04/2022       Imaging: CT abdomen pelvis wo contrast    Result Date: 9/2/2022  Narrative: CT ABDOMEN AND PELVIS WITHOUT IV CONTRAST INDICATION:   Abdominal pain, acute, nonlocalized Generalized diffuse abdominal pain, allergy to contrast  COMPARISON:  None  TECHNIQUE:  CT examination of the abdomen and pelvis was performed without intravenous contrast  Axial, sagittal, and coronal 2D reformatted images were created from the source data and submitted for interpretation  Radiation dose length product (DLP) for this visit:  1187 mGy-cm   This examination, like all CT scans performed in the [address] [place] [address], was performed utilizing techniques to minimize radiation dose exposure, including the use of iterative reconstruction and automated exposure control  Enteric contrast was administered  FINDINGS: ABDOMEN LOWER CHEST:  Atelectatic changes are noted at the lung bases  LIVER/BILIARY TREE:  Unremarkable  GALLBLADDER:  No calcified gallstones  No pericholecystic inflammatory change  SPLEEN:  Stable splenic hypodensity  PANCREAS:  Unremarkable  ADRENAL GLANDS:  Unremarkable  KIDNEYS/URETERS:  Unremarkable  No hydronephrosis  STOMACH AND BOWEL:  Unremarkable  APPENDIX:  No findings to suggest appendicitis  ABDOMINOPELVIC CAVITY:  No ascites  No pneumoperitoneum  No lymphadenopathy  VESSELS:  Unremarkable for patient's age   PELVIS REPRODUCTIVE ORGANS:  Surgical changes of prior hysterectomy  URINARY BLADDER:  Unremarkable  ABDOMINAL WALL/INGUINAL REGIONS:  Mild right inguinal adenopathy, likely reactive and similar to 4/27/2022    OSSEOUS STRUCTURES:  No acute fracture or destructive osseous lesion  Fixation of the right pelvis with severe posttraumatic degenerative changes of the right hip  Impression: No evidence of acute intra-abdominal or pelvic pathology  Workstation performed: [id number]     XR chest portable    Result Date: 9/2/2022  Narrative: CHEST INDICATION:   Fever  COMPARISON:  4/21/2022 EXAM PERFORMED/VIEWS:  XR CHEST PORTABLE FINDINGS: Heart shadow is enlarged but unchanged from prior exam  Diffuse patchy bilateral hazy airspace opacities    Osseous structures appear within normal limits for patient age  Impression: Diffuse patchy bilateral hazy airspace opacities compatible with pneumonia versus pulmonary edema Workstation performed: [id number]           ROS:  As mentioned in HPI & Interval History otherwise 14 point ROS negative  Allergies: Allergies   Allergen Reactions    Iodinated Diagnostic Agents Shortness Of Breath    Omnipaque [Iohexol] Shortness Of Breath    Other Shortness Of Breath     IVP dye, x ray dye 3    Iodides      Other reaction(s): SWELLING, SOB    Methotrexate Nausea Only     Headache and nausea    Methotrexate Derivatives Headache     Current Medications: Reviewed  PMH/FH/SH:  Reviewed      Physical Exam:    There is no height or weight on file to calculate BSA      Ht Readings from Last 3 Encounters:   09/02/22 5' 3" (1 6 m)   07/25/22 5' 3" (1 6 m)   05/20/22 5' 3" (1 6 m)        Wt Readings from Last 3 Encounters:   09/02/22 97 6 kg (215 lb 2 7 oz)   07/25/22 101 kg (223 lb)   05/20/22 101 kg (223 lb)        Temp Readings from Last 3 Encounters:   09/04/22 97 8 °F (36 6 °C)   05/20/22 97 8 °F (36 6 °C) (Temporal)   05/03/22 98 4 °F (36 9 °C) (Oral)        BP Readings from Last 3 Encounters:   09/04/22 154/85   05/20/22 140/70 05/03/22 136/68           Physical Exam  Vitals reviewed  Constitutional:       General: She is not in acute distress  Appearance: She is well-developed  She is not diaphoretic  HENT:      Head: Normocephalic and atraumatic  Eyes:      Conjunctiva/sclera: Conjunctivae normal    Neck:      Trachea: No tracheal deviation  Cardiovascular:      Rate and Rhythm: Normal rate and regular rhythm  Heart sounds: No friction rub  Pulmonary:      Effort: Pulmonary effort is normal    Musculoskeletal:      Cervical back: Normal range of motion and neck supple  Lymphadenopathy:      Cervical: No cervical adenopathy  Skin:     General: Skin is warm and dry  Coloration: Skin is not pale  Neurological:      Mental Status: She is alert and oriented to person, place, and time  Psychiatric:         Behavior: Behavior normal          Thought Content:  Thought content normal          Judgment: Judgment normal            Emergency Contacts:    Extended Emergency Contact Information  Primary Emergency Contact: Teresa Brown  Address: Luis Bardales , [address] [place] [address] Phone: [phone]  Mobile Phone: [phone]  Relation: Spouse  Secondary Emergency Contact: Lilia Valencia [address] Phone: [phone]  Mobile Phone: [phone]  Relation: Son

## 2022-09-23 NOTE — PROGRESS NOTES
Hematology/Oncology Outpatient Follow- up Note  Ashwini Valdivia 69 y o  female MRN: @ Encounter: [id number]        Date:  9/23/2022      Assessment / Plan:    1  Juan's granulomatosis diagnosed in 02/2010 treated with rituximab for 4 years with good response and subsequent hypogammaglobinemia  Follows with Dr Tyron Ragsdale, Dr Ethan Maciel  2   Hypogammaglobinemia with subsequent multiple infections required multiple admissions to the hospital at least 4 times in 2019 with sepsis, cellulitis, pneumonia  IgG level was less than 200  She was given IVIG 30 g x 2 doses January and February 2020  Level improved to the range of 500  She had headache, nausea with IVIG requiring Zofran, and hydrocortisone ,Benadryl  Last dose of IV Ig was 10/2020  IgG level was 500 8/2021; improved to 1200 range 2022  Admitted 4/27/22 - 5/3/22 secondary to right lower extremity cellulitis and septic shock  Admitted 9/2 - 9/4/22 2nd to pneumonia, heart failure  Total protein was elevated  Will investigate  Discussed prn f/u  At this time, she will f/u in 1 year  3   History of ovarian cancer status post bilateral oophorectomy and hysterectomy in 2007 followed by chemotherapy (Carboplatin/Taxol x 5 last 5/2007)  Follows with Dr Jonnie Kennedy at [facility]  BRCA1 mutation   Mammogram and MRI of the breast are followed by Surgical Oncology Dr Meggan Swann  HPI:    Nazia Jiménez is a 29-year-old  female seen initially 12/27/2019 regarding hypogammaglobulinemia  She has multiple medical problems including history of ovarian cancer  She is s/p exploratory laparotomy /BSO January 2007  She is s/p  hysterectomy, omentumectomy, lymph node biopsy 2/14/2007 at  [facility]  Complicated by DVT and PE 3/25/2007  IVC filter was placed  Treated with Lovenox x 1 year  She received post op chemotherapy  She was was found to have BRCA1 mutation    She follows with Dr Meggan Swann with surgical oncology  She has history of Juan's granulomatosis diagnosed 11/2010  She was on steroids through 2011 and then she received rituximab for almost 4 years per Dr Johnie Fothergill   She had subsequent pneumonia, pneumonitis, sinusitis, cellulitis of the lower extremity requiring multiple admissions to the hospital and antibiotics     Admission to the hospital was on 11/14/2019 with severe sepsis, leukocytosis, encephalopathy     On 11/22/2019, IgG 152, IgA 36, IgM 72     IgG 189 7/2016 at [facility]  IgG <200 through 11/2019  She was initiated on IVIG 30 g every month received the 1st dose January 31, 2020, IgG level went up from 189 to 547 2/22/20  She reported profound headaches after the 1st treatment  Hydrocortisone 100mg given as premedication with cycle #2 due to prolonged HA post treatment #1  For nausea she had benefit from Compazine, Zofran was added to premedication prior to IVIG  She only received the 2 doses  - January 2020 and February 2020 9/14/20: IgG 375   10/20/20: IgG 304  IgM 48; IgA 30  Normal CBCD  She received IVIG with Zofran and premedication with Benadryl and hydrocortisone  IgG level on 01/22/2021 of 453, normal CBC and differential    Last dose of IV Ig was 10/2020    Interval History:    Last seen 12/2021  Admitted 4/27/22 - 5/3/22 secondary to right lower extremity cellulitis and septic shock  7/22 Bilateral- There are no suspicious masses    Admitted 9/2 - 9/4/22 2nd to pneumonia, heart failure  Noncontrast CT A/P- No evidence of acute intra-abdominal or pelvic pathology  6/15/22-  hemoglobin 12, white blood cell count 5 9, 53% neutrophils, 34% lymphocytes, 8% monocytes, platelet count 805  IgG level 1485    9/20/22 CBCD normal; IgG 1243      Interval History:    Here with her , Glenetta Fleischer  Feeling well          Test Results:        Labs:   Lab Results   Component Value Date    HGB 11 1 (L) 09/04/2022    HCT 35 1 09/04/2022    MCV 92 09/04/2022    PLT 191 09/04/2022    WBC 6 90 09/04/2022    NRBC 0 09/03/2022    BANDSPCT 10 (H) 04/27/2022    ATYLMPCT 4 (H) 09/06/2017     Lab Results   Component Value Date     03/22/2015    K 3 6 09/04/2022     09/04/2022    CO2 31 09/04/2022    ANIONGAP 7 03/22/2015    BUN 18 09/04/2022    CREATININE 1 04 09/04/2022    GLUCOSE 88 03/22/2015    CALCIUM 9 0 09/04/2022    CORRECTEDCA 8 3 04/28/2022    AST 38 09/02/2022    ALT 28 09/02/2022    ALKPHOS 162 (H) 09/02/2022    PROT 7 3 05/15/2015    BILITOT 0 39 05/15/2015    EGFR 54 09/04/2022       Imaging: CT abdomen pelvis wo contrast    Result Date: 9/2/2022  Narrative: CT ABDOMEN AND PELVIS WITHOUT IV CONTRAST INDICATION:   Abdominal pain, acute, nonlocalized Generalized diffuse abdominal pain, allergy to contrast  COMPARISON:  None  TECHNIQUE:  CT examination of the abdomen and pelvis was performed without intravenous contrast  Axial, sagittal, and coronal 2D reformatted images were created from the source data and submitted for interpretation  Radiation dose length product (DLP) for this visit:  1187 mGy-cm   This examination, like all CT scans performed in the [facility], was performed utilizing techniques to minimize radiation dose exposure, including the use of iterative reconstruction and automated exposure control  Enteric contrast was administered  FINDINGS: ABDOMEN LOWER CHEST:  Atelectatic changes are noted at the lung bases  LIVER/BILIARY TREE:  Unremarkable  GALLBLADDER:  No calcified gallstones  No pericholecystic inflammatory change  SPLEEN:  Stable splenic hypodensity  PANCREAS:  Unremarkable  ADRENAL GLANDS:  Unremarkable  KIDNEYS/URETERS:  Unremarkable  No hydronephrosis  STOMACH AND BOWEL:  Unremarkable  APPENDIX:  No findings to suggest appendicitis  ABDOMINOPELVIC CAVITY:  No ascites  No pneumoperitoneum  No lymphadenopathy  VESSELS:  Unremarkable for patient's age   PELVIS REPRODUCTIVE ORGANS:  Surgical changes of prior hysterectomy  URINARY BLADDER:  Unremarkable  ABDOMINAL WALL/INGUINAL REGIONS:  Mild right inguinal adenopathy, likely reactive and similar to 4/27/2022    OSSEOUS STRUCTURES:  No acute fracture or destructive osseous lesion  Fixation of the right pelvis with severe posttraumatic degenerative changes of the right hip  Impression: No evidence of acute intra-abdominal or pelvic pathology  Workstation performed: [id number]     XR chest portable    Result Date: 9/2/2022  Narrative: CHEST INDICATION:   Fever  COMPARISON:  4/21/2022 EXAM PERFORMED/VIEWS:  XR CHEST PORTABLE FINDINGS: Heart shadow is enlarged but unchanged from prior exam  Diffuse patchy bilateral hazy airspace opacities    Osseous structures appear within normal limits for patient age  Impression: Diffuse patchy bilateral hazy airspace opacities compatible with pneumonia versus pulmonary edema Workstation performed: [id number]           ROS:  As mentioned in HPI & Interval History otherwise 14 point ROS negative  Allergies: Allergies   Allergen Reactions    Iodinated Diagnostic Agents Shortness Of Breath    Omnipaque [Iohexol] Shortness Of Breath    Other Shortness Of Breath     IVP dye, x ray dye 3    Iodides      Other reaction(s): SWELLING, SOB    Methotrexate Nausea Only     Headache and nausea    Methotrexate Derivatives Headache     Current Medications: Reviewed  PMH/FH/SH:  Reviewed      Physical Exam:    There is no height or weight on file to calculate BSA      Ht Readings from Last 3 Encounters:   09/02/22 5' 3" (1 6 m)   07/25/22 5' 3" (1 6 m)   05/20/22 5' 3" (1 6 m)        Wt Readings from Last 3 Encounters:   09/02/22 97 6 kg (215 lb 2 7 oz)   07/25/22 101 kg (223 lb)   05/20/22 101 kg (223 lb)        Temp Readings from Last 3 Encounters:   09/04/22 97 8 °F (36 6 °C)   05/20/22 97 8 °F (36 6 °C) (Temporal)   05/03/22 98 4 °F (36 9 °C) (Oral)        BP Readings from Last 3 Encounters:   09/04/22 154/85   05/20/22 140/70 05/03/22 136/68           Physical Exam  Vitals reviewed  Constitutional:       General: She is not in acute distress  Appearance: She is well-developed  She is not diaphoretic  HENT:      Head: Normocephalic and atraumatic  Eyes:      Conjunctiva/sclera: Conjunctivae normal    Neck:      Trachea: No tracheal deviation  Cardiovascular:      Rate and Rhythm: Normal rate and regular rhythm  Heart sounds: No friction rub  Pulmonary:      Effort: Pulmonary effort is normal    Musculoskeletal:      Cervical back: Normal range of motion and neck supple  Lymphadenopathy:      Cervical: No cervical adenopathy  Skin:     General: Skin is warm and dry  Coloration: Skin is not pale  Neurological:      Mental Status: She is alert and oriented to person, place, and time  Psychiatric:         Behavior: Behavior normal          Thought Content:  Thought content normal          Judgment: Judgment normal            Emergency Contacts:    Extended Emergency Contact Information  Primary Emergency Contact: Lois Duncan  Address: Luis Bardales , [address] [place] [address] Phone: [phone]  Mobile Phone: [phone]  Relation: Spouse  Secondary Emergency Contact: Timur Walker [address] Phone: [phone]  Mobile Phone: [phone]  Relation: Son

## 2022-10-19 ENCOUNTER — HOSPITAL ENCOUNTER (INPATIENT)
Facility: HOSPITAL | Age: 67
LOS: 3 days | Discharge: HOME | DRG: 871 | End: 2022-10-22
Attending: EMERGENCY MEDICINE | Admitting: INTERNAL MEDICINE
Payer: MEDICARE

## 2022-10-19 ENCOUNTER — APPOINTMENT (INPATIENT)
Dept: CT IMAGING | Facility: HOSPITAL | Age: 67
DRG: 871 | End: 2022-10-19
Payer: MEDICARE

## 2022-10-19 ENCOUNTER — APPOINTMENT (EMERGENCY)
Dept: CT IMAGING | Facility: HOSPITAL | Age: 67
DRG: 871 | End: 2022-10-19
Payer: MEDICARE

## 2022-10-19 ENCOUNTER — TELEPHONE (OUTPATIENT)
Dept: CARDIOLOGY CLINIC | Facility: CLINIC | Age: 67
End: 2022-10-19

## 2022-10-19 DIAGNOSIS — E66.01 MORBID OBESITY (HCC): ICD-10-CM

## 2022-10-19 DIAGNOSIS — R65.21 SEPTIC SHOCK (HCC): ICD-10-CM

## 2022-10-19 DIAGNOSIS — L03.90 CELLULITIS: ICD-10-CM

## 2022-10-19 DIAGNOSIS — N17.9 AKI (ACUTE KIDNEY INJURY) (HCC): ICD-10-CM

## 2022-10-19 DIAGNOSIS — M54.50 CHRONIC RIGHT-SIDED LOW BACK PAIN WITHOUT SCIATICA: ICD-10-CM

## 2022-10-19 DIAGNOSIS — A41.9 SEPTIC SHOCK (HCC): ICD-10-CM

## 2022-10-19 DIAGNOSIS — G93.40 ACUTE ENCEPHALOPATHY: Primary | ICD-10-CM

## 2022-10-19 DIAGNOSIS — I42.2 HYPERTROPHIC CARDIOMYOPATHY (HCC): ICD-10-CM

## 2022-10-19 DIAGNOSIS — G89.29 CHRONIC RIGHT-SIDED LOW BACK PAIN WITHOUT SCIATICA: ICD-10-CM

## 2022-10-19 DIAGNOSIS — D80.1 HYPOGAMMAGLOBULINEMIA, ACQUIRED (HCC): ICD-10-CM

## 2022-10-19 DIAGNOSIS — A41.9 SEPTICEMIA (HCC): ICD-10-CM

## 2022-10-19 DIAGNOSIS — L03.115 CELLULITIS OF RIGHT LOWER EXTREMITY: ICD-10-CM

## 2022-10-19 LAB
2HR DELTA HS TROPONIN: 25 NG/L
4HR DELTA HS TROPONIN: -28 NG/L
ALBUMIN SERPL BCP-MCNC: 3.9 G/DL (ref 3.5–5)
ALP SERPL-CCNC: 194 U/L (ref 46–116)
ALT SERPL W P-5'-P-CCNC: 67 U/L (ref 12–78)
ANION GAP SERPL CALCULATED.3IONS-SCNC: 10 MMOL/L (ref 4–13)
AST SERPL W P-5'-P-CCNC: 114 U/L (ref 5–45)
ATRIAL RATE: 98 BPM
BASOPHILS # BLD MANUAL: 0 THOUSAND/UL (ref 0–0.1)
BASOPHILS NFR MAR MANUAL: 0 % (ref 0–1)
BILIRUB SERPL-MCNC: 1.08 MG/DL (ref 0.2–1)
BILIRUB UR QL STRIP: NEGATIVE
BUN SERPL-MCNC: 25 MG/DL (ref 5–25)
CALCIUM SERPL-MCNC: 9.7 MG/DL (ref 8.3–10.1)
CARDIAC TROPONIN I PNL SERPL HS: 104 NG/L
CARDIAC TROPONIN I PNL SERPL HS: 51 NG/L
CARDIAC TROPONIN I PNL SERPL HS: 79 NG/L
CHLORIDE SERPL-SCNC: 100 MMOL/L (ref 96–108)
CLARITY UR: CLEAR
CO2 SERPL-SCNC: 29 MMOL/L (ref 21–32)
COLOR UR: YELLOW
CREAT SERPL-MCNC: 1.37 MG/DL (ref 0.6–1.3)
EOSINOPHIL # BLD MANUAL: 0 THOUSAND/UL (ref 0–0.4)
EOSINOPHIL NFR BLD MANUAL: 0 % (ref 0–6)
ERYTHROCYTE [DISTWIDTH] IN BLOOD BY AUTOMATED COUNT: 14.2 % (ref 11.6–15.1)
ETHANOL SERPL-MCNC: <3 MG/DL (ref 0–3)
FLUAV RNA RESP QL NAA+PROBE: NEGATIVE
FLUBV RNA RESP QL NAA+PROBE: NEGATIVE
GFR SERPL CREATININE-BSD FRML MDRD: 39 ML/MIN/1.73SQ M
GLUCOSE SERPL-MCNC: 128 MG/DL (ref 65–140)
GLUCOSE SERPL-MCNC: 136 MG/DL (ref 65–140)
GLUCOSE UR STRIP-MCNC: NEGATIVE MG/DL
HCT VFR BLD AUTO: 44.7 % (ref 34.8–46.1)
HGB BLD-MCNC: 14.3 G/DL (ref 11.5–15.4)
HGB UR QL STRIP.AUTO: NEGATIVE
KETONES UR STRIP-MCNC: NEGATIVE MG/DL
LACTATE SERPL-SCNC: 3.6 MMOL/L (ref 0.5–2)
LACTATE SERPL-SCNC: 4.7 MMOL/L (ref 0.5–2)
LEUKOCYTE ESTERASE UR QL STRIP: NEGATIVE
LYMPHOCYTES # BLD AUTO: 1.15 THOUSAND/UL (ref 0.6–4.47)
LYMPHOCYTES # BLD AUTO: 6 % (ref 14–44)
MCH RBC QN AUTO: 30.1 PG (ref 26.8–34.3)
MCHC RBC AUTO-ENTMCNC: 32 G/DL (ref 31.4–37.4)
MCV RBC AUTO: 94 FL (ref 82–98)
MONOCYTES # BLD AUTO: 0.58 THOUSAND/UL (ref 0–1.22)
MONOCYTES NFR BLD: 3 % (ref 4–12)
NEUTROPHILS # BLD MANUAL: 17.48 THOUSAND/UL (ref 1.85–7.62)
NEUTS BAND NFR BLD MANUAL: 12 % (ref 0–8)
NEUTS SEG NFR BLD AUTO: 79 % (ref 43–75)
NITRITE UR QL STRIP: NEGATIVE
P AXIS: 41 DEGREES
PH UR STRIP.AUTO: 7 [PH]
PLATELET # BLD AUTO: 213 THOUSANDS/UL (ref 149–390)
PLATELET BLD QL SMEAR: ADEQUATE
PMV BLD AUTO: 9.3 FL (ref 8.9–12.7)
POTASSIUM SERPL-SCNC: 4.2 MMOL/L (ref 3.5–5.3)
PR INTERVAL: 152 MS
PROT SERPL-MCNC: 8.7 G/DL (ref 6.4–8.4)
PROT UR STRIP-MCNC: NEGATIVE MG/DL
QRS AXIS: 9 DEGREES
QRSD INTERVAL: 86 MS
QT INTERVAL: 342 MS
QTC INTERVAL: 436 MS
RBC # BLD AUTO: 4.75 MILLION/UL (ref 3.81–5.12)
RBC MORPH BLD: NORMAL
RSV RNA RESP QL NAA+PROBE: NEGATIVE
SARS-COV-2 RNA RESP QL NAA+PROBE: NEGATIVE
SODIUM SERPL-SCNC: 139 MMOL/L (ref 135–147)
SP GR UR STRIP.AUTO: 1.01 (ref 1–1.03)
T WAVE AXIS: 18 DEGREES
TSH SERPL DL<=0.05 MIU/L-ACNC: 1.3 UIU/ML (ref 0.45–4.5)
UROBILINOGEN UR QL STRIP.AUTO: 0.2 E.U./DL
VENTRICULAR RATE: 98 BPM
WBC # BLD AUTO: 19.21 THOUSAND/UL (ref 4.31–10.16)

## 2022-10-19 PROCEDURE — 96360 HYDRATION IV INFUSION INIT: CPT

## 2022-10-19 PROCEDURE — 96365 THER/PROPH/DIAG IV INF INIT: CPT

## 2022-10-19 PROCEDURE — 93005 ELECTROCARDIOGRAM TRACING: CPT

## 2022-10-19 PROCEDURE — 85025 COMPLETE CBC W/AUTO DIFF WBC: CPT | Performed by: EMERGENCY MEDICINE

## 2022-10-19 PROCEDURE — 70450 CT HEAD/BRAIN W/O DYE: CPT

## 2022-10-19 PROCEDURE — 93010 ELECTROCARDIOGRAM REPORT: CPT | Performed by: INTERNAL MEDICINE

## 2022-10-19 PROCEDURE — 99223 1ST HOSP IP/OBS HIGH 75: CPT | Performed by: PHYSICIAN ASSISTANT

## 2022-10-19 PROCEDURE — 84484 ASSAY OF TROPONIN QUANT: CPT | Performed by: PHYSICIAN ASSISTANT

## 2022-10-19 PROCEDURE — 0241U HB NFCT DS VIR RESP RNA 4 TRGT: CPT | Performed by: EMERGENCY MEDICINE

## 2022-10-19 PROCEDURE — 71250 CT THORAX DX C-: CPT

## 2022-10-19 PROCEDURE — 81003 URINALYSIS AUTO W/O SCOPE: CPT | Performed by: PHYSICIAN ASSISTANT

## 2022-10-19 PROCEDURE — 83605 ASSAY OF LACTIC ACID: CPT | Performed by: EMERGENCY MEDICINE

## 2022-10-19 PROCEDURE — 74176 CT ABD & PELVIS W/O CONTRAST: CPT

## 2022-10-19 PROCEDURE — 82948 REAGENT STRIP/BLOOD GLUCOSE: CPT

## 2022-10-19 PROCEDURE — 82077 ASSAY SPEC XCP UR&BREATH IA: CPT | Performed by: EMERGENCY MEDICINE

## 2022-10-19 PROCEDURE — 84443 ASSAY THYROID STIM HORMONE: CPT | Performed by: EMERGENCY MEDICINE

## 2022-10-19 PROCEDURE — 99291 CRITICAL CARE FIRST HOUR: CPT | Performed by: EMERGENCY MEDICINE

## 2022-10-19 PROCEDURE — 96367 TX/PROPH/DG ADDL SEQ IV INF: CPT

## 2022-10-19 PROCEDURE — 99291 CRITICAL CARE FIRST HOUR: CPT

## 2022-10-19 PROCEDURE — 83605 ASSAY OF LACTIC ACID: CPT | Performed by: STUDENT IN AN ORGANIZED HEALTH CARE EDUCATION/TRAINING PROGRAM

## 2022-10-19 PROCEDURE — 84484 ASSAY OF TROPONIN QUANT: CPT | Performed by: EMERGENCY MEDICINE

## 2022-10-19 PROCEDURE — 87040 BLOOD CULTURE FOR BACTERIA: CPT | Performed by: EMERGENCY MEDICINE

## 2022-10-19 PROCEDURE — 36415 COLL VENOUS BLD VENIPUNCTURE: CPT | Performed by: EMERGENCY MEDICINE

## 2022-10-19 PROCEDURE — 80053 COMPREHEN METABOLIC PANEL: CPT | Performed by: EMERGENCY MEDICINE

## 2022-10-19 PROCEDURE — 85007 BL SMEAR W/DIFF WBC COUNT: CPT | Performed by: EMERGENCY MEDICINE

## 2022-10-19 PROCEDURE — 85027 COMPLETE CBC AUTOMATED: CPT | Performed by: EMERGENCY MEDICINE

## 2022-10-19 RX ORDER — DULOXETIN HYDROCHLORIDE 60 MG/1
60 CAPSULE, DELAYED RELEASE ORAL DAILY
Status: DISCONTINUED | OUTPATIENT
Start: 2022-10-20 | End: 2022-10-22 | Stop reason: HOSPADM

## 2022-10-19 RX ORDER — ONDANSETRON 2 MG/ML
4 INJECTION INTRAMUSCULAR; INTRAVENOUS EVERY 6 HOURS PRN
Status: DISCONTINUED | OUTPATIENT
Start: 2022-10-19 | End: 2022-10-22 | Stop reason: HOSPADM

## 2022-10-19 RX ORDER — ACETAMINOPHEN 325 MG/1
650 TABLET ORAL EVERY 6 HOURS PRN
Status: DISCONTINUED | OUTPATIENT
Start: 2022-10-19 | End: 2022-10-22 | Stop reason: HOSPADM

## 2022-10-19 RX ORDER — ENOXAPARIN SODIUM 100 MG/ML
1 INJECTION SUBCUTANEOUS ONCE
Status: COMPLETED | OUTPATIENT
Start: 2022-10-19 | End: 2022-10-19

## 2022-10-19 RX ORDER — LORAZEPAM 0.5 MG/1
0.5 TABLET ORAL 2 TIMES DAILY PRN
Status: DISCONTINUED | OUTPATIENT
Start: 2022-10-20 | End: 2022-10-22 | Stop reason: HOSPADM

## 2022-10-19 RX ORDER — VANCOMYCIN HYDROCHLORIDE 500 MG/100ML
500 INJECTION, SOLUTION INTRAVENOUS ONCE
Status: COMPLETED | OUTPATIENT
Start: 2022-10-19 | End: 2022-10-19

## 2022-10-19 RX ORDER — OXYCODONE HCL 40 MG/1
40 TABLET, FILM COATED, EXTENDED RELEASE ORAL EVERY 12 HOURS SCHEDULED
Status: DISCONTINUED | OUTPATIENT
Start: 2022-10-20 | End: 2022-10-20

## 2022-10-19 RX ORDER — PANTOPRAZOLE SODIUM 40 MG/1
40 TABLET, DELAYED RELEASE ORAL
Status: DISCONTINUED | OUTPATIENT
Start: 2022-10-20 | End: 2022-10-22 | Stop reason: HOSPADM

## 2022-10-19 RX ORDER — AMOXICILLIN 250 MG
1 CAPSULE ORAL
Status: DISCONTINUED | OUTPATIENT
Start: 2022-10-19 | End: 2022-10-22 | Stop reason: HOSPADM

## 2022-10-19 RX ORDER — VANCOMYCIN HYDROCHLORIDE 1 G/200ML
15 INJECTION, SOLUTION INTRAVENOUS ONCE
Status: COMPLETED | OUTPATIENT
Start: 2022-10-19 | End: 2022-10-19

## 2022-10-19 RX ORDER — SODIUM CHLORIDE, SODIUM GLUCONATE, SODIUM ACETATE, POTASSIUM CHLORIDE, MAGNESIUM CHLORIDE, SODIUM PHOSPHATE, DIBASIC, AND POTASSIUM PHOSPHATE .53; .5; .37; .037; .03; .012; .00082 G/100ML; G/100ML; G/100ML; G/100ML; G/100ML; G/100ML; G/100ML
100 INJECTION, SOLUTION INTRAVENOUS CONTINUOUS
Status: DISCONTINUED | OUTPATIENT
Start: 2022-10-19 | End: 2022-10-20

## 2022-10-19 RX ADMIN — Medication 12.5 MG: at 22:41

## 2022-10-19 RX ADMIN — SODIUM CHLORIDE, SODIUM GLUCONATE, SODIUM ACETATE, POTASSIUM CHLORIDE, MAGNESIUM CHLORIDE, SODIUM PHOSPHATE, DIBASIC, AND POTASSIUM PHOSPHATE 100 ML/HR: .53; .5; .37; .037; .03; .012; .00082 INJECTION, SOLUTION INTRAVENOUS at 21:01

## 2022-10-19 RX ADMIN — SENNOSIDES AND DOCUSATE SODIUM 1 TABLET: 8.6; 5 TABLET ORAL at 22:42

## 2022-10-19 RX ADMIN — VANCOMYCIN HYDROCHLORIDE 500 MG: 500 INJECTION, SOLUTION INTRAVENOUS at 22:41

## 2022-10-19 RX ADMIN — ACETAMINOPHEN 650 MG: 325 TABLET, FILM COATED ORAL at 22:48

## 2022-10-19 RX ADMIN — ENOXAPARIN SODIUM 100 MG: 100 INJECTION SUBCUTANEOUS at 22:41

## 2022-10-19 RX ADMIN — CEFEPIME HYDROCHLORIDE 2000 MG: 2 INJECTION, POWDER, FOR SOLUTION INTRAVENOUS at 17:07

## 2022-10-19 RX ADMIN — SODIUM CHLORIDE 1600 ML: 0.9 INJECTION, SOLUTION INTRAVENOUS at 17:00

## 2022-10-19 RX ADMIN — VANCOMYCIN HYDROCHLORIDE 1000 MG: 1 INJECTION, SOLUTION INTRAVENOUS at 17:40

## 2022-10-19 RX ADMIN — LORAZEPAM 0.5 MG: 0.5 TABLET ORAL at 23:34

## 2022-10-19 NOTE — ED PROVIDER NOTES
History  Chief Complaint   Patient presents with   • Altered Mental Status     Patient  reports altered mental status today  Symptoms observed at 1400  Patient is lethargic in triage, falling asleep during conversation  Oriented x3  Complains of chronic leg pain  Patient is a 64-year-old female  She has distant history of ovarian cancer  She is not on chemotherapy  She has a history of right Charcot foot secondary to distant trauma  She has chronic lymphedema to the right leg  She has a history of pulmonary embolism  Several times a year patient develops fairly rapid onset acute delirium which is usually due to infection  At times it is been due to pneumonia  Other times it is been due to cellulitis  This morning patient was fine  When  returned from work he found her altered  The back door was open  She had the heat cranked up  She was lethargic  As per  this is similar to prior episodes  He noticed that her right leg has been red  No measured fever at home  There has been some cough  Otherwise history is limited by altered mental status  Prior to Admission Medications   Prescriptions Last Dose Informant Patient Reported? Taking? Calcium Carbonate-Vit D-Min (CALCIUM 1200 PO)  Self Yes No   Sig: Take 3 tablets by mouth daily    DULoxetine (CYMBALTA) 60 mg delayed release capsule  Self Yes No   Sig: Take 60 mg by mouth daily     LORazepam (ATIVAN) 0 5 mg tablet   No No   Sig: Take 1 tablet (0 5 mg total) by mouth 2 (two) times a day as needed for anxiety Take 1 tablet as needed every 8 hours  Patient taking differently: Take 0 5 mg by mouth 2 (two) times a day as needed for anxiety Take 1 tablet as needed every 8 hours    Patient takes every 12 hours   Multiple Vitamins-Minerals (MULTIVITAMIN ADULT PO)  Self Yes No   Sig: Take 1 capsule by mouth daily    OXYCODONE HCL PO  Self Yes No   Sig: Take 15 mg by mouth 2 (two) times a day as needed (breakthrough pain) TOVIAZ 8 MG TB24  Self Yes No   Sig: Take 8 mg by mouth daily at bedtime    acetaminophen (TYLENOL) 325 mg tablet  Self No No   Sig: Take 2 tablets by mouth every 6 (six) hours as needed for mild pain   bisacodyl (DULCOLAX) 5 mg EC tablet   Yes No   Sig: Take 5 mg by mouth daily as needed for constipation   metoprolol tartrate (LOPRESSOR) 25 mg tablet  Self No No   Sig: TAKE 1/2 TABLET(12 5MG) BY MOUTH EVERY 12 HOURS   omeprazole (PriLOSEC) 20 mg delayed release capsule  Self Yes No   Sig: Take 20 mg by mouth daily  oxyCODONE (OxyCONTIN) 40 mg 12 hr tablet  Self Yes No   Sig: Take 40 mg by mouth every 12 (twelve) hours   senna-docusate sodium (SENOKOT S) 8 6-50 mg per tablet  Self No No   Sig: Take 1 tablet by mouth daily at bedtime      Facility-Administered Medications: None       Past Medical History:   Diagnosis Date   • BRCA1 positive    • [facility] )     ovarian Ca with chemo   • History of chemotherapy     ovarian cancer    • History of pulmonary embolus (PE) & DVT 2007    post-op FAMILIA/ omenectomy   • Lymphedema    • MRSA (methicillin resistant Staphylococcus aureus) 2017    nasal swab negative 4/3/19   • O[facility] cancer ([facility] )     CYST REMOVED RIGHT OVARY IN   HYSTERECTOMY 07         Past Surgical History:   Procedure Laterality Date   • APPENDECTOMY      laparoscopic   • BILATERAL SALPINGOOPHORECTOMY  2007   • BREAST BIOPSY Right 2016   • BREAST BIOPSY Left 07/15/2020   • BREAST BIOPSY Left 2021    stereo   •  SECTION     • HYSTERECTOMY  2007    Omentectomy and lymph node biopsy at [facility]    • MAMMO STEREOTACTIC BREAST BIOPSY RIGHT (ALL INC) Right 2021   • OOPHORECTOMY Bilateral 2007   • OTHER SURGICAL HISTORY      right lower extremity due to trauma   • AR TREAT TIBIAL SHAFT FX, INTRAMED IMPLANT Right 2017    Procedure: INSERTION NAIL IM TIBIA;  Surgeon: Gal Garcia MD;  Location: [facility];  Service: Orthopedics   • US GUIDANCE BREAST BIOPSY LEFT EACH ADDITIONAL Left 7/15/2020   • US GUIDED BREAST BIOPSY LEFT COMPLETE Left 7/15/2020   • US GUIDED BREAST BIOPSY RIGHT COMPLETE Right 5/9/2016   • US GUIDED BREAST BIOPSY RIGHT COMPLETE Right 7/26/2021       Family History   Problem Relation Age of Onset   • Breast cancer Mother         28   • Pancreatic cancer Mother    • Ovarian cancer Maternal Grandmother    • Breast cancer Maternal Aunt 39   • No Known Problems Paternal Aunt    • No Known Problems Father    • No Known Problems Maternal Grandfather    • No Known Problems Paternal Grandmother    • No Known Problems Paternal Grandfather    • Lung cancer Half-Sister      I have reviewed and agree with the history as documented  E-Cigarette/Vaping   • E-Cigarette Use Never User      E-Cigarette/Vaping Substances   • Nicotine No    • THC No    • CBD No    • Flavoring No    • Other No    • Unknown No      Social History     Tobacco Use   • Smoking status: Never Smoker   • Smokeless tobacco: Never Used   Vaping Use   • Vaping Use: Never used   Substance Use Topics   • Alcohol use: Not Currently   • Drug use: Not Currently       Review of Systems   Unable to perform ROS: Mental status change       Physical Exam  Physical Exam  Vitals reviewed  Constitutional:       Appearance: She is obese  She is ill-appearing  Comments: Lethargic female  HENT:      Head: Normocephalic and atraumatic  Mouth/Throat:      Mouth: Mucous membranes are moist    Eyes:      General: No scleral icterus  Pupils: Pupils are equal, round, and reactive to light  Cardiovascular:      Rate and Rhythm: Regular rhythm  Tachycardia present  Heart sounds: Normal heart sounds  No murmur heard  No friction rub  No gallop  Pulmonary:      Effort: Pulmonary effort is normal       Comments: Crackles in the left base  Abdominal:      General: Bowel sounds are normal  There is no distension  Palpations: Abdomen is soft  Tenderness:  There is no abdominal tenderness  There is no guarding  Musculoskeletal:      Cervical back: Normal range of motion and neck supple  No rigidity  Comments: There is chronic lymphedema to the right upper leg  There is erythema and warmth  Skin:     General: Skin is warm and dry  Findings: Erythema present  Neurological:      Comments: Lethargic female  No lateralizing deficits  Psychiatric:      Comments: Lethargic  Vital Signs  ED Triage Vitals [10/19/22 1510]   Temperature Pulse Respirations Blood Pressure SpO2   99 8 °F (37 7 °C) (!) 108 16 162/77 94 %      Temp Source Heart Rate Source Patient Position - Orthostatic VS BP Location FiO2 (%)   Oral Monitor Sitting Right arm --      Pain Score       6           Vitals:    10/19/22 1510 10/19/22 1703   BP: 162/77 140/60   Pulse: (!) 108 98   Patient Position - Orthostatic VS: Sitting Lying         Visual Acuity      ED Medications  Medications   vancomycin (VANCOCIN) IVPB (premix in dextrose) 1,000 mg 200 mL (1,000 mg Intravenous New Bag 10/19/22 1740)   sodium chloride 0 9 % bolus 1,600 mL (1,600 mL Intravenous New Bag 10/19/22 1700)   cefepime (MAXIPIME) 2 g/50 mL dextrose IVPB (0 mg Intravenous Stopped 10/19/22 1740)       Diagnostic Studies  Results Reviewed     Procedure Component Value Units Date/Time    HS Troponin I 4hr [[id number]]     Lab Status: No result Specimen: Blood     Blood culture #1 [[id number]] Collected: 10/19/22 1659    Lab Status:  In process Specimen: Blood from Hand, Left Updated: 10/19/22 1719    Manual Differential(PHLEBS Do Not Order) [[id number]]  (Abnormal) Collected: 10/19/22 1620    Lab Status: Final result Specimen: Blood from Arm, Right Updated: 10/19/22 1706     Segmented % 79 %      Bands % 12 %      Lymphocytes % 6 %      Monocytes % 3 %      Eosinophils, % 0 %      Basophils % 0 %      Absolute Neutrophils 17 48 Thousand/uL      Lymphocytes Absolute 1 15 Thousand/uL      Monocytes Absolute 0 58 Thousand/uL Eosinophils Absolute 0 00 Thousand/uL      Basophils Absolute 0 00 Thousand/uL      Total Counted --     RBC Morphology Normal     Platelet Estimate Adequate    FLU/RSV/COVID - if FLU/RSV clinically relevant [[id number]]  (Normal) Collected: 10/19/22 1604    Lab Status: Final result Specimen: Nares from Nose Updated: 10/19/22 1702     SARS-CoV-2 Negative     INFLUENZA A PCR Negative     INFLUENZA B PCR Negative     RSV PCR Negative    Narrative:      FOR PEDIATRIC PATIENTS - copy/paste COVID Guidelines URL to browser: [email or web address]  [employer]    SARS-CoV-2 assay is a Nucleic Acid Amplification assay intended for the  qualitative detection of nucleic acid from SARS-CoV-2 in nasopharyngeal  swabs  Results are for the presumptive identification of SARS-CoV-2 RNA  Positive results are indicative of infection with SARS-CoV-2, the virus  causing COVID-19, but do not rule out bacterial infection or co-infection  with other viruses  Laboratories within the [place] and its  territories are required to report all positive results to the appropriate  public health authorities  Negative results do not preclude SARS-CoV-2  infection and should not be used as the sole basis for treatment or other  patient management decisions  Negative results must be combined with  clinical observations, patient history, and epidemiological information  This test has not been FDA cleared or approved  This test has been authorized by FDA under an Emergency Use Authorization  (EUA)  This test is only authorized for the duration of time the  declaration that circumstances exist justifying the authorization of the  emergency use of an in vitro diagnostic tests for detection of SARS-CoV-2  virus and/or diagnosis of COVID-19 infection under section 564(b)(1) of  the Act, 21 U  S C  987CKW-8(I)(2), unless the authorization is terminated  or revoked sooner   The test has been validated but independent review by FDA  and CLIA is pending  Test performed using [employer] GeneXpert: This RT-PCR assay targets N2,  a region unique to SARS-CoV-2  A conserved region in the E-gene was chosen  for pan-Sarbecovirus detection which includes SARS-CoV-2  According to CMS-2020-01-R, this platform meets the definition of high-throughput technology  HS Troponin 0hr (reflex protocol) [[id number]]  (Abnormal) Collected: 10/19/22 1620    Lab Status: Final result Specimen: Blood from Arm, Right Updated: 10/19/22 1657     hs TnI 0hr 79 ng/L     HS Troponin I 2hr [[id number]]     Lab Status: No result Specimen: Blood     Lactic acid [[id number]]  (Abnormal) Collected: 10/19/22 1620    Lab Status: Final result Specimen: Blood from Arm, Right Updated: 10/19/22 1656     LACTIC ACID 3 6 mmol/L     Narrative:      Result may be elevated if tourniquet was used during collection  Lactic acid 2 Hours [[id number]]     Lab Status: No result Specimen: Blood     TSH [[id number]]  (Normal) Collected: 10/19/22 1620    Lab Status: Final result Specimen: Blood from Arm, Right Updated: 10/19/22 1653     TSH 3RD GENERATON 1 298 uIU/mL     Narrative:      Patients undergoing fluorescein dye angiography may retain small amounts of fluorescein in the body for 48-72 hours post procedure  Samples containing fluorescein can produce falsely depressed TSH values  If the patient had this procedure,a specimen should be resubmitted post fluorescein clearance        Ethanol [[id number]]  (Normal) Collected: 10/19/22 1620    Lab Status: Final result Specimen: Blood from Arm, Right Updated: 10/19/22 1651     Ethanol Lvl <3 mg/dL     Comprehensive metabolic panel [[id number]]  (Abnormal) Collected: 10/19/22 1620    Lab Status: Final result Specimen: Blood from Arm, Right Updated: 10/19/22 1644     Sodium 139 mmol/L      Potassium 4 2 mmol/L      Chloride 100 mmol/L      CO2 29 mmol/L      ANION GAP 10 mmol/L      BUN 25 mg/dL      Creatinine 1 37 mg/dL Glucose 136 mg/dL      Calcium 9 7 mg/dL       U/L      ALT 67 U/L      Alkaline Phosphatase 194 U/L      Total Protein 8 7 g/dL      Albumin 3 9 g/dL      Total Bilirubin 1 08 mg/dL      eGFR 39 ml/min/1 73sq m     Narrative:      [place] guidelines for Chronic Kidney Disease (CKD):   •  Stage 1 with normal or high GFR (GFR > 90 mL/min/1 73 square meters)  •  Stage 2 Mild CKD (GFR = 60-89 mL/min/1 73 square meters)  •  Stage 3A Moderate CKD (GFR = 45-59 mL/min/1 73 square meters)  •  Stage 3B Moderate CKD (GFR = 30-44 mL/min/1 73 square meters)  •  Stage 4 Severe CKD (GFR = 15-29 mL/min/1 73 square meters)  •  Stage 5 End Stage CKD (GFR <15 mL/min/1 73 square meters)  Note: GFR calculation is accurate only with a steady state creatinine    CBC and differential [[id number]]  (Abnormal) Collected: 10/19/22 1620    Lab Status: Final result Specimen: Blood from Arm, Right Updated: 10/19/22 1636     WBC 19 21 Thousand/uL      RBC 4 75 Million/uL      Hemoglobin 14 3 g/dL      Hematocrit 44 7 %      MCV 94 fL      MCH 30 1 pg      MCHC 32 0 g/dL      RDW 14 2 %      MPV 9 3 fL      Platelets 612 Thousands/uL     Narrative: This is an appended report  These results have been appended to a previously verified report  Blood culture #2 [[id number]] Collected: 10/19/22 1620    Lab Status: In process Specimen: Blood from Arm, Right Updated: 10/19/22 1627    Fingerstick Glucose (POCT) [[id number]]  (Normal) Collected: 10/19/22 1556    Lab Status: Final result Updated: 10/19/22 1557     POC Glucose 128 mg/dl     UA w Reflex to Microscopic w Reflex to Culture [[id number]]     Lab Status: No result Specimen: Urine                  CT head without contrast   Final Result by Kristyn Palma, DO (10/19 1648)      No acute intracranial abnormality                    Workstation performed: [id number]         CT chest abdomen pelvis wo contrast   Final Result by Dnezel Langley MD (10/19 1727)      1  Groundglass opacities and mild scarring apical regions of the upper lobes likely representing sequela of prior pneumonia  Persistent or superimposed new infection not excluded  2   No evidence of acute intra-abdominal or pelvic process  Workstation performed: [id number]                    Procedures  ECG 12 Lead Documentation Only    Date/Time: 10/19/2022 4:24 PM  Performed by: Juana Gerardo MD  Authorized by: Juana Gerardo MD     ECG reviewed by me, the ED Provider: yes    Patient location:  ED  Interpretation:     Interpretation: abnormal    Rate:     ECG rate assessment: normal    Rhythm:     Rhythm: sinus rhythm    ST segments:     ST segments:  Normal  T waves:     T waves: normal    Other findings:     Other findings: poor R wave progression      CriticalCare Time  Performed by: Juana Gerardo MD  Authorized by: Juana Gerardo MD     Critical care provider statement:     Critical care time (minutes):  60    Critical care time was exclusive of:  Separately billable procedures and treating other patients    Critical care was necessary to treat or prevent imminent or life-threatening deterioration of the following conditions:  Sepsis    Critical care was time spent personally by me on the following activities:  Obtaining history from patient or surrogate, development of treatment plan with patient or surrogate, discussions with consultants, evaluation of patient's response to treatment, examination of patient, ordering and performing treatments and interventions, ordering and review of laboratory studies, ordering and review of radiographic studies and re-evaluation of patient's condition    I assumed direction of critical care for this patient from another provider in my specialty: no               ED Course                                             MDM  Number of Diagnoses or Management Options  Diagnosis management comments: Patient met sepsis criteria    She was cultured  She received IV fluids calculated to ideal body weight  She received empiric antibiotics  Likely source is cellulitis of the lymphedemic right leg  Pneumonia is also another possibility, though CT scan was inconclusive  Urinalysis is still pending  Consulted with hospitalist for admission  Amount and/or Complexity of Data Reviewed  Clinical lab tests: ordered and reviewed  Tests in the radiology section of CPT®: ordered and reviewed  Discuss the patient with other providers: yes  Independent visualization of images, tracings, or specimens: yes        Disposition  Final diagnoses:   Acute encephalopathy   [facility] ([facility] )   Cellulitis   RUCHI (acute kidney injury) ([facility] )     Time reflects when diagnosis was documented in both MDM as applicable and the Disposition within this note     Time User Action Codes Description Comment    10/19/2022  4:30 PM Velvet Games [R56 9] Seizure ([facility] )     10/19/2022  4:44 PM Tammie Duffel [R56 9] Seizure ([facility] )     10/19/2022  6:23 PM Tiffanie Aiken Add [G93 40] Acute encephalopathy     10/19/2022  6:23 PM Tiffanie Aiken Add [A41 9] Septicemia ([facility] )     10/19/2022  6:23 PM Tiffanie Aiken Add [L03 90] Cellulitis     10/19/2022  6:26 PM Tiffanie Aiken Add [N17 9] RUCHI (acute kidney injury) [facility])       ED Disposition     ED Disposition   Admit    Condition   Stable    Date/Time   Wed Oct 19, 2022  6:23 PM    Comment   --         Follow-up Information    None         Patient's Medications   Discharge Prescriptions    No medications on file       No discharge procedures on file      PDMP Review       Value Time User    PDMP Reviewed  Yes 9/2/2022  7:37 AM Roro Nichols PA-C          ED Provider  Electronically Signed by           Roxane Marie MD  10/19/22 40 Clare Kimball MD  10/19/22 0432

## 2022-10-20 ENCOUNTER — APPOINTMENT (INPATIENT)
Dept: CT IMAGING | Facility: HOSPITAL | Age: 67
DRG: 871 | End: 2022-10-20
Payer: MEDICARE

## 2022-10-20 ENCOUNTER — APPOINTMENT (INPATIENT)
Dept: NON INVASIVE DIAGNOSTICS | Facility: HOSPITAL | Age: 67
DRG: 871 | End: 2022-10-20
Payer: MEDICARE

## 2022-10-20 LAB
ALBUMIN SERPL BCP-MCNC: 2.8 G/DL (ref 3.5–5)
ALP SERPL-CCNC: 142 U/L (ref 46–116)
ALT SERPL W P-5'-P-CCNC: 55 U/L (ref 12–78)
ANION GAP SERPL CALCULATED.3IONS-SCNC: 12 MMOL/L (ref 4–13)
AST SERPL W P-5'-P-CCNC: 77 U/L (ref 5–45)
ATRIAL RATE: 101 BPM
BILIRUB SERPL-MCNC: 1.05 MG/DL (ref 0.2–1)
BUN SERPL-MCNC: 26 MG/DL (ref 5–25)
CALCIUM ALBUM COR SERPL-MCNC: 10 MG/DL (ref 8.3–10.1)
CALCIUM SERPL-MCNC: 9 MG/DL (ref 8.3–10.1)
CHLORIDE SERPL-SCNC: 102 MMOL/L (ref 96–108)
CO2 SERPL-SCNC: 24 MMOL/L (ref 21–32)
CREAT SERPL-MCNC: 1.2 MG/DL (ref 0.6–1.3)
ERYTHROCYTE [DISTWIDTH] IN BLOOD BY AUTOMATED COUNT: 14.6 % (ref 11.6–15.1)
GFR SERPL CREATININE-BSD FRML MDRD: 46 ML/MIN/1.73SQ M
GLUCOSE SERPL-MCNC: 83 MG/DL (ref 65–140)
HCT VFR BLD AUTO: 37.9 % (ref 34.8–46.1)
HGB BLD-MCNC: 11.9 G/DL (ref 11.5–15.4)
LACTATE SERPL-SCNC: 1.7 MMOL/L (ref 0.5–2)
LACTATE SERPL-SCNC: 3.6 MMOL/L (ref 0.5–2)
MCH RBC QN AUTO: 30 PG (ref 26.8–34.3)
MCHC RBC AUTO-ENTMCNC: 31.4 G/DL (ref 31.4–37.4)
MCV RBC AUTO: 96 FL (ref 82–98)
P AXIS: 51 DEGREES
PLATELET # BLD AUTO: 178 THOUSANDS/UL (ref 149–390)
PMV BLD AUTO: 10.4 FL (ref 8.9–12.7)
POTASSIUM SERPL-SCNC: 3.7 MMOL/L (ref 3.5–5.3)
PR INTERVAL: 154 MS
PROT SERPL-MCNC: 6.7 G/DL (ref 6.4–8.4)
QRS AXIS: 22 DEGREES
QRSD INTERVAL: 92 MS
QT INTERVAL: 342 MS
QTC INTERVAL: 443 MS
RBC # BLD AUTO: 3.97 MILLION/UL (ref 3.81–5.12)
SODIUM SERPL-SCNC: 138 MMOL/L (ref 135–147)
T WAVE AXIS: 35 DEGREES
VENTRICULAR RATE: 101 BPM
WBC # BLD AUTO: 20.09 THOUSAND/UL (ref 4.31–10.16)

## 2022-10-20 PROCEDURE — 83605 ASSAY OF LACTIC ACID: CPT | Performed by: STUDENT IN AN ORGANIZED HEALTH CARE EDUCATION/TRAINING PROGRAM

## 2022-10-20 PROCEDURE — G1004 CDSM NDSC: HCPCS

## 2022-10-20 PROCEDURE — 85027 COMPLETE CBC AUTOMATED: CPT | Performed by: PHYSICIAN ASSISTANT

## 2022-10-20 PROCEDURE — 93010 ELECTROCARDIOGRAM REPORT: CPT | Performed by: INTERNAL MEDICINE

## 2022-10-20 PROCEDURE — 73700 CT LOWER EXTREMITY W/O DYE: CPT

## 2022-10-20 PROCEDURE — 99232 SBSQ HOSP IP/OBS MODERATE 35: CPT | Performed by: INTERNAL MEDICINE

## 2022-10-20 PROCEDURE — 80053 COMPREHEN METABOLIC PANEL: CPT | Performed by: PHYSICIAN ASSISTANT

## 2022-10-20 RX ORDER — OXYCODONE HCL 40 MG/1
40 TABLET, FILM COATED, EXTENDED RELEASE ORAL EVERY 12 HOURS SCHEDULED
Status: DISCONTINUED | OUTPATIENT
Start: 2022-10-20 | End: 2022-10-22 | Stop reason: HOSPADM

## 2022-10-20 RX ADMIN — LORAZEPAM 0.5 MG: 0.5 TABLET ORAL at 08:07

## 2022-10-20 RX ADMIN — ACETAMINOPHEN 650 MG: 325 TABLET, FILM COATED ORAL at 11:51

## 2022-10-20 RX ADMIN — LORAZEPAM 0.5 MG: 0.5 TABLET ORAL at 22:28

## 2022-10-20 RX ADMIN — B-COMPLEX W/ C & FOLIC ACID TAB 1 TABLET: TAB at 08:02

## 2022-10-20 RX ADMIN — SENNOSIDES AND DOCUSATE SODIUM 1 TABLET: 8.6; 5 TABLET ORAL at 21:39

## 2022-10-20 RX ADMIN — ACETAMINOPHEN 650 MG: 325 TABLET, FILM COATED ORAL at 18:16

## 2022-10-20 RX ADMIN — DEXTROSE MONOHYDRATE 1000 MG: 5 INJECTION, SOLUTION INTRAVENOUS at 00:32

## 2022-10-20 RX ADMIN — VANCOMYCIN HYDROCHLORIDE 1500 MG: 10 INJECTION, POWDER, LYOPHILIZED, FOR SOLUTION INTRAVENOUS at 16:51

## 2022-10-20 RX ADMIN — OXYCODONE HYDROCHLORIDE 15 MG: 10 TABLET ORAL at 17:30

## 2022-10-20 RX ADMIN — ACETAMINOPHEN 650 MG: 325 TABLET, FILM COATED ORAL at 05:29

## 2022-10-20 RX ADMIN — OXYCODONE HYDROCHLORIDE 15 MG: 10 TABLET ORAL at 02:22

## 2022-10-20 RX ADMIN — OXYCODONE HYDROCHLORIDE 15 MG: 10 TABLET ORAL at 07:24

## 2022-10-20 RX ADMIN — OXYCODONE HYDROCHLORIDE 40 MG: 40 TABLET, FILM COATED, EXTENDED RELEASE ORAL at 08:03

## 2022-10-20 RX ADMIN — DULOXETINE 60 MG: 60 CAPSULE, DELAYED RELEASE ORAL at 08:03

## 2022-10-20 RX ADMIN — PANTOPRAZOLE SODIUM 40 MG: 40 TABLET, DELAYED RELEASE ORAL at 05:06

## 2022-10-20 RX ADMIN — OXYCODONE HYDROCHLORIDE 40 MG: 40 TABLET, FILM COATED, EXTENDED RELEASE ORAL at 21:39

## 2022-10-20 RX ADMIN — Medication 1 TABLET: at 08:02

## 2022-10-20 NOTE — ASSESSMENT & PLAN NOTE
· Troponin elevated 79 - 104 - 51  · This is likely from sinus tachycardia on admission from sepsis  · Patient denies any chest pain  · EKG reveals sinus tachycardia

## 2022-10-20 NOTE — ASSESSMENT & PLAN NOTE
· She has chronic lymphedema of the right leg and has had recurring cellulitis here because of her immunocompromised state  · IV antibiotics  · IV fluids  · Vas duplex to rule out DVT  · CT scan of the right lower extremity attempted on admission however patient was too restless and this can be deferred  There is no crepitus on palpation thus I do not suspect necrotizing fasciitis

## 2022-10-20 NOTE — PROGRESS NOTES
Vancomycin Assessment    Risa Uribe is a 69 y o  female who is currently receiving vancomycin 1000 mg once for skin and soft tissue  Relevant clinical data and objective history reviewed:  Creatinine   Date Value Ref Range Status   10/19/2022 1 37 (H) 0 60 - 1 30 mg/dL Final     Comment:     Standardized to IDMS reference method   09/04/2022 1 04 0 60 - 1 30 mg/dL Final     Comment:     Standardized to IDMS reference method   09/03/2022 0 94 0 60 - 1 30 mg/dL Final     Comment:     Standardized to IDMS reference method   05/15/2015 1 37 (H) 0 60 - 1 30 mg/dL Final     Comment:     Standardized to IDMS reference method   03/22/2015 1 16 0 60 - 1 30 mg/dL Final     Comment:     Standardized to IDMS reference method   01/30/2015 0 93 0 60 - 1 30 mg/dL Final     Comment:     Standardized to IDMS reference method     VANCOMYCIN RANDOM   Date Value Ref Range Status   01/29/2015 17 7 mcg/mL Final     Comment:     The above 1 analytes were performed by [facility]  [address] [place] [id number]       /57   Pulse 95   Temp 99 8 °F (37 7 °C) (Oral)   Resp (!) 24   Ht 5' 3" (1 6 m)   SpO2 100%   BMI 38 09 kg/m²   I/O last 3 completed shifts: In: 250 [IV Piggyback:250]  Out: -   Lab Results   Component Value Date/Time    BUN 25 10/19/2022 04:20 PM    BUN 20 03/22/2015 08:42 AM    WBC 19 21 (H) 10/19/2022 04:20 PM    WBC 6 70 05/15/2015 12:23 PM    HGB 14 3 10/19/2022 04:20 PM    HGB 13 2 05/15/2015 12:23 PM    HCT 44 7 10/19/2022 04:20 PM    HCT 41 6 05/15/2015 12:23 PM    MCV 94 10/19/2022 04:20 PM    MCV 91 05/15/2015 12:23 PM     10/19/2022 04:20 PM     05/15/2015 12:23 PM     Temp Readings from Last 3 Encounters:   10/19/22 99 8 °F (37 7 °C) (Oral)   09/23/22 (!) 97 °F (36 1 °C)   09/04/22 97 8 °F (36 6 °C)     Vancomycin Days of Therapy: 1    Assessment/Plan  The patient is currently on vancomycin utilizing scheduled dosing based on adjusted body weight (due to obesity)    Baseline risks associated with therapy include: advanced age  The patient is currently receiving 1000 mg once and after clinical evaluation will be changed to 1500 mg q24h  Also ordering an additional 500 mg dose to be given tonight to supplement the initial 1000 mg loading dose  Pharmacy will also follow closely for s/sx of nephrotoxicity, infusion reactions, and appropriateness of therapy  BMP and CBC will be ordered per protocol  Plan for trough as patient approaches steady state, prior to the 4th  dose at approximately 10/22 @ 1730  Due to infection severity, will target a trough of 15-20  Pharmacy will continue to follow the patient’s culture results and clinical progress daily      Shiv Elias, Pharmacist

## 2022-10-20 NOTE — ASSESSMENT & PLAN NOTE
· From cellulitis and sepsis  · Monitor mentation progress with treatment as above  · She also takes chronic opioids and p r n   Ativan

## 2022-10-20 NOTE — PLAN OF CARE
Problem: Prexisting or High Potential for Compromised Skin Integrity  Goal: Skin integrity is maintained or improved  Description: INTERVENTIONS:  - Identify patients at risk for skin breakdown  - Assess and monitor skin integrity  - Assess and monitor nutrition and hydration status  - Monitor labs   - Assess for incontinence   - Turn and reposition patient  - Assist with mobility/ambulation  - Relieve pressure over bony prominences  - Avoid friction and shearing  - Provide appropriate hygiene as needed including keeping skin clean and dry  - Evaluate need for skin moisturizer/barrier cream  - Collaborate with interdisciplinary team   - Patient/family teaching  - Consider wound care consult   10/20/2022 0214 by Soniya Rand RN  Outcome: Progressing  10/20/2022 0213 by Soniya Rand RN  Outcome: Progressing     Problem: PAIN - ADULT  Goal: Verbalizes/displays adequate comfort level or baseline comfort level  Description: Interventions:  - Encourage patient to monitor pain and request assistance  - Assess pain using appropriate pain scale  - Administer analgesics based on type and severity of pain and evaluate response  - Implement non-pharmacological measures as appropriate and evaluate response  - Consider cultural and social influences on pain and pain management  - Notify physician/advanced practitioner if interventions unsuccessful or patient reports new pain  Outcome: Progressing     Problem: INFECTION - ADULT  Goal: Absence or prevention of progression during hospitalization  Description: INTERVENTIONS:  - Assess and monitor for signs and symptoms of infection  - Monitor lab/diagnostic results  - Monitor all insertion sites, i e  indwelling lines, tubes, and drains  - Monitor endotracheal if appropriate and nasal secretions for changes in amount and color  - [place] appropriate cooling/warming therapies per order  - Administer medications as ordered  - Instruct and encourage patient and family to use good hand hygiene technique  - Identify and instruct in appropriate isolation precautions for identified infection/condition  Outcome: Progressing     Problem: SAFETY ADULT  Goal: Patient will remain free of falls  Description: INTERVENTIONS:  - Educate patient/family on patient safety including physical limitations  - Instruct patient to call for assistance with activity   - Consult OT/PT to assist with strengthening/mobility   - Keep Call bell within reach  - Keep bed low and locked with side rails adjusted as appropriate  - Keep care items and personal belongings within reach  - Initiate and maintain comfort rounds  - Make Fall Risk Sign visible to staff  - Offer Toileting every  Hours, in advance of need  - Initiate/Maintain alarm  - Obtain necessary fall risk management equipme  - Apply yellow socks and bracelet for high fall risk patients  - Consider moving patient to room near nurses station  Outcome: Progressing  Goal: Maintain or return to baseline ADL function  Description: INTERVENTIONS:  -  Assess patient's ability to carry out ADLs; assess patient's baseline for ADL function and identify physical deficits which impact ability to perform ADLs (bathing, care of mouth/teeth, toileting, grooming, dressing, etc )  - Assess/evaluate cause of self-care deficits   - Assess range of motion  - Assess patient's mobility; develop plan if impaired  - Assess patient's need for assistive devices and provide as appropriate  - Encourage maximum independence but intervene and supervise when necessary  - Involve family in performance of ADLs  - Assess for home care needs following discharge   - Consider OT consult to assist with ADL evaluation and planning for discharge  - Provide patient education as appropriate  Outcome: Progressing  Goal: Maintains/Returns to pre admission functional level  Description: INTERVENTIONS:  - Perform BMAT or MOVE assessment daily    - Set and communicate daily mobility goal to care team and patient/family/caregiver  - Collaborate with rehabilitation services on mobility goals if consulted  - Perform Range of Motion times a day  - Reposition patient every hours  - Dangle patient times a day  - Stand patient times a day  - Ambulate patient times a day  - Out of bed to chair times a day   - Out of bed for meals times a day  - Out of bed for toileting  - Record patient progress and toleration of activity level   Outcome: Progressing     Problem: DISCHARGE PLANNING  Goal: Discharge to home or other facility with appropriate resources  Description: INTERVENTIONS:  - Identify barriers to discharge w/patient and caregiver  - Arrange for needed discharge resources and transportation as appropriate  - Identify discharge learning needs (meds, wound care, etc )  - Arrange for interpretive services to assist at discharge as needed  - Refer to Case Management Department for coordinating discharge planning if the patient needs post-hospital services based on physician/advanced practitioner order or complex needs related to functional status, cognitive ability, or social support system  Outcome: Progressing     Problem: Knowledge Deficit  Goal: Patient/family/caregiver demonstrates understanding of disease process, treatment plan, medications, and discharge instructions  Description: Complete learning assessment and assess knowledge base    Interventions:  - Provide teaching at level of understanding  - Provide teaching via preferred learning methods  Outcome: Progressing     Problem: NEUROSENSORY - ADULT  Goal: Achieves stable or improved neurological status  Description: INTERVENTIONS  - Monitor and report changes in neurological status  - Monitor vital signs such as temperature, blood pressure, glucose, and any other labs ordered   - Initiate measures to prevent increased intracranial pressure  - Monitor for seizure activity and implement precautions if appropriate      Outcome: Progressing  Goal: Achieves maximal functionality and self care  Description: INTERVENTIONS  - Monitor swallowing and airway patency with patient fatigue and changes in neurological status  - Encourage and assist patient to increase activity and self care     - Encourage visually impaired, hearing impaired and aphasic patients to use assistive/communication devices  Outcome: Progressing     Problem: RESPIRATORY - ADULT  Goal: Achieves optimal ventilation and oxygenation  Description: INTERVENTIONS:  - Assess for changes in respiratory status  - Assess for changes in mentation and behavior  - Position to facilitate oxygenation and minimize respiratory effort  - Oxygen administered by appropriate delivery if ordered  - Initiate smoking cessation education as indicated  - Encourage broncho-pulmonary hygiene including cough, deep breathe, Incentive Spirometry  - Assess the need for suctioning and aspirate as needed  - Assess and instruct to report SOB or any respiratory difficulty  - Respiratory Therapy support as indicated  Outcome: Progressing     Problem: GENITOURINARY - ADULT  Goal: Maintains or returns to baseline urinary function  Description: INTERVENTIONS:  - Assess urinary function  - Encourage oral fluids to ensure adequate hydration if ordered  - Administer IV fluids as ordered to ensure adequate hydration  - Administer ordered medications as needed  - Offer frequent toileting  - Follow urinary retention protocol if ordered  Outcome: Progressing  Goal: Absence of urinary retention  Description: INTERVENTIONS:  - Assess patient’s ability to void and empty bladder  - Monitor I/O  - Bladder scan as needed  - Discuss with physician/AP medications to alleviate retention as needed  - Discuss catheterization for long term situations as appropriate  Outcome: Progressing     Problem: METABOLIC, FLUID AND ELECTROLYTES - ADULT  Goal: Electrolytes maintained within normal limits  Description: INTERVENTIONS:  - Monitor labs and assess patient for signs and symptoms of electrolyte imbalances  - Administer electrolyte replacement as ordered  - Monitor response to electrolyte replacements, including repeat lab results as appropriate  - Instruct patient on fluid and nutrition as appropriate  Outcome: Progressing  Goal: Fluid balance maintained  Description: INTERVENTIONS:  - Monitor labs   - Monitor I/O and WT  - Instruct patient on fluid and nutrition as appropriate  - Assess for signs & symptoms of volume excess or deficit  Outcome: Progressing  Goal: Glucose maintained within target range  Description: INTERVENTIONS:  - Monitor Blood Glucose as ordered  - Assess for signs and symptoms of hyperglycemia and hypoglycemia  - Administer ordered medications to maintain glucose within target range  - Assess nutritional intake and initiate nutrition service referral as needed  Outcome: Progressing     Problem: SKIN/TISSUE INTEGRITY - ADULT  Goal: Skin Integrity remains intact(Skin Breakdown Prevention)  Description: Assess:  -Perform Matthew assessment every  -Clean and moisturize skin every   -Inspect skin when repositioning, toileting, and assisting with ADLS  -Assess under medical devices such as  every  -Assess extremities for adequate circulation and sensation     Bed Management:  -Have minimal linens on bed & keep smooth, unwrinkled  -Change linens as needed when moist or perspiring  -Avoid sitting or lying in one position for more than hours while in bed  -Keep HOB at degrees     Toileting:  -Offer bedside commode  -Assess for incontinence every  -Use incontinent care products after each incontinent episode such as    Activity:  -Mobilize patient times a day  -Encourage activity and walks on unit  -Encourage or provide ROM exercises   -Turn and reposition patient every  Hours  -Use appropriate equipment to lift or move patient in bed  -Instruct/ Assist with weight shifting every  when out of bed in chair  -Consider limitation of chair time  hour intervals    Skin Care:  -Avoid use of baby powder, tape, friction and shearing, hot water or constrictive clothing  -Relieve pressure over bony prominences using   -Do not massage red bony areas    Next Steps:  -Teach patient strategies to minimize risks such as   -Consider consults to  interdisciplinary teams such as   Outcome: Progressing  Goal: Incision(s), wounds(s) or drain site(s) healing without S/S of infection  Description: INTERVENTIONS  - Assess and document dressing, incision, wound bed, drain sites and surrounding tissue  - Provide patient and family education  - Perform skin care/dressing changes every   Outcome: Progressing     Problem: HEMATOLOGIC - ADULT  Goal: Maintains hematologic stability  Description: INTERVENTIONS  - Assess for signs and symptoms of bleeding or hemorrhage  - Monitor labs  - Administer supportive blood products/factors as ordered and appropriate  Outcome: Progressing     Problem: MUSCULOSKELETAL - ADULT  Goal: Maintain or return mobility to safest level of function  Description: INTERVENTIONS:  - Assess patient's ability to carry out ADLs; assess patient's baseline for ADL function and identify physical deficits which impact ability to perform ADLs (bathing, care of mouth/teeth, toileting, grooming, dressing, etc )  - Assess/evaluate cause of self-care deficits   - Assess range of motion  - Assess patient's mobility  - Assess patient's need for assistive devices and provide as appropriate  - Encourage maximum independence but intervene and supervise when necessary  - Involve family in performance of ADLs  - Assess for home care needs following discharge   - Consider OT consult to assist with ADL evaluation and planning for discharge  - Provide patient education as appropriate  Outcome: Progressing  Goal: Maintain proper alignment of affected body part  Description: INTERVENTIONS:  - Support, maintain and protect limb and body alignment  - Provide patient/ family with appropriate education  Outcome: Progressing

## 2022-10-20 NOTE — ASSESSMENT & PLAN NOTE
Wt Readings from Last 3 Encounters:   10/19/22 93 7 kg (206 lb 9 1 oz)   09/23/22 97 5 kg (215 lb)   09/02/22 97 6 kg (215 lb 2 7 oz)         · Not exacerbated  Most recent echo was reviewed    · Daily weights  · Continue home GDMT

## 2022-10-20 NOTE — ASSESSMENT & PLAN NOTE
· Leukocytosis, tachycardia, tachypnea  · Lactic acid trended down  · Source likely right leg cellulitis  · Urinalysis negative for UTI  · CT scan commented on resolving pneumonia from prior admission  · Continue IV antibiotics ceftriaxone and vancomycin  · Follow-up cultures

## 2022-10-20 NOTE — ASSESSMENT & PLAN NOTE
· IVC filter in place  · Vas duplex ordered of the right leg, in the meantime did give therapeutic dose Lovenox on the night of her admission in case of DVT

## 2022-10-20 NOTE — ASSESSMENT & PLAN NOTE
Wt Readings from Last 3 Encounters:   10/20/22 95 3 kg (210 lb 1 6 oz)   09/23/22 97 5 kg (215 lb)   09/02/22 97 6 kg (215 lb 2 7 oz)       · Not exacerbated  Most recent echo was reviewed    · Continue metoprolol  · Daily weights

## 2022-10-20 NOTE — ASSESSMENT & PLAN NOTE
· IVC filter in place  · Patient was given therapeutic dose of Lovenox on evening of her admission  · Lower extremity Doppler pending

## 2022-10-20 NOTE — H&P
[address]  H&P- Janee Mata 1955, 69 y o  female MRN: [id number]  Unit/Bed#: \[facility] [place] [facility]\"" 68 2 [facility] [address] 87 226-01 Encounter: [id number]  Primary Care Provider: Neli Morris DO   Date and time admitted to hospital: 10/19/2022  3:16 PM    * Acute metabolic encephalopathy  Assessment & Plan  · From cellulitis and sepsis  · Monitor mentation progress with treatment as above  · She also takes chronic opioids and p r n  Ativan    Cellulitis of right lower extremity  Assessment & Plan  · She has chronic lymphedema of the right leg and has had recurring cellulitis here because of her immunocompromised state  · IV antibiotics  · IV fluids  · Vas duplex to rule out DVT  · CT scan of the right lower extremity attempted on admission however patient was too restless and this can be deferred  There is no crepitus on palpation thus I do not suspect necrotizing fasciitis  Chronic diastolic heart failure [facility])  Assessment & Plan  Wt Readings from Last 3 Encounters:   10/19/22 93 7 kg (206 lb 9 1 oz)   09/23/22 97 5 kg (215 lb)   09/02/22 97 6 kg (215 lb 2 7 oz)         · Not exacerbated  Most recent echo was reviewed  · Daily weights  · Continue home GDMT    History of pulmonary embolism  Assessment & Plan  · IVC filter in place  · Vas duplex ordered of the right leg, in the meantime did give therapeutic dose Lovenox on the night of her admission in case of DVT    Elevated troponin  Assessment & Plan  · Troponin elevated 79 - 104 - 51  · This is likely from sinus tachycardia on admission from sepsis    Severe sepsis (HCC)  Assessment & Plan  · Leukocytosis, tachycardia, tachypnea  · Lactic acid 3 6 - 4 7 - 3 4 - trend until less than 2  · IVF  · Source likely right leg cellulitis  · Given IV cefepime and vanco in the emergency room, continue with IV Rocephin and vanco with pharmacy consult  · Blood cultures x2 in progress  · CXR clear  UA not yet collected    Patient is too altered to answer my questions about urinary symptoms  Benign essential hypertension  Assessment & Plan  · Continue home medications    Wegener's granulomatosis with renal involvement [facility])  Assessment & Plan  BUN   Date Value Ref Range Status   10/19/2022 25 5 - 25 mg/dL Final   09/04/2022 18 5 - 25 mg/dL Final   09/03/2022 15 5 - 25 mg/dL Final     Creatinine   Date Value Ref Range Status   10/19/2022 1 37 (H) 0 60 - 1 30 mg/dL Final     Comment:     Standardized to IDMS reference method   09/04/2022 1 04 0 60 - 1 30 mg/dL Final     Comment:     Standardized to IDMS reference method   09/03/2022 0 94 0 60 - 1 30 mg/dL Final     Comment:     Standardized to IDMS reference method     · She has completed treatment for Wegener's granulomatosis in the outpatient setting and follows with Hematology-Oncology  · Elevated serum creatinine likely from sepsis, this does not meet RUCHI criteria  · Trend tomorrow BMP     VTE Pharmacologic Prophylaxis: VTE Score: 5 High Risk (Score >/= 5) - Pharmacological DVT Prophylaxis Ordered: enoxaparin (Lovenox)  Sequential Compression Devices Ordered  Code Status: Level 1 - Full Code     Anticipated Length of Stay: Patient will be admitted on an inpatient basis with an anticipated length of stay of greater than 2 midnights secondary to iv abx, ivf, trending labs and vs, pending ct leg and vas duplex  Total Time for Visit, including Counseling / Coordination of Care: 45 minutes Greater than 50% of this total time spent on direct patient counseling and coordination of care  Chief Complaint: ams x 1 day    History of Present Illness:  Shira Moran is a 69 y o  female with a PMH of immunocompromised from acquired hypogammaglobulinemia, Wegener's, CKD, left ventricular hypertrophy, obesity, opioid use for arthritis, right leg lymphedema, GERD, ovarian cancer, awake, depression, hypertension, who presents with AMS x1 day    Patient's  gives most of the history and obtain it from the emergency room providers notes and handoff which is that the  went off to work this morning and patient was acting her normal self, no confusion or complaints  When he came home from work, the patient was altered, she had all the doors open in the house, the heat was lasting, she was lethargic and not answering questions appropriately   reports that she has been like this with prior infections, most recently pneumonia for which she was treated in 2022  Emergency room provider reports patient is lethargic and not able to contribute to ROS  Apparently  states he has noticed increased redness of the right leg for a number of days at home  Review of Systems:  Review of Systems   Unable to perform ROS: Mental status change   Constitutional: Positive for activity change  Skin: Positive for color change  Psychiatric/Behavioral: Positive for confusion  Past Medical and Surgical History:   Past Medical History:   Diagnosis Date   • BRCA1 positive    • [facility]     ovarian Ca with chemo   • History of chemotherapy     ovarian cancer    • History of pulmonary embolus (PE) & DVT 2007    post-op FAMILIA/ omenectomy   • Lymphedema    • MRSA (methicillin resistant Staphylococcus aureus) 2017    nasal swab negative 4/3/19   • Ovarian cancer ([facility] )     CYST REMOVED RIGHT OVARY IN   HYSTERECTOMY 07         Past Surgical History:   Procedure Laterality Date   • APPENDECTOMY      laparoscopic   • BILATERAL SALPINGOOPHORECTOMY  2007   • BREAST BIOPSY Right 2016   • BREAST BIOPSY Left 07/15/2020   • BREAST BIOPSY Left 2021    stereo   •  SECTION     • HYSTERECTOMY  2007    Omentectomy and lymph node biopsy at [facility]    • MAMMO STEREOTACTIC BREAST BIOPSY RIGHT (ALL INC) Right 2021   • OOPHORECTOMY Bilateral 2007   • OTHER SURGICAL HISTORY      right lower extremity due to trauma   • AZ TREAT TIBIAL SHAFT FX, INTRAMED IMPLANT Right 2017 Procedure: INSERTION NAIL IM TIBIA;  Surgeon: Khurram Aponte MD;  Location: [facility];  Service: Orthopedics   • US GUIDANCE BREAST BIOPSY LEFT EACH ADDITIONAL Left 7/15/2020   • US GUIDED BREAST BIOPSY LEFT COMPLETE Left 7/15/2020   • US GUIDED BREAST BIOPSY RIGHT COMPLETE Right 5/9/2016   • US GUIDED BREAST BIOPSY RIGHT COMPLETE Right 7/26/2021       Meds/Allergies:  Prior to Admission medications    Medication Sig Start Date End Date Taking? Authorizing Provider   acetaminophen (TYLENOL) 325 mg tablet Take 2 tablets by mouth every 6 (six) hours as needed for mild pain 9/19/17   Lawyer Tyra MD   bisacodyl (DULCOLAX) 5 mg EC tablet Take 5 mg by mouth daily as needed for constipation    Historical Provider, MD   Calcium Carbonate-Vit D-Min (CALCIUM 1200 PO) Take 3 tablets by mouth daily     Historical Provider, MD   DULoxetine (CYMBALTA) 60 mg delayed release capsule Take 60 mg by mouth daily   4/13/18   Historical Provider, MD   LORazepam (ATIVAN) 0 5 mg tablet Take 1 tablet (0 5 mg total) by mouth 2 (two) times a day as needed for anxiety Take 1 tablet as needed every 8 hours  Patient taking differently: Take 0 5 mg by mouth 2 (two) times a day as needed for anxiety Take 1 tablet as needed every 8 hours  Patient takes every 12 hours 5/3/22   Melody Durbin MD   metoprolol tartrate (LOPRESSOR) 25 mg tablet TAKE 1/2 TABLET(12 5MG) BY MOUTH EVERY 12 HOURS 9/2/21   Radha Archibald MD   Multiple Vitamins-Minerals (MULTIVITAMIN ADULT PO) Take 1 capsule by mouth daily  9/27/13   Historical Provider, MD   omeprazole (PriLOSEC) 20 mg delayed release capsule Take 20 mg by mouth daily      Historical Provider, MD   oxyCODONE (OxyCONTIN) 40 mg 12 hr tablet Take 40 mg by mouth every 12 (twelve) hours    Historical Provider, MD   OXYCODONE HCL PO Take 15 mg by mouth 2 (two) times a day as needed (breakthrough pain)    Historical Provider, MD   senna-docusate sodium (SENOKOT S) 8 6-50 mg per tablet Take 1 tablet by mouth daily at bedtime 5/3/22 6/2/22  Nhan Marie MD   TOVIAZ 8 MG TB24 Take 8 mg by mouth daily at bedtime  4/5/18   Historical Provider, MD     I have reviewed home medications using recent Epic encounter  Allergies: Allergies   Allergen Reactions   • Iodinated Diagnostic Agents Shortness Of Breath   • Omnipaque [Iohexol] Shortness Of Breath   • Other Shortness Of Breath     IVP dye, x ray dye 3   • Iodides      Other reaction(s): SWELLING, SOB   • Methotrexate Nausea Only     Headache and nausea   • Methotrexate Derivatives Headache       Social History:  Marital Status: /Civil Union   Occupation: none  Patient Pre-hospital Living Situation: Home  Patient Pre-hospital Level of Mobility: walks  Patient Pre-hospital Diet Restrictions: none  Substance Use History:   Social History     Substance and Sexual Activity   Alcohol Use Not Currently     Social History     Tobacco Use   Smoking Status Never Smoker   Smokeless Tobacco Never Used     Social History     Substance and Sexual Activity   Drug Use Not Currently       Family History:  Family History   Problem Relation Age of Onset   • Breast cancer Mother         28   • Pancreatic cancer Mother    • Ovarian cancer Maternal Grandmother    • Breast cancer Maternal Aunt 39   • No Known Problems Paternal Aunt    • No Known Problems Father    • No Known Problems Maternal Grandfather    • No Known Problems Paternal Grandmother    • No Known Problems Paternal Grandfather    • Lung cancer Half-Sister        Physical Exam:     Vitals:   Blood Pressure: 131/78 (10/19/22 2247)  Pulse: 91 (10/19/22 2247)  Temperature: (!) 97 3 °F (36 3 °C) (10/19/22 2057)  Temp Source: Oral (10/19/22 1510)  Respirations: 18 (10/19/22 2057)  Height: 5' 3" (160 cm) (10/19/22 1510)  Weight - Scale: 93 7 kg (206 lb 9 1 oz) (10/19/22 2001)  SpO2: (!) 89 % (10/19/22 2247)    Physical Exam  Vitals and nursing note reviewed  Constitutional:       Appearance: She is ill-appearing  Comments: Awake, she has her eyes open she is calling out to me asking for help but she cannot elaborate what she needs or wants    Moderate distress   HENT:      Head: Normocephalic and atraumatic  Mouth/Throat:      Mouth: Mucous membranes are dry  Comments: Very dry mucous membranes  Eyes:      Conjunctiva/sclera: Conjunctivae normal       Comments: Will not keep her eyes open for light reflex or EOM   Cardiovascular:      Rate and Rhythm: Regular rhythm  Tachycardia present  Pulmonary:      Effort: Pulmonary effort is normal  No respiratory distress  Breath sounds: Normal breath sounds  No stridor  No wheezing or rhonchi  Comments: No tachypnea, no cough observed  No increased work of breathing  Chest:      Chest wall: No tenderness  Abdominal:      General: Bowel sounds are normal  There is no distension  Palpations: Abdomen is soft  Tenderness: There is no abdominal tenderness  There is no guarding  Musculoskeletal:         General: Swelling present  Cervical back: No rigidity  No pain with movement  Comments: There is deformity of the right leg from lymphadema and charcot foot   Skin:     Findings: Erythema present  Comments: Bright erythema on the right leg to the mid thigh, circumferential  Tracking to the right inguinal lymph nodes which are tender to palpation    Right leg overall tender to palpation no site is particularly exquisitely tender  No crepitus  Neurological:      Mental Status: She is alert  Comments: I am able to move all of her limbs, no abnormal tone  Generalized weakness  She is not following commands for my neuro exam   Psychiatric:      Comments: Confusion, lethargy   slurred words          Additional Data:     Lab Results:  Results from last 7 days   Lab Units 10/19/22  1620   WBC Thousand/uL 19 21*   HEMOGLOBIN g/dL 14 3   HEMATOCRIT % 44 7   PLATELETS Thousands/uL 213   BANDS PCT % 12*   LYMPHO PCT % 6*   MONO PCT % 3* EOS PCT % 0     Results from last 7 days   Lab Units 10/19/22  1620   SODIUM mmol/L 139   POTASSIUM mmol/L 4 2   CHLORIDE mmol/L 100   CO2 mmol/L 29   BUN mg/dL 25   CREATININE mg/dL 1 37*   ANION GAP mmol/L 10   CALCIUM mg/dL 9 7   ALBUMIN g/dL 3 9   TOTAL BILIRUBIN mg/dL 1 08*   ALK PHOS U/L 194*   ALT U/L 67   AST U/L 114*   GLUCOSE RANDOM mg/dL 136         Results from last 7 days   Lab Units 10/19/22  1556   POC GLUCOSE mg/dl 128         Results from last 7 days   Lab Units 10/19/22  2330 10/19/22  1920 10/19/22  1620   LACTIC ACID mmol/L 3 6* 4 7* 3 6*       Imaging: Reviewed radiology reports from this admission including: CT head and ct c/a/p  CT head without contrast   Final Result by Mac Harry DO (10/19 1648)      No acute intracranial abnormality  Workstation performed: [id number]         CT chest abdomen pelvis wo contrast   Final Result by Shanita Valenzuela MD (10/19 1727)      1  Groundglass opacities and mild scarring apical regions of the upper lobes likely representing sequela of prior pneumonia  Persistent or superimposed new infection not excluded  2   No evidence of acute intra-abdominal or pelvic process  Workstation performed: [id number]         CT lower extremity wo contrast right    (Results Pending)   VAS lower limb venous duplex study, complete bilateral    (Results Pending)       EKG and Other Studies Reviewed on Admission:   · EKG: Sinus Tachycardia  HR 99     ** Please Note: This note has been constructed using a voice recognition system   **

## 2022-10-20 NOTE — PROGRESS NOTES
[address] [facility]  Progress Note - Nathalie Fisher 1955, 69 y o  female MRN: [id number]  Unit/Bed#: [address] 2 [address] 226-01 Encounter: [id number]  Primary Care Provider: Martin Martin DO   Date and time admitted to hospital: 10/19/2022  3:16 PM    * Severe sepsis ([facility] )  Assessment & Plan  · Leukocytosis, tachycardia, tachypnea  · Lactic acid trended down  · Source likely right leg cellulitis  · Urinalysis negative for UTI  · CT scan commented on resolving pneumonia from prior admission  · Continue IV antibiotics ceftriaxone and vancomycin  · Follow-up cultures    Acute metabolic encephalopathy  Assessment & Plan  · From cellulitis and sepsis  · Improved    Chronic diastolic heart failure (HCC)  Assessment & Plan  Wt Readings from Last 3 Encounters:   10/20/22 95 3 kg (210 lb 1 6 oz)   09/23/22 97 5 kg (215 lb)   09/02/22 97 6 kg (215 lb 2 7 oz)       · Not exacerbated  Most recent echo was reviewed    · Continue metoprolol  · Daily weights    History of pulmonary embolism  Assessment & Plan  · IVC filter in place  · Patient was given therapeutic dose of Lovenox on evening of her admission  · Lower extremity Doppler pending    Cellulitis of right lower extremity  Assessment & Plan  · She has chronic lymphedema of the right leg and has had recurring cellulitis here because of her immunocompromised state  · Continue with IV antibiotics  · Follow-up cultures  · CT scan revealed right lower extremity subcutaneous edema and skin thickening indicating nonspecific cellulitis without drainable collection    Elevated troponin  Assessment & Plan  · Troponin elevated 79 - 104 - 51  · This is likely from sinus tachycardia on admission from sepsis  · Patient denies any chest pain  · EKG reveals sinus tachycardia    Benign essential hypertension  Assessment & Plan  · Continue home medications    [facility]  Assessment & Plan  BUN   Date Value Ref Range Status 10/20/2022 26 (H) 5 - 25 mg/dL Final   10/19/2022 25 5 - 25 mg/dL Final   2022 18 5 - 25 mg/dL Final     Creatinine   Date Value Ref Range Status   10/20/2022 1 20 0 60 - 1 30 mg/dL Final     Comment:     Standardized to IDMS reference method   10/19/2022 1 37 (H) 0 60 - 1 30 mg/dL Final     Comment:     Standardized to IDMS reference method   2022 1 04 0 60 - 1 30 mg/dL Final     Comment:     Standardized to IDMS reference method     · She has completed treatment for Wegener's granulomatosis in the outpatient setting and follows with Hematology-Oncology  · Elevated serum creatinine likely from sepsis, this does not meet RUCHI criteria  · Trend tomorrow BMP           VTE Pharmacologic Prophylaxis:   Pharmacologic:  Lovenox    Patient Centered Rounds: I have performed bedside rounds with nursing staff today  Education and Discussions with Family / Patient:  Updated patient's spouse    Time Spent for Care: 20 minutes  More than 50% of total time spent on counseling and coordination of care as described above  Current Length of Stay: 1 day(s)    Current Patient Status: Inpatient   Certification Statement: The patient will continue to require additional inpatient hospital stay due to Sepsis, IV antibiotics    Discharge Plan / Estimated Discharge Date: TBD    Code Status: Level 1 - Full Code      Subjective:   Patient seen and examined at bedside, denies any abdominal pain, nausea, vomiting, more awake    Objective:     Vitals:   Temp (24hrs), Av °F (36 7 °C), Min:97 3 °F (36 3 °C), Max:98 6 °F (37 °C)    Temp:  [97 3 °F (36 3 °C)-98 6 °F (37 °C)] 98 1 °F (36 7 °C)  HR:  [] 94  Resp:  [16-28] 16  BP: (102-152)/(57-89) 102/58  SpO2:  [89 %-100 %] 90 %  Body mass index is 37 22 kg/m²  Input and Output Summary (last 24 hours):        Intake/Output Summary (Last 24 hours) at 10/20/2022 1736  Last data filed at 10/20/2022 9725  Gross per 24 hour   Intake 700 ml   Output --   Net 700 ml Physical Exam:    Constitutional: Patient is oriented to person, place and time, no acute distress  HEENT:  Normocephalic, atraumatic  Cardiovascular: Normal S1S2, RRR, No murmurs/rubs/gallops appreciated  Pulmonary:  Bilateral air entry, No rhonchi/rales/wheezing appreciated  Abdominal: Soft, Bowel sounds present, Non-tender, Non-distended  Extremities:  No cyanosis, clubbing or edema  Neurological:  Awake, alert  Skin:  Bilateral lower extremity with compression dressings in place    Additional Data:     Labs:    Results from last 7 days   Lab Units 10/20/22  0500 10/19/22  1620   WBC Thousand/uL 20 09* 19 21*   HEMOGLOBIN g/dL 11 9 14 3   HEMATOCRIT % 37 9 44 7   PLATELETS Thousands/uL 178 213   LYMPHO PCT %  --  6*   MONO PCT %  --  3*   EOS PCT %  --  0     Results from last 7 days   Lab Units 10/20/22  0500   POTASSIUM mmol/L 3 7   CHLORIDE mmol/L 102   CO2 mmol/L 24   BUN mg/dL 26*   CREATININE mg/dL 1 20   CALCIUM mg/dL 9 0   ALK PHOS U/L 142*   ALT U/L 55   AST U/L 77*            I Have Reviewed All Lab Data Listed Above  Invasive Devices  Report    Peripheral Intravenous Line  Duration           Peripheral IV 10/19/22 Dorsal (posterior); Left Hand 1 day    Peripheral IV 10/19/22 Right Arm <1 day                     Recent Cultures (last 7 days):     Results from last 7 days   Lab Units 10/19/22  1659 10/19/22  1620   BLOOD CULTURE  Received in Microbiology Lab  Culture in Progress  Received in Microbiology Lab  Culture in Progress         Last 24 Hours Medication List:   Current Facility-Administered Medications   Medication Dose Route Frequency Provider Last Rate   • acetaminophen  650 mg Oral Q6H PRN Nany Moreau PA-C     • bisacodyl  5 mg Oral Daily PRN Nany Moreau PA-C     • calcium carbonate-vitamin D  1 tablet Oral Daily With Breakfast Nany Moreau PA-C     • cefTRIAXone  1,000 mg Intravenous Q24H Nany Moreau PA-C 1,000 mg (10/20/22 0032)   • DULoxetine  60 mg Oral Daily Jessi Paz PA-C     • LORazepam  0 5 mg Oral BID PRN Alexa Zepeda PA-C     • metoprolol tartrate  12 5 mg Oral Q12H [facility] Nany Moreau PA-C     • multivitamin stress formula  1 tablet Oral Daily Nany Moreau PA-C     • ondansetron  4 mg Intravenous Q6H PRN Nany Moreau PA-C     • oxyCODONE  40 mg Oral Q12H [facility] Nany Moreau PA-C     • oxyCODONE  15 mg Oral BID PRN Nany Moreau PA-C     • pantoprazole  40 mg Oral Early Morning Nany Moreau PA-C     • senna-docusate sodium  1 tablet Oral HS Nany Moreau PA-C     • vancomycin  20 mg/kg (Adjusted) Intravenous Q24H Alexa Zepeda PA-C          Today, Patient Was Seen By: Ang Aiken

## 2022-10-20 NOTE — ASSESSMENT & PLAN NOTE
BUN   Date Value Ref Range Status   10/20/2022 26 (H) 5 - 25 mg/dL Final   10/19/2022 25 5 - 25 mg/dL Final   09/04/2022 18 5 - 25 mg/dL Final     Creatinine   Date Value Ref Range Status   10/20/2022 1 20 0 60 - 1 30 mg/dL Final     Comment:     Standardized to IDMS reference method   10/19/2022 1 37 (H) 0 60 - 1 30 mg/dL Final     Comment:     Standardized to IDMS reference method   09/04/2022 1 04 0 60 - 1 30 mg/dL Final     Comment:     Standardized to IDMS reference method     · She has completed treatment for Wegener's granulomatosis in the outpatient setting and follows with Hematology-Oncology  · Elevated serum creatinine likely from sepsis, this does not meet RUCHI criteria  · Trend tomorrow BMP

## 2022-10-20 NOTE — ASSESSMENT & PLAN NOTE
· Leukocytosis, tachycardia, tachypnea  · Lactic acid 3 6 - 4 7 - 3 4 - trend until less than 2  · IVF  · Source likely right leg cellulitis  · Given IV cefepime and vanco in the emergency room, continue with IV Rocephin and vanco with pharmacy consult  · Blood cultures x2 in progress  · CXR clear  UA not yet collected  Patient is too altered to answer my questions about urinary symptoms

## 2022-10-20 NOTE — PLAN OF CARE
Problem: Prexisting or High Potential for Compromised Skin Integrity  Goal: Skin integrity is maintained or improved  Description: INTERVENTIONS:  - Identify patients at risk for skin breakdown  - Assess and monitor skin integrity  - Assess and monitor nutrition and hydration status  - Monitor labs   - Assess for incontinence   - Turn and reposition patient  - Assist with mobility/ambulation  - Relieve pressure over bony prominences  - Avoid friction and shearing  - Provide appropriate hygiene as needed including keeping skin clean and dry  - Evaluate need for skin moisturizer/barrier cream  - Collaborate with interdisciplinary team   - Patient/family teaching  - Consider wound care consult   Outcome: Progressing     Problem: PAIN - ADULT  Goal: Verbalizes/displays adequate comfort level or baseline comfort level  Description: Interventions:  - Encourage patient to monitor pain and request assistance  - Assess pain using appropriate pain scale  - Administer analgesics based on type and severity of pain and evaluate response  - Implement non-pharmacological measures as appropriate and evaluate response  - Consider cultural and social influences on pain and pain management  - Notify physician/advanced practitioner if interventions unsuccessful or patient reports new pain  Outcome: Progressing     Problem: INFECTION - ADULT  Goal: Absence or prevention of progression during hospitalization  Description: INTERVENTIONS:  - Assess and monitor for signs and symptoms of infection  - Monitor lab/diagnostic results  - Monitor all insertion sites, i e  indwelling lines, tubes, and drains  - Monitor endotracheal if appropriate and nasal secretions for changes in amount and color  - [place] appropriate cooling/warming therapies per order  - Administer medications as ordered  - Instruct and encourage patient and family to use good hand hygiene technique  - Identify and instruct in appropriate isolation precautions for identified infection/condition  Outcome: Progressing     Problem: SAFETY ADULT  Goal: Patient will remain free of falls  Description: INTERVENTIONS:  - Educate patient/family on patient safety including physical limitations  - Instruct patient to call for assistance with activity   - Consult OT/PT to assist with strengthening/mobility   - Keep Call bell within reach  - Keep bed low and locked with side rails adjusted as appropriate  - Keep care items and personal belongings within reach  - Initiate and maintain comfort rounds  - Make Fall Risk Sign visible to staff  - Offer Toileting every  Hours, in advance of need  - Initiate/Maintainalarm  - Obtain necessary fall risk management equipment:   - Apply yellow socks and bracelet for high fall risk patients  - Consider moving patient to room near nurses station  Outcome: Progressing  Goal: Maintain or return to baseline ADL function  Description: INTERVENTIONS:  -  Assess patient's ability to carry out ADLs; assess patient's baseline for ADL function and identify physical deficits which impact ability to perform ADLs (bathing, care of mouth/teeth, toileting, grooming, dressing, etc )  - Assess/evaluate cause of self-care deficits   - Assess range of motion  - Assess patient's mobility; develop plan if impaired  - Assess patient's need for assistive devices and provide as appropriate  - Encourage maximum independence but intervene and supervise when necessary  - Involve family in performance of ADLs  - Assess for home care needs following discharge   - Consider OT consult to assist with ADL evaluation and planning for discharge  - Provide patient education as appropriate  Outcome: Progressing  Goal: Maintains/Returns to pre admission functional level  Description: INTERVENTIONS:  - Perform BMAT or MOVE assessment daily    - Set and communicate daily mobility goal to care team and patient/family/caregiver     - Collaborate with rehabilitation services on mobility goals if consulted  - Perform Range of Motion  times a day  - Reposition patient every  hours  - Dangle patient  times a day  - Stand patient  times a day  - Ambulate patient  times a day  - Out of bed to chair  times a day   - Out of bed for meals  times a day  - Out of bed for toileting  - Record patient progress and toleration of activity level   Outcome: Progressing     Problem: DISCHARGE PLANNING  Goal: Discharge to home or other facility with appropriate resources  Description: INTERVENTIONS:  - Identify barriers to discharge w/patient and caregiver  - Arrange for needed discharge resources and transportation as appropriate  - Identify discharge learning needs (meds, wound care, etc )  - Arrange for interpretive services to assist at discharge as needed  - Refer to Case Management Department for coordinating discharge planning if the patient needs post-hospital services based on physician/advanced practitioner order or complex needs related to functional status, cognitive ability, or social support system  Outcome: Progressing     Problem: Knowledge Deficit  Goal: Patient/family/caregiver demonstrates understanding of disease process, treatment plan, medications, and discharge instructions  Description: Complete learning assessment and assess knowledge base    Interventions:  - Provide teaching at level of understanding  - Provide teaching via preferred learning methods  Outcome: Progressing     Problem: NEUROSENSORY - ADULT  Goal: Achieves stable or improved neurological status  Description: INTERVENTIONS  - Monitor and report changes in neurological status  - Monitor vital signs such as temperature, blood pressure, glucose, and any other labs ordered   - Initiate measures to prevent increased intracranial pressure  - Monitor for seizure activity and implement precautions if appropriate      Outcome: Progressing  Goal: Achieves maximal functionality and self care  Description: INTERVENTIONS  - Monitor swallowing and airway patency with patient fatigue and changes in neurological status  - Encourage and assist patient to increase activity and self care     - Encourage visually impaired, hearing impaired and aphasic patients to use assistive/communication devices  Outcome: Progressing     Problem: RESPIRATORY - ADULT  Goal: Achieves optimal ventilation and oxygenation  Description: INTERVENTIONS:  - Assess for changes in respiratory status  - Assess for changes in mentation and behavior  - Position to facilitate oxygenation and minimize respiratory effort  - Oxygen administered by appropriate delivery if ordered  - Initiate smoking cessation education as indicated  - Encourage broncho-pulmonary hygiene including cough, deep breathe, Incentive Spirometry  - Assess the need for suctioning and aspirate as needed  - Assess and instruct to report SOB or any respiratory difficulty  - Respiratory Therapy support as indicated  Outcome: Progressing     Problem: GENITOURINARY - ADULT  Goal: Maintains or returns to baseline urinary function  Description: INTERVENTIONS:  - Assess urinary function  - Encourage oral fluids to ensure adequate hydration if ordered  - Administer IV fluids as ordered to ensure adequate hydration  - Administer ordered medications as needed  - Offer frequent toileting  - Follow urinary retention protocol if ordered  Outcome: Progressing  Goal: Absence of urinary retention  Description: INTERVENTIONS:  - Assess patient’s ability to void and empty bladder  - Monitor I/O  - Bladder scan as needed  - Discuss with physician/AP medications to alleviate retention as needed  - Discuss catheterization for long term situations as appropriate  Outcome: Progressing     Problem: METABOLIC, FLUID AND ELECTROLYTES - ADULT  Goal: Electrolytes maintained within normal limits  Description: INTERVENTIONS:  - Monitor labs and assess patient for signs and symptoms of electrolyte imbalances  - Administer electrolyte replacement as ordered  - Monitor response to electrolyte replacements, including repeat lab results as appropriate  - Instruct patient on fluid and nutrition as appropriate  Outcome: Progressing  Goal: Fluid balance maintained  Description: INTERVENTIONS:  - Monitor labs   - Monitor I/O and WT  - Instruct patient on fluid and nutrition as appropriate  - Assess for signs & symptoms of volume excess or deficit  Outcome: Progressing  Goal: Glucose maintained within target range  Description: INTERVENTIONS:  - Monitor Blood Glucose as ordered  - Assess for signs and symptoms of hyperglycemia and hypoglycemia  - Administer ordered medications to maintain glucose within target range  - Assess nutritional intake and initiate nutrition service referral as needed  Outcome: Progressing     Problem: SKIN/TISSUE INTEGRITY - ADULT  Goal: Skin Integrity remains intact(Skin Breakdown Prevention)  Description: Assess:  -Perform Matthew assessment every   -Clean and moisturize skin every   -Inspect skin when repositioning, toileting, and assisting with ADLS  -Assess under medical devices such as  every   -Assess extremities for adequate circulation and sensation     Bed Management:  -Have minimal linens on bed & keep smooth, unwrinkled  -Change linens as needed when moist or perspiring  -Avoid sitting or lying in one position for more than hours while in bed  -Keep HOB at degrees     Toileting:  -Offer bedside commode  -Assess for incontinence every   -Use incontinent care products after each incontinent episode such as     Activity:  -Mobilize patient  times a day  -Encourage activity and walks on unit  -Encourage or provide ROM exercises   -Turn and reposition patient every  Hours  -Use appropriate equipment to lift or move patient in bed  -Instruct/ Assist with weight shifting every  when out of bed in chair  -Consider limitation of chair time  hour intervals    Skin Care:  -Avoid use of baby powder, tape, friction and shearing, hot water or constrictive clothing  -Relieve pressure over bony prominences using   -Do not massage red bony areas    Next Steps:  -Teach patient strategies to minimize risks such as    -Consider consults to  interdisciplinary teams such as   Outcome: Progressing  Goal: Incision(s), wounds(s) or drain site(s) healing without S/S of infection  Description: INTERVENTIONS  - Assess and document dressing, incision, wound bed, drain sites and surrounding tissue  - Provide patient and family education  - Perform skin care/dressing changes every  Outcome: Progressing     Problem: HEMATOLOGIC - ADULT  Goal: Maintains hematologic stability  Description: INTERVENTIONS  - Assess for signs and symptoms of bleeding or hemorrhage  - Monitor labs  - Administer supportive blood products/factors as ordered and appropriate  Outcome: Progressing     Problem: MUSCULOSKELETAL - ADULT  Goal: Maintain or return mobility to safest level of function  Description: INTERVENTIONS:  - Assess patient's ability to carry out ADLs; assess patient's baseline for ADL function and identify physical deficits which impact ability to perform ADLs (bathing, care of mouth/teeth, toileting, grooming, dressing, etc )  - Assess/evaluate cause of self-care deficits   - Assess range of motion  - Assess patient's mobility  - Assess patient's need for assistive devices and provide as appropriate  - Encourage maximum independence but intervene and supervise when necessary  - Involve family in performance of ADLs  - Assess for home care needs following discharge   - Consider OT consult to assist with ADL evaluation and planning for discharge  - Provide patient education as appropriate  Outcome: Progressing  Goal: Maintain proper alignment of affected body part  Description: INTERVENTIONS:  - Support, maintain and protect limb and body alignment  - Provide patient/ family with appropriate education  Outcome: Progressing     Problem: Potential for Falls  Goal: Patient will remain free of falls  Description: INTERVENTIONS:  - Educate patient/family on patient safety including physical limitations  - Instruct patient to call for assistance with activity   - Consult OT/PT to assist with strengthening/mobility   - Keep Call bell within reach  - Keep bed low and locked with side rails adjusted as appropriate  - Keep care items and personal belongings within reach  - Initiate and maintain comfort rounds  - Make Fall Risk Sign visible to staff  - Offer Toileting every  Hours, in advance of need  - Initiate/Maintain alarm  - Obtain necessary fall risk management equipment:   - Apply yellow socks and bracelet for high fall risk patients  - Consider moving patient to room near nurses station  Outcome: Progressing     Problem: Nutrition/Hydration-ADULT  Goal: Nutrient/Hydration intake appropriate for improving, restoring or maintaining nutritional needs  Description: Monitor and assess patient's nutrition/hydration status for malnutrition  Collaborate with interdisciplinary team and initiate plan and interventions as ordered  Monitor patient's weight and dietary intake as ordered or per policy  Utilize nutrition screening tool and intervene as necessary  Determine patient's food preferences and provide high-protein, high-caloric foods as appropriate       INTERVENTIONS:  - Monitor oral intake, urinary output, labs, and treatment plans  - Assess nutrition and hydration status and recommend course of action  - Evaluate amount of meals eaten  - Assist patient with eating if necessary   - Allow adequate time for meals  - Recommend/ encourage appropriate diets, oral nutritional supplements, and vitamin/mineral supplements  - Order, calculate, and assess calorie counts as needed  - Recommend, monitor, and adjust tube feedings and TPN/PPN based on assessed needs  - Assess need for intravenous fluids  - Provide specific nutrition/hydration education as appropriate  - Include patient/family/caregiver in decisions related to nutrition  Outcome: Progressing     Problem: MOBILITY - ADULT  Goal: Maintain or return to baseline ADL function  Description: INTERVENTIONS:  -  Assess patient's ability to carry out ADLs; assess patient's baseline for ADL function and identify physical deficits which impact ability to perform ADLs (bathing, care of mouth/teeth, toileting, grooming, dressing, etc )  - Assess/evaluate cause of self-care deficits   - Assess range of motion  - Assess patient's mobility; develop plan if impaired  - Assess patient's need for assistive devices and provide as appropriate  - Encourage maximum independence but intervene and supervise when necessary  - Involve family in performance of ADLs  - Assess for home care needs following discharge   - Consider OT consult to assist with ADL evaluation and planning for discharge  - Provide patient education as appropriate  Outcome: Progressing  Goal: Maintains/Returns to pre admission functional level  Description: INTERVENTIONS:  - Perform BMAT or MOVE assessment daily    - Set and communicate daily mobility goal to care team and patient/family/caregiver  - Collaborate with rehabilitation services on mobility goals if consulted  - Perform Range of Motion  times a day  - Reposition patient every  hours    - Dangle patient  times a day  - Stand patient  times a day  - Ambulate patient times a day  - Out of bed to chair  times a day   - Out of bed for meals times a day  - Out of bed for toileting  - Record patient progress and toleration of activity level   Outcome: Progressing

## 2022-10-20 NOTE — ASSESSMENT & PLAN NOTE
· She has chronic lymphedema of the right leg and has had recurring cellulitis here because of her immunocompromised state  · Continue with IV antibiotics  · Follow-up cultures  · CT scan revealed right lower extremity subcutaneous edema and skin thickening indicating nonspecific cellulitis without drainable collection

## 2022-10-20 NOTE — PROGRESS NOTES
Vancomycin IV Pharmacy-to-Dose Consultation    Ghanshyam Ibrahim is a 69 y o  female who is currently receiving Vancomycin IV with management by the Pharmacy Consult service  Assessment/Plan:  The patient was reviewed  Renal function is stable and no signs or symptoms of nephrotoxicity and/or infusion reactions were documented in the chart  Based on today’s assessment, continue current vancomycin (day # 2) dosing of 1500 mg every 24 hours, with a plan for trough to be drawn at 1730 on 10/22  We will continue to follow the patient’s culture results and clinical progress daily      Lee Ann Sherman

## 2022-10-20 NOTE — ASSESSMENT & PLAN NOTE
BUN   Date Value Ref Range Status   10/19/2022 25 5 - 25 mg/dL Final   09/04/2022 18 5 - 25 mg/dL Final   09/03/2022 15 5 - 25 mg/dL Final     Creatinine   Date Value Ref Range Status   10/19/2022 1 37 (H) 0 60 - 1 30 mg/dL Final     Comment:     Standardized to IDMS reference method   09/04/2022 1 04 0 60 - 1 30 mg/dL Final     Comment:     Standardized to IDMS reference method   09/03/2022 0 94 0 60 - 1 30 mg/dL Final     Comment:     Standardized to IDMS reference method     · She has completed treatment for Wegener's granulomatosis in the outpatient setting and follows with Hematology-Oncology  · Elevated serum creatinine likely from sepsis, this does not meet RUCHI criteria  · Trend tomorrow BMP

## 2022-10-21 LAB
ANION GAP SERPL CALCULATED.3IONS-SCNC: 5 MMOL/L (ref 4–13)
BASOPHILS # BLD MANUAL: 0 THOUSAND/UL (ref 0–0.1)
BASOPHILS NFR MAR MANUAL: 0 % (ref 0–1)
BUN SERPL-MCNC: 21 MG/DL (ref 5–25)
CALCIUM SERPL-MCNC: 8.7 MG/DL (ref 8.3–10.1)
CHLORIDE SERPL-SCNC: 103 MMOL/L (ref 96–108)
CO2 SERPL-SCNC: 27 MMOL/L (ref 21–32)
CREAT SERPL-MCNC: 1 MG/DL (ref 0.6–1.3)
EOSINOPHIL # BLD MANUAL: 0 THOUSAND/UL (ref 0–0.4)
EOSINOPHIL NFR BLD MANUAL: 0 % (ref 0–6)
ERYTHROCYTE [DISTWIDTH] IN BLOOD BY AUTOMATED COUNT: 14.7 % (ref 11.6–15.1)
GFR SERPL CREATININE-BSD FRML MDRD: 57 ML/MIN/1.73SQ M
GLUCOSE SERPL-MCNC: 123 MG/DL (ref 65–140)
HCT VFR BLD AUTO: 33 % (ref 34.8–46.1)
HGB BLD-MCNC: 10.5 G/DL (ref 11.5–15.4)
LYMPHOCYTES # BLD AUTO: 0.68 THOUSAND/UL (ref 0.6–4.47)
LYMPHOCYTES # BLD AUTO: 6 % (ref 14–44)
MCH RBC QN AUTO: 29.7 PG (ref 26.8–34.3)
MCHC RBC AUTO-ENTMCNC: 31.8 G/DL (ref 31.4–37.4)
MCV RBC AUTO: 94 FL (ref 82–98)
MONOCYTES # BLD AUTO: 0.11 THOUSAND/UL (ref 0–1.22)
MONOCYTES NFR BLD: 1 % (ref 4–12)
NEUTROPHILS # BLD MANUAL: 10.46 THOUSAND/UL (ref 1.85–7.62)
NEUTS SEG NFR BLD AUTO: 93 % (ref 43–75)
PLATELET # BLD AUTO: 139 THOUSANDS/UL (ref 149–390)
PLATELET BLD QL SMEAR: ADEQUATE
PMV BLD AUTO: 10.2 FL (ref 8.9–12.7)
POTASSIUM SERPL-SCNC: 3.3 MMOL/L (ref 3.5–5.3)
RBC # BLD AUTO: 3.53 MILLION/UL (ref 3.81–5.12)
RBC MORPH BLD: NORMAL
SODIUM SERPL-SCNC: 135 MMOL/L (ref 135–147)
WBC # BLD AUTO: 11.25 THOUSAND/UL (ref 4.31–10.16)

## 2022-10-21 PROCEDURE — 85025 COMPLETE CBC W/AUTO DIFF WBC: CPT | Performed by: INTERNAL MEDICINE

## 2022-10-21 PROCEDURE — 85027 COMPLETE CBC AUTOMATED: CPT | Performed by: INTERNAL MEDICINE

## 2022-10-21 PROCEDURE — 85007 BL SMEAR W/DIFF WBC COUNT: CPT | Performed by: INTERNAL MEDICINE

## 2022-10-21 PROCEDURE — 80048 BASIC METABOLIC PNL TOTAL CA: CPT | Performed by: INTERNAL MEDICINE

## 2022-10-21 PROCEDURE — 99232 SBSQ HOSP IP/OBS MODERATE 35: CPT | Performed by: INTERNAL MEDICINE

## 2022-10-21 RX ORDER — ENOXAPARIN SODIUM 100 MG/ML
40 INJECTION SUBCUTANEOUS
Status: DISCONTINUED | OUTPATIENT
Start: 2022-10-21 | End: 2022-10-22 | Stop reason: HOSPADM

## 2022-10-21 RX ORDER — POTASSIUM CHLORIDE 20 MEQ/1
40 TABLET, EXTENDED RELEASE ORAL ONCE
Status: COMPLETED | OUTPATIENT
Start: 2022-10-21 | End: 2022-10-21

## 2022-10-21 RX ADMIN — OXYCODONE HYDROCHLORIDE 40 MG: 40 TABLET, FILM COATED, EXTENDED RELEASE ORAL at 08:12

## 2022-10-21 RX ADMIN — LORAZEPAM 0.5 MG: 0.5 TABLET ORAL at 08:12

## 2022-10-21 RX ADMIN — LORAZEPAM 0.5 MG: 0.5 TABLET ORAL at 21:14

## 2022-10-21 RX ADMIN — OXYCODONE HYDROCHLORIDE 15 MG: 10 TABLET ORAL at 18:05

## 2022-10-21 RX ADMIN — ACETAMINOPHEN 650 MG: 325 TABLET, FILM COATED ORAL at 02:21

## 2022-10-21 RX ADMIN — OXYCODONE HYDROCHLORIDE 40 MG: 40 TABLET, FILM COATED, EXTENDED RELEASE ORAL at 21:11

## 2022-10-21 RX ADMIN — DEXTROSE MONOHYDRATE 1000 MG: 5 INJECTION, SOLUTION INTRAVENOUS at 02:13

## 2022-10-21 RX ADMIN — OXYCODONE HYDROCHLORIDE 15 MG: 10 TABLET ORAL at 09:18

## 2022-10-21 RX ADMIN — Medication 12.5 MG: at 08:12

## 2022-10-21 RX ADMIN — DULOXETINE 60 MG: 60 CAPSULE, DELAYED RELEASE ORAL at 08:12

## 2022-10-21 RX ADMIN — PANTOPRAZOLE SODIUM 40 MG: 40 TABLET, DELAYED RELEASE ORAL at 05:18

## 2022-10-21 RX ADMIN — VANCOMYCIN HYDROCHLORIDE 1500 MG: 10 INJECTION, POWDER, LYOPHILIZED, FOR SOLUTION INTRAVENOUS at 17:02

## 2022-10-21 RX ADMIN — Medication 1 TABLET: at 08:12

## 2022-10-21 RX ADMIN — B-COMPLEX W/ C & FOLIC ACID TAB 1 TABLET: TAB at 08:12

## 2022-10-21 RX ADMIN — SENNOSIDES AND DOCUSATE SODIUM 1 TABLET: 8.6; 5 TABLET ORAL at 21:11

## 2022-10-21 RX ADMIN — POTASSIUM CHLORIDE 40 MEQ: 1500 TABLET, EXTENDED RELEASE ORAL at 12:04

## 2022-10-21 RX ADMIN — Medication 12.5 MG: at 21:11

## 2022-10-21 RX ADMIN — ENOXAPARIN SODIUM 40 MG: 40 INJECTION SUBCUTANEOUS at 14:58

## 2022-10-21 NOTE — ASSESSMENT & PLAN NOTE
· IVC filter in place  · Patient continues to refuse lower extremity Doppler  · Continue DVT prophylaxis

## 2022-10-21 NOTE — ASSESSMENT & PLAN NOTE
Wt Readings from Last 3 Encounters:   10/21/22 91 7 kg (202 lb 2 6 oz)   09/23/22 97 5 kg (215 lb)   09/02/22 97 6 kg (215 lb 2 7 oz)       · Not exacerbated  Most recent echo was reviewed    · Continue metoprolol  · Daily weights

## 2022-10-21 NOTE — PLAN OF CARE
Problem: Prexisting or High Potential for Compromised Skin Integrity  Goal: Skin integrity is maintained or improved  Description: INTERVENTIONS:  - Identify patients at risk for skin breakdown  - Assess and monitor skin integrity  - Assess and monitor nutrition and hydration status  - Monitor labs   - Assess for incontinence   - Turn and reposition patient  - Assist with mobility/ambulation  - Relieve pressure over bony prominences  - Avoid friction and shearing  - Provide appropriate hygiene as needed including keeping skin clean and dry  - Evaluate need for skin moisturizer/barrier cream  - Collaborate with interdisciplinary team   - Patient/family teaching  - Consider wound care consult   Outcome: Progressing     Problem: PAIN - ADULT  Goal: Verbalizes/displays adequate comfort level or baseline comfort level  Description: Interventions:  - Encourage patient to monitor pain and request assistance  - Assess pain using appropriate pain scale  - Administer analgesics based on type and severity of pain and evaluate response  - Implement non-pharmacological measures as appropriate and evaluate response  - Consider cultural and social influences on pain and pain management  - Notify physician/advanced practitioner if interventions unsuccessful or patient reports new pain  Outcome: Progressing     Problem: INFECTION - ADULT  Goal: Absence or prevention of progression during hospitalization  Description: INTERVENTIONS:  - Assess and monitor for signs and symptoms of infection  - Monitor lab/diagnostic results  - Monitor all insertion sites, i e  indwelling lines, tubes, and drains  - Monitor endotracheal if appropriate and nasal secretions for changes in amount and color  - [place] appropriate cooling/warming therapies per order  - Administer medications as ordered  - Instruct and encourage patient and family to use good hand hygiene technique  - Identify and instruct in appropriate isolation precautions for identified infection/condition  Outcome: Progressing     Problem: SAFETY ADULT  Goal: Patient will remain free of falls  Description: INTERVENTIONS:  - Educate patient/family on patient safety including physical limitations  - Instruct patient to call for assistance with activity   - Consult OT/PT to assist with strengthening/mobility   - Keep Call bell within reach  - Keep bed low and locked with side rails adjusted as appropriate  - Keep care items and personal belongings within reach  - Initiate and maintain comfort rounds  - Make Fall Risk Sign visible to staff  - Offer Toileting every  Hours, in advance of need  - Initiate/Maintain alarm  - Obtain necessary fall risk management equipment:   - Apply yellow socks and bracelet for high fall risk patients  - Consider moving patient to room near nurses station  Outcome: Progressing  Goal: Maintain or return to baseline ADL function  Description: INTERVENTIONS:  -  Assess patient's ability to carry out ADLs; assess patient's baseline for ADL function and identify physical deficits which impact ability to perform ADLs (bathing, care of mouth/teeth, toileting, grooming, dressing, etc )  - Assess/evaluate cause of self-care deficits   - Assess range of motion  - Assess patient's mobility; develop plan if impaired  - Assess patient's need for assistive devices and provide as appropriate  - Encourage maximum independence but intervene and supervise when necessary  - Involve family in performance of ADLs  - Assess for home care needs following discharge   - Consider OT consult to assist with ADL evaluation and planning for discharge  - Provide patient education as appropriate  Outcome: Progressing  Goal: Maintains/Returns to pre admission functional level  Description: INTERVENTIONS:  - Perform BMAT or MOVE assessment daily    - Set and communicate daily mobility goal to care team and patient/family/caregiver     - Collaborate with rehabilitation services on mobility goals if consulted  - Perform Range of Motion  times a day  - Reposition patient every  hours  - Dangle patient  times a day  - Stand patient  times a day  - Ambulate patient  times a day  - Out of bed to chair  times a day   - Out of bed for meals  times a day  - Out of bed for toileting  - Record patient progress and toleration of activity level   Outcome: Progressing     Problem: DISCHARGE PLANNING  Goal: Discharge to home or other facility with appropriate resources  Description: INTERVENTIONS:  - Identify barriers to discharge w/patient and caregiver  - Arrange for needed discharge resources and transportation as appropriate  - Identify discharge learning needs (meds, wound care, etc )  - Arrange for interpretive services to assist at discharge as needed  - Refer to Case Management Department for coordinating discharge planning if the patient needs post-hospital services based on physician/advanced practitioner order or complex needs related to functional status, cognitive ability, or social support system  Outcome: Progressing     Problem: NEUROSENSORY - ADULT  Goal: Achieves stable or improved neurological status  Description: INTERVENTIONS  - Monitor and report changes in neurological status  - Monitor vital signs such as temperature, blood pressure, glucose, and any other labs ordered   - Initiate measures to prevent increased intracranial pressure  - Monitor for seizure activity and implement precautions if appropriate      Outcome: Progressing  Goal: Achieves maximal functionality and self care  Description: INTERVENTIONS  - Monitor swallowing and airway patency with patient fatigue and changes in neurological status  - Encourage and assist patient to increase activity and self care     - Encourage visually impaired, hearing impaired and aphasic patients to use assistive/communication devices  Outcome: Progressing     Problem: RESPIRATORY - ADULT  Goal: Achieves optimal ventilation and oxygenation  Description: INTERVENTIONS:  - Assess for changes in respiratory status  - Assess for changes in mentation and behavior  - Position to facilitate oxygenation and minimize respiratory effort  - Oxygen administered by appropriate delivery if ordered  - Initiate smoking cessation education as indicated  - Encourage broncho-pulmonary hygiene including cough, deep breathe, Incentive Spirometry  - Assess the need for suctioning and aspirate as needed  - Assess and instruct to report SOB or any respiratory difficulty  - Respiratory Therapy support as indicated  Outcome: Progressing     Problem: GENITOURINARY - ADULT  Goal: Maintains or returns to baseline urinary function  Description: INTERVENTIONS:  - Assess urinary function  - Encourage oral fluids to ensure adequate hydration if ordered  - Administer IV fluids as ordered to ensure adequate hydration  - Administer ordered medications as needed  - Offer frequent toileting  - Follow urinary retention protocol if ordered  Outcome: Progressing  Goal: Absence of urinary retention  Description: INTERVENTIONS:  - Assess patient’s ability to void and empty bladder  - Monitor I/O  - Bladder scan as needed  - Discuss with physician/AP medications to alleviate retention as needed  - Discuss catheterization for long term situations as appropriate  Outcome: Progressing     Problem: METABOLIC, FLUID AND ELECTROLYTES - ADULT  Goal: Electrolytes maintained within normal limits  Description: INTERVENTIONS:  - Monitor labs and assess patient for signs and symptoms of electrolyte imbalances  - Administer electrolyte replacement as ordered  - Monitor response to electrolyte replacements, including repeat lab results as appropriate  - Instruct patient on fluid and nutrition as appropriate  Outcome: Progressing  Goal: Fluid balance maintained  Description: INTERVENTIONS:  - Monitor labs   - Monitor I/O and WT  - Instruct patient on fluid and nutrition as appropriate  - Assess for signs & symptoms of volume excess or deficit  Outcome: Progressing  Goal: Glucose maintained within target range  Description: INTERVENTIONS:  - Monitor Blood Glucose as ordered  - Assess for signs and symptoms of hyperglycemia and hypoglycemia  - Administer ordered medications to maintain glucose within target range  - Assess nutritional intake and initiate nutrition service referral as needed  Outcome: Progressing     Problem: SKIN/TISSUE INTEGRITY - ADULT  Goal: Skin Integrity remains intact(Skin Breakdown Prevention)  Description: Assess:  -Perform Matthew assessment every   -Clean and moisturize skin every   -Inspect skin when repositioning, toileting, and assisting with ADLS  -Assess under medical devices such as  every   -Assess extremities for adequate circulation and sensation     Bed Management:  -Have minimal linens on bed & keep smooth, unwrinkled  -Change linens as needed when moist or perspiring  -Avoid sitting or lying in one position for more than  hours while in bed  -Keep HOB at degrees     Toileting:  -Offer bedside commode  -Assess for incontinence every   -Use incontinent care products after each incontinent episode such as     Activity:  -Mobilize patient  times a day  -Encourage activity and walks on unit  -Encourage or provide ROM exercises   -Turn and reposition patient every  Hours  -Use appropriate equipment to lift or move patient in bed  -Instruct/ Assist with weight shifting every  when out of bed in chair  -Consider limitation of chair time  hour intervals    Skin Care:  -Avoid use of baby powder, tape, friction and shearing, hot water or constrictive clothing  -Relieve pressure over bony prominences using   -Do not massage red bony areas    Next Steps:  -Teach patient strategies to minimize risks such as    -Consider consults to  interdisciplinary teams such as   Outcome: Progressing  Goal: Incision(s), wounds(s) or drain site(s) healing without S/S of infection  Description: INTERVENTIONS  - Assess and document dressing, incision, wound bed, drain sites and surrounding tissue  - Provide patient and family education  - Perform skin care/dressing changes every   Outcome: Progressing     Problem: HEMATOLOGIC - ADULT  Goal: Maintains hematologic stability  Description: INTERVENTIONS  - Assess for signs and symptoms of bleeding or hemorrhage  - Monitor labs  - Administer supportive blood products/factors as ordered and appropriate  Outcome: Progressing     Problem: MUSCULOSKELETAL - ADULT  Goal: Maintain or return mobility to safest level of function  Description: INTERVENTIONS:  - Assess patient's ability to carry out ADLs; assess patient's baseline for ADL function and identify physical deficits which impact ability to perform ADLs (bathing, care of mouth/teeth, toileting, grooming, dressing, etc )  - Assess/evaluate cause of self-care deficits   - Assess range of motion  - Assess patient's mobility  - Assess patient's need for assistive devices and provide as appropriate  - Encourage maximum independence but intervene and supervise when necessary  - Involve family in performance of ADLs  - Assess for home care needs following discharge   - Consider OT consult to assist with ADL evaluation and planning for discharge  - Provide patient education as appropriate  Outcome: Progressing  Goal: Maintain proper alignment of affected body part  Description: INTERVENTIONS:  - Support, maintain and protect limb and body alignment  - Provide patient/ family with appropriate education  Outcome: Progressing     Problem: Potential for Falls  Goal: Patient will remain free of falls  Description: INTERVENTIONS:  - Educate patient/family on patient safety including physical limitations  - Instruct patient to call for assistance with activity   - Consult OT/PT to assist with strengthening/mobility   - Keep Call bell within reach  - Keep bed low and locked with side rails adjusted as appropriate  - Keep care items and personal belongings within reach  - Initiate and maintain comfort rounds  - Make Fall Risk Sign visible to staff  - Offer Toileting every  Hours, in advance of need  - Initiate/Maintain alarm  - Obtain necessary fall risk management equipment:   - Apply yellow socks and bracelet for high fall risk patients  - Consider moving patient to room near nurses station  Outcome: Progressing     Problem: Nutrition/Hydration-ADULT  Goal: Nutrient/Hydration intake appropriate for improving, restoring or maintaining nutritional needs  Description: Monitor and assess patient's nutrition/hydration status for malnutrition  Collaborate with interdisciplinary team and initiate plan and interventions as ordered  Monitor patient's weight and dietary intake as ordered or per policy  Utilize nutrition screening tool and intervene as necessary  Determine patient's food preferences and provide high-protein, high-caloric foods as appropriate       INTERVENTIONS:  - Monitor oral intake, urinary output, labs, and treatment plans  - Assess nutrition and hydration status and recommend course of action  - Evaluate amount of meals eaten  - Assist patient with eating if necessary   - Allow adequate time for meals  - Recommend/ encourage appropriate diets, oral nutritional supplements, and vitamin/mineral supplements  - Order, calculate, and assess calorie counts as needed  - Recommend, monitor, and adjust tube feedings and TPN/PPN based on assessed needs  - Assess need for intravenous fluids  - Provide specific nutrition/hydration education as appropriate  - Include patient/family/caregiver in decisions related to nutrition  Outcome: Progressing     Problem: MOBILITY - ADULT  Goal: Maintain or return to baseline ADL function  Description: INTERVENTIONS:  -  Assess patient's ability to carry out ADLs; assess patient's baseline for ADL function and identify physical deficits which impact ability to perform ADLs (bathing, care of mouth/teeth, toileting, grooming, dressing, etc )  - Assess/evaluate cause of self-care deficits   - Assess range of motion  - Assess patient's mobility; develop plan if impaired  - Assess patient's need for assistive devices and provide as appropriate  - Encourage maximum independence but intervene and supervise when necessary  - Involve family in performance of ADLs  - Assess for home care needs following discharge   - Consider OT consult to assist with ADL evaluation and planning for discharge  - Provide patient education as appropriate  Outcome: Progressing  Goal: Maintains/Returns to pre admission functional level  Description: INTERVENTIONS:  - Perform BMAT or MOVE assessment daily    - Set and communicate daily mobility goal to care team and patient/family/caregiver  - Collaborate with rehabilitation services on mobility goals if consulted  - Perform Range of Motion  times a day  - Reposition patient every  hours    - Dangle patient  times a day  - Stand patient  times a day  - Ambulate patient  times a day  - Out of bed to chair  times a day   - Out of bed for meal times a day  - Out of bed for toileting  - Record patient progress and toleration of activity level   Outcome: Progressing

## 2022-10-21 NOTE — PROGRESS NOTES
Vancomycin IV Pharmacy-to-Dose Consultation    Katie Torres is a 69 y o  female who is currently receiving Vancomycin IV with management by the Pharmacy Consult service  Assessment/Plan:  The patient was reviewed  Renal function is stable and no signs or symptoms of nephrotoxicity and/or infusion reactions were documented in the chart  Based on today’s assessment, continue current vancomycin (day # 3) dosing of 1500mg q24 hours,with a plan for trough to be drawn at 1730 on 10/22  We will continue to follow the patient’s culture results and clinical progress daily      Ilan Brownlee

## 2022-10-21 NOTE — PROGRESS NOTES
[address] [facility]  Progress Note - Madison Smoke 1955, 69 y o  female MRN: [id number]  Unit/Bed#: [facility] [address] 226-01 Encounter: [id number]  Primary Care Provider: Consuelo Cruz DO   Date and time admitted to hospital: 10/19/2022  3:16 PM    * Severe sepsis ([facility] [address] )  Assessment & Plan  · Leukocytosis, tachycardia, tachypnea  · Lactic acid trended down  · Source likely right leg cellulitis  · Urinalysis negative for UTI  · CT scan commented on resolving pneumonia from prior admission  · Continue IV antibiotics ceftriaxone and vancomycin  · Follow-up cultures    Acute metabolic encephalopathy  Assessment & Plan  · From cellulitis and sepsis  · Improved    Chronic diastolic heart failure (HCC)  Assessment & Plan  Wt Readings from Last 3 Encounters:   10/21/22 91 7 kg (202 lb 2 6 oz)   09/23/22 97 5 kg (215 lb)   09/02/22 97 6 kg (215 lb 2 7 oz)       · Not exacerbated  Most recent echo was reviewed    · Continue metoprolol  · Daily weights    History of pulmonary embolism  Assessment & Plan  · IVC filter in place  · Patient continues to refuse lower extremity Doppler  · Continue DVT prophylaxis    Cellulitis of right lower extremity  Assessment & Plan  · She has chronic lymphedema of the right leg and has had recurring cellulitis here because of her immunocompromised state  · Continue with IV antibiotics  · Follow-up cultures  · CT scan revealed right lower extremity subcutaneous edema and skin thickening indicating nonspecific cellulitis without drainable collection    Elevated troponin  Assessment & Plan  · Troponin elevated 79 - 104 - 51  · This is likely from sinus tachycardia on admission from sepsis  · Patient denies any chest pain  · EKG reveals sinus tachycardia    Benign essential hypertension  Assessment & Plan  · Continue home medications    [facility]  Assessment & Plan  BUN   Date Value Ref Range Status   10/21/2022 21 5 - 25 mg/dL Final 10/20/2022 26 (H) 5 - 25 mg/dL Final   10/19/2022 25 5 - 25 mg/dL Final     Creatinine   Date Value Ref Range Status   10/21/2022 1 00 0 60 - 1 30 mg/dL Final     Comment:     Standardized to IDMS reference method   10/20/2022 1 20 0 60 - 1 30 mg/dL Final     Comment:     Standardized to IDMS reference method   10/19/2022 1 37 (H) 0 60 - 1 30 mg/dL Final     Comment:     Standardized to IDMS reference method     · She has completed treatment for Wegener's granulomatosis in the outpatient setting and follows with Hematology-Oncology  · Elevated serum creatinine likely from sepsis, this does not meet RUCHI criteria  · Trend tomorrow BMP         VTE Pharmacologic Prophylaxis:   Pharmacologic:  Lovenox    Patient Centered Rounds: I have performed bedside rounds with nursing staff today  Education and Discussions with Family / Patient:  Updated patient's spouse    Time Spent for Care: 20 minutes  More than 50% of total time spent on counseling and coordination of care as described above  Current Length of Stay: 2 day(s)    Current Patient Status: Inpatient   Certification Statement: The patient will continue to require additional inpatient hospital stay due to IV antibiotics    Discharge Plan / Estimated Discharge Date: 24h    Code Status: Level 1 - Full Code      Subjective:   Patient seen and examined at bedside, resting comfortably in recliner, states she feels much better, denies any complaints    Objective:     Vitals:   Temp (24hrs), Av 3 °F (36 8 °C), Min:98 1 °F (36 7 °C), Max:98 4 °F (36 9 °C)    Temp:  [98 1 °F (36 7 °C)-98 4 °F (36 9 °C)] 98 3 °F (36 8 °C)  HR:  [88-99] 88  Resp:  [16-18] 18  BP: ()/(58-65) 123/65  SpO2:  [90 %-95 %] 95 %  Body mass index is 35 81 kg/m²  Input and Output Summary (last 24 hours):        Intake/Output Summary (Last 24 hours) at 10/21/2022 1404  Last data filed at 10/21/2022 0519  Gross per 24 hour   Intake 250 ml   Output --   Net 250 ml       Physical Exam:    Constitutional: Patient is oriented to person, place and time, no acute distress  HEENT:  Normocephalic, atraumatic  Cardiovascular: Normal S1S2, RRR, No murmurs/rubs/gallops appreciated  Pulmonary:  Bilateral air entry, No rhonchi/rales/wheezing appreciated  Abdominal: Soft, Bowel sounds present, Non-tender, Non-distended  Extremities:  Bilateral extremity edema  Neurological: Cranial nerves II-XII grossly intact, sensation intact, otherwise no focal neurological symptoms  Skin:  Bilateral extremity with compression dressing in place    Additional Data:     Labs:    Results from last 7 days   Lab Units 10/21/22  0502   WBC Thousand/uL 11 25*   HEMOGLOBIN g/dL 10 5*   HEMATOCRIT % 33 0*   PLATELETS Thousands/uL 139*   LYMPHO PCT % 6*   MONO PCT % 1*   EOS PCT % 0     Results from last 7 days   Lab Units 10/21/22  0502 10/20/22  0500   POTASSIUM mmol/L 3 3* 3 7   CHLORIDE mmol/L 103 102   CO2 mmol/L 27 24   BUN mg/dL 21 26*   CREATININE mg/dL 1 00 1 20   CALCIUM mg/dL 8 7 9 0   ALK PHOS U/L  --  142*   ALT U/L  --  55   AST U/L  --  77*            I Have Reviewed All Lab Data Listed Above  Invasive Devices  Report    Peripheral Intravenous Line  Duration           Peripheral IV 10/19/22 Dorsal (posterior); Left Hand 1 day    Peripheral IV 10/19/22 Right Arm 1 day                     Recent Cultures (last 7 days):     Results from last 7 days   Lab Units 10/19/22  1659 10/19/22  1620   BLOOD CULTURE  No Growth at 24 hrs  No Growth at 24 hrs         Last 24 Hours Medication List:   Current Facility-Administered Medications   Medication Dose Route Frequency Provider Last Rate   • acetaminophen  650 mg Oral Q6H PRN Nany Moreau PA-C     • bisacodyl  5 mg Oral Daily PRN Nany Moreau PA-C     • calcium carbonate-vitamin D  1 tablet Oral Daily With Breakfast Nany Moreau PA-C     • cefTRIAXone  1,000 mg Intravenous Q24H Nany Moreau PA-C 1,000 mg (10/21/22 1116)   • DULoxetine  60 mg Oral Daily Kacie Dugan PA-C     • enoxaparin  40 mg Subcutaneous Q24H [facility] Kulwant Wahl MD     • LORazepam  0 5 mg Oral BID PRN Nany Moreau PA-C     • metoprolol tartrate  12 5 mg Oral Q12H [facility] Nany Moreau PA-C     • multivitamin stress formula  1 tablet Oral Daily Nany Moreau PA-C     • ondansetron  4 mg Intravenous Q6H PRN Nany Moreau PA-C     • oxyCODONE  40 mg Oral Q12H [facility] Nany Moreau PA-C     • oxyCODONE  15 mg Oral BID PRN Nany Moreau PA-C     • pantoprazole  40 mg Oral Early Morning Nany Moreau PA-C     • senna-docusate sodium  1 tablet Oral HS Nany Moreau PA-C     • vancomycin  20 mg/kg (Adjusted) Intravenous Q24H Jarocho Rivera PA-C          Today, Patient Was Seen By: Kulwant Wahl

## 2022-10-21 NOTE — WOUND OSTOMY CARE
Consult Note - Wound   Kathrin Carrillo 69 y o  female MRN: [id number]  Unit/Bed#: [facility] 2 -01 Encounter: [id number]      History and Present Illness:  69year old female presented to the hospital with altered mental status, lethargy  Patient's history significant for ovarian cancer, Charcot foot, lymphedema  Assessment Findings:   Patient sitting up in chair, able to stand and ambulate unassisted with walker with special shoes  Denies incontinence  Patient denies any wounds or skin issues at this time  States she does not want her compression wraps removed for lower extremity assessment at this time  Patient follows with outpatient lymphedema clinic and has lower extremity lymphedema wraps which she wears daily  Patient also declined assessment of skin folds and buttocks/sacrum  Explained importance of skin/wound assessment with diagnosis of sepsis and cellulitis  Patient politely declined  Patient provided with wound care RN contact information  Primary RN and Dr Joana Rader made aware that patient declined assessment  On review of admission photograph, there is no obvious open areas  Lateral aspect of right thigh with visible erythema in photo  Photo from 10/20/2022 0008: Wound care team will sign-off at this time  Please re-consult if patient becomes agreeable to assessment      Fauzia ESCOBARN, RN, [facility]

## 2022-10-21 NOTE — PLAN OF CARE
Problem: Prexisting or High Potential for Compromised Skin Integrity  Goal: Skin integrity is maintained or improved  Description: INTERVENTIONS:  - Identify patients at risk for skin breakdown  - Assess and monitor skin integrity  - Assess and monitor nutrition and hydration status  - Monitor labs   - Assess for incontinence   - Turn and reposition patient  - Assist with mobility/ambulation  - Relieve pressure over bony prominences  - Avoid friction and shearing  - Provide appropriate hygiene as needed including keeping skin clean and dry  - Evaluate need for skin moisturizer/barrier cream  - Collaborate with interdisciplinary team   - Patient/family teaching  - Consider wound care consult   Outcome: Progressing     Problem: PAIN - ADULT  Goal: Verbalizes/displays adequate comfort level or baseline comfort level  Description: Interventions:  - Encourage patient to monitor pain and request assistance  - Assess pain using appropriate pain scale  - Administer analgesics based on type and severity of pain and evaluate response  - Implement non-pharmacological measures as appropriate and evaluate response  - Consider cultural and social influences on pain and pain management  - Notify physician/advanced practitioner if interventions unsuccessful or patient reports new pain  Outcome: Progressing     Problem: INFECTION - ADULT  Goal: Absence or prevention of progression during hospitalization  Description: INTERVENTIONS:  - Assess and monitor for signs and symptoms of infection  - Monitor lab/diagnostic results  - Monitor all insertion sites, i e  indwelling lines, tubes, and drains  - Monitor endotracheal if appropriate and nasal secretions for changes in amount and color  - [place] appropriate cooling/warming therapies per order  - Administer medications as ordered  - Instruct and encourage patient and family to use good hand hygiene technique  - Identify and instruct in appropriate isolation precautions for identified infection/condition  Outcome: Progressing     Problem: SAFETY ADULT  Goal: Patient will remain free of falls  Description: INTERVENTIONS:  - Educate patient/family on patient safety including physical limitations  - Instruct patient to call for assistance with activity   - Consult OT/PT to assist with strengthening/mobility   - Keep Call bell within reach  - Keep bed low and locked with side rails adjusted as appropriate  - Keep care items and personal belongings within reach  - Initiate and maintain comfort rounds  - Make Fall Risk Sign visible to staff  - Offer Toileting every  Hours, in advance of need  - Initiate/Maintainalarm  - Obtain necessary fall risk management equipment:   - Apply yellow socks and bracelet for high fall risk patients  - Consider moving patient to room near nurses station  Outcome: Progressing  Goal: Maintain or return to baseline ADL function  Description: INTERVENTIONS:  -  Assess patient's ability to carry out ADLs; assess patient's baseline for ADL function and identify physical deficits which impact ability to perform ADLs (bathing, care of mouth/teeth, toileting, grooming, dressing, etc )  - Assess/evaluate cause of self-care deficits   - Assess range of motion  - Assess patient's mobility; develop plan if impaired  - Assess patient's need for assistive devices and provide as appropriate  - Encourage maximum independence but intervene and supervise when necessary  - Involve family in performance of ADLs  - Assess for home care needs following discharge   - Consider OT consult to assist with ADL evaluation and planning for discharge  - Provide patient education as appropriate  Outcome: Progressing  Goal: Maintains/Returns to pre admission functional level  Description: INTERVENTIONS:  - Perform BMAT or MOVE assessment daily    - Set and communicate daily mobility goal to care team and patient/family/caregiver     - Collaborate with rehabilitation services on mobility goals if consulted  - Perform Range of Motion  times a day  - Reposition patient every  hours  - Dangle patient  times a day  - Stand patient  times a day  - Ambulate patient  times a day  - Out of bed to chair  times a day   - Out of bed for meals  times a day  - Out of bed for toileting  - Record patient progress and toleration of activity level   Outcome: Progressing     Problem: DISCHARGE PLANNING  Goal: Discharge to home or other facility with appropriate resources  Description: INTERVENTIONS:  - Identify barriers to discharge w/patient and caregiver  - Arrange for needed discharge resources and transportation as appropriate  - Identify discharge learning needs (meds, wound care, etc )  - Arrange for interpretive services to assist at discharge as needed  - Refer to Case Management Department for coordinating discharge planning if the patient needs post-hospital services based on physician/advanced practitioner order or complex needs related to functional status, cognitive ability, or social support system  Outcome: Progressing     Problem: Knowledge Deficit  Goal: Patient/family/caregiver demonstrates understanding of disease process, treatment plan, medications, and discharge instructions  Description: Complete learning assessment and assess knowledge base    Interventions:  - Provide teaching at level of understanding  - Provide teaching via preferred learning methods  Outcome: Progressing     Problem: NEUROSENSORY - ADULT  Goal: Achieves stable or improved neurological status  Description: INTERVENTIONS  - Monitor and report changes in neurological status  - Monitor vital signs such as temperature, blood pressure, glucose, and any other labs ordered   - Initiate measures to prevent increased intracranial pressure  - Monitor for seizure activity and implement precautions if appropriate      Outcome: Progressing  Goal: Achieves maximal functionality and self care  Description: INTERVENTIONS  - Monitor swallowing and airway patency with patient fatigue and changes in neurological status  - Encourage and assist patient to increase activity and self care     - Encourage visually impaired, hearing impaired and aphasic patients to use assistive/communication devices  Outcome: Progressing     Problem: RESPIRATORY - ADULT  Goal: Achieves optimal ventilation and oxygenation  Description: INTERVENTIONS:  - Assess for changes in respiratory status  - Assess for changes in mentation and behavior  - Position to facilitate oxygenation and minimize respiratory effort  - Oxygen administered by appropriate delivery if ordered  - Initiate smoking cessation education as indicated  - Encourage broncho-pulmonary hygiene including cough, deep breathe, Incentive Spirometry  - Assess the need for suctioning and aspirate as needed  - Assess and instruct to report SOB or any respiratory difficulty  - Respiratory Therapy support as indicated  Outcome: Progressing     Problem: GENITOURINARY - ADULT  Goal: Maintains or returns to baseline urinary function  Description: INTERVENTIONS:  - Assess urinary function  - Encourage oral fluids to ensure adequate hydration if ordered  - Administer IV fluids as ordered to ensure adequate hydration  - Administer ordered medications as needed  - Offer frequent toileting  - Follow urinary retention protocol if ordered  Outcome: Progressing  Goal: Absence of urinary retention  Description: INTERVENTIONS:  - Assess patient’s ability to void and empty bladder  - Monitor I/O  - Bladder scan as needed  - Discuss with physician/AP medications to alleviate retention as needed  - Discuss catheterization for long term situations as appropriate  Outcome: Progressing     Problem: METABOLIC, FLUID AND ELECTROLYTES - ADULT  Goal: Electrolytes maintained within normal limits  Description: INTERVENTIONS:  - Monitor labs and assess patient for signs and symptoms of electrolyte imbalances  - Administer electrolyte replacement as ordered  - Monitor response to electrolyte replacements, including repeat lab results as appropriate  - Instruct patient on fluid and nutrition as appropriate  Outcome: Progressing  Goal: Fluid balance maintained  Description: INTERVENTIONS:  - Monitor labs   - Monitor I/O and WT  - Instruct patient on fluid and nutrition as appropriate  - Assess for signs & symptoms of volume excess or deficit  Outcome: Progressing  Goal: Glucose maintained within target range  Description: INTERVENTIONS:  - Monitor Blood Glucose as ordered  - Assess for signs and symptoms of hyperglycemia and hypoglycemia  - Administer ordered medications to maintain glucose within target range  - Assess nutritional intake and initiate nutrition service referral as needed  Outcome: Progressing     Problem: SKIN/TISSUE INTEGRITY - ADULT  Goal: Skin Integrity remains intact(Skin Breakdown Prevention)  Description: Assess:  -Perform [place] assessment every   -Clean and moisturize skin every   -Inspect skin when repositioning, toileting, and assisting with ADLS  -Assess under medical devices such as  every   -Assess extremities for adequate circulation and sensation     Bed Management:  -Have minimal linens on bed & keep smooth, unwrinkled  -Change linens as needed when moist or perspiring  -Avoid sitting or lying in one position for more than  hours while in bed  -Keep HOB at degrees     Toileting:  -Offer bedside commode  -Assess for incontinence every   -Use incontinent care products after each incontinent episode such as    Activity:  -Mobilize patient  times a day  -Encourage activity and walks on unit  -Encourage or provide ROM exercises   -Turn and reposition patient every  Hours  -Use appropriate equipment to lift or move patient in bed  -Instruct/ Assist with weight shifting ever when out of bed in chair  -Consider limitation of chair time  hour intervals    Skin Care:  -Avoid use of baby powder, tape, friction and shearing, hot water or constrictive clothing  -Relieve pressure over bony prominences using   -Do not massage red bony areas    Next Steps:  -Teach patient strategies to minimize risks such as    -Consider consults to  interdisciplinary teams such as   Outcome: Progressing  Goal: Incision(s), wounds(s) or drain site(s) healing without S/S of infection  Description: INTERVENTIONS  - Assess and document dressing, incision, wound bed, drain sites and surrounding tissue  - Provide patient and family education  - Perform skin care/dressing changes every   Outcome: Progressing     Problem: HEMATOLOGIC - ADULT  Goal: Maintains hematologic stability  Description: INTERVENTIONS  - Assess for signs and symptoms of bleeding or hemorrhage  - Monitor labs  - Administer supportive blood products/factors as ordered and appropriate  Outcome: Progressing     Problem: MUSCULOSKELETAL - ADULT  Goal: Maintain or return mobility to safest level of function  Description: INTERVENTIONS:  - Assess patient's ability to carry out ADLs; assess patient's baseline for ADL function and identify physical deficits which impact ability to perform ADLs (bathing, care of mouth/teeth, toileting, grooming, dressing, etc )  - Assess/evaluate cause of self-care deficits   - Assess range of motion  - Assess patient's mobility  - Assess patient's need for assistive devices and provide as appropriate  - Encourage maximum independence but intervene and supervise when necessary  - Involve family in performance of ADLs  - Assess for home care needs following discharge   - Consider OT consult to assist with ADL evaluation and planning for discharge  - Provide patient education as appropriate  Outcome: Progressing  Goal: Maintain proper alignment of affected body part  Description: INTERVENTIONS:  - Support, maintain and protect limb and body alignment  - Provide patient/ family with appropriate education  Outcome: Progressing     Problem: Potential for Falls  Goal: Patient will remain free of falls  Description: INTERVENTIONS:  - Educate patient/family on patient safety including physical limitations  - Instruct patient to call for assistance with activity   - Consult OT/PT to assist with strengthening/mobility   - Keep Call bell within reach  - Keep bed low and locked with side rails adjusted as appropriate  - Keep care items and personal belongings within reach  - Initiate and maintain comfort rounds  - Make Fall Risk Sign visible to staff  - Offer Toileting every  Hours, in advance of need  - Initiate/Maintain alarm  - Obtain necessary fall risk management equipment:   - Apply yellow socks and bracelet for high fall risk patients  - Consider moving patient to room near nurses station  Outcome: Progressing     Problem: Nutrition/Hydration-ADULT  Goal: Nutrient/Hydration intake appropriate for improving, restoring or maintaining nutritional needs  Description: Monitor and assess patient's nutrition/hydration status for malnutrition  Collaborate with interdisciplinary team and initiate plan and interventions as ordered  Monitor patient's weight and dietary intake as ordered or per policy  Utilize nutrition screening tool and intervene as necessary  Determine patient's food preferences and provide high-protein, high-caloric foods as appropriate       INTERVENTIONS:  - Monitor oral intake, urinary output, labs, and treatment plans  - Assess nutrition and hydration status and recommend course of action  - Evaluate amount of meals eaten  - Assist patient with eating if necessary   - Allow adequate time for meals  - Recommend/ encourage appropriate diets, oral nutritional supplements, and vitamin/mineral supplements  - Order, calculate, and assess calorie counts as needed  - Recommend, monitor, and adjust tube feedings and TPN/PPN based on assessed needs  - Assess need for intravenous fluids  - Provide specific nutrition/hydration education as appropriate  - Include patient/family/caregiver in decisions related to nutrition  Outcome: Progressing     Problem: MOBILITY - ADULT  Goal: Maintain or return to baseline ADL function  Description: INTERVENTIONS:  -  Assess patient's ability to carry out ADLs; assess patient's baseline for ADL function and identify physical deficits which impact ability to perform ADLs (bathing, care of mouth/teeth, toileting, grooming, dressing, etc )  - Assess/evaluate cause of self-care deficits   - Assess range of motion  - Assess patient's mobility; develop plan if impaired  - Assess patient's need for assistive devices and provide as appropriate  - Encourage maximum independence but intervene and supervise when necessary  - Involve family in performance of ADLs  - Assess for home care needs following discharge   - Consider OT consult to assist with ADL evaluation and planning for discharge  - Provide patient education as appropriate  Outcome: Progressing  Goal: Maintains/Returns to pre admission functional level  Description: INTERVENTIONS:  - Perform BMAT or MOVE assessment daily    - Set and communicate daily mobility goal to care team and patient/family/caregiver  - Collaborate with rehabilitation services on mobility goals if consulted  - Perform Range of Motion  times a day  - Reposition patient every  hours    - Dangle patient  times a day  - Stand patient  times a day  - Ambulate patient  times a day  - Out of bed to chair  times a day   - Out of bed for meals  times a day  - Out of bed for toileting  - Record patient progress and toleration of activity level   Outcome: Progressing

## 2022-10-21 NOTE — ASSESSMENT & PLAN NOTE
BUN   Date Value Ref Range Status   10/21/2022 21 5 - 25 mg/dL Final   10/20/2022 26 (H) 5 - 25 mg/dL Final   10/19/2022 25 5 - 25 mg/dL Final     Creatinine   Date Value Ref Range Status   10/21/2022 1 00 0 60 - 1 30 mg/dL Final     Comment:     Standardized to IDMS reference method   10/20/2022 1 20 0 60 - 1 30 mg/dL Final     Comment:     Standardized to IDMS reference method   10/19/2022 1 37 (H) 0 60 - 1 30 mg/dL Final     Comment:     Standardized to IDMS reference method     · She has completed treatment for Wegener's granulomatosis in the outpatient setting and follows with Hematology-Oncology  · Elevated serum creatinine likely from sepsis, this does not meet RUCHI criteria  · Trend tomorrow BMP

## 2022-10-22 VITALS
HEART RATE: 71 BPM | SYSTOLIC BLOOD PRESSURE: 105 MMHG | RESPIRATION RATE: 17 BRPM | HEIGHT: 63 IN | WEIGHT: 213.41 LBS | BODY MASS INDEX: 37.81 KG/M2 | TEMPERATURE: 97.3 F | OXYGEN SATURATION: 96 % | DIASTOLIC BLOOD PRESSURE: 68 MMHG

## 2022-10-22 PROCEDURE — 99239 HOSP IP/OBS DSCHRG MGMT >30: CPT | Performed by: INTERNAL MEDICINE

## 2022-10-22 RX ORDER — CEPHALEXIN 500 MG/1
500 CAPSULE ORAL EVERY 12 HOURS SCHEDULED
Qty: 12 CAPSULE | Refills: 0 | Status: SHIPPED | OUTPATIENT
Start: 2022-10-22 | End: 2022-10-28

## 2022-10-22 RX ORDER — CEPHALEXIN 500 MG/1
500 CAPSULE ORAL EVERY 6 HOURS SCHEDULED
Qty: 24 CAPSULE | Refills: 0 | Status: SHIPPED | OUTPATIENT
Start: 2022-10-22 | End: 2022-10-22 | Stop reason: SDUPTHER

## 2022-10-22 RX ADMIN — OXYCODONE HYDROCHLORIDE 40 MG: 40 TABLET, FILM COATED, EXTENDED RELEASE ORAL at 08:27

## 2022-10-22 RX ADMIN — LORAZEPAM 0.5 MG: 0.5 TABLET ORAL at 10:06

## 2022-10-22 RX ADMIN — PANTOPRAZOLE SODIUM 40 MG: 40 TABLET, DELAYED RELEASE ORAL at 05:31

## 2022-10-22 RX ADMIN — ENOXAPARIN SODIUM 40 MG: 40 INJECTION SUBCUTANEOUS at 08:27

## 2022-10-22 RX ADMIN — DULOXETINE 60 MG: 60 CAPSULE, DELAYED RELEASE ORAL at 08:27

## 2022-10-22 RX ADMIN — Medication 1 TABLET: at 08:27

## 2022-10-22 RX ADMIN — DEXTROSE MONOHYDRATE 1000 MG: 5 INJECTION, SOLUTION INTRAVENOUS at 00:26

## 2022-10-22 RX ADMIN — ACETAMINOPHEN 650 MG: 325 TABLET, FILM COATED ORAL at 01:17

## 2022-10-22 RX ADMIN — B-COMPLEX W/ C & FOLIC ACID TAB 1 TABLET: TAB at 08:27

## 2022-10-22 NOTE — PLAN OF CARE
Problem: Prexisting or High Potential for Compromised Skin Integrity  Goal: Skin integrity is maintained or improved  Description: INTERVENTIONS:  - Identify patients at risk for skin breakdown  - Assess and monitor skin integrity  - Assess and monitor nutrition and hydration status  - Monitor labs   - Assess for incontinence   - Turn and reposition patient  - Assist with mobility/ambulation  - Relieve pressure over bony prominences  - Avoid friction and shearing  - Provide appropriate hygiene as needed including keeping skin clean and dry  - Evaluate need for skin moisturizer/barrier cream  - Collaborate with interdisciplinary team   - Patient/family teaching  - Consider wound care consult   Outcome: Progressing     Problem: PAIN - ADULT  Goal: Verbalizes/displays adequate comfort level or baseline comfort level  Description: Interventions:  - Encourage patient to monitor pain and request assistance  - Assess pain using appropriate pain scale  - Administer analgesics based on type and severity of pain and evaluate response  - Implement non-pharmacological measures as appropriate and evaluate response  - Consider cultural and social influences on pain and pain management  - Notify physician/advanced practitioner if interventions unsuccessful or patient reports new pain  Outcome: Progressing     Problem: INFECTION - ADULT  Goal: Absence or prevention of progression during hospitalization  Description: INTERVENTIONS:  - Assess and monitor for signs and symptoms of infection  - Monitor lab/diagnostic results  - Monitor all insertion sites, i e  indwelling lines, tubes, and drains  - Monitor endotracheal if appropriate and nasal secretions for changes in amount and color  - [place] appropriate cooling/warming therapies per order  - Administer medications as ordered  - Instruct and encourage patient and family to use good hand hygiene technique  - Identify and instruct in appropriate isolation precautions for identified infection/condition  Outcome: Progressing     Problem: SAFETY ADULT  Goal: Patient will remain free of falls  Description: INTERVENTIONS:  - Educate patient/family on patient safety including physical limitations  - Instruct patient to call for assistance with activity   - Consult OT/PT to assist with strengthening/mobility   - Keep Call bell within reach  - Keep bed low and locked with side rails adjusted as appropriate  - Keep care items and personal belongings within reach  - Initiate and maintain comfort rounds  - Make Fall Risk Sign visible to staff  - Offer Toileting every  Hours, in advance of need  - Initiate/Maintain alarm  - Obtain necessary fall risk management equipment:   - Apply yellow socks and bracelet for high fall risk patients  - Consider moving patient to room near nurses station  Outcome: Progressing  Goal: Maintain or return to baseline ADL function  Description: INTERVENTIONS:  -  Assess patient's ability to carry out ADLs; assess patient's baseline for ADL function and identify physical deficits which impact ability to perform ADLs (bathing, care of mouth/teeth, toileting, grooming, dressing, etc )  - Assess/evaluate cause of self-care deficits   - Assess range of motion  - Assess patient's mobility; develop plan if impaired  - Assess patient's need for assistive devices and provide as appropriate  - Encourage maximum independence but intervene and supervise when necessary  - Involve family in performance of ADLs  - Assess for home care needs following discharge   - Consider OT consult to assist with ADL evaluation and planning for discharge  - Provide patient education as appropriate  Outcome: Progressing  Goal: Maintains/Returns to pre admission functional level  Description: INTERVENTIONS:  - Perform BMAT or MOVE assessment daily    - Set and communicate daily mobility goal to care team and patient/family/caregiver     - Collaborate with rehabilitation services on mobility goals if consulted  - Perform Range of Motion  times a day  - Reposition patient every  hours  - Dangle patient  times a day  - Stand patient  times a day  - Ambulate patient  times a day  - Out of bed to chair  times a day   - Out of bed for meals  times a day  - Out of bed for toileting  - Record patient progress and toleration of activity level   Outcome: Progressing     Problem: DISCHARGE PLANNING  Goal: Discharge to home or other facility with appropriate resources  Description: INTERVENTIONS:  - Identify barriers to discharge w/patient and caregiver  - Arrange for needed discharge resources and transportation as appropriate  - Identify discharge learning needs (meds, wound care, etc )  - Arrange for interpretive services to assist at discharge as needed  - Refer to Case Management Department for coordinating discharge planning if the patient needs post-hospital services based on physician/advanced practitioner order or complex needs related to functional status, cognitive ability, or social support system  Outcome: Progressing     Problem: Knowledge Deficit  Goal: Patient/family/caregiver demonstrates understanding of disease process, treatment plan, medications, and discharge instructions  Description: Complete learning assessment and assess knowledge base    Interventions:  - Provide teaching at level of understanding  - Provide teaching via preferred learning methods  Outcome: Progressing     Problem: NEUROSENSORY - ADULT  Goal: Achieves stable or improved neurological status  Description: INTERVENTIONS  - Monitor and report changes in neurological status  - Monitor vital signs such as temperature, blood pressure, glucose, and any other labs ordered   - Initiate measures to prevent increased intracranial pressure  - Monitor for seizure activity and implement precautions if appropriate      Outcome: Progressing  Goal: Achieves maximal functionality and self care  Description: INTERVENTIONS  - Monitor swallowing and airway patency with patient fatigue and changes in neurological status  - Encourage and assist patient to increase activity and self care     - Encourage visually impaired, hearing impaired and aphasic patients to use assistive/communication devices  Outcome: Progressing     Problem: RESPIRATORY - ADULT  Goal: Achieves optimal ventilation and oxygenation  Description: INTERVENTIONS:  - Assess for changes in respiratory status  - Assess for changes in mentation and behavior  - Position to facilitate oxygenation and minimize respiratory effort  - Oxygen administered by appropriate delivery if ordered  - Initiate smoking cessation education as indicated  - Encourage broncho-pulmonary hygiene including cough, deep breathe, Incentive Spirometry  - Assess the need for suctioning and aspirate as needed  - Assess and instruct to report SOB or any respiratory difficulty  - Respiratory Therapy support as indicated  Outcome: Progressing     Problem: GENITOURINARY - ADULT  Goal: Maintains or returns to baseline urinary function  Description: INTERVENTIONS:  - Assess urinary function  - Encourage oral fluids to ensure adequate hydration if ordered  - Administer IV fluids as ordered to ensure adequate hydration  - Administer ordered medications as needed  - Offer frequent toileting  - Follow urinary retention protocol if ordered  Outcome: Progressing  Goal: Absence of urinary retention  Description: INTERVENTIONS:  - Assess patient’s ability to void and empty bladder  - Monitor I/O  - Bladder scan as needed  - Discuss with physician/AP medications to alleviate retention as needed  - Discuss catheterization for long term situations as appropriate  Outcome: Progressing     Problem: METABOLIC, FLUID AND ELECTROLYTES - ADULT  Goal: Electrolytes maintained within normal limits  Description: INTERVENTIONS:  - Monitor labs and assess patient for signs and symptoms of electrolyte imbalances  - Administer electrolyte replacement as ordered  - Monitor response to electrolyte replacements, including repeat lab results as appropriate  - Instruct patient on fluid and nutrition as appropriate  Outcome: Progressing  Goal: Fluid balance maintained  Description: INTERVENTIONS:  - Monitor labs   - Monitor I/O and WT  - Instruct patient on fluid and nutrition as appropriate  - Assess for signs & symptoms of volume excess or deficit  Outcome: Progressing  Goal: Glucose maintained within target range  Description: INTERVENTIONS:  - Monitor Blood Glucose as ordered  - Assess for signs and symptoms of hyperglycemia and hypoglycemia  - Administer ordered medications to maintain glucose within target range  - Assess nutritional intake and initiate nutrition service referral as needed  Outcome: Progressing     Problem: SKIN/TISSUE INTEGRITY - ADULT  Goal: Skin Integrity remains intact(Skin Breakdown Prevention)  Description: Assess:  -Perform Matthew assessment every   -Clean and moisturize skin every   -Inspect skin when repositioning, toileting, and assisting with ADLS  -Assess under medical devices such as  every   -Assess extremities for adequate circulation and sensation     Bed Management:  -Have minimal linens on bed & keep smooth, unwrinkled  -Change linens as needed when moist or perspiring  -Avoid sitting or lying in one position for more than  hours while in bed  -Keep HOB at degrees     Toileting:  -Offer bedside commode  -Assess for incontinence every   -Use incontinent care products after each incontinent episode such as     Activity:  -Mobilize patient  times a day  -Encourage activity and walks on unit  -Encourage or provide ROM exercises   -Turn and reposition patient every  Hours  -Use appropriate equipment to lift or move patient in bed  -Instruct/ Assist with weight shifting every  when out of bed in chair  -Consider limitation of chair time hour intervals    Skin Care:  -Avoid use of baby powder, tape, friction and shearing, hot water or constrictive clothing  -Relieve pressure over bony prominences using   -Do not massage red bony areas    Next Steps:  -Teach patient strategies to minimize risks such as    -Consider consults to  interdisciplinary teams such as   Outcome: Progressing  Goal: Incision(s), wounds(s) or drain site(s) healing without S/S of infection  Description: INTERVENTIONS  - Assess and document dressing, incision, wound bed, drain sites and surrounding tissue  - Provide patient and family education  - Perform skin care/dressing changes every   Outcome: Progressing     Problem: HEMATOLOGIC - ADULT  Goal: Maintains hematologic stability  Description: INTERVENTIONS  - Assess for signs and symptoms of bleeding or hemorrhage  - Monitor labs  - Administer supportive blood products/factors as ordered and appropriate  Outcome: Progressing     Problem: MUSCULOSKELETAL - ADULT  Goal: Maintain or return mobility to safest level of function  Description: INTERVENTIONS:  - Assess patient's ability to carry out ADLs; assess patient's baseline for ADL function and identify physical deficits which impact ability to perform ADLs (bathing, care of mouth/teeth, toileting, grooming, dressing, etc )  - Assess/evaluate cause of self-care deficits   - Assess range of motion  - Assess patient's mobility  - Assess patient's need for assistive devices and provide as appropriate  - Encourage maximum independence but intervene and supervise when necessary  - Involve family in performance of ADLs  - Assess for home care needs following discharge   - Consider OT consult to assist with ADL evaluation and planning for discharge  - Provide patient education as appropriate  Outcome: Progressing  Goal: Maintain proper alignment of affected body part  Description: INTERVENTIONS:  - Support, maintain and protect limb and body alignment  - Provide patient/ family with appropriate education  Outcome: Progressing     Problem: Potential for Falls  Goal: Patient will remain free of falls  Description: INTERVENTIONS:  - Educate patient/family on patient safety including physical limitations  - Instruct patient to call for assistance with activity   - Consult OT/PT to assist with strengthening/mobility   - Keep Call bell within reach  - Keep bed low and locked with side rails adjusted as appropriate  - Keep care items and personal belongings within reach  - Initiate and maintain comfort rounds  - Make Fall Risk Sign visible to staff  - Offer Toileting every Hours, in advance of need  - Initiate/Maintain alarm  - Obtain necessary fall risk management equipment  - Apply yellow socks and bracelet for high fall risk patients  - Consider moving patient to room near nurses station  Outcome: Progressing     Problem: Nutrition/Hydration-ADULT  Goal: Nutrient/Hydration intake appropriate for improving, restoring or maintaining nutritional needs  Description: Monitor and assess patient's nutrition/hydration status for malnutrition  Collaborate with interdisciplinary team and initiate plan and interventions as ordered  Monitor patient's weight and dietary intake as ordered or per policy  Utilize nutrition screening tool and intervene as necessary  Determine patient's food preferences and provide high-protein, high-caloric foods as appropriate       INTERVENTIONS:  - Monitor oral intake, urinary output, labs, and treatment plans  - Assess nutrition and hydration status and recommend course of action  - Evaluate amount of meals eaten  - Assist patient with eating if necessary   - Allow adequate time for meals  - Recommend/ encourage appropriate diets, oral nutritional supplements, and vitamin/mineral supplements  - Order, calculate, and assess calorie counts as needed  - Recommend, monitor, and adjust tube feedings and TPN/PPN based on assessed needs  - Assess need for intravenous fluids  - Provide specific nutrition/hydration education as appropriate  - Include patient/family/caregiver in decisions related to nutrition  Outcome: Progressing     Problem: MOBILITY - ADULT  Goal: Maintain or return to baseline ADL function  Description: INTERVENTIONS:  -  Assess patient's ability to carry out ADLs; assess patient's baseline for ADL function and identify physical deficits which impact ability to perform ADLs (bathing, care of mouth/teeth, toileting, grooming, dressing, etc )  - Assess/evaluate cause of self-care deficits   - Assess range of motion  - Assess patient's mobility; develop plan if impaired  - Assess patient's need for assistive devices and provide as appropriate  - Encourage maximum independence but intervene and supervise when necessary  - Involve family in performance of ADLs  - Assess for home care needs following discharge   - Consider OT consult to assist with ADL evaluation and planning for discharge  - Provide patient education as appropriate  Outcome: Progressing  Goal: Maintains/Returns to pre admission functional level  Description: INTERVENTIONS:  - Perform BMAT or MOVE assessment daily    - Set and communicate daily mobility goal to care team and patient/family/caregiver  - Collaborate with rehabilitation services on mobility goals if consulted  - Perform Range of Motion  times a day  - Reposition patient every  hours    - Dangle patient  times a day  - Stand patient  times a day  - Ambulate patient  times a day  - Out of bed to chair  times a day   - Out of bed for meals  times a day  - Out of bed for toileting  - Record patient progress and toleration of activity level   Outcome: Progressing

## 2022-10-22 NOTE — DISCHARGE SUMMARY
[facility]  Discharge- Ashanti Hicksta 1955, 69 y o  female MRN: [id number]  Unit/Bed#: [place] [address] [id number] Encounter: [id number]  Primary Care Provider: Samara Shepard DO   Date and time admitted to hospital: 10/19/2022  3:16 PM    * Severe sepsis [facility])  Assessment & Plan  · Leukocytosis, tachycardia, tachypnea  · Lactic acid trended down  · Source likely right leg cellulitis  · Urinalysis negative for UTI  · CT scan commented on resolving pneumonia from prior admission  · Blood cultures negative x48h  · Will discharge on keflex to complete course      Acute metabolic encephalopathy  Assessment & Plan  · From cellulitis and sepsis  · Improved    Chronic diastolic heart failure (HCC)  Assessment & Plan  Wt Readings from Last 3 Encounters:   10/22/22 96 8 kg (213 lb 6 5 oz)   09/23/22 97 5 kg (215 lb)   09/02/22 97 6 kg (215 lb 2 7 oz)       · Not exacerbated  Most recent echo was reviewed    · Continue metoprolol    History of pulmonary embolism  Assessment & Plan  · IVC filter in place  · Patient continues to refuse lower extremity Doppler    Cellulitis of right lower extremity  Assessment & Plan  · She has chronic lymphedema of the right leg and has had recurring cellulitis here because of her immunocompromised state  · Continue with IV antibiotics  · Follow-up cultures  · CT scan revealed right lower extremity subcutaneous edema and skin thickening indicating nonspecific cellulitis without drainable collection    Elevated troponin  Assessment & Plan  · Troponin elevated 79 - 104 - 51  · This is likely from sinus tachycardia on admission from sepsis  · Patient denies any chest pain  · EKG reveals sinus tachycardia    Benign essential hypertension  Assessment & Plan  · Continue home medications    Wegener's granulomatosis with renal involvement ([facility] [address] )  Assessment & Plan  BUN   Date Value Ref Range Status   10/21/2022 21 5 - 25 mg/dL Final   10/20/2022 26 (H) 5 - 25 mg/dL Final 10/19/2022 25 5 - 25 mg/dL Final     Creatinine   Date Value Ref Range Status   10/21/2022 1 00 0 60 - 1 30 mg/dL Final     Comment:     Standardized to IDMS reference method   10/20/2022 1 20 0 60 - 1 30 mg/dL Final     Comment:     Standardized to IDMS reference method   10/19/2022 1 37 (H) 0 60 - 1 30 mg/dL Final     Comment:     Standardized to IDMS reference method     · She has completed treatment for Wegener's granulomatosis in the outpatient setting and follows with Hematology-Oncology  · Elevated serum creatinine likely from sepsis, this does not meet RUCHI criteria      Transition of Care Discharge Summary - [facility]    Patient Information: Evens Will 69 y o  female MRN: [id number]  Unit/Bed#: [place] [address] [facility] [id number] Encounter: [id number]    Discharging Physician / Practitioner: Landry Kaminski  PCP: Jesi Grigsby DO  Admission Date: 10/19/2022  Discharge Date: 10/22/22    Disposition:      Other: home      Reason for Admission: sepsis    Discharge Diagnoses:     Principal Problem:    Severe sepsis ([facility] [address] )  Active Problems:    Acute metabolic encephalopathy    Wegener's granulomatosis with renal involvement ([facility] [address] )    Benign essential hypertension    Elevated troponin    Cellulitis of right lower extremity    History of pulmonary embolism    Ch[facility] failure ([facility] )  Resolved Problems:    * No resolved hospital problems  *      Consultations During Hospital Stay:  · IP CONSULT TO CASE MANAGEMENT  · IP CONSULT TO PHARMACY      Procedures Performed:     · none    Medication Adjustments and Discharge Medications:  · Medication Dosing Tapers - Please refer to Discharge Medication List for details on any medication dosing tapers (if applicable to patient)  · Discharge Medication List: See after visit summary for reconciled discharge medications  Wound Care Recommendations:  When applicable, please see wound care section of After Visit Summary      Diet Recommendations at Discharge:  Diet -        Diet Orders   (From admission, onward)             Start     Ordered    10/19/22 2001  Diet Regular; Regular House  Diet effective now        References:    Nutrtion Support Algorithm Enteral vs  Parenteral   Question Answer Comment   Diet Type Regular    Regular Regular House    RD to adjust diet per protocol? Yes        10/19/22 2000              Fluid Restriction - No Fluid Restriction at Discharge  Significant Findings / Test Results:     CT chest abdomen pelvis wo contrast    Result Date: 10/19/2022  Impression: 1  Groundglass opacities and mild scarring apical regions of the upper lobes likely representing sequela of prior pneumonia  Persistent or superimposed new infection not excluded  2   No evidence of acute intra-abdominal or pelvic process  Workstation performed: [id number]     CT head without contrast    Result Date: 10/19/2022  Impression: No acute intracranial abnormality  Workstation performed: [id number]     CT lower extremity wo contrast right    Result Date: 10/20/2022  · Impression: Right lower extremity subcutaneous edema and skin thickening indicating nonspecific cellulitis without drainable collection in this unenhanced study  No soft tissue emphysema or osseous destructive change to indicate osteomyelitis  Workstation performed: [id number]       Hospital Course:     Antonette Farrar is a 69 y o  female patient who originally presented to the hospital on 10/19/2022 due to altered mental status  Patient has history of acquired hypogammaglobulinemia, Wegener's, CKD, obesity, arthritis, GERD, ovarian cancer, depression, hypertension lives at home with her   Patient met sepsis criteria likely secondary to lower extremity cellulitis  Was treated with IV abx and symptoms improved, She will be discharged home today on oral antibiotics to complete course  Please see above problem list for further details        Condition at Discharge: good     Discharge Day Visit / Exam:     Subjective:  Patient seen and examined at bedside, denies any complaints    Vitals: Blood Pressure: 105/68 (10/22/22 0754)  Pulse: 71 (10/22/22 0754)  Temperature: (!) 97 3 °F (36 3 °C) (10/22/22 0754)  Temp Source: Oral (10/21/22 0800)  Respirations: 17 (10/22/22 0754)  Height: 5' 3" (160 cm) (10/19/22 1510)  Weight - Scale: 96 8 kg (213 lb 6 5 oz) (10/22/22 0541)  SpO2: 96 % (10/22/22 0754)    Physical Exam:    Constitutional: Patient is oriented to person, place and time, no acute distress  HEENT:  Normocephalic, atraumatic  Cardiovascular: Normal S1S2, RRR, No murmurs/rubs/gallops appreciated  Pulmonary:  Bilateral air entry, No rhonchi/rales/wheezing appreciated  Abdominal: Soft, Bowel sounds present, Non-tender, Non-distended  Extremities:  No cyanosis, clubbing or edema  Neurological: Cranial nerves II-XII grossly intact, sensation intact, otherwise no focal neurological symptoms  Bilateral lower extremities with dressing in place    Discharge instructions/Information to patient and family:   See after visit summary section titled Discharge Instructions for information provided to patient and family  Planned Readmission: no      Discharge Statement:  I spent 35 minutes discharging the patient  This time was spent on the day of discharge  I had direct contact with the patient on the day of discharge  Greater than 50% of the total time was spent examining patient, answering all patient questions, arranging and discussing plan of care with patient as well as directly providing post-discharge instructions  Additional time then spent on discharge activities      ** Please Note: This note has been constructed using a voice recognition system **

## 2022-10-22 NOTE — ASSESSMENT & PLAN NOTE
Wt Readings from Last 3 Encounters:   10/22/22 96 8 kg (213 lb 6 5 oz)   09/23/22 97 5 kg (215 lb)   09/02/22 97 6 kg (215 lb 2 7 oz)       · Not exacerbated  Most recent echo was reviewed    · Continue metoprolol  · Daily weights

## 2022-10-25 LAB
BACTERIA BLD CULT: NORMAL
BACTERIA BLD CULT: NORMAL

## 2022-11-03 ENCOUNTER — TELEPHONE (OUTPATIENT)
Dept: NEPHROLOGY | Facility: CLINIC | Age: 67
End: 2022-11-03

## 2022-11-03 DIAGNOSIS — N18.31 STAGE 3A CHRONIC KIDNEY DISEASE (HCC): ICD-10-CM

## 2022-11-03 DIAGNOSIS — M31.31 GRANULOMATOSIS WITH POLYANGIITIS WITH RENAL INVOLVEMENT (HCC): ICD-10-CM

## 2022-11-03 DIAGNOSIS — N18.30 STAGE 3 CHRONIC KIDNEY DISEASE, UNSPECIFIED WHETHER STAGE 3A OR 3B CKD (HCC): Primary | ICD-10-CM

## 2022-11-03 DIAGNOSIS — I10 BENIGN ESSENTIAL HYPERTENSION: ICD-10-CM

## 2022-11-03 NOTE — TELEPHONE ENCOUNTER
Called patient and left a voicemail asking patient to have blood work drawn before the follow up appointment

## 2022-11-03 NOTE — TELEPHONE ENCOUNTER
----- Message from Jer Thompson, DO sent at 11/3/2022  4:31 PM EDT -----  Renal function panel, urinalysis with micro, urine for microalbumin to creatinine ratio  Thank you   ----- Message -----  From: Bob Ocampo  Sent: 11/1/2022   4:15 PM EDT  To: Jer Thompson, DO    What labs would sorinu like on this patient before the follow up on 11/10?

## 2022-11-09 ENCOUNTER — OFFICE VISIT (OUTPATIENT)
Dept: OBGYN CLINIC | Facility: CLINIC | Age: 67
End: 2022-11-09

## 2022-11-09 ENCOUNTER — APPOINTMENT (OUTPATIENT)
Dept: RADIOLOGY | Facility: AMBULARY SURGERY CENTER | Age: 67
End: 2022-11-09
Attending: ORTHOPAEDIC SURGERY

## 2022-11-09 VITALS
WEIGHT: 213 LBS | DIASTOLIC BLOOD PRESSURE: 82 MMHG | BODY MASS INDEX: 37.74 KG/M2 | HEART RATE: 78 BPM | HEIGHT: 63 IN | SYSTOLIC BLOOD PRESSURE: 145 MMHG

## 2022-11-09 DIAGNOSIS — M25.571 PAIN IN JOINT INVOLVING RIGHT ANKLE AND FOOT: ICD-10-CM

## 2022-11-09 DIAGNOSIS — M19.171 POST-TRAUMATIC ARTHRITIS OF RIGHT ANKLE: Primary | ICD-10-CM

## 2022-11-09 DIAGNOSIS — Q66.6 CONGENITAL HINDFOOT VALGUS: ICD-10-CM

## 2022-11-09 DIAGNOSIS — M14.671 CHARCOT'S JOINT OF RIGHT FOOT: ICD-10-CM

## 2022-11-09 DIAGNOSIS — M19.071 ARTHRITIS OF RIGHT SUBTALAR JOINT: ICD-10-CM
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
## 2022-11-09 NOTE — PATIENT INSTRUCTIONS
Today, We recommended a brace/prosthesis for you  [facility] certified pedorthotists make orthotics but not braces or prosthesis  Below are the companies in the area that make these, Please call ahead for an appointment and to ensure the company accepts your insurance  Make sure to bring the prescription given to you in the office today to the company for the brace/prosthesis  [place]  [address] [place]  [address] [facility] [address] Phone: [phone]  [address] [facility] Fax: [phone]    [place]  [address] [facility]  [address]  [place], [address]  (By Appointment Only)  Directions  [address] [facility], [address] [place] [address] [address]  [place] Phone: [phone]  [place] Fax: [address] [facility]  [address]   [address] [facility]  [phone] (fax)    [facility]  [address] [facility], [address] [facility],[address]  [place], [facility]  [phone] (fax)    [facility]  [address] [facility], [address] [place] [address]  [phone] (fax)    [facility] Location  [address]  250 [facility]  [phone] (fax)    [facility]  [address] , [address], [place]   [phone] (fax)

## 2022-11-10 ENCOUNTER — OFFICE VISIT (OUTPATIENT)
Dept: NEPHROLOGY | Facility: CLINIC | Age: 67
End: 2022-11-10

## 2022-11-10 ENCOUNTER — TELEPHONE (OUTPATIENT)
Dept: OBGYN CLINIC | Facility: HOSPITAL | Age: 67
End: 2022-11-10

## 2022-11-10 VITALS
BODY MASS INDEX: 37.74 KG/M2 | DIASTOLIC BLOOD PRESSURE: 72 MMHG | SYSTOLIC BLOOD PRESSURE: 130 MMHG | WEIGHT: 213 LBS | HEIGHT: 63 IN

## 2022-11-10 DIAGNOSIS — I50.32 CHRONIC DIASTOLIC HEART FAILURE (HCC): Chronic | ICD-10-CM

## 2022-11-10 DIAGNOSIS — M31.31 WEGENER'S GRANULOMATOSIS WITH RENAL INVOLVEMENT (HCC): ICD-10-CM

## 2022-11-10 DIAGNOSIS — N18.30 STAGE 3 CHRONIC KIDNEY DISEASE, UNSPECIFIED WHETHER STAGE 3A OR 3B CKD (HCC): Primary | ICD-10-CM

## 2022-11-10 NOTE — PROGRESS NOTES
NEPHROLOGY OUTPATIENT PROGRESS NOTE   Jaclyn Coats 69 y o  female MRN: [id number]  Reason for visit: CKD    ASSESSMENT and PLAN:  1  Chronic kidney disease, stage III, baseline creatinine 1 0-1 2 most recent creatinine 1 0 estimated GFR 57 previous etiology most likely secondary to Wegener's granulomatosis  2  Wegener's granulomatosis, following closely with rheumatology currently not on suppressive treatment currently  3  Recent hospitalization secondary to right lower extremity cellulitis   4  Anemia of chronic kidney disease previous hemoglobin while in the hospital 10 5  5  Chronic lymphedema, currently appears stable    Overall renal function remains stable   Blood pressure and volume status acceptable   Patient has anticipate repeat labs this week for rheumatology  No changes from Nephrology standpoint  1 year with repeat labs at that time    SUBJECTIVE / INTERVAL HISTORY:  Unfortunately was recently hospitalized secondary to cellulitis  Prior to that was hospitalized secondary to pneumonia  Currently feels well  No reports of chest pain or shortness of breath  Denies abdominal pain  Lower extremity swelling is stable  OBJECTIVE:  /72 (BP Location: Left arm, Patient Position: Sitting, Cuff Size: Large)   Ht 5' 3" (1 6 m)   Wt 96 6 kg (213 lb)   BMI 37 73 kg/m²   Vitals:    11/10/22 1151   Weight: 96 6 kg (213 lb)       Physical Exam  Constitutional:       Appearance: She is obese  She is not ill-appearing  Eyes:      General: No scleral icterus  Cardiovascular:      Rate and Rhythm: Normal rate and regular rhythm  Pulmonary:      Effort: Pulmonary effort is normal       Breath sounds: Normal breath sounds  Abdominal:      General: There is no distension  Palpations: Abdomen is soft  Musculoskeletal:      Right lower leg: Edema present  Left lower leg: Edema present  Skin:     General: Skin is warm and dry  Findings: No rash     Neurological:      Mental Status: She is alert and oriented to person, place, and time  Medications:    Current Outpatient Medications:   •  acetaminophen (TYLENOL) 325 mg tablet, Take 2 tablets by mouth every 6 (six) hours as needed for mild pain, Disp: 30 tablet, Rfl: 0  •  bisacodyl (DULCOLAX) 5 mg EC tablet, Take 5 mg by mouth daily as needed for constipation, Disp: , Rfl:   •  Calcium Carbonate-Vit D-Min (CALCIUM 1200 PO), Take 3 tablets by mouth daily , Disp: , Rfl:   •  DULoxetine (CYMBALTA) 60 mg delayed release capsule, Take 60 mg by mouth daily  , Disp: , Rfl: 0  •  LORazepam (ATIVAN) 0 5 mg tablet, Take 1 tablet (0 5 mg total) by mouth 2 (two) times a day as needed for anxiety Take 1 tablet as needed every 8 hours  (Patient taking differently: Take 0 5 mg by mouth 2 (two) times a day as needed for anxiety Take 1 tablet as needed every 8 hours  Patient takes every 12 hours), Disp: , Rfl:   •  metoprolol tartrate (LOPRESSOR) 25 mg tablet, TAKE 1/2 TABLET(12 5MG) BY MOUTH EVERY 12 HOURS (Patient taking differently: 25 mg every 12 (twelve) hours), Disp: 90 tablet, Rfl: 3  •  Multiple Vitamins-Minerals (MULTIVITAMIN ADULT PO), Take 1 capsule by mouth daily , Disp: , Rfl:   •  omeprazole (PriLOSEC) 20 mg delayed release capsule, Take 20 mg by mouth daily  , Disp: , Rfl:   •  oxyCODONE (OxyCONTIN) 40 mg 12 hr tablet, Take 40 mg by mouth every 12 (twelve) hours, Disp: , Rfl:   •  OXYCODONE HCL PO, Take 15 mg by mouth 2 (two) times a day as needed (breakthrough pain), Disp: , Rfl:   •  senna-docusate sodium (SENOKOT S) 8 6-50 mg per tablet, Take 1 tablet by mouth daily at bedtime, Disp: 30 tablet, Rfl: 0  •  TOVIAZ 8 MG TB24, Take 8 mg by mouth daily at bedtime , Disp: , Rfl:     Laboratory Results:  Results for orders placed or performed during the hospital encounter of 10/19/22   FLU/RSV/COVID - if FLU/RSV clinically relevant    Specimen: Nose; Nares   Result Value Ref Range    SARS-CoV-2 Negative Negative    INFLUENZA A PCR Negative Negative INFLUENZA B PCR Negative Negative    RSV PCR Negative Negative   Blood culture #1    Specimen: Hand, Left; Blood   Result Value Ref Range    Blood Culture No Growth After 5 Days  Blood culture #2    Specimen: Arm, Right; Blood   Result Value Ref Range    Blood Culture No Growth After 5 Days      CBC and differential   Result Value Ref Range    WBC 19 21 (H) 4 31 - 10 16 Thousand/uL    RBC 4 75 3 81 - 5 12 Million/uL    Hemoglobin 14 3 11 5 - 15 4 g/dL    Hematocrit 44 7 34 8 - 46 1 %    MCV 94 82 - 98 fL    MCH 30 1 26 8 - 34 3 pg    MCHC 32 0 31 4 - 37 4 g/dL    RDW 14 2 11 6 - 15 1 %    MPV 9 3 8 9 - 12 7 fL    Platelets 552 547 - 662 Thousands/uL   UA w Reflex to Microscopic w Reflex to Culture    Specimen: Urine, Straight Cath   Result Value Ref Range    Color, UA Yellow     Clarity, UA Clear     Specific [place], UA 1 010 1 003 - 1 030    pH, UA 7 0 4 5, 5 0, 5 5, 6 0, 6 5, 7 0, 7 5, 8 0    Leukocytes, UA Negative Negative    Nitrite, UA Negative Negative    Protein, UA Negative Negative mg/dl    Glucose, UA Negative Negative mg/dl    Ketones, UA Negative Negative mg/dl    Urobilinogen, UA 0 2 0 2, 1 0 E U /dl E U /dl    Bilirubin, UA Negative Negative    Occult Blood, UA Negative Negative   Comprehensive metabolic panel   Result Value Ref Range    Sodium 139 135 - 147 mmol/L    Potassium 4 2 3 5 - 5 3 mmol/L    Chloride 100 96 - 108 mmol/L    CO2 29 21 - 32 mmol/L    ANION GAP 10 4 - 13 mmol/L    BUN 25 5 - 25 mg/dL    Creatinine 1 37 (H) 0 60 - 1 30 mg/dL    Glucose 136 65 - 140 mg/dL    Calcium 9 7 8 3 - 10 1 mg/dL     (H) 5 - 45 U/L    ALT 67 12 - 78 U/L    Alkaline Phosphatase 194 (H) 46 - 116 U/L    Total Protein 8 7 (H) 6 4 - 8 4 g/dL    Albumin 3 9 3 5 - 5 0 g/dL    Total Bilirubin 1 08 (H) 0 20 - 1 00 mg/dL    eGFR 39 ml/min/1 73sq m   TSH   Result Value Ref Range    TSH 3RD GENERATON 1 298 0 450 - 4 500 uIU/mL   Lactic acid   Result Value Ref Range    LACTIC ACID 3 6 (HH) 0 5 - 2 0 mmol/L HS Troponin 0hr (reflex protocol)   Result Value Ref Range    hs TnI 0hr 79 (H) "Refer to ACS Flowchart"- see link ng/L   Ethanol   Result Value Ref Range    Ethanol Lvl <3 0 - 3 mg/dL   Lactic acid 2 Hours   Result Value Ref Range    LACTIC ACID 4 7 (HH) 0 5 - 2 0 mmol/L   HS Troponin I 2hr   Result Value Ref Range    hs TnI 2hr 104 (H) "Refer to ACS Flowchart"- see link ng/L    Delta 2hr hsTnI 25 (H) <20 ng/L   HS Troponin I 4hr   Result Value Ref Range    hs TnI 4hr 51 (H) "Refer to ACS Flowchart"- see link ng/L    Delta 4hr hsTnI -28 <20 ng/L   Lactic acid, plasma   Result Value Ref Range    LACTIC ACID 3 6 (HH) 0 5 - 2 0 mmol/L   Lactic acid 2 Hours   Result Value Ref Range    LACTIC ACID 1 7 0 5 - 2 0 mmol/L   CBC   Result Value Ref Range    WBC 20 09 (H) 4 31 - 10 16 Thousand/uL    RBC 3 97 3 81 - 5 12 Million/uL    Hemoglobin 11 9 11 5 - 15 4 g/dL    Hematocrit 37 9 34 8 - 46 1 %    MCV 96 82 - 98 fL    MCH 30 0 26 8 - 34 3 pg    MCHC 31 4 31 4 - 37 4 g/dL    RDW 14 6 11 6 - 15 1 %    Platelets 602 313 - 152 Thousands/uL    MPV 10 4 8 9 - 12 7 fL   Comprehensive metabolic panel   Result Value Ref Range    Sodium 138 135 - 147 mmol/L    Potassium 3 7 3 5 - 5 3 mmol/L    Chloride 102 96 - 108 mmol/L    CO2 24 21 - 32 mmol/L    ANION GAP 12 4 - 13 mmol/L    BUN 26 (H) 5 - 25 mg/dL    Creatinine 1 20 0 60 - 1 30 mg/dL    Glucose 83 65 - 140 mg/dL    Calcium 9 0 8 3 - 10 1 mg/dL    Corrected Calcium 10 0 8 3 - 10 1 mg/dL    AST 77 (H) 5 - 45 U/L    ALT 55 12 - 78 U/L    Alkaline Phosphatase 142 (H) 46 - 116 U/L    Total Protein 6 7 6 4 - 8 4 g/dL    Albumin 2 8 (L) 3 5 - 5 0 g/dL    Total Bilirubin 1 05 (H) 0 20 - 1 00 mg/dL    eGFR 46 ml/min/1 73sq m   CBC and differential   Result Value Ref Range    WBC 11 25 (H) 4 31 - 10 16 Thousand/uL    RBC 3 53 (L) 3 81 - 5 12 Million/uL    Hemoglobin 10 5 (L) 11 5 - 15 4 g/dL    Hematocrit 33 0 (L) 34 8 - 46 1 %    MCV 94 82 - 98 fL    MCH 29 7 26 8 - 34 3 pg    MCHC 31 8 31 4 - 37 4 g/dL    RDW 14 7 11 6 - 15 1 %    MPV 10 2 8 9 - 12 7 fL    Platelets 389 (L) 650 - 390 Thousands/uL   Basic metabolic panel   Result Value Ref Range    Sodium 135 135 - 147 mmol/L    Potassium 3 3 (L) 3 5 - 5 3 mmol/L    Chloride 103 96 - 108 mmol/L    CO2 27 21 - 32 mmol/L    ANION GAP 5 4 - 13 mmol/L    BUN 21 5 - 25 mg/dL    Creatinine 1 00 0 60 - 1 30 mg/dL    Glucose 123 65 - 140 mg/dL    Calcium 8 7 8 3 - 10 1 mg/dL    eGFR 57 ml/min/1 73sq m   ECG 12 lead   Result Value Ref Range    Ventricular Rate 98 BPM    Atrial Rate 98 BPM    AZ Interval 152 ms    QRSD Interval 86 ms    QT Interval 342 ms    QTC Interval 436 ms    P Axis 41 degrees    QRS Axis 9 degrees    T Wave Axis 18 degrees   ECG 12 lead   Result Value Ref Range    Ventricular Rate 101 BPM    Atrial Rate 101 BPM    AZ Interval 154 ms    QRSD Interval 92 ms    QT Interval 342 ms    QTC Interval 443 ms    P [place] 51 degrees    QRS Axis 22 degrees    T Wave Axis 35 degrees   Fingerstick Glucose (POCT)   Result Value Ref Range    POC Glucose 128 65 - 140 mg/dl   Manual Differential(PHLEBS Do Not Order)   Result Value Ref Range    Segmented % 79 (H) 43 - 75 %    Bands % 12 (H) 0 - 8 %    Lymphocytes % 6 (L) 14 - 44 %    Monocytes % 3 (L) 4 - 12 %    Eosinophils, % 0 0 - 6 %    Basophils % 0 0 - 1 %    Absolute Neutrophils 17 48 (H) 1 85 - 7 62 Thousand/uL    Lymphocytes Absolute 1 15 0 60 - 4 47 Thousand/uL    Monocytes Absolute 0 58 0 00 - 1 22 Thousand/uL    Eosinophils Absolute 0 00 0 00 - 0 40 Thousand/uL    Basophils Absolute 0 00 0 00 - 0 10 Thousand/uL    Total Counted      RBC Morphology Normal     Platelet Estimate Adequate Adequate   Manual Differential(PHLEBS Do Not Order)   Result Value Ref Range    Segmented % 93 (H) 43 - 75 %    Lymphocytes % 6 (L) 14 - 44 %    Monocytes % 1 (L) 4 - 12 %    Eosinophils, % 0 0 - 6 %    Basophils % 0 0 - 1 %    Absolute Neutrophils 10 46 (H) 1 85 - 7 62 Thousand/uL    Lymphocytes Absolute 0  68 0 60 - 4 47 Thousand/uL    Monocytes Absolute 0 11 0 00 - 1 22 Thousand/uL    Eosinophils Absolute 0 00 0 00 - 0 40 Thousand/uL    Basophils Absolute 0 00 0 00 - 0 10 Thousand/uL    Total Counted      RBC Morphology Normal     Platelet Estimate Adequate Adequate

## 2022-11-10 NOTE — TELEPHONE ENCOUNTER
Caller: [phone]    Doctor/Office: Lachman/Masood    CB#:n/a      What needs to be faxed: Ada Green to: Lindsay Diana    Fax#: [phone]

## 2022-11-18 ENCOUNTER — TELEPHONE (OUTPATIENT)
Dept: HEMATOLOGY ONCOLOGY | Facility: CLINIC | Age: 67
End: 2022-11-18

## 2022-11-18 NOTE — TELEPHONE ENCOUNTER
Appointment Cancellation Or Reschedule     Person calling in Patient    If other than patient calling, are they listed on the communication consent form? na   Provider Tobin Cornejo   Office Visit Date and Time 11/18/22 @ 3pm   Office Visit Location [place]   Did patient want to reschedule their office appointment? If so, when was it scheduled to? Yes    Did you have STAR scheduled for this appointment? No   Do you need STAR set up for your new appointment? If yes, please send to "PATIENT RIDESHARE" pool for STAR rescheduling No   If you are cancelling appointment, can we notify STAR to cancel ride? If yes, please send to "PATIENT RIDESHARE" pool for STAR to cancel service NA   Is this patient calling to reschedule an infusion appointment? No   When is their next infusion appointment? NA   Is this patient a Chemo patient? No   Reason for Cancellation or Reschedule Patient is unable to keep appointment     If the patient is a treatment patient, please route this to the office nurse  If the patient is not on treatment, please route to the office MA  If the patient is a surgical oncology patient, please route to surg/onc clinical pool

## 2022-11-19 DIAGNOSIS — I42.2 HYPERTROPHIC CARDIOMYOPATHY (HCC): ICD-10-CM

## 2022-11-19 DIAGNOSIS — I10 BENIGN ESSENTIAL HYPERTENSION: ICD-10-CM

## 2022-11-28 ENCOUNTER — APPOINTMENT (EMERGENCY)
Dept: CT IMAGING | Facility: HOSPITAL | Age: 67
End: 2022-11-28

## 2022-11-28 ENCOUNTER — HOSPITAL ENCOUNTER (INPATIENT)
Facility: HOSPITAL | Age: 67
LOS: 9 days | Discharge: NURSING, REHAB OR LONG-TERM CARE | End: 2022-12-07
Attending: EMERGENCY MEDICINE | Admitting: HOSPITALIST

## 2022-11-28 ENCOUNTER — APPOINTMENT (EMERGENCY)
Dept: RADIOLOGY | Facility: HOSPITAL | Age: 67
End: 2022-11-28

## 2022-11-28 DIAGNOSIS — F41.9 ANXIETY: ICD-10-CM

## 2022-11-28 DIAGNOSIS — R65.10 SIRS (SYSTEMIC INFLAMMATORY RESPONSE SYNDROME) (HCC): ICD-10-CM

## 2022-11-28 DIAGNOSIS — L03.115 CELLULITIS OF RIGHT LOWER EXTREMITY: ICD-10-CM

## 2022-11-28 DIAGNOSIS — R50.9 FEVER: Primary | ICD-10-CM

## 2022-11-28 DIAGNOSIS — M25.551 RIGHT HIP PAIN: ICD-10-CM

## 2022-11-28 DIAGNOSIS — R78.81 MSSA BACTEREMIA: ICD-10-CM

## 2022-11-28 DIAGNOSIS — R41.0 CONFUSION: ICD-10-CM

## 2022-11-28 DIAGNOSIS — B95.61 MSSA BACTEREMIA: ICD-10-CM

## 2022-11-28 LAB
ALBUMIN SERPL BCP-MCNC: 3.9 G/DL (ref 3.5–5)
ALP SERPL-CCNC: 174 U/L (ref 46–116)
ALT SERPL W P-5'-P-CCNC: 58 U/L (ref 12–78)
ANION GAP SERPL CALCULATED.3IONS-SCNC: 8 MMOL/L (ref 4–13)
APTT PPP: 39 SECONDS (ref 23–37)
AST SERPL W P-5'-P-CCNC: 91 U/L (ref 5–45)
BASOPHILS # BLD AUTO: 0.02 THOUSANDS/ÂΜL (ref 0–0.1)
BASOPHILS NFR BLD AUTO: 0 % (ref 0–1)
BILIRUB SERPL-MCNC: 0.68 MG/DL (ref 0.2–1)
BILIRUB UR QL STRIP: NEGATIVE
BILIRUB UR QL STRIP: NEGATIVE
BUN SERPL-MCNC: 19 MG/DL (ref 5–25)
CALCIUM SERPL-MCNC: 9.2 MG/DL (ref 8.3–10.1)
CHLORIDE SERPL-SCNC: 98 MMOL/L (ref 96–108)
CLARITY UR: CLEAR
CLARITY UR: CLEAR
CO2 SERPL-SCNC: 30 MMOL/L (ref 21–32)
COLOR UR: YELLOW
COLOR UR: YELLOW
CREAT SERPL-MCNC: 1.2 MG/DL (ref 0.6–1.3)
EOSINOPHIL # BLD AUTO: 0.05 THOUSAND/ÂΜL (ref 0–0.61)
EOSINOPHIL NFR BLD AUTO: 0 % (ref 0–6)
ERYTHROCYTE [DISTWIDTH] IN BLOOD BY AUTOMATED COUNT: 13.8 % (ref 11.6–15.1)
FLUAV RNA RESP QL NAA+PROBE: NEGATIVE
FLUBV RNA RESP QL NAA+PROBE: NEGATIVE
GFR SERPL CREATININE-BSD FRML MDRD: 46 ML/MIN/1.73SQ M
GLUCOSE SERPL-MCNC: 119 MG/DL (ref 65–140)
GLUCOSE UR STRIP-MCNC: NEGATIVE MG/DL
GLUCOSE UR STRIP-MCNC: NEGATIVE MG/DL
HCT VFR BLD AUTO: 38 % (ref 34.8–46.1)
HGB BLD-MCNC: 12.3 G/DL (ref 11.5–15.4)
HGB UR QL STRIP.AUTO: NEGATIVE
HGB UR QL STRIP.AUTO: NEGATIVE
IMM GRANULOCYTES # BLD AUTO: 0.04 THOUSAND/UL (ref 0–0.2)
IMM GRANULOCYTES NFR BLD AUTO: 0 % (ref 0–2)
INR PPP: 1.16 (ref 0.84–1.19)
KETONES UR STRIP-MCNC: NEGATIVE MG/DL
KETONES UR STRIP-MCNC: NEGATIVE MG/DL
LACTATE SERPL-SCNC: 1.7 MMOL/L (ref 0.5–2)
LEUKOCYTE ESTERASE UR QL STRIP: NEGATIVE
LEUKOCYTE ESTERASE UR QL STRIP: NEGATIVE
LYMPHOCYTES # BLD AUTO: 1.25 THOUSANDS/ÂΜL (ref 0.6–4.47)
LYMPHOCYTES NFR BLD AUTO: 9 % (ref 14–44)
MCH RBC QN AUTO: 30.4 PG (ref 26.8–34.3)
MCHC RBC AUTO-ENTMCNC: 32.4 G/DL (ref 31.4–37.4)
MCV RBC AUTO: 94 FL (ref 82–98)
MONOCYTES # BLD AUTO: 1.47 THOUSAND/ÂΜL (ref 0.17–1.22)
MONOCYTES NFR BLD AUTO: 11 % (ref 4–12)
NEUTROPHILS # BLD AUTO: 10.98 THOUSANDS/ÂΜL (ref 1.85–7.62)
NEUTS SEG NFR BLD AUTO: 80 % (ref 43–75)
NITRITE UR QL STRIP: NEGATIVE
NITRITE UR QL STRIP: NEGATIVE
NRBC BLD AUTO-RTO: 0 /100 WBCS
PH UR STRIP.AUTO: 7 [PH]
PH UR STRIP.AUTO: 7 [PH] (ref 4.5–8)
PLATELET # BLD AUTO: 204 THOUSANDS/UL (ref 149–390)
PMV BLD AUTO: 9.4 FL (ref 8.9–12.7)
POTASSIUM SERPL-SCNC: 4.4 MMOL/L (ref 3.5–5.3)
PROCALCITONIN SERPL-MCNC: 1.83 NG/ML
PROT SERPL-MCNC: 8.1 G/DL (ref 6.4–8.4)
PROT UR STRIP-MCNC: NEGATIVE MG/DL
PROT UR STRIP-MCNC: NEGATIVE MG/DL
PROTHROMBIN TIME: 14.8 SECONDS (ref 11.6–14.5)
RBC # BLD AUTO: 4.04 MILLION/UL (ref 3.81–5.12)
RSV RNA RESP QL NAA+PROBE: NEGATIVE
SARS-COV-2 RNA RESP QL NAA+PROBE: NEGATIVE
SODIUM SERPL-SCNC: 136 MMOL/L (ref 135–147)
SP GR UR STRIP.AUTO: 1.02 (ref 1–1.03)
SP GR UR STRIP.AUTO: 1.02 (ref 1–1.03)
UROBILINOGEN UR QL STRIP.AUTO: 0.2 E.U./DL
UROBILINOGEN UR QL STRIP.AUTO: 0.2 E.U./DL
WBC # BLD AUTO: 13.81 THOUSAND/UL (ref 4.31–10.16)

## 2022-11-28 RX ORDER — ACETAMINOPHEN 325 MG/1
650 TABLET ORAL ONCE
Status: COMPLETED | OUTPATIENT
Start: 2022-11-28 | End: 2022-11-28

## 2022-11-28 RX ADMIN — ACETAMINOPHEN 650 MG: 325 TABLET ORAL at 16:10

## 2022-11-28 RX ADMIN — CEFTRIAXONE SODIUM 2000 MG: 2 INJECTION, POWDER, FOR SOLUTION INTRAMUSCULAR; INTRAVENOUS at 21:20

## 2022-11-28 RX ADMIN — SODIUM CHLORIDE 1000 ML: 0.9 INJECTION, SOLUTION INTRAVENOUS at 18:40

## 2022-11-28 RX ADMIN — ACYCLOVIR SODIUM 525 MG: 50 INJECTION, SOLUTION INTRAVENOUS at 23:37

## 2022-11-28 RX ADMIN — ACETAMINOPHEN 650 MG: 325 TABLET ORAL at 18:37

## 2022-11-28 RX ADMIN — OXYCODONE HYDROCHLORIDE 15 MG: 10 TABLET ORAL at 19:35

## 2022-11-28 NOTE — ED NOTES
Two RN's attempted to obtain IV access unsuccessfully  Pt taken to xray and RN will attempt US IV access        Jose Maria Salomon, JUDSON  11/28/22 1401
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
## 2022-11-29 ENCOUNTER — TELEPHONE (OUTPATIENT)
Dept: RADIOLOGY | Facility: HOSPITAL | Age: 67
End: 2022-11-29

## 2022-11-29 ENCOUNTER — APPOINTMENT (INPATIENT)
Dept: NON INVASIVE DIAGNOSTICS | Facility: HOSPITAL | Age: 67
End: 2022-11-29

## 2022-11-29 LAB
ALBUMIN SERPL BCP-MCNC: 3.6 G/DL (ref 3.5–5)
ALP SERPL-CCNC: 172 U/L (ref 46–116)
ALT SERPL W P-5'-P-CCNC: 48 U/L (ref 12–78)
ANION GAP SERPL CALCULATED.3IONS-SCNC: 18 MMOL/L (ref 4–13)
APICAL FOUR CHAMBER EJECTION FRACTION: 61 %
AST SERPL W P-5'-P-CCNC: 61 U/L (ref 5–45)
BACTERIA UR QL AUTO: ABNORMAL /HPF
BASOPHILS # BLD AUTO: 0.04 THOUSANDS/ÂΜL (ref 0–0.1)
BASOPHILS NFR BLD AUTO: 0 % (ref 0–1)
BILIRUB SERPL-MCNC: 1.31 MG/DL (ref 0.2–1)
BILIRUB UR QL STRIP: NEGATIVE
BUN SERPL-MCNC: 18 MG/DL (ref 5–25)
CALCIUM SERPL-MCNC: 9.4 MG/DL (ref 8.3–10.1)
CHLORIDE SERPL-SCNC: 99 MMOL/L (ref 96–108)
CLARITY UR: CLEAR
CO2 SERPL-SCNC: 16 MMOL/L (ref 21–32)
COLOR UR: YELLOW
CREAT SERPL-MCNC: 1.05 MG/DL (ref 0.6–1.3)
EOSINOPHIL # BLD AUTO: 0 THOUSAND/ÂΜL (ref 0–0.61)
EOSINOPHIL NFR BLD AUTO: 0 % (ref 0–6)
ERYTHROCYTE [DISTWIDTH] IN BLOOD BY AUTOMATED COUNT: 13.8 % (ref 11.6–15.1)
FRACTIONAL SHORTENING: 34 (ref 28–44)
GFR SERPL CREATININE-BSD FRML MDRD: 54 ML/MIN/1.73SQ M
GLUCOSE SERPL-MCNC: 124 MG/DL (ref 65–140)
GLUCOSE UR STRIP-MCNC: NEGATIVE MG/DL
HCT VFR BLD AUTO: 43.3 % (ref 34.8–46.1)
HGB BLD-MCNC: 13.9 G/DL (ref 11.5–15.4)
HGB UR QL STRIP.AUTO: NEGATIVE
IMM GRANULOCYTES # BLD AUTO: 0.14 THOUSAND/UL (ref 0–0.2)
IMM GRANULOCYTES NFR BLD AUTO: 1 % (ref 0–2)
INTERVENTRICULAR SEPTUM IN DIASTOLE (PARASTERNAL SHORT AXIS VIEW): 1.5 CM
INTERVENTRICULAR SEPTUM: 1.5 CM (ref 0.6–1.1)
KETONES UR STRIP-MCNC: NEGATIVE MG/DL
LEFT INTERNAL DIMENSION IN SYSTOLE: 2.5 CM (ref 2.1–4)
LEFT VENTRICULAR INTERNAL DIMENSION IN DIASTOLE: 3.8 CM (ref 3.5–6)
LEFT VENTRICULAR POSTERIOR WALL IN END DIASTOLE: 1.3 CM
LEFT VENTRICULAR STROKE VOLUME: 42 ML
LEUKOCYTE ESTERASE UR QL STRIP: NEGATIVE
LVSV (TEICH): 42 ML
LYMPHOCYTES # BLD AUTO: 1.29 THOUSANDS/ÂΜL (ref 0.6–4.47)
LYMPHOCYTES NFR BLD AUTO: 8 % (ref 14–44)
MAGNESIUM SERPL-MCNC: 2.1 MG/DL (ref 1.6–2.6)
MCH RBC QN AUTO: 29.6 PG (ref 26.8–34.3)
MCHC RBC AUTO-ENTMCNC: 32.1 G/DL (ref 31.4–37.4)
MCV RBC AUTO: 92 FL (ref 82–98)
MONOCYTES # BLD AUTO: 1.16 THOUSAND/ÂΜL (ref 0.17–1.22)
MONOCYTES NFR BLD AUTO: 7 % (ref 4–12)
NEUTROPHILS # BLD AUTO: 13.78 THOUSANDS/ÂΜL (ref 1.85–7.62)
NEUTS SEG NFR BLD AUTO: 84 % (ref 43–75)
NITRITE UR QL STRIP: NEGATIVE
NON-SQ EPI CELLS URNS QL MICRO: ABNORMAL /HPF
NRBC BLD AUTO-RTO: 0 /100 WBCS
PH UR STRIP.AUTO: 7 [PH]
PLATELET # BLD AUTO: 180 THOUSANDS/UL (ref 149–390)
PMV BLD AUTO: 9.7 FL (ref 8.9–12.7)
POTASSIUM SERPL-SCNC: 4.5 MMOL/L (ref 3.5–5.3)
PROCALCITONIN SERPL-MCNC: 3.32 NG/ML
PROT SERPL-MCNC: 8.4 G/DL (ref 6.4–8.4)
PROT UR STRIP-MCNC: ABNORMAL MG/DL
RBC # BLD AUTO: 4.7 MILLION/UL (ref 3.81–5.12)
RBC #/AREA URNS AUTO: ABNORMAL /HPF
SL CV LV EF: 65
SL CV PED ECHO LEFT VENTRICLE DIASTOLIC VOLUME (MOD BIPLANE) 2D: 64 ML
SL CV PED ECHO LEFT VENTRICLE SYSTOLIC VOLUME (MOD BIPLANE) 2D: 22 ML
SODIUM SERPL-SCNC: 133 MMOL/L (ref 135–147)
SP GR UR STRIP.AUTO: 1.01 (ref 1–1.03)
TR MAX PG: 32 MMHG
TR PEAK VELOCITY: 2.8 M/S
TRICUSPID VALVE PEAK REGURGITATION VELOCITY: 2.82 M/S
UROBILINOGEN UR QL STRIP.AUTO: 0.2 E.U./DL
WBC # BLD AUTO: 16.41 THOUSAND/UL (ref 4.31–10.16)
WBC #/AREA URNS AUTO: ABNORMAL /HPF

## 2022-11-29 RX ORDER — OXYCODONE HYDROCHLORIDE 10 MG/1
10 TABLET ORAL EVERY 4 HOURS PRN
Status: DISCONTINUED | OUTPATIENT
Start: 2022-11-29 | End: 2022-12-07 | Stop reason: HOSPADM

## 2022-11-29 RX ORDER — ACETAMINOPHEN 325 MG/1
650 TABLET ORAL EVERY 6 HOURS PRN
Status: DISCONTINUED | OUTPATIENT
Start: 2022-11-29 | End: 2022-12-07 | Stop reason: HOSPADM

## 2022-11-29 RX ORDER — SODIUM CHLORIDE, SODIUM LACTATE, POTASSIUM CHLORIDE, CALCIUM CHLORIDE 600; 310; 30; 20 MG/100ML; MG/100ML; MG/100ML; MG/100ML
125 INJECTION, SOLUTION INTRAVENOUS CONTINUOUS
Status: DISCONTINUED | OUTPATIENT
Start: 2022-11-29 | End: 2022-11-30

## 2022-11-29 RX ORDER — LORAZEPAM 1 MG/1
0.5 TABLET ORAL EVERY 8 HOURS PRN
Status: DISCONTINUED | OUTPATIENT
Start: 2022-11-29 | End: 2022-12-07 | Stop reason: HOSPADM

## 2022-11-29 RX ORDER — CEFAZOLIN SODIUM 2 G/50ML
2000 SOLUTION INTRAVENOUS EVERY 8 HOURS
Status: DISCONTINUED | OUTPATIENT
Start: 2022-11-29 | End: 2022-12-07 | Stop reason: HOSPADM

## 2022-11-29 RX ORDER — DIPHENHYDRAMINE HCL 25 MG
50 TABLET ORAL
Status: DISCONTINUED | OUTPATIENT
Start: 2022-11-29 | End: 2022-12-07 | Stop reason: HOSPADM

## 2022-11-29 RX ORDER — HYDROMORPHONE HCL/PF 1 MG/ML
0.5 SYRINGE (ML) INJECTION EVERY 4 HOURS PRN
Status: DISCONTINUED | OUTPATIENT
Start: 2022-11-29 | End: 2022-11-29

## 2022-11-29 RX ORDER — PANTOPRAZOLE SODIUM 20 MG/1
20 TABLET, DELAYED RELEASE ORAL
Status: DISCONTINUED | OUTPATIENT
Start: 2022-11-29 | End: 2022-12-07 | Stop reason: HOSPADM

## 2022-11-29 RX ORDER — METHYLPREDNISOLONE 16 MG/1
32 TABLET ORAL
Status: COMPLETED | OUTPATIENT
Start: 2022-11-29 | End: 2022-11-29

## 2022-11-29 RX ORDER — AMOXICILLIN 250 MG
1 CAPSULE ORAL
Status: DISCONTINUED | OUTPATIENT
Start: 2022-11-29 | End: 2022-12-07 | Stop reason: HOSPADM

## 2022-11-29 RX ORDER — OXYBUTYNIN CHLORIDE 5 MG/1
5 TABLET, EXTENDED RELEASE ORAL DAILY
Status: DISCONTINUED | OUTPATIENT
Start: 2022-11-29 | End: 2022-12-07 | Stop reason: HOSPADM

## 2022-11-29 RX ORDER — DULOXETIN HYDROCHLORIDE 60 MG/1
60 CAPSULE, DELAYED RELEASE ORAL DAILY
Status: DISCONTINUED | OUTPATIENT
Start: 2022-11-29 | End: 2022-12-07 | Stop reason: HOSPADM

## 2022-11-29 RX ORDER — HYDROMORPHONE HCL/PF 1 MG/ML
1 SYRINGE (ML) INJECTION EVERY 4 HOURS PRN
Status: DISCONTINUED | OUTPATIENT
Start: 2022-11-29 | End: 2022-12-06

## 2022-11-29 RX ORDER — OXYCODONE HCL 20 MG/1
40 TABLET, FILM COATED, EXTENDED RELEASE ORAL EVERY 12 HOURS SCHEDULED
Status: DISCONTINUED | OUTPATIENT
Start: 2022-11-29 | End: 2022-12-07 | Stop reason: HOSPADM

## 2022-11-29 RX ADMIN — CEFTRIAXONE SODIUM 2000 MG: 2 INJECTION, POWDER, FOR SOLUTION INTRAMUSCULAR; INTRAVENOUS at 09:13

## 2022-11-29 RX ADMIN — ACYCLOVIR SODIUM 525 MG: 50 INJECTION, SOLUTION INTRAVENOUS at 06:52

## 2022-11-29 RX ADMIN — DIPHENHYDRAMINE HCL 50 MG: 25 TABLET, COATED ORAL at 23:30

## 2022-11-29 RX ADMIN — SODIUM CHLORIDE, SODIUM LACTATE, POTASSIUM CHLORIDE, AND CALCIUM CHLORIDE 125 ML/HR: 600; 310; 30; 20 INJECTION, SOLUTION INTRAVENOUS at 05:06

## 2022-11-29 RX ADMIN — OXYBUTYNIN CHLORIDE 5 MG: 5 TABLET, EXTENDED RELEASE ORAL at 08:58

## 2022-11-29 RX ADMIN — CEFAZOLIN SODIUM 2000 MG: 2 SOLUTION INTRAVENOUS at 12:21

## 2022-11-29 RX ADMIN — OXYCODONE HYDROCHLORIDE 40 MG: 20 TABLET, FILM COATED, EXTENDED RELEASE ORAL at 08:58

## 2022-11-29 RX ADMIN — ACETAMINOPHEN 650 MG: 325 TABLET, FILM COATED ORAL at 16:59

## 2022-11-29 RX ADMIN — SENNOSIDES AND DOCUSATE SODIUM 1 TABLET: 8.6; 5 TABLET ORAL at 22:33

## 2022-11-29 RX ADMIN — METOPROLOL TARTRATE 25 MG: 25 TABLET, FILM COATED ORAL at 20:26

## 2022-11-29 RX ADMIN — CEFAZOLIN SODIUM 2000 MG: 2 SOLUTION INTRAVENOUS at 20:26

## 2022-11-29 RX ADMIN — DULOXETINE 60 MG: 60 CAPSULE, DELAYED RELEASE ORAL at 08:57

## 2022-11-29 RX ADMIN — OXYCODONE HYDROCHLORIDE 40 MG: 20 TABLET, FILM COATED, EXTENDED RELEASE ORAL at 20:26

## 2022-11-29 RX ADMIN — ACETAMINOPHEN 650 MG: 325 TABLET, FILM COATED ORAL at 06:49

## 2022-11-29 RX ADMIN — LORAZEPAM 0.5 MG: 1 TABLET ORAL at 22:36

## 2022-11-29 RX ADMIN — METHYLPREDNISOLONE 32 MG: 16 TABLET ORAL at 22:33

## 2022-11-29 RX ADMIN — OXYCODONE HYDROCHLORIDE 15 MG: 10 TABLET ORAL at 16:48

## 2022-11-29 RX ADMIN — VANCOMYCIN HYDROCHLORIDE 1750 MG: 1 INJECTION, POWDER, LYOPHILIZED, FOR SOLUTION INTRAVENOUS at 00:42

## 2022-11-29 RX ADMIN — SODIUM CHLORIDE, SODIUM LACTATE, POTASSIUM CHLORIDE, AND CALCIUM CHLORIDE 125 ML/HR: 600; 310; 30; 20 INJECTION, SOLUTION INTRAVENOUS at 16:53

## 2022-11-29 RX ADMIN — PANTOPRAZOLE SODIUM 20 MG: 20 TABLET, DELAYED RELEASE ORAL at 06:48

## 2022-11-29 RX ADMIN — METOPROLOL TARTRATE 25 MG: 25 TABLET, FILM COATED ORAL at 08:57

## 2022-11-29 RX ADMIN — METHYLPREDNISOLONE 32 MG: 16 TABLET ORAL at 12:19

## 2022-11-29 RX ADMIN — HYDROMORPHONE HYDROCHLORIDE 1 MG: 1 INJECTION, SOLUTION INTRAMUSCULAR; INTRAVENOUS; SUBCUTANEOUS at 10:55

## 2022-11-29 NOTE — TELEPHONE ENCOUNTER
Jassi texted ordering provider to let them know we do not do these and to place an "IR Consult" order  Also, asked provider to cx this order   TCW 11/29/22 8485

## 2022-11-29 NOTE — ASSESSMENT & PLAN NOTE
· Acute metabolic encephalopathy secondary to acute infection    · Mentation has improved and spouse reports is near baseline  · Management of underlying infection as outlined below

## 2022-11-29 NOTE — ASSESSMENT & PLAN NOTE
· Sepsis with MSSA bacteremia  · Suspicion is right hip infection  · Being seen by infectious disease    · Awaiting further imaging of right hip and antibiotics tailored to cefazolin

## 2022-11-29 NOTE — PROGRESS NOTES
Ashwini Valdivia is a 69 y o  female who is currently ordered Vancomycin IV with management by the Pharmacy Consult service  Relevant clinical data and objective / subjective history reviewed  Vancomycin Assessment:  Indication and Goal AUC/Trough: CNS infection (goal -600, trough 15-20), -600, trough 15-20    Micro:   pending  Renal Function:  SCr: 1 2 mg/dL  CrCl: 48 2 mL/min  Renal replacement: Not on dialysis  Days of Therapy: 1  Current Dose: 1750 mg IV loading dose  Vancomycin Plan:  New Dosin mg IV every 12 hours  Estimated AUC: 519 mcg*hr/mL  Estimated Trough: 18 2 mcg/mL  Next Level: 0600 on 2022  Renal Function Monitoring: Daily BMP and [place] will continue to follow closely for s/sx of nephrotoxicity, infusion reactions and appropriateness of therapy  BMP and CBC will be ordered per protocol  We will continue to follow the patient’s culture results and clinical progress daily      Julissa Morales, Pharmacist

## 2022-11-29 NOTE — ASSESSMENT & PLAN NOTE
· Chronic hip pain on oxycodone  · Confirmed on   Continue 40 mg b i d  Loni Prows   · Given bacteremia, checking CT with contrast but needs prep for contrast allergy

## 2022-11-29 NOTE — ASSESSMENT & PLAN NOTE
I spoke with her  on the phone  He feels that right hip pain is chronic and she did not take her power fall pain medications including OxyContin today so he thinks that is all as that is going on with the hip    I do not see any signs of infection at the hip right now  She had a total body CT scan in the ER and that just showed postoperative changes and degeneration of her right hip joint    But did not look acutely abnormal

## 2022-11-29 NOTE — ASSESSMENT & PLAN NOTE
It does seem like every time she gets infected somewhere she also gets sepsis with delirium  Last admission this fall was cellulitis of the leg, and previous admission in last spring was pneumonia both with sepsis and delirium    No evidence of cellulitis now and no evidence of pneumonia    I think likely this is a viral illness  Flu, RSV, COVID testing was negative today  I would like to get a spinal tap which I will order to be done by IR  Will cover her with Rocephin, vancomycin and acyclovir to cover bacterial meningitis and encephalitis  Although I think these are probably less likely  This is probably just a viral illness causing his severe fever and confusion    She was having trouble giving a urine sample even with straight cath likely due to profound dehydration    But UTI is another possible source
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
## 2022-11-29 NOTE — ED CARE HANDOFF
Emergency Department Sign Out Note        Sign out and transfer of care from Dr Darryl Hernández  See Separate Emergency Department note  The patient, Sapphire Cat, was evaluated by the previous provider for fever/confusion  Workup Completed:  X-ray    ED Course / Workup Pending (followup):  Labs, urine, admit      21:00 Patient evaluated  Labs showing leukocytosis  Urine clean  Her source of infection is typically either cellulitis or urine, however both ruled out at this time  She offers no other complaints/infectious sx  Added COVID/Flu/RSV testing and CT scans to look for possible source  Will cover with broad spectrum abx, ultimately will be admitted            ED Course as of 11/30/22 2227 Mon Nov 28, 2022   2000 Received in sign out- fever/confusion starting today typical of when she gets infections, pending labs and urine and to admit   2312 No source of infection found on broad workup, cannot rule out meningitis  Unable to perform LP in ED given pt unable to get into position for procedure (pt screaming in pain when legs bent to get straight cath earlier) and unable to bend hip at baseline   Will treat empirically for meningitis/encephalitis- already received 2g Ceftriaxone, added Vanco and Acyclovir     Procedures  MDM        Disposition  Final diagnoses:   Fever   Confusion     Time reflects when diagnosis was documented in both MDM as applicable and the Disposition within this note     Time User Action Codes Description Comment    11/28/2022 10:54 PM Anselm Legacy A Add [R50 9] Fever     11/28/2022 11:12 PM Elex Linker Add [R41 0] Confusion     11/28/2022 11:51 PM Kristin Arriaga Add [E53 736] Cellulitis of right lower extremity     11/30/2022  2:26 PM Mayur Lynne Add [R65 10] SIRS (systemic inflammatory response syndrome) [facility])       ED Disposition     ED Disposition   Admit    Condition   Stable    Date/Time   Mon Nov 28, 2022 10:54 PM    Comment   Case was discussed with SLIM and the patient's admission status was agreed to be Admission Status: inpatient status to the service of Dr Nuris Muñoz   Follow-up Information    None       Current Discharge Medication List      CONTINUE these medications which have NOT CHANGED    Details   DULoxetine (CYMBALTA) 60 mg delayed release capsule Take 60 mg by mouth daily    Refills: 0      LORazepam (ATIVAN) 0 5 mg tablet Take 1 tablet (0 5 mg total) by mouth 2 (two) times a day as needed for anxiety Take 1 tablet as needed every 8 hours  Associated Diagnoses: Anxiety      oxyCODONE (OxyCONTIN) 40 mg 12 hr tablet Take 40 mg by mouth every 12 (twelve) hours      TOVIAZ 8 MG TB24 Take 8 mg by mouth daily at bedtime       acetaminophen (TYLENOL) 325 mg tablet Take 2 tablets by mouth every 6 (six) hours as needed for mild pain  Qty: 30 tablet, Refills: 0      bisacodyl (DULCOLAX) 5 mg EC tablet Take 5 mg by mouth daily as needed for constipation      Calcium Carbonate-Vit D-Min (CALCIUM 1200 PO) Take 3 tablets by mouth daily       metoprolol tartrate (LOPRESSOR) 25 mg tablet Take 1 tablet (25 mg total) by mouth every 12 (twelve) hours  Qty: 90 tablet, Refills: 1    Associated Diagnoses: Benign essential hypertension; Hypertrophic cardiomyopathy (HCC)      Multiple Vitamins-Minerals (MULTIVITAMIN ADULT PO) Take 1 capsule by mouth daily       omeprazole (PriLOSEC) 20 mg delayed release capsule Take 20 mg by mouth daily  OXYCODONE HCL PO Take 15 mg by mouth 2 (two) times a day as needed (breakthrough pain)      senna-docusate sodium (SENOKOT S) 8 6-50 mg per tablet Take 1 tablet by mouth daily at bedtime  Qty: 30 tablet, Refills: 0    Associated Diagnoses: Slow transit constipation           No discharge procedures on file         ED Provider  Electronically Signed by     Zoe Hernandez DO  11/30/22 9101

## 2022-11-29 NOTE — ASSESSMENT & PLAN NOTE
Presents with acute confusion onset day  Has a high fever 102  No one else is sick around her  She has had her flu shots and COVID shots  Previous admissions for cellulitis and pneumonia with sepsis and a similar picture of confusion, but this time has no cellulitis nor pneumonia    Check blood cultures, urine cultures  Tried to get a lumbar puncture but she was complaining of pain in her right hip which is chronic and she did not take her pain medications today which include OxyContin    She was unable to tolerate laying on her side for the lumbar puncture    I would still cover her with antibiotics for meningitis and herpes encephalitis until we can get a lumbar puncture    Ask Infectious Disease to see her    They also had a trouble getting a urine sample so could still be UTI and she could be extremely dehydrated

## 2022-11-29 NOTE — QUICK NOTE
Attempted to bring patient to IR for lumbar puncture today  Patient is in severe pain and restless due to not having home pain medication for chronic hip pain   at bedside  Family would like to hold off on LP at this time to allow patient to stabilize after having home medications  Discussed with primary Dr Mal Rivera  Will reevaluate and potentially perform this afternoon/tomorrow if family would like to proceed      Shmuel Carlos PA-C

## 2022-11-29 NOTE — ASSESSMENT & PLAN NOTE
· Previously on rituximab and prednisone    · Renal function stable    Results from last 7 days   Lab Units 11/29/22  0621 11/28/22  1835   BUN mg/dL 18 19   CREATININE mg/dL 1 05 1 20   EGFR ml/min/1 73sq m 54 46

## 2022-11-29 NOTE — PLAN OF CARE
Problem: Potential for Falls  Goal: Patient will remain free of falls  Description: INTERVENTIONS:  - Educate patient/family on patient safety including physical limitations  - Instruct patient to call for assistance with activity   - Consult OT/PT to assist with strengthening/mobility   - Keep Call bell within reach  - Keep bed low and locked with side rails adjusted as appropriate  - Keep care items and personal belongings within reach  - Initiate and maintain comfort rounds  - Make Fall Risk Sign visible to staff  - Offer Toileting every 2 Hours, in advance of need  - Initiate/Maintain bed alarm  - Apply yellow socks and bracelet for high fall risk patients  - Consider moving patient to room near nurses station  Outcome: Progressing     Problem: Prexisting or High Potential for Compromised Skin Integrity  Goal: Skin integrity is maintained or improved  Description: INTERVENTIONS:  - Identify patients at risk for skin breakdown  - Assess and monitor skin integrity  - Assess and monitor nutrition and hydration status  - Monitor labs   - Assess for incontinence   - Turn and reposition patient  - Assist with mobility/ambulation  - Relieve pressure over bony prominences  - Avoid friction and shearing  - Provide appropriate hygiene as needed including keeping skin clean and dry  - Evaluate need for skin moisturizer/barrier cream  - Collaborate with interdisciplinary team   - Patient/family teaching  - Consider wound care consult   Outcome: Progressing     Problem: MOBILITY - ADULT  Goal: Maintain or return to baseline ADL function  Description: INTERVENTIONS:  -  Assess patient's ability to carry out ADLs; assess patient's baseline for ADL function and identify physical deficits which impact ability to perform ADLs (bathing, care of mouth/teeth, toileting, grooming, dressing, etc )  - Assess/evaluate cause of self-care deficits   - Assess range of motion  - Assess patient's mobility; develop plan if impaired  - Assess patient's need for assistive devices and provide as appropriate  - Encourage maximum independence but intervene and supervise when necessary  - Involve family in performance of ADLs  - Assess for home care needs following discharge   - Consider OT consult to assist with ADL evaluation and planning for discharge  - Provide patient education as appropriate  Outcome: Progressing  Goal: Maintains/Returns to pre admission functional level  Description: INTERVENTIONS:  - Perform BMAT or MOVE assessment daily    - Set and communicate daily mobility goal to care team and patient/family/caregiver     - Collaborate with rehabilitation services on mobility goals if consulted  - Out of bed for toileting  - Record patient progress and toleration of activity level   Outcome: Progressing
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
## 2022-11-29 NOTE — ED NOTES
This RN attempted straight cath at this time  Pt complaining of right hip pain  Provider ordered pain meds  This RN will re-evaluate pt's pain level and re-attempt cath        Marianela Blum RN  11/28/22 1945

## 2022-11-29 NOTE — PROGRESS NOTES
[facility]  Progress Note - Sapphire Cat 1955, 69 y o  female MRN: [id number]  Unit/Bed#: E5 -01 Encounter: [id number]  Primary Care Provider: Ami Ann DO   Date and time admitted to hospital: 11/28/2022  5:08 PM    * Acute metabolic encephalopathy  Assessment & Plan  · Acute metabolic encephalopathy secondary to acute infection  · Mentation has improved and spouse reports is near baseline  · Management of underlying infection as outlined below    Sepsis ([facility] )  Assessment & Plan  · Sepsis with MSSA bacteremia  · Suspicion is right hip infection  · Being seen by infectious disease  · Awaiting further imaging of right hip and antibiotics tailored to cefazolin    Right hip pain  Assessment & Plan  · Chronic hip pain on oxycodone  · Confirmed on   Continue 40 mg b i d  Dara Soulier · Given bacteremia, checking CT with contrast but needs prep for contrast allergy    Cancer of ovary ([facility] )  Assessment & Plan  · History of ovarian cancer    Wegener's granulomatosis with renal involvement ([facility] )  Assessment & Plan  · Previously on rituximab and prednisone  · Renal function stable    Results from last 7 days   Lab Units 11/29/22  0621 11/28/22  1835   BUN mg/dL 18 19   CREATININE mg/dL 1 05 1 20   EGFR ml/min/1 73sq m 54 46       VTE Pharmacologic Prophylaxis: VTE Score: 5 High Risk (Score >/= 5) - Pharmacological DVT Prophylaxis Ordered: Start Lovenox  Sequential Compression Devices Ordered  Patient Centered Rounds: I have performed bedside rounds with nursing staff today  Discussions with Specialists or Other Care Team Provider:  Infectious Disease and case management    Education and Discussions with Family / Patient: Updated  () at bedside  Time Spent for Care: 25 mins  More than 50% of total time spent on counseling and coordination of care as described above      Current Length of Stay: 1 day(s)  Current Patient Status: Inpatient   Certification Statement: The patient will continue to require additional inpatient hospital stay due to bacteremia  Discharge Plan / Estimated Discharge Date: Anticipate discharge in >72 hrs to discharge location to be determined pending rehab evaluations  Code Status: Level 1 - Full Code      Subjective:   Patient seen and examined  Still having lot of right hip pain  Mentation closer to/or at baseline now per spouse    Objective:   Vitals: Blood pressure 117/67, pulse 97, temperature 100 1 °F (37 8 °C), resp  rate 20, height 5' 3" (1 6 m), weight 96 6 kg (213 lb), SpO2 91 %  Intake/Output Summary (Last 24 hours) at 11/29/2022 1503  Last data filed at 11/29/2022 0041  Gross per 24 hour   Intake 1150 ml   Output --   Net 1150 ml       Physical Exam  Vitals reviewed  Constitutional:       General: She is not in acute distress  Appearance: Normal appearance  HENT:      Head: Atraumatic  Eyes:      General: No scleral icterus  Extraocular Movements: Extraocular movements intact  Cardiovascular:      Rate and Rhythm: Regular rhythm  Pulmonary:      Breath sounds: No wheezing  Abdominal:      General: Bowel sounds are normal       Palpations: Abdomen is soft  Tenderness: There is no guarding or rebound  Musculoskeletal:         General: Tenderness (Right hip) present  No swelling  Skin:     General: Skin is warm  Neurological:      Mental Status: She is alert  Mental status is at baseline     Psychiatric:         Mood and Affect: Mood normal        Additional Data:   Labs:  Results from last 7 days   Lab Units 11/29/22 0621 11/28/22 2120 11/28/22  1835   WBC Thousand/uL 16 41*  --  13 81*   HEMOGLOBIN g/dL 13 9  --  12 3   PLATELETS Thousands/uL 180  --  204   MCV fL 92  --  94   INR   --  1 16  --      Results from last 7 days   Lab Units 11/29/22  0621 11/28/22  1835   SODIUM mmol/L 133* 136   POTASSIUM mmol/L 4 5 4 4   CHLORIDE mmol/L 99 98   CO2 mmol/L 16* 30   ANION GAP mmol/L 18* 8   BUN mg/dL 18 19   CREATININE mg/dL 1 05 1 20   CALCIUM mg/dL 9 4 9 2   ALBUMIN g/dL 3 6 3 9   TOTAL BILIRUBIN mg/dL 1 31* 0 68   ALK PHOS U/L 172* 174*   ALT U/L 48 58   AST U/L 61* 91*   EGFR ml/min/1 73sq m 54 46   GLUCOSE RANDOM mg/dL 124 119     Results from last 7 days   Lab Units 11/29/22  0621   MAGNESIUM mg/dL 2 1                  Results from last 7 days   Lab Units 11/29/22 0621 11/28/22 2120 11/28/22  1835   LACTIC ACID mmol/L  --   --  1 7   PROCALCITONIN ng/ml 3 32*   < >  --     < > = values in this interval not displayed  * I Have Reviewed All Lab Data Listed Above  Cultures:   Results from last 7 days   Lab Units 11/28/22 2120 11/28/22  1830   GRAM STAIN RESULT   --  Gram positive cocci in clusters*  Gram positive cocci in clusters*   INFLUENZA A PCR  Negative  --        Results from last 7 days   Lab Units 11/28/22 2120   SARS-COV-2  Negative   INFLUENZA A PCR  Negative   INFLUENZA B PCR  Negative   RSV PCR  Negative           Lines/Drains:  Invasive Devices     Peripheral Intravenous Line  Duration           Peripheral IV 11/28/22 Distal;Left;Ventral (anterior) Forearm <1 day              Telemetry:      Imaging:  Imaging Reports Reviewed Today Include:   CT chest abdomen pelvis wo contrast    Result Date: 11/28/2022  Impression: Minimal biapical groundglass opacity with associated scarring, improved from prior recent study dated 10/19/2022 which may reflect residua of prior pneumonia  Correlate clinically to exclude superimposed active infection  No focal consolidation or effusion  No definite acute findings in the abdomen or pelvis within limitations of noncontrast exam  Tiny nonspecific foci of antidependent air within the urinary bladder  Correlate for? Recent instrumentation  Workstation performed: [id number]     XR hip/pelv 2-3 vws right    Result Date: 11/29/2022  Impression: No acute osseous abnormality  Severe osteoarthritis in the right hip   Workstation performed: [id number]     CT head without contrast    Result Date: 11/28/2022  Impression: No acute intracranial abnormality  Workstation performed: [id number]       Scheduled Meds:  Current Facility-Administered Medications   Medication Dose Route Frequency Provider Last Rate   • acetaminophen  650 mg Oral Q6H PRN Gayl Beckers Prechtel, DO     • bisacodyl  5 mg Oral Daily PRN Gayl Beckers Prechtel, DO     • cefazolin  2,000 mg Intravenous Q8H Dorita Crawford, CRNP 2,000 mg (11/29/22 1221)   • diphenhydrAMINE  50 mg Oral 60 Min Pre-Op Kp May DO     • DULoxetine  60 mg Oral Daily Corwin S Prechtel, DO     • HYDROmorphone  1 mg Intravenous Q4H PRN Griffin Sears DO     • lactated ringers  125 mL/hr Intravenous Continuous Gayl Beckers Prechtel,  mL/hr (11/29/22 0506)   • methylPREDNISolone  32 mg Oral Q10H Kp May, DO     • metoprolol tartrate  25 mg Oral Q12H [address] Corwin S Prechtel, DO     • oxybutynin  5 mg Oral Daily Corwin S Prechtel, DO     • oxyCODONE  40 mg Oral Q12H [address] Kp Lalo, DO     • pantoprazole  20 mg Oral Early Morning Corwin S Prechtel, DO     • senna-docusate sodium  1 tablet Oral HS Tommy Babar, DO         Today, Patient Was Seen By: Griffin Sears DO    ** Please Note: Dictation voice to text software may have been used in the creation of this document   **

## 2022-11-29 NOTE — CASE MANAGEMENT
Case Management Assessment & Discharge Planning Note    Patient name Deep Yeh  [place] [address] /E5 MS 18-* MRN [id number]  : 1955 Date 2022       Current Admission Date: 2022  Current Admission Diagnosis:Acute metabolic encephalopathy   Patient Active Problem List    Diagnosis Date Noted   • Right hip pain 2022   • History of pulmonary embolism 2022   • Chronic diastolic heart failure ([facility] ) 2022   • Slow transit constipation 2021   • Chronic right-sided low back pain without sciatica 2021   • Cellulitis of right lower extremity 2021   • Septic shock ([facility] ) 2021   • Morbid obesity ([place] [address] ) 2021   • Hypogammaglobulinemia, acquired ([place] [address] ) 2019   • Hypertrophic cardiomyopathy ([place] [address] ) 2019   • Low immunoglobulin level 2019   • LVH (left ventricular hypertrophy) 2019   • Postnasal drip 2019   • Dyspnea on exertion 2019   • Mitral valve disorder    • Elevated troponin 2019   • Sepsis ([place] [address] ) 2019   • Acute metabolic encephalopathy    • Increased risk of breast cancer    • Other complicated headache syndrome 2018   • Nausea & vomiting 2018   • SIRS (systemic inflammatory response syndrome) ([place] [address] ) 2018   • Post-traumatic osteoarthritis 2018   • Opiate analgesic use agreement exists 2016   • BRCA1 positive 2016   • Charcot's joint 2016   • Class 2 obesity 2016   • Wegener's granulomatosis with renal involvement ([facility] ) 01/15/2016   • Cancer of ovary ([facility] ) 2015   • Ovarian cancer ([facility] ) 2015   • Increased frequency of urination 2014   • Chronic kidney disease, stage III (moderate) ([facility] ) 2014   • Chronic pain disorder 10/24/2013   • GERD (gastroesophageal reflux disease) 2013   • Dyslipidemia 2013   • Lymphedema 11/15/2012   • Anxiety 2012   • Benign essential hypertension 2012   • Depression 06/05/2012      LOS (days): 1  Geometric Mean LOS (GMLOS) (days): 5 00  Days to GMLOS:4 4     OBJECTIVE:    Risk of Unplanned Readmission Score: 16 65         Current admission status: Inpatient       Preferred Pharmacy:   [address], [address]  [address] [facility] [address]  Phone: [phone] Fax: [address] [place], [place] [address] #[id number] - [place] , [place] - [facility] [facility]  [address] [place] [id number]  Phone: [phone] Fax: 1068 [facility] [address] ,  [facility] [address] ,  [facility] [address]  Phone: [phone] Fax: [phone]    Primary Care Provider: Naz Beasley DO    Primary Insurance: MEDICARE  Secondary Insurance: AARP    ASSESSMENT:  Active Health Care Proxies     Adonis, [address] Representative - Spouse   Primary Phone: [phone] [facility]  Home Phone: [phone]               Advance Directives  Does patient have a [address] [facility] [address]?: No  Was patient offered paperwork?: Yes  Does patient currently have a Health Care decision maker?: Yes, please see Health Care Proxy section (Pt's  is next decision maker per [place] Act 169)  Does patient have Advance Directives?: No  Was patient offered paperwork?: Yes  Primary Contact: Viridiana Lamb/ ([phone])    Readmission Root Cause  30 Day Readmission: No    Patient Information  Admitted from[de-identified] Home  Mental Status: Confused  During Assessment patient was accompanied by: Spouse  Assessment information provided by[de-identified] Spouse  Primary Caregiver: Self  Support Systems: Spouse/significant other  [place] of Residence: [address] [place], [place] [address] do you live in?: [address] [place] [address] access options   Select all that apply : Ramp  Type of Current Residence: [place]  In the last 12 months, was there a time when you were not able to pay the mortgage or rent on time?: No  In the last 12 months, how many places have you lived?: 1  In the last 15 months, was there a time when you did not have a steady place to sleep or slept in a shelter (including now)?: No  Homeless/housing insecurity resource given?: N/A  Living Arrangements: Lives w/ Spouse/significant other  Is patient a ?: No    Activities of Daily Living Prior to Admission  Functional Status: Independent  Completes ADLs independently?: Yes  Ambulates independently?: Yes  Does patient use assisted devices?: Yes  Assisted Devices (DME) used: Jordan Reddy  Does patient currently own DME?: Yes  What DME does the patient currently own?: Jordan Reddy  Does patient have a history of Outpatient Therapy (PT/OT)?: Yes  Does the patient have a history of Short-Term Rehab?: Yes  Does patient have a history of HHC?: Yes  Does patient currently have Vipul ?: Yes    Current Home Health Care  Type of Current Home Care Services: Home PT, Home OT, Nurse visit  [address][de-identified] Other (please enter name in comment) ([employer])  [facility] Provider[de-identified] PCP    Patient Information Continued  Income Source: Pension/intermediate  Within the past 12 months, you worried that your food would run out before you got the money to buy more : Never true  Within the past 12 months, the food you bought just didn't last and you didn't have money to get more : Never true  Food insecurity resource given?: N/A  Does patient receive dialysis treatments?: No  Does patient have a history of substance abuse?: No  Does patient have a history of Mental Health Diagnosis?: Yes (Anxiety, depression)  Is patient receiving treatment for mental health?: Yes  Has patient received inpatient treatment related to mental health in the last 2 years?: No    Means of Transportation  [facility] Family transport  In the past 12 months, has lack of transportation kept you from medical appointments or from getting medications?: No  In the past 12 months, has lack of transportation kept you from meetings, work, or from getting things needed for daily living?: No  Was application for public transport provided?: N/A    DISCHARGE DETAILS:    Discharge planning discussed with[de-identified] Pt's , Cheryl Bill of Choice: Yes  Comments - Freedom of Choice: Pt's  would like to resume services with [facility] however is open to the idea of STR if recommended, however would like to hold off on referrals until recommendation is made  CM contacted family/caregiver?: Yes (Assessment completed with  via phone)  Were Treatment Team discharge recommendations reviewed with patient/caregiver?: Yes  Did patient/caregiver verbalize understanding of patient care needs?: Yes  Were patient/caregiver advised of the risks associated with not following Treatment Team discharge recommendations?: Yes    Contacts  Patient Contacts: Amira Lamb/  Relationship to Patient[de-identified] Family  Contact Method: Phone  Phone Number: [phone]  Reason/Outcome: Continuity of Care, Emergency Contact, Referral, Discharge [address]         Is the patient interested in [address] at discharge?: Yes  Via Mack Ortega requested[de-identified] Nursing, Occupational Therapy, Physical [address] Name[de-identified] Other ([place])  [address] [place] [address] Provider[de-identified] PCP  Home Health Services Needed[de-identified] Strengthening/Theraputic Exercises to Improve Function, Evaluate Functional Status and Safety, Gait/ADL Training  Homebound Criteria Met[de-identified] Requires the Assistance of Another Person for Safe Ambulation or to Leave the Home, Uses an Assist Device (i e  cane, walker, etc)  Supporting Clincal Findings[de-identified] Fatigues Easliy in [employer], Limited Endurance    DME Referral Provided  Referral made for DME?: No    Other Referral/Resources/Interventions Provided:  Interventions: [facility]  Referral Comments: Referral for SUSANNAH sent to [address] [place] [address] [facility] via [employer]      Treatment Team Recommendation: Home with [facility]  Discharge Destination Plan[de-identified] Home with Kwasi at Discharge : Family

## 2022-11-30 ENCOUNTER — TELEPHONE (OUTPATIENT)
Dept: HEMATOLOGY ONCOLOGY | Facility: CLINIC | Age: 67
End: 2022-11-30

## 2022-11-30 ENCOUNTER — APPOINTMENT (INPATIENT)
Dept: CT IMAGING | Facility: HOSPITAL | Age: 67
End: 2022-11-30

## 2022-11-30 LAB
ALBUMIN SERPL BCP-MCNC: 2.6 G/DL (ref 3.5–5)
ALP SERPL-CCNC: 123 U/L (ref 46–116)
ALT SERPL W P-5'-P-CCNC: 22 U/L (ref 12–78)
ANION GAP SERPL CALCULATED.3IONS-SCNC: 7 MMOL/L (ref 4–13)
AST SERPL W P-5'-P-CCNC: 22 U/L (ref 5–45)
BILIRUB SERPL-MCNC: 0.54 MG/DL (ref 0.2–1)
BUN SERPL-MCNC: 19 MG/DL (ref 5–25)
CALCIUM ALBUM COR SERPL-MCNC: 10.4 MG/DL (ref 8.3–10.1)
CALCIUM SERPL-MCNC: 9.3 MG/DL (ref 8.3–10.1)
CHLORIDE SERPL-SCNC: 103 MMOL/L (ref 96–108)
CO2 SERPL-SCNC: 24 MMOL/L (ref 21–32)
CREAT SERPL-MCNC: 1 MG/DL (ref 0.6–1.3)
ERYTHROCYTE [DISTWIDTH] IN BLOOD BY AUTOMATED COUNT: 14.3 % (ref 11.6–15.1)
GFR SERPL CREATININE-BSD FRML MDRD: 57 ML/MIN/1.73SQ M
GLUCOSE SERPL-MCNC: 162 MG/DL (ref 65–140)
HCT VFR BLD AUTO: 35.5 % (ref 34.8–46.1)
HGB BLD-MCNC: 11.5 G/DL (ref 11.5–15.4)
MCH RBC QN AUTO: 30.2 PG (ref 26.8–34.3)
MCHC RBC AUTO-ENTMCNC: 32.4 G/DL (ref 31.4–37.4)
MCV RBC AUTO: 93 FL (ref 82–98)
PLATELET # BLD AUTO: 155 THOUSANDS/UL (ref 149–390)
PMV BLD AUTO: 9.8 FL (ref 8.9–12.7)
POTASSIUM SERPL-SCNC: 4.1 MMOL/L (ref 3.5–5.3)
PROT SERPL-MCNC: 6.7 G/DL (ref 6.4–8.4)
RBC # BLD AUTO: 3.81 MILLION/UL (ref 3.81–5.12)
SODIUM SERPL-SCNC: 134 MMOL/L (ref 135–147)
WBC # BLD AUTO: 19.49 THOUSAND/UL (ref 4.31–10.16)

## 2022-11-30 RX ORDER — ENOXAPARIN SODIUM 100 MG/ML
40 INJECTION SUBCUTANEOUS
Status: DISCONTINUED | OUTPATIENT
Start: 2022-11-30 | End: 2022-12-07 | Stop reason: HOSPADM

## 2022-11-30 RX ORDER — IODIXANOL 320 MG/ML
100 INJECTION, SOLUTION INTRAVASCULAR
Status: COMPLETED | OUTPATIENT
Start: 2022-11-30 | End: 2022-11-30

## 2022-11-30 RX ADMIN — METOPROLOL TARTRATE 25 MG: 25 TABLET, FILM COATED ORAL at 09:14

## 2022-11-30 RX ADMIN — CEFAZOLIN SODIUM 2000 MG: 2 SOLUTION INTRAVENOUS at 05:00

## 2022-11-30 RX ADMIN — CEFAZOLIN SODIUM 2000 MG: 2 SOLUTION INTRAVENOUS at 11:42

## 2022-11-30 RX ADMIN — SENNOSIDES AND DOCUSATE SODIUM 1 TABLET: 8.6; 5 TABLET ORAL at 21:44

## 2022-11-30 RX ADMIN — OXYCODONE HYDROCHLORIDE 15 MG: 10 TABLET ORAL at 11:21

## 2022-11-30 RX ADMIN — OXYCODONE HYDROCHLORIDE 40 MG: 20 TABLET, FILM COATED, EXTENDED RELEASE ORAL at 20:49

## 2022-11-30 RX ADMIN — CEFAZOLIN SODIUM 2000 MG: 2 SOLUTION INTRAVENOUS at 20:49

## 2022-11-30 RX ADMIN — PANTOPRAZOLE SODIUM 20 MG: 20 TABLET, DELAYED RELEASE ORAL at 05:07

## 2022-11-30 RX ADMIN — OXYCODONE HYDROCHLORIDE 15 MG: 10 TABLET ORAL at 17:29

## 2022-11-30 RX ADMIN — METOPROLOL TARTRATE 25 MG: 25 TABLET, FILM COATED ORAL at 20:49

## 2022-11-30 RX ADMIN — OXYCODONE HYDROCHLORIDE 40 MG: 20 TABLET, FILM COATED, EXTENDED RELEASE ORAL at 09:14

## 2022-11-30 RX ADMIN — OXYBUTYNIN CHLORIDE 5 MG: 5 TABLET, EXTENDED RELEASE ORAL at 09:14

## 2022-11-30 RX ADMIN — DULOXETINE 60 MG: 60 CAPSULE, DELAYED RELEASE ORAL at 09:14

## 2022-11-30 RX ADMIN — IODIXANOL 100 ML: 320 INJECTION, SOLUTION INTRAVASCULAR at 00:27

## 2022-11-30 RX ADMIN — ACETAMINOPHEN 650 MG: 325 TABLET, FILM COATED ORAL at 11:22

## 2022-11-30 RX ADMIN — ENOXAPARIN SODIUM 40 MG: 40 INJECTION SUBCUTANEOUS at 11:43

## 2022-11-30 NOTE — PROGRESS NOTES
[address] [facility]  Progress Note - Malina Kramer 1955, 69 y o  female MRN: [id number]  Unit/Bed#: E5 -01 Encounter: [id number]  Primary Care Provider: Hailey Moody DO   Date and time admitted to hospital: 11/28/2022  5:08 PM    * Acute metabolic encephalopathy  Assessment & Plan  · Acute metabolic encephalopathy secondary to acute infection  · Mentation has improved and is at baseline  · Management of underlying infection as outlined below    Sepsis ([place] [address] )  Assessment & Plan  · Sepsis with MSSA bacteremia  · Suspicion is right hip infection  · Seen by infectious disease  · Awaiting further imaging of right hip and continue cefazolin    Right hip pain  Assessment & Plan  · Chronic hip pain on oxycodone  · Confirmed on   Continue 40 mg b i d  · Follow-up on CT imaging    Cancer of ovary ([facility] )  Assessment & Plan  · History of ovarian cancer    Wegener's granulomatosis with renal involvement ([place] [address] )  Assessment & Plan  · Previously on rituximab and prednisone  · Renal function stable    Results from last 7 days   Lab Units 11/30/22  0504 11/29/22  0621 11/28/22  1835   BUN mg/dL 19 18 19   CREATININE mg/dL 1 00 1 05 1 20   EGFR ml/min/1 73sq m 57 54 46       VTE Pharmacologic Prophylaxis: VTE Score: 5 High Risk (Score >/= 5) - Pharmacological DVT Prophylaxis Ordered: Starting Lovenox  Sequential Compression Devices Ordered  Patient Centered Rounds: I have performed bedside rounds with nursing staff today  Discussions with Specialists or Other Care Team Provider:  Case management    Education and Discussions with Family / Patient: Updated  () at bedside  Time Spent for Care: 30 mins  More than 50% of total time spent on counseling and coordination of care as described above      Current Length of Stay: 2 day(s)  Current Patient Status: Inpatient   Certification Statement: The patient will continue to require additional inpatient hospital stay due to bacteremia  Discharge Plan / Estimated Discharge Date: Anticipate discharge in >72 hrs to discharge location to be determined pending rehab evaluations  Code Status: Level 1 - Full Code      Subjective:   Patient seen and examined  Feeling much better today  Pain better controlled  Objective:   Vitals: Blood pressure 121/64, pulse 80, temperature 97 6 °F (36 4 °C), resp  rate 18, height 5' 3" (1 6 m), weight 96 6 kg (213 lb), SpO2 96 %  Intake/Output Summary (Last 24 hours) at 11/30/2022 1002  Last data filed at 11/30/2022 0701  Gross per 24 hour   Intake --   Output 1350 ml   Net -1350 ml       Physical Exam  Vitals reviewed  Constitutional:       General: She is not in acute distress  Appearance: Normal appearance  HENT:      Head: Atraumatic  Eyes:      Extraocular Movements: Extraocular movements intact  Cardiovascular:      Rate and Rhythm: Regular rhythm  Pulmonary:      Breath sounds: No wheezing  Abdominal:      General: Bowel sounds are normal       Palpations: Abdomen is soft  Tenderness: There is no guarding or rebound  Musculoskeletal:         General: Tenderness (Lower extremities bilaterally) present  No swelling  Cervical back: Normal range of motion  Skin:     General: Skin is warm  Neurological:      Mental Status: She is alert  Mental status is at baseline     Psychiatric:         Mood and Affect: Mood normal        Additional Data:   Labs:  Results from last 7 days   Lab Units 11/30/22  0504 11/29/22 0621 11/28/22 2120 11/28/22  1835   WBC Thousand/uL 19 49* 16 41*  --  13 81*   HEMOGLOBIN g/dL 11 5 13 9  --  12 3   PLATELETS Thousands/uL 155 180  --  204   MCV fL 93 92  --  94   INR   --   --  1 16  --      Results from last 7 days   Lab Units 11/30/22  0504 11/29/22  0621 11/28/22  1835   SODIUM mmol/L 134* 133* 136   POTASSIUM mmol/L 4 1 4 5 4 4   CHLORIDE mmol/L 103 99 98   CO2 mmol/L 24 16* 30   ANION GAP mmol/L 7 18* 8   BUN mg/dL 19 18 19 CREATININE mg/dL 1 00 1 05 1 20   CALCIUM mg/dL 9 3 9 4 9 2   ALBUMIN g/dL 2 6* 3 6 3 9   TOTAL BILIRUBIN mg/dL 0 54 1 31* 0 68   ALK PHOS U/L 123* 172* 174*   ALT U/L 22 48 58   AST U/L 22 61* 91*   EGFR ml/min/1 73sq m 57 54 46   GLUCOSE RANDOM mg/dL 162* 124 119     Results from last 7 days   Lab Units 11/29/22  0621   MAGNESIUM mg/dL 2 1                  Results from last 7 days   Lab Units 11/29/22  0621 11/28/22 2120 11/28/22  1835   LACTIC ACID mmol/L  --   --  1 7   PROCALCITONIN ng/ml 3 32*   < >  --     < > = values in this interval not displayed  * I Have Reviewed All Lab Data Listed Above  Cultures:   Results from last 7 days   Lab Units 11/28/22 2120 11/28/22  1830   BLOOD CULTURE   --  Staphylococcus aureus*  Staphylococcus aureus*   GRAM STAIN RESULT   --  Gram positive cocci in clusters*  Gram positive cocci in clusters*   INFLUENZA A PCR  Negative  --        Results from last 7 days   Lab Units 11/28/22 2120   SARS-COV-2  Negative   INFLUENZA A PCR  Negative   INFLUENZA B PCR  Negative   RSV PCR  Negative           Lines/Drains:  Invasive Devices     Peripheral Intravenous Line  Duration           Peripheral IV 11/28/22 Distal;Left;Ventral (anterior) Forearm 1 day              Telemetry:      Imaging:  Imaging Reports Reviewed Today Include:   CT chest abdomen pelvis wo contrast    Result Date: 11/28/2022  Impression: Minimal biapical groundglass opacity with associated scarring, improved from prior recent study dated 10/19/2022 which may reflect residua of prior pneumonia  Correlate clinically to exclude superimposed active infection  No focal consolidation or effusion  No definite acute findings in the abdomen or pelvis within limitations of noncontrast exam  Tiny nonspecific foci of antidependent air within the urinary bladder  Correlate for? Recent instrumentation   Workstation performed: [id number]     XR hip/pelv 2-3 vws right    Result Date: 11/29/2022  Impression: No acute osseous abnormality  Severe osteoarthritis in the right hip  Workstation performed: [id number]     CT head without contrast    Result Date: 11/28/2022  Impression: No acute intracranial abnormality  Workstation performed: [id number]       Scheduled Meds:  Current Facility-Administered Medications   Medication Dose Route Frequency Provider Last Rate   • acetaminophen  650 mg Oral Q6H PRN Megahn  Prechtel, DO     • bisacodyl  5 mg Oral Daily PRN Meghan  Prechtel, DO     • cefazolin  2,000 mg Intravenous Q8H Dorita Crawford, CRNP 2,000 mg (11/30/22 0500)   • diphenhydrAMINE  50 mg Oral 60 Min Pre-Op Kp May, DO     • DULoxetine  60 mg Oral Daily Corwin S Prechtel, DO     • HYDROmorphone  1 mg Intravenous Q4H PRN Amanda Palomares, DO     • lactated ringers  125 mL/hr Intravenous Continuous Meghan  Prechtel,  mL/hr (11/29/22 1653)   • LORazepam  0 5 mg Oral Q8H PRN Amanda Palomares DO     • metoprolol tartrate  25 mg Oral Q12H [address] Corwin S Prechtel, DO     • oxybutynin  5 mg Oral Daily Corwin S Prechtel, DO     • oxyCODONE  40 mg Oral Q12H [address] Kp Lalo, DO     • oxyCODONE  10 mg Oral Q4H PRN Amanda Palomares, DO      Or   • oxyCODONE  15 mg Oral Q4H PRN Amanda Palomares DO     • pantoprazole  20 mg Oral Early Morning Corwin S Prechtel, DO     • senna-docusate sodium  1 tablet Oral HS Kaitlyn Dobbs DO         Today, Patient Was Seen By: Amanda Palomares DO    ** Please Note: Dictation voice to text software may have been used in the creation of this document   **
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
## 2022-11-30 NOTE — TELEPHONE ENCOUNTER
Appointment Cancellation Or Reschedule     Person calling in Patient    If other than patient calling, are they listed on the communication consent form? na   Provider Vaishnavi Townsend   Office Visit Date and Time 12/2/22 @ 9:30am   Office Visit Location [place]   Did patient want to reschedule their office appointment? If so, when was it scheduled to? Yes 1/6/2023 @ 11:30am   Did you have STAR scheduled for this appointment? No   Do you need STAR set up for your new appointment? If yes, please send to "PATIENT RIDESHARE" pool for STAR rescheduling No   If you are cancelling appointment, can we notify STAR to cancel ride? If yes, please send to "PATIENT RIDESHARE" pool for STAR to cancel service NA   Is this patient calling to reschedule an infusion appointment? No   When is their next infusion appointment? NA   Is this patient a Chemo patient? No   Reason for Cancellation or Reschedule Patient is currently in the  hospital     If the patient is a treatment patient, please route this to the office nurse  If the patient is not on treatment, please route to the office MA  If the patient is a surgical oncology patient, please route to surg/onc clinical pool

## 2022-11-30 NOTE — ASSESSMENT & PLAN NOTE
· Previously on rituximab and prednisone    · Renal function stable    Results from last 7 days   Lab Units 11/30/22  0504 11/29/22  0621 11/28/22  1835   BUN mg/dL 19 18 19   CREATININE mg/dL 1 00 1 05 1 20   EGFR ml/min/1 73sq m 57 54 46

## 2022-11-30 NOTE — ASSESSMENT & PLAN NOTE
· Sepsis with MSSA bacteremia  · Suspicion is right hip infection  · Seen by infectious disease    · Awaiting further imaging of right hip and continue cefazolin

## 2022-11-30 NOTE — ASSESSMENT & PLAN NOTE
· Acute metabolic encephalopathy secondary to acute infection    · Mentation has improved and is at baseline  · Management of underlying infection as outlined below

## 2022-11-30 NOTE — PLAN OF CARE
Problem: OCCUPATIONAL THERAPY ADULT  Goal: Performs self-care activities at highest level of function for planned discharge setting  See evaluation for individualized goals  Description: Treatment Interventions: ADL retraining, Functional transfer training, UE strengthening/ROM, Endurance training, Patient/family training, Equipment evaluation/education, Compensatory technique education, Continued evaluation, Energy conservation, Activityengagement          See flowsheet documentation for full assessment, interventions and recommendations  Note: Limitation: Decreased ADL status, Decreased UE strength, Decreased Safe judgement during ADL, Decreased endurance, Decreased self-care trans, Decreased high-level ADLs  Prognosis: Good  Assessment: Pt is a 69 y o  female seen for OT evaluation s/p adm to [facility] on 67/28/8288 w/ Acute metabolic encephalopathy and Sepsis  Comorbidities affecting pt’s functional performance include a significant PMH of CA, PE, DVT, Chronic lymphedema B/L LE, HTN  Pt with active OT orders and activity orders for Up with assistance  Pt lives with spouse in a one level ranch home with ramped entrance  +home alone while spouse works (spouse is a RN)  At baseline, pt was I w/ ADLs and functional transfers/mobility w/ use of RW  Pt and spouse share IADLs- pt reports I w/ laundry and light meal prep  (-)   Denies falls PTA  Upon evaluation, pt currently requires Supervision for UB ADLs, Mod-Min A for LB ADLs, Min A for toileting, Mod A of 2 for bed mobility, and Supervision for functional mobility/transfers 2* the following deficits impacting occupational performance: decreased strength, decreased balance, decreased tolerance, decreased safety awareness and increased pain  These impairments, as well at pt’s fall risk, limited home support, difficulty performing ADLS and difficulty performing IADLS  limit pt’s ability to safely engage in all baseline areas of occupation   Based on the aforementioned OT evaluation, functional performance deficits, and assessments, pt has been identified as a Moderate complexity evaluation  Pt to continue to benefit from continued acute OT services during hospital stay to address defined deficits and to maximize level of functional independence in the following Occupational Performance areas: bathing/shower, toilet hygiene, dressing, medication management, health maintenance, functional mobility, community mobility, clothing management, meal prep and household maintenance  From OT standpoint, recommend Home OT upon D/C   OT will continue to follow pt 3-5x/wk to address the following goals to  w/in 10-14 days:     OT Discharge Recommendation: Home with home health rehabilitation

## 2022-11-30 NOTE — PLAN OF CARE
Problem: PHYSICAL THERAPY ADULT  Goal: Performs mobility at highest level of function for planned discharge setting  See evaluation for individualized goals  Description: Treatment/Interventions: Functional transfer training, LE strengthening/ROM, Therapeutic exercise, Cognitive reorientation, Patient/family training, Equipment eval/education, Gait training, Bed mobility, Compensatory technique education, Continued evaluation, Spoke to nursing, OT          See flowsheet documentation for full assessment, interventions and recommendations  11/30/2022 1453 by Laxmi Cantu PT  Note: Prognosis: Fair  Problem List: Decreased strength, Decreased range of motion, Decreased mobility, Decreased cognition, Impaired judgement, Decreased safety awareness, Obesity, Pain  Assessment: Deep Yeh is a 69 y o  female admitted to [facility] on 11/28/2022 for Acute metabolic encephalopathy  PT was consulted and pt was seen on 11/30/2022 for mobility assessment and d/c planning  Pt presents w high fall risk, pain, mariam monitoring  At baseline per patient, indep for ADLs, IADLs including meal prep and laundry, ambulating w RW  Pt is currently functioning at a moderate assistance x2 level for bed mobility however usually sleeps in lift chair  Performed transfers and ambulation at close S level  Require increased time to complete all mobility  Bed height elevated for standing transf to simulate lift chair at home  Significantly slower gait speeds however likely somewhat chronic in nature due to baseline ankle deformity, body habitus, generalized deconditioning  No gross LOB noted during walking  Did require frequent cuing for direction levi performing stand>sit transf  Pt will benefit from continued skilled IP PT to address the above mentioned impairments  in order to maximize recovery and increase functional independence when completing mobility and ADLs   At this time PT recommendations for d/c are home w HHPT pending continued progress  Barriers to Discharge: None, Decreased caregiver support  Barriers to Discharge Comments: +home alone  PT Discharge Recommendation: Home with home health rehabilitation    See flowsheet documentation for full assessment  11/30/2022 1453 by Patsy Gamble PT  Note: Prognosis: Fair  Problem List: Decreased strength, Decreased range of motion, Decreased mobility, Decreased cognition, Impaired judgement, Decreased safety awareness, Obesity, Pain  Assessment: Vidhi Hi is a 69 y o  female admitted to [facility] on 11/28/2022 for Acute metabolic encephalopathy  PT was consulted and pt was seen on 11/30/2022 for mobility assessment and d/c planning  Pt presents w high fall risk, pain, mariam monitoring  At baseline per patient, indep for ADLs, IADLs including meal prep and laundry, ambulating w RW  Pt is currently functioning at a moderate assistance x2 level for bed mobility however usually sleeps in lift chair  Performed transfers and ambulation at close S level  Require increased time to complete all mobility  Bed height elevated for standing transf to simulate lift chair at home  Significantly slower gait speeds however likely somewhat chronic in nature due to baseline ankle deformity, body habitus, generalized deconditioning  No gross LOB noted during walking  Did require frequent cuing for direction levi performing stand>sit transf  Pt will benefit from continued skilled IP PT to address the above mentioned impairments  in order to maximize recovery and increase functional independence when completing mobility and ADLs  At this time PT recommendations for d/c are home w HHPT pending continued progress  Barriers to Discharge: None, Decreased caregiver support  Barriers to Discharge Comments: +home alone  PT Discharge Recommendation: Home with home health rehabilitation    See flowsheet documentation for full assessment

## 2022-11-30 NOTE — PROGRESS NOTES
Progress Note - Infectious Disease   Ana Dangelo 69 y o  female MRN: [id number]  Unit/Bed#: E5 -01 Encounter: [id number]    Impression/Plan:  1  Sepsis  Present on arrival  Fever, tachycardia, tachypnea, and leukocytosis  Likely secondary to MSSA bacteremia with suspected RLE  source  Patient with lymphedema and history of recurring cellulitis  While leg does not appears erythematous there is swelling in the thigh and skin is hot to touch  Less likely is respiratory source  She has ground glass opacities in the apices and bibasilar atelectasis on imaging but these changes appear somewhat chronic  She has no current respiratory symptoms  Mental status with rapid improvement  This morning she is afebrile with persistent leukocytosis  -antibiotic as below  -check CBCD and BMP tomorrow  -follow up blood cultures  -monitor vitals  -supportive care     2  MSSA bacteremia  Both sets of admission blood cultures suggesting MSSA on BCI  Consider cutaneous source as patient has lymphedema and history of recurring RLE cellulitis  Now with significant R hip pain  She completed CT of the R hip for further assessment, results are pending  The patient is currently receiving IV Cefazolin which she is tolerating without difficulty  I will continue this antibiotic for now  Repeat blood cultures were sent this morning  TTE was a poor study and she may require further evaluation with ELIZABETH    -continue IV cefazolin  -check CBCD and BMP tomorrow  -follow up repeat blood cultures  -follow up R hip CT  -consider ELIZABETH  -monitor vitals       3  R hip pain  In patient with previous ORIF and intact hardware  Patient was without her chronic home pain meds for several days  She has lymphedema and history of recurring RLE cellulitis  While R leg does not appear acutely cellulitic today she does have swelling and warmth of the R thigh which may still evolve  Consider possibility of recurring RLE infections seeding her R hip hardware   Xray without acute abnormality but she underwent further evaluation with CT and results are pending   -antibiotic as above  -serial R hip and RLE exams  -follow up R hip CT  -pain management per primary service     4  Confusion  Likely secondary to sepsis and bacteremia above  Mental status has improved rapidly  Patient refused LP earlier today  Given low suspicion for bacterial meningitis will hold off for now  She does not need to remain on isolation  -hold off LP for now  -treatment of sepsis/bacteremia as above  -serial neuro exams  -monitor mood and mental status     5  Lymphedema  This is likely driving force of patient's recurring lower extremity cellulitis      6  CKD III  Upon review of patient's available past medical records it appears her baseline creatinine is approximately 0 8-1 2  She was within baseline at 1 2 upon admission  -monitor creatinine  -dose adjust antibiotics for renal function as needed  -avoid nephrotoxins     7  Morbid obesity  BMI = 37 73    Above plan was discussed in detail with patient at the bedside  Above plan was discussed in detail with SLIM  Antibiotics:  Cefazolin 2  Antibiotics 3    Subjective:  Patient reports she's feeling better today  Tells me she's not as tired or weak this morning and was able to eat her entire breakfast  She is still having R sided hip pain but tells me it is less than yesterday  Reports no new changes to her R leg  She has no fever, chills, sweats, shakes; no nausea, vomiting, abdominal pain, diarrhea; no cough, shortness of breath, or chest pain  No new symptoms      Objective:  Vitals:  Temp:  [97 9 °F (36 6 °C)-100 1 °F (37 8 °C)] 97 9 °F (36 6 °C)  HR:  [] 69  Resp:  [18-20] 18  BP: (117-162)/(67-84) 127/68  SpO2:  [91 %-95 %] 95 %  Temp (24hrs), Av 4 °F (37 4 °C), Min:97 9 °F (36 6 °C), Max:100 1 °F (37 8 °C)  Current: Temperature: 97 9 °F (36 6 °C)    Physical Exam:   General Appearance:  Alert, interactive, nontoxic, in no acute distress  She appears chronically debilitated  Patient appears comfortable sitting up I bed having tea  Throat: Oropharynx moist without lesions  Lungs:   Clear to auscultation bilaterally; no wheezes, rhonchi or rales; respirations unlabored on room air  Heart:  RRR; no murmur, rub or gallop  Abdomen:   Soft, obese, non-tender, non-distended, positive bowel sounds  Extremities: No clubbing or cyanosis  B/L LE lymphedema  R hip remains somewhat tender to palpation, warm to touch without radiating erythema  Skin: No new rashes noted on exposed skin       Labs, Imaging, & Other studies:   All pertinent labs and imaging studies were personally reviewed  Results from last 7 days   Lab Units 11/30/22  0504 11/29/22  0621 11/28/22  1835   WBC Thousand/uL 19 49* 16 41* 13 81*   HEMOGLOBIN g/dL 11 5 13 9 12 3   PLATELETS Thousands/uL 155 180 204     Results from last 7 days   Lab Units 11/30/22  0504 11/29/22  0621 11/28/22  1835   POTASSIUM mmol/L 4 1 4 5 4 4   CHLORIDE mmol/L 103 99 98   CO2 mmol/L 24 16* 30   BUN mg/dL 19 18 19   CREATININE mg/dL 1 00 1 05 1 20   EGFR ml/min/1 73sq m 57 54 46   CALCIUM mg/dL 9 3 9 4 9 2   AST U/L 22 61* 91*   ALT U/L 22 48 58   ALK PHOS U/L 123* 172* 174*     Results from last 7 days   Lab Units 11/28/22  1830   GRAM STAIN RESULT  Gram positive cocci in clusters*  Gram positive cocci in clusters*     Results from last 7 days   Lab Units 11/29/22  0621 11/28/22  2120   PROCALCITONIN ng/ml 3 32* 1 83*
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
## 2022-12-01 LAB
ALBUMIN SERPL BCP-MCNC: 2.6 G/DL (ref 3.5–5)
ALP SERPL-CCNC: 114 U/L (ref 46–116)
ALT SERPL W P-5'-P-CCNC: 22 U/L (ref 12–78)
ANION GAP SERPL CALCULATED.3IONS-SCNC: 7 MMOL/L (ref 4–13)
AST SERPL W P-5'-P-CCNC: 40 U/L (ref 5–45)
BACTERIA BLD CULT: ABNORMAL
BILIRUB SERPL-MCNC: 0.33 MG/DL (ref 0.2–1)
BUN SERPL-MCNC: 25 MG/DL (ref 5–25)
CALCIUM ALBUM COR SERPL-MCNC: 10.3 MG/DL (ref 8.3–10.1)
CALCIUM SERPL-MCNC: 9.2 MG/DL (ref 8.3–10.1)
CHLORIDE SERPL-SCNC: 103 MMOL/L (ref 96–108)
CO2 SERPL-SCNC: 25 MMOL/L (ref 21–32)
CREAT SERPL-MCNC: 1.06 MG/DL (ref 0.6–1.3)
ERYTHROCYTE [DISTWIDTH] IN BLOOD BY AUTOMATED COUNT: 14.4 % (ref 11.6–15.1)
GFR SERPL CREATININE-BSD FRML MDRD: 53 ML/MIN/1.73SQ M
GLUCOSE SERPL-MCNC: 95 MG/DL (ref 65–140)
GRAM STN SPEC: ABNORMAL
HCT VFR BLD AUTO: 35.6 % (ref 34.8–46.1)
HGB BLD-MCNC: 11.1 G/DL (ref 11.5–15.4)
MCH RBC QN AUTO: 29.7 PG (ref 26.8–34.3)
MCHC RBC AUTO-ENTMCNC: 31.2 G/DL (ref 31.4–37.4)
MCV RBC AUTO: 95 FL (ref 82–98)
PLATELET # BLD AUTO: 191 THOUSANDS/UL (ref 149–390)
PMV BLD AUTO: 9.9 FL (ref 8.9–12.7)
POTASSIUM SERPL-SCNC: 4 MMOL/L (ref 3.5–5.3)
PROT SERPL-MCNC: 7 G/DL (ref 6.4–8.4)
RBC # BLD AUTO: 3.74 MILLION/UL (ref 3.81–5.12)
S AUREUS DNA BLD POS QL NAA+NON-PROBE: DETECTED
SODIUM SERPL-SCNC: 135 MMOL/L (ref 135–147)
WBC # BLD AUTO: 18.97 THOUSAND/UL (ref 4.31–10.16)

## 2022-12-01 RX ADMIN — ACETAMINOPHEN 650 MG: 325 TABLET, FILM COATED ORAL at 23:35

## 2022-12-01 RX ADMIN — OXYCODONE HYDROCHLORIDE 40 MG: 20 TABLET, FILM COATED, EXTENDED RELEASE ORAL at 10:26

## 2022-12-01 RX ADMIN — SENNOSIDES AND DOCUSATE SODIUM 1 TABLET: 8.6; 5 TABLET ORAL at 21:14

## 2022-12-01 RX ADMIN — METOPROLOL TARTRATE 25 MG: 25 TABLET, FILM COATED ORAL at 10:26

## 2022-12-01 RX ADMIN — LORAZEPAM 0.5 MG: 1 TABLET ORAL at 23:36

## 2022-12-01 RX ADMIN — ENOXAPARIN SODIUM 40 MG: 40 INJECTION SUBCUTANEOUS at 10:28

## 2022-12-01 RX ADMIN — LORAZEPAM 0.5 MG: 1 TABLET ORAL at 00:48

## 2022-12-01 RX ADMIN — CEFAZOLIN SODIUM 2000 MG: 2 SOLUTION INTRAVENOUS at 12:56

## 2022-12-01 RX ADMIN — OXYCODONE HYDROCHLORIDE 15 MG: 10 TABLET ORAL at 13:45

## 2022-12-01 RX ADMIN — PANTOPRAZOLE SODIUM 20 MG: 20 TABLET, DELAYED RELEASE ORAL at 05:13

## 2022-12-01 RX ADMIN — CEFAZOLIN SODIUM 2000 MG: 2 SOLUTION INTRAVENOUS at 05:13

## 2022-12-01 RX ADMIN — METOPROLOL TARTRATE 25 MG: 25 TABLET, FILM COATED ORAL at 20:14

## 2022-12-01 RX ADMIN — CEFAZOLIN SODIUM 2000 MG: 2 SOLUTION INTRAVENOUS at 20:12

## 2022-12-01 RX ADMIN — DULOXETINE 60 MG: 60 CAPSULE, DELAYED RELEASE ORAL at 10:26

## 2022-12-01 RX ADMIN — HYDROMORPHONE HYDROCHLORIDE 1 MG: 1 INJECTION, SOLUTION INTRAMUSCULAR; INTRAVENOUS; SUBCUTANEOUS at 15:15

## 2022-12-01 RX ADMIN — OXYCODONE HYDROCHLORIDE 40 MG: 20 TABLET, FILM COATED, EXTENDED RELEASE ORAL at 20:12

## 2022-12-01 RX ADMIN — OXYCODONE HYDROCHLORIDE 15 MG: 10 TABLET ORAL at 06:26

## 2022-12-01 RX ADMIN — OXYBUTYNIN CHLORIDE 5 MG: 5 TABLET, EXTENDED RELEASE ORAL at 21:14

## 2022-12-01 NOTE — PLAN OF CARE
Problem: PHYSICAL THERAPY ADULT  Goal: Performs mobility at highest level of function for planned discharge setting  See evaluation for individualized goals  Description: Treatment/Interventions: Functional transfer training, LE strengthening/ROM, Therapeutic exercise, Cognitive reorientation, Patient/family training, Equipment eval/education, Gait training, Bed mobility, Compensatory technique education, Continued evaluation, Spoke to nursing, OT          See flowsheet documentation for full assessment, interventions and recommendations  Note: Prognosis: Fair  Problem List: Decreased strength, Decreased endurance, Impaired balance, Decreased mobility, Impaired judgement, Decreased safety awareness, Obesity, Pain, Decreased skin integrity  Assessment: Pt able to inc ambulation distance to 10 feet with use of RW  Ataxic and unsteady gait pattern, wide MAURICE, shuffling gait pattern, dec BLE step length and forward flexed posture  Pt reports "I walk further at home, I sware"  Pt currently has limited activity tolerance with inc pain R lateral leg and R hip area during WB and ambulation, inc pain during transfers as well  Difficulty to get comfortable following mobility and activity  Pt needs min Ax1 for transfers and gait wih use of RW  Pt would cont to benefit from skilled inpt PT services to maximize functional independence  Barriers to Discharge: None, Decreased caregiver support  Barriers to Discharge Comments: +home alone  PT Discharge Recommendation: Home with home health rehabilitation    See flowsheet documentation for full assessment

## 2022-12-01 NOTE — ASSESSMENT & PLAN NOTE
· Sepsis with MSSA bacteremia  · No clear source at this time  ID recommends ELIZABETH  · Patient initially undecided but now agreeable   Discussed with cardiology

## 2022-12-01 NOTE — PROGRESS NOTES
[facility]  Progress Note - Bam Powersegal 1955, 69 y o  female MRN: [id number]  Unit/Bed#: E5 -01 Encounter: [id number]  Primary Care Provider: Evelin Green DO   Date and time admitted to hospital: 11/28/2022  5:08 PM    * Acute metabolic encephalopathy  Assessment & Plan  · Acute metabolic encephalopathy secondary to acute infection  · Mentation has improved and is at baseline  · Management of underlying infection as outlined below    Sepsis ([facility] )  Assessment & Plan  · Sepsis with MSSA bacteremia  · No clear source at this time  ID recommends ELIZABETH  · Patient undecided    Right hip pain  Assessment & Plan  · Chronic hip pain on oxycodone  · Confirmed on   Continue 40 mg b i d  · Hip CT negative for acute infection    Cancer of ovary ([facility] )  Assessment & Plan  · History of ovarian cancer    Wegener's granulomatosis with renal involvement ([facility] )  Assessment & Plan  · Previously on rituximab and prednisone  · Renal function stable    Results from last 7 days   Lab Units 12/01/22  0543 11/30/22  0504 11/29/22  0621 11/28/22  1835   BUN mg/dL 25 19 18 19   CREATININE mg/dL 1 06 1 00 1 05 1 20   EGFR ml/min/1 73sq m 53 57 54 46       VTE Pharmacologic Prophylaxis: VTE Score: 5 High Risk (Score >/= 5) - Pharmacological DVT Prophylaxis Ordered: enoxaparin (Lovenox)  Sequential Compression Devices Ordered  Patient Centered Rounds: I have performed bedside rounds with nursing staff today  Discussions with Specialists or Other Care Team Provider:  Case management    Education and Discussions with Family / Patient: Updated  () via phone  Time Spent for Care: 25 mins  More than 50% of total time spent on counseling and coordination of care as described above      Current Length of Stay: 3 day(s)  Current Patient Status: Inpatient   Certification Statement: The patient will continue to require additional inpatient hospital stay due to need for treatment of IV antibiotics  Discharge Plan / Estimated Discharge Date: Anticipate discharge in >72 hrs to home with home services  Code Status: Level 1 - Full Code      Subjective:   Patient seen and examined  No new complaints  Hip pain controlled    Objective:   Vitals: Blood pressure 135/73, pulse 74, temperature 98 °F (36 7 °C), temperature source Oral, resp  rate 18, height 5' 3" (1 6 m), weight 96 6 kg (213 lb), SpO2 94 %  No intake or output data in the 24 hours ending 12/01/22 1050    Physical Exam  Vitals reviewed  Constitutional:       General: She is not in acute distress  Appearance: Normal appearance  HENT:      Head: Atraumatic  Eyes:      General: No scleral icterus  Cardiovascular:      Rate and Rhythm: Regular rhythm  Heart sounds: Normal heart sounds  Pulmonary:      Breath sounds: Decreased breath sounds present  No wheezing  Abdominal:      General: Bowel sounds are normal       Palpations: Abdomen is soft  Tenderness: There is no guarding or rebound  Musculoskeletal:         General: Tenderness (Legs) present  No swelling  Skin:     General: Skin is warm  Neurological:      General: No focal deficit present  Mental Status: She is alert  Mental status is at baseline     Psychiatric:         Mood and Affect: Mood normal        Additional Data:   Labs:  Results from last 7 days   Lab Units 12/01/22  0543 11/30/22  0504 11/29/22  0621 11/28/22  2120   WBC Thousand/uL 18 97* 19 49* 16 41*  --    HEMOGLOBIN g/dL 11 1* 11 5 13 9  --    PLATELETS Thousands/uL 191 155 180  --    MCV fL 95 93 92  --    INR   --   --   --  1 16     Results from last 7 days   Lab Units 12/01/22  0543 11/30/22  0504 11/29/22  0621   SODIUM mmol/L 135 134* 133*   POTASSIUM mmol/L 4 0 4 1 4 5   CHLORIDE mmol/L 103 103 99   CO2 mmol/L 25 24 16*   ANION GAP mmol/L 7 7 18*   BUN mg/dL 25 19 18   CREATININE mg/dL 1 06 1 00 1 05   CALCIUM mg/dL 9 2 9 3 9 4   ALBUMIN g/dL 2 6* 2 6* 3 6   TOTAL BILIRUBIN mg/dL 0 33 0 54 1 31*   ALK PHOS U/L 114 123* 172*   ALT U/L 22 22 48   AST U/L 40 22 61*   EGFR ml/min/1 73sq m 53 57 54   GLUCOSE RANDOM mg/dL 95 162* 124     Results from last 7 days   Lab Units 11/29/22  0621   MAGNESIUM mg/dL 2 1                  Results from last 7 days   Lab Units 11/29/22  0621 11/28/22 2120 11/28/22  1835   LACTIC ACID mmol/L  --   --  1 7   PROCALCITONIN ng/ml 3 32*   < >  --     < > = values in this interval not displayed  * I Have Reviewed All Lab Data Listed Above  Cultures:   Results from last 7 days   Lab Units 11/30/22  0449 11/28/22 2120 11/28/22  1830   BLOOD CULTURE  No Growth at 24 hrs  No Growth at 24 hrs   --  Staphylococcus aureus*  Staphylococcus aureus*   GRAM STAIN RESULT   --   --  Gram positive cocci in clusters*  Gram positive cocci in clusters*   INFLUENZA A PCR   --  Negative  --        Results from last 7 days   Lab Units 11/28/22 2120   SARS-COV-2  Negative   INFLUENZA A PCR  Negative   INFLUENZA B PCR  Negative   RSV PCR  Negative           Lines/Drains:  Invasive Devices     Peripheral Intravenous Line  Duration           Peripheral IV 11/28/22 Distal;Left;Ventral (anterior) Forearm 2 days              Telemetry:      Imaging:  Imaging Reports Reviewed Today Include:   CT chest abdomen pelvis wo contrast    Result Date: 11/28/2022  Impression: Minimal biapical groundglass opacity with associated scarring, improved from prior recent study dated 10/19/2022 which may reflect residua of prior pneumonia  Correlate clinically to exclude superimposed active infection  No focal consolidation or effusion  No definite acute findings in the abdomen or pelvis within limitations of noncontrast exam  Tiny nonspecific foci of antidependent air within the urinary bladder  Correlate for? Recent instrumentation  Workstation performed: [id number]     XR hip/pelv 2-3 vws right    Result Date: 11/29/2022  Impression: No acute osseous abnormality  Severe osteoarthritis in the right hip  Workstation performed: [id number]     CT head without contrast    Result Date: 11/28/2022  Impression: No acute intracranial abnormality  Workstation performed: [id number]     CT lower extremity w contrast right    Result Date: 11/30/2022  Impression: Severe right hip osteoarthrosis with mild acetabular protrusio deformity as described above  Status post ORIF of the medial acetabulum  The appearance of this appears not significantly changed  Mild subcutaneous edema seen laterally in the distal thigh  No discrete soft tissue abscess or soft tissue gas  Workstation performed: [id number]       Scheduled Meds:  Current Facility-Administered Medications   Medication Dose Route Frequency Provider Last Rate   • acetaminophen  650 mg Oral Q6H PRN Ariane Bio Prechtel, DO     • bisacodyl  5 mg Oral Daily PRN Ariane Bio Prechtel, DO     • cefazolin  2,000 mg Intravenous [facility], CRNP 2,000 mg (12/01/22 4842)   • diphenhydrAMINE  50 mg Oral 60 Min Pre-Op Kp May DO     • DULoxetine  60 mg Oral Daily Corwin S Prechtel, DO     • enoxaparin  40 mg Subcutaneous Q24H [address] Kp May, DO     • HYDROmorphone  1 mg Intravenous Q4H PRN Cl Nielson DO     • LORazepam  0 5 mg Oral Q8H PRN Cl Nielson, DO     • metoprolol tartrate  25 mg Oral Q12H [address] Corwin S Prechtel, DO     • oxybutynin  5 mg Oral Daily Corwin S Prechtel, DO     • oxyCODONE  40 mg Oral Q12H [address] Kp May, DO     • oxyCODONE  10 mg Oral Q4H PRN Cl Nielson, DO      Or   • oxyCODONE  15 mg Oral Q4H PRN Cl Nielson DO     • pantoprazole  20 mg Oral Early Morning Corwin S Prechtel, DO     • senna-docusate sodium  1 tablet Oral HS Fauzia Elias DO         Today, Patient Was Seen By: Cl Nielson DO    ** Please Note: Dictation voice to text software may have been used in the creation of this document   **

## 2022-12-01 NOTE — ASSESSMENT & PLAN NOTE
· Previously on rituximab and prednisone    · Renal function stable    Results from last 7 days   Lab Units 12/01/22  0543 11/30/22  0504 11/29/22  0621 11/28/22  1835   BUN mg/dL 25 19 18 19   CREATININE mg/dL 1 06 1 00 1 05 1 20   EGFR ml/min/1 73sq m 53 57 54 46

## 2022-12-01 NOTE — PROGRESS NOTES
Progress Note - Infectious Disease   Prachi Murguia 69 y o  female MRN: [id number]  Unit/Bed#: E5 -01 Encounter: [id number]    Impression/Plan:  1  Sepsis  Present on arrival  Fever, tachycardia, tachypnea, and leukocytosis  Likely secondary to MSSA bacteremia with suspected RLE source  Patient with lymphedema and history of recurring cellulitis  Clinically improving with resolution of fever  Hemodynamics are stable   -antibiotic as below  -follow temperatures and hemodynamics  -recheck CBC in a m      2  MSSA bacteremia  Most likely cutaneous source in the setting of lower extremity lymphedema  Consider relation to #3 as patient has right hip pain  CT findings are stable without radiographic evidence of infection  2D echo was a poor study  No intravascular prosthetic devices  Repeat blood cultures are negative thus far     -continue IV cefazolin  -follow-up pending ELIZABETH  -will need course of IV antibiotic  -hold off on PICC line placement until repeat blood cultures are negative at 72 hours    3  R hip pain  In patient with previous ORIF and intact hardware   CT shows severe right hip osteoarthrosis and post ORIF changes, which appear stable  No discrete soft tissue abscess or gas noted  -serial exams  -pain management per primary service     4  Confusion  Likely secondary to sepsis and bacteremia above  Mentation has improved   -treatment of sepsis/bacteremia as above  -serial neuro exams  -monitor mood and mental status     5  Lymphedema  This is likely driving force of patient's recurring lower extremity cellulitis      6  CKD III  Upon review of patient's available past medical records it appears her baseline creatinine is approximately 0 8-1 2  She was within baseline at 1 2 upon admission  -monitor creatinine  -dose adjust antibiotics for renal function as needed  -avoid nephrotoxins     7  Morbid obesity  BMI = 37 73    8   Wegener's granulomatosis      Above plan was discussed in detail with patient at the bedside  Above plan was discussed in detail with SLIM      Antibiotics:  Cefazolin 3  Antibiotics 4      Subjective:  Still has right hip pain which is stable  No fevers or chills  Tolerating oral intake  No new focal complaints  Objective:  Vitals:  Temp:  [97 9 °F (36 6 °C)-98 °F (36 7 °C)] 98 °F (36 7 °C)  HR:  [74-86] 74  Resp:  [18-21] 18  BP: (105-135)/(58-73) 135/73  SpO2:  [92 %-96 %] 94 %  Temp (24hrs), Av 9 °F (36 6 °C), Min:97 9 °F (36 6 °C), Max:98 °F (36 7 °C)  Current: Temperature: 98 °F (36 7 °C)    Physical Exam:   General:  No acute distress, nontoxic  HEENT:  Atraumatic normocephalic  Neck:  Trachea midline  Psychiatric:  Awake and alert  Pulmonary:  Normal respiratory excursion without accessory muscle use  Abdomen:  Soft, nontender  Extremities:  Bilateral lower extremities are wrapped with no ascending erythema  Skin:  No diffuse rashes or draining wounds  Neuro: Moves all extremities spontaneously    Lab Results:  I have personally reviewed pertinent labs  Results from last 7 days   Lab Units 22  0543 22  0504 22  0621   POTASSIUM mmol/L 4 0 4 1 4 5   CHLORIDE mmol/L 103 103 99   CO2 mmol/L 25 24 16*   BUN mg/dL 25 19 18   CREATININE mg/dL 1 06 1 00 1 05   EGFR ml/min/1 73sq m 53 57 54   CALCIUM mg/dL 9 2 9 3 9 4   AST U/L 40 22 61*   ALT U/L 22 22 48   ALK PHOS U/L 114 123* 172*     Results from last 7 days   Lab Units 22  0543 22  0504 22  0621   WBC Thousand/uL 18 97* 19 49* 16 41*   HEMOGLOBIN g/dL 11 1* 11 5 13 9   PLATELETS Thousands/uL 191 155 180     Results from last 7 days   Lab Units 22  0449 22  1830   BLOOD CULTURE  No Growth at 24 hrs  No Growth at 24 hrs   Staphylococcus aureus*  Staphylococcus aureus*   GRAM STAIN RESULT   --  Gram positive cocci in clusters*  Gram positive cocci in clusters*       Imaging Studies:   I have personally reviewed pertinent imaging study reports and images in PACS     EKG, Pathology, and Other Studies:   I have personally reviewed pertinent reports

## 2022-12-01 NOTE — PHYSICAL THERAPY NOTE
Physical Therapy Treatment Session:       12/01/22 1355   PT Last Visit   PT Visit Date 12/01/22   Note Type   Note Type Treatment   Pain Assessment   Pain Assessment Tool 0-10   Pain Score 6   Pain Location/Orientation Orientation: Right;Location: Hip;Location: Leg  (lateral)   Hospital Pain Intervention(s) Repositioned;Elevated; Emotional support; Rest   Restrictions/Precautions   Other Precautions Cognitive; Chair Alarm; Bed Alarm;Pain; Fall Risk;Multiple lines;Telemetry  (body habitus)   General   Chart Reviewed Yes   Family/Caregiver Present No   Cognition   Overall Cognitive Status Impaired   Arousal/Participation Cooperative   Attention Attends with cues to redirect   Orientation Level Oriented X4   Following Commands Follows one step commands with increased time or repetition   Comments 2* inc pain and slow mobility   Subjective   Subjective Pt using BR upon arrival to pts room  Pt willing and agreeable to work with PT and to participate in therapy intervention  "I just want to walk, I know I can walk better than this"  Bed Mobility   Supine to Sit Unable to assess  (pt is static sit, no bed mobility performed)   Transfers   Stand to Sit 4  Minimal assistance   Additional items Assist x 1; Armrests; Increased time required;Verbal cues  (instruction for proper placement of BLE and B hands prior to transfer)   Toilet transfer 4  Minimal assistance   Additional items Assist x 1; Increased time required;Verbal cues;Standard toilet  (A with use of GB)   Ambulation/Elevation   Gait pattern Poor UE support; Improper Weight shift; Forward Flexion; Wide MAURICE;Shuffling; Inconsistent flor; Foward flexed; Short stride; Ataxia; Step to;Excessively slow   Gait Assistance 4  Minimal assist   Additional items Assist x 1;Verbal cues; Tactile cues   Assistive Device Rolling walker   Distance 10 feet with use of RW on tile surface   Balance   Static Sitting Fair +  (chair alarm intact prior to leaving pts room)   Dynamic Sitting Poor + Static Standing Poor +   Dynamic Standing Poor +   Ambulatory Poor +   Endurance Deficit   Endurance Deficit Yes   Endurance Deficit Description pain, fatigue, limited activity tolerance   Activity Tolerance   Activity Tolerance Patient limited by fatigue;Patient limited by pain  (poor)   Nurse Made Aware yes   Assessment   Prognosis Fair   Problem List Decreased strength;Decreased endurance; Impaired balance;Decreased mobility; Impaired judgement;Decreased safety awareness; Obesity;Pain;Decreased skin integrity   Assessment Pt able to inc ambulation distance to 10 feet with use of RW  Ataxic and unsteady gait pattern, wide MAURICE, shuffling gait pattern, dec BLE step length and forward flexed posture  Pt reports "I walk further at home, I sware"  Pt currently has limited activity tolerance with inc pain R lateral leg and R hip area during WB and ambulation, inc pain during transfers as well  Difficulty to get comfortable following mobility and activity  Pt needs min Ax1 for transfers and gait wih use of RW  Pt would cont to benefit from skilled inpt PT services to maximize functional independence  Goals   Patient Goals to walk better and longer   STG Expiration Date 12/14/22   PT Treatment Day 1   Plan   Treatment/Interventions Functional transfer training;LE strengthening/ROM; Therapeutic exercise; Endurance training;Patient/family training;Equipment eval/education; Bed mobility;Gait training;Spoke to nursing   Progress Slow progress, decreased activity tolerance   PT Frequency 3-5x/wk   Recommendation   PT Discharge Recommendation Home with home health rehabilitation   Equipment Recommended Walker  (use of walker)   AM-PAC Basic Mobility Inpatient   Turning in Bed Without Bedrails 1   Lying on Back to Sitting on Edge of Flat Bed 1   Moving Bed to Chair 3   Standing Up From Chair 3   Walk in Room 3   Climb 3-5 Stairs 1   Basic Mobility Inpatient Raw Score 12   Basic Mobility Standardized Score 32 23   Highest Level Of Mobility   JH-HLM Goal 4: Move to chair/commode   JH-HLM Achieved 6: Walk 10 steps or more

## 2022-12-01 NOTE — ASSESSMENT & PLAN NOTE
· Chronic hip pain on oxycodone  · Confirmed on   Continue 40 mg b i d    · Hip CT negative for acute infection

## 2022-12-01 NOTE — PROGRESS NOTES
Progress Note - Cardiology   Edelmira Evans 69 y o  female MRN: [id number]  Unit/Bed#: E5 -01 Encounter: [id number]      Assessment/Recommendations/Discussion:   1  Sepsis  2  MSSA bacteremia  3  Right hip pain with prior ORIF  4  Encephalopathy  5  History of hypertrophic obstructive cardiomyopathy  Echo in 2021 with dynamic obstruction at rest the outflow gradient with peak gradient 37 mmHg, increases to Valsalva gradient of 75 mmHg  6  Hypertension   7  Dyslipidemia  8  History of PE in DVT, IVC filter in place  9  Wegeners granulomatosis  10  Hypogammaglobulinemia  11  Ovarian cancer status post bilateral oophorectomy and hysterectomy  12  Chronic kidney disease stage 3  13  Morbid obesity  14  Chronic lymphedema    Results from last 7 days   Lab Units 11/29/22  1407   SL CV LV EF  72        PLAN  • She is here with MSSA bacteremia  • Transthoracic echo without significant valvular destruction however images were not adequate enough to rule out endocarditis  • She is agreeable to ELIZABETH, will keep NPO after midnight plan for ELIZABETH tomorrow    Subjective:   HPI  Doing well today, is having some pain in her right hip however, no chest pain or shortness of breath      Review of Systems: As noted in HPI  Rest of ROS is negative      Vitals:   /67   Pulse 74   Temp 98 3 °F (36 8 °C)   Resp 18   Ht 5' 3" (1 6 m)   Wt 96 6 kg (213 lb)   SpO2 95%   BMI 37 73 kg/m²   I/O       11/29 0701  11/30 0700 11/30 0701  12/01 0700 12/01 0701  12/02 0700    IV Piggyback       Total Intake(mL/kg)       Urine (mL/kg/hr) 1000 (0 4) 350 (0 2)     Total Output 1000 350     Net -1000 -350            Unmeasured Urine Occurrence   1 x        Weight (last 2 days)     Date/Time Weight    11/29/22 1407 96 6 (213)          Physical Exam   Constitutional: awake, alert and oriented, in no acute distress, no obvious deformities, obese female  Head: Normocephalic, without obvious abnormality, atraumatic  Eyes: conjunctivae clear and moist  Sclera anicteric  No xanthelasmas  Pupils equal bilaterally  Extraocular motions are full  Ear nose mouth and throat: ears are symmetrical bilaterally, hearing appears to be equal bilaterally, no nasal discharge or epistaxis, oropharynx is clear with moist mucous membranes  Neck:  Trachea is midline, neck is supple, no thyromegaly or significant lymphadenopathy, there is full range of motion  Lungs: clear to auscultation bilaterally, no wheezes, no rales, no rhonchi, no accessory muscle use, breathing is nonlabored  Heart: regular rate and rhythm, S1, S2 normal, no murmur, no click, no rub and no gallop, no lower extremity edema  Abdomen:  Obese, soft, non-tender; bowel sounds normal; no masses,  no organomegaly  Psychiatric:  Patient is oriented to time, place, person, mood/affect is negative for depression, anxiety, agitation, appears to have appropriate insight  Skin: Skin is warm, dry, intact  No obvious rashes or lesions on exposed extremities  Nail beds are pink with no cyanosis or clubbing      TELEMETRY:   Lab Results:  Results from last 7 days   Lab Units 12/01/22  0543   WBC Thousand/uL 18 97*   HEMOGLOBIN g/dL 11 1*   HEMATOCRIT % 35 6   PLATELETS Thousands/uL 191     Results from last 7 days   Lab Units 12/01/22  0543   POTASSIUM mmol/L 4 0   CHLORIDE mmol/L 103   CO2 mmol/L 25   BUN mg/dL 25   CREATININE mg/dL 1 06   CALCIUM mg/dL 9 2   ALK PHOS U/L 114   ALT U/L 22   AST U/L 40     Results from last 7 days   Lab Units 12/01/22  0543   POTASSIUM mmol/L 4 0   CHLORIDE mmol/L 103   CO2 mmol/L 25   BUN mg/dL 25   CREATININE mg/dL 1 06   CALCIUM mg/dL 9 2           Medications:    Current Facility-Administered Medications:   •  acetaminophen (TYLENOL) tablet 650 mg, 650 mg, Oral, Q6H PRN, Corwin S Prechtel, DO, 650 mg at 11/30/22 1122  •  bisacodyl (DULCOLAX) EC tablet 5 mg, 5 mg, Oral, Daily PRN, Kristin GOMEZ Prechtel, DO  •  ceFAZolin (ANCEF) IVPB (premix in dextrose) 2,000 mg 50 mL, 2,000 mg, Intravenous, Q8H, RENETTA Peralta, Last Rate: 100 mL/hr at 12/01/22 1256, 2,000 mg at 12/01/22 1256  •  diphenhydrAMINE (BENADRYL) tablet 50 mg, 50 mg, Oral, 60 Min Pre-Op, Kp Lalo, DO, 50 mg at 11/29/22 2330  •  DULoxetine (CYMBALTA) delayed release capsule 60 mg, 60 mg, Oral, Daily, Corwin S Prechtel, DO, 60 mg at 12/01/22 1026  •  enoxaparin (LOVENOX) subcutaneous injection 40 mg, 40 mg, Subcutaneous, Q24H [address], Kp Lalo, DO, 40 mg at 12/01/22 1028  •  HYDROmorphone (DILAUDID) injection 1 mg, 1 mg, Intravenous, Q4H PRN, Phuong Bering, DO, 1 mg at 12/01/22 1515  •  LORazepam (ATIVAN) tablet 0 5 mg, 0 5 mg, Oral, Q8H PRN, Phuong Bering, DO, 0 5 mg at 12/01/22 0048  •  metoprolol tartrate (LOPRESSOR) tablet 25 mg, 25 mg, Oral, Q12H [address], Corwin S Prechtel, DO, 25 mg at 12/01/22 1026  •  oxybutynin (DITROPAN-XL) 24 hr tablet 5 mg, 5 mg, Oral, Daily, Corwin S Prechtel, DO, 5 mg at 11/30/22 6810  •  oxyCODONE (OxyCONTIN) 12 hr tablet 40 mg, 40 mg, Oral, Q12H [address], Kp Lalo, DO, 40 mg at 12/01/22 1026  •  oxyCODONE (ROXICODONE) immediate release tablet 10 mg, 10 mg, Oral, Q4H PRN **OR** oxyCODONE (ROXICODONE) IR tablet 15 mg, 15 mg, Oral, Q4H PRN, Kp Lalo, DO, 15 mg at 12/01/22 1345  •  pantoprazole (PROTONIX) EC tablet 20 mg, 20 mg, Oral, Early Morning, Corwin S Prechtel, DO, 20 mg at 12/01/22 3874  •  senna-docusate sodium (SENOKOT S) 8 6-50 mg per tablet 1 tablet, 1 tablet, Oral, HS, Corwin S Prechtel, DO, 1 tablet at 11/30/22 8099    This note was completed in part utilizing [employer] Software  Grammatical errors, random word insertions, spelling mistakes, and incomplete sentences may be an occasional consequence of this system secondary to software limitations, ambient noise, and hardware issues    If you have any questions or concerns about the content, text, or information contained within the body of this dictation, please contact the provider for clarification      Yordy Lemos DO, [facility]  12/1/2022 5:30 PM

## 2022-12-02 ENCOUNTER — ANESTHESIA (INPATIENT)
Dept: NON INVASIVE DIAGNOSTICS | Facility: HOSPITAL | Age: 67
End: 2022-12-02

## 2022-12-02 ENCOUNTER — ANESTHESIA EVENT (INPATIENT)
Dept: NON INVASIVE DIAGNOSTICS | Facility: HOSPITAL | Age: 67
End: 2022-12-02

## 2022-12-02 ENCOUNTER — APPOINTMENT (OUTPATIENT)
Dept: NON INVASIVE DIAGNOSTICS | Facility: HOSPITAL | Age: 67
End: 2022-12-02

## 2022-12-02 LAB
BACTERIA BLD CULT: ABNORMAL
GRAM STN SPEC: ABNORMAL
SL CV LV EF: 60

## 2022-12-02 RX ORDER — SODIUM CHLORIDE 9 MG/ML
INJECTION, SOLUTION INTRAVENOUS CONTINUOUS PRN
Status: DISCONTINUED | OUTPATIENT
Start: 2022-12-02 | End: 2022-12-02

## 2022-12-02 RX ORDER — SODIUM CHLORIDE 9 MG/ML
125 INJECTION, SOLUTION INTRAVENOUS CONTINUOUS
Status: CANCELLED | OUTPATIENT
Start: 2022-12-02

## 2022-12-02 RX ORDER — LIDOCAINE HYDROCHLORIDE 20 MG/ML
INJECTION, SOLUTION EPIDURAL; INFILTRATION; INTRACAUDAL; PERINEURAL AS NEEDED
Status: DISCONTINUED | OUTPATIENT
Start: 2022-12-02 | End: 2022-12-02

## 2022-12-02 RX ORDER — PROPOFOL 10 MG/ML
INJECTION, EMULSION INTRAVENOUS AS NEEDED
Status: DISCONTINUED | OUTPATIENT
Start: 2022-12-02 | End: 2022-12-02

## 2022-12-02 RX ORDER — ONDANSETRON 2 MG/ML
4 INJECTION INTRAMUSCULAR; INTRAVENOUS EVERY 6 HOURS PRN
Status: DISCONTINUED | OUTPATIENT
Start: 2022-12-02 | End: 2022-12-02 | Stop reason: HOSPADM

## 2022-12-02 RX ADMIN — CEFAZOLIN SODIUM 2000 MG: 2 SOLUTION INTRAVENOUS at 12:39

## 2022-12-02 RX ADMIN — OXYBUTYNIN CHLORIDE 5 MG: 5 TABLET, EXTENDED RELEASE ORAL at 21:59

## 2022-12-02 RX ADMIN — CEFAZOLIN SODIUM 2000 MG: 2 SOLUTION INTRAVENOUS at 05:06

## 2022-12-02 RX ADMIN — LIDOCAINE HYDROCHLORIDE 100 MG: 20 INJECTION, SOLUTION EPIDURAL; INFILTRATION; INTRACAUDAL; PERINEURAL at 13:13

## 2022-12-02 RX ADMIN — ACETAMINOPHEN 650 MG: 325 TABLET, FILM COATED ORAL at 18:36

## 2022-12-02 RX ADMIN — SODIUM CHLORIDE: 0.9 INJECTION, SOLUTION INTRAVENOUS at 13:06

## 2022-12-02 RX ADMIN — METOPROLOL TARTRATE 25 MG: 25 TABLET, FILM COATED ORAL at 21:59

## 2022-12-02 RX ADMIN — PANTOPRAZOLE SODIUM 20 MG: 20 TABLET, DELAYED RELEASE ORAL at 05:06

## 2022-12-02 RX ADMIN — PROPOFOL 130 MG: 10 INJECTION, EMULSION INTRAVENOUS at 13:13

## 2022-12-02 RX ADMIN — HYDROMORPHONE HYDROCHLORIDE 1 MG: 1 INJECTION, SOLUTION INTRAMUSCULAR; INTRAVENOUS; SUBCUTANEOUS at 16:50

## 2022-12-02 RX ADMIN — CEFAZOLIN SODIUM 2000 MG: 2 SOLUTION INTRAVENOUS at 19:21

## 2022-12-02 RX ADMIN — OXYCODONE HYDROCHLORIDE 40 MG: 20 TABLET, FILM COATED, EXTENDED RELEASE ORAL at 08:48

## 2022-12-02 RX ADMIN — OXYCODONE HYDROCHLORIDE 40 MG: 20 TABLET, FILM COATED, EXTENDED RELEASE ORAL at 21:59

## 2022-12-02 RX ADMIN — DULOXETINE 60 MG: 60 CAPSULE, DELAYED RELEASE ORAL at 08:48

## 2022-12-02 RX ADMIN — PROPOFOL 20 MG: 10 INJECTION, EMULSION INTRAVENOUS at 13:17

## 2022-12-02 RX ADMIN — OXYCODONE HYDROCHLORIDE 15 MG: 10 TABLET ORAL at 19:15

## 2022-12-02 RX ADMIN — METOPROLOL TARTRATE 25 MG: 25 TABLET, FILM COATED ORAL at 08:48

## 2022-12-02 NOTE — PROGRESS NOTES
[address] [place] [address]  Progress Note - Meñoen Beverage 1955, 69 y o  female MRN: [id number]  Unit/Bed#: E5 -01 Encounter: [id number]  Primary Care Provider: Ciera Marin DO   Date and time admitted to hospital: 2022  5:08 PM    * Sepsis [facility])  Assessment & Plan  · Sepsis with MSSA bacteremia  · Thought possibly to be due to right lower extremity skin and soft tissue infection    ELIZABETH done today was negative for endocarditis    Patient will need PICC line and home IV antibiotics for 4 weeks, that can be done early next week    Acute metabolic encephalopathy  Assessment & Plan  · This is due to the acute septic infection  · This is resolving with treatment of the sepsis    Right hip pain  Assessment & Plan  · Patient complains of chronic right hip pain  · CT was done and this was not felt to be the source of infection, there are severe degenerative changes  ·  requests orthopedic consult    Wegener's granulomatosis with renal involvement ([facility] [address] )  Assessment & Plan  · Previously on rituximab and prednisone  · Renal function stable    Results from last 7 days   Lab Units 22  0543 22  0504 22  0621 22  1835   BUN mg/dL 25 19 18 19   CREATININE mg/dL 1 06 1 00 1 05 1 20   EGFR ml/min/1 73sq m 53 57 54 46             Subjective:   Seen after ELIZABETH  No current complaints  No fever chills  No current right hip pain      Objective:     Vitals:   Temp (24hrs), Av 4 °F (36 9 °C), Min:97 9 °F (36 6 °C), Max:99 2 °F (37 3 °C)    Temp:  [97 9 °F (36 6 °C)-99 2 °F (37 3 °C)] 98 3 °F (36 8 °C)  HR:  [70-93] 72  Resp:  [16-20] 17  BP: (103-136)/(53-71) 131/61  SpO2:  [94 %-96 %] 95 %  Body mass index is 37 73 kg/m²  Input and Output Summary (last 24 hours):        Intake/Output Summary (Last 24 hours) at 2022 1407  Last data filed at 2022 1327  Gross per 24 hour   Intake 250 ml   Output --   Net 250 ml       Physical Exam:     Physical Exam  Vitals and nursing note reviewed  HENT:      Head: Normocephalic and atraumatic  Eyes:      Extraocular Movements: EOM normal       Pupils: Pupils are equal, round, and reactive to light  Cardiovascular:      Rate and Rhythm: Normal rate and regular rhythm  Heart sounds: No murmur heard  No friction rub  No gallop  Pulmonary:      Effort: Pulmonary effort is normal       Breath sounds: Normal breath sounds  No wheezing or rales  Abdominal:      General: Bowel sounds are normal       Palpations: Abdomen is soft  Tenderness: There is no abdominal tenderness  Musculoskeletal:         General: No edema  Right lower leg: No edema  Left lower leg: No edema          Additional Data:     Labs:    Results from last 7 days   Lab Units 12/01/22  0543 11/30/22  0504 11/29/22  0621   WBC Thousand/uL 18 97*   < > 16 41*   HEMOGLOBIN g/dL 11 1*   < > 13 9   HEMATOCRIT % 35 6   < > 43 3   PLATELETS Thousands/uL 191   < > 180   NEUTROS PCT %  --   --  84*   LYMPHS PCT %  --   --  8*   MONOS PCT %  --   --  7   EOS PCT %  --   --  0    < > = values in this interval not displayed  Results from last 7 days   Lab Units 12/01/22  0543   POTASSIUM mmol/L 4 0   CHLORIDE mmol/L 103   CO2 mmol/L 25   BUN mg/dL 25   CREATININE mg/dL 1 06   CALCIUM mg/dL 9 2   ALK PHOS U/L 114   ALT U/L 22   AST U/L 40     Results from last 7 days   Lab Units 11/28/22  2120   INR  1 16                   * I Have Reviewed All Lab Data     Recent Cultures (last 7 days):     Results from last 7 days   Lab Units 11/30/22  0449 11/28/22  1830   BLOOD CULTURE  No Growth at 48 hrs  No Growth at 48 hrs   Staphylococcus aureus*  Staphylococcus aureus*   GRAM STAIN RESULT   --  Gram positive cocci in clusters*  Gram positive cocci in clusters*         Last 24 Hours Medication List:   Current Facility-Administered Medications   Medication Dose Route Frequency Provider Last Rate   • acetaminophen  650 mg Oral Q6H PRN Ariane Bio Prechtel, DO     • bisacodyl  5 mg Oral Daily PRN Meghan Garcia Prechtel, DO     • cefazolin  2,000 mg Intravenous [facility], CRNP 2,000 mg (12/02/22 1239)   • diphenhydrAMINE  50 mg Oral 60 Min Pre-Op Kp May, DO     • DULoxetine  60 mg Oral Daily Corwin S Prechtel, DO     • enoxaparin  40 mg Subcutaneous Q24H [facility] Kp Lalo, DO     • HYDROmorphone  1 mg Intravenous Q4H PRN Amanda Marielle, DO     • LORazepam  0 5 mg Oral Q8H PRN [facility] Marielle, DO     • metoprolol tartrate  25 mg Oral Q12H [facility] Prechtel, DO     • ondansetron  4 mg Intravenous Q6H PRN Melquiades Peoples, DO     • oxybutynin  5 mg Oral Daily [facility] Prechtel, DO     • oxyCODONE  40 mg Oral Q12H [facility] Lalo, DO     • oxyCODONE  10 mg Oral Q4H PRN Amanda Marielle, DO      Or   • oxyCODONE  15 mg Oral Q4H PRN [facility] Marielle, DO     • pantoprazole  20 mg Oral Early Morning Corwin S Prechtel, DO     • senna-docusate sodium  1 tablet Oral HS Corwin S Prechtel, DO           VTE Pharmacologic Prophylaxis:   Pharmacologic: Enoxaparin (Lovenox)      Current Length of Stay: 4 day(s)    Current Patient Status: Inpatient       Discharge Plan: Will need PICC line and home IV Ancef set up for 4 weeks  Likely home early next week    Code Status: Level 1 - Full Code           Today, Patient Was Seen By: Kaitlyn Dobbs DO    ** Please Note: Dictation voice to text software may have been used in the creation of this document   **

## 2022-12-02 NOTE — PHYSICAL THERAPY NOTE
PHYSICAL THERAPY NOTE          Patient Name: Malina Kramer  [id number] Date: 12/2/2022  PT treatment offered this morning but patient declined stating that she is having severe hip pain, which she plans to have ortho evaluate, and because she is going for a procedure at 1300  Patient was educated in the benefits of PT but patient continued to decline at this time  Will continue to offer PT treatment as ordered and progress as tolerated    Haleigh Forbes [place], [place]
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
## 2022-12-02 NOTE — ANESTHESIA POSTPROCEDURE EVALUATION
Post-Op Assessment Note    CV Status:  Stable  Pain Score: 0    Pain management: adequate     Mental Status:  Alert and awake   Hydration Status:  Euvolemic   PONV Controlled:  Controlled   Airway Patency:  Patent      Post Op Vitals Reviewed: Yes      Staff: Anesthesiologist         No notable events documented      /61 (12/02/22 1346)    Temp      Pulse 72 (12/02/22 1346)   Resp 17 (12/02/22 1346)    SpO2 95 % (12/02/22 1346)

## 2022-12-02 NOTE — ANESTHESIA PREPROCEDURE EVALUATION
Procedure:  ELIZABETH    Relevant Problems   ANESTHESIA (within normal limits)      CARDIO  Left Ventricle: Left ventricular cavity size is normal  Wall thickness is moderately increased  The left ventricular ejection fraction is 65%  Systolic function is normal  Wall motion is normal  Diastolic function is normal   •  Aortic Valve: The aortic valve was not well visualized  There is no evidence of regurgitation  •  Mitral Valve: The mitral valve was not well visualized  There is no evidence of regurgitation  •  Tricuspid Valve: The tricuspid valve was not well visualized  There is no evidence of regurgitation  •  Pulmonic Valve: The pulmonic valve was not well visualized  There is no evidence of regurgitation      (+) Benign essential hypertension   (+) Dyspnea on exertion      GI/HEPATIC   (+) GERD (gastroesophageal reflux disease)      /RENAL   (+) Chronic kidney disease, stage III (moderate) (HCC)      GYN   (+) Cancer of ovary (HCC)   (+) Ovarian cancer (HCC)      MUSCULOSKELETAL   (+) Chronic right-sided low back pain without sciatica   (+) Post-traumatic osteoarthritis      NEURO/PSYCH   (+) Anxiety   (+) Chronic pain disorder   (+) Chronic right-sided low back pain without sciatica   (+) Depression   (+) History of pulmonary embolism   (+) Other complicated headache syndrome      PULMONARY   (+) Dyspnea on exertion        Physical Exam    Airway    Mallampati score: II         Dental       Cardiovascular  Rhythm: regular, Rate: normal, Cardiovascular exam normal    Pulmonary  Pulmonary exam normal Breath sounds clear to auscultation,     Other Findings        Anesthesia Plan  ASA Score- 3     Anesthesia Type- general with ASA Monitors  Additional Monitors:   Airway Plan:           Plan Factors-    Chart reviewed  Existing labs reviewed  Patient summary reviewed  Patient is not a current smoker  Induction- intravenous      Postoperative Plan-     Informed Consent- Anesthetic plan and risks discussed with patient

## 2022-12-02 NOTE — ASSESSMENT & PLAN NOTE
· Sepsis with MSSA bacteremia  · Thought possibly to be due to right lower extremity skin and soft tissue infection    ELIZABETH done today was negative for endocarditis    Patient will need PICC line and home IV antibiotics for 4 weeks, that can be done early next week

## 2022-12-02 NOTE — OCCUPATIONAL THERAPY NOTE
OCCUPATIONAL THERAPY CANCELLATION     OT reviewed chart  Pt off the floor for ELIZABETH  Will f/u and see as able and appropriate       Krysten Lucas MS, OTR/L

## 2022-12-02 NOTE — PROGRESS NOTES
Progress Note - Infectious Disease   Jeanna Prior 69 y o  female MRN: [id number]  Unit/Bed#: E5 -01 Encounter: [id number]      Impression/Plan:  1  Sepsis  Present on arrival  Fever, tachycardia, tachypnea, and leukocytosis  Likely secondary to MSSA bacteremia with suspected RLE source  Patient with lymphedema and history of recurring cellulitis  Clinically improving with resolution of fever  Hemodynamics are stable   -antibiotic as below  -follow temperatures and hemodynamics  -recheck CBC in a m      2  MSSA bacteremia  Most likely cutaneous source in the setting of lower extremity lymphedema  Consider relation to #3 as patient has right hip pain but no radiographic evidence of infection on CT  2D echo was a poor study  No intravascular prosthetic devices  Bacteremia quickly cleared   -continue IV cefazolin  -follow-up pending ELIZABETH to help determine duration of therapy  -hold off on PICC line placement until repeat blood cultures are negative at 72 hours  -if ELIZABETH is negative, anticipate 4 week course of IV antibiotic, through 12/27   -outpatient ID follow-up in 2 weeks after discharge  -surveillance blood cultures 2 weeks after completion of antibiotic course     3  R hip pain  In patient with previous ORIF and intact hardware   CT shows severe right hip osteoarthrosis and post ORIF changes, which appear stable  No discrete soft tissue abscess or gas noted  -serial exams  -pain management per primary service     4  Confusion  Likely secondary to sepsis and bacteremia above  Mentation has improved   -treatment of sepsis/bacteremia as above  -serial neuro exams  -monitor mood and mental status     5  Lymphedema  This is likely driving force of patient's recurring lower extremity cellulitis      6  CKD III  Upon review of patient's available past medical records it appears her baseline creatinine is approximately 0 8-1 2  She was within baseline at 1 2 upon admission    -monitor creatinine  -dose adjust antibiotics for renal function as needed  -avoid nephrotoxins     7  Morbid obesity  BMI = 37 73     8  Wegener's granulomatosis      Will plan to formally re-evaluate patient on   Please call us with any new questions in the interim      Antibiotics:  Cefazolin 4  Antibiotics 5         Subjective:  Still has hip pain  Going for ELIZABETH this afternoon  No fevers  Objective:  Vitals:  Temp:  [97 9 °F (36 6 °C)-99 2 °F (37 3 °C)] 97 9 °F (36 6 °C)  HR:  [74-93] 78  Resp:  [20] 20  BP: (117-136)/(64-71) 135/71  SpO2:  [94 %-95 %] 95 %  Temp (24hrs), Av 5 °F (36 9 °C), Min:97 9 °F (36 6 °C), Max:99 2 °F (37 3 °C)  Current: Temperature: 97 9 °F (36 6 °C)    Physical Exam:   General:  No acute distress, nontoxic  HEENT:  Atraumatic normocephalic  Neck:  Trachea midline  Psychiatric:  Awake and alert  Pulmonary:  Normal respiratory excursion without accessory muscle use  Abdomen:  Soft, nontender  Extremities:  Bilateral lower extremities are wrapped with no ascending erythema  Skin:  No diffuse rashes or draining wounds  Neuro: Moves all extremities spontaneously    Lab Results:  I have personally reviewed pertinent labs  Results from last 7 days   Lab Units 22  0543 22  0504 22  0621   POTASSIUM mmol/L 4 0 4 1 4 5   CHLORIDE mmol/L 103 103 99   CO2 mmol/L 25 24 16*   BUN mg/dL 25 19 18   CREATININE mg/dL 1 06 1 00 1 05   EGFR ml/min/1 73sq m 53 57 54   CALCIUM mg/dL 9 2 9 3 9 4   AST U/L 40 22 61*   ALT U/L 22 22 48   ALK PHOS U/L 114 123* 172*     Results from last 7 days   Lab Units 22  0543 22  0504 22  0621   WBC Thousand/uL 18 97* 19 49* 16 41*   HEMOGLOBIN g/dL 11 1* 11 5 13 9   PLATELETS Thousands/uL 191 155 180     Results from last 7 days   Lab Units 22  0449 22  1830   BLOOD CULTURE  No Growth at 48 hrs  No Growth at 48 hrs   Staphylococcus aureus*  Staphylococcus aureus*   GRAM STAIN RESULT   --  Gram positive cocci in clusters*  Gram positive cocci in clusters*       Imaging Studies:   I have personally reviewed pertinent imaging study reports and images in PACS  EKG, Pathology, and Other Studies:   I have personally reviewed pertinent reports

## 2022-12-03 LAB
ANION GAP SERPL CALCULATED.3IONS-SCNC: 7 MMOL/L (ref 4–13)
BASOPHILS # BLD AUTO: 0.02 THOUSANDS/ÂΜL (ref 0–0.1)
BASOPHILS NFR BLD AUTO: 0 % (ref 0–1)
BUN SERPL-MCNC: 14 MG/DL (ref 5–25)
CALCIUM SERPL-MCNC: 8.5 MG/DL (ref 8.3–10.1)
CHLORIDE SERPL-SCNC: 102 MMOL/L (ref 96–108)
CO2 SERPL-SCNC: 29 MMOL/L (ref 21–32)
CREAT SERPL-MCNC: 0.81 MG/DL (ref 0.6–1.3)
EOSINOPHIL # BLD AUTO: 0.19 THOUSAND/ÂΜL (ref 0–0.61)
EOSINOPHIL NFR BLD AUTO: 2 % (ref 0–6)
ERYTHROCYTE [DISTWIDTH] IN BLOOD BY AUTOMATED COUNT: 14.2 % (ref 11.6–15.1)
GFR SERPL CREATININE-BSD FRML MDRD: 74 ML/MIN/1.73SQ M
GLUCOSE SERPL-MCNC: 107 MG/DL (ref 65–140)
HCT VFR BLD AUTO: 32.8 % (ref 34.8–46.1)
HGB BLD-MCNC: 10.5 G/DL (ref 11.5–15.4)
IMM GRANULOCYTES # BLD AUTO: 0.06 THOUSAND/UL (ref 0–0.2)
IMM GRANULOCYTES NFR BLD AUTO: 1 % (ref 0–2)
LYMPHOCYTES # BLD AUTO: 1.75 THOUSANDS/ÂΜL (ref 0.6–4.47)
LYMPHOCYTES NFR BLD AUTO: 16 % (ref 14–44)
MAGNESIUM SERPL-MCNC: 1.7 MG/DL (ref 1.6–2.6)
MCH RBC QN AUTO: 29.4 PG (ref 26.8–34.3)
MCHC RBC AUTO-ENTMCNC: 32 G/DL (ref 31.4–37.4)
MCV RBC AUTO: 92 FL (ref 82–98)
MONOCYTES # BLD AUTO: 1.3 THOUSAND/ÂΜL (ref 0.17–1.22)
MONOCYTES NFR BLD AUTO: 12 % (ref 4–12)
NEUTROPHILS # BLD AUTO: 7.38 THOUSANDS/ÂΜL (ref 1.85–7.62)
NEUTS SEG NFR BLD AUTO: 69 % (ref 43–75)
NRBC BLD AUTO-RTO: 0 /100 WBCS
PLATELET # BLD AUTO: 214 THOUSANDS/UL (ref 149–390)
PMV BLD AUTO: 9.4 FL (ref 8.9–12.7)
POTASSIUM SERPL-SCNC: 3.9 MMOL/L (ref 3.5–5.3)
RBC # BLD AUTO: 3.57 MILLION/UL (ref 3.81–5.12)
SODIUM SERPL-SCNC: 138 MMOL/L (ref 135–147)
WBC # BLD AUTO: 10.7 THOUSAND/UL (ref 4.31–10.16)

## 2022-12-03 RX ADMIN — OXYCODONE HYDROCHLORIDE 40 MG: 20 TABLET, FILM COATED, EXTENDED RELEASE ORAL at 09:23

## 2022-12-03 RX ADMIN — PANTOPRAZOLE SODIUM 20 MG: 20 TABLET, DELAYED RELEASE ORAL at 05:11

## 2022-12-03 RX ADMIN — OXYCODONE HYDROCHLORIDE 15 MG: 10 TABLET ORAL at 16:52

## 2022-12-03 RX ADMIN — HYDROMORPHONE HYDROCHLORIDE 1 MG: 1 INJECTION, SOLUTION INTRAMUSCULAR; INTRAVENOUS; SUBCUTANEOUS at 18:27

## 2022-12-03 RX ADMIN — OXYCODONE HYDROCHLORIDE 15 MG: 10 TABLET ORAL at 22:11

## 2022-12-03 RX ADMIN — ACETAMINOPHEN 650 MG: 325 TABLET, FILM COATED ORAL at 09:42

## 2022-12-03 RX ADMIN — OXYBUTYNIN CHLORIDE 5 MG: 5 TABLET, EXTENDED RELEASE ORAL at 22:11

## 2022-12-03 RX ADMIN — OXYCODONE HYDROCHLORIDE 15 MG: 10 TABLET ORAL at 05:11

## 2022-12-03 RX ADMIN — CEFAZOLIN SODIUM 2000 MG: 2 SOLUTION INTRAVENOUS at 20:21

## 2022-12-03 RX ADMIN — METOPROLOL TARTRATE 25 MG: 25 TABLET, FILM COATED ORAL at 09:23

## 2022-12-03 RX ADMIN — CEFAZOLIN SODIUM 2000 MG: 2 SOLUTION INTRAVENOUS at 12:06

## 2022-12-03 RX ADMIN — CEFAZOLIN SODIUM 2000 MG: 2 SOLUTION INTRAVENOUS at 04:03

## 2022-12-03 RX ADMIN — METOPROLOL TARTRATE 25 MG: 25 TABLET, FILM COATED ORAL at 20:21

## 2022-12-03 RX ADMIN — OXYCODONE HYDROCHLORIDE 40 MG: 20 TABLET, FILM COATED, EXTENDED RELEASE ORAL at 20:22

## 2022-12-03 RX ADMIN — DULOXETINE 60 MG: 60 CAPSULE, DELAYED RELEASE ORAL at 09:23

## 2022-12-03 RX ADMIN — ENOXAPARIN SODIUM 40 MG: 40 INJECTION SUBCUTANEOUS at 09:24

## 2022-12-03 RX ADMIN — ACETAMINOPHEN 650 MG: 325 TABLET, FILM COATED ORAL at 22:19

## 2022-12-03 NOTE — PROGRESS NOTES
[address] [facility]  Progress Note - Lizzeth Mccarty 1955, 69 y o  female MRN: [id number]  Unit/Bed#: E5 -01 Encounter: [id number]  Primary Care Provider: Tanya Arthur DO   Date and time admitted to hospital: 11/28/2022  5:08 PM    * Sepsis [facility])  Assessment & Plan  · Sepsis with MSSA bacteremia  · ELIZABETH negative for endocarditis  Hip imaging negative for acute infection  · Likely secondary to right lower extremity cellulitis  · Blood cultures negative x72 hours  · ID plan:  Four weeks of IV antibiotics and a 12/27/2022  PICC order placed and consent obtained and placed in chart    Acute metabolic encephalopathy  Assessment & Plan  · Acute metabolic encephalopathy secondary to acute infection  · Mentation is now at baseline  · Management of underlying infection as outlined below    Right hip pain  Assessment & Plan  · Chronic hip pain on oxycodone  · Confirmed on   Continue 40 mg b i d  · Hip CT negative for acute infection  · Seen by orthopedics without any further recommendations    Cancer of ovary ([facility] )  Assessment & Plan  · History of ovarian cancer status post BSO    Wegener's granulomatosis with renal involvement ([facility] )  Assessment & Plan  · Previously on rituximab and prednisone  · Renal function stable    Results from last 7 days   Lab Units 12/03/22  0502 12/01/22  0543 11/30/22  0504 11/29/22  0621 11/28/22  1835   BUN mg/dL 14 25 19 18 19   CREATININE mg/dL 0 81 1 06 1 00 1 05 1 20   EGFR ml/min/1 73sq m 74 53 57 54 46       VTE Pharmacologic Prophylaxis: VTE Score: 5 High Risk (Score >/= 5) - Pharmacological DVT Prophylaxis Ordered: enoxaparin (Lovenox)  Sequential Compression Devices Ordered  Patient Centered Rounds: I have performed bedside rounds with nursing staff today  Discussions with Specialists or Other Care Team Provider:     Education and Discussions with Family / Patient:     Time Spent for Care: 20 mins    More than 50% of total time spent on counseling and coordination of care as described above  Current Length of Stay: 5 day(s)  Current Patient Status: Inpatient   Certification Statement: The patient will continue to require additional inpatient hospital stay due to IV antibiotics  Discharge Plan / Estimated Discharge Date: Anticipate discharge in 48-72 hrs to home with home services  Code Status: Level 1 - Full Code      Subjective:   Patient seen and examined  No new complaints, still having some right hip pains  No diarrhea    Objective:   Vitals: Blood pressure 145/68, pulse 79, temperature 99 °F (37 2 °C), resp  rate 18, height 5' 3" (1 6 m), weight 96 6 kg (213 lb), SpO2 94 %  No intake or output data in the 24 hours ending 12/03/22 1407    Physical Exam  Vitals reviewed  Constitutional:       General: She is not in acute distress  Appearance: Normal appearance  HENT:      Head: Atraumatic  Cardiovascular:      Rate and Rhythm: Regular rhythm  Pulmonary:      Breath sounds: Decreased breath sounds present  No wheezing  Abdominal:      General: Bowel sounds are normal       Palpations: Abdomen is soft  Tenderness: There is no guarding or rebound  Musculoskeletal:         General: Tenderness (Right hip) present  No swelling  Skin:     General: Skin is warm  Neurological:      Mental Status: She is alert  Mental status is at baseline  Psychiatric:         Mood and Affect: Mood normal        Additional Data:   Labs:  Results from last 7 days   Lab Units 12/03/22  0502 12/01/22 0543 11/30/22  0504 11/29/22  0621 11/28/22  2120   WBC Thousand/uL 10 70* 18 97* 19 49*   < >  --    HEMOGLOBIN g/dL 10 5* 11 1* 11 5   < >  --    PLATELETS Thousands/uL 214 191 155   < >  --    MCV fL 92 95 93   < >  --    INR   --   --   --   --  1 16    < > = values in this interval not displayed       Results from last 7 days   Lab Units 12/03/22  0502 12/01/22  0543 11/30/22  0504 11/29/22  0621   SODIUM mmol/L 138 135 134* 133* POTASSIUM mmol/L 3 9 4 0 4 1 4 5   CHLORIDE mmol/L 102 103 103 99   CO2 mmol/L 29 25 24 16*   ANION GAP mmol/L 7 7 7 18*   BUN mg/dL 14 25 19 18   CREATININE mg/dL 0 81 1 06 1 00 1 05   CALCIUM mg/dL 8 5 9 2 9 3 9 4   ALBUMIN g/dL  --  2 6* 2 6* 3 6   TOTAL BILIRUBIN mg/dL  --  0 33 0 54 1 31*   ALK PHOS U/L  --  114 123* 172*   ALT U/L  --  22 22 48   AST U/L  --  40 22 61*   EGFR ml/min/1 73sq m 74 53 57 54   GLUCOSE RANDOM mg/dL 107 95 162* 124     Results from last 7 days   Lab Units 12/03/22  0502 11/29/22  0621   MAGNESIUM mg/dL 1 7 2 1                  Results from last 7 days   Lab Units 11/29/22  0621 11/28/22 2120 11/28/22  1835   LACTIC ACID mmol/L  --   --  1 7   PROCALCITONIN ng/ml 3 32*   < >  --     < > = values in this interval not displayed  * I Have Reviewed All Lab Data Listed Above  Cultures:   Results from last 7 days   Lab Units 11/30/22  0449 11/28/22 2120 11/28/22  1830   BLOOD CULTURE  No Growth at 72 hrs  No Growth at 72 hrs   --  Staphylococcus aureus*  Staphylococcus aureus*   GRAM STAIN RESULT   --   --  Gram positive cocci in clusters*  Gram positive cocci in clusters*   INFLUENZA A PCR   --  Negative  --        Results from last 7 days   Lab Units 11/28/22 2120   SARS-COV-2  Negative   INFLUENZA A PCR  Negative   INFLUENZA B PCR  Negative   RSV PCR  Negative           Lines/Drains:  Invasive Devices     Peripheral Intravenous Line  Duration           Peripheral IV 12/02/22 Right Forearm 1 day              Telemetry:      Imaging:  Imaging Reports Reviewed Today Include:   CT chest abdomen pelvis wo contrast    Result Date: 11/28/2022  Impression: Minimal biapical groundglass opacity with associated scarring, improved from prior recent study dated 10/19/2022 which may reflect residua of prior pneumonia  Correlate clinically to exclude superimposed active infection  No focal consolidation or effusion   No definite acute findings in the abdomen or pelvis within limitations of noncontrast exam  Tiny nonspecific foci of antidependent air within the urinary bladder  Correlate for? Recent instrumentation  Workstation performed: [id number]     XR hip/pelv 2-3 vws right    Result Date: 11/29/2022  Impression: No acute osseous abnormality  Severe osteoarthritis in the right hip  Workstation performed: [id number]     CT head without contrast    Result Date: 11/28/2022  Impression: No acute intracranial abnormality  Workstation performed: [id number]     CT lower extremity w contrast right    Result Date: 11/30/2022  Impression: Severe right hip osteoarthrosis with mild acetabular protrusio deformity as described above  Status post ORIF of the medial acetabulum  The appearance of this appears not significantly changed  Mild subcutaneous edema seen laterally in the distal thigh  No discrete soft tissue abscess or soft tissue gas   Workstation performed: [id number]       Scheduled Meds:  Current Facility-Administered Medications   Medication Dose Route Frequency Provider Last Rate   • acetaminophen  650 mg Oral Q6H PRN Joanna Reason Prechtel, DO     • bisacodyl  5 mg Oral Daily PRN Joanna Reason Prechtel, DO     • cefazolin  2,000 mg Intravenous [facility], CRNP 2,000 mg (12/03/22 1206)   • diphenhydrAMINE  50 mg Oral 60 Min Pre-Op Kp May, DO     • DULoxetine  60 mg Oral Daily Corwin S Prechtel, DO     • enoxaparin  40 mg Subcutaneous Q24H [address] Kp Lalo, DO     • HYDROmorphone  1 mg Intravenous Q4H PRN Pedro Mandril, DO     • LORazepam  0 5 mg Oral Q8H PRN Pedro Mandril, DO     • metoprolol tartrate  25 mg Oral Q12H [address] Corwin S Prechtel, DO     • oxybutynin  5 mg Oral Daily Corwin S Prechtel, DO     • oxyCODONE  40 mg Oral Q12H [address] Kp Lalo, DO     • oxyCODONE  10 mg Oral Q4H PRN Pedro Mandril, DO      Or   • oxyCODONE  15 mg Oral Q4H PRN Pedro Mandril, DO     • pantoprazole  20 mg Oral Early Morning Corwin S Prechtel, DO     • senna-docusate sodium  1 tablet Oral HS Zoya Guillen DO         Today, Patient Was Seen By: Rodrigo Sethi DO    ** Please Note: Dictation voice to text software may have been used in the creation of this document   **

## 2022-12-03 NOTE — ASSESSMENT & PLAN NOTE
· Previously on rituximab and prednisone    · Renal function stable    Results from last 7 days   Lab Units 12/03/22  0502 12/01/22  0543 11/30/22  0504 11/29/22  0621 11/28/22  1835   BUN mg/dL 14 25 19 18 19   CREATININE mg/dL 0 81 1 06 1 00 1 05 1 20   EGFR ml/min/1 73sq m 74 53 57 54 46

## 2022-12-03 NOTE — ASSESSMENT & PLAN NOTE
· Chronic hip pain on oxycodone  · Confirmed on   Continue 40 mg b i d    · Hip CT negative for acute infection  · Seen by orthopedics without any further recommendations

## 2022-12-03 NOTE — ASSESSMENT & PLAN NOTE
· Acute metabolic encephalopathy secondary to acute infection    · Mentation is now at baseline  · Management of underlying infection as outlined below

## 2022-12-03 NOTE — ASSESSMENT & PLAN NOTE
· Sepsis with MSSA bacteremia  · ELIZABETH negative for endocarditis  Hip imaging negative for acute infection  · Likely secondary to right lower extremity cellulitis  · Blood cultures negative x72 hours  · ID plan:  Four weeks of IV antibiotics and a 12/27/2022    PICC order placed and consent obtained and placed in chart

## 2022-12-03 NOTE — PLAN OF CARE
Problem: Potential for Falls  Goal: Patient will remain free of falls  Description: INTERVENTIONS:  - Educate patient/family on patient safety including physical limitations  - Instruct patient to call for assistance with activity   - Consult OT/PT to assist with strengthening/mobility   - Keep Call bell within reach  - Keep bed low and locked with side rails adjusted as appropriate  - Keep care items and personal belongings within reach  - Initiate and maintain comfort rounds  - Make Fall Risk Sign visible to staff  - Offer Toileting every 2 Hours, in advance of need  - Initiate/Maintain bed alarm  - Apply yellow socks and bracelet for high fall risk patients  - Consider moving patient to room near nurses station  12/3/2022 0923 by Shanta Santos RN  Outcome: Progressing  12/3/2022 0921 by Shanta Santos RN  Outcome: Progressing     Problem: Prexisting or High Potential for Compromised Skin Integrity  Goal: Skin integrity is maintained or improved  Description: INTERVENTIONS:  - Identify patients at risk for skin breakdown  - Assess and monitor skin integrity  - Assess and monitor nutrition and hydration status  - Monitor labs   - Assess for incontinence   - Turn and reposition patient  - Assist with mobility/ambulation  - Relieve pressure over bony prominences  - Avoid friction and shearing  - Provide appropriate hygiene as needed including keeping skin clean and dry  - Evaluate need for skin moisturizer/barrier cream  - Collaborate with interdisciplinary team   - Patient/family teaching  - Consider wound care consult   12/3/2022 0923 by Shanta Santos RN  Outcome: Progressing  12/3/2022 0921 by Shanta Santos RN  Outcome: Progressing     Problem: MOBILITY - ADULT  Goal: Maintain or return to baseline ADL function  Description: INTERVENTIONS:  -  Assess patient's ability to carry out ADLs; assess patient's baseline for ADL function and identify physical deficits which impact ability to perform ADLs (bathing, care of mouth/teeth, toileting, grooming, dressing, etc )  - Assess/evaluate cause of self-care deficits   - Assess range of motion  - Assess patient's mobility; develop plan if impaired  - Assess patient's need for assistive devices and provide as appropriate  - Encourage maximum independence but intervene and supervise when necessary  - Involve family in performance of ADLs  - Assess for home care needs following discharge   - Consider OT consult to assist with ADL evaluation and planning for discharge  - Provide patient education as appropriate  12/3/2022 0923 by Sugar Bryan RN  Outcome: Progressing  12/3/2022 0921 by Sugar Bryan RN  Outcome: Progressing  Goal: Maintains/Returns to pre admission functional level  Description: INTERVENTIONS:  - Perform BMAT or MOVE assessment daily    - Set and communicate daily mobility goal to care team and patient/family/caregiver     - Collaborate with rehabilitation services on mobility goals if consulted  - Out of bed for toileting  - Record patient progress and toleration of activity level   12/3/2022 0923 by Sugar Bryan RN  Outcome: Progressing  12/3/2022 0921 by Sugar Bryan RN  Outcome: Progressing     Problem: PAIN - ADULT  Goal: Verbalizes/displays adequate comfort level or baseline comfort level  Description: Interventions:  - Encourage patient to monitor pain and request assistance  - Assess pain using appropriate pain scale  - Administer analgesics based on type and severity of pain and evaluate response  - Implement non-pharmacological measures as appropriate and evaluate response  - Consider cultural and social influences on pain and pain management  - Notify physician/advanced practitioner if interventions unsuccessful or patient reports new pain  Outcome: Progressing     Problem: INFECTION - ADULT  Goal: Absence or prevention of progression during hospitalization  Description: INTERVENTIONS:  - Assess and monitor for signs and symptoms of infection  - Monitor lab/diagnostic results  - Monitor all insertion sites, i e  indwelling lines, tubes, and drains  - Monitor endotracheal if appropriate and nasal secretions for changes in amount and color  - [place] appropriate cooling/warming therapies per order  - Administer medications as ordered  - Instruct and encourage patient and family to use good hand hygiene technique  - Identify and instruct in appropriate isolation precautions for identified infection/condition  Outcome: Progressing  Goal: Absence of fever/infection during neutropenic period  Description: INTERVENTIONS:  - Monitor WBC    Outcome: Progressing     Problem: SAFETY ADULT  Goal: Patient will remain free of falls  Description: INTERVENTIONS:  - Educate patient/family on patient safety including physical limitations  - Instruct patient to call for assistance with activity   - Consult OT/PT to assist with strengthening/mobility   - Keep Call bell within reach  - Keep bed low and locked with side rails adjusted as appropriate  - Keep care items and personal belongings within reach  - Initiate and maintain comfort rounds  - Make Fall Risk Sign visible to staff  - Offer Toileting every 2 Hours, in advance of need  - Initiate/Maintain bed alarm  - Apply yellow socks and bracelet for high fall risk patients  - Consider moving patient to room near nurses station  12/3/2022 0923 by Sugar Bryan RN  Outcome: Progressing  12/3/2022 0921 by Sugar Bryan RN  Outcome: Progressing  Goal: Maintain or return to baseline ADL function  Description: INTERVENTIONS:  -  Assess patient's ability to carry out ADLs; assess patient's baseline for ADL function and identify physical deficits which impact ability to perform ADLs (bathing, care of mouth/teeth, toileting, grooming, dressing, etc )  - Assess/evaluate cause of self-care deficits   - Assess range of motion  - Assess patient's mobility; develop plan if impaired  - Assess patient's need for assistive devices and provide as appropriate  - Encourage maximum independence but intervene and supervise when necessary  - Involve family in performance of ADLs  - Assess for home care needs following discharge   - Consider OT consult to assist with ADL evaluation and planning for discharge  - Provide patient education as appropriate  12/3/2022 0923 by Celia Pike RN  Outcome: Progressing  12/3/2022 0921 by Celia Pike RN  Outcome: Progressing  Goal: Maintains/Returns to pre admission functional level  Description: INTERVENTIONS:  - Perform BMAT or MOVE assessment daily    - Set and communicate daily mobility goal to care team and patient/family/caregiver  - Collaborate with rehabilitation services on mobility goals if consulted  - Out of bed for toileting  - Record patient progress and toleration of activity level   12/3/2022 0923 by Celia Pike RN  Outcome: Progressing  12/3/2022 0921 by Celia Pike RN  Outcome: Progressing     Problem: DISCHARGE PLANNING  Goal: Discharge to home or other facility with appropriate resources  Description: INTERVENTIONS:  - Identify barriers to discharge w/patient and caregiver  - Arrange for needed discharge resources and transportation as appropriate  - Identify discharge learning needs (meds, wound care, etc )  - Arrange for interpretive services to assist at discharge as needed  - Refer to Case Management Department for coordinating discharge planning if the patient needs post-hospital services based on physician/advanced practitioner order or complex needs related to functional status, cognitive ability, or social support system  Outcome: Progressing     Problem: Knowledge Deficit  Goal: Patient/family/caregiver demonstrates understanding of disease process, treatment plan, medications, and discharge instructions  Description: Complete learning assessment and assess knowledge base    Interventions:  - Provide teaching at level of understanding  - Provide teaching via preferred learning methods  Outcome: Progressing     Problem: MUSCULOSKELETAL - ADULT  Goal: Maintain or return mobility to safest level of function  Description: INTERVENTIONS:  - Assess patient's ability to carry out ADLs; assess patient's baseline for ADL function and identify physical deficits which impact ability to perform ADLs (bathing, care of mouth/teeth, toileting, grooming, dressing, etc )  - Assess/evaluate cause of self-care deficits   - Assess range of motion  - Assess patient's mobility  - Assess patient's need for assistive devices and provide as appropriate  - Encourage maximum independence but intervene and supervise when necessary  - Involve family in performance of ADLs  - Assess for home care needs following discharge   - Consider OT consult to assist with ADL evaluation and planning for discharge  - Provide patient education as appropriate  Outcome: Progressing  Goal: Maintain proper alignment of affected body part  Description: INTERVENTIONS:  - Support, maintain and protect limb and body alignment  - Provide patient/ family with appropriate education  Outcome: Progressing

## 2022-12-03 NOTE — ASSESSMENT & PLAN NOTE
· Sepsis with MSSA bacteremia  · ELIZABETH negative for endocarditis  Hip imaging negative for acute infection  · Likely secondary to right lower extremity cellulitis  · Blood cultures negative x72 hours  · ID plan:  Four weeks of IV antibiotics end date 12/27/2022    PICC order placed and consent obtained and placed in chart

## 2022-12-04 RX ADMIN — LORAZEPAM 0.5 MG: 1 TABLET ORAL at 02:18

## 2022-12-04 RX ADMIN — PANTOPRAZOLE SODIUM 20 MG: 20 TABLET, DELAYED RELEASE ORAL at 05:00

## 2022-12-04 RX ADMIN — OXYCODONE HYDROCHLORIDE 15 MG: 10 TABLET ORAL at 12:28

## 2022-12-04 RX ADMIN — SENNOSIDES AND DOCUSATE SODIUM 1 TABLET: 8.6; 5 TABLET ORAL at 22:36

## 2022-12-04 RX ADMIN — OXYCODONE HYDROCHLORIDE 15 MG: 10 TABLET ORAL at 19:43

## 2022-12-04 RX ADMIN — OXYBUTYNIN CHLORIDE 5 MG: 5 TABLET, EXTENDED RELEASE ORAL at 22:36

## 2022-12-04 RX ADMIN — DULOXETINE 60 MG: 60 CAPSULE, DELAYED RELEASE ORAL at 09:35

## 2022-12-04 RX ADMIN — OXYCODONE HYDROCHLORIDE 15 MG: 10 TABLET ORAL at 04:58

## 2022-12-04 RX ADMIN — METOPROLOL TARTRATE 25 MG: 25 TABLET, FILM COATED ORAL at 09:35

## 2022-12-04 RX ADMIN — CEFAZOLIN SODIUM 2000 MG: 2 SOLUTION INTRAVENOUS at 19:43

## 2022-12-04 RX ADMIN — METOPROLOL TARTRATE 25 MG: 25 TABLET, FILM COATED ORAL at 22:36

## 2022-12-04 RX ADMIN — HYDROMORPHONE HYDROCHLORIDE 1 MG: 1 INJECTION, SOLUTION INTRAMUSCULAR; INTRAVENOUS; SUBCUTANEOUS at 00:41

## 2022-12-04 RX ADMIN — HYDROMORPHONE HYDROCHLORIDE 1 MG: 1 INJECTION, SOLUTION INTRAMUSCULAR; INTRAVENOUS; SUBCUTANEOUS at 14:49

## 2022-12-04 RX ADMIN — OXYCODONE HYDROCHLORIDE 40 MG: 20 TABLET, FILM COATED, EXTENDED RELEASE ORAL at 09:34

## 2022-12-04 RX ADMIN — OXYCODONE HYDROCHLORIDE 40 MG: 20 TABLET, FILM COATED, EXTENDED RELEASE ORAL at 22:36

## 2022-12-04 RX ADMIN — DICLOFENAC SODIUM 2 G: 10 GEL TOPICAL at 14:50

## 2022-12-04 RX ADMIN — CEFAZOLIN SODIUM 2000 MG: 2 SOLUTION INTRAVENOUS at 12:21

## 2022-12-04 RX ADMIN — ENOXAPARIN SODIUM 40 MG: 40 INJECTION SUBCUTANEOUS at 09:35

## 2022-12-04 RX ADMIN — CEFAZOLIN SODIUM 2000 MG: 2 SOLUTION INTRAVENOUS at 04:50

## 2022-12-04 RX ADMIN — LORAZEPAM 0.5 MG: 1 TABLET ORAL at 22:35

## 2022-12-04 NOTE — PROGRESS NOTES
[facility]  Progress Note - Ana Dangelo 1955, 69 y o  female MRN: [id number]  Unit/Bed#: E5 -01 Encounter: [id number]  Primary Care Provider: Lloyd Case DO   Date and time admitted to hospital: 11/28/2022  5:08 PM    * Sepsis [facility])  Assessment & Plan  · Sepsis with MSSA bacteremia  · ELIZABETH negative for endocarditis  Hip imaging negative for acute infection  · Likely secondary to right lower extremity cellulitis  · Blood cultures negative x72 hours  · ID plan:  Four weeks of IV antibiotics end date 12/27/2022  PICC order placed and consent obtained and placed in chart    Acute metabolic encephalopathy  Assessment & Plan  · Acute metabolic encephalopathy secondary to acute infection  · Mentation is now at baseline  · Management of underlying infection as outlined below    Right hip pain  Assessment & Plan  · Chronic hip pain on oxycodone  · Confirmed on   Continue 40 mg b i d  · Hip CT negative for acute infection  · Seen by orthopedics without any further recommendations  Patient and spouse requesting re-evaluation as their child is an orthopedic resident and suggesting possibility of other procedures that may alleviate pain  This was related to orthopedics for re-evaluation on Monday    Cancer of ovary [facility])  Assessment & Plan  · History of ovarian cancer status post BSO    Wegener's granulomatosis with renal involvement ([facility] [address] )  Assessment & Plan  · Previously on rituximab and prednisone  · Renal function stable    Results from last 7 days   Lab Units 12/03/22  0502 12/01/22  0543 11/30/22  0504 11/29/22  0621 11/28/22  1835   BUN mg/dL 14 25 19 18 19   CREATININE mg/dL 0 81 1 06 1 00 1 05 1 20   EGFR ml/min/1 73sq m 74 53 57 54 46       VTE Pharmacologic Prophylaxis: VTE Score: 5 High Risk (Score >/= 5) - Pharmacological DVT Prophylaxis Ordered: enoxaparin (Lovenox)  Sequential Compression Devices Ordered      Patient Centered Rounds: I have performed bedside rounds with nursing staff today  Discussions with Specialists or Other Care Team Provider:  Case Management, Orthopedics    Education and Discussions with Family / Patient: Updated  () at bedside  Time Spent for Care: 30 mins  More than 50% of total time spent on counseling and coordination of care as described above  Current Length of Stay: 6 day(s)  Current Patient Status: Inpatient   Certification Statement: The patient will continue to require additional inpatient hospital stay due to PICC line and IV antibiotics  Discharge Plan / Estimated Discharge Date: Anticipate discharge in 24-48 hrs to home  Spouse/patient hoping for SNF instead as she is debilitated  Will need PT/OT re-evaluations    Code Status: Level 1 - Full Code      Subjective:   Patient seen and examined  Concerned about going home, wants to go to rehab instead  Spouse wants to try Voltaren gel to hip and also states that their son is a orthopedic resident and has ideas    Objective:   Vitals: Blood pressure 146/71, pulse 75, temperature 98 2 °F (36 8 °C), temperature source Oral, resp  rate 18, height 5' 3" (1 6 m), weight 96 6 kg (213 lb), SpO2 94 %  Intake/Output Summary (Last 24 hours) at 12/4/2022 1843  Last data filed at 12/4/2022 1330  Gross per 24 hour   Intake 1180 ml   Output --   Net 1180 ml       Physical Exam  Vitals reviewed  Constitutional:       General: She is not in acute distress  Appearance: Normal appearance  HENT:      Head: Atraumatic  Eyes:      General: No scleral icterus  Cardiovascular:      Rate and Rhythm: Regular rhythm  Pulmonary:      Breath sounds: Decreased breath sounds present  No wheezing  Abdominal:      General: Bowel sounds are normal       Palpations: Abdomen is soft  Tenderness: There is no guarding or rebound  Musculoskeletal:         General: Tenderness (Right hip) and deformity (Right lower extremity) present  No swelling     Skin:     General: Skin is warm  Neurological:      General: No focal deficit present  Mental Status: She is alert  Psychiatric:         Mood and Affect: Mood normal        Additional Data:   Labs:  Results from last 7 days   Lab Units 12/03/22  0502 12/01/22 0543 11/30/22 0504 11/29/22 0621 11/28/22 2120   WBC Thousand/uL 10 70* 18 97* 19 49*   < >  --    HEMOGLOBIN g/dL 10 5* 11 1* 11 5   < >  --    PLATELETS Thousands/uL 214 191 155   < >  --    MCV fL 92 95 93   < >  --    INR   --   --   --   --  1 16    < > = values in this interval not displayed  Results from last 7 days   Lab Units 12/03/22  0502 12/01/22  0543 11/30/22  0504 11/29/22  0621   SODIUM mmol/L 138 135 134* 133*   POTASSIUM mmol/L 3 9 4 0 4 1 4 5   CHLORIDE mmol/L 102 103 103 99   CO2 mmol/L 29 25 24 16*   ANION GAP mmol/L 7 7 7 18*   BUN mg/dL 14 25 19 18   CREATININE mg/dL 0 81 1 06 1 00 1 05   CALCIUM mg/dL 8 5 9 2 9 3 9 4   ALBUMIN g/dL  --  2 6* 2 6* 3 6   TOTAL BILIRUBIN mg/dL  --  0 33 0 54 1 31*   ALK PHOS U/L  --  114 123* 172*   ALT U/L  --  22 22 48   AST U/L  --  40 22 61*   EGFR ml/min/1 73sq m 74 53 57 54   GLUCOSE RANDOM mg/dL 107 95 162* 124     Results from last 7 days   Lab Units 12/03/22  0502 11/29/22  0621   MAGNESIUM mg/dL 1 7 2 1                  Results from last 7 days   Lab Units 11/29/22 0621 11/28/22 2120 11/28/22  1835   LACTIC ACID mmol/L  --   --  1 7   PROCALCITONIN ng/ml 3 32*   < >  --     < > = values in this interval not displayed  * I Have Reviewed All Lab Data Listed Above  Cultures:   Results from last 7 days   Lab Units 11/30/22  0449 11/28/22 2120 11/28/22  1830   BLOOD CULTURE  No Growth After 4 Days  No Growth After 4 Days    --  Staphylococcus aureus*  Staphylococcus aureus*   GRAM STAIN RESULT   --   --  Gram positive cocci in clusters*  Gram positive cocci in clusters*   INFLUENZA A PCR   --  Negative  --        Results from last 7 days   Lab Units 11/28/22 2120   SARS-COV-2 Negative   INFLUENZA A PCR  Negative   INFLUENZA B PCR  Negative   RSV PCR  Negative           Lines/Drains:  Invasive Devices     Peripheral Intravenous Line  Duration           Peripheral IV 12/04/22 Distal;Left;Ventral (anterior) Wrist <1 day              Telemetry:      Imaging:  Imaging Reports Reviewed Today Include:   CT chest abdomen pelvis wo contrast    Result Date: 11/28/2022  Impression: Minimal biapical groundglass opacity with associated scarring, improved from prior recent study dated 10/19/2022 which may reflect residua of prior pneumonia  Correlate clinically to exclude superimposed active infection  No focal consolidation or effusion  No definite acute findings in the abdomen or pelvis within limitations of noncontrast exam  Tiny nonspecific foci of antidependent air within the urinary bladder  Correlate for? Recent instrumentation  Workstation performed: [id number]     XR hip/pelv 2-3 vws right    Result Date: 11/29/2022  Impression: No acute osseous abnormality  Severe osteoarthritis in the right hip  Workstation performed: [id number]     CT head without contrast    Result Date: 11/28/2022  Impression: No acute intracranial abnormality  Workstation performed: [id number]     CT lower extremity w contrast right    Result Date: 11/30/2022  Impression: Severe right hip osteoarthrosis with mild acetabular protrusio deformity as described above  Status post ORIF of the medial acetabulum  The appearance of this appears not significantly changed  Mild subcutaneous edema seen laterally in the distal thigh  No discrete soft tissue abscess or soft tissue gas   Workstation performed: [id number]       Scheduled Meds:  Current Facility-Administered Medications   Medication Dose Route Frequency Provider Last Rate   • acetaminophen  650 mg Oral Q6H PRN Gricelda Salvage Prechtel, DO     • bisacodyl  5 mg Oral Daily PRN Gricelda Salvage Prechtel, DO     • cefazolin  2,000 mg Intravenous 3 [facility], RAVINDER Stopped (12/04/22 1330)   • Diclofenac Sodium  2 g Topical 4x Daily Kp May, DO     • diphenhydrAMINE  50 mg Oral 60 Min Pre-Op Kp May, DO     • DULoxetine  60 mg Oral Daily Corwin S Prechtel, DO     • enoxaparin  40 mg Subcutaneous Q24H [address] Kp May, DO     • HYDROmorphone  1 mg Intravenous Q4H PRN Simon Danyell, DO     • LORazepam  0 5 mg Oral Q8H PRN Simon Danyell, DO     • metoprolol tartrate  25 mg Oral Q12H [address] Corwin S Prechtel, DO     • oxybutynin  5 mg Oral Daily Corwin S Prechtel, DO     • oxyCODONE  40 mg Oral Q12H [address] Kp Lalo, DO     • oxyCODONE  10 mg Oral Q4H PRN Simonleia Child, DO      Or   • oxyCODONE  15 mg Oral Q4H PRN Simon Danyell, DO     • pantoprazole  20 mg Oral Early Morning Corwin S Prechtel, DO     • senna-docusate sodium  1 tablet Oral HS Nadege Barker, DO         Today, Patient Was Seen By: Simon Child DO    ** Please Note: Dictation voice to text software may have been used in the creation of this document   **

## 2022-12-04 NOTE — ASSESSMENT & PLAN NOTE
· Chronic hip pain on oxycodone  · Confirmed on   Continue 40 mg b i d  · Hip CT negative for acute infection  · Seen by orthopedics without any further recommendations  Patient and spouse requesting re-evaluation as their child is an orthopedic resident and suggesting possibility of other procedures that may alleviate pain    This was related to orthopedics for re-evaluation on Monday

## 2022-12-04 NOTE — PLAN OF CARE
Problem: Potential for Falls  Goal: Patient will remain free of falls  Description: INTERVENTIONS:  - Educate patient/family on patient safety including physical limitations  - Instruct patient to call for assistance with activity   - Consult OT/PT to assist with strengthening/mobility   - Keep Call bell within reach  - Keep bed low and locked with side rails adjusted as appropriate  - Keep care items and personal belongings within reach  - Initiate and maintain comfort rounds  - Make Fall Risk Sign visible to staff  - Offer Toileting every 2 Hours, in advance of need  - Initiate/Maintain bed alarm  - Apply yellow socks and bracelet for high fall risk patients  - Consider moving patient to room near nurses station  Outcome: Progressing     Problem: Prexisting or High Potential for Compromised Skin Integrity  Goal: Skin integrity is maintained or improved  Description: INTERVENTIONS:  - Identify patients at risk for skin breakdown  - Assess and monitor skin integrity  - Assess and monitor nutrition and hydration status  - Monitor labs   - Assess for incontinence   - Turn and reposition patient  - Assist with mobility/ambulation  - Relieve pressure over bony prominences  - Avoid friction and shearing  - Provide appropriate hygiene as needed including keeping skin clean and dry  - Evaluate need for skin moisturizer/barrier cream  - Collaborate with interdisciplinary team   - Patient/family teaching  - Consider wound care consult   Outcome: Progressing     Problem: MOBILITY - ADULT  Goal: Maintain or return to baseline ADL function  Description: INTERVENTIONS:  -  Assess patient's ability to carry out ADLs; assess patient's baseline for ADL function and identify physical deficits which impact ability to perform ADLs (bathing, care of mouth/teeth, toileting, grooming, dressing, etc )  - Assess/evaluate cause of self-care deficits   - Assess range of motion  - Assess patient's mobility; develop plan if impaired  - Assess patient's need for assistive devices and provide as appropriate  - Encourage maximum independence but intervene and supervise when necessary  - Involve family in performance of ADLs  - Assess for home care needs following discharge   - Consider OT consult to assist with ADL evaluation and planning for discharge  - Provide patient education as appropriate  Outcome: Progressing  Note: Pt refuses to get OOB   Goal: Maintains/Returns to pre admission functional level  Description: INTERVENTIONS:  - Perform BMAT or MOVE assessment daily    - Set and communicate daily mobility goal to care team and patient/family/caregiver     - Collaborate with rehabilitation services on mobility goals if consulted  - Out of bed for toileting  - Record patient progress and toleration of activity level   Outcome: Progressing     Problem: PAIN - ADULT  Goal: Verbalizes/displays adequate comfort level or baseline comfort level  Description: Interventions:  - Encourage patient to monitor pain and request assistance  - Assess pain using appropriate pain scale  - Administer analgesics based on type and severity of pain and evaluate response  - Implement non-pharmacological measures as appropriate and evaluate response  - Consider cultural and social influences on pain and pain management  - Notify physician/advanced practitioner if interventions unsuccessful or patient reports new pain  Outcome: Progressing     Problem: INFECTION - ADULT  Goal: Absence or prevention of progression during hospitalization  Description: INTERVENTIONS:  - Assess and monitor for signs and symptoms of infection  - Monitor lab/diagnostic results  - Monitor all insertion sites, i e  indwelling lines, tubes, and drains  - Monitor endotracheal if appropriate and nasal secretions for changes in amount and color  - [place] appropriate cooling/warming therapies per order  - Administer medications as ordered  - Instruct and encourage patient and family to use good hand hygiene technique  - Identify and instruct in appropriate isolation precautions for identified infection/condition  Outcome: Progressing  Goal: Absence of fever/infection during neutropenic period  Description: INTERVENTIONS:  - Monitor WBC    Outcome: Progressing     Problem: SAFETY ADULT  Goal: Patient will remain free of falls  Description: INTERVENTIONS:  - Educate patient/family on patient safety including physical limitations  - Instruct patient to call for assistance with activity   - Consult OT/PT to assist with strengthening/mobility   - Keep Call bell within reach  - Keep bed low and locked with side rails adjusted as appropriate  - Keep care items and personal belongings within reach  - Initiate and maintain comfort rounds  - Make Fall Risk Sign visible to staff  - Offer Toileting every 2 Hours, in advance of need  - Initiate/Maintain bed alarm  - Apply yellow socks and bracelet for high fall risk patients  - Consider moving patient to room near nurses station  Outcome: Progressing  Goal: Maintain or return to baseline ADL function  Description: INTERVENTIONS:  -  Assess patient's ability to carry out ADLs; assess patient's baseline for ADL function and identify physical deficits which impact ability to perform ADLs (bathing, care of mouth/teeth, toileting, grooming, dressing, etc )  - Assess/evaluate cause of self-care deficits   - Assess range of motion  - Assess patient's mobility; develop plan if impaired  - Assess patient's need for assistive devices and provide as appropriate  - Encourage maximum independence but intervene and supervise when necessary  - Involve family in performance of ADLs  - Assess for home care needs following discharge   - Consider OT consult to assist with ADL evaluation and planning for discharge  - Provide patient education as appropriate  Outcome: Progressing  Note: Pt refuses to get OOB   Goal: Maintains/Returns to pre admission functional level  Description: INTERVENTIONS:  - Perform BMAT or MOVE assessment daily    - Set and communicate daily mobility goal to care team and patient/family/caregiver  - Collaborate with rehabilitation services on mobility goals if consulted  - Out of bed for toileting  - Record patient progress and toleration of activity level   Outcome: Progressing     Problem: DISCHARGE PLANNING  Goal: Discharge to home or other facility with appropriate resources  Description: INTERVENTIONS:  - Identify barriers to discharge w/patient and caregiver  - Arrange for needed discharge resources and transportation as appropriate  - Identify discharge learning needs (meds, wound care, etc )  - Arrange for interpretive services to assist at discharge as needed  - Refer to Case Management Department for coordinating discharge planning if the patient needs post-hospital services based on physician/advanced practitioner order or complex needs related to functional status, cognitive ability, or social support system  Outcome: Progressing     Problem: Knowledge Deficit  Goal: Patient/family/caregiver demonstrates understanding of disease process, treatment plan, medications, and discharge instructions  Description: Complete learning assessment and assess knowledge base    Interventions:  - Provide teaching at level of understanding  - Provide teaching via preferred learning methods  Outcome: Progressing     Problem: MUSCULOSKELETAL - ADULT  Goal: Maintain or return mobility to safest level of function  Description: INTERVENTIONS:  - Assess patient's ability to carry out ADLs; assess patient's baseline for ADL function and identify physical deficits which impact ability to perform ADLs (bathing, care of mouth/teeth, toileting, grooming, dressing, etc )  - Assess/evaluate cause of self-care deficits   - Assess range of motion  - Assess patient's mobility  - Assess patient's need for assistive devices and provide as appropriate  - Encourage maximum independence but intervene and supervise when necessary  - Involve family in performance of ADLs  - Assess for home care needs following discharge   - Consider OT consult to assist with ADL evaluation and planning for discharge  - Provide patient education as appropriate  Outcome: Progressing  Goal: Maintain proper alignment of affected body part  Description: INTERVENTIONS:  - Support, maintain and protect limb and body alignment  - Provide patient/ family with appropriate education  Outcome: Progressing

## 2022-12-05 ENCOUNTER — APPOINTMENT (INPATIENT)
Dept: RADIOLOGY | Facility: HOSPITAL | Age: 67
End: 2022-12-05
Attending: INTERNAL MEDICINE

## 2022-12-05 ENCOUNTER — APPOINTMENT (INPATIENT)
Dept: CT IMAGING | Facility: HOSPITAL | Age: 67
End: 2022-12-05
Attending: INTERNAL MEDICINE

## 2022-12-05 PROBLEM — B95.61 MSSA BACTEREMIA: Status: ACTIVE | Noted: 2022-12-05

## 2022-12-05 PROBLEM — R78.81 MSSA BACTEREMIA: Status: ACTIVE | Noted: 2022-12-05

## 2022-12-05 LAB
BACTERIA BLD CULT: NORMAL
BACTERIA BLD CULT: NORMAL

## 2022-12-05 PROCEDURE — 02HV33Z INSERTION OF INFUSION DEVICE INTO SUPERIOR VENA CAVA, PERCUTANEOUS APPROACH: ICD-10-PCS | Performed by: INTERNAL MEDICINE

## 2022-12-05 RX ORDER — LIDOCAINE HYDROCHLORIDE 10 MG/ML
INJECTION, SOLUTION EPIDURAL; INFILTRATION; INTRACAUDAL; PERINEURAL AS NEEDED
Status: COMPLETED | OUTPATIENT
Start: 2022-12-05 | End: 2022-12-05

## 2022-12-05 RX ADMIN — CEFAZOLIN SODIUM 2000 MG: 2 SOLUTION INTRAVENOUS at 12:34

## 2022-12-05 RX ADMIN — CEFAZOLIN SODIUM 2000 MG: 2 SOLUTION INTRAVENOUS at 22:06

## 2022-12-05 RX ADMIN — DICLOFENAC SODIUM 2 G: 10 GEL TOPICAL at 11:29

## 2022-12-05 RX ADMIN — OXYCODONE HYDROCHLORIDE 40 MG: 20 TABLET, FILM COATED, EXTENDED RELEASE ORAL at 09:06

## 2022-12-05 RX ADMIN — METOPROLOL TARTRATE 25 MG: 25 TABLET, FILM COATED ORAL at 22:06

## 2022-12-05 RX ADMIN — OXYCODONE HYDROCHLORIDE 40 MG: 20 TABLET, FILM COATED, EXTENDED RELEASE ORAL at 22:06

## 2022-12-05 RX ADMIN — PANTOPRAZOLE SODIUM 20 MG: 20 TABLET, DELAYED RELEASE ORAL at 05:06

## 2022-12-05 RX ADMIN — LIDOCAINE HYDROCHLORIDE 10 ML: 10 INJECTION, SOLUTION EPIDURAL; INFILTRATION; INTRACAUDAL; PERINEURAL at 15:05

## 2022-12-05 RX ADMIN — ACETAMINOPHEN 650 MG: 325 TABLET, FILM COATED ORAL at 02:53

## 2022-12-05 RX ADMIN — ENOXAPARIN SODIUM 40 MG: 40 INJECTION SUBCUTANEOUS at 09:08

## 2022-12-05 RX ADMIN — HYDROMORPHONE HYDROCHLORIDE 1 MG: 1 INJECTION, SOLUTION INTRAMUSCULAR; INTRAVENOUS; SUBCUTANEOUS at 11:29

## 2022-12-05 RX ADMIN — LORAZEPAM 0.5 MG: 1 TABLET ORAL at 09:07

## 2022-12-05 RX ADMIN — METOPROLOL TARTRATE 25 MG: 25 TABLET, FILM COATED ORAL at 09:06

## 2022-12-05 RX ADMIN — DICLOFENAC SODIUM 2 G: 10 GEL TOPICAL at 09:07

## 2022-12-05 RX ADMIN — DICLOFENAC SODIUM 2 G: 10 GEL TOPICAL at 17:04

## 2022-12-05 RX ADMIN — OXYCODONE HYDROCHLORIDE 15 MG: 10 TABLET ORAL at 17:04

## 2022-12-05 RX ADMIN — HYDROMORPHONE HYDROCHLORIDE 1 MG: 1 INJECTION, SOLUTION INTRAMUSCULAR; INTRAVENOUS; SUBCUTANEOUS at 00:51

## 2022-12-05 RX ADMIN — SENNOSIDES AND DOCUSATE SODIUM 1 TABLET: 8.6; 5 TABLET ORAL at 22:06

## 2022-12-05 RX ADMIN — DICLOFENAC SODIUM 2 G: 10 GEL TOPICAL at 22:06

## 2022-12-05 RX ADMIN — CEFAZOLIN SODIUM 2000 MG: 2 SOLUTION INTRAVENOUS at 05:05

## 2022-12-05 RX ADMIN — DULOXETINE 60 MG: 60 CAPSULE, DELAYED RELEASE ORAL at 09:06

## 2022-12-05 RX ADMIN — DICLOFENAC SODIUM 2 G: 10 GEL TOPICAL at 00:51

## 2022-12-05 RX ADMIN — OXYBUTYNIN CHLORIDE 5 MG: 5 TABLET, EXTENDED RELEASE ORAL at 22:06

## 2022-12-05 NOTE — PROCEDURES
Insert PICC line    Date/Time: 12/5/2022 10:26 AM  Performed by: Mary Rausch RN  Authorized by: Janelle Mojica DO     Patient location:  Bedside  Consent:     Consent obtained:  Written    Consent given by:  Patient    Procedural risks discussed: consent obtained by physician  [place] protocol:     Procedure explained and questions answered to patient or proxy's satisfaction: yes      Relevant documents present and verified: yes      Test results available and properly labeled: yes      Radiology Images displayed and confirmed  If images not available, report reviewed: yes      Required blood products, implants, devices, and special equipment available: yes      Site/side marked: yes      Immediately prior to procedure, a time out was called: yes      Patient identity confirmed:  Verbally with patient, arm band, provided demographic data and hospital-assigned identification number  Pre-procedure details:     Hand hygiene: Hand hygiene performed prior to insertion      Sterile barrier technique: All elements of maximal sterile technique followed      Skin preparation:  ChloraPrep    Skin preparation agent: Skin preparation agent completely dried prior to procedure    Indications:     PICC line indications: long term antibiotics    Anesthesia (see MAR for exact dosages):      Anesthesia method:  Local infiltration    Local anesthetic:  Lidocaine 1% w/o epi  Procedure details:     Location:  [place]    Vessel type: vein      Laterality:  Right    Approach: percutaneous technique used      Patient position:  Flat    Procedural supplies:  Double lumen    Catheter size:  5 Fr    Landmarks identified: yes      Ultrasound guidance: yes      Ultrasound image availability:  Not saved    Sterile ultrasound techniques: Sterile gel and sterile probe covers were used      Number of attempts:  1    Successful placement: yes      Vessel of catheter tip end:  Sherlock 3CG confirmed (sherlock 3cg procedure record confirmed placement sent to medical records)    Total catheter length (cm):  36    Catheter out on skin (cm):  0    Max flow rate:  999ml/hr    Arm circumference:  34cm  Post-procedure details:     Post-procedure:  Dressing applied and securement device placed    Assessment:  Blood return through all ports and free fluid flow (sherlock 3cg confirmed placement)    Post-procedure complications: none      Patient tolerance of procedure:   Tolerated well, no immediate complications  Comments:      ID confirmed pt cleared for picc insertion  Lot#[id number] 2023-11-30

## 2022-12-05 NOTE — PROGRESS NOTES
[address] [facility]  Progress Note - Nic Metzger 1955, 69 y o  female MRN: [id number]  Unit/Bed#: E5 -01 Encounter: [id number]  Primary Care Provider: Dominick De Souza DO   Date and time admitted to hospital: 11/28/2022  5:08 PM    * Sepsis [facility])  Assessment & Plan  · Sepsis with MSSA bacteremia  · MARCIANO negative for endocarditis  Hip imaging negative for acute infection  · Likely secondary to right lower extremity cellulitis  · Repeat Blood cultures from 11/30 negative   · ID plan:  Four weeks of IV antibiotics end date 12/27/2022  PICC placed 12/5    Right hip pain  Assessment & Plan  · Chronic hip pain on oxycodone  · Confirmed on   Continue 40 mg b i d  · Hip CT negative for acute infection  · Seen by orthopedics without any further recommendations  Patient and spouse requesting re-evaluation as their child is an orthopedic resident and suggesting possibility of other procedures that may alleviate pain  · Patient was re-evaluated by Orthopedic surgery today and recommended right hip arthrocentesis  · IR consult placed    MSSA bacteremia  Assessment & Plan  · MSSA bacteremia most likely secondary to left lower extremity cellulitis  · Marciano negative for vegetations  · Continue cefazolin through 12/27  · PICC line placed 89/8    Acute metabolic encephalopathy  Assessment & Plan  · Acute metabolic encephalopathy secondary to acute infection  · Mentation is now at baseline  · Management of underlying infection as outlined below    Cancer of ovary ([facility] )  Assessment & Plan  · History of ovarian cancer status post BSO    Wegener's granulomatosis with renal involvement ([facility] )  Assessment & Plan  · Previously on rituximab and prednisone    · Renal function stable    Results from last 7 days   Lab Units 12/03/22  0502 12/01/22  0543 11/30/22  0504 11/29/22  0621 11/28/22  1835   BUN mg/dL 14 25 19 18 19   CREATININE mg/dL 0 81 1 06 1 00 1 05 1 20   EGFR ml/min/1 73sq m 74 53 57 54 46 VTE Pharmacologic Prophylaxis: VTE Score: 5 High Risk (Score >/= 5) - Pharmacological DVT Prophylaxis Ordered: enoxaparin (Lovenox)  Sequential Compression Devices Ordered  Patient Centered Rounds: I performed bedside rounds with nursing staff today  Discussions with Specialists or Other Care Team Provider:  Nursing, case management, orthopedic surgery    Education and Discussions with Family / Patient: Updated  () via phone  Time Spent for Care: 30 minutes  More than 50% of total time spent on counseling and coordination of care as described above  Current Length of Stay: 7 day(s)  Current Patient Status: Inpatient   Certification Statement: The patient will continue to require additional inpatient hospital stay due to Right hip pain, MSSA bacteremia  Discharge Plan: Anticipate discharge in 48 hrs to discharge location to be determined pending rehab evaluations  Code Status: Level 1 - Full Code    Subjective:   Patient was seen and evaluated bedside  Denies chest pain palpitation shortness of breath  Complaining of right hip pain    Objective:     Vitals:   Temp (24hrs), Av 6 °F (37 °C), Min:97 8 °F (36 6 °C), Max:99 3 °F (37 4 °C)    Temp:  [97 8 °F (36 6 °C)-99 3 °F (37 4 °C)] 97 8 °F (36 6 °C)  HR:  [70-79] 70  Resp:  [16-20] 20  BP: (126-136)/(64-78) 136/78  SpO2:  [94 %-96 %] 96 %  Body mass index is 37 73 kg/m²  Input and Output Summary (last 24 hours):   No intake or output data in the 24 hours ending 22 1441    Physical Exam:   Physical Exam  Constitutional:       General: She is not in acute distress  HENT:      Head: Atraumatic  Cardiovascular:      Rate and Rhythm: Normal rate and regular rhythm  Heart sounds: No murmur heard  No friction rub  No gallop  Pulmonary:      Effort: Pulmonary effort is normal  No respiratory distress  Breath sounds: Normal breath sounds  No wheezing     Abdominal:      General: Bowel sounds are normal  There is no distension  Palpations: Abdomen is soft  Tenderness: There is no abdominal tenderness  Musculoskeletal:         General: No swelling  Cervical back: Neck supple  Skin:     General: Skin is warm and dry  Neurological:      General: No focal deficit present  Mental Status: She is alert  Psychiatric:         Mood and Affect: Mood normal         Additional Data:     Labs:  Results from last 7 days   Lab Units 12/03/22  0502   WBC Thousand/uL 10 70*   HEMOGLOBIN g/dL 10 5*   HEMATOCRIT % 32 8*   PLATELETS Thousands/uL 214   NEUTROS PCT % 69   LYMPHS PCT % 16   MONOS PCT % 12   EOS PCT % 2     Results from last 7 days   Lab Units 12/03/22  0502 12/01/22  0543   SODIUM mmol/L 138 135   POTASSIUM mmol/L 3 9 4 0   CHLORIDE mmol/L 102 103   CO2 mmol/L 29 25   BUN mg/dL 14 25   CREATININE mg/dL 0 81 1 06   ANION GAP mmol/L 7 7   CALCIUM mg/dL 8 5 9 2   ALBUMIN g/dL  --  2 6*   TOTAL BILIRUBIN mg/dL  --  0 33   ALK PHOS U/L  --  114   ALT U/L  --  22   AST U/L  --  40   GLUCOSE RANDOM mg/dL 107 95     Results from last 7 days   Lab Units 11/28/22 2120   INR  1 16             Results from last 7 days   Lab Units 11/29/22  0621 11/28/22 2120 11/28/22  1835   LACTIC ACID mmol/L  --   --  1 7   PROCALCITONIN ng/ml 3 32* 1 83*  --        Lines/Drains:  Invasive Devices     Peripherally Inserted Central Catheter Line  Duration           PICC Line 27/50/50 Right Basilic <1 day                Central Line:  Goal for removal: N/A - Discharging with PICC for IV ABX/medications             Imaging: Reviewed radiology reports from this admission including: CT of theleg    Recent Cultures (last 7 days):   Results from last 7 days   Lab Units 11/30/22  0449 11/28/22  1830   BLOOD CULTURE  No Growth After 5 Days  No Growth After 5 Days   Staphylococcus aureus*  Staphylococcus aureus*   GRAM STAIN RESULT   --  Gram positive cocci in clusters*  Gram positive cocci in clusters*       Last 24 Hours Medication List:   Current Facility-Administered Medications   Medication Dose Route Frequency Provider Last Rate   • acetaminophen  650 mg Oral Q6H PRN Govind Stapler Prechtel, DO     • bisacodyl  5 mg Oral Daily PRN Govind Stapler Prechtel, DO     • cefazolin  2,000 mg Intravenous Q8H Dorita Crawford, CRNP 2,000 mg (12/05/22 1234)   • Diclofenac Sodium  2 g Topical 4x Daily Kp May, DO     • diphenhydrAMINE  50 mg Oral 60 Min Pre-Op Kp Lalo, DO     • DULoxetine  60 mg Oral Daily Corwin S Prechtel, DO     • enoxaparin  40 mg Subcutaneous Q24H [address] Kp Lalo, DO     • HYDROmorphone  1 mg Intravenous Q4H PRN Simon Danyell, DO     • LORazepam  0 5 mg Oral Q8H PRN Simon Danyell, DO     • metoprolol tartrate  25 mg Oral Q12H [address] Corwin S Prechtel, DO     • oxybutynin  5 mg Oral Daily Corwin S Prechtel, DO     • oxyCODONE  40 mg Oral Q12H [address] Kp Lalo, DO     • oxyCODONE  10 mg Oral Q4H PRN Simon Danyell, DO      Or   • oxyCODONE  15 mg Oral Q4H PRN Simon Danyell, DO     • pantoprazole  20 mg Oral Early Morning Corwin S Prechtel, DO     • senna-docusate sodium  1 tablet Oral HS Govind Stapler Prechtel, DO          Today, Patient Was Seen By: Wade Horan MD    **Please Note: This note may have been constructed using a voice recognition system  **

## 2022-12-05 NOTE — BRIEF OP NOTE (RAD/CATH)
INTERVENTIONAL RADIOLOGY PROCEDURE NOTE    Date: 12/5/2022    Procedure:   Procedure Summary     Date:  Room / Location:     Anesthesia Start:  Anesthesia Stop:     Procedure:  Diagnosis:     Scheduled Providers:  Responsible Provider:     Anesthesia Type: Not recorded ASA Status: Not recorded          Preoperative diagnosis:   1  Fever    2  Confusion    3  Cellulitis of right lower extremity    4  SIRS (systemic inflammatory response syndrome) (HCC)    5  Right hip pain         Postoperative diagnosis: Same  Surgeon: Chapis Campoverde MD     Assistant: None  No qualified resident was available  Blood loss: None    Specimens:   Zero aspirate upon initial needle placement, bacteriostatic saline used to obtain very small aspirate  Findings:     CT guided 20g needle placement in the right hip joint space via an anterior approach  No initial aspirate obtained  2cc saline instilled, minimal fluid obtained upon aspiration  Joint space density on CT likely chronic inflammatory soft tissue changes as opposed to infected fluid  Complications: None immediate      Anesthesia: local

## 2022-12-05 NOTE — PROGRESS NOTES
Progress Note - Infectious Disease   Claudia Arteaga 69 y o  female MRN: [id number]  Unit/Bed#: E5 -01 Encounter: [id number]      Impression/Plan:  1  Sepsis  Present on arrival  Fever, tachycardia, tachypnea, and leukocytosis  Likely secondary to MSSA bacteremia with suspected RLE source  Patient with lymphedema and history of recurring cellulitis   Clinically improved with resolution of fevers  WBC count is now much improved   -antibiotic as below  -follow temperatures and hemodynamics  -follow CBC     2  MSSA bacteremia   Most likely cutaneous source in the setting of lower extremity lymphedema   Consider relation to #3 as patient has right hip pain but no radiographic evidence of infection on CT   2D echo was a poor study  ELIZABETH was negative   No intravascular prosthetic devices   Bacteremia quickly cleared   -continue IV cefazolin  -anticipate 4 week course of IV antibiotic, through 12/27   -check weekly CBC with diff, creatinine while on antibiotic  -outpatient ID follow-up in 2 weeks after discharge  -surveillance blood cultures 2 weeks after completion of antibiotic course     3  R hip pain  In patient with previous ORIF and intact hardware   CT shows severe right hip osteoarthrosis and post ORIF changes, which appear stable   No fluid collection, effusion, discrete soft tissue abscess or gas noted  Orthopedic surgery input noted with low clinical suspicion for septic joint   -serial exams  -pain management per primary service  -orthopedic surgery follow-up     4  Confusion  Likely secondary to sepsis and bacteremia above   Mentation has improved   -treatment of sepsis/bacteremia as above  -serial neuro exams  -monitor mood and mental status     5  Lymphedema  This is likely driving force of patient's recurring lower extremity cellulitis      6  CKD III  Remains within stable range   -monitor creatinine  -dose adjust antibiotics for renal function as needed  -avoid nephrotoxins     7  Morbid obesity   BMI = 37 73     8  Wegener's granulomatosis      Antibiotics:  Cefazolin 5  Antibiotics 6     I discussed above plan with patient and with Dr Flakita Joseph  Scripts for IV antibiotic and weekly blood work were provided to Case Management  Subjective:  Still has hip pain but not any worse  No fevers, chills  Tolerating oral intake  Objective:  Vitals:  Temp:  [97 8 °F (36 6 °C)-99 3 °F (37 4 °C)] 97 8 °F (36 6 °C)  HR:  [70-79] 70  Resp:  [16-20] 20  BP: (126-136)/(64-78) 136/78  SpO2:  [94 %-96 %] 96 %  Temp (24hrs), Av 6 °F (37 °C), Min:97 8 °F (36 6 °C), Max:99 3 °F (37 4 °C)  Current: Temperature: 97 8 °F (36 6 °C)    Physical Exam:   General:  No acute distress, nontoxic, resting comfortably in bed  HEENT:  Atraumatic normocephalic  Neck:  Trachea midline  Psychiatric:  Awake and alert  Pulmonary:  Normal respiratory excursion without accessory muscle use  Abdomen:  Soft, nontender  Extremities:  Bilateral lower extremity lymphedema with intact dressings  Skin:  No rashes or draining wound  Neuro: Moves all extremities spontaneously    Lab Results:  I have personally reviewed pertinent labs  Results from last 7 days   Lab Units 22  0502 22  0522  0504 22  0621   POTASSIUM mmol/L 3 9 4 0 4 1 4 5   CHLORIDE mmol/L 102 103 103 99   CO2 mmol/L 29 25 24 16*   BUN mg/dL 14 25 19 18   CREATININE mg/dL 0 81 1 06 1 00 1 05   EGFR ml/min/1 73sq m 74 53 57 54   CALCIUM mg/dL 8 5 9 2 9 3 9 4   AST U/L  --  40 22 61*   ALT U/L  --  22 22 48   ALK PHOS U/L  --  114 123* 172*     Results from last 7 days   Lab Units 22  0502 22  0543 22  0504   WBC Thousand/uL 10 70* 18 97* 19 49*   HEMOGLOBIN g/dL 10 5* 11 1* 11 5   PLATELETS Thousands/uL 214 191 155     Results from last 7 days   Lab Units 22  0449 22  1830   BLOOD CULTURE  No Growth After 5 Days  No Growth After 5 Days   Staphylococcus aureus*  Staphylococcus aureus*   GRAM STAIN RESULT   --  Gram positive cocci in clusters*  Gram positive cocci in clusters*       Imaging Studies:   I have personally reviewed pertinent imaging study reports and images in PACS  EKG, Pathology, and Other Studies:   I have personally reviewed pertinent reports  ELIZABETH was negative

## 2022-12-05 NOTE — ASSESSMENT & PLAN NOTE
· MSSA bacteremia most likely secondary to left lower extremity cellulitis  · Marciano negative for vegetations  · Continue cefazolin through 12/27  · PICC line placed 12/5

## 2022-12-05 NOTE — QUICK NOTE
64-year-old female with known posttraumatic osteoarthritis of her right hip status post right acetabular fracture ORIF from 2014  She was admitted to the hospital with MSSA bacteremia  She had an increase of right hip pain prior to her admission the hospital   Right hip was aspirated by IR today yielding no aspirate  2 cc of saline were injected and aspirated  Based on these results there is likely no infection in the right hip  Dr Popeye Trujillo spoke with the patient and the patient's  at length about surgical treatment options for her right hip to include right total hip arthroplasty  Discussed that her infection would need to be completely cleared and treated prior to discussing hip replacement surgery  Patient will follow up wit Dr Popeye Trujillo as outpatient

## 2022-12-05 NOTE — ASSESSMENT & PLAN NOTE
· Chronic hip pain on oxycodone  · Confirmed on   Continue 40 mg b i d  · Hip CT negative for acute infection  · Seen by orthopedics without any further recommendations  Patient and spouse requesting re-evaluation as their child is an orthopedic resident and suggesting possibility of other procedures that may alleviate pain      · Patient was re-evaluated by Orthopedic surgery today and recommended right hip arthrocentesis  · IR consult placed

## 2022-12-05 NOTE — TELEMEDICINE
e-Consult (IPC)  - Interventional Radiology  Vidhi Hi 69 y o  female MRN: [id number]  Unit/Bed#: E5 -01 Encounter: [id number]          Interventional Radiology has been consulted to evaluate [address] to IR  Consult performed by: Charisma Montanez MD  Consult ordered by: Dianelys De Souza MD        12/05/22    Assessment/Recommendation:     69year old female with concern for right hip infection  Plan for CT guided right hip aspiration  11-20 minutes, >50% of the total time devoted to medical consultative verbal/EMR discussion between providers  Written report will be generated in the EMR  Thank you for allowing Interventional Radiology to participate in the care of Vidhi Hi  Please don't hesitate to call or TigerText us with any questions       Charisma Montanez MD

## 2022-12-05 NOTE — ASSESSMENT & PLAN NOTE
· Sepsis with MSSA bacteremia  · ELIZABETH negative for endocarditis  Hip imaging negative for acute infection  · Likely secondary to right lower extremity cellulitis  · Repeat Blood cultures from 11/30 negative   · ID plan:  Four weeks of IV antibiotics end date 12/27/2022    PICC placed 12/5

## 2022-12-05 NOTE — CASE MANAGEMENT
Case Management Discharge Planning Note    Patient name Deep Yeh  [place] [address] /E5 MS 18-* MRN [id number]  : 1955 Date 2022       Current Admission Date: 2022  Current Admission Diagnosis:Sepsis [facility])   Patient Active Problem List    Diagnosis Date Noted   • MSSA bacteremia 2022   • Right hip pain 2022   • History of pulmonary embolism 2022   • Chronic diastolic heart failure ([facility] ) 2022   • Slow transit constipation 2021   • Chronic right-sided low back pain without sciatica 2021   • Cellulitis of right lower extremity 2021   • Septic shock ([facility] ) 2021   • Morbid obesity ([facility] ) 2021   • Hypogammaglobulinemia, acquired ([facility] ) 2019   • Hypertrophic cardiomyopathy ([place] [address] ) 2019   • Low immunoglobulin level 2019   • LVH (left ventricular hypertrophy) 2019   • Postnasal drip 2019   • Dyspnea on exertion 2019   • Mitral valve disorder    • Elevated troponin 2019   • Sepsis ([facility] ) 2019   • Acute metabolic encephalopathy    • Increased risk of breast cancer    • Other complicated headache syndrome 2018   • Nausea & vomiting 2018   • SIRS (systemic inflammatory response syndrome) ([place] [address] ) 2018   • Post-traumatic osteoarthritis 2018   • Opiate analgesic use agreement exists 2016   • BRCA1 positive 2016   • Charcot's joint 2016   • Class 2 obesity 2016   • Wegener's granulomatosis with renal involvement ([facility] ) 01/15/2016   • Cancer of ovary ([facility] ) 2015   • Ovarian cancer ([facility] ) 2015   • Increased frequency of urination 2014   • Chronic kidney disease, stage III (moderate) ([facility] ) 2014   • Chronic pain disorder 10/24/2013   • GERD (gastroesophageal reflux disease) 2013   • Dyslipidemia 2013   • Lymphedema 11/15/2012   • Anxiety 2012   • Benign essential hypertension 2012   • Depression 06/05/2012      LOS (days): 7  Geometric Mean LOS (GMLOS) (days): 5 00  Days to GMLOS:-1 7     OBJECTIVE:  Risk of Unplanned Readmission Score: 20 45         Current admission status: Inpatient   Preferred Pharmacy:   [address], [address] [place] [id number]  Phone: [phone] Fax: [address] [address]  Phone: [phone] Fax: [address] [place], [place] - [facility] [address] ,  [facility] [address] ,  [facility] [address]  Phone: [phone] Fax: [phone]    Primary Care Provider: Luis Welch DO    Primary Insurance: MEDICARE  Secondary Insurance: AARP    DISCHARGE DETAILS:    Patient's spouse Dolly Quick is requesting rehab, states patient is in pain and cannot walk 15 feet  Patient's spouse works also  CM placed referrals for STR, patient is pending medical clearance

## 2022-12-05 NOTE — PLAN OF CARE
Problem: Potential for Falls  Goal: Patient will remain free of falls  Description: INTERVENTIONS:  - Educate patient/family on patient safety including physical limitations  - Instruct patient to call for assistance with activity   - Consult OT/PT to assist with strengthening/mobility   - Keep Call bell within reach  - Keep bed low and locked with side rails adjusted as appropriate  - Keep care items and personal belongings within reach  - Initiate and maintain comfort rounds  - Make Fall Risk Sign visible to staff  - Offer Toileting every 2 Hours, in advance of need  - Initiate/Maintain bed alarm  - Apply yellow socks and bracelet for high fall risk patients  - Consider moving patient to room near nurses station  Outcome: Progressing     Problem: Prexisting or High Potential for Compromised Skin Integrity  Goal: Skin integrity is maintained or improved  Description: INTERVENTIONS:  - Identify patients at risk for skin breakdown  - Assess and monitor skin integrity  - Assess and monitor nutrition and hydration status  - Monitor labs   - Assess for incontinence   - Turn and reposition patient  - Assist with mobility/ambulation  - Relieve pressure over bony prominences  - Avoid friction and shearing  - Provide appropriate hygiene as needed including keeping skin clean and dry  - Evaluate need for skin moisturizer/barrier cream  - Collaborate with interdisciplinary team   - Patient/family teaching  - Consider wound care consult   Outcome: Progressing     Problem: MOBILITY - ADULT  Goal: Maintain or return to baseline ADL function  Description: INTERVENTIONS:  -  Assess patient's ability to carry out ADLs; assess patient's baseline for ADL function and identify physical deficits which impact ability to perform ADLs (bathing, care of mouth/teeth, toileting, grooming, dressing, etc )  - Assess/evaluate cause of self-care deficits   - Assess range of motion  - Assess patient's mobility; develop plan if impaired  - Assess patient's need for assistive devices and provide as appropriate  - Encourage maximum independence but intervene and supervise when necessary  - Involve family in performance of ADLs  - Assess for home care needs following discharge   - Consider OT consult to assist with ADL evaluation and planning for discharge  - Provide patient education as appropriate  Outcome: Progressing  Goal: Maintains/Returns to pre admission functional level  Description: INTERVENTIONS:  - Perform BMAT or MOVE assessment daily    - Set and communicate daily mobility goal to care team and patient/family/caregiver     - Collaborate with rehabilitation services on mobility goals if consulted  - Out of bed for toileting  - Record patient progress and toleration of activity level   Outcome: Progressing     Problem: PAIN - ADULT  Goal: Verbalizes/displays adequate comfort level or baseline comfort level  Description: Interventions:  - Encourage patient to monitor pain and request assistance  - Assess pain using appropriate pain scale  - Administer analgesics based on type and severity of pain and evaluate response  - Implement non-pharmacological measures as appropriate and evaluate response  - Consider cultural and social influences on pain and pain management  - Notify physician/advanced practitioner if interventions unsuccessful or patient reports new pain  Outcome: Progressing     Problem: INFECTION - ADULT  Goal: Absence or prevention of progression during hospitalization  Description: INTERVENTIONS:  - Assess and monitor for signs and symptoms of infection  - Monitor lab/diagnostic results  - Monitor all insertion sites, i e  indwelling lines, tubes, and drains  - Monitor endotracheal if appropriate and nasal secretions for changes in amount and color  - [place] appropriate cooling/warming therapies per order  - Administer medications as ordered  - Instruct and encourage patient and family to use good hand hygiene technique  - Identify and instruct in appropriate isolation precautions for identified infection/condition  Outcome: Progressing  Goal: Absence of fever/infection during neutropenic period  Description: INTERVENTIONS:  - Monitor WBC    Outcome: Progressing     Problem: SAFETY ADULT  Goal: Patient will remain free of falls  Description: INTERVENTIONS:  - Educate patient/family on patient safety including physical limitations  - Instruct patient to call for assistance with activity   - Consult OT/PT to assist with strengthening/mobility   - Keep Call bell within reach  - Keep bed low and locked with side rails adjusted as appropriate  - Keep care items and personal belongings within reach  - Initiate and maintain comfort rounds  - Make Fall Risk Sign visible to staff  - Offer Toileting every 2 Hours, in advance of need  - Initiate/Maintain bed alarm  - Apply yellow socks and bracelet for high fall risk patients  - Consider moving patient to room near nurses station  Outcome: Progressing  Goal: Maintain or return to baseline ADL function  Description: INTERVENTIONS:  -  Assess patient's ability to carry out ADLs; assess patient's baseline for ADL function and identify physical deficits which impact ability to perform ADLs (bathing, care of mouth/teeth, toileting, grooming, dressing, etc )  - Assess/evaluate cause of self-care deficits   - Assess range of motion  - Assess patient's mobility; develop plan if impaired  - Assess patient's need for assistive devices and provide as appropriate  - Encourage maximum independence but intervene and supervise when necessary  - Involve family in performance of ADLs  - Assess for home care needs following discharge   - Consider OT consult to assist with ADL evaluation and planning for discharge  - Provide patient education as appropriate  Outcome: Progressing  Goal: Maintains/Returns to pre admission functional level  Description: INTERVENTIONS:  - Perform BMAT or MOVE assessment daily    - Set and communicate daily mobility goal to care team and patient/family/caregiver  - Collaborate with rehabilitation services on mobility goals if consulted  - Out of bed for toileting  - Record patient progress and toleration of activity level   Outcome: Progressing     Problem: DISCHARGE PLANNING  Goal: Discharge to home or other facility with appropriate resources  Description: INTERVENTIONS:  - Identify barriers to discharge w/patient and caregiver  - Arrange for needed discharge resources and transportation as appropriate  - Identify discharge learning needs (meds, wound care, etc )  - Arrange for interpretive services to assist at discharge as needed  - Refer to Case Management Department for coordinating discharge planning if the patient needs post-hospital services based on physician/advanced practitioner order or complex needs related to functional status, cognitive ability, or social support system  Outcome: Progressing     Problem: Knowledge Deficit  Goal: Patient/family/caregiver demonstrates understanding of disease process, treatment plan, medications, and discharge instructions  Description: Complete learning assessment and assess knowledge base    Interventions:  - Provide teaching at level of understanding  - Provide teaching via preferred learning methods  Outcome: Progressing     Problem: MUSCULOSKELETAL - ADULT  Goal: Maintain or return mobility to safest level of function  Description: INTERVENTIONS:  - Assess patient's ability to carry out ADLs; assess patient's baseline for ADL function and identify physical deficits which impact ability to perform ADLs (bathing, care of mouth/teeth, toileting, grooming, dressing, etc )  - Assess/evaluate cause of self-care deficits   - Assess range of motion  - Assess patient's mobility  - Assess patient's need for assistive devices and provide as appropriate  - Encourage maximum independence but intervene and supervise when necessary  - Involve family in performance of ADLs  - Assess for home care needs following discharge   - Consider OT consult to assist with ADL evaluation and planning for discharge  - Provide patient education as appropriate  Outcome: Progressing  Goal: Maintain proper alignment of affected body part  Description: INTERVENTIONS:  - Support, maintain and protect limb and body alignment  - Provide patient/ family with appropriate education  Outcome: Progressing

## 2022-12-05 NOTE — PLAN OF CARE
Problem: INFECTION - ADULT  Goal: Absence or prevention of progression during hospitalization  Description: INTERVENTIONS:  Picc procedure and maintenance  - Assess and monitor for signs and symptoms of infection  - Monitor lab/diagnostic results  - Monitor all insertion sites, i e  indwelling lines, tubes, and drains  - Monitor endotracheal if appropriate and nasal secretions for changes in amount and color  - [place] appropriate cooling/warming therapies per order  - Administer medications as ordered  - Instruct and encourage patient and family to use good hand hygiene technique  - Identify and instruct in appropriate isolation precautions for identified infection/condition  Outcome: Progressing     Problem: Knowledge Deficit  Goal: Patient/family/caregiver demonstrates understanding of disease process, treatment plan, medications, and discharge instructions  Description: Complete learning assessment and assess knowledge base    Interventions:  Picc procedure and maintenance  - Provide teaching at level of understanding  - Provide teaching via preferred learning methods  Outcome: Progressing

## 2022-12-06 LAB
ANION GAP SERPL CALCULATED.3IONS-SCNC: 7 MMOL/L (ref 4–13)
BASOPHILS # BLD AUTO: 0.02 THOUSANDS/ÂΜL (ref 0–0.1)
BASOPHILS NFR BLD AUTO: 0 % (ref 0–1)
BUN SERPL-MCNC: 14 MG/DL (ref 5–25)
CALCIUM SERPL-MCNC: 9 MG/DL (ref 8.3–10.1)
CHLORIDE SERPL-SCNC: 102 MMOL/L (ref 96–108)
CO2 SERPL-SCNC: 31 MMOL/L (ref 21–32)
CREAT SERPL-MCNC: 0.84 MG/DL (ref 0.6–1.3)
EOSINOPHIL # BLD AUTO: 0.34 THOUSAND/ÂΜL (ref 0–0.61)
EOSINOPHIL NFR BLD AUTO: 4 % (ref 0–6)
ERYTHROCYTE [DISTWIDTH] IN BLOOD BY AUTOMATED COUNT: 13.7 % (ref 11.6–15.1)
GFR SERPL CREATININE-BSD FRML MDRD: 71 ML/MIN/1.73SQ M
GLUCOSE SERPL-MCNC: 93 MG/DL (ref 65–140)
HCT VFR BLD AUTO: 32.1 % (ref 34.8–46.1)
HGB BLD-MCNC: 10.1 G/DL (ref 11.5–15.4)
IMM GRANULOCYTES # BLD AUTO: 0.08 THOUSAND/UL (ref 0–0.2)
IMM GRANULOCYTES NFR BLD AUTO: 1 % (ref 0–2)
LYMPHOCYTES # BLD AUTO: 1.93 THOUSANDS/ÂΜL (ref 0.6–4.47)
LYMPHOCYTES NFR BLD AUTO: 25 % (ref 14–44)
MCH RBC QN AUTO: 29.4 PG (ref 26.8–34.3)
MCHC RBC AUTO-ENTMCNC: 31.5 G/DL (ref 31.4–37.4)
MCV RBC AUTO: 94 FL (ref 82–98)
MONOCYTES # BLD AUTO: 0.99 THOUSAND/ÂΜL (ref 0.17–1.22)
MONOCYTES NFR BLD AUTO: 13 % (ref 4–12)
NEUTROPHILS # BLD AUTO: 4.34 THOUSANDS/ÂΜL (ref 1.85–7.62)
NEUTS SEG NFR BLD AUTO: 57 % (ref 43–75)
NRBC BLD AUTO-RTO: 0 /100 WBCS
PLATELET # BLD AUTO: 324 THOUSANDS/UL (ref 149–390)
PMV BLD AUTO: 8.9 FL (ref 8.9–12.7)
POTASSIUM SERPL-SCNC: 3.6 MMOL/L (ref 3.5–5.3)
RBC # BLD AUTO: 3.43 MILLION/UL (ref 3.81–5.12)
SARS-COV-2 RNA RESP QL NAA+PROBE: NEGATIVE
SODIUM SERPL-SCNC: 140 MMOL/L (ref 135–147)
WBC # BLD AUTO: 7.7 THOUSAND/UL (ref 4.31–10.16)

## 2022-12-06 PROCEDURE — 0S993ZZ DRAINAGE OF RIGHT HIP JOINT, PERCUTANEOUS APPROACH: ICD-10-PCS | Performed by: INTERNAL MEDICINE

## 2022-12-06 RX ADMIN — HYDROMORPHONE HYDROCHLORIDE 1 MG: 1 INJECTION, SOLUTION INTRAMUSCULAR; INTRAVENOUS; SUBCUTANEOUS at 10:29

## 2022-12-06 RX ADMIN — SENNOSIDES AND DOCUSATE SODIUM 1 TABLET: 8.6; 5 TABLET ORAL at 22:03

## 2022-12-06 RX ADMIN — METOPROLOL TARTRATE 25 MG: 25 TABLET, FILM COATED ORAL at 22:04

## 2022-12-06 RX ADMIN — DICLOFENAC SODIUM 2 G: 10 GEL TOPICAL at 22:03

## 2022-12-06 RX ADMIN — DICLOFENAC SODIUM 2 G: 10 GEL TOPICAL at 09:25

## 2022-12-06 RX ADMIN — OXYCODONE HYDROCHLORIDE 15 MG: 10 TABLET ORAL at 16:18

## 2022-12-06 RX ADMIN — OXYBUTYNIN CHLORIDE 5 MG: 5 TABLET, EXTENDED RELEASE ORAL at 22:05

## 2022-12-06 RX ADMIN — CEFAZOLIN SODIUM 2000 MG: 2 SOLUTION INTRAVENOUS at 12:36

## 2022-12-06 RX ADMIN — ENOXAPARIN SODIUM 40 MG: 40 INJECTION SUBCUTANEOUS at 09:24

## 2022-12-06 RX ADMIN — CEFAZOLIN SODIUM 2000 MG: 2 SOLUTION INTRAVENOUS at 21:00

## 2022-12-06 RX ADMIN — OXYCODONE HYDROCHLORIDE 40 MG: 20 TABLET, FILM COATED, EXTENDED RELEASE ORAL at 22:04

## 2022-12-06 RX ADMIN — DICLOFENAC SODIUM 2 G: 10 GEL TOPICAL at 12:39

## 2022-12-06 RX ADMIN — LORAZEPAM 0.5 MG: 1 TABLET ORAL at 00:18

## 2022-12-06 RX ADMIN — CEFAZOLIN SODIUM 2000 MG: 2 SOLUTION INTRAVENOUS at 05:09

## 2022-12-06 RX ADMIN — OXYCODONE HYDROCHLORIDE 40 MG: 20 TABLET, FILM COATED, EXTENDED RELEASE ORAL at 09:22

## 2022-12-06 RX ADMIN — DULOXETINE 60 MG: 60 CAPSULE, DELAYED RELEASE ORAL at 09:22

## 2022-12-06 RX ADMIN — PANTOPRAZOLE SODIUM 20 MG: 20 TABLET, DELAYED RELEASE ORAL at 05:09

## 2022-12-06 RX ADMIN — DICLOFENAC SODIUM 2 G: 10 GEL TOPICAL at 17:03

## 2022-12-06 RX ADMIN — OXYCODONE HYDROCHLORIDE 15 MG: 10 TABLET ORAL at 01:35

## 2022-12-06 RX ADMIN — BISACODYL 5 MG: 5 TABLET, COATED ORAL at 09:22

## 2022-12-06 RX ADMIN — OXYCODONE HYDROCHLORIDE 15 MG: 10 TABLET ORAL at 07:19

## 2022-12-06 RX ADMIN — METOPROLOL TARTRATE 25 MG: 25 TABLET, FILM COATED ORAL at 09:22

## 2022-12-06 NOTE — ASSESSMENT & PLAN NOTE
· Chronic hip pain on oxycodone  · Confirmed on   Continue OxyContin 40 mg b i d  Oxycodone 15 mg for breakthrough pain  · Hip CT negative for acute infection  · Seen by orthopedics without any further recommendations  Patient and spouse requesting re-evaluation as their child is an orthopedic resident and suggesting possibility of other procedures that may alleviate pain  · Patient was re-evaluated by Orthopedic surgery and recommended right hip aspiration  · Underwent right hip aspiration by interventional radiology 12/5, minimal aspirate  Gram stain negative  Mildly unlikely there is an infection in the right hip    Patient will follow up with orthopedic surgery in January once she completes IV antibiotics to discuss hip replacement  · Evaluated by PT OT, plan to discharge to rehab on Wednesday

## 2022-12-06 NOTE — PROGRESS NOTES
Progress Note - Infectious Disease   Gerard Barajas 69 y o  female MRN: [id number]  Unit/Bed#: E5 -01 Encounter: [id number]      Impression/Plan:  1  Sepsis  Present on arrival  Fever, tachycardia, tachypnea, and leukocytosis  Likely secondary to MSSA bacteremia with suspected RLE source  Patient with lymphedema and history of recurring cellulitis   Clinically improved with resolution of fevers  WBC count has normalized  -antibiotic as below  -follow temperatures and hemodynamics  -follow CBC     2  MSSA bacteremia   Most likely cutaneous source in the setting of lower extremity lymphedema   Consider relation to #3 as patient has right hip pain but no radiographic evidence of infection on CT   2D echo was a poor study  ELIZABETH was negative   No intravascular prosthetic devices   Bacteremia quickly cleared  Patient continues to tolerate IV antibiotic without difficulty  -continue IV cefazolin  -anticipate 4 week course of IV antibiotic, through 12/27   -check weekly CBC with diff, creatinine while on antibiotic  -outpatient ID follow-up in 2 weeks after discharge  -surveillance blood cultures 2 weeks after completion of antibiotic course     3  R hip pain  In patient with previous ORIF and intact hardware   CT shows severe right hip osteoarthrosis and post ORIF changes, which appear stable   No fluid collection, effusion, discrete soft tissue abscess or gas noted  Orthopedic surgery input noted with low clinical suspicion for septic joint  IR attempted aspiration initially with no aspirate obtained  Minimal fluid obtained after instillation of saline  Culture is pending  Ongoing low suspicion for infection  -follow-up IR culture  -serial hip exams  -pain management per primary service  -orthopedic surgery follow-up with eventual plan for hip replacement surgery in the future      4  Confusion   Likely secondary to sepsis and bacteremia above   Mentation has improved   -treatment of sepsis/bacteremia as above  -serial neuro exams  -monitor mood and mental status     5  Lymphedema  This is likely driving force of patient's recurring lower extremity cellulitis      6  CKD III  Remains within stable range   -monitor creatinine  -dose adjust antibiotics for renal function as needed  -avoid nephrotoxins     7  Morbid obesity  BMI = 37 73     8  Wegener's granulomatosis  Discussed with Dr Shyam Roque and with patient      Antibiotics:  Cefazolin 6  Antibiotics 7           Subjective:  IR performed right hip aspiration yesterday initially with no aspirate obtained  Minimal fluid was obtained after 2 cc of saline was instilled  No fevers or other new reported overnight events  Objective:  Vitals:  Temp:  [98 6 °F (37 °C)-99 7 °F (37 6 °C)] 98 7 °F (37 1 °C)  HR:  [72-85] 72  Resp:  [19-20] 19  BP: (121-139)/(60-70) 138/69  SpO2:  [92 %-95 %] 92 %  Temp (24hrs), Av °F (37 2 °C), Min:98 6 °F (37 °C), Max:99 7 °F (37 6 °C)  Current: Temperature: 98 7 °F (37 1 °C)    Physical Exam:   General:  No acute distress, nontoxic  HEENT:  Atraumatic normocephalic  Neck:  Trachea midline  Psychiatric:  Awake and alert  Pulmonary:  Normal respiratory excursion without accessory muscle use  Abdomen:  Soft, nontender  Extremities:  Bilateral lower extremity lymphedema  Leg dressings are intact  Skin:  No rashes or draining wounds  Neuro: Moves all extremities spontaneously    Lab Results:  I have personally reviewed pertinent labs    Results from last 7 days   Lab Units 2218 22  0502 22  0543 22  0504   POTASSIUM mmol/L 3 6 3 9 4 0 4 1   CHLORIDE mmol/L 102 102 103 103   CO2 mmol/L 31 29 25 24   BUN mg/dL 14 14 25 19   CREATININE mg/dL 0 84 0 81 1 06 1 00   EGFR ml/min/1 73sq m 71 74 53 57   CALCIUM mg/dL 9 0 8 5 9 2 9 3   AST U/L  --   --  40 22   ALT U/L  --   --  22 22   ALK PHOS U/L  --   --  114 123*     Results from last 7 days   Lab Units 2218 22  0502 22  0543   WBC Thousand/uL 7 70 10 70* 18 97*   HEMOGLOBIN g/dL 10 1* 10 5* 11 1*   PLATELETS Thousands/uL 324 214 191     Results from last 7 days   Lab Units 12/05/22  1513 11/30/22  0449   BLOOD CULTURE   --  No Growth After 5 Days  No Growth After 5 Days  GRAM STAIN RESULT  No Polys or Bacteria seen  --        Imaging Studies:   I have personally reviewed pertinent imaging study reports and images in PACS  EKG, Pathology, and Other Studies:   I have personally reviewed pertinent reports

## 2022-12-06 NOTE — PLAN OF CARE
Problem: Potential for Falls  Goal: Patient will remain free of falls  Description: INTERVENTIONS:  - Educate patient/family on patient safety including physical limitations  - Instruct patient to call for assistance with activity   - Consult OT/PT to assist with strengthening/mobility   - Keep Call bell within reach  - Keep bed low and locked with side rails adjusted as appropriate  - Keep care items and personal belongings within reach  - Initiate and maintain comfort rounds  - Make Fall Risk Sign visible to staff  - Offer Toileting every 2 Hours, in advance of need  - Initiate/Maintain bed alarm  - Apply yellow socks and bracelet for high fall risk patients  - Consider moving patient to room near nurses station  Outcome: Progressing     Problem: Prexisting or High Potential for Compromised Skin Integrity  Goal: Skin integrity is maintained or improved  Description: INTERVENTIONS:  - Identify patients at risk for skin breakdown  - Assess and monitor skin integrity  - Assess and monitor nutrition and hydration status  - Monitor labs   - Assess for incontinence   - Turn and reposition patient  - Assist with mobility/ambulation  - Relieve pressure over bony prominences  - Avoid friction and shearing  - Provide appropriate hygiene as needed including keeping skin clean and dry  - Evaluate need for skin moisturizer/barrier cream  - Collaborate with interdisciplinary team   - Patient/family teaching  - Consider wound care consult   Outcome: Progressing     Problem: MOBILITY - ADULT  Goal: Maintain or return to baseline ADL function  Description: INTERVENTIONS:  -  Assess patient's ability to carry out ADLs; assess patient's baseline for ADL function and identify physical deficits which impact ability to perform ADLs (bathing, care of mouth/teeth, toileting, grooming, dressing, etc )  - Assess/evaluate cause of self-care deficits   - Assess range of motion  - Assess patient's mobility; develop plan if impaired  - Assess patient's need for assistive devices and provide as appropriate  - Encourage maximum independence but intervene and supervise when necessary  - Involve family in performance of ADLs  - Assess for home care needs following discharge   - Consider OT consult to assist with ADL evaluation and planning for discharge  - Provide patient education as appropriate  Outcome: Progressing  Goal: Maintains/Returns to pre admission functional level  Description: INTERVENTIONS:  - Perform BMAT or MOVE assessment daily    - Set and communicate daily mobility goal to care team and patient/family/caregiver     - Collaborate with rehabilitation services on mobility goals if consulted  - Out of bed for toileting  - Record patient progress and toleration of activity level   Outcome: Progressing     Problem: PAIN - ADULT  Goal: Verbalizes/displays adequate comfort level or baseline comfort level  Description: Interventions:  - Encourage patient to monitor pain and request assistance  - Assess pain using appropriate pain scale  - Administer analgesics based on type and severity of pain and evaluate response  - Implement non-pharmacological measures as appropriate and evaluate response  - Consider cultural and social influences on pain and pain management  - Notify physician/advanced practitioner if interventions unsuccessful or patient reports new pain  Outcome: Progressing     Problem: INFECTION - ADULT  Goal: Absence or prevention of progression during hospitalization  Description: INTERVENTIONS:  - Assess and monitor for signs and symptoms of infection  - Monitor lab/diagnostic results  - Monitor all insertion sites, i e  indwelling lines, tubes, and drains  - Monitor endotracheal if appropriate and nasal secretions for changes in amount and color  - [place] appropriate cooling/warming therapies per order  - Administer medications as ordered  - Instruct and encourage patient and family to use good hand hygiene technique  - Identify and instruct in appropriate isolation precautions for identified infection/condition  Outcome: Progressing  Goal: Absence of fever/infection during neutropenic period  Description: INTERVENTIONS:  - Monitor WBC    Outcome: Progressing     Problem: SAFETY ADULT  Goal: Patient will remain free of falls  Description: INTERVENTIONS:  - Educate patient/family on patient safety including physical limitations  - Instruct patient to call for assistance with activity   - Consult OT/PT to assist with strengthening/mobility   - Keep Call bell within reach  - Keep bed low and locked with side rails adjusted as appropriate  - Keep care items and personal belongings within reach  - Initiate and maintain comfort rounds  - Make Fall Risk Sign visible to staff  - Offer Toileting every 2 Hours, in advance of need  - Initiate/Maintain bed alarm  - Apply yellow socks and bracelet for high fall risk patients  - Consider moving patient to room near nurses station  Outcome: Progressing  Goal: Maintain or return to baseline ADL function  Description: INTERVENTIONS:  -  Assess patient's ability to carry out ADLs; assess patient's baseline for ADL function and identify physical deficits which impact ability to perform ADLs (bathing, care of mouth/teeth, toileting, grooming, dressing, etc )  - Assess/evaluate cause of self-care deficits   - Assess range of motion  - Assess patient's mobility; develop plan if impaired  - Assess patient's need for assistive devices and provide as appropriate  - Encourage maximum independence but intervene and supervise when necessary  - Involve family in performance of ADLs  - Assess for home care needs following discharge   - Consider OT consult to assist with ADL evaluation and planning for discharge  - Provide patient education as appropriate  Outcome: Progressing  Goal: Maintains/Returns to pre admission functional level  Description: INTERVENTIONS:  - Perform BMAT or MOVE assessment daily    - Set and communicate daily mobility goal to care team and patient/family/caregiver  - Collaborate with rehabilitation services on mobility goals if consulted  - Out of bed for toileting  - Record patient progress and toleration of activity level   Outcome: Progressing     Problem: DISCHARGE PLANNING  Goal: Discharge to home or other facility with appropriate resources  Description: INTERVENTIONS:  - Identify barriers to discharge w/patient and caregiver  - Arrange for needed discharge resources and transportation as appropriate  - Identify discharge learning needs (meds, wound care, etc )  - Arrange for interpretive services to assist at discharge as needed  - Refer to Case Management Department for coordinating discharge planning if the patient needs post-hospital services based on physician/advanced practitioner order or complex needs related to functional status, cognitive ability, or social support system  Outcome: Progressing     Problem: Knowledge Deficit  Goal: Patient/family/caregiver demonstrates understanding of disease process, treatment plan, medications, and discharge instructions  Description: Complete learning assessment and assess knowledge base    Interventions:  - Provide teaching at level of understanding  - Provide teaching via preferred learning methods  Outcome: Progressing     Problem: MUSCULOSKELETAL - ADULT  Goal: Maintain or return mobility to safest level of function  Description: INTERVENTIONS:  - Assess patient's ability to carry out ADLs; assess patient's baseline for ADL function and identify physical deficits which impact ability to perform ADLs (bathing, care of mouth/teeth, toileting, grooming, dressing, etc )  - Assess/evaluate cause of self-care deficits   - Assess range of motion  - Assess patient's mobility  - Assess patient's need for assistive devices and provide as appropriate  - Encourage maximum independence but intervene and supervise when necessary  - Involve family in performance of ADLs  - Assess for home care needs following discharge   - Consider OT consult to assist with ADL evaluation and planning for discharge  - Provide patient education as appropriate  Outcome: Progressing  Goal: Maintain proper alignment of affected body part  Description: INTERVENTIONS:  - Support, maintain and protect limb and body alignment  - Provide patient/ family with appropriate education  Outcome: Progressing

## 2022-12-06 NOTE — PLAN OF CARE
Problem: Potential for Falls  Goal: Patient will remain free of falls  Description: INTERVENTIONS:  - Educate patient/family on patient safety including physical limitations  - Instruct patient to call for assistance with activity   - Consult OT/PT to assist with strengthening/mobility   - Keep Call bell within reach  - Keep bed low and locked with side rails adjusted as appropriate  - Keep care items and personal belongings within reach  - Initiate and maintain comfort rounds  - Make Fall Risk Sign visible to staff  - Offer Toileting every 2 Hours, in advance of need  - Initiate/Maintain bed alarm  - Apply yellow socks and bracelet for high fall risk patients  - Consider moving patient to room near nurses station  Outcome: Progressing     Problem: Prexisting or High Potential for Compromised Skin Integrity  Goal: Skin integrity is maintained or improved  Description: INTERVENTIONS:  - Identify patients at risk for skin breakdown  - Assess and monitor skin integrity  - Assess and monitor nutrition and hydration status  - Monitor labs   - Assess for incontinence   - Turn and reposition patient  - Assist with mobility/ambulation  - Relieve pressure over bony prominences  - Avoid friction and shearing  - Provide appropriate hygiene as needed including keeping skin clean and dry  - Evaluate need for skin moisturizer/barrier cream  - Collaborate with interdisciplinary team   - Patient/family teaching  - Consider wound care consult   Outcome: Progressing     Problem: MOBILITY - ADULT  Goal: Maintain or return to baseline ADL function  Description: INTERVENTIONS:  -  Assess patient's ability to carry out ADLs; assess patient's baseline for ADL function and identify physical deficits which impact ability to perform ADLs (bathing, care of mouth/teeth, toileting, grooming, dressing, etc )  - Assess/evaluate cause of self-care deficits   - Assess range of motion  - Assess patient's mobility; develop plan if impaired  - Assess patient's need for assistive devices and provide as appropriate  - Encourage maximum independence but intervene and supervise when necessary  - Involve family in performance of ADLs  - Assess for home care needs following discharge   - Consider OT consult to assist with ADL evaluation and planning for discharge  - Provide patient education as appropriate  Outcome: Progressing  Goal: Maintains/Returns to pre admission functional level  Description: INTERVENTIONS:  - Perform BMAT or MOVE assessment daily    - Set and communicate daily mobility goal to care team and patient/family/caregiver     - Collaborate with rehabilitation services on mobility goals if consulted  - Out of bed for toileting  - Record patient progress and toleration of activity level   Outcome: Progressing     Problem: PAIN - ADULT  Goal: Verbalizes/displays adequate comfort level or baseline comfort level  Description: Interventions:  - Encourage patient to monitor pain and request assistance  - Assess pain using appropriate pain scale  - Administer analgesics based on type and severity of pain and evaluate response  - Implement non-pharmacological measures as appropriate and evaluate response  - Consider cultural and social influences on pain and pain management  - Notify physician/advanced practitioner if interventions unsuccessful or patient reports new pain  Outcome: Progressing     Problem: INFECTION - ADULT  Goal: Absence or prevention of progression during hospitalization  Description: INTERVENTIONS:  - Assess and monitor for signs and symptoms of infection  - Monitor lab/diagnostic results  - Monitor all insertion sites, i e  indwelling lines, tubes, and drains  - Monitor endotracheal if appropriate and nasal secretions for changes in amount and color  - Warden appropriate cooling/warming therapies per order  - Administer medications as ordered  - Instruct and encourage patient and family to use good hand hygiene technique  - Identify and instruct in appropriate isolation precautions for identified infection/condition  Outcome: Progressing  Goal: Absence of fever/infection during neutropenic period  Description: INTERVENTIONS:  - Monitor WBC    Outcome: Progressing     Problem: SAFETY ADULT  Goal: Patient will remain free of falls  Description: INTERVENTIONS:  - Educate patient/family on patient safety including physical limitations  - Instruct patient to call for assistance with activity   - Consult OT/PT to assist with strengthening/mobility   - Keep Call bell within reach  - Keep bed low and locked with side rails adjusted as appropriate  - Keep care items and personal belongings within reach  - Initiate and maintain comfort rounds  - Make Fall Risk Sign visible to staff  - Offer Toileting every 2 Hours, in advance of need  - Initiate/Maintain bed alarm  - Apply yellow socks and bracelet for high fall risk patients  - Consider moving patient to room near nurses station  Outcome: Progressing  Goal: Maintain or return to baseline ADL function  Description: INTERVENTIONS:  -  Assess patient's ability to carry out ADLs; assess patient's baseline for ADL function and identify physical deficits which impact ability to perform ADLs (bathing, care of mouth/teeth, toileting, grooming, dressing, etc )  - Assess/evaluate cause of self-care deficits   - Assess range of motion  - Assess patient's mobility; develop plan if impaired  - Assess patient's need for assistive devices and provide as appropriate  - Encourage maximum independence but intervene and supervise when necessary  - Involve family in performance of ADLs  - Assess for home care needs following discharge   - Consider OT consult to assist with ADL evaluation and planning for discharge  - Provide patient education as appropriate  Outcome: Progressing  Goal: Maintains/Returns to pre admission functional level  Description: INTERVENTIONS:  - Perform BMAT or MOVE assessment daily    - Set and communicate daily mobility goal to care team and patient/family/caregiver  - Collaborate with rehabilitation services on mobility goals if consulted  - Out of bed for toileting  - Record patient progress and toleration of activity level   Outcome: Progressing     Problem: DISCHARGE PLANNING  Goal: Discharge to home or other facility with appropriate resources  Description: INTERVENTIONS:  - Identify barriers to discharge w/patient and caregiver  - Arrange for needed discharge resources and transportation as appropriate  - Identify discharge learning needs (meds, wound care, etc )  - Arrange for interpretive services to assist at discharge as needed  - Refer to Case Management Department for coordinating discharge planning if the patient needs post-hospital services based on physician/advanced practitioner order or complex needs related to functional status, cognitive ability, or social support system  Outcome: Progressing     Problem: Knowledge Deficit  Goal: Patient/family/caregiver demonstrates understanding of disease process, treatment plan, medications, and discharge instructions  Description: Complete learning assessment and assess knowledge base    Interventions:  - Provide teaching at level of understanding  - Provide teaching via preferred learning methods  Outcome: Progressing     Problem: MUSCULOSKELETAL - ADULT  Goal: Maintain or return mobility to safest level of function  Description: INTERVENTIONS:  - Assess patient's ability to carry out ADLs; assess patient's baseline for ADL function and identify physical deficits which impact ability to perform ADLs (bathing, care of mouth/teeth, toileting, grooming, dressing, etc )  - Assess/evaluate cause of self-care deficits   - Assess range of motion  - Assess patient's mobility  - Assess patient's need for assistive devices and provide as appropriate  - Encourage maximum independence but intervene and supervise when necessary  - Involve family in performance of ADLs  - Assess for home care needs following discharge   - Consider OT consult to assist with ADL evaluation and planning for discharge  - Provide patient education as appropriate  Outcome: Progressing  Goal: Maintain proper alignment of affected body part  Description: INTERVENTIONS:  - Support, maintain and protect limb and body alignment  - Provide patient/ family with appropriate education  Outcome: Progressing

## 2022-12-06 NOTE — PLAN OF CARE
Problem: PHYSICAL THERAPY ADULT  Goal: Performs mobility at highest level of function for planned discharge setting  See evaluation for individualized goals  Description: Treatment/Interventions: Functional transfer training, LE strengthening/ROM, Therapeutic exercise, Cognitive reorientation, Patient/family training, Equipment eval/education, Gait training, Bed mobility, Compensatory technique education, Continued evaluation, Spoke to nursing, OT          See flowsheet documentation for full assessment, interventions and recommendations  Outcome: Progressing  Note: Prognosis: Fair  Problem List: Decreased strength, Decreased range of motion, Decreased endurance, Impaired balance, Decreased mobility, Obesity, Decreased skin integrity, Pain  Assessment: PT  SEEN FOR PT TREATMENT SESSION  PT  REPORTS PAIN R HIP AND LATERAL THIGH 8/10 PAIN  PT  VERY TENDER TO TOUCH AND DOES NOT LIKE PEOPLE TOUCHING HER RIGHT LATERAL THIGH, HIP AREA OR BEING PULLED ON WITH OR PUSHED WITH SUPINE TO SIT AND WITH  SCOOTING TO EDGE OF BE   NOTED  RETROPULSION UPON SUPINE TO SIT REQUIRING MOD ASSIST FOR TRUNK SUPPORT UNTIL PT REACHED EOB, PT  THEN ABLE TO SIT UPRIGHT AND MAINTAIN MIDLINE WITH UPRIGHT POSTURE  PT  PERFORMS TRANSFERS SIT TO STAND FROM EXTREMELY ELEVATED BED HEIGHT WITH  MIN ASSIST X2   PT  USES LIFT CHAIR AT HOME  PT  PROGRESSED WITH AMBULATION DISTANCES TO 12' X1 WITH MIN ASSIST X2 WITH CHAIR FOLLOW FOR SAFETY AND DECREASED  ACTIVITY TOLERANCE, AND FUNCTIONAL ENDURANCE  PT  LIMITED BY FATIGUE, GENERALIZED WEAKNESS AND PAIN  PT  PERFORMS B/L LE ISOMETRIC EXERCISES OF QS AND GS AND A/A L LE HIP ABD,/ADD  EXERCISES  PT UNABLE TO TOLERATE AROM EXERCISES WITH R LE DUE TO INCREASED PAIN  PT REMAINED SEATED OUT OF BED IN RECLINER POST PT SESSION B/L LE'S ELEVATED  PT'S  DONNED r ANKLE BRACE AND B/L ORTHOPEDIC SHOES FOR PT PRIOR TO TRANSFER AND GAIT TRAINING    PT REQUIRED FREQUENT REDIRECTION TO TASK DURING PT SESSION  PT  IS CURRENTLY FUNCTIONING WELL BELOW BASELINE LEVEL OF MOBILITY AND REQUIRING INCREASED ASSISTANCE FOR BED MOBILITY, TRANSFERS AND AMBULATION FROM INITIAL PT EVALULATION AND BASELINE, PT PRESENTS WITH DECREASED GAIT SPEED, BALANCE,  GENERALIZED STRENGTH, MOBILITY, ACTIVITY TOLERANCE, FUNCTIONAL ENDURANCE AND GAIT DEVIATIONS AND PAIN LIMITING FUNCTIONAL MOBILITY  DUE TO SLOW PROGRESS AND IMPAIRMENTS AS NOTED,  STR  IS RECOMMENDED AT THIS TIME  Barriers to Discharge: None, Decreased caregiver support  Barriers to Discharge Comments: +home alone  PT Discharge Recommendation: Post acute rehabilitation services    See flowsheet documentation for full assessment

## 2022-12-06 NOTE — OCCUPATIONAL THERAPY NOTE
Occupational Therapy Progress Note     Patient Name: Ricardo Rincon  LPTCR'A Date: 12/6/2022  Problem List  Principal Problem:    Sepsis [facility])  Active Problems:    Wegener's granulomatosis with renal involvement ([facility] [address] )    [facility] )    Acute metabolic encephalopathy    Right hip pain    MSSA bacteremia        12/06/22 1055   OT Last Visit   OT Visit Date 12/06/22   Pain Assessment   Pain Assessment Tool 0-10   Pain Score 8   Pain Location/Orientation Orientation: Right;Location: Hip   Hospital Pain Intervention(s) Ambulation/increased activity;Repositioned; Emotional support   Restrictions/Precautions   Weight Bearing Precautions Per Order No   Other Precautions Chair Alarm;Cognitive; Bed Alarm; Fall Risk;Pain   ADL   Where Assessed Chair   LB Dressing Assistance 2  Maximal Assistance   LB Dressing Deficit Setup;Steadying; Requires assistive device for steadying;Verbal cueing;Supervision/safety; Increased time to complete; Don/doff L shoe;Don/doff R shoe   LB Dressing Comments impaired functional reach   Toileting Assistance  3  Moderate Assistance   Toileting Deficit Setup;Verbal cueing;Supervison/safety; Increased time to complete;Use of bedpan/urinal setup; Clothing management up;Clothing management down   Toileting Comments assist to position self on bed pan, able to complete hygiene while seated   Bed Mobility   Rolling R 3  Moderate assistance   Additional items Assist x 1; Increased time required;LE management;Verbal cues   Rolling L 4  Minimal assistance   Additional items Assist x 1; Increased time required;Verbal cues;LE management; Bedrails   Supine to Sit 3  Moderate assistance   Additional items Assist x 1; Increased time required;Verbal cues;LE management; Bedrails;HOB elevated   Additional Comments increased time to get OOB pt likes to move LE herself as pain when others move   Transfers   Sit to Stand 4  Minimal assistance   Additional items Assist x 2; Increased time required;Verbal cues;Armrests  (elevated bed height)   Stand to Sit 4  Minimal assistance   Additional items Assist x 2; Increased time required;Verbal cues;Armrests   Additional Comments cues for hand placement and positioning   Functional Mobility   Functional Mobility 4  Minimal assistance   Additional Comments assist x2 w/ increased time and cues for safety increased fatigue and pain in left LE   Additional items Rolling walker   Therapeutic Exercise - ROM   UE-ROM Yes   ROM- Right Upper Extremities   R Shoulder AROM; Flexion; Extension;Horizontal ABduction;ABduction  (triceps)   R Elbow AROM;Elbow extension;Elbow flexion  (punchouts)   R Weight/Reps/Sets 2 sets x 10 reps   RUE ROM Comment tolerated well w/ cues for proper techniques   ROM - Left Upper Extremities    L Shoulder AROM; Flexion; Extension;Horizontal ABduction;ABduction  (triceps)   L Elbow AROM;Elbow flexion;Elbow extension  (punchouts)   L Weight/Reps/Sets 2 sets x 10reps   LUE ROM Comment tolerated well w/ cues for proper techniques   Cognition   Overall Cognitive Status Impaired   Arousal/Participation Responsive; Cooperative; Alert   Attention Attends with cues to redirect   Orientation Level Oriented to person;Oriented to time;Oriented to place   Memory Decreased short term memory;Decreased recall of precautions   Following Commands Follows one step commands without difficulty   Comments engages in appropriate conversations   Additional Activities   Additional Activities   (education on positioning)   Additional Activities Comments pt receptive   Activity Tolerance   Activity Tolerance Patient limited by pain; Patient limited by fatigue;Treatment limited secondary to medical complications (Comment)   Medical Staff Made Aware appropriate to see per [facility]   Assessment   Assessment Pt seen for skilled OT session focused on ADLs, functional transfers and mobility, UE exercises  Pt requested to use bed pan as emergently had to go to bathroom   Pt w/ MIN-MOD assist rolling in bed and assist to position bed pan  Pt completed own hygiene  Pt w/ MOD assist x1 w/ increased time to reach EOB as pt requiring assistance for trunk  Pt max assist to don shoes on b/l feet  Pt w/ increased time to achieve balance EOB  Pt w/ elevated bed height min assist x2 sit>stand from bed w/ increased time and RW  Pt w/ MIN assist x2 functional mobility in homelike environment w/ RW and increased fatigue requiring seated rest break  Pt completed b/l UE exercises seen above to increase strength and endurance for ADLs, functional transfers and mobility and pt w/ cues for proper techniques and rest breaks  Pt seated in recliner w/ all needs met end of session  Pt continues to be limited due to increase pain in right hip, impaired balance, impaired functional reach, decreased strength and endurance, impaired activity tolerance, fall risk, all causing a decline in aDLs, functional transfers and mobility  Recommend STR when medically stable  Will continue to follow  The patient's raw score on the AM-PAC Daily Activity inpatient short form is 16, standardized score is 35 96, less than 39 4  Patients at this level are likely to benefit from discharge to post-acute rehabilitation services  Please refer to the recommendation of the Occupational Therapist for safe discharge planning  Plan   Treatment Interventions ADL retraining;UE strengthening/ROM; Functional transfer training; Endurance training;Cognitive reorientation;Patient/family training;Equipment evaluation/education; Compensatory technique education; Energy conservation; Activityengagement   Goal Expiration Date 12/14/22   OT Treatment Day 1   OT Frequency 2-3x/wk   Recommendation   OT Discharge Recommendation Post acute rehabilitation services   [facility] Daily Activity Inpatient   Lower Body Dressing 2   Bathing 2   Toileting 2   Upper Body Dressing 3   Grooming 3   Eating 4   Daily Activity Raw Score 16   Daily Activity Standardized Score (Calc for Raw Score >=11) 35 96   AM-PAC Applied Cognition Inpatient   Following a Speech/Presentation 3   Understanding Ordinary Conversation 4   Taking Medications 3   Remembering Where Things Are Placed or Put Away 3   Remembering List of 4-5 Errands 3   Taking Care of Complicated Tasks 3   Applied Cognition Raw Score 19   Applied Cognition Standardized Score 39 77   Modified [place] Scale   Modified [place] Scale 4   End of Consult   Education Provided Yes;Family or social support of family present for education by provider   Patient Position at End of Consult Bedside chair;Bed/Chair alarm activated; All needs within reach   Nurse Communication Nurse aware of consult     Documentation completed by: Moses Brown MS, OTR/L

## 2022-12-06 NOTE — PROGRESS NOTES
[address] [facility]  Progress Note - Apolinar Laguerre 1955, 69 y o  female MRN: [id number]  Unit/Bed#: E5 -01 Encounter: [id number]  Primary Care Provider: Sammie Nolan DO   Date and time admitted to hospital: 11/28/2022  5:08 PM    * Sepsis [facility])  Assessment & Plan  · Sepsis with MSSA bacteremia  · MARCIANO negative for endocarditis  Hip imaging negative for acute infection  · Likely secondary to right lower extremity cellulitis  · Repeat Blood cultures from 11/30 negative   · ID plan:  Four weeks of IV antibiotics end date 12/27/2022  PICC placed 12/5    Right hip pain  Assessment & Plan  · Chronic hip pain on oxycodone  · Confirmed on   Continue OxyContin 40 mg b i d  Oxycodone 15 mg for breakthrough pain  · Hip CT negative for acute infection  · Seen by orthopedics without any further recommendations  Patient and spouse requesting re-evaluation as their child is an orthopedic resident and suggesting possibility of other procedures that may alleviate pain  · Patient was re-evaluated by Orthopedic surgery and recommended right hip aspiration  · Underwent right hip aspiration by interventional radiology 12/5, minimal aspirate  Gram stain negative  Mildly unlikely there is an infection in the right hip  Patient will follow up with orthopedic surgery in January once she completes IV antibiotics to discuss hip replacement  · Evaluated by PT OT, plan to discharge to rehab on Wednesday    MSSA bacteremia  Assessment & Plan  · MSSA bacteremia most likely secondary to left lower extremity cellulitis  · Marciano negative for vegetations  · Continue cefazolin through 12/27  · PICC line placed 75/4    Acute metabolic encephalopathy  Assessment & Plan  · Acute metabolic encephalopathy secondary to acute infection    · Mentation is now at baseline  · Management of underlying infection as outlined below    Cancer of ovary ([facility] [address] )  Assessment & Plan  · History of ovarian cancer status post BSO    Wegener's granulomatosis with renal involvement ([facility] [address] )  Assessment & Plan  · Previously on rituximab and prednisone  · Renal function stable    Results from last 7 days   Lab Units 22  0518 22  0502 22  0543 22  0504   BUN mg/dL 14 14 25 19   CREATININE mg/dL 0 84 0 81 1 06 1 00   EGFR ml/min/1 73sq m 71 74 53 57         VTE Pharmacologic Prophylaxis: VTE Score: 5 High Risk (Score >/= 5) - Pharmacological DVT Prophylaxis Ordered: enoxaparin (Lovenox)  Sequential Compression Devices Ordered  Patient Centered Rounds: I performed bedside rounds with nursing staff today  Discussions with Specialists or Other Care Team Provider: Nursing, case management, infectious disease    Education and Discussions with Family / Patient: Updated  () at bedside  Time Spent for Care: 30 minutes  More than 50% of total time spent on counseling and coordination of care as described above  Current Length of Stay: 8 day(s)  Current Patient Status: Inpatient   Certification Statement: The patient will continue to require additional inpatient hospital stay due to Right hip pain  Discharge Plan: Anticipate discharge tomorrow to rehab facility  Code Status: Level 1 - Full Code    Subjective:   Patient was seen and evaluated bedside  She is feeling well, she was able to work with physical therapy    Objective:     Vitals:   Temp (24hrs), Av 8 °F (37 1 °C), Min:98 1 °F (36 7 °C), Max:99 7 °F (37 6 °C)    Temp:  [98 1 °F (36 7 °C)-99 7 °F (37 6 °C)] 98 1 °F (36 7 °C)  HR:  [72-85] 82  Resp:  [16-20] 16  BP: ()/(55-70) 99/55  SpO2:  [92 %-94 %] 93 %  Body mass index is 37 73 kg/m²  Input and Output Summary (last 24 hours):   No intake or output data in the 24 hours ending 22 0969    Physical Exam:   Physical Exam  Constitutional:       General: She is not in acute distress  HENT:      Head: Atraumatic     Cardiovascular:      Rate and Rhythm: Normal rate and regular rhythm  Heart sounds: No murmur heard  No friction rub  No gallop  Pulmonary:      Effort: Pulmonary effort is normal  No respiratory distress  Breath sounds: Normal breath sounds  No wheezing  Abdominal:      General: Bowel sounds are normal  There is no distension  Palpations: Abdomen is soft  Tenderness: There is no abdominal tenderness  Musculoskeletal:         General: No swelling  Cervical back: Neck supple  Skin:     General: Skin is warm and dry  Neurological:      General: No focal deficit present  Mental Status: She is alert  Psychiatric:         Mood and Affect: Mood normal         Additional Data:     Labs:  Results from last 7 days   Lab Units 12/06/22  0518   WBC Thousand/uL 7 70   HEMOGLOBIN g/dL 10 1*   HEMATOCRIT % 32 1*   PLATELETS Thousands/uL 324   NEUTROS PCT % 57   LYMPHS PCT % 25   MONOS PCT % 13*   EOS PCT % 4     Results from last 7 days   Lab Units 12/06/22  0518 12/03/22  0502 12/01/22  0543   SODIUM mmol/L 140   < > 135   POTASSIUM mmol/L 3 6   < > 4 0   CHLORIDE mmol/L 102   < > 103   CO2 mmol/L 31   < > 25   BUN mg/dL 14   < > 25   CREATININE mg/dL 0 84   < > 1 06   ANION GAP mmol/L 7   < > 7   CALCIUM mg/dL 9 0   < > 9 2   ALBUMIN g/dL  --   --  2 6*   TOTAL BILIRUBIN mg/dL  --   --  0 33   ALK PHOS U/L  --   --  114   ALT U/L  --   --  22   AST U/L  --   --  40   GLUCOSE RANDOM mg/dL 93   < > 95    < > = values in this interval not displayed  Lines/Drains:  Invasive Devices     Peripherally Inserted Central Catheter Line  Duration           PICC Line 82/78/11 Right Basilic 1 day                Central Line:  Goal for removal: N/A - Discharging with PICC for IV ABX/medications             Imaging: Reviewed radiology reports from this admission including: CT leg    Recent Cultures (last 7 days):   Results from last 7 days   Lab Units 12/05/22  1513 11/30/22  0449   BLOOD CULTURE   --  No Growth After 5 Days    No Growth After 5 Days  GRAM STAIN RESULT  No Polys or Bacteria seen  --    BODY FLUID CULTURE, STERILE  No growth  --        Last 24 Hours Medication List:   Current Facility-Administered Medications   Medication Dose Route Frequency Provider Last Rate   • acetaminophen  650 mg Oral Q6H PRN Funmilayo Melissa Prechtel, DO     • bisacodyl  5 mg Oral Daily PRN Funmilayo Melissa Prechtel, DO     • cefazolin  2,000 mg Intravenous Q8H RENETTA Peralta 2,000 mg (12/06/22 1236)   • Diclofenac Sodium  2 g Topical 4x Daily Kp May, DO     • diphenhydrAMINE  50 mg Oral 60 Min Pre-Op [facility] Lalo, DO     • DULoxetine  60 mg Oral Daily [facility] Prechtel, DO     • enoxaparin  40 mg Subcutaneous Q24H [facility], DO     • LORazepam  0 5 mg Oral Q8H PRN Phuong Olivo, DO     • metoprolol tartrate  25 mg Oral Q12H [facility] Prechtel, DO     • oxybutynin  5 mg Oral Daily Corwin S Prechtel, DO     • oxyCODONE  40 mg Oral Q12H [facility], DO     • oxyCODONE  10 mg Oral Q4H PRN Phuong Bering, DO      Or   • oxyCODONE  15 mg Oral Q4H PRN Phuong Bering, DO     • pantoprazole  20 mg Oral Early Morning Corwin S Prechtel, DO     • senna-docusate sodium  1 tablet Oral HS Funmilayo Melissa Prechtel, DO          Today, Patient Was Seen By: Burgess Pepe MD    **Please Note: This note may have been constructed using a voice recognition system  **

## 2022-12-06 NOTE — PHYSICAL THERAPY NOTE
PHYSICAL THERAPY NOTE          Patient Name: Selina Strong  RLEQY'V Date: 12/6/2022 12/06/22 1057   Note Type   Note Type Treatment   Pain Assessment   Pain Assessment Tool 0-10   Pain Score 8   Pain Location/Orientation Orientation: Right;Location: Hip   Hospital Pain Intervention(s) Ambulation/increased activity;Repositioned; Emotional support;Elevated   Restrictions/Precautions   Braces or Orthoses Other (Comment)  (ORTHOPEDIC SHOES AND BRACE FOR R ANKLE, WHICH PT'S  DONNED FOR PT )   Other Precautions Chair Alarm;Cognitive; Fall Risk;Pain;Bed Alarm   General   Chart Reviewed Yes   Family/Caregiver Present Yes  (SPOUSE PRESENT)   Cognition   Arousal/Participation Responsive; Cooperative   Attention Attends with cues to redirect   Orientation Level Oriented to person;Oriented to place;Oriented to time   Memory Decreased recall of precautions;Decreased short term memory   Following Commands Follows one step commands without difficulty   Subjective   Subjective I NEED TO USE THE BED PAN FIRST  Bed Mobility   Rolling R 3  Moderate assistance   Additional items Assist x 1;Bedrails; Increased time required;Verbal cues;LE management   Rolling L 4  Minimal assistance   Additional items Assist x 1;Bedrails; Increased time required;Verbal cues   Supine to Sit 3  Moderate assistance   Additional items Assist x 1;HOB elevated; Bedrails; Increased time required;Verbal cues   Additional Comments INCREASED TIME TO PERFORM AND COMPLETE SUPINE TO SIT, , USE OF BED PAD TO PIVOT HIPS AROUIND AND SQUARE UP AT EDGE OF BED  Transfers   Sit to Stand 4  Minimal assistance   Additional items Assist x 2;Armrests; Increased time required;Verbal cues  (BED HEIGHT ELEVATED)   Stand to Sit 4  Minimal assistance   Additional items Assist x 2;Armrests; Increased time required;Verbal cues   Additional Comments CUES FOR HAND PLACEMENT, EASE OF DESCENT, ADVANCEMENT OF r LE WITH STAND TO SIT, BED HEIGHT ELEVATED WITH SIT TO STAND, PT USES LIFT CHAIR AT HOME  Ambulation/Elevation   Gait pattern Poor UE support; Improper Weight shift; Forward Flexion; Short stride; Excessively slow  (PT  ABLE TO LIFT AND ADVANCE  R LE,)   Gait Assistance 4  Minimal assist   Additional items Assist x 2   Assistive Device Rolling walker   Distance 12' X1, CHAIR FOLLOW   Balance   Static Sitting Fair   Dynamic Sitting Fair -   Static Standing Poor +   Dynamic Standing Poor +   Ambulatory Poor +   Endurance Deficit   Endurance Deficit Description PAIN, FATIGUE,   Activity Tolerance   Activity Tolerance Patient limited by pain; Patient limited by fatigue   Exercises   Quad Sets Sitting;10 reps;Bilateral;AROM  (LONGSITTING)   Glute Sets Sitting;10 reps;AROM; Bilateral  (LONGSITTING)   Hip Abduction Sitting;10 reps;AAROM; Left   Hip Adduction Sitting;10 reps;AAROM; Left   Assessment   Prognosis Fair   Problem List Decreased strength;Decreased range of motion;Decreased endurance; Impaired balance;Decreased mobility;Obesity; Decreased skin integrity;Pain   Assessment PT  SEEN FOR PT TREATMENT SESSION  PT  REPORTS PAIN R HIP AND LATERAL THIGH 8/10 PAIN  PT  VERY TENDER TO TOUCH AND DOES NOT LIKE PEOPLE TOUCHING HER RIGHT LATERAL THIGH, HIP AREA OR BEING PULLED ON WITH OR PUSHED WITH SUPINE TO SIT AND WITH  SCOOTING TO EDGE OF BE   NOTED  RETROPULSION UPON SUPINE TO SIT REQUIRING MOD ASSIST FOR TRUNK SUPPORT UNTIL PT REACHED EOB, PT  THEN ABLE TO SIT UPRIGHT AND MAINTAIN MIDLINE WITH UPRIGHT POSTURE  PT  PERFORMS TRANSFERS SIT TO STAND FROM EXTREMELY ELEVATED BED HEIGHT WITH  MIN ASSIST X2   PT  USES LIFT CHAIR AT HOME  PT  PROGRESSED WITH AMBULATION DISTANCES TO 12' X1 WITH MIN ASSIST X2 WITH CHAIR FOLLOW FOR SAFETY AND DECREASED  ACTIVITY TOLERANCE, AND FUNCTIONAL ENDURANCE  PT  LIMITED BY FATIGUE, GENERALIZED WEAKNESS AND PAIN    PT  PERFORMS B/L LE ISOMETRIC EXERCISES OF QS AND GS AND A/A L LE HIP ABD,/ADD  EXERCISES  PT UNABLE TO TOLERATE AROM EXERCISES WITH R LE DUE TO INCREASED PAIN  PT REMAINED SEATED OUT OF BED IN RECLINER POST PT SESSION B/L LE'S ELEVATED  PT'S  DONNED r ANKLE BRACE AND B/L ORTHOPEDIC SHOES FOR PT PRIOR TO TRANSFER AND GAIT TRAINING  PT REQUIRED FREQUENT REDIRECTION TO TASK DURING PT SESSION  PT  IS CURRENTLY FUNCTIONING WELL BELOW BASELINE LEVEL OF MOBILITY AND REQUIRING INCREASED ASSISTANCE FOR BED MOBILITY, TRANSFERS AND AMBULATION FROM INITIAL PT EVALULATION AND BASELINE, PT PRESENTS WITH DECREASED GAIT SPEED, BALANCE,  GENERALIZED STRENGTH, MOBILITY, ACTIVITY TOLERANCE, FUNCTIONAL ENDURANCE AND GAIT DEVIATIONS AND PAIN LIMITING FUNCTIONAL MOBILITY  DUE TO SLOW PROGRESS AND IMPAIRMENTS AS NOTED,  STR  IS RECOMMENDED AT THIS TIME  Goals   Patient Goals TO WALK BETTER  STG Expiration Date 12/14/22   PT Treatment Day 2   Plan   Treatment/Interventions Functional transfer training;LE strengthening/ROM; Therapeutic exercise; Endurance training;Patient/family training;Equipment eval/education; Bed mobility;Gait training;Spoke to nursing;OT;Family   Progress Slow progress, decreased activity tolerance   PT Frequency 3-5x/wk   Recommendation   PT Discharge Recommendation Post acute rehabilitation services   AM-PAC Basic Mobility Inpatient   Turning in Bed Without Bedrails 1   Lying on Back to Sitting on Edge of Flat Bed 1   Moving Bed to Chair 2   Standing Up From Chair 2   Walk in Room 2   Climb 3-5 Stairs 1   Basic Mobility Inpatient Raw Score 9   Highest Level Of Mobility   JH-HLM Goal 3: Sit at edge of bed   JH-HLM Achieved 6: Walk 10 steps or more   Education   Education Provided Mobility training;Home exercise program;Assistive device   Patient Demonstrates acceptance/verbal understanding;Reinforcement needed   End of Consult   Patient Position at End of Consult Bedside chair; All needs within reach   End of Consult Comments B/L LE'S ELEVATED, PT'S SPOUSE PRESENT TO ASSIST PT IN USE OF CALL CULP AS BELL DOES NOT REACH PTWHERE SEATED IN RECLINER       12/06/22 1721   Note Type   Note Type Treatment   Pain Assessment   Pain Assessment Tool 0-10   Pain Score 8   Pain Location/Orientation Orientation: Right;Location: Hip   Hospital Pain Intervention(s) Ambulation/increased activity;Repositioned; Emotional support;Elevated   Restrictions/Precautions   Braces or Orthoses Other (Comment)  (ORTHOPEDIC SHOES AND BRACE FOR R ANKLE, WHICH PT'S  DONNED FOR PT )   Other Precautions Chair Alarm;Cognitive; Fall Risk;Pain;Bed Alarm   General   Chart Reviewed Yes   Family/Caregiver Present Yes  (SPOUSE PRESENT)   Cognition   Arousal/Participation Responsive; Cooperative   Attention Attends with cues to redirect   Orientation Level Oriented to person;Oriented to place;Oriented to time   Memory Decreased recall of precautions;Decreased short term memory   Following Commands Follows one step commands without difficulty   Subjective   Subjective I NEED TO USE THE BED PAN FIRST  Bed Mobility   Rolling R 3  Moderate assistance   Additional items Assist x 1;Bedrails; Increased time required;Verbal cues;LE management   Rolling L 4  Minimal assistance   Additional items Assist x 1;Bedrails; Increased time required;Verbal cues   Supine to Sit 3  Moderate assistance   Additional items Assist x 1;HOB elevated; Bedrails; Increased time required;Verbal cues   Additional Comments INCREASED TIME TO PERFORM AND COMPLETE SUPINE TO SIT, , USE OF BED PAD TO PIVOT HIPS AROUIND AND SQUARE UP AT EDGE OF BED  Transfers   Sit to Stand 4  Minimal assistance   Additional items Assist x 2;Armrests; Increased time required;Verbal cues  (BED HEIGHT ELEVATED)   Stand to Sit 4  Minimal assistance   Additional items Assist x 2;Armrests; Increased time required;Verbal cues   Additional Comments CUES FOR HAND PLACEMENT, EASE OF DESCENT, ADVANCEMENT OF r LE WITH STAND TO SIT, BED HEIGHT ELEVATED WITH SIT TO STAND, PT USES LIFT CHAIR AT HOME  Ambulation/Elevation   Gait pattern Poor UE support; Improper Weight shift; Forward Flexion; Short stride; Excessively slow  (PT  ABLE TO LIFT AND ADVANCE  R LE,)   Gait Assistance 4  Minimal assist   Additional items Assist x 2   Assistive Device Rolling walker   Distance 12' X1, CHAIR FOLLOW   Balance   Static Sitting Fair   Dynamic Sitting Fair -   Static Standing Poor +   Dynamic Standing Poor +   Ambulatory Poor +   Endurance Deficit   Endurance Deficit Description PAIN, FATIGUE,   Activity Tolerance   Activity Tolerance Patient limited by pain; Patient limited by fatigue   Exercises   Quad Sets Sitting;10 reps;Bilateral;AROM  (LONGSITTING)   Glute Sets Sitting;10 reps;AROM; Bilateral  (LONGSITTING)   Hip Abduction Sitting;10 reps;AAROM; Left   Hip Adduction Sitting;10 reps;AAROM; Left   Assessment   Prognosis Fair   Problem List Decreased strength;Decreased range of motion;Decreased endurance; Impaired balance;Decreased mobility;Obesity; Decreased skin integrity;Pain   Assessment PT  SEEN FOR PT TREATMENT SESSION  PT  REPORTS PAIN R HIP AND LATERAL THIGH 8/10 PAIN  PT  VERY TENDER TO TOUCH AND DOES NOT LIKE PEOPLE TOUCHING HER RIGHT LATERAL THIGH, HIP AREA OR BEING PULLED ON WITH OR PUSHED WITH SUPINE TO SIT AND WITH  SCOOTING TO EDGE OF BE   NOTED  RETROPULSION UPON SUPINE TO SIT REQUIRING MOD ASSIST FOR TRUNK SUPPORT UNTIL PT REACHED EOB, PT  THEN ABLE TO SIT UPRIGHT AND MAINTAIN MIDLINE WITH UPRIGHT POSTURE  PT  PERFORMS TRANSFERS SIT TO STAND FROM EXTREMELY ELEVATED BED HEIGHT WITH  MIN ASSIST X2   PT  USES LIFT CHAIR AT HOME  PT  PROGRESSED WITH AMBULATION DISTANCES TO 12' X1 WITH MIN ASSIST X2 WITH CHAIR FOLLOW FOR SAFETY AND DECREASED  ACTIVITY TOLERANCE, AND FUNCTIONAL ENDURANCE  PT  LIMITED BY FATIGUE, GENERALIZED WEAKNESS AND PAIN  PT  PERFORMS B/L LE ISOMETRIC EXERCISES OF QS AND GS AND A/A L LE HIP ABD,/ADD  EXERCISES    PT UNABLE TO TOLERATE AROM EXERCISES WITH R LE DUE TO INCREASED PAIN   PT REMAINED SEATED OUT OF BED IN RECLINER POST PT SESSION B/L LE'S ELEVATED  PT'S  DONNED r ANKLE BRACE AND B/L ORTHOPEDIC SHOES FOR PT PRIOR TO TRANSFER AND GAIT TRAINING  PT REQUIRED FREQUENT REDIRECTION TO TASK DURING PT SESSION  PT  IS CURRENTLY FUNCTIONING WELL BELOW BASELINE LEVEL OF MOBILITY AND REQUIRING INCREASED ASSISTANCE FOR BED MOBILITY, TRANSFERS AND AMBULATION FROM INITIAL PT EVALULATION AND BASELINE, PT PRESENTS WITH DECREASED GAIT SPEED, BALANCE,  GENERALIZED STRENGTH, MOBILITY, ACTIVITY TOLERANCE, FUNCTIONAL ENDURANCE AND GAIT DEVIATIONS AND PAIN LIMITING FUNCTIONAL MOBILITY  DUE TO SLOW PROGRESS AND IMPAIRMENTS AS NOTED,  STR  IS RECOMMENDED AT THIS TIME  Goals   Patient Goals TO WALK BETTER  STG Expiration Date 12/14/22   PT Treatment Day 2   Plan   Treatment/Interventions Functional transfer training;LE strengthening/ROM; Therapeutic exercise; Endurance training;Patient/family training;Equipment eval/education; Bed mobility;Gait training;Spoke to nursing;OT;Family   Progress Slow progress, decreased activity tolerance   PT Frequency 3-5x/wk   Recommendation   PT Discharge Recommendation Post acute rehabilitation services   AM-PAC Basic Mobility Inpatient   Turning in Bed Without Bedrails 1   Lying on Back to Sitting on Edge of Flat Bed 1   Moving Bed to Chair 2   Standing Up From Chair 2   Walk in Room 2   Climb 3-5 Stairs 1   Basic Mobility Inpatient Raw Score 9   Highest Level Of Mobility   -HLM Goal 3: Sit at edge of bed   JH-HLM Achieved 6: Walk 10 steps or more   Education   Education Provided Mobility training;Home exercise program;Assistive device   Patient Demonstrates acceptance/verbal understanding;Reinforcement needed   End of Consult   Patient Position at End of Consult Bedside chair; All needs within reach   End of Consult Comments B/L LE'S ELEVATED, PT'S SPOUSE PRESENT TO ASSIST PT IN USE OF CALL CULP AS BELL DOES NOT REACH PTWHERE SEATED IN RECLINER     Arlyn Pleitezs Zarina Nicole

## 2022-12-06 NOTE — PLAN OF CARE
Problem: OCCUPATIONAL THERAPY ADULT  Goal: Performs self-care activities at highest level of function for planned discharge setting  See evaluation for individualized goals  Description: Treatment Interventions: ADL retraining, Functional transfer training, UE strengthening/ROM, Endurance training, Patient/family training, Equipment evaluation/education, Compensatory technique education, Continued evaluation, Energy conservation, Activityengagement          See flowsheet documentation for full assessment, interventions and recommendations  Outcome: Progressing  Note: Limitation: Decreased ADL status, Decreased UE strength, Decreased Safe judgement during ADL, Decreased endurance, Decreased self-care trans, Decreased high-level ADLs  Prognosis: Good  Assessment: Pt seen for skilled OT session focused on ADLs, functional transfers and mobility, UE exercises  Pt requested to use bed pan as emergently had to go to bathroom  Pt w/ MIN-MOD assist rolling in bed and assist to position bed pan  Pt completed own hygiene  Pt w/ MOD assist x1 w/ increased time to reach EOB as pt requiring assistance for trunk  Pt max assist to don shoes on b/l feet  Pt w/ increased time to achieve balance EOB  Pt w/ elevated bed height min assist x2 sit>stand from bed w/ increased time and RW  Pt w/ MIN assist x2 functional mobility in homelike environment w/ RW and increased fatigue requiring seated rest break  Pt completed b/l UE exercises seen above to increase strength and endurance for ADLs, functional transfers and mobility and pt w/ cues for proper techniques and rest breaks  Pt seated in recliner w/ all needs met end of session  Pt continues to be limited due to increase pain in right hip, impaired balance, impaired functional reach, decreased strength and endurance, impaired activity tolerance, fall risk, all causing a decline in aDLs, functional transfers and mobility  Recommend STR when medically stable   Will continue to follow  The patient's raw score on the AM-PAC Daily Activity inpatient short form is 16, standardized score is 35 96, less than 39 4  Patients at this level are likely to benefit from discharge to post-acute rehabilitation services  Please refer to the recommendation of the Occupational Therapist for safe discharge planning       OT Discharge Recommendation: Post acute rehabilitation services

## 2022-12-06 NOTE — ASSESSMENT & PLAN NOTE
· Previously on rituximab and prednisone    · Renal function stable    Results from last 7 days   Lab Units 12/06/22  0518 12/03/22  0502 12/01/22  0543 11/30/22  0504   BUN mg/dL 14 14 25 19   CREATININE mg/dL 0 84 0 81 1 06 1 00   EGFR ml/min/1 73sq m 71 74 53 57

## 2022-12-07 VITALS
OXYGEN SATURATION: 92 % | BODY MASS INDEX: 37.74 KG/M2 | HEART RATE: 80 BPM | SYSTOLIC BLOOD PRESSURE: 130 MMHG | DIASTOLIC BLOOD PRESSURE: 62 MMHG | RESPIRATION RATE: 17 BRPM | TEMPERATURE: 98.6 F | HEIGHT: 63 IN | WEIGHT: 213 LBS

## 2022-12-07 RX ORDER — OXYCODONE HYDROCHLORIDE 15 MG/1
15 TABLET ORAL EVERY 8 HOURS PRN
Qty: 10 TABLET | Refills: 0 | Status: SHIPPED | OUTPATIENT
Start: 2022-12-07

## 2022-12-07 RX ORDER — CEFAZOLIN SODIUM 2 G/50ML
2000 SOLUTION INTRAVENOUS EVERY 8 HOURS
Qty: 150 ML | Refills: 0
Start: 2022-12-07 | End: 2022-12-08

## 2022-12-07 RX ORDER — LORAZEPAM 0.5 MG/1
TABLET ORAL
Qty: 10 TABLET | Refills: 0 | Status: SHIPPED | OUTPATIENT
Start: 2022-12-07

## 2022-12-07 RX ADMIN — METOPROLOL TARTRATE 25 MG: 25 TABLET, FILM COATED ORAL at 09:26

## 2022-12-07 RX ADMIN — DULOXETINE 60 MG: 60 CAPSULE, DELAYED RELEASE ORAL at 09:26

## 2022-12-07 RX ADMIN — OXYCODONE HYDROCHLORIDE 15 MG: 10 TABLET ORAL at 05:36

## 2022-12-07 RX ADMIN — OXYCODONE HYDROCHLORIDE 15 MG: 10 TABLET ORAL at 12:08

## 2022-12-07 RX ADMIN — CEFAZOLIN SODIUM 2000 MG: 2 SOLUTION INTRAVENOUS at 11:48

## 2022-12-07 RX ADMIN — CEFAZOLIN SODIUM 2000 MG: 2 SOLUTION INTRAVENOUS at 04:53

## 2022-12-07 RX ADMIN — DICLOFENAC SODIUM 2 G: 10 GEL TOPICAL at 09:26

## 2022-12-07 RX ADMIN — OXYCODONE HYDROCHLORIDE 40 MG: 20 TABLET, FILM COATED, EXTENDED RELEASE ORAL at 09:26

## 2022-12-07 RX ADMIN — ENOXAPARIN SODIUM 40 MG: 40 INJECTION SUBCUTANEOUS at 09:26

## 2022-12-07 RX ADMIN — BISACODYL 5 MG: 5 TABLET, COATED ORAL at 11:14

## 2022-12-07 RX ADMIN — LORAZEPAM 0.5 MG: 1 TABLET ORAL at 09:40

## 2022-12-07 RX ADMIN — PANTOPRAZOLE SODIUM 20 MG: 20 TABLET, DELAYED RELEASE ORAL at 05:36

## 2022-12-07 NOTE — PROGRESS NOTES
Progress Note - Infectious Disease   Ashwini Valdivia 69 y o  female MRN: [id number]  Unit/Bed#: E5 -01 Encounter: [id number]         Impression/Plan:  1  Sepsis  Present on arrival  Fever, tachycardia, tachypnea, and leukocytosis  Likely secondary to MSSA bacteremia with suspected RLE source  Patient with lymphedema and history of recurring cellulitis   Clinically improved with resolution of fevers   WBC count has normalized  -antibiotic as below  -follow temperatures and hemodynamics  -follow CBC     2  MSSA bacteremia   Most likely cutaneous source in the setting of lower extremity lymphedema   Consider relation to #3 as patient has right hip pain but no radiographic evidence of infection on CT   2D echo was a poor study  ELIZABETH was negative   No intravascular prosthetic devices   Bacteremia quickly cleared  Patient continues to tolerate IV antibiotic without difficulty  -continue IV cefazolin  -anticipate 4 week course of IV antibiotic, through 12/27   -check weekly CBC with diff, creatinine while on antibiotic  -outpatient ID follow-up in 2 weeks after discharge  -surveillance blood cultures 2 weeks after completion of antibiotic course     3  R hip pain  In patient with previous ORIF and intact hardware   CT shows severe right hip osteoarthrosis and post ORIF changes, which appear stable   No fluid collection, effusion, discrete soft tissue abscess or gas noted   Orthopedic surgery input noted with low clinical suspicion for septic joint  IR attempted aspiration initially with no aspirate obtained  Minimal fluid obtained after instillation of saline with negative culture  Ongoing low suspicion for infection  -follow-up IR culture until finalized  -serial hip exams  -pain management per primary service  -orthopedic surgery follow-up with eventual plan for hip replacement surgery in the future      4  Confusion   Likely secondary to sepsis and bacteremia above   Mentation has improved   -treatment of sepsis/bacteremia as above  -serial neuro exams  -monitor mood and mental status     5  Lymphedema  This is likely driving force of patient's recurring lower extremity cellulitis      6  CKD III   Remains within stable range   -monitor creatinine  -dose adjust antibiotics for renal function as needed  -avoid nephrotoxins     7  Morbid obesity  BMI = 37 73     8  Wegener's granulomatosis      Anticipate discharge to short-term rehab facility      Antibiotics:  Cefazolin 7  Antibiotics 8           Subjective:  No new reported overnight events  No fevers or chills  Awaiting transfer to short-term rehab facility  Objective:  Vitals:  Temp:  [98 1 °F (36 7 °C)-99 6 °F (37 6 °C)] 98 6 °F (37 °C)  HR:  [70-82] 80  Resp:  [16-18] 17  BP: ()/(55-72) 130/62  SpO2:  [91 %-93 %] 92 %  Temp (24hrs), Av 9 °F (37 2 °C), Min:98 1 °F (36 7 °C), Max:99 6 °F (37 6 °C)  Current: Temperature: 98 6 °F (37 °C)    Physical Exam:   General:  No acute distress, nontoxic  HEENT:  Atraumatic normocephalic  Neck:  Trachea midline  Psychiatric:  Awake and alert  Pulmonary:  Normal respiratory excursion without accessory muscle use  Abdomen:  Soft, nontender  Extremities:  Bilateral lower extremity lymphedema  Leg dressings are clean and dry  Skin:  No diffuse rashes or draining wound  Neuro: Moves all extremities spontaneously    Lab Results:  I have personally reviewed pertinent labs    Results from last 7 days   Lab Units 22  05022  0543   POTASSIUM mmol/L 3 6 3 9 4 0   CHLORIDE mmol/L 102 102 103   CO2 mmol/L 31 29 25   BUN mg/dL 14 14 25   CREATININE mg/dL 0 84 0 81 1 06   EGFR ml/min/1 73sq m 71 74 53   CALCIUM mg/dL 9 0 8 5 9 2   AST U/L  --   --  40   ALT U/L  --   --  22   ALK PHOS U/L  --   --  114     Results from last 7 days   Lab Units 2218 22  0502 22  0543   WBC Thousand/uL 7 70 10 70* 18 97*   HEMOGLOBIN g/dL 10 1* 10 5* 11 1*   PLATELETS Thousands/uL 324 214 191 Results from last 7 days   Lab Units 12/05/22  1513   GRAM STAIN RESULT  No Polys or Bacteria seen   BODY FLUID CULTURE, STERILE  No growth       Imaging Studies:   I have personally reviewed pertinent imaging study reports and images in PACS  EKG, Pathology, and Other Studies:   I have personally reviewed pertinent reports

## 2022-12-07 NOTE — ASSESSMENT & PLAN NOTE
· Sepsis with MSSA bacteremia  · ELIZABETH negative for endocarditis  Hip imaging negative for acute infection  · Likely secondary to right lower extremity cellulitis  · Repeat Blood cultures from 11/30 negative   · ID plan:  Four weeks of IV antibiotics end date 12/27/2022      · PICC placed 12/5  · Weekly CBC with differential and BMP while on antibiotics  · Outpatient ID follow-up in 2 weeks after discharge  · Surveillance blood culture 2 weeks after completion of antibiotics

## 2022-12-07 NOTE — ASSESSMENT & PLAN NOTE
· Previously on rituximab and prednisone    · Renal function stable    Results from last 7 days   Lab Units 12/06/22  0518 12/03/22  0502 12/01/22  0543   BUN mg/dL 14 14 25   CREATININE mg/dL 0 84 0 81 1 06   EGFR ml/min/1 73sq m 71 74 53

## 2022-12-07 NOTE — ASSESSMENT & PLAN NOTE
· Chronic hip pain on oxycodone  · Confirmed on   Continue OxyContin 40 mg b i d  Oxycodone 15 mg for breakthrough pain every 8 hr as needed  · Hip CT negative for acute infection  · Evaluated by Orthopedic surgery and recommended right hip aspiration  · Underwent right hip aspiration by interventional radiology 12/5, minimal aspirate  Gram stain negative  No signs of infection   Patient will follow up with orthopedic surgery in January once she completes IV antibiotics to discuss hip replacement  · Evaluated by PT OT, discharged to rehab

## 2022-12-07 NOTE — ASSESSMENT & PLAN NOTE
· MSSA bacteremia most likely secondary to left lower extremity cellulitis  · Marciano negative for vegetations  · Continue cefazolin through 12/27  · PICC line placed 12/5  · Surveillance blood cultures 2 weeks after completion of antibiotic course  · Weekly CBC with differential and BMP while on antibiotics  · OP follow-up with infectious disease

## 2022-12-07 NOTE — PLAN OF CARE
Problem: Potential for Falls  Goal: Patient will remain free of falls  Description: INTERVENTIONS:  - Educate patient/family on patient safety including physical limitations  - Instruct patient to call for assistance with activity   - Consult OT/PT to assist with strengthening/mobility   - Keep Call bell within reach  - Keep bed low and locked with side rails adjusted as appropriate  - Keep care items and personal belongings within reach  - Initiate and maintain comfort rounds  - Make Fall Risk Sign visible to staff  - Offer Toileting every 2 Hours, in advance of need  - Initiate/Maintain bed alarm  - Apply yellow socks and bracelet for high fall risk patients  - Consider moving patient to room near nurses station  Outcome: Progressing     Problem: Prexisting or High Potential for Compromised Skin Integrity  Goal: Skin integrity is maintained or improved  Description: INTERVENTIONS:  - Identify patients at risk for skin breakdown  - Assess and monitor skin integrity  - Assess and monitor nutrition and hydration status  - Monitor labs   - Assess for incontinence   - Turn and reposition patient  - Assist with mobility/ambulation  - Relieve pressure over bony prominences  - Avoid friction and shearing  - Provide appropriate hygiene as needed including keeping skin clean and dry  - Evaluate need for skin moisturizer/barrier cream  - Collaborate with interdisciplinary team   - Patient/family teaching  - Consider wound care consult   Outcome: Progressing     Problem: MOBILITY - ADULT  Goal: Maintain or return to baseline ADL function  Description: INTERVENTIONS:  -  Assess patient's ability to carry out ADLs; assess patient's baseline for ADL function and identify physical deficits which impact ability to perform ADLs (bathing, care of mouth/teeth, toileting, grooming, dressing, etc )  - Assess/evaluate cause of self-care deficits   - Assess range of motion  - Assess patient's mobility; develop plan if impaired  - Assess patient's need for assistive devices and provide as appropriate  - Encourage maximum independence but intervene and supervise when necessary  - Involve family in performance of ADLs  - Assess for home care needs following discharge   - Consider OT consult to assist with ADL evaluation and planning for discharge  - Provide patient education as appropriate  Outcome: Progressing  Goal: Maintains/Returns to pre admission functional level  Description: INTERVENTIONS:  - Perform BMAT or MOVE assessment daily    - Set and communicate daily mobility goal to care team and patient/family/caregiver     - Collaborate with rehabilitation services on mobility goals if consulted  - Out of bed for toileting  - Record patient progress and toleration of activity level   Outcome: Progressing     Problem: PAIN - ADULT  Goal: Verbalizes/displays adequate comfort level or baseline comfort level  Description: Interventions:  - Encourage patient to monitor pain and request assistance  - Assess pain using appropriate pain scale  - Administer analgesics based on type and severity of pain and evaluate response  - Implement non-pharmacological measures as appropriate and evaluate response  - Consider cultural and social influences on pain and pain management  - Notify physician/advanced practitioner if interventions unsuccessful or patient reports new pain  Outcome: Progressing     Problem: INFECTION - ADULT  Goal: Absence or prevention of progression during hospitalization  Description: INTERVENTIONS:  - Assess and monitor for signs and symptoms of infection  - Monitor lab/diagnostic results  - Monitor all insertion sites, i e  indwelling lines, tubes, and drains  - Monitor endotracheal if appropriate and nasal secretions for changes in amount and color  - Ulysses appropriate cooling/warming therapies per order  - Administer medications as ordered  - Instruct and encourage patient and family to use good hand hygiene technique  - Identify and instruct in appropriate isolation precautions for identified infection/condition  Outcome: Progressing  Goal: Absence of fever/infection during neutropenic period  Description: INTERVENTIONS:  - Monitor WBC    Outcome: Progressing     Problem: SAFETY ADULT  Goal: Patient will remain free of falls  Description: INTERVENTIONS:  - Educate patient/family on patient safety including physical limitations  - Instruct patient to call for assistance with activity   - Consult OT/PT to assist with strengthening/mobility   - Keep Call bell within reach  - Keep bed low and locked with side rails adjusted as appropriate  - Keep care items and personal belongings within reach  - Initiate and maintain comfort rounds  - Make Fall Risk Sign visible to staff  - Offer Toileting every 2 Hours, in advance of need  - Initiate/Maintain bed alarm  - Apply yellow socks and bracelet for high fall risk patients  - Consider moving patient to room near nurses station  Outcome: Progressing  Goal: Maintain or return to baseline ADL function  Description: INTERVENTIONS:  -  Assess patient's ability to carry out ADLs; assess patient's baseline for ADL function and identify physical deficits which impact ability to perform ADLs (bathing, care of mouth/teeth, toileting, grooming, dressing, etc )  - Assess/evaluate cause of self-care deficits   - Assess range of motion  - Assess patient's mobility; develop plan if impaired  - Assess patient's need for assistive devices and provide as appropriate  - Encourage maximum independence but intervene and supervise when necessary  - Involve family in performance of ADLs  - Assess for home care needs following discharge   - Consider OT consult to assist with ADL evaluation and planning for discharge  - Provide patient education as appropriate  Outcome: Progressing  Goal: Maintains/Returns to pre admission functional level  Description: INTERVENTIONS:  - Perform BMAT or MOVE assessment daily    - Set and communicate daily mobility goal to care team and patient/family/caregiver  - Collaborate with rehabilitation services on mobility goals if consulted  - Out of bed for toileting  - Record patient progress and toleration of activity level   Outcome: Progressing     Problem: DISCHARGE PLANNING  Goal: Discharge to home or other facility with appropriate resources  Description: INTERVENTIONS:  - Identify barriers to discharge w/patient and caregiver  - Arrange for needed discharge resources and transportation as appropriate  - Identify discharge learning needs (meds, wound care, etc )  - Arrange for interpretive services to assist at discharge as needed  - Refer to Case Management Department for coordinating discharge planning if the patient needs post-hospital services based on physician/advanced practitioner order or complex needs related to functional status, cognitive ability, or social support system  Outcome: Progressing     Problem: Knowledge Deficit  Goal: Patient/family/caregiver demonstrates understanding of disease process, treatment plan, medications, and discharge instructions  Description: Complete learning assessment and assess knowledge base    Interventions:  - Provide teaching at level of understanding  - Provide teaching via preferred learning methods  Outcome: Progressing     Problem: MUSCULOSKELETAL - ADULT  Goal: Maintain or return mobility to safest level of function  Description: INTERVENTIONS:  - Assess patient's ability to carry out ADLs; assess patient's baseline for ADL function and identify physical deficits which impact ability to perform ADLs (bathing, care of mouth/teeth, toileting, grooming, dressing, etc )  - Assess/evaluate cause of self-care deficits   - Assess range of motion  - Assess patient's mobility  - Assess patient's need for assistive devices and provide as appropriate  - Encourage maximum independence but intervene and supervise when necessary  - Involve family in performance of ADLs  - Assess for home care needs following discharge   - Consider OT consult to assist with ADL evaluation and planning for discharge  - Provide patient education as appropriate  Outcome: Progressing  Goal: Maintain proper alignment of affected body part  Description: INTERVENTIONS:  - Support, maintain and protect limb and body alignment  - Provide patient/ family with appropriate education  Outcome: Progressing

## 2022-12-07 NOTE — NURSING NOTE
Patient discharged to [facility] rehab  Report called and given to [facility]

## 2022-12-07 NOTE — CASE MANAGEMENT
Case Management Discharge Planning Note    Patient name Ricardo Rincon  [place] [address] /E5 MS 18-* MRN [id number]  : 1955 Date 2022       Current Admission Date: 2022  Current Admission Diagnosis:Sepsis [facility])   Patient Active Problem List    Diagnosis Date Noted   • MSSA bacteremia 2022   • Right hip pain 2022   • History of pulmonary embolism 2022   • Chronic diastolic heart failure ([facility] ) 2022   • Slow transit constipation 2021   • Chronic right-sided low back pain without sciatica 2021   • Cellulitis of right lower extremity 2021   • Septic shock ([facility] ) 2021   • Morbid obesity ([facility] ) 2021   • Hypogammaglobulinemia, acquired ([facility] ) 2019   • Hypertrophic cardiomyopathy ([place] [address] ) 2019   • Low immunoglobulin level 2019   • LVH (left ventricular hypertrophy) 2019   • Postnasal drip 2019   • Dyspnea on exertion 2019   • Mitral valve disorder    • Elevated troponin 2019   • Sepsis ([facility] ) 2019   • Acute metabolic encephalopathy    • Increased risk of breast cancer    • Other complicated headache syndrome 2018   • Nausea & vomiting 2018   • SIRS (systemic inflammatory response syndrome) ([place] [address] ) 2018   • Post-traumatic osteoarthritis 2018   • Opiate analgesic use agreement exists 2016   • BRCA1 positive 2016   • Charcot's joint 2016   • Class 2 obesity 2016   • Wegener's granulomatosis with renal involvement ([facility] ) 01/15/2016   • Cancer of ovary ([facility] ) 2015   • Ovarian cancer ([facility] ) 2015   • Increased frequency of urination 2014   • Chronic kidney disease, stage III (moderate) ([facility] ) 2014   • Chronic pain disorder 10/24/2013   • GERD (gastroesophageal reflux disease) 2013   • Dyslipidemia 2013   • Lymphedema 11/15/2012   • Anxiety 2012   • Benign essential hypertension 2012   • Depression 06/05/2012      LOS (days): 9  Geometric Mean LOS (GMLOS) (days): 5 00  Days to GMLOS:-3 5     OBJECTIVE:  Risk of Unplanned Readmission Score: 20 77         Current admission status: Inpatient   Preferred Pharmacy:   [address], [address] [place] [address]  [address] [place] [address]  Phone: [phone] Fax: [address] [place], [place] - [address] [facility]  [address] [place] [address]  Phone: [phone] Fax: [address] [place], [place] - [address] ,  [address] ,  [address] [place] [address]  Phone: [phone] Fax: [phone]    Primary Care Provider: Hailey Moody DO    Primary Insurance: MEDICARE  Secondary Insurance: AARP    DISCHARGE DETAILS:    Discharge planning discussed with[de-identified] Lily Russell and patient  Freedom of Choice: Yes  Comments - Freedom of Choice: Patient and Spouse wants [facility] at [address] contacted family/caregiver?: Yes  Were Treatment Team discharge recommendations reviewed with patient/caregiver?: Yes  Did patient/caregiver verbalize understanding of patient care needs?: N/A- going to facility  Were patient/caregiver advised of the risks associated with not following Treatment Team discharge recommendations?: Yes    Contacts  Patient Contacts: Taft Primrose Wise/  Contact Method: Phone  Phone Number: [phone]  Reason/Outcome: Continuity of Care, Emergency Contact, Referral, Discharge [address]         Is the patient interested in [address] at discharge?: No  Supporting Clincal Findings[de-identified] Limited Endurance, Fatigues Easliy in Short Distances    DME Referral Provided  Referral made for DME?: No    Other Referral/Resources/Interventions Provided:  Interventions: Short Term Rehab  Referral Comments: rehab at [facility] at [address] Team Recommendation: Short Term Rehab  Discharge Destination Plan[de-identified] Short Term Rehab  Transport at Discharge : S Ambulance  Dispatcher Contacted: Yes  Number/Name of Dispatcher: Ruth Mello [phone]     ETA of Transport (Date): 12/07/22  ETA of Transport (Time):  (pending)     Transfer Mode: Stretcher  IMM Given (Date):: 12/07/22  IMM Given to[de-identified] Family  Family notified[de-identified] Oddis Hunger    [address] Name, [facility] : [facility]  Receiving Facility/Agency Phone Number: Phone: [phone]  Fax: [phone]  Facility/Agency Fax Number: Phone: [phone]  Fax: [phone]

## 2022-12-07 NOTE — DISCHARGE SUMMARY
[address] [facility]  Discharge- Stone Charlotte 1955, 69 y o  female MRN: [id number]  Unit/Bed#: E5 -01 Encounter: [id number]  Primary Care Provider: Daquan Jeter DO   Date and time admitted to hospital: 11/28/2022  5:08 PM    * Sepsis [facility])  Assessment & Plan  · Sepsis with MSSA bacteremia  · MARCIANO negative for endocarditis  Hip imaging negative for acute infection  · Likely secondary to right lower extremity cellulitis  · Repeat Blood cultures from 11/30 negative   · ID plan:  Four weeks of IV antibiotics end date 12/27/2022  · PICC placed 12/5  · Weekly CBC with differential and BMP while on antibiotics  · Outpatient ID follow-up in 2 weeks after discharge  · Surveillance blood culture 2 weeks after completion of antibiotics    Right hip pain  Assessment & Plan  · Chronic hip pain on oxycodone  · Confirmed on   Continue OxyContin 40 mg b i d  Oxycodone 15 mg for breakthrough pain every 8 hr as needed  · Hip CT negative for acute infection  · Evaluated by Orthopedic surgery and recommended right hip aspiration  · Underwent right hip aspiration by interventional radiology 12/5, minimal aspirate  Gram stain negative  No signs of infection  Patient will follow up with orthopedic surgery in January once she completes IV antibiotics to discuss hip replacement  · Evaluated by PT OT, discharged to rehab    MSSA bacteremia  Assessment & Plan  · MSSA bacteremia most likely secondary to left lower extremity cellulitis  · Marciano negative for vegetations  · Continue cefazolin through 12/27  · PICC line placed 12/5  · Surveillance blood cultures 2 weeks after completion of antibiotic course  · Weekly CBC with differential and BMP while on antibiotics  · OP follow-up with infectious disease    Acute metabolic encephalopathy  Assessment & Plan  · Acute metabolic encephalopathy secondary to acute infection    · Mentation is now at baseline      Cancer of ovary [facility])  Assessment & Plan  · History of ovarian cancer status post BSO    Wegener's granulomatosis with renal involvement ([facility] [address] )  Assessment & Plan  · Previously on rituximab and prednisone  · Renal function stable    Results from last 7 days   Lab Units 12/06/22  0518 12/03/22  0502 12/01/22  0543   BUN mg/dL 14 14 25   CREATININE mg/dL 0 84 0 81 1 06   EGFR ml/min/1 73sq m 71 74 53           Medical Problems     Resolved Problems  Date Reviewed: 12/7/2022   None       Discharging Physician / Practitioner: Briseida Martínez MD  PCP: Graciela Cheng DO  Admission Date:   Admission Orders (From admission, onward)     Ordered        11/28/22 2312  INPATIENT ADMISSION  Once                      Discharge Date: 12/07/22    Consultations During Hospital Stay:  · Infectious disease  · Cardiology  · Orthopedic surgery    Procedures Performed:   · Right hip aspiration  · ELIZABETH       Interpretation Summary       •  Left Ventricle: Left ventricular cavity size is normal  Wall thickness is normal  The left ventricular ejection fraction is 60%  Systolic function is normal  Wall motion is normal   •  Atrial Septum: No patent foramen ovale detected using color flow Doppler at rest   •  Left Atrial Appendage: There is normal function  There is no thrombus  Significant Findings / Test Results:   · Echo  Interpretation Summary       •  Left Ventricle: Left ventricular cavity size is normal  Wall thickness is moderately increased  The left ventricular ejection fraction is 65%  Systolic function is normal  Wall motion is normal  Diastolic function is normal   •  Aortic Valve: The aortic valve was not well visualized  There is no evidence of regurgitation  •  Mitral Valve: The mitral valve was not well visualized  There is no evidence of regurgitation  •  Tricuspid Valve: The tricuspid valve was not well visualized  There is no evidence of regurgitation  •  Pulmonic Valve: The pulmonic valve was not well visualized   There is no evidence of regurgitation  Incidental Findings:   none    Test Results Pending at Discharge (will require follow up):   · none     Outpatient Tests Requested:  · none    Complications:  none    Reason for Admission: MSSA bacteremia secondary to right leg cellulitis    Hospital Course:   Nanda Wong is a 69 y o  female patient who originally presented to the hospital on 11/28/2022 due to confusion  Patient was diagnosed with MSSA bacteremia suspect secondary to right lower extremity cellulitis  ELIZABETH was negative for endocarditis  Patient also had right hip pain, imaging negative for acute infection  Underwent right hip aspiration by interventional radiology with minimal aspirate, gram stain negative  No signs of infection  Repeat blood cultures 11/30/2022 negative  PICC line placed 12/5/2022  Per infectious disease patient will need total 4 weeks of IV antibiotics, end date 12/27/2022  Patient will need weekly CBC with differential and BMP while on antibiotics  Surveillance blood culture in 2 weeks after completion of antibiotics  Patient has chronic hip pain on OxyContin 40 mg twice daily and oxycodone 15 mg twice daily as needed  Increase oxycodone to 15 mg every 8 hours as needed  Patient will be discharged to rehab  Patient will follow up with orthopedic surgery for hip replacement after finishing course of antibiotics  Please see above list of diagnoses and related plan for additional information  Condition at Discharge: good    Discharge Day Visit / Exam:   Subjective: She was seen and evaluated bedside    Complaining of right hip pain  Vitals: Blood Pressure: 130/62 (12/07/22 0923)  Pulse: 80 (12/07/22 0923)  Temperature: 98 6 °F (37 °C) (12/07/22 0717)  Temp Source: Oral (12/07/22 0717)  Respirations: 17 (12/07/22 0717)  Height: 5' 3" (160 cm) (12/02/22 1320)  Weight - Scale: 96 6 kg (213 lb) (12/02/22 1320)  SpO2: 92 % (12/07/22 0923)  Exam:   Physical Exam  Constitutional:       General: She is not in acute distress  HENT:      Head: Atraumatic  Cardiovascular:      Rate and Rhythm: Normal rate and regular rhythm  Heart sounds: No murmur heard  No friction rub  No gallop  Pulmonary:      Effort: Pulmonary effort is normal  No respiratory distress  Breath sounds: Normal breath sounds  No wheezing  Abdominal:      General: Bowel sounds are normal  There is no distension  Palpations: Abdomen is soft  Tenderness: There is no abdominal tenderness  Musculoskeletal:         General: No swelling  Cervical back: Neck supple  Skin:     General: Skin is warm and dry  Comments: Bilateral leg dressing clean and intact   Neurological:      General: No focal deficit present  Mental Status: She is alert  Psychiatric:         Mood and Affect: Mood normal           Discussion with Family: Updated  () via phone  Discharge instructions/Information to patient and family:   See after visit summary for information provided to patient and family  Provisions for Follow-Up Care:  See after visit summary for information related to follow-up care and any pertinent home health orders  Disposition:   [facility] Readmission: no     Discharge Statement:  I spent 45 minutes discharging the patient  This time was spent on the day of discharge  I had direct contact with the patient on the day of discharge  Greater than 50% of the total time was spent examining patient, answering all patient questions, arranging and discussing plan of care with patient as well as directly providing post-discharge instructions  Additional time then spent on discharge activities  Discharge Medications:  See after visit summary for reconciled discharge medications provided to patient and/or family        **Please Note: This note may have been constructed using a voice recognition system**

## 2022-12-08 ENCOUNTER — TELEPHONE (OUTPATIENT)
Dept: INFECTIOUS DISEASES | Facility: CLINIC | Age: 67
End: 2022-12-08

## 2022-12-08 DIAGNOSIS — B95.61 MSSA BACTEREMIA: Primary | ICD-10-CM

## 2022-12-08 DIAGNOSIS — R78.81 MSSA BACTEREMIA: Primary | ICD-10-CM

## 2022-12-08 LAB
BACTERIA SPEC BFLD CULT: NO GROWTH
GRAM STN SPEC: NORMAL

## 2022-12-08 NOTE — TELEPHONE ENCOUNTER
Patient cancelled her hospital follow up via [employer]  Contacted her via cell phone to discuss and made arrangements for virtual f/u as patient does not wish to leave facility  Last infusion was in the hospital yesterday at noon  They still have not received her antibiotics at the facility

## 2022-12-08 NOTE — TELEPHONE ENCOUNTER
[id number] [email or web address] and spoke with [facility] [place] RN regarding patients care  They do not have any questions about her antibiotic plan, however they have not received abx from pharmacy yet  She has missed one dose  They will call if further doses are missed, but should receive it by second dose today  Faxed abx plan and follow up with Alexia Payer to [phone]  They will call if they need anything further

## 2022-12-14 ENCOUNTER — TELEPHONE (OUTPATIENT)
Dept: CARDIOLOGY CLINIC | Facility: CLINIC | Age: 67
End: 2022-12-14

## 2022-12-20 ENCOUNTER — TELEMEDICINE (OUTPATIENT)
Dept: INFECTIOUS DISEASES | Facility: CLINIC | Age: 67
End: 2022-12-20

## 2022-12-20 ENCOUNTER — TELEPHONE (OUTPATIENT)
Dept: INFECTIOUS DISEASES | Facility: CLINIC | Age: 67
End: 2022-12-20

## 2022-12-20 VITALS
TEMPERATURE: 97.6 F | HEIGHT: 63 IN | DIASTOLIC BLOOD PRESSURE: 81 MMHG | SYSTOLIC BLOOD PRESSURE: 161 MMHG | WEIGHT: 215 LBS | HEART RATE: 62 BPM | BODY MASS INDEX: 38.09 KG/M2 | OXYGEN SATURATION: 95 % | RESPIRATION RATE: 16 BRPM

## 2022-12-20 DIAGNOSIS — B95.61 MSSA BACTEREMIA: Primary | ICD-10-CM

## 2022-12-20 DIAGNOSIS — I89.0 LYMPHEDEMA: ICD-10-CM

## 2022-12-20 DIAGNOSIS — R78.81 MSSA BACTEREMIA: Primary | ICD-10-CM

## 2022-12-20 DIAGNOSIS — N18.30 STAGE 3 CHRONIC KIDNEY DISEASE, UNSPECIFIED WHETHER STAGE 3A OR 3B CKD (HCC): ICD-10-CM

## 2022-12-20 DIAGNOSIS — E66.01 MORBID OBESITY (HCC): ICD-10-CM

## 2022-12-20 DIAGNOSIS — M25.551 RIGHT HIP PAIN: ICD-10-CM

## 2022-12-20 NOTE — PATIENT INSTRUCTIONS
-continue IV cefazolin for 4 week course, through 12/27  -check labs again next week to include CBC with diff, creatinine    -ordered for surveillance blood cultures 2 weeks after completion of antibiotic course (week of Jan 8th) - please print and mail to home since she goes to out of network lab   -remove PICC line after last dose of IV abx   -no formal ID follow up required, call PRN

## 2022-12-20 NOTE — PROGRESS NOTES
Virtual Regular Visit    Verification of patient location: Patient is located in the following state in which I hold an active license [place]    Assessment/Plan:  1  MSSA bacteremia   Recently admitted to [facility] with sepsis 2/2 MSSA bacteremia with suspected RLE cellulitis source  Most likely cutaneous source in the setting of lower extremity lymphedema   Consider relation to #2 as patient has right hip pain but no radiographic evidence of infection on CT  2D echo was a poor study  ELIZABETH was negative   No intravascular prosthetic devices  Bacteremia quickly cleared   Patient continues to tolerate IV antibiotic without difficulty  Facility reports 1 missed dose of abx  Labs from yesterday are stable   -continue IV cefazolin  -anticipate 4 week course of IV antibiotic, through 12/27   -check labs again next week to include CBC with diff, creatinine    -surveillance blood cultures 2 weeks after completion of antibiotic course (week of Jan 8th)  -remove PICC after last dose of IV abx   -no formal further ID follow up required, call PRN     2  R hip pain  In patient with previous ORIF and intact hardware  Recent  CT shows severe right hip osteoarthrosis and post ORIF changes, which appear stable   No fluid collection, effusion, discrete soft tissue abscess or gas noted   Orthopedic surgery input noted with low clinical suspicion for septic joint   IR attempted aspiration initially with no aspirate obtained   Minimal fluid obtained after instillation of saline with negative culture   Ongoing low suspicion for infection  Has follow up with ortho in January 2023    -pain management per primary service  -orthopedic surgery follow-up with eventual plan for hip replacement surgery in the future      3  Lymphedema   This is likely driving force of patient's recurring lower extremity cellulitis      4  CKD III   Remains within stable range   -monitor creatinine while on abx   -dose adjust antibiotics for renal function as needed     5  Morbid obesity  BMI = 37 73     6  Wegener's granulomatosis  Encounter provider Juanita Favre, PA-C    Provider located at [address] [id number] [phone]    The patient was identified by name and date of birth  Lata Tesfaye was informed that this is a telemedicine visit and that the visit is being conducted through the [address] [employer] platform  She agrees to proceed  My office door was closed  No one else was in the room  She acknowledged consent and understanding of privacy and security of the video platform  The patient has agreed to participate and understands they can discontinue the visit at any time  Patient is aware this is a billable service  Subjective  Lata Tesfaye is a 69 y o  female with PMH significant for LE lymphedema, obesity, arthritis, CKD3, ovarian CA, who presents for virtual follow up after recent hospital stay at [facility]  Patient was admitted 11/28-12/7 due to confusion  She met sepsis criteria on admission and was found to have MSSA bacteremia  ID was seen and thought source was primarily cutaneous with hx of LE lymphedema  Pt also c/o R hip pain  ELIZABETH was negative for vegetation  Her repeat bl cleared quickly while on IV ancef  Due to R hip pain, she had IR attempt drainage with removal of only small specimen for culture  This was negative for growth  She had picc placed and was d/c to rehab  She reports ongoing pain in right hip but is working with therapy and has ortho follow up scheduled in jan  She is tolerating IV ancef without adverse effects  Labs are normal  She reports a few missed doses while awaiting abx to be delivered shortly after admission to [facility], but nursing reports only 1 missed dose  Patient denies fevers or chills  No diarrhea  No recurrence of LE cellulitis       We discussed the above plan and she expressed understanding and took notes      Video Exam    Vitals:    12/20/22 1047   BP: 161/81   Pulse: 62   Resp: 16   Temp: 97 6 °F (36 4 °C)   SpO2: 95%   Weight: 97 5 kg (215 lb)   Height: 5' 3" (1 6 m)       Physical Exam  Constitutional:       Appearance: Normal appearance  She is obese  HENT:      Head: Normocephalic and atraumatic  Mouth/Throat:      Mouth: Mucous membranes are moist    Eyes:      Extraocular Movements: Extraocular movements intact  Pulmonary:      Effort: Pulmonary effort is normal  No respiratory distress  Musculoskeletal:         General: Swelling (b/l LE lymphedema ) present  Cervical back: Normal range of motion and neck supple  Skin:     General: Skin is warm and dry  Neurological:      Mental Status: She is alert and oriented to person, place, and time     Psychiatric:         Mood and Affect: Mood normal          Behavior: Behavior normal           I spent 35 minutes with patient today in which greater than 50% of the time was spent in counseling/coordination of care regarding ongoing IV antibiotic management

## 2022-12-20 NOTE — TELEPHONE ENCOUNTER
[address] to ask if they can fax over a updated medication list and a set of vitals  Provided our office fax

## 2022-12-23 ENCOUNTER — TELEPHONE (OUTPATIENT)
Dept: CARDIOLOGY CLINIC | Facility: CLINIC | Age: 67
End: 2022-12-23

## 2023-01-03 ENCOUNTER — OFFICE VISIT (OUTPATIENT)
Dept: OBGYN CLINIC | Facility: MEDICAL CENTER | Age: 68
End: 2023-01-03

## 2023-01-03 VITALS
SYSTOLIC BLOOD PRESSURE: 111 MMHG | DIASTOLIC BLOOD PRESSURE: 71 MMHG | WEIGHT: 215 LBS | HEIGHT: 63 IN | BODY MASS INDEX: 38.09 KG/M2 | HEART RATE: 89 BPM

## 2023-01-03 DIAGNOSIS — M25.551 RIGHT HIP PAIN: ICD-10-CM

## 2023-01-03 DIAGNOSIS — E66.01 MORBID (SEVERE) OBESITY DUE TO EXCESS CALORIES (HCC): ICD-10-CM

## 2023-01-03 DIAGNOSIS — M25.59 PAIN IN OTHER SPECIFIED JOINT: ICD-10-CM

## 2023-01-03 DIAGNOSIS — M31.31 WEGENER'S GRANULOMATOSIS WITH RENAL INVOLVEMENT (HCC): ICD-10-CM

## 2023-01-03 DIAGNOSIS — M12.551 TRAUMATIC ARTHRITIS OF RIGHT HIP: Primary | ICD-10-CM

## 2023-01-03 DIAGNOSIS — M19.19 POST-TRAUMATIC OSTEOARTHRITIS, OTHER SPECIFIED SITE: ICD-10-CM

## 2023-01-03 RX ORDER — FOLIC ACID 1 MG/1
1 TABLET ORAL DAILY
Qty: 30 TABLET | Refills: 1 | Status: SHIPPED | OUTPATIENT
Start: 2023-01-03 | End: 2023-01-03

## 2023-01-03 RX ORDER — CEFAZOLIN SODIUM 2 G/50ML
2000 SOLUTION INTRAVENOUS ONCE
OUTPATIENT
Start: 2023-01-03 | End: 2023-01-03

## 2023-01-03 RX ORDER — CHLORHEXIDINE GLUCONATE 0.12 MG/ML
15 RINSE ORAL ONCE
OUTPATIENT
Start: 2023-01-03 | End: 2023-01-03

## 2023-01-03 RX ORDER — ASCORBIC ACID 500 MG
500 TABLET ORAL 2 TIMES DAILY
Qty: 60 TABLET | Refills: 1 | Status: SHIPPED | OUTPATIENT
Start: 2023-01-03

## 2023-01-03 RX ORDER — FOLIC ACID 1 MG/1
1 TABLET ORAL DAILY
Qty: 30 TABLET | Refills: 1 | Status: SHIPPED | OUTPATIENT
Start: 2023-01-03

## 2023-01-03 RX ORDER — MELATONIN
2000 DAILY
Qty: 60 TABLET | Refills: 1 | Status: SHIPPED | OUTPATIENT
Start: 2023-01-03 | End: 2023-01-03

## 2023-01-03 RX ORDER — SODIUM CHLORIDE, SODIUM LACTATE, POTASSIUM CHLORIDE, CALCIUM CHLORIDE 600; 310; 30; 20 MG/100ML; MG/100ML; MG/100ML; MG/100ML
125 INJECTION, SOLUTION INTRAVENOUS CONTINUOUS
OUTPATIENT
Start: 2023-01-03

## 2023-01-03 RX ORDER — ONDANSETRON 2 MG/ML
4 INJECTION INTRAMUSCULAR; INTRAVENOUS ONCE
OUTPATIENT
Start: 2023-01-03 | End: 2023-01-03

## 2023-01-03 RX ORDER — ASCORBIC ACID 500 MG
500 TABLET ORAL 2 TIMES DAILY
Qty: 60 TABLET | Refills: 1 | Status: SHIPPED | OUTPATIENT
Start: 2023-01-03 | End: 2023-01-03

## 2023-01-03 RX ORDER — CHLORHEXIDINE GLUCONATE 4 G/100ML
SOLUTION TOPICAL DAILY PRN
OUTPATIENT
Start: 2023-01-03

## 2023-01-03 RX ORDER — MELATONIN
2000 DAILY
Qty: 60 TABLET | Refills: 1 | Status: SHIPPED | OUTPATIENT
Start: 2023-01-03

## 2023-01-03 RX ORDER — ACETAMINOPHEN 325 MG/1
975 TABLET ORAL ONCE
OUTPATIENT
Start: 2023-01-03 | End: 2023-01-03

## 2023-01-03 RX ORDER — GABAPENTIN 300 MG/1
300 CAPSULE ORAL ONCE
OUTPATIENT
Start: 2023-01-03 | End: 2023-01-03
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
## 2023-01-06 ENCOUNTER — TELEPHONE (OUTPATIENT)
Dept: HEMATOLOGY ONCOLOGY | Facility: CLINIC | Age: 68
End: 2023-01-06

## 2023-01-06 NOTE — TELEPHONE ENCOUNTER
Appointment Cancellation Or Reschedule     Person calling in Patient    If other than patient calling, are they listed on the communication consent form? Provider Manuela Duvall, [address]   Office Visit Date and Time 1/6/23   Office Visit Location [place]   Did patient want to reschedule their office appointment? If so, when was it scheduled to? 6/2/23   Did you have STAR scheduled for this appointment? no   Do you need STAR set up for your new appointment? If yes, please send to "PATIENT RIDESHARE" pool for STAR rescheduling no   If you are cancelling appointment, can we notify STAR to cancel ride? If yes, please send to "PATIENT RIDESHARE" pool for STAR to cancel service no   Is this patient calling to reschedule an infusion appointment? no   When is their next infusion appointment? no   Is this patient a Chemo patient? no   Reason for Cancellation or Reschedule Patient wants appt for June  She stated she is having hip problems and can barley walk  Wants appt for later     If the patient is a treatment patient, please route this to the office nurse  If the patient is not on treatment, please route to the office MA  If the patient is a surgical oncology patient, please route to surg/onc clinical pool

## 2023-01-12 ENCOUNTER — APPOINTMENT (OUTPATIENT)
Dept: LAB | Facility: HOSPITAL | Age: 68
End: 2023-01-12

## 2023-01-12 ENCOUNTER — TELEPHONE (OUTPATIENT)
Dept: OBGYN CLINIC | Facility: HOSPITAL | Age: 68
End: 2023-01-12

## 2023-01-12 DIAGNOSIS — R78.81 MSSA BACTEREMIA: ICD-10-CM

## 2023-01-12 DIAGNOSIS — B95.61 MSSA BACTEREMIA: ICD-10-CM

## 2023-01-12 NOTE — TELEPHONE ENCOUNTER
Caller: Tommy Lu    Doctor: Edu Notice     Reason for call: Patient received call from PT regarding pre surgical PT  Patient states she is already getting pre surgical  PT at home        Please call to clarify and advise     Call back#: [phone]

## 2023-01-15 LAB
BACTERIA BLD CULT: NORMAL
BACTERIA BLD CULT: NORMAL

## 2023-01-17 LAB
BACTERIA BLD CULT: NORMAL
BACTERIA BLD CULT: NORMAL

## 2023-02-01 ENCOUNTER — TELEPHONE (OUTPATIENT)
Dept: LAB | Facility: HOSPITAL | Age: 68
End: 2023-02-01

## 2023-02-01 ENCOUNTER — ANESTHESIA EVENT (OUTPATIENT)
Dept: PERIOP | Facility: HOSPITAL | Age: 68
End: 2023-02-01

## 2023-02-02 ENCOUNTER — TELEPHONE (OUTPATIENT)
Dept: ANESTHESIOLOGY | Facility: CLINIC | Age: 68
End: 2023-02-02

## 2023-02-02 DIAGNOSIS — Z01.89 ENCOUNTER FOR GERIATRIC ASSESSMENT: Primary | ICD-10-CM

## 2023-02-02 NOTE — TELEPHONE ENCOUNTER
2/2 - have to reach out to see if phleb can go to pt - she is in [facility] - seeing if [facility] can go to pt - labs are fasting - some tests may not be able to be done through mobile

## 2023-02-03 PROBLEM — A41.9 SEPSIS (HCC): Status: RESOLVED | Noted: 2019-04-02 | Resolved: 2023-02-03

## 2023-02-07 ENCOUNTER — TELEPHONE (OUTPATIENT)
Dept: OBGYN CLINIC | Facility: HOSPITAL | Age: 68
End: 2023-02-07

## 2023-02-07 ENCOUNTER — APPOINTMENT (OUTPATIENT)
Dept: LAB | Facility: HOSPITAL | Age: 68
End: 2023-02-07

## 2023-02-07 DIAGNOSIS — D80.1 HYPOGAMMAGLOBULINEMIA, ACQUIRED (HCC): ICD-10-CM

## 2023-02-07 DIAGNOSIS — M12.551 TRAUMATIC ARTHRITIS OF RIGHT HIP: ICD-10-CM

## 2023-02-07 DIAGNOSIS — M31.31 WEGENER'S GRANULOMATOSIS WITH RENAL INVOLVEMENT (HCC): Primary | ICD-10-CM

## 2023-02-07 DIAGNOSIS — R77.8 ELEVATED TOTAL PROTEIN: ICD-10-CM

## 2023-02-07 LAB
ALBUMIN SERPL BCP-MCNC: 2.9 G/DL (ref 3.5–5)
ALBUMIN SERPL BCP-MCNC: 2.9 G/DL (ref 3.5–5)
ALP SERPL-CCNC: 134 U/L (ref 46–116)
ALP SERPL-CCNC: 134 U/L (ref 46–116)
ALT SERPL W P-5'-P-CCNC: 23 U/L (ref 12–78)
ALT SERPL W P-5'-P-CCNC: 23 U/L (ref 12–78)
ANION GAP SERPL CALCULATED.3IONS-SCNC: 2 MMOL/L (ref 4–13)
APTT PPP: 33 SECONDS (ref 23–37)
AST SERPL W P-5'-P-CCNC: 26 U/L (ref 5–45)
AST SERPL W P-5'-P-CCNC: 26 U/L (ref 5–45)
BASOPHILS # BLD AUTO: 0.03 THOUSANDS/ÂΜL (ref 0–0.1)
BASOPHILS NFR BLD AUTO: 1 % (ref 0–1)
BILIRUB DIRECT SERPL-MCNC: 0.08 MG/DL (ref 0–0.2)
BILIRUB SERPL-MCNC: 0.38 MG/DL (ref 0.2–1)
BILIRUB SERPL-MCNC: 0.38 MG/DL (ref 0.2–1)
BUN SERPL-MCNC: 15 MG/DL (ref 5–25)
CALCIUM ALBUM COR SERPL-MCNC: 10.5 MG/DL (ref 8.3–10.1)
CALCIUM SERPL-MCNC: 9.6 MG/DL (ref 8.3–10.1)
CHLORIDE SERPL-SCNC: 104 MMOL/L (ref 96–108)
CO2 SERPL-SCNC: 30 MMOL/L (ref 21–32)
CREAT SERPL-MCNC: 0.95 MG/DL (ref 0.6–1.3)
CRP SERPL QL: 33 MG/L
EOSINOPHIL # BLD AUTO: 0.22 THOUSAND/ÂΜL (ref 0–0.61)
EOSINOPHIL NFR BLD AUTO: 3 % (ref 0–6)
ERYTHROCYTE [DISTWIDTH] IN BLOOD BY AUTOMATED COUNT: 14.9 % (ref 11.6–15.1)
ERYTHROCYTE [SEDIMENTATION RATE] IN BLOOD: 94 MM/HOUR (ref 0–29)
FERRITIN SERPL-MCNC: 193 NG/ML (ref 8–388)
GFR SERPL CREATININE-BSD FRML MDRD: 61 ML/MIN/1.73SQ M
GLUCOSE P FAST SERPL-MCNC: 85 MG/DL (ref 65–99)
HCT VFR BLD AUTO: 34.1 % (ref 34.8–46.1)
HGB BLD-MCNC: 10.4 G/DL (ref 11.5–15.4)
IGA SERPL-MCNC: 21 MG/DL (ref 70–400)
IGG SERPL-MCNC: 1560 MG/DL (ref 700–1600)
IGM SERPL-MCNC: 98 MG/DL (ref 40–230)
IMM GRANULOCYTES # BLD AUTO: 0.02 THOUSAND/UL (ref 0–0.2)
IMM GRANULOCYTES NFR BLD AUTO: 0 % (ref 0–2)
INR PPP: 1.11 (ref 0.84–1.19)
IRON SATN MFR SERPL: 27 % (ref 15–50)
IRON SERPL-MCNC: 53 UG/DL (ref 50–170)
LYMPHOCYTES # BLD AUTO: 2.4 THOUSANDS/ÂΜL (ref 0.6–4.47)
LYMPHOCYTES NFR BLD AUTO: 36 % (ref 14–44)
MCH RBC QN AUTO: 28 PG (ref 26.8–34.3)
MCHC RBC AUTO-ENTMCNC: 30.5 G/DL (ref 31.4–37.4)
MCV RBC AUTO: 92 FL (ref 82–98)
MONOCYTES # BLD AUTO: 0.53 THOUSAND/ÂΜL (ref 0.17–1.22)
MONOCYTES NFR BLD AUTO: 8 % (ref 4–12)
NEUTROPHILS # BLD AUTO: 3.4 THOUSANDS/ÂΜL (ref 1.85–7.62)
NEUTS SEG NFR BLD AUTO: 52 % (ref 43–75)
NRBC BLD AUTO-RTO: 0 /100 WBCS
PLATELET # BLD AUTO: 352 THOUSANDS/UL (ref 149–390)
PMV BLD AUTO: 9.1 FL (ref 8.9–12.7)
POTASSIUM SERPL-SCNC: 4.6 MMOL/L (ref 3.5–5.3)
PROT SERPL-MCNC: 7.5 G/DL (ref 6.4–8.4)
PROT SERPL-MCNC: 7.5 G/DL (ref 6.4–8.4)
PROTHROMBIN TIME: 14.5 SECONDS (ref 11.6–14.5)
RBC # BLD AUTO: 3.71 MILLION/UL (ref 3.81–5.12)
RETICS # AUTO: ABNORMAL 10*3/UL (ref 14097–95744)
RETICS # CALC: 2.11 % (ref 0.37–1.87)
SODIUM SERPL-SCNC: 136 MMOL/L (ref 135–147)
TIBC SERPL-MCNC: 197 UG/DL (ref 250–450)
WBC # BLD AUTO: 6.6 THOUSAND/UL (ref 4.31–10.16)

## 2023-02-07 NOTE — TELEPHONE ENCOUNTER
Preoperative Elective Admission Assessment- spoke to patient    Previous [address] Stay (returned Home 12/28)   Current Vipul Tiwari with PT services-  works for them     Living Situation:    Who does pt live with: spouse  What kind of home: ranch  How do they enter the home: Ramp  How many levels in home: 1 level home with ramp to enter    Sleeping arrangement: first/entry floor  Where is Bathroom: Walk in shower      First Floor Setup:   Is there a bathroom: Yes  Where would pt sleep: bed     DME: frame walker, cane, 3-in-1 bedside commode and wheelchair     Patient's Current Level of Function: Ambulates with walker, Non-ambulatory/Wheelchair bound and ADLs: Independent    Post-op Caregiver: spouse  Caregiver Name and phone number for Inpatient discharge needs: Chiqui Houston- [phone]  Currently receive any HHC/aides/community supports: Yes     Post-op Transport: spouse  To/from hospital: spouse  To/from PT 2-3x/week: Home PT services per OV note   Uses community transport now: No     Outpatient Physical Therapy Site:  Site:OV note states continuing home PT     Medication Management: self and pillbox- pain meds managed by    Preferred Pharmacy for Post-op Medications: [facility]   Blood Management Vitamin Regimen: Pt confirms taking as prescribed  Post-op anticoagulant: to be determined by surgical team postoperatively     DC Plan: Pt plans to be discharged home  Home with home health PT services per OV note, as patient is current using home health PT services since December     Barriers to DC identified preoperatively: Current ambulatory status     BMI: 38 09    Not Enrolled in [employer]     Patient Education:  Pt educated on post-op pain, early mobilization (POD0), Average inpt LOS, OP PT goal   Patient educated that our goal is to appropriately discharge patient based off their post-op function while striving to maintain maximal independence   The goal is to discharge patient to home and for them to attend outpatient physical therapy

## 2023-02-08 LAB
ALBUMIN SERPL ELPH-MCNC: 3.66 G/DL (ref 3.5–5)
ALBUMIN SERPL ELPH-MCNC: 54.7 % (ref 52–65)
ALPHA1 GLOB SERPL ELPH-MCNC: 0.4 G/DL (ref 0.1–0.4)
ALPHA1 GLOB SERPL ELPH-MCNC: 5.9 % (ref 2.5–5)
ALPHA2 GLOB SERPL ELPH-MCNC: 0.99 G/DL (ref 0.4–1.2)
ALPHA2 GLOB SERPL ELPH-MCNC: 14.8 % (ref 7–13)
BETA GLOB ABNORMAL SERPL ELPH-MCNC: 0.23 G/DL (ref 0.4–0.8)
BETA1 GLOB SERPL ELPH-MCNC: 3.4 % (ref 5–13)
BETA2 GLOB SERPL ELPH-MCNC: 3.2 % (ref 2–8)
BETA2+GAMMA GLOB SERPL ELPH-MCNC: 0.21 G/DL (ref 0.2–0.5)
EST. AVERAGE GLUCOSE BLD GHB EST-MCNC: 108 MG/DL
GAMMA GLOB ABNORMAL SERPL ELPH-MCNC: 1.21 G/DL (ref 0.5–1.6)
GAMMA GLOB SERPL ELPH-MCNC: 18 % (ref 12–22)
HBA1C MFR BLD: 5.4 %
IGG/ALB SER: 1.21 {RATIO} (ref 1.1–1.8)
KAPPA LC FREE SER-MCNC: 83.8 MG/L (ref 3.3–19.4)
KAPPA LC FREE/LAMBDA FREE SER: 1.18 {RATIO} (ref 0.26–1.65)
LAMBDA LC FREE SERPL-MCNC: 71.1 MG/L (ref 5.7–26.3)
PROT PATTERN SERPL ELPH-IMP: ABNORMAL
PROT SERPL-MCNC: 6.7 G/DL (ref 6.4–8.2)

## 2023-02-09 ENCOUNTER — TELEPHONE (OUTPATIENT)
Dept: LAB | Facility: HOSPITAL | Age: 68
End: 2023-02-09

## 2023-02-09 LAB
C-ANCA TITR SER IF: NORMAL TITER
MYELOPEROXIDASE AB SER IA-ACNC: <0.2 UNITS (ref 0–0.9)
MYELOPEROXIDASE AB SER IA-ACNC: <0.2 UNITS (ref 0–0.9)
P-ANCA ATYPICAL TITR SER IF: NORMAL TITER
P-ANCA TITR SER IF: NORMAL TITER
PROTEINASE3 AB SER IA-ACNC: <0.2 UNITS (ref 0–0.9)
PROTEINASE3 AB SER IA-ACNC: <0.2 UNITS (ref 0–0.9)

## 2023-02-09 RX ORDER — OXYCODONE HCL 40 MG/1
40 TABLET, FILM COATED, EXTENDED RELEASE ORAL EVERY 12 HOURS SCHEDULED
COMMUNITY

## 2023-02-09 NOTE — PRE-PROCEDURE INSTRUCTIONS
Pre-Surgery Instructions:   Medication Instructions   • acetaminophen (TYLENOL) 325 mg tablet Continue to take as prescribed including DOS with a small sip of water, unless usually taken at night   • ascorbic acid (VITAMIN C) 500 MG tablet continue as prescribed excluding DOS   • bisacodyl (DULCOLAX) 5 mg EC tablet Continue to take as prescribed including DOS with a small sip of water, unless usually taken at night   • Calcium Carbonate-Vit D-Min (CALCIUM 1200 PO) Avoid 1 week prior to surgery    • cholecalciferol (VITAMIN D3) 1,000 units tablet continue as prescribed excluding DOS   • DULoxetine (CYMBALTA) 60 mg delayed release capsule Continue to take as prescribed including DOS with a small sip of water, unless usually taken at night   • folic acid (FOLVITE) 1 mg tablet continue as prescribed excluding DOS   • LORazepam (ATIVAN) 0 5 mg tablet Continue to take as prescribed including DOS with a small sip of water, unless usually taken at night   • metoprolol tartrate (LOPRESSOR) 25 mg tablet Continue to take as prescribed including DOS with a small sip of water, unless usually taken at night   • Multiple Vitamins-Minerals (MULTIVITAMIN ADULT PO) Avoid 1 week prior to surgery    • omeprazole (PriLOSEC) 20 mg delayed release capsule Continue to take as prescribed including DOS with a small sip of water, unless usually taken at night   • oxyCODONE (OxyCONTIN) 40 mg 12 hr tablet Continue to take as prescribed including DOS with a small sip of water, unless usually taken at night   • oxyCODONE (ROXICODONE) 15 mg immediate release tablet Continue to take as prescribed including DOS with a small sip of water, unless usually taken at night   • Probiotic Product (PROBIOTIC-10 PO) Avoid 1 week prior to surgery    • TOVIAZ 8 MG TB24 continue as prescribed excluding DOS        TLJ Ortho class reviewed with patient  Patient verbalizes understanding of expectations regarding TLJ surgery    Patient instructed to continue scheduled medications excluding DOS  Patient instructed to continue Vitamin C, Folic Acid and Iron excluding DOS  Tylenol ok PRN  Patient instructed to avoid ASPIRIN, NSAIDS, and other OTC vitamins for 1 week prior to DOS  Patient given CHG bathing instruction per protocol and instructed on using CHG cloths DOS  Patient instructed to avoid using lotions, powders, oils, etc  DOS  Incentive Spirometer reviewed with Patient and patient encouraged to start using IS at least 1 week prior to DOS  Patient verbalized understanding on how to prepare for after surgery  Patient instructed to removed jewelry and not bring valuables DOS  Patient instructed that dentures and contact lenses will need to be removed DOS  Patient verbalizes understanding of NPO instructions  Patient informed of current visitor guidelines  If you have any changes in health before DOS, please call the surgeon's office  Patient verbalizes understanding of all instructions    See Geriatric Assessment below       • Cognitive Assessment:   • CAM:   • TUG <15 sec:  • Falls (last 6 months):   • Hand  score:  -Attempt 1:  -Attempt 2:  -Attempt 3:  • Matthew Total Score: 15  • PHQ- 9 Depression Scale:1  • Nutrition Assessment Score:13  • METS:3 3  • Health goals:  -What are your overall health goals? (quit smoking, wt  loss, rest, decrease stress)  Dance at son's wedding in June- be off the walker and using a cane  -What brings you strength? (family, friends, Christianity, health)  Family, prayer  -What activities are important to you? (exercise, reading, travel, work)   Spending time and visiting with family

## 2023-02-10 ENCOUNTER — APPOINTMENT (OUTPATIENT)
Dept: LAB | Facility: HOSPITAL | Age: 68
End: 2023-02-10

## 2023-02-10 DIAGNOSIS — M12.551 TRAUMATIC ARTHRITIS OF RIGHT HIP: ICD-10-CM

## 2023-02-10 DIAGNOSIS — N18.30 STAGE 3 CHRONIC KIDNEY DISEASE, UNSPECIFIED WHETHER STAGE 3A OR 3B CKD (HCC): ICD-10-CM

## 2023-02-10 DIAGNOSIS — N18.31 STAGE 3A CHRONIC KIDNEY DISEASE (HCC): ICD-10-CM

## 2023-02-10 DIAGNOSIS — M31.31 GRANULOMATOSIS WITH POLYANGIITIS WITH RENAL INVOLVEMENT (HCC): ICD-10-CM

## 2023-02-10 DIAGNOSIS — I10 BENIGN ESSENTIAL HYPERTENSION: ICD-10-CM

## 2023-02-11 ENCOUNTER — LAB REQUISITION (OUTPATIENT)
Dept: LAB | Facility: HOSPITAL | Age: 68
End: 2023-02-11

## 2023-02-11 DIAGNOSIS — M31.31 WEGENER'S GRANULOMATOSIS WITH RENAL INVOLVEMENT (HCC): ICD-10-CM

## 2023-02-11 DIAGNOSIS — N18.30 CHRONIC KIDNEY DISEASE, STAGE 3 UNSPECIFIED (HCC): ICD-10-CM

## 2023-02-11 DIAGNOSIS — N18.31 CHRONIC KIDNEY DISEASE, STAGE 3A (HCC): ICD-10-CM

## 2023-02-11 DIAGNOSIS — I10 ESSENTIAL (PRIMARY) HYPERTENSION: ICD-10-CM

## 2023-02-11 LAB
ABO GROUP BLD: NORMAL
BLD GP AB SCN SERPL QL: NEGATIVE
RH BLD: POSITIVE
SPECIMEN EXPIRATION DATE: NORMAL

## 2023-02-13 NOTE — PROGRESS NOTES
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      Findings  Left Ventricle Left ventricular cavity size is normal  Wall thickness is normal  The left ventricular ejection fraction is 60%  Systolic function is normal   Wall motion is normal    Right Ventricle Right ventricular cavity size is normal  Systolic function is normal  Wall thickness is normal    Left Atrium The atrium is normal in size  Right Atrium The atrium is normal in size  Atrial Septum No patent foramen ovale detected using color flow Doppler at rest    Left Atrial Appendage Left atrial appendage is normal in size  There is normal function  There is no thrombus  Aortic Valve The aortic valve is trileaflet  The leaflets are not thickened  The leaflets are not calcified  The leaflets exhibit normal mobility  There is no evidence of regurgitation  The aortic valve has no significant stenosis  Mitral Valve There is no evidence of regurgitation  There is no evidence of stenosis  The mitral valve has normal structure and normal function  Tricuspid Valve Tricuspid valve structure is normal  There is no evidence of regurgitation  There is no evidence of stenosis     Pulmonic Valve Pulmonic valve structure is normal  There is no evidence of regurgitation  There is no evidence of stenosis  Ascending Aorta The aortic root is normal in size  There is no evidence of aortic dissection  There is no aneurysm in the aorta  Exam Details  Performed Procedure Technologist Supporting Staff Performing Physician   ELIZABETH JUDSON Schreiber, RS Erna Patton DO         Appointment Date/Status Modality Department    2022     Completed [facility] CATH LAB 2 [facility] CATH LAB           Begin Exam End Exam  End Exam Questionnaires   2022  1:05 PM 2022  1:27 PM  PATIENT EDUCATION            Procedure Log Hyperlink  ELIZABETH/CV Log     All Reviewers List  Dameon Cruz DO on 2022  3:01 PM   Porsha Ding on 2022  9:47 AM     Signed  Electronically signed by Erna Patton DO on 22 at  EST     [id number], normal sinus rhythm 74 bpm, LVH    Counseling / Coordination of Care  Total floor / unit time spent today 40 minutes  Greater than 50% of total time was spent with the patient and / or family counseling and / or coordination of care

## 2023-02-14 ENCOUNTER — TELEPHONE (OUTPATIENT)
Dept: HEMATOLOGY ONCOLOGY | Facility: CLINIC | Age: 68
End: 2023-02-14

## 2023-02-14 ENCOUNTER — OFFICE VISIT (OUTPATIENT)
Dept: CARDIAC SURGERY | Facility: CLINIC | Age: 68
End: 2023-02-14

## 2023-02-14 VITALS
BODY MASS INDEX: 36 KG/M2 | OXYGEN SATURATION: 96 % | DIASTOLIC BLOOD PRESSURE: 58 MMHG | SYSTOLIC BLOOD PRESSURE: 110 MMHG | HEIGHT: 63 IN | WEIGHT: 203.2 LBS | HEART RATE: 74 BPM

## 2023-02-14 DIAGNOSIS — I50.32 CHRONIC DIASTOLIC HEART FAILURE (HCC): Chronic | ICD-10-CM

## 2023-02-14 DIAGNOSIS — I42.2 HYPERTROPHIC CARDIOMYOPATHY (HCC): ICD-10-CM

## 2023-02-14 DIAGNOSIS — I51.7 LVH (LEFT VENTRICULAR HYPERTROPHY): ICD-10-CM

## 2023-02-14 DIAGNOSIS — M12.551 TRAUMATIC ARTHRITIS OF RIGHT HIP: ICD-10-CM

## 2023-02-14 DIAGNOSIS — I05.9 MITRAL VALVE DISORDER: Primary | ICD-10-CM

## 2023-02-14 DIAGNOSIS — I42.2 HYPERTROPHIC CARDIOMYOPATHY (HCC): Primary | ICD-10-CM

## 2023-02-14 DIAGNOSIS — D63.8 ANEMIA IN OTHER CHRONIC DISEASES CLASSIFIED ELSEWHERE: Primary | ICD-10-CM

## 2023-02-14 PROBLEM — D64.89 OTHER SPECIFIED ANEMIAS: Status: ACTIVE | Noted: 2023-02-14

## 2023-02-14 NOTE — TELEPHONE ENCOUNTER
Informed pt of Dr Mcghee Service recommendation for Procrit x2 doses prior to 3/6 surgery  Infusion - please schedule  Pt prefers [facility] but is also willing to go to [facility]       Thank you

## 2023-02-17 ENCOUNTER — TELEPHONE (OUTPATIENT)
Dept: OBGYN CLINIC | Facility: HOSPITAL | Age: 68
End: 2023-02-17

## 2023-02-20 ENCOUNTER — HOSPITAL ENCOUNTER (OUTPATIENT)
Dept: NON INVASIVE DIAGNOSTICS | Facility: HOSPITAL | Age: 68
Discharge: HOME | End: 2023-02-20

## 2023-02-20 ENCOUNTER — HOSPITAL ENCOUNTER (OUTPATIENT)
Dept: INFUSION CENTER | Facility: HOSPITAL | Age: 68
Discharge: HOME | End: 2023-02-20
Attending: INTERNAL MEDICINE

## 2023-02-20 ENCOUNTER — TELEMEDICINE (OUTPATIENT)
Dept: ANESTHESIOLOGY | Facility: CLINIC | Age: 68
End: 2023-02-20

## 2023-02-20 VITALS
RESPIRATION RATE: 18 BRPM | HEART RATE: 73 BPM | DIASTOLIC BLOOD PRESSURE: 78 MMHG | SYSTOLIC BLOOD PRESSURE: 117 MMHG | TEMPERATURE: 98 F

## 2023-02-20 VITALS
SYSTOLIC BLOOD PRESSURE: 110 MMHG | BODY MASS INDEX: 35.97 KG/M2 | WEIGHT: 203 LBS | HEART RATE: 74 BPM | HEIGHT: 63 IN | DIASTOLIC BLOOD PRESSURE: 58 MMHG

## 2023-02-20 DIAGNOSIS — F41.9 ANXIETY: ICD-10-CM

## 2023-02-20 DIAGNOSIS — Z86.711 HISTORY OF PULMONARY EMBOLISM: Chronic | ICD-10-CM

## 2023-02-20 DIAGNOSIS — I42.2 HYPERTROPHIC CARDIOMYOPATHY (HCC): ICD-10-CM

## 2023-02-20 DIAGNOSIS — I10 BENIGN ESSENTIAL HYPERTENSION: ICD-10-CM

## 2023-02-20 DIAGNOSIS — F32.A DEPRESSION, UNSPECIFIED DEPRESSION TYPE: ICD-10-CM

## 2023-02-20 DIAGNOSIS — Z01.89 ENCOUNTER FOR GERIATRIC ASSESSMENT: ICD-10-CM

## 2023-02-20 DIAGNOSIS — I51.7 LVH (LEFT VENTRICULAR HYPERTROPHY): ICD-10-CM

## 2023-02-20 DIAGNOSIS — D63.8 ANEMIA IN OTHER CHRONIC DISEASES CLASSIFIED ELSEWHERE: Primary | ICD-10-CM

## 2023-02-20 DIAGNOSIS — N18.30 STAGE 3 CHRONIC KIDNEY DISEASE, UNSPECIFIED WHETHER STAGE 3A OR 3B CKD (HCC): ICD-10-CM

## 2023-02-20 DIAGNOSIS — I05.9 MITRAL VALVE DISORDER: Primary | ICD-10-CM

## 2023-02-20 DIAGNOSIS — M31.31 WEGENER'S GRANULOMATOSIS WITH RENAL INVOLVEMENT (HCC): ICD-10-CM

## 2023-02-20 DIAGNOSIS — G93.41 ACUTE METABOLIC ENCEPHALOPATHY: ICD-10-CM

## 2023-02-20 DIAGNOSIS — I50.32 CHRONIC DIASTOLIC HEART FAILURE (HCC): Chronic | ICD-10-CM

## 2023-02-20 PROBLEM — D50.8 IRON DEFICIENCY ANEMIA SECONDARY TO INADEQUATE DIETARY IRON INTAKE: Status: ACTIVE | Noted: 2023-02-20

## 2023-02-20 LAB
AORTIC ROOT: 3.2 CM
APICAL FOUR CHAMBER EJECTION FRACTION: 57 %
ASCENDING AORTA: 3.1 CM
E WAVE DECELERATION TIME: 243 MS
FRACTIONAL SHORTENING: 33 % (ref 28–44)
GLOBAL LONGITUIDAL STRAIN: -18 %
INTERVENTRICULAR SEPTUM IN DIASTOLE (PARASTERNAL SHORT AXIS VIEW): 1.3 CM
INTERVENTRICULAR SEPTUM: 1.3 CM (ref 0.6–1.1)
LAAS-AP2: 20.6 CM2
LAAS-AP4: 17.5 CM2
LEFT ATRIUM SIZE: 4.1 CM
LEFT INTERNAL DIMENSION IN SYSTOLE: 2.6 CM (ref 2.1–4)
LEFT VENTRICULAR INTERNAL DIMENSION IN DIASTOLE: 3.9 CM (ref 3.5–6)
LEFT VENTRICULAR POSTERIOR WALL IN END DIASTOLE: 1.3 CM
LEFT VENTRICULAR STROKE VOLUME: 41 ML
LVSV (TEICH): 41 ML
MV E'TISSUE VEL-LAT: 10 CM/S
MV E'TISSUE VEL-SEP: 8 CM/S
MV PEAK A VEL: 0.82 M/S
MV PEAK E VEL: 76 CM/S
MV STENOSIS PRESSURE HALF TIME: 71 MS
MV VALVE AREA P 1/2 METHOD: 3.1 CM2
RIGHT ATRIUM AREA SYSTOLE A4C: 16.6 CM2
RIGHT VENTRICLE ID DIMENSION: 3.8 CM
SL CV LEFT ATRIUM LENGTH A2C: 6 CM
SL CV LV EF: 68
SL CV PED ECHO LEFT VENTRICLE DIASTOLIC VOLUME (MOD BIPLANE) 2D: 64 ML
SL CV PED ECHO LEFT VENTRICLE SYSTOLIC VOLUME (MOD BIPLANE) 2D: 24 ML
TR MAX PG: 23 MMHG
TR PEAK VELOCITY: 2.4 M/S
TRICUSPID ANNULAR PLANE SYSTOLIC EXCURSION: 2 CM
TRICUSPID VALVE PEAK REGURGITATION VELOCITY: 2.42 M/S

## 2023-02-20 RX ADMIN — EPOETIN ALFA 40000 UNITS: 20000 SOLUTION INTRAVENOUS; SUBCUTANEOUS at 10:24
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
## 2023-02-27 ENCOUNTER — HOSPITAL ENCOUNTER (OUTPATIENT)
Dept: INFUSION CENTER | Facility: HOSPITAL | Age: 68
Discharge: HOME | End: 2023-02-27
Attending: INTERNAL MEDICINE

## 2023-02-28 NOTE — PROGRESS NOTES
Received message letting us know pt missed second Epogen appt yesterday  I spoke with pt who states she developed vomiting and dizziness which lasted several days after the first injection  Pt does not want second injection  Explained our office cannot provide surgical clearance without the second injection  Pt states she did notify ortho office

## 2023-03-01 ENCOUNTER — TELEPHONE (OUTPATIENT)
Dept: HEMATOLOGY ONCOLOGY | Facility: CLINIC | Age: 68
End: 2023-03-01

## 2023-03-01 DIAGNOSIS — R11.2 NAUSEA AND VOMITING, UNSPECIFIED VOMITING TYPE: Primary | ICD-10-CM

## 2023-03-01 RX ORDER — ONDANSETRON 8 MG/1
8 TABLET, ORALLY DISINTEGRATING ORAL EVERY 8 HOURS PRN
Qty: 10 TABLET | Refills: 0 | Status: SHIPPED | OUTPATIENT
Start: 2023-03-01

## 2023-03-01 NOTE — TELEPHONE ENCOUNTER
Spoke with pt and let her know ortho is not comfortable with proceeding with surgery unless she has the second Epogen injection  Script for Zofran pended for signature and pt instructed to take 1 hour prior to injection  She agreed to call Infusion to schedule appt and also call and update ortho office

## 2023-03-02 DIAGNOSIS — M12.551 TRAUMATIC ARTHRITIS OF RIGHT HIP: ICD-10-CM

## 2023-03-02 DIAGNOSIS — D63.8 ANEMIA IN OTHER CHRONIC DISEASES CLASSIFIED ELSEWHERE: Primary | ICD-10-CM

## 2023-03-02 RX ORDER — FOLIC ACID 1 MG/1
TABLET ORAL
Qty: 30 TABLET | Refills: 1 | Status: SHIPPED | OUTPATIENT
Start: 2023-03-02

## 2023-03-03 ENCOUNTER — HOSPITAL ENCOUNTER (OUTPATIENT)
Dept: INFUSION CENTER | Facility: HOSPITAL | Age: 68
End: 2023-03-03

## 2023-03-03 ENCOUNTER — TELEPHONE (OUTPATIENT)
Dept: OBGYN CLINIC | Facility: CLINIC | Age: 68
End: 2023-03-03

## 2023-03-03 VITALS
RESPIRATION RATE: 18 BRPM | HEART RATE: 89 BPM | DIASTOLIC BLOOD PRESSURE: 71 MMHG | TEMPERATURE: 99.2 F | SYSTOLIC BLOOD PRESSURE: 111 MMHG

## 2023-03-03 DIAGNOSIS — D63.8 ANEMIA IN OTHER CHRONIC DISEASES CLASSIFIED ELSEWHERE: Primary | ICD-10-CM

## 2023-03-03 LAB
BASOPHILS # BLD AUTO: 0.03 THOUSANDS/ÂΜL (ref 0–0.1)
BASOPHILS NFR BLD AUTO: 0 % (ref 0–1)
EOSINOPHIL # BLD AUTO: 0.2 THOUSAND/ÂΜL (ref 0–0.61)
EOSINOPHIL NFR BLD AUTO: 2 % (ref 0–6)
ERYTHROCYTE [DISTWIDTH] IN BLOOD BY AUTOMATED COUNT: 16 % (ref 11.6–15.1)
HCT VFR BLD AUTO: 38.5 % (ref 34.8–46.1)
HGB BLD-MCNC: 11.8 G/DL (ref 11.5–15.4)
IMM GRANULOCYTES # BLD AUTO: 0.04 THOUSAND/UL (ref 0–0.2)
IMM GRANULOCYTES NFR BLD AUTO: 0 % (ref 0–2)
LYMPHOCYTES # BLD AUTO: 1.78 THOUSANDS/ÂΜL (ref 0.6–4.47)
LYMPHOCYTES NFR BLD AUTO: 18 % (ref 14–44)
MCH RBC QN AUTO: 28.2 PG (ref 26.8–34.3)
MCHC RBC AUTO-ENTMCNC: 30.6 G/DL (ref 31.4–37.4)
MCV RBC AUTO: 92 FL (ref 82–98)
MONOCYTES # BLD AUTO: 0.72 THOUSAND/ÂΜL (ref 0.17–1.22)
MONOCYTES NFR BLD AUTO: 7 % (ref 4–12)
NEUTROPHILS # BLD AUTO: 6.93 THOUSANDS/ÂΜL (ref 1.85–7.62)
NEUTS SEG NFR BLD AUTO: 73 % (ref 43–75)
NRBC BLD AUTO-RTO: 0 /100 WBCS
PLATELET # BLD AUTO: 353 THOUSANDS/UL (ref 149–390)
PMV BLD AUTO: 8.7 FL (ref 8.9–12.7)
RBC # BLD AUTO: 4.19 MILLION/UL (ref 3.81–5.12)
WBC # BLD AUTO: 9.7 THOUSAND/UL (ref 4.31–10.16)

## 2023-03-03 RX ADMIN — EPOETIN ALFA 40000 UNITS: 20000 SOLUTION INTRAVENOUS; SUBCUTANEOUS at 10:55

## 2023-03-03 NOTE — PROGRESS NOTES
Pt here for Epogen injection  She and her  were notified that mondays surgery has been cancelled due to low hemoglobin  All discussed with Alexia RN, will give Epo injection today and draw CBC  PTs  is already in touch with orthopedic office    AVS given, no return appts at this time

## 2023-03-03 NOTE — TELEPHONE ENCOUNTER
Caller: Jadon Baker ()     Doctor: Heath Jackson     Reason for call: Patients  is calling into the office confused as to why the surgery for Monday was cancelled  Patient at infusion now and can have lab work done to see updated labs?  Nurse from infusion advised she can draw labs     Would like a call back to discuss     Call back#: [phone]

## 2023-03-03 NOTE — TELEPHONE ENCOUNTER
Caller: Camila Oquendo    Doctor: Maryam Wilson    Reason for call: patient to find out why patients surgery is being canceled because patient is getting an hemoglobin infusion     Call back#: [phone]

## 2023-03-03 NOTE — PLAN OF CARE
Problem: Potential for Falls  Goal: Patient will remain free of falls  Description: INTERVENTIONS:  - Assess patient frequently for physical needs  -  Identify cognitive and physical deficits and behaviors that affect risk of falls    -  [place] fall precautions as indicated by assessment   - Educate patient/family on patient safety including physical limitations  - Instruct patient to call for assistance with activity based on assessment  - Modify environment to reduce risk of injury  - Consider OT/PT consult to assist with strengthening/mobility  Outcome: Progressing

## 2023-03-06 ENCOUNTER — APPOINTMENT (OUTPATIENT)
Dept: RADIOLOGY | Facility: HOSPITAL | Age: 68
End: 2023-03-06

## 2023-03-06 ENCOUNTER — ANESTHESIA (OUTPATIENT)
Dept: PERIOP | Facility: HOSPITAL | Age: 68
End: 2023-03-06

## 2023-03-06 ENCOUNTER — HOSPITAL ENCOUNTER (INPATIENT)
Facility: HOSPITAL | Age: 68
LOS: 4 days | Discharge: NURSING, REHAB OR LONG-TERM CARE | End: 2023-03-11
Attending: STUDENT IN AN ORGANIZED HEALTH CARE EDUCATION/TRAINING PROGRAM | Admitting: STUDENT IN AN ORGANIZED HEALTH CARE EDUCATION/TRAINING PROGRAM

## 2023-03-06 DIAGNOSIS — M25.551 RIGHT HIP PAIN: ICD-10-CM

## 2023-03-06 DIAGNOSIS — Z87.898 HISTORY OF BACTEREMIA: Primary | ICD-10-CM

## 2023-03-06 DIAGNOSIS — M12.551 TRAUMATIC ARTHRITIS OF RIGHT HIP: ICD-10-CM

## 2023-03-06 LAB
BASOPHILS # BLD AUTO: 0.07 THOUSANDS/ÂΜL (ref 0–0.1)
BASOPHILS NFR BLD AUTO: 0 % (ref 0–1)
EOSINOPHIL # BLD AUTO: 0.09 THOUSAND/ÂΜL (ref 0–0.61)
EOSINOPHIL NFR BLD AUTO: 0 % (ref 0–6)
ERYTHROCYTE [DISTWIDTH] IN BLOOD BY AUTOMATED COUNT: 15.9 % (ref 11.6–15.1)
FLUAV RNA RESP QL NAA+PROBE: NEGATIVE
FLUBV RNA RESP QL NAA+PROBE: NEGATIVE
HCT VFR BLD AUTO: 25.1 % (ref 34.8–46.1)
HGB BLD-MCNC: 7.4 G/DL (ref 11.5–15.4)
IMM GRANULOCYTES # BLD AUTO: 0.26 THOUSAND/UL (ref 0–0.2)
IMM GRANULOCYTES NFR BLD AUTO: 1 % (ref 0–2)
LYMPHOCYTES # BLD AUTO: 2.84 THOUSANDS/ÂΜL (ref 0.6–4.47)
LYMPHOCYTES NFR BLD AUTO: 14 % (ref 14–44)
MCH RBC QN AUTO: 27.9 PG (ref 26.8–34.3)
MCHC RBC AUTO-ENTMCNC: 29.5 G/DL (ref 31.4–37.4)
MCV RBC AUTO: 95 FL (ref 82–98)
MONOCYTES # BLD AUTO: 1.2 THOUSAND/ÂΜL (ref 0.17–1.22)
MONOCYTES NFR BLD AUTO: 6 % (ref 4–12)
NEUTROPHILS # BLD AUTO: 16.26 THOUSANDS/ÂΜL (ref 1.85–7.62)
NEUTS SEG NFR BLD AUTO: 79 % (ref 43–75)
NRBC BLD AUTO-RTO: 0 /100 WBCS
PLATELET # BLD AUTO: 312 THOUSANDS/UL (ref 149–390)
PMV BLD AUTO: 8.9 FL (ref 8.9–12.7)
RBC # BLD AUTO: 2.65 MILLION/UL (ref 3.81–5.12)
RSV RNA RESP QL NAA+PROBE: NEGATIVE
SARS-COV-2 RNA RESP QL NAA+PROBE: NEGATIVE
WBC # BLD AUTO: 20.72 THOUSAND/UL (ref 4.31–10.16)

## 2023-03-06 PROCEDURE — 0SR9049 REPLACEMENT OF RIGHT HIP JOINT WITH CERAMIC ON POLYETHYLENE SYNTHETIC SUBSTITUTE, CEMENTED, OPEN APPROACH: ICD-10-PCS | Performed by: STUDENT IN AN ORGANIZED HEALTH CARE EDUCATION/TRAINING PROGRAM

## 2023-03-06 PROCEDURE — 0SH908Z INSERTION OF SPACER INTO RIGHT HIP JOINT, OPEN APPROACH: ICD-10-PCS | Performed by: STUDENT IN AN ORGANIZED HEALTH CARE EDUCATION/TRAINING PROGRAM

## 2023-03-06 DEVICE — TRIDENT X3 0 DEGREE POLYETHYLENE INSERT
Type: IMPLANTABLE DEVICE | Site: HIP | Status: FUNCTIONAL
Brand: TRIDENT X3 INSERT

## 2023-03-06 DEVICE — UNIVERSAL V40 TAPER ADAPTER SLEEVE
Type: IMPLANTABLE DEVICE | Site: HIP | Status: FUNCTIONAL
Brand: ADAPTER SLEEVE

## 2023-03-06 DEVICE — ANATOMIC UNIVERSAL TAPER FEMORAL HEAD
Type: IMPLANTABLE DEVICE | Site: HIP | Status: FUNCTIONAL
Brand: BIOLOX

## 2023-03-06 DEVICE — V40 STEM
Type: IMPLANTABLE DEVICE | Site: HIP | Status: FUNCTIONAL
Brand: EXETER

## 2023-03-06 DEVICE — CANAL -CEMENTED
Type: IMPLANTABLE DEVICE | Site: HIP | Status: FUNCTIONAL
Brand: BONE PLUG

## 2023-03-06 DEVICE — HALF DOSE BONE CEMENT, 10 PACK CATALOG NUMBER IS 6188-1-010
Type: IMPLANTABLE DEVICE | Site: HIP | Status: FUNCTIONAL
Brand: SIMPLEX

## 2023-03-06 RX ORDER — CEFAZOLIN SODIUM 2 G/50ML
2000 SOLUTION INTRAVENOUS ONCE
Status: COMPLETED | OUTPATIENT
Start: 2023-03-06 | End: 2023-03-06

## 2023-03-06 RX ORDER — SODIUM CHLORIDE, SODIUM LACTATE, POTASSIUM CHLORIDE, CALCIUM CHLORIDE 600; 310; 30; 20 MG/100ML; MG/100ML; MG/100ML; MG/100ML
100 INJECTION, SOLUTION INTRAVENOUS CONTINUOUS
Status: DISCONTINUED | OUTPATIENT
Start: 2023-03-06 | End: 2023-03-11

## 2023-03-06 RX ORDER — ASPIRIN 81 MG/1
81 TABLET ORAL 2 TIMES DAILY
Qty: 60 TABLET | Refills: 0 | Status: SHIPPED | OUTPATIENT
Start: 2023-03-06 | End: 2023-03-11 | Stop reason: SDUPTHER

## 2023-03-06 RX ORDER — EPHEDRINE SULFATE 50 MG/ML
INJECTION INTRAVENOUS AS NEEDED
Status: DISCONTINUED | OUTPATIENT
Start: 2023-03-06 | End: 2023-03-06

## 2023-03-06 RX ORDER — FENTANYL CITRATE 50 UG/ML
INJECTION, SOLUTION INTRAMUSCULAR; INTRAVENOUS AS NEEDED
Status: DISCONTINUED | OUTPATIENT
Start: 2023-03-06 | End: 2023-03-06

## 2023-03-06 RX ORDER — MORPHINE SULFATE 10 MG/ML
2 INJECTION, SOLUTION INTRAMUSCULAR; INTRAVENOUS EVERY 2 HOUR PRN
Status: ACTIVE | OUTPATIENT
Start: 2023-03-06 | End: 2023-03-08

## 2023-03-06 RX ORDER — GABAPENTIN 300 MG/1
300 CAPSULE ORAL ONCE
Status: COMPLETED | OUTPATIENT
Start: 2023-03-06 | End: 2023-03-06

## 2023-03-06 RX ORDER — AMOXICILLIN 250 MG
1 CAPSULE ORAL DAILY
Qty: 30 TABLET | Refills: 0 | Status: SHIPPED | OUTPATIENT
Start: 2023-03-06 | End: 2023-03-11 | Stop reason: SDUPTHER

## 2023-03-06 RX ORDER — TOBRAMYCIN 1.2 G/30ML
INJECTION, POWDER, LYOPHILIZED, FOR SOLUTION INTRAVENOUS AS NEEDED
Status: DISCONTINUED | OUTPATIENT
Start: 2023-03-06 | End: 2023-03-06 | Stop reason: HOSPADM

## 2023-03-06 RX ORDER — MIDAZOLAM HYDROCHLORIDE 2 MG/2ML
INJECTION, SOLUTION INTRAMUSCULAR; INTRAVENOUS AS NEEDED
Status: DISCONTINUED | OUTPATIENT
Start: 2023-03-06 | End: 2023-03-06

## 2023-03-06 RX ORDER — ONDANSETRON 2 MG/ML
4 INJECTION INTRAMUSCULAR; INTRAVENOUS ONCE
Status: DISCONTINUED | OUTPATIENT
Start: 2023-03-06 | End: 2023-03-06

## 2023-03-06 RX ORDER — ONDANSETRON 8 MG/1
8 TABLET, ORALLY DISINTEGRATING ORAL EVERY 8 HOURS PRN
Status: DISCONTINUED | OUTPATIENT
Start: 2023-03-06 | End: 2023-03-11 | Stop reason: HOSPADM

## 2023-03-06 RX ORDER — ONDANSETRON 2 MG/ML
4 INJECTION INTRAMUSCULAR; INTRAVENOUS ONCE AS NEEDED
Status: DISCONTINUED | OUTPATIENT
Start: 2023-03-06 | End: 2023-03-06 | Stop reason: HOSPADM

## 2023-03-06 RX ORDER — CEFAZOLIN SODIUM 2 G/50ML
2000 SOLUTION INTRAVENOUS EVERY 8 HOURS
Status: DISCONTINUED | OUTPATIENT
Start: 2023-03-07 | End: 2023-03-11 | Stop reason: HOSPADM

## 2023-03-06 RX ORDER — CEFAZOLIN SODIUM 1 G/3ML
INJECTION, POWDER, FOR SOLUTION INTRAMUSCULAR; INTRAVENOUS AS NEEDED
Status: DISCONTINUED | OUTPATIENT
Start: 2023-03-06 | End: 2023-03-06

## 2023-03-06 RX ORDER — MAGNESIUM HYDROXIDE/ALUMINUM HYDROXICE/SIMETHICONE 120; 1200; 1200 MG/30ML; MG/30ML; MG/30ML
30 SUSPENSION ORAL EVERY 6 HOURS PRN
Status: DISCONTINUED | OUTPATIENT
Start: 2023-03-06 | End: 2023-03-11 | Stop reason: HOSPADM

## 2023-03-06 RX ORDER — ONDANSETRON 2 MG/ML
INJECTION INTRAMUSCULAR; INTRAVENOUS AS NEEDED
Status: DISCONTINUED | OUTPATIENT
Start: 2023-03-06 | End: 2023-03-06

## 2023-03-06 RX ORDER — CHLORHEXIDINE GLUCONATE 4 G/100ML
SOLUTION TOPICAL DAILY PRN
Status: DISCONTINUED | OUTPATIENT
Start: 2023-03-06 | End: 2023-03-06

## 2023-03-06 RX ORDER — ROCURONIUM BROMIDE 10 MG/ML
INJECTION, SOLUTION INTRAVENOUS AS NEEDED
Status: DISCONTINUED | OUTPATIENT
Start: 2023-03-06 | End: 2023-03-06

## 2023-03-06 RX ORDER — TRANEXAMIC ACID 100 MG/ML
INJECTION, SOLUTION INTRAVENOUS AS NEEDED
Status: DISCONTINUED | OUTPATIENT
Start: 2023-03-06 | End: 2023-03-06

## 2023-03-06 RX ORDER — ASPIRIN 81 MG/1
81 TABLET ORAL 2 TIMES DAILY
Status: DISCONTINUED | OUTPATIENT
Start: 2023-03-06 | End: 2023-03-11 | Stop reason: HOSPADM

## 2023-03-06 RX ORDER — OXYCODONE HCL 20 MG/1
40 TABLET, FILM COATED, EXTENDED RELEASE ORAL EVERY 12 HOURS SCHEDULED
Status: DISCONTINUED | OUTPATIENT
Start: 2023-03-06 | End: 2023-03-11 | Stop reason: HOSPADM

## 2023-03-06 RX ORDER — OXYCODONE HYDROCHLORIDE 5 MG/1
10 TABLET ORAL EVERY 4 HOURS PRN
Status: DISCONTINUED | OUTPATIENT
Start: 2023-03-06 | End: 2023-03-11 | Stop reason: HOSPADM

## 2023-03-06 RX ORDER — SODIUM CHLORIDE 9 MG/ML
125 INJECTION, SOLUTION INTRAVENOUS CONTINUOUS
Status: DISCONTINUED | OUTPATIENT
Start: 2023-03-06 | End: 2023-03-06

## 2023-03-06 RX ORDER — ACETAMINOPHEN 500 MG
1000 TABLET ORAL EVERY 8 HOURS
Qty: 60 TABLET | Refills: 0 | Status: SHIPPED | OUTPATIENT
Start: 2023-03-06 | End: 2023-03-11 | Stop reason: SDUPTHER

## 2023-03-06 RX ORDER — SENNOSIDES 8.6 MG
1 TABLET ORAL DAILY
Status: DISCONTINUED | OUTPATIENT
Start: 2023-03-07 | End: 2023-03-11 | Stop reason: HOSPADM

## 2023-03-06 RX ORDER — DOCUSATE SODIUM 100 MG/1
100 CAPSULE, LIQUID FILLED ORAL 2 TIMES DAILY
Status: DISCONTINUED | OUTPATIENT
Start: 2023-03-06 | End: 2023-03-11 | Stop reason: HOSPADM

## 2023-03-06 RX ORDER — DULOXETIN HYDROCHLORIDE 60 MG/1
60 CAPSULE, DELAYED RELEASE ORAL DAILY
Status: DISCONTINUED | OUTPATIENT
Start: 2023-03-07 | End: 2023-03-11 | Stop reason: HOSPADM

## 2023-03-06 RX ORDER — PROPOFOL 10 MG/ML
INJECTION, EMULSION INTRAVENOUS AS NEEDED
Status: DISCONTINUED | OUTPATIENT
Start: 2023-03-06 | End: 2023-03-06

## 2023-03-06 RX ORDER — ALBUMIN, HUMAN INJ 5% 5 %
SOLUTION INTRAVENOUS CONTINUOUS PRN
Status: DISCONTINUED | OUTPATIENT
Start: 2023-03-06 | End: 2023-03-06

## 2023-03-06 RX ORDER — ACETAMINOPHEN 325 MG/1
975 TABLET ORAL ONCE
Status: COMPLETED | OUTPATIENT
Start: 2023-03-06 | End: 2023-03-06

## 2023-03-06 RX ORDER — SODIUM CHLORIDE, SODIUM LACTATE, POTASSIUM CHLORIDE, CALCIUM CHLORIDE 600; 310; 30; 20 MG/100ML; MG/100ML; MG/100ML; MG/100ML
125 INJECTION, SOLUTION INTRAVENOUS CONTINUOUS
Status: DISCONTINUED | OUTPATIENT
Start: 2023-03-06 | End: 2023-03-11

## 2023-03-06 RX ORDER — MAGNESIUM HYDROXIDE 1200 MG/15ML
LIQUID ORAL AS NEEDED
Status: DISCONTINUED | OUTPATIENT
Start: 2023-03-06 | End: 2023-03-06 | Stop reason: HOSPADM

## 2023-03-06 RX ORDER — FENTANYL CITRATE/PF 50 MCG/ML
25 SYRINGE (ML) INJECTION
Status: DISCONTINUED | OUTPATIENT
Start: 2023-03-06 | End: 2023-03-06 | Stop reason: HOSPADM

## 2023-03-06 RX ORDER — CHLORHEXIDINE GLUCONATE 0.12 MG/ML
15 RINSE ORAL ONCE
Status: COMPLETED | OUTPATIENT
Start: 2023-03-06 | End: 2023-03-06

## 2023-03-06 RX ORDER — OXYBUTYNIN CHLORIDE 10 MG/1
10 TABLET, EXTENDED RELEASE ORAL DAILY
Status: DISCONTINUED | OUTPATIENT
Start: 2023-03-07 | End: 2023-03-08

## 2023-03-06 RX ORDER — DEXAMETHASONE SODIUM PHOSPHATE 10 MG/ML
INJECTION, SOLUTION INTRAMUSCULAR; INTRAVENOUS AS NEEDED
Status: DISCONTINUED | OUTPATIENT
Start: 2023-03-06 | End: 2023-03-06 | Stop reason: HOSPADM

## 2023-03-06 RX ORDER — VANCOMYCIN HYDROCHLORIDE 1 G/20ML
INJECTION, POWDER, LYOPHILIZED, FOR SOLUTION INTRAVENOUS AS NEEDED
Status: DISCONTINUED | OUTPATIENT
Start: 2023-03-06 | End: 2023-03-06 | Stop reason: HOSPADM

## 2023-03-06 RX ORDER — CALCIUM CARBONATE 200(500)MG
1000 TABLET,CHEWABLE ORAL DAILY PRN
Status: DISCONTINUED | OUTPATIENT
Start: 2023-03-06 | End: 2023-03-11 | Stop reason: HOSPADM

## 2023-03-06 RX ORDER — GABAPENTIN 300 MG/1
300 CAPSULE ORAL
Status: DISCONTINUED | OUTPATIENT
Start: 2023-03-06 | End: 2023-03-11 | Stop reason: HOSPADM

## 2023-03-06 RX ORDER — ONDANSETRON 2 MG/ML
4 INJECTION INTRAMUSCULAR; INTRAVENOUS EVERY 6 HOURS PRN
Status: DISCONTINUED | OUTPATIENT
Start: 2023-03-06 | End: 2023-03-11 | Stop reason: HOSPADM

## 2023-03-06 RX ORDER — LORAZEPAM 0.5 MG/1
0.5 TABLET ORAL 2 TIMES DAILY PRN
Status: DISCONTINUED | OUTPATIENT
Start: 2023-03-06 | End: 2023-03-11 | Stop reason: HOSPADM

## 2023-03-06 RX ORDER — ONDANSETRON 4 MG/1
4 TABLET, FILM COATED ORAL EVERY 8 HOURS PRN
Qty: 20 TABLET | Refills: 0 | Status: SHIPPED | OUTPATIENT
Start: 2023-03-06

## 2023-03-06 RX ORDER — OXYCODONE HYDROCHLORIDE 5 MG/1
20 TABLET ORAL EVERY 4 HOURS PRN
Status: DISCONTINUED | OUTPATIENT
Start: 2023-03-06 | End: 2023-03-11 | Stop reason: HOSPADM

## 2023-03-06 RX ORDER — ACETAMINOPHEN 325 MG/1
975 TABLET ORAL EVERY 8 HOURS
Status: DISCONTINUED | OUTPATIENT
Start: 2023-03-06 | End: 2023-03-11 | Stop reason: HOSPADM

## 2023-03-06 RX ADMIN — ASPIRIN 81 MG: 81 TABLET, COATED ORAL at 20:45

## 2023-03-06 RX ADMIN — EPHEDRINE SULFATE 10 MG: 50 INJECTION, SOLUTION INTRAVENOUS at 13:42

## 2023-03-06 RX ADMIN — ONDANSETRON 4 MG: 2 INJECTION INTRAMUSCULAR; INTRAVENOUS at 16:57

## 2023-03-06 RX ADMIN — ROCURONIUM BROMIDE 20 MG: 10 INJECTION, SOLUTION INTRAVENOUS at 14:10

## 2023-03-06 RX ADMIN — ROCURONIUM BROMIDE 10 MG: 10 INJECTION, SOLUTION INTRAVENOUS at 16:12

## 2023-03-06 RX ADMIN — OXYCODONE HYDROCHLORIDE 40 MG: 40 TABLET, FILM COATED, EXTENDED RELEASE ORAL at 20:45

## 2023-03-06 RX ADMIN — ALBUMIN (HUMAN): 12.5 INJECTION, SOLUTION INTRAVENOUS at 15:48

## 2023-03-06 RX ADMIN — VANCOMYCIN HYDROCHLORIDE 1250 MG: 1 INJECTION, POWDER, LYOPHILIZED, FOR SOLUTION INTRAVENOUS at 22:18

## 2023-03-06 RX ADMIN — FENTANYL CITRATE 100 MCG: 50 INJECTION INTRAMUSCULAR; INTRAVENOUS at 13:11

## 2023-03-06 RX ADMIN — GABAPENTIN 300 MG: 300 CAPSULE ORAL at 22:17

## 2023-03-06 RX ADMIN — ALBUMIN (HUMAN): 12.5 INJECTION, SOLUTION INTRAVENOUS at 16:14

## 2023-03-06 RX ADMIN — CHLORHEXIDINE GLUCONATE 0.12% ORAL RINSE 15 ML: 1.2 LIQUID ORAL at 11:07

## 2023-03-06 RX ADMIN — DOCUSATE SODIUM 100 MG: 100 CAPSULE, LIQUID FILLED ORAL at 20:45

## 2023-03-06 RX ADMIN — SODIUM CHLORIDE, SODIUM LACTATE, POTASSIUM CHLORIDE, AND CALCIUM CHLORIDE 125 ML/HR: .6; .31; .03; .02 INJECTION, SOLUTION INTRAVENOUS at 18:00

## 2023-03-06 RX ADMIN — MIDAZOLAM 2 MG: 1 INJECTION INTRAMUSCULAR; INTRAVENOUS at 13:00

## 2023-03-06 RX ADMIN — PHENYLEPHRINE HYDROCHLORIDE 30 MCG/MIN: 10 INJECTION INTRAVENOUS at 16:30

## 2023-03-06 RX ADMIN — SODIUM CHLORIDE, SODIUM LACTATE, POTASSIUM CHLORIDE, AND CALCIUM CHLORIDE 125 ML/HR: .6; .31; .03; .02 INJECTION, SOLUTION INTRAVENOUS at 11:10

## 2023-03-06 RX ADMIN — SODIUM CHLORIDE, SODIUM LACTATE, POTASSIUM CHLORIDE, AND CALCIUM CHLORIDE: .6; .31; .03; .02 INJECTION, SOLUTION INTRAVENOUS at 15:44

## 2023-03-06 RX ADMIN — CEFAZOLIN SODIUM 2000 MG: 2 SOLUTION INTRAVENOUS at 12:58

## 2023-03-06 RX ADMIN — ACETAMINOPHEN 325MG 975 MG: 325 TABLET ORAL at 20:45

## 2023-03-06 RX ADMIN — TRANEXAMIC ACID 1 G: 100 INJECTION, SOLUTION INTRAVENOUS at 13:25

## 2023-03-06 RX ADMIN — SODIUM CHLORIDE, SODIUM LACTATE, POTASSIUM CHLORIDE, AND CALCIUM CHLORIDE 100 ML/HR: .6; .31; .03; .02 INJECTION, SOLUTION INTRAVENOUS at 20:47

## 2023-03-06 RX ADMIN — SUGAMMADEX 200 MG: 100 INJECTION, SOLUTION INTRAVENOUS at 17:14

## 2023-03-06 RX ADMIN — ACETAMINOPHEN 325MG 975 MG: 325 TABLET ORAL at 11:00

## 2023-03-06 RX ADMIN — CEFAZOLIN 2000 MG: 1 INJECTION, POWDER, FOR SOLUTION INTRAMUSCULAR; INTRAVENOUS at 16:48

## 2023-03-06 RX ADMIN — FENTANYL CITRATE 50 MCG: 50 INJECTION INTRAMUSCULAR; INTRAVENOUS at 14:04

## 2023-03-06 RX ADMIN — ROCURONIUM BROMIDE 20 MG: 10 INJECTION, SOLUTION INTRAVENOUS at 15:24

## 2023-03-06 RX ADMIN — ROCURONIUM BROMIDE 50 MG: 10 INJECTION, SOLUTION INTRAVENOUS at 13:11

## 2023-03-06 RX ADMIN — GABAPENTIN 300 MG: 300 CAPSULE ORAL at 11:01

## 2023-03-06 RX ADMIN — PROPOFOL 150 MG: 10 INJECTION, EMULSION INTRAVENOUS at 13:11

## 2023-03-06 NOTE — ANESTHESIA PREPROCEDURE EVALUATION
Procedure:  ARTHROPLASTY HIP TOTAL (Right: Hip)    Relevant Problems   CARDIO   (+) Benign essential hypertension   (+) Dyspnea on exertion      GI/HEPATIC   (+) GERD (gastroesophageal reflux disease)      /RENAL   (+) Chronic kidney disease, stage III (moderate) (HCC)      GYN   (+) Cancer of ovary (HCC)   (+) Ovarian cancer (HCC)      HEMATOLOGY   (+) Iron deficiency anemia secondary to inadequate dietary iron intake   (+) Other specified anemias      MUSCULOSKELETAL   (+) Chronic right-sided low back pain without sciatica   (+) Post-traumatic osteoarthritis      NEURO/PSYCH   (+) Anxiety   (+) Chronic pain disorder   (+) Chronic right-sided low back pain without sciatica   (+) Depression   (+) History of pulmonary embolism   (+) Other complicated headache syndrome      PULMONARY   (+) Dyspnea on exertion      Cardiovascular and Mediastinum   (+) Chronic diastolic heart failure (HCC)   (+) Hypertrophic cardiomyopathy (HCC)      Digestive   (+) Nausea & vomiting      Other   (+) Lymphedema   (+) Opiate analgesic use agreement exists        Physical Exam    Airway    Mallampati score: II  TM Distance: >3 FB  Neck ROM: full     Dental   No notable dental hx     Cardiovascular  Rhythm: regular, Rate: normal, Cardiovascular exam normal    Pulmonary  Pulmonary exam normal Breath sounds clear to auscultation,     Other Findings        Anesthesia Plan  ASA Score- 2     Anesthesia Type- general with ASA Monitors  Additional Monitors:   Airway Plan:     Comment: GA prn  Plan Factors-Exercise tolerance (METS): >4 METS  Chart reviewed  EKG reviewed  Imaging results reviewed  Existing labs reviewed  Patient summary reviewed  Patient is not a current smoker  Patient not instructed to abstain from smoking on day of procedure  Patient did not smoke on day of surgery  Induction- intravenous      Postoperative Plan-     Informed Consent- Anesthetic plan and risks discussed with patient and spouse

## 2023-03-06 NOTE — DISCHARGE INSTR - AVS FIRST PAGE
Dr Saray Villareal Hip Replacement    Infectious Disease is requesting a CBC with diff and CMP weekly while patient is on IV antibiotics  What to Expect/Activity  You are weight bearing as tolerated to your operative leg unless otherwise instructed  Use your walker or crutches  It is important to get up and walk for 10 minutes every hour, if possible  Initial recovery therapy is focused on walking  If in your follow-up a referral to formal physical therapy is required, this will be provided based on your recovery  You may sleep however you would like  Many patients feel more comfortable with a pillow between their legs and find it uncomfortably to lay on the operative side  If you do roll on that side at night you will not damage the replacement  You may use a regular or elevated toilet seat, whichever is more comfortable  We do not routinely require any specific precautions in terms of positioning of your leg and if so this will be taught to you by the physical therapists at the hospital  This means that you can dress as you are comfortable, including shoes and socks  You can bend down carefully to get items from the floor  Swelling and discomfort causes pain  Elevate your surgical leg on 1-2 pillows placed behind your ankle/calf throughout the day, especially when you are laying down  Please use ice packs for 20-30 minutes every 1-2 hours for 48-72 hours on the operative hip  Please make sure there is a barrier directly between your skin and your ice/ice pack  Please use incentive spirometer 10 times per hour while awake (see diagram below)  Dressing/Wound Care/Bathing  There is a surgical dressing over your incision that stays in place for 7 days after surgery  You may start showering 24 hours after surgery, the surgical dressing will remain in place  Please pat the dressing dry  If you notice the dressing appears saturated or is starting to come off, please replace with a dry dressing    Keep the dressing on until your follow-up in the office  There may be dried blood around the incision  It is ok to continue showering after removing the dressing but do not scrub the incision  Pat incision dry  Do not place any creams, ointments or gels on or around the incision  No baths, swimming or submerging until cleared by Dr Alexandro Fraga  Pain Management/Medications  You may resume your usual medications  Please take the following medications:  Anti-coagulation (blood clot prevention) - aspirin 81mg twice daily for 4 weeks  Pain medication:  Narcotic Medication: Take as directed  NSAID (Anti-inflammatory): Take as directed  Tylenol 1000mg every 8 hours  Zofran (ondasetron) - 4mg every 8 hours as needed for nausea  Stool Softeners (senna/colace)- take daily to help prevent constipation as narcotic pain medication causes constipation  Antibiotic - take as directed if prescribed  If you have questions or pain concerns, please contact the office  Pain medication cannot remove all post-operative pain  Follow up/Call if:  The findings of your surgery will be explained to you and your family immediately after surgery  However, in the post-operative period, during recovery from anesthesia you may not fully remember or fully understand what was said  We will go over this again when you return for your post-op appointment    Please contact Dr Lacy Ji office if you experience the following:  Excessive bleeding (bleeding through your dressing)  Fever greater than 101 degrees F after the first 2 days (a slight fever is normal the first few days after surgery)  Persistent nausea or vomiting  Decreased sensation or discoloration of the operative limb  Pain or swelling that is getting worse and not better with medication    Dr Wilma Collins: [phone]

## 2023-03-06 NOTE — INTERVAL H&P NOTE
H&P reviewed  After examining the patient I find no changes in the patients condition since the H&P had been written  Plan for complex right conversion ROWDY

## 2023-03-07 PROBLEM — Z87.898 HISTORY OF BACTEREMIA: Status: ACTIVE | Noted: 2022-12-05

## 2023-03-07 PROBLEM — D64.9 ANEMIA: Status: ACTIVE | Noted: 2023-02-14

## 2023-03-07 LAB
ANION GAP SERPL CALCULATED.3IONS-SCNC: 6 MMOL/L (ref 4–13)
BUN SERPL-MCNC: 19 MG/DL (ref 5–25)
CALCIUM SERPL-MCNC: 8 MG/DL (ref 8.4–10.2)
CHLORIDE SERPL-SCNC: 105 MMOL/L (ref 96–108)
CO2 SERPL-SCNC: 24 MMOL/L (ref 21–32)
CREAT SERPL-MCNC: 1.19 MG/DL (ref 0.6–1.3)
ERYTHROCYTE [DISTWIDTH] IN BLOOD BY AUTOMATED COUNT: 16.1 % (ref 11.6–15.1)
GFR SERPL CREATININE-BSD FRML MDRD: 46 ML/MIN/1.73SQ M
GLUCOSE SERPL-MCNC: 169 MG/DL (ref 65–140)
HCT VFR BLD AUTO: 21.2 % (ref 34.8–46.1)
HGB BLD-MCNC: 6.3 G/DL (ref 11.5–15.4)
MCH RBC QN AUTO: 28.1 PG (ref 26.8–34.3)
MCHC RBC AUTO-ENTMCNC: 29.7 G/DL (ref 31.4–37.4)
MCV RBC AUTO: 95 FL (ref 82–98)
PLATELET # BLD AUTO: 273 THOUSANDS/UL (ref 149–390)
PMV BLD AUTO: 9.2 FL (ref 8.9–12.7)
POTASSIUM SERPL-SCNC: 5.2 MMOL/L (ref 3.5–5.3)
RBC # BLD AUTO: 2.24 MILLION/UL (ref 3.81–5.12)
SODIUM SERPL-SCNC: 135 MMOL/L (ref 135–147)
VANCOMYCIN SERPL-MCNC: 13.4 UG/ML (ref 10–20)
WBC # BLD AUTO: 14.23 THOUSAND/UL (ref 4.31–10.16)

## 2023-03-07 PROCEDURE — 30233N1 TRANSFUSION OF NONAUTOLOGOUS RED BLOOD CELLS INTO PERIPHERAL VEIN, PERCUTANEOUS APPROACH: ICD-10-PCS | Performed by: STUDENT IN AN ORGANIZED HEALTH CARE EDUCATION/TRAINING PROGRAM

## 2023-03-07 RX ADMIN — ACETAMINOPHEN 325MG 975 MG: 325 TABLET ORAL at 06:35

## 2023-03-07 RX ADMIN — VANCOMYCIN HYDROCHLORIDE 1250 MG: 1 INJECTION, POWDER, LYOPHILIZED, FOR SOLUTION INTRAVENOUS at 22:53

## 2023-03-07 RX ADMIN — SENNOSIDES 8.6 MG: 8.6 TABLET, FILM COATED ORAL at 09:16

## 2023-03-07 RX ADMIN — ACETAMINOPHEN 325MG 975 MG: 325 TABLET ORAL at 13:00

## 2023-03-07 RX ADMIN — LORAZEPAM 0.5 MG: 0.5 TABLET ORAL at 10:00

## 2023-03-07 RX ADMIN — CEFAZOLIN SODIUM 2000 MG: 2 SOLUTION INTRAVENOUS at 12:48

## 2023-03-07 RX ADMIN — OXYCODONE HYDROCHLORIDE 40 MG: 40 TABLET, FILM COATED, EXTENDED RELEASE ORAL at 09:16

## 2023-03-07 RX ADMIN — CEFAZOLIN SODIUM 2000 MG: 2 SOLUTION INTRAVENOUS at 22:02

## 2023-03-07 RX ADMIN — DOCUSATE SODIUM 100 MG: 100 CAPSULE, LIQUID FILLED ORAL at 17:54

## 2023-03-07 RX ADMIN — OXYCODONE HYDROCHLORIDE 20 MG: 5 TABLET ORAL at 06:35

## 2023-03-07 RX ADMIN — OXYCODONE HYDROCHLORIDE 20 MG: 5 TABLET ORAL at 00:36

## 2023-03-07 RX ADMIN — OXYCODONE HYDROCHLORIDE 40 MG: 40 TABLET, FILM COATED, EXTENDED RELEASE ORAL at 21:32

## 2023-03-07 RX ADMIN — LORAZEPAM 0.5 MG: 0.5 TABLET ORAL at 21:32

## 2023-03-07 RX ADMIN — DULOXETINE 60 MG: 60 CAPSULE, DELAYED RELEASE ORAL at 09:16

## 2023-03-07 RX ADMIN — ASPIRIN 81 MG: 81 TABLET, COATED ORAL at 09:16

## 2023-03-07 RX ADMIN — ASPIRIN 81 MG: 81 TABLET, COATED ORAL at 20:01

## 2023-03-07 RX ADMIN — ACETAMINOPHEN 325MG 975 MG: 325 TABLET ORAL at 21:32

## 2023-03-07 RX ADMIN — DOCUSATE SODIUM 100 MG: 100 CAPSULE, LIQUID FILLED ORAL at 09:16

## 2023-03-07 RX ADMIN — OXYBUTYNIN CHLORIDE 10 MG: 10 TABLET, EXTENDED RELEASE ORAL at 21:32

## 2023-03-07 RX ADMIN — CEFAZOLIN SODIUM 2000 MG: 2 SOLUTION INTRAVENOUS at 00:47

## 2023-03-07 RX ADMIN — OXYCODONE HYDROCHLORIDE 20 MG: 5 TABLET ORAL at 20:00

## 2023-03-07 RX ADMIN — OXYCODONE HYDROCHLORIDE 20 MG: 5 TABLET ORAL at 12:55

## 2023-03-07 NOTE — ANESTHESIA POSTPROCEDURE EVALUATION
Post-Op Assessment Note    CV Status:  Stable    Pain management: adequate     Mental Status:  Alert and awake   Hydration Status:  Euvolemic   PONV Controlled:  Controlled   Airway Patency:  Patent   Two or more mitigation strategies used for obstructive sleep apnea   Post Op Vitals Reviewed: Yes      Staff: Anesthesiologist         No notable events documented      /50 (03/06/23 2004)    Temp (!) 97 1 °F (36 2 °C) (03/06/23 2004)    Pulse 75 (03/06/23 2004)   Resp 20 (03/06/23 2004)    SpO2 98 % (03/06/23 2004)

## 2023-03-07 NOTE — ASSESSMENT & PLAN NOTE
· Patient is postop day 1 from ROWDY for posttraumatic osteoarthritis of right hip  · Was found to have evidence of purulence and chronic osteomyelitis during surgery, some old hardware was removed  · Spacer impregnated with vancomycin was placed in the OR  · ID following for antibiotic management  · PT/OT consults  · Discharge planning per orthopedics

## 2023-03-07 NOTE — ASSESSMENT & PLAN NOTE
· DVT prophylaxis per orthopedics  · Currently on aspirin, would recommend heparin or Lovenox  · Does have history of IVC filter
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
## 2023-03-07 NOTE — ASSESSMENT & PLAN NOTE
Wt Readings from Last 3 Encounters:   03/07/23 90 5 kg (199 lb 8 3 oz)   02/20/23 92 1 kg (203 lb)   02/14/23 92 2 kg (203 lb 3 2 oz)     · Appears euvolemic on exam  · Monitor for signs of volume overload with blood transfusions  · Continue Lopressor with hold parameters

## 2023-03-07 NOTE — PLAN OF CARE
Problem: Prexisting or High Potential for Compromised Skin Integrity  Goal: Skin integrity is maintained or improved  Description: INTERVENTIONS:  - Identify patients at risk for skin breakdown  - Assess and monitor skin integrity  - Assess and monitor nutrition and hydration status  - Monitor labs   - Assess for incontinence   - Turn and reposition patient  - Assist with mobility/ambulation  - Relieve pressure over bony prominences  - Avoid friction and shearing  - Provide appropriate hygiene as needed including keeping skin clean and dry  - Evaluate need for skin moisturizer/barrier cream  - Collaborate with interdisciplinary team   - Patient/family teaching  Outcome: Progressing     Problem: MOBILITY - ADULT  Goal: Maintain or return to baseline ADL function  Description: INTERVENTIONS:  -  Assess patient's ability to carry out ADLs; assess patient's baseline for ADL function and identify physical deficits which impact ability to perform ADLs (bathing, care of mouth/teeth, toileting, grooming, dressing, etc )  - Assess/evaluate cause of self-care deficits   - Assess range of motion  - Assess patient's mobility; develop plan if impaired  - Assess patient's need for assistive devices and provide as appropriate  - Encourage maximum independence but intervene and supervise when necessary  - Involve family in performance of ADLs  - Assess for home care needs following discharge   - Consider OT consult to assist with ADL evaluation and planning for discharge  - Provide patient education as appropriate  Outcome: Progressing  Goal: Maintains/Returns to pre admission functional level  Description: INTERVENTIONS:  - Perform BMAT or MOVE assessment daily    - Set and communicate daily mobility goal to care team and patient/family/caregiver  - Collaborate with rehabilitation services on mobility goals if consulted  - Perform Range of Motion 3 times a day  - Reposition patient every 2 hours    - Dangle patient 3 times a day  - Stand patient 3 times a day  - Ambulate patient 3 times a day  - Out of bed to chair   - Out of bed for meals   - Out of bed for toileting  - Record patient progress and toleration of activity level   Outcome: Progressing     Problem: PAIN - ADULT  Goal: Verbalizes/displays adequate comfort level or baseline comfort level  Description: Interventions:  - Encourage patient to monitor pain and request assistance  - Assess pain using appropriate pain scale  - Administer analgesics based on type and severity of pain and evaluate response  - Implement non-pharmacological measures as appropriate and evaluate response  - Consider cultural and social influences on pain and pain management  - Notify physician/advanced practitioner if interventions unsuccessful or patient reports new pain  Outcome: Progressing     Problem: INFECTION - ADULT  Goal: Absence or prevention of progression during hospitalization  Description: INTERVENTIONS:  - Assess and monitor for signs and symptoms of infection  - Monitor lab/diagnostic results  - Monitor all insertion sites, i e  indwelling lines, tubes, and drains  - Monitor endotracheal if appropriate and nasal secretions for changes in amount and color  - [place] appropriate cooling/warming therapies per order  - Administer medications as ordered  - Instruct and encourage patient and family to use good hand hygiene technique  - Identify and instruct in appropriate isolation precautions for identified infection/condition  Outcome: Progressing     Problem: SAFETY ADULT  Goal: Maintain or return to baseline ADL function  Description: INTERVENTIONS:  -  Assess patient's ability to carry out ADLs; assess patient's baseline for ADL function and identify physical deficits which impact ability to perform ADLs (bathing, care of mouth/teeth, toileting, grooming, dressing, etc )  - Assess/evaluate cause of self-care deficits   - Assess range of motion  - Assess patient's mobility; develop plan if impaired  - Assess patient's need for assistive devices and provide as appropriate  - Encourage maximum independence but intervene and supervise when necessary  - Involve family in performance of ADLs  - Assess for home care needs following discharge   - Consider OT consult to assist with ADL evaluation and planning for discharge  - Provide patient education as appropriate  Outcome: Progressing  Goal: Maintains/Returns to pre admission functional level  Description: INTERVENTIONS:  - Perform BMAT or MOVE assessment daily    - Set and communicate daily mobility goal to care team and patient/family/caregiver  - Collaborate with rehabilitation services on mobility goals if consulted  - Perform Range of Motion 3 times a day  - Reposition patient every 2 hours    - Dangle patient 3 times a day  - Stand patient 3 times a day  - Ambulate patient 3 times a day  - Out of bed to chair   - Out of bed for meals   - Out of bed for toileting  - Record patient progress and toleration of activity level   Outcome: Progressing  Goal: Patient will remain free of falls  Description: INTERVENTIONS:  - Educate patient/family on patient safety including physical limitations  - Instruct patient to call for assistance with activity   - Consult OT/PT to assist with strengthening/mobility   - Keep Call bell within reach  - Keep bed low and locked with side rails adjusted as appropriate  - Keep care items and personal belongings within reach  - Initiate and maintain comfort rounds  - Make Fall Risk Sign visible to staff  - Offer Toileting every 2 Hours, in advance of need  - Initiate/Maintain bed alarm  - Obtain necessary fall risk management equipment: walker   - Apply yellow socks and bracelet for high fall risk patients  - Consider moving patient to room near nurses station  Outcome: Progressing     Problem: DISCHARGE PLANNING  Goal: Discharge to home or other facility with appropriate resources  Description: INTERVENTIONS:  - Identify barriers to discharge w/patient and caregiver  - Arrange for needed discharge resources and transportation as appropriate  - Identify discharge learning needs  - Refer to Case Management Department for coordinating discharge planning if the patient needs post-hospital services based on physician/advanced practitioner order or complex needs related to functional status, cognitive ability, or social support system  Outcome: Progressing     Problem: Knowledge Deficit  Goal: Patient/family/caregiver demonstrates understanding of disease process, treatment plan, medications, and discharge instructions  Description: Complete learning assessment and assess knowledge base    Interventions:  - Provide teaching at level of understanding  - Provide teaching via preferred learning methods  Outcome: Progressing     Problem: GENITOURINARY - ADULT  Goal: Urinary catheter remains patent  Description: INTERVENTIONS:  - Assess patency of urinary catheter  - Discontinue lopez catheter POD #1  - Follow guidelines for intermittent irrigation of non-functioning urinary catheter  Outcome: Progressing  Goal: Absence of urinary retention  Description: INTERVENTIONS:  - After lopez catheter removed, assess patient's ability to void and empty bladder  - Monitor I/O  - Bladder scan as needed  - Discuss with physician/AP medications to alleviate retention as needed  - Follow urine retention protocol if ordered  Outcome: Progressing     Problem: SKIN/TISSUE INTEGRITY - ADULT  Goal: Incision(s), wounds(s) or drain site(s) healing without S/S of infection  Description: INTERVENTIONS  - Assess and document dressing, incision, wound bed, drain sites and surrounding tissue  - Provide patient and family education  - Perform skin care/dressing changes as ordered  Outcome: Progressing     Problem: COPING  Goal: Will report anxiety at manageable levels  Description: INTERVENTIONS:  - Administer medication as ordered  - Teach and encourage coping skills  - Provide emotional support  - Assess patient/family for anxiety and ability to cope  Outcome: Progressing

## 2023-03-07 NOTE — ASSESSMENT & PLAN NOTE
· Hemoglobin 6 3 postoperatively  · Transfused 2 units PRBCs now  · Continue monitoring hemoglobin while inpatient

## 2023-03-07 NOTE — ASSESSMENT & PLAN NOTE
Lab Results   Component Value Date    EGFR 46 03/07/2023    EGFR 61 02/07/2023    EGFR 71 12/06/2022    CREATININE 1 19 03/07/2023    CREATININE 0 95 02/07/2023    CREATININE 0 84 12/06/2022     · Creatinine mildly elevated at 1 19 today  · Baseline 0 8-0 9  · Continue monitoring while inpatient  · Correct anemia
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
## 2023-03-07 NOTE — PLAN OF CARE
Problem: Prexisting or High Potential for Compromised Skin Integrity  Goal: Skin integrity is maintained or improved  Description: INTERVENTIONS:  - Identify patients at risk for skin breakdown  - Assess and monitor skin integrity  - Assess and monitor nutrition and hydration status  - Monitor labs   - Assess for incontinence   - Turn and reposition patient  - Assist with mobility/ambulation  - Relieve pressure over bony prominences  - Avoid friction and shearing  - Provide appropriate hygiene as needed including keeping skin clean and dry  - Evaluate need for skin moisturizer/barrier cream  - Collaborate with interdisciplinary team   - Patient/family teaching  Outcome: Progressing     Problem: MOBILITY - ADULT  Goal: Maintains/Returns to pre admission functional level  Description: INTERVENTIONS:  - Perform BMAT or MOVE assessment daily    - Set and communicate daily mobility goal to care team and patient/family/caregiver  - Collaborate with rehabilitation services on mobility goals if consulted  - Perform Range of Motion 3 times a day  - Reposition patient every 2 hours    - Dangle patient 3 times a day  - Stand patient 3 times a day  - Ambulate patient 3 times a day  - Out of bed to chair   - Out of bed for meals   - Out of bed for toileting  - Record patient progress and toleration of activity level   Outcome: Progressing     Problem: PAIN - ADULT  Goal: Verbalizes/displays adequate comfort level or baseline comfort level  Description: Interventions:  - Encourage patient to monitor pain and request assistance  - Assess pain using appropriate pain scale  - Administer analgesics based on type and severity of pain and evaluate response  - Implement non-pharmacological measures as appropriate and evaluate response  - Consider cultural and social influences on pain and pain management  - Notify physician/advanced practitioner if interventions unsuccessful or patient reports new pain  Outcome: Progressing Problem: SAFETY ADULT  Goal: Maintains/Returns to pre admission functional level  Description: INTERVENTIONS:  - Perform BMAT or MOVE assessment daily    - Set and communicate daily mobility goal to care team and patient/family/caregiver  - Collaborate with rehabilitation services on mobility goals if consulted  - Perform Range of Motion 3 times a day  - Reposition patient every 2 hours    - Dangle patient 3 times a day  - Stand patient 3 times a day  - Ambulate patient 3 times a day  - Out of bed to chair   - Out of bed for meals   - Out of bed for toileting  - Record patient progress and toleration of activity level   Outcome: Progressing  Goal: Patient will remain free of falls  Description: INTERVENTIONS:  - Educate patient/family on patient safety including physical limitations  - Instruct patient to call for assistance with activity   - Consult OT/PT to assist with strengthening/mobility   - Keep Call bell within reach  - Keep bed low and locked with side rails adjusted as appropriate  - Keep care items and personal belongings within reach  - Initiate and maintain comfort rounds  - Make Fall Risk Sign visible to staff  - Offer Toileting every 2 Hours, in advance of need  - Initiate/Maintain bed/chair alarm  - Obtain necessary fall risk management equipment: bed/chair alarm, call bell, gripper socks, walker, bedside commode  - Apply yellow socks and bracelet for high fall risk patients  - Consider moving patient to room near nurses station  Outcome: Progressing     Problem: DISCHARGE PLANNING  Goal: Discharge to home or other facility with appropriate resources  Description: INTERVENTIONS:  - Identify barriers to discharge w/patient and caregiver  - Arrange for needed discharge resources and transportation as appropriate  - Identify discharge learning needs  - Refer to Case Management Department for coordinating discharge planning if the patient needs post-hospital services based on physician/advanced practitioner order or complex needs related to functional status, cognitive ability, or social support system  Outcome: Progressing     Problem: Knowledge Deficit  Goal: Patient/family/caregiver demonstrates understanding of disease process, treatment plan, medications, and discharge instructions  Description: Complete learning assessment and assess knowledge base    Interventions:  - Provide teaching at level of understanding  - Provide teaching via preferred learning methods  Outcome: Progressing     Problem: GENITOURINARY - ADULT  Goal: Urinary catheter remains patent  Description: INTERVENTIONS:  - Assess patency of urinary catheter  - Discontinue lopez catheter POD #1  - Follow guidelines for intermittent irrigation of non-functioning urinary catheter  Outcome: Progressing     Problem: SKIN/TISSUE INTEGRITY - ADULT  Goal: Incision(s), wounds(s) or drain site(s) healing without S/S of infection  Description: INTERVENTIONS  - Assess and document dressing, incision, wound bed, drain sites and surrounding tissue  - Provide patient and family education  - Perform skin care/dressing changes as ordered  Outcome: Progressing     Problem: COPING  Goal: Will report anxiety at manageable levels  Description: INTERVENTIONS:  - Administer medication as ordered  - Teach and encourage coping skills  - Provide emotional support  - Assess patient/family for anxiety and ability to cope  Outcome: Progressing

## 2023-03-07 NOTE — PROGRESS NOTES
Progress Note - Orthopedics   Neelam Ibrahim 77 y o  female MRN: [id number]  Unit/Bed#: E2 -01      Subjective:    77 y  o female POD 1 right hip conversion ROWDY (antibiotic spacer), I&D, and insertion of drug delivery device  No acute overnight events, no new complaints  The patient states that her pain is reasonably well-controlled  She states she has a little numbness and tingling in her great toe, but this seems to be positional rather than consistent  She denies any fevers or chills  She is currently receiving 2 units of blood  When the transfusion first started, she felt a little light-headed and was noted to have low blood pressure  She states that she feels fine now, though, and presently has no complaints        Labs:  0   Lab Value Date/Time    HCT 21 2 (L) 03/07/2023 0459    HCT 25 1 (L) 03/06/2023 1811    HCT 38 5 03/03/2023 1054    HCT 41 6 05/15/2015 1223    HCT 41 9 03/22/2015 0842    HCT 32 3 (L) 01/30/2015 0505    HGB 6 3 (LL) 03/07/2023 0459    HGB 7 4 (L) 03/06/2023 1811    HGB 11 8 03/03/2023 1054    HGB 13 2 05/15/2015 1223    HGB 13 3 03/22/2015 0842    HGB 10 2 (L) 01/30/2015 0505    INR 1 11 02/07/2023 0714    WBC 14 23 (H) 03/07/2023 0459    WBC 20 72 (H) 03/06/2023 1811    WBC 9 70 03/03/2023 1054    WBC 6 70 05/15/2015 1223    WBC 6 16 03/22/2015 0842    WBC 16 18 (H) 01/30/2015 0505    ESR 94 (H) 02/07/2023 0708    ESR 23 (H) 05/15/2015 1223    CRP 33 0 (H) 02/07/2023 0708    CRP 30 2 (H) 05/15/2015 1223       Meds:    Current Facility-Administered Medications:   •  acetaminophen (TYLENOL) tablet 975 mg, 975 mg, Oral, Q8H, [employer], PA-C, 975 mg at 03/07/23 7423  •  aluminum-magnesium hydroxide-simethicone (MYLANTA) oral suspension 30 mL, 30 mL, Oral, Q6H PRN, [employer]  •  aspirin (ECOTRIN LOW STRENGTH) EC tablet 81 mg, 81 mg, Oral, BID, Alyce Tabares PA-C, 81 mg at 03/07/23 9817  •  calcium carbonate (TUMS) chewable tablet 1,000 mg, 1,000 mg, Oral, Daily PRN, Trent Julien PA-C  •  ceFAZolin (ANCEF) IVPB (premix in dextrose) 2,000 mg 50 mL, 2,000 mg, Intravenous, Q8H, Trent Julien PA-C, Stopped at 03/07/23 0117  •  docusate sodium (COLACE) capsule 100 mg, 100 mg, Oral, BID, Trent Julien PA-C, 100 mg at 03/07/23 7903  •  DULoxetine (CYMBALTA) delayed release capsule 60 mg, 60 mg, Oral, Daily, Trent Julien PA-C, 60 mg at 03/07/23 4140  •  gabapentin (NEURONTIN) capsule 300 mg, 300 mg, Oral, HS, Trent Julien PA-C, 300 mg at 03/06/23 2217  •  lactated ringers bolus 1,000 mL, 1,000 mL, Intravenous, Once PRN **AND** lactated ringers bolus 1,000 mL, 1,000 mL, Intravenous, Once PRN, Trent Julien PA-C  •  lactated ringers bolus 1,000 mL, 1,000 mL, Intravenous, Once PRN **AND** lactated ringers bolus 1,000 mL, 1,000 mL, Intravenous, Once PRN, Sejal Capone, DO  •  lactated ringers infusion, 125 mL/hr, Intravenous, Continuous, Trent Julien PA-C, Stopped at 03/06/23 2047  •  lactated ringers infusion, 100 mL/hr, Intravenous, Continuous, Trent Julien PA-C, Stopped at 03/07/23 3616  •  LORazepam (ATIVAN) tablet 0 5 mg, 0 5 mg, Oral, BID PRN, Trent Julien PA-C  •  metoprolol tartrate (LOPRESSOR) tablet 25 mg, 25 mg, Oral, Q12H [address], Trent Julien PA-C  •  morphine injection 2 mg, 2 mg, Intravenous, Q2H PRN, Trent Julien PA-C  •  [facility] injection 4 mg, 4 mg, Intravenous, Q6H PRN, Trent Julien PA-C  •  ondansetron (ZOFRAN-ODT) dispersible tablet 8 mg, 8 mg, Oral, Q8H PRN, Trent Julien PA-C  •  oxybutynin (DITROPAN-XL) 24 hr tablet 10 mg, 10 mg, Oral, Daily, Trent Julien PA-C  •  oxyCODONE (OxyCONTIN) 12 hr tablet 40 mg, 40 mg, Oral, Q12H [address], Bowie Sara Tabares PA-C, 40 mg at 03/07/23 9132  •  oxyCODONE (ROXICODONE) IR tablet 10 mg, 10 mg, Oral, Q4H PRN, Trent Julien PA-C  •  oxyCODONE (ROXICODONE) IR tablet 20 mg, 20 mg, Oral, Q4H PRN, Trent Julien PA-C, 20 mg at 03/07/23 1965  •  senna (SENOKOT) tablet 8 6 mg, 1 tablet, Oral, Daily, Arlene Tabares PA-C, 8 6 mg at 03/07/23 8296  •  sodium chloride 0 9 % bolus 1,000 mL, 1,000 mL, Intravenous, Once PRN **AND** sodium chloride 0 9 % bolus 1,000 mL, 1,000 mL, Intravenous, Once PRN, Kemi Mcneill PA-C  •  sodium chloride 0 9 % bolus 1,000 mL, 1,000 mL, Intravenous, Once PRN **AND** sodium chloride 0 9 % bolus 1,000 mL, 1,000 mL, Intravenous, Once PRN, Christopher Mcgraw DO  •  vancomycin (VANCOCIN) 1,250 mg in sodium chloride 0 9 % 250 mL IVPB, 15 mg/kg, Intravenous, PRN, Kemi Mcneill PA-C    Blood Culture:   Lab Results   Component Value Date    BLOODCX No Growth After 5 Days  01/12/2023    BLOODCX No Growth After 5 Days  01/12/2023       Wound Culture:   No results found for: WOUNDCULT    Ins and Outs:  I/O last 24 hours: In: 4406 7 [I V :3606 7; IV Piggyback:800 1]  Out: 1725 [Urine:1225; Blood:500]          Physical:  Vitals:    03/07/23 0907   BP: 106/55   Pulse: 105   Resp: 18   Temp: 98 8 °F (37 1 °C)   SpO2: 95%     Musculoskeletal: right Lower Extremity  · Visible skin without erythema or ecchymosis  · Dressing C/D/I  · TTP over the hip  · Sensation intact over the toes  · Motor intact to +FHL/EHL, +ankle dorsi/plantar flexion  Movement in the toes is limited at baseline due to prior accident  · Digits warm and well perfused  · Capillary refill < 2 seconds    Assessment:    77 y  o female POD 1 right hip conversion ROWDY (antibiotic spacer), I&D, and insertion of drug delivery device with Dr Renata Hinkle  Patient doing well  Plan:  · WBAT RLE  · Anterior hip precautions  · Patient found to have likely osteomyelitis and gross purulence intra-operatively; Currently on ancef q8 hours and vancomycin q12 hours  ID recommendations appreciated  Follow-up on intra-operative cultures  · Hgb was found to be 6 3 this morning  2 units of blood have been ordered for the patient  We will monitor and administer IVF/prbc as indicated    Continue to monitor blood pressure  · PT/OT  · Pain control with oxycontin 40 mg twice daily and PRN medications due to high baseline dosing of narcotics  · DVT ppx with aspirin 81 mg twice daily while in the hospital and at discharge    · Medical co-morbidities are being managed per medicine team  · Dispo: Ortho will follow    Darinel Escobedo PA-C

## 2023-03-07 NOTE — OCCUPATIONAL THERAPY NOTE
Occupational Therapy         Patient Name: Patrizia Donovan  [id number]'[id number] Date: 3/7/2023     03/07/23 1145   OT Last Visit   OT Visit Date 03/07/23   Note Type   Note type Cancelled Session   Cancel Reasons Medical status   Additional Comments OT consult received  Chart reviewed  Pt w/ low hgb of 6 3 + hypotension BP 93/44  Pt currently receiving blood transfusion  RN advised to defer OT/PT evals at this time  Will follow in pm as medically appropriate       Elisabeth Crenshaw

## 2023-03-07 NOTE — ASSESSMENT & PLAN NOTE
· Intermittently hypotensive postoperatively, may be component of symptomatic anemia  · Continue metoprolol with hold parameters  · Monitor with blood transfusion and improvement in hemoglobin

## 2023-03-07 NOTE — PHYSICAL THERAPY NOTE
PHYSICAL THERAPY NOTE          Patient Name: Oralia Sauceda  [id number] Date: 3/7/2023       03/07/23 1143   PT Last Visit   PT Visit Date 03/07/23   Note Type   Note type Evaluation; Cancelled Session   Cancel Reasons Medical status   Additional Comments PT consult received  Chart reviewed  Pt w/ low hgb of 6 3 + hypotension BP 93/44  Pt currently receiving blood transfusion  RN advised to defer PT/OT evals at this time  Will follow in pm as medically appropriate       Bijal Carter
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
## 2023-03-07 NOTE — ASSESSMENT & PLAN NOTE
· Patient had recent MSSA bacteremia in November 2022  · Completed course of IV Ancef  · Initially source thought to be due to cellulitis in the setting of chronic leg lymphedema

## 2023-03-07 NOTE — ASSESSMENT & PLAN NOTE
· History of chronic lymphedema, appears under control at this time  · Encourage sequential compression devices while inpatient

## 2023-03-07 NOTE — PLAN OF CARE
Problem: OCCUPATIONAL THERAPY ADULT  Goal: Performs self-care activities at highest level of function for planned discharge setting  See evaluation for individualized goals  Description: Treatment Interventions: ADL retraining, Functional transfer training, UE strengthening/ROM, Endurance training, Patient/family training, Equipment evaluation/education, Neuromuscular reeducation, Compensatory technique education, Continued evaluation, Energy conservation, Activityengagement          See flowsheet documentation for full assessment, interventions and recommendations  Outcome: Progressing  Note: Limitation: Decreased ADL status, Decreased UE strength, Decreased Safe judgement during ADL, Decreased endurance, Decreased self-care trans, Decreased high-level ADLs  Prognosis: Good  Assessment: Patient is a 77y o  year old female seen for OT eval s/p admit to [facility] on 3/6/2023 with post-traumatic osteoarthritis of R hip s/p ROWDY, seen POD1  Pt was found to have evidence of purulence and chronic osteomyelitis during surgery, thus old hardware was removed, I&D and insertion of drug delivery device  Hgb 6 3 this AM and hypotensive, s/p 2 units PRBCs  Pt also with MSSA bacteremia in Nov 2022 with completed course of IV Ancef  Comorbidities affecting pt’s functional performance include a significant PMH of: HTN, CKD3, lymphedema, hx of PE, CHF, anemia, charcot's joint, hx of CA, chronic pain, depression, GERD, SITS, acute metabolic encephalopathy, elevated troponin, mitral valve disorder, morbid obesity, septic shock, chronic R sided LB pain without sciatica, cellulitis of RLE  Patient with active OT orders and activity orders for Activity as tolerated; pt is WBAT RLE  Personal factors affecting pt at time of IE include: difficulty performing ADLs and IADLs, difficulty with functional mobility/transfers  Prior to admission, pt requires assistance for ADLs/IADLs   Upon evaluation, patient’s functional status as follows: eating: independent, grooming: SBA, UB bathing: SBA, LB bathing: MOD A, UB dressing: SBA, LB dressing: MAX A, toileting: MAX A; functional transfers: MOD A, bed mobility: MOD A, functional mobility: MIN A , sitting/standing tolerance: ~2 min with b/l UE support- due to the following deficits impacting occupational performance: decreased B UE strength, decreased static/dynamic sitting/standing balance, decreased activity tolerance, decreased safety awareness, and increased pain  Patient would benefit from continued skilled OT therapy while in acute setting to address deficits as defined above and maximize (I) with ADLs and functional mobility  Occupational performance areas to address include: grooming, bathing, toileting, UB/LB dressing, functional mobility, household maintenance, and medication management   Based on the aforementioned OT evaluation, functional performance deficits, and assessments, pt has been identified as a high complexity evaluation     OT Discharge Recommendation: Post acute rehabilitation services

## 2023-03-07 NOTE — ASSESSMENT & PLAN NOTE
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

## 2023-03-08 LAB
ABO GROUP BLD BPU: NORMAL
ABO GROUP BLD BPU: NORMAL
ANION GAP SERPL CALCULATED.3IONS-SCNC: 4 MMOL/L (ref 4–13)
BPU ID: NORMAL
BPU ID: NORMAL
BUN SERPL-MCNC: 18 MG/DL (ref 5–25)
CALCIUM SERPL-MCNC: 7.9 MG/DL (ref 8.4–10.2)
CHLORIDE SERPL-SCNC: 108 MMOL/L (ref 96–108)
CO2 SERPL-SCNC: 28 MMOL/L (ref 21–32)
CREAT SERPL-MCNC: 1.03 MG/DL (ref 0.6–1.3)
CROSSMATCH: NORMAL
CROSSMATCH: NORMAL
ERYTHROCYTE [DISTWIDTH] IN BLOOD BY AUTOMATED COUNT: 16.9 % (ref 11.6–15.1)
GFR SERPL CREATININE-BSD FRML MDRD: 55 ML/MIN/1.73SQ M
GLUCOSE SERPL-MCNC: 139 MG/DL (ref 65–140)
HCT VFR BLD AUTO: 25.2 % (ref 34.8–46.1)
HGB BLD-MCNC: 7.8 G/DL (ref 11.5–15.4)
MCH RBC QN AUTO: 29.2 PG (ref 26.8–34.3)
MCHC RBC AUTO-ENTMCNC: 31 G/DL (ref 31.4–37.4)
MCV RBC AUTO: 94 FL (ref 82–98)
PLATELET # BLD AUTO: 236 THOUSANDS/UL (ref 149–390)
PMV BLD AUTO: 8.9 FL (ref 8.9–12.7)
POTASSIUM SERPL-SCNC: 4.2 MMOL/L (ref 3.5–5.3)
RBC # BLD AUTO: 2.67 MILLION/UL (ref 3.81–5.12)
SODIUM SERPL-SCNC: 140 MMOL/L (ref 135–147)
UNIT DISPENSE STATUS: NORMAL
UNIT DISPENSE STATUS: NORMAL
UNIT PRODUCT CODE: NORMAL
UNIT PRODUCT CODE: NORMAL
UNIT PRODUCT VOLUME: 350 ML
UNIT PRODUCT VOLUME: 350 ML
UNIT RH: NORMAL
UNIT RH: NORMAL
VANCOMYCIN SERPL-MCNC: 17.7 UG/ML (ref 10–20)
WBC # BLD AUTO: 15.31 THOUSAND/UL (ref 4.31–10.16)

## 2023-03-08 RX ORDER — SACCHAROMYCES BOULARDII 250 MG
250 CAPSULE ORAL 2 TIMES DAILY
Status: DISCONTINUED | OUTPATIENT
Start: 2023-03-08 | End: 2023-03-11 | Stop reason: HOSPADM

## 2023-03-08 RX ORDER — PANTOPRAZOLE SODIUM 40 MG/1
40 TABLET, DELAYED RELEASE ORAL
Status: DISCONTINUED | OUTPATIENT
Start: 2023-03-08 | End: 2023-03-11 | Stop reason: HOSPADM

## 2023-03-08 RX ORDER — OXYBUTYNIN CHLORIDE 10 MG/1
10 TABLET, EXTENDED RELEASE ORAL
Status: DISCONTINUED | OUTPATIENT
Start: 2023-03-08 | End: 2023-03-11 | Stop reason: HOSPADM

## 2023-03-08 RX ORDER — MELATONIN
2000 DAILY
Status: DISCONTINUED | OUTPATIENT
Start: 2023-03-08 | End: 2023-03-11 | Stop reason: HOSPADM

## 2023-03-08 RX ORDER — ASCORBIC ACID 500 MG
500 TABLET ORAL 2 TIMES DAILY
Status: DISCONTINUED | OUTPATIENT
Start: 2023-03-08 | End: 2023-03-11 | Stop reason: HOSPADM

## 2023-03-08 RX ORDER — FOLIC ACID 1 MG/1
1000 TABLET ORAL DAILY
Status: DISCONTINUED | OUTPATIENT
Start: 2023-03-08 | End: 2023-03-11 | Stop reason: HOSPADM

## 2023-03-08 RX ORDER — CALCIUM CARBONATE 500(1250)
1 TABLET ORAL 2 TIMES DAILY WITH MEALS
Status: DISCONTINUED | OUTPATIENT
Start: 2023-03-08 | End: 2023-03-11 | Stop reason: HOSPADM

## 2023-03-08 RX ADMIN — SENNOSIDES 8.6 MG: 8.6 TABLET, FILM COATED ORAL at 09:06

## 2023-03-08 RX ADMIN — OXYBUTYNIN CHLORIDE 10 MG: 10 TABLET, EXTENDED RELEASE ORAL at 22:00

## 2023-03-08 RX ADMIN — CEFAZOLIN SODIUM 2000 MG: 2 SOLUTION INTRAVENOUS at 06:01

## 2023-03-08 RX ADMIN — CEFAZOLIN SODIUM 2000 MG: 2 SOLUTION INTRAVENOUS at 16:29

## 2023-03-08 RX ADMIN — Medication 250 MG: at 17:20

## 2023-03-08 RX ADMIN — MULTIPLE VITAMINS W/ MINERALS TAB 1 TABLET: TAB ORAL at 09:51

## 2023-03-08 RX ADMIN — DOCUSATE SODIUM 100 MG: 100 CAPSULE, LIQUID FILLED ORAL at 09:07

## 2023-03-08 RX ADMIN — VANCOMYCIN HYDROCHLORIDE 1250 MG: 5 INJECTION, POWDER, LYOPHILIZED, FOR SOLUTION INTRAVENOUS at 22:01

## 2023-03-08 RX ADMIN — ACETAMINOPHEN 325MG 975 MG: 325 TABLET ORAL at 14:54

## 2023-03-08 RX ADMIN — ACETAMINOPHEN 325MG 975 MG: 325 TABLET ORAL at 06:00

## 2023-03-08 RX ADMIN — Medication 1 TABLET: at 16:29

## 2023-03-08 RX ADMIN — OXYCODONE HYDROCHLORIDE 40 MG: 40 TABLET, FILM COATED, EXTENDED RELEASE ORAL at 09:07

## 2023-03-08 RX ADMIN — FOLIC ACID 1000 MCG: 1 TABLET ORAL at 09:51

## 2023-03-08 RX ADMIN — OXYCODONE HYDROCHLORIDE 20 MG: 5 TABLET ORAL at 11:44

## 2023-03-08 RX ADMIN — ASPIRIN 81 MG: 81 TABLET, COATED ORAL at 09:05

## 2023-03-08 RX ADMIN — OXYCODONE HYDROCHLORIDE AND ACETAMINOPHEN 500 MG: 500 TABLET ORAL at 09:51

## 2023-03-08 RX ADMIN — ASPIRIN 81 MG: 81 TABLET, COATED ORAL at 22:00

## 2023-03-08 RX ADMIN — OXYCODONE HYDROCHLORIDE 40 MG: 40 TABLET, FILM COATED, EXTENDED RELEASE ORAL at 22:00

## 2023-03-08 RX ADMIN — Medication 2000 UNITS: at 09:51

## 2023-03-08 RX ADMIN — CEFAZOLIN SODIUM 2000 MG: 2 SOLUTION INTRAVENOUS at 23:50

## 2023-03-08 RX ADMIN — PANTOPRAZOLE SODIUM 40 MG: 40 TABLET, DELAYED RELEASE ORAL at 09:51

## 2023-03-08 RX ADMIN — ACETAMINOPHEN 325MG 975 MG: 325 TABLET ORAL at 22:00

## 2023-03-08 RX ADMIN — Medication 250 MG: at 09:51

## 2023-03-08 RX ADMIN — OXYCODONE HYDROCHLORIDE AND ACETAMINOPHEN 500 MG: 500 TABLET ORAL at 17:20

## 2023-03-08 RX ADMIN — DULOXETINE 60 MG: 60 CAPSULE, DELAYED RELEASE ORAL at 09:12

## 2023-03-08 RX ADMIN — LORAZEPAM 0.5 MG: 0.5 TABLET ORAL at 09:14

## 2023-03-08 RX ADMIN — DOCUSATE SODIUM 100 MG: 100 CAPSULE, LIQUID FILLED ORAL at 17:20

## 2023-03-08 RX ADMIN — METOPROLOL TARTRATE 25 MG: 25 TABLET, FILM COATED ORAL at 22:00

## 2023-03-08 RX ADMIN — LORAZEPAM 0.5 MG: 0.5 TABLET ORAL at 22:06

## 2023-03-08 NOTE — ASSESSMENT & PLAN NOTE
Wt Readings from Last 3 Encounters:   03/08/23 90 5 kg (199 lb 8 6 oz)   02/20/23 92 1 kg (203 lb)   02/14/23 92 2 kg (203 lb 3 2 oz)     · Appears euvolemic on exam  · Monitor for signs of volume overload with blood transfusions  · Continue Lopressor with hold parameters
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
## 2023-03-08 NOTE — PROGRESS NOTES
[facility]  Progress Note - Elaina Chavez 1955, 77 y o  female MRN: [id number]  Unit/Bed#: E2 -01 Encounter: [id number]  Primary Care Provider: Margo Warren DO   Date and time admitted to hospital: 3/6/2023 10:28 AM    * Post-traumatic osteoarthritis  Assessment & Plan  · Patient is postop day 2 from ROWDY for posttraumatic osteoarthritis of right hip  · Was found to have evidence of purulence and chronic osteomyelitis during surgery, some old hardware was removed  · Spacer impregnated with vancomycin was placed in the OR  · Appreciate ID recommendations regarding antibiotic management  · Continue PT/OT while inpatient  · Discharge planning per orthopedics-we will need rehab placement    Anemia  Assessment & Plan  · Hemoglobin 6 3 postoperatively  · Transfused 2 units PRBCs   · Hemoglobin improved to 7 8  · Continue monitoring hemoglobin while inpatient    History of bacteremia  Assessment & Plan  · Patient had recent MSSA bacteremia in November 2022  · Completed course of IV Ancef  · Initially source thought to be due to cellulitis in the setting of chronic leg lymphedema    Chronic diastolic heart failure (HCC)  Assessment & Plan  Wt Readings from Last 3 Encounters:   03/08/23 90 5 kg (199 lb 8 6 oz)   02/20/23 92 1 kg (203 lb)   02/14/23 92 2 kg (203 lb 3 2 oz)     · Appears euvolemic on exam  · Monitor for signs of volume overload with blood transfusions  · Continue Lopressor with hold parameters      History of pulmonary embolism  Assessment & Plan  · DVT prophylaxis per orthopedics  · Currently on aspirin, would recommend heparin or Lovenox  · Does have history of IVC filter    Hypogammaglobulinemia, acquired ([facility] [address] )  Assessment & Plan  · 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  · Being followed outpatient by hematology oncology  · Received IVIG previously, not currently receiving therapy    Lymphedema  Assessment & Plan  · History of chronic lymphedema, appears under control at this time  · Encourage sequential compression devices while inpatient    Chronic kidney disease, stage III (moderate) [facility])  Assessment & Plan  Lab Results   Component Value Date    EGFR 46 03/07/2023    EGFR 61 02/07/2023    EGFR 71 12/06/2022    CREATININE 1 19 03/07/2023    CREATININE 0 95 02/07/2023    CREATININE 0 84 12/06/2022     · Creatinine mildly elevated at 1 19 postoperatively, follow-up today's BMP  · Baseline 0 8-0 9  · Continue monitoring while inpatient  · Continue correcting anemia    Benign essential hypertension  Assessment & Plan  · Intermittently hypotensive postoperatively, may be component of symptomatic anemia  · BP improved today  · Continue metoprolol with hold parameters        VTE Pharmacologic Prophylaxis: VTE Score: 10 Per orthopedics    Patient Centered Rounds: I performed bedside rounds with nursing staff today  Discussions with Specialists or Other Care Team Provider: None    Education and Discussions with Family / Patient: Spoke with patient at bedside  Total Time Spent on Date of Encounter in care of patient: 35 minutes This time was spent on one or more of the following: performing physical exam; counseling and coordination of care; obtaining or reviewing history; documenting in the medical record; reviewing/ordering tests, medications or procedures; communicating with other healthcare professionals and discussing with patient's family/caregivers  Current Length of Stay: 1 day(s)  Current Patient Status: Inpatient   Certification Statement: The patient will continue to require additional inpatient hospital stay due to per ortho  Discharge Plan: [facility] is following this patient on consult  They are medically stable for discharge when deemed appropriate by primary service  Code Status: Prior    Subjective:   Patient seen and examined at bedside   She notes she is having some pain in her hip but notes she has been able to get to and from the bathroom without much difficulty  Would like to go to rehab at time of discharge  Eating and drinking well  Breathing well, denies chest pain  Objective:     Vitals:   Temp (24hrs), Av 4 °F (36 9 °C), Min:97 8 °F (36 6 °C), Max:98 8 °F (37 1 °C)    Temp:  [97 8 °F (36 6 °C)-98 8 °F (37 1 °C)] 97 8 °F (36 6 °C)  HR:  [] 89  Resp:  [16-20] 17  BP: ()/(44-67) 106/53  SpO2:  [93 %-96 %] 96 %  Body mass index is 35 35 kg/m²  Input and Output Summary (last 24 hours): Intake/Output Summary (Last 24 hours) at 3/8/2023 1018  Last data filed at 3/8/2023 0901  Gross per 24 hour   Intake 940 ml   Output 1050 ml   Net -110 ml       Physical Exam:   Physical Exam  Vitals reviewed  Constitutional:       General: She is not in acute distress  HENT:      Head: Normocephalic and atraumatic  Eyes:      General: No scleral icterus  Conjunctiva/sclera: Conjunctivae normal    Cardiovascular:      Rate and Rhythm: Normal rate and regular rhythm  Heart sounds: No murmur heard  Pulmonary:      Effort: Pulmonary effort is normal  No respiratory distress  Breath sounds: Normal breath sounds  Abdominal:      General: Bowel sounds are normal  There is no distension  Palpations: Abdomen is soft  Tenderness: There is no abdominal tenderness  Musculoskeletal:      Cervical back: Neck supple  Comments: Bilateral lower extremities wrapped in ACE wrap   Skin:     General: Skin is warm and dry  Neurological:      Mental Status: She is alert and oriented to person, place, and time     Psychiatric:         Mood and Affect: Mood normal          Behavior: Behavior normal           Additional Data:     Labs:  Results from last 7 days   Lab Units 23  0950 23  0459 23  1811   WBC Thousand/uL 15 31*   < > 20 72*   HEMOGLOBIN g/dL 7 8*   < > 7 4*   HEMATOCRIT % 25 2*   < > 25 1*   PLATELETS Thousands/uL 236   < > 312   NEUTROS PCT %  --   -- 79*   LYMPHS PCT %  --   --  14   MONOS PCT %  --   --  6   EOS PCT %  --   --  0    < > = values in this interval not displayed  Results from last 7 days   Lab Units 03/07/23  0459   SODIUM mmol/L 135   POTASSIUM mmol/L 5 2   CHLORIDE mmol/L 105   CO2 mmol/L 24   BUN mg/dL 19   CREATININE mg/dL 1 19   ANION GAP mmol/L 6   CALCIUM mg/dL 8 0*   GLUCOSE RANDOM mg/dL 169*                       Lines/Drains:  Invasive Devices     Peripheral Intravenous Line  Duration           Peripheral IV 03/06/23 Dorsal (posterior); Left Hand 1 day                      Imaging: No pertinent imaging reviewed      Recent Cultures (last 7 days):   Results from last 7 days   Lab Units 03/06/23  1412 03/06/23  1411   GRAM STAIN RESULT  Rare Polys  No organisms seen  Rare Polys  No organisms seen Rare Polys  No organisms seen       Last 24 Hours Medication List:   Current Facility-Administered Medications   Medication Dose Route Frequency Provider Last Rate   • acetaminophen  975 mg Oral Q8H Daniel Rogers PA-C     • aluminum-magnesium hydroxide-simethicone  30 mL Oral Q6H PRN Daniel Rogers PA-C     • ascorbic acid  500 mg Oral BID Charlene Rodriguez PA-C     • aspirin  81 mg Oral BID Daniel Rogers PA-C     • calcium carbonate  1 tablet Oral BID With Meals Charlene Rodriguez PA-C     • calcium carbonate  1,000 mg Oral Daily PRN Daniel Rogers PA-C     • cefazolin  2,000 mg Intravenous Q8H Daniel Rogers PA-C 2,000 mg (03/08/23 0601)   • cholecalciferol  2,000 Units Oral Daily Charlene Rodriguez PA-C     • docusate sodium  100 mg Oral BID Daniel Rogers PA-C     • DULoxetine  60 mg Oral Daily Daniel Rogers PA-C     • folic acid  0,049 mcg Oral Daily Charlene Rodriguez PA-C     • gabapentin  300 mg Oral HS Daniel Rogers PA-C     • lactated ringers  125 mL/hr Intravenous Continuous Daniel Rogers PA-C Stopped (03/06/23 2047)   • lactated ringers  100 mL/hr Intravenous Continuous Daniel Rogers PA-C Stopped (03/07/23 1963)   • LORazepam  0 5 mg Oral BID PRN Annalise Recinos PA-C     • metoprolol tartrate  25 mg Oral Q12H [address] Annalise Recinos, [place]     • morphine injection  2 mg Intravenous Q2H PRN Annalise Recinos PA-C     • multivitamin-minerals  1 tablet Oral Daily Trinidad Merino PA-C     • ondansetron  4 mg Intravenous Q6H PRN Annalise Recinos PA-C     • ondansetron  8 mg Oral Q8H PRN Annalise Recinos PA-C     • oxybutynin  10 mg Oral HS Trinidad Merino PA-C     • oxyCODONE  40 mg Oral Q12H [address] Annalise Recinos PA-C     • oxyCODONE  10 mg Oral Q4H PRN Annalise Recinos PA-C     • oxyCODONE  20 mg Oral Q4H PRN Annalise Recinos PA-C     • pantoprazole  40 mg Oral Early Morning Trinidad Merino PA-C     • saccharomyces boulardii  250 mg Oral BID Trinidad Merino PA-C     • senna  1 tablet Oral Daily Annalise Recinos PA-C          Today, Patient Was Seen By: Olivier Donahue PA-C    **Please Note: This note may have been constructed using a voice recognition system  **

## 2023-03-08 NOTE — CASE MANAGEMENT
Case Management Assessment & Discharge Planning Note    Patient name Sebastian Das  Location [facility] /E2 -* MRN [id number]  : 1955 Date 3/8/2023       Current Admission Date: 3/6/2023  Current Admission Diagnosis:Post-traumatic osteoarthritis   Patient Active Problem List    Diagnosis Date Noted   • Encounter for geriatric assessment 2023   • Iron deficiency anemia secondary to inadequate dietary iron intake 2023   • Anemia 2023   • History of bacteremia 2022   • Right hip pain 2022   • History of pulmonary embolism 2022   • Chronic diastolic heart failure ([facility] ) 2022   • Slow transit constipation 2021   • Chronic right-sided low back pain without sciatica 2021   • Cellulitis of right lower extremity 2021   • Septic shock ([facility] ) 2021   • Morbid obesity ([facility] ) 2021   • Hypogammaglobulinemia, acquired ([facility] ) 2019   • Hypertrophic cardiomyopathy ([facility] ) 2019   • Low immunoglobulin level 2019   • LVH (left ventricular hypertrophy) 2019   • Postnasal drip 2019   • Dyspnea on exertion 2019   • Mitral valve disorder    • Elevated troponin 2019   • Acute metabolic encephalopathy    • Increased risk of breast cancer    • Other complicated headache syndrome 2018   • Nausea & vomiting 2018   • SIRS (systemic inflammatory response syndrome) ([facility] ) 2018   • Post-traumatic osteoarthritis 2018   • Opiate analgesic use agreement exists 2016   • BRCA1 positive 2016   • Charcot's joint 2016   • Class 2 obesity 2016   • Wegener's granulomatosis with renal involvement ([facility] ) 01/15/2016   • Cancer of ovary ([facility] ) 2015   • Ovarian cancer ([facility] ) 2015   • Increased frequency of urination 2014   • Chronic kidney disease, stage III (moderate) ([facility] ) 2014   • Chronic pain disorder 10/24/2013   • GERD (gastroesophageal reflux disease) 09/27/2013   • Dyslipidemia 04/04/2013   • Lymphedema 11/15/2012   • Anxiety 06/05/2012   • Benign essential hypertension 06/05/2012   • Depression 06/05/2012      LOS (days): 1  Geometric Mean LOS (GMLOS) (days): 2 70  Days to GMLOS:1 4     OBJECTIVE:    Risk of Unplanned Readmission Score: 25 8         Current admission status: Inpatient       Preferred Pharmacy:   [address], [address] [place] [address]  Phone: [phone] Fax: [address] [address]  Phone: [phone] Fax: [address] [facility] [address] ,  [facility] [address] ,  [facility] [address]  Phone: [phone] Fax: [phone]    Primary Care Provider: Nola Valenzuela DO    Primary Insurance: MEDICARE  Secondary Insurance: AARP    ASSESSMENT:  Active Health Care Proxies     Lamb, Δηλη[address] Representative - Spouse   Primary Phone: [phone] (Mobile)  Home Phone: [phone]                         Readmission Root Cause  30 Day Readmission: No    Patient Information  Admitted from[de-identified] Home  Mental Status: Alert  During Assessment patient was accompanied by: Spouse  Assessment information provided by[de-identified] Patient, Spouse  Primary Caregiver: Self  Support Systems: Spouse/significant other, Family members, Darlin Cabrera 59 of Residence: [address] [place] [address] do you live in?: [address] [place] [address] access options   Select all that apply : Ramp  Type of Current Residence: [place]  In the last 12 months, was there a time when you were not able to pay the mortgage or rent on time?: No  In the last 12 months, how many places have you lived?: 1  In the last 12 months, was there a time when you did not have a steady place to sleep or slept in a shelter (including now)?: No  Homeless/housing insecurity resource given?: N/A  Living Arrangements: Lives w/ Spouse/significant other  Is patient a ?: No    Activities of Daily Living Prior to Admission  Functional Status: Independent  Completes ADLs independently?: Yes  Ambulates independently?: No  Level of ambulatory dependence: Assistance (uses assistive device)  Does patient use assisted devices?: Yes  Assisted Devices (DME) used: Walker, Shower Chair, Other (Comment) (grab bars and toilet raiser)  Does patient currently own DME?: Yes  What DME does the patient currently own?: Savita Valiente, Other (Comment), Shower Chair (grab bars and toilet raiser)  Does patient have a history of Outpatient Therapy (PT/OT)?: Yes  Does the patient have a history of Short-Term Rehab?: Yes ([facility] [place])  Does patient have a [facility] of [facility]?: Yes ([address])  Does patient currently have [facility]?: No         Patient Information Continued  Income Source: SSI/SSD  Does patient have prescription coverage?: Yes  Within the past 12 months, you worried that your food would run out before you got the money to buy more : Never true  Within the past 12 months, the food you bought just didn't last and you didn't have money to get more : Never true  Food insecurity resource given?: N/A  Does patient receive dialysis treatments?: No  Does patient have a history of substance abuse?: No  Does patient have a history of Mental Health Diagnosis?: Yes (Depression and Anxiety)  Is patient receiving treatment for mental health?: Yes  Has patient received inpatient treatment related to mental health in the last 2 years?: No         Means of Transportation  [facility] Family transport  In the past 12 months, has lack of transportation kept you from medical appointments or from getting medications?: No  In the past 12 months, has lack of transportation kept you from meetings, work, or from getting things needed for daily living?: No  Was application for public transport provided?: N/A        DISCHARGE DETAILS:    Discharge planning discussed with[de-identified] Patient and spouse  Freedom of Choice: Yes  Comments - Freedom of Choice: Pt and spouse are interested in STR  Pt has been to [facility] in the past and would like to go there if possible  Referrals placed  CM contacted family/caregiver?: Yes  Were Treatment Team discharge recommendations reviewed with patient/caregiver?: Yes  Did patient/caregiver verbalize understanding of patient care needs?: N/A- going to facility  Were patient/caregiver advised of the risks associated with not following Treatment Team discharge recommendations?: Yes    Contacts  Patient Contacts: Theo Lamb/  Relationship to Patient[de-identified] Family  Contact Method: In Person  Reason/Outcome: Continuity of Care, Emergency Contact, Referral, Discharge [address]         Is the patient interested in [facility] at discharge?: No    DME Referral Provided  Referral made for DME?: No    Other Referral/Resources/Interventions Provided:  Interventions: Short Term Rehab  Referral Comments: referrals placed via aidin         Treatment Team Recommendation: Short Term Rehab                                            Additional Comments: CM met with pt and spouse at the bedside  Pt reports that she would like to go to STR at [facility]  PT did report she did much better with therapy and could potentially go home with home health  She is going to be on IV abx for at least 6 weeks so pt is unsure if insurance will cover the IV abx at home  Once script is received from ID, CM will place referral to [facility] to determine cost of IV abx so that pt and her spouse can make an informed decision between home with IV abx or STR

## 2023-03-08 NOTE — ASSESSMENT & PLAN NOTE
Lab Results   Component Value Date    EGFR 46 03/07/2023    EGFR 61 02/07/2023    EGFR 71 12/06/2022    CREATININE 1 19 03/07/2023    CREATININE 0 95 02/07/2023    CREATININE 0 84 12/06/2022     · Creatinine mildly elevated at 1 19 postoperatively, follow-up today's BMP  · Baseline 0 8-0 9  · Continue monitoring while inpatient  · Continue correcting anemia

## 2023-03-08 NOTE — PLAN OF CARE
Problem: Prexisting or High Potential for Compromised Skin Integrity  Goal: Skin integrity is maintained or improved  Description: INTERVENTIONS:  - Identify patients at risk for skin breakdown  - Assess and monitor skin integrity  - Assess and monitor nutrition and hydration status  - Monitor labs   - Assess for incontinence   - Turn and reposition patient  - Assist with mobility/ambulation  - Relieve pressure over bony prominences  - Avoid friction and shearing  - Provide appropriate hygiene as needed including keeping skin clean and dry  - Evaluate need for skin moisturizer/barrier cream  - Collaborate with interdisciplinary team   - Patient/family teaching  Outcome: Progressing     Problem: MOBILITY - ADULT  Goal: Maintain or return to baseline ADL function  Description: INTERVENTIONS:  -  Assess patient's ability to carry out ADLs; assess patient's baseline for ADL function and identify physical deficits which impact ability to perform ADLs (bathing, care of mouth/teeth, toileting, grooming, dressing, etc )  - Assess/evaluate cause of self-care deficits   - Assess range of motion  - Assess patient's mobility; develop plan if impaired  - Assess patient's need for assistive devices and provide as appropriate  - Encourage maximum independence but intervene and supervise when necessary  - Involve family in performance of ADLs  - Assess for home care needs following discharge   - Consider OT consult to assist with ADL evaluation and planning for discharge  - Provide patient education as appropriate  Outcome: Progressing  Goal: Maintains/Returns to pre admission functional level  Description: INTERVENTIONS:  - Perform BMAT or MOVE assessment daily    - Set and communicate daily mobility goal to care team and patient/family/caregiver  - Collaborate with rehabilitation services on mobility goals if consulted  - Perform Range of Motion 3 times a day  - Reposition patient every 2 hours    - Dangle patient 3 times a day  - Stand patient 3 times a day  - Ambulate patient 3 times a day  - Out of bed to chair   - Out of bed for meals   - Out of bed for toileting  - Record patient progress and toleration of activity level   Outcome: Progressing     Problem: PAIN - ADULT  Goal: Verbalizes/displays adequate comfort level or baseline comfort level  Description: Interventions:  - Encourage patient to monitor pain and request assistance  - Assess pain using appropriate pain scale  - Administer analgesics based on type and severity of pain and evaluate response  - Implement non-pharmacological measures as appropriate and evaluate response  - Consider cultural and social influences on pain and pain management  - Notify physician/advanced practitioner if interventions unsuccessful or patient reports new pain  Outcome: Progressing     Problem: INFECTION - ADULT  Goal: Absence or prevention of progression during hospitalization  Description: INTERVENTIONS:  - Assess and monitor for signs and symptoms of infection  - Monitor lab/diagnostic results  - Monitor all insertion sites, i e  indwelling lines, tubes, and drains  - Monitor endotracheal if appropriate and nasal secretions for changes in amount and color  - [place] appropriate cooling/warming therapies per order  - Administer medications as ordered  - Instruct and encourage patient and family to use good hand hygiene technique  - Identify and instruct in appropriate isolation precautions for identified infection/condition  Outcome: Progressing     Problem: SAFETY ADULT  Goal: Maintain or return to baseline ADL function  Description: INTERVENTIONS:  -  Assess patient's ability to carry out ADLs; assess patient's baseline for ADL function and identify physical deficits which impact ability to perform ADLs (bathing, care of mouth/teeth, toileting, grooming, dressing, etc )  - Assess/evaluate cause of self-care deficits   - Assess range of motion  - Assess patient's mobility; develop plan if impaired  - Assess patient's need for assistive devices and provide as appropriate  - Encourage maximum independence but intervene and supervise when necessary  - Involve family in performance of ADLs  - Assess for home care needs following discharge   - Consider OT consult to assist with ADL evaluation and planning for discharge  - Provide patient education as appropriate  Outcome: Progressing  Goal: Maintains/Returns to pre admission functional level  Description: INTERVENTIONS:  - Perform BMAT or MOVE assessment daily    - Set and communicate daily mobility goal to care team and patient/family/caregiver  - Collaborate with rehabilitation services on mobility goals if consulted  - Perform Range of Motion 3 times a day  - Reposition patient every 2 hours    - Dangle patient 3 times a day  - Stand patient 3 times a day  - Ambulate patient 3 times a day  - Out of bed to chair   - Out of bed for meals   - Out of bed for toileting  - Record patient progress and toleration of activity level   Outcome: Progressing  Goal: Patient will remain free of falls  Description: INTERVENTIONS:  - Educate patient/family on patient safety including physical limitations  - Instruct patient to call for assistance with activity   - Consult OT/PT to assist with strengthening/mobility   - Keep Call bell within reach  - Keep bed low and locked with side rails adjusted as appropriate  - Keep care items and personal belongings within reach  - Initiate and maintain comfort rounds  - Make Fall Risk Sign visible to staff  - Offer Toileting every 2 Hours, in advance of need  - Initiate/Maintain bed alarm  - Obtain necessary fall risk management equipment: walker  - Apply yellow socks and bracelet for high fall risk patients  - Consider moving patient to room near nurses station  Outcome: Progressing     Problem: DISCHARGE PLANNING  Goal: Discharge to home or other facility with appropriate resources  Description: INTERVENTIONS:  - Identify barriers to discharge w/patient and caregiver  - Arrange for needed discharge resources and transportation as appropriate  - Identify discharge learning needs  - Refer to Case Management Department for coordinating discharge planning if the patient needs post-hospital services based on physician/advanced practitioner order or complex needs related to functional status, cognitive ability, or social support system  Outcome: Progressing     Problem: Knowledge Deficit  Goal: Patient/family/caregiver demonstrates understanding of disease process, treatment plan, medications, and discharge instructions  Description: Complete learning assessment and assess knowledge base    Interventions:  - Provide teaching at level of understanding  - Provide teaching via preferred learning methods  Outcome: Progressing     Problem: GENITOURINARY - ADULT  Goal: Urinary catheter remains patent  Description: INTERVENTIONS:  - Assess patency of urinary catheter  - Discontinue lopez catheter POD #1  - Follow guidelines for intermittent irrigation of non-functioning urinary catheter  Outcome: Progressing  Goal: Absence of urinary retention  Description: INTERVENTIONS:  - After lopez catheter removed, assess patient's ability to void and empty bladder  - Monitor I/O  - Bladder scan as needed  - Discuss with physician/AP medications to alleviate retention as needed  - Follow urine retention protocol if ordered  Outcome: Progressing     Problem: SKIN/TISSUE INTEGRITY - ADULT  Goal: Incision(s), wounds(s) or drain site(s) healing without S/S of infection  Description: INTERVENTIONS  - Assess and document dressing, incision, wound bed, drain sites and surrounding tissue  - Provide patient and family education  - Perform skin care/dressing changes as ordered  Outcome: Progressing     Problem: COPING  Goal: Will report anxiety at manageable levels  Description: INTERVENTIONS:  - Administer medication as ordered  - Teach and encourage coping skills  - Provide emotional support  - Assess patient/family for anxiety and ability to cope  Outcome: Progressing
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
## 2023-03-08 NOTE — ASSESSMENT & PLAN NOTE
· Patient is postop day 2 from ROWDY for posttraumatic osteoarthritis of right hip  · Was found to have evidence of purulence and chronic osteomyelitis during surgery, some old hardware was removed  · Spacer impregnated with vancomycin was placed in the OR  · Appreciate ID recommendations regarding antibiotic management  · Continue PT/OT while inpatient  · Discharge planning per orthopedics-we will need rehab placement

## 2023-03-08 NOTE — PHYSICAL THERAPY NOTE
Physical Therapy Treatment Note     03/08/23 1043   PT Last Visit   PT Visit Date 03/08/23   Note Type   Note Type Treatment   Pain Assessment   Pain Assessment Tool 0-10   Pain Score 6   Pain Location/Orientation Location: Hip;Orientation: Right   Restrictions/Precautions   Weight Bearing Precautions Per Order Yes   RLE Weight Bearing Per Order WBAT   Other Precautions Chair Alarm; Fall Risk;Pain  (Ant hip prec )   General   Chart Reviewed Yes   Subjective   Subjective Pt  in bed upon entry  Agreeable to PT   Bed Mobility   Supine to Sit 3  Moderate assistance   Additional items Assist x 1;Bedrails;HOB elevated; Increased time required;LE management;Verbal cues   Transfers   Sit to Stand 4  Minimal assistance   Additional items Assist x 1; Increased time required;Verbal cues;Armrests   Stand to Sit 5  Supervision   Additional items Assist x 1; Increased time required;Verbal cues   Stand pivot 5  Supervision   Additional items Assist x 1; Increased time required   Toilet transfer 5  Supervision   Additional items Assist x 1; Increased time required;Raised toilet seat   Ambulation/Elevation   Gait pattern Improper Weight shift;Decreased R stance;Decreased foot clearance; Excessively slow; Short stride;Decreased toe off;Decreased heel strike; Step to   Gait Assistance 5  Supervision   Additional items Assist x 1   Assistive Device Rolling walker   Distance 10ft, 100ft   Balance   Static Sitting Good   Dynamic Sitting Fair +   Static Standing Fair +   Dynamic Standing Fair   Ambulatory Fair   Endurance Deficit   Endurance Deficit No   Activity Tolerance   Activity Tolerance Patient tolerated treatment well   Nurse Made Aware Yes   Assessment   Prognosis Fair   Problem List Decreased strength;Decreased range of motion;Decreased mobility;Orthopedic restrictions;Pain;Obesity   Assessment Pt  progressing well with overall mobility  Pt  able to tolerate all actiivites however needs increased time to complete tasks   Pt  able to perform pericare in standing with S  Pt  was given Mod A x 1 for supine to sit transfer with HOB elevated and use of bed rails  Pt  reported she sleeps on a lift chair  Pt  able to mobilize her BLEs better this session  pt  given assistance to don her brace and shoes on LEs  Pt  maintained good balance without UE support while standing and pericare with 1UE support  Pt  progressed to ambulate with S and DS as distance progrrssed  Talked to CM and Adam PT regrading patient's improved mobility and agreeable patient has progressed to DC home with family support and Home PT/OPPT  Recommended to restorative staff and nursing that patient should ambulate on unit during the stay  Will continue to follow per PT POC  Pt  reported her  is a nurse and works part time and neighbor also helps   does cooking and cleaning  Barriers to Discharge None   Goals   Patient Goals None reported   STG Expiration Date 03/17/23   PT Treatment Day 1   Plan   Treatment/Interventions Functional transfer training;Gait training;Bed mobility; Equipment eval/education;Patient/family training;Spoke to nursing;Spoke to case management  (PT)   Progress Progressing toward goals   PT Frequency 5-7x/wk   Recommendation   PT Discharge Recommendation Post acute rehabilitation services   Equipment Recommended [address] Mobility Inpatient   Turning in Flat Bed Without Bedrails 2   Lying on Back to Sitting on Edge of Flat Bed Without Bedrails 2   Moving Bed to Chair 3   Standing Up From Chair Using Arms 4   Walk in Room 4   Climb 3-5 Stairs With Railing 3   Basic Mobility Inpatient Raw Score 18   Basic Mobility Standardized Score 41 05   Highest Level Of Mobility   JH-HLM Goal 6: Walk 10 steps or more   JH-HLM Achieved 7: Walk 25 feet or more   End of Consult   Patient Position at End of Consult Bedside chair;Bed/Chair alarm activated; All needs within reach           Sharifa Oseguera PTA    An AM-PAC basic mobility standardized score less than 42 9 suggest the patient may benefit from discharge to post-acute rehab services

## 2023-03-08 NOTE — ASSESSMENT & PLAN NOTE
· Hemoglobin 6 3 postoperatively  · Transfused 2 units PRBCs   · Hemoglobin improved to 7 8  · Continue monitoring hemoglobin while inpatient

## 2023-03-08 NOTE — PLAN OF CARE
Problem: PHYSICAL THERAPY ADULT  Goal: Performs mobility at highest level of function for planned discharge setting  See evaluation for individualized goals  Description: Treatment/Interventions: Functional transfer training, LE strengthening/ROM, Therapeutic exercise, Endurance training, Cognitive reorientation, Patient/family training, Bed mobility, Gait training, Spoke to nursing, OT          See flowsheet documentation for full assessment, interventions and recommendations  Outcome: Progressing  Note: Prognosis: Fair  Problem List: Decreased strength, Decreased range of motion, Decreased mobility, Orthopedic restrictions, Pain, Obesity  Assessment: Pt  progressing well with overall mobility  Pt  able to tolerate all actiivites however needs increased time to complete tasks  Pt  able to perform pericare in standing with S  Pt  was given Mod A x 1 for supine to sit transfer with HOB elevated and use of bed rails  Pt  reported she sleeps on a lift chair  Pt  able to mobilize her BLEs better this session  pt  given assistance to don her brace and shoes on LEs  Pt  maintained good balance without UE support while standing and pericare with 1UE support  Pt  progressed to ambulate with S and DS as distance progrrssed  Talked to CM and Adam PT regrading patient's improved mobility and agreeable patient has progressed to DC home with family support and Home PT/OPPT  Recommended to restorative staff and nursing that patient should ambulate on unit during the stay  Will continue to follow per PT POC  Pt  reported her  is a nurse and works part time and neighbor also helps   does cooking and cleaning  Barriers to Discharge: None     PT Discharge Recommendation: Post acute rehabilitation services    See flowsheet documentation for full assessment

## 2023-03-08 NOTE — RESTORATIVE TECHNICIAN NOTE
Restorative Technician Note      Patient Name: Caty Dobbins     Restorative Tech Visit Date: 03/08/23  Note Type: Mobility  Patient Position Upon Consult: Supine  Activity Performed: Ambulated  Assistive Device: Roller walker  Patient Position at End of Consult: Standing; All needs within reach;  Other (comment) (Left in care of PCA)  Nurse Communication: Nurse aware of consult, application of brace

## 2023-03-08 NOTE — ASSESSMENT & PLAN NOTE
· Intermittently hypotensive postoperatively, may be component of symptomatic anemia  · BP improved today  · Continue metoprolol with hold parameters

## 2023-03-08 NOTE — PROGRESS NOTES
Chuy Kumar is a 77 y o  female who is currently ordered Vancomycin IV with management by the Pharmacy Consult service  Relevant clinical data and objective / subjective history reviewed  Vancomycin Assessment:  Indication and Goal AUC/Trough: Bone/joint infection (goal -600, trough >10)  Clinical Status: stable  Micro:     Renal Function:  SCr: 1 03 mg/dL  CrCl: 54 2 mL/min  Renal replacement: Not on dialysis  Days of Therapy: 3  Current Dose: 1250mg IV prn random level <15   Vancomycin Plan:  New Dosinmg IV every 24 hours   Estimated AUC: 475 mcg*hr/mL  Estimated Trough: 12 9 mcg/mL  Next Level: 3/10/23 with AM labs  Renal Function Monitoring: Daily BMP and [place] will continue to follow closely for s/sx of nephrotoxicity, infusion reactions and appropriateness of therapy  BMP and CBC will be ordered per protocol  We will continue to follow the patient’s culture results and clinical progress daily      Candace Amador, Pharmacist

## 2023-03-09 LAB
ANION GAP SERPL CALCULATED.3IONS-SCNC: 6 MMOL/L (ref 4–13)
BUN SERPL-MCNC: 19 MG/DL (ref 5–25)
CALCIUM SERPL-MCNC: 8.1 MG/DL (ref 8.4–10.2)
CHLORIDE SERPL-SCNC: 108 MMOL/L (ref 96–108)
CO2 SERPL-SCNC: 25 MMOL/L (ref 21–32)
CREAT SERPL-MCNC: 0.94 MG/DL (ref 0.6–1.3)
ERYTHROCYTE [DISTWIDTH] IN BLOOD BY AUTOMATED COUNT: 17.6 % (ref 11.6–15.1)
GFR SERPL CREATININE-BSD FRML MDRD: 61 ML/MIN/1.73SQ M
GLUCOSE SERPL-MCNC: 124 MG/DL (ref 65–140)
HCT VFR BLD AUTO: 25.4 % (ref 34.8–46.1)
HGB BLD-MCNC: 7.9 G/DL (ref 11.5–15.4)
MCH RBC QN AUTO: 29.6 PG (ref 26.8–34.3)
MCHC RBC AUTO-ENTMCNC: 31.1 G/DL (ref 31.4–37.4)
MCV RBC AUTO: 95 FL (ref 82–98)
PLATELET # BLD AUTO: 221 THOUSANDS/UL (ref 149–390)
PMV BLD AUTO: 8.8 FL (ref 8.9–12.7)
POTASSIUM SERPL-SCNC: 4.2 MMOL/L (ref 3.5–5.3)
RBC # BLD AUTO: 2.67 MILLION/UL (ref 3.81–5.12)
SODIUM SERPL-SCNC: 139 MMOL/L (ref 135–147)
WBC # BLD AUTO: 15.6 THOUSAND/UL (ref 4.31–10.16)

## 2023-03-09 RX ADMIN — DOCUSATE SODIUM 100 MG: 100 CAPSULE, LIQUID FILLED ORAL at 09:00

## 2023-03-09 RX ADMIN — LORAZEPAM 0.5 MG: 0.5 TABLET ORAL at 22:31

## 2023-03-09 RX ADMIN — ACETAMINOPHEN 325MG 975 MG: 325 TABLET ORAL at 05:30

## 2023-03-09 RX ADMIN — Medication 1 TABLET: at 17:35

## 2023-03-09 RX ADMIN — OXYCODONE HYDROCHLORIDE 10 MG: 5 TABLET ORAL at 03:21

## 2023-03-09 RX ADMIN — ASPIRIN 81 MG: 81 TABLET, COATED ORAL at 22:36

## 2023-03-09 RX ADMIN — Medication 250 MG: at 09:00

## 2023-03-09 RX ADMIN — OXYBUTYNIN CHLORIDE 10 MG: 10 TABLET, EXTENDED RELEASE ORAL at 22:31

## 2023-03-09 RX ADMIN — OXYCODONE HYDROCHLORIDE 40 MG: 40 TABLET, FILM COATED, EXTENDED RELEASE ORAL at 09:01

## 2023-03-09 RX ADMIN — METOPROLOL TARTRATE 25 MG: 25 TABLET, FILM COATED ORAL at 22:32

## 2023-03-09 RX ADMIN — OXYCODONE HYDROCHLORIDE 20 MG: 5 TABLET ORAL at 14:36

## 2023-03-09 RX ADMIN — OXYCODONE HYDROCHLORIDE 40 MG: 40 TABLET, FILM COATED, EXTENDED RELEASE ORAL at 22:31

## 2023-03-09 RX ADMIN — LORAZEPAM 0.5 MG: 0.5 TABLET ORAL at 09:06

## 2023-03-09 RX ADMIN — PANTOPRAZOLE SODIUM 40 MG: 40 TABLET, DELAYED RELEASE ORAL at 05:30

## 2023-03-09 RX ADMIN — DOCUSATE SODIUM 100 MG: 100 CAPSULE, LIQUID FILLED ORAL at 17:34

## 2023-03-09 RX ADMIN — OXYCODONE HYDROCHLORIDE AND ACETAMINOPHEN 500 MG: 500 TABLET ORAL at 08:59

## 2023-03-09 RX ADMIN — Medication 250 MG: at 17:35

## 2023-03-09 RX ADMIN — OXYCODONE HYDROCHLORIDE AND ACETAMINOPHEN 500 MG: 500 TABLET ORAL at 17:34

## 2023-03-09 RX ADMIN — FOLIC ACID 1000 MCG: 1 TABLET ORAL at 08:59

## 2023-03-09 RX ADMIN — SENNOSIDES 8.6 MG: 8.6 TABLET, FILM COATED ORAL at 09:00

## 2023-03-09 RX ADMIN — ACETAMINOPHEN 325MG 975 MG: 325 TABLET ORAL at 22:31

## 2023-03-09 RX ADMIN — MULTIPLE VITAMINS W/ MINERALS TAB 1 TABLET: TAB ORAL at 08:59

## 2023-03-09 RX ADMIN — CEFAZOLIN SODIUM 2000 MG: 2 SOLUTION INTRAVENOUS at 14:00

## 2023-03-09 RX ADMIN — ASPIRIN 81 MG: 81 TABLET, COATED ORAL at 09:00

## 2023-03-09 RX ADMIN — Medication 1 TABLET: at 08:59

## 2023-03-09 RX ADMIN — CEFAZOLIN SODIUM 2000 MG: 2 SOLUTION INTRAVENOUS at 22:33

## 2023-03-09 RX ADMIN — ACETAMINOPHEN 325MG 975 MG: 325 TABLET ORAL at 14:36

## 2023-03-09 RX ADMIN — CEFAZOLIN SODIUM 2000 MG: 2 SOLUTION INTRAVENOUS at 05:30

## 2023-03-09 RX ADMIN — DULOXETINE 60 MG: 60 CAPSULE, DELAYED RELEASE ORAL at 10:42

## 2023-03-09 RX ADMIN — Medication 2000 UNITS: at 09:01

## 2023-03-09 NOTE — PROGRESS NOTES
Progress Note - Orthopedics   Catherine Merlos 77 y o  female MRN: [id number]  Unit/Bed#: E2 -01      Subjective:    77 y  o female POD 2 right hip conversion ROWDY (antibiotic spacer), I&D, and insertion of drug delivery device  She was examined lying in bed  She states the swelling is worse today than it was yesterday in her right hip, and explained that she typically wraps her legs up to the shin for lymphedema  She has continued moving her ankle and foot to avoid it feeling "weird " She denies any lightheadedness, fevers, chills, or difficulty with PO diet  She overall feels more herself today         Labs:  0   Lab Value Date/Time    HCT 25 2 (L) 03/08/2023 0950    HCT 21 2 (L) 03/07/2023 0459    HCT 25 1 (L) 03/06/2023 1811    HCT 41 6 05/15/2015 1223    HCT 41 9 03/22/2015 0842    HCT 32 3 (L) 01/30/2015 0505    HGB 7 8 (L) 03/08/2023 0950    HGB 6 3 (LL) 03/07/2023 0459    HGB 7 4 (L) 03/06/2023 1811    HGB 13 2 05/15/2015 1223    HGB 13 3 03/22/2015 0842    HGB 10 2 (L) 01/30/2015 0505    INR 1 11 02/07/2023 0714    WBC 15 31 (H) 03/08/2023 0950    WBC 14 23 (H) 03/07/2023 0459    WBC 20 72 (H) 03/06/2023 1811    WBC 6 70 05/15/2015 1223    WBC 6 16 03/22/2015 0842    WBC 16 18 (H) 01/30/2015 0505    ESR 94 (H) 02/07/2023 0708    ESR 23 (H) 05/15/2015 1223    CRP 33 0 (H) 02/07/2023 0708    CRP 30 2 (H) 05/15/2015 1223       Meds:    Current Facility-Administered Medications:   •  acetaminophen (TYLENOL) tablet 975 mg, 975 mg, Oral, Q8H, [employer], PA-C, 975 mg at 03/08/23 1454  •  aluminum-magnesium hydroxide-simethicone (MYLANTA) oral suspension 30 mL, 30 mL, Oral, Q6H PRN, [employer], EPHRAIM  •  ascorbic acid (VITAMIN C) tablet 500 mg, 500 mg, Oral, BID, Maricruz Herndon PA-C, 500 mg at 03/08/23 1720  •  aspirin (ECOTRIN LOW STRENGTH) EC tablet 81 mg, 81 mg, Oral, BID, Dilip Tabares PA-C, 81 mg at 03/08/23 3542  •  calcium carbonate (OYSTER SHELL,OSCAL) 500 mg tablet 1 tablet, 1 tablet, Oral, BID With Meals, Trinidad Merino PA-C, 1 tablet at 03/08/23 1629  •  calcium carbonate (TUMS) chewable tablet 1,000 mg, 1,000 mg, Oral, Daily PRN, Annalise Recinos PA-C  •  ceFAZolin (ANCEF) IVPB (premix in dextrose) 2,000 mg 50 mL, 2,000 mg, Intravenous, Q8H, Annalise Recinos PA-C, Last Rate: 100 mL/hr at 03/08/23 1629, 2,000 mg at 03/08/23 1629  •  cholecalciferol (VITAMIN D3) tablet 2,000 Units, 2,000 Units, Oral, Daily, Tirnidad Merino PA-C, 2,000 Units at 03/08/23 5476  •  docusate sodium (COLACE) capsule 100 mg, 100 mg, Oral, BID, Julissa Tabares PA-C, 100 mg at 03/08/23 1720  •  DULoxetine (CYMBALTA) delayed release capsule 60 mg, 60 mg, Oral, Daily, Annalise Recinos PA-C, 60 mg at 34/54/33 5988  •  folic acid (FOLVITE) tablet 1,000 mcg, 1,000 mcg, Oral, Daily, Trinidad Merino PA-C, 1,000 mcg at 03/08/23 5267  •  gabapentin (NEURONTIN) capsule 300 mg, 300 mg, Oral, HS, Annalise Recinos PA-C, 300 mg at 03/06/23 2217  •  lactated ringers infusion, 125 mL/hr, Intravenous, Continuous, Annalise Recinos PA-C, Stopped at 03/06/23 2047  •  lactated ringers infusion, 100 mL/hr, Intravenous, Continuous, Annalise Recinos PA-C, Stopped at 03/07/23 1083  •  LORazepam (ATIVAN) tablet 0 5 mg, 0 5 mg, Oral, BID PRN, Annalise Recinos PA-C, 0 5 mg at 03/08/23 0301  •  metoprolol tartrate (LOPRESSOR) tablet 25 mg, 25 mg, Oral, Q12H [address], Annalise Recinos PA-C  •  multivitamin-minerals (CENTRUM) tablet 1 tablet, 1 tablet, Oral, Daily, Trinidad Merino PA-C, 1 tablet at 03/08/23 0951  •  ondansetron [facility]) injection 4 mg, 4 mg, Intravenous, Q6H PRN, Annalise Recinos PA-C  •  ondansetron (ZOFRAN-ODT) dispersible tablet 8 mg, 8 mg, Oral, Q8H PRN, Annalise Recinos PA-C  •  oxybutynin (DITROPAN-XL) 24 hr tablet 10 mg, 10 mg, Oral, HS, Trinidad Merino PA-C  •  oxyCODONE (OxyCONTIN) 12 hr tablet 40 mg, 40 mg, Oral, Q12H [address], Julissaalyssa Tabares PA-C, 40 mg at 03/08/23 4642  •  oxyCODONE (ROXICODONE) IR tablet 10 mg, 10 mg, Oral, Q4H PRN, Army Mayur PA-C  •  oxyCODONE (ROXICODONE) IR tablet 20 mg, 20 mg, Oral, Q4H PRN, Neyda Tabares PA-C, 20 mg at 03/08/23 1144  •  pantoprazole (PROTONIX) EC tablet 40 mg, 40 mg, Oral, Early Morning, Briseida Parker PA-C, 40 mg at 03/08/23 1711  •  saccharomyces boulardii (FLORASTOR) capsule 250 mg, 250 mg, Oral, BID, Briseida Parker PA-C, 250 mg at 03/08/23 1720  •  senna (SENOKOT) tablet 8 6 mg, 1 tablet, Oral, Daily, Neyda Tabares PA-C, 8 6 mg at 03/08/23 6263  •  vancomycin (VANCOCIN) 1,250 mg in sodium chloride 0 9 % 250 mL IVPB, 1,250 mg, Intravenous, Q24H, Baldo Mccauley MD    Blood Culture:   Lab Results   Component Value Date    BLOODCX No Growth After 5 Days  01/12/2023    BLOODCX No Growth After 5 Days  01/12/2023       Wound Culture:   No results found for: WOUNDCULT    Ins and Outs:  I/O last 24 hours: In: 590 [P O :240; Blood:350]  Out: 950 [Urine:950]      Physical:  Vitals:    03/08/23 1834   BP: 110/57   Pulse: 88   Resp: 19   Temp: 97 9 °F (36 6 °C)   SpO2: 95%     Musculoskeletal: right Lower Extremity  · Visible skin without erythema or ecchymosis  · Mepilex dressing C/D/I  · Sensation intact distally  · Motor intact to +FHL/EHL, +ankle dorsi/plantar flexion  Movement in the toes is limited at baseline   · Digits warm and well perfused  · Compartments soft and compressible  · Swelling of bilateral legs due to known lymphedema    Assessment:    77 y  o female POD 2 right hip conversion ROWDY (antibiotic spacer), I&D, and insertion of drug delivery device with Dr Panchito Gonzales  Patient doing well  Plan:  · WBAT RLE  · Anterior hip precautions  · Antibiotics per ID, continuing vanc pending cultures,  anticipating 6 weeks IV abx   · Hgb was found to be 7 8, improved from 6 3 this morning  2 units of blood given yesterday  We will monitor and administer IVF/prbc as indicated  Continue to monitor blood pressure    · PT/OT  · Pain control with oxycontin 40 mg twice daily and PRN medications due to high baseline dosing of narcotics  · DVT ppx with aspirin 81 mg twice daily while in the hospital and at discharge    · Medical co-morbidities are being managed per medicine team  · Dispo: Ortho will follow    [facility] EPHRAIM Pineda

## 2023-03-09 NOTE — RESTORATIVE TECHNICIAN NOTE
Restorative Technician Note      Patient Name: Talita Kohler     Restorative Tech Visit Date: 03/09/23  Note Type: Mobility  Patient Position Upon Consult: Bedside chair  Activity Performed: Ambulated  Assistive Device: Roller walker  Patient Position at End of Consult: Supine;  All needs within reach
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
## 2023-03-09 NOTE — PROGRESS NOTES
Lata Tesfaye is a 77 y o  female who is currently ordered Vancomycin IV with management by the Pharmacy Consult service  Relevant clinical data and objective / subjective history reviewed  Vancomycin Assessment:  Indication and Goal AUC/Trough: Bone/joint infection (goal -600, trough >10)  Clinical Status: stable  Micro:     Renal Function:  SCr: 0 94 mg/dL  CrCl: 59 4 mL/min  Renal replacement: Not on dialysis  Days of Therapy: 4  Current Dose: 1250mg IV every 24 hours  Vancomycin Plan:  New Dosinmg IV every 24 hours   Estimated AUC: 446 mcg*hr/mL  Estimated Trough: 11 8 mcg/mL  Next Level: 3/10/23 with am labs  Renal Function Monitoring: Daily BMP and [place] will continue to follow closely for s/sx of nephrotoxicity, infusion reactions and appropriateness of therapy  BMP and CBC will be ordered per protocol  We will continue to follow the patient’s culture results and clinical progress daily      Abel Martin, Pharmacist

## 2023-03-09 NOTE — ASSESSMENT & PLAN NOTE
· Patient is postop day 3 from ROWDY for posttraumatic osteoarthritis of right hip  · Was found to have evidence of purulence and chronic osteomyelitis during surgery, some old hardware was removed  · Spacer impregnated with vancomycin was placed in the OR  · Appreciate ID recommendations regarding antibiotic management  · Continue PT/OT while inpatient   Rehab vs home with home health  · Discharge planning per orthopedics

## 2023-03-09 NOTE — PLAN OF CARE
Problem: Prexisting or High Potential for Compromised Skin Integrity  Goal: Skin integrity is maintained or improved  Description: INTERVENTIONS:  - Identify patients at risk for skin breakdown  - Assess and monitor skin integrity  - Assess and monitor nutrition and hydration status  - Monitor labs   - Assess for incontinence   - Turn and reposition patient  - Assist with mobility/ambulation  - Relieve pressure over bony prominences  - Avoid friction and shearing  - Provide appropriate hygiene as needed including keeping skin clean and dry  - Evaluate need for skin moisturizer/barrier cream  - Collaborate with interdisciplinary team   - Patient/family teaching  Outcome: Progressing     Problem: MOBILITY - ADULT  Goal: Maintain or return to baseline ADL function  Description: INTERVENTIONS:  -  Assess patient's ability to carry out ADLs; assess patient's baseline for ADL function and identify physical deficits which impact ability to perform ADLs (bathing, care of mouth/teeth, toileting, grooming, dressing, etc )  - Assess/evaluate cause of self-care deficits   - Assess range of motion  - Assess patient's mobility; develop plan if impaired  - Assess patient's need for assistive devices and provide as appropriate  - Encourage maximum independence but intervene and supervise when necessary  - Involve family in performance of ADLs  - Assess for home care needs following discharge   - Consider OT consult to assist with ADL evaluation and planning for discharge  - Provide patient education as appropriate  Outcome: Progressing  Goal: Maintains/Returns to pre admission functional level  Description: INTERVENTIONS:  - Perform BMAT or MOVE assessment daily    - Set and communicate daily mobility goal to care team and patient/family/caregiver  - Collaborate with rehabilitation services on mobility goals if consulted  - Perform Range of Motion 3 times a day  - Reposition patient every 2 hours    - Dangle patient 3 times a day  - Stand patient 3 times a day  - Ambulate patient 3 times a day  - Out of bed to chair   - Out of bed for meals   - Out of bed for toileting  - Record patient progress and toleration of activity level   Outcome: Progressing     Problem: PAIN - ADULT  Goal: Verbalizes/displays adequate comfort level or baseline comfort level  Description: Interventions:  - Encourage patient to monitor pain and request assistance  - Assess pain using appropriate pain scale  - Administer analgesics based on type and severity of pain and evaluate response  - Implement non-pharmacological measures as appropriate and evaluate response  - Consider cultural and social influences on pain and pain management  - Notify physician/advanced practitioner if interventions unsuccessful or patient reports new pain  Outcome: Progressing     Problem: INFECTION - ADULT  Goal: Absence or prevention of progression during hospitalization  Description: INTERVENTIONS:  - Assess and monitor for signs and symptoms of infection  - Monitor lab/diagnostic results  - Monitor all insertion sites, i e  indwelling lines, tubes, and drains  - Monitor endotracheal if appropriate and nasal secretions for changes in amount and color  - [place] appropriate cooling/warming therapies per order  - Administer medications as ordered  - Instruct and encourage patient and family to use good hand hygiene technique  - Identify and instruct in appropriate isolation precautions for identified infection/condition  Outcome: Progressing     Problem: SAFETY ADULT  Goal: Maintain or return to baseline ADL function  Description: INTERVENTIONS:  -  Assess patient's ability to carry out ADLs; assess patient's baseline for ADL function and identify physical deficits which impact ability to perform ADLs (bathing, care of mouth/teeth, toileting, grooming, dressing, etc )  - Assess/evaluate cause of self-care deficits   - Assess range of motion  - Assess patient's mobility; develop plan if impaired  - Assess patient's need for assistive devices and provide as appropriate  - Encourage maximum independence but intervene and supervise when necessary  - Involve family in performance of ADLs  - Assess for home care needs following discharge   - Consider OT consult to assist with ADL evaluation and planning for discharge  - Provide patient education as appropriate  Outcome: Progressing  Goal: Maintains/Returns to pre admission functional level  Description: INTERVENTIONS:  - Perform BMAT or MOVE assessment daily    - Set and communicate daily mobility goal to care team and patient/family/caregiver  - Collaborate with rehabilitation services on mobility goals if consulted  - Perform Range of Motion 3 times a day  - Reposition patient every 2 hours    - Dangle patient 3 times a day  - Stand patient 3 times a day  - Ambulate patient 3 times a day  - Out of bed to chair   - Out of bed for meals   - Out of bed for toileting  - Record patient progress and toleration of activity level   Outcome: Progressing  Goal: Patient will remain free of falls  Description: INTERVENTIONS:  - Educate patient/family on patient safety including physical limitations  - Instruct patient to call for assistance with activity   - Consult OT/PT to assist with strengthening/mobility   - Keep Call bell within reach  - Keep bed low and locked with side rails adjusted as appropriate  - Keep care items and personal belongings within reach  - Initiate and maintain comfort rounds  - Make Fall Risk Sign visible to staff  - Offer Toileting every  Hours, in advance of need  - Initiate/Maintain - Obtain necessary fall risk management equipment:   - Apply yellow socks and bracelet for high fall risk patients  - Consider moving patient to room near nurses station  Outcome: Progressing

## 2023-03-09 NOTE — ASSESSMENT & PLAN NOTE
Wt Readings from Last 3 Encounters:   03/09/23 90 5 kg (199 lb 8 7 oz)   02/20/23 92 1 kg (203 lb)   02/14/23 92 2 kg (203 lb 3 2 oz)     · Appears euvolemic on exam  · No signs of overload after blood transfusion  · Continue Lopressor with hold parameters

## 2023-03-09 NOTE — PHYSICAL THERAPY NOTE
Physical Therapy Progress Note       03/09/23 1033   PT Last Visit   PT Visit Date 03/09/23   Note Type   Note Type Treatment   Pain Assessment   Pain Assessment Tool 0-10   Pain Score 3   Pain Location/Orientation Location: Hip;Orientation: Right   Hospital Pain Intervention(s) Repositioned;Cold applied; Ambulation/increased activity; Elevated; Emotional support   Restrictions/Precautions   Weight Bearing Precautions Per Order Yes   RLE Weight Bearing Per Order WBAT   Braces or Orthoses Other (Comment)  (custom boot rle and shoes bilat)   Other Precautions Pain; Fall Risk   General   Chart Reviewed Yes   Response to Previous Treatment Patient with no complaints from previous session  Family/Caregiver Present No   Cognition   Overall Cognitive Status WFL   Orientation Level Oriented X4   Subjective   Subjective reports she is doing better with her walking   Bed Mobility   Supine to Sit 5  Supervision   Additional items Increased time required;Verbal cues   Transfers   Sit to Stand 5  Supervision   Additional items Bedrails; Increased time required;Verbal cues  (bed elevated, uses lift recliner at home )   Stand to Sit 5  Supervision   Additional items Increased time required   Ambulation/Elevation   Gait pattern Improper Weight shift;Decreased foot clearance;Decreased R stance; Inconsistent flor; Short stride; Excessively slow; Step to   Gait Assistance 5  Supervision   Assistive Device Rolling walker   Distance 180'   Stair Management Assistance 4  Minimal assist   Additional items Assist x 1;Verbal cues; Tactile cues; Increased time required   Stair Management Technique Two rails; Step to pattern; Foreward   Number of Stairs 3  (4" stairs)   Ambulation/Elevation Additional Comments pt able to estend walking distance   pt barely able to get lle up on 4" stair   Balance   Static Sitting Good   Dynamic Sitting Fair +   Static Standing Fair +   Dynamic Standing Fair -   Ambulatory Fair -   Endurance Deficit   Endurance Deficit Yes   Endurance Deficit Description fatigue   Activity Tolerance   Activity Tolerance Treatment limited secondary to medical complications (Comment); Patient limited by pain; Patient limited by fatigue   Nurse Made Aware yes   Exercises   THR Supine;10 reps;AROM; Bilateral   Assessment   Prognosis Fair   Problem List Decreased strength;Decreased range of motion;Decreased endurance; Impaired balance;Decreased mobility;Orthopedic restrictions;Pain; Impaired sensation;Decreased skin integrity  (joint inpairment)   Assessment pt seen for PT treatment session  pt demonstrating improvedment in walking distance and has initiated stair training on 4" stairs  pt also performed seated arom ble within strength and available rom  pt was able to perform supine to sitting with supervision, pt needed increased time but able to perform without assist  pt needed bed elevated until she was near standing for sit to standing transfer therefore bedside chair required a pillow to assist transfers  pt was able to amb 170' using rw  pt requires supervision  gait deviations persist  pt has personal goal of being able to perform stairs  pt initiated training on 4" stairs with B railings  pt was bareley able to get L foot onto stair but did performe 3 stairs with min assist and instructions  pt performed THR ther ex ble in sitting for 10-20 reps  tolerated well  pt continues to have deficits in strength, rom, balance, gait sequencing and stability, pain control  pt has single floor set-up  pt is progressing as expected   Barriers to Discharge None   Goals   Patient Goals be able to  walk with a cane and do stairs   STG Expiration Date 03/17/23   Short Term Goal #2 additional goal: 6" stairs (5-7) with supervision and railing / device  amb using rw 250' modified indep  PT Treatment Day 2   Plan   Treatment/Interventions Functional transfer training;LE strengthening/ROM; Elevations; Therapeutic exercise; Endurance training;Patient/family training;Equipment eval/education; Bed mobility;Gait training; Compensatory technique education;Spoke to nursing   Progress Progressing toward goals   PT Frequency 5-7x/wk   Recommendation   PT Discharge Recommendation   (vs HHPT)   Equipment Recommended Walker  (has)   AM-PAC Basic Mobility Inpatient   Turning in Flat Bed Without Bedrails 2   Lying on Back to Sitting on Edge of Flat Bed Without Bedrails 4   Moving Bed to Chair 3   Standing Up From Chair Using Arms 4   Walk in Room 4   Climb 3-5 Stairs With Railing 3   Basic Mobility Inpatient Raw Score 20   Basic Mobility Standardized Score 43 99   Highest Level Of Mobility   JH-HLM Goal 6: Walk 10 steps or more   JH-HLM Achieved 7: Walk 25 feet or more   Education   Education Provided   (stairs)     An AM-PAC Basic Mobility raw score less than 16 suggests the patient may benefit from discharge to post-acute rehab services      Reddy Branham, PT

## 2023-03-09 NOTE — PROGRESS NOTES
[address] [facility]  Progress Note - Elaina Chavez 1955, 77 y o  female MRN: [id number]  Unit/Bed#: E2 MS Andre-01 Encounter: [id number]  Primary Care Provider: Margo Warren DO   Date and time admitted to hospital: 3/6/2023 10:28 AM    * Post-traumatic osteoarthritis  Assessment & Plan  · Patient is postop day 3 from [facility] for posttraumatic osteoarthritis of right hip  · Was found to have evidence of purulence and chronic osteomyelitis during surgery, some old hardware was removed  · Spacer impregnated with vancomycin was placed in the OR  · Appreciate ID recommendations regarding antibiotic management  · Continue PT/OT while inpatient   Rehab vs home with home health  · Discharge planning per orthopedics    Anemia  Assessment & Plan  · Hemoglobin 6 3 postoperatively,Transfused 2 units PRBCs 3/8/23  · Hemoglobin stable at 7 9 today  · Continue monitoring hemoglobin while inpatient    History of bacteremia  Assessment & Plan  · Patient had recent MSSA bacteremia in November 2022  · Completed course of IV Ancef  · Initially source thought to be due to cellulitis in the setting of chronic leg lymphedema    Chronic diastolic heart failure (HCC)  Assessment & Plan  Wt Readings from Last 3 Encounters:   03/09/23 90 5 kg (199 lb 8 7 oz)   02/20/23 92 1 kg (203 lb)   02/14/23 92 2 kg (203 lb 3 2 oz)     · Appears euvolemic on exam  · No signs of overload after blood transfusion  · Continue Lopressor with hold parameters      Benign essential hypertension  Assessment & Plan  · Intermittently hypotensive postoperatively, may be component of symptomatic anemia  · BP improved   · Continue metoprolol with hold parameters    Hypogammaglobulinemia, acquired ([facility] [address] )  Assessment & Plan  · 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  · Being followed outpatient by hematology oncology  · Received IVIG previously, not currently receiving therapy    Lymphedema  Assessment & Plan  · History of chronic lymphedema, appears under control at this time  · Encourage sequential compression devices while inpatient    History of pulmonary embolism  Assessment & Plan  · DVT prophylaxis per orthopedics  · Currently on aspirin, would recommend heparin or Lovenox  · Does have history of IVC filter    Chronic kidney disease, stage III (moderate) [facility])  Assessment & Plan  Lab Results   Component Value Date    EGFR 61 03/09/2023    EGFR 55 03/08/2023    EGFR 46 03/07/2023    CREATININE 0 94 03/09/2023    CREATININE 1 03 03/08/2023    CREATININE 1 19 03/07/2023     · Creatinine mildly elevated at 1 19 postoperatively improved to 0 94  · Baseline 0 8-0 9  · Continue monitoring while inpatient      VTE Pharmacologic Prophylaxis: VTE Score: 10 High Risk (Score >/= 5) - Pharmacological DVT Prophylaxis Ordered: aspirin 81 mg BID  Sequential Compression Devices Ordered  Patient Centered Rounds: I performed bedside rounds with nursing staff today  Discussions with Specialists or Other Care Team Provider: none    Education and Discussions with Family / Patient: Updated  () at bedside  Total Time Spent on Date of Encounter in care of patient: 35 minutes This time was spent on one or more of the following: performing physical exam; counseling and coordination of care; obtaining or reviewing history; documenting in the medical record; reviewing/ordering tests, medications or procedures; communicating with other healthcare professionals and discussing with patient's family/caregivers  Current Length of Stay: 2 day(s)  Current Patient Status: Inpatient   Certification Statement: The patient will continue to require additional inpatient hospital stay due to right hip osteomyelitis requiring IV antibiotics  Discharge Plan: Anticipate discharge in 48 hrs to rehab facility  Code Status: Prior    Subjective:   Patient was seen Sharee Lyon in chair today  She reports feeling well today   She states that her pain in under control on her current pain regimen  She denies any acute complaints at this time  Objective:     Vitals:   Temp (24hrs), Av 6 °F (36 4 °C), Min:96 3 °F (35 7 °C), Max:98 3 °F (36 8 °C)    Temp:  [96 3 °F (35 7 °C)-98 3 °F (36 8 °C)] 96 3 °F (35 7 °C)  HR:  [68-91] 68  Resp:  [17-19] 18  BP: (103-128)/(55-60) 107/57  SpO2:  [94 %-97 %] 95 %  Body mass index is 35 35 kg/m²  Input and Output Summary (last 24 hours): Intake/Output Summary (Last 24 hours) at 3/9/2023 1131  Last data filed at 3/8/2023 2200  Gross per 24 hour   Intake --   Output 300 ml   Net -300 ml       Physical Exam:   Physical Exam  Vitals and nursing note reviewed  Constitutional:       Appearance: Normal appearance  HENT:      Head: Normocephalic and atraumatic  Eyes:      General: No scleral icterus  Cardiovascular:      Rate and Rhythm: Normal rate and regular rhythm  Pulmonary:      Effort: Pulmonary effort is normal       Breath sounds: Normal breath sounds  Abdominal:      General: Abdomen is flat  Bowel sounds are normal       Palpations: Abdomen is soft  Skin:     General: Skin is warm and dry  Comments: Bilateral legs in ACE wrap bandages  Dressing clean dry and intact   Neurological:      Mental Status: She is alert  Mental status is at baseline  Psychiatric:         Mood and Affect: Mood normal          Behavior: Behavior normal           Additional Data:     Labs:  Results from last 7 days   Lab Units 23  0840 23  0459 23  1811   WBC Thousand/uL 15 60*   < > 20 72*   HEMOGLOBIN g/dL 7 9*   < > 7 4*   HEMATOCRIT % 25 4*   < > 25 1*   PLATELETS Thousands/uL 221   < > 312   NEUTROS PCT %  --   --  79*   LYMPHS PCT %  --   --  14   MONOS PCT %  --   --  6   EOS PCT %  --   --  0    < > = values in this interval not displayed       Results from last 7 days   Lab Units 23  0840   SODIUM mmol/L 139   POTASSIUM mmol/L 4 2   CHLORIDE mmol/L 108   CO2 mmol/L 25 BUN mg/dL 19   CREATININE mg/dL 0 94   ANION GAP mmol/L 6   CALCIUM mg/dL 8 1*   GLUCOSE RANDOM mg/dL 124                       Lines/Drains:  Invasive Devices     Peripheral Intravenous Line  Duration           Peripheral IV 03/08/23 Left;Ventral (anterior) Forearm <1 day                      Imaging: No pertinent imaging reviewed      Recent Cultures (last 7 days):   Results from last 7 days   Lab Units 03/06/23  1412 03/06/23  1411   GRAM STAIN RESULT  Rare Polys  No organisms seen  Rare Polys  No organisms seen Rare Polys  No organisms seen       Last 24 Hours Medication List:   Current Facility-Administered Medications   Medication Dose Route Frequency Provider Last Rate   • acetaminophen  975 mg Oral Q8H Rachel Morales PA-C     • aluminum-magnesium hydroxide-simethicone  30 mL Oral Q6H PRN Rachel Morales PA-C     • ascorbic acid  500 mg Oral BID Navin Hernandez PA-C     • aspirin  81 mg Oral BID Rachel Morlaes PA-C     • calcium carbonate  1 tablet Oral BID With Meals Navin Hernandez PA-C     • calcium carbonate  1,000 mg Oral Daily PRN Rachel Morales PA-C     • cefazolin  2,000 mg Intravenous Q8H Rachel Morales PA-C 2,000 mg (03/09/23 0530)   • cholecalciferol  2,000 Units Oral Daily Navin Hernandez PA-C     • docusate sodium  100 mg Oral BID Rachel Morales PA-C     • DULoxetine  60 mg Oral Daily Rachel Moraels PA-C     • folic acid  1,113 mcg Oral Daily Navin Hernandez PA-C     • gabapentin  300 mg Oral HS Rachel Morales PA-C     • lactated ringers  125 mL/hr Intravenous Continuous Rachel Morales PA-C Stopped (03/06/23 2047)   • lactated ringers  100 mL/hr Intravenous Continuous Rachel Morales PA-C Stopped (03/07/23 1112)   • LORazepam  0 5 mg Oral BID PRN Rachel Morales PA-C     • metoprolol tartrate  25 mg Oral Q12H [address] MERVAT, EPHRAIM     • multivitamin-minerals  1 tablet Oral Daily Navin Hernandez PA-C     • ondansetron  4 mg Intravenous Q6H PRN Rachel Morales EPHRAIM     • ondansetron  8 mg Oral Q8H PRN Mildred Conway PA-C     • oxybutynin  10 mg Oral HS Dillan Person, EPHRAIM     • oxyCODONE  40 mg Oral Q12H [facility] Mildred Conway, Janette Santos     • oxyCODONE  10 mg Oral Q4H PRN Mildred Conway PA-C     • oxyCODONE  20 mg Oral Q4H PRN Mildred Conway PA-C     • pantoprazole  40 mg Oral Early Morning Dillan Person, EPHRAIM     • saccharomyces boulardii  250 mg Oral BID Dillan Person, EPHRAIM     • senna  1 tablet Oral Daily Mildred Conway PA-C     • vancomycin  1,250 mg Intravenous Q24H Evelyn Gonzalez MD 1,250 mg (03/08/23 2201)        Today, Patient Was Seen By: Myesha Parish PA-C    **Please Note: This note may have been constructed using a voice recognition system  **

## 2023-03-09 NOTE — ASSESSMENT & PLAN NOTE
· Intermittently hypotensive postoperatively, may be component of symptomatic anemia  · BP improved   · Continue metoprolol with hold parameters

## 2023-03-09 NOTE — ASSESSMENT & PLAN NOTE
Lab Results   Component Value Date    EGFR 61 03/09/2023    EGFR 55 03/08/2023    EGFR 46 03/07/2023    CREATININE 0 94 03/09/2023    CREATININE 1 03 03/08/2023    CREATININE 1 19 03/07/2023     · Creatinine mildly elevated at 1 19 postoperatively improved to 0 94  · Baseline 0 8-0 9  · Continue monitoring while inpatient

## 2023-03-09 NOTE — ASSESSMENT & PLAN NOTE
· Hemoglobin 6 3 postoperatively,Transfused 2 units PRBCs 3/8/23  · Hemoglobin stable at 7 9 today  · Continue monitoring hemoglobin while inpatient

## 2023-03-09 NOTE — PLAN OF CARE
Problem: PHYSICAL THERAPY ADULT  Goal: Performs mobility at highest level of function for planned discharge setting  See evaluation for individualized goals  Description: Treatment/Interventions: Functional transfer training, LE strengthening/ROM, Therapeutic exercise, Endurance training, Cognitive reorientation, Patient/family training, Bed mobility, Gait training, Spoke to nursing, OT          See flowsheet documentation for full assessment, interventions and recommendations  Outcome: Progressing  Note: Prognosis: Fair  Problem List: Decreased strength, Decreased range of motion, Decreased endurance, Impaired balance, Decreased mobility, Orthopedic restrictions, Pain, Impaired sensation, Decreased skin integrity (joint inpairment)  Assessment: pt seen for PT treatment session  pt demonstrating improvedment in walking distance and has initiated stair training on 4" stairs  pt also performed seated arom ble within strength and available rom  pt was able to perform supine to sitting with supervision, pt needed increased time but able to perform without assist  pt needed bed elevated until she was near standing for sit to standing transfer therefore bedside chair required a pillow to assist transfers  pt was able to amb 170' using rw  pt requires supervision  gait deviations persist  pt has personal goal of being able to perform stairs  pt initiated training on 4" stairs with B railings  pt was charlene able to get L foot onto stair but did performe 3 stairs with min assist and instructions  pt performed THR ther ex ble in sitting for 10-20 reps  tolerated well  pt continues to have deficits in strength, rom, balance, gait sequencing and stability, pain control  pt has single floor set-up  pt is progressing as expected  Barriers to Discharge: None     PT Discharge Recommendation:  (vs HHPT)    See flowsheet documentation for full assessment

## 2023-03-09 NOTE — PLAN OF CARE
Problem: Prexisting or High Potential for Compromised Skin Integrity  Goal: Skin integrity is maintained or improved  Description: INTERVENTIONS:  - Identify patients at risk for skin breakdown  - Assess and monitor skin integrity  - Assess and monitor nutrition and hydration status  - Monitor labs   - Assess for incontinence   - Turn and reposition patient  - Assist with mobility/ambulation  - Relieve pressure over bony prominences  - Avoid friction and shearing  - Provide appropriate hygiene as needed including keeping skin clean and dry  - Evaluate need for skin moisturizer/barrier cream  - Collaborate with interdisciplinary team   - Patient/family teaching  Outcome: Progressing     Problem: MOBILITY - ADULT  Goal: Maintain or return to baseline ADL function  Description: INTERVENTIONS:  -  Assess patient's ability to carry out ADLs; assess patient's baseline for ADL function and identify physical deficits which impact ability to perform ADLs (bathing, care of mouth/teeth, toileting, grooming, dressing, etc )  - Assess/evaluate cause of self-care deficits   - Assess range of motion  - Assess patient's mobility; develop plan if impaired  - Assess patient's need for assistive devices and provide as appropriate  - Encourage maximum independence but intervene and supervise when necessary  - Involve family in performance of ADLs  - Assess for home care needs following discharge   - Consider OT consult to assist with ADL evaluation and planning for discharge  - Provide patient education as appropriate  Outcome: Progressing  Goal: Maintains/Returns to pre admission functional level  Description: INTERVENTIONS:  - Perform BMAT or MOVE assessment daily    - Set and communicate daily mobility goal to care team and patient/family/caregiver  - Collaborate with rehabilitation services on mobility goals if consulted  - Perform Range of Motion 3 times a day  - Reposition patient every 2 hours    - Dangle patient 3 times a day  - Stand patient 3 times a day  - Ambulate patient 3 times a day  - Out of bed to chair   - Out of bed for meals   - Out of bed for toileting  - Record patient progress and toleration of activity level   Outcome: Progressing     Problem: PAIN - ADULT  Goal: Verbalizes/displays adequate comfort level or baseline comfort level  Description: Interventions:  - Encourage patient to monitor pain and request assistance  - Assess pain using appropriate pain scale  - Administer analgesics based on type and severity of pain and evaluate response  - Implement non-pharmacological measures as appropriate and evaluate response  - Consider cultural and social influences on pain and pain management  - Notify physician/advanced practitioner if interventions unsuccessful or patient reports new pain  Outcome: Progressing     Problem: INFECTION - ADULT  Goal: Absence or prevention of progression during hospitalization  Description: INTERVENTIONS:  - Assess and monitor for signs and symptoms of infection  - Monitor lab/diagnostic results  - Monitor all insertion sites, i e  indwelling lines, tubes, and drains  - Monitor endotracheal if appropriate and nasal secretions for changes in amount and color  - Charlo appropriate cooling/warming therapies per order  - Administer medications as ordered  - Instruct and encourage patient and family to use good hand hygiene technique  - Identify and instruct in appropriate isolation precautions for identified infection/condition  Outcome: Progressing     Problem: SAFETY ADULT  Goal: Maintain or return to baseline ADL function  Description: INTERVENTIONS:  -  Assess patient's ability to carry out ADLs; assess patient's baseline for ADL function and identify physical deficits which impact ability to perform ADLs (bathing, care of mouth/teeth, toileting, grooming, dressing, etc )  - Assess/evaluate cause of self-care deficits   - Assess range of motion  - Assess patient's mobility; develop plan if impaired  - Assess patient's need for assistive devices and provide as appropriate  - Encourage maximum independence but intervene and supervise when necessary  - Involve family in performance of ADLs  - Assess for home care needs following discharge   - Consider OT consult to assist with ADL evaluation and planning for discharge  - Provide patient education as appropriate  Outcome: Progressing  Goal: Maintains/Returns to pre admission functional level  Description: INTERVENTIONS:  - Perform BMAT or MOVE assessment daily    - Set and communicate daily mobility goal to care team and patient/family/caregiver  - Collaborate with rehabilitation services on mobility goals if consulted  - Perform Range of Motion 3 times a day  - Reposition patient every 2 hours    - Dangle patient 3 times a day  - Stand patient 3 times a day  - Ambulate patient 3 times a day  - Out of bed to chair   - Out of bed for meals   - Out of bed for toileting  - Record patient progress and toleration of activity level   Outcome: Progressing  Goal: Patient will remain free of falls  Description: INTERVENTIONS:  - Educate patient/family on patient safety including physical limitations  - Instruct patient to call for assistance with activity   - Consult OT/PT to assist with strengthening/mobility   - Keep Call bell within reach  - Keep bed low and locked with side rails adjusted as appropriate  - Keep care items and personal belongings within reach  - Initiate and maintain comfort rounds  - Make Fall Risk Sign visible to staff  - Offer Toileting every 2 Hours, in advance of need  - Initiate/Maintain bed alarm  - Obtain necessary fall risk management equipment  - Apply yellow socks and bracelet for high fall risk patients  - Consider moving patient to room near nurses station  Outcome: Progressing     Problem: DISCHARGE PLANNING  Goal: Discharge to home or other facility with appropriate resources  Description: INTERVENTIONS:  - Identify barriers to discharge w/patient and caregiver  - Arrange for needed discharge resources and transportation as appropriate  - Identify discharge learning needs  - Refer to Case Management Department for coordinating discharge planning if the patient needs post-hospital services based on physician/advanced practitioner order or complex needs related to functional status, cognitive ability, or social support system  Outcome: Progressing     Problem: Knowledge Deficit  Goal: Patient/family/caregiver demonstrates understanding of disease process, treatment plan, medications, and discharge instructions  Description: Complete learning assessment and assess knowledge base    Interventions:  - Provide teaching at level of understanding  - Provide teaching via preferred learning methods  Outcome: Progressing     Problem: GENITOURINARY - ADULT  Goal: Urinary catheter remains patent  Description: INTERVENTIONS:  - Assess patency of urinary catheter  - Discontinue lopez catheter POD #1  - Follow guidelines for intermittent irrigation of non-functioning urinary catheter  Outcome: Progressing  Goal: Absence of urinary retention  Description: INTERVENTIONS:  - After lopez catheter removed, assess patient's ability to void and empty bladder  - Monitor I/O  - Bladder scan as needed  - Discuss with physician/AP medications to alleviate retention as needed  - Follow urine retention protocol if ordered  Outcome: Progressing     Problem: SKIN/TISSUE INTEGRITY - ADULT  Goal: Incision(s), wounds(s) or drain site(s) healing without S/S of infection  Description: INTERVENTIONS  - Assess and document dressing, incision, wound bed, drain sites and surrounding tissue  - Provide patient and family education  - Perform skin care/dressing changes as ordered  Outcome: Progressing     Problem: COPING  Goal: Will report anxiety at manageable levels  Description: INTERVENTIONS:  - Administer medication as ordered  - Teach and encourage coping skills  - Provide emotional support  - Assess patient/family for anxiety and ability to cope  Outcome: Progressing

## 2023-03-09 NOTE — PROGRESS NOTES
Progress Note - Orthopedics   Ean Snyder 77 y o  female MRN: [id number]  Unit/Bed#: E2 -01      Subjective:    77 y  o female POD 3 right hip conversion ROWDY (antibiotic spacer), I&D, and insertion of drug delivery device  ,  She feels her pain is well controlled  She is able to get up some with therapy and feels she has been able to do a little bit more than prior to surgery already  Patient denies any distal numbness or tingling at this time  She denies any lightheadedness, fevers, chills, or difficulty with PO diet         Labs:  0   Lab Value Date/Time    HCT 25 4 (L) 03/09/2023 0840    HCT 25 2 (L) 03/08/2023 0950    HCT 21 2 (L) 03/07/2023 0459    HCT 41 6 05/15/2015 1223    HCT 41 9 03/22/2015 0842    HCT 32 3 (L) 01/30/2015 0505    HGB 7 9 (L) 03/09/2023 0840    HGB 7 8 (L) 03/08/2023 0950    HGB 6 3 (LL) 03/07/2023 0459    HGB 13 2 05/15/2015 1223    HGB 13 3 03/22/2015 0842    HGB 10 2 (L) 01/30/2015 0505    INR 1 11 02/07/2023 0714    WBC 15 60 (H) 03/09/2023 0840    WBC 15 31 (H) 03/08/2023 0950    WBC 14 23 (H) 03/07/2023 0459    WBC 6 70 05/15/2015 1223    WBC 6 16 03/22/2015 0842    WBC 16 18 (H) 01/30/2015 0505    ESR 94 (H) 02/07/2023 0708    ESR 23 (H) 05/15/2015 1223    CRP 33 0 (H) 02/07/2023 0708    CRP 30 2 (H) 05/15/2015 1223       Meds:    Current Facility-Administered Medications:   •  acetaminophen (TYLENOL) tablet 975 mg, 975 mg, Oral, Q8H, [employer], PA-C, 975 mg at 03/09/23 0530  •  aluminum-magnesium hydroxide-simethicone (MYLANTA) oral suspension 30 mL, 30 mL, Oral, Q6H PRN, "[employer]EPHRAIM  •  ascorbic acid (VITAMIN C) tablet 500 mg, 500 mg, Oral, BID, Jess Tovar PA-C, 500 mg at 03/09/23 0859  •  aspirin (ECOTRIN LOW STRENGTH) EC tablet 81 mg, 81 mg, Oral, BID, Blessing Tabares PA-C, 81 mg at 03/09/23 0900  •  calcium carbonate (OYSTER SHELL,OSCAL) 500 mg tablet 1 tablet, 1 tablet, Oral, BID With Meals, Jess Tovar PA-C, 1 tablet at 03/09/23 0859  • calcium carbonate (TUMS) chewable tablet 1,000 mg, 1,000 mg, Oral, Daily PRN, Trent Julien PA-C  •  ceFAZolin (ANCEF) IVPB (premix in dextrose) 2,000 mg 50 mL, 2,000 mg, Intravenous, Q8H, Trent Julien PA-C, Last Rate: 100 mL/hr at 03/09/23 0530, 2,000 mg at 03/09/23 0530  •  cholecalciferol (VITAMIN D3) tablet 2,000 Units, 2,000 Units, Oral, Daily, Niranjan Hooker PA-C, 2,000 Units at 03/09/23 0901  •  docusate sodium (COLACE) capsule 100 mg, 100 mg, Oral, BID, Gilda Tabares PA-C, 100 mg at 03/09/23 0900  •  DULoxetine (CYMBALTA) delayed release capsule 60 mg, 60 mg, Oral, Daily, Gilda Tabares PA-C, 60 mg at 57/15/03 0809  •  folic acid (FOLVITE) tablet 1,000 mcg, 1,000 mcg, Oral, Daily, Niranjan Hooker PA-C, 1,000 mcg at 03/09/23 2246  •  gabapentin (NEURONTIN) capsule 300 mg, 300 mg, Oral, HS, Trent Julien PA-C, 300 mg at 03/06/23 2217  •  lactated ringers infusion, 125 mL/hr, Intravenous, Continuous, Trent Julien PA-C, Stopped at 03/06/23 2047  •  lactated ringers infusion, 100 mL/hr, Intravenous, Continuous, Trent Julien PA-C, Stopped at 03/07/23 3857  •  LORazepam (ATIVAN) tablet 0 5 mg, 0 5 mg, Oral, BID PRN, Trent Julien PA-C, 0 5 mg at 03/09/23 2165  •  metoprolol tartrate (LOPRESSOR) tablet 25 mg, 25 mg, Oral, Q12H [address], Gilda Tabares PA-C, 25 mg at 03/08/23 2200  •  multivitamin-minerals (CENTRUM) tablet 1 tablet, 1 tablet, Oral, Daily, Niranjan Hooker PA-C, 1 tablet at 03/09/23 0859  •  ondansetron (ZOFRAN) injection 4 mg, 4 mg, Intravenous, Q6H PRN, Trent Julien PA-C  •  ondansetron (ZOFRAN-ODT) dispersible tablet 8 mg, 8 mg, Oral, Q8H PRN, Trent Julien PA-C  •  oxybutynin (DITROPAN-XL) 24 hr tablet 10 mg, 10 mg, Oral, HS, Niranjan Hooker PA-C, 10 mg at 03/08/23 2200  •  oxyCODONE (OxyCONTIN) 12 hr tablet 40 mg, 40 mg, Oral, Q12H [address], Gilda Tabares PA-C, 40 mg at 03/09/23 0901  •  oxyCODONE (ROXICODONE) IR tablet 10 mg, 10 mg, Oral, Q4H PRN, Gilda Ruiz EPHRAIM Tabares, 10 mg at 03/09/23 0321  •  oxyCODONE (ROXICODONE) IR tablet 20 mg, 20 mg, Oral, Q4H PRN, Xiomara Tabares PA-C, 20 mg at 03/08/23 1144  •  pantoprazole (PROTONIX) EC tablet 40 mg, 40 mg, Oral, Early Morning, Nicki Novoa PA-C, 40 mg at 03/09/23 0530  •  saccharomyces boulardii (FLORASTOR) capsule 250 mg, 250 mg, Oral, BID, Nicki Novoa PA-C, 250 mg at 03/09/23 0900  •  senna (SENOKOT) tablet 8 6 mg, 1 tablet, Oral, Daily, Xiomara Tabares PA-C, 8 6 mg at 03/09/23 0900  •  vancomycin (VANCOCIN) 1,250 mg in sodium chloride 0 9 % 250 mL IVPB, 1,250 mg, Intravenous, Q24H, Sana Yee MD, Last Rate: 166 7 mL/hr at 03/08/23 2201, 1,250 mg at 03/08/23 2201    Blood Culture:   Lab Results   Component Value Date    BLOODCX No Growth After 5 Days  01/12/2023    BLOODCX No Growth After 5 Days  01/12/2023       Wound Culture:   No results found for: WOUNDCULT    Ins and Outs:  I/O last 24 hours: In: 240 [P O :240]  Out: 300 [Urine:300]      Physical:  Vitals:    03/09/23 0810   BP: 107/57   Pulse: 68   Resp: 18   Temp: (!) 96 3 °F (35 7 °C)   SpO2: 95%     Musculoskeletal: right Lower Extremity  · Visible skin without erythema or ecchymosis  · Mepilex dressing C/D/I  · Sensation intact distally  · Motor intact to +FHL/EHL, +ankle dorsi/plantar flexion  Movement in the toes is limited at baseline   · Digits warm and well perfused  · Compartments soft and compressible  · Swelling of bilateral legs due to known lymphedema    Assessment:    77 y  o female POD 3 right hip conversion ROWDY (antibiotic spacer), I&D, and insertion of drug delivery device  Plan:  · WBAT RLE  · Anterior hip precautions  · PICC line consent placed in the chart and consult ordered for insertion  Continue antibiotics per ID, cultures have remained negative up to this point  Anticipating 6 weeks IV abx pending on results  May consider suppressive therapy following IV antibiotics with growth in cultures    · Hemoglobin stable at 7 9 today with no signs of bleeding in the operative extremity  Continue to monitor at this time  · PT/OT  · Pain control with oxycontin 40 mg twice daily and PRN medications due to high baseline dosing of narcotics  · DVT ppx with aspirin 81 mg twice daily while in the hospital and at discharge  · Medical co-morbidities are being managed per medicine team  · Patient will most likely need rehab/SNF placement upon discharge  Antibiotics would not have to be arranged and pick set up prior to this happening      Coby Mariscal PA-C

## 2023-03-10 LAB
ANION GAP SERPL CALCULATED.3IONS-SCNC: 6 MMOL/L (ref 4–13)
BUN SERPL-MCNC: 23 MG/DL (ref 5–25)
CALCIUM SERPL-MCNC: 8.1 MG/DL (ref 8.4–10.2)
CHLORIDE SERPL-SCNC: 109 MMOL/L (ref 96–108)
CO2 SERPL-SCNC: 25 MMOL/L (ref 21–32)
CREAT SERPL-MCNC: 0.87 MG/DL (ref 0.6–1.3)
ERYTHROCYTE [DISTWIDTH] IN BLOOD BY AUTOMATED COUNT: 18.6 % (ref 11.6–15.1)
GFR SERPL CREATININE-BSD FRML MDRD: 67 ML/MIN/1.73SQ M
GLUCOSE SERPL-MCNC: 90 MG/DL (ref 65–140)
HCT VFR BLD AUTO: 26.3 % (ref 34.8–46.1)
HGB BLD-MCNC: 7.9 G/DL (ref 11.5–15.4)
MCH RBC QN AUTO: 29.3 PG (ref 26.8–34.3)
MCHC RBC AUTO-ENTMCNC: 30 G/DL (ref 31.4–37.4)
MCV RBC AUTO: 97 FL (ref 82–98)
PLATELET # BLD AUTO: 234 THOUSANDS/UL (ref 149–390)
PMV BLD AUTO: 9 FL (ref 8.9–12.7)
POTASSIUM SERPL-SCNC: 3.9 MMOL/L (ref 3.5–5.3)
RBC # BLD AUTO: 2.7 MILLION/UL (ref 3.81–5.12)
SODIUM SERPL-SCNC: 140 MMOL/L (ref 135–147)
VANCOMYCIN SERPL-MCNC: 10 UG/ML (ref 10–20)
WBC # BLD AUTO: 14.1 THOUSAND/UL (ref 4.31–10.16)

## 2023-03-10 PROCEDURE — 02HV33Z INSERTION OF INFUSION DEVICE INTO SUPERIOR VENA CAVA, PERCUTANEOUS APPROACH: ICD-10-PCS | Performed by: STUDENT IN AN ORGANIZED HEALTH CARE EDUCATION/TRAINING PROGRAM

## 2023-03-10 RX ORDER — POLYETHYLENE GLYCOL 3350 17 G/17G
17 POWDER, FOR SOLUTION ORAL DAILY PRN
Status: DISCONTINUED | OUTPATIENT
Start: 2023-03-10 | End: 2023-03-10

## 2023-03-10 RX ORDER — BISACODYL 5 MG/1
5 TABLET, DELAYED RELEASE ORAL DAILY PRN
Status: DISCONTINUED | OUTPATIENT
Start: 2023-03-10 | End: 2023-03-11 | Stop reason: HOSPADM

## 2023-03-10 RX ADMIN — ASPIRIN 81 MG: 81 TABLET, COATED ORAL at 09:06

## 2023-03-10 RX ADMIN — ACETAMINOPHEN 325MG 975 MG: 325 TABLET ORAL at 13:27

## 2023-03-10 RX ADMIN — OXYCODONE HYDROCHLORIDE AND ACETAMINOPHEN 500 MG: 500 TABLET ORAL at 16:38

## 2023-03-10 RX ADMIN — Medication 1 TABLET: at 07:26

## 2023-03-10 RX ADMIN — OXYCODONE HYDROCHLORIDE 40 MG: 40 TABLET, FILM COATED, EXTENDED RELEASE ORAL at 21:33

## 2023-03-10 RX ADMIN — MULTIPLE VITAMINS W/ MINERALS TAB 1 TABLET: TAB ORAL at 09:06

## 2023-03-10 RX ADMIN — OXYBUTYNIN CHLORIDE 10 MG: 10 TABLET, EXTENDED RELEASE ORAL at 21:33

## 2023-03-10 RX ADMIN — OXYCODONE HYDROCHLORIDE 10 MG: 5 TABLET ORAL at 05:49

## 2023-03-10 RX ADMIN — Medication 1 TABLET: at 16:38

## 2023-03-10 RX ADMIN — METOPROLOL TARTRATE 25 MG: 25 TABLET, FILM COATED ORAL at 09:06

## 2023-03-10 RX ADMIN — Medication 2000 UNITS: at 09:06

## 2023-03-10 RX ADMIN — Medication 250 MG: at 09:06

## 2023-03-10 RX ADMIN — OXYCODONE HYDROCHLORIDE 40 MG: 40 TABLET, FILM COATED, EXTENDED RELEASE ORAL at 09:06

## 2023-03-10 RX ADMIN — OXYCODONE HYDROCHLORIDE AND ACETAMINOPHEN 500 MG: 500 TABLET ORAL at 09:06

## 2023-03-10 RX ADMIN — ASPIRIN 81 MG: 81 TABLET, COATED ORAL at 21:33

## 2023-03-10 RX ADMIN — PANTOPRAZOLE SODIUM 40 MG: 40 TABLET, DELAYED RELEASE ORAL at 05:49

## 2023-03-10 RX ADMIN — DOCUSATE SODIUM 100 MG: 100 CAPSULE, LIQUID FILLED ORAL at 09:06

## 2023-03-10 RX ADMIN — LORAZEPAM 0.5 MG: 0.5 TABLET ORAL at 09:11

## 2023-03-10 RX ADMIN — BISACODYL 5 MG: 5 TABLET, COATED ORAL at 17:05

## 2023-03-10 RX ADMIN — FOLIC ACID 1000 MCG: 1 TABLET ORAL at 09:06

## 2023-03-10 RX ADMIN — CEFAZOLIN SODIUM 2000 MG: 2 SOLUTION INTRAVENOUS at 13:28

## 2023-03-10 RX ADMIN — ACETAMINOPHEN 325MG 975 MG: 325 TABLET ORAL at 21:33

## 2023-03-10 RX ADMIN — CEFAZOLIN SODIUM 2000 MG: 2 SOLUTION INTRAVENOUS at 05:49

## 2023-03-10 RX ADMIN — CEFAZOLIN SODIUM 2000 MG: 2 SOLUTION INTRAVENOUS at 21:34

## 2023-03-10 RX ADMIN — DOCUSATE SODIUM 100 MG: 100 CAPSULE, LIQUID FILLED ORAL at 16:38

## 2023-03-10 RX ADMIN — SENNOSIDES 8.6 MG: 8.6 TABLET, FILM COATED ORAL at 09:06

## 2023-03-10 RX ADMIN — ACETAMINOPHEN 325MG 975 MG: 325 TABLET ORAL at 05:49

## 2023-03-10 RX ADMIN — Medication 250 MG: at 16:38

## 2023-03-10 RX ADMIN — DULOXETINE 60 MG: 60 CAPSULE, DELAYED RELEASE ORAL at 09:07

## 2023-03-10 RX ADMIN — LORAZEPAM 0.5 MG: 0.5 TABLET ORAL at 22:37

## 2023-03-10 RX ADMIN — METOPROLOL TARTRATE 25 MG: 25 TABLET, FILM COATED ORAL at 21:33

## 2023-03-10 RX ADMIN — OXYCODONE HYDROCHLORIDE 10 MG: 5 TABLET ORAL at 16:38

## 2023-03-10 NOTE — PROGRESS NOTES
Orthopedics   Catherine Merlos 77 y o  female MRN: [id number]  Unit/Bed#: E2 -01      Subjective:  77 y  o female post operative day 4 right hip conversion of ROWDY to abx spacer, I&D  Pt doing well  Pain controlled  States her right heel felt numb overnight, but has since resolved   Denies CP or SOB    Labs:  0   Lab Value Date/Time    HCT 26 3 (L) 03/10/2023 0509    HCT 25 4 (L) 03/09/2023 0840    HCT 25 2 (L) 03/08/2023 0950    HCT 41 6 05/15/2015 1223    HCT 41 9 03/22/2015 0842    HCT 32 3 (L) 01/30/2015 0505    HGB 7 9 (L) 03/10/2023 0509    HGB 7 9 (L) 03/09/2023 0840    HGB 7 8 (L) 03/08/2023 0950    HGB 13 2 05/15/2015 1223    HGB 13 3 03/22/2015 0842    HGB 10 2 (L) 01/30/2015 0505    INR 1 11 02/07/2023 0714    WBC 14 10 (H) 03/10/2023 0509    WBC 15 60 (H) 03/09/2023 0840    WBC 15 31 (H) 03/08/2023 0950    WBC 6 70 05/15/2015 1223    WBC 6 16 03/22/2015 0842    WBC 16 18 (H) 01/30/2015 0505    ESR 94 (H) 02/07/2023 0708    ESR 23 (H) 05/15/2015 1223    CRP 33 0 (H) 02/07/2023 0708    CRP 30 2 (H) 05/15/2015 1223       Meds:    Current Facility-Administered Medications:   •  acetaminophen (TYLENOL) tablet 975 mg, 975 mg, Oral, Q8H, Kait Pizano PA-C, 975 mg at 03/10/23 0549  •  aluminum-magnesium hydroxide-simethicone (MYLANTA) oral suspension 30 mL, 30 mL, Oral, Q6H PRN, Kait Pizano PA-C  •  ascorbic acid (VITAMIN C) tablet 500 mg, 500 mg, Oral, BID, Maricruz Herndon PA-C, 500 mg at 03/09/23 1734  •  aspirin (ECOTRIN LOW STRENGTH) EC tablet 81 mg, 81 mg, Oral, BID, Dilip Tabares PA-C, 81 mg at 03/09/23 2236  •  calcium carbonate (OYSTER SHELL,OSCAL) 500 mg tablet 1 tablet, 1 tablet, Oral, BID With Meals, Maricruz Herndon PA-C, 1 tablet at 03/10/23 3095  •  calcium carbonate (TUMS) chewable tablet 1,000 mg, 1,000 mg, Oral, Daily PRN, Kait Pizano PA-C  •  ceFAZolin (ANCEF) IVPB (premix in dextrose) 2,000 mg 50 mL, 2,000 mg, Intravenous, Q8H, Kait Pizano PA-C, Last Rate: 100 mL/hr at 03/10/23 0549, 2,000 mg at 03/10/23 0549  •  cholecalciferol (VITAMIN D3) tablet 2,000 Units, 2,000 Units, Oral, Daily, Gustavo Polk PA-C, 2,000 Units at 03/09/23 0901  •  docusate sodium (COLACE) capsule 100 mg, 100 mg, Oral, BID, Jeanie Tabares PA-C, 100 mg at 03/09/23 1734  •  DULoxetine (CYMBALTA) delayed release capsule 60 mg, 60 mg, Oral, Daily, Jeanie Tabares PA-C, 60 mg at 50/41/20 4434  •  folic acid (FOLVITE) tablet 1,000 mcg, 1,000 mcg, Oral, Daily, Gustavo Polk PA-C, 1,000 mcg at 03/09/23 8087  •  gabapentin (NEURONTIN) capsule 300 mg, 300 mg, Oral, HS, Kelvin Smith PA-C, 300 mg at 03/06/23 2217  •  lactated ringers infusion, 125 mL/hr, Intravenous, Continuous, Kelvin Smith PA-C, Stopped at 03/06/23 2047  •  lactated ringers infusion, 100 mL/hr, Intravenous, Continuous, Kelvin Smith PA-C, Stopped at 03/07/23 9726  •  LORazepam (ATIVAN) tablet 0 5 mg, 0 5 mg, Oral, BID PRN, Kelvin Smith PA-C, 0 5 mg at 03/09/23 2231  •  metoprolol tartrate (LOPRESSOR) tablet 25 mg, 25 mg, Oral, Q12H [facility], Kelvin Smith PA-C, 25 mg at 03/09/23 2232  •  multivitamin-minerals (CENTRUM) tablet 1 tablet, 1 tablet, Oral, Daily, Gustavo Polk PA-C, 1 tablet at 03/09/23 0859  •  ondansetron (ZOFRAN) injection 4 mg, 4 mg, Intravenous, Q6H PRN, Kelvin Smith PA-C  •  ondansetron (ZOFRAN-ODT) dispersible tablet 8 mg, 8 mg, Oral, Q8H PRN, Kelvin Smith PA-C  •  oxybutynin (DITROPAN-XL) 24 hr tablet 10 mg, 10 mg, Oral, HS, Gustavo Polk PA-C, 10 mg at 03/09/23 2231  •  oxyCODONE (OxyCONTIN) 12 hr tablet 40 mg, 40 mg, Oral, Q12H [facility], [facility] EPHRAIM Tabares, 40 mg at 03/09/23 2231  •  oxyCODONE (ROXICODONE) IR tablet 10 mg, 10 mg, Oral, Q4H PRN, Kelvin Smith PA-C, 10 mg at 03/10/23 9800  •  oxyCODONE (ROXICODONE) IR tablet 20 mg, 20 mg, Oral, Q4H PRN, [facility] EPHRAIM Tabares, 20 mg at 03/09/23 1436  •  pantoprazole (PROTONIX) EC tablet 40 mg, 40 mg, Oral, Early Morning, Gustavo Polk PA-C, 40 mg at 03/10/23 0549  •  saccharomyces boulardii (FLORASTOR) capsule 250 mg, 250 mg, Oral, BID, Maria Eugenia Carey, PA-C, 250 mg at 03/09/23 1735  •  senna (SENOKOT) tablet 8 6 mg, 1 tablet, Oral, Daily, Lida Tabares, PA-C, 8 6 mg at 03/09/23 0900    Blood Culture:   Lab Results   Component Value Date    BLOODCX No Growth After 5 Days  01/12/2023    BLOODCX No Growth After 5 Days  01/12/2023       Wound Culture:   No results found for: WOUNDCULT      Physical Exam:  Vitals:    03/10/23 0745   BP: 126/70   Pulse: 66   Resp: 18   Temp: (!) 97 4 °F (36 3 °C)   SpO2: 99%     Gen: AAOx3, NAD  Skin: warm and dry  EENT: EOMI, hearing intact, trachea midline  Lungs: no audible wheezing and stridor  right lower extremity:  · Dressings C/D/I  · Sensation intact L2-S1  · Motor intact distally  · Limited foot/toe ROM limited at baseline  · Digits warm and well perfused   · Calf soft and non-tender  · Mild edema lower leg consistent with lymphedema    _*_*_*_*_*_*_*_*_*_*_*_*_*_*_*_*_*_*_*_*_*_*_*_*_*_*_*_*_*_*_*_*_*_*_*_*_*_*_*_*_*    Assessment: 77 y  o female post operative day 4 right hip conversion of ROWDY to antibiotic spacer, I&D with insertion of drug delivery device  Doing well    Plan:  · Weight Bearing as tolerated- RLE  · Up and out of bed with assist  · Anterior hip precautions  · abx per ID - Cefazolin x 6 weeks, then PO after 6 weeks for total of 3 months of abx  · DVT prophylaxis - 81 mg ASA BID  · PICC was ordered - awaiting placement  · Analgesics  · PT/OT  · Discharge - pending PT recommendations: home vs rehab;  Antibiotics will need to arrange prior to dc; pt has schedule f/u with [facility] Cr[facility]er in 2 weeks   · Patient noted to have acute blood loss anemia due to a drop in Hbg of > 2 0g from preop levels, will monitor vital signs and resuscitate with IV fluids as needed  hgb stable at 7 9 this morning    Leonidas Jaeger PA-C

## 2023-03-10 NOTE — PHYSICAL THERAPY NOTE
PHYSICAL THERAPY NOTE          Patient Name: Lata Tesfaye  RSUMR'R Date: 3/10/2023       03/10/23 1326   PT Last Visit   PT Visit Date 03/10/23   Note Type   Note Type Treatment   End of Consult   Patient Position at End of Consult Supine; All needs within reach   Nurse Communication Nurse aware of consult, application of brace  (Nursing present at end of session )   Pain Assessment   Pain Assessment Tool 0-10   Pain Score 7   Pain Location/Orientation Orientation: Right;Location: Hip   Pain Onset/Description Onset: Ongoing   Effect of Pain on Daily Activities limits mobility   Patient's Stated Pain Goal No pain   Hospital Pain Intervention(s) Cold applied;Repositioned; Ambulation/increased activity; Rest   Multiple Pain Sites No   Restrictions/Precautions   Weight Bearing Precautions Per Order Yes   RLE Weight Bearing Per Order WBAT   Braces or Orthoses Other (Comment)  (R ankle brace, custom shoes bilaterally)   Other Precautions Fall Risk;Pain;WBS   General   Chart Reviewed Yes   Response to Previous Treatment Patient with no complaints from previous session  Family/Caregiver Present No   Cognition   Overall Cognitive Status WFL   Arousal/Participation Cooperative   Attention Within functional limits   Orientation Level Oriented X4   Memory Within functional limits   Following Commands Follows all commands and directions without difficulty   Subjective   Subjective Pt reports moving better  Pt c/o 7/10 pain currently  Bed Mobility   Supine to Sit 5  Supervision   Additional items HOB elevated; Bedrails; Increased time required;Verbal cues   Sit to Supine 4  Minimal assistance   Additional items Assist x 1; Increased time required;Verbal cues;LE management  (RLE management)   Transfers   Sit to Stand 5  Supervision   Additional items Increased time required;Verbal cues; Bedrails   Stand to Sit 5  Supervision   Additional items Armrests; Increased time required;Verbal cues   Additional Comments cues for safe technique   Ambulation/Elevation   Gait pattern Improper Weight shift; Wide MAURICE; Decreased foot clearance;Decreased R stance; Short stride; Step to;Excessively slow;Decreased heel strike;Decreased toe off   Gait Assistance 5  Supervision   Additional items Verbal cues   Assistive Device Rolling walker   Distance 200'   Balance   Static Sitting Good   Dynamic Sitting Fair +   Static Standing Fair   Dynamic Standing Fair -   Ambulatory Poor +   Endurance Deficit   Endurance Deficit Yes   Endurance Deficit Description fatigue   Activity Tolerance   Activity Tolerance Patient limited by fatigue;Patient limited by pain   Medical Staff Royal Tang 405, OT; Pt seen for Co-treatment with Occupational Therapist due to pt's medical complexity, functional limitations and limited activity tolerance  Nurse Made Aware yes, JUDSON Mohr   Assessment   Prognosis Good   Problem List Decreased strength;Decreased endurance; Impaired balance;Decreased mobility; Decreased safety awareness;Decreased skin integrity;Pain   Assessment Pt seen for PT treatment  Pt is progressing well with mobility with RW, but needs increased time and occasional cues for safety  The patient's AM-PAC Basic Mobility Inpatient Short Form Raw Score is 20  A Raw score of greater than 16 suggests the patient may benefit from discharge to home  Please also refer to the recommendation of the Physical Therapist for safe discharge planning  Recommend return home with home PT at d/c to maximize safe, functional mobility  Barriers to Discharge None   Goals   Patient Goals to walk with a cane for son's wedding   STG Expiration Date 03/17/23   PT Treatment Day 3   Plan   Treatment/Interventions Functional transfer training;LE strengthening/ROM; Elevations; Therapeutic exercise; Endurance training;Equipment eval/education; Bed mobility;Gait training;Spoke to nursing;OT   Progress Progressing toward goals   PT Frequency 5-7x/wk   Recommendation   PT Discharge Recommendation Home with home health rehabilitation   AM-PAC Basic Mobility Inpatient   Turning in Flat Bed Without Bedrails 4   Lying on Back to Sitting on Edge of Flat Bed Without Bedrails 4   Moving Bed to Chair 3   Standing Up From Chair Using Arms 3   Walk in Room 3   Climb 3-5 Stairs With Railing 3   Basic Mobility Inpatient Raw Score 20   Basic Mobility Standardized Score 43 99   Highest Level Of Mobility   JH-HLM Goal 6: Walk 10 steps or more   JH-HLM Achieved 7: Walk 25 feet or more   Education   Education Provided Mobility training;Assistive device   Patient Demonstrates acceptance/verbal understanding   End of Consult   Patient Position at End of Consult Supine; All needs within reach   End of Consult Comments Pt agreeable to OOB to chair for dinner  Nsg lurdes Kolb

## 2023-03-10 NOTE — OCCUPATIONAL THERAPY NOTE
Occupational Therapy Progress Note     Patient Name: Chuy Kumar  PFZHR'A Date: 3/10/2023  Problem List  Principal Problem:    Post-traumatic osteoarthritis  Active Problems:    Benign essential hypertension    Chronic kidney disease, stage III (moderate) (HCC)    Lymphedema    [facility], [facility] ([facility] )    History of pulmonary embolism    Chronic diastolic heart failure ([facility] )    History of bacteremia    Anemia         03/10/23 1327   OT Last Visit   OT Visit Date 03/10/23   Note Type   Note Type Treatment   Pain Assessment   Pain Assessment Tool 0-10   Pain Score 7   Pain Location/Orientation Orientation: Right;Location: Hip;Location: Knee   Patient's Stated Pain Goal No pain   Hospital Pain Intervention(s) Cold applied;Repositioned; Ambulation/increased activity; Emotional support; Rest   Restrictions/Precautions   RLE Weight Bearing Per Order WBAT   Braces or Orthoses Other (Comment)  (Custom B shoes and R LE brace)   Other Precautions Pain; Fall Risk   ADL   Grooming Assistance 5  Supervision/Setup   Grooming Deficit Wash/dry hands   Grooming Comments standing at sink s/p toileting   UB Dressing Assistance 6  Modified independent   UB Dressing Deficit Thread RUE; Thread LUE;Pull around back  (house robe)   UB Dressing Comments MI donned  (S) to doff in stance  LB Dressing Assistance 2  Maximal Assistance   LB Dressing Deficit Don/doff R shoe;Don/doff L shoe   LB Dressing Comments States  dons her shoes and brace at home  Also has reacher to assist if needed  Toileting Assistance  5  Supervision/Setup   Toileting Deficit Perineal hygiene; Other (Comment)  (gown management)   Toileting Comments Completed urine hygiene and gown management w/ increased time  Wiped toilet seat w/ unilateral support w/ (S)  Bed Mobility   Supine to Sit 5  Supervision   Additional items HOB elevated; Bedrails; Increased time required   Sit to Supine 4  Minimal assistance   Additional items Bedrails; Increased time required;LE management   Additional Comments Sleeps in lift chair at home  Remains in bed w/ all needs  Transfers   Sit to Stand 5  Supervision   Additional items Increased time required   Stand to Sit 5  Supervision   Additional items Increased time required   Stand pivot 5  Supervision   Additional items   (RW)   Additional Comments Increased time  Functional Mobility   Functional Mobility 5  Supervision   Additional Comments (S) using RW on unit  Easily fatigues  Subjective   Subjective " my hip and knee are a little swollen"   Cognition   Overall Cognitive Status WFL   Arousal/Participation Cooperative   Attention Within functional limits   Orientation Level Oriented X4   Memory Within functional limits   Following Commands Follows multistep commands without difficulty   Comments Pleasant and cooperative  Additional Activities   Additional Activities Comments Educated pt on great improvement since initial eval  Discussed need for continued IV ABX  Activity Tolerance   Activity Tolerance Patient tolerated treatment well;Patient limited by fatigue;Patient limited by pain   Medical Staff Made Aware PT, Angi & RN, Fani   Assessment   Assessment Pt seen for f/u OT tx session w/ focus on self care, bed mobility, transfers, functional mobility, and activity tolerance  C/o pain in R hip and knee  (S) OOB and Min into bed  Sleeps in lift chair at home  Improved transfers to (S)  Ambulates using RW w/ (S)  Performed toileting and grooming tasks in stance w/ (S)  Multi directional reaching in stance w/ (S)  Increased time to complete all tasks; cautious  Pt making nice gains toward goals  Therefore, recommend pt return home w/ home therapy  However, still needs IV ABX  Plan   Treatment Interventions ADL retraining;Functional transfer training; Endurance training;Patient/family training;Equipment evaluation/education; Activityengagement   Goal Expiration Date 03/21/23   OT Treatment Day 1   OT Frequency 2-3x/wk Recommendation   OT Discharge Recommendation Home with home health rehabilitation  (Continues to require IV ABX)   AM-PAC Daily Activity Inpatient   Lower Body Dressing 2   Bathing 3   Toileting 3   Upper Body Dressing 4   Grooming 3   Eating 4   Daily Activity Raw Score 19   Daily Activity Standardized Score (Calc for Raw Score >=11) 40 22       DylanCordova Garima MS, OTR/L

## 2023-03-10 NOTE — ASSESSMENT & PLAN NOTE
Lab Results   Component Value Date    EGFR 67 03/10/2023    EGFR 61 03/09/2023    EGFR 55 03/08/2023    CREATININE 0 87 03/10/2023    CREATININE 0 94 03/09/2023    CREATININE 1 03 03/08/2023     · Creatinine mildly elevated at 1 19 postoperatively improved to 0 87  · Baseline 0 8-0 9  · Continue monitoring while inpatient

## 2023-03-10 NOTE — ASSESSMENT & PLAN NOTE
Wt Readings from Last 3 Encounters:   03/10/23 91 1 kg (200 lb 13 4 oz)   02/20/23 92 1 kg (203 lb)   02/14/23 92 2 kg (203 lb 3 2 oz)     · Appears euvolemic on exam  · No signs of overload after blood transfusion  · Continue Lopressor with hold parameters

## 2023-03-10 NOTE — PLAN OF CARE
Problem: INFECTION - ADULT  Goal: Absence or prevention of progression during hospitalization  Description: INTERVENTIONS:  Picc procedure and maintenance  - Assess and monitor for signs and symptoms of infection  - Monitor lab/diagnostic results  - Monitor all insertion sites, i e  indwelling lines, tubes, and drains  - Monitor endotracheal if appropriate and nasal secretions for changes in amount and color  - [place] appropriate cooling/warming therapies per order  - Administer medications as ordered  - Instruct and encourage patient and family to use good hand hygiene technique  - Identify and instruct in appropriate isolation precautions for identified infection/condition  Outcome: Progressing     Problem: Knowledge Deficit  Goal: Patient/family/caregiver demonstrates understanding of disease process, treatment plan, medications, and discharge instructions  Description: Complete learning assessment and assess knowledge base    Interventions:  Picc procedure and maintenance  - Provide teaching at level of understanding  - Provide teaching via preferred learning methods  Outcome: Progressing

## 2023-03-10 NOTE — PLAN OF CARE
Problem: Prexisting or High Potential for Compromised Skin Integrity  Goal: Skin integrity is maintained or improved  Description: INTERVENTIONS:  - Identify patients at risk for skin breakdown  - Assess and monitor skin integrity  - Assess and monitor nutrition and hydration status  - Monitor labs   - Assess for incontinence   - Turn and reposition patient  - Assist with mobility/ambulation  - Relieve pressure over bony prominences  - Avoid friction and shearing  - Provide appropriate hygiene as needed including keeping skin clean and dry  - Evaluate need for skin moisturizer/barrier cream  - Collaborate with interdisciplinary team   - Patient/family teaching  Outcome: Progressing     Problem: MOBILITY - ADULT  Goal: Maintain or return to baseline ADL function  Description: INTERVENTIONS:  -  Assess patient's ability to carry out ADLs; assess patient's baseline for ADL function and identify physical deficits which impact ability to perform ADLs (bathing, care of mouth/teeth, toileting, grooming, dressing, etc )  - Assess/evaluate cause of self-care deficits   - Assess range of motion  - Assess patient's mobility; develop plan if impaired  - Assess patient's need for assistive devices and provide as appropriate  - Encourage maximum independence but intervene and supervise when necessary  - Involve family in performance of ADLs  - Assess for home care needs following discharge   - Consider OT consult to assist with ADL evaluation and planning for discharge  - Provide patient education as appropriate  Outcome: Progressing  Goal: Maintains/Returns to pre admission functional level  Description: INTERVENTIONS:  - Perform BMAT or MOVE assessment daily    - Set and communicate daily mobility goal to care team and patient/family/caregiver  - Collaborate with rehabilitation services on mobility goals if consulted  - Perform Range of Motion 3 times a day  - Reposition patient every 2 hours    - Dangle patient 3 times a day  - Stand patient 3 times a day  - Ambulate patient 3 times a day  - Out of bed to chair   - Out of bed for meals   - Out of bed for toileting  - Record patient progress and toleration of activity level   Outcome: Progressing     Problem: PAIN - ADULT  Goal: Verbalizes/displays adequate comfort level or baseline comfort level  Description: Interventions:  - Encourage patient to monitor pain and request assistance  - Assess pain using appropriate pain scale  - Administer analgesics based on type and severity of pain and evaluate response  - Implement non-pharmacological measures as appropriate and evaluate response  - Consider cultural and social influences on pain and pain management  - Notify physician/advanced practitioner if interventions unsuccessful or patient reports new pain  Outcome: Progressing     Problem: INFECTION - ADULT  Goal: Absence or prevention of progression during hospitalization  Description: INTERVENTIONS:  - Assess and monitor for signs and symptoms of infection  - Monitor lab/diagnostic results  - Monitor all insertion sites, i e  indwelling lines, tubes, and drains  - Monitor endotracheal if appropriate and nasal secretions for changes in amount and color  - Wingate appropriate cooling/warming therapies per order  - Administer medications as ordered  - Instruct and encourage patient and family to use good hand hygiene technique  - Identify and instruct in appropriate isolation precautions for identified infection/condition  Outcome: Progressing     Problem: SAFETY ADULT  Goal: Maintain or return to baseline ADL function  Description: INTERVENTIONS:  -  Assess patient's ability to carry out ADLs; assess patient's baseline for ADL function and identify physical deficits which impact ability to perform ADLs (bathing, care of mouth/teeth, toileting, grooming, dressing, etc )  - Assess/evaluate cause of self-care deficits   - Assess range of motion  - Assess patient's mobility; develop plan if impaired  - Assess patient's need for assistive devices and provide as appropriate  - Encourage maximum independence but intervene and supervise when necessary  - Involve family in performance of ADLs  - Assess for home care needs following discharge   - Consider OT consult to assist with ADL evaluation and planning for discharge  - Provide patient education as appropriate  Outcome: Progressing  Goal: Maintains/Returns to pre admission functional level  Description: INTERVENTIONS:  - Perform BMAT or MOVE assessment daily    - Set and communicate daily mobility goal to care team and patient/family/caregiver  - Collaborate with rehabilitation services on mobility goals if consulted  - Perform Range of Motion 3 times a day  - Reposition patient every 2 hours    - Dangle patient 3 times a day  - Stand patient 3 times a day  - Ambulate patient 3 times a day  - Out of bed to chair   - Out of bed for meals   - Out of bed for toileting  - Record patient progress and toleration of activity level   Outcome: Progressing  Goal: Patient will remain free of falls  Description: INTERVENTIONS:  - Educate patient/family on patient safety including physical limitations  - Instruct patient to call for assistance with activity   - Consult OT/PT to assist with strengthening/mobility   - Keep Call bell within reach  - Keep bed low and locked with side rails adjusted as appropriate  - Keep care items and personal belongings within reach  - Initiate and maintain comfort rounds  - Make Fall Risk Sign visible to staff  - Offer Toileting every 2 Hours, in advance of need  - Initiate/Maintain bed alarm  - Obtain necessary fall risk management equipment  - Apply yellow socks and bracelet for high fall risk patients  - Consider moving patient to room near nurses station  Outcome: Progressing     Problem: DISCHARGE PLANNING  Goal: Discharge to home or other facility with appropriate resources  Description: INTERVENTIONS:  - Identify barriers to discharge w/patient and caregiver  - Arrange for needed discharge resources and transportation as appropriate  - Identify discharge learning needs  - Refer to Case Management Department for coordinating discharge planning if the patient needs post-hospital services based on physician/advanced practitioner order or complex needs related to functional status, cognitive ability, or social support system  Outcome: Progressing     Problem: Knowledge Deficit  Goal: Patient/family/caregiver demonstrates understanding of disease process, treatment plan, medications, and discharge instructions  Description: Complete learning assessment and assess knowledge base    Interventions:  - Provide teaching at level of understanding  - Provide teaching via preferred learning methods  Outcome: Progressing     Problem: GENITOURINARY - ADULT  Goal: Urinary catheter remains patent  Description: INTERVENTIONS:  - Assess patency of urinary catheter  - Discontinue lopez catheter POD #1  - Follow guidelines for intermittent irrigation of non-functioning urinary catheter  Outcome: Progressing  Goal: Absence of urinary retention  Description: INTERVENTIONS:  - After lopez catheter removed, assess patient's ability to void and empty bladder  - Monitor I/O  - Bladder scan as needed  - Discuss with physician/AP medications to alleviate retention as needed  - Follow urine retention protocol if ordered  Outcome: Progressing     Problem: SKIN/TISSUE INTEGRITY - ADULT  Goal: Incision(s), wounds(s) or drain site(s) healing without S/S of infection  Description: INTERVENTIONS  - Assess and document dressing, incision, wound bed, drain sites and surrounding tissue  - Provide patient and family education  - Perform skin care/dressing changes as ordered  Outcome: Progressing     Problem: COPING  Goal: Will report anxiety at manageable levels  Description: INTERVENTIONS:  - Administer medication as ordered  - Teach and encourage coping skills  - Provide emotional support  - Assess patient/family for anxiety and ability to cope  Outcome: Progressing

## 2023-03-10 NOTE — PLAN OF CARE
Problem: OCCUPATIONAL THERAPY ADULT  Goal: Performs self-care activities at highest level of function for planned discharge setting  See evaluation for individualized goals  Description: Treatment Interventions: ADL retraining, Functional transfer training, Endurance training, Patient/family training, Equipment evaluation/education, Activityengagement          See flowsheet documentation for full assessment, interventions and recommendations  Outcome: Progressing  Note: Limitation: Decreased ADL status, Decreased UE strength, Decreased Safe judgement during ADL, Decreased endurance, Decreased self-care trans, Decreased high-level ADLs  Prognosis: Good  Assessment: Pt seen for f/u OT tx session w/ focus on self care, bed mobility, transfers, functional mobility, and activity tolerance  C/o pain in R hip and knee  (S) OOB and Min into bed  Sleeps in lift chair at home  Improved transfers to (S)  Ambulates using RW w/ (S)  Performed toileting and grooming tasks in stance w/ (S)  Multi directional reaching in stance w/ (S)  Increased time to complete all tasks; cautious  Pt making nice gains toward goals  Therefore, recommend pt return home w/ home therapy  However, still needs IV ABX       OT Discharge Recommendation: Home with home health rehabilitation (Continues to require IV ABX)

## 2023-03-10 NOTE — ASSESSMENT & PLAN NOTE
· Patient is postop day 4 from ROWDY for posttraumatic osteoarthritis of right hip  · Was found to have evidence of purulence and chronic osteomyelitis during surgery, some old hardware was removed  · Spacer impregnated with vancomycin was placed in the OR  · ID recommending IV cefazolin for 6 weeks through 4/17/23 then transition to PO cefadroxil   · Patient will follow up with ID office two weeks from discharge   · Continue PT/OT while inpatient discharge to rehab  · Discharge planning per orthopedics  · DVT prophylaxis 81 mg BID

## 2023-03-10 NOTE — PROCEDURES
Insert PICC line    Date/Time: 3/10/2023 11:33 AM  Performed by: Dianna Xiong RN  Authorized by: Army Mayur PA-C     Patient location:  Bedside  Consent:     Consent obtained:  Written    Consent given by:  Patient    Procedural risks discussed: consent obtained  Universal protocol:     Procedure explained and questions answered to patient or proxy's satisfaction: yes      Relevant documents present and verified: yes      Test results available and properly labeled: yes      Radiology Images displayed and confirmed  If images not available, report reviewed: yes      Required blood products, implants, devices, and special equipment available: yes      Site/side marked: yes      Immediately prior to procedure, a time out was called: yes      Patient identity confirmed:  Verbally with patient, arm band, provided demographic data and hospital-assigned identification number  Pre-procedure details:     Hand hygiene: Hand hygiene performed prior to insertion      Sterile barrier technique: All elements of maximal sterile technique followed      Skin preparation:  ChloraPrep    Skin preparation agent: Skin preparation agent completely dried prior to procedure    Indications:     PICC line indications: long term antibiotics    Anesthesia (see MAR for exact dosages):      Anesthesia method:  Local infiltration (3ml)    Local anesthetic:  Lidocaine 1% w/o epi  Procedure details:     Location:  Basilic    Vessel type: vein      Laterality:  Right    Approach: percutaneous technique used      Patient position:  Flat    Procedural supplies:  Single lumen (per protocol)    Catheter size:  4 Fr    Landmarks identified: yes      Ultrasound guidance: yes      Ultrasound image availability:  Not saved    Sterile ultrasound techniques: Sterile gel and sterile probe covers were used      Number of attempts:  1    Successful placement: yes      Vessel of catheter tip end:  Sherlock 3CG confirmed (sherlock 3cg procedure record confirmed placement sent to medical records, picc okay to be utilized)    Total catheter length (cm):  38    Catheter out on skin (cm):  0    Max flow rate:  999ml/hr    Arm circumference:  34cm  Post-procedure details:     Post-procedure:  Dressing applied and securement device placed    Assessment:  Blood return through all ports and free fluid flow (sherlock 3cg confirmed)    Post-procedure complications: none      Patient tolerance of procedure:   Tolerated well, no immediate complications  Comments:      ID confirmed pt cleared for picc placement  Lot#[id number] 2024-03-31
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
## 2023-03-10 NOTE — PROGRESS NOTES
[facility]  Progress Note - Pascale Medrano 1955, 77 y o  female MRN: [id number]  Unit/Bed#: E2 [facility] Encounter: [id number]  Primary Care Provider: Delphine Blankenship DO   Date and time admitted to hospital: 3/6/2023 10:28 AM    * Post-traumatic osteoarthritis  Assessment & Plan  · Patient is postop day 4 from [facility] for posttraumatic osteoarthritis of right hip  · Was found to have evidence of purulence and chronic osteomyelitis during surgery, some old hardware was removed  · Spacer impregnated with vancomycin was placed in the OR  · ID recommending IV cefazolin for 6 weeks through 4/17/23 then transition to PO cefadroxil   · Patient will follow up with ID office two weeks from discharge   · Continue PT/OT while inpatient discharge to rehab  · Discharge planning per orthopedics  · DVT prophylaxis 81 mg BID    Anemia  Assessment & Plan  · Hemoglobin 6 3 postoperatively,Transfused 2 units PRBCs 3/8/23  · Hemoglobin stable at 7 9 today  · Continue monitoring hemoglobin while inpatient    History of bacteremia  Assessment & Plan  · Patient had recent MSSA bacteremia in November 2022  · Completed course of IV Ancef  · Initially source thought to be due to cellulitis in the setting of chronic leg lymphedema    Chronic diastolic heart failure (HCC)  Assessment & Plan  Wt Readings from Last 3 Encounters:   03/10/23 91 1 kg (200 lb 13 4 oz)   02/20/23 92 1 kg (203 lb)   02/14/23 92 2 kg (203 lb 3 2 oz)     · Appears euvolemic on exam  · No signs of overload after blood transfusion  · Continue Lopressor with hold parameters      Benign essential hypertension  Assessment & Plan  · Intermittently hypotensive postoperatively, may be component of symptomatic anemia  · BP improved   · Continue metoprolol with hold parameters    Hypogammaglobulinemia, acquired (HCC)  Assessment & Plan  · 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  · Being followed outpatient by hematology oncology  · Received IVIG previously, not currently receiving therapy    Lymphedema  Assessment & Plan  · History of chronic lymphedema, appears under control at this time  · Encourage sequential compression devices while inpatient    History of pulmonary embolism  Assessment & Plan  · DVT prophylaxis per orthopedics  · Currently on aspirin, would recommend heparin or Lovenox  · Does have history of IVC filter    Chronic kidney disease, stage III (moderate) [facility])  Assessment & Plan  Lab Results   Component Value Date    EGFR 67 03/10/2023    EGFR 61 03/09/2023    EGFR 55 03/08/2023    CREATININE 0 87 03/10/2023    CREATININE 0 94 03/09/2023    CREATININE 1 03 03/08/2023     · Creatinine mildly elevated at 1 19 postoperatively improved to 0 87  · Baseline 0 8-0 9  · Continue monitoring while inpatient      VTE Pharmacologic Prophylaxis: VTE Score: 10 Moderate Risk (Score 3-4) - Pharmacological DVT Prophylaxis Ordered: aspirin  Patient Centered Rounds: I performed bedside rounds with nursing staff today  Discussions with Specialists or Other Care Team Provider: case management    Education and Discussions with Family / Patient: Attempted to update  () via phone  Unable to contact  Total Time Spent on Date of Encounter in care of patient: 35 minutes This time was spent on one or more of the following: performing physical exam; counseling and coordination of care; obtaining or reviewing history; documenting in the medical record; reviewing/ordering tests, medications or procedures; communicating with other healthcare professionals and discussing with patient's family/caregivers  Current Length of Stay: 3 day(s)  Current Patient Status: Inpatient   Certification Statement: The patient will continue to require additional inpatient hospital stay due to picc line placement for IV antibiotics  Discharge Plan: Anticipate discharge in 24-48 hrs to rehab facility      Code Status: Prior    Subjective:   Patient was seen Alondra Sifuentes in bed today  She reports continued pain in her right hip that is controlled on her current pain regimen  She reports she has not had a bowel movement in 3 days  She denies any other acute complaints at this time  Objective:     Vitals:   Temp (24hrs), Av 2 °F (36 2 °C), Min:97 °F (36 1 °C), Max:97 4 °F (36 3 °C)    Temp:  [97 °F (36 1 °C)-97 4 °F (36 3 °C)] 97 4 °F (36 3 °C)  HR:  [66-79] 66  Resp:  [18] 18  BP: (125-138)/(67-70) 126/70  SpO2:  [97 %-99 %] 99 %  Body mass index is 35 58 kg/m²  Input and Output Summary (last 24 hours):   No intake or output data in the 24 hours ending 03/10/23 0952    Physical Exam:   Physical Exam  Vitals and nursing note reviewed  Constitutional:       Appearance: Normal appearance  HENT:      Head: Normocephalic and atraumatic  Eyes:      General: No scleral icterus  Cardiovascular:      Rate and Rhythm: Normal rate and regular rhythm  Pulmonary:      Effort: Pulmonary effort is normal       Breath sounds: Normal breath sounds  Abdominal:      General: Abdomen is flat  Bowel sounds are normal       Palpations: Abdomen is soft  Tenderness: There is no abdominal tenderness  Skin:     General: Skin is warm and dry  Comments: Bilateral legs in ace wrap badges  Dressing clean dry and inplace   Neurological:      Mental Status: She is alert  Mental status is at baseline  Psychiatric:         Mood and Affect: Mood normal          Behavior: Behavior normal           Additional Data:     Labs:  Results from last 7 days   Lab Units 03/10/23  0509 23  0459 23  1811   WBC Thousand/uL 14 10*   < > 20 72*   HEMOGLOBIN g/dL 7 9*   < > 7 4*   HEMATOCRIT % 26 3*   < > 25 1*   PLATELETS Thousands/uL 234   < > 312   NEUTROS PCT %  --   --  79*   LYMPHS PCT %  --   --  14   MONOS PCT %  --   --  6   EOS PCT %  --   --  0    < > = values in this interval not displayed       Results from last 7 days   Lab Units 03/10/23  0509   SODIUM mmol/L 140   POTASSIUM mmol/L 3 9   CHLORIDE mmol/L 109*   CO2 mmol/L 25   BUN mg/dL 23   CREATININE mg/dL 0 87   ANION GAP mmol/L 6   CALCIUM mg/dL 8 1*   GLUCOSE RANDOM mg/dL 90                       Lines/Drains:  Invasive Devices     Peripheral Intravenous Line  Duration           Peripheral IV 03/08/23 Left;Ventral (anterior) Forearm 1 day                      Imaging: No pertinent imaging reviewed      Recent Cultures (last 7 days):   Results from last 7 days   Lab Units 03/06/23  1412 03/06/23  1411   GRAM STAIN RESULT  Rare Polys  No organisms seen  Rare Polys  No organisms seen Rare Polys  No organisms seen       Last 24 Hours Medication List:   Current Facility-Administered Medications   Medication Dose Route Frequency Provider Last Rate   • acetaminophen  975 mg Oral Q8H Neftali Medina PA-C     • aluminum-magnesium hydroxide-simethicone  30 mL Oral Q6H PRN Neftali Medina PA-C     • ascorbic acid  500 mg Oral BID Leighton Sepulveda PA-C     • aspirin  81 mg Oral BID Neftali Medina PA-C     • calcium carbonate  1 tablet Oral BID With Meals Leighton Sepulveda PA-C     • calcium carbonate  1,000 mg Oral Daily PRN Neftali Medina PA-C     • cefazolin  2,000 mg Intravenous Q8H Neftali Medina PA-C 2,000 mg (03/10/23 0549)   • cholecalciferol  2,000 Units Oral Daily Leighton Sepulveda PA-C     • docusate sodium  100 mg Oral BID Neftali Medina PA-C     • DULoxetine  60 mg Oral Daily Neftali Medina PA-C     • folic acid  0,885 mcg Oral Daily Leighton Sepulveda PA-C     • gabapentin  300 mg Oral HS Neftali Medina PA-C     • lactated ringers  125 mL/hr Intravenous Continuous Neftali Medina PA-C Stopped (03/06/23 2047)   • lactated ringers  100 mL/hr Intravenous Continuous Neftali Medina PA-C Stopped (03/07/23 0256)   • LORazepam  0 5 mg Oral BID PRN Neftali Medina PA-C     • metoprolol tartrate  25 mg Oral Q12H [address] [place]OWE[place]     • multivitamin-minerals  1 tablet Oral Daily Pina Araya PA-C     • ondansetron  4 mg Intravenous Q6H PRN Fredo Antony PA-C     • ondansetron  8 mg Oral Q8H PRN Fredo Antony PA-C     • oxybutynin  10 mg Oral HS Pina Araya PA-C     • oxyCODONE  40 mg Oral Q12H [address] Fredo Antony PA-C     • oxyCODONE  10 mg Oral Q4H PRN Fredo Antony PA-C     • oxyCODONE  20 mg Oral Q4H PRN Fredo Antony PA-C     • pantoprazole  40 mg Oral Early Morning Pina Araya PA-C     • saccharomyces boulardii  250 mg Oral BID Pina Araya PA-C     • senna  1 tablet Oral Daily Fredo Antony PA-C          Today, Patient Was Seen By: Neela Sommer PA-C    **Please Note: This note may have been constructed using a voice recognition system  **

## 2023-03-10 NOTE — PLAN OF CARE
Problem: PHYSICAL THERAPY ADULT  Goal: Performs mobility at highest level of function for planned discharge setting  See evaluation for individualized goals  Description: Treatment/Interventions: Functional transfer training, LE strengthening/ROM, Therapeutic exercise, Endurance training, Cognitive reorientation, Patient/family training, Bed mobility, Gait training, Spoke to nursing, OT          See flowsheet documentation for full assessment, interventions and recommendations  Outcome: Progressing  Note: Prognosis: Good  Problem List: Decreased strength, Decreased endurance, Impaired balance, Decreased mobility, Decreased safety awareness, Decreased skin integrity, Pain  Assessment: Pt seen for PT treatment  Pt is progressing well with mobility with RW, but needs increased time and occasional cues for safety  The patient's AM-PAC Basic Mobility Inpatient Short Form Raw Score is 20  A Raw score of greater than 16 suggests the patient may benefit from discharge to home  Please also refer to the recommendation of the Physical Therapist for safe discharge planning  Recommend return home with home PT at d/c to maximize safe, functional mobility  Barriers to Discharge: None     PT Discharge Recommendation: Home with home health rehabilitation    See flowsheet documentation for full assessment

## 2023-03-11 VITALS
HEIGHT: 63 IN | SYSTOLIC BLOOD PRESSURE: 118 MMHG | TEMPERATURE: 98 F | RESPIRATION RATE: 18 BRPM | OXYGEN SATURATION: 97 % | BODY MASS INDEX: 35.59 KG/M2 | WEIGHT: 200.86 LBS | HEART RATE: 87 BPM | DIASTOLIC BLOOD PRESSURE: 56 MMHG

## 2023-03-11 LAB
ANION GAP SERPL CALCULATED.3IONS-SCNC: 5 MMOL/L (ref 4–13)
BUN SERPL-MCNC: 20 MG/DL (ref 5–25)
CALCIUM SERPL-MCNC: 8.1 MG/DL (ref 8.4–10.2)
CHLORIDE SERPL-SCNC: 108 MMOL/L (ref 96–108)
CO2 SERPL-SCNC: 27 MMOL/L (ref 21–32)
CREAT SERPL-MCNC: 0.81 MG/DL (ref 0.6–1.3)
ERYTHROCYTE [DISTWIDTH] IN BLOOD BY AUTOMATED COUNT: 19.7 % (ref 11.6–15.1)
GFR SERPL CREATININE-BSD FRML MDRD: 73 ML/MIN/1.73SQ M
GLUCOSE SERPL-MCNC: 87 MG/DL (ref 65–140)
HCT VFR BLD AUTO: 28 % (ref 34.8–46.1)
HGB BLD-MCNC: 8.4 G/DL (ref 11.5–15.4)
MCH RBC QN AUTO: 29.6 PG (ref 26.8–34.3)
MCHC RBC AUTO-ENTMCNC: 30 G/DL (ref 31.4–37.4)
MCV RBC AUTO: 99 FL (ref 82–98)
PLATELET # BLD AUTO: 283 THOUSANDS/UL (ref 149–390)
PMV BLD AUTO: 9.1 FL (ref 8.9–12.7)
POTASSIUM SERPL-SCNC: 3.9 MMOL/L (ref 3.5–5.3)
RBC # BLD AUTO: 2.84 MILLION/UL (ref 3.81–5.12)
SODIUM SERPL-SCNC: 140 MMOL/L (ref 135–147)
WBC # BLD AUTO: 10.64 THOUSAND/UL (ref 4.31–10.16)

## 2023-03-11 RX ORDER — ACETAMINOPHEN 500 MG
1000 TABLET ORAL EVERY 8 HOURS
Qty: 60 TABLET | Refills: 0 | Status: SHIPPED | OUTPATIENT
Start: 2023-03-11

## 2023-03-11 RX ORDER — ASPIRIN 81 MG/1
81 TABLET ORAL 2 TIMES DAILY
Qty: 60 TABLET | Refills: 0 | Status: SHIPPED | OUTPATIENT
Start: 2023-03-11

## 2023-03-11 RX ORDER — AMOXICILLIN 250 MG
1 CAPSULE ORAL DAILY
Qty: 30 TABLET | Refills: 0 | Status: SHIPPED | OUTPATIENT
Start: 2023-03-11

## 2023-03-11 RX ORDER — CEFAZOLIN SODIUM 2 G/50ML
2000 SOLUTION INTRAVENOUS EVERY 8 HOURS
Qty: 5550 ML | Refills: 0 | Status: SHIPPED | OUTPATIENT
Start: 2023-03-11 | End: 2023-04-17

## 2023-03-11 RX ADMIN — METOPROLOL TARTRATE 25 MG: 25 TABLET, FILM COATED ORAL at 08:45

## 2023-03-11 RX ADMIN — SENNOSIDES 8.6 MG: 8.6 TABLET, FILM COATED ORAL at 08:45

## 2023-03-11 RX ADMIN — Medication 1 TABLET: at 08:45

## 2023-03-11 RX ADMIN — PANTOPRAZOLE SODIUM 40 MG: 40 TABLET, DELAYED RELEASE ORAL at 05:03

## 2023-03-11 RX ADMIN — DULOXETINE 60 MG: 60 CAPSULE, DELAYED RELEASE ORAL at 08:45

## 2023-03-11 RX ADMIN — ASPIRIN 81 MG: 81 TABLET, COATED ORAL at 08:44

## 2023-03-11 RX ADMIN — ACETAMINOPHEN 325MG 975 MG: 325 TABLET ORAL at 13:30

## 2023-03-11 RX ADMIN — OXYCODONE HYDROCHLORIDE 40 MG: 40 TABLET, FILM COATED, EXTENDED RELEASE ORAL at 08:45

## 2023-03-11 RX ADMIN — OXYCODONE HYDROCHLORIDE 10 MG: 5 TABLET ORAL at 13:36

## 2023-03-11 RX ADMIN — CEFAZOLIN SODIUM 2000 MG: 2 SOLUTION INTRAVENOUS at 04:53

## 2023-03-11 RX ADMIN — DOCUSATE SODIUM 100 MG: 100 CAPSULE, LIQUID FILLED ORAL at 08:45

## 2023-03-11 RX ADMIN — Medication 2000 UNITS: at 08:44

## 2023-03-11 RX ADMIN — MULTIPLE VITAMINS W/ MINERALS TAB 1 TABLET: TAB ORAL at 08:45

## 2023-03-11 RX ADMIN — OXYCODONE HYDROCHLORIDE AND ACETAMINOPHEN 500 MG: 500 TABLET ORAL at 08:45

## 2023-03-11 RX ADMIN — OXYCODONE HYDROCHLORIDE 10 MG: 5 TABLET ORAL at 04:53

## 2023-03-11 RX ADMIN — CEFAZOLIN SODIUM 2000 MG: 2 SOLUTION INTRAVENOUS at 13:30

## 2023-03-11 RX ADMIN — LORAZEPAM 0.5 MG: 0.5 TABLET ORAL at 10:44

## 2023-03-11 RX ADMIN — FOLIC ACID 1000 MCG: 1 TABLET ORAL at 08:45

## 2023-03-11 RX ADMIN — BISACODYL 5 MG: 5 TABLET, COATED ORAL at 09:13

## 2023-03-11 RX ADMIN — Medication 250 MG: at 08:45

## 2023-03-11 RX ADMIN — ACETAMINOPHEN 325MG 975 MG: 325 TABLET ORAL at 05:03

## 2023-03-11 NOTE — PROGRESS NOTES
Progress Note - Orthopedics   Alease Patches 77 y o  female MRN: [id number]  Unit/Bed#: E2 [facility] Encounter: [id number]    Assessment:  76 yo female POD5 status post R hip conversion to hand-crafted antibiotic cement ROWDY and I&D on 3/7/23    Plan:  · Continue IV antibiotics  Currently on IV ancef, plan for 6 weeks then transition to oral antibiotic per ID recs  Patient had PICC line placed yesterday  · WBAT to RLE  · Anterior hip precautions  · Pain control prn  · DVT prophylaxis- ASA 81 mg BID    · OT/ OT  · Medical management per SLIM  · Dc planning- will follow up with case management regarding antibiotic arrangements  Subjective: Patient seen and examined  Patient eating breakfast  She notes her right hip pain is well controlled  Patient states she was sore from PT sessions  She notes hip swelling as well  Overall she feels she is improving daily  She is passing gas  Denies fevers, chills, abd pain, distal numbness  Vitals: Blood pressure 146/81, pulse 78, temperature 98 1 °F (36 7 °C), temperature source Temporal, resp  rate 18, height 5' 3" (1 6 m), weight 91 1 kg (200 lb 13 8 oz), SpO2 99 %  ,Body mass index is 35 58 kg/m²  No intake or output data in the 24 hours ending 03/11/23 0751    Invasive Devices     Peripherally Inserted Central Catheter Line  Duration           PICC Line 32/04/50 Right Basilic <1 day                Ortho Exam:   Right lower extremity:  Inspection- mepilex dressing CDI  No ecchymosis or erythema  Palpation- moderate swelling about the hip  No TTP  Thigh soft and compressible  ROM- able to plantar and dorsiflex ankle and toes  Neurovascular- Sensation intact L4-S1  Toes warm and well perfused  At/ GS/ EHL intact       Lab, Imaging and other studies:   CBC:   Lab Results   Component Value Date    WBC 10 64 (H) 03/11/2023    HGB 8 4 (L) 03/11/2023    HCT 28 0 (L) 03/11/2023    MCV 99 (H) 03/11/2023     03/11/2023    MCH 29 6 03/11/2023    MCHC 30 0 (L) 03/11/2023    RDW 19 7 (H) 03/11/2023    MPV 9 1 03/11/2023     CMP:   Lab Results   Component Value Date    SODIUM 140 03/11/2023     03/11/2023    CO2 27 03/11/2023    BUN 20 03/11/2023    CREATININE 0 81 03/11/2023    CALCIUM 8 1 (L) 03/11/2023    EGFR 73 03/11/2023

## 2023-03-11 NOTE — CASE MANAGEMENT
Case Management Assessment & Discharge Planning Note    Patient name Wandy Grace  Location [facility] /E2 -* MRN [id number]  : 1955 Date 3/11/2023       Current Admission Date: 3/6/2023  Current Admission Diagnosis:Post-traumatic osteoarthritis   Patient Active Problem List    Diagnosis Date Noted   • Encounter for geriatric assessment 2023   • Iron deficiency anemia secondary to inadequate dietary iron intake 2023   • Anemia 2023   • History of bacteremia 2022   • Right hip pain 2022   • History of pulmonary embolism 2022   • Chronic diastolic heart failure ([facility] ) 2022   • Slow transit constipation 2021   • Chronic right-sided low back pain without sciatica 2021   • Cellulitis of right lower extremity 2021   • Septic shock ([facility] ) 2021   • Morbid obesity ([facility] ) 2021   • Hypogammaglobulinemia, acquired ([facility] ) 2019   • Hypertrophic cardiomyopathy ([facility] ) 2019   • Low immunoglobulin level 2019   • LVH (left ventricular hypertrophy) 2019   • Postnasal drip 2019   • Dyspnea on exertion 2019   • Mitral valve disorder    • Elevated troponin 2019   • Acute metabolic encephalopathy    • Increased risk of breast cancer    • Other complicated headache syndrome 2018   • Nausea & vomiting 2018   • SIRS (systemic inflammatory response syndrome) ([facility] ) 2018   • Post-traumatic osteoarthritis 2018   • Opiate analgesic use agreement exists 2016   • BRCA1 positive 2016   • Charcot's joint 2016   • Class 2 obesity 2016   • Wegener's granulomatosis with renal involvement ([facility] ) 01/15/2016   • Cancer of ovary ([facility] ) 2015   • Ovarian cancer ([facility] ) 2015   • Increased frequency of urination 2014   • Chronic kidney disease, stage III (moderate) ([facility] ) 2014   • Chronic pain disorder 10/24/2013   • GERD (gastroesophageal reflux disease) 09/27/2013   • Dyslipidemia 04/04/2013   • Lymphedema 11/15/2012   • Anxiety 06/05/2012   • Benign essential hypertension 06/05/2012   • Depression 06/05/2012      LOS (days): 4  Geometric Mean LOS (GMLOS) (days): 1 70  Days to GMLOS:-2 5     OBJECTIVE:    Risk of Unplanned Readmission Score: 26 33         Current admission status: Inpatient       Preferred Pharmacy:   [address], [address] [place] [address]  Phone: [phone] Fax: [facility], [address] [place] [address], [facility]   [facility] [address]  Phone: [phone] Fax: [phone]    [facility] , [facility] [facility] [address] [place] [address]  Phone: [phone] Fax: [phone]    [facility] [place] [place] [address] [address] [address]  Phone: [phone] Fax: [phone]    Primary Care Provider: Gini Thorne, DO    Primary Insurance: MEDICARE  Secondary Insurance: AARP    DISCHARGE DETAILS:    Discharge planning discussed with[de-identified] patient's spouse Deepa Cardinal of Choice: Yes  Comments - Freedom of Choice: Gordon De Leon contacted family/caregiver?: Yes  Were Treatment Team discharge recommendations reviewed with patient/caregiver?: Yes  Did patient/caregiver verbalize understanding of patient care needs?: N/A- going to facility  Were patient/caregiver advised of the risks associated with not following Treatment Team discharge recommendations?: Yes    Contacts  Patient Contacts: Ashley Bustos Lamb/  Relationship to Patient[de-identified] Family  Contact Method: Phone  Phone Number: [phone]  Reason/Outcome: Continuity of Care, Emergency Contact, Referral, Discharge Tanner Frank         Is the patient interested in [facility] at discharge?: No    DME Referral Provided  Referral made for DME?: No    Other Referral/Resources/Interventions Provided:  Interventions: Short Term Rehab  Referral Comments: Patient is accepted to [facility] at [address] Recommendation: Short Term Rehab  Discharge Destination Plan[de-identified] Short Term Rehab                                         Additional Comments: Patient's spouse Ewa London requested short term rehab AT [facility], transportation via BLS arranged for 3:15pm today  Per facility no new covid swab needed  Antibiotic script sent to facility to order and was advised med would be available today      Accepting Facility Name, [facility] : [facility]  Receiving Facility/Agency Phone Number: P: [phone]  F: [phone]  Facility/Agency Fax Number: P: [phone]  F: [phone]

## 2023-03-11 NOTE — PLAN OF CARE
Problem: PHYSICAL THERAPY ADULT  Goal: Performs mobility at highest level of function for planned discharge setting  See evaluation for individualized goals  Description: Treatment/Interventions: Functional transfer training, LE strengthening/ROM, Therapeutic exercise, Endurance training, Cognitive reorientation, Patient/family training, Bed mobility, Gait training, Spoke to nursing, OT          See flowsheet documentation for full assessment, interventions and recommendations  Outcome: Progressing  Note: Prognosis: Good  Problem List: Decreased strength, Decreased range of motion, Decreased endurance, Impaired balance, Decreased mobility, Pain, Orthopedic restrictions  Assessment: Pt  progressing well with mobility  Pt  able to perform supine to sit transfer with S with increased time and effort with HOB elevated and use of bedrails  Pt  able to maintain static standing with and without RW support to tie her gown  Elevated bed for STS tarnsfer from EOB  Pt  noted to fatigue with short distance  pt  reported she does not feel good in her stomach as she may need to have a BM  pt  ambulated back to the room and seated on raised toilet seat  RN was informed of patient status  Will continue to follow per PT POC  Barriers to Discharge: None     PT Discharge Recommendation: Home with home health rehabilitation    See flowsheet documentation for full assessment

## 2023-03-11 NOTE — ASSESSMENT & PLAN NOTE
Wt Readings from Last 3 Encounters:   03/11/23 91 1 kg (200 lb 13 8 oz)   02/20/23 92 1 kg (203 lb)   02/14/23 92 2 kg (203 lb 3 2 oz)     · Appears euvolemic on exam  · No signs of overload after blood transfusion  · Continue Lopressor with hold parameters

## 2023-03-11 NOTE — QUICK NOTE
Discussed case with ID  Patient is clear for dc today with plan as documented yesterday  Discussed with CM  Patient has been accepted for rehab and IV antibiotic infusions at [address] prescription sent to [place] for use at rehab

## 2023-03-11 NOTE — PLAN OF CARE
Problem: Prexisting or High Potential for Compromised Skin Integrity  Goal: Skin integrity is maintained or improved  Description: INTERVENTIONS:  - Identify patients at risk for skin breakdown  - Assess and monitor skin integrity  - Assess and monitor nutrition and hydration status  - Monitor labs   - Assess for incontinence   - Turn and reposition patient  - Assist with mobility/ambulation  - Relieve pressure over bony prominences  - Avoid friction and shearing  - Provide appropriate hygiene as needed including keeping skin clean and dry  - Evaluate need for skin moisturizer/barrier cream  - Collaborate with interdisciplinary team   - Patient/family teaching  Outcome: Progressing     Problem: MOBILITY - ADULT  Goal: Maintain or return to baseline ADL function  Description: INTERVENTIONS:  -  Assess patient's ability to carry out ADLs; assess patient's baseline for ADL function and identify physical deficits which impact ability to perform ADLs (bathing, care of mouth/teeth, toileting, grooming, dressing, etc )  - Assess/evaluate cause of self-care deficits   - Assess range of motion  - Assess patient's mobility; develop plan if impaired  - Assess patient's need for assistive devices and provide as appropriate  - Encourage maximum independence but intervene and supervise when necessary  - Involve family in performance of ADLs  - Assess for home care needs following discharge   - Consider OT consult to assist with ADL evaluation and planning for discharge  - Provide patient education as appropriate  Outcome: Progressing  Goal: Maintains/Returns to pre admission functional level  Description: INTERVENTIONS:  - Perform BMAT or MOVE assessment daily    - Set and communicate daily mobility goal to care team and patient/family/caregiver  - Collaborate with rehabilitation services on mobility goals if consulted  - Perform Range of Motion 3 times a day  - Reposition patient every 2 hours    - Dangle patient 3 times a day  - Stand patient 3 times a day  - Ambulate patient 3 times a day  - Out of bed to chair   - Out of bed for meals   - Out of bed for toileting  - Record patient progress and toleration of activity level   Outcome: Progressing     Problem: PAIN - ADULT  Goal: Verbalizes/displays adequate comfort level or baseline comfort level  Description: Interventions:  - Encourage patient to monitor pain and request assistance  - Assess pain using appropriate pain scale  - Administer analgesics based on type and severity of pain and evaluate response  - Implement non-pharmacological measures as appropriate and evaluate response  - Consider cultural and social influences on pain and pain management  - Notify physician/advanced practitioner if interventions unsuccessful or patient reports new pain  Outcome: Progressing     Problem: INFECTION - ADULT  Goal: Absence or prevention of progression during hospitalization  Description: INTERVENTIONS:  - Assess and monitor for signs and symptoms of infection  - Monitor lab/diagnostic results  - Monitor all insertion sites, i e  indwelling lines, tubes, and drains  - Monitor endotracheal if appropriate and nasal secretions for changes in amount and color  - [place] appropriate cooling/warming therapies per order  - Administer medications as ordered  - Instruct and encourage patient and family to use good hand hygiene technique  - Identify and instruct in appropriate isolation precautions for identified infection/condition  Outcome: Progressing     Problem: SAFETY ADULT  Goal: Maintain or return to baseline ADL function  Description: INTERVENTIONS:  -  Assess patient's ability to carry out ADLs; assess patient's baseline for ADL function and identify physical deficits which impact ability to perform ADLs (bathing, care of mouth/teeth, toileting, grooming, dressing, etc )  - Assess/evaluate cause of self-care deficits   - Assess range of motion  - Assess patient's mobility; develop plan if impaired  - Assess patient's need for assistive devices and provide as appropriate  - Encourage maximum independence but intervene and supervise when necessary  - Involve family in performance of ADLs  - Assess for home care needs following discharge   - Consider OT consult to assist with ADL evaluation and planning for discharge  - Provide patient education as appropriate  Outcome: Progressing  Goal: Maintains/Returns to pre admission functional level  Description: INTERVENTIONS:  - Perform BMAT or MOVE assessment daily    - Set and communicate daily mobility goal to care team and patient/family/caregiver  - Collaborate with rehabilitation services on mobility goals if consulted  - Perform Range of Motion 3 times a day  - Reposition patient every 2 hours    - Dangle patient 3 times a day  - Stand patient 3 times a day  - Ambulate patient 3 times a day  - Out of bed to chair   - Out of bed for meals   - Out of bed for toileting  - Record patient progress and toleration of activity level   Outcome: Progressing  Goal: Patient will remain free of falls  Description: INTERVENTIONS:  - Educate patient/family on patient safety including physical limitations  - Instruct patient to call for assistance with activity   - Consult OT/PT to assist with strengthening/mobility   - Keep Call bell within reach  - Keep bed low and locked with side rails adjusted as appropriate  - Keep care items and personal belongings within reach  - Initiate and maintain comfort rounds  - Make Fall Risk Sign visible to staff  - Apply yellow socks and bracelet for high fall risk patients  - Consider moving patient to room near nurses station  Outcome: Progressing     Problem: DISCHARGE PLANNING  Goal: Discharge to home or other facility with appropriate resources  Description: INTERVENTIONS:  - Identify barriers to discharge w/patient and caregiver  - Arrange for needed discharge resources and transportation as appropriate  - Identify discharge learning needs  - Refer to Case Management Department for coordinating discharge planning if the patient needs post-hospital services based on physician/advanced practitioner order or complex needs related to functional status, cognitive ability, or social support system  Outcome: Progressing     Problem: Knowledge Deficit  Goal: Patient/family/caregiver demonstrates understanding of disease process, treatment plan, medications, and discharge instructions  Description: Complete learning assessment and assess knowledge base    Interventions:  - Provide teaching at level of understanding  - Provide teaching via preferred learning methods  Outcome: Progressing     Problem: GENITOURINARY - ADULT  Goal: Urinary catheter remains patent  Description: INTERVENTIONS:  - Assess patency of urinary catheter  - Discontinue lopez catheter POD #1  - Follow guidelines for intermittent irrigation of non-functioning urinary catheter  Outcome: Progressing  Goal: Absence of urinary retention  Description: INTERVENTIONS:  - After lopez catheter removed, assess patient's ability to void and empty bladder  - Monitor I/O  - Bladder scan as needed  - Discuss with physician/AP medications to alleviate retention as needed  - Follow urine retention protocol if ordered  Outcome: Progressing     Problem: SKIN/TISSUE INTEGRITY - ADULT  Goal: Incision(s), wounds(s) or drain site(s) healing without S/S of infection  Description: INTERVENTIONS  - Assess and document dressing, incision, wound bed, drain sites and surrounding tissue  - Provide patient and family education  - Perform skin care/dressing changes as ordered  Outcome: Progressing     Problem: COPING  Goal: Will report anxiety at manageable levels  Description: INTERVENTIONS:  - Administer medication as ordered  - Teach and encourage coping skills  - Provide emotional support  - Assess patient/family for anxiety and ability to cope  Outcome: Progressing

## 2023-03-11 NOTE — PLAN OF CARE
Problem: Prexisting or High Potential for Compromised Skin Integrity  Goal: Skin integrity is maintained or improved  Description: INTERVENTIONS:  - Identify patients at risk for skin breakdown  - Assess and monitor skin integrity  - Assess and monitor nutrition and hydration status  - Monitor labs   - Assess for incontinence   - Turn and reposition patient  - Assist with mobility/ambulation  - Relieve pressure over bony prominences  - Avoid friction and shearing  - Provide appropriate hygiene as needed including keeping skin clean and dry  - Evaluate need for skin moisturizer/barrier cream  - Collaborate with interdisciplinary team   - Patient/family teaching  Outcome: Progressing     Problem: PAIN - ADULT  Goal: Verbalizes/displays adequate comfort level or baseline comfort level  Description: Interventions:  - Encourage patient to monitor pain and request assistance  - Assess pain using appropriate pain scale  - Administer analgesics based on type and severity of pain and evaluate response  - Implement non-pharmacological measures as appropriate and evaluate response  - Consider cultural and social influences on pain and pain management  - Notify physician/advanced practitioner if interventions unsuccessful or patient reports new pain  Outcome: Progressing     Problem: SAFETY ADULT  Goal: Patient will remain free of falls  Description: INTERVENTIONS:  - Educate patient/family on patient safety including physical limitations  - Instruct patient to call for assistance with activity   - Consult OT/PT to assist with strengthening/mobility   - Keep Call bell within reach  - Keep bed low and locked with side rails adjusted as appropriate  - Keep care items and personal belongings within reach  - Initiate and maintain comfort rounds  - Make Fall Risk Sign visible to staff  - Offer Toileting every 2 Hours, in advance of need  - Initiate/Maintain bed alarm  - Apply yellow socks and bracelet for high fall risk patients  - Consider moving patient to room near nurses station  Outcome: Progressing

## 2023-03-11 NOTE — ASSESSMENT & PLAN NOTE
Lab Results   Component Value Date    EGFR 73 03/11/2023    EGFR 67 03/10/2023    EGFR 61 03/09/2023    CREATININE 0 81 03/11/2023    CREATININE 0 87 03/10/2023    CREATININE 0 94 03/09/2023     · Creatinine mildly elevated at 1 19 postoperatively improved to 0 81  · Baseline 0 8-0 9  · Continue monitoring while inpatient

## 2023-03-11 NOTE — ASSESSMENT & PLAN NOTE
· Intermittently hypotensive postoperatively, BP now improved   · Continue metoprolol with hold parameters

## 2023-03-11 NOTE — ASSESSMENT & PLAN NOTE
· Patient is postop day 5 from [facility] for posttraumatic osteoarthritis of right hip  · Was found to have evidence of purulence and chronic osteomyelitis during surgery, some old hardware was removed  · Spacer impregnated with vancomycin was placed in the OR  · ID recommending IV cefazolin for 6 weeks through 4/17/23 then transition to PO cefadroxil   · Patient will follow up with ID office two weeks from discharge   · Pt/Ot updated recommendations to home with home health  · Discharge planning per orthopedics  · DVT prophylaxis 81 mg BID

## 2023-03-11 NOTE — PLAN OF CARE
Problem: Prexisting or High Potential for Compromised Skin Integrity  Goal: Skin integrity is maintained or improved  Description: INTERVENTIONS:  - Identify patients at risk for skin breakdown  - Assess and monitor skin integrity  - Assess and monitor nutrition and hydration status  - Monitor labs   - Assess for incontinence   - Turn and reposition patient  - Assist with mobility/ambulation  - Relieve pressure over bony prominences  - Avoid friction and shearing  - Provide appropriate hygiene as needed including keeping skin clean and dry  - Evaluate need for skin moisturizer/barrier cream  - Collaborate with interdisciplinary team   - Patient/family teaching  3/11/2023 1640 by Rita Jacobson RN  Outcome: Completed  3/11/2023 1158 by Rita Jacobson RN  Outcome: Progressing     Problem: MOBILITY - ADULT  Goal: Maintain or return to baseline ADL function  Description: INTERVENTIONS:  -  Assess patient's ability to carry out ADLs; assess patient's baseline for ADL function and identify physical deficits which impact ability to perform ADLs (bathing, care of mouth/teeth, toileting, grooming, dressing, etc )  - Assess/evaluate cause of self-care deficits   - Assess range of motion  - Assess patient's mobility; develop plan if impaired  - Assess patient's need for assistive devices and provide as appropriate  - Encourage maximum independence but intervene and supervise when necessary  - Involve family in performance of ADLs  - Assess for home care needs following discharge   - Consider OT consult to assist with ADL evaluation and planning for discharge  - Provide patient education as appropriate  3/11/2023 1640 by Rita Jacobson RN  Outcome: Completed  3/11/2023 1158 by Rita Jacobson RN  Outcome: Progressing  Goal: Maintains/Returns to pre admission functional level  Description: INTERVENTIONS:  - Perform BMAT or MOVE assessment daily    - Set and communicate daily mobility goal to care team and patient/family/caregiver  - Collaborate with rehabilitation services on mobility goals if consulted  - Perform Range of Motion 3 times a day  - Reposition patient every 2 hours    - Dangle patient 3 times a day  - Stand patient 3 times a day  - Ambulate patient 3 times a day  - Out of bed to chair   - Out of bed for meals   - Out of bed for toileting  - Record patient progress and toleration of activity level   3/11/2023 1640 by Winda Harada, RN  Outcome: Completed  3/11/2023 1158 by Winda Harada, RN  Outcome: Progressing     Problem: PAIN - ADULT  Goal: Verbalizes/displays adequate comfort level or baseline comfort level  Description: Interventions:  - Encourage patient to monitor pain and request assistance  - Assess pain using appropriate pain scale  - Administer analgesics based on type and severity of pain and evaluate response  - Implement non-pharmacological measures as appropriate and evaluate response  - Consider cultural and social influences on pain and pain management  - Notify physician/advanced practitioner if interventions unsuccessful or patient reports new pain  3/11/2023 1640 by Winda Harada, RN  Outcome: Completed  3/11/2023 1158 by Winda Harada, RN  Outcome: Progressing     Problem: INFECTION - ADULT  Goal: Absence or prevention of progression during hospitalization  Description: INTERVENTIONS:  - Assess and monitor for signs and symptoms of infection  - Monitor lab/diagnostic results  - Monitor all insertion sites, i e  indwelling lines, tubes, and drains  - Monitor endotracheal if appropriate and nasal secretions for changes in amount and color  - [place] appropriate cooling/warming therapies per order  - Administer medications as ordered  - Instruct and encourage patient and family to use good hand hygiene technique  - Identify and instruct in appropriate isolation precautions for identified infection/condition  3/11/2023 1640 by Winda Harada, RN  Outcome: Completed  3/11/2023 1158 by Winda Harada, RN  Outcome: Progressing     Problem: SAFETY ADULT  Goal: Maintain or return to baseline ADL function  Description: INTERVENTIONS:  -  Assess patient's ability to carry out ADLs; assess patient's baseline for ADL function and identify physical deficits which impact ability to perform ADLs (bathing, care of mouth/teeth, toileting, grooming, dressing, etc )  - Assess/evaluate cause of self-care deficits   - Assess range of motion  - Assess patient's mobility; develop plan if impaired  - Assess patient's need for assistive devices and provide as appropriate  - Encourage maximum independence but intervene and supervise when necessary  - Involve family in performance of ADLs  - Assess for home care needs following discharge   - Consider OT consult to assist with ADL evaluation and planning for discharge  - Provide patient education as appropriate  3/11/2023 1640 by Winda Harada, RN  Outcome: Completed  3/11/2023 1158 by Winda Harada, RN  Outcome: Progressing  Goal: Maintains/Returns to pre admission functional level  Description: INTERVENTIONS:  - Perform BMAT or MOVE assessment daily    - Set and communicate daily mobility goal to care team and patient/family/caregiver  - Collaborate with rehabilitation services on mobility goals if consulted  - Perform Range of Motion 3 times a day  - Reposition patient every 2 hours    - Dangle patient 3 times a day  - Stand patient 3 times a day  - Ambulate patient 3 times a day  - Out of bed to chair   - Out of bed for meals   - Out of bed for toileting  - Record patient progress and toleration of activity level   3/11/2023 1640 by Winda Harada, RN  Outcome: Completed  3/11/2023 1158 by Winda Harada, RN  Outcome: Progressing  Goal: Patient will remain free of falls  Description: INTERVENTIONS:  - Educate patient/family on patient safety including physical limitations  - Instruct patient to call for assistance with activity   - Consult OT/PT to assist with strengthening/mobility   - Keep Call bell within reach  - Keep bed low and locked with side rails adjusted as appropriate  - Keep care items and personal belongings within reach  - Initiate and maintain comfort rounds  - Make Fall Risk Sign visible to staff  - Apply yellow socks and bracelet for high fall risk patients  - Consider moving patient to room near nurses station  3/11/2023 1640 by Sonia Quick RN  Outcome: Completed  3/11/2023 1158 by Sonia Quick RN  Outcome: Progressing     Problem: DISCHARGE PLANNING  Goal: Discharge to home or other facility with appropriate resources  Description: INTERVENTIONS:  - Identify barriers to discharge w/patient and caregiver  - Arrange for needed discharge resources and transportation as appropriate  - Identify discharge learning needs  - Refer to Case Management Department for coordinating discharge planning if the patient needs post-hospital services based on physician/advanced practitioner order or complex needs related to functional status, cognitive ability, or social support system  3/11/2023 1640 by Sonia Quick RN  Outcome: Completed  3/11/2023 1158 by Sonia Quick RN  Outcome: Progressing     Problem: Knowledge Deficit  Goal: Patient/family/caregiver demonstrates understanding of disease process, treatment plan, medications, and discharge instructions  Description: Complete learning assessment and assess knowledge base    Interventions:  - Provide teaching at level of understanding  - Provide teaching via preferred learning methods  3/11/2023 1640 by Sonia Quick RN  Outcome: Completed  3/11/2023 1158 by Sonia Quick RN  Outcome: Progressing     Problem: GENITOURINARY - ADULT  Goal: Urinary catheter remains patent  Description: INTERVENTIONS:  - Assess patency of urinary catheter  - Discontinue lopez catheter POD #1  - Follow guidelines for intermittent irrigation of non-functioning urinary catheter  3/11/2023 1640 by Sonia Quick RN  Outcome: Completed  3/11/2023 1158 by Sonia Quick RN  Outcome: Progressing  Goal: Absence of urinary retention  Description: INTERVENTIONS:  - After lopez catheter removed, assess patient's ability to void and empty bladder  - Monitor I/O  - Bladder scan as needed  - Discuss with physician/AP medications to alleviate retention as needed  - Follow urine retention protocol if ordered  3/11/2023 1640 by Karlyn Saint, RN  Outcome: Completed  3/11/2023 1158 by Karlyn Saint, RN  Outcome: Progressing     Problem: SKIN/TISSUE INTEGRITY - ADULT  Goal: Incision(s), wounds(s) or drain site(s) healing without S/S of infection  Description: INTERVENTIONS  - Assess and document dressing, incision, wound bed, drain sites and surrounding tissue  - Provide patient and family education  - Perform skin care/dressing changes as ordered  3/11/2023 1640 by Karlyn Saint, RN  Outcome: Completed  3/11/2023 1158 by Karlyn Saint, RN  Outcome: Progressing     Problem: COPING  Goal: Will report anxiety at manageable levels  Description: INTERVENTIONS:  - Administer medication as ordered  - Teach and encourage coping skills  - Provide emotional support  - Assess patient/family for anxiety and ability to cope  3/11/2023 1640 by Karlyn Saint, RN  Outcome: Completed  3/11/2023 1158 by Karlyn Saint, RN  Outcome: Progressing

## 2023-03-11 NOTE — ASSESSMENT & PLAN NOTE
· Hemoglobin 6 3 postoperatively,Transfused 2 units PRBCs 3/8/23  · Hemoglobin stable at 8 4 today  · Continue monitoring hemoglobin while inpatient

## 2023-03-11 NOTE — NURSING NOTE
Patient discharged at this time  Patient picked up and being taken to [facility]  Belongings sent home with   PICC in place and working properly in right arm  Patient left in stable condition

## 2023-03-11 NOTE — PHYSICAL THERAPY NOTE
Physical Therapy Treatment Note     03/11/23 0952   PT Last Visit   PT Visit Date 03/11/23   Note Type   Note Type Treatment   Pain Assessment   Pain Assessment Tool 0-10   Pain Score 7   Pain Location/Orientation Orientation: Right;Location: Hip;Location: Leg   Restrictions/Precautions   Weight Bearing Precautions Per Order Yes   RLE Weight Bearing Per Order WBAT   Other Precautions Bed Alarm;Pain; Fall Risk   General   Chart Reviewed Yes   Additional Pertinent History Orthopedic shoes B/L + brace for R ankle; chronic B/L lymphedema, Stg 3 CKD, HTN, hx PE, chronic diastolic heart failure, hx bacteremia, anemia; hx ovarian CA   Family/Caregiver Present No   Cognition   Overall Cognitive Status WFL   Subjective   Subjective Pt  in bed upon entry  Pt  agreeable to PT  Bed Mobility   Supine to Sit 5  Supervision   Additional items HOB elevated; Bedrails; Increased time required;Assist x 1   Transfers   Sit to Stand 5  Supervision   Additional items Increased time required   Stand to Sit 5  Supervision   Additional items Increased time required   Stand pivot 5  Supervision   Additional items Increased time required   Toilet transfer 5  Supervision   Additional items Armrests; Increased time required;Raised toilet seat   Ambulation/Elevation   Gait pattern Improper Weight shift; Antalgic;Decreased foot clearance; Forward Flexion; Foward flexed; Excessively slow;Decreased heel strike;Decreased toe off   Gait Assistance 5  Supervision   Additional items Assist x 1;Verbal cues   Assistive Device Rolling walker   Distance 60ft, 140ft   Balance   Static Sitting Good   Dynamic Sitting Fair +   Static Standing Fair +   Dynamic Standing Fair   Ambulatory Fair   Endurance Deficit   Endurance Deficit Yes   Endurance Deficit Description Fatigue   Activity Tolerance   Activity Tolerance Patient tolerated treatment well   Nurse Made Aware Yes   Assessment   Prognosis Good   Problem List Decreased strength;Decreased range of motion;Decreased endurance; Impaired balance;Decreased mobility;Pain;Orthopedic restrictions   Assessment Pt  progressing well with mobility  Pt  able to perform supine to sit transfer with S with increased time and effort with HOB elevated and use of bedrails  Pt  able to maintain static standing with and without RW support to tie her gown  Elevated bed for STS tarnsfer from EOB  Pt  noted to fatigue with short distance  pt  reported she does not feel good in her stomach as she may need to have a BM  pt  ambulated back to the room and seated on raised toilet seat  RN was informed of patient status  Will continue to follow per PT POC  Barriers to Discharge None   Goals   Patient Goals None reported   STG Expiration Date 03/17/23   PT Treatment Day 4   Plan   Treatment/Interventions Functional transfer training;Patient/family training;Bed mobility;Gait training;Spoke to nursing;Equipment eval/education   Progress Progressing toward goals   PT Frequency 5-7x/wk   Recommendation   PT Discharge Recommendation Home with home health rehabilitation   Equipment Recommended Walker   AM-PAC Basic Mobility Inpatient   Turning in Flat Bed Without Bedrails 4   Lying on Back to Sitting on Edge of Flat Bed Without Bedrails 4   Moving Bed to Chair 4   Standing Up From Chair Using Arms 4   Walk in Room 4   Climb 3-5 Stairs With Railing 3   Basic Mobility Inpatient Raw Score 23   Basic Mobility Standardized Score 50 88   Highest Level Of Mobility   JH-HLM Goal 7: Walk 25 feet or more   JH-HLM Achieved 7: Walk 25 feet or more   End of Consult   Patient Position at End of Consult All needs within reach; Other (comment)  (toilet )           Jeyson Ramos PTA    An AM-PAC basic mobility standardized score less than 42 9 suggest the patient may benefit from discharge to post-acute rehab services

## 2023-03-11 NOTE — PROGRESS NOTES
[facility]  Progress Note - Alease Patches 1955, 77 y o  female MRN: [id number]  Unit/Bed#: E2 MS Andre-01 Encounter: [id number]  Primary Care Provider: Sydnie Hood DO   Date and time admitted to hospital: 3/6/2023 10:28 AM    * Post-traumatic osteoarthritis  Assessment & Plan  · Patient is postop day 5 from ROWDY for posttraumatic osteoarthritis of right hip  · Was found to have evidence of purulence and chronic osteomyelitis during surgery, some old hardware was removed  · Spacer impregnated with vancomycin was placed in the OR  · ID recommending IV cefazolin for 6 weeks through 4/17/23 then transition to PO cefadroxil   · Patient will follow up with ID office two weeks from discharge   · Pt/Ot updated recommendations to home with home health  · Discharge planning per orthopedics  · DVT prophylaxis 81 mg BID    Anemia  Assessment & Plan  · Hemoglobin 6 3 postoperatively,Transfused 2 units PRBCs 3/8/23  · Hemoglobin stable at 8 4 today  · Continue monitoring hemoglobin while inpatient    Chronic diastolic heart failure (HCC)  Assessment & Plan  Wt Readings from Last 3 Encounters:   03/11/23 91 1 kg (200 lb 13 8 oz)   02/20/23 92 1 kg (203 lb)   02/14/23 92 2 kg (203 lb 3 2 oz)     · Appears euvolemic on exam  · No signs of overload after blood transfusion  · Continue Lopressor with hold parameters      Benign essential hypertension  Assessment & Plan  · Intermittently hypotensive postoperatively, BP now improved   · Continue metoprolol with hold parameters    Hypogammaglobulinemia, acquired ([facility] [address] )  Assessment & Plan  · 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  · Being followed outpatient by hematology oncology  · Received IVIG previously, not currently receiving therapy    Lymphedema  Assessment & Plan  · History of chronic lymphedema, appears under control at this time  · Encourage sequential compression devices while inpatient    History of pulmonary embolism  Assessment & Plan  · DVT prophylaxis per orthopedics  · Currently on aspirin, would recommend heparin or Lovenox  · Does have history of IVC filter    Chronic kidney disease, stage III (moderate) [facility])  Assessment & Plan  Lab Results   Component Value Date    EGFR 73 2023    EGFR 67 03/10/2023    EGFR 61 2023    CREATININE 0 81 2023    CREATININE 0 87 03/10/2023    CREATININE 0 94 2023     · Creatinine mildly elevated at 1 19 postoperatively improved to 0 81  · Baseline 0 8-0 9  · Continue monitoring while inpatient      VTE Pharmacologic Prophylaxis: VTE Score: 10 aspirin     Patient Centered Rounds: I performed bedside rounds with nursing staff today  Discussions with Specialists or Other Care Team Provider: case management    Education and Discussions with Family / Patient: Patient declined call to   Total Time Spent on Date of Encounter in care of patient: 35 minutes This time was spent on one or more of the following: performing physical exam; counseling and coordination of care; obtaining or reviewing history; documenting in the medical record; reviewing/ordering tests, medications or procedures; communicating with other healthcare professionals and discussing with patient's family/caregivers  Current Length of Stay: 4 day(s)  Current Patient Status: Inpatient   Certification Statement: The patient will continue to require additional inpatient hospital stay due to seeking placement in acute rehab vs home with home health  Discharge Plan: Anticipate discharge in 24-48 hrs to rehab facility  Code Status: Prior    Subjective:   Patient was seen laying in bed today  She reports feeling well today  She states that pain in controlled on current pain regimen  She is still constipated  She denies any acute complaints at this time      Objective:     Vitals:   Temp (24hrs), Av 7 °F (36 5 °C), Min:97 4 °F (36 3 °C), Max:98 1 °F (36 7 °C)    Temp: [97 4 °F (36 3 °C)-98 1 °F (36 7 °C)] 98 1 °F (36 7 °C)  HR:  [76-81] 78  Resp:  [17-18] 18  BP: (118-146)/(54-81) 146/81  SpO2:  [95 %-99 %] 99 %  Body mass index is 35 58 kg/m²  Input and Output Summary (last 24 hours):   No intake or output data in the 24 hours ending 03/11/23 0844    Physical Exam:   Physical Exam  Vitals and nursing note reviewed  Constitutional:       Appearance: Normal appearance  HENT:      Head: Normocephalic and atraumatic  Eyes:      General: No scleral icterus  Cardiovascular:      Rate and Rhythm: Normal rate and regular rhythm  Pulmonary:      Effort: Pulmonary effort is normal       Breath sounds: Normal breath sounds  No wheezing  Abdominal:      General: Abdomen is flat  Bowel sounds are normal       Palpations: Abdomen is soft  Tenderness: There is no abdominal tenderness  Skin:     General: Skin is warm and dry  Comments: Bilateral legs in ACE wrap dressing  Dressing clean dry and in place   Neurological:      Mental Status: She is alert  Mental status is at baseline  Psychiatric:         Mood and Affect: Mood normal          Behavior: Behavior normal         Additional Data:     Labs:  Results from last 7 days   Lab Units 03/11/23  0504 03/07/23  0459 03/06/23  1811   WBC Thousand/uL 10 64*   < > 20 72*   HEMOGLOBIN g/dL 8 4*   < > 7 4*   HEMATOCRIT % 28 0*   < > 25 1*   PLATELETS Thousands/uL 283   < > 312   NEUTROS PCT %  --   --  79*   LYMPHS PCT %  --   --  14   MONOS PCT %  --   --  6   EOS PCT %  --   --  0    < > = values in this interval not displayed       Results from last 7 days   Lab Units 03/11/23  0504   SODIUM mmol/L 140   POTASSIUM mmol/L 3 9   CHLORIDE mmol/L 108   CO2 mmol/L 27   BUN mg/dL 20   CREATININE mg/dL 0 81   ANION GAP mmol/L 5   CALCIUM mg/dL 8 1*   GLUCOSE RANDOM mg/dL 87                       Lines/Drains:  Invasive Devices     Peripherally Inserted Central Catheter Line  Duration           PICC Line 92/82/05 Right Basilic <1 day                Central Line:  Goal for removal: N/A - Discharging with PICC for IV ABX/medications             Imaging: No pertinent imaging reviewed      Recent Cultures (last 7 days):   Results from last 7 days   Lab Units 03/06/23  1412 03/06/23  1411   GRAM STAIN RESULT  Rare Polys  No organisms seen  Rare Polys  No organisms seen Rare Polys  No organisms seen       Last 24 Hours Medication List:   Current Facility-Administered Medications   Medication Dose Route Frequency Provider Last Rate   • acetaminophen  975 mg Oral Q8H Sari Basilio PA-C     • aluminum-magnesium hydroxide-simethicone  30 mL Oral Q6H PRN SariADRIANA Talavera-NEETA     • ascorbic acid  500 mg Oral BID Royal Maggie PA-C     • aspirin  81 mg Oral BID Sarialpa Basilio PA-C     • bisacodyl  5 mg Oral Daily PRN Alexi EPHRAIM Sorenson     • calcium carbonate  1 tablet Oral BID With Meals Royal Maggie PA-C     • calcium carbonate  1,000 mg Oral Daily PRN Sari Basilio PA-C     • cefazolin  2,000 mg Intravenous Q8H ADRIANA Lockhart-C 2,000 mg (03/11/23 4903)   • cholecalciferol  2,000 Units Oral Daily Royal Maggie PA-C     • docusate sodium  100 mg Oral BID Sari Basilio PA-C     • DULoxetine  60 mg Oral Daily Sarialpa Basilio PA-C     • folic acid  6,495 mcg Oral Daily [place] ADRIANA Serrano-NEETA     • gabapentin  300 mg Oral HS ADRIANA Lockhart-NEETA     • lactated ringers  125 mL/hr Intravenous Continuous ADRIANA Lockhart-C Stopped (03/06/23 2047)   • lactated ringers  100 mL/hr Intravenous Continuous Sari Basilio, PA-C Stopped (03/07/23 7986)   • LORazepam  0 5 mg Oral BID PRN Sari Basilio PA-C     • metoprolol tartrate  25 mg Oral Q12H [address], [place]     • multivitamin-minerals  1 tablet Oral Daily ADRIANA Ahumada-NEETA     • ondansetron  4 mg Intravenous Q6H PRN SariADRIANA Talavera-NEETA     • ondansetron  8 mg Oral Q8H PRN Sari Basilio PA-C     • oxybutynin  10 mg Oral HS Ruthie Suggs Jodi Coyle PA-C     • oxyCODONE  40 mg Oral Q12H [address] [facility] [place]     • oxyCODONE  10 mg Oral Q4H PRN Gerson Durand PA-C     • oxyCODONE  20 mg Oral Q4H PRN Gerson Durand PA-C     • pantoprazole  40 mg Oral Early Morning Ravinder Garcia PA-C     • saccharomyces boulardii  250 mg Oral BID Ravinder Garcia PA-C     • senna  1 tablet Oral Daily Gerson Durand PA-C          Today, Patient Was Seen By: Carl Anne PA-C    **Please Note: This note may have been constructed using a voice recognition system  **

## 2023-03-12 LAB
BACTERIA SPEC ANAEROBE CULT: NO GROWTH
BACTERIA TISS AEROBE CULT: NO GROWTH
GRAM STN SPEC: NORMAL

## 2023-03-12 NOTE — DISCHARGE SUMMARY
Admission diagnosis: Post-traumatic arthritis of the right hip  D/C diagnoses: Post-traumatic arthritis of the right hip, right hip infection, status post right hip I&D and conversion to hand-crafted antibiotic cement right total hip arthroplasty  Procedure: Right hip I&D and conversion to hand-crafted antibiotic cement right total hip arthroplasty  Date of surgery: 3/6/2023  Admission dates: 3/6/2023- 3/11/2023    Wandy Grace presented to the hospital on 3/6/2023 with Right hip post-traumatic arthritis for a complex total hip arthroplasty with antibiotic cement construct  She underwent the procedure that same day and the details of the procedure can be found in the operative note  Post-operatively she was placed on a pain and bowel regimen  She was made WBAT of the Right lower extremity and consults were placed to physical therapy and occupational therapy as well as internal medicine and infectious disease who all saw her throughout her admission  She was placed on anterior hip precautions  She was started on aspirin and sequential compression were used for DVT prophylaxis  She received scheduled IV ancef and IV vancomycin post operatively due to the purulence and osteomyelitis encountered during surgery  On POD 1 labs were checked  Patient received 2 units PRBC due to ABLA  Her dressing was found to be clean, dry, and intact  She worked with physical therapy  On POD 2 labs were checked  Her dressing was found to be clean, dry, and intact  She worked with physical therapy  On POD 3 labs were checked  Intra operative cultures continued to show no growth  IV vancomycin was discontinued and patient continued to received IV ancef  Her dressing was found to be clean, dry, and intact  On POD 4 labs were checked  IV ancef was continued  PICC line was placed for 6 weeks of IV antibiotic treatment  On POD 5 labs were checked  Cultures continued to have no growth  IV ancef was given   She continued to work with physical therapy and make progress  Later that day she was found to be safe and stable for discharge to [facility]  to continue with physical therapy and IV antibiotic treatment  Discharge instructions were provided as well as needed prescriptions          Results from last 7 days   Lab Units 03/11/23  0504 03/10/23  0509 03/09/23  0840   WBC Thousand/uL 10 64* 14 10* 15 60*   HEMOGLOBIN g/dL 8 4* 7 9* 7 9*   HEMATOCRIT % 28 0* 26 3* 25 4*   PLATELETS Thousands/uL 283 234 221         Results from last 7 days   Lab Units 03/11/23  0504 03/10/23  0509 03/09/23  0840   POTASSIUM mmol/L 3 9 3 9 4 2   CHLORIDE mmol/L 108 109* 108   CO2 mmol/L 27 25 25   BUN mg/dL 20 23 19   CREATININE mg/dL 0 81 0 87 0 94   CALCIUM mg/dL 8 1* 8 1* 8 1*

## 2023-03-13 DIAGNOSIS — R78.81 MSSA BACTEREMIA: Primary | ICD-10-CM

## 2023-03-13 DIAGNOSIS — B95.61 MSSA BACTEREMIA: Primary | ICD-10-CM

## 2023-03-13 LAB
FUNGUS SPEC CULT: NORMAL

## 2023-03-13 NOTE — PROGRESS NOTES
OPAT NOTE    Supervising/Discharge physician: Ursula    Diagnosis: Rt Hip Osteo/Hardware Infx    Drug: Cefazolin     Dose/Frequency: 2g Q8    Infusion/[facility] contact: [address]    Next appointment: 3/23      Spoke to  to confirm f/u    Faxed abx plan w/ labs; f/u to [facility] at [facility]: [phone]

## 2023-03-19 LAB
BACTERIA SPEC ANAEROBE CULT: NO GROWTH
BACTERIA TISS AEROBE CULT: NO GROWTH
GRAM STN SPEC: NORMAL

## 2023-03-20 LAB
FUNGUS SPEC CULT: NORMAL

## 2023-03-21 ENCOUNTER — OFFICE VISIT (OUTPATIENT)
Dept: OBGYN CLINIC | Facility: MEDICAL CENTER | Age: 68
End: 2023-03-21

## 2023-03-21 ENCOUNTER — APPOINTMENT (OUTPATIENT)
Dept: RADIOLOGY | Facility: MEDICAL CENTER | Age: 68
End: 2023-03-21

## 2023-03-21 VITALS
HEIGHT: 63 IN | DIASTOLIC BLOOD PRESSURE: 69 MMHG | HEART RATE: 88 BPM | SYSTOLIC BLOOD PRESSURE: 127 MMHG | WEIGHT: 200 LBS | BODY MASS INDEX: 35.44 KG/M2

## 2023-03-21 DIAGNOSIS — Z47.1 AFTERCARE FOLLOWING RIGHT HIP JOINT REPLACEMENT SURGERY: ICD-10-CM

## 2023-03-21 DIAGNOSIS — Z96.641 AFTERCARE FOLLOWING RIGHT HIP JOINT REPLACEMENT SURGERY: ICD-10-CM

## 2023-03-21 DIAGNOSIS — Z96.641 AFTERCARE FOLLOWING RIGHT HIP JOINT REPLACEMENT SURGERY: Primary | ICD-10-CM

## 2023-03-21 DIAGNOSIS — Z47.1 AFTERCARE FOLLOWING RIGHT HIP JOINT REPLACEMENT SURGERY: Primary | ICD-10-CM

## 2023-03-21 NOTE — PROGRESS NOTES
Subjective: Patient seen and examined  Pain controlled  Progressing well  She is ambulating with a rolling walker today  She feels that she is doing well overall  She has been able to do steps and walk much more without assistance than prior to surgery  Incision without drainage  Denies fevers or chills  She is getting ancef via PICC with infectious disease  Cultures have thus far been negative  Physical Exam:  Incision: CDI, nylon sutures intact  No drainage or erythema noted  ROM: normal post operative ROM without pain  5/5 IP/Q/HS/TA/GS, 2+ DP/PT, SILT DP/SP/S/S/TN    XR Right hip: s/p articulating permanent antibiotic spacer with cement augmentation, previous acetabular hardware intact, no fracture or dislocation  Assessment/Plan:  78 y/o female 2 weeks s/p Right hip conversion to ROWDY (articulating permanent antibiotic spacer), I&D, removal of hardware    - continue multi-modal pain control   - Weight bearing status: WBAT with assistance  - DVT ppx: aspirin 81mg twice daily for 4 weeks post op  - Incision care: half of sutures removed today, steri-strips placed    Do not submerge incision   - PT/OT  - F/U 1 week for remaining suture removal       Scribe Attestation    I,:  Marce Washburn PA-C am acting as a scribe while in the presence of the attending physician :       I,:  Dao Real DO personally performed the services described in this documentation    as scribed in my presence :

## 2023-03-23 ENCOUNTER — OFFICE VISIT (OUTPATIENT)
Dept: INFECTIOUS DISEASES | Facility: CLINIC | Age: 68
End: 2023-03-23

## 2023-03-23 VITALS
OXYGEN SATURATION: 95 % | HEART RATE: 77 BPM | TEMPERATURE: 96 F | SYSTOLIC BLOOD PRESSURE: 110 MMHG | BODY MASS INDEX: 38.26 KG/M2 | RESPIRATION RATE: 16 BRPM | DIASTOLIC BLOOD PRESSURE: 70 MMHG | WEIGHT: 216 LBS

## 2023-03-23 DIAGNOSIS — M86.651 OTHER CHRONIC OSTEOMYELITIS OF RIGHT FEMUR (HCC): Primary | ICD-10-CM

## 2023-03-23 NOTE — PATIENT INSTRUCTIONS
We will call you soon to schedule a 3 week follow up with us  Will continue current plan to complete IV Cefazolin through 4/17 and then switch to oral pills on 4/18  We will see you before this to go over the transition in detail
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
## 2023-03-24 LAB
BACTERIA SPEC ANAEROBE CULT: NO GROWTH
BACTERIA TISS AEROBE CULT: NO GROWTH
GRAM STN SPEC: NORMAL

## 2023-03-27 LAB
BACTERIA SPEC ANAEROBE CULT: NO GROWTH
BACTERIA TISS AEROBE CULT: NO GROWTH
BACTERIA TISS AEROBE CULT: NO GROWTH
FUNGUS SPEC CULT: NORMAL
GRAM STN SPEC: NORMAL

## 2023-03-28 LAB
BACTERIA TISS AEROBE CULT: ABNORMAL
GRAM STN SPEC: ABNORMAL
GRAM STN SPEC: ABNORMAL

## 2023-03-30 ENCOUNTER — OFFICE VISIT (OUTPATIENT)
Dept: OBGYN CLINIC | Facility: MEDICAL CENTER | Age: 68
End: 2023-03-30

## 2023-03-30 VITALS
HEART RATE: 112 BPM | WEIGHT: 216 LBS | DIASTOLIC BLOOD PRESSURE: 76 MMHG | SYSTOLIC BLOOD PRESSURE: 117 MMHG | BODY MASS INDEX: 38.27 KG/M2 | HEIGHT: 63 IN

## 2023-03-30 DIAGNOSIS — Z47.1 AFTERCARE FOLLOWING RIGHT HIP JOINT REPLACEMENT SURGERY: Primary | ICD-10-CM

## 2023-03-30 DIAGNOSIS — Z96.641 AFTERCARE FOLLOWING RIGHT HIP JOINT REPLACEMENT SURGERY: Primary | ICD-10-CM

## 2023-03-30 NOTE — PROGRESS NOTES
Subjective: Patient seen and examined  Pain controlled  Progressing well  She is ambulating with a rolling walker today  She feels that she is doing well overall  She has been able to do steps and walk much more without assistance than prior to surgery  Incision without drainage, she is here today for remaining suture removal  Denies fevers or chills  She is getting ancef via PICC with infectious disease  Cultures recently returned, showing + C  Acnes  Physical Exam:  Incision: CDI, nylon sutures intact  No drainage or erythema noted  ROM: normal post operative ROM without pain  5/5 IP/Q/HS/TA/GS, 2+ DP/PT, SILT DP/SP/S/S/TN    XR Right hip: new new imaging today    Assessment/Plan:  76 y/o female 3 weeks s/p Right hip conversion to ROWDY (articulating permanent antibiotic spacer), I&D, removal of hardware    - continue multi-modal pain control   - Weight bearing status: WBAT with assistance  - DVT ppx: aspirin 81mg twice daily for 4 weeks post op  - Incision care:  Remaining sutures removed and steri-strips placed  Do not submerge incision   - PT/OT  - Reviewed that her intra-operative cultures are growing c acnes  The ancef that she is currently on will cover the c   Acnes, continue treatment with ID   - Follow up in 3 weeks      Scribe Attestation    I,:  Dawna Valencia PA-C am acting as a scribe while in the presence of the attending physician :       I,:  Landy Novoa DO personally performed the services described in this documentation    as scribed in my presence :

## 2023-04-03 LAB
FUNGUS SPEC CULT: NORMAL

## 2023-04-03 NOTE — TELEPHONE ENCOUNTER
Caller: Patient    Doctor: Antonio Rubin    Reason for call: Patient asking if she needs any physical visit with Dr Antonio Rubin prior to sx?     Call back#: [phone] may leave message
I called the patient and confirmed with her that its not necessary unless she wanted to, she agreed that she wont need an appt  She did have another question in regards of the injection that she got for a low hemoglobin or hemocrit  The injection is called procrit, she got in on Monday and felt really sick after the injection  She is suppose to get another injection on Monday, she is wondering if she can not get the second one since it made her very sick? And hope it doesn't affect with her surgery 
1) ARF: r/o chronic component ==> suggested by echogenic kidneys on sono              ? vasculitis ==> +HUGO 1:640 (negative ds DNA)                                       anti GBM negative               - check remaining serologies               - monitor renal function- ? baseline renal function the patient will establish               - consider Rheum evaluation for elevated HUGO at high titer    2) Hyponatremia: improved                - optimal glucose control                - avoid excessive hypotonic fluids
Yes

## 2023-04-11 PROBLEM — Z45.2 PICC (PERIPHERALLY INSERTED CENTRAL CATHETER) IN PLACE: Status: ACTIVE | Noted: 2023-04-11

## 2023-04-11 PROBLEM — R78.81 MSSA BACTEREMIA: Status: ACTIVE | Noted: 2023-04-11

## 2023-04-11 PROBLEM — M86.9 HIP OSTEOMYELITIS, RIGHT (HCC): Status: ACTIVE | Noted: 2023-04-11

## 2023-04-11 PROBLEM — B95.61 MSSA BACTEREMIA: Status: ACTIVE | Noted: 2023-04-11

## 2023-05-11 ENCOUNTER — TELEPHONE (OUTPATIENT)
Dept: INFECTIOUS DISEASES | Facility: CLINIC | Age: 68
End: 2023-05-11
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
## 2023-05-11 NOTE — TELEPHONE ENCOUNTER
Called and spoke with patient today regarding upcoming appointment  Reminded patient of her upcoming appointment and to have labs done prior to the appointment  Patient verbalizes understanding at this time

## 2023-05-12 ENCOUNTER — EVALUATION (OUTPATIENT)
Age: 68
End: 2023-05-12

## 2023-05-12 DIAGNOSIS — M25.60 DECREASED RANGE OF MOTION: ICD-10-CM

## 2023-05-12 DIAGNOSIS — Z96.641 AFTERCARE FOLLOWING RIGHT HIP JOINT REPLACEMENT SURGERY: Primary | ICD-10-CM

## 2023-05-12 DIAGNOSIS — R26.9 GAIT ABNORMALITY: ICD-10-CM

## 2023-05-12 DIAGNOSIS — R29.898 WEAKNESS OF RIGHT LOWER EXTREMITY: ICD-10-CM

## 2023-05-12 DIAGNOSIS — Z47.1 AFTERCARE FOLLOWING RIGHT HIP JOINT REPLACEMENT SURGERY: Primary | ICD-10-CM

## 2023-05-15 ENCOUNTER — OFFICE VISIT (OUTPATIENT)
Age: 68
End: 2023-05-15

## 2023-05-15 ENCOUNTER — APPOINTMENT (OUTPATIENT)
Age: 68
End: 2023-05-15

## 2023-05-15 DIAGNOSIS — M86.9 HIP OSTEOMYELITIS, RIGHT (HCC): ICD-10-CM

## 2023-05-15 DIAGNOSIS — M25.60 DECREASED RANGE OF MOTION: ICD-10-CM

## 2023-05-15 DIAGNOSIS — R29.898 WEAKNESS OF RIGHT LOWER EXTREMITY: ICD-10-CM

## 2023-05-15 DIAGNOSIS — Z96.641 AFTERCARE FOLLOWING RIGHT HIP JOINT REPLACEMENT SURGERY: Primary | ICD-10-CM

## 2023-05-15 DIAGNOSIS — Z47.1 AFTERCARE FOLLOWING RIGHT HIP JOINT REPLACEMENT SURGERY: Primary | ICD-10-CM

## 2023-05-15 DIAGNOSIS — R26.9 GAIT ABNORMALITY: ICD-10-CM

## 2023-05-15 LAB
ALBUMIN SERPL BCP-MCNC: 3.4 G/DL (ref 3.5–5)
ALP SERPL-CCNC: 131 U/L (ref 46–116)
ALT SERPL W P-5'-P-CCNC: 11 U/L (ref 12–78)
ANION GAP SERPL CALCULATED.3IONS-SCNC: 1 MMOL/L (ref 4–13)
AST SERPL W P-5'-P-CCNC: 15 U/L (ref 5–45)
BASOPHILS # BLD AUTO: 0.01 THOUSANDS/ÂΜL (ref 0–0.1)
BASOPHILS NFR BLD AUTO: 0 % (ref 0–1)
BILIRUB SERPL-MCNC: 0.26 MG/DL (ref 0.2–1)
BUN SERPL-MCNC: 24 MG/DL (ref 5–25)
CALCIUM ALBUM COR SERPL-MCNC: 9.6 MG/DL (ref 8.3–10.1)
CALCIUM SERPL-MCNC: 9.1 MG/DL (ref 8.3–10.1)
CHLORIDE SERPL-SCNC: 106 MMOL/L (ref 96–108)
CO2 SERPL-SCNC: 28 MMOL/L (ref 21–32)
CREAT SERPL-MCNC: 1.15 MG/DL (ref 0.6–1.3)
EOSINOPHIL # BLD AUTO: 0.21 THOUSAND/ÂΜL (ref 0–0.61)
EOSINOPHIL NFR BLD AUTO: 4 % (ref 0–6)
ERYTHROCYTE [DISTWIDTH] IN BLOOD BY AUTOMATED COUNT: 15 % (ref 11.6–15.1)
GFR SERPL CREATININE-BSD FRML MDRD: 48 ML/MIN/1.73SQ M
GLUCOSE P FAST SERPL-MCNC: 85 MG/DL (ref 65–99)
HCT VFR BLD AUTO: 36.8 % (ref 34.8–46.1)
HGB BLD-MCNC: 10.1 G/DL (ref 11.5–15.4)
IMM GRANULOCYTES # BLD AUTO: 0.01 THOUSAND/UL (ref 0–0.2)
IMM GRANULOCYTES NFR BLD AUTO: 0 % (ref 0–2)
LYMPHOCYTES # BLD AUTO: 1.63 THOUSANDS/ÂΜL (ref 0.6–4.47)
LYMPHOCYTES NFR BLD AUTO: 32 % (ref 14–44)
MCH RBC QN AUTO: 27.4 PG (ref 26.8–34.3)
MCHC RBC AUTO-ENTMCNC: 27.4 G/DL (ref 31.4–37.4)
MCV RBC AUTO: 100 FL (ref 82–98)
MONOCYTES # BLD AUTO: 0.45 THOUSAND/ÂΜL (ref 0.17–1.22)
MONOCYTES NFR BLD AUTO: 9 % (ref 4–12)
NEUTROPHILS # BLD AUTO: 2.86 THOUSANDS/ÂΜL (ref 1.85–7.62)
NEUTS SEG NFR BLD AUTO: 55 % (ref 43–75)
NRBC BLD AUTO-RTO: 0 /100 WBCS
PLATELET # BLD AUTO: 265 THOUSANDS/UL (ref 149–390)
PMV BLD AUTO: 9.9 FL (ref 8.9–12.7)
POTASSIUM SERPL-SCNC: 4.8 MMOL/L (ref 3.5–5.3)
PROT SERPL-MCNC: 8 G/DL (ref 6.4–8.4)
RBC # BLD AUTO: 3.68 MILLION/UL (ref 3.81–5.12)
SODIUM SERPL-SCNC: 135 MMOL/L (ref 135–147)
WBC # BLD AUTO: 5.17 THOUSAND/UL (ref 4.31–10.16)

## 2023-05-15 NOTE — PROGRESS NOTES
"Daily Note     Today's date: 5/15/2023  Patient name: Rasta Bauer  : 1955  MRN: [id number]  Referring provider: Julian Santoyo, *  Dx:   Encounter Diagnosis     ICD-10-CM    1  Aftercare following right hip joint replacement surgery  Z47 1     Z96 641       2  Weakness of right lower extremity  R29 898       3  Gait abnormality  R26 9       4  Decreased range of motion  M25 60           Start Time: 1617  Stop Time: 1710  Total time in clinic (min): 53 minutes    Subjective: pt has been practicing walking with RW at home  Patient c/o intermittent burning sensation at the hip, not sure when it happens,  if its at the joint or more in the muscle  Objective: See treatment diary below      Assessment: Tolerated treatment well  Good posture noted with GT, continues/no rest breaks, good sequencing with RW  Decreased foot clearance noted with fatigue, R > L  Min-A with seated heel slides for knee flexion, hip hiking compensation  Limited hip flexion on RLE, with fair eccentric control  EDU STS sequencing/mechanics, focusing ant weight shifting to initiate  Pt is able to complete session without adverse effects  Continued PT would be beneficial to improve function           Plan: Continue per plan of care         Precautions: standard, charcots foot R, wears brace on R, fall, lymphedema       Date: 2023 5/15/2023           Visit: #1 #2 #3 #4 #5 #6 #7 #8 #9 #10   Manual:                                       Neuro Re-Ed                                                                                                        Ther Ex             Warm Up   NuStep ?? fatigue          Sit to stand- focus on knee flexion and use of RLE  Mechanics/sequencing           Seated knee flexion heel slides   10x 5 sec hold, min-A           Standing alternating marches /step tap   10x, 5x 2 UE support           Long arc quad   10x            Seated hip add  10x5\" black ball           Seated hip abd  10x5\" pink t-band   " Ther Activity             Standing tolerance - standing unsupported for time   2x 1 min                        Gait Training             Lateral WS --> SLS   WS 10x 2 UE support           Gait training with quad cane   Hold for now            Walking laps in gym with RW  1 lap- 120 ft to start session, 3/4 lap to end                        Modalities

## 2023-05-17 NOTE — PROGRESS NOTES
Daily Note     Today's date: 2023  Patient name: Nadine Alston  : 1955  MRN: [id number]  Referring provider: Perico Koch, *  Dx:   Encounter Diagnosis     ICD-10-CM    1  Aftercare following right hip joint replacement surgery  Z47 1     Z96 641       2  Weakness of right lower extremity  R29 898       3  Gait abnormality  R26 9       4  Decreased range of motion  M25 60           Start Time: 0730  Stop Time: 820  Total time in clinic (min): 50 minutes    Subjective: Pt reports she was riding in truck yesterday and had some pain in her right knee  Pain has been well managed  Objective: See treatment diary below      Assessment: Nadine Alston tolerated today's treatment session well  Patient education provided on progression of POC, TE and HEP  Ofelia Johansen completed all TE with good form and no adverse reactions  Pt tolerated NuStep well  Will continue this to help with ROM  Ofelia Johansen continues to benefit from skilled OPPT services to address post op hip and knee ROM and strength  Will continue to address functional deficits and focus on progression of POC per patient tolerance  Patient performed Nustep to increase blood flow to the area being treated, prepare the muscles for strength training and stretching, improve overall tolerance to activity, and aerobic endurance  Plan: Continue per plan of care  Progress treatment as tolerated  Precautions: standard, charcots foot R, wears brace on R, fall, lymphedema     Access Code: [email or web address]  URL: [email or web address]  Date: 2023  Prepared by: Fany Harry 61  - 1 x daily - 1 sets - 10 reps - 10 second hold  - Hip abduction  - 1 x daily - 1 sets - 10 reps - 10 second hold  - Seated Long Arc Quad  - 2 x daily - 1 sets - 15 reps  - Proper Sit to Stand Technique  - 2 x daily - 1 sets - 5 reps  - Standing March with Counter Support  - 2 x daily - 1 sets - 15 reps      Date: 2023 5/15/2023 "5/19/2023          Visit: #1 #2 #3 #4 #5 #6 #7 #8 #9 #10   Manual:                                       Neuro Re-Ed                                                                                                        Ther Ex             Warm Up   NuStep level 2 8 min           Sit to stand- focus on knee flexion and use of RLE  Mechanics/sequencing x5 with railing on R side           Seated knee flexion heel slides   10x 5 sec hold, min-A           Standing alternating marches /step tap   10x, 5x 2 UE support x15 each LE           Long arc quad   10x  x15 each LE           Seated hip add  10x5\" black ball 10\" hold x10           Seated hip abd  10x5\" pink t-band GTB 10\" hold x10                       Ther Activity             Standing tolerance - standing unsupported for time   2x 1 min 3 min unsupported                        Gait Training             Lateral WS --> SLS   WS 10x 2 UE support           Gait training with quad cane   Hold for now            Walking laps in gym with RW  1 lap- 120 ft to start session, 3/4 lap to end 8 min - focus on decreasing UE support                        Modalities                                                  "

## 2023-05-18 ENCOUNTER — OFFICE VISIT (OUTPATIENT)
Dept: INFECTIOUS DISEASES | Facility: CLINIC | Age: 68
End: 2023-05-18

## 2023-05-18 VITALS
OXYGEN SATURATION: 93 % | HEART RATE: 71 BPM | WEIGHT: 210 LBS | HEIGHT: 63 IN | DIASTOLIC BLOOD PRESSURE: 70 MMHG | BODY MASS INDEX: 37.21 KG/M2 | TEMPERATURE: 97.2 F | SYSTOLIC BLOOD PRESSURE: 118 MMHG | RESPIRATION RATE: 18 BRPM

## 2023-05-18 DIAGNOSIS — M86.9 HIP OSTEOMYELITIS, RIGHT (HCC): Primary | ICD-10-CM

## 2023-05-18 RX ORDER — CEFADROXIL 500 MG/1
500 CAPSULE ORAL 2 TIMES DAILY
COMMUNITY
Start: 2023-05-15 | End: 2023-05-18 | Stop reason: SDUPTHER

## 2023-05-18 RX ORDER — CEFADROXIL 500 MG/1
500 CAPSULE ORAL 2 TIMES DAILY
Qty: 60 CAPSULE | Refills: 2 | Status: SHIPPED | OUTPATIENT
Start: 2023-05-18 | End: 2023-06-17
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
## 2023-05-18 NOTE — PATIENT INSTRUCTIONS
We will keep you on the same antibiotic for now  I will plan to see you again in about 4 weeks  We will get blood work before that visit

## 2023-05-19 ENCOUNTER — OFFICE VISIT (OUTPATIENT)
Age: 68
End: 2023-05-19

## 2023-05-19 DIAGNOSIS — Z47.1 AFTERCARE FOLLOWING RIGHT HIP JOINT REPLACEMENT SURGERY: Primary | ICD-10-CM

## 2023-05-19 DIAGNOSIS — Z96.641 AFTERCARE FOLLOWING RIGHT HIP JOINT REPLACEMENT SURGERY: Primary | ICD-10-CM

## 2023-05-19 DIAGNOSIS — R29.898 WEAKNESS OF RIGHT LOWER EXTREMITY: ICD-10-CM

## 2023-05-19 DIAGNOSIS — R26.9 GAIT ABNORMALITY: ICD-10-CM

## 2023-05-19 DIAGNOSIS — M25.60 DECREASED RANGE OF MOTION: ICD-10-CM

## 2023-05-19 NOTE — PROGRESS NOTES
Daily Note     Today's date: 2023  Patient name: Brenden Naik  : 1955  MRN: [id number]  Referring provider: Danni Hull, *  Dx:   Encounter Diagnosis     ICD-10-CM    1  Aftercare following right hip joint replacement surgery  Z47 1     Z96 641       2  Weakness of right lower extremity  R29 898       3  Gait abnormality  R26 9       4  Decreased range of motion  M25 60           Start Time: 1700  Stop Time: 1805  Total time in clinic (min): 65 minutes    Subjective: Pt reports she is doing well  Is a little sore after therapy but not too bad  Objective: See treatment diary below      Assessment: Brenden Naik tolerated today's treatment session well  Patient education provided on progression of gait with quad cane  Kelli Spencer completed all TE with good form and no adverse reactions  Pts right knee ROM is greatly limiting her functional mobility  She continues to fatigue quickly with ambulation  Oscaraleshia Spencer continues to benefit from skilled OPPT services to address post op ROM and strength deficits  Will continue to address functional deficits and focus on progression of POC per patient tolerance  Patient performed Nustep to increase blood flow to the area being treated, prepare the muscles for strength training and stretching, improve overall tolerance to activity, and aerobic endurance  Plan: Continue per plan of care  Progress treatment as tolerated  Precautions: standard, charcots foot R, wears brace on R, fall, lymphedema     Access Code: [email or web address]  URL: [email or web address]  Date: 2023  Prepared by: Fany Harry 61  - 1 x daily - 1 sets - 10 reps - 10 second hold  - Hip abduction  - 1 x daily - 1 sets - 10 reps - 10 second hold  - Seated Long Arc Quad  - 2 x daily - 1 sets - 15 reps  - Proper Sit to Stand Technique  - 2 x daily - 1 sets - 5 reps  - Standing March with Counter Support  - 2 x daily - 1 sets - 15 reps      Date: "5/12/2023 5/15/2023 5/19/2023 5/22/2023         Visit: #1 #2 #3 #4 #5 #6 #7 #8 #9 #10   Manual:                                       Neuro Re-Ed                                                                                                        Ther Ex             Warm Up   NuStep level 2 8 min  NuStep level 2 10 min          Sit to stand- focus on knee flexion and use of RLE  Mechanics/sequencing x5 with railing on R side  x5 with railing on R side          Seated knee flexion heel slides   10x 5 sec hold, min-A           Standing alternating marches /step tap   10x, 5x 2 UE support x15 each LE  Step tap to 4\" box x10 each LE         Long arc quad   10x  x15 each LE  x20 each LE          Seated hip add  10x5\" black ball 10\" hold x10           Seated hip abd  10x5\" pink t-band GTB 10\" hold x10          Standing HS curl     x10 each LE                                                 Ther Activity             Standing tolerance - standing unsupported for time   2x 1 min 3 min unsupported                        Gait Training             Lateral WS --> SLS   WS 10x 2 UE support           Gait training with quad cane   Hold for now   48' with CGA and NBQC         Walking laps in gym with RW  1 lap- 120 ft to start session, 3/4 lap to end 8 min - focus on decreasing UE support  Focus on equal stride length and continuous movement 100 feet                       Modalities                                                    "

## 2023-05-22 ENCOUNTER — OFFICE VISIT (OUTPATIENT)
Age: 68
End: 2023-05-22

## 2023-05-22 DIAGNOSIS — Z96.641 AFTERCARE FOLLOWING RIGHT HIP JOINT REPLACEMENT SURGERY: Primary | ICD-10-CM

## 2023-05-22 DIAGNOSIS — R29.898 WEAKNESS OF RIGHT LOWER EXTREMITY: ICD-10-CM

## 2023-05-22 DIAGNOSIS — M25.60 DECREASED RANGE OF MOTION: ICD-10-CM

## 2023-05-22 DIAGNOSIS — R26.9 GAIT ABNORMALITY: ICD-10-CM

## 2023-05-22 DIAGNOSIS — Z47.1 AFTERCARE FOLLOWING RIGHT HIP JOINT REPLACEMENT SURGERY: Primary | ICD-10-CM

## 2023-05-25 NOTE — PROGRESS NOTES
Daily Note     Today's date: 2023  Patient name: Dodie Aguilar  : 1955  MRN: [id number]  Referring provider: Yobani De León, *  Dx:   Encounter Diagnosis     ICD-10-CM    1  Aftercare following right hip joint replacement surgery  Z47 1     Z96 641       2  Weakness of right lower extremity  R29 898       3  Gait abnormality  R26 9       4  Decreased range of motion  M25 60           Start Time: 0935  Stop Time: 1025  Total time in clinic (min): 50 minutes    Subjective: Pt reports she is doing well  No new complaints  Objective: See treatment diary below      Assessment: Dodie Aguilar tolerated today's treatment session well  Patient education provided on progression of gait training and POC  Estefanía Vega completed all TE with good form and no adverse reactions  Pt able to walk longer distance, needing cues to fully advance RLE  She fatigues quickly  Still has very limited RLE movement  Estefanía Vega continues to benefit from skilled OPPT services to address post op hip ROM and LE strength  Will continue to address functional deficits and focus on progression of POC per patient tolerance  Patient performed Nustep to increase blood flow to the area being treated, prepare the muscles for strength training and stretching, improve overall tolerance to activity, and aerobic endurance  Plan: Continue per plan of care  Progress treatment as tolerated  Precautions: standard, charcots foot R, wears brace on R, fall, lymphedema     Access Code: [email or web address]  URL: [email or web address]  Date: 2023  Prepared by: Fany Harry 61  - 1 x daily - 1 sets - 10 reps - 10 second hold  - Hip abduction  - 1 x daily - 1 sets - 10 reps - 10 second hold  - Seated Long Arc Quad  - 2 x daily - 1 sets - 15 reps  - Proper Sit to Stand Technique  - 2 x daily - 1 sets - 5 reps  - Standing March with Counter Support  - 2 x daily - 1 sets - 15 reps      Date: 2023 "5/15/2023 5/19/2023 5/22/2023 5/26/2023        Visit: #1 #2 #3 #4 #5 #6 #7 #8 #9 #10   Manual:                                       Neuro Re-Ed                                                                                                        Ther Ex             Warm Up   NuStep level 2 8 min  NuStep level 2 10 min  NuStep level 1 10 min         Sit to stand- focus on knee flexion and use of RLE  Mechanics/sequencing x5 with railing on R side  x5 with railing on R side  x5 with railing on R side         Seated knee flexion heel slides   10x 5 sec hold, min-A           Standing alternating marches /step tap   10x, 5x 2 UE support x15 each LE  Step tap to 4\" box x10 each LE Step tap to 4\" box x10 each LE        Long arc quad   10x  x15 each LE  x20 each LE  x20 each LE         Seated hip add  10x5\" black ball 10\" hold x10   10\" hold x10         Seated hip abd  10x5\" pink t-band GTB 10\" hold x10  GTB 10\" hold x10         Standing HS curl     x10 each LE                                                 Ther Activity             Standing tolerance - standing unsupported for time   2x 1 min 3 min unsupported                        Gait Training             Lateral WS --> SLS   WS 10x 2 UE support           Gait training with quad cane   Hold for now   48' with CGA and NBQC 80' with CGA and NBQC focus on equal stride length         Walking laps in gym with RW  1 lap- 120 ft to start session, 3/4 lap to end 8 min - focus on decreasing UE support  Focus on equal stride length and continuous movement 100 feet                       Modalities                                                      "

## 2023-05-26 ENCOUNTER — OFFICE VISIT (OUTPATIENT)
Age: 68
End: 2023-05-26

## 2023-05-26 DIAGNOSIS — R29.898 WEAKNESS OF RIGHT LOWER EXTREMITY: ICD-10-CM

## 2023-05-26 DIAGNOSIS — Z96.641 AFTERCARE FOLLOWING RIGHT HIP JOINT REPLACEMENT SURGERY: Primary | ICD-10-CM

## 2023-05-26 DIAGNOSIS — Z47.1 AFTERCARE FOLLOWING RIGHT HIP JOINT REPLACEMENT SURGERY: Primary | ICD-10-CM

## 2023-05-26 DIAGNOSIS — R26.9 GAIT ABNORMALITY: ICD-10-CM

## 2023-05-26 DIAGNOSIS — M25.60 DECREASED RANGE OF MOTION: ICD-10-CM

## 2023-05-26 NOTE — PROGRESS NOTES
Daily Note     Today's date: 2023  Patient name: Nancy Hutner  : 1955  MRN: [id number]  Referring provider: Janine Trivedi, *  Dx:   Encounter Diagnosis     ICD-10-CM    1  Aftercare following right hip joint replacement surgery  Z47 1     Z96 641       2  Weakness of right lower extremity  R29 898       3  Gait abnormality  R26 9       4  Decreased range of motion  M25 60           Start Time: 1730  Stop Time: 1825  Total time in clinic (min): 55 minutes    Subjective: Pt reports she had some pain when she was sitting in the car for a prolonged period of time  Today feels ok  Objective: See treatment diary below      Assessment: Nancy Hunter tolerated today's treatment session well  Patient education provided on progession of gait training and TE   Maeve Mead completed all TE with good form and no adverse reactions  Pt fatigued more quickly today, but did well with STS with TRX straps to help with anterior weight shift  Maeve Mead continues to benefit from skilled OPPT services to address post op ROM and LE deficits  Will continue to address functional deficits and focus on progression of POC per patient tolerance  Patient performed Nustep to increase blood flow to the area being treated, prepare the muscles for strength training and stretching, improve overall tolerance to activity, and aerobic endurance  Plan: Continue per plan of care  Progress treatment as tolerated  Precautions: standard, charcots foot R, wears brace on R, fall, lymphedema     Access Code: [email or web address]  URL: [email or web address]  Date: 2023  Prepared by: Fany Harry 59  - 1 x daily - 1 sets - 10 reps - 10 second hold  - Hip abduction  - 1 x daily - 1 sets - 10 reps - 10 second hold  - Seated Long Arc Quad  - 2 x daily - 1 sets - 15 reps  - Proper Sit to Stand Technique  - 2 x daily - 1 sets - 5 reps  - Standing March with Counter Support  - 2 x daily - 1 sets - 15 "reps      Date: 5/12/2023 5/15/2023 5/19/2023 5/22/2023 5/26/2023 5/30/2023       Visit: #1 #2 #3 #4 #5 #6 #7 #8 #9 #10   Manual:                                       Neuro Re-Ed                                                                                                        Ther Ex             Warm Up   NuStep level 2 8 min  NuStep level 2 10 min  NuStep level 1 10 min  NuStep level 2 10 min        Sit to stand- focus on knee flexion and use of RLE  Mechanics/sequencing x5 with railing on R side  x5 with railing on R side  x5 with railing on R side  With TRX straps with 2 (x7) with single (x5)       Seated knee flexion heel slides   10x 5 sec hold, min-A           Standing alternating marches /step tap   10x, 5x 2 UE support x15 each LE  Step tap to 4\" box x10 each LE Step tap to 4\" box x10 each LE        Long arc quad   10x  x15 each LE  x20 each LE  x20 each LE  x20 each LE       Seated hip add  10x5\" black ball 10\" hold x10   10\" hold x10  10\" hold x10  (cues to move RLE)       Seated hip abd  10x5\" pink t-band GTB 10\" hold x10  GTB 10\" hold x10  GTB 10\" hold x10 (cue to move RLE)       Standing HS curl     x10 each LE                                                 Ther Activity             Standing tolerance - standing unsupported for time   2x 1 min 3 min unsupported                        Gait Training             Lateral WS --> SLS   WS 10x 2 UE support           Gait training with quad cane   Hold for now   48' with CGA and NBQC 80' with CGA and NBQC focus on equal stride length  54'x2 with CGA with NBQC focus on step through with RLE        Walking laps in gym with RW  1 lap- 120 ft to start session, 3/4 lap to end 8 min - focus on decreasing UE support  Focus on equal stride length and continuous movement 100 feet   100' with RW                     Modalities                                                        "

## 2023-05-30 ENCOUNTER — OFFICE VISIT (OUTPATIENT)
Age: 68
End: 2023-05-30

## 2023-05-30 DIAGNOSIS — M25.60 DECREASED RANGE OF MOTION: ICD-10-CM

## 2023-05-30 DIAGNOSIS — Z47.1 AFTERCARE FOLLOWING RIGHT HIP JOINT REPLACEMENT SURGERY: Primary | ICD-10-CM

## 2023-05-30 DIAGNOSIS — R29.898 WEAKNESS OF RIGHT LOWER EXTREMITY: ICD-10-CM

## 2023-05-30 DIAGNOSIS — Z96.641 AFTERCARE FOLLOWING RIGHT HIP JOINT REPLACEMENT SURGERY: Primary | ICD-10-CM

## 2023-05-30 DIAGNOSIS — R26.9 GAIT ABNORMALITY: ICD-10-CM

## 2023-06-01 NOTE — PROGRESS NOTES
Daily Note     Today's date: 2023  Patient name: Ronan Pinto  : 1955  MRN: [id number]  Referring provider: Ruthell Osler, *  Dx:   Encounter Diagnosis     ICD-10-CM    1  Weakness of right lower extremity  R29 898       2  Aftercare following right hip joint replacement surgery  Z47 1     Z96 641       3  Gait abnormality  R26 9       4  Decreased range of motion  M25 60           Start Time: 0735  Stop Time: 08  Total time in clinic (min): 55 minutes    Subjective: Pt reports nothing new  Objective: See treatment diary below      Assessment: Ronan Pinto tolerated today's treatment session well  Patient education provided on progression of POC and TE   Najma Santiago completed all TE with good form and no adverse reactions  Pt has profound core weakness which is limiting her functional mobility  She is unable to sit upright without holding onto chair arms to prevent falling back on chair without UEs  Discussed STS form with patients  as well as importance of working on   Najma May continues to benefit from skilled OPPT services to address mobility impairement  Will continue to address functional deficits and focus on progression of POC per patient tolerance  Patient performed Nustep to increase blood flow to the area being treated, prepare the muscles for strength training and stretching, improve overall tolerance to activity, and aerobic endurance  Plan: Continue per plan of care  Progress treatment as tolerated  Precautions: standard, charcots foot R, wears brace on R, fall, lymphedema     Access Code: [email or web address]  URL: [email or web address]  Date: 2023  Prepared by: Fany Harry 61  - 1 x daily - 1 sets - 10 reps - 10 second hold  - Hip abduction  - 1 x daily - 1 sets - 10 reps - 10 second hold  - Seated Long Arc Quad  - 2 x daily - 1 sets - 15 reps  - Proper Sit to Stand Technique  - 2 x daily - 1 sets - 5 reps  - Standing "March with Counter Support  - 2 x daily - 1 sets - 15 reps      Date: 5/12/2023 5/15/2023 5/19/2023 5/22/2023 5/26/2023 5/30/2023 6/2/2023      Visit: #1 #2 #3 #4 #5 #6 #7 #8 #9 #10   Manual:                                       Neuro Re-Ed                                                                                                        Ther Ex             Warm Up   NuStep level 2 8 min  NuStep level 2 10 min  NuStep level 1 10 min  NuStep level 2 10 min  NuStep level 1 10 min       Sit to stand- focus on knee flexion and use of RLE  Mechanics/sequencing x5 with railing on R side  x5 with railing on R side  x5 with railing on R side  With TRX straps with 2 (x7) with single (x5) x3 with R rail and LUE on chair, x5 with R rail and LUE on thigh       Seated knee flexion heel slides   10x 5 sec hold, min-A           Standing alternating marches /step tap   10x, 5x 2 UE support x15 each LE  Step tap to 4\" box x10 each LE Step tap to 4\" box x10 each LE        Long arc quad   10x  x15 each LE  x20 each LE  x20 each LE  x20 each LE x20 each LE with focus on back away from chair for core activation       Seated hip add  10x5\" black ball 10\" hold x10   10\" hold x10  10\" hold x10  (cues to move RLE)       Seated hip abd  10x5\" pink t-band GTB 10\" hold x10  GTB 10\" hold x10  GTB 10\" hold x10 (cue to move RLE)       Standing HS curl     x10 each LE          pball trunk flexion        Focus on anterior weight shift blue ball x10                                 Ther Activity             Standing tolerance - standing unsupported for time   2x 1 min 3 min unsupported                        Gait Training             Lateral WS --> SLS   WS 10x 2 UE support           Gait training with quad cane   Hold for now   48' with CGA and NBQC 80' with CGA and NBQC focus on equal stride length  54'x2 with CGA with NBQC focus on step through with RLE  59', 39' with close S with NBQC focus on step through with RLE       Walking laps in gym " with RW  1 lap- 120 ft to start session, 3/4 lap to end 8 min - focus on decreasing UE support  Focus on equal stride length and continuous movement 100 feet   100' with RW                     Modalities

## 2023-06-02 ENCOUNTER — OFFICE VISIT (OUTPATIENT)
Age: 68
End: 2023-06-02

## 2023-06-02 ENCOUNTER — OFFICE VISIT (OUTPATIENT)
Dept: SURGICAL ONCOLOGY | Facility: CLINIC | Age: 68
End: 2023-06-02

## 2023-06-02 VITALS
TEMPERATURE: 97.8 F | OXYGEN SATURATION: 96 % | RESPIRATION RATE: 18 BRPM | BODY MASS INDEX: 36.39 KG/M2 | WEIGHT: 205.4 LBS | HEIGHT: 63 IN | DIASTOLIC BLOOD PRESSURE: 70 MMHG | SYSTOLIC BLOOD PRESSURE: 130 MMHG | HEART RATE: 75 BPM

## 2023-06-02 DIAGNOSIS — R29.898 WEAKNESS OF RIGHT LOWER EXTREMITY: Primary | ICD-10-CM

## 2023-06-02 DIAGNOSIS — Z12.31 ENCOUNTER FOR SCREENING MAMMOGRAM FOR MALIGNANT NEOPLASM OF BREAST: ICD-10-CM

## 2023-06-02 DIAGNOSIS — Z96.641 AFTERCARE FOLLOWING RIGHT HIP JOINT REPLACEMENT SURGERY: ICD-10-CM

## 2023-06-02 DIAGNOSIS — M25.60 DECREASED RANGE OF MOTION: ICD-10-CM

## 2023-06-02 DIAGNOSIS — R26.9 GAIT ABNORMALITY: ICD-10-CM

## 2023-06-02 DIAGNOSIS — Z91.89 INCREASED RISK OF BREAST CANCER: ICD-10-CM

## 2023-06-02 DIAGNOSIS — Z15.09 BRCA1 POSITIVE: Primary | ICD-10-CM

## 2023-06-02 DIAGNOSIS — Z15.01 BRCA1 POSITIVE: Primary | ICD-10-CM

## 2023-06-02 DIAGNOSIS — Z47.1 AFTERCARE FOLLOWING RIGHT HIP JOINT REPLACEMENT SURGERY: ICD-10-CM

## 2023-06-02 NOTE — PROGRESS NOTES
"Daily Note     Today's date: 2023  Patient name: Shahana Rhodes  : 1955  MRN: [id number]  Referring provider: Louise Mena, *  Dx:   Encounter Diagnosis     ICD-10-CM    1  Weakness of right lower extremity  R29 898       2  Aftercare following right hip joint replacement surgery  Z47 1     Z96 641       3  Gait abnormality  R26 9       4  Decreased range of motion  M25 60           Start Time: 1740  Stop Time: 1830  Total time in clinic (min): 50 minutes    Subjective: Pt reports she feels \"off\" today  Objective: See treatment diary below      Assessment: Shahana Rhodes tolerated today's treatment session well  Patient education provided on continuation of POC  Duane  completed all TE with good form and no adverse reactions  Pt was more fatigued today  Required increased time to complete exercises  Duane  continues to benefit from skilled OPPT services to address post op hip ROM and strength   Will continue to address functional deficits and focus on progression of POC per patient tolerance  Patient performed Nustep to increase blood flow to the area being treated, prepare the muscles for strength training and stretching, improve overall tolerance to activity, and aerobic endurance  Plan: Continue per plan of care  Progress treatment as tolerated  Precautions: standard, charcots foot R, wears brace on R, fall, lymphedema     Access Code: [email or web address]  URL: [email or web address]  Date: 2023  Prepared by: Fany Harry 61  - 1 x daily - 1 sets - 10 reps - 10 second hold  - Hip abduction  - 1 x daily - 1 sets - 10 reps - 10 second hold  - Seated Long Arc Quad  - 2 x daily - 1 sets - 15 reps  - Proper Sit to Stand Technique  - 2 x daily - 1 sets - 5 reps  - Standing March with Counter Support  - 2 x daily - 1 sets - 15 reps      Date: 2023 5/15/2023 2023 2023 2023 2023 2023 2023     Visit: #1 #2 #3 #4 #5 " "#6 #7 #8 #9 #10   Manual:                                       Neuro Re-Ed                                                                                                        Ther Ex             Warm Up   NuStep level 2 8 min  NuStep level 2 10 min  NuStep level 1 10 min  NuStep level 2 10 min  NuStep level 1 10 min  NuStep level 1 10 min      Sit to stand- focus on knee flexion and use of RLE  Mechanics/sequencing x5 with railing on R side  x5 with railing on R side  x5 with railing on R side  With TRX straps with 2 (x7) with single (x5) x3 with R rail and LUE on chair, x5 with R rail and LUE on thigh  x5 with R rail and LUE on thigh (min to mod A)     Seated knee flexion heel slides   10x 5 sec hold, min-A           Standing alternating marches /step tap   10x, 5x 2 UE support x15 each LE  Step tap to 4\" box x10 each LE Step tap to 4\" box x10 each LE        Long arc quad   10x  x15 each LE  x20 each LE  x20 each LE  x20 each LE x20 each LE with focus on back away from chair for core activation  x20 each LE with focus on back away from chair for core activation      Seated hip add  10x5\" black ball 10\" hold x10   10\" hold x10  10\" hold x10  (cues to move RLE)  10\" hold x10 (sitting unsupported)     Seated hip abd  10x5\" pink t-band GTB 10\" hold x10  GTB 10\" hold x10  GTB 10\" hold x10 (cue to move RLE)  GTB 10\" hold x10 (sitting unsupported)     Standing HS curl     x10 each LE          pball trunk flexion        Focus on anterior weight shift blue ball x10  Focus on anterior weight shift blue ball x10      Seated knee flexion         AAROM 10\" x10                   Ther Activity             Standing tolerance - standing unsupported for time   2x 1 min 3 min unsupported                        Gait Training             Lateral WS --> SLS   WS 10x 2 UE support           Gait training with quad cane   Hold for now   48' with CGA and NBQC 80' with CGA and NBQC focus on equal stride length  54'x2 with CGA with NBQC " focus on step through with RLE  59', 39' with close S with NBQC focus on step through with RLE  27' x2 with NBQC and other distances with RW t/o session     Walking laps in gym with RW  1 lap- 120 ft to start session, 3/4 lap to end 8 min - focus on decreasing UE support  Focus on equal stride length and continuous movement 100 feet   100' with RW                     Modalities
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
## 2023-06-05 ENCOUNTER — OFFICE VISIT (OUTPATIENT)
Age: 68
End: 2023-06-05
Payer: MEDICARE

## 2023-06-05 DIAGNOSIS — R26.9 GAIT ABNORMALITY: ICD-10-CM

## 2023-06-05 DIAGNOSIS — Z96.641 AFTERCARE FOLLOWING RIGHT HIP JOINT REPLACEMENT SURGERY: ICD-10-CM

## 2023-06-05 DIAGNOSIS — Z47.1 AFTERCARE FOLLOWING RIGHT HIP JOINT REPLACEMENT SURGERY: ICD-10-CM

## 2023-06-05 DIAGNOSIS — M25.60 DECREASED RANGE OF MOTION: ICD-10-CM

## 2023-06-05 DIAGNOSIS — R29.898 WEAKNESS OF RIGHT LOWER EXTREMITY: Primary | ICD-10-CM

## 2023-06-05 PROCEDURE — 97110 THERAPEUTIC EXERCISES: CPT

## 2023-06-06 NOTE — PROGRESS NOTES
Daily Note     Today's date: 2023  Patient name: Nikole Cabrera  : 1955  MRN: [id number]  Referring provider: Rc Clements, *  Dx:   Encounter Diagnosis     ICD-10-CM    1  Weakness of right lower extremity  R29 898       2  Aftercare following right hip joint replacement surgery  Z47 1     Z96 641       3  Gait abnormality  R26 9       4  Decreased range of motion  M25 60                      Subjective: ***      Objective: See treatment diary below      Assessment: Nikole Cabrera tolerated today's treatment session well  Patient education provided on [facility] education options:68903}  Madison Thomas completed all TE with good form and no adverse reactions  ***  Madison Thomas continues to benefit from skilled OPPT services to address {alldailysymptoms:01606}  Will continue to address functional deficits and focus on progression of POC per patient tolerance  Patient performed {KUAerobic:26860} to increase blood flow to the area being treated, prepare the muscles for strength training and stretching, improve overall tolerance to activity, and aerobic endurance  Plan: Continue per plan of care  Progress treatment as tolerated  Precautions: standard, charcots foot R, wears brace on R, fall, lymphedema     Access Code: [email or web address]  URL: [email or web address]  Date: 2023  Prepared by: Fany Harry 61  - 1 x daily - 1 sets - 10 reps - 10 second hold  - Hip abduction  - 1 x daily - 1 sets - 10 reps - 10 second hold  - Seated Long Arc Quad  - 2 x daily - 1 sets - 15 reps  - Proper Sit to Stand Technique  - 2 x daily - 1 sets - 5 reps  - Standing March with Counter Support  - 2 x daily - 1 sets - 15 reps      Date: 2023 5/15/2023 2023 2023 2023 2023 2023 2023 2023    Visit: #1 #2 #3 #4 #5 #6 #7 #8 #9 #10   Manual:                                       Neuro Re-Ed "                              Ther Ex             Warm Up   NuStep level 2 8 min  NuStep level 2 10 min  NuStep level 1 10 min  NuStep level 2 10 min  NuStep level 1 10 min  NuStep level 1 10 min      Sit to stand- focus on knee flexion and use of RLE  Mechanics/sequencing x5 with railing on R side  x5 with railing on R side  x5 with railing on R side  With TRX straps with 2 (x7) with single (x5) x3 with R rail and LUE on chair, x5 with R rail and LUE on thigh  x5 with R rail and LUE on thigh (min to mod A)     Seated knee flexion heel slides   10x 5 sec hold, min-A           Standing alternating marches /step tap   10x, 5x 2 UE support x15 each LE  Step tap to 4\" box x10 each LE Step tap to 4\" box x10 each LE        Long arc quad   10x  x15 each LE  x20 each LE  x20 each LE  x20 each LE x20 each LE with focus on back away from chair for core activation  x20 each LE with focus on back away from chair for core activation      Seated hip add  10x5\" black ball 10\" hold x10   10\" hold x10  10\" hold x10  (cues to move RLE)  10\" hold x10 (sitting unsupported)     Seated hip abd  10x5\" pink t-band GTB 10\" hold x10  GTB 10\" hold x10  GTB 10\" hold x10 (cue to move RLE)  GTB 10\" hold x10 (sitting unsupported)     Standing HS curl     x10 each LE          pball trunk flexion        Focus on anterior weight shift blue ball x10  Focus on anterior weight shift blue ball x10      Seated knee flexion         AAROM 10\" x10                   Ther Activity             Standing tolerance - standing unsupported for time   2x 1 min 3 min unsupported                        Gait Training             Lateral WS --> SLS   WS 10x 2 UE support           Gait training with quad cane   Hold for now   48' with CGA and NBQC 80' with CGA and NBQC focus on equal stride length  54'x2 with CGA with NBQC focus on step through with RLE  59', 39' with close S with NBQC focus on step through with RLE  27' x2 with NBQC and other distances with RW t/o " session     Walking laps in gym with RW  1 lap- 120 ft to start session, 3/4 lap to end 8 min - focus on decreasing UE support  Focus on equal stride length and continuous movement 100 feet   100' with RW                     Modalities

## 2023-06-08 ENCOUNTER — APPOINTMENT (OUTPATIENT)
Age: 68
End: 2023-06-08
Payer: MEDICARE

## 2023-06-08 DIAGNOSIS — R26.9 GAIT ABNORMALITY: ICD-10-CM

## 2023-06-08 DIAGNOSIS — M25.60 DECREASED RANGE OF MOTION: ICD-10-CM

## 2023-06-08 DIAGNOSIS — Z96.641 AFTERCARE FOLLOWING RIGHT HIP JOINT REPLACEMENT SURGERY: ICD-10-CM

## 2023-06-08 DIAGNOSIS — Z47.1 AFTERCARE FOLLOWING RIGHT HIP JOINT REPLACEMENT SURGERY: ICD-10-CM

## 2023-06-08 DIAGNOSIS — R29.898 WEAKNESS OF RIGHT LOWER EXTREMITY: Primary | ICD-10-CM

## 2023-06-12 NOTE — PROGRESS NOTES
Daily Note     Today's date: 2023  Patient name: Patsy Andrade  : 1955  MRN: [id number]  Referring provider: Emely Mosley, *  Dx:   Encounter Diagnosis     ICD-10-CM    1  Weakness of right lower extremity  R29 898       2  Aftercare following right hip joint replacement surgery  Z47 1     Z96 641       3  Gait abnormality  R26 9       4  Decreased range of motion  M25 60           Start Time: 1730  Stop Time: 1830  Total time in clinic (min): 60 minutes    Subjective: pt had son's wedding over the w/e  Was very tired w/ all the activities of the w/e  Requested to try step amb today           Objective: See treatment diary below      Assessment: Patsy Andrade tolerated today's treatment session well  Patient education provided on proper posture in sitting/standing and mechanics w/ sit to stand transfer  [place] completed all TE with no adverse reactions  Performed stairs today w/ close supervision/CG up/down 2 w/ 1 railing and NBQC up and 2 railings down for support and pt did well, though more difficult w/ descending  [place] continues to benefit from skilled OPPT services to address R>LLe weakness, stiffness and gait dysfunction  Will continue to address functional deficits and focus on progression of POC per patient tolerance  Patient performed Nustep to increase blood flow to the area being treated, prepare the muscles for strength training and stretching, improve overall tolerance to activity, and aerobic endurance  Plan: Continue per plan of care  Progress treatment as tolerated  Precautions: standard, charcots foot R, wears brace on R, fall, lymphedema     Access Code: [email or web address]  URL: [email or web address]  Date: 2023  Prepared by: Fany Harry 61  - 1 x daily - 1 sets - 10 reps - 10 second hold  - Hip abduction  - 1 x daily - 1 sets - 10 reps - 10 second hold  - Seated Long Arc Quad  - 2 x daily - 1 sets - 15 reps  - Proper Sit to "Stand Technique  - 2 x daily - 1 sets - 5 reps  - Standing March with Counter Support  - 2 x daily - 1 sets - 15 reps      Date: 5/12/2023 5/15/2023 5/19/2023 5/22/2023 5/26/2023 5/30/2023 6/2/2023 6/5/2023 6/14/2023    Visit: #1 #2 #3 #4 #5 #6 #7 #8 #9 #10   Manual:                                       Neuro Re-Ed                                                                                                        Ther Ex             Warm Up seat 11; arms 14    NuStep level 2 8 min  NuStep level 2 10 min  NuStep level 1 10 min  NuStep level 2 10 min  NuStep level 1 10 min  NuStep level 1 10 min  NuStep level 2 10 min     Sit to stand- focus on knee flexion and use of RLE  Mechanics/sequencing x5 with railing on R side  x5 with railing on R side  x5 with railing on R side  With TRX straps with 2 (x7) with single (x5) x3 with R rail and LUE on chair, x5 with R rail and LUE on thigh  x5 with R rail and LUE on thigh (min to mod A) x3 w/focus on BUE on armrests    Seated knee flexion heel slides   10x 5 sec hold, min-A           Standing alternating marches /step tap   10x, 5x 2 UE support x15 each LE  Step tap to 4\" box x10 each LE Step tap to 4\" box x10 each LE        Long arc quad   10x  x15 each LE  x20 each LE  x20 each LE  x20 each LE x20 each LE with focus on back away from chair for core activation  x20 each LE with focus on back away from chair for core activation  x20 each LE with focus on back away from chair for core activation     Seated hip add  10x5\" black ball 10\" hold x10   10\" hold x10  10\" hold x10  (cues to move RLE)  10\" hold x10 (sitting unsupported) 10\" hold x10 (sitting unsupported)    Seated hip abd  10x5\" pink t-band GTB 10\" hold x10  GTB 10\" hold x10  GTB 10\" hold x10 (cue to move RLE)  GTB 10\" hold x10 (sitting unsupported) GTB 10\" hold x10 (sitting unsupported)    Standing HS curl     x10 each LE      Alt side x10ea    Stand:   Alt hip abd  Alt hip ext - - - - - - - - x10 ea    pball trunk " "flexion        Focus on anterior weight shift blue ball x10  Focus on anterior weight shift blue ball x10      Seated knee flexion         AAROM 10\" x10                   Ther Activity             Standing tolerance - standing unsupported for time   2x 1 min 3 min unsupported                        Gait Training             Lateral WS --> SLS   WS 10x 2 UE support           Gait training with quad cane   Hold for now   48' with CGA and NBQC 80' with CGA and NBQC focus on equal stride length  54'x2 with CGA with NBQC focus on step through with RLE  59', 39' with close S with NBQC focus on step through with RLE  27' x2 with NBQC and other distances with RW t/o session 1x40', 2x10' w/ NBQC    Stair training - - - - - - - - Up/down 2 6\" steps w/ 1 railing/NBQC up and 2 railings down    Walking laps in gym with RW  1 lap- 120 ft to start session, 3/4 lap to end 8 min - focus on decreasing UE support  Focus on equal stride length and continuous movement 100 feet   100' with RW                     Modalities                                                                "

## 2023-06-13 ENCOUNTER — OFFICE VISIT (OUTPATIENT)
Dept: OBGYN CLINIC | Facility: MEDICAL CENTER | Age: 68
End: 2023-06-13
Payer: MEDICARE

## 2023-06-13 ENCOUNTER — APPOINTMENT (OUTPATIENT)
Dept: RADIOLOGY | Facility: MEDICAL CENTER | Age: 68
End: 2023-06-13
Payer: MEDICARE

## 2023-06-13 VITALS
HEART RATE: 79 BPM | SYSTOLIC BLOOD PRESSURE: 120 MMHG | BODY MASS INDEX: 36.32 KG/M2 | WEIGHT: 205 LBS | DIASTOLIC BLOOD PRESSURE: 70 MMHG | HEIGHT: 63 IN

## 2023-06-13 DIAGNOSIS — Z47.1 AFTERCARE FOLLOWING RIGHT HIP JOINT REPLACEMENT SURGERY: ICD-10-CM

## 2023-06-13 DIAGNOSIS — Z96.641 AFTERCARE FOLLOWING RIGHT HIP JOINT REPLACEMENT SURGERY: ICD-10-CM

## 2023-06-13 DIAGNOSIS — Z47.1 AFTERCARE FOLLOWING RIGHT HIP JOINT REPLACEMENT SURGERY: Primary | ICD-10-CM

## 2023-06-13 DIAGNOSIS — Z96.641 AFTERCARE FOLLOWING RIGHT HIP JOINT REPLACEMENT SURGERY: Primary | ICD-10-CM

## 2023-06-13 PROCEDURE — 99214 OFFICE O/P EST MOD 30 MIN: CPT | Performed by: STUDENT IN AN ORGANIZED HEALTH CARE EDUCATION/TRAINING PROGRAM

## 2023-06-13 PROCEDURE — 73502 X-RAY EXAM HIP UNI 2-3 VIEWS: CPT
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
## 2023-06-14 ENCOUNTER — OFFICE VISIT (OUTPATIENT)
Age: 68
End: 2023-06-14
Payer: MEDICARE

## 2023-06-14 DIAGNOSIS — Z47.1 AFTERCARE FOLLOWING RIGHT HIP JOINT REPLACEMENT SURGERY: ICD-10-CM

## 2023-06-14 DIAGNOSIS — M25.60 DECREASED RANGE OF MOTION: ICD-10-CM

## 2023-06-14 DIAGNOSIS — Z96.641 AFTERCARE FOLLOWING RIGHT HIP JOINT REPLACEMENT SURGERY: ICD-10-CM

## 2023-06-14 DIAGNOSIS — R26.9 GAIT ABNORMALITY: ICD-10-CM

## 2023-06-14 DIAGNOSIS — R29.898 WEAKNESS OF RIGHT LOWER EXTREMITY: Primary | ICD-10-CM

## 2023-06-14 PROCEDURE — 97116 GAIT TRAINING THERAPY: CPT

## 2023-06-14 PROCEDURE — 97110 THERAPEUTIC EXERCISES: CPT

## 2023-06-16 NOTE — PROGRESS NOTES
Daily Note     Today's date: 2023  Patient name: Mohamud Velazquez  : 1955  MRN: [id number]  Referring provider: Марина Bernal, *  Dx:   Encounter Diagnosis     ICD-10-CM    1  Weakness of right lower extremity  R29 898       2  Aftercare following right hip joint replacement surgery  Z47 1     Z96 641       3  Gait abnormality  R26 9       4  Decreased range of motion  M25 60                      Subjective: ***      Objective: See treatment diary below      Assessment: Mohamud Velazquez tolerated today's treatment session well  Patient education provided on [facility] education options:47747}  Natalie Browning completed all TE with good form and no adverse reactions  ***  Natalie Browning continues to benefit from skilled OPPT services to address {khdailysymptoms:76165}  Will continue to address functional deficits and focus on progression of POC per patient tolerance  Patient performed {KUAerobic:59024} to increase blood flow to the area being treated, prepare the muscles for strength training and stretching, improve overall tolerance to activity, and aerobic endurance  Plan: Continue per plan of care  Progress treatment as tolerated  Precautions: standard, charcots foot R, wears brace on R, fall, lymphedema     Access Code: [email or web address]  URL: [email or web address]  Date: 2023  Prepared by: Fany Harry 61  - 1 x daily - 1 sets - 10 reps - 10 second hold  - Hip abduction  - 1 x daily - 1 sets - 10 reps - 10 second hold  - Seated Long Arc Quad  - 2 x daily - 1 sets - 15 reps  - Proper Sit to Stand Technique  - 2 x daily - 1 sets - 5 reps  - Standing March with Counter Support  - 2 x daily - 1 sets - 15 reps      Date: 2023 5/15/2023 2023 2023 2023 2023 2023 2023 2023 2023   Visit: #1 #2 #3 #4 #5 #6 #7 #8 #9 #10   Manual:                                       Neuro Re-Ed "                                         Ther Ex             Warm Up seat 11; arms 14    NuStep level 2 8 min  NuStep level 2 10 min  NuStep level 1 10 min  NuStep level 2 10 min  NuStep level 1 10 min  NuStep level 1 10 min  NuStep level 2 10 min     Sit to stand- focus on knee flexion and use of RLE  Mechanics/sequencing x5 with railing on R side  x5 with railing on R side  x5 with railing on R side  With TRX straps with 2 (x7) with single (x5) x3 with R rail and LUE on chair, x5 with R rail and LUE on thigh  x5 with R rail and LUE on thigh (min to mod A) x3 w/focus on BUE on armrests    Seated knee flexion heel slides   10x 5 sec hold, min-A           Standing alternating marches /step tap   10x, 5x 2 UE support x15 each LE  Step tap to 4\" box x10 each LE Step tap to 4\" box x10 each LE        Long arc quad   10x  x15 each LE  x20 each LE  x20 each LE  x20 each LE x20 each LE with focus on back away from chair for core activation  x20 each LE with focus on back away from chair for core activation  x20 each LE with focus on back away from chair for core activation     Seated hip add  10x5\" black ball 10\" hold x10   10\" hold x10  10\" hold x10  (cues to move RLE)  10\" hold x10 (sitting unsupported) 10\" hold x10 (sitting unsupported)    Seated hip abd  10x5\" pink t-band GTB 10\" hold x10  GTB 10\" hold x10  GTB 10\" hold x10 (cue to move RLE)  GTB 10\" hold x10 (sitting unsupported) GTB 10\" hold x10 (sitting unsupported)    Standing HS curl     x10 each LE      Alt side x10ea    Stand:   Alt hip abd  Alt hip ext - - - - - - - - x10 ea    pball trunk flexion        Focus on anterior weight shift blue ball x10  Focus on anterior weight shift blue ball x10      Seated knee flexion         AAROM 10\" x10                   Ther Activity             Standing tolerance - standing unsupported for time   2x 1 min 3 min unsupported                        Gait Training             Lateral WS --> SLS   WS 10x 2 UE support         " "  Gait training with quad cane   Hold for now   48' with CGA and NBQC 80' with CGA and NBQC focus on equal stride length  54'x2 with CGA with NBQC focus on step through with RLE  59', 39' with close S with NBQC focus on step through with RLE  27' x2 with NBQC and other distances with RW t/o session 1x40', 2x10' w/ NBQC    Stair training - - - - - - - - Up/down 2 6\" steps w/ 1 railing/NBQC up and 2 railings down    Walking laps in gym with RW  1 lap- 120 ft to start session, 3/4 lap to end 8 min - focus on decreasing UE support  Focus on equal stride length and continuous movement 100 feet   100' with RW                     Modalities                                                                  "

## 2023-06-19 ENCOUNTER — OFFICE VISIT (OUTPATIENT)
Age: 68
End: 2023-06-19
Payer: MEDICARE

## 2023-06-19 ENCOUNTER — APPOINTMENT (OUTPATIENT)
Age: 68
End: 2023-06-19
Payer: MEDICARE

## 2023-06-19 DIAGNOSIS — Z47.1 AFTERCARE FOLLOWING RIGHT HIP JOINT REPLACEMENT SURGERY: ICD-10-CM

## 2023-06-19 DIAGNOSIS — R29.898 WEAKNESS OF RIGHT LOWER EXTREMITY: Primary | ICD-10-CM

## 2023-06-19 DIAGNOSIS — M25.60 DECREASED RANGE OF MOTION: ICD-10-CM

## 2023-06-19 DIAGNOSIS — R26.9 GAIT ABNORMALITY: ICD-10-CM

## 2023-06-19 DIAGNOSIS — M86.9 HIP OSTEOMYELITIS, RIGHT (HCC): ICD-10-CM

## 2023-06-19 DIAGNOSIS — Z96.641 AFTERCARE FOLLOWING RIGHT HIP JOINT REPLACEMENT SURGERY: ICD-10-CM

## 2023-06-19 LAB
ALBUMIN SERPL BCP-MCNC: 3.7 G/DL (ref 3.5–5)
ALP SERPL-CCNC: 122 U/L (ref 46–116)
ALT SERPL W P-5'-P-CCNC: 15 U/L (ref 12–78)
ANION GAP SERPL CALCULATED.3IONS-SCNC: 4 MMOL/L (ref 4–13)
AST SERPL W P-5'-P-CCNC: 23 U/L (ref 5–45)
BASOPHILS # BLD AUTO: 0.02 THOUSANDS/ÂΜL (ref 0–0.1)
BASOPHILS NFR BLD AUTO: 0 % (ref 0–1)
BILIRUB SERPL-MCNC: 0.39 MG/DL (ref 0.2–1)
BUN SERPL-MCNC: 27 MG/DL (ref 5–25)
CALCIUM SERPL-MCNC: 9.6 MG/DL (ref 8.3–10.1)
CHLORIDE SERPL-SCNC: 106 MMOL/L (ref 96–108)
CO2 SERPL-SCNC: 27 MMOL/L (ref 21–32)
CREAT SERPL-MCNC: 1.3 MG/DL (ref 0.6–1.3)
EOSINOPHIL # BLD AUTO: 0.18 THOUSAND/ÂΜL (ref 0–0.61)
EOSINOPHIL NFR BLD AUTO: 3 % (ref 0–6)
ERYTHROCYTE [DISTWIDTH] IN BLOOD BY AUTOMATED COUNT: 15.5 % (ref 11.6–15.1)
GFR SERPL CREATININE-BSD FRML MDRD: 41 ML/MIN/1.73SQ M
GLUCOSE P FAST SERPL-MCNC: 84 MG/DL (ref 65–99)
HCT VFR BLD AUTO: 34.6 % (ref 34.8–46.1)
HGB BLD-MCNC: 10.3 G/DL (ref 11.5–15.4)
IMM GRANULOCYTES # BLD AUTO: 0.01 THOUSAND/UL (ref 0–0.2)
IMM GRANULOCYTES NFR BLD AUTO: 0 % (ref 0–2)
LYMPHOCYTES # BLD AUTO: 2.01 THOUSANDS/ÂΜL (ref 0.6–4.47)
LYMPHOCYTES NFR BLD AUTO: 33 % (ref 14–44)
MCH RBC QN AUTO: 26.8 PG (ref 26.8–34.3)
MCHC RBC AUTO-ENTMCNC: 29.8 G/DL (ref 31.4–37.4)
MCV RBC AUTO: 90 FL (ref 82–98)
MONOCYTES # BLD AUTO: 0.53 THOUSAND/ÂΜL (ref 0.17–1.22)
MONOCYTES NFR BLD AUTO: 9 % (ref 4–12)
NEUTROPHILS # BLD AUTO: 3.35 THOUSANDS/ÂΜL (ref 1.85–7.62)
NEUTS SEG NFR BLD AUTO: 55 % (ref 43–75)
NRBC BLD AUTO-RTO: 0 /100 WBCS
PLATELET # BLD AUTO: 261 THOUSANDS/UL (ref 149–390)
PMV BLD AUTO: 9.7 FL (ref 8.9–12.7)
POTASSIUM SERPL-SCNC: 4.8 MMOL/L (ref 3.5–5.3)
PROT SERPL-MCNC: 8.2 G/DL (ref 6.4–8.4)
RBC # BLD AUTO: 3.84 MILLION/UL (ref 3.81–5.12)
SODIUM SERPL-SCNC: 137 MMOL/L (ref 135–147)
WBC # BLD AUTO: 6.1 THOUSAND/UL (ref 4.31–10.16)

## 2023-06-19 PROCEDURE — 85025 COMPLETE CBC W/AUTO DIFF WBC: CPT

## 2023-06-19 PROCEDURE — 97530 THERAPEUTIC ACTIVITIES: CPT

## 2023-06-19 PROCEDURE — 97110 THERAPEUTIC EXERCISES: CPT

## 2023-06-19 PROCEDURE — 36415 COLL VENOUS BLD VENIPUNCTURE: CPT

## 2023-06-19 PROCEDURE — 97164 PT RE-EVAL EST PLAN CARE: CPT

## 2023-06-19 PROCEDURE — 80053 COMPREHEN METABOLIC PANEL: CPT

## 2023-06-19 PROCEDURE — 97116 GAIT TRAINING THERAPY: CPT

## 2023-06-19 NOTE — PROGRESS NOTES
PT Re-Evaluation     Today's date: 2023  Patient name: Taz Ag  : 1955  MRN: [id number]  Referring provider: Robbi Flores, *  Dx:   Encounter Diagnosis     ICD-10-CM    1  Weakness of right lower extremity  R29 898       2  Aftercare following right hip joint replacement surgery  Z47 1     Z96 641       3  Gait abnormality  R26 9       4  Decreased range of motion  M25 60           Start Time: 1343  Stop Time: 1430  Total time in clinic (min): 47 minutes    Assessment  Assessment details: Taz Ag is making fair  progress toward meeting goals written for plan of care for increasing ROM, strength, decreasing pain and increasing activity tolerance  Pt has attended 10 treatment sessions since start of plan of care  Per objective measure Vani Arora is decreasing level of disability related to diagnosis  Vani Arora has made improvements with strength, tolerance to activity and normalizing gait pattern  Vani Arora continues to benefit from skilled physical therapy services to address range of motion deficits, strength deficits , decreased tolerance to activity and impaired balance  deficits  Vani Arora continues to have increased difficulty with functional tasks such as ambulation community distances and stair negotiation  Discussed progress toward goals with Vani Arora and together, with patient, decided Vani Arora should continue to be seen for PT services 2 days a week for 4 weeks  Vani Arora agreeable to plan of care      Impairments: abnormal gait, abnormal or restricted ROM, activity intolerance, impaired balance, impaired physical strength, lacks appropriate home exercise program, pain with function and weight-bearing intolerance  Barriers to therapy: Complex medical history     Understanding of Dx/Px/POC: good   Prognosis: fair  Prognosis details: Patient Active Problem List:     Wegener's granulomatosis with renal involvement ([facility] )     Charcot's joint     Anxiety     Benign essential hypertension     BRCA1 positive Cancer of ovary ([facility] )     Chronic kidney disease, stage III (moderate) (HCC)     Chronic pain disorder     Class 2 obesity     Depression     Dyslipidemia     GERD (gastroesophageal reflux disease)     Lymphedema     Opiate analgesic use agreement exists     Ovarian cancer (HCC)     Post-traumatic osteoarthritis     Increased frequency of urination     Other complicated headache syndrome     Nausea & vomiting     SIRS (systemic inflammatory response syndrome) (HCC)     Increased risk of breast cancer     Acute metabolic encephalopathy     Elevated troponin     Mitral valve disorder     Postnasal drip     Dyspnea on exertion     LVH (left ventricular hypertrophy)     Low immunoglobulin level     Hypertrophic cardiomyopathy (HCC)     Hypogammaglobulinemia, acquired ([facility] )     Morbid obesity ([facility] )     Septic shock ([place] [id number] )     Chronic right-sided low back pain without sciatica     Cellulitis of right lower extremity     Slow transit constipation     History of pulmonary embolism     Chronic diastolic heart failure (HCC)     Right hip pain     History of bacteremia     Anemia     Encounter for geriatric assessment     Iron deficiency anemia secondary to inadequate dietary iron intake     Hip osteomyelitis, right (HCC)     MSSA bacteremia     PICC (peripherally inserted central catheter) in place    Goals  STG: To be completed in 4 weeks from 5/12/2023   1  Alan Lerma will be independent with basic HEP to allow patient to complete exercises safely when not directly supervised during PT session  MET 6/19/2023   2  Alan Lerma will report pain no worse than 5/10 at worst to indicate decreased pain level and improved tolerance to activity  MET 6/19/2023   3  Alan Lerma will demonstrate improvements in sit to stand with 1 UE support  PROGRESSING 6/19/2023     LTG: To be completed in 10 weeks from 5/12/2023   1  Alan Lerma will report pain no worse than 3/10 at worst to indicate decreased pain level and improved tolerance to activity  PROGRESSING 6/19/2023   2  Mayda Brady will report 70% improvement in symptoms compared to start of POC to indicate functional improvement and decrease level of disability  PROGRESSING 6/19/2023   3  Mayda Brady will be independent with advanced home exercise program to allow patient to transition from physical therapy care to continue with plan of care at home without supervision from therapist and continue to progress with rehabilitation  PROGRESSING 6/19/2023   4  Maydaalyssa Brady will tolerate single limb stance for 5 seconds on right leg to allow patient to have increased stance time on right leg during ambulation  Discontinue 6/19/2023   5  [place] Jose Antonio will decreased TUG time by 3 seconds from 50 seconds to indicate improved functional mobility and decreased fall risk  MET 6/19/2023   6  Maydaalyssa Brady will ambulate community distances >300 feet with RW  NOT ASSESSED 6/19/2023   7  Mayda Brady will ambulate household distances >100 feet with quad cane to meet goal of walking down aisle at son's wedding with cane  PROGRESSING 6/19/2023 (80 feet)    NEW Goals to be met in 4 weeks from 6/19/2023:   1  Mayda Brady will report pain no worse than 3/10 at worst to indicate decreased pain level and improved tolerance to activity  2  Mayda Brady will report 70% improvement in symptoms compared to start of POC to indicate functional improvement and decrease level of disability  3  Mayda Brady will be independent with advanced home exercise program to allow patient to transition from physical therapy care to continue with plan of care at home without supervision from therapist and continue to progress with rehabilitation  4  [place] Jose Antonio will decreased TUG time by 3 seconds from 54 seconds to indicate improved functional mobility and decreased fall risk  5  Mayda Jose Antonio will ambulate community distances >300 feet with RW  6  [place] Jose Antonio will ambulate household distances >100 feet with quad cane to meet goal of walking down aisle at son's wedding with cane      Plan  Patient would benefit from: "skilled physical therapy  Planned modality interventions: biofeedback, cryotherapy, thermotherapy: hydrocollator packs, ultrasound and traction  Planned therapy interventions: abdominal trunk stabilization, activity modification, ADL retraining, balance, balance/weight bearing training, behavior modification, body mechanics training, breathing training, coordination, fine motor coordination training, flexibility, functional ROM exercises, gait training, graded activity, graded exercise, graded motor, home exercise program, joint mobilization, manual therapy, massage, motor coordination training, neuromuscular re-education, patient education, strengthening, stretching, therapeutic activities and therapeutic exercise  Frequency: 2x week  Duration in weeks: 4  Plan of Care beginning date: 2023  Plan of Care expiration date: 2023  Treatment plan discussed with: patient, PTA and referring physician        Subjective Evaluation    History of Present Illness  Date of surgery: 3/6/2023  Mechanism of injury: surgery  Mechanism of injury: Pt reports she was able to walk down the aisle holding onto son and quad cane  Pt intermittently using ice for pain control  She feels she can walk ok  She is having some trouble sitting some places but this was also at baseline  She thinks she is having more diffiuclty with getting up and down from chair     Pain  Current pain ratin  At best pain ratin  At worst pain ratin  Location: generalized RLE pain   Quality: dull ache and burning  Relieving factors: medications and ice    Treatments  Previous treatment: physical therapy  Patient Goals  Patient goals for therapy: decreased pain, improved balance, increased motion and increased strength  Patient goal: \"to be able to walk down the aisle with cane at son's wedding in , to get my leg stronger\"         Objective     Active Range of Motion     Additional Active Range of Motion Details  Grossly limited hip " "movement- pt unable to sit upright at 90* angle   Grossly limited R knee flexion -? Due to swelling or wraps for lymphedema     Strength/Myotome Testing     Left Hip   Planes of Motion   Flexion: 4  Abduction: 4  Adduction: 4    Right Hip   Planes of Motion   Flexion: 3+  Abduction: 3  Adduction: 4    Left Knee   Flexion: 4+  Extension: 4+    Right Knee   Flexion: 4  Extension: 4    Ambulation   Weight-Bearing Status   Weight-Bearing Status (Left): full weight bearing   Weight-Bearing Status (Right): weight-bearing as tolerated    Assistive device used: front-wheeled walker and small base quad cane    Ambulation: Level Surfaces   Ambulation with assistive device: independent    Observational Gait   Gait: antalgic and circumduction   Increased left stance time  Decreased walking speed, stride length, right stance time, left step length and right step length  Left foot contact pattern: foot flat  Right foot contact pattern: foot flat    Comments   Pt has full reliance of UEs for sit to stand    Pt kicks RLE out and uses UEs to get to standing with forward trunk flexion and then comes to full stand     Functional Assessment        Comments  TUG 50\" with RW (IE) --> TUG with RW (Re-Eval) 45 5 seconds              Precautions: standard, charcots foot R, wears brace on R, fall, lymphedema       Date: 5/12/2023 5/15/2023 5/19/2023 5/22/2023 5/26/2023 5/30/2023 6/2/2023 6/5/2023 6/14/2023 6/19/2023    Visit: #1 #2 #3 #4 #5 #6 #7 #8 #9 #10   Manual:                                       Neuro Re-Ed                                                                                                        Ther Ex             Warm Up seat 11; arms 14    NuStep level 2 8 min  NuStep level 2 10 min  NuStep level 1 10 min  NuStep level 2 10 min  NuStep level 1 10 min  NuStep level 1 10 min  NuStep level 2 10 min  NuStep level 2 10 min    Sit to stand- focus on knee flexion and use of RLE  Mechanics/sequencing x5 with railing on R " "side  x5 with railing on R side  x5 with railing on R side  With TRX straps with 2 (x7) with single (x5) x3 with R rail and LUE on chair, x5 with R rail and LUE on thigh  x5 with R rail and LUE on thigh (min to mod A) x3 w/focus on BUE on armrests x4    Seated knee flexion heel slides   10x 5 sec hold, min-A           Standing alternating marches /step tap   10x, 5x 2 UE support x15 each LE  Step tap to 4\" box x10 each LE Step tap to 4\" box x10 each LE        Long arc quad   10x  x15 each LE  x20 each LE  x20 each LE  x20 each LE x20 each LE with focus on back away from chair for core activation  x20 each LE with focus on back away from chair for core activation  x20 each LE with focus on back away from chair for core activation  x30 each LE with focus on back away from chair for core activation    Seated hip add  10x5\" black ball 10\" hold x10   10\" hold x10  10\" hold x10  (cues to move RLE)  10\" hold x10 (sitting unsupported) 10\" hold x10 (sitting unsupported)    Seated hip abd  10x5\" pink t-band GTB 10\" hold x10  GTB 10\" hold x10  GTB 10\" hold x10 (cue to move RLE)  GTB 10\" hold x10 (sitting unsupported) GTB 10\" hold x10 (sitting unsupported)    Standing HS curl     x10 each LE      Alt side x10ea    Stand:   Alt hip abd  Alt hip ext - - - - - - - - x10 ea    pball trunk flexion        Focus on anterior weight shift blue ball x10  Focus on anterior weight shift blue ball x10      Seated knee flexion         AAROM 10\" x10                   Ther Activity             Standing tolerance - standing unsupported for time   2x 1 min 3 min unsupported                        Gait Training             Lateral WS --> SLS   WS 10x 2 UE support           Gait training with quad cane   Hold for now   48' with CGA and NBQC 80' with CGA and NBQC focus on equal stride length  54'x2 with CGA with NBQC focus on step through with RLE  59', 39' with close S with NBQC focus on step through with RLE  27' x2 with NBQC and other distances " "with RW t/o session 1x40', 2x10' w/ NBQC [de-identified]' with NBQC close S level    Stair training - - - - - - - - Up/down 2 6\" steps w/ 1 railing/NBQC up and 2 railings down    Walking laps in gym with RW  1 lap- 120 ft to start session, 3/4 lap to end 8 min - focus on decreasing UE support  Focus on equal stride length and continuous movement 100 feet   100' with RW                     Modalities                                           "

## 2023-06-20 NOTE — PROGRESS NOTES
Daily Note     Today's date: 2023  Patient name: Harjit Orta  : 1955  MRN: [id number]  Referring provider: Colby Shaw, *  Dx:   Encounter Diagnosis     ICD-10-CM    1  Weakness of right lower extremity  R29 898       2  Aftercare following right hip joint replacement surgery  Z47 1     Z96 641       3  Gait abnormality  R26 9       4  Decreased range of motion  M25 60           Start Time: 815  Stop Time: 915  Total time in clinic (min): 60 minutes    Subjective: Pt reports she is feeling good  No new complaints  Objective: See treatment diary below      Assessment: Harjit Orta tolerated today's treatment session well  Patient education provided on progression of stair and gait training  Alejandrina Jo completed all TE with good form and no adverse reactions  Pt able to walk longer distances with good quality of gait with NBQC  As she fatigues last 30 feet, she needs cues to keep NBQC close to body and take full step with RLE  Pt able to complete 10 steps today with 2 HR (increased reliance of UEs) but no hands on assist   Alejandrina Jo continues to benefit from skilled OPPT services to address post op hip and LE ROM and strength deficits   Will continue to address functional deficits and focus on progression of POC per patient tolerance  Patient performed Nustep to increase blood flow to the area being treated, prepare the muscles for strength training and stretching, improve overall tolerance to activity, and aerobic endurance  Plan: Continue per plan of care  Progress treatment as tolerated         Precautions: standard, charcots foot R, wears brace on R, fall, lymphedema       Date: 2023    Visit: #11      #7 #8 #9 #10   Manual:                                       Neuro Re-Ed                                                                                                        Ther Ex             Warm Up seat 11; arms 14  NuStep "level 4 10 min       NuStep level 1 10 min  NuStep level 1 10 min  NuStep level 2 10 min  NuStep level 2 10 min    Sit to stand- focus on knee flexion and use of RLE       x3 with R rail and LUE on chair, x5 with R rail and LUE on thigh  x5 with R rail and LUE on thigh (min to mod A) x3 w/focus on BUE on armrests x4    Seated knee flexion heel slides              Standing alternating marches /step tap              Long arc quad  x20 each LE       x20 each LE with focus on back away from chair for core activation  x20 each LE with focus on back away from chair for core activation  x20 each LE with focus on back away from chair for core activation  x30 each LE with focus on back away from chair for core activation    Seated hip add        10\" hold x10 (sitting unsupported) 10\" hold x10 (sitting unsupported)    Seated hip abd        GTB 10\" hold x10 (sitting unsupported) GTB 10\" hold x10 (sitting unsupported)    Standing HS curl          Alt side x10ea    Stand:   Alt hip abd  Alt hip ext       - - x10 ea    pball trunk flexion        Focus on anterior weight shift blue ball x10  Focus on anterior weight shift blue ball x10      Seated knee flexion         AAROM 10\" x10                   Ther Activity             Standing tolerance - standing unsupported for time                           Gait Training             Lateral WS --> SLS              Gait training with quad cane  90' with NBQC close S level, 100+ feet with RW focus on smooth steps       64', 39' with close S with NBQC focus on step through with RLE  27' x2 with NBQC and other distances with RW t/o session 1x40', 2x10' w/ NBQC [de-identified]' with NBQC close S level    Stair training Up fwd, down backwards 6\" step (2) x5 (10 total steps) focus on knee flexion (2 railings)  *NV forward descent on stairs      - - Up/down 2 6\" steps w/ 1 railing/NBQC up and 2 railings down    Walking laps in gym with RW                          Modalities                                     "

## 2023-06-23 ENCOUNTER — OFFICE VISIT (OUTPATIENT)
Dept: INFECTIOUS DISEASES | Facility: CLINIC | Age: 68
End: 2023-06-23
Payer: MEDICARE

## 2023-06-23 ENCOUNTER — OFFICE VISIT (OUTPATIENT)
Age: 68
End: 2023-06-23
Payer: MEDICARE

## 2023-06-23 VITALS
DIASTOLIC BLOOD PRESSURE: 68 MMHG | WEIGHT: 210 LBS | HEIGHT: 63 IN | SYSTOLIC BLOOD PRESSURE: 112 MMHG | HEART RATE: 87 BPM | OXYGEN SATURATION: 96 % | BODY MASS INDEX: 37.21 KG/M2 | TEMPERATURE: 97 F | RESPIRATION RATE: 16 BRPM

## 2023-06-23 DIAGNOSIS — R29.898 WEAKNESS OF RIGHT LOWER EXTREMITY: Primary | ICD-10-CM

## 2023-06-23 DIAGNOSIS — R78.81 MSSA BACTEREMIA: ICD-10-CM

## 2023-06-23 DIAGNOSIS — Z47.1 AFTERCARE FOLLOWING RIGHT HIP JOINT REPLACEMENT SURGERY: ICD-10-CM

## 2023-06-23 DIAGNOSIS — B95.61 MSSA BACTEREMIA: ICD-10-CM

## 2023-06-23 DIAGNOSIS — I89.0 LYMPHEDEMA: ICD-10-CM

## 2023-06-23 DIAGNOSIS — M25.60 DECREASED RANGE OF MOTION: ICD-10-CM

## 2023-06-23 DIAGNOSIS — M86.9 HIP OSTEOMYELITIS, RIGHT (HCC): Primary | ICD-10-CM

## 2023-06-23 DIAGNOSIS — R26.9 GAIT ABNORMALITY: ICD-10-CM

## 2023-06-23 DIAGNOSIS — Z96.641 AFTERCARE FOLLOWING RIGHT HIP JOINT REPLACEMENT SURGERY: ICD-10-CM

## 2023-06-23 DIAGNOSIS — N18.30 STAGE 3 CHRONIC KIDNEY DISEASE, UNSPECIFIED WHETHER STAGE 3A OR 3B CKD (HCC): ICD-10-CM

## 2023-06-23 PROCEDURE — 97116 GAIT TRAINING THERAPY: CPT

## 2023-06-23 PROCEDURE — 97110 THERAPEUTIC EXERCISES: CPT

## 2023-06-23 PROCEDURE — 99212 OFFICE O/P EST SF 10 MIN: CPT | Performed by: NURSE PRACTITIONER

## 2023-06-23 RX ORDER — CEFADROXIL 500 MG/1
500 CAPSULE ORAL EVERY 12 HOURS SCHEDULED
Qty: 60 CAPSULE | Refills: 0 | Status: SHIPPED | OUTPATIENT
Start: 2023-06-23 | End: 2023-07-23

## 2023-06-23 NOTE — PATIENT INSTRUCTIONS
Continue oral Cefadroxil for ongoing antibiotic suppression  Refills sent to [address]  Complete lab work (CBCD and creatinine) prior to next outpatient infectious disease follow up visit  Return to the outpatient infectious disease office in 1 month for follow up

## 2023-06-23 NOTE — PROGRESS NOTES
Outpatient Progress Note - [facility] Infectious Disease   Burt Soto 77 y o  female MRN: [id number]  Encounter: [id number]    Impression/Plan:  1  R hip osteomyelitis/hardware infection  Gross evidence of osteomyelitis of proximal femoral head/truce acetabular bone and purulence encountered during elective ROWDY on 3/6/2023  Unfortunately the posterior column screws were noted to be embedded in the femoral head and unable to be removed  OR cultures without growth but suspect MSSA given her recent bacteremia, also consider difficult to grow pathogens such as Cutibacterium or strep species  Patient completed 6-weeks of IV antibiotic treatment and has transitioned to PO Cefadroxil for ongoing suppression  She has been tolerating the antibiotic without difficulty  I reviewed her most recent lab work from 6/19/2023 which showed stable normal WBC count, stable creatinine, and stable LFTs  Patient is planned for 6 months of antibiotic total, through 8/21/2023  She may require longer suppression pending her clinical course   -continue oral Cefadroxil through 8/21/2023 for 6 full months of antibiotic treatment/suppression  -will continue to reassess need for ongoing antibiotic after the 6 months pending patient's clinical course  -complete lab work (CBCD and creatinine) prior to next outpatient ID follow up visit  -return to the outpatient ID office in 1 month for follow up     2  History of MSSA bacteremia  Likely cutaneous source in the setting of chronic lymphedema  Patient completed appropriate IV antibiotic treatment for this issue  Surveillance blood cultures collected in 1/2023 with no growth       3  CKD III  Upon review of patient's available past medical records it appears that patient's baseline creatinine is approximately 1-1 2  Most recent serum creatinine was 1 3 on 6/19/2023  Patient follows outpatient with [facility]    -monitor creatinine  -dose adjust antibiotic for renal function as needed  -continue outpatient follow up with nephrology     4  Chronic lymphedema  This is risk factor for wounds  -recommend serial skin exams/skin care  -frequent compression/elevation to promote lymphatic return     Above plan was discussed in detail with patient in the exam room  Antibiotics:  Cefadroxil    Subjective:  Patient presents for follow up for suppressive treatment of R hip osteomyelitis and hardware infection  She completed IV antibiotic course and remains on cefadroxil for suppression  She has been tolerating the antibiotic without difficulty  She has no fever, chills, sweats, shakes; no nausea, vomiting, abdominal pain, diarrhea, or dysuria; no cough, shortness of breath, or chest pain  No new symptoms  Patient has been ambulating well with both the walker and cane  She continues to follow up with her outpatient orthopedic surgery team and is going to PT  Objective:  Vitals:  Temp 97  HR 87  RR 16  /68    Physical Exam:   General Appearance:  Alert, interactive, nontoxic, no acute distress  She appears comfortable sitting in the exam chair  Lungs:   Clear to auscultation bilaterally; no wheezes, rhonchi or rales; respirations unlabored on room air  Heart:  RRR; no murmur, rub or gallop  Extremities: B/L LE lymphedema  Skin: No new rashes or lesiosn noted on exposed skin       Labs, Imaging, & Other studies:   All pertinent labs and imaging studies were personally reviewed by me including  Results from last 7 days   Lab Units 06/19/23  1004   WBC Thousand/uL 6 10   HEMOGLOBIN g/dL 10 3*   PLATELETS Thousands/uL 261     Results from last 7 days   Lab Units 06/19/23  1004   POTASSIUM mmol/L 4 8   CHLORIDE mmol/L 106   CO2 mmol/L 27   BUN mg/dL 27*   CREATININE mg/dL 1 30   EGFR ml/min/1 73sq m 41   CALCIUM mg/dL 9 6   AST U/L 23   ALT U/L 15   ALK PHOS U/L 122*

## 2023-06-26 ENCOUNTER — OFFICE VISIT (OUTPATIENT)
Age: 68
End: 2023-06-26
Payer: MEDICARE

## 2023-06-26 ENCOUNTER — APPOINTMENT (OUTPATIENT)
Age: 68
End: 2023-06-26
Payer: MEDICARE

## 2023-06-26 DIAGNOSIS — R29.898 WEAKNESS OF RIGHT LOWER EXTREMITY: Primary | ICD-10-CM

## 2023-06-26 DIAGNOSIS — M25.60 DECREASED RANGE OF MOTION: ICD-10-CM

## 2023-06-26 DIAGNOSIS — Z47.1 AFTERCARE FOLLOWING RIGHT HIP JOINT REPLACEMENT SURGERY: ICD-10-CM

## 2023-06-26 DIAGNOSIS — Z96.641 AFTERCARE FOLLOWING RIGHT HIP JOINT REPLACEMENT SURGERY: ICD-10-CM

## 2023-06-26 DIAGNOSIS — R26.9 GAIT ABNORMALITY: ICD-10-CM

## 2023-06-26 PROCEDURE — 97116 GAIT TRAINING THERAPY: CPT

## 2023-06-26 PROCEDURE — 97110 THERAPEUTIC EXERCISES: CPT

## 2023-06-26 NOTE — PROGRESS NOTES
Daily Note     Today's date: 2023  Patient name: Oriana Patel  : 1955  MRN: [id number]  Referring provider: Joy Mendez, *  Dx:   Encounter Diagnosis     ICD-10-CM    1  Weakness of right lower extremity  R29 898       2  Aftercare following right hip joint replacement surgery  Z47 1     Z96 641       3  Gait abnormality  R26 9       4  Decreased range of motion  M25 60           Start Time: 1445  Stop Time: 1530  Total time in clinic (min): 45 minutes    Subjective: Patient noted that she feels good today  Patient noted after sitting a while she is stiff to stand up and walk  Objective: See treatment diary below      Assessment: Patient performed Nustep aerobic exercise to increase blood flow to the area being treated, prepare the muscles for strength training and stretching, improve overall tolerance to activity, and aerobic endurance  Patient amb with SBQC with a step to pattern mod (I)  Patient had no LOB, however required one seated educational break about the stairs  Patient negotiate the steps up and down with 2 HHA and noted more difficulty descending than ascending  Patient noted fatigue post session  Patient would benefit from continued PT to allow the patient to return to her PLOF  Plan: Continue per plan of care        Precautions: standard, charcots foot R, wears brace on R, fall, lymphedema       Date: 2023    Visit: #11 #12     #7 #8 #9 #10   Manual:                                       Neuro Re-Ed                                                                                                        Ther Ex             Warm Up seat 11; arms 14  NuStep level 4 10 min  Nustep  Level 4   16 mins     NuStep level 1 10 min  NuStep level 1 10 min  NuStep level 2 10 min  NuStep level 2 10 min    Sit to stand- focus on knee flexion and use of RLE       x3 with R rail and LUE on chair, x5 with R rail and LUE on thigh "x5 with R rail and LUE on thigh (min to mod A) x3 w/focus on BUE on armrests x4    Seated knee flexion heel slides              Standing alternating marches /step tap              Long arc quad  x20 each LE       x20 each LE with focus on back away from chair for core activation  x20 each LE with focus on back away from chair for core activation  x20 each LE with focus on back away from chair for core activation  x30 each LE with focus on back away from chair for core activation    Seated hip add        10\" hold x10 (sitting unsupported) 10\" hold x10 (sitting unsupported)    Seated hip abd        GTB 10\" hold x10 (sitting unsupported) GTB 10\" hold x10 (sitting unsupported)    Standing HS curl          Alt side x10ea    Stand:   Alt hip abd  Alt hip ext       - - x10 ea    pball trunk flexion        Focus on anterior weight shift blue ball x10  Focus on anterior weight shift blue ball x10      Seated knee flexion         AAROM 10\" x10                   Ther Activity             Standing tolerance - standing unsupported for time                           Gait Training             Lateral WS --> SLS              Gait training with quad cane  90' with NBQC close S level, 100+ feet with RW focus on smooth steps  80' with NBQC close S level, 100+ feet with RW focus on smooth steps      64', 39' with close S with NBQC focus on step through with RLE  27' x2 with NBQC and other distances with RW t/o session 1x40', 2x10' w/ NBQC [de-identified]' with NBQC close S level    Stair training Up fwd, down backwards 6\" step (2) x5 (10 total steps) focus on knee flexion (2 railings)   forward ascend & descent on stairs  x5  ea     - - Up/down 2 6\" steps w/ 1 railing/NBQC up and 2 railings down    Walking laps in gym with RW                          Modalities                                                  "

## 2023-06-30 ENCOUNTER — OFFICE VISIT (OUTPATIENT)
Age: 68
End: 2023-06-30
Payer: MEDICARE

## 2023-06-30 DIAGNOSIS — Z96.641 AFTERCARE FOLLOWING RIGHT HIP JOINT REPLACEMENT SURGERY: ICD-10-CM

## 2023-06-30 DIAGNOSIS — R29.898 WEAKNESS OF RIGHT LOWER EXTREMITY: Primary | ICD-10-CM

## 2023-06-30 DIAGNOSIS — Z47.1 AFTERCARE FOLLOWING RIGHT HIP JOINT REPLACEMENT SURGERY: ICD-10-CM

## 2023-06-30 DIAGNOSIS — R26.9 GAIT ABNORMALITY: ICD-10-CM

## 2023-06-30 DIAGNOSIS — M25.60 DECREASED RANGE OF MOTION: ICD-10-CM

## 2023-06-30 PROCEDURE — 97116 GAIT TRAINING THERAPY: CPT

## 2023-06-30 PROCEDURE — 97110 THERAPEUTIC EXERCISES: CPT

## 2023-06-30 NOTE — PROGRESS NOTES
Daily Note     Today's date: 2023  Patient name: Tylor Archibald  : 1955  MRN: [id number]  Referring provider: Akira Murrieta, *  Dx:   Encounter Diagnosis     ICD-10-CM    1  Weakness of right lower extremity  R29 898       2  Decreased range of motion  M25 60       3  Aftercare following right hip joint replacement surgery  Z47 1     Z96 641       4  Gait abnormality  R26 9           Start Time: 0945  Stop Time: 1045  Total time in clinic (min): 60 minutes    Subjective: Pt reports today she is having some nerve pain into her R foot  Objective: See treatment diary below      Assessment: Pt performed Nustep aerobic exercises to increase blood flow to the area being treated, prepare the muscles for strength training and stretching, improve overall tolerance to activity, and aerobic endurance  Focused session around functional gait training and stair negotiation  Pt ambulated with slow flor and narrow base of support with [facility] and close supervision for safety  Pt was able to ambulate solely with the [facility] with jordy supervision for safety today  Pt required minimal cueing for task completion  Will continue to progress next session based on symptom irritability and overall cardiovascular and muscular endurance/ strength  Patient would benefit from continued PT in order to continue progressing towards goals as outlined and return to PLOF  Plan: Continue per plan of care        Precautions: standard, charcots foot R, wears brace on R, fall, lymphedema       Date: 2023    Visit: #11 #12 #13    #7 #8 #9 #10   Manual:                                       Neuro Re-Ed                                                                                                        Ther Ex             Warm Up seat 11; arms 14  NuStep level 4 10 min  Nustep  Level 4   16 mins Nustep Level 4 15 minutes     NuStep level 1 10 min  NuStep level 1 10 "min  NuStep level 2 10 min  NuStep level 2 10 min    Sit to stand- focus on knee flexion and use of RLE       x3 with R rail and LUE on chair, x5 with R rail and LUE on thigh  x5 with R rail and LUE on thigh (min to mod A) x3 w/focus on BUE on armrests x4    Seated knee flexion heel slides              Standing alternating marches /step tap              Long arc quad  x20 each LE       x20 each LE with focus on back away from chair for core activation  x20 each LE with focus on back away from chair for core activation  x20 each LE with focus on back away from chair for core activation  x30 each LE with focus on back away from chair for core activation    Seated hip add   10\" hold x10 (sitting unsupported)     10\" hold x10 (sitting unsupported) 10\" hold x10 (sitting unsupported)    Seated hip abd   GTB 10\" hold x10 (sitting unsupported)     GTB 10\" hold x10 (sitting unsupported) GTB 10\" hold x10 (sitting unsupported)    Standing HS curl    Alt side x10ea      Alt side x10ea    Stand:   Alt hip abd  Alt hip ext       - - x10 ea    pball trunk flexion        Focus on anterior weight shift blue ball x10  Focus on anterior weight shift blue ball x10      Seated knee flexion         AAROM 10\" x10                   Ther Activity             Standing tolerance - standing unsupported for time                           Gait Training             Lateral WS --> SLS              Gait training with quad cane  90' with NBQC close S level, 100+ feet with RW focus on smooth steps  80' with NBQC close S level, 100+ feet with RW focus on smooth steps  48 feet> 120 feet with NBQC close S level    64', 39' with close S with NBQC focus on step through with RLE  27' x2 with NBQC and other distances with RW t/o session 1x40', 2x10' w/ NBQC [de-identified]' with NBQC close S level    Stair training Up fwd, down backwards 6\" step (2) x5 (10 total steps) focus on knee flexion (2 railings)   forward ascend & descent on stairs  x5  ea     - - Up/down 2 6\" " steps w/ 1 railing/NBQC up and 2 railings down    Walking laps in gym with RW                          Modalities

## 2023-07-03 ENCOUNTER — APPOINTMENT (OUTPATIENT)
Age: 68
End: 2023-07-03
Payer: MEDICARE

## 2023-07-07 ENCOUNTER — OFFICE VISIT (OUTPATIENT)
Age: 68
End: 2023-07-07
Payer: MEDICARE

## 2023-07-07 DIAGNOSIS — Z47.1 AFTERCARE FOLLOWING RIGHT HIP JOINT REPLACEMENT SURGERY: ICD-10-CM

## 2023-07-07 DIAGNOSIS — R29.898 WEAKNESS OF RIGHT LOWER EXTREMITY: Primary | ICD-10-CM

## 2023-07-07 DIAGNOSIS — Z96.641 AFTERCARE FOLLOWING RIGHT HIP JOINT REPLACEMENT SURGERY: ICD-10-CM

## 2023-07-07 DIAGNOSIS — M25.60 DECREASED RANGE OF MOTION: ICD-10-CM

## 2023-07-07 DIAGNOSIS — R26.9 GAIT ABNORMALITY: ICD-10-CM

## 2023-07-07 PROCEDURE — 97110 THERAPEUTIC EXERCISES: CPT

## 2023-07-07 PROCEDURE — 97116 GAIT TRAINING THERAPY: CPT

## 2023-07-07 NOTE — PROGRESS NOTES
Daily Note     Today's date: 2023  Patient name: Marni Padilla  : 1955  MRN: [id number]  Referring provider: Harpreet Davila, *  Dx:   Encounter Diagnosis     ICD-10-CM    1. Weakness of right lower extremity  R29.898       2. Decreased range of motion  M25.60       3. Aftercare following right hip joint replacement surgery  Z47.1     Z96.641       4. Gait abnormality  R26.9                      Subjective: Patient states she is doing alright. Offers no new complaints arriving to therapy. Objective: See treatment diary below      Assessment: Tolerated treatment fair. Initiated POC on NuStep for active warmup. Resumed with prescribed POC. She fatigues quickly with ambulation. Added in LAQ with good tolerance. Patient would benefit from additional core strengthening. Patient demonstrated fatigue post treatment and would benefit from continued PT      Plan: Progress treatment as tolerated.        Precautions: standard, charcots foot R, wears brace on R, fall, lymphedema       Date: 2023    Visit: #11 #12 #13 #14   #7 #8 #9 #10   Manual:                                       Neuro Re-Ed                                                                                                        Ther Ex             Warm Up seat 11; arms 14  NuStep level 4 10 min  Nustep  Level 4   16 mins Nustep Level 4 15 minutes  NuStep  Level 4 10 min    NuStep level 1 10 min  NuStep level 1 10 min  NuStep level 2 10 min  NuStep level 2 10 min    Sit to stand- focus on knee flexion and use of RLE       x3 with R rail and LUE on chair, x5 with R rail and LUE on thigh  x5 with R rail and LUE on thigh (min to mod A) x3 w/focus on BUE on armrests x4    Seated knee flexion heel slides              Standing alternating marches /step tap              Long arc quad  x20 each LE    x10 ea leg    x20 each LE with focus on back away from chair for core activation  x20 each LE with focus on back away from chair for core activation  x20 each LE with focus on back away from chair for core activation  x30 each LE with focus on back away from chair for core activation    Seated hip add   10" hold x10 (sitting unsupported) 10" hold x10 (sitting unsupported)    10" hold x10 (sitting unsupported) 10" hold x10 (sitting unsupported)    Seated hip abd   GTB 10" hold x10 (sitting unsupported) GTB 10" hold x10 (sitting unsupported)    GTB 10" hold x10 (sitting unsupported) GTB 10" hold x10 (sitting unsupported)    Standing HS curl    Alt side x10ea Alt side x 10      Alt side x10ea    Stand:   Alt hip abd  Alt hip ext       - - x10 ea    pball trunk flexion        Focus on anterior weight shift blue ball x10  Focus on anterior weight shift blue ball x10      Seated knee flexion         AAROM 10" x10                   Ther Activity             Standing tolerance - standing unsupported for time                           Gait Training             Lateral WS --> SLS              Gait training with quad cane  90' with NBQC close S level, 100+ feet with RW focus on smooth steps  80' with NBQC close S level, 100+ feet with RW focus on smooth steps  48 feet> 120 feet with NBQC close S level 102 feet with NBQC close S level   64', 45' with close S with NBQC focus on step through with RLE  27' x2 with NBQC and other distances with RW t/o session 1x40', 2x10' w/ NBQC 80' with NBQC close S level    Stair training Up fwd, down backwards 6" step (2) x5 (10 total steps) focus on knee flexion (2 railings)   forward ascend & descent on stairs  x5  ea     - - Up/down 2 6" steps w/ 1 railing/NBQC up and 2 railings down    Walking laps in gym with RW                          Modalities

## 2023-07-08 NOTE — PROGRESS NOTES
Daily Note     Today's date: 7/10/2023  Patient name: Maximiano Buerger  : 1955  MRN: [id number]  Referring provider: Albin Vizcarra, *  Dx:   Encounter Diagnosis     ICD-10-CM    1. Weakness of right lower extremity  R29.898       2. Decreased range of motion  M25.60       3. Aftercare following right hip joint replacement surgery  Z47.1     Z96.641       4. Gait abnormality  R26.9           Start Time: 1430  Stop Time: 1530  Total time in clinic (min): 60 minutes    Subjective: Pt reports she is doing well. Feels good. Pt reports since accident in  patient had mostly been walking with RW. She was only using cane occasionally. Objective: See treatment diary below      Assessment: Maximiano Buerger tolerated today's treatment session well. Patient education provided on progression of TE.  Leandra Severino completed all TE with good form and no adverse reactions. Pt appeared more lethargic during gait trianing today- needing increased time to complete short distance with SPC   Pt endorsed she was primarily using RW prior to hip replacement. Leandra Severino continues to benefit from skilled OPPT services to address post op weakness and ROM deficits. Will continue to address functional deficits and focus on progression of POC per patient tolerance. Patient performed Nustep to increase blood flow to the area being treated, prepare the muscles for strength training and stretching, improve overall tolerance to activity, and aerobic endurance. Plan: Continue per plan of care. Progress treatment as tolerated.        Precautions: standard, charcots foot R, wears brace on R, fall, lymphedema       Date: 2023  2023 2023 2023 7/10/2023         Visit: #11 #12 #13 #14 #15        Manual:                                       Neuro Re-Ed                                                                                                        Ther Ex             Warm Up seat 11; arms 14  NuStep level 4 10 min  Nustep  Level 4   16 mins Nustep Level 4 15 minutes  NuStep  Level 4 10 min  NuStep level 4 10 min         Sit to stand- focus on knee flexion and use of RLE             Seated knee flexion heel slides              Standing alternating marches /step tap      Seated unsupported at high low table 2# weight 2x10 each LE        Long arc quad  x20 each LE    x10 ea leg  2# weight 2x10 each LE (sitting high low table with no UE support)        Seated hip add   10" hold x10 (sitting unsupported) 10" hold x10 (sitting unsupported)         Seated hip abd   GTB 10" hold x10 (sitting unsupported) GTB 10" hold x10 (sitting unsupported)         Standing HS curl    Alt side x10ea Alt side x 10          Stand:   Alt hip abd  Alt hip ext             pball trunk flexion              Seated knee flexion      10x10"                      Ther Activity             Standing tolerance - standing unsupported for time                           Gait Training             Lateral WS --> SLS              Gait training with quad cane  90' with NBQC close S level, 100+ feet with RW focus on smooth steps  80' with NBQC close S level, 100+ feet with RW focus on smooth steps  48 feet> 120 feet with NBQC close S level 102 feet with NBQC close S level 64 feet with NBCQ (6 min)    240 feet around gym with RW (cues for continuous walking) 7 min to complete         Stair training Up fwd, down backwards 6" step (2) x5 (10 total steps) focus on knee flexion (2 railings)   forward ascend & descent on stairs  x5  ea           Walking laps in gym with RW                          Modalities

## 2023-07-10 ENCOUNTER — OFFICE VISIT (OUTPATIENT)
Age: 68
End: 2023-07-10
Payer: MEDICARE

## 2023-07-10 ENCOUNTER — HOSPITAL ENCOUNTER (OUTPATIENT)
Dept: MAMMOGRAPHY | Facility: MEDICAL CENTER | Age: 68
Discharge: HOME | End: 2023-07-10

## 2023-07-10 DIAGNOSIS — R26.9 GAIT ABNORMALITY: ICD-10-CM

## 2023-07-10 DIAGNOSIS — Z47.1 AFTERCARE FOLLOWING RIGHT HIP JOINT REPLACEMENT SURGERY: ICD-10-CM

## 2023-07-10 DIAGNOSIS — R29.898 WEAKNESS OF RIGHT LOWER EXTREMITY: Primary | ICD-10-CM

## 2023-07-10 DIAGNOSIS — Z12.31 VISIT FOR SCREENING MAMMOGRAM: ICD-10-CM

## 2023-07-10 DIAGNOSIS — Z96.641 AFTERCARE FOLLOWING RIGHT HIP JOINT REPLACEMENT SURGERY: ICD-10-CM

## 2023-07-10 DIAGNOSIS — M25.60 DECREASED RANGE OF MOTION: ICD-10-CM

## 2023-07-10 PROCEDURE — 97116 GAIT TRAINING THERAPY: CPT

## 2023-07-10 PROCEDURE — 97110 THERAPEUTIC EXERCISES: CPT

## 2023-07-12 NOTE — PROGRESS NOTES
PT Re-Evaluation     Today's date: 2023  Patient name: Maximiano Buerger  : 1955  MRN: [id number]  Referring provider: Albin Vizcarra, *  Dx:   Encounter Diagnosis     ICD-10-CM    1. Weakness of right lower extremity  R29.898       2. Decreased range of motion  M25.60       3. Aftercare following right hip joint replacement surgery  Z47.1     Z96.641       4. Gait abnormality  R26.9           Start Time: 08  Stop Time: 930  Total time in clinic (min): 50 minutes    Assessment  Assessment details: Maximiano Buerger is making fair  progress toward meeting goals written for plan of care for increasing ROM, strength, decreasing pain and increasing activity tolerance. Per objective measure Leandra Severino is decreasing level of disability related to diagnosis. Leandra Severino feels they are about 65% improved compared to baseline function. Leandra Severino has made improvements with tolerance to activity. Leandra Severino continues to benefit from skilled physical therapy services to address range of motion deficits, strength deficits , decreased tolerance to activity and impaired balance  deficits. Leandra Severino continues to have increased difficulty with functional tasks such as general ambulation and activity. Discussed progress toward goals with Leandra Severino and together, with patient, decided Leandra Severino should continue to be seen for PT services 2 days a week for 4 weeks. Leandra Severino agreeable to plan of care.       Impairments: abnormal gait, abnormal or restricted ROM, activity intolerance, impaired balance, impaired physical strength, lacks appropriate home exercise program, pain with function and weight-bearing intolerance  Barriers to therapy: Complex medical history     Understanding of Dx/Px/POC: good   Prognosis: fair  Prognosis details: Patient Active Problem List:     Wegener's granulomatosis with renal involvement ([address])     Charcot's joint     Anxiety     Benign essential hypertension     BRCA1 positive     Cancer of ovary ([address])     Chronic kidney disease, stage III (moderate) (HCC)     Chronic pain disorder     Class 2 obesity     Depression     Dyslipidemia     GERD (gastroesophageal reflux disease)     Lymphedema     Opiate analgesic use agreement exists     Ovarian cancer (HCC)     Post-traumatic osteoarthritis     Increased frequency of urination     Other complicated headache syndrome     Nausea & vomiting     SIRS (systemic inflammatory response syndrome) (HCC)     Increased risk of breast cancer     Acute metabolic encephalopathy     Elevated troponin     Mitral valve disorder     Postnasal drip     Dyspnea on exertion     LVH (left ventricular hypertrophy)     Low immunoglobulin level     Hypertrophic cardiomyopathy (HCC)     Hypogammaglobulinemia, acquired ([address])     Morbid obesity ([address])     Septic shock (HCC)     Chronic right-sided low back pain without sciatica     Cellulitis of right lower extremity     Slow transit constipation     History of pulmonary embolism     Chronic diastolic heart failure (HCC)     Right hip pain     History of bacteremia     Anemia     Encounter for geriatric assessment     Iron deficiency anemia secondary to inadequate dietary iron intake     Hip osteomyelitis, right (HCC)     MSSA bacteremia     PICC (peripherally inserted central catheter) in place    Goals  NEW Goals to be met in 4 weeks from 6/19/2023:   1. Nayeli Nathan will report pain no worse than 3/10 at worst to indicate decreased pain level and improved tolerance to activity. MET 7/14/2023   2. Nayeli Nathan will report 70% improvement in symptoms compared to start of POC to indicate functional improvement and decrease level of disability PROGRESSING (Pt reports 65%)  3. Nayeli Nathan will be independent with advanced home exercise program to allow patient to transition from physical therapy care to continue with plan of care at home without supervision from therapist and continue to progress with rehabilitation. PROGRESSING 7/14/2023   4.  Nayeli Nathan will decreased TUG time by 3 seconds from 54 seconds to indicate improved functional mobility and decreased fall risk. MET 7/14/2023   5. Leandra Severino will ambulate community distances >300 feet with RW. PROGRESSING 7/14/2023   6. Leandra Severino will ambulate household distances >100 feet with quad cane to meet goal of walking down aisle at son's wedding with cane. MET 7/14/2023     NEW Goals to be met in 4 weeks from 7/14/2023:   1. Leandra Severino will be independent with advanced home exercise program to allow patient to transition from physical therapy care to continue with plan of care at home without supervision from therapist and continue to progress with rehabilitation. 2. Leandra Severino will decrease TUG to 30 seconds or less with RW indicate improved functional mobility and decreased fall risk. 3. Leandra Severino will decrease TUG to 60 seconds or less with NBQC indicate improved functional mobility and decreased fall risk. 4. Leandra Severino will increase 2MWT by 50 feet.      Plan  Patient would benefit from: skilled physical therapy  Planned modality interventions: biofeedback, cryotherapy, thermotherapy: hydrocollator packs, ultrasound and traction  Planned therapy interventions: abdominal trunk stabilization, activity modification, ADL retraining, balance, balance/weight bearing training, behavior modification, body mechanics training, breathing training, coordination, fine motor coordination training, flexibility, functional ROM exercises, gait training, graded activity, graded exercise, graded motor, home exercise program, joint mobilization, manual therapy, massage, motor coordination training, neuromuscular re-education, patient education, strengthening, stretching, therapeutic activities and therapeutic exercise  Frequency: 2x week  Duration in weeks: 4  Plan of Care beginning date: 7/14/2023  Plan of Care expiration date: 8/11/2023  Treatment plan discussed with: patient, PTA and referring physician        Subjective Evaluation    History of Present Illness  Date of surgery: 3/6/2023  Mechanism of injury: surgery  Mechanism of injury: The other day pt had some burning pain in her hip. She states she is doing better getting around her home. She cannot think of anything specifically she is having trouble with at home in regards to functional mobility. Patient Goals  Patient goals for therapy: decreased pain, improved balance, increased motion and increased strength  Patient goal: "I want to get my leg stronger, be able to sit down and get up using my legs more and use the cane better"  Pain  Current pain ratin  At best pain ratin  At worst pain rating: 3  Location: generalized RLE pain   Quality: burning  Relieving factors: medications and ice    Treatments  Previous treatment: physical therapy        Objective     Active Range of Motion     Additional Active Range of Motion Details  Grossly limited hip movement- pt unable to sit upright at 90* angle   Grossly limited R knee flexion -? Due to swelling or wraps for lymphedema     Strength/Myotome Testing     Left Hip   Planes of Motion   Flexion: 4  Abduction: 4  Adduction: 4    Right Hip   Planes of Motion   Flexion: 3+  Abduction: 3  Adduction: 4    Left Knee   Flexion: 4+  Extension: 4+    Right Knee   Flexion: 4  Extension: 4    Ambulation   Weight-Bearing Status   Weight-Bearing Status (Left): full weight bearing   Weight-Bearing Status (Right): weight-bearing as tolerated    Assistive device used: front-wheeled walker and small base quad cane    Ambulation: Level Surfaces   Ambulation with assistive device: independent    Observational Gait   Gait: antalgic and circumduction   Increased left stance time. Decreased walking speed, stride length, right stance time, left step length and right step length. Left foot contact pattern: foot flat  Right foot contact pattern: foot flat    Comments   Pt has full reliance of UEs for sit to stand.   Pt kicks RLE out and uses UEs to get to standing with forward trunk flexion and then comes to full stand     Functional Assessment        Comments  TUG 50" with RW (IE) --> TUG with RW (Re-Eval) 45.5 seconds --> 34 second average with RW (7/14/2023), 1 min 14 seconds with NBQC (7/14/2023)  2MWT: 108 feet with RW       Flowsheet Rows    Flowsheet Row Most Recent Value   PT/OT G-Codes    Current Score 50   Projected Score 53             Precautions: standard, charcots foot R, wears brace on R, fall, lymphedema     Access Code: [email or web address]  URL: [email or web address]  Date: 07/14/2023  Prepared by: Bright Velázquez    Exercises  - Proper Sit to Stand Technique  - 2 x daily - 1 sets - 5 reps  - Standing March with Counter Support  - 2 x daily - 1 sets - 15 reps  - Standing Hip Abduction with Counter Support  - 2 x daily - 2 sets - 10 reps  - Standing Hip Extension with Counter Support  - 2 x daily - 2 sets - 10 reps  - Standing Knee Flexion with Counter Support  - 2 x daily - 1 sets - 20 reps  - Side Stepping with Counter Support  - 2 x daily - 2 sets - 10 reps    Date: 7/14/2023             Visit: #16            Manual:                                       Neuro Re-Ed             Balance Ground FT 30"  Airex 30"                                                                                           Ther Ex             Warm Up seat 11; arms 14  NuStep level 4 10 min             Sit to stand- focus on knee flexion and use of RLE             Seated knee flexion heel slides              Standing alternating marches /step tap              Long arc quad              Seated hip add             Seated hip abd             Standing HS curl  x20 alternating LEs            Stand:   Alt hip abd  Alt hip ext 2x10 each LE            pball trunk flexion              Seated knee flexion              Lateral walking at mirror 2 laps                                                    Ther Activity             Standing tolerance - standing unsupported for time                           Gait Training Lateral WS --> SLS              Gait training with quad cane              Stair training             Walking laps in gym with RW                          Modalities

## 2023-07-14 ENCOUNTER — EVALUATION (OUTPATIENT)
Age: 68
End: 2023-07-14
Payer: MEDICARE

## 2023-07-14 DIAGNOSIS — M25.60 DECREASED RANGE OF MOTION: ICD-10-CM

## 2023-07-14 DIAGNOSIS — Z47.1 AFTERCARE FOLLOWING RIGHT HIP JOINT REPLACEMENT SURGERY: ICD-10-CM

## 2023-07-14 DIAGNOSIS — R26.9 GAIT ABNORMALITY: ICD-10-CM

## 2023-07-14 DIAGNOSIS — R29.898 WEAKNESS OF RIGHT LOWER EXTREMITY: Primary | ICD-10-CM

## 2023-07-14 DIAGNOSIS — Z96.641 AFTERCARE FOLLOWING RIGHT HIP JOINT REPLACEMENT SURGERY: ICD-10-CM

## 2023-07-14 PROCEDURE — 97110 THERAPEUTIC EXERCISES: CPT

## 2023-07-14 PROCEDURE — 97164 PT RE-EVAL EST PLAN CARE: CPT

## 2023-07-15 NOTE — PROGRESS NOTES
Daily Note     Today's date: 2023  Patient name: Luba Bates  : 1955  MRN: [id number]  Referring provider: Denisse Santo, *  Dx:   Encounter Diagnosis     ICD-10-CM    1. Weakness of right lower extremity  R29.898       2. Decreased range of motion  M25.60       3. Aftercare following right hip joint replacement surgery  Z47.1     Z96.641       4. Gait abnormality  R26.9           Start Time: 1730  Stop Time: 1830  Total time in clinic (min): 60 minutes    Subjective: Pt reports she is feeling good today. No hip pain at the moment. Objective: See treatment diary below      Assessment: Luba Bates tolerated today's treatment session well. Patient education provided on progression of POC and TE.  Henny Freedman completed all TE with good form and no adverse reactions. Pt is demonstrating improved activity tolerance. Henny Freedman continues to benefit from skilled OPPT services to address post op weakness and decreased tolerance to activity. . Will continue to address functional deficits and focus on progression of POC per patient tolerance. Patient performed Nustep to increase blood flow to the area being treated, prepare the muscles for strength training and stretching, improve overall tolerance to activity, and aerobic endurance. Plan: Continue per plan of care. Progress treatment as tolerated. Precautions: standard, charcots foot R, wears brace on R, fall, lymphedema     Access Code: [email or web address]  URL: [email or web address]  Date: 2023  Prepared by: [facility]    Exercises  - Proper Sit to Stand Technique  - 2 x daily - 1 sets - 5 reps  - Standing March with Counter Support  - 2 x daily - 1 sets - 15 reps  - Standing Hip Abduction with Counter Support  - 2 x daily - 2 sets - 10 reps  - Standing Hip Extension with Counter Support  - 2 x daily - 2 sets - 10 reps  - Standing Knee Flexion with Counter Support  - 2 x daily - 1 sets - 20 reps  - Side Stepping with Counter Support  - 2 x daily - 2 sets - 10 reps    Date: 7/14/2023 7/17/2023           Visit: #16            Manual:                                       Neuro Re-Ed             Balance Ground FT 30"  Airex 30"                                                                                           Ther Ex             Warm Up seat 11; arms 14  NuStep level 4 10 min  NuStep level 4 10 min            Sit to stand- focus on knee flexion and use of RLE             Seated knee flexion heel slides              Standing alternating marches /step tap              Long arc quad              Seated hip add  10x10"           Seated hip abd             Standing HS curl  x20 alternating LEs x10 each LE           Stand:   Alt hip abd  Alt hip ext 2x10 each LE x10 each LE           pball trunk flexion              Seated knee flexion   10x10"           Lateral walking at mirror 2 laps  3 laps                                                   Ther Activity             Standing tolerance - standing unsupported for time                           Gait Training             Lateral WS --> SLS              Gait training with quad cane    **          Stair training             Walking laps in gym with RW  Walking with RW focus on continuous gait 2 laps around gym                        Modalities

## 2023-07-17 ENCOUNTER — OFFICE VISIT (OUTPATIENT)
Age: 68
End: 2023-07-17
Payer: MEDICARE

## 2023-07-17 DIAGNOSIS — R29.898 WEAKNESS OF RIGHT LOWER EXTREMITY: Primary | ICD-10-CM

## 2023-07-17 DIAGNOSIS — Z47.1 AFTERCARE FOLLOWING RIGHT HIP JOINT REPLACEMENT SURGERY: ICD-10-CM

## 2023-07-17 DIAGNOSIS — Z96.641 AFTERCARE FOLLOWING RIGHT HIP JOINT REPLACEMENT SURGERY: ICD-10-CM

## 2023-07-17 DIAGNOSIS — M25.60 DECREASED RANGE OF MOTION: ICD-10-CM

## 2023-07-17 DIAGNOSIS — R26.9 GAIT ABNORMALITY: ICD-10-CM

## 2023-07-17 PROCEDURE — 97110 THERAPEUTIC EXERCISES: CPT

## 2023-07-17 PROCEDURE — 97116 GAIT TRAINING THERAPY: CPT

## 2023-07-18 NOTE — PROGRESS NOTES
Daily Note     Today's date: 2023  Patient name: Chinyere Scott  : 1955  MRN: [id number]  Referring provider: Gia Cloud, *  Dx:   Encounter Diagnosis     ICD-10-CM    1. Weakness of right lower extremity  R29.898       2. Decreased range of motion  M25.60       3. Aftercare following right hip joint replacement surgery  Z47.1     Z96.641       4. Gait abnormality  R26.9           Start Time: 1120  Stop Time: 1200  Total time in clinic (min): 40 minutes    Subjective: Pt reports she saw foot specialist this AM to discuss surgical options for foot to see if this would help her mobility in the long-term. Objective: See treatment diary below      Assessment: Chinyere Scott tolerated today's treatment session well. Patient education provided on continuation of POC and TE.  Giselle Mix completed all TE with good form and no adverse reactions. Pt is improving her tolerance to activity and functional mobility. Giselle Mix continues to benefit from skilled OPPT services to address post op ROM and strength deficits. Will continue to address functional deficits and focus on progression of POC per patient tolerance. Patient performed Nustep to increase blood flow to the area being treated, prepare the muscles for strength training and stretching, improve overall tolerance to activity, and aerobic endurance. Plan: Continue per plan of care. Progress treatment as tolerated. Precautions: standard, charcots foot R, wears brace on R, fall, lymphedema     Access Code: [email or web address]  URL: [email or web address]  Date: 2023  Prepared by: Deon Carpio    Exercises  - Proper Sit to Stand Technique  - 2 x daily - 1 sets - 5 reps  - Standing March with Counter Support  - 2 x daily - 1 sets - 15 reps  - Standing Hip Abduction with Counter Support  - 2 x daily - 2 sets - 10 reps  - Standing Hip Extension with Counter Support  - 2 x daily - 2 sets - 10 reps  - Standing Knee Flexion with Counter Support  - 2 x daily - 1 sets - 20 reps  - Side Stepping with Counter Support  - 2 x daily - 2 sets - 10 reps    Date: 7/14/2023 7/17/2023 7/21/2023           Visit: #16 #17 #18          Manual:                                       Neuro Re-Ed             Balance Ground FT 30"  Airex 30"                                                                                           Ther Ex             Warm Up seat 11; arms 14  NuStep level 4 10 min  NuStep level 4 10 min  NuStep level 4 5 min           Sit to stand- focus on knee flexion and use of RLE             Seated knee flexion heel slides              Standing alternating marches /step tap              Long arc quad              Seated hip add  10x10"           Seated hip abd             Standing HS curl  x20 alternating LEs x10 each LE x10 each LE          Stand:   Alt hip abd  Alt hip ext 2x10 each LE x10 each LE x10 each LE          pball trunk flexion              Seated knee flexion   10x10"           Lateral walking at mirror 2 laps  3 laps  3 laps                                                  Ther Activity             Standing tolerance - standing unsupported for time                           Gait Training             Lateral WS --> SLS              Gait training with quad cane    68' with NBQC 5 min and other shoulder distances t/o session           Stair training   6" step 2 HR x5 (down backwards)          Walking laps in gym with RW  Walking with RW focus on continuous gait 2 laps around gym                        Modalities

## 2023-07-20 ENCOUNTER — APPOINTMENT (OUTPATIENT)
Age: 68
End: 2023-07-20
Payer: MEDICARE

## 2023-07-20 DIAGNOSIS — M31.31 WEGENER'S GRANULOMATOSIS WITH RENAL INVOLVEMENT (HCC): ICD-10-CM

## 2023-07-20 DIAGNOSIS — M86.9 HIP OSTEOMYELITIS, RIGHT (HCC): ICD-10-CM

## 2023-07-20 DIAGNOSIS — N18.31 STAGE 3A CHRONIC KIDNEY DISEASE (HCC): ICD-10-CM

## 2023-07-20 DIAGNOSIS — M31.31 GRANULOMATOSIS WITH POLYANGIITIS WITH RENAL INVOLVEMENT (HCC): ICD-10-CM

## 2023-07-20 DIAGNOSIS — I10 BENIGN ESSENTIAL HYPERTENSION: ICD-10-CM

## 2023-07-20 DIAGNOSIS — D80.1 HYPOGAMMAGLOBULINEMIA, ACQUIRED (HCC): ICD-10-CM

## 2023-07-20 DIAGNOSIS — N18.30 STAGE 3 CHRONIC KIDNEY DISEASE, UNSPECIFIED WHETHER STAGE 3A OR 3B CKD (HCC): ICD-10-CM

## 2023-07-20 DIAGNOSIS — I50.32 CHRONIC DIASTOLIC HEART FAILURE (HCC): Chronic | ICD-10-CM

## 2023-07-20 LAB
ALBUMIN SERPL BCP-MCNC: 3.6 G/DL (ref 3.5–5)
ALP SERPL-CCNC: 121 U/L (ref 46–116)
ALT SERPL W P-5'-P-CCNC: 16 U/L (ref 12–78)
ANION GAP SERPL CALCULATED.3IONS-SCNC: 2 MMOL/L
AST SERPL W P-5'-P-CCNC: 18 U/L (ref 5–45)
BASOPHILS # BLD AUTO: 0.02 THOUSANDS/ÂΜL (ref 0–0.1)
BASOPHILS NFR BLD AUTO: 0 % (ref 0–1)
BILIRUB SERPL-MCNC: 0.48 MG/DL (ref 0.2–1)
BUN SERPL-MCNC: 21 MG/DL (ref 5–25)
CALCIUM SERPL-MCNC: 9.2 MG/DL (ref 8.3–10.1)
CHLORIDE SERPL-SCNC: 106 MMOL/L (ref 96–108)
CO2 SERPL-SCNC: 29 MMOL/L (ref 21–32)
CREAT SERPL-MCNC: 1.2 MG/DL (ref 0.6–1.3)
EOSINOPHIL # BLD AUTO: 0.2 THOUSAND/ÂΜL (ref 0–0.61)
EOSINOPHIL NFR BLD AUTO: 4 % (ref 0–6)
ERYTHROCYTE [DISTWIDTH] IN BLOOD BY AUTOMATED COUNT: 16.6 % (ref 11.6–15.1)
GFR SERPL CREATININE-BSD FRML MDRD: 45 ML/MIN/1.73SQ M
GLUCOSE P FAST SERPL-MCNC: 90 MG/DL (ref 65–99)
HCT VFR BLD AUTO: 35.9 % (ref 34.8–46.1)
HGB BLD-MCNC: 10.7 G/DL (ref 11.5–15.4)
IMM GRANULOCYTES # BLD AUTO: 0.01 THOUSAND/UL (ref 0–0.2)
IMM GRANULOCYTES NFR BLD AUTO: 0 % (ref 0–2)
LYMPHOCYTES # BLD AUTO: 1.88 THOUSANDS/ÂΜL (ref 0.6–4.47)
LYMPHOCYTES NFR BLD AUTO: 35 % (ref 14–44)
MCH RBC QN AUTO: 26.8 PG (ref 26.8–34.3)
MCHC RBC AUTO-ENTMCNC: 29.8 G/DL (ref 31.4–37.4)
MCV RBC AUTO: 90 FL (ref 82–98)
MONOCYTES # BLD AUTO: 0.5 THOUSAND/ÂΜL (ref 0.17–1.22)
MONOCYTES NFR BLD AUTO: 9 % (ref 4–12)
NEUTROPHILS # BLD AUTO: 2.8 THOUSANDS/ÂΜL (ref 1.85–7.62)
NEUTS SEG NFR BLD AUTO: 52 % (ref 43–75)
NRBC BLD AUTO-RTO: 0 /100 WBCS
PLATELET # BLD AUTO: 249 THOUSANDS/UL (ref 149–390)
PMV BLD AUTO: 9.9 FL (ref 8.9–12.7)
POTASSIUM SERPL-SCNC: 4.7 MMOL/L (ref 3.5–5.3)
PROT SERPL-MCNC: 7.6 G/DL (ref 6.4–8.4)
RBC # BLD AUTO: 4 MILLION/UL (ref 3.81–5.12)
SODIUM SERPL-SCNC: 137 MMOL/L (ref 135–147)
WBC # BLD AUTO: 5.41 THOUSAND/UL (ref 4.31–10.16)

## 2023-07-20 PROCEDURE — 80053 COMPREHEN METABOLIC PANEL: CPT

## 2023-07-20 PROCEDURE — 85025 COMPLETE CBC W/AUTO DIFF WBC: CPT

## 2023-07-21 ENCOUNTER — OFFICE VISIT (OUTPATIENT)
Dept: OBGYN CLINIC | Facility: CLINIC | Age: 68
End: 2023-07-21
Payer: MEDICARE

## 2023-07-21 ENCOUNTER — OFFICE VISIT (OUTPATIENT)
Age: 68
End: 2023-07-21
Payer: MEDICARE

## 2023-07-21 VITALS
HEIGHT: 63 IN | BODY MASS INDEX: 36.14 KG/M2 | SYSTOLIC BLOOD PRESSURE: 120 MMHG | WEIGHT: 204 LBS | DIASTOLIC BLOOD PRESSURE: 72 MMHG

## 2023-07-21 DIAGNOSIS — R29.898 WEAKNESS OF RIGHT LOWER EXTREMITY: Primary | ICD-10-CM

## 2023-07-21 DIAGNOSIS — Z47.1 AFTERCARE FOLLOWING RIGHT HIP JOINT REPLACEMENT SURGERY: ICD-10-CM

## 2023-07-21 DIAGNOSIS — R26.9 GAIT ABNORMALITY: ICD-10-CM

## 2023-07-21 DIAGNOSIS — M25.60 DECREASED RANGE OF MOTION: ICD-10-CM

## 2023-07-21 DIAGNOSIS — Z96.641 AFTERCARE FOLLOWING RIGHT HIP JOINT REPLACEMENT SURGERY: ICD-10-CM

## 2023-07-21 DIAGNOSIS — M19.171 POST-TRAUMATIC ARTHRITIS OF RIGHT ANKLE: Primary | ICD-10-CM

## 2023-07-21 DIAGNOSIS — M14.671 CHARCOT'S JOINT OF RIGHT FOOT: ICD-10-CM

## 2023-07-21 DIAGNOSIS — Q66.6 CONGENITAL HINDFOOT VALGUS: ICD-10-CM

## 2023-07-21 DIAGNOSIS — M19.071 ARTHRITIS OF RIGHT SUBTALAR JOINT: ICD-10-CM

## 2023-07-21 PROCEDURE — 97110 THERAPEUTIC EXERCISES: CPT

## 2023-07-21 PROCEDURE — 99213 OFFICE O/P EST LOW 20 MIN: CPT | Performed by: ORTHOPAEDIC SURGERY

## 2023-07-21 PROCEDURE — 97116 GAIT TRAINING THERAPY: CPT

## 2023-07-21 NOTE — PROGRESS NOTES
Betty Lee M.D. Attending, Orthopaedic Surgery  Foot and [facility]      ORTHOPAEDIC FOOT AND ANKLE CLINIC VISIT     Assessment:     Encounter Diagnoses   Name Primary? • Post-traumatic arthritis of right ankle Yes   • Congenital hindfoot valgus    • Arthritis of right subtalar joint    • Charcot's joint of right foot             Plan:   · The patient verbalized understanding of exam findings and treatment plan. We engaged in the shared decision-making process and treatment options were discussed at length with the patient. Surgical and conservative management discussed today along with risks and benefits. · Patient has right  post traumatic subtalar and ankle arthritis, hindfoot valgus and charcot changes  · Continue [place] bracing   Activity modification, OTC pain meds, ice, and elevation for pain control   Compression socks for swelling control  · She is a surgical candidate for MCO, TTC arthrodesis but this would be a significant surgery with a lengthy recovery for her. If the brace is helping, the hope wound be to avoid any surgery. Return if symptoms worsen or fail to improve. History of Present Illness:   Chief Complaint:   Chief Complaint   Patient presents with   • Right Foot - Follow-up     Genet Joshi is a 77 y.o. female who is being seen in follow-up for Right ankle. When we last saw she we recommended [place] brace. Pain has  improved. Residual pain is localized at diffuse ankle with minimal radiating and described as sharp and severe.       Pain/symptom timing:  Worse during the day when active  Pain/symptom context:  Worse with activites and work  Pain/symptom modifying factors:  Rest makes better, activities make worse  Pain/symptom associated signs/symptoms: none    Prior treatment   · NSAIDsYes   · Injections No   · Bracing/Orthotics Yes    · Physical Therapy Yes     Orthopedic Surgical History:   See below    Past Medical, Surgical and Social History:  Past Medical History:  has a past medical history of Arthritis, BRCA1 positive, Cancer ([address]), History of chemotherapy, History of pulmonary embolus (PE) & DVT (2007), History of transfusion, Lymphedema, MRSA (methicillin resistant Staphylococcus aureus) (2017), Ovarian cancer ([address]), and Wegener's granulomatosis with renal involvement ([address]). Problem List: does not have any pertinent problems on file. Past Surgical History:  has a past surgical history that includes Appendectomy (); Other surgical history; pr tx tibl shft fx imed implt w/wo screws&/cercla (Right, 2017); US guided breast biopsy right complete (Right, 2016); Bilateral salpingoophorectomy (2007);  section (); US guided breast biopsy left complete (Left, 07/15/2020); US guidance breast biopsy left each additional (Left, 07/15/2020); Hysterectomy (2007); Breast biopsy (Right, 2016); Breast biopsy (Left, 07/15/2020); Breast biopsy (Left, 2021); US guided breast biopsy right complete (Right, 2021); Mammo stereotactic breast biopsy right (all inc) (Right, 2021); Oophorectomy (Bilateral, 2007); Foot surgery (Right, ); IR aspiration joint (specify location) (2022); Leg Surgery (Right); Varicose vein surgery (Right); IVC FILTER INSERTION; and pr arthrp acetblr/prox fem prostc agrft/algrft (Right, 3/6/2023). Family History: family history includes Breast cancer in her mother; Breast cancer (age of onset: 39) in her maternal aunt; Lung cancer in her half-sister; No Known Problems in her father, maternal grandfather, paternal aunt, paternal grandfather, and paternal grandmother; Ovarian cancer in her maternal grandmother; Pancreatic cancer in her mother. Social History:  reports that she has never smoked. She has never used smokeless tobacco. She reports that she does not drink alcohol and does not use drugs.   Current Medications: has a current medication list which includes the following prescription(s): acetaminophen, acetaminophen, ascorbic acid, aspirin, calcium carbonate-vit d-min, cefadroxil, cholecalciferol, duloxetine, folic acid, lorazepam, metoprolol succinate, metoprolol tartrate, multiple vitamin, omeprazole, ondansetron, ondansetron, oxycodone, oxycodone hcl er, probiotic product, senna-docusate sodium, and toviaz. Allergies: is allergic to iodinated contrast media, omnipaque [iohexol], other, iodides, methotrexate, and methotrexate derivatives. Review of Systems:  General- denies fever/chills  HEENT- denies hearing loss or sore throat  Eyes- denies eye pain or visual disturbances, denies red eyes  Respiratory- denies cough or SOB  Cardio- denies chest pain or palpitations  GI- denies abdominal pain  Endocrine- denies urinary frequency  Urinary- denies pain with urination  Musculoskeletal- Negative except noted above  Skin- denies rashes or wounds  Neurological- denies dizziness or headache  Psychiatric- denies anxiety or difficulty concentrating    Physical Exam:   /72   Ht 5' 3" (1.6 m)   Wt 92.5 kg (204 lb)   BMI 36.14 kg/m²   General/Constitutional: No apparent distress: well-nourished and well developed. Eyes: normal ocular motion  Lymphatic: No appreciable lymphadenopathy  Respiratory: Non-labored breathing  Vascular: No edema, swelling or tenderness, except as noted in detailed exam.  Integumentary: No impressive skin lesions present, except as noted in detailed exam.  Neuro: No ataxia or tremors noted  Psych: Normal mood and affect, oriented to person, place and time. Appropriate affect. Musculoskeletal: Normal, except as noted in detailed exam and in HPI.     Examination    Right    Gait Normal - walked in with rolling walker   Musculoskeletal Tender to palpation at lateral and medial ankle    Skin Normal.    Nails Normal    Range of Motion  Not assessed    Stability Stable    Muscle Strength Not assessed    Neurologic Normal    Sensation Intact to light touch throughout sural, saphenous, superficial peroneal, deep peroneal and medial/lateral plantar nerve distributions. [place] 5.07 filament (10g) testing  deferred. Cardiovascular Brisk capillary refill < 2 seconds,intact DP and PT pulses    Special Tests None      Imaging Studies:   No new imaging    Scribe Attestation    I,:  Tree Campbell PA-C am acting as a scribe while in the presence of the attending physician.:       I,:  Justin Cifuentes MD personally performed the services described in this documentation    as scribed in my presence.:             Madison Chase. Lachman, MD  Foot & Ankle Surgery   Department of [address] [place] [address]      I personally performed the service. Madison Chase.  Lachman, MD

## 2023-07-24 ENCOUNTER — OFFICE VISIT (OUTPATIENT)
Age: 68
End: 2023-07-24
Payer: MEDICARE

## 2023-07-24 DIAGNOSIS — Z96.641 AFTERCARE FOLLOWING RIGHT HIP JOINT REPLACEMENT SURGERY: ICD-10-CM

## 2023-07-24 DIAGNOSIS — R26.9 GAIT ABNORMALITY: ICD-10-CM

## 2023-07-24 DIAGNOSIS — M25.60 DECREASED RANGE OF MOTION: ICD-10-CM

## 2023-07-24 DIAGNOSIS — Z47.1 AFTERCARE FOLLOWING RIGHT HIP JOINT REPLACEMENT SURGERY: ICD-10-CM

## 2023-07-24 DIAGNOSIS — R29.898 WEAKNESS OF RIGHT LOWER EXTREMITY: Primary | ICD-10-CM

## 2023-07-24 PROCEDURE — 97110 THERAPEUTIC EXERCISES: CPT

## 2023-07-24 PROCEDURE — 97116 GAIT TRAINING THERAPY: CPT

## 2023-07-24 NOTE — PROGRESS NOTES
Daily Note     Today's date: 2023  Patient name: Chinyere Scott  : 1955  MRN: [id number]  Referring provider: Gia Cloud, *  Dx:   Encounter Diagnosis     ICD-10-CM    1. Weakness of right lower extremity  R29.898       2. Decreased range of motion  M25.60       3. Aftercare following right hip joint replacement surgery  Z47.1     Z96.641       4. Gait abnormality  R26.9           Start Time: 1700  Stop Time: 1800  Total time in clinic (min): 60 minutes    Subjective: Pt had friend bring her to therapy and she forgot her cane at home. Objective: See treatment diary below      Assessment: Chinyere Scott tolerated today's treatment session well. Patient education provided on progression of POC and TE.  Giselle Mix completed all TE with good form and no adverse reactions. Pt with improved core strength during STS activity at edge of high low table. Giselle Mix continues to benefit from skilled OPPT services to address post op hip deficits. Will continue to address functional deficits and focus on progression of POC per patient tolerance. Patient performed Nustep to increase blood flow to the area being treated, prepare the muscles for strength training and stretching, improve overall tolerance to activity, and aerobic endurance. Plan: Continue per plan of care. Progress treatment as tolerated. Precautions: standard, charcots foot R, wears brace on R, fall, lymphedema     Access Code: [email or web address]  URL: [email or web address]  Date: 2023  Prepared by: Deon Carpio    Exercises  - Proper Sit to Stand Technique  - 2 x daily - 1 sets - 5 reps  - Standing March with Counter Support  - 2 x daily - 1 sets - 15 reps  - Standing Hip Abduction with Counter Support  - 2 x daily - 2 sets - 10 reps  - Standing Hip Extension with Counter Support  - 2 x daily - 2 sets - 10 reps  - Standing Knee Flexion with Counter Support  - 2 x daily - 1 sets - 20 reps  - Side Stepping with Counter Support  - 2 x daily - 2 sets - 10 reps    Date: 7/14/2023 7/17/2023 7/21/2023 7/24/2023          Visit: #16 #17 #18 #19         Manual:                                       Neuro Re-Ed             Balance Ground FT 30"  Airex 30"                                                                                           Ther Ex             Warm Up seat 11; arms 14  NuStep level 4 10 min  NuStep level 4 10 min  NuStep level 4 5 min  NuStep level 4 12 min          Sit to stand- focus on knee flexion and use of RLE    At high low table x10         Seated knee flexion heel slides              Standing alternating marches /step tap              Long arc quad              Seated hip add  10x10"           Seated hip abd             Standing HS curl  x20 alternating LEs x10 each LE x10 each LE x10 each LE         Stand:   Alt hip abd  Alt hip ext 2x10 each LE x10 each LE x10 each LE x10 each LE         pball trunk flexion              Seated knee flexion   10x10"           Lateral walking at mirror 2 laps  3 laps  3 laps  3 laps                                                 Ther Activity             Standing tolerance - standing unsupported for time                           Gait Training             Lateral WS --> SLS              Gait training with quad cane    68' with NBQC 5 min and other shoulder distances t/o session           Stair training   6" step 2 HR x5 (down backwards) 6" step 2 HR x10 (down backwards)         Walking laps in gym with RW  Walking with RW focus on continuous gait 2 laps around gym  Walking laps in gym with focus on continuous gait 3.5 laps in 4 min 45 seconds                       Modalities

## 2023-07-27 ENCOUNTER — OFFICE VISIT (OUTPATIENT)
Age: 68
End: 2023-07-27
Payer: MEDICARE

## 2023-07-27 ENCOUNTER — OFFICE VISIT (OUTPATIENT)
Dept: INFECTIOUS DISEASES | Facility: CLINIC | Age: 68
End: 2023-07-27
Payer: MEDICARE

## 2023-07-27 VITALS
OXYGEN SATURATION: 95 % | HEART RATE: 67 BPM | TEMPERATURE: 97.3 F | SYSTOLIC BLOOD PRESSURE: 110 MMHG | HEIGHT: 63 IN | BODY MASS INDEX: 36.14 KG/M2 | DIASTOLIC BLOOD PRESSURE: 65 MMHG | RESPIRATION RATE: 18 BRPM | WEIGHT: 204 LBS

## 2023-07-27 DIAGNOSIS — Z96.641 AFTERCARE FOLLOWING RIGHT HIP JOINT REPLACEMENT SURGERY: ICD-10-CM

## 2023-07-27 DIAGNOSIS — R26.9 GAIT ABNORMALITY: ICD-10-CM

## 2023-07-27 DIAGNOSIS — Z47.1 AFTERCARE FOLLOWING RIGHT HIP JOINT REPLACEMENT SURGERY: ICD-10-CM

## 2023-07-27 DIAGNOSIS — M86.9 HIP OSTEOMYELITIS, RIGHT (HCC): Primary | ICD-10-CM

## 2023-07-27 DIAGNOSIS — R29.898 WEAKNESS OF RIGHT LOWER EXTREMITY: Primary | ICD-10-CM

## 2023-07-27 DIAGNOSIS — M25.60 DECREASED RANGE OF MOTION: ICD-10-CM

## 2023-07-27 PROCEDURE — 97110 THERAPEUTIC EXERCISES: CPT

## 2023-07-27 PROCEDURE — 99214 OFFICE O/P EST MOD 30 MIN: CPT | Performed by: INTERNAL MEDICINE

## 2023-07-27 PROCEDURE — 97116 GAIT TRAINING THERAPY: CPT

## 2023-07-27 RX ORDER — CEFADROXIL 500 MG/1
500 CAPSULE ORAL EVERY 12 HOURS SCHEDULED
COMMUNITY
End: 2023-07-27 | Stop reason: SDUPTHER

## 2023-07-27 RX ORDER — CEFADROXIL 500 MG/1
500 CAPSULE ORAL EVERY 12 HOURS SCHEDULED
Qty: 60 CAPSULE | Refills: 3 | Status: SHIPPED | OUTPATIENT
Start: 2023-07-27 | End: 2023-08-26

## 2023-07-27 NOTE — PROGRESS NOTES
Daily Note     Today's date: 2023  Patient name: Luba Bates  : 1955  MRN: [id number]  Referring provider: Denisse Santo, *  Dx:   Encounter Diagnosis     ICD-10-CM    1. Weakness of right lower extremity  R29.898       2. Gait abnormality  R26.9       3. Decreased range of motion  M25.60       4. Aftercare following right hip joint replacement surgery  Z47.1     Z96.641           Start Time: 1630  Stop Time: 1715  Total time in clinic (min): 45 minutes    Subjective: Pt denies any pain today but notes she is a little stiff. Pt reports compliance with her HEP and denies any questions or concerns at this time. Objective: See treatment diary below      Assessment: Pt performed Nustep aerobic exercises to increase blood flow to the area being treated, prepare the muscles for strength training and stretching, improve overall tolerance to activity, and aerobic endurance. Focused session around LE strengthening and overall endurance through continuous activities. Pt required moderate verbal cueing for biomechanical alignment for sit to stands especially in terms of foot placement. Pt fatigued quickly throughout the session and gait was hesitant with a decreased flor and short step length when ambulating with quad cane> RW. Will continue to progress pt as appropriate based on overall strength, endurance and symptom irritability. Patient would benefit from continued PT in order to continue progressing towards goals as outlined and return to PLOF. Plan: Continue per plan of care. Precautions: standard, charcots foot R, wears brace on R, fall, lymphedema     Access Code: [email or web address]  URL: [email or web address]  Date: 2023  Prepared by: Ephraim Olvera    Exercises  - Proper Sit to Stand Technique  - 2 x daily - 1 sets - 5 reps  - Standing March with Counter Support  - 2 x daily - 1 sets - 15 reps  - Standing Hip Abduction with Counter Support  - 2 x daily - 2 sets - 10 reps  - Standing Hip Extension with Counter Support  - 2 x daily - 2 sets - 10 reps  - Standing Knee Flexion with Counter Support  - 2 x daily - 1 sets - 20 reps  - Side Stepping with Counter Support  - 2 x daily - 2 sets - 10 reps    Date: 7/14/2023 7/17/2023 7/21/2023 7/24/2023 7/27/23        Visit: #16 #17 #18 #19 #20        Manual:                                       Neuro Re-Ed             Balance Ground FT 30"  Airex 30"                                                                                           Ther Ex             Warm Up seat 11; arms 14  NuStep level 4 10 min  NuStep level 4 10 min  NuStep level 4 5 min  NuStep level 4 12 min  NuStep level 4 12 min         Sit to stand- focus on knee flexion and use of RLE    At high low table x10 At high low table x10        Seated knee flexion heel slides              Standing alternating marches /step tap              Long arc quad              Seated hip add  10x10"           Seated hip abd             Standing HS curl  x20 alternating LEs x10 each LE x10 each LE x10 each LE x10 each LE        Stand:   Alt hip abd  Alt hip ext 2x10 each LE x10 each LE x10 each LE x10 each LE x10 each LE        pball trunk flexion              Seated knee flexion   10x10"           Lateral walking at mirror 2 laps  3 laps  3 laps  3 laps  3 laps                                                Ther Activity             Standing tolerance - standing unsupported for time                           Gait Training             Lateral WS --> SLS              Gait training with quad cane    68' with NBQC 5 min and other shoulder distances t/o session           Stair training   6" step 2 HR x5 (down backwards) 6" step 2 HR x10 (down backwards)         Walking laps in gym with RW  Walking with RW focus on continuous gait 2 laps around gym  Walking laps in gym with focus on continuous gait 3.5 laps in 4 min 45 seconds  Walking laps in gym with focus on continuous gait 3.5 laps in 5' min 3" with quad cane (1/2 lap around the clinic)                     Modalities
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
## 2023-07-27 NOTE — PATIENT INSTRUCTIONS
Continue taking your antibiotic as you have been. We will plan to repeat labs about one week prior to your next visit. Call us sooner with questions.

## 2023-07-28 NOTE — PROGRESS NOTES
Daily Note     Today's date: 2023  Patient name: Christian Márquez  : 1955  MRN: [id number]  Referring provider: Camilo Baldwin, *  Dx:   Encounter Diagnosis     ICD-10-CM    1. Weakness of right lower extremity  R29.898       2. Decreased range of motion  M25.60       3. Gait abnormality  R26.9       4. Aftercare following right hip joint replacement surgery  Z47.1     Z96.641                      Subjective: Pt reports she is doing well. Nothing new. Objective: See treatment diary below      Assessment: Christian Márquez tolerated today's treatment session well. Patient education provided on progression of strength training and gait training. Claudean Presto completed all TE with good form and no adverse reactions. Pt with improved tolerance to activity. Able to walk longer distance with cane and complete more reps of standing TE. Claudean Presto continues to benefit from skilled OPPT services to address post op hip ROM and strength deficits. Will continue to address functional deficits and focus on progression of POC per patient tolerance. Patient performed Nustep to increase blood flow to the area being treated, prepare the muscles for strength training and stretching, improve overall tolerance to activity, and aerobic endurance. Plan: Continue per plan of care. Progress treatment as tolerated. Precautions: standard, charcots foot R, wears brace on R, fall, lymphedema     Access Code: [email or web address]  URL: [email or web address]  Date: 2023  Prepared by: Sapphire Cook    Exercises  - Proper Sit to Stand Technique  - 2 x daily - 1 sets - 5 reps  - Standing March with Counter Support  - 2 x daily - 1 sets - 15 reps  - Standing Hip Abduction with Counter Support  - 2 x daily - 2 sets - 10 reps  - Standing Hip Extension with Counter Support  - 2 x daily - 2 sets - 10 reps  - Standing Knee Flexion with Counter Support  - 2 x daily - 1 sets - 20 reps  - Side Stepping with Counter Support  - 2 x daily - 2 sets - 10 reps    Date: 7/14/2023 7/17/2023 7/21/2023 7/24/2023 7/27/23 7/31/2023       Visit: #16 #17 #18 #19 #20 #21       Manual:                                       Neuro Re-Ed             Balance Ground FT 30"  Airex 30"                                                                                           Ther Ex             Warm Up seat 11; arms 14  NuStep level 4 10 min  NuStep level 4 10 min  NuStep level 4 5 min  NuStep level 4 12 min  NuStep level 4 12 min  NuStep level 4 10 min (seat 10, arms 14)       Sit to stand- focus on knee flexion and use of RLE    At high low table x10 At high low table x10 From chair and airex with hands on walker x10        Seated knee flexion heel slides              Standing alternating marches /step tap              Long arc quad              Seated hip add  10x10"           Seated hip abd             Standing HS curl  x20 alternating LEs x10 each LE x10 each LE x10 each LE x10 each LE x15 each LE        Stand:   Alt hip abd  Alt hip ext 2x10 each LE x10 each LE x10 each LE x10 each LE x10 each LE x15 each LE       pball trunk flexion              Seated knee flexion   10x10"           Lateral walking at mirror 2 laps  3 laps  3 laps  3 laps  3 laps  3 laps                                               Ther Activity             Standing tolerance - standing unsupported for time                           Gait Training             Lateral WS --> SLS              Gait training with quad cane    68' with NBQC 5 min and other shoulder distances t/o session    167' with NBQC close S in 11:53       Stair training   6" step 2 HR x5 (down backwards) 6" step 2 HR x10 (down backwards)         Walking laps in gym with RW  Walking with RW focus on continuous gait 2 laps around gym  Walking laps in gym with focus on continuous gait 3.5 laps in 4 min 45 seconds  Walking laps in gym with focus on continuous gait 3.5 laps in 5' min 3" with quad cane (1/2 lap around the clinic)                     Modalities

## 2023-07-31 ENCOUNTER — OFFICE VISIT (OUTPATIENT)
Age: 68
End: 2023-07-31
Payer: MEDICARE

## 2023-07-31 DIAGNOSIS — Z47.1 AFTERCARE FOLLOWING RIGHT HIP JOINT REPLACEMENT SURGERY: ICD-10-CM

## 2023-07-31 DIAGNOSIS — R29.898 WEAKNESS OF RIGHT LOWER EXTREMITY: Primary | ICD-10-CM

## 2023-07-31 DIAGNOSIS — R26.9 GAIT ABNORMALITY: ICD-10-CM

## 2023-07-31 DIAGNOSIS — Z96.641 AFTERCARE FOLLOWING RIGHT HIP JOINT REPLACEMENT SURGERY: ICD-10-CM

## 2023-07-31 DIAGNOSIS — M25.60 DECREASED RANGE OF MOTION: ICD-10-CM

## 2023-07-31 PROCEDURE — 97110 THERAPEUTIC EXERCISES: CPT

## 2023-07-31 PROCEDURE — 97116 GAIT TRAINING THERAPY: CPT

## 2023-08-02 NOTE — PROGRESS NOTES
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 without late peaking gradient.     Findings    Left Ventricle Left ventricular cavity size is normal. Wall thickness is severely increased. 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 The left ventricular ejection fraction is 68%. Systolic function is vigorous. Global longitudinal strain is normal at -18%. Wall motion is normal. Diastolic function is mildly abnormal, consistent with grade I (abnormal) relaxation. There is no LV dynamic obstruction. Right Ventricle Right ventricular cavity size is normal. Systolic function is normal. Wall thickness is normal.      Left Atrium The atrium is mildly dilated. Right Atrium The atrium is normal in size. Atrial Septum No patent foramen ovale detected using color flow Doppler at rest.      Left Atrial Appendage Left atrial appendage is normal in size. There is normal function. There is no thrombus. Aortic Valve The aortic valve is trileaflet. The leaflets are not thickened. The leaflets are not calcified. The leaflets exhibit normal mobility. There is no evidence of regurgitation. The aortic valve has no significant stenosis. Mitral Valve The leaflets are not thickened. The leaflets are not calcified. The leaflets exhibit normal mobility. There is trace regurgitation. There is no evidence of stenosis. There is systolic anterior motion of the anterior leaflet and chordal apparatus without late peaking gradient. Tricuspid Valve Tricuspid valve structure is normal. There is trace regurgitation. There is no evidence of stenosis. The right ventricular systolic pressure is normal.      Pulmonic Valve Pulmonic valve structure is normal. There is no evidence of regurgitation. There is no evidence of stenosis. Ascending Aorta The aortic root is normal in size. There is no evidence of aortic dissection. There is no aneurysm in the aorta.         Left Ventricle Measurements    Function/Volumes   A4C EF 57 %         Dimensions   LVIDd 3.9 cm         LVIDS 2.6 cm         IVSd 1.3 cm         LVPWd 1.3 cm         FS 33 %         Diastolic Filling   MV E' Tissue Velocity Septal 8 cm/s         MV E' Tissue Velocity Lateral 10 cm/s         E wave deceleration time 243 ms         MV Peak E Franklyn 76 cm/s         MV Peak A Franklyn 0.82 m/s         Strain   GLS -18 %               Right Ventricle Measurements    Dimensions   RVID d 3.8 cm         Tricuspid annular plane systolic excursion 2 cm               Left Atrium Measurements    Dimensions   LA size 4.1 cm         LA length (A2C) 6 cm               Right Atrium Measurements    Dimensions   RAA A4C 16.6 cm2               Mitral Valve Measurements    Stenosis   MV stenosis pressure 1/2 time 71 ms         MV valve area p 1/2 method 3.1 cm2               Tricuspid Valve Measurements    RVSP Parameters   TR Peak Franklyn 2.4 m/s         Triscuspid Valve Regurgitation Peak Gradient 23 mmHg               Aorta Measurements    Aortic Dimensions   Ao root 3.2 cm         Asc Ao 3.1 cm               Exam Details    Performed Procedure Technologist Supporting Staff Performing Physician   Echo complete w/ strain Orion Madrigal           Appointment Date/Status Modality Department    2/20/2023     Completed BE ECHO RM 2 BE CAR NON INV           Begin Exam End Exam  End Exam Questionnaires   2/20/2023  8:59 AM 2/20/2023 10:02 AM  PATIENT EDUCATION            All Reviewers List    Early Alex, DO on 2/21/2023 10:03 AM   Narcisa Geronimo DO on 2/21/2023  8:20 AM   Pretty Trejo MD on 2/21/2023  6:55 AM     Signed    Electronically signed by Pretty Trejo MD on 2/20/23 at 1602 EST         Counseling / Coordination of Care  Total floor / unit time spent today 45 minutes. Greater than 50% of total time was spent with the patient and / or family counseling and / or coordination of care. A description of the counseling / coordination of care: Andrés Alvarado

## 2023-08-02 NOTE — PROGRESS NOTES
Daily Note     Today's date: 2023  Patient name: Danial Red  : 1955  MRN: [id number]  Referring provider: Rossy Yee, *  Dx:   Encounter Diagnosis     ICD-10-CM    1. Weakness of right lower extremity  R29.898       2. Decreased range of motion  M25.60       3. Gait abnormality  R26.9       4. Aftercare following right hip joint replacement surgery  Z47.1     Z96.641           Start Time: 1005  Stop Time: 1045  Total time in clinic (min): 40 minutes    Subjective: Pt reports she is doing well. Objective: See treatment diary below      Assessment: Danial Red tolerated today's treatment session well. Patient education provided on continuation of POC and TE.  Pancho bacon completed all TE with good form and no adverse reactions. Pt continues to require cues for correct form for STS- increased difficulty with out pushing with UEs. Pancho bacon continues to benefit from skilled OPPT services to address LE weakness and ROM deficits. . Will continue to address functional deficits and focus on progression of POC per patient tolerance. Patient performed Nustep to increase blood flow to the area being treated, prepare the muscles for strength training and stretching, improve overall tolerance to activity, and aerobic endurance. Plan: Continue per plan of care. Progress treatment as tolerated. Precautions: standard, charcots foot R, wears brace on R, fall, lymphedema     Access Code: [email or web address]  URL: [email or web address]  Date: 2023  Prepared by: Melony Shipman    Exercises  - Proper Sit to Stand Technique  - 2 x daily - 1 sets - 5 reps  - Standing March with Counter Support  - 2 x daily - 1 sets - 15 reps  - Standing Hip Abduction with Counter Support  - 2 x daily - 2 sets - 10 reps  - Standing Hip Extension with Counter Support  - 2 x daily - 2 sets - 10 reps  - Standing Knee Flexion with Counter Support  - 2 x daily - 1 sets - 20 reps  - Side Stepping with Counter Support - 2 x daily - 2 sets - 10 reps    Date: 7/14/2023 7/17/2023 7/21/2023 7/24/2023 7/27/23 7/31/2023 8/4/2023      Visit: #16 #17 #18 #19 #20 #21 #22      Manual:                                       Neuro Re-Ed             Balance Ground FT 30"  Airex 30"                                                                                           Ther Ex             Warm Up seat 11; arms 14  NuStep level 4 10 min  NuStep level 4 10 min  NuStep level 4 5 min  NuStep level 4 12 min  NuStep level 4 12 min  NuStep level 4 10 min (seat 10, arms 14) NuStep level 4 8 min (seat 10, arms 14)      Sit to stand- focus on knee flexion and use of RLE    At high low table x10 At high low table x10 From chair and airex with hands on walker x10  At high low table with hands on walker x10      Seated knee flexion heel slides              Standing alternating marches /step tap              Long arc quad              Seated hip add  10x10"           Seated hip abd             Standing HS curl  x20 alternating LEs x10 each LE x10 each LE x10 each LE x10 each LE x15 each LE  x15 each LE       Stand:   Alt hip abd  Alt hip ext 2x10 each LE x10 each LE x10 each LE x10 each LE x10 each LE x15 each LE x15 each LE       pball trunk flexion              Seated knee flexion   10x10"           Lateral walking at mirror 2 laps  3 laps  3 laps  3 laps  3 laps  3 laps  3 laps                                              Ther Activity             Standing tolerance - standing unsupported for time                           Gait Training             Lateral WS --> SLS              Gait training with quad cane    68' with NBQC 5 min and other shoulder distances t/o session    167' with NBQC close S in 11:53 [place] distance t/o session       Stair training   6" step 2 HR x5 (down backwards) 6" step 2 HR x10 (down backwards)         Walking laps in gym with RW  Walking with RW focus on continuous gait 2 laps around gym  Walking laps in gym with focus on continuous gait 3.5 laps in 4 min 45 seconds  Walking laps in gym with focus on continuous gait 3.5 laps in 5' min 3" with quad cane (1/2 lap around the clinic)  Walking laps in gym with RW focus on continuous gait pattern 2 laps around gym                   Modalities

## 2023-08-03 ENCOUNTER — HOSPITAL ENCOUNTER (OUTPATIENT)
Dept: RADIOLOGY | Age: 68
Discharge: HOME | End: 2023-08-03
Payer: MEDICARE

## 2023-08-03 DIAGNOSIS — Z78.0 ASYMPTOMATIC MENOPAUSAL STATE: ICD-10-CM

## 2023-08-03 PROCEDURE — 77080 DXA BONE DENSITY AXIAL: CPT

## 2023-08-04 ENCOUNTER — OFFICE VISIT (OUTPATIENT)
Age: 68
End: 2023-08-04
Payer: MEDICARE

## 2023-08-04 DIAGNOSIS — M25.60 DECREASED RANGE OF MOTION: ICD-10-CM

## 2023-08-04 DIAGNOSIS — Z47.1 AFTERCARE FOLLOWING RIGHT HIP JOINT REPLACEMENT SURGERY: ICD-10-CM

## 2023-08-04 DIAGNOSIS — R29.898 WEAKNESS OF RIGHT LOWER EXTREMITY: Primary | ICD-10-CM

## 2023-08-04 DIAGNOSIS — R26.9 GAIT ABNORMALITY: ICD-10-CM

## 2023-08-04 DIAGNOSIS — Z96.641 AFTERCARE FOLLOWING RIGHT HIP JOINT REPLACEMENT SURGERY: ICD-10-CM

## 2023-08-04 PROCEDURE — 97110 THERAPEUTIC EXERCISES: CPT

## 2023-08-04 PROCEDURE — 97116 GAIT TRAINING THERAPY: CPT

## 2023-08-04 NOTE — PROGRESS NOTES
Daily Note     Today's date: 2023  Patient name: Chaz Dillon  : 1955  MRN: [id number]  Referring provider: Luz Carter, *  Dx:   Encounter Diagnosis     ICD-10-CM    1. Weakness of right lower extremity  R29.898       2. Decreased range of motion  M25.60       3. Gait abnormality  R26.9       4. Aftercare following right hip joint replacement surgery  Z47.1     Z96.641           Start Time: 1750  Stop Time: 1830  Total time in clinic (min): 40 minutes    Subjective: Nothing new to report. Objective: See treatment diary below      Assessment: Chaz Dillon tolerated today's treatment session well. Patient education provided on continuation of POC and TE.  Fina Campos completed all TE with good form and no adverse reactions. Fina Campos continues to benefit from skilled OPPT services to address post op ROM and strength deficits. Will continue to address functional deficits and focus on progression of POC per patient tolerance. Patient performed Nustep to increase blood flow to the area being treated, prepare the muscles for strength training and stretching, improve overall tolerance to activity, and aerobic endurance. Plan: Continue per plan of care. Progress treatment as tolerated. Precautions: standard, charcots foot R, wears brace on R, fall, lymphedema     Access Code: [email or web address]  URL: [email or web address]  Date: 2023  Prepared by: Bright Velázquez    Exercises  - Proper Sit to Stand Technique  - 2 x daily - 1 sets - 5 reps  - Standing March with Counter Support  - 2 x daily - 1 sets - 15 reps  - Standing Hip Abduction with Counter Support  - 2 x daily - 2 sets - 10 reps  - Standing Hip Extension with Counter Support  - 2 x daily - 2 sets - 10 reps  - Standing Knee Flexion with Counter Support  - 2 x daily - 1 sets - 20 reps  - Side Stepping with Counter Support  - 2 x daily - 2 sets - 10 reps    Date: 2023 8/4/2023 8/7/2023     Visit: #16 #17 #18 #19 #20 #21 #22 #23     Manual:                                       Neuro Re-Ed             Balance Ground FT 30"  Airex 30"                                                                                           Ther Ex             Warm Up seat 11; arms 14  NuStep level 4 10 min  NuStep level 4 10 min  NuStep level 4 5 min  NuStep level 4 12 min  NuStep level 4 12 min  NuStep level 4 10 min (seat 10, arms 14) NuStep level 4 8 min (seat 10, arms 14) NuStep level 4 8 min (seat 10, arms 14)     Sit to stand- focus on knee flexion and use of RLE    At high low table x10 At high low table x10 From chair and airex with hands on walker x10  At high low table with hands on walker x10 From chair and airex with hands on walker x10      Seated knee flexion heel slides              Standing alternating marches /step tap              Long arc quad         x20 each LE     Seated hip add  10x10"           Seated hip abd             Standing HS curl  x20 alternating LEs x10 each LE x10 each LE x10 each LE x10 each LE x15 each LE  x15 each LE  x15 each LE      Stand:   Alt hip abd  Alt hip ext 2x10 each LE x10 each LE x10 each LE x10 each LE x10 each LE x15 each LE x15 each LE  x15 each LE      pball trunk flexion              Seated knee flexion   10x10"           Lateral walking at mirror 2 laps  3 laps  3 laps  3 laps  3 laps  3 laps  3 laps  4 laps                                             Ther Activity             Standing tolerance - standing unsupported for time                           Gait Training             Lateral WS --> SLS              Gait training with quad cane    68' with NBQC 5 min and other shoulder distances t/o session    167' with NBQC close S in 11:53 [place] distance t/o session  [place] distance t/o session      Stair training   6" step 2 HR x5 (down backwards) 6" step 2 HR x10 (down backwards)         Walking laps in gym with RW  Walking with RW focus on continuous gait 2 laps around gym  Walking laps in gym with focus on continuous gait 3.5 laps in 4 min 45 seconds  Walking laps in gym with focus on continuous gait 3.5 laps in 5' min 3" with quad cane (1/2 lap around the clinic)  Walking laps in gym with RW focus on continuous gait pattern 2 laps around gym                   Modalities

## 2023-08-07 ENCOUNTER — OFFICE VISIT (OUTPATIENT)
Age: 68
End: 2023-08-07
Payer: MEDICARE

## 2023-08-07 ENCOUNTER — OFFICE VISIT (OUTPATIENT)
Dept: CARDIAC SURGERY | Facility: CLINIC | Age: 68
End: 2023-08-07
Payer: MEDICARE

## 2023-08-07 VITALS
SYSTOLIC BLOOD PRESSURE: 118 MMHG | WEIGHT: 204 LBS | BODY MASS INDEX: 36.14 KG/M2 | OXYGEN SATURATION: 96 % | HEART RATE: 81 BPM | HEIGHT: 63 IN | DIASTOLIC BLOOD PRESSURE: 76 MMHG

## 2023-08-07 DIAGNOSIS — Z96.641 AFTERCARE FOLLOWING RIGHT HIP JOINT REPLACEMENT SURGERY: ICD-10-CM

## 2023-08-07 DIAGNOSIS — Z47.1 AFTERCARE FOLLOWING RIGHT HIP JOINT REPLACEMENT SURGERY: ICD-10-CM

## 2023-08-07 DIAGNOSIS — R29.898 WEAKNESS OF RIGHT LOWER EXTREMITY: Primary | ICD-10-CM

## 2023-08-07 DIAGNOSIS — M25.60 DECREASED RANGE OF MOTION: ICD-10-CM

## 2023-08-07 DIAGNOSIS — I42.2 HYPERTROPHIC CARDIOMYOPATHY (HCC): Primary | ICD-10-CM

## 2023-08-07 DIAGNOSIS — R26.9 GAIT ABNORMALITY: ICD-10-CM

## 2023-08-07 PROCEDURE — 99214 OFFICE O/P EST MOD 30 MIN: CPT | Performed by: NURSE PRACTITIONER

## 2023-08-07 PROCEDURE — 97110 THERAPEUTIC EXERCISES: CPT

## 2023-08-08 NOTE — PROGRESS NOTES
Daily Note     Today's date: 2023  Patient name: Erma Burns  : 1955  MRN: [id number]  Referring provider: Chelsie Blakely, *  Dx:   Encounter Diagnosis     ICD-10-CM    1. Weakness of right lower extremity  R29.898       2. Decreased range of motion  M25.60       3. Gait abnormality  R26.9       4. Aftercare following right hip joint replacement surgery  Z47.1     Z96.641                      Subjective: ***      Objective: See treatment diary below      Assessment: Erma Burns tolerated today's treatment session well. Patient education provided on [facility] education options:[id number]}. Salas Meo completed all TE with good form and no adverse reactions. ***  Asa Meo continues to benefit from skilled OPPT services to address {dailysymptoms:67345}. Will continue to address functional deficits and focus on progression of POC per patient tolerance. Patient performed {KUAerobic:07466} to increase blood flow to the area being treated, prepare the muscles for strength training and stretching, improve overall tolerance to activity, and aerobic endurance. Plan: Continue per plan of care. Progress treatment as tolerated. Precautions: standard, charcots foot R, wears brace on R, fall, lymphedema     Access Code: [email or web address]  URL: [email or web address]  Date: 2023  Prepared by: Nadine Coburn    Exercises  - Proper Sit to Stand Technique  - 2 x daily - 1 sets - 5 reps  - Standing March with Counter Support  - 2 x daily - 1 sets - 15 reps  - Standing Hip Abduction with Counter Support  - 2 x daily - 2 sets - 10 reps  - Standing Hip Extension with Counter Support  - 2 x daily - 2 sets - 10 reps  - Standing Knee Flexion with Counter Support  - 2 x daily - 1 sets - 20 reps  - Side Stepping with Counter Support  - 2 x daily - 2 sets - 10 reps    Date: 2023    Visit: #16 #17 #18 #19 #20 #21 #22 #23     Manual: Neuro Re-Ed             Balance Ground FT 30"  Airex 30"                                                                                           Ther Ex             Warm Up seat 11; arms 14  NuStep level 4 10 min  NuStep level 4 10 min  NuStep level 4 5 min  NuStep level 4 12 min  NuStep level 4 12 min  NuStep level 4 10 min (seat 10, arms 14) NuStep level 4 8 min (seat 10, arms 14) NuStep level 4 8 min (seat 10, arms 14)     Sit to stand- focus on knee flexion and use of RLE    At high low table x10 At high low table x10 From chair and airex with hands on walker x10  At high low table with hands on walker x10 From chair and airex with hands on walker x10      Seated knee flexion heel slides              Standing alternating marches /step tap              Long arc quad         x20 each LE     Seated hip add  10x10"           Seated hip abd             Standing HS curl  x20 alternating LEs x10 each LE x10 each LE x10 each LE x10 each LE x15 each LE  x15 each LE  x15 each LE      Stand:   Alt hip abd  Alt hip ext 2x10 each LE x10 each LE x10 each LE x10 each LE x10 each LE x15 each LE x15 each LE  x15 each LE      pball trunk flexion              Seated knee flexion   10x10"           Lateral walking at mirror 2 laps  3 laps  3 laps  3 laps  3 laps  3 laps  3 laps  4 laps                                             Ther Activity             Standing tolerance - standing unsupported for time                           Gait Training             Lateral WS --> SLS              Gait training with quad cane    68' with NBQC 5 min and other shoulder distances t/o session    167' with NBQC close S in 11:53 [place] distance t/o session  [place] distance t/o session      Stair training   6" step 2 HR x5 (down backwards) 6" step 2 HR x10 (down backwards)         Walking laps in gym with RW  Walking with RW focus on continuous gait 2 laps around gym  Walking laps in gym with focus on continuous gait 3.5 laps in 4 min 45 seconds  Walking laps in gym with focus on continuous gait 3.5 laps in 5' min 3" with quad cane (1/2 lap around the clinic)  Walking laps in gym with RW focus on continuous gait pattern 2 laps around gym                   Modalities

## 2023-08-09 NOTE — PROGRESS NOTES
PT Re-Evaluation     Today's date: 2023  Patient name: Genet Joshi  : 1955  MRN: [id number]  Referring provider: Hannah Alfredo, *  Dx:   Encounter Diagnosis     ICD-10-CM    1. Weakness of right lower extremity  R29.898       2. Decreased range of motion  M25.60       3. Gait abnormality  R26.9       4. Aftercare following right hip joint replacement surgery  Z47.1     Z96.641           Start Time: 0830  Stop Time: 0900  Total time in clinic (min): 30 minutes    Assessment  Assessment details: Genet Joshi is making good progress toward meeting goals written for plan of care for increasing ROM, strength, decreasing pain and increasing activity tolerance. Pt has attended 23 treatment sessions since start of plan of care. Per objective measure Varghese Casey is decreasing level of disability related to diagnosis. Varghese Casey has made improvements with tolerance to activity, normalizing gait pattern and increasing gait speed with both RW and NBCQ. Varghese Casey continues to benefit from skilled physical therapy services to address impaired balance  and activity tolerance deficits. Varghese Casey continues to have increased difficulty with functional tasks such as ambulating community distances. Discussed progress toward goals with Varghese Casey and together, with patient, decided Varghese Casey should continue to be seen for PT services 2 days a week for 4 weeks. Varghese Casey agreeable to plan of care.         Impairments: abnormal gait, abnormal or restricted ROM, activity intolerance, impaired balance, impaired physical strength, lacks appropriate home exercise program, pain with function and weight-bearing intolerance  Barriers to therapy: Complex medical history     Understanding of Dx/Px/POC: good   Prognosis: fair  Prognosis details: Patient Active Problem List:     Wegener's granulomatosis with renal involvement ([address])     Charcot's joint     Anxiety     Benign essential hypertension     BRCA1 positive     Cancer of ovary ([address])     Chronic kidney disease, stage III (moderate) (HCC)     Chronic pain disorder     Class 2 obesity     Depression     Dyslipidemia     GERD (gastroesophageal reflux disease)     Lymphedema     Opiate analgesic use agreement exists     Ovarian cancer (HCC)     Post-traumatic osteoarthritis     Increased frequency of urination     Other complicated headache syndrome     Nausea & vomiting     SIRS (systemic inflammatory response syndrome) (HCC)     Increased risk of breast cancer     Acute metabolic encephalopathy     Elevated troponin     Mitral valve disorder     Postnasal drip     Dyspnea on exertion     LVH (left ventricular hypertrophy)     Low immunoglobulin level     Hypertrophic cardiomyopathy (HCC)     Hypogammaglobulinemia, acquired ([address])     Morbid obesity ([address])     Septic shock (HCC)     Chronic right-sided low back pain without sciatica     Cellulitis of right lower extremity     Slow transit constipation     History of pulmonary embolism     Chronic diastolic heart failure (HCC)     Right hip pain     History of bacteremia     Anemia     Encounter for geriatric assessment     Iron deficiency anemia secondary to inadequate dietary iron intake     Hip osteomyelitis, right (HCC)     MSSA bacteremia     PICC (peripherally inserted central catheter) in place    Goals  NEW Goals to be met in 4 weeks from 7/14/2023:   1. Sharlett [place] will be independent with advanced home exercise program to allow patient to transition from physical therapy care to continue with plan of care at home without supervision from therapist and continue to progress with rehabilitation. PROGRESSING 8/11/2023   2. Sharlett [place] will decrease TUG to 30 seconds or less with RW indicate improved functional mobility and decreased fall risk. PROGRESSING 8/11/2023   3. Sharlett [place] will decrease TUG to 60 seconds or less with NBQC indicate improved functional mobility and decreased fall risk. PROGRESSING 8/11/2023   4. Sharlett [place] will increase 2MWT by 50 feet.  MET 8/11/2023     NEW Goals to be met in 4 weeks from 8/11/2023  1. Caridad Byrne will be independent with advanced home exercise program to allow patient to transition from physical therapy care to continue with plan of care at home without supervision from therapist and continue to progress with rehabilitation. 2. Caridad Byrne will decrease TUG to 30 seconds or less with RW indicate improved functional mobility and decreased fall risk. 3. Caridad Byrne will decrease TUG to 60 seconds or less with NBQC indicate improved functional mobility and decreased fall risk. 4. Caridad Byrne will increase 2MWT by 50 feet (from 167 feet with RW). 5. Caridad Byrne will increase 2MWT by 25 feet with NBQC (from 58 feet). Plan  Patient would benefit from: skilled physical therapy  Planned modality interventions: biofeedback, cryotherapy, thermotherapy: hydrocollator packs, ultrasound and traction  Planned therapy interventions: abdominal trunk stabilization, activity modification, ADL retraining, balance, balance/weight bearing training, behavior modification, body mechanics training, breathing training, coordination, fine motor coordination training, flexibility, functional ROM exercises, gait training, graded activity, graded exercise, graded motor, home exercise program, joint mobilization, manual therapy, massage, motor coordination training, neuromuscular re-education, patient education, strengthening, stretching, therapeutic activities and therapeutic exercise  Frequency: 2x week  Duration in weeks: 4  Plan of Care beginning date: 8/11/2023  Plan of Care expiration date: 9/8/2023  Treatment plan discussed with: patient, PTA and referring physician        Subjective Evaluation    History of Present Illness  Date of surgery: 3/6/2023  Mechanism of injury: surgery  Mechanism of injury: Pt reports she has had some pain in her right hip. She notices it when she sits down in chair at the end of a day with increased activity.  She sometimes uses the cane at home (in the kitchen) but mostly uses RW. Patient Goals  Patient goals for therapy: decreased pain, improved balance, increased motion and increased strength  Patient goal: "I want to get my legs stronger and get better with the cane" "I would like to be able to walk farther with cane than I am now"   Pain  Current pain ratin  At best pain ratin  At worst pain rating: 3  Location: generalized RLE pain   Quality: burning  Relieving factors: medications and ice    Treatments  Previous treatment: physical therapy        Objective     Active Range of Motion     Additional Active Range of Motion Details  Grossly limited hip movement- pt unable to sit upright at 90* angle   Grossly limited R knee flexion -? Due to swelling or wraps for lymphedema     Strength/Myotome Testing     Left Hip   Planes of Motion   Flexion: 4  Abduction: 4  Adduction: 4    Right Hip   Planes of Motion   Flexion: 3+  Abduction: 3  Adduction: 4    Left Knee   Flexion: 4+  Extension: 4+    Right Knee   Flexion: 4  Extension: 4    Ambulation   Weight-Bearing Status   Weight-Bearing Status (Left): full weight bearing   Weight-Bearing Status (Right): weight-bearing as tolerated    Assistive device used: front-wheeled walker and small base quad cane    Ambulation: Level Surfaces   Ambulation with assistive device: independent    Observational Gait   Gait: antalgic and circumduction   Increased left stance time. Decreased walking speed, stride length, right stance time, left step length and right step length. Left foot contact pattern: foot flat  Right foot contact pattern: foot flat    Comments   Pt has full reliance of UEs for sit to stand.   Pt kicks RLE out and uses UEs to get to standing with forward trunk flexion and then comes to full stand     Functional Assessment        Comments  TUG 50" with RW (IE) --> TUG with RW (Re-Eval) 45.5 seconds --> 34 second average with RW (2023), 1 min 14 seconds with NBQC (2023)   TUG with NBQC: 1 min 13 seconds seconds (8/11/2023)  TUG with RW: 31.55 seconds (8/11/2023)    2MWT: 108 feet with RW --> 167 feet with RW (8/11/2023) Modified BRAYAN RPE 8/10  2MWT: 58 feet with NBQC 8/11/2023              Precautions: standard, charcots foot R, wears brace on R, fall, lymphedema       Date: 7/14/2023 7/17/2023 7/21/2023 7/24/2023 7/27/23 7/31/2023 8/4/2023 8/7/2023 8/11/2023     Visit: #16 #17 #18 #19 #20 #21 #22 #23 #24    Manual:                                       Neuro Re-Ed             Balance Ground FT 30"  Airex 30"                                                                                           Ther Ex             Warm Up seat 11; arms 14  NuStep level 4 10 min  NuStep level 4 10 min  NuStep level 4 5 min  NuStep level 4 12 min  NuStep level 4 12 min  NuStep level 4 10 min (seat 10, arms 14) NuStep level 4 8 min (seat 10, arms 14) NuStep level 4 8 min (seat 10, arms 14)     Sit to stand- focus on knee flexion and use of RLE    At high low table x10 At high low table x10 From chair and airex with hands on walker x10  At high low table with hands on walker x10 From chair and airex with hands on walker x10  From high low mat x10     Seated knee flexion heel slides              Standing alternating marches /step tap              Long arc quad         x20 each LE     Seated hip add  10x10"           Seated hip abd             Standing HS curl  x20 alternating LEs x10 each LE x10 each LE x10 each LE x10 each LE x15 each LE  x15 each LE  x15 each LE      Stand:   Alt hip abd  Alt hip ext 2x10 each LE x10 each LE x10 each LE x10 each LE x10 each LE x15 each LE x15 each LE  x15 each LE      pball trunk flexion              Seated knee flexion   10x10"           Lateral walking at mirror 2 laps  3 laps  3 laps  3 laps  3 laps  3 laps  3 laps  4 laps                                             Ther Activity             Standing tolerance - standing unsupported for time                           Gait Training Lateral WS --> SLS              Gait training with quad cane    68' with NBQC 5 min and other shoulder distances t/o session    167' with NBQC close S in 11:53 [place] distance t/o session  [place] distance t/o session      Stair training   6" step 2 HR x5 (down backwards) 6" step 2 HR x10 (down backwards)         Walking laps in gym with RW  Walking with RW focus on continuous gait 2 laps around gym  Walking laps in gym with focus on continuous gait 3.5 laps in 4 min 45 seconds  Walking laps in gym with focus on continuous gait 3.5 laps in 5' min 3" with quad cane (1/2 lap around the clinic)  Walking laps in gym with RW focus on continuous gait pattern 2 laps around gym                   Modalities

## 2023-08-11 ENCOUNTER — HOSPITAL ENCOUNTER (OUTPATIENT)
Dept: MAMMOGRAPHY | Facility: MEDICAL CENTER | Age: 68
Discharge: HOME | End: 2023-08-11
Payer: MEDICARE

## 2023-08-11 ENCOUNTER — EVALUATION (OUTPATIENT)
Age: 68
End: 2023-08-11
Payer: MEDICARE

## 2023-08-11 VITALS — HEIGHT: 63 IN | BODY MASS INDEX: 36.13 KG/M2 | WEIGHT: 203.93 LBS

## 2023-08-11 DIAGNOSIS — Z47.1 AFTERCARE FOLLOWING RIGHT HIP JOINT REPLACEMENT SURGERY: ICD-10-CM

## 2023-08-11 DIAGNOSIS — M25.60 DECREASED RANGE OF MOTION: ICD-10-CM

## 2023-08-11 DIAGNOSIS — R29.898 WEAKNESS OF RIGHT LOWER EXTREMITY: Primary | ICD-10-CM

## 2023-08-11 DIAGNOSIS — R26.9 GAIT ABNORMALITY: ICD-10-CM

## 2023-08-11 DIAGNOSIS — Z96.641 AFTERCARE FOLLOWING RIGHT HIP JOINT REPLACEMENT SURGERY: ICD-10-CM

## 2023-08-11 PROCEDURE — 97116 GAIT TRAINING THERAPY: CPT

## 2023-08-11 PROCEDURE — 77063 BREAST TOMOSYNTHESIS BI: CPT

## 2023-08-11 PROCEDURE — 77067 SCR MAMMO BI INCL CAD: CPT

## 2023-08-11 PROCEDURE — 97164 PT RE-EVAL EST PLAN CARE: CPT

## 2023-08-11 PROCEDURE — 97530 THERAPEUTIC ACTIVITIES: CPT

## 2023-08-11 NOTE — PROGRESS NOTES
Daily Note     Today's date: 8/15/2023  Patient name: Sydnie Stone  : 1955  MRN: [id number]  Referring provider: Perico Ledesma, *  Dx:   Encounter Diagnosis     ICD-10-CM    1. Weakness of right lower extremity  R29.898       2. Decreased range of motion  M25.60       3. Gait abnormality  R26.9       4. Aftercare following right hip joint replacement surgery  Z47.1     Z96.641           Start Time: 730  Stop Time: 815  Total time in clinic (min): 45 minutes    Subjective: Pt has nothing new to report. She is doing well. Objective: See treatment diary below      Assessment: Sydnie Stone tolerated today's treatment session well. Patient education provided on progression of POC and TE.  Jocy Méndez completed all TE with good form and no adverse reactions. Pt encouraged to continue walking with cane. Jocy Méndez continues to benefit from skilled OPPT services to address post op ROM and strength deficits and decreased tolerance to activity. . Will continue to address functional deficits and focus on progression of POC per patient tolerance. Patient performed Nustep to increase blood flow to the area being treated, prepare the muscles for strength training and stretching, improve overall tolerance to activity, and aerobic endurance. Plan: Continue per plan of care. Progress treatment as tolerated.        Precautions: standard, charcots foot R, wears brace on R, fall, lymphedema       Date: 2023  2023 2023  2023  7/27/23 2023 2023 2023 2023  8/15/2023   Visit: #16 #17 #18 #19 #20 #21 #22 #23 #24 #25   Manual:                                       Neuro Re-Ed             Balance Ground FT 30"  Airex 30"                                                                                           Ther Ex             Warm Up seat 11; arms 14  NuStep level 4 10 min  NuStep level 4 10 min  NuStep level 4 5 min  NuStep level 4 12 min  NuStep level 4 12 min  NuStep level 4 10 min (seat 10, arms 14) NuStep level 4 8 min (seat 10, arms 14) NuStep level 4 8 min (seat 10, arms 14)  NuStep level 5 10 min    Sit to stand- focus on knee flexion and use of RLE    At high low table x10 At high low table x10 From chair and airex with hands on walker x10  At high low table with hands on walker x10 From chair and airex with hands on walker x10  From high low mat x10     Seated knee flexion heel slides              Standing alternating marches /step tap              Long arc quad         x20 each LE     Seated hip add  10x10"           Seated hip abd             Standing HS curl  x20 alternating LEs x10 each LE x10 each LE x10 each LE x10 each LE x15 each LE  x15 each LE  x15 each LE   x20 each LE   Stand:   Alt hip abd  Alt hip ext 2x10 each LE x10 each LE x10 each LE x10 each LE x10 each LE x15 each LE x15 each LE  x15 each LE   x20 each LE    pball trunk flexion              Seated knee flexion   10x10"           Lateral walking at mirror 2 laps  3 laps  3 laps  3 laps  3 laps  3 laps  3 laps  4 laps   4 laps                                           Ther Activity             Standing tolerance - standing unsupported for time                           Gait Training             Lateral WS --> SLS              Gait training with quad cane    68' with NBQC 5 min and other shoulder distances t/o session    167' with NBQC close S in 11:53 [place] distance t/o session  [place] distance t/o session   1.5 laps around gym with NBQC   Stair training   6" step 2 HR x5 (down backwards) 6" step 2 HR x10 (down backwards)         Walking laps in gym with RW  Walking with RW focus on continuous gait 2 laps around gym  Walking laps in gym with focus on continuous gait 3.5 laps in 4 min 45 seconds  Walking laps in gym with focus on continuous gait 3.5 laps in 5' min 3" with quad cane (1/2 lap around the clinic)  Walking laps in gym with RW focus on continuous gait pattern 2 laps around gym Modalities

## 2023-08-15 ENCOUNTER — OFFICE VISIT (OUTPATIENT)
Age: 68
End: 2023-08-15
Payer: MEDICARE

## 2023-08-15 DIAGNOSIS — R26.9 GAIT ABNORMALITY: ICD-10-CM

## 2023-08-15 DIAGNOSIS — R29.898 WEAKNESS OF RIGHT LOWER EXTREMITY: Primary | ICD-10-CM

## 2023-08-15 DIAGNOSIS — Z96.641 AFTERCARE FOLLOWING RIGHT HIP JOINT REPLACEMENT SURGERY: ICD-10-CM

## 2023-08-15 DIAGNOSIS — Z47.1 AFTERCARE FOLLOWING RIGHT HIP JOINT REPLACEMENT SURGERY: ICD-10-CM

## 2023-08-15 DIAGNOSIS — M25.60 DECREASED RANGE OF MOTION: ICD-10-CM

## 2023-08-15 PROCEDURE — 97116 GAIT TRAINING THERAPY: CPT

## 2023-08-15 PROCEDURE — 97110 THERAPEUTIC EXERCISES: CPT

## 2023-08-15 NOTE — PROGRESS NOTES
Daily Note     Today's date: 2023  Patient name: Karyn Smith  : 1955  MRN: [id number]  Referring provider: Clarisse Huffmanr, *  Dx:   Encounter Diagnosis     ICD-10-CM    1. Weakness of right lower extremity  R29.898       2. Decreased range of motion  M25.60       3. Gait abnormality  R26.9       4. Aftercare following right hip joint replacement surgery  Z47.1     Z96.641           Start Time: 0945  Stop Time: 1030  Total time in clinic (min): 45 minutes    Subjective: No new complaints today. Objective: See treatment diary below      Assessment: Karyn Smith tolerated today's treatment session well. Patient education provided on continuation of POC and TE.  Flora Yuen completed all TE with good form and no adverse reactions. Pt with improved overall activity tolerance. Flora Yuen continues to benefit from skilled OPPT services to address decreased tolerance to activity and functional mobility s/p hip replacement. Will continue to address functional deficits and focus on progression of POC per patient tolerance. Patient performed Nustep to increase blood flow to the area being treated, prepare the muscles for strength training and stretching, improve overall tolerance to activity, and aerobic endurance. Plan: Continue per plan of care. Progress treatment as tolerated.        Precautions: standard, charcots foot R, wears brace on R, fall, lymphedema       Date: 2023      2023 2023 2023  8/15/2023   Visit: #26      #22 #23 #24 #25   Manual:                                       Neuro Re-Ed             Balance                                                                                           Ther Ex             Warm Up seat 11; arms 14  NuStep level 4 10 min       NuStep level 4 8 min (seat 10, arms 14) NuStep level 4 8 min (seat 10, arms 14)  NuStep level 5 10 min    Sit to stand- focus on knee flexion and use of RLE       At high low table with hands on walker x10 From chair and airex with hands on walker x10  From high low mat x10     Seated knee flexion heel slides              Standing alternating marches /step tap              Long arc quad         x20 each LE     Seated hip add             Seated hip abd             Standing HS curl  x20 each LE      x15 each LE  x15 each LE   x20 each LE   Stand:   Alt hip abd  Alt hip ext x20 each LE      x15 each LE  x15 each LE   x20 each LE    pball trunk flexion              Seated knee flexion              Lateral walking at mirror 4 laps       3 laps  4 laps   4 laps                                           Ther Activity             Standing tolerance - standing unsupported for time                           Gait Training             Lateral WS --> SLS              Gait training with quad cane  1.5 laps around gym with NBQC      [place] distance t/o session  [place] distance t/o session   1.5 laps around gym with NBQC   Stair training             Walking laps in gym with RW       Walking laps in gym with RW focus on continuous gait pattern 2 laps around gym                   Modalities

## 2023-08-17 ENCOUNTER — APPOINTMENT (OUTPATIENT)
Age: 68
End: 2023-08-17
Payer: MEDICARE

## 2023-08-18 ENCOUNTER — OFFICE VISIT (OUTPATIENT)
Age: 68
End: 2023-08-18
Payer: MEDICARE

## 2023-08-18 DIAGNOSIS — Z47.1 AFTERCARE FOLLOWING RIGHT HIP JOINT REPLACEMENT SURGERY: ICD-10-CM

## 2023-08-18 DIAGNOSIS — R26.9 GAIT ABNORMALITY: ICD-10-CM

## 2023-08-18 DIAGNOSIS — M25.60 DECREASED RANGE OF MOTION: ICD-10-CM

## 2023-08-18 DIAGNOSIS — R29.898 WEAKNESS OF RIGHT LOWER EXTREMITY: Primary | ICD-10-CM

## 2023-08-18 DIAGNOSIS — Z96.641 AFTERCARE FOLLOWING RIGHT HIP JOINT REPLACEMENT SURGERY: ICD-10-CM

## 2023-08-18 PROCEDURE — 97110 THERAPEUTIC EXERCISES: CPT

## 2023-08-18 PROCEDURE — 97116 GAIT TRAINING THERAPY: CPT

## 2023-08-18 NOTE — PROGRESS NOTES
Daily Note     Today's date: 2023  Patient name: Chad Jones  : 1955  MRN: [id number]  Referring provider: Misha Billingsley, *  Dx:   Encounter Diagnosis     ICD-10-CM    1. Weakness of right lower extremity  R29.898       2. Decreased range of motion  M25.60       3. Gait abnormality  R26.9       4. Aftercare following right hip joint replacement surgery  Z47.1     Z96.641           Start Time:   Stop Time: 815  Total time in clinic (min): 40 minutes    Subjective: Pt has nothing new to report. She helped with some chores and housework over the weekend. Objective: See treatment diary below      Assessment: Chad Jones tolerated today's treatment session well. Patient education provided on continuation of POC and TE.  Stephan Al completed all TE with good form and no adverse reactions. Pt continues to demonstrate improved activity tolerance. Stephan Al continues to benefit from skilled OPPT services to address decreased toleranc to activity and functional mobility. Will continue to address functional deficits and focus on progression of POC per patient tolerance. Patient performed Nustep to increase blood flow to the area being treated, prepare the muscles for strength training and stretching, improve overall tolerance to activity, and aerobic endurance. Plan: Continue per plan of care. Progress treatment as tolerated.        Precautions: standard, charcots foot R, wears brace on R, fall, lymphedema       Date: 2023 2023     2023 2023 2023  8/15/2023   Visit: #26 #27     #22 #23 #24 #25   Manual:                                       Neuro Re-Ed             Balance                                                                                           Ther Ex             Warm Up seat 11; arms 14  NuStep level 4 10 min  NuStep level 4 10 min      NuStep level 4 8 min (seat 10, arms 14) NuStep level 4 8 min (seat 10, arms 14)  NuStep level 5 10 min    Sit to stand- focus on knee flexion and use of RLE       At high low table with hands on walker x10 From chair and airex with hands on walker x10  From high low mat x10     Seated knee flexion heel slides              Standing alternating marches /step tap              Long arc quad         x20 each LE     Seated hip add             Seated hip abd             Standing HS curl  x20 each LE x20 each LE      x15 each LE  x15 each LE   x20 each LE   Stand:   Alt hip abd  Alt hip ext x20 each LE 20 each LE      x15 each LE  x15 each LE   x20 each LE    pball trunk flexion              Seated knee flexion              Lateral walking at mirror 4 laps  4 laps      3 laps  4 laps   4 laps                                           Ther Activity             Standing tolerance - standing unsupported for time                           Gait Training             Lateral WS --> SLS              Gait training with quad cane  1.5 laps around gym with NBQC 1.5 laps around gym with NBQC     [place] distance t/o session  [place] distance t/o session   1.5 laps around gym with NBQC   Stair training             Walking laps in gym with RW       Walking laps in gym with RW focus on continuous gait pattern 2 laps around gym                   Modalities

## 2023-08-21 ENCOUNTER — OFFICE VISIT (OUTPATIENT)
Age: 68
End: 2023-08-21
Payer: MEDICARE

## 2023-08-21 DIAGNOSIS — R29.898 WEAKNESS OF RIGHT LOWER EXTREMITY: Primary | ICD-10-CM

## 2023-08-21 DIAGNOSIS — M25.60 DECREASED RANGE OF MOTION: ICD-10-CM

## 2023-08-21 DIAGNOSIS — Z96.641 AFTERCARE FOLLOWING RIGHT HIP JOINT REPLACEMENT SURGERY: ICD-10-CM

## 2023-08-21 DIAGNOSIS — Z47.1 AFTERCARE FOLLOWING RIGHT HIP JOINT REPLACEMENT SURGERY: ICD-10-CM

## 2023-08-21 DIAGNOSIS — R26.9 GAIT ABNORMALITY: ICD-10-CM

## 2023-08-21 PROCEDURE — 97110 THERAPEUTIC EXERCISES: CPT

## 2023-08-21 PROCEDURE — 97116 GAIT TRAINING THERAPY: CPT

## 2023-08-23 NOTE — PROGRESS NOTES
Daily Note     Today's date: 2023  Patient name: Kamilah Pelayo  : 1955  MRN: [id number]  Referring provider: Amos Jones, *  Dx:   Encounter Diagnosis     ICD-10-CM    1. Weakness of right lower extremity  R29.898       2. Decreased range of motion  M25.60       3. Gait abnormality  R26.9       4. Aftercare following right hip joint replacement surgery  Z47.1     Z96.641           Start Time: 905  Stop Time: 945  Total time in clinic (min): 40 minutes    Subjective: Pt has nothing new to report. She is a little tired- did a lot around the house this week. Objective: See treatment diary below      Assessment: Kamilah Pelayo tolerated today's treatment session well. Patient education provided on continuation of POC and TE.  Rob Lake completed all TE with good form and no adverse reactions. Pt needed minimal rest breaks between exercises today. Improved overall activity tolerance. Rob Lake continues to benefit from skilled OPPT services to address decreased functional mobility. Will continue to address functional deficits and focus on progression of POC per patient tolerance. Patient performed Nustep to increase blood flow to the area being treated, prepare the muscles for strength training and stretching, improve overall tolerance to activity, and aerobic endurance. Plan: Continue per plan of care. Progress treatment as tolerated.        Precautions: standard, charcots foot R, wears brace on R, fall, lymphedema       Date: 2023 2023 2023    2023 2023 2023  8/15/2023   Visit: #26 #27 #28    #22 #23 #24 #25   Manual:                                       Neuro Re-Ed             Balance                                                                                           Ther Ex             Warm Up seat 11; arms 14  NuStep level 4 10 min  NuStep level 4 10 min  NuStep level 4 10 min     NuStep level 4 8 min (seat 10, arms 14) NuStep level 4 8 min (seat 10, arms 14)  NuStep level 5 10 min    Sit to stand- focus on knee flexion and use of RLE   From high low mat x10     At high low table with hands on walker x10 From chair and airex with hands on walker x10  From high low mat x10     Seated knee flexion heel slides              Standing alternating marches /step tap              Long arc quad         x20 each LE     Seated hip add             Seated hip abd             Standing HS curl  x20 each LE x20 each LE  x20 each LE    x15 each LE  x15 each LE   x20 each LE   Stand:   Alt hip abd  Alt hip ext x20 each LE 20 each LE  x20 each LE     x15 each LE  x15 each LE   x20 each LE    pball trunk flexion              Seated knee flexion              Lateral walking at mirror 4 laps  4 laps  4 laps     3 laps  4 laps   4 laps                                           Ther Activity             Standing tolerance - standing unsupported for time                           Gait Training             Lateral WS --> SLS              Gait training with quad cane  1.5 laps around gym with NBQC 1.5 laps around gym with NBQC 1 lap with NBQC and other shorter distances t/o session     [place] distance t/o session  [place] distance t/o session   1.5 laps around gym with NBQC   Stair training             Walking laps in gym with RW       Walking laps in gym with RW focus on continuous gait pattern 2 laps around gym                   Modalities

## 2023-08-25 ENCOUNTER — OFFICE VISIT (OUTPATIENT)
Age: 68
End: 2023-08-25
Payer: MEDICARE

## 2023-08-25 DIAGNOSIS — Z96.641 AFTERCARE FOLLOWING RIGHT HIP JOINT REPLACEMENT SURGERY: ICD-10-CM

## 2023-08-25 DIAGNOSIS — Z47.1 AFTERCARE FOLLOWING RIGHT HIP JOINT REPLACEMENT SURGERY: ICD-10-CM

## 2023-08-25 DIAGNOSIS — M25.60 DECREASED RANGE OF MOTION: ICD-10-CM

## 2023-08-25 DIAGNOSIS — R29.898 WEAKNESS OF RIGHT LOWER EXTREMITY: Primary | ICD-10-CM

## 2023-08-25 DIAGNOSIS — R26.9 GAIT ABNORMALITY: ICD-10-CM

## 2023-08-25 PROCEDURE — 97110 THERAPEUTIC EXERCISES: CPT

## 2023-08-25 PROCEDURE — 97116 GAIT TRAINING THERAPY: CPT

## 2023-08-25 NOTE — PROGRESS NOTES
Daily Note     Today's date: 2023  Patient name: Maryse Mauricio  : 1955  MRN: [id number]  Referring provider: Bird Neville, *  Dx:   Encounter Diagnosis     ICD-10-CM    1. Weakness of right lower extremity  R29.898       2. Decreased range of motion  M25.60       3. Gait abnormality  R26.9       4. Aftercare following right hip joint replacement surgery  Z47.1     Z96.641           Start Time: 0900  Stop Time: 945  Total time in clinic (min): 45 minutes    Subjective: Pt reports she is feeling ok. Feels she needs to have BM and is backed up. Objective: See treatment diary below      Assessment: Maryse Mauricio tolerated today's treatment session well. Patient education provided on continuation of POC and TE.  Pranav Hoover completed all TE with good form and no adverse reactions. Pt needs come cues for management of NBQC with increased fatigue, but overall good activity tolerance. Pranav Hoover continues to benefit from skilled OPPT services to address decreased tolerance to activity and functional mobility. . Will continue to address functional deficits and focus on progression of POC per patient tolerance. Patient performed Nustep to increase blood flow to the area being treated, prepare the muscles for strength training and stretching, improve overall tolerance to activity, and aerobic endurance. Plan: Continue per plan of care. Progress treatment as tolerated.        Precautions: standard, charcots foot R, wears brace on R, fall, lymphedema       Date: 2023 2023 2023 2023   2023 2023 2023  8/15/2023   Visit: #26 #27 #28 #29   #22 #23 #24 #25   Manual:                                       Neuro Re-Ed             Balance                                                                                           Ther Ex             Warm Up seat 11; arms 14  NuStep level 4 10 min  NuStep level 4 10 min  NuStep level 4 10 min  NuStep level 4 12 min    NuStep level 4 8 min (seat 10, arms 14) NuStep level 4 8 min (seat 10, arms 14)  NuStep level 5 10 min    Sit to stand- focus on knee flexion and use of RLE   From high low mat x10     At high low table with hands on walker x10 From chair and airex with hands on walker x10  From high low mat x10     Seated knee flexion heel slides              Standing alternating marches /step tap              Long arc quad         x20 each LE     Seated hip add             Seated hip abd             Standing HS curl  x20 each LE x20 each LE  x20 each LE x20 each LE   x15 each LE  x15 each LE   x20 each LE   Stand:   Alt hip abd  Alt hip ext x20 each LE 20 each LE  x20 each LE  x20 each LE   x15 each LE  x15 each LE   x20 each LE    pball trunk flexion              Seated knee flexion              Lateral walking at mirror 4 laps  4 laps  4 laps  4 laps    3 laps  4 laps   4 laps                                           Ther Activity             Standing tolerance - standing unsupported for time                           Gait Training             Lateral WS --> SLS              Gait training with quad cane  1.5 laps around gym with NBQC 1.5 laps around gym with NBQC 1 lap with NBQC and other shorter distances t/o session  2.5 laps with NBQC    [place] distance t/o session  [place] distance t/o session   1.5 laps around gym with NBQC   Stair training             Walking laps in gym with RW       Walking laps in gym with RW focus on continuous gait pattern 2 laps around gym                   Modalities

## 2023-08-28 ENCOUNTER — OFFICE VISIT (OUTPATIENT)
Age: 68
End: 2023-08-28
Payer: MEDICARE

## 2023-08-28 DIAGNOSIS — Z96.641 AFTERCARE FOLLOWING RIGHT HIP JOINT REPLACEMENT SURGERY: ICD-10-CM

## 2023-08-28 DIAGNOSIS — R29.898 WEAKNESS OF RIGHT LOWER EXTREMITY: Primary | ICD-10-CM

## 2023-08-28 DIAGNOSIS — M25.60 DECREASED RANGE OF MOTION: ICD-10-CM

## 2023-08-28 DIAGNOSIS — Z47.1 AFTERCARE FOLLOWING RIGHT HIP JOINT REPLACEMENT SURGERY: ICD-10-CM

## 2023-08-28 DIAGNOSIS — R26.9 GAIT ABNORMALITY: ICD-10-CM

## 2023-08-28 PROCEDURE — 97110 THERAPEUTIC EXERCISES: CPT

## 2023-08-28 PROCEDURE — 97116 GAIT TRAINING THERAPY: CPT

## 2023-08-31 NOTE — PROGRESS NOTES
PT Re-Evaluation     Today's date: 2023  Patient name: Carly Gomez  : 1955  MRN: [id number]  Referring provider: Justyn Steele, *  Dx:   Encounter Diagnosis     ICD-10-CM    1. Weakness of right lower extremity  R29.898       2. Decreased range of motion  M25.60       3. Gait abnormality  R26.9       4. Aftercare following right hip joint replacement surgery  Z47.1     Z96.641           Start Time: 0945  Stop Time: 1030  Total time in clinic (min): 45 minutes    Assessment  Assessment details: Carly Gomez is making good progress toward meeting goals written for plan of care for increasing ROM, strength, and increasing overall tolerance to activity. Per objective measure Oc Small is decreasing level of disability related to diagnosis. Oc Small has made improvements with strength, tolerance to activity and normalizing gait pattern. Oc Small continues to benefit from skilled physical therapy services to address range of motion deficits, strength deficits , decreased tolerance to activity and impaired balance  deficits. Oc Small continues to have increased difficulty with functional tasks such as walking community distances. Discussed progress toward goals with Oc Small and together, with patient, decided Oc Small should continue to be seen for PT services 2 days a week for 5 weeks. Oc Small agreeable to plan of care.         Impairments: abnormal gait, abnormal or restricted ROM, activity intolerance, impaired balance, impaired physical strength, lacks appropriate home exercise program, pain with function and weight-bearing intolerance  Barriers to therapy: Complex medical history     Understanding of Dx/Px/POC: good   Prognosis: fair  Prognosis details: Patient Active Problem List:     Wegener's granulomatosis with renal involvement ([address])     Charcot's joint     Anxiety     Benign essential hypertension     BRCA1 positive     Cancer of ovary ([address])     Chronic kidney disease, stage III (moderate) ([address])     Chronic pain disorder     Class 2 obesity     Depression     Dyslipidemia     GERD (gastroesophageal reflux disease)     Lymphedema     Opiate analgesic use agreement exists     Ovarian cancer (HCC)     Post-traumatic osteoarthritis     Increased frequency of urination     Other complicated headache syndrome     Nausea & vomiting     SIRS (systemic inflammatory response syndrome) (HCC)     Increased risk of breast cancer     Acute metabolic encephalopathy     Elevated troponin     Mitral valve disorder     Postnasal drip     Dyspnea on exertion     LVH (left ventricular hypertrophy)     Low immunoglobulin level     Hypertrophic cardiomyopathy (HCC)     Hypogammaglobulinemia, acquired ([address])     Morbid obesity ([address])     Septic shock (HCC)     Chronic right-sided low back pain without sciatica     Cellulitis of right lower extremity     Slow transit constipation     History of pulmonary embolism     Chronic diastolic heart failure (HCC)     Right hip pain     History of bacteremia     Anemia     Encounter for geriatric assessment     Iron deficiency anemia secondary to inadequate dietary iron intake     Hip osteomyelitis, right (HCC)     MSSA bacteremia     PICC (peripherally inserted central catheter) in place    Goals  NEW Goals to be met in 4 weeks from 8/11/2023  1. Damian Panchal will be independent with advanced home exercise program to allow patient to transition from physical therapy care to continue with plan of care at home without supervision from therapist and continue to progress with rehabilitation. PROGRESSING 9/1/2023   2. Damian Joness will decrease TUG to 30 seconds or less with RW indicate improved functional mobility and decreased fall risk. NOT MET 9/1/2023   3. Damian Joness will decrease TUG to 60 seconds or less with NBQC indicate improved functional mobility and decreased fall risk. NOT MET  9/1/2023   4. Damian Joness will increase 2MWT by 50 feet (from 167 feet with RW). NOT MET 9/1/2023   5.  Damian Medinaavers will increase 2MWT by 25 feet with NBQC (from 58 feet). NOT MET 9/1/2023     NEW Goals to be met in 5 weeks from 9/1/2023:  1. Erminio Kick will be independent with advanced home exercise program to allow patient to transition from physical therapy care to continue with plan of care at home without supervision from therapist and continue to progress with rehabilitation. 2. Erminio Kick will decrease TUG to 30 seconds or less with RW indicate improved functional mobility and decreased fall risk. 3. Erminio Kick will decrease TUG to 60 seconds or less with NBQC indicate improved functional mobility and decreased fall risk. 4. Erminio Kick will increase 2MWT by 50 feet (from 167 feet with RW). 5. Erminio Kick will increase 2MWT by 25 feet with NBQC (from 58 feet). Plan  Patient would benefit from: skilled physical therapy  Planned modality interventions: biofeedback, cryotherapy, thermotherapy: hydrocollator packs, ultrasound and traction  Planned therapy interventions: abdominal trunk stabilization, activity modification, ADL retraining, balance, balance/weight bearing training, behavior modification, body mechanics training, breathing training, coordination, fine motor coordination training, flexibility, functional ROM exercises, gait training, graded activity, graded exercise, graded motor, home exercise program, joint mobilization, manual therapy, massage, motor coordination training, neuromuscular re-education, patient education, strengthening, stretching, therapeutic activities and therapeutic exercise  Frequency: 2x week  Duration in weeks: 5  Plan of Care beginning date: 9/1/2023  Plan of Care expiration date: 10/6/2023  Treatment plan discussed with: patient, PTA and referring physician        Subjective Evaluation    History of Present Illness  Date of surgery: 3/6/2023  Mechanism of injury: surgery  Mechanism of injury: Pt reports she has some hip pain intermittently. She feels she is improving, but feels she could still improve.   Feels she could improve walking with the cane and with STS. Patient Goals  Patient goals for therapy: decreased pain, improved balance, increased motion and increased strength  Patient goal: "I want to get my legs stronger and get better with the cane" "I would like to be able to walk farther with cane than I am now"   Pain  Current pain ratin  At best pain ratin  At worst pain rating: 3  Location: generalized RLE pain   Quality: burning  Relieving factors: medications and ice    Treatments  Previous treatment: physical therapy        Objective     Active Range of Motion     Additional Active Range of Motion Details  Grossly limited hip movement- pt unable to sit upright at 90* angle   Grossly limited R knee flexion -? Due to swelling or wraps for lymphedema     Ambulation   Weight-Bearing Status   Weight-Bearing Status (Left): full weight bearing   Weight-Bearing Status (Right): weight-bearing as tolerated    Assistive device used: front-wheeled walker and small base quad cane    Ambulation: Level Surfaces   Ambulation with assistive device: independent    Observational Gait   Gait: antalgic and circumduction   Increased left stance time. Decreased walking speed, stride length, right stance time, left step length and right step length.    Left foot contact pattern: foot flat  Right foot contact pattern: foot flat    Functional Assessment        Comments  TUG 50" with RW (IE) --> TUG with RW (Re-Eval) 45.5 seconds --> 34 second average with RW (2023), 1 min 14 seconds with NBQC (2023)   TUG with NBQC: 1 min 13 seconds seconds (2023) --> 1 min (2023)   TUG with RW: 31.55 seconds (2023) --> 31 seconds (2023)      2MWT: 108 feet with RW --> 167 feet with RW (2023) Modified BRAYAN RPE 8/10 --> 145 feet with RW (2023)   2MWT: 58 feet with NBQC 2023 -->  60 feet with NBQC (2023) moderate difficulty     6MWT with NBQC: 178 feet  (2023) moderate difficulty   6MWT with RW: 423 feet (2023) moderate high difficulty              Precautions: standard, charcots foot R, wears brace on R, fall, lymphedema       Date: 7/14/2023 7/17/2023 7/21/2023 7/24/2023 7/27/23 7/31/2023 8/4/2023 8/7/2023 9/1/2023     Visit: #16 #17 #18 #19 #20 #21 #22 #23 #24    Manual:                                       Neuro Re-Ed             Balance Ground FT 30"  Airex 30"                                                                                           Ther Ex             Warm Up seat 11; arms 14  NuStep level 4 10 min  NuStep level 4 10 min  NuStep level 4 5 min  NuStep level 4 12 min  NuStep level 4 12 min  NuStep level 4 10 min (seat 10, arms 14) NuStep level 4 8 min (seat 10, arms 14) NuStep level 4 8 min (seat 10, arms 14) NuStep level 4 10 min (seat 10, arms 14)    Sit to stand- focus on knee flexion and use of RLE    At high low table x10 At high low table x10 From chair and airex with hands on walker x10  At high low table with hands on walker x10 From chair and airex with hands on walker x10      Seated knee flexion heel slides              Standing alternating marches /step tap              Long arc quad         x20 each LE     Seated hip add  10x10"           Seated hip abd             Standing HS curl  x20 alternating LEs x10 each LE x10 each LE x10 each LE x10 each LE x15 each LE  x15 each LE  x15 each LE      Stand:   Alt hip abd  Alt hip ext 2x10 each LE x10 each LE x10 each LE x10 each LE x10 each LE x15 each LE x15 each LE  x15 each LE      pball trunk flexion              Seated knee flexion   10x10"           Lateral walking at mirror 2 laps  3 laps  3 laps  3 laps  3 laps  3 laps  3 laps  4 laps                                             Ther Activity             Standing tolerance - standing unsupported for time                           Gait Training             Lateral WS --> SLS              Gait training with quad cane    68' with NBQC 5 min and other shoulder distances t/o session    167' with NBQC close S in 11:53 [place] distance t/o session  [place] distance t/o session  Walking tests t/o session for re-eval    Stair training   6" step 2 HR x5 (down backwards) 6" step 2 HR x10 (down backwards)         Walking laps in gym with RW  Walking with RW focus on continuous gait 2 laps around gym  Walking laps in gym with focus on continuous gait 3.5 laps in 4 min 45 seconds  Walking laps in gym with focus on continuous gait 3.5 laps in 5' min 3" with quad cane (1/2 lap around the clinic)  Walking laps in gym with RW focus on continuous gait pattern 2 laps around gym                   Modalities

## 2023-08-31 NOTE — PROGRESS NOTES
Daily Note     Today's date: 2023  Patient name: Chad Jones  : 1955  MRN: [id number]  Referring provider: Misha Billingsley, *  Dx:   Encounter Diagnosis     ICD-10-CM    1. Weakness of right lower extremity  R29.898       2. Decreased range of motion  M25.60       3. Gait abnormality  R26.9       4. Aftercare following right hip joint replacement surgery  Z47.1     Z96.641                      Subjective: ***      Objective: See treatment diary below      Assessment: Chad Jones tolerated today's treatment session well. Patient education provided on [facility] education options:[id number]}. Stephan Al completed all TE with good form and no adverse reactions. ***  Stephan Al continues to benefit from skilled OPPT services to address {dailysymptoms:64588}. Will continue to address functional deficits and focus on progression of POC per patient tolerance. Patient performed {KUAerobic:62904} to increase blood flow to the area being treated, prepare the muscles for strength training and stretching, improve overall tolerance to activity, and aerobic endurance. Plan: Continue per plan of care. Progress note during next visit. Progress treatment as tolerated.        Precautions: standard, charcots foot R, wears brace on R, fall, lymphedema       Date: 2023 2023 2023 2023 2023  2023 2023 2023  8/15/2023   Visit: #26 #27 #28 #29   #22 #23 #24 #25   Manual:                                       Neuro Re-Ed             Balance                                                                                           Ther Ex             Warm Up seat 11; arms 14  NuStep level 4 10 min  NuStep level 4 10 min  NuStep level 4 10 min  NuStep level 4 12 min    NuStep level 4 8 min (seat 10, arms 14) NuStep level 4 8 min (seat 10, arms 14)  NuStep level 5 10 min    Sit to stand- focus on knee flexion and use of RLE   From high low mat x10     At high low table with hands on walker x10 From chair and airex with hands on walker x10  From high low mat x10     Seated knee flexion heel slides              Standing alternating marches /step tap              Long arc quad         x20 each LE     Seated hip add             Seated hip abd             Standing HS curl  x20 each LE x20 each LE  x20 each LE x20 each LE   x15 each LE  x15 each LE   x20 each LE   Stand:   Alt hip abd  Alt hip ext x20 each LE 20 each LE  x20 each LE  x20 each LE   x15 each LE  x15 each LE   x20 each LE    pball trunk flexion              Seated knee flexion              Lateral walking at mirror 4 laps  4 laps  4 laps  4 laps    3 laps  4 laps   4 laps                                           Ther Activity             Standing tolerance - standing unsupported for time                           Gait Training             Lateral WS --> SLS              Gait training with quad cane  1.5 laps around gym with NBQC 1.5 laps around gym with NBQC 1 lap with NBQC and other shorter distances t/o session  2.5 laps with NBQC    [place] distance t/o session  [place] distance t/o session   1.5 laps around gym with NBQC   Stair training             Walking laps in gym with RW       Walking laps in gym with RW focus on continuous gait pattern 2 laps around gym                   Modalities

## 2023-09-01 ENCOUNTER — EVALUATION (OUTPATIENT)
Age: 68
End: 2023-09-01
Payer: MEDICARE

## 2023-09-01 DIAGNOSIS — M25.60 DECREASED RANGE OF MOTION: ICD-10-CM

## 2023-09-01 DIAGNOSIS — Z96.641 AFTERCARE FOLLOWING RIGHT HIP JOINT REPLACEMENT SURGERY: ICD-10-CM

## 2023-09-01 DIAGNOSIS — R29.898 WEAKNESS OF RIGHT LOWER EXTREMITY: Primary | ICD-10-CM

## 2023-09-01 DIAGNOSIS — Z47.1 AFTERCARE FOLLOWING RIGHT HIP JOINT REPLACEMENT SURGERY: ICD-10-CM

## 2023-09-01 DIAGNOSIS — R26.9 GAIT ABNORMALITY: ICD-10-CM

## 2023-09-01 PROCEDURE — 97110 THERAPEUTIC EXERCISES: CPT

## 2023-09-01 PROCEDURE — 97116 GAIT TRAINING THERAPY: CPT

## 2023-09-01 PROCEDURE — 97164 PT RE-EVAL EST PLAN CARE: CPT

## 2023-09-04 NOTE — PROGRESS NOTES
Daily Note     Today's date: 2023  Patient name: Adrian Chandler  : 1955  MRN: [id number]  Referring provider: Ilda Germain, *  Dx:   Encounter Diagnosis     ICD-10-CM    1. Weakness of right lower extremity  R29.898       2. Decreased range of motion  M25.60       3. Gait abnormality  R26.9       4. Aftercare following right hip joint replacement surgery  Z47.1     Z96.641           Start Time: 1740  Stop Time:   Total time in clinic (min): 45 minutes    Subjective: Pt reports she was able to walk into restaurant using NBQC. Objective: See treatment diary below      Assessment: Adrian Chandler tolerated today's treatment session well. Patient education provided on continuation of POC and TE.  Hanh Lockwood completed all TE with good form and no adverse reactions. Hanh Lockwood continues to benefit from skilled OPPT services to address decreased functional mobility. Will continue to address functional deficits and focus on progression of POC per patient tolerance. Patient performed Nustep to increase blood flow to the area being treated, prepare the muscles for strength training and stretching, improve overall tolerance to activity, and aerobic endurance. Plan: Continue per plan of care. Progress treatment as tolerated.        Precautions: standard, charcots foot R, wears brace on R, fall, lymphedema       Date: 2023   Visit: #16 #17 #18 #19 #20 #21 #22 #23 #24 #25   Manual:                                       Neuro Re-Ed             Balance Ground FT 30"  Airex 30"                                                                                           Ther Ex             Warm Up seat 11; arms 14  NuStep level 4 10 min  NuStep level 4 10 min  NuStep level 4 5 min  NuStep level 4 12 min  NuStep level 4 12 min  NuStep level 4 10 min (seat 10, arms 14) NuStep level 4 8 min (seat 10, arms 14) NuStep level 4 8 min (seat 10, arms 14) NuStep level 4 10 min (seat 10, arms 14) NuStep level 4 15 min (seat 10, arms 14)    Sit to stand- focus on knee flexion and use of RLE    At high low table x10 At high low table x10 From chair and airex with hands on walker x10  At high low table with hands on walker x10 From chair and airex with hands on walker x10   At high low table x10   Seated knee flexion heel slides              Standing alternating marches /step tap              Long arc quad         x20 each LE  x20 each LE   Seated hip add  10x10"           Seated hip abd             Standing HS curl  x20 alternating LEs x10 each LE x10 each LE x10 each LE x10 each LE x15 each LE  x15 each LE  x15 each LE   x20 each LE    Stand:   Alt hip abd  Alt hip ext 2x10 each LE x10 each LE x10 each LE x10 each LE x10 each LE x15 each LE x15 each LE  x15 each LE   x20 each LE    pball trunk flexion              Seated knee flexion   10x10"           Lateral walking at mirror 2 laps  3 laps  3 laps  3 laps  3 laps  3 laps  3 laps  4 laps                                             Ther Activity             Standing tolerance - standing unsupported for time                           Gait Training             Lateral WS --> SLS              Gait training with quad cane    68' with NBQC 5 min and other shoulder distances t/o session    167' with NBQC close S in 11:53 [place] distance t/o session  [place] distance t/o session  Walking tests t/o session for re-eval walkng t/o session    Stair training   6" step 2 HR x5 (down backwards) 6" step 2 HR x10 (down backwards)         Walking laps in gym with RW  Walking with RW focus on continuous gait 2 laps around gym  Walking laps in gym with focus on continuous gait 3.5 laps in 4 min 45 seconds  Walking laps in gym with focus on continuous gait 3.5 laps in 5' min 3" with quad cane (1/2 lap around the clinic)  Walking laps in gym with RW focus on continuous gait pattern 2 laps around gym Modalities

## 2023-09-05 ENCOUNTER — OFFICE VISIT (OUTPATIENT)
Age: 68
End: 2023-09-05
Payer: MEDICARE

## 2023-09-05 DIAGNOSIS — M25.60 DECREASED RANGE OF MOTION: ICD-10-CM

## 2023-09-05 DIAGNOSIS — Z96.641 AFTERCARE FOLLOWING RIGHT HIP JOINT REPLACEMENT SURGERY: ICD-10-CM

## 2023-09-05 DIAGNOSIS — R29.898 WEAKNESS OF RIGHT LOWER EXTREMITY: Primary | ICD-10-CM

## 2023-09-05 DIAGNOSIS — Z47.1 AFTERCARE FOLLOWING RIGHT HIP JOINT REPLACEMENT SURGERY: ICD-10-CM

## 2023-09-05 DIAGNOSIS — R26.9 GAIT ABNORMALITY: ICD-10-CM

## 2023-09-05 PROCEDURE — 97110 THERAPEUTIC EXERCISES: CPT

## 2023-09-05 PROCEDURE — 97116 GAIT TRAINING THERAPY: CPT

## 2023-09-08 ENCOUNTER — APPOINTMENT (OUTPATIENT)
Age: 68
End: 2023-09-08
Payer: MEDICARE

## 2023-09-11 ENCOUNTER — OFFICE VISIT (OUTPATIENT)
Age: 68
End: 2023-09-11
Payer: MEDICARE

## 2023-09-11 DIAGNOSIS — M25.60 DECREASED RANGE OF MOTION: ICD-10-CM

## 2023-09-11 DIAGNOSIS — Z96.641 AFTERCARE FOLLOWING RIGHT HIP JOINT REPLACEMENT SURGERY: ICD-10-CM

## 2023-09-11 DIAGNOSIS — R29.898 WEAKNESS OF RIGHT LOWER EXTREMITY: Primary | ICD-10-CM

## 2023-09-11 DIAGNOSIS — Z47.1 AFTERCARE FOLLOWING RIGHT HIP JOINT REPLACEMENT SURGERY: ICD-10-CM

## 2023-09-11 DIAGNOSIS — R26.9 GAIT ABNORMALITY: ICD-10-CM

## 2023-09-11 PROCEDURE — 97116 GAIT TRAINING THERAPY: CPT

## 2023-09-11 PROCEDURE — 97110 THERAPEUTIC EXERCISES: CPT

## 2023-09-11 NOTE — PROGRESS NOTES
Daily Note     Today's date: 2023  Patient name: Roman Tucker  : 1955  MRN: [id number]  Referring provider: Lela Pena, *  Dx:   Encounter Diagnosis     ICD-10-CM    1. Weakness of right lower extremity  R29.898       2. Aftercare following right hip joint replacement surgery  Z47.1     Z96.641       3. Decreased range of motion  M25.60       4. Gait abnormality  R26.9           Start Time: 0815  Stop Time: 0900  Total time in clinic (min): 45 minutes    Subjective: Pt has nothing new to report today. Objective: See treatment diary below      Assessment: Roman Tucker tolerated today's treatment session well. Patient education provided on continution of POC and TE.  Katerina Solis completed all TE with good form and no adverse reactions. Patient had mild LOB during STS exercises with increased fatigue. Katerina Solis continues to benefit from skilled OPPT services to address decreased functional mobilty. . Will continue to address functional deficits and focus on progression of POC per patient tolerance. Patient performed Nustep to increase blood flow to the area being treated, prepare the muscles for strength training and stretching, improve overall tolerance to activity, and aerobic endurance. Plan: Continue per plan of care. Progress treatment as tolerated.        Precautions: standard, charcots foot R, wears brace on R, fall, lymphedema       Date: 2023   Visit:      #21 #22 #23 #24 #25   Manual:                                       Neuro Re-Ed             Balance                                                                                           Ther Ex             Warm Up seat 11; arms 14  NuStep level 4 10 min (seat 9, arms 14)      NuStep level 4 10 min (seat 10, arms 14) NuStep level 4 8 min (seat 10, arms 14) NuStep level 4 8 min (seat 10, arms 14) NuStep level 4 10 min (seat 10, arms 14) NuStep level 4 15 min (seat 10, arms 14)    Sit to stand- focus on knee flexion and use of RLE At high low table 2x10      From chair and airex with hands on walker x10  At high low table with hands on walker x10 From chair and airex with hands on walker x10   At high low table x10   Seated knee flexion heel slides              Standing alternating marches /step tap              Long arc quad  x30 each LE       x20 each LE  x20 each LE   Seated hip add             Seated hip abd             Standing HS curl  x20 each LE     x15 each LE  x15 each LE  x15 each LE   x20 each LE    Stand:   Alt hip abd  Alt hip ext x20 each BLE     x15 each LE x15 each LE  x15 each LE   x20 each LE    pball trunk flexion              Seated knee flexion              Lateral walking at mirror 4 laps at mirror     3 laps  3 laps  4 laps                                             Ther Activity             Standing tolerance - standing unsupported for time                           Gait Training             Lateral WS --> SLS              Gait training with quad cane  Short distances t/o session with cane and RW       167' with NBQC close S in 11:53 [place] distance t/o session  [place] distance t/o session  Walking tests t/o session for re-eval walkng t/o session    Stair training             Walking laps in gym with RW       Walking laps in gym with RW focus on continuous gait pattern 2 laps around gym                   Modalities

## 2023-09-13 NOTE — PROGRESS NOTES
Daily Note     Today's date: 9/15/2023  Patient name: Vidal Edwards  : 1955  MRN: [id number]  Referring provider: Martell Choi, *  Dx:   Encounter Diagnosis     ICD-10-CM    1. Weakness of right lower extremity  R29.898       2. Gait abnormality  R26.9       3. Aftercare following right hip joint replacement surgery  Z47.1     Z96.641       4. Decreased range of motion  M25.60           Start Time: 820  Stop Time: 915  Total time in clinic (min): 55 minutes    Subjective: Pt reports some hip pain after last session- but also walked into diner with NBQC after therapy. Objective: See treatment diary below      Assessment: Vidal Edwards tolerated today's treatment session well. Patient education provided on continuation of POC and TE.  Leslie Stacy completed all TE with good form and no adverse reactions. Patient continues to make improvement with overall activity tolerance. Leslie Stacy continues to benefit from skilled OPPT services to address decreased functional mobility . Will continue to address functional deficits and focus on progression of POC per patient tolerance. Patient performed Nustep to increase blood flow to the area being treated, prepare the muscles for strength training and stretching, improve overall tolerance to activity, and aerobic endurance. Plan: Continue per plan of care. Progress treatment as tolerated.        Precautions: standard, charcots foot R, wears brace on R, fall, lymphedema       Date: 2023  9/15/2023    2023 2023 2023 2023  2023   Visit:      #21 #22 #23 #24 #25   Manual:                                       Neuro Re-Ed             Balance                                                                                           Ther Ex             Warm Up seat 11; arms 14  NuStep level 4 10 min (seat 9, arms 14)  NuStep level 4 10 min (seat 10, arms 14)     NuStep level 4 10 min (seat 10, arms 14) NuStep level 4 8 min (seat 10, arms 14) NuStep level 4 8 min (seat 10, arms 14) NuStep level 4 10 min (seat 10, arms 14) NuStep level 4 15 min (seat 10, arms 14)    Sit to stand- focus on knee flexion and use of RLE At high low table 2x10  At high low table 2x10     From chair and airex with hands on walker x10  At high low table with hands on walker x10 From chair and airex with hands on walker x10   At high low table x10   Seated knee flexion heel slides              Standing alternating marches /step tap              Long arc quad  x30 each LE       x20 each LE  x20 each LE   Seated hip add             Seated hip abd             Standing HS curl  x20 each LE     x15 each LE  x15 each LE  x15 each LE   x20 each LE    Stand:   Alt hip abd  Alt hip ext x20 each BLE x20 each BLE     x15 each LE x15 each LE  x15 each LE   x20 each LE    pball trunk flexion              Seated knee flexion              Lateral walking at mirror 4 laps at mirror 4 laps at mirror     3 laps  3 laps  4 laps                                             Ther Activity             Standing tolerance - standing unsupported for time                           Gait Training             Lateral WS --> SLS              Gait training with quad cane  Short distances t/o session with cane and RW   1 long lap with NBQC and shorter distances t/o session     167' with NBQC close S in 11:53 [place] distance t/o session  [place] distance t/o session  Walking tests t/o session for re-eval walkng t/o session    Stair training             Walking laps in gym with RW       Walking laps in gym with RW focus on continuous gait pattern 2 laps around gym                   Modalities

## 2023-09-15 ENCOUNTER — OFFICE VISIT (OUTPATIENT)
Age: 68
End: 2023-09-15
Payer: MEDICARE

## 2023-09-15 DIAGNOSIS — R26.9 GAIT ABNORMALITY: ICD-10-CM

## 2023-09-15 DIAGNOSIS — Z96.641 AFTERCARE FOLLOWING RIGHT HIP JOINT REPLACEMENT SURGERY: ICD-10-CM

## 2023-09-15 DIAGNOSIS — R29.898 WEAKNESS OF RIGHT LOWER EXTREMITY: Primary | ICD-10-CM

## 2023-09-15 DIAGNOSIS — M25.60 DECREASED RANGE OF MOTION: ICD-10-CM

## 2023-09-15 DIAGNOSIS — Z47.1 AFTERCARE FOLLOWING RIGHT HIP JOINT REPLACEMENT SURGERY: ICD-10-CM

## 2023-09-15 PROCEDURE — 97116 GAIT TRAINING THERAPY: CPT

## 2023-09-15 PROCEDURE — 97110 THERAPEUTIC EXERCISES: CPT

## 2023-09-18 ENCOUNTER — OFFICE VISIT (OUTPATIENT)
Age: 68
End: 2023-09-18
Payer: MEDICARE

## 2023-09-18 DIAGNOSIS — Z47.1 AFTERCARE FOLLOWING RIGHT HIP JOINT REPLACEMENT SURGERY: ICD-10-CM

## 2023-09-18 DIAGNOSIS — R26.9 GAIT ABNORMALITY: ICD-10-CM

## 2023-09-18 DIAGNOSIS — R29.898 WEAKNESS OF RIGHT LOWER EXTREMITY: Primary | ICD-10-CM

## 2023-09-18 DIAGNOSIS — M25.60 DECREASED RANGE OF MOTION: ICD-10-CM

## 2023-09-18 DIAGNOSIS — Z96.641 AFTERCARE FOLLOWING RIGHT HIP JOINT REPLACEMENT SURGERY: ICD-10-CM

## 2023-09-18 PROCEDURE — 97116 GAIT TRAINING THERAPY: CPT

## 2023-09-18 PROCEDURE — 97110 THERAPEUTIC EXERCISES: CPT

## 2023-09-18 NOTE — PROGRESS NOTES
Daily Note     Today's date: 2023  Patient name: Carly Gomez  : 1955  MRN: [id number]  Referring provider: Justyn Steele, *  Dx:   Encounter Diagnosis     ICD-10-CM    1. Weakness of right lower extremity  R29.898       2. Decreased range of motion  M25.60       3. Gait abnormality  R26.9       4. Aftercare following right hip joint replacement surgery  Z47.1     Z96.641           Start Time: 0945  Stop Time: 1030  Total time in clinic (min): 45 minutes    Subjective: Pt reports she is doing well. Nothing new to report. Objective: See treatment diary below      Assessment: Carly Gomez tolerated today's treatment session well. Patient education provided on continuation of POC and TE.  Oc Small completed all TE with good form and no adverse reactions. Oc Small continues to benefit from skilled OPPT services to address decreasd functional mobility. Will continue to address functional deficits and focus on progression of POC per patient tolerance. Patient performed Nustep to increase blood flow to the area being treated, prepare the muscles for strength training and stretching, improve overall tolerance to activity, and aerobic endurance. Plan: Continue per plan of care. Progress treatment as tolerated.        Precautions: standard, charcots foot R, wears brace on R, fall, lymphedema       Date: 2023  9/15/2023 2023   2023 2023 2023 2023  2023   Visit:      #21 #22 #23 #24 #25   Manual:                                       Neuro Re-Ed             Balance                                                                                           Ther Ex             Warm Up seat 11; arms 14  NuStep level 4 10 min (seat 9, arms 14)  NuStep level 4 10 min (seat 10, arms 14)  NuStep level 4 11 min (seat 10, arms 14)    NuStep level 4 10 min (seat 10, arms 14) NuStep level 4 8 min (seat 10, arms 14) NuStep level 4 8 min (seat 10, arms 14) NuStep level 4 10 min (seat 10, arms 14) NuStep level 4 15 min (seat 10, arms 14)    Sit to stand- focus on knee flexion and use of RLE At high low table 2x10  At high low table 2x10  At high low table 2x10    From chair and airex with hands on walker x10  At high low table with hands on walker x10 From chair and airex with hands on walker x10   At high low table x10   Seated knee flexion heel slides              Standing alternating marches /step tap              Long arc quad  x30 each LE  x20 each LE     x20 each LE  x20 each LE   Seated hip add             Seated hip abd             Standing HS curl  x20 each LE     x15 each LE  x15 each LE  x15 each LE   x20 each LE    Stand:   Alt hip abd  Alt hip ext x20 each BLE x20 each BLE     x15 each LE x15 each LE  x15 each LE   x20 each LE    pball trunk flexion              Seated knee flexion              Lateral walking at mirror 4 laps at mirror 4 laps at mirror     3 laps  3 laps  4 laps                                             Ther Activity             Standing tolerance - standing unsupported for time                           Gait Training             Lateral WS --> SLS              Gait training with quad cane  Short distances t/o session with cane and RW   1 long lap with NBQC and shorter distances t/o session  1.5 long laps with NBQC and shorter distances t/o session with and with RW   167' with NBQC close S in 11:53 [place] distance t/o session  [place] distance t/o session  Walking tests t/o session for re-eval walkng t/o session    Stair training             Walking laps in gym with RW       Walking laps in gym with RW focus on continuous gait pattern 2 laps around gym                   Modalities

## 2023-09-19 ENCOUNTER — APPOINTMENT (OUTPATIENT)
Dept: RADIOLOGY | Facility: MEDICAL CENTER | Age: 68
End: 2023-09-19
Payer: MEDICARE

## 2023-09-19 ENCOUNTER — OFFICE VISIT (OUTPATIENT)
Dept: OBGYN CLINIC | Facility: MEDICAL CENTER | Age: 68
End: 2023-09-19
Payer: MEDICARE

## 2023-09-19 VITALS
HEIGHT: 63 IN | HEART RATE: 65 BPM | DIASTOLIC BLOOD PRESSURE: 70 MMHG | WEIGHT: 203.93 LBS | BODY MASS INDEX: 36.13 KG/M2 | SYSTOLIC BLOOD PRESSURE: 123 MMHG

## 2023-09-19 DIAGNOSIS — Z96.641 AFTERCARE FOLLOWING RIGHT HIP JOINT REPLACEMENT SURGERY: Primary | ICD-10-CM

## 2023-09-19 DIAGNOSIS — M25.551 RIGHT HIP PAIN: ICD-10-CM

## 2023-09-19 DIAGNOSIS — Z47.1 AFTERCARE FOLLOWING RIGHT HIP JOINT REPLACEMENT SURGERY: Primary | ICD-10-CM

## 2023-09-19 PROCEDURE — 99214 OFFICE O/P EST MOD 30 MIN: CPT | Performed by: STUDENT IN AN ORGANIZED HEALTH CARE EDUCATION/TRAINING PROGRAM

## 2023-09-19 PROCEDURE — 73502 X-RAY EXAM HIP UNI 2-3 VIEWS: CPT

## 2023-09-19 NOTE — PROGRESS NOTES
Progress Note - Orthopedics   Vidal Edwards 77 y.o. female MRN: [id number]  Unit/Bed#:  Encounter: [id number]    Assessment:  78 y/o female doing well 6 months s/p Right hip conversion to ROWDY (articulating permanent antibiotic spacer), I&D, removal of hardware from 3/6/23. Xrays reviewed today showing that the prosthesis has remained in good position. Continue with WBAT with assistance as needed. Continue PT, no formal hip precautions at this time. Continue with antibiotics via I.D. We will see her back in 6 months for her 1 year visit. Plan:  · Continue WBAT  · Continue Tylenol/ibuprofen PRN for pain control  · No formal restrictions  · Continue antibiotics per ID recommendations: cefadroxil through 3/06/2024 given retained hardware and consider potential ongoing suppressive abx per last clinic note  · Continue physical therapy, patient continues to see benefits from this, she is now ambulating with a cane rather than walker      Subjective: She has no new complaints. Occasionally has lateral hip pain and groin pain with increased physical activity like doing physical therapy sessions. These are generally around 2 out of 10 in intensity and respond to her home pain medications (Oxy 30 ER) prescribed by her PCP given history of ovarian cancer and recent hip surgery causing chronic pain. She denies fevers chills nausea vomiting chest pain shortness of breath. Vitals: Blood pressure 123/70, pulse 65, height 5' 3" (1.6 m), weight 92.5 kg (203 lb 14.8 oz). ,Body mass index is 36.12 kg/m². [unfilled]    Invasive Devices     Peripherally Inserted Central Catheter Line  Duration           PICC Line 40/44/39 Right Basilic 654 days                Ortho Exam: Hip  Incision well appearing no erythema drainage. There is lymphedem to RLE and a charcot boot in place. Ambulates slwoly with a four point cane without significant balance issues. The limb lengths appear equal and are mobile.  She has hip flexion to about 100 and full extension. She is able to straight leg raise. Global muscle evaluation limited strength wise due to wieght of leg secondary to significant lymphedema. Her foot is warm and sensate.      Lab, Imaging and other studies: None today

## 2023-09-21 NOTE — PROGRESS NOTES
Daily Note     Today's date: 2023  Patient name: Alex Briggs  : 1955  MRN: [id number]  Referring provider: Kobe Astudillo, *  Dx:   Encounter Diagnosis     ICD-10-CM    1. Weakness of right lower extremity  R29.898       2. Aftercare following right hip joint replacement surgery  Z47.1     Z96.641       3. Decreased range of motion  M25.60       4. Gait abnormality  R26.9           Start Time: 0815  Stop Time: 0900  Total time in clinic (min): 45 minutes    Subjective: Pt reports she saw ortho the other day and had x ray and things are healing well. Objective: See treatment diary below      Assessment: Alex Briggs tolerated today's treatment session well. Patient education provided on continuation of POC and TE.  Jodi Goddard completed all TE with good form and no adverse reactions. Pt able to walk longest distance to date so far with good quality of movement. Needing some cues to not advance cane too far with fatigue. Jodi Goddard continues to benefit from skilled OPPT services to address decreased functional mobility . Will continue to address functional deficits and focus on progression of POC per patient tolerance. Patient performed Nustep to increase blood flow to the area being treated, prepare the muscles for strength training and stretching, improve overall tolerance to activity, and aerobic endurance. Plan: Continue per plan of care. Progress treatment as tolerated.        Precautions: standard, charcots foot R, wears brace on R, fall, lymphedema       Date: 2023  9/15/2023 2023 2023         Visit: #26 #27 #28 #29         Manual:                                       Neuro Re-Ed             Balance                                                                                           Ther Ex             Warm Up seat 11; arms 14  NuStep level 4 10 min (seat 9, arms 14)  NuStep level 4 10 min (seat 10, arms 14)  NuStep level 4 11 min (seat 10, arms 14)  NuStep level 4 11 min (seat 10, arms 14)          Sit to stand- focus on knee flexion and use of RLE At high low table 2x10  At high low table 2x10  At high low table 2x10  At high low table  x10          Seated knee flexion heel slides              Standing alternating marches /step tap              Long arc quad  x30 each LE  x20 each LE          Seated hip add             Seated hip abd             Standing HS curl  x20 each LE   x20 each LE         Stand:   Alt hip abd  Alt hip ext x20 each BLE x20 each BLE   x20 each BLE         pball trunk flexion              Seated knee flexion              Lateral walking at mirror 4 laps at [facility] 4 laps at mirror   4 laps at [facility] Activity             Standing tolerance - standing unsupported for time                           Gait Training             Lateral WS --> SLS              Gait training with quad cane  Short distances t/o session with cane and RW   1 long lap with NBQC and shorter distances t/o session  1.5 long laps with NBQC and shorter distances t/o session with and with RW 3 long laps with NBQC          Stair training             Walking laps in gym with RW                          Modalities

## 2023-09-22 ENCOUNTER — OFFICE VISIT (OUTPATIENT)
Age: 68
End: 2023-09-22
Payer: MEDICARE

## 2023-09-22 DIAGNOSIS — R29.898 WEAKNESS OF RIGHT LOWER EXTREMITY: Primary | ICD-10-CM

## 2023-09-22 DIAGNOSIS — M25.60 DECREASED RANGE OF MOTION: ICD-10-CM

## 2023-09-22 DIAGNOSIS — R26.9 GAIT ABNORMALITY: ICD-10-CM

## 2023-09-22 DIAGNOSIS — Z96.641 AFTERCARE FOLLOWING RIGHT HIP JOINT REPLACEMENT SURGERY: ICD-10-CM

## 2023-09-22 DIAGNOSIS — Z47.1 AFTERCARE FOLLOWING RIGHT HIP JOINT REPLACEMENT SURGERY: ICD-10-CM

## 2023-09-22 PROCEDURE — 97116 GAIT TRAINING THERAPY: CPT

## 2023-09-22 PROCEDURE — 97110 THERAPEUTIC EXERCISES: CPT

## 2023-09-22 NOTE — PROGRESS NOTES
Daily Note     Today's date: 2023  Patient name: Emmanuelle Olivia  : 1955  MRN: [id number]  Referring provider: Radha Lewis, *  Dx:   Encounter Diagnosis     ICD-10-CM    1. Weakness of right lower extremity  R29.898       2. Gait abnormality  R26.9       3. Aftercare following right hip joint replacement surgery  Z47.1     Z96.641       4. Decreased range of motion  M25.60           Start Time: 0900  Stop Time: 0945  Total time in clinic (min): 45 minutes    Subjective: Pt has nothing new to report. Objective: See treatment diary below      Assessment: Emmanuelle Olivia tolerated today's treatment session well. Patient education provided on continuation of POC and TE.  Augusta Alpers completed all TE with good form and no adverse reactions. Augusta Alpers continues to benefit from skilled OPPT services to address decreased functional mobility. Will continue to address functional deficits and focus on progression of POC per patient tolerance. Patient performed Nustep to increase blood flow to the area being treated, prepare the muscles for strength training and stretching, improve overall tolerance to activity, and aerobic endurance. Plan: Continue per plan of care. Progress treatment as tolerated.        Precautions: standard, charcots foot R, wears brace on R, fall, lymphedema       Date: 2023  9/15/2023 2023 2023 2023        Visit: #26 #27 #28 #29 #30        Manual:                                       Neuro Re-Ed             Balance                                                                                           Ther Ex             Warm Up seat 11; arms 14  NuStep level 4 10 min (seat 9, arms 14)  NuStep level 4 10 min (seat 10, arms 14)  NuStep level 4 11 min (seat 10, arms 14)  NuStep level 4 11 min (seat 10, arms 14)  NuStep level 4 12 min (seat 10, arms 14)         Sit to stand- focus on knee flexion and use of RLE At high low table 2x10  At high low table 2x10  At high low table 2x10  At high low table  x10  From chair with airex x10         Seated knee flexion heel slides              Standing alternating marches /step tap              Long arc quad  x30 each LE  x20 each LE          Seated hip add             Seated hip abd             Standing HS curl  x20 each LE   x20 each LE x20 each LE        Stand:   Alt hip abd  Alt hip ext x20 each BLE x20 each BLE   x20 each BLE x20 each LE        pball trunk flexion              Seated knee flexion              Lateral walking at mirror 4 laps at mirror 4 laps at [facility]   4 laps at mirror  4 laps at [facility]                                                Ther Activity             Standing tolerance - standing unsupported for time                           Gait Training             Lateral WS --> SLS              Gait training with quad cane  Short distances t/o session with cane and RW   1 long lap with NBQC and shorter distances t/o session  1.5 long laps with NBQC and shorter distances t/o session with and with RW 3 long laps with NBQC  2 laps total t/o session with NBQC        Stair training             Walking laps in gym with RW                          Modalities

## 2023-09-25 ENCOUNTER — OFFICE VISIT (OUTPATIENT)
Age: 68
End: 2023-09-25
Payer: MEDICARE

## 2023-09-25 ENCOUNTER — APPOINTMENT (OUTPATIENT)
Age: 68
End: 2023-09-25
Payer: MEDICARE

## 2023-09-25 DIAGNOSIS — R26.9 GAIT ABNORMALITY: ICD-10-CM

## 2023-09-25 DIAGNOSIS — Z96.641 AFTERCARE FOLLOWING RIGHT HIP JOINT REPLACEMENT SURGERY: ICD-10-CM

## 2023-09-25 DIAGNOSIS — M86.9 HIP OSTEOMYELITIS, RIGHT (HCC): ICD-10-CM

## 2023-09-25 DIAGNOSIS — R29.898 WEAKNESS OF RIGHT LOWER EXTREMITY: Primary | ICD-10-CM

## 2023-09-25 DIAGNOSIS — M25.60 DECREASED RANGE OF MOTION: ICD-10-CM

## 2023-09-25 DIAGNOSIS — Z13.6 SCREENING FOR ISCHEMIC HEART DISEASE: ICD-10-CM

## 2023-09-25 DIAGNOSIS — Z47.1 AFTERCARE FOLLOWING RIGHT HIP JOINT REPLACEMENT SURGERY: ICD-10-CM

## 2023-09-25 LAB
ALBUMIN SERPL BCP-MCNC: 4.2 G/DL (ref 3.5–5)
ALP SERPL-CCNC: 105 U/L (ref 34–104)
ALT SERPL W P-5'-P-CCNC: 10 U/L (ref 7–52)
ANION GAP SERPL CALCULATED.3IONS-SCNC: 9 MMOL/L
AST SERPL W P-5'-P-CCNC: 23 U/L (ref 13–39)
BASOPHILS # BLD AUTO: 0.02 THOUSANDS/ÂΜL (ref 0–0.1)
BASOPHILS NFR BLD AUTO: 0 % (ref 0–1)
BILIRUB SERPL-MCNC: 0.43 MG/DL (ref 0.2–1)
BUN SERPL-MCNC: 25 MG/DL (ref 5–25)
CALCIUM SERPL-MCNC: 9.4 MG/DL (ref 8.4–10.2)
CHLORIDE SERPL-SCNC: 102 MMOL/L (ref 96–108)
CHOLEST SERPL-MCNC: 187 MG/DL
CO2 SERPL-SCNC: 27 MMOL/L (ref 21–32)
CREAT SERPL-MCNC: 1.2 MG/DL (ref 0.6–1.3)
EOSINOPHIL # BLD AUTO: 0.24 THOUSAND/ÂΜL (ref 0–0.61)
EOSINOPHIL NFR BLD AUTO: 4 % (ref 0–6)
ERYTHROCYTE [DISTWIDTH] IN BLOOD BY AUTOMATED COUNT: 15.1 % (ref 11.6–15.1)
GFR SERPL CREATININE-BSD FRML MDRD: 45 ML/MIN/1.73SQ M
GLUCOSE P FAST SERPL-MCNC: 76 MG/DL (ref 65–99)
HCT VFR BLD AUTO: 39.7 % (ref 34.8–46.1)
HDLC SERPL-MCNC: 41 MG/DL
HGB BLD-MCNC: 11.8 G/DL (ref 11.5–15.4)
IMM GRANULOCYTES # BLD AUTO: 0.01 THOUSAND/UL (ref 0–0.2)
IMM GRANULOCYTES NFR BLD AUTO: 0 % (ref 0–2)
LDLC SERPL CALC-MCNC: 108 MG/DL (ref 0–100)
LYMPHOCYTES # BLD AUTO: 1.99 THOUSANDS/ÂΜL (ref 0.6–4.47)
LYMPHOCYTES NFR BLD AUTO: 35 % (ref 14–44)
MCH RBC QN AUTO: 29.1 PG (ref 26.8–34.3)
MCHC RBC AUTO-ENTMCNC: 29.7 G/DL (ref 31.4–37.4)
MCV RBC AUTO: 98 FL (ref 82–98)
MONOCYTES # BLD AUTO: 0.56 THOUSAND/ÂΜL (ref 0.17–1.22)
MONOCYTES NFR BLD AUTO: 10 % (ref 4–12)
NEUTROPHILS # BLD AUTO: 2.89 THOUSANDS/ÂΜL (ref 1.85–7.62)
NEUTS SEG NFR BLD AUTO: 51 % (ref 43–75)
NONHDLC SERPL-MCNC: 146 MG/DL
NRBC BLD AUTO-RTO: 0 /100 WBCS
PLATELET # BLD AUTO: 208 THOUSANDS/UL (ref 149–390)
PMV BLD AUTO: 9.4 FL (ref 8.9–12.7)
POTASSIUM SERPL-SCNC: 4.7 MMOL/L (ref 3.5–5.3)
PROT SERPL-MCNC: 7.3 G/DL (ref 6.4–8.4)
RBC # BLD AUTO: 4.05 MILLION/UL (ref 3.81–5.12)
SODIUM SERPL-SCNC: 138 MMOL/L (ref 135–147)
TRIGL SERPL-MCNC: 191 MG/DL
WBC # BLD AUTO: 5.71 THOUSAND/UL (ref 4.31–10.16)

## 2023-09-25 PROCEDURE — 80053 COMPREHEN METABOLIC PANEL: CPT

## 2023-09-25 PROCEDURE — 97116 GAIT TRAINING THERAPY: CPT

## 2023-09-25 PROCEDURE — 80061 LIPID PANEL: CPT

## 2023-09-25 PROCEDURE — 97110 THERAPEUTIC EXERCISES: CPT

## 2023-09-25 PROCEDURE — 85025 COMPLETE CBC W/AUTO DIFF WBC: CPT

## 2023-09-25 PROCEDURE — 36415 COLL VENOUS BLD VENIPUNCTURE: CPT
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
## 2023-09-28 NOTE — PROGRESS NOTES
Daily Note     Today's date: 2023  Patient name: Maryse Mauricio  : 1955  MRN: [id number]  Referring provider: Bird Neville, *  Dx:   Encounter Diagnosis     ICD-10-CM    1. Weakness of right lower extremity  R29.898       2. Decreased range of motion  M25.60       3. Gait abnormality  R26.9       4. Aftercare following right hip joint replacement surgery  Z47.1     Z96.641           Start Time: 0820  Stop Time: 0900  Total time in clinic (min): 40 minutes    Subjective: Pt reports she is doing well. Nothing new to report. Objective: See treatment diary below      Assessment: Maryse Mauricio tolerated today's treatment session well. Patient education provided on contination of POC and TE.  Pranav Hoover completed all TE with good form and no adverse reactions. Pranav Hoover continues to benefit from skilled OPPT services to address decreased functional mobility . Will continue to address functional deficits and focus on progression of POC per patient tolerance. Plan: Continue per plan of care. Progress treatment as tolerated.        Precautions: standard, charcots foot R, wears brace on R, fall, lymphedema       Date: 2023  9/15/2023 2023 2023 2023 2023       Visit: #26 #27 #28 #29 #30 #31 #32 #33 #34 #35   Manual:                                       Neuro Re-Ed             Balance                                                                                           Ther Ex             Warm Up seat 11; arms 14  NuStep level 4 10 min (seat 9, arms 14)  NuStep level 4 10 min (seat 10, arms 14)  NuStep level 4 11 min (seat 10, arms 14)  NuStep level 4 11 min (seat 10, arms 14)  NuStep level 4 12 min (seat 10, arms 14)         Sit to stand- focus on knee flexion and use of RLE At high low table 2x10  At high low table 2x10  At high low table 2x10  At high low table  x10  From chair with airex x10  At high low mat x10        Seated knee flexion heel slides Standing alternating marches /step tap              Long arc quad  x30 each LE  x20 each LE   x20 each LE        Seated hip add             Seated hip abd             Standing HS curl  x20 each LE   x20 each LE x20 each LE x20 each LE        Stand:   Alt hip abd  Alt hip ext x20 each BLE x20 each BLE   x20 each BLE x20 each LE x20 each LE       pball trunk flexion              Seated knee flexion              Lateral walking at mirror 4 laps at [facility] 4 laps at [facility]   4 laps at [facility]  4 laps at [facility]  4 laps at [facility]                                               Ther Activity             Standing tolerance - standing unsupported for time                           Gait Training             Lateral WS --> SLS              Gait training with quad cane  Short distances t/o session with cane and RW   1 long lap with NBQC and shorter distances t/o session  1.5 long laps with NBQC and shorter distances t/o session with and with RW 3 long laps with NBQC  2 laps total t/o session with NBQC 1.5 lap with NBQC        Stair training             Walking laps in gym with RW                          Modalities

## 2023-09-29 ENCOUNTER — OFFICE VISIT (OUTPATIENT)
Dept: INFECTIOUS DISEASES | Facility: CLINIC | Age: 68
End: 2023-09-29
Payer: MEDICARE

## 2023-09-29 ENCOUNTER — OFFICE VISIT (OUTPATIENT)
Age: 68
End: 2023-09-29
Payer: MEDICARE

## 2023-09-29 VITALS
RESPIRATION RATE: 18 BRPM | DIASTOLIC BLOOD PRESSURE: 68 MMHG | WEIGHT: 203 LBS | TEMPERATURE: 96.3 F | HEIGHT: 63 IN | HEART RATE: 79 BPM | BODY MASS INDEX: 35.97 KG/M2 | SYSTOLIC BLOOD PRESSURE: 108 MMHG | OXYGEN SATURATION: 95 %

## 2023-09-29 DIAGNOSIS — B95.61 MSSA BACTEREMIA: Primary | ICD-10-CM

## 2023-09-29 DIAGNOSIS — R26.9 GAIT ABNORMALITY: ICD-10-CM

## 2023-09-29 DIAGNOSIS — R78.81 MSSA BACTEREMIA: Primary | ICD-10-CM

## 2023-09-29 DIAGNOSIS — Z96.641 AFTERCARE FOLLOWING RIGHT HIP JOINT REPLACEMENT SURGERY: ICD-10-CM

## 2023-09-29 DIAGNOSIS — M25.60 DECREASED RANGE OF MOTION: ICD-10-CM

## 2023-09-29 DIAGNOSIS — Z47.1 AFTERCARE FOLLOWING RIGHT HIP JOINT REPLACEMENT SURGERY: ICD-10-CM

## 2023-09-29 DIAGNOSIS — R29.898 WEAKNESS OF RIGHT LOWER EXTREMITY: Primary | ICD-10-CM

## 2023-09-29 PROCEDURE — 99213 OFFICE O/P EST LOW 20 MIN: CPT | Performed by: INTERNAL MEDICINE

## 2023-09-29 PROCEDURE — 97116 GAIT TRAINING THERAPY: CPT

## 2023-09-29 PROCEDURE — 97110 THERAPEUTIC EXERCISES: CPT

## 2023-09-29 RX ORDER — CEFADROXIL 500 MG/1
1 CAPSULE ORAL 2 TIMES DAILY
COMMUNITY
Start: 2023-09-20 | End: 2023-09-29 | Stop reason: SDUPTHER

## 2023-09-29 RX ORDER — CEFADROXIL 500 MG/1
500 CAPSULE ORAL 2 TIMES DAILY
Qty: 60 CAPSULE | Refills: 2 | Status: SHIPPED | OUTPATIENT
Start: 2023-09-29 | End: 2023-10-29

## 2023-09-29 NOTE — PATIENT INSTRUCTIONS
Continue taking your Cefadroxil as you have been  We will plan to follow up again in about 3 months  We will plan to check labs again before next visit

## 2023-10-02 ENCOUNTER — OFFICE VISIT (OUTPATIENT)
Age: 68
End: 2023-10-02
Payer: MEDICARE

## 2023-10-02 DIAGNOSIS — R29.898 WEAKNESS OF RIGHT LOWER EXTREMITY: Primary | ICD-10-CM

## 2023-10-02 DIAGNOSIS — Z47.1 AFTERCARE FOLLOWING RIGHT HIP JOINT REPLACEMENT SURGERY: ICD-10-CM

## 2023-10-02 DIAGNOSIS — M25.60 DECREASED RANGE OF MOTION: ICD-10-CM

## 2023-10-02 DIAGNOSIS — Z96.641 AFTERCARE FOLLOWING RIGHT HIP JOINT REPLACEMENT SURGERY: ICD-10-CM

## 2023-10-02 DIAGNOSIS — R26.9 GAIT ABNORMALITY: ICD-10-CM

## 2023-10-02 PROCEDURE — 97116 GAIT TRAINING THERAPY: CPT

## 2023-10-02 PROCEDURE — 97110 THERAPEUTIC EXERCISES: CPT

## 2023-10-02 NOTE — PROGRESS NOTES
Daily Note     Today's date: 10/2/2023  Patient name: Mitch Saldaña  : 1955  MRN: [id number]  Referring provider: Simon Medina, *  Dx:   Encounter Diagnosis     ICD-10-CM    1. Weakness of right lower extremity  R29.898       2. Aftercare following right hip joint replacement surgery  Z47.1     Z96.641       3. Decreased range of motion  M25.60       4. Gait abnormality  R26.9           Start Time: 8198  Stop Time: 0950  Total time in clinic (min): 45 minutes    Subjective: Pt has no new complaints today. Objective: See treatment diary below      Assessment: Mitch Saldaña tolerated today's treatment session well. Patient education provided on continuation of POC and TE.  Mony Parson completed all TE with good form and no adverse reactions. Discussed planned re-eval with pt and possible upcoming discharge at next visit. She is thinking she would like to continue with fitness program to maintain and use NuStep. Mony Parson continues to benefit from skilled OPPT services to address decreased functional mobility. Will continue to address functional deficits and focus on progression of POC per patient tolerance. Patient performed Nustep to increase blood flow to the area being treated, prepare the muscles for strength training and stretching, improve overall tolerance to activity, and aerobic endurance. Plan: Continue per plan of care. Progress note during next visit. Progress treatment as tolerated.        Precautions: standard, charcots foot R, wears brace on R, fall, lymphedema       Date: 2023  9/15/2023 2023 2023 2023 2023 10/2/2023       Visit: #26 #27 #28 #29 #30 #31 #32 #33 #34 #35   Manual:                                       Neuro Re-Ed             Balance                                                                                           Ther Ex             Warm Up seat 11; arms 14  NuStep level 4 10 min (seat 9, arms 14)  NuStep level 4 10 min (seat 10, arms 14)  NuStep level 4 11 min (seat 10, arms 14)  NuStep level 4 11 min (seat 10, arms 14)  NuStep level 4 12 min (seat 10, arms 14)   NuStep level 5 10 min (seat 10, arms 14)      Sit to stand- focus on knee flexion and use of RLE At high low table 2x10  At high low table 2x10  At high low table 2x10  At high low table  x10  From chair with airex x10  At high low mat x10        Seated knee flexion heel slides              Standing alternating marches /step tap              Long arc quad  x30 each LE  x20 each LE   x20 each LE  x20 each LE       Seated hip add             Seated hip abd             Standing HS curl  x20 each LE   x20 each LE x20 each LE x20 each LE  x20 each LE      Stand:   Alt hip abd  Alt hip ext x20 each BLE x20 each BLE   x20 each BLE x20 each LE x20 each LE x20 each BLE      pball trunk flexion              Seated knee flexion              Lateral walking at mirror 4 laps at [facility] 4 laps at [facility]   4 laps at [facility]  4 laps at [facility]  4 laps at [facility]  4 laps at [facility]                                              Ther Activity             Standing tolerance - standing unsupported for time                           Gait Training             Lateral WS --> SLS              Gait training with quad cane  Short distances t/o session with cane and RW   1 long lap with NBQC and shorter distances t/o session  1.5 long laps with NBQC and shorter distances t/o session with and with RW 3 long laps with NBQC  2 laps total t/o session with NBQC 1.5 lap with NBQC  2.5 laps with NBQC      Stair training             Walking laps in gym with RW                          Modalities

## 2023-10-05 NOTE — PROGRESS NOTES
PT Re-Evaluation /Discharge Summary     Today's date: 10/6/2023  Patient name: Mitch Saldaña  : 1955  MRN: [id number]  Referring provider: Simon Medina, *  Dx:   Encounter Diagnosis     ICD-10-CM    1. Weakness of right lower extremity  R29.898       2. Gait abnormality  R26.9       3. Aftercare following right hip joint replacement surgery  Z47.1     Z96.641       4. Decreased range of motion  M25.60           Start Time: 0900  Stop Time: 1000  Total time in clinic (min): 60 minutes    Assessment  Assessment details: Mony Parson has made good progress with physical therapy. They have improved strength and ROM in BLE. Mony Parson has improved gait pattern. Mony Parson has improved overall activity tolerance. They are independent with advanced exercise program. Functional outcome measure indicate improved functional mobility since start of POC. They are appropriate for discharge at this time and transition to advanced HEP. Mony Parson is in agreement. Issued final HEP. Encouraged Mony Parson to call this office with any questions.         Impairments: abnormal gait, abnormal or restricted ROM, activity intolerance, impaired balance, impaired physical strength, lacks appropriate home exercise program, pain with function and weight-bearing intolerance  Barriers to therapy: Complex medical history     Understanding of Dx/Px/POC: good   Prognosis: fair  Prognosis details: Patient Active Problem List:     Wegener's granulomatosis with renal involvement ([address])     Charcot's joint     Anxiety     Benign essential hypertension     BRCA1 positive     Cancer of ovary (HCC)     Chronic kidney disease, stage III (moderate) (HCC)     Chronic pain disorder     Class 2 obesity     Depression     Dyslipidemia     GERD (gastroesophageal reflux disease)     Lymphedema     Opiate analgesic use agreement exists     Ovarian cancer ([address])     Post-traumatic osteoarthritis     Increased frequency of urination     Other complicated headache syndrome Nausea & vomiting     SIRS (systemic inflammatory response syndrome) (HCC)     Increased risk of breast cancer     Acute metabolic encephalopathy     Elevated troponin     Mitral valve disorder     Postnasal drip     Dyspnea on exertion     LVH (left ventricular hypertrophy)     Low immunoglobulin level     Hypertrophic cardiomyopathy (HCC)     Hypogammaglobulinemia, acquired ([address])     Morbid obesity ([address])     Septic shock (HCC)     Chronic right-sided low back pain without sciatica     Cellulitis of right lower extremity     Slow transit constipation     History of pulmonary embolism     Chronic diastolic heart failure (HCC)     Right hip pain     History of bacteremia     Anemia     Encounter for geriatric assessment     Iron deficiency anemia secondary to inadequate dietary iron intake     Hip osteomyelitis, right (HCC)     MSSA bacteremia     PICC (peripherally inserted central catheter) in place    Goals  NEW Goals to be met in 5 weeks from 9/1/2023:  1. Ala Reins will be independent with advanced home exercise program to allow patient to transition from physical therapy care to continue with plan of care at home without supervision from therapist and continue to progress with rehabilitation. MET 10/6/2023   2. Ala Reins will decrease TUG to 30 seconds or less with RW indicate improved functional mobility and decreased fall risk. NOT MET 10/6/2023   3. Ala Reins will decrease TUG to 60 seconds or less with NBQC indicate improved functional mobility and decreased fall risk. MET 10/6/2023   4. Ala Reins will increase 2MWT by 50 feet (from 167 feet with RW). NOT MET 10/6/2023   5. Ala Reins will increase 2MWT by 25 feet with NBQC (from 58 feet).  NOT MET 10/6/2023     Plan  Patient would benefit from: skilled physical therapy  Planned modality interventions: biofeedback, cryotherapy, thermotherapy: hydrocollator packs, ultrasound and traction  Planned therapy interventions: abdominal trunk stabilization, activity modification, ADL retraining, balance, balance/weight bearing training, behavior modification, body mechanics training, breathing training, coordination, fine motor coordination training, flexibility, functional ROM exercises, gait training, graded activity, graded exercise, graded motor, home exercise program, joint mobilization, manual therapy, massage, motor coordination training, neuromuscular re-education, patient education, strengthening, stretching, therapeutic activities and therapeutic exercise  Frequency: 2x week  Duration in weeks: 1  Plan of Care beginning date: 10/6/2023  Plan of Care expiration date: 10/6/2023  Treatment plan discussed with: patient, PTA and referring physician        Subjective Evaluation    History of Present Illness  Date of surgery: 3/6/2023  Mechanism of injury: surgery  Mechanism of injury: Pt reports she is feeling good today. Feels she is ready for discharge. Patient Goals  Patient goals for therapy: decreased pain, improved balance, increased motion and increased strength  Patient goal: "I want to get my legs stronger and get better with the cane" "I would like to be able to walk farther with cane than I am now"   Pain  Current pain ratin  At best pain ratin  At worst pain ratin  Location: generalized RLE pain   Quality: burning    Treatments  Previous treatment: physical therapy        Objective     Active Range of Motion     Additional Active Range of Motion Details  Grossly limited hip movement- pt unable to sit upright at 90* angle   Grossly limited R knee flexion -?  Due to swelling or wraps for lymphedema     Ambulation   Weight-Bearing Status   Weight-Bearing Status (Left): full weight bearing   Weight-Bearing Status (Right): weight-bearing as tolerated    Assistive device used: front-wheeled walker and small base quad cane    Ambulation: Level Surfaces   Ambulation with assistive device: independent    Observational Gait   Gait: antalgic and circumduction   Increased left stance time. Decreased walking speed, stride length, right stance time, left step length and right step length.    Left foot contact pattern: foot flat  Right foot contact pattern: foot flat    Functional Assessment        Comments  TUG 50" with RW (IE) --> TUG with RW (Re-Eval) 45.5 seconds --> 34 second average with RW (7/14/2023), 1 min 14 seconds with NBQC (7/14/2023)   TUG with NBQC: 1 min 13 seconds seconds (8/11/2023) --> 1 min (9/1/2023) --> 56 seconds (10/6/2023)  TUG with RW: 31.55 seconds (8/11/2023) --> 31 seconds (9/1/2023)  --> 35 seconds (10/6/2023)    2MWT: 108 feet with RW --> 167 feet with RW (8/11/2023) Modified BRAYAN RPE 8/10 --> 145 feet with RW (9/1/2023) --> 151 feet with RW (10/6/2023)  2MWT: 58 feet with NBQC 8/11/2023 -->  60 feet with NBQC (9/1/2023) moderate difficulty --> 50.7 feet with NBQC (10/6/2023)    6MWT with NBQC: 178 feet  (9/1/2023) moderate difficulty   6MWT with RW: 423 feet (9/1/2023) moderate high difficulty              Precautions: standard, charcots foot R, wears brace on R, fall, lymphedema       Date: 9/11/2023  9/15/2023 9/18/2023 9/22/2023 9/25/2023 9/29/2023 10/2/2023  10/6/2023     Visit: #26 #27 #28 #29 #30 #31 #32 #33 #34 #35   Manual:                                       Neuro Re-Ed             Balance                                                                                           Ther Ex             Warm Up seat 11; arms 14  NuStep level 4 10 min (seat 9, arms 14)  NuStep level 4 10 min (seat 10, arms 14)  NuStep level 4 11 min (seat 10, arms 14)  NuStep level 4 11 min (seat 10, arms 14)  NuStep level 4 12 min (seat 10, arms 14)   NuStep level 5 10 min (seat 10, arms 14) NuStep level 5 12 min (seat 10, arms 14)     Sit to stand- focus on knee flexion and use of RLE At high low table 2x10  At high low table 2x10  At high low table 2x10  At high low table  x10  From chair with airex x10  At high low mat x10        Seated knee flexion heel slides Standing alternating marches /step tap              Long arc quad  x30 each LE  x20 each LE   x20 each LE  x20 each LE       Seated hip add             Seated hip abd             Standing HS curl  x20 each LE   x20 each LE x20 each LE x20 each LE  x20 each LE x20 each LE     Stand:   Alt hip abd  Alt hip ext x20 each BLE x20 each BLE   x20 each BLE x20 each LE x20 each LE x20 each BLE x20 each BLE     pball trunk flexion              Seated knee flexion              Lateral walking at mirror 4 laps at mirror 4 laps at [facility]   4 laps at [employer]  4 laps at [employer]  4 laps at [employer]  4 laps at [employer]  4 laps at [employer] Activity             Standing tolerance - standing unsupported for time                           Gait Training             Lateral WS --> SLS              Gait training with quad cane  Short distances t/o session with cane and RW   1 long lap with NBQC and shorter distances t/o session  1.5 long laps with NBQC and shorter distances t/o session with and with RW 3 long laps with NBQC  2 laps total t/o session with NBQC 1.5 lap with NBQC  2.5 laps with NBQC      Stair training             Walking laps in gym with RW                          Modalities

## 2023-10-06 ENCOUNTER — EVALUATION (OUTPATIENT)
Age: 68
End: 2023-10-06
Payer: MEDICARE

## 2023-10-06 DIAGNOSIS — Z96.641 AFTERCARE FOLLOWING RIGHT HIP JOINT REPLACEMENT SURGERY: ICD-10-CM

## 2023-10-06 DIAGNOSIS — R29.898 WEAKNESS OF RIGHT LOWER EXTREMITY: Primary | ICD-10-CM

## 2023-10-06 DIAGNOSIS — Z47.1 AFTERCARE FOLLOWING RIGHT HIP JOINT REPLACEMENT SURGERY: ICD-10-CM

## 2023-10-06 DIAGNOSIS — R26.9 GAIT ABNORMALITY: ICD-10-CM

## 2023-10-06 DIAGNOSIS — M25.60 DECREASED RANGE OF MOTION: ICD-10-CM

## 2023-10-06 PROCEDURE — 97530 THERAPEUTIC ACTIVITIES: CPT

## 2023-10-06 PROCEDURE — 97116 GAIT TRAINING THERAPY: CPT

## 2023-10-06 PROCEDURE — 97110 THERAPEUTIC EXERCISES: CPT

## 2023-10-31 ENCOUNTER — TELEPHONE (OUTPATIENT)
Dept: OBGYN CLINIC | Facility: MEDICAL CENTER | Age: 68
End: 2023-10-31

## 2023-10-31 NOTE — TELEPHONE ENCOUNTER
Just an FYI-Wanted to let you know that you I had a biopsy done on my tongue-and awaiting the results.

## 2023-12-05 ENCOUNTER — TELEPHONE (OUTPATIENT)
Dept: HEMATOLOGY ONCOLOGY | Facility: CLINIC | Age: 68
End: 2023-12-05

## 2023-12-05 DIAGNOSIS — Z15.01 BRCA1 POSITIVE: Primary | ICD-10-CM

## 2023-12-05 DIAGNOSIS — Z15.09 BRCA1 POSITIVE: Primary | ICD-10-CM

## 2023-12-05 NOTE — TELEPHONE ENCOUNTER
Lab Inquiry   Who are you speaking with? Patient     If it is not the patient, are they listed on an active communication consent form? N/A   Name of ordering provider Odin Nevarez PA-C   What is being requested? Lab orders need to be entered   Lab draw location [facility]   What is the best call back number?  [phone]

## 2023-12-06 ENCOUNTER — TELEPHONE (OUTPATIENT)
Dept: HEMATOLOGY ONCOLOGY | Facility: CLINIC | Age: 68
End: 2023-12-06

## 2023-12-07 ENCOUNTER — TELEPHONE (OUTPATIENT)
Dept: HEMATOLOGY ONCOLOGY | Facility: CLINIC | Age: 68
End: 2023-12-07

## 2023-12-07 NOTE — TELEPHONE ENCOUNTER
Appointment Change  Cancel, Reschedule, Change to Virtual      Who are you speaking with? Patient   If it is not the patient, is the caller listed on the communication consent form? N/A   Which provider is the appointment scheduled with? Jadyn Torres PA-C   When was the original appointment scheduled? Please list date and time 12/08/2023 @11:30AM    At which location is the appointment scheduled to take place? [facility]   Was the appointment rescheduled? Was the appointment changed from an in person visit to a virtual visit? If so, please list the details of the change. Yes, 01/18/2024 @ 11:30AM in [facility]    What is the reason for the appointment change?  Scheduling conflict

## 2023-12-15 ENCOUNTER — APPOINTMENT (OUTPATIENT)
Age: 68
End: 2023-12-15
Payer: MEDICARE

## 2023-12-15 DIAGNOSIS — Z15.09 BRCA1 POSITIVE: ICD-10-CM

## 2023-12-15 DIAGNOSIS — R78.81 MSSA BACTEREMIA: ICD-10-CM

## 2023-12-15 DIAGNOSIS — B95.61 MSSA BACTEREMIA: ICD-10-CM

## 2023-12-15 DIAGNOSIS — Z15.01 BRCA1 POSITIVE: ICD-10-CM

## 2023-12-15 LAB
ALBUMIN SERPL BCP-MCNC: 4.3 G/DL (ref 3.5–5)
ALP SERPL-CCNC: 104 U/L (ref 34–104)
ALT SERPL W P-5'-P-CCNC: 13 U/L (ref 7–52)
ANION GAP SERPL CALCULATED.3IONS-SCNC: 8 MMOL/L
AST SERPL W P-5'-P-CCNC: 23 U/L (ref 13–39)
BASOPHILS # BLD AUTO: 0.01 THOUSANDS/ÂΜL (ref 0–0.1)
BASOPHILS NFR BLD AUTO: 0 % (ref 0–1)
BILIRUB SERPL-MCNC: 0.53 MG/DL (ref 0.2–1)
BUN SERPL-MCNC: 21 MG/DL (ref 5–25)
CALCIUM SERPL-MCNC: 9.5 MG/DL (ref 8.4–10.2)
CHLORIDE SERPL-SCNC: 101 MMOL/L (ref 96–108)
CO2 SERPL-SCNC: 30 MMOL/L (ref 21–32)
CREAT SERPL-MCNC: 1.18 MG/DL (ref 0.6–1.3)
EOSINOPHIL # BLD AUTO: 0.19 THOUSAND/ÂΜL (ref 0–0.61)
EOSINOPHIL NFR BLD AUTO: 4 % (ref 0–6)
ERYTHROCYTE [DISTWIDTH] IN BLOOD BY AUTOMATED COUNT: 14 % (ref 11.6–15.1)
GFR SERPL CREATININE-BSD FRML MDRD: 46 ML/MIN/1.73SQ M
GLUCOSE P FAST SERPL-MCNC: 84 MG/DL (ref 65–99)
HCT VFR BLD AUTO: 37.4 % (ref 34.8–46.1)
HGB BLD-MCNC: 11.6 G/DL (ref 11.5–15.4)
IGA SERPL-MCNC: 15 MG/DL (ref 66–433)
IGG SERPL-MCNC: 983 MG/DL (ref 635–1741)
IGM SERPL-MCNC: 65 MG/DL (ref 45–281)
IMM GRANULOCYTES # BLD AUTO: 0.01 THOUSAND/UL (ref 0–0.2)
IMM GRANULOCYTES NFR BLD AUTO: 0 % (ref 0–2)
LYMPHOCYTES # BLD AUTO: 1.61 THOUSANDS/ÂΜL (ref 0.6–4.47)
LYMPHOCYTES NFR BLD AUTO: 30 % (ref 14–44)
MCH RBC QN AUTO: 29.4 PG (ref 26.8–34.3)
MCHC RBC AUTO-ENTMCNC: 31 G/DL (ref 31.4–37.4)
MCV RBC AUTO: 95 FL (ref 82–98)
MONOCYTES # BLD AUTO: 0.62 THOUSAND/ÂΜL (ref 0.17–1.22)
MONOCYTES NFR BLD AUTO: 12 % (ref 4–12)
NEUTROPHILS # BLD AUTO: 2.96 THOUSANDS/ÂΜL (ref 1.85–7.62)
NEUTS SEG NFR BLD AUTO: 54 % (ref 43–75)
NRBC BLD AUTO-RTO: 0 /100 WBCS
PLATELET # BLD AUTO: 239 THOUSANDS/UL (ref 149–390)
PMV BLD AUTO: 10.2 FL (ref 8.9–12.7)
POTASSIUM SERPL-SCNC: 4.8 MMOL/L (ref 3.5–5.3)
PROT SERPL-MCNC: 7 G/DL (ref 6.4–8.4)
RBC # BLD AUTO: 3.94 MILLION/UL (ref 3.81–5.12)
SODIUM SERPL-SCNC: 139 MMOL/L (ref 135–147)
WBC # BLD AUTO: 5.4 THOUSAND/UL (ref 4.31–10.16)

## 2023-12-15 PROCEDURE — 80053 COMPREHEN METABOLIC PANEL: CPT

## 2023-12-15 PROCEDURE — 36415 COLL VENOUS BLD VENIPUNCTURE: CPT

## 2023-12-15 PROCEDURE — 83521 IG LIGHT CHAINS FREE EACH: CPT

## 2023-12-15 PROCEDURE — 82784 ASSAY IGA/IGD/IGG/IGM EACH: CPT

## 2023-12-15 PROCEDURE — 85025 COMPLETE CBC W/AUTO DIFF WBC: CPT

## 2023-12-15 PROCEDURE — 84165 PROTEIN E-PHORESIS SERUM: CPT

## 2023-12-16 LAB
KAPPA LC FREE SER-MCNC: 33.3 MG/L (ref 3.3–19.4)
KAPPA LC FREE/LAMBDA FREE SER: 1.27 {RATIO} (ref 0.26–1.65)
LAMBDA LC FREE SERPL-MCNC: 26.2 MG/L (ref 5.7–26.3)

## 2023-12-18 LAB
ALBUMIN SERPL ELPH-MCNC: 4.11 G/DL (ref 3.2–5.1)
ALBUMIN SERPL ELPH-MCNC: 60.4 % (ref 48–70)
ALPHA1 GLOB SERPL ELPH-MCNC: 0.29 G/DL (ref 0.15–0.47)
ALPHA1 GLOB SERPL ELPH-MCNC: 4.3 % (ref 1.8–7)
ALPHA2 GLOB SERPL ELPH-MCNC: 0.8 G/DL (ref 0.42–1.04)
ALPHA2 GLOB SERPL ELPH-MCNC: 11.7 % (ref 5.9–14.9)
BETA GLOB ABNORMAL SERPL ELPH-MCNC: 0.43 G/DL (ref 0.31–0.57)
BETA1 GLOB SERPL ELPH-MCNC: 6.3 % (ref 4.7–7.7)
BETA2 GLOB SERPL ELPH-MCNC: 4 % (ref 3.1–7.9)
BETA2+GAMMA GLOB SERPL ELPH-MCNC: 0.27 G/DL (ref 0.2–0.58)
GAMMA GLOB ABNORMAL SERPL ELPH-MCNC: 0.9 G/DL (ref 0.4–1.66)
GAMMA GLOB SERPL ELPH-MCNC: 13.3 % (ref 6.9–22.3)
IGG/ALB SER: 1.53 {RATIO} (ref 1.1–1.8)
PROT PATTERN SERPL ELPH-IMP: NORMAL
PROT SERPL-MCNC: 6.8 G/DL (ref 6.4–8.2)

## 2023-12-18 PROCEDURE — 84165 PROTEIN E-PHORESIS SERUM: CPT | Performed by: PATHOLOGY

## 2023-12-19 ENCOUNTER — OFFICE VISIT (OUTPATIENT)
Dept: INFECTIOUS DISEASES | Facility: CLINIC | Age: 68
End: 2023-12-19
Payer: MEDICARE

## 2023-12-19 VITALS
SYSTOLIC BLOOD PRESSURE: 118 MMHG | OXYGEN SATURATION: 96 % | DIASTOLIC BLOOD PRESSURE: 68 MMHG | WEIGHT: 203.04 LBS | RESPIRATION RATE: 18 BRPM | HEART RATE: 77 BPM | TEMPERATURE: 96.8 F | HEIGHT: 63 IN | BODY MASS INDEX: 35.98 KG/M2

## 2023-12-19 DIAGNOSIS — R78.81 MSSA BACTEREMIA: ICD-10-CM

## 2023-12-19 DIAGNOSIS — N18.30 STAGE 3 CHRONIC KIDNEY DISEASE, UNSPECIFIED WHETHER STAGE 3A OR 3B CKD (HCC): ICD-10-CM

## 2023-12-19 DIAGNOSIS — I89.0 LYMPHEDEMA: ICD-10-CM

## 2023-12-19 DIAGNOSIS — M86.9 HIP OSTEOMYELITIS, RIGHT (HCC): Primary | ICD-10-CM

## 2023-12-19 DIAGNOSIS — B95.61 MSSA BACTEREMIA: ICD-10-CM

## 2023-12-19 PROCEDURE — 99212 OFFICE O/P EST SF 10 MIN: CPT | Performed by: NURSE PRACTITIONER

## 2023-12-19 RX ORDER — CEFADROXIL 500 MG/1
500 CAPSULE ORAL EVERY 12 HOURS SCHEDULED
Qty: 60 CAPSULE | Refills: 2 | Status: SHIPPED | OUTPATIENT
Start: 2023-12-19 | End: 2024-01-18

## 2023-12-19 RX ORDER — CEFADROXIL 500 MG/1
CAPSULE ORAL
COMMUNITY
Start: 2023-11-24

## 2023-12-19 NOTE — PATIENT INSTRUCTIONS
Continue oral Cefodroxil, 500mg every 12 hours.    Refills sent to [facility].    Complete lab work (CBCD and creatinine) prior to next outpatient infectious disease follow up appointment.    Return to the outpatient infectious disease office in 3 months for follow up.

## 2024-01-05 ENCOUNTER — HOSPITAL ENCOUNTER (OUTPATIENT)
Dept: ULTRASOUND IMAGING | Facility: CLINIC | Age: 69
Discharge: HOME | End: 2024-01-05
Payer: MEDICARE

## 2024-01-05 DIAGNOSIS — Z12.31 ENCOUNTER FOR SCREENING MAMMOGRAM FOR MALIGNANT NEOPLASM OF BREAST: ICD-10-CM

## 2024-01-05 DIAGNOSIS — Z15.01 BRCA1 POSITIVE: ICD-10-CM

## 2024-01-05 DIAGNOSIS — Z15.09 BRCA1 POSITIVE: ICD-10-CM

## 2024-01-05 DIAGNOSIS — Z91.89 INCREASED RISK OF BREAST CANCER: ICD-10-CM

## 2024-01-05 PROCEDURE — 76641 ULTRASOUND BREAST COMPLETE: CPT

## 2024-01-09 ENCOUNTER — TELEPHONE (OUTPATIENT)
Dept: HEMATOLOGY ONCOLOGY | Facility: CLINIC | Age: 69
End: 2024-01-09

## 2024-01-09 NOTE — TELEPHONE ENCOUNTER
I called [facility] regarding an appointment that they have scheduled with Dr. Fay scheduled on  1/19/24           Appointment Change  Cancel, Reschedule, Change to Virtual      Who are you speaking with? Patient   If it is not the patient, is the caller listed on the communication consent form? N/A   Which provider is the appointment scheduled with? Dr. Fay   When was the original appointment scheduled?    Please list date and time 1/19/24 8:45am   At which location is the appointment scheduled to take place? [place]   Was the appointment rescheduled?     Was the appointment changed from an in person visit to a virtual visit?    If so, please list the details of the change. 1/25/24 11:30am   What is the reason for the appointment change? Change in provider's schedule       Was STAR transport scheduled? No   Does STAR transport need to be scheduled for the new visit (if applicable) No   Does the patient need an infusion appointment rescheduled? No   Does the patient have an upcoming infusion appointment scheduled? If so, when? No   Is the patient undergoing chemotherapy? No   For appointments cancelled with less than 24 hours:  Was the no-show policy reviewed? Yes

## 2024-01-18 ENCOUNTER — OFFICE VISIT (OUTPATIENT)
Dept: HEMATOLOGY ONCOLOGY | Facility: CLINIC | Age: 69
End: 2024-01-18
Payer: MEDICARE

## 2024-01-18 VITALS
OXYGEN SATURATION: 98 % | DIASTOLIC BLOOD PRESSURE: 70 MMHG | HEIGHT: 63 IN | HEART RATE: 67 BPM | WEIGHT: 213 LBS | SYSTOLIC BLOOD PRESSURE: 140 MMHG | BODY MASS INDEX: 37.74 KG/M2 | TEMPERATURE: 98 F | RESPIRATION RATE: 18 BRPM

## 2024-01-18 DIAGNOSIS — M86.9 HIP OSTEOMYELITIS, RIGHT (HCC): ICD-10-CM

## 2024-01-18 DIAGNOSIS — E66.01 MORBID (SEVERE) OBESITY DUE TO EXCESS CALORIES (HCC): ICD-10-CM

## 2024-01-18 DIAGNOSIS — R76.8 LOW IMMUNOGLOBULIN LEVEL: ICD-10-CM

## 2024-01-18 DIAGNOSIS — Z15.09 BRCA1 POSITIVE: ICD-10-CM

## 2024-01-18 DIAGNOSIS — I50.32 CHRONIC DIASTOLIC HEART FAILURE (HCC): ICD-10-CM

## 2024-01-18 DIAGNOSIS — C56.9 MALIGNANT NEOPLASM OF OVARY, UNSPECIFIED LATERALITY (HCC): ICD-10-CM

## 2024-01-18 DIAGNOSIS — N18.30 STAGE 3 CHRONIC KIDNEY DISEASE, UNSPECIFIED WHETHER STAGE 3A OR 3B CKD (HCC): ICD-10-CM

## 2024-01-18 DIAGNOSIS — M31.31 WEGENER'S GRANULOMATOSIS WITH RENAL INVOLVEMENT (HCC): ICD-10-CM

## 2024-01-18 DIAGNOSIS — Z15.01 BRCA1 POSITIVE: ICD-10-CM

## 2024-01-18 DIAGNOSIS — Z86.711 HISTORY OF PULMONARY EMBOLISM: Chronic | ICD-10-CM

## 2024-01-18 DIAGNOSIS — D80.1 HYPOGAMMAGLOBULINEMIA, ACQUIRED (HCC): Primary | ICD-10-CM

## 2024-01-18 DIAGNOSIS — I50.32 CHRONIC DIASTOLIC (CONGESTIVE) HEART FAILURE (HCC): ICD-10-CM

## 2024-01-18 PROBLEM — R77.1: Status: ACTIVE | Noted: 2024-01-18

## 2024-01-18 PROBLEM — R77.1: Status: RESOLVED | Noted: 2024-01-18 | Resolved: 2024-01-18

## 2024-01-18 PROCEDURE — 99214 OFFICE O/P EST MOD 30 MIN: CPT | Performed by: PHYSICIAN ASSISTANT

## 2024-01-18 NOTE — PROGRESS NOTES
Hematology/Oncology Outpatient Follow- up Note  Charito Lamb 68 y.o. female MRN: @ Encounter: [id number]        Date:  1/18/2024      Assessment / Plan:    1. Juan's granulomatosis diagnosed in 02/2010 treated with rituximab for 4 years with good response and subsequent hypogammaglobinemia  Follows with Dr. Wong, Dr. Holden.      2. Hypogammaglobinemia with subsequent multiple infections required multiple admissions to the hospital at least 4 times in 2019 with sepsis, cellulitis, pneumonia. IgG level was less than 200. She was given IVIG 30 g x 2 doses January and February 2020.  Level improved to the range of 500.   She had headache, nausea with IVIG requiring Zofran, and hydrocortisone ,Benadryl.     Last dose of IV Ig was 10/2020  IgG level was 500 8/2021; improved to 1200 range 2022.        Admitted 4/27/22 - 5/3/22 secondary to right lower extremity cellulitis and septic shock.     Admitted 9/2 - 9/4/22 2nd to pneumonia, heart failure. Total protein was elevated. Will investigate.       3. History of ovarian cancer status post bilateral oophorectomy and hysterectomy in 2007 followed by chemotherapy (Carboplatin/Taxol x 5 last 5/2007). Follows with Dr. Darrian Gardner at [facility].    BRCA1 mutation     Mammogram and MRI of the breast are followed by Surgical Oncology Dr. Fay.         Chronic lymphedema; recurrent cellulitis RLE.  Hospitalization x 2 December 2022.    Follows with ID    S/P ROWDY 3/6/23.  osteomyelitis of proximal femoral head/truce acetabular bone and purulence encountered during elective ROWDY.    F/U in 1 year with repeat labs        HPI:    Charito Lamb is a 68-year-old  female seen initially 12/27/2019 regarding hypogammaglobulinemia.  She has multiple medical problems including history of ovarian cancer.  She is s/p exploratory laparotomy /BSO January 2007.  She is s/p  hysterectomy, omentumectomy, lymph node biopsy 2/14/2007 at  [facility].  Complicated by DVT and  PE 3/25/2007.  IVC filter was placed. Treated with Lovenox x 1 year.  She received post op chemotherapy.       She was was found to have BRCA1 mutation.  She follows with Dr. Fay with surgical oncology.    She has history of Juan's granulomatosis diagnosed 11/2010. She was on steroids through 2011 and then she received rituximab for almost 4 years per Dr. Wong.  She had subsequent pneumonia, pneumonitis, sinusitis, cellulitis of the lower extremity requiring multiple admissions to the hospital and antibiotics     Admission to the hospital was on 11/14/2019 with severe sepsis, leukocytosis, encephalopathy     On 11/22/2019, IgG 152, IgA 36, IgM 72     IgG 189 7/2016 at [facility].  IgG <200 through 11/2019.     She was initiated on IVIG 30 g every month received the 1st dose January 31, 2020, IgG level went up from 189 to 547 2/22/20.  She reported profound headaches after the 1st treatment.  Hydrocortisone 100mg given as premedication with cycle #2 due to prolonged HA post treatment #1.  For nausea she had benefit from Compazine, Zofran was added to premedication prior to IVIG.     She only received the 2 doses  - January 2020 and February 2020.        9/14/20:  IgG 375.  10/20/20:  IgG 304. IgM 48; IgA 30.  Normal CBCD. She received IVIG with Zofran and premedication with Benadryl and hydrocortisone  IgG level on 01/22/2021 of 453, normal CBC and differential     Last dose of IV Ig was 10/2020      Seen 12/2021 and 9/23/22.     Admitted 4/27/22 - 5/3/22 secondary to right lower extremity cellulitis and septic shock.     7/22 Bilateral mammogram- There are no suspicious masses     Admitted 9/2 - 9/4/22 2nd to pneumonia, heart failure  Noncontrast CT A/P- No evidence of acute intra-abdominal or pelvic pathology.     6/15/22- hemoglobin 12, white blood cell count 5.9, 53% neutrophils, 34% lymphocytes, 8% monocytes, platelet count 283. IgG level 1485     9/20/22 CBCD normal; IgG 1243        Interval History:   Prn f/u  discussed 9/2022.  She wished to have annual f/u    Admitted October 19, 2022- 10/22/22 secondary to sepsis from right lower extremity cellulitis  She has an IVC filter in place.  She refused lower extremity Doppler  10/19/2022 hemoglobin 14.3, MCV 94    Admitted November 28, 2022 - 12/7/22 can [facility] to MSSA bacteremia.  Hip imaging negative for acute infection, ELIZABETH ELIZABETH negative for endocarditis.  This was thought to be secondary to her right lower extremity cellulitis  Hemoglobin 12.3    December 6, 2022 hemoglobin 10.1    2/7/23 hemoglobin 10.4    2/14/23 -  Procrit 40,000 u x2 doses prior to 3/6 surgery.     Admitted March 6 through March 11, 2023 for right hip I&D and conversion to hand crafted antibiotic cement right total hip arthroplasty.    She received 2 units packed red blood cells due to acute blood loss anemia    8/11/23 mammogram -BI-RADS 2    12/15/23 hemoglobin 11.6, MCV 95, white blood cell count 5.4, platelet count 239, normal differential.  Normal quantitative immunoglobulins.  IgG 983.  No monoclonal band on SPEP, serum kappa free light chain 33, lambda 26.2, ratio 1.27    1/5/24 bilateral breast ultrasound BI-RADS 1    Still following with ID        Review of Systems   Constitutional:  Positive for fatigue. Negative for appetite change, chills, diaphoresis, fever and unexpected weight change.   HENT:   Negative for mouth sores, nosebleeds, sore throat, tinnitus and voice change.    Eyes:  Negative for eye problems.   Respiratory:  Negative for chest tightness, cough, shortness of breath and wheezing.    Cardiovascular:  Negative for chest pain, leg swelling and palpitations.   Gastrointestinal:  Negative for abdominal distention, abdominal pain, blood in stool, constipation, diarrhea, nausea, rectal pain and vomiting.   Endocrine: Negative for hot flashes.   Genitourinary: Negative.     Musculoskeletal:  Positive for arthralgias and myalgias. Negative for gait problem.   Skin:  Negative for  itching and rash.   Neurological:  Negative for dizziness, gait problem, headaches, light-headedness and numbness.   Hematological:  Negative for adenopathy.   Psychiatric/Behavioral:  Negative for confusion and sleep disturbance. The patient is not nervous/anxious.         Test Results:        Labs:   Lab Results   Component Value Date    HGB 11.6 12/15/2023    HCT 37.4 12/15/2023    MCV 95 12/15/2023     12/15/2023    WBC 5.40 12/15/2023    NRBC 0 12/15/2023    BANDSPCT 12 (H) 10/19/2022    ATYLMPCT 4 (H) 09/06/2017     Lab Results   Component Value Date     03/22/2015    K 4.8 12/15/2023     12/15/2023    CO2 30 12/15/2023    ANIONGAP 7 03/22/2015    BUN 21 12/15/2023    CREATININE 1.18 12/15/2023    GLUCOSE 88 03/22/2015    GLUF 84 12/15/2023    CALCIUM 9.5 12/15/2023    CORRECTEDCA 9.6 05/15/2023    AST 23 12/15/2023    ALT 13 12/15/2023    ALKPHOS 104 12/15/2023    PROT 7.3 05/15/2015    BILITOT 0.39 05/15/2015    EGFR 46 12/15/2023           Imaging: US breast screening bilateral complete (ABUS)    Result Date: 1/8/2024  Narrative: DIAGNOSIS: BRCA1 positive; Increased risk of breast cancer; Encounter for screening mammogram for malignant neoplasm of breast TECHNIQUE: Automated breast ultrasound images were obtained on the [employer] ABUS system.  Imaging was obtained in standard projections including AP, lateral and medial for both breasts, inclusive of all four quadrants and the retroareolar region. COMPARISONS: Prior breast imaging dated: 08/11/2023, 07/25/2022, 04/21/2022, 07/28/2021, 07/28/2021, 07/26/2021, 07/26/2021, 07/26/2021, 07/26/2021, 07/26/2021, 07/23/2021, 07/23/2021, 07/12/2021, 01/08/2021, 01/08/2021, 07/15/2020, 07/15/2020, 07/15/2020, 07/09/2020, 07/09/2020, 07/07/2020, 05/14/2019, 05/14/2019, 11/28/2018, 05/11/2018, 05/30/2017, 05/02/2017, 05/09/2016, 05/09/2016, 05/09/2016, 04/26/2016, 04/26/2016, 04/26/2016, 04/22/2015, 10/17/2014, 10/17/2014, and 04/21/2014 RELEVANT  "HISTORY: Family Breast Cancer History: History of breast cancer in Mother, Maternal Aunt. Family Medical History: Family medical history includes breast cancer in 2 relatives (maternal aunt, mother) and ovarian cancer in maternal grandmother. Personal History: No known relevant hormone history. Surgical history includes breast biopsy, hysterectomy, and oophorectomy. Medical history includes ovarian cancer, BRCA 1 positive, and history of chemotherapy. RISK ASSESSMENT: 5 Year Tyrer-Cuzick: 4.34% 10 Year Tyrer-Cuzick: 7.99% Lifetime Tyrer-Cuzick: 11.53% TISSUE COMPOSITION: Homogeneous background echotexture - fat INDICATION: Charito Lamb is a 68 y.o. female presenting for automated breast ultrasound screening. FINDINGS: Bilateral There are no suspicious masses or areas of architectural distortion.     Impression:  No sonographic evidence of malignancy in either breast. ASSESSMENT/BI-RADS CATEGORY: Left: 1 - Negative Right: 1 - Negative Overall: 1 - Negative RECOMMENDATION:      - Continue annual screening mammography for both breasts. Workstation ID: [id number]            Allergies:   Allergies   Allergen Reactions    Iodinated Contrast Media Shortness Of Breath    Omnipaque [Iohexol] Shortness Of Breath    Other Shortness Of Breath     IVP dye, x ray dye 3    Iodides      Other reaction(s): SWELLING, SOB    Methotrexate Nausea Only     Headache and nausea    Methotrexate Derivatives Headache     Current Medications: Reviewed  PMH/FH/SH:  Reviewed      Physical Exam:    1.95 meters squared    Ht Readings from Last 3 Encounters:   01/18/24 5' 3\" (1.6 m)   12/19/23 5' 3\" (1.6 m)   09/29/23 5' 3\" (1.6 m)        Wt Readings from Last 3 Encounters:   12/19/23 92.1 kg (203 lb 0.7 oz)   09/29/23 92.1 kg (203 lb)   09/19/23 92.5 kg (203 lb 14.8 oz)        Temp Readings from Last 3 Encounters:   12/19/23 (!) 96.8 °F (36 °C)   09/29/23 (!) 96.3 °F (35.7 °C)   07/27/23 (!) 97.3 °F (36.3 °C)        BP Readings from Last 3 " Encounters:   12/19/23 118/68   09/29/23 108/68   09/19/23 123/70             Physical Exam  Vitals reviewed.   Constitutional:       General: She is not in acute distress.     Appearance: She is well-developed. She is not diaphoretic.   HENT:      Head: Normocephalic and atraumatic.   Eyes:      Conjunctiva/sclera: Conjunctivae normal.   Neck:      Trachea: No tracheal deviation.   Cardiovascular:      Rate and Rhythm: Normal rate and regular rhythm.      Heart sounds: No murmur heard.     No friction rub. No gallop.   Pulmonary:      Effort: Pulmonary effort is normal. No respiratory distress.      Breath sounds: Normal breath sounds. No wheezing or rales.   Chest:      Chest wall: No tenderness.   Abdominal:      General: There is no distension.      Palpations: Abdomen is soft.      Tenderness: There is no abdominal tenderness.   Musculoskeletal:      Cervical back: Normal range of motion and neck supple.      Right lower leg: Edema (chronic) present.   Lymphadenopathy:      Cervical: No cervical adenopathy.   Skin:     General: Skin is warm and dry.      Coloration: Skin is not pale.      Findings: No erythema.   Neurological:      Mental Status: She is alert and oriented to person, place, and time.   Psychiatric:         Behavior: Behavior normal.         Thought Content: Thought content normal.         Judgment: Judgment normal.             Emergency Contacts:    Extended Emergency Contact Information  Primary Emergency Contact: Govind Lamb  Address: [address] [place] [address] [id number] [place] [address]  Home Phone: [phone]  Mobile Phone: [phone]  Relation: Spouse  Secondary Emergency Contact: Morro Lamb   [facility]  Home Phone: [phone]  Mobile Phone: [phone]  Relation: Son

## 2024-01-25 ENCOUNTER — OFFICE VISIT (OUTPATIENT)
Dept: SURGICAL ONCOLOGY | Facility: CLINIC | Age: 69
End: 2024-01-25
Payer: MEDICARE

## 2024-01-25 VITALS
TEMPERATURE: 97.3 F | HEART RATE: 65 BPM | DIASTOLIC BLOOD PRESSURE: 78 MMHG | RESPIRATION RATE: 18 BRPM | HEIGHT: 63 IN | BODY MASS INDEX: 37.73 KG/M2 | SYSTOLIC BLOOD PRESSURE: 130 MMHG | OXYGEN SATURATION: 94 %

## 2024-01-25 DIAGNOSIS — Z12.31 ENCOUNTER FOR SCREENING MAMMOGRAM FOR MALIGNANT NEOPLASM OF BREAST: ICD-10-CM

## 2024-01-25 DIAGNOSIS — Z91.89 INCREASED RISK OF BREAST CANCER: Primary | ICD-10-CM

## 2024-01-25 PROCEDURE — 99213 OFFICE O/P EST LOW 20 MIN: CPT | Performed by: SURGERY
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
## 2024-01-29 ENCOUNTER — TELEPHONE (OUTPATIENT)
Dept: HEMATOLOGY ONCOLOGY | Facility: CLINIC | Age: 69
End: 2024-01-29

## 2024-01-29 NOTE — TELEPHONE ENCOUNTER
Appointment Change  Cancel, Reschedule, Change to Virtual      Who are you speaking with? Patient   If it is not the patient, is the caller listed on the communication consent form? N/A   Which provider is the appointment scheduled with? RENETTA [place]   When was the original appointment scheduled?    Please list date and time 8/19/24 11am   At which location is the appointment scheduled to take place? [place]   Was the appointment rescheduled?     Was the appointment changed from an in person visit to a virtual visit?    If so, please list the details of the change. 8/26/24 930   What is the reason for the appointment change? Appt conflict       Was STAR transport scheduled? N/A   Does STAR transport need to be scheduled for the new visit (if applicable) N/A   Does the patient need an infusion appointment rescheduled? N/A   Does the patient have an upcoming infusion appointment scheduled? If so, when? No   Is the patient undergoing chemotherapy? N/A   For appointments cancelled with less than 24 hours:  Was the no-show policy reviewed? N/A

## 2024-01-29 NOTE — TELEPHONE ENCOUNTER
Appointment Confirmation   Who are you speaking with? Patient   If it is not the patient, are they listed on an active communication consent form? N/A   Which provider is the appointment scheduled with?  Justine Way PA-C   When is the appointment scheduled?  Please list date and time 1/24/25 10am   At which location is the appointment scheduled to take place? [place]   Did caller verbalize understanding of appointment details? Yes

## 2024-02-19 NOTE — PROGRESS NOTES
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    CO2 30 12/15/2023    ANIONGAP 7 03/22/2015    BUN 21 12/15/2023    CREATININE 1.18 12/15/2023    EGFR 46 12/15/2023    GLUCOSE 88 03/22/2015    CALCIUM 9.5 12/15/2023    AST 23 12/15/2023    ALT 13 12/15/2023    ALKPHOS 104 12/15/2023    PROT 7.3 05/15/2015    BILITOT 0.39 05/15/2015     Lab Results   Component Value Date    MG 1.7 12/03/2022     Lab Results   Component Value Date    HDL 41 (L) 09/25/2023    TRIG 191 (H) 09/25/2023    LDLCALC 108 (H) 09/25/2023     Lab Results   Component Value Date    GXT0MJKJZFZS 1.298 10/19/2022     Imaging:   I have personally reviewed pertinent films in PACS    EKG:      Media Information      Document Information    Clinical Image - Mobile Device      02/20/2024 12:14 PM   Attached To:   Office Visit on 2/20/24 with Simeon Sung MD   Source Information    RENETTA Moody  [facility]       Cardiac testing:   Results for orders placed during the hospital encounter of 07/06/21    Echo complete with contrast if indicated    Narrative  [address], [place] [id number] [phone]    Transthoracic Echocardiogram  2D,  M-mode, Doppler, and Color Doppler    Study date:  2021    Patient: GUANAKO PAPPAS  MR number: [id number]  Account number: [id number]  : 1955  Age: 66 years  Gender: Female  Status: Inpatient  Location: Bedside  Height: 63 in  Weight: 224 lb  BP: 102/ 61 mmHg    Indications: Elevated Troponin;HOCOM follow-up.    Diagnoses: I42.9 - Cardiomyopathy, unspecified    Sonographer:  RAPHAEL Echeverria  Primary Physician:  Surya Conley DO  Referring Physician:  Pia Kaur MD  Group:  [facility]  Interpreting Physician:  Simeon Sung MD    IMPRESSIONS:  Features are consistent with obstructive hypertrophic cardiomyopathy.    SUMMARY    PROCEDURE INFORMATION:  This was a technically difficult study.    LEFT VENTRICLE:  Systolic function was hyperdynamic. Ejection fraction was estimated to be 70 %.  There were no regional wall motion abnormalities.  Wall thickness was markedly increased.  There was severe assymetrical hypertrophy of the septum.  There was dynamic obstruction at rest in the outflow tract, with a peak gradient of 37 mmHg.  There was dynamic obstruction during Valsalva, with a peak gradient of 75 mmHg.  Features were consistent with a pseudonormal left ventricular filling pattern, with concomitant abnormal relaxation and increased filling pressure (grade 2 diastolic dysfunction).    LEFT ATRIUM:  The atrium was mildly to moderately dilated.    RIGHT ATRIUM:  The atrium was mildly dilated.    MITRAL VALVE:  There was severe systolic anterior motion of the anterior leaflet.    TRICUSPID VALVE:  There was trace regurgitation.    HISTORY: PRIOR HISTORY: Ovarian CA;CKD3;HOCM.    PROCEDURE: The procedure was performed at the bedside. This was a routine study. The transthoracic approach was used. The study included complete 2D imaging, M-mode, complete spectral Doppler, and color Doppler. The heart rate was 79 bpm,  at the start of the study. Echocardiographic views were  limited due to poor acoustic window availability. This was a technically difficult study.    LEFT VENTRICLE: Size was normal. Systolic function was hyperdynamic. Ejection fraction was estimated to be 70 %. There were no regional wall motion abnormalities. Wall thickness was markedly increased. There was severe assymetrical  hypertrophy of the septum. DOPPLER: There was dynamic obstruction at rest in the outflow tract, with a peak gradient of 37 mmHg. There was dynamic obstruction during Valsalva, with a peak gradient of 75 mmHg. Features were consistent with  a pseudonormal left ventricular filling pattern, with concomitant abnormal relaxation and increased filling pressure (grade 2 diastolic dysfunction).    RIGHT VENTRICLE: The size was normal. Systolic function was normal. Wall thickness was normal.    LEFT ATRIUM: The atrium was mildly to moderately dilated.    RIGHT ATRIUM: The atrium was mildly dilated.    MITRAL VALVE: Valve structure was normal. There was normal leaflet separation. There was severe systolic anterior motion of the anterior leaflet. DOPPLER: The transmitral velocity was within the normal range. There was no evidence for  stenosis. There was no regurgitation.    AORTIC VALVE: The valve was trileaflet. Leaflets exhibited normal thickness and normal cuspal separation. DOPPLER: Transaortic velocity was within the normal range. There was no evidence for stenosis. There was no regurgitation.    TRICUSPID VALVE: The valve structure was normal. There was normal leaflet separation. DOPPLER: The transtricuspid velocity was within the normal range. There was no evidence for stenosis. There was trace regurgitation.    PULMONIC VALVE: Leaflets exhibited normal thickness, no calcification, and normal cuspal separation. DOPPLER: The transpulmonic velocity was within the normal range. There was no regurgitation.    PERICARDIUM: There was no pericardial effusion. The pericardium was normal in  "appearance.    AORTA: The root exhibited normal size.    SYSTEM MEASUREMENT TABLES    2D  %FS: 27.97 %  Ao Diam: 2.51 cm  EDV(Teich): 43.3 ml  EF(Teich): 55.43 %  ESV(Teich): 19.3 ml  IVSd: 1.68 cm  LA Area: 14.17 cm2  LA Diam: 4.21 cm  LVEDV MOD A4C: 78.4 ml  LVEF MOD A4C: 59.38 %  LVESV MOD A4C: 31.85 ml  LVIDd: 3.27 cm  LVIDs: 2.36 cm  LVLd A4C: 7.48 cm  LVLs A4C: 6.03 cm  LVPWd: 1.41 cm  RA Area: 11.6 cm2  RVIDd: 3.79 cm  SV MOD A4C: 46.55 ml  SV(Teich): 24 ml    CW  AV Vmax: 3.96 m/s  AV maxP.14 mmHg  PRend PG: 3.62 mmHg  PRend Vmax: 0.95 m/s  TR Vmax: 2.61 m/s  TR maxP.28 mmHg    MM  TAPSE: 2.46 cm    PW  E' Sept: 0.05 m/s  E/E' Sept: 21.2  MV A [place]: 0.84 m/s  MV Dec [place]: 5.02 m/s2  MV DecT: 197.81 ms  MV E [place]: 0.99 m/s  MV E/A Ratio: 1.2  MV PHT: 57.37 ms  MVA By PHT: 3.84 cm2    [facility]    Prepared and electronically signed by    Simeon Sung MD  Signed 2021 18:05:24    Name: Charito Lamb                       : 1955  MRN: [id number]                       Age: 68 y.o.  Patient Status: Outpatient          Gender: female  Echo complete w/ strain    Height: 5' 3\" (1.6 m)   Weight: 92.1 kg (203 lb)   BSA: 1.95 m²   Blood Pressure: 110/58    Date of Study: 23   Ordering Provider: Cathy Tabares PA-C   Clinical Indications: Hypertrophic cardiomyopathy (HCC) [I42.2 (ICD-10-CM)]       Reading Physicians  Performing Staff   Cardiology: Simeon Sung MD    Tech: Marlen Pospisil, RS        Vitals    Height Weight BSA (Calculated - m2) BP Pulse   5' 3\" (1.6 m) 92.1 kg (203 lb) 1.95 sq meters 110/58 74     PACS Images     Show images for Echo complete w/ contrast if indicated  Study Details    This transthoracic echocardiogram was performed in the echo lab. This was a routine, outpatient study. Study quality was adequate. This was a technically difficult study due to lung interference. A complete 2D, color flow Doppler, " spectral Doppler, 2D, color flow Doppler, spectral Doppler and strain transthoracic echocardiogram was performed.  The apical, parasternal, subcostal and suprasternal views were obtained.     History    Sepsis, MSSA Bacteremia, HOCM, PE/DVT, IVC Filter, Ovarian Cancer, CKD     Interpretation Summary         Left Ventricle: Left ventricular cavity size is normal. Wall thickness is severely increased. 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 The left ventricular ejection fraction is 68%. Systolic function is vigorous. Global longitudinal strain is normal at -18%. Wall motion is normal. Diastolic function is mildly abnormal, consistent with grade I (abnormal) relaxation. There is no LV dynamic obstruction.    Left Atrium: The atrium is mildly dilated.    Atrial Septum: No patent foramen ovale detected using color flow Doppler at rest.    Left Atrial Appendage: There is normal function. There is no thrombus.    Mitral Valve: There is systolic anterior motion of the anterior leaflet and chordal apparatus without late peaking gradient.     Findings    Left Ventricle Left ventricular cavity size is normal. Wall thickness is severely increased. 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 The left ventricular ejection fraction is 68%. Systolic function is vigorous. Global longitudinal strain is normal at -18%.  Wall motion is normal. Diastolic function is mildly abnormal, consistent with grade I (abnormal) relaxation.  There is no LV dynamic obstruction.   Right Ventricle Right ventricular cavity size is normal. Systolic function is normal. Wall thickness is normal.   Left Atrium The atrium is mildly dilated.   Right Atrium The atrium is normal in size.   Atrial Septum No patent foramen ovale detected using color flow Doppler at rest.   Left Atrial Appendage Left atrial appendage is normal in size. There is normal function. There is no thrombus.   Aortic Valve The aortic  valve is trileaflet. The leaflets are not thickened. The leaflets are not calcified. The leaflets exhibit normal mobility. There is no evidence of regurgitation. The aortic valve has no significant stenosis.   Mitral Valve The leaflets are not thickened. The leaflets are not calcified. The leaflets exhibit normal mobility. There is trace regurgitation. There is no evidence of stenosis. There is systolic anterior motion of the anterior leaflet and chordal apparatus without late peaking gradient.   Tricuspid Valve Tricuspid valve structure is normal. There is trace regurgitation. There is no evidence of stenosis. The right ventricular systolic pressure is normal.   Pulmonic Valve Pulmonic valve structure is normal. There is no evidence of regurgitation. There is no evidence of stenosis.   Ascending Aorta The aortic root is normal in size. There is no evidence of aortic dissection. There is no aneurysm in the aorta.     Left Ventricle Measurements    Function/Volumes   A4C EF 57 %         Dimensions   LVIDd 3.9 cm         LVIDS 2.6 cm         IVSd 1.3 cm         LVPWd 1.3 cm         FS 33 %         Diastolic Filling   MV E' Tissue Velocity Septal 8 cm/s         MV E' Tissue Velocity Lateral 10 cm/s         E wave deceleration time 243 ms         MV Peak E [place] 76 cm/s         MV Peak A [place] 0.82 m/s         Strain   GLS -18 %               Right Ventricle Measurements    Dimensions   RVID d 3.8 cm         Tricuspid annular plane systolic excursion 2 cm               Left Atrium Measurements    Dimensions   LA size 4.1 cm         LA length (A2C) 6 cm               Right Atrium Measurements    Dimensions   RAA A4C 16.6 cm2               Mitral Valve Measurements    Stenosis   MV stenosis pressure 1/2 time 71 ms         MV valve area p 1/2 method 3.1 cm2               Tricuspid Valve Measurements    RVSP Parameters   TR Peak [place] 2.4 m/s         Triscuspid Valve Regurgitation Peak Gradient 23 mmHg               Aorta  Measurements    Aortic Dimensions   Ao root 3.2 cm         Asc Ao 3.1 cm               Exam Details    Performed Procedure Technologist Supporting Staff Performing Physician   Echo complete w/ strain ESTUARDO Mckeon           Appointment Date/Status Modality Department    2/20/2023     Completed [facility] ECHO RM 2 BE CAR NON INV           Begin Exam End Exam  End Exam Questionnaires   2/20/2023  8:59 AM 2/20/2023 10:02 AM  PATIENT EDUCATION            All Reviewers List    Diamante Almanzar DO on 2/21/2023 10:03 AM   Pedro Burkett DO on 2/21/2023  8:20 AM   Simeon Sung MD on 2/21/2023  6:55 AM     Signed    Electronically signed by Simeon Sung MD on 2/20/23 at 1602 EST       Counseling / Coordination of Care  Total floor / unit time spent today 40 minutes.  Greater than 50% of total time was spent with the patient and / or family counseling and / or coordination of care.

## 2024-02-20 ENCOUNTER — OFFICE VISIT (OUTPATIENT)
Dept: CARDIOLOGY CLINIC | Facility: CLINIC | Age: 69
End: 2024-02-20
Payer: MEDICARE

## 2024-02-20 VITALS
WEIGHT: 218.9 LBS | HEART RATE: 73 BPM | HEIGHT: 63 IN | DIASTOLIC BLOOD PRESSURE: 56 MMHG | SYSTOLIC BLOOD PRESSURE: 118 MMHG | BODY MASS INDEX: 38.79 KG/M2

## 2024-02-20 DIAGNOSIS — I05.9 MITRAL VALVE DISORDER: ICD-10-CM

## 2024-02-20 DIAGNOSIS — I50.32 CHRONIC DIASTOLIC HEART FAILURE (HCC): Primary | Chronic | ICD-10-CM

## 2024-02-20 DIAGNOSIS — I42.2 HYPERTROPHIC CARDIOMYOPATHY (HCC): ICD-10-CM

## 2024-02-20 DIAGNOSIS — I51.7 LVH (LEFT VENTRICULAR HYPERTROPHY): ICD-10-CM

## 2024-02-20 DIAGNOSIS — I10 BENIGN ESSENTIAL HYPERTENSION: ICD-10-CM

## 2024-02-20 PROCEDURE — 99214 OFFICE O/P EST MOD 30 MIN: CPT | Performed by: INTERNAL MEDICINE

## 2024-02-20 PROCEDURE — 93000 ELECTROCARDIOGRAM COMPLETE: CPT | Performed by: INTERNAL MEDICINE

## 2024-03-05 ENCOUNTER — APPOINTMENT (OUTPATIENT)
Dept: RADIOLOGY | Facility: MEDICAL CENTER | Age: 69
End: 2024-03-05
Payer: MEDICARE

## 2024-03-05 ENCOUNTER — OFFICE VISIT (OUTPATIENT)
Dept: OBGYN CLINIC | Facility: MEDICAL CENTER | Age: 69
End: 2024-03-05
Payer: MEDICARE

## 2024-03-05 VITALS
WEIGHT: 218 LBS | BODY MASS INDEX: 38.62 KG/M2 | SYSTOLIC BLOOD PRESSURE: 121 MMHG | DIASTOLIC BLOOD PRESSURE: 77 MMHG | HEIGHT: 63 IN | HEART RATE: 73 BPM

## 2024-03-05 DIAGNOSIS — Z96.641 AFTERCARE FOLLOWING RIGHT HIP JOINT REPLACEMENT SURGERY: ICD-10-CM

## 2024-03-05 DIAGNOSIS — Z47.1 AFTERCARE FOLLOWING RIGHT HIP JOINT REPLACEMENT SURGERY: Primary | ICD-10-CM

## 2024-03-05 DIAGNOSIS — Z47.1 AFTERCARE FOLLOWING RIGHT HIP JOINT REPLACEMENT SURGERY: ICD-10-CM

## 2024-03-05 DIAGNOSIS — Z96.641 AFTERCARE FOLLOWING RIGHT HIP JOINT REPLACEMENT SURGERY: Primary | ICD-10-CM

## 2024-03-05 PROCEDURE — 99214 OFFICE O/P EST MOD 30 MIN: CPT | Performed by: STUDENT IN AN ORGANIZED HEALTH CARE EDUCATION/TRAINING PROGRAM

## 2024-03-05 PROCEDURE — 73502 X-RAY EXAM HIP UNI 2-3 VIEWS: CPT
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
## 2024-03-11 ENCOUNTER — APPOINTMENT (OUTPATIENT)
Age: 69
End: 2024-03-11
Payer: MEDICARE

## 2024-03-11 DIAGNOSIS — M86.9 HIP OSTEOMYELITIS, RIGHT (HCC): ICD-10-CM

## 2024-03-11 LAB
BASOPHILS # BLD AUTO: 0.02 THOUSANDS/ÂΜL (ref 0–0.1)
BASOPHILS NFR BLD AUTO: 0 % (ref 0–1)
CREAT SERPL-MCNC: 1.09 MG/DL (ref 0.6–1.3)
EOSINOPHIL # BLD AUTO: 0.22 THOUSAND/ÂΜL (ref 0–0.61)
EOSINOPHIL NFR BLD AUTO: 3 % (ref 0–6)
ERYTHROCYTE [DISTWIDTH] IN BLOOD BY AUTOMATED COUNT: 14.1 % (ref 11.6–15.1)
GFR SERPL CREATININE-BSD FRML MDRD: 51 ML/MIN/1.73SQ M
HCT VFR BLD AUTO: 39.4 % (ref 34.8–46.1)
HGB BLD-MCNC: 12.3 G/DL (ref 11.5–15.4)
IMM GRANULOCYTES # BLD AUTO: 0.02 THOUSAND/UL (ref 0–0.2)
IMM GRANULOCYTES NFR BLD AUTO: 0 % (ref 0–2)
LYMPHOCYTES # BLD AUTO: 2.21 THOUSANDS/ÂΜL (ref 0.6–4.47)
LYMPHOCYTES NFR BLD AUTO: 34 % (ref 14–44)
MCH RBC QN AUTO: 29 PG (ref 26.8–34.3)
MCHC RBC AUTO-ENTMCNC: 31.2 G/DL (ref 31.4–37.4)
MCV RBC AUTO: 93 FL (ref 82–98)
MONOCYTES # BLD AUTO: 0.64 THOUSAND/ÂΜL (ref 0.17–1.22)
MONOCYTES NFR BLD AUTO: 10 % (ref 4–12)
NEUTROPHILS # BLD AUTO: 3.46 THOUSANDS/ÂΜL (ref 1.85–7.62)
NEUTS SEG NFR BLD AUTO: 53 % (ref 43–75)
NRBC BLD AUTO-RTO: 0 /100 WBCS
PLATELET # BLD AUTO: 253 THOUSANDS/UL (ref 149–390)
PMV BLD AUTO: 9.6 FL (ref 8.9–12.7)
RBC # BLD AUTO: 4.24 MILLION/UL (ref 3.81–5.12)
WBC # BLD AUTO: 6.57 THOUSAND/UL (ref 4.31–10.16)

## 2024-03-11 PROCEDURE — 36415 COLL VENOUS BLD VENIPUNCTURE: CPT

## 2024-03-11 PROCEDURE — 85025 COMPLETE CBC W/AUTO DIFF WBC: CPT

## 2024-03-11 PROCEDURE — 82565 ASSAY OF CREATININE: CPT

## 2024-03-15 ENCOUNTER — OFFICE VISIT (OUTPATIENT)
Dept: INFECTIOUS DISEASES | Facility: CLINIC | Age: 69
End: 2024-03-15
Payer: MEDICARE

## 2024-03-15 VITALS
HEART RATE: 75 BPM | OXYGEN SATURATION: 98 % | DIASTOLIC BLOOD PRESSURE: 72 MMHG | SYSTOLIC BLOOD PRESSURE: 118 MMHG | TEMPERATURE: 97.6 F | RESPIRATION RATE: 18 BRPM | BODY MASS INDEX: 38.63 KG/M2 | WEIGHT: 218.03 LBS | HEIGHT: 63 IN

## 2024-03-15 DIAGNOSIS — M86.9 HIP OSTEOMYELITIS, RIGHT (HCC): Primary | ICD-10-CM

## 2024-03-15 PROCEDURE — 99213 OFFICE O/P EST LOW 20 MIN: CPT | Performed by: INTERNAL MEDICINE

## 2024-03-15 RX ORDER — CEFADROXIL 500 MG/1
500 CAPSULE ORAL EVERY 12 HOURS SCHEDULED
Qty: 60 CAPSULE | Refills: 5 | Status: SHIPPED | OUTPATIENT
Start: 2024-03-15 | End: 2024-04-14
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
## 2024-03-15 NOTE — PATIENT INSTRUCTIONS
Continue taking your Cefadroxil as you have been. We will plan to see you for a 6 month follow up with repeat blood work before hand. Call us sooner as needed.

## 2024-06-12 ENCOUNTER — TELEPHONE (OUTPATIENT)
Age: 69
End: 2024-06-12

## 2024-06-12 NOTE — TELEPHONE ENCOUNTER
Called patient to reschedule Marisol Thurman appointment on 8/26/24. Patient did not answer so original appointment was cancelled and rescheduled to 9/9/24 @ 2:30PM. I provided HOPE line phone number to call back to reschedule if needed. Additionally, a Dimmi message has been sent with the above information.

## 2024-07-17 ENCOUNTER — TELEPHONE (OUTPATIENT)
Age: 69
End: 2024-07-17

## 2024-07-17 NOTE — TELEPHONE ENCOUNTER
Caller:  Charito Lamb    Doctor: Simeon Sung,     Reason for call:  Patient calling in to speak with Marva patient stated she only wants to speak to her in regards to scheduling her follow up appointment with Dr. Sung.     Call back#: [phone]

## 2024-07-25 ENCOUNTER — TELEPHONE (OUTPATIENT)
Age: 69
End: 2024-07-25

## 2024-07-25 NOTE — TELEPHONE ENCOUNTER
Patient called in and would like for her lab scripts to be mail out to her home address. Thank you

## 2024-07-29 ENCOUNTER — TELEPHONE (OUTPATIENT)
Age: 69
End: 2024-07-29

## 2024-07-29 DIAGNOSIS — M31.31 GRANULOMATOSIS WITH POLYANGIITIS WITH RENAL INVOLVEMENT (HCC): ICD-10-CM

## 2024-07-29 DIAGNOSIS — N18.30 STAGE 3 CHRONIC KIDNEY DISEASE, UNSPECIFIED WHETHER STAGE 3A OR 3B CKD (HCC): Primary | ICD-10-CM

## 2024-07-29 DIAGNOSIS — I10 BENIGN ESSENTIAL HYPERTENSION: ICD-10-CM

## 2024-07-29 DIAGNOSIS — N18.31 STAGE 3A CHRONIC KIDNEY DISEASE (HCC): ICD-10-CM

## 2024-07-29 NOTE — TELEPHONE ENCOUNTER
The pt called to make an appt - last seen 11/2022 - pt is aware that it is in [place] and she will need new lab slips entered and mailed to her - address was confirmed in [facility].

## 2024-08-01 NOTE — TELEPHONE ENCOUNTER
I spoke to Charito lópez is aware that labs have been placed in [facility] for December. I mailed slips out.

## 2024-08-05 ENCOUNTER — APPOINTMENT (OUTPATIENT)
Age: 69
End: 2024-08-05
Payer: MEDICARE

## 2024-08-05 DIAGNOSIS — N18.31 STAGE 3A CHRONIC KIDNEY DISEASE (HCC): ICD-10-CM

## 2024-08-05 DIAGNOSIS — M31.31 WEGENER'S GRANULOMATOSIS WITH RENAL INVOLVEMENT (HCC): ICD-10-CM

## 2024-08-05 DIAGNOSIS — M86.9 HIP OSTEOMYELITIS, RIGHT (HCC): ICD-10-CM

## 2024-08-05 LAB
ALBUMIN SERPL BCG-MCNC: 4 G/DL (ref 3.5–5)
ALP SERPL-CCNC: 121 U/L (ref 34–104)
ALT SERPL W P-5'-P-CCNC: 18 U/L (ref 7–52)
ANION GAP SERPL CALCULATED.3IONS-SCNC: 8 MMOL/L (ref 4–13)
AST SERPL W P-5'-P-CCNC: 26 U/L (ref 13–39)
BASOPHILS # BLD AUTO: 0.02 THOUSANDS/ÂΜL (ref 0–0.1)
BASOPHILS NFR BLD AUTO: 0 % (ref 0–1)
BILIRUB SERPL-MCNC: 0.44 MG/DL (ref 0.2–1)
BUN SERPL-MCNC: 26 MG/DL (ref 5–25)
CALCIUM SERPL-MCNC: 9.2 MG/DL (ref 8.4–10.2)
CHLORIDE SERPL-SCNC: 103 MMOL/L (ref 96–108)
CHOLEST SERPL-MCNC: 199 MG/DL
CO2 SERPL-SCNC: 26 MMOL/L (ref 21–32)
CREAT SERPL-MCNC: 1.14 MG/DL (ref 0.6–1.3)
CRP SERPL QL: 7.6 MG/L
EOSINOPHIL # BLD AUTO: 0.38 THOUSAND/ÂΜL (ref 0–0.61)
EOSINOPHIL NFR BLD AUTO: 8 % (ref 0–6)
ERYTHROCYTE [DISTWIDTH] IN BLOOD BY AUTOMATED COUNT: 14.2 % (ref 11.6–15.1)
GFR SERPL CREATININE-BSD FRML MDRD: 48 ML/MIN/1.73SQ M
GLUCOSE P FAST SERPL-MCNC: 85 MG/DL (ref 65–99)
HCT VFR BLD AUTO: 38.6 % (ref 34.8–46.1)
HDLC SERPL-MCNC: 38 MG/DL
HGB BLD-MCNC: 12.3 G/DL (ref 11.5–15.4)
IMM GRANULOCYTES # BLD AUTO: 0.01 THOUSAND/UL (ref 0–0.2)
IMM GRANULOCYTES NFR BLD AUTO: 0 % (ref 0–2)
LDLC SERPL CALC-MCNC: 110 MG/DL (ref 0–100)
LYMPHOCYTES # BLD AUTO: 1.64 THOUSANDS/ÂΜL (ref 0.6–4.47)
LYMPHOCYTES NFR BLD AUTO: 33 % (ref 14–44)
MCH RBC QN AUTO: 29.5 PG (ref 26.8–34.3)
MCHC RBC AUTO-ENTMCNC: 31.9 G/DL (ref 31.4–37.4)
MCV RBC AUTO: 93 FL (ref 82–98)
MONOCYTES # BLD AUTO: 0.49 THOUSAND/ÂΜL (ref 0.17–1.22)
MONOCYTES NFR BLD AUTO: 10 % (ref 4–12)
NEUTROPHILS # BLD AUTO: 2.51 THOUSANDS/ÂΜL (ref 1.85–7.62)
NEUTS SEG NFR BLD AUTO: 49 % (ref 43–75)
NONHDLC SERPL-MCNC: 161 MG/DL
NRBC BLD AUTO-RTO: 0 /100 WBCS
PLATELET # BLD AUTO: 214 THOUSANDS/UL (ref 149–390)
PMV BLD AUTO: 10.2 FL (ref 8.9–12.7)
POTASSIUM SERPL-SCNC: 4.9 MMOL/L (ref 3.5–5.3)
PROT SERPL-MCNC: 6.9 G/DL (ref 6.4–8.4)
RBC # BLD AUTO: 4.17 MILLION/UL (ref 3.81–5.12)
SODIUM SERPL-SCNC: 137 MMOL/L (ref 135–147)
TRIGL SERPL-MCNC: 254 MG/DL
WBC # BLD AUTO: 5.05 THOUSAND/UL (ref 4.31–10.16)

## 2024-08-05 PROCEDURE — 83520 IMMUNOASSAY QUANT NOS NONAB: CPT

## 2024-08-05 PROCEDURE — 86037 ANCA TITER EACH ANTIBODY: CPT

## 2024-08-05 PROCEDURE — 36415 COLL VENOUS BLD VENIPUNCTURE: CPT

## 2024-08-05 PROCEDURE — 80053 COMPREHEN METABOLIC PANEL: CPT

## 2024-08-05 PROCEDURE — 86140 C-REACTIVE PROTEIN: CPT

## 2024-08-05 PROCEDURE — 85025 COMPLETE CBC W/AUTO DIFF WBC: CPT

## 2024-08-05 PROCEDURE — 80061 LIPID PANEL: CPT

## 2024-08-07 LAB
C-ANCA TITR SER IF: NORMAL TITER
MYELOPEROXIDASE AB SER IA-ACNC: <0.2 UNITS (ref 0–0.9)
P-ANCA ATYPICAL TITR SER IF: NORMAL TITER
P-ANCA TITR SER IF: NORMAL TITER
PROTEINASE3 AB SER IA-ACNC: <0.2 UNITS (ref 0–0.9)

## 2024-08-12 ENCOUNTER — HOSPITAL ENCOUNTER (OUTPATIENT)
Dept: MAMMOGRAPHY | Facility: MEDICAL CENTER | Age: 69
Discharge: HOME | End: 2024-08-12
Payer: MEDICARE

## 2024-08-12 VITALS — BODY MASS INDEX: 38.62 KG/M2 | WEIGHT: 218 LBS | HEIGHT: 63 IN

## 2024-08-12 DIAGNOSIS — Z12.31 ENCOUNTER FOR SCREENING MAMMOGRAM FOR MALIGNANT NEOPLASM OF BREAST: ICD-10-CM

## 2024-08-12 DIAGNOSIS — Z91.89 INCREASED RISK OF BREAST CANCER: ICD-10-CM

## 2024-08-12 PROCEDURE — 77067 SCR MAMMO BI INCL CAD: CPT

## 2024-08-12 PROCEDURE — 77063 BREAST TOMOSYNTHESIS BI: CPT

## 2024-08-30 ENCOUNTER — TELEPHONE (OUTPATIENT)
Age: 69
End: 2024-08-30

## 2024-08-30 NOTE — TELEPHONE ENCOUNTER
Caller: Patient     Doctor: Kwadwo     Reason for call: Patient asked  if she can be added on to the schedule on  9/24/24 around 9:15. That is when her  is being seen and would like to be see close to that appointment     Call back#: [phone]

## 2024-09-09 ENCOUNTER — OFFICE VISIT (OUTPATIENT)
Dept: SURGICAL ONCOLOGY | Facility: CLINIC | Age: 69
End: 2024-09-09
Payer: MEDICARE

## 2024-09-09 VITALS
HEART RATE: 71 BPM | TEMPERATURE: 98 F | RESPIRATION RATE: 18 BRPM | SYSTOLIC BLOOD PRESSURE: 118 MMHG | BODY MASS INDEX: 38.98 KG/M2 | DIASTOLIC BLOOD PRESSURE: 70 MMHG | HEIGHT: 63 IN | OXYGEN SATURATION: 93 % | WEIGHT: 220 LBS

## 2024-09-09 DIAGNOSIS — Z12.31 ENCOUNTER FOR SCREENING MAMMOGRAM FOR MALIGNANT NEOPLASM OF BREAST: ICD-10-CM

## 2024-09-09 DIAGNOSIS — Z15.09 BRCA1 POSITIVE: Primary | ICD-10-CM

## 2024-09-09 DIAGNOSIS — Z91.89 INCREASED RISK OF BREAST CANCER: ICD-10-CM

## 2024-09-09 DIAGNOSIS — Z15.01 BRCA1 POSITIVE: Primary | ICD-10-CM

## 2024-09-09 PROCEDURE — 99213 OFFICE O/P EST LOW 20 MIN: CPT | Performed by: NURSE PRACTITIONER

## 2024-09-09 RX ORDER — CEFADROXIL 500 MG/1
CAPSULE ORAL
COMMUNITY
Start: 2024-08-18
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
## 2024-09-12 NOTE — TELEPHONE ENCOUNTER
Not a problem, if someone rox help getting her scheduled for an appointment with 15 min before or after her 's appointment on 9/24.  Thanks!

## 2024-09-12 NOTE — TELEPHONE ENCOUNTER
Caller: Charito    Doctor: Dr Burkett    Reason for call: Please see attached message. Thank you   Pt was seen by Dr Burkett Right hip sx- 3/23    Call back#: [phone]

## 2024-09-13 ENCOUNTER — APPOINTMENT (OUTPATIENT)
Age: 69
End: 2024-09-13
Payer: MEDICARE

## 2024-09-13 LAB
ALBUMIN SERPL BCG-MCNC: 4.4 G/DL (ref 3.5–5)
ALP SERPL-CCNC: 119 U/L (ref 34–104)
ALT SERPL W P-5'-P-CCNC: 18 U/L (ref 7–52)
ANION GAP SERPL CALCULATED.3IONS-SCNC: 5 MMOL/L (ref 4–13)
AST SERPL W P-5'-P-CCNC: 25 U/L (ref 13–39)
BASOPHILS # BLD AUTO: 0.02 THOUSANDS/ÂΜL (ref 0–0.1)
BASOPHILS NFR BLD AUTO: 0 % (ref 0–1)
BILIRUB SERPL-MCNC: 0.44 MG/DL (ref 0.2–1)
BUN SERPL-MCNC: 20 MG/DL (ref 5–25)
CALCIUM SERPL-MCNC: 9 MG/DL (ref 8.4–10.2)
CHLORIDE SERPL-SCNC: 102 MMOL/L (ref 96–108)
CO2 SERPL-SCNC: 30 MMOL/L (ref 21–32)
CREAT SERPL-MCNC: 1.2 MG/DL (ref 0.6–1.3)
EOSINOPHIL # BLD AUTO: 0.32 THOUSAND/ÂΜL (ref 0–0.61)
EOSINOPHIL NFR BLD AUTO: 5 % (ref 0–6)
ERYTHROCYTE [DISTWIDTH] IN BLOOD BY AUTOMATED COUNT: 13.8 % (ref 11.6–15.1)
GFR SERPL CREATININE-BSD FRML MDRD: 45 ML/MIN/1.73SQ M
GLUCOSE P FAST SERPL-MCNC: 86 MG/DL (ref 65–99)
HCT VFR BLD AUTO: 38.3 % (ref 34.8–46.1)
HGB BLD-MCNC: 12 G/DL (ref 11.5–15.4)
IMM GRANULOCYTES # BLD AUTO: 0.01 THOUSAND/UL (ref 0–0.2)
IMM GRANULOCYTES NFR BLD AUTO: 0 % (ref 0–2)
LYMPHOCYTES # BLD AUTO: 2.3 THOUSANDS/ÂΜL (ref 0.6–4.47)
LYMPHOCYTES NFR BLD AUTO: 39 % (ref 14–44)
MCH RBC QN AUTO: 29.4 PG (ref 26.8–34.3)
MCHC RBC AUTO-ENTMCNC: 31.3 G/DL (ref 31.4–37.4)
MCV RBC AUTO: 94 FL (ref 82–98)
MONOCYTES # BLD AUTO: 0.56 THOUSAND/ÂΜL (ref 0.17–1.22)
MONOCYTES NFR BLD AUTO: 9 % (ref 4–12)
NEUTROPHILS # BLD AUTO: 2.72 THOUSANDS/ÂΜL (ref 1.85–7.62)
NEUTS SEG NFR BLD AUTO: 47 % (ref 43–75)
NRBC BLD AUTO-RTO: 0 /100 WBCS
PLATELET # BLD AUTO: 222 THOUSANDS/UL (ref 149–390)
PMV BLD AUTO: 9.7 FL (ref 8.9–12.7)
POTASSIUM SERPL-SCNC: 5.1 MMOL/L (ref 3.5–5.3)
PROT SERPL-MCNC: 7 G/DL (ref 6.4–8.4)
RBC # BLD AUTO: 4.08 MILLION/UL (ref 3.81–5.12)
SODIUM SERPL-SCNC: 137 MMOL/L (ref 135–147)
WBC # BLD AUTO: 5.93 THOUSAND/UL (ref 4.31–10.16)

## 2024-09-13 PROCEDURE — 80053 COMPREHEN METABOLIC PANEL: CPT

## 2024-09-13 PROCEDURE — 85025 COMPLETE CBC W/AUTO DIFF WBC: CPT

## 2024-09-24 ENCOUNTER — OFFICE VISIT (OUTPATIENT)
Age: 69
End: 2024-09-24
Payer: MEDICARE

## 2024-09-24 ENCOUNTER — APPOINTMENT (OUTPATIENT)
Age: 69
End: 2024-09-24
Payer: MEDICARE

## 2024-09-24 VITALS
WEIGHT: 219 LBS | DIASTOLIC BLOOD PRESSURE: 76 MMHG | BODY MASS INDEX: 38.8 KG/M2 | HEART RATE: 76 BPM | SYSTOLIC BLOOD PRESSURE: 126 MMHG | HEIGHT: 63 IN

## 2024-09-24 DIAGNOSIS — Z96.641 AFTERCARE FOLLOWING RIGHT HIP JOINT REPLACEMENT SURGERY: ICD-10-CM

## 2024-09-24 DIAGNOSIS — Z47.1 AFTERCARE FOLLOWING RIGHT HIP JOINT REPLACEMENT SURGERY: ICD-10-CM

## 2024-09-24 DIAGNOSIS — Z96.641 AFTERCARE FOLLOWING RIGHT HIP JOINT REPLACEMENT SURGERY: Primary | ICD-10-CM

## 2024-09-24 DIAGNOSIS — Z47.1 AFTERCARE FOLLOWING RIGHT HIP JOINT REPLACEMENT SURGERY: Primary | ICD-10-CM

## 2024-09-24 PROCEDURE — 73502 X-RAY EXAM HIP UNI 2-3 VIEWS: CPT

## 2024-09-24 PROCEDURE — 99213 OFFICE O/P EST LOW 20 MIN: CPT | Performed by: STUDENT IN AN ORGANIZED HEALTH CARE EDUCATION/TRAINING PROGRAM
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
## 2024-09-26 NOTE — ASSESSMENT & PLAN NOTE
Writer placed call to Trino.  He did receive a call today from Dr. Campbell regarding positive cytology results now making his pancreatic cancer stage IV.  Dr. Johnson would like to see him in follow-up next Monday at 4:30 PM to review.  Patient in agreement.     · Hx of DVT/PE in 2007 following hysterectomy for ovarian CA at [facility]   IVC filter placed, treated with Lovenox for 1 year  · Presented with elevated D dimer 10 15  · Unable to CTA 2/2 IV dye allergy -- would need pre-medication  · BLE venous duplex - negative for DVT  · Suspect elevated D-dimer 2/2 infection/sepsis  · Suspect for current acute pulmonary embolism, continue DVT prophylaxis with enoxaparin

## 2024-09-27 ENCOUNTER — TELEPHONE (OUTPATIENT)
Dept: INFECTIOUS DISEASES | Facility: CLINIC | Age: 69
End: 2024-09-27

## 2024-09-27 ENCOUNTER — OFFICE VISIT (OUTPATIENT)
Dept: INFECTIOUS DISEASES | Facility: CLINIC | Age: 69
End: 2024-09-27
Payer: MEDICARE

## 2024-09-27 VITALS
OXYGEN SATURATION: 97 % | HEART RATE: 78 BPM | SYSTOLIC BLOOD PRESSURE: 122 MMHG | DIASTOLIC BLOOD PRESSURE: 68 MMHG | RESPIRATION RATE: 18 BRPM | TEMPERATURE: 96.9 F

## 2024-09-27 DIAGNOSIS — D80.1 HYPOGAMMAGLOBULINEMIA, ACQUIRED (HCC): ICD-10-CM

## 2024-09-27 DIAGNOSIS — I89.0 LYMPHEDEMA: ICD-10-CM

## 2024-09-27 DIAGNOSIS — E66.01 MORBID OBESITY (HCC): ICD-10-CM

## 2024-09-27 DIAGNOSIS — N18.30 STAGE 3 CHRONIC KIDNEY DISEASE, UNSPECIFIED WHETHER STAGE 3A OR 3B CKD (HCC): ICD-10-CM

## 2024-09-27 DIAGNOSIS — M86.9 HIP OSTEOMYELITIS, RIGHT (HCC): Primary | ICD-10-CM

## 2024-09-27 PROCEDURE — 99214 OFFICE O/P EST MOD 30 MIN: CPT | Performed by: NURSE PRACTITIONER

## 2024-09-27 RX ORDER — CEFADROXIL 500 MG/1
500 CAPSULE ORAL EVERY 12 HOURS SCHEDULED
Qty: 60 CAPSULE | Refills: 5 | Status: SHIPPED | OUTPATIENT
Start: 2024-09-27 | End: 2025-03-26

## 2024-09-27 NOTE — ASSESSMENT & PLAN NOTE
Lab Results   Component Value Date    EGFR 45 09/13/2024    EGFR 48 08/05/2024    EGFR 51 03/11/2024    CREATININE 1.20 09/13/2024    CREATININE 1.14 08/05/2024    CREATININE 1.09 03/11/2024   Upon review of patient's available past medical records it appears that patient's baseline creatinine is approximately 1-1.2. Most recent serum creatinine was 1.2 on 9/13/2024. Patient follows outpatient with [facility].  -monitor creatinine  -dose adjust antibiotic for renal function as needed  -continue outpatient follow up with nephrology

## 2024-09-27 NOTE — PROGRESS NOTES
Ambulatory Visit  Name: Charito Lamb      : 1955      MRN: [id number]  Encounter Provider: RENETTA Norris  Encounter Date: 2024   Encounter department: [facility]    Assessment & Plan  Hip osteomyelitis, right (HCC)  Complicated by residual hardware. Gross evidence of osteomyelitis of proximal femoral head/truce acetabular bone and purulence encountered during elective ROWDY on 3/6/2023. Unfortunately the posterior column screws were noted to be embedded in the femoral head and unable to be removed. OR cultures without growth but suspect MSSA given her recent bacteremia, also consider difficult to grow pathogens such as Cutibacterium or strep species. Patient completed 6-weeks of IV antibiotic treatment and has transitioned to PO Cefadroxil for ongoing suppression. She has been tolerating the antibiotic without difficulty. I reviewed her most recent lab work from 2024 which showed stable normal WBC count = 5.93, stable creatinine = 1.2, and normal AST/ALT with mild Alk phos elevation of 199 which is improved since last reading in 2024. Patient will remain on indefinite suppression given residual hardware.  -continue oral Cefadroxil through 3/6/2024  -refills sent to [facility] [place]  -complete lab work (CBCD and creatinine) prior to next outpatient ID follow up visit  -return to the outpatient ID office in 6 months for follow up  Stage 3 chronic kidney disease, unspecified whether stage 3a or 3b CKD ([facility])  Lab Results   Component Value Date    EGFR 45 2024    EGFR 48 2024    EGFR 51 2024    CREATININE 1.20 2024    CREATININE 1.14 2024    CREATININE 1.09 2024   Upon review of patient's available past medical records it appears that patient's baseline creatinine is approximately 1-1.2. Most recent serum creatinine was 1.2 on 2024. Patient follows outpatient with [facility].  -monitor  creatinine  -dose adjust antibiotic for renal function as needed  -continue outpatient follow up with nephrology  Lymphedema  This is risk factor for wounds.  -recommend serial skin exams/skin care  -frequent compression/elevation to promote lymphatic return  Hypogammaglobulinemia, acquired (HCC)  Patient receives IVIG  -continue outpatient follow up with hematology oncology  Morbid obesity (HCC)  BMI = 38.79. This is risk factor for wounds. This can impact antibiotic dosing.  -monitor patient weight and dose adjust antibiotic as needed    Above plan was discussed in detail with patient in the exam room.    Antibiotics:  Cefadroxil    Subjective:  Patient presents for follow up for ongoing suppressive antibiotic treatment of R hip osteomyelitis complicated by retained hardware. She continues on oral Cefadroxil which she is tolerating without difficulty. Today the patient has no fever, chills, sweats, shakes; no nausea, vomiting, abdominal pain, diarrhea, or dysuria; no cough, shortness of breath, or chest pain. No new symptoms. Patient has been working on reducing her pain medication and has noticed her appetite has picked up in that process.    Objective:  Vitals:  Temp 96.9  HR 78  RR 18  /68    Physical Exam:   General Appearance:  Alert, interactive, nontoxic, no acute distress. She appears comfortable sitting in the exam room.   Lungs:   Clear to auscultation bilaterally; no wheezes, rhonchi or rales; respirations unlabored on room air.   Heart:  RRR; no murmur, rub or gallop.   Extremities: Lymphedema of the B/L LE.    Skin: No new rashes noted on exposed skin.     Labs, Imaging, & Other studies:   All pertinent labs and imaging studies were personally reviewed by me including CBCD and CMP collected on 9/13/2024.

## 2024-09-27 NOTE — ASSESSMENT & PLAN NOTE
This is risk factor for wounds.  -recommend serial skin exams/skin care  -frequent compression/elevation to promote lymphatic return

## 2024-09-27 NOTE — TELEPHONE ENCOUNTER
Called and left message for patient today.   Informed patient to call the office back.,   Was calling as patient was scheduled for an appointment today at 11:30AM and did not show for the appointment.

## 2024-09-27 NOTE — PATIENT INSTRUCTIONS
Continue oral Cefadroxil, 500mg every 12 hours, for indefinite suppression.    Refills sent to [facility] [place].    Complete lab work (CBCD and creatinine) prior to next outpatient infectious disease follow up appointment.    Return to the outpatient infectious disease office in 6 months for follow up.

## 2024-09-27 NOTE — ASSESSMENT & PLAN NOTE
Complicated by residual hardware. Gross evidence of osteomyelitis of proximal femoral head/truce acetabular bone and purulence encountered during elective ROWDY on 3/6/2023. Unfortunately the posterior column screws were noted to be embedded in the femoral head and unable to be removed. OR cultures without growth but suspect MSSA given her recent bacteremia, also consider difficult to grow pathogens such as Cutibacterium or strep species. Patient completed 6-weeks of IV antibiotic treatment and has transitioned to PO Cefadroxil for ongoing suppression. She has been tolerating the antibiotic without difficulty. I reviewed her most recent lab work from 9/13/2024 which showed stable normal WBC count = 5.93, stable creatinine = 1.2, and normal AST/ALT with mild Alk phos elevation of 199 which is improved since last reading in 8/2024. Patient will remain on indefinite suppression given residual hardware.  -continue oral Cefadroxil through 3/6/2024  -refills sent to [facility] [place]  -complete lab work (CBCD and creatinine) prior to next outpatient ID follow up visit  -return to the outpatient ID office in 6 months for follow up

## 2024-09-27 NOTE — ASSESSMENT & PLAN NOTE
BMI = 38.79. This is risk factor for wounds. This can impact antibiotic dosing.  -monitor patient weight and dose adjust antibiotic as needed

## 2024-10-25 ENCOUNTER — TELEPHONE (OUTPATIENT)
Age: 69
End: 2024-10-25

## 2024-11-11 ENCOUNTER — APPOINTMENT (OUTPATIENT)
Age: 69
End: 2024-11-11
Payer: MEDICARE

## 2024-11-11 DIAGNOSIS — N18.30 STAGE 3 CHRONIC KIDNEY DISEASE, UNSPECIFIED WHETHER STAGE 3A OR 3B CKD (HCC): ICD-10-CM

## 2024-11-11 DIAGNOSIS — N18.31 STAGE 3A CHRONIC KIDNEY DISEASE (HCC): ICD-10-CM

## 2024-11-11 DIAGNOSIS — I10 BENIGN ESSENTIAL HYPERTENSION: ICD-10-CM

## 2024-11-11 DIAGNOSIS — M31.31 GRANULOMATOSIS WITH POLYANGIITIS WITH RENAL INVOLVEMENT (HCC): ICD-10-CM

## 2024-11-11 LAB
ALBUMIN SERPL BCG-MCNC: 4.3 G/DL (ref 3.5–5)
ALP SERPL-CCNC: 115 U/L (ref 34–104)
ALT SERPL W P-5'-P-CCNC: 19 U/L (ref 7–52)
ANION GAP SERPL CALCULATED.3IONS-SCNC: 8 MMOL/L (ref 4–13)
AST SERPL W P-5'-P-CCNC: 27 U/L (ref 13–39)
BILIRUB SERPL-MCNC: 0.31 MG/DL (ref 0.2–1)
BILIRUB UR QL STRIP: NEGATIVE
BUN SERPL-MCNC: 25 MG/DL (ref 5–25)
CALCIUM SERPL-MCNC: 9.3 MG/DL (ref 8.4–10.2)
CHLORIDE SERPL-SCNC: 102 MMOL/L (ref 96–108)
CLARITY UR: CLEAR
CO2 SERPL-SCNC: 28 MMOL/L (ref 21–32)
COLOR UR: NORMAL
CREAT SERPL-MCNC: 1.28 MG/DL (ref 0.6–1.3)
CREAT UR-MCNC: 66.5 MG/DL
ERYTHROCYTE [DISTWIDTH] IN BLOOD BY AUTOMATED COUNT: 13.6 % (ref 11.6–15.1)
GFR SERPL CREATININE-BSD FRML MDRD: 42 ML/MIN/1.73SQ M
GLUCOSE P FAST SERPL-MCNC: 86 MG/DL (ref 65–99)
GLUCOSE UR STRIP-MCNC: NEGATIVE MG/DL
HCT VFR BLD AUTO: 38.2 % (ref 34.8–46.1)
HGB BLD-MCNC: 12 G/DL (ref 11.5–15.4)
HGB UR QL STRIP.AUTO: NEGATIVE
KETONES UR STRIP-MCNC: NEGATIVE MG/DL
LEUKOCYTE ESTERASE UR QL STRIP: NEGATIVE
MCH RBC QN AUTO: 29.5 PG (ref 26.8–34.3)
MCHC RBC AUTO-ENTMCNC: 31.4 G/DL (ref 31.4–37.4)
MCV RBC AUTO: 94 FL (ref 82–98)
MICROALBUMIN UR-MCNC: <7 MG/L
NITRITE UR QL STRIP: NEGATIVE
PH UR STRIP.AUTO: 6 [PH]
PHOSPHATE SERPL-MCNC: 4.1 MG/DL (ref 2.3–4.1)
PLATELET # BLD AUTO: 209 THOUSANDS/UL (ref 149–390)
PMV BLD AUTO: 9.7 FL (ref 8.9–12.7)
POTASSIUM SERPL-SCNC: 4.6 MMOL/L (ref 3.5–5.3)
PROT SERPL-MCNC: 7 G/DL (ref 6.4–8.4)
PROT UR STRIP-MCNC: NEGATIVE MG/DL
PTH-INTACT SERPL-MCNC: 53.6 PG/ML (ref 12–88)
RBC # BLD AUTO: 4.07 MILLION/UL (ref 3.81–5.12)
SODIUM SERPL-SCNC: 138 MMOL/L (ref 135–147)
SP GR UR STRIP.AUTO: 1.02 (ref 1–1.03)
UROBILINOGEN UR STRIP-ACNC: <2 MG/DL
WBC # BLD AUTO: 6.14 THOUSAND/UL (ref 4.31–10.16)

## 2024-11-11 PROCEDURE — 83520 IMMUNOASSAY QUANT NOS NONAB: CPT

## 2024-11-11 PROCEDURE — 86037 ANCA TITER EACH ANTIBODY: CPT

## 2024-11-11 PROCEDURE — 82570 ASSAY OF URINE CREATININE: CPT

## 2024-11-11 PROCEDURE — 80053 COMPREHEN METABOLIC PANEL: CPT

## 2024-11-11 PROCEDURE — 81003 URINALYSIS AUTO W/O SCOPE: CPT

## 2024-11-11 PROCEDURE — 83970 ASSAY OF PARATHORMONE: CPT

## 2024-11-11 PROCEDURE — 85027 COMPLETE CBC AUTOMATED: CPT

## 2024-11-11 PROCEDURE — 84100 ASSAY OF PHOSPHORUS: CPT

## 2024-11-11 PROCEDURE — 82043 UR ALBUMIN QUANTITATIVE: CPT

## 2024-11-11 PROCEDURE — 36415 COLL VENOUS BLD VENIPUNCTURE: CPT

## 2024-11-12 ENCOUNTER — OFFICE VISIT (OUTPATIENT)
Dept: NEPHROLOGY | Facility: CLINIC | Age: 69
End: 2024-11-12
Payer: MEDICARE

## 2024-11-12 VITALS
WEIGHT: 221 LBS | DIASTOLIC BLOOD PRESSURE: 70 MMHG | BODY MASS INDEX: 39.16 KG/M2 | HEART RATE: 76 BPM | SYSTOLIC BLOOD PRESSURE: 124 MMHG | HEIGHT: 63 IN

## 2024-11-12 DIAGNOSIS — N18.31 STAGE 3A CHRONIC KIDNEY DISEASE (HCC): Primary | ICD-10-CM

## 2024-11-12 DIAGNOSIS — M31.31 WEGENER'S GRANULOMATOSIS WITH RENAL INVOLVEMENT (HCC): ICD-10-CM

## 2024-11-12 PROCEDURE — 99214 OFFICE O/P EST MOD 30 MIN: CPT | Performed by: INTERNAL MEDICINE

## 2024-11-12 NOTE — ASSESSMENT & PLAN NOTE
Repeat ANCA is pending  Again recent urinalysis showing bland urine sediment without significant hematuria or proteinuria  Protein creatinine ratio within normal range    Summary:  Overall renal function remains fairly stable  Urinalysis is bland without signs of pulmonary disease.  No evidence of hematuria or significant proteinuria.  Recommend no changes in her current regimen  Recommend follow-up in 1 year with repeat labs at that time.

## 2024-11-12 NOTE — PROGRESS NOTES
"NEPHROLOGY OUTPATIENT PROGRESS NOTE   Charito Lamb 69 y.o. female MRN: [id number]  Reason for visit: CKD    Assessment & Plan  Stage 3a chronic kidney disease (HCC)  Baseline creatinine 1.1-1.3 with a recent creatinine of 1.28 EGFR of 42.  Etiology most likely secondary to previous Wegener's granulomatosis.  Recent urinalysis with bland urine sediment.  No evidence of protein or hematuria  Microalbumin to creatinine ratio undetectable  Wegener's granulomatosis with renal involvement (HCC)  Repeat ANCA is pending  Again recent urinalysis showing bland urine sediment without significant hematuria or proteinuria  Protein creatinine ratio within normal range    Summary:  Overall renal function remains fairly stable  Urinalysis is bland without signs of pulmonary disease.  No evidence of hematuria or significant proteinuria.  Recommend no changes in her current regimen  Recommend follow-up in 1 year with repeat labs at that time.        SUBJECTIVE / INTERVAL HISTORY:  She has been doing reasonably well.  Denies any recent hospitalizations or illnesses.  Denies any chest pain shortness of breath.  No significant increase in lower extremity swelling.      OBJECTIVE:  /70 (BP Location: Left arm, Patient Position: Sitting, Cuff Size: Standard)   Pulse 76   Ht 5' 3\" (1.6 m)   Wt 100 kg (221 lb)   BMI 39.15 kg/m²   Vitals:    11/12/24 1349   Weight: 100 kg (221 lb)       Physical Exam  Constitutional:       Appearance: She is not ill-appearing.   Cardiovascular:      Rate and Rhythm: Normal rate and regular rhythm.   Pulmonary:      Effort: Pulmonary effort is normal.      Breath sounds: Normal breath sounds.   Abdominal:      General: There is no distension.      Palpations: Abdomen is soft.      Tenderness: There is no abdominal tenderness.   Musculoskeletal:      Right lower leg: Edema present.      Left lower leg: Edema present.   Skin:     General: Skin is warm and dry.      Findings: No rash.   Neurological:     "  Mental Status: She is alert and oriented to person, place, and time.           Medications:    Current Outpatient Medications:     acetaminophen (TYLENOL) 325 mg tablet, Take 2 tablets by mouth every 6 (six) hours as needed for mild pain, Disp: 30 tablet, Rfl: 0    acetaminophen (TYLENOL) 500 mg tablet, Take 2 tablets (1,000 mg total) by mouth every 8 (eight) hours For use at [facility], Disp: 60 tablet, Rfl: 0    ascorbic acid (VITAMIN C) 500 MG tablet, Take 1 tablet (500 mg total) by mouth 2 (two) times a day, Disp: 60 tablet, Rfl: 1    Calcium Carbonate-Vit D-Min (CALCIUM 1200 PO), Take 3 tablets by mouth daily , Disp: , Rfl:     cefadroxil (DURICEF) 500 mg capsule, Take 1 capsule (500 mg total) by mouth every 12 (twelve) hours, Disp: 60 capsule, Rfl: 5    cholecalciferol (VITAMIN D3) 1,000 units tablet, Take 2 tablets (2,000 Units total) by mouth daily, Disp: 60 tablet, Rfl: 1    DULoxetine (CYMBALTA) 60 mg delayed release capsule, Take 60 mg by mouth daily  , Disp: , Rfl: 0    folic acid (FOLVITE) 1 mg tablet, take 1 tablet by mouth once daily, Disp: 30 tablet, Rfl: 1    LORazepam (ATIVAN) 0.5 mg tablet, Take 1 tablet twice a day as needed, Disp: 10 tablet, Rfl: 0    metoprolol tartrate (LOPRESSOR) 25 mg tablet, Take 25 mg by mouth every 12 (twelve) hours, Disp: , Rfl:     Multiple Vitamins-Minerals (MULTIVITAMIN ADULT PO), Take 1 capsule by mouth daily , Disp: , Rfl:     omeprazole (PriLOSEC) 20 mg delayed release capsule, Take 20 mg by mouth daily., Disp: , Rfl:     oxyCODONE (ROXICODONE) 15 mg immediate release tablet, Take 1 tablet (15 mg total) by mouth every 8 (eight) hours as needed (breakthrough pain) for up to 10 doses Max Daily Amount: 45 mg, Disp: 10 tablet, Rfl: 0    OxyCONTIN 20 MG 12 hr tablet, Take 15 mg by mouth every 12 (twelve) hours, Disp: , Rfl:     Probiotic Product (PROBIOTIC-10 PO), Take by mouth, Disp: , Rfl:     TOVIAZ 8 MG TB24, Take 8 mg by mouth daily at bedtime , Disp: , Rfl:      aspirin (ECOTRIN LOW STRENGTH) 81 mg EC tablet, Take 1 tablet (81 mg total) by mouth 2 (two) times a day For use at [facility] (Patient not taking: Reported on 1/25/2024), Disp: 60 tablet, Rfl: 0    metoprolol succinate (TOPROL-XL) 25 mg 24 hr tablet, , Disp: , Rfl:     ondansetron (ZOFRAN) 4 mg tablet, Take 1 tablet (4 mg total) by mouth every 8 (eight) hours as needed for nausea or vomiting (Patient not taking: Reported on 3/5/2024), Disp: 20 tablet, Rfl: 0    ondansetron (ZOFRAN-ODT) 8 mg disintegrating tablet, Take 1 tablet (8 mg total) by mouth every 8 (eight) hours as needed for nausea or vomiting (Patient not taking: Reported on 2/20/2024), Disp: 10 tablet, Rfl: 0    oxyCODONE HCl ER (OxyCONTIN) 30 MG T12A, Take 30 mg by mouth every 12 (twelve) hours 20 mg (Patient not taking: Reported on 3/5/2024), Disp: , Rfl:     senna-docusate sodium (SENOKOT S) 8.6-50 mg per tablet, Take 1 tablet by mouth daily For use at [facility] (Patient not taking: Reported on 11/12/2024), Disp: 30 tablet, Rfl: 0    Laboratory Results:  Results for orders placed or performed in visit on 11/11/24   CBC and Platelet    Collection Time: 11/11/24  8:31 AM   Result Value Ref Range    WBC 6.14 4.31 - 10.16 Thousand/uL    RBC 4.07 3.81 - 5.12 Million/uL    Hemoglobin 12.0 11.5 - 15.4 g/dL    Hematocrit 38.2 34.8 - 46.1 %    MCV 94 82 - 98 fL    MCH 29.5 26.8 - 34.3 pg    MCHC 31.4 31.4 - 37.4 g/dL    RDW 13.6 11.6 - 15.1 %    Platelets 209 149 - 390 Thousands/uL    MPV 9.7 8.9 - 12.7 fL   Comprehensive metabolic panel    Collection Time: 11/11/24  8:31 AM   Result Value Ref Range    Sodium 138 135 - 147 mmol/L    Potassium 4.6 3.5 - 5.3 mmol/L    Chloride 102 96 - 108 mmol/L    CO2 28 21 - 32 mmol/L    ANION GAP 8 4 - 13 mmol/L    BUN 25 5 - 25 mg/dL    Creatinine 1.28 0.60 - 1.30 mg/dL    Glucose, Fasting 86 65 - 99 mg/dL    Calcium 9.3 8.4 - 10.2 mg/dL    AST 27 13 - 39 U/L    ALT 19 7 - 52 U/L    Alkaline Phosphatase 115 (H) 34 - 104  U/L    Total Protein 7.0 6.4 - 8.4 g/dL    Albumin 4.3 3.5 - 5.0 g/dL    Total Bilirubin 0.31 0.20 - 1.00 mg/dL    eGFR 42 ml/min/1.73sq m   Phosphorus    Collection Time: 11/11/24  8:31 AM   Result Value Ref Range    Phosphorus 4.1 2.3 - 4.1 mg/dL   PTH, intact    Collection Time: 11/11/24  8:31 AM   Result Value Ref Range    PTH 53.6 12.0 - 88.0 pg/mL   Albumin / creatinine urine ratio    Collection Time: 11/11/24  8:31 AM   Result Value Ref Range    Creatinine, Ur 66.5 Reference range not established. mg/dL    Albumin,U,Random <7.0 <20.0 mg/L    Albumin Creat Ratio     UA (URINE) with reflex to Scope    Collection Time: 11/11/24  8:56 AM   Result Value Ref Range    Color, UA Light Yellow     Clarity, UA Clear     Specific Gravity, UA 1.016 1.003 - 1.030    pH, UA 6.0 4.5, 5.0, 5.5, 6.0, 6.5, 7.0, 7.5, 8.0    Leukocytes, UA Negative Negative    Nitrite, UA Negative Negative    Protein, UA Negative Negative mg/dl    Glucose, UA Negative Negative mg/dl    Ketones, UA Negative Negative mg/dl    Urobilinogen, UA <2.0 <2.0 mg/dl mg/dl    Bilirubin, UA Negative Negative    Occult Blood, UA Negative Negative     *Note: Due to a large number of results and/or encounters for the requested time period, some results have not been displayed. A complete set of results can be found in Results Review.

## 2024-11-12 NOTE — ASSESSMENT & PLAN NOTE
Baseline creatinine 1.1-1.3 with a recent creatinine of 1.28 EGFR of 42.  Etiology most likely secondary to previous Wegener's granulomatosis.  Recent urinalysis with bland urine sediment.  No evidence of protein or hematuria  Microalbumin to creatinine ratio undetectable

## 2024-12-18 ENCOUNTER — TELEPHONE (OUTPATIENT)
Dept: NEPHROLOGY | Facility: CLINIC | Age: 69
End: 2024-12-18

## 2024-12-18 NOTE — TELEPHONE ENCOUNTER
Called pt and LVM stating she has been scheduled for her Nov 2025 follow up appt on 11/11/25 at 9:30am with Dr Holden in the AO. Advised pt to call office if she would like to reschedule.

## 2025-01-10 NOTE — ASSESSMENT & PLAN NOTE
· Last ECHO shows EF 75% with grade 1 diastolic dysfunction and hypertrophy of interventricular septum  · Hold home lasix in the setting of sepsis No

## 2025-02-07 ENCOUNTER — HOSPITAL ENCOUNTER (OUTPATIENT)
Dept: RADIOLOGY | Age: 70
Discharge: HOME | End: 2025-02-07
Payer: MEDICARE

## 2025-02-07 DIAGNOSIS — Z15.01 BRCA1 POSITIVE: ICD-10-CM

## 2025-02-07 DIAGNOSIS — Z12.31 ENCOUNTER FOR SCREENING MAMMOGRAM FOR MALIGNANT NEOPLASM OF BREAST: ICD-10-CM

## 2025-02-07 DIAGNOSIS — Z15.09 BRCA1 POSITIVE: ICD-10-CM

## 2025-02-07 PROCEDURE — 76641 ULTRASOUND BREAST COMPLETE: CPT

## 2025-02-24 ENCOUNTER — TELEPHONE (OUTPATIENT)
Dept: INFECTIOUS DISEASES | Facility: CLINIC | Age: 70
End: 2025-02-24

## 2025-02-24 ENCOUNTER — TELEPHONE (OUTPATIENT)
Age: 70
End: 2025-02-24

## 2025-02-24 DIAGNOSIS — D80.1 HYPOGAMMAGLOBULINEMIA, ACQUIRED (HCC): Primary | ICD-10-CM

## 2025-02-24 DIAGNOSIS — M31.31 WEGENER'S GRANULOMATOSIS WITH RENAL INVOLVEMENT (HCC): ICD-10-CM

## 2025-02-24 NOTE — TELEPHONE ENCOUNTER
Patient calling in, stated she has an appt on Friday and needs new labs entered, as the old ones were used. She is going tomorrow morning to get them done, please call her at [phone] to let her know when they are entered.

## 2025-02-24 NOTE — TELEPHONE ENCOUNTER
Contacted patient to follow up on previous call made to our office and address any questions or concerns they may have regarding their lab orders.    Spoke with patient. She inquired if she currently has any active lab orders from our office as she attempted to go to the [facility] and was told she did not have any active labs in the system.    Patient states they are going to [facility] tomorrow to attempt to have blood work completed. I assured her there are active lab orders in our system for her which were placed on 9/27/2024 by the provider, Dorita. I instructed patient to reference this date as well as the provider name when she goes, and they should be able to find the lab orders in their system.    I provided patient with office number and encouraged her to call if she has any issues after arriving at the lab so we may assist further if needed. Patient verbalizes understanding, and has no further questions at this time.

## 2025-02-24 NOTE — TELEPHONE ENCOUNTER
Patient calling for blood work orders they have an appt on 3/14 and would like to go get blood work done tomorrow

## 2025-02-25 ENCOUNTER — APPOINTMENT (OUTPATIENT)
Age: 70
End: 2025-02-25
Payer: MEDICARE

## 2025-02-25 DIAGNOSIS — D80.1 HYPOGAMMAGLOBULINEMIA, ACQUIRED (HCC): ICD-10-CM

## 2025-02-25 DIAGNOSIS — M31.31 WEGENER'S GRANULOMATOSIS WITH RENAL INVOLVEMENT (HCC): ICD-10-CM

## 2025-02-25 DIAGNOSIS — M86.9 HIP OSTEOMYELITIS, RIGHT (HCC): ICD-10-CM

## 2025-02-25 LAB
BASOPHILS # BLD AUTO: 0.02 THOUSANDS/ÂΜL (ref 0–0.1)
BASOPHILS NFR BLD AUTO: 0 % (ref 0–1)
EOSINOPHIL # BLD AUTO: 0.2 THOUSAND/ÂΜL (ref 0–0.61)
EOSINOPHIL NFR BLD AUTO: 4 % (ref 0–6)
ERYTHROCYTE [DISTWIDTH] IN BLOOD BY AUTOMATED COUNT: 13.9 % (ref 11.6–15.1)
HCT VFR BLD AUTO: 38.5 % (ref 34.8–46.1)
HGB BLD-MCNC: 12.1 G/DL (ref 11.5–15.4)
IMM GRANULOCYTES # BLD AUTO: 0.02 THOUSAND/UL (ref 0–0.2)
IMM GRANULOCYTES NFR BLD AUTO: 0 % (ref 0–2)
LYMPHOCYTES # BLD AUTO: 1.86 THOUSANDS/ÂΜL (ref 0.6–4.47)
LYMPHOCYTES NFR BLD AUTO: 38 % (ref 14–44)
MCH RBC QN AUTO: 29.5 PG (ref 26.8–34.3)
MCHC RBC AUTO-ENTMCNC: 31.4 G/DL (ref 31.4–37.4)
MCV RBC AUTO: 94 FL (ref 82–98)
MONOCYTES # BLD AUTO: 0.51 THOUSAND/ÂΜL (ref 0.17–1.22)
MONOCYTES NFR BLD AUTO: 10 % (ref 4–12)
NEUTROPHILS # BLD AUTO: 2.28 THOUSANDS/ÂΜL (ref 1.85–7.62)
NEUTS SEG NFR BLD AUTO: 48 % (ref 43–75)
NRBC BLD AUTO-RTO: 0 /100 WBCS
PLATELET # BLD AUTO: 220 THOUSANDS/UL (ref 149–390)
PMV BLD AUTO: 9.8 FL (ref 8.9–12.7)
RBC # BLD AUTO: 4.1 MILLION/UL (ref 3.81–5.12)
WBC # BLD AUTO: 4.89 THOUSAND/UL (ref 4.31–10.16)

## 2025-02-25 PROCEDURE — 82784 ASSAY IGA/IGD/IGG/IGM EACH: CPT

## 2025-02-25 PROCEDURE — 85025 COMPLETE CBC W/AUTO DIFF WBC: CPT

## 2025-02-25 PROCEDURE — 84165 PROTEIN E-PHORESIS SERUM: CPT

## 2025-02-25 PROCEDURE — 80053 COMPREHEN METABOLIC PANEL: CPT

## 2025-02-25 PROCEDURE — 83521 IG LIGHT CHAINS FREE EACH: CPT

## 2025-02-25 PROCEDURE — 36415 COLL VENOUS BLD VENIPUNCTURE: CPT

## 2025-02-26 LAB
ALBUMIN SERPL BCG-MCNC: 4.1 G/DL (ref 3.5–5)
ALP SERPL-CCNC: 121 U/L (ref 34–104)
ALT SERPL W P-5'-P-CCNC: 23 U/L (ref 7–52)
ANION GAP SERPL CALCULATED.3IONS-SCNC: 9 MMOL/L (ref 4–13)
AST SERPL W P-5'-P-CCNC: 33 U/L (ref 13–39)
BILIRUB SERPL-MCNC: 0.45 MG/DL (ref 0.2–1)
BUN SERPL-MCNC: 18 MG/DL (ref 5–25)
CALCIUM SERPL-MCNC: 9.4 MG/DL (ref 8.4–10.2)
CHLORIDE SERPL-SCNC: 105 MMOL/L (ref 96–108)
CO2 SERPL-SCNC: 26 MMOL/L (ref 21–32)
CREAT SERPL-MCNC: 1.14 MG/DL (ref 0.6–1.3)
GFR SERPL CREATININE-BSD FRML MDRD: 48 ML/MIN/1.73SQ M
GLUCOSE P FAST SERPL-MCNC: 94 MG/DL (ref 65–99)
IGA SERPL-MCNC: 18 MG/DL (ref 66–433)
IGG SERPL-MCNC: 1024 MG/DL (ref 635–1741)
IGM SERPL-MCNC: 61 MG/DL (ref 45–281)
KAPPA LC FREE SER-MCNC: 22 MG/L (ref 3.3–19.4)
KAPPA LC FREE/LAMBDA FREE SER: 0.91 {RATIO} (ref 0.26–1.65)
LAMBDA LC FREE SERPL-MCNC: 24.1 MG/L (ref 5.7–26.3)
POTASSIUM SERPL-SCNC: 4.5 MMOL/L (ref 3.5–5.3)
PROT SERPL-MCNC: 6.6 G/DL (ref 6.4–8.4)
SODIUM SERPL-SCNC: 140 MMOL/L (ref 135–147)

## 2025-02-27 LAB
ALBUMIN SERPL ELPH-MCNC: 3.94 G/DL (ref 3.2–5.1)
ALBUMIN SERPL ELPH-MCNC: 61.5 % (ref 48–70)
ALPHA1 GLOB SERPL ELPH-MCNC: 0.24 G/DL (ref 0.15–0.47)
ALPHA1 GLOB SERPL ELPH-MCNC: 3.8 % (ref 1.8–7)
ALPHA2 GLOB SERPL ELPH-MCNC: 0.72 G/DL (ref 0.42–1.04)
ALPHA2 GLOB SERPL ELPH-MCNC: 11.2 % (ref 5.9–14.9)
BETA GLOB ABNORMAL SERPL ELPH-MCNC: 0.42 G/DL (ref 0.31–0.57)
BETA1 GLOB SERPL ELPH-MCNC: 6.5 % (ref 4.7–7.7)
BETA2 GLOB SERPL ELPH-MCNC: 4.4 % (ref 3.1–7.9)
BETA2+GAMMA GLOB SERPL ELPH-MCNC: 0.28 G/DL (ref 0.2–0.58)
GAMMA GLOB ABNORMAL SERPL ELPH-MCNC: 0.81 G/DL (ref 0.4–1.66)
GAMMA GLOB SERPL ELPH-MCNC: 12.6 % (ref 6.9–22.3)
IGG/ALB SER: 1.6 {RATIO} (ref 1.1–1.8)
PROT PATTERN SERPL ELPH-IMP: NORMAL
PROT SERPL-MCNC: 6.4 G/DL (ref 6.4–8.2)

## 2025-02-27 PROCEDURE — 84165 PROTEIN E-PHORESIS SERUM: CPT | Performed by: PATHOLOGY

## 2025-02-28 ENCOUNTER — OFFICE VISIT (OUTPATIENT)
Dept: HEMATOLOGY ONCOLOGY | Facility: CLINIC | Age: 70
End: 2025-02-28
Payer: MEDICARE

## 2025-02-28 VITALS
DIASTOLIC BLOOD PRESSURE: 74 MMHG | HEART RATE: 68 BPM | BODY MASS INDEX: 39.15 KG/M2 | HEIGHT: 63 IN | TEMPERATURE: 97.6 F | RESPIRATION RATE: 16 BRPM | SYSTOLIC BLOOD PRESSURE: 128 MMHG | OXYGEN SATURATION: 98 %

## 2025-02-28 DIAGNOSIS — Z15.01 BRCA1 POSITIVE: ICD-10-CM

## 2025-02-28 DIAGNOSIS — D80.1 HYPOGAMMAGLOBULINEMIA, ACQUIRED (HCC): Primary | ICD-10-CM

## 2025-02-28 DIAGNOSIS — C56.9 MALIGNANT NEOPLASM OF OVARY, UNSPECIFIED LATERALITY (HCC): ICD-10-CM

## 2025-02-28 DIAGNOSIS — Z15.09 BRCA1 POSITIVE: ICD-10-CM

## 2025-02-28 DIAGNOSIS — M31.31 WEGENER'S GRANULOMATOSIS WITH RENAL INVOLVEMENT (HCC): ICD-10-CM

## 2025-02-28 DIAGNOSIS — M86.9 HIP OSTEOMYELITIS, RIGHT (HCC): ICD-10-CM

## 2025-02-28 PROCEDURE — 99213 OFFICE O/P EST LOW 20 MIN: CPT | Performed by: PHYSICIAN ASSISTANT

## 2025-02-28 NOTE — ASSESSMENT & PLAN NOTE
BRCA1 mutation      Mammogram and MRI of the breast are followed by Surgical Oncology Dr. Fay.

## 2025-02-28 NOTE — PROGRESS NOTES
Name: Charito Lamb      : 1955      MRN: [id number]  Encounter Provider: Justine Way PA-C  Encounter Date: 2025   Encounter department: [facility]  :  Assessment & Plan  Hypogammaglobulinemia, acquired (HCC)  Hypogammaglobinemia with subsequent multiple infections required multiple admissions to the hospital at least 4 times in 2019 with sepsis, cellulitis, pneumonia. IgG level was less than 200. She was given IVIG 30 g x 2 doses January and 2020.  Level improved to the range of 500.   She had headache, nausea with IVIG requiring Zofran, and hydrocortisone ,Benadryl.     Last dose of IV Ig was 10/2020  2022 IgG 565             1560            983             1024     2025 no monoclonal band on SPEP        No hospitalizations at [facility] since 3/2023    Reains on antibiotics currently per ID due to her osteomyelitis.    This time, we will see her as needed.         BRCA1 positive    BRCA1 mutation      Mammogram and MRI of the breast are followed by Surgical Oncology Dr. Fay.         Malignant neoplasm of ovary, unspecified laterality (HCC)    Status post bilateral oophorectomy and hysterectomy in  followed by chemotherapy (Carboplatin/Taxol x 5; last 2007). Follows with Dr. Darrian Gardner at [place].       Wegener's granulomatosis with renal involvement (HCC)  Follows with nephrology       Hip osteomyelitis, right (HCC)  On indefinite ABX per ID given residual hardware.            Return if symptoms worsen or fail to improve.    History of Present Illness   Chief Complaint   Patient presents with   • office visit     Pertinent Medical History     25: HPI:    Charito Lamb is a 70-year-old  female seen initially 2019 regarding hypogammaglobulinemia.  She has multiple medical problems including history of ovarian cancer.  She is s/p exploratory laparotomy /BSO 2007.  She is s/p  hysterectomy,  omentumectomy, lymph node biopsy 2/14/2007 at  [facility].  Complicated by DVT and PE 3/25/2007.  IVC filter was placed. Treated with Lovenox x 1 year.  She received post op chemotherapy.       She was was found to have BRCA1 mutation.  She follows with Dr. Fay with surgical oncology.    She has history of Juan's granulomatosis diagnosed 11/2010. She was on steroids through 2011 and then she received rituximab for almost 4 years per Dr. Wong.  She had subsequent pneumonia, pneumonitis, sinusitis, cellulitis of the lower extremity requiring multiple admissions to the hospital and antibiotics     Admission to the hospital was on 11/14/2019 with severe sepsis, leukocytosis, encephalopathy     On 11/22/2019, IgG 152, IgA 36, IgM 72     IgG 189 7/2016 at [facility].  IgG <200 through 11/2019.     She was initiated on IVIG 30 g every month received the 1st dose January 31, 2020, IgG level went up from 189 to 547 2/22/20.  She reported profound headaches after the 1st treatment.  Hydrocortisone 100mg given as premedication with cycle #2 due to prolonged HA post treatment #1.  For nausea she had benefit from Compazine, Zofran was added to premedication prior to IVIG.     She only received the 2 doses  - January 2020 and February 2020.        9/14/20:  IgG 375.  10/20/20:  IgG 304. IgM 48; IgA 30.  Normal CBCD. She received IVIG with Zofran and premedication with Benadryl and hydrocortisone  IgG level on 01/22/2021 of 453, normal CBC and differential     Last dose of IV Ig was 10/2020        Seen 12/2021 and 9/23/22.     Admitted 4/27/22 - 5/3/22 secondary to right lower extremity cellulitis and septic shock.     7/22 Bilateral mammogram- There are no suspicious masses     Admitted 9/2 - 9/4/22 2nd to pneumonia, heart failure  Noncontrast CT A/P- No evidence of acute intra-abdominal or pelvic pathology.     6/15/22- hemoglobin 12, white blood cell count 5.9, 53% neutrophils, 34% lymphocytes, 8% monocytes,  "platelet count 283. IgG level 1485     9/20/22 CBCD normal; IgG 1243       Prn f/u discussed 9/2022.  She wished to have annual f/u     Admitted October 19, 2022- 10/22/22 secondary to sepsis from right lower extremity cellulitis  She has an IVC filter in place.  She refused lower extremity Doppler  10/19/2022 hemoglobin 14.3, MCV 94     Admitted November 28, 2022 - 12/7/22 can Tyron to MSSA bacteremia.  Hip imaging negative for acute infection, ELIZABETH ELZIABETH negative for endocarditis.  This was thought to be secondary to her right lower extremity cellulitis  Hemoglobin 12.3     December 6, 2022 hemoglobin 10.1     2/7/23 hemoglobin 10.4     2/14/23 -  Procrit 40,000 u x2 doses prior to 3/6 surgery.      Admitted March 6 through March 11, 2023 for right hip I&D and conversion to hand crafted antibiotic cement right total hip arthroplasty.   She received 2 units packed red blood cells due to acute blood loss anemia     8/11/23 mammogram -BI-RADS 2     12/15/23 hemoglobin 11.6, MCV 95, white blood cell count 5.4, platelet count 239, normal differential.  Normal quantitative immunoglobulins.  IgG 983.  No monoclonal band on SPEP, serum kappa free light chain 33, lambda 26.2, ratio 1.27     1/5/24 bilateral breast ultrasound BI-RADS 1      2/25/25 hemoglobin 12.1, MCV 94, white blood cell count 4.8, normal differential, platelets 220  CMP stable  Serum kappa free light chain 22, lambda 24, normal ratio  IgG 1000       Review of Systems   Constitutional:  Positive for fatigue.   Respiratory: Negative.     Cardiovascular: Negative.    Gastrointestinal: Negative.    Genitourinary: Negative.    Musculoskeletal:  Positive for arthralgias and myalgias.   Allergic/Immunologic: Negative.    Hematological: Negative.    Psychiatric/Behavioral: Negative.             Objective   /74 (Patient Position: Sitting, Cuff Size: Large)   Pulse 68   Temp 97.6 °F (36.4 °C) (Temporal)   Resp 16   Ht 5' 3\" (1.6 m)   SpO2 98%   BMI 39.15 " kg/m²     Physical Exam  Constitutional:       Appearance: She is well-developed.   HENT:      Head: Normocephalic and atraumatic.   Cardiovascular:      Heart sounds: No murmur heard.  Pulmonary:      Effort: Pulmonary effort is normal. No respiratory distress.      Breath sounds: Normal breath sounds. No stridor.   Skin:     General: Skin is warm and dry.   Neurological:      Mental Status: She is alert.   Psychiatric:         Behavior: Behavior normal.         Labs: I have reviewed the following labs:  Results for orders placed or performed in visit on 02/25/25   CBC and differential   Result Value Ref Range    WBC 4.89 4.31 - 10.16 Thousand/uL    RBC 4.10 3.81 - 5.12 Million/uL    Hemoglobin 12.1 11.5 - 15.4 g/dL    Hematocrit 38.5 34.8 - 46.1 %    MCV 94 82 - 98 fL    MCH 29.5 26.8 - 34.3 pg    MCHC 31.4 31.4 - 37.4 g/dL    RDW 13.9 11.6 - 15.1 %    MPV 9.8 8.9 - 12.7 fL    Platelets 220 149 - 390 Thousands/uL    nRBC 0 /100 WBCs    Segmented % 48 43 - 75 %    Immature Grans % 0 0 - 2 %    Lymphocytes % 38 14 - 44 %    Monocytes % 10 4 - 12 %    Eosinophils Relative 4 0 - 6 %    Basophils Relative 0 0 - 1 %    Absolute Neutrophils 2.28 1.85 - 7.62 Thousands/µL    Absolute Immature Grans 0.02 0.00 - 0.20 Thousand/uL    Absolute Lymphocytes 1.86 0.60 - 4.47 Thousands/µL    Absolute Monocytes 0.51 0.17 - 1.22 Thousand/µL    Eosinophils Absolute 0.20 0.00 - 0.61 Thousand/µL    Basophils Absolute 0.02 0.00 - 0.10 Thousands/µL   Comprehensive metabolic panel   Result Value Ref Range    Sodium 140 135 - 147 mmol/L    Potassium 4.5 3.5 - 5.3 mmol/L    Chloride 105 96 - 108 mmol/L    CO2 26 21 - 32 mmol/L    ANION GAP 9 4 - 13 mmol/L    BUN 18 5 - 25 mg/dL    Creatinine 1.14 0.60 - 1.30 mg/dL    Glucose, Fasting 94 65 - 99 mg/dL    Calcium 9.4 8.4 - 10.2 mg/dL    AST 33 13 - 39 U/L    ALT 23 7 - 52 U/L    Alkaline Phosphatase 121 (H) 34 - 104 U/L    Total Protein 6.6 6.4 - 8.4 g/dL    Albumin 4.1 3.5 - 5.0 g/dL    Total  Bilirubin 0.45 0.20 - 1.00 mg/dL    eGFR 48 ml/min/1.73sq m   Immunoglobulin free LT chains blood   Result Value Ref Range    Ig Kappa Free Light Chain 22.0 (H) 3.3 - 19.4 mg/L    Ig Lambda Free Light Chain 24.1 5.7 - 26.3 mg/L    Kappa/Lambda FluidC Ratio 0.91 0.26 - 1.65   IgG, IgA, IgM   Result Value Ref Range    IGA 18 (L) 66 - 433 mg/dL    IGG 1,024 635 - 1,741 mg/dL    IGM 61 45 - 281 mg/dL   Protein electrophoresis, serum   Result Value Ref Range    A/G Ratio 1.60 1.10 - 1.80    Albumin Electrophoresis 61.5 48.0 - 70.0 %    Albumin CONC 3.94 3.20 - 5.10 g/dl    Alpha 1 3.8 1.8 - 7.0 %    ALPHA 1 CONC 0.24 0.15 - 0.47 g/dL    Alpha 2 11.2 5.9 - 14.9 %    ALPHA 2 CONC 0.72 0.42 - 1.04 g/dL    Beta-1 6.5 4.7 - 7.7 %    BETA 1 CONC 0.42 0.31 - 0.57 g/dL    Beta-2 4.4 3.1 - 7.9 %    BETA 2 CONC 0.28 0.20 - 0.58 g/dL    Gamma Globulin 12.6 6.9 - 22.3 %    GAMMA CONC 0.81 0.40 - 1.66 g/dL    Total Protein 6.4 6.4 - 8.2 g/dL    SPEP Interpretation See Comment      *Note: Due to a large number of results and/or encounters for the requested time period, some results have not been displayed. A complete set of results can be found in Results Review.

## 2025-02-28 NOTE — ASSESSMENT & PLAN NOTE
Hypogammaglobinemia with subsequent multiple infections required multiple admissions to the hospital at least 4 times in 2019 with sepsis, cellulitis, pneumonia. IgG level was less than 200. She was given IVIG 30 g x 2 doses January and February 2020.  Level improved to the range of 500.   She had headache, nausea with IVIG requiring Zofran, and hydrocortisone ,Benadryl.     Last dose of IV Ig was 10/2020  7/2022 IgG 565  2/23           1560  12/23          983  2/25           1024     2/2025 no monoclonal band on SPEP        No hospitalizations at [facility] since 3/2023    Reains on antibiotics currently per ID due to her osteomyelitis.    This time, we will see her as needed.

## 2025-02-28 NOTE — ASSESSMENT & PLAN NOTE
Status post bilateral oophorectomy and hysterectomy in 2007 followed by chemotherapy (Carboplatin/Taxol x 5; last 5/2007). Follows with Dr. Darrian Gardner at [place].

## 2025-03-12 ENCOUNTER — TELEPHONE (OUTPATIENT)
Age: 70
End: 2025-03-12

## 2025-03-12 NOTE — TELEPHONE ENCOUNTER
Patient is calling to confirm which office her appointment will take place. Patient had that it was going to be in [place]. I advised her the appointment is scheduled for [place].        Thank you.

## 2025-03-14 DIAGNOSIS — M86.9 HIP OSTEOMYELITIS, RIGHT (HCC): ICD-10-CM

## 2025-03-14 RX ORDER — CEFADROXIL 500 MG/1
500 CAPSULE ORAL EVERY 12 HOURS SCHEDULED
Qty: 60 CAPSULE | Refills: 5 | Status: SHIPPED | OUTPATIENT
Start: 2025-03-14 | End: 2025-09-10

## 2025-03-14 NOTE — TELEPHONE ENCOUNTER
Patient called in to r/s her appt for today with Dorita because patient states that she has a bad cough. I did offer a virtual appt for today, but patient declined. The next available appt is not until April 4th and patient states that she would need more antibiotics to last her until her next appt. Please advise.

## 2025-03-14 NOTE — TELEPHONE ENCOUNTER
Called and spoke with patient today.   Informed patient refills sent to pharmacy.   Informed patient if there are any further questions to call the office.   Patient verbalizes understanding at this time.

## 2025-03-17 ENCOUNTER — OFFICE VISIT (OUTPATIENT)
Dept: SURGICAL ONCOLOGY | Facility: CLINIC | Age: 70
End: 2025-03-17
Payer: MEDICARE

## 2025-03-17 VITALS
SYSTOLIC BLOOD PRESSURE: 132 MMHG | DIASTOLIC BLOOD PRESSURE: 80 MMHG | TEMPERATURE: 97.7 F | BODY MASS INDEX: 39.15 KG/M2 | HEART RATE: 76 BPM | OXYGEN SATURATION: 95 % | HEIGHT: 63 IN

## 2025-03-17 DIAGNOSIS — Z12.31 VISIT FOR SCREENING MAMMOGRAM: ICD-10-CM

## 2025-03-17 DIAGNOSIS — Z91.89 INCREASED RISK OF BREAST CANCER: Primary | ICD-10-CM

## 2025-03-17 DIAGNOSIS — Z15.01 BRCA1 POSITIVE: ICD-10-CM

## 2025-03-17 DIAGNOSIS — Z15.09 BRCA1 POSITIVE: ICD-10-CM

## 2025-03-17 DIAGNOSIS — Z80.0 FAMILY HISTORY OF PANCREATIC CANCER: ICD-10-CM

## 2025-03-17 PROCEDURE — 99213 OFFICE O/P EST LOW 20 MIN: CPT | Performed by: NURSE PRACTITIONER

## 2025-03-17 RX ORDER — OXYCODONE HYDROCHLORIDE 10 MG/1
TABLET, FILM COATED, EXTENDED RELEASE ORAL 2 TIMES DAILY
COMMUNITY
Start: 2025-02-18

## 2025-03-17 NOTE — PROGRESS NOTES
Name: Charito Lamb      : 1955      MRN: [id number]  Encounter Provider: RENETTA Montero  Encounter Date: 3/17/2025   Encounter department: [facility] [place]  :  Assessment & Plan  Increased risk of breast cancer         BRCA1 positive    Orders:  •  Ambulatory Referral to Gastroenterology; Future    Family history of pancreatic cancer    Orders:  •  Ambulatory Referral to Gastroenterology; Future    Visit for screening mammogram    Orders:  •  Mammo screening bilateral w 3d and cad; Future        Patient is a 70-year-old female with a BRCA1 genetic mutation. She has a personal history of ovarian cancer. We have been following her with annual mammography and automated breast ultrasound as she is unable to tolerate breast MRI positioning. She had a bilateral 3D screening mammogram in 2024 which was BI-RADS 2, category 1 density. She had an automated breast ultrasound in 2025 which was BI-RADS 2. She offers no new complaints today and there are no worrisome findings on today's clinical exam. Prescription given for mammogram due in August. Referral made to GI for pancreatic cancer screening as her mother had pancreatic cancer. We will see her back in six months for a follow up visit or sooner should the need arise. She was instructed to contact us with any changes or concerns in the interim.         History of Present Illness   Charito Lamb is a 70 y.o. year old female who presents today for a follow up visit. She has not appreciated any changes on self breast exam. She is now interested in meeting with GI to undergo pancreatic cancer screening. She had an ABUS in February which was BIRADS 2.       Review of Systems   Constitutional:  Negative for chills, fatigue and fever.   Respiratory:  Negative for cough and shortness of breath.    Cardiovascular:  Negative for chest pain.   Hematological:  Negative for adenopathy.   Psychiatric/Behavioral:   "Negative for confusion.     A complete review of systems is negative other than that noted above in the HPI.           Objective   /80 (BP Location: Right arm, Patient Position: Sitting, Cuff Size: Large)   Pulse 76   Temp 97.7 °F (36.5 °C) (Temporal)   Ht 5' 3\" (1.6 m)   SpO2 95%   BMI 39.15 kg/m²     Pain Screening:  Pain Score: 0-No pain  ECOG    Physical Exam  Vitals reviewed.   Constitutional:       Appearance: Normal appearance.   HENT:      Head: Normocephalic and atraumatic.   Pulmonary:      Effort: Pulmonary effort is normal.   Chest:   Breasts:     Right: No swelling, bleeding, inverted nipple, mass, nipple discharge, skin change or tenderness.      Left: No swelling, bleeding, inverted nipple, mass, nipple discharge, skin change or tenderness.   Musculoskeletal:      Comments: Right leg lymphedema    Ambulating with walker   Lymphadenopathy:      Upper Body:      Right upper body: No supraclavicular or axillary adenopathy.      Left upper body: No supraclavicular or axillary adenopathy.   Neurological:      General: No focal deficit present.      Mental Status: She is alert and oriented to person, place, and time.   Psychiatric:         Mood and Affect: Mood normal.            "

## 2025-04-02 NOTE — PATIENT INSTRUCTIONS
Continue indefinite suppression with oral Cefadroxil, 500mg every 12 hours.    New 90-day supply prescriptions sent to [facility].    Complete lab work (CBCD and creatinine) prior to next outpatient infectious disease follow up visit.    Return to the outpatient infectious disease office for follow up in 6 months.

## 2025-04-02 NOTE — PROGRESS NOTES
Name: Charito Lamb      : 1955      MRN: [id number]  Encounter Provider: RENETTA Norris  Encounter Date: 2025   Encounter department: [facility]    Assessment & Plan  Hip osteomyelitis, right (HCC)  Complicated by residual hardware. Gross evidence of osteomyelitis of proximal femoral head/truce acetabular bone and purulence encountered during elective ROWDY on 3/6/2023. Unfortunately the posterior column screws were noted to be embedded in the femoral head and unable to be removed. OR cultures without growth but suspect MSSA given her recent bacteremia, also consider difficult to grow pathogens such as Cutibacterium or strep species. Patient completed 6-weeks of IV antibiotic treatment and has transitioned to PO Cefadroxil for ongoing suppression. She has been tolerating the antibiotic without difficulty. I reviewed her most recent lab work from 2025 which showed stable normal WBC count = 4.89, stable creatinine = 1.14, and normal AST/ALT with mild Alk phos elevation of 121 which is in range of where patient has been since . Patient will remain on indefinite suppression given residual hardware.  -continue oral Cefadroxil for indefinite suppression  -new 90-day supply prescriptions sent to [facility]  -complete lab work (CBCD and creatinine) prior to next outpatient ID follow up visit  -return to the outpatient ID office in 6 months for follow up  Stage 3a chronic kidney disease (HCC)  Lab Results   Component Value Date    EGFR 48 2025    EGFR 42 2024    EGFR 45 2024    CREATININE 1.14 2025    CREATININE 1.28 2024    CREATININE 1.20 2024   Upon review of patient's available past medical records it appears that patient's baseline creatinine is approximately 1-1.2. Most recent serum creatinine was 1.14 on 2025. Patient follows outpatient with [facility].  -monitor creatinine  -dose adjust  antibiotic for renal function as needed  -continue outpatient follow up with nephrology  Lymphedema  This is risk factor for wounds.  -recommend serial skin exams/skin care  -frequent compression/elevation to promote lymphatic return  Hypogammaglobulinemia, acquired (HCC)  Patient receives IVIG  -continue outpatient follow up with hematology oncology  Morbid obesity (HCC)  BMI = 40.6. This can impact antibiotic dosing.  -monitor patient weight and dose adjust antibiotic as needed    Above plan was discussed in detail with patient in the exam room.    Antibiotics:  Cefadroxil    Subjective:  Patient presents to the outpatient infectious disease office for ongoing suppressive antibiotic treatment for treatment of R hip osteomyelitis with retained hardware. Since her last visit the patient continues on oral Cefadroxil. She continues to tolerate the antibiotic without difficulty. Patient reports no new edema or redness of her hip. She's been getting around well with her walker. She's had some stiffness in the knee and lower leg which she feels is related to some of her hardware. She has an appointment coming up with orthopedics and intends to discuss this with them further. Today the patient has no fever, chills, sweats, shakes; no nausea, vomiting, abdominal pain, diarrhea, or dysuria; no cough, shortness of breath, or chest pain. No new symptoms.    Objective:  Vitals:  Temp 96.5  HR 71  O2 saturation 94% on room air  /68    Physical Exam:   General Appearance:  Alert, interactive, nontoxic, no acute distress. She appears comfortable sitting in the exam room.   Lungs:   Clear to auscultation bilaterally; no wheezes, rhonchi or rales; respirations unlabored on room air.   Abdomen:   Soft, non-tender, non-distended, positive bowel sounds.     Extremities: Chronic B/L LE lymphedema.   Skin: No new rashes noted on exposed skin.     Labs, Imaging, & Other studies:   All pertinent labs and imaging studies were  personally reviewed by me including CBCD and CMP collected on 2/25/2025.

## 2025-04-02 NOTE — ASSESSMENT & PLAN NOTE
BMI = 40.6. This can impact antibiotic dosing.  -monitor patient weight and dose adjust antibiotic as needed

## 2025-04-02 NOTE — ASSESSMENT & PLAN NOTE
Complicated by residual hardware. Gross evidence of osteomyelitis of proximal femoral head/truce acetabular bone and purulence encountered during elective ROWDY on 3/6/2023. Unfortunately the posterior column screws were noted to be embedded in the femoral head and unable to be removed. OR cultures without growth but suspect MSSA given her recent bacteremia, also consider difficult to grow pathogens such as Cutibacterium or strep species. Patient completed 6-weeks of IV antibiotic treatment and has transitioned to PO Cefadroxil for ongoing suppression. She has been tolerating the antibiotic without difficulty. I reviewed her most recent lab work from 2/25/2025 which showed stable normal WBC count = 4.89, stable creatinine = 1.14, and normal AST/ALT with mild Alk phos elevation of 121 which is in range of where patient has been since 2024. Patient will remain on indefinite suppression given residual hardware.  -continue oral Cefadroxil for indefinite suppression  -new 90-day supply prescriptions sent to [facility]  -complete lab work (CBCD and creatinine) prior to next outpatient ID follow up visit  -return to the outpatient ID office in 6 months for follow up

## 2025-04-02 NOTE — ASSESSMENT & PLAN NOTE
Lab Results   Component Value Date    EGFR 48 02/25/2025    EGFR 42 11/11/2024    EGFR 45 09/13/2024    CREATININE 1.14 02/25/2025    CREATININE 1.28 11/11/2024    CREATININE 1.20 09/13/2024   Upon review of patient's available past medical records it appears that patient's baseline creatinine is approximately 1-1.2. Most recent serum creatinine was 1.14 on 2/25/2025. Patient follows outpatient with [facility].  -monitor creatinine  -dose adjust antibiotic for renal function as needed  -continue outpatient follow up with nephrology

## 2025-04-04 ENCOUNTER — OFFICE VISIT (OUTPATIENT)
Dept: INFECTIOUS DISEASES | Facility: CLINIC | Age: 70
End: 2025-04-04
Payer: MEDICARE

## 2025-04-04 VITALS
SYSTOLIC BLOOD PRESSURE: 112 MMHG | TEMPERATURE: 96.5 F | WEIGHT: 229.2 LBS | DIASTOLIC BLOOD PRESSURE: 68 MMHG | OXYGEN SATURATION: 94 % | HEART RATE: 71 BPM | BODY MASS INDEX: 40.6 KG/M2

## 2025-04-04 DIAGNOSIS — I89.0 LYMPHEDEMA: ICD-10-CM

## 2025-04-04 DIAGNOSIS — M86.9 HIP OSTEOMYELITIS, RIGHT (HCC): Primary | ICD-10-CM

## 2025-04-04 DIAGNOSIS — E66.01 MORBID OBESITY (HCC): ICD-10-CM

## 2025-04-04 DIAGNOSIS — D80.1 HYPOGAMMAGLOBULINEMIA, ACQUIRED (HCC): ICD-10-CM

## 2025-04-04 DIAGNOSIS — N18.31 STAGE 3A CHRONIC KIDNEY DISEASE (HCC): ICD-10-CM

## 2025-04-04 PROCEDURE — 99214 OFFICE O/P EST MOD 30 MIN: CPT | Performed by: NURSE PRACTITIONER

## 2025-04-04 RX ORDER — CEFADROXIL 500 MG/1
500 CAPSULE ORAL EVERY 12 HOURS SCHEDULED
Qty: 180 CAPSULE | Refills: 1 | Status: SHIPPED | OUTPATIENT
Start: 2025-04-04 | End: 2025-10-01

## 2025-04-07 NOTE — PROGRESS NOTES
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    oxyCODONE (ROXICODONE) 15 mg immediate release tablet Take 1 tablet (15 mg total) by mouth every 8 (eight) hours as needed (breakthrough pain) for up to 10 doses Max Daily Amount: 45 mg 10 tablet 0    oxyCODONE HCl ER (OxyCONTIN) 30 MG T12A Take 30 mg by mouth every 12 (twelve) hours 20 mg (Patient not taking: Reported on 3/5/2024)      OxyCONTIN 10 MG 12 hr tablet 2 (two) times a day      OxyCONTIN 20 MG 12 hr tablet Take 15 mg by mouth every 12 (twelve) hours (Patient not taking: Reported on 4/4/2025)      Probiotic Product (PROBIOTIC-10 PO) Take by mouth      senna-docusate sodium (SENOKOT S) 8.6-50 mg per tablet Take 1 tablet by mouth daily For use at [facility] (Patient not taking: Reported on 11/12/2024) 30 tablet 0    TOVIAZ 8 MG TB24 Take 8 mg by mouth daily at bedtime        No current facility-administered medications on file prior to visit.     Allergies   Allergen Reactions    Iodinated Contrast Media Shortness Of Breath    Omnipaque [Iohexol] Shortness Of Breath    Other Shortness Of Breath     IVP dye, x ray dye 3    Iodides      Other reaction(s): SWELLING, SOB    Methotrexate Nausea Only     Headache and nausea     "Methotrexate Derivatives Headache     Social History     Substance and Sexual Activity   Alcohol Use Never     Social History     Substance and Sexual Activity   Drug Use Never     Social History     Tobacco Use   Smoking Status Never   Smokeless Tobacco Never     Objective   Vitals:   Vitals:    04/08/25 1146   BP: 120/62   BP Location: Right arm   Patient Position: Sitting   Cuff Size: Large   Pulse: 85   SpO2: 96%   Weight: 107 kg (235 lb)   Height: 5' 3\" (1.6 m)   Body surface area is 2.07 meters squared.  Body mass index is 41.63 kg/m².    Invasive Devices       Peripherally Inserted Central Catheter Line  Duration             PICC Line 03/10/23 Right Basilic 759 days                  Physical Exam:  GEN: Charito Lamb appears well, alert and oriented x 3, pleasant and cooperative   HEENT: pupils equal, round, and reactive to light; extraocular muscles intact  NECK: supple, no carotid bruits   HEART: regular rhythm, normal S1 and S2, no murmurs, clicks, gallops or rubs   LUNGS: clear to auscultation bilaterally; no wheezes, rales, or rhonchi   ABDOMEN: normal bowel sounds, soft, no tenderness, no distention  EXTREMITIES: Both legs wrapped in ace bandages due to severe lymphedema  NEURO: no focal findings   SKIN: normal without suspicious lesions on exposed skin    Lab Results:   Lab Results   Component Value Date    WBC 4.89 02/25/2025    RBC 4.10 02/25/2025    HGB 12.1 02/25/2025    HCT 38.5 02/25/2025    MCV 94 02/25/2025     02/25/2025    RDW 13.9 02/25/2025     Lab Results   Component Value Date     03/22/2015    K 4.5 02/25/2025     02/25/2025    CO2 26 02/25/2025    ANIONGAP 7 03/22/2015    BUN 18 02/25/2025    CREATININE 1.14 02/25/2025    EGFR 48 02/25/2025    GLUCOSE 88 03/22/2015    CALCIUM 9.4 02/25/2025    AST 33 02/25/2025    ALT 23 02/25/2025    ALKPHOS 121 (H) 02/25/2025    PROT 7.3 05/15/2015    BILITOT 0.39 05/15/2015     Lab Results   Component Value Date    MG 1.7 12/03/2022 "     Lab Results   Component Value Date    HDL 38 (L) 2024    TRIG 254 (H) 2024    LDLCALC 110 (H) 2024     Lab Results   Component Value Date    RES9DJNBSABT 1.298 10/19/2022     Imaging:   I have personally reviewed pertinent films in PACS    Cardiac testing:   Results for orders placed during the hospital encounter of 21    Echo complete with contrast if indicated    [place] [address]  [phone]    Transthoracic Echocardiogram  2D, M-mode, Doppler, and Color Doppler    Study date:  2021    Patient: GUANAKO PAPPAS  MR number: [id number]  Account number: [id number]  : 1955  Age: 66 years  Gender: Female  Status: Inpatient  Location: Bedside  Height: 63 in  Weight: 224 lb  BP: 102/ 61 mmHg    Indications: Elevated Troponin;HOCOM follow-up.    Diagnoses: I42.9 - Cardiomyopathy, unspecified    Sonographer:  RAPHAEL Echeverria  Primary Physician:  Surya Conley DO  Referring Physician:  Pia Kaur MD  Group:  [facility]  Interpreting Physician:  Simeon Sung MD    IMPRESSIONS:  Features are consistent with obstructive hypertrophic cardiomyopathy.    SUMMARY    PROCEDURE INFORMATION:  This was a technically difficult study.    LEFT VENTRICLE:  Systolic function was hyperdynamic. Ejection fraction was estimated to be 70 %.  There were no regional wall motion abnormalities.  Wall thickness was markedly increased.  There was severe assymetrical hypertrophy of the septum.  There was dynamic obstruction at rest in the outflow tract, with a peak gradient of 37 mmHg.  There was dynamic obstruction during Valsalva, with a peak gradient of 75 mmHg.  Features were consistent with a pseudonormal left ventricular filling pattern, with concomitant abnormal relaxation and increased filling pressure (grade 2 diastolic dysfunction).    LEFT ATRIUM:  The atrium was mildly to moderately  dilated.    RIGHT ATRIUM:  The atrium was mildly dilated.    MITRAL VALVE:  There was severe systolic anterior motion of the anterior leaflet.    TRICUSPID VALVE:  There was trace regurgitation.    HISTORY: PRIOR HISTORY: Ovarian CA;CKD3;HOCM.    PROCEDURE: The procedure was performed at the bedside. This was a routine study. The transthoracic approach was used. The study included complete 2D imaging, M-mode, complete spectral Doppler, and color Doppler. The heart rate was 79 bpm,  at the start of the study. Echocardiographic views were limited due to poor acoustic window availability. This was a technically difficult study.    LEFT VENTRICLE: Size was normal. Systolic function was hyperdynamic. Ejection fraction was estimated to be 70 %. There were no regional wall motion abnormalities. Wall thickness was markedly increased. There was severe assymetrical  hypertrophy of the septum. DOPPLER: There was dynamic obstruction at rest in the outflow tract, with a peak gradient of 37 mmHg. There was dynamic obstruction during Valsalva, with a peak gradient of 75 mmHg. Features were consistent with  a pseudonormal left ventricular filling pattern, with concomitant abnormal relaxation and increased filling pressure (grade 2 diastolic dysfunction).    RIGHT VENTRICLE: The size was normal. Systolic function was normal. Wall thickness was normal.    LEFT ATRIUM: The atrium was mildly to moderately dilated.    RIGHT ATRIUM: The atrium was mildly dilated.    MITRAL VALVE: Valve structure was normal. There was normal leaflet separation. There was severe systolic anterior motion of the anterior leaflet. DOPPLER: The transmitral velocity was within the normal range. There was no evidence for  stenosis. There was no regurgitation.    AORTIC VALVE: The valve was trileaflet. Leaflets exhibited normal thickness and normal cuspal separation. DOPPLER: Transaortic velocity was within the normal range. There was no evidence for stenosis.  "There was no regurgitation.    TRICUSPID VALVE: The valve structure was normal. There was normal leaflet separation. DOPPLER: The transtricuspid velocity was within the normal range. There was no evidence for stenosis. There was trace regurgitation.    PULMONIC VALVE: Leaflets exhibited normal thickness, no calcification, and normal cuspal separation. DOPPLER: The transpulmonic velocity was within the normal range. There was no regurgitation.    PERICARDIUM: There was no pericardial effusion. The pericardium was normal in appearance.    AORTA: The root exhibited normal size.    SYSTEM MEASUREMENT TABLES    2D  %FS: 27.97 %  Ao Diam: 2.51 cm  EDV(Teich): 43.3 ml  EF(Teich): 55.43 %  ESV(Teich): 19.3 ml  IVSd: 1.68 cm  LA Area: 14.17 cm2  LA Diam: 4.21 cm  LVEDV MOD A4C: 78.4 ml  LVEF MOD A4C: 59.38 %  LVESV MOD A4C: 31.85 ml  LVIDd: 3.27 cm  LVIDs: 2.36 cm  LVLd A4C: 7.48 cm  LVLs A4C: 6.03 cm  LVPWd: 1.41 cm  RA Area: 11.6 cm2  RVIDd: 3.79 cm  SV MOD A4C: 46.55 ml  SV(Teich): 24 ml    CW  AV Vmax: 3.96 m/s  AV maxP.14 mmHg  PRend PG: 3.62 mmHg  PRend Vmax: 0.95 m/s  TR Vmax: 2.61 m/s  TR maxP.28 mmHg    MM  TAPSE: 2.46 cm    PW  E' Sept: 0.05 m/s  E/E' Sept: 21.2  MV A [place]: 0.84 m/s  MV Dec [place]: 5.02 m/s2  MV DecT: 197.81 ms  MV E [place]: 0.99 m/s  MV E/A Ratio: 1.2  MV PHT: 57.37 ms  MVA By PHT: 3.84 cm2    [employer] Commission Accredited Echocardiography Laboratory    Prepared and electronically signed by    Simeon Sung MD  Signed 2021 18:05:24    Name: Charito Lamb                       : 1955  MRN: [id number]                       Age: 70 y.o.  Patient Status: Outpatient          Gender: female  ECHO Complete With Contrast If Indicated    Height: 5' 3\" (1.6 m)   Weight: 92.1 kg (203 lb)   BSA: 1.95 m²   Blood Pressure: 110/58    Date of Study: 23   Ordering Provider: Cathy Tabares PA-C    Clinical Indications: Hypertrophic cardiomyopathy (HCC) [I42.2 (ICD-10-CM)]       " "[facility]  Performing Staff   Cardiology: Simeon Sung MD    Tech: Marlen Yaothelma, RS         Vitals    Height Weight BSA (Calculated - m2) BP Pulse   5' 3\" (1.6 m) 92.1 kg (203 lb) 1.95 sq meters 110/58 74     PACS Images     Show images for Echo complete w/ contrast if indicated  PACS Images - Sectra     Show images for Echo complete w/ contrast if indicated  Study Details    This transthoracic echocardiogram was performed in the echo lab. This was a routine, outpatient study. Study quality was adequate. This was a technically difficult study due to lung interference. A complete 2D, color flow Doppler, spectral Doppler, 2D, color flow Doppler, spectral Doppler and strain transthoracic echocardiogram was performed.  The apical, parasternal, subcostal and suprasternal views were obtained.     History    Sepsis, MSSA Bacteremia, HOCM, PE/DVT, IVC Filter, Ovarian Cancer, CKD     Interpretation Summary  Show Result Comparison     Left Ventricle: Left ventricular cavity size is normal. Wall thickness is severely increased. 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 The left ventricular ejection fraction is 68%. Systolic function is vigorous. Global longitudinal strain is normal at -18%. Wall motion is normal. Diastolic function is mildly abnormal, consistent with grade I (abnormal) relaxation. There is no LV dynamic obstruction.    Left Atrium: The atrium is mildly dilated.    Atrial Septum: No patent foramen ovale detected using color flow Doppler at rest.    Left Atrial Appendage: There is normal function. There is no thrombus.    Mitral Valve: There is systolic anterior motion of the anterior leaflet and chordal apparatus without late peaking gradient.     Findings    Left Ventricle Left ventricular cavity size is normal. Wall thickness is severely increased. 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 The left ventricular ejection fraction is 68%. " Systolic function is vigorous. Global longitudinal strain is normal at -18%.  Wall motion is normal. Diastolic function is mildly abnormal, consistent with grade I (abnormal) relaxation.  There is no LV dynamic obstruction.   Right Ventricle Right ventricular cavity size is normal. Systolic function is normal. Wall thickness is normal.   Left Atrium The atrium is mildly dilated.   Right Atrium The atrium is normal in size.   Atrial Septum No patent foramen ovale detected using color flow Doppler at rest.   Left Atrial Appendage Left atrial appendage is normal in size. There is normal function. There is no thrombus.   Aortic Valve The aortic valve is trileaflet. The leaflets are not thickened. The leaflets are not calcified. The leaflets exhibit normal mobility. There is no evidence of regurgitation. The aortic valve has no significant stenosis.   Mitral Valve The leaflets are not thickened. The leaflets are not calcified. The leaflets exhibit normal mobility. There is trace regurgitation. There is no evidence of stenosis. There is systolic anterior motion of the anterior leaflet and chordal apparatus without late peaking gradient.   Tricuspid Valve Tricuspid valve structure is normal. There is trace regurgitation. There is no evidence of stenosis. The right ventricular systolic pressure is normal.   Pulmonic Valve Pulmonic valve structure is normal. There is no evidence of regurgitation. There is no evidence of stenosis.   Ascending Aorta The aortic root is normal in size. There is no evidence of aortic dissection. There is no aneurysm in the aorta.     Left Ventricle Measurements    Function/Volumes   A4C EF 57 %         Left ventricular stroke volume (2D) 41 mL         Dimensions   LVIDd 3.9 cm         LVIDS 2.6 cm         IVSd 1.3 cm         LVPWd 1.3 cm         FS 33 %         Diastolic Filling   MV E' Tissue Velocity Septal 8 cm/s         MV E' Tissue Velocity Lateral 10 cm/s         E wave deceleration time  243 ms         MV Peak E [place] 76 cm/s         MV Peak A [place] 0.82 m/s         Strain   GLS -18 %               Right Ventricle Measurements    Dimensions   RVID d 3.8 cm         Tricuspid annular plane systolic excursion 2 cm               Left Atrium Measurements    Dimensions   LA size 4.1 cm         LA length (A2C) 6 cm               Right Atrium Measurements    Dimensions   RAA A4C 16.6 cm2               Mitral Valve Measurements    Stenosis   MV stenosis pressure 1/2 time 71 ms         MV valve area p 1/2 method 3.1 cm2               Tricuspid Valve Measurements    RVSP Parameters   TR Peak [place] 2.4 m/s         Triscuspid Valve Regurgitation Peak Gradient 23 mmHg               Aorta Measurements    Aortic Dimensions   Ao root 3.2 cm         Asc Ao 3.1 cm               Exam Details    Performed Procedure Technologist Supporting Staff Performing Physician   Echo complete w/ strain ESTUARDO Mckeon            Appointment Date/Status Modality Department    2/20/2023     Completed [facility] ECHO RM 2 BE CAR NON INV           Begin Exam End Exam  End Exam Questionnaires   2/20/2023  8:59 AM 2/20/2023 10:02 AM  PATIENT EDUCATION            All Reviewers List    Diamante Almanzar DO on 2/21/2023 10:03 AM   Pedro Burkett DO on 2/21/2023  8:20 AM   Simeon Sung MD on 2/21/2023  6:55 AM     Signed    Electronically signed by Simeon Sung MD on 2/20/23 at 1602 EST     Counseling / Coordination of Care  Total time spent today 47 minutes.  Greater than 50% of total time was spent with the patient and / or family counseling and / or coordination of care.

## 2025-04-08 ENCOUNTER — OFFICE VISIT (OUTPATIENT)
Dept: CARDIOLOGY CLINIC | Facility: CLINIC | Age: 70
End: 2025-04-08
Payer: MEDICARE

## 2025-04-08 ENCOUNTER — HOSPITAL ENCOUNTER (OUTPATIENT)
Dept: NON INVASIVE DIAGNOSTICS | Facility: CLINIC | Age: 70
Discharge: HOME | End: 2025-04-08
Payer: MEDICARE

## 2025-04-08 VITALS
HEIGHT: 63 IN | SYSTOLIC BLOOD PRESSURE: 120 MMHG | OXYGEN SATURATION: 96 % | HEART RATE: 85 BPM | DIASTOLIC BLOOD PRESSURE: 62 MMHG | BODY MASS INDEX: 41.64 KG/M2 | WEIGHT: 235 LBS

## 2025-04-08 VITALS
SYSTOLIC BLOOD PRESSURE: 112 MMHG | DIASTOLIC BLOOD PRESSURE: 68 MMHG | HEIGHT: 63 IN | BODY MASS INDEX: 40.57 KG/M2 | HEART RATE: 71 BPM | WEIGHT: 229 LBS

## 2025-04-08 DIAGNOSIS — E78.5 DYSLIPIDEMIA: ICD-10-CM

## 2025-04-08 DIAGNOSIS — I51.7 LVH (LEFT VENTRICULAR HYPERTROPHY): ICD-10-CM

## 2025-04-08 DIAGNOSIS — I42.2 HYPERTROPHIC CARDIOMYOPATHY (HCC): ICD-10-CM

## 2025-04-08 DIAGNOSIS — R79.89 ELEVATED TROPONIN: ICD-10-CM

## 2025-04-08 DIAGNOSIS — R06.09 DYSPNEA ON EXERTION: ICD-10-CM

## 2025-04-08 DIAGNOSIS — I05.9 MITRAL VALVE DISORDER: ICD-10-CM

## 2025-04-08 DIAGNOSIS — I50.32 CHRONIC DIASTOLIC HEART FAILURE (HCC): Chronic | ICD-10-CM

## 2025-04-08 DIAGNOSIS — I10 BENIGN ESSENTIAL HYPERTENSION: Primary | ICD-10-CM

## 2025-04-08 PROCEDURE — 99215 OFFICE O/P EST HI 40 MIN: CPT | Performed by: INTERNAL MEDICINE

## 2025-04-08 PROCEDURE — 93356 MYOCRD STRAIN IMG SPCKL TRCK: CPT | Performed by: INTERNAL MEDICINE

## 2025-04-08 PROCEDURE — 93356 MYOCRD STRAIN IMG SPCKL TRCK: CPT

## 2025-04-08 PROCEDURE — 93306 TTE W/DOPPLER COMPLETE: CPT

## 2025-04-08 PROCEDURE — 93306 TTE W/DOPPLER COMPLETE: CPT | Performed by: INTERNAL MEDICINE

## 2025-04-09 LAB
AORTIC ROOT: 3.1 CM
ASCENDING AORTA: 3.2 CM
AV LVOT MEAN GRADIENT: 7 MMHG
AV LVOT PEAK GRADIENT: 11 MMHG
BSA FOR ECHO PROCEDURE: 2.05 M2
DOP CALC LVOT AREA: 2.54 CM2
DOP CALC LVOT CARDIAC INDEX: 3.64 L/MIN/M2
DOP CALC LVOT CARDIAC OUTPUT: 7.46 L/MIN
DOP CALC LVOT DIAMETER: 1.8 CM
DOP CALC LVOT PEAK VEL VTI: 42.47 CM
DOP CALC LVOT PEAK VEL: 1.66 M/S
DOP CALC LVOT STROKE INDEX: 54.1 ML/M2
DOP CALC LVOT STROKE VOLUME: 108.02
E WAVE DECELERATION TIME: 207 MS
E/A RATIO: 1.41
FRACTIONAL SHORTENING: 42 (ref 28–44)
GLOBAL LONGITUIDAL STRAIN: -22 %
INTERVENTRICULAR SEPTUM IN DIASTOLE (PARASTERNAL SHORT AXIS VIEW): 1.8 CM
INTERVENTRICULAR SEPTUM: 1.8 CM (ref 0.6–1.1)
LAAS-AP2: 18 CM2
LAAS-AP4: 18.4 CM2
LEFT ATRIUM SIZE: 4.3 CM
LEFT ATRIUM VOLUME (MOD BIPLANE): 52 ML
LEFT ATRIUM VOLUME INDEX (MOD BIPLANE): 25.4 ML/M2
LEFT INTERNAL DIMENSION IN SYSTOLE: 2.5 CM (ref 2.1–4)
LEFT VENTRICULAR INTERNAL DIMENSION IN DIASTOLE: 4.3 CM (ref 3.5–6)
LEFT VENTRICULAR POSTERIOR WALL IN END DIASTOLE: 0.9 CM
LEFT VENTRICULAR STROKE VOLUME: 60 ML
LV EF US.2D.A4C+ESTIMATED: 63 %
LVSV (TEICH): 60 ML
MV E'TISSUE VEL-LAT: 9 CM/S
MV E'TISSUE VEL-SEP: 7 CM/S
MV PEAK A VEL: 0.75 M/S
MV PEAK E VEL: 106 CM/S
MV STENOSIS PRESSURE HALF TIME: 60 MS
MV VALVE AREA P 1/2 METHOD: 3.67
RIGHT ATRIUM AREA SYSTOLE A4C: 15.7 CM2
RIGHT VENTRICLE ID DIMENSION: 3.3 CM
SL CV ECHO LV DYNAMIC OBSTRUCTION PEAK GRADIENT (REST): 16 MMHG
SL CV ECHO LV DYNAMIC OBSTRUCTION PEAK GRADIENT (VALSAL: 56 MMHG
SL CV LEFT ATRIUM LENGTH A2C: 5.4 CM
SL CV LV EF: 63
SL CV PED ECHO LEFT VENTRICLE DIASTOLIC VOLUME (MOD BIPLANE) 2D: 83 ML
SL CV PED ECHO LEFT VENTRICLE SYSTOLIC VOLUME (MOD BIPLANE) 2D: 23 ML
TRICUSPID ANNULAR PLANE SYSTOLIC EXCURSION: 2.2 CM

## 2025-05-12 ENCOUNTER — APPOINTMENT (OUTPATIENT)
Age: 70
End: 2025-05-12
Attending: STUDENT IN AN ORGANIZED HEALTH CARE EDUCATION/TRAINING PROGRAM
Payer: MEDICARE

## 2025-05-12 ENCOUNTER — OFFICE VISIT (OUTPATIENT)
Age: 70
End: 2025-05-12
Payer: MEDICARE

## 2025-05-12 VITALS — HEIGHT: 63 IN | WEIGHT: 235 LBS | BODY MASS INDEX: 41.64 KG/M2

## 2025-05-12 DIAGNOSIS — Z47.1 AFTERCARE FOLLOWING RIGHT HIP JOINT REPLACEMENT SURGERY: ICD-10-CM

## 2025-05-12 DIAGNOSIS — Z47.1 AFTERCARE FOLLOWING RIGHT HIP JOINT REPLACEMENT SURGERY: Primary | ICD-10-CM

## 2025-05-12 DIAGNOSIS — Z96.641 AFTERCARE FOLLOWING RIGHT HIP JOINT REPLACEMENT SURGERY: ICD-10-CM

## 2025-05-12 DIAGNOSIS — Z96.641 AFTERCARE FOLLOWING RIGHT HIP JOINT REPLACEMENT SURGERY: Primary | ICD-10-CM

## 2025-05-12 PROCEDURE — 99213 OFFICE O/P EST LOW 20 MIN: CPT | Performed by: STUDENT IN AN ORGANIZED HEALTH CARE EDUCATION/TRAINING PROGRAM

## 2025-05-12 PROCEDURE — 73502 X-RAY EXAM HIP UNI 2-3 VIEWS: CPT
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
## 2025-05-22 ENCOUNTER — TELEPHONE (OUTPATIENT)
Age: 70
End: 2025-05-22

## 2025-05-22 NOTE — TELEPHONE ENCOUNTER
Patient called per referral - Referral made to GI for pancreatic cancer screening as her mother had pancreatic cancer being referred to Dr. Wharton. Can you please look into this, I'm not sure if she would be a advance patient.  Offered to schedule with another doctor in the office she refuses and willing to wait for Dr. Wharton / Please advise

## 2025-08-13 ENCOUNTER — HOSPITAL ENCOUNTER (OUTPATIENT)
Dept: MAMMOGRAPHY | Facility: MEDICAL CENTER | Age: 70
Discharge: HOME | End: 2025-08-13
Payer: MEDICARE

## 2025-08-15 ENCOUNTER — APPOINTMENT (OUTPATIENT)
Age: 70
End: 2025-08-15
Payer: MEDICARE

## 2025-08-22 ENCOUNTER — OFFICE VISIT (OUTPATIENT)
Dept: SURGICAL ONCOLOGY | Facility: CLINIC | Age: 70
End: 2025-08-22
Payer: MEDICARE

## 2025-08-22 VITALS
DIASTOLIC BLOOD PRESSURE: 80 MMHG | TEMPERATURE: 97.7 F | OXYGEN SATURATION: 96 % | HEART RATE: 75 BPM | BODY MASS INDEX: 41.64 KG/M2 | SYSTOLIC BLOOD PRESSURE: 119 MMHG | WEIGHT: 235 LBS | RESPIRATION RATE: 16 BRPM | HEIGHT: 63 IN

## 2025-08-22 DIAGNOSIS — R92.30 DENSE BREAST: ICD-10-CM

## 2025-08-22 DIAGNOSIS — Z12.31 BREAST CANCER SCREENING BY MAMMOGRAM: ICD-10-CM

## 2025-08-22 DIAGNOSIS — Z91.89 INCREASED RISK OF BREAST CANCER: ICD-10-CM

## 2025-08-22 DIAGNOSIS — Z15.01 BRCA1 POSITIVE: Primary | ICD-10-CM

## 2025-08-22 DIAGNOSIS — Z15.09 BRCA1 POSITIVE: Primary | ICD-10-CM

## 2025-08-22 PROCEDURE — 99213 OFFICE O/P EST LOW 20 MIN: CPT

## 2025-08-22 PROCEDURE — G2211 COMPLEX E/M VISIT ADD ON: HCPCS

## 2025-08-22 RX ORDER — CEPHALEXIN 500 MG/1
CAPSULE ORAL
COMMUNITY
Start: 2025-06-13

## 2025-08-22 RX ORDER — MUPIROCIN 2 %
OINTMENT (GRAM) TOPICAL
COMMUNITY
Start: 2025-06-13

## (undated) DEVICE — SPONGE PVP SCRUB WING STERILE

## (undated) DEVICE — SUT VICRYL 2-0 CT-1 27 IN J259H

## (undated) DEVICE — 2.5MM REAMING ROD WITH BALL TIP/950MM-STERILE

## (undated) DEVICE — 450 ML BOTTLE OF 0.05% CHLORHEXIDINE GLUCONATE IN 99.95% STERILE WATER FOR IRRIGATION, USP AND APPLICATOR.: Brand: IRRISEPT ANTIMICROBIAL WOUND LAVAGE

## (undated) DEVICE — 3.2MM GUIDE WIRE 400MM

## (undated) DEVICE — 4.2MM THREE-FLUTED DRILL BIT QC/NEEDLE POINT/145MM

## (undated) DEVICE — TIBURON HIP DRAPE WITH POUCHES: Brand: CONVERTORS

## (undated) DEVICE — TRAY FOLEY 16FR URIMETER SURESTEP

## (undated) DEVICE — CEMENT MIXING CARTRIDGE PRISM II

## (undated) DEVICE — GLOVE INDICATOR PI UNDERGLOVE SZ 8.5 BLUE

## (undated) DEVICE — SYRINGE 50ML LL

## (undated) DEVICE — SPECIMEN CONTAINER STERILE PEEL PACK

## (undated) DEVICE — NEEDLE 18 G X 1 1/2

## (undated) DEVICE — GLOVE INDICATOR PI UNDERGLOVE SZ 6.5 BLUE

## (undated) DEVICE — ABDOMINAL PAD: Brand: DERMACEA

## (undated) DEVICE — FRAZIER SUCTION INSTRUMENT 18 FR W/OBTURATOR, NO CONTROL VENT: Brand: FRAZIER

## (undated) DEVICE — STOCKINETTE REGULAR

## (undated) DEVICE — GLOVE SRG BIOGEL 8.5

## (undated) DEVICE — COBAN 6 IN STERILE

## (undated) DEVICE — ADHESIVE SKIN CLSR DERMABOND NX

## (undated) DEVICE — HOOD: Brand: FLYTE, SURGICOOL

## (undated) DEVICE — SAW BLADE OSCILLATING BRAZOL 167

## (undated) DEVICE — SILVER-COATED ANTIMICROBIAL BARRIER DRESSING: Brand: ACTICOAT   4" X 8"

## (undated) DEVICE — GLOVE SRG BIOGEL 6.5

## (undated) DEVICE — BIPOLAR SEALER 23-113-1 AQM 2.3: Brand: AQUAMANTYS™

## (undated) DEVICE — KERLIX BANDAGE ROLL: Brand: KERLIX

## (undated) DEVICE — CHLORAPREP HI-LITE 26ML ORANGE

## (undated) DEVICE — GLOVE SRG BIOGEL 8

## (undated) DEVICE — SURGICAL GOWN, XL SMARTSLEEVE: Brand: CONVERTORS

## (undated) DEVICE — SUT STRATAFIX SPIRAL PLUS 3-0 PS-2 30 X 30 CM SXMP2B408

## (undated) DEVICE — ALL PURPOSE SPONGES,NON-WOVEN, 4 PLY: Brand: CURITY

## (undated) DEVICE — GLOVE SRG BIOGEL ORTHOPEDIC 8.5

## (undated) DEVICE — BULB SYRINGE,IRRIGATION WITH PROTECTIVE CAP: Brand: DOVER

## (undated) DEVICE — PLUMEPEN PRO 10FT

## (undated) DEVICE — SCD SEQUENTIAL COMPRESSION COMFORT SLEEVE MEDIUM KNEE LENGTH: Brand: KENDALL SCD

## (undated) DEVICE — SPONGE LAP 18 X 18 IN

## (undated) DEVICE — IMPERVIOUS STOCKINETTE: Brand: DEROYAL

## (undated) DEVICE — JP 3-SPRING RES W/10FR PVC DRAIN/TR: Brand: CARDINAL HEALTH

## (undated) DEVICE — BETHLEHEM TOTAL HIP, KIT: Brand: CARDINAL HEALTH

## (undated) DEVICE — DRESSING MEPILEX AG BORDER POST-OP 4 X 10 IN

## (undated) DEVICE — 3M™ IOBAN™ 2 ANTIMICROBIAL INCISE DRAPE 6648EZ: Brand: IOBAN™ 2

## (undated) DEVICE — ACE WRAP 6 IN UNSTERILE

## (undated) DEVICE — 4.0 MM X 2.35 MM X 70.0 MM OVAL CARBIDE BUR, 8 FLUTE

## (undated) DEVICE — DRAPE SHEET THREE QUARTER

## (undated) DEVICE — SUT VICRYL 0 CT-1 36 IN J946H

## (undated) DEVICE — DRAPE C-ARM X-RAY

## (undated) DEVICE — HEAVY DUTY TABLE COVER: Brand: CONVERTORS

## (undated) DEVICE — INTENDED FOR TISSUE SEPARATION, AND OTHER PROCEDURES THAT REQUIRE A SHARP SURGICAL BLADE TO PUNCTURE OR CUT.: Brand: BARD-PARKER ® CARBON RIB-BACK BLADES

## (undated) DEVICE — 3000CC GUARDIAN II: Brand: GUARDIAN

## (undated) DEVICE — SPONGE GAUZE 4 X 9

## (undated) DEVICE — UNIVERSAL MAJOR EXTREMITY,KIT: Brand: CARDINAL HEALTH

## (undated) DEVICE — 3M™ STERI-DRAPE™ U-DRAPE 1015: Brand: STERI-DRAPE™

## (undated) DEVICE — SUT VICRYL 1 CT-1 27 IN J261H

## (undated) DEVICE — CEMENT MIXING BOWL W/SPATULA

## (undated) DEVICE — THE SIMPULSE SOLO SYSTEM WITH ULTREX RETRACTABLE SPLASH SHIELD TIP: Brand: SIMPULSE SOLO

## (undated) DEVICE — SUT STRATAFIX SPIRAL PDS PLUS 1 CTX 18 IN SXPP1A400

## (undated) DEVICE — PROXIMATE PLUS MD MULTI-DIRECTIONAL RELEASE SKIN STAPLERS CONTAINS 35 STAINLESS STEEL STAPLES APPROXIMATE CLOSED DIMENSIONS: 6.9MM X 3.9MM WIDE: Brand: PROXIMATE

## (undated) DEVICE — PADDING CAST 4 IN  COTTON STRL

## (undated) DEVICE — 4.2MM THREE-FLUTED DRILL BIT QC/330MM/100MM CALIBRATION